# Patient Record
Sex: FEMALE | Race: WHITE | NOT HISPANIC OR LATINO | Employment: OTHER | URBAN - NONMETROPOLITAN AREA
[De-identification: names, ages, dates, MRNs, and addresses within clinical notes are randomized per-mention and may not be internally consistent; named-entity substitution may affect disease eponyms.]

---

## 2017-02-11 ENCOUNTER — TELEPHONE (OUTPATIENT)
Dept: FAMILY MEDICINE | Facility: OTHER | Age: 62
End: 2017-02-11

## 2017-02-11 NOTE — PATIENT INSTRUCTIONS
pLease call her and let her know it is time for a recheck of her kidney and liver function.  Electronically signed:    Phil Cunha PA-C

## 2017-05-26 ENCOUNTER — HEALTH MAINTENANCE LETTER (OUTPATIENT)
Age: 62
End: 2017-05-26

## 2017-06-02 DIAGNOSIS — E61.1 IRON DEFICIENCY: ICD-10-CM

## 2017-06-02 DIAGNOSIS — I10 HYPERTENSION GOAL BP (BLOOD PRESSURE) < 140/90: ICD-10-CM

## 2017-06-02 DIAGNOSIS — I10 BENIGN ESSENTIAL HYPERTENSION WITH TARGET BLOOD PRESSURE BELOW 140/90: ICD-10-CM

## 2017-06-05 RX ORDER — LISINOPRIL 10 MG/1
TABLET ORAL
Qty: 90 TABLET | Refills: 0 | Status: SHIPPED | OUTPATIENT
Start: 2017-06-05 | End: 2018-03-01

## 2017-06-05 RX ORDER — TRIAMTERENE AND HYDROCHLOROTHIAZIDE 37.5; 25 MG/1; MG/1
CAPSULE ORAL
Qty: 90 CAPSULE | Refills: 0 | Status: SHIPPED | OUTPATIENT
Start: 2017-06-05 | End: 2018-03-01

## 2017-06-05 NOTE — TELEPHONE ENCOUNTER
Lisinopril      Last Written Prescription Date: 11/30/16  Last Fill Quantity: 90, # refills: 1  Last Office Visit with [facility] [place] [place], [facility] or [facility] prescribing provider: 11/30/16       Potassium   Date Value Ref Range Status   12/19/2016 4.6 3.4 - 5.3 mmol/L Final     Creatinine   Date Value Ref Range Status   12/19/2016 1.36 (H) 0.52 - 1.04 mg/dL Final     BP Readings from Last 3 Encounters:   11/30/16 124/80   09/23/15 122/78   05/19/14 130/80     Dyazide      Last Written Prescription Date: 11/30/16  Last Fill Quantity: 90, # refills: 1  Last Office Visit with [facility] [place] [place], [facility] or [facility] prescribing provider: 11/30/16       Potassium   Date Value Ref Range Status   12/19/2016 4.6 3.4 - 5.3 mmol/L Final     Creatinine   Date Value Ref Range Status   12/19/2016 1.36 (H) 0.52 - 1.04 mg/dL Final     BP Readings from Last 3 Encounters:   11/30/16 124/80   09/23/15 122/78   05/19/14 130/80

## 2017-06-05 NOTE — TELEPHONE ENCOUNTER
Routing refill request to provider for review/approval because:  Labs out of range:  Creatinine    Alexandria Ritter RN  [facility]

## 2017-08-31 DIAGNOSIS — I10 BENIGN ESSENTIAL HYPERTENSION WITH TARGET BLOOD PRESSURE BELOW 140/90: ICD-10-CM

## 2017-08-31 DIAGNOSIS — E61.1 IRON DEFICIENCY: ICD-10-CM

## 2017-08-31 DIAGNOSIS — I10 HYPERTENSION GOAL BP (BLOOD PRESSURE) < 140/90: ICD-10-CM

## 2017-08-31 RX ORDER — TRIAMTERENE AND HYDROCHLOROTHIAZIDE 37.5; 25 MG/1; MG/1
CAPSULE ORAL
Qty: 90 CAPSULE | Refills: 0 | Status: SHIPPED | OUTPATIENT
Start: 2017-08-31 | End: 2017-11-27

## 2017-08-31 RX ORDER — LISINOPRIL 10 MG/1
TABLET ORAL
Qty: 90 TABLET | Refills: 0 | Status: SHIPPED | OUTPATIENT
Start: 2017-08-31 | End: 2017-11-27

## 2017-08-31 NOTE — TELEPHONE ENCOUNTER
Routing refill request to provider for review/approval because:  Labs out of range:  Creatinine    Alexandria Ritter RN  [facility]

## 2017-08-31 NOTE — TELEPHONE ENCOUNTER
Dyazide      Last Written Prescription Date: 6/05/2017  Last Fill Quantity: 90, # refills: 0  Last Office Visit with [facility] [place], [facility] or [facility] prescribing provider: 11/30/2016       Potassium   Date Value Ref Range Status   12/19/2016 4.6 3.4 - 5.3 mmol/L Final     Creatinine   Date Value Ref Range Status   12/19/2016 1.36 (H) 0.52 - 1.04 mg/dL Final     BP Readings from Last 3 Encounters:   11/30/16 124/80   09/23/15 122/78   05/19/14 130/80     Lisinopril      Last Written Prescription Date: 6/05/2017  Last Fill Quantity: 90, # refills: 0  Last Office Visit with [facility] [place], [facility] or [facility] prescribing provider: 11/30/2016       Potassium   Date Value Ref Range Status   12/19/2016 4.6 3.4 - 5.3 mmol/L Final     Creatinine   Date Value Ref Range Status   12/19/2016 1.36 (H) 0.52 - 1.04 mg/dL Final     BP Readings from Last 3 Encounters:   11/30/16 124/80   09/23/15 122/78   05/19/14 130/80

## 2017-11-27 DIAGNOSIS — I10 BENIGN ESSENTIAL HYPERTENSION WITH TARGET BLOOD PRESSURE BELOW 140/90: ICD-10-CM

## 2017-11-27 DIAGNOSIS — E61.1 IRON DEFICIENCY: ICD-10-CM

## 2017-11-27 DIAGNOSIS — I10 HYPERTENSION GOAL BP (BLOOD PRESSURE) < 140/90: ICD-10-CM

## 2017-11-28 RX ORDER — LISINOPRIL 10 MG/1
TABLET ORAL
Qty: 90 TABLET | Refills: 0 | Status: SHIPPED | OUTPATIENT
Start: 2017-11-28 | End: 2018-03-12

## 2017-11-28 RX ORDER — TRIAMTERENE AND HYDROCHLOROTHIAZIDE 37.5; 25 MG/1; MG/1
CAPSULE ORAL
Qty: 90 CAPSULE | Refills: 0 | Status: SHIPPED | OUTPATIENT
Start: 2017-11-28 | End: 2018-03-12

## 2017-11-28 RX ORDER — SULFASALAZINE 500 MG/1
TABLET ORAL
Qty: 360 TABLET | Refills: 0 | Status: SHIPPED | OUTPATIENT
Start: 2017-11-28 | End: 2018-03-12

## 2017-11-28 NOTE — TELEPHONE ENCOUNTER
Requested Prescriptions   Pending Prescriptions Disp Refills     sulfaSALAzine (AZULFIDINE) 500 MG tablet [Pharmacy Med Name: SULFASALAZINE 500MG TABS] 360 tablet 0     Sig: TAKE TWO TABLETS BY MOUTH TWICE A DAY --NO FURTHER REFILLS WITHOUT OFFICE VISIT    There is no refill protocol information for this order        lisinopril (PRINIVIL/ZESTRIL) 10 MG tablet [Pharmacy Med Name: LISINOPRIL 10MG TABS] 90 tablet 0     Sig: TAKE ONE TABLET BY MOUTH EVERY DAY--NO FURTHER REFILLS WITHOUT OFFICE VISIT    ACE Inhibitors (Including Combos) Protocol Failed    11/27/2017  7:52 AM       Failed - Normal serum creatinine on file in past 12 months    Recent Labs   Lab Test  12/19/16   1539   CR  1.36*            Passed - Blood pressure under 140/90    BP Readings from Last 3 Encounters:   11/30/16 124/80   09/23/15 122/78 05/19/14 130/80                Passed - Recent or future visit with authorizing provider's specialty    Patient had office visit in the last year or has a visit in the next 30 days with authorizing provider.  See chart review.              Passed - Patient is age 18 or older       Passed - No active pregnancy on record       Passed - Normal serum potassium on file in past 12 months    Recent Labs   Lab Test  12/19/16   1539   POTASSIUM  4.6            Passed - No positive pregnancy test in past 12 months        triamterene-hydrochlorothiazide (DYAZIDE) 37.5-25 MG per capsule [Pharmacy Med Name: TRIAMTERENE-HCTZ 37.5-25MG CAPS] 90 capsule 0     Sig: TAKE ONE CAPSULE BY MOUTH EVERY DAY--NO FURTHER REFILLS WITHOUT OFFICE VISIT    Diuretics (Including Combos) Protocol Failed    11/27/2017  7:52 AM       Failed - Normal serum creatinine on file in past 12 months    Recent Labs   Lab Test  12/19/16   1539   CR  1.36*             Passed - Blood pressure under 140/90    BP Readings from Last 3 Encounters:   11/30/16 124/80   09/23/15 122/78   05/19/14 130/80                Passed - Recent or future visit with  authorizing provider's specialty    Patient had office visit in the last year or has a visit in the next 30 days with authorizing provider.  See chart review.              Passed - Patient is age 18 or older       Passed - No active pregancy on record       Passed - Normal serum potassium on file in past 12 months    Recent Labs   Lab Test  12/19/16   1539   POTASSIUM  4.6                   Passed - Normal serum sodium on file in past 12 months    Recent Labs   Lab Test  12/19/16   1539   NA  144             Passed - No positive pregnancy test in past 12 months        sulfaSALAzine (AZULFIDINE) 500 MG tablet  Routing refill request to provider for review/approval because:  Lisa given x1 and patient did not follow up, please advise  Drug not on the [facility] refill protocol   Patient needs to be seen because:  Due for OV    lisinopril (PRINIVIL/ZESTRIL) 10 MG tablet  Routing refill request to provider for review/approval because:  Lisa given x1 and patient did not follow up, please advise  A break in medication, should have needed refills around 09/2017  Patient needs to be seen because:  Due for OV    triamterene-hydrochlorothiazide (DYAZIDE) 37.5-25 MG per capsule  Routing refill request to provider for review/approval because:  Lisa given x1 and patient did not follow up, please advise  Labs out of range:  creatinine  Patient needs to be seen because:  Due for OV    Isabel Lam, RN, BSN

## 2018-01-10 ENCOUNTER — TELEPHONE (OUTPATIENT)
Dept: FAMILY MEDICINE | Facility: OTHER | Age: 63
End: 2018-01-10

## 2018-01-10 NOTE — TELEPHONE ENCOUNTER
1/10/2018    Call Regarding Preventive Health Screening Colonoscopy, Mammogram and Cervical/PAP    Attempt 2    Message left with a man    Comments:     Outreach   Treva Marrero

## 2018-02-02 NOTE — TELEPHONE ENCOUNTER
2/2/2018    Call Regarding Preventive Health Screening Colonoscopy, Mammogram and Cervical/PAP    Attempt 3    Message left with a man    Comments:       Outreach   Kandis Luna

## 2018-02-27 DIAGNOSIS — E61.1 IRON DEFICIENCY: ICD-10-CM

## 2018-02-27 DIAGNOSIS — I10 HYPERTENSION GOAL BP (BLOOD PRESSURE) < 140/90: ICD-10-CM

## 2018-02-27 DIAGNOSIS — I10 BENIGN ESSENTIAL HYPERTENSION WITH TARGET BLOOD PRESSURE BELOW 140/90: ICD-10-CM

## 2018-02-28 RX ORDER — TRIAMTERENE AND HYDROCHLOROTHIAZIDE 37.5; 25 MG/1; MG/1
CAPSULE ORAL
Qty: 90 CAPSULE | Refills: 0 | OUTPATIENT
Start: 2018-02-28

## 2018-02-28 RX ORDER — SULFASALAZINE 500 MG/1
TABLET ORAL
Qty: 360 TABLET | Refills: 0 | OUTPATIENT
Start: 2018-02-28

## 2018-02-28 RX ORDER — LISINOPRIL 10 MG/1
TABLET ORAL
Qty: 90 TABLET | Refills: 0 | OUTPATIENT
Start: 2018-02-28

## 2018-02-28 NOTE — TELEPHONE ENCOUNTER
No further refills without office visit.  I have net seen her since 11/2016 and am not listed as her PCP.  Electronically signed:    Phil Cunha PA-C

## 2018-02-28 NOTE — TELEPHONE ENCOUNTER
sulfaSALAzine (AZULFIDINE) 500 MG tablet  Routing refill request to provider for review/approval because:  Lisa given x2 and patient did not follow up, please advise  Drug not on the [facility] refill protocol   Patient needs to be seen because:  Due for OV    lisinopril (PRINIVIL/ZESTRIL) 10 MG tablet  Routing refill request to provider for review/approval because:  Lisa given x2 and patient did not follow up, please advise  Labs not current:  CMP  Patient needs to be seen because:  Due for OV     triamterene-hydrochlorothiazide (DYAZIDE) 37.5-25 MG per capsule  Routing refill request to provider for review/approval because:  Lisa given x2 and patient did not follow up, please advise  Labs not current:  CMP  Patient needs to be seen because:  Due for OV     Please assist with scheduling.    Isabel Lam, RN, BSN

## 2018-03-01 RX ORDER — LISINOPRIL 10 MG/1
10 TABLET ORAL DAILY
Qty: 14 TABLET | Refills: 0 | Status: SHIPPED | OUTPATIENT
Start: 2018-03-01 | End: 2018-03-12

## 2018-03-01 RX ORDER — TRIAMTERENE AND HYDROCHLOROTHIAZIDE 37.5; 25 MG/1; MG/1
1 CAPSULE ORAL DAILY
Qty: 14 CAPSULE | Refills: 0 | Status: SHIPPED | OUTPATIENT
Start: 2018-03-01 | End: 2018-03-12

## 2018-03-01 RX ORDER — SULFASALAZINE 500 MG/1
1000 TABLET ORAL 2 TIMES DAILY
Qty: 56 TABLET | Refills: 0 | Status: SHIPPED | OUTPATIENT
Start: 2018-03-01 | End: 2018-03-12

## 2018-03-01 NOTE — TELEPHONE ENCOUNTER
Pt called back. Informed she needs an appt. She has one scheduled already for 3/12 to est with Dr. Cartwright. Can she get a supply to last until then?   Thank you,  Annamarie Heaton- Pt Rep.

## 2018-03-01 NOTE — TELEPHONE ENCOUNTER
Spoke with patient informed her of message below, no further questions   Closing encounter  Malia Gallardo RT (R)

## 2018-03-12 ENCOUNTER — OFFICE VISIT (OUTPATIENT)
Dept: FAMILY MEDICINE | Facility: OTHER | Age: 63
End: 2018-03-12
Payer: COMMERCIAL

## 2018-03-12 VITALS
TEMPERATURE: 98 F | SYSTOLIC BLOOD PRESSURE: 124 MMHG | RESPIRATION RATE: 20 BRPM | HEIGHT: 61 IN | OXYGEN SATURATION: 94 % | HEART RATE: 109 BPM | BODY MASS INDEX: 44.07 KG/M2 | DIASTOLIC BLOOD PRESSURE: 82 MMHG | WEIGHT: 233.4 LBS

## 2018-03-12 DIAGNOSIS — I10 BENIGN ESSENTIAL HYPERTENSION WITH TARGET BLOOD PRESSURE BELOW 140/90: ICD-10-CM

## 2018-03-12 DIAGNOSIS — D51.9 ANEMIA DUE TO VITAMIN B12 DEFICIENCY, UNSPECIFIED B12 DEFICIENCY TYPE: ICD-10-CM

## 2018-03-12 DIAGNOSIS — R73.03 PREDIABETES: Primary | ICD-10-CM

## 2018-03-12 DIAGNOSIS — K50.80 CROHN'S DISEASE OF BOTH SMALL AND LARGE INTESTINE WITHOUT COMPLICATION (H): ICD-10-CM

## 2018-03-12 DIAGNOSIS — E61.1 IRON DEFICIENCY: ICD-10-CM

## 2018-03-12 DIAGNOSIS — I10 HYPERTENSION GOAL BP (BLOOD PRESSURE) < 140/90: ICD-10-CM

## 2018-03-12 DIAGNOSIS — I27.0 PRIMARY PULMONARY HYPERTENSION (H): ICD-10-CM

## 2018-03-12 LAB
ANION GAP SERPL CALCULATED.3IONS-SCNC: 7 MMOL/L (ref 3–14)
BUN SERPL-MCNC: 25 MG/DL (ref 7–30)
CALCIUM SERPL-MCNC: 8.8 MG/DL (ref 8.5–10.1)
CHLORIDE SERPL-SCNC: 106 MMOL/L (ref 94–109)
CO2 SERPL-SCNC: 27 MMOL/L (ref 20–32)
CREAT SERPL-MCNC: 1.09 MG/DL (ref 0.52–1.04)
CREAT UR-MCNC: 144 MG/DL
ERYTHROCYTE [DISTWIDTH] IN BLOOD BY AUTOMATED COUNT: 13.8 % (ref 10–15)
FERRITIN SERPL-MCNC: 454 NG/ML (ref 8–252)
GFR SERPL CREATININE-BSD FRML MDRD: 51 ML/MIN/1.7M2
GLUCOSE SERPL-MCNC: 102 MG/DL (ref 70–99)
HBA1C MFR BLD: 4.8 % (ref 4.3–6)
HCT VFR BLD AUTO: 41.6 % (ref 35–47)
HGB BLD-MCNC: 13 G/DL (ref 11.7–15.7)
MCH RBC QN AUTO: 29.7 PG (ref 26.5–33)
MCHC RBC AUTO-ENTMCNC: 31.3 G/DL (ref 31.5–36.5)
MCV RBC AUTO: 95 FL (ref 78–100)
MICROALBUMIN UR-MCNC: 6 MG/L
MICROALBUMIN/CREAT UR: 4.02 MG/G CR (ref 0–25)
PLATELET # BLD AUTO: 227 10E9/L (ref 150–450)
POTASSIUM SERPL-SCNC: 4.5 MMOL/L (ref 3.4–5.3)
RBC # BLD AUTO: 4.37 10E12/L (ref 3.8–5.2)
SODIUM SERPL-SCNC: 140 MMOL/L (ref 133–144)
WBC # BLD AUTO: 8 10E9/L (ref 4–11)

## 2018-03-12 PROCEDURE — 82728 ASSAY OF FERRITIN: CPT | Performed by: INTERNAL MEDICINE

## 2018-03-12 PROCEDURE — 36415 COLL VENOUS BLD VENIPUNCTURE: CPT | Performed by: INTERNAL MEDICINE

## 2018-03-12 PROCEDURE — 99214 OFFICE O/P EST MOD 30 MIN: CPT | Performed by: INTERNAL MEDICINE

## 2018-03-12 PROCEDURE — 85027 COMPLETE CBC AUTOMATED: CPT | Performed by: INTERNAL MEDICINE

## 2018-03-12 PROCEDURE — 82043 UR ALBUMIN QUANTITATIVE: CPT | Performed by: INTERNAL MEDICINE

## 2018-03-12 PROCEDURE — 83036 HEMOGLOBIN GLYCOSYLATED A1C: CPT | Performed by: INTERNAL MEDICINE

## 2018-03-12 PROCEDURE — 80048 BASIC METABOLIC PNL TOTAL CA: CPT | Performed by: INTERNAL MEDICINE

## 2018-03-12 RX ORDER — SULFASALAZINE 500 MG/1
1000 TABLET ORAL 2 TIMES DAILY
Qty: 56 TABLET | Refills: 0 | Status: SHIPPED | OUTPATIENT
Start: 2018-03-12 | End: 2018-03-14

## 2018-03-12 RX ORDER — TRIAMTERENE AND HYDROCHLOROTHIAZIDE 37.5; 25 MG/1; MG/1
1 CAPSULE ORAL DAILY
Qty: 14 CAPSULE | Refills: 0 | Status: SHIPPED | OUTPATIENT
Start: 2018-03-12 | End: 2018-03-14

## 2018-03-12 RX ORDER — LISINOPRIL 10 MG/1
10 TABLET ORAL DAILY
Qty: 14 TABLET | Refills: 0 | Status: SHIPPED | OUTPATIENT
Start: 2018-03-12 | End: 2018-03-14

## 2018-03-12 ASSESSMENT — PAIN SCALES - GENERAL: PAINLEVEL: NO PAIN (0)

## 2018-03-12 NOTE — NURSING NOTE
"Chief Complaint   Patient presents with     Establish Care       Initial /82 (BP Location: Left arm, Patient Position: Chair, Cuff Size: Adult Large)  Pulse 109  Temp 98  F (36.7  C) (Temporal)  Resp 20  Ht 5' 1.2\" (1.554 m)  Wt 233 lb 6.4 oz (105.9 kg)  SpO2 94%  BMI 43.81 kg/m2 Estimated body mass index is 43.81 kg/(m^2) as calculated from the following:    Height as of this encounter: 5' 1.2\" (1.554 m).    Weight as of this encounter: 233 lb 6.4 oz (105.9 kg).  Medication Reconciliation: complete     Aida Pete MA 3/12/2018  4:11 PM          "

## 2018-03-12 NOTE — LETTER
March 20, 2018      Nadia Ladd  [address] [facility] [address]        Dear ,    We are writing to inform you of your test results.    The ferritin is slightly elevated at 454.   The kidney function has improved significantly over the last year.     Resulted Orders   Albumin Random Urine Quantitative with Creat Ratio   Result Value Ref Range    Creatinine Urine 144 mg/dL    Albumin Urine mg/L 6 mg/L    Albumin Urine mg/g Cr 4.02 0 - 25 mg/g Cr   Basic metabolic panel   Result Value Ref Range    Sodium 140 133 - 144 mmol/L    Potassium 4.5 3.4 - 5.3 mmol/L    Chloride 106 94 - 109 mmol/L    Carbon Dioxide 27 20 - 32 mmol/L    Anion Gap 7 3 - 14 mmol/L    Glucose 102 (H) 70 - 99 mg/dL    Urea Nitrogen 25 7 - 30 mg/dL    Creatinine 1.09 (H) 0.52 - 1.04 mg/dL    GFR Estimate 51 (L) >60 mL/min/1.7m2      Comment:      Non  GFR Calc    GFR Estimate If Black 62 >60 mL/min/1.7m2      Comment:       GFR Calc    Calcium 8.8 8.5 - 10.1 mg/dL   Hemoglobin A1c   Result Value Ref Range    Hemoglobin A1C 4.8 4.3 - 6.0 %   CBC with platelets   Result Value Ref Range    WBC 8.0 4.0 - 11.0 10e9/L    RBC Count 4.37 3.8 - 5.2 10e12/L    Hemoglobin 13.0 11.7 - 15.7 g/dL    Hematocrit 41.6 35.0 - 47.0 %    MCV 95 78 - 100 fl    MCH 29.7 26.5 - 33.0 pg    MCHC 31.3 (L) 31.5 - 36.5 g/dL    RDW 13.8 10.0 - 15.0 %    Platelet Count 227 150 - 450 10e9/L   Ferritin   Result Value Ref Range    Ferritin 454 (H) 8 - 252 ng/mL       If you have any questions or concerns, please call the clinic at the number listed above.       Sincerely,        Nomi Cartwright DO

## 2018-03-12 NOTE — MR AVS SNAPSHOT
"              After Visit Summary   3/12/2018    Nadia Ladd    MRN: [id number]           Patient Information     Date Of Birth          1955        Visit Information        Provider Department      3/12/2018 3:40 PM Nomi Cartwright DO [facility]        Today's Diagnoses     Prediabetes    -  1    Hypertension goal BP (blood pressure) < 140/90        Iron deficiency        Crohn's disease of both small and large intestine without complication (H)        Primary pulmonary hypertension (H)        Anemia due to vitamin B12 deficiency, unspecified B12 deficiency type        Benign essential hypertension with target blood pressure below 140/90           Follow-ups after your visit        Who to contact     If you have questions or need follow up information about today's clinic visit or your schedule please contact [facility] directly at [phone].  Normal or non-critical lab and imaging results will be communicated to you by MyChart, letter or phone within 4 business days after the clinic has received the results. If you do not hear from us within 7 days, please contact the clinic through MyChart or phone. If you have a critical or abnormal lab result, we will notify you by phone as soon as possible.  Submit refill requests through [employer] or call your pharmacy and they will forward the refill request to us. Please allow 3 business days for your refill to be completed.          Additional Information About Your Visit        MyChart Information     TableApp lets you send messages to your doctor, view your test results, renew your prescriptions, schedule appointments and more. To sign up, go to [email or web address] . Click on \"Log in\" on the left side of the screen, which will take you to the Welcome page. Then click on \"Sign up Now\" on the right side of the page.     You will be asked to enter the access code listed below, as well as some personal information. Please " "follow the directions to create your username and password.     Your access code is: [email or web address]  Expires: 2018 11:17 AM     Your access code will  in 90 days. If you need help or a new code, please call your [facility] clinic or [phone].        Care EveryWhere ID     This is your Care EveryWhere ID. This could be used by other organizations to access your [facility] medical records  [id number]        Your Vitals Were     Pulse Temperature Respirations Height Pulse Oximetry BMI (Body Mass Index)    109 98  F (36.7  C) (Temporal) 20 5' 1.2\" (1.554 m) 94% 43.81 kg/m2       Blood Pressure from Last 3 Encounters:   18 124/82   16 124/80   09/23/15 122/78    Weight from Last 3 Encounters:   18 233 lb 6.4 oz (105.9 kg)   16 229 lb 4.8 oz (104 kg)   09/23/15 209 lb 1.6 oz (94.8 kg)              We Performed the Following     Albumin Random Urine Quantitative with Creat Ratio     Basic metabolic panel     CBC with platelets     Ferritin     Hemoglobin A1c     IRON/IRON BINDING CAPACITY          Where to get your medicines      These medications were sent to [facility] [address]   [address] [facility] [address]     Phone:  [phone]     lisinopril 10 MG tablet    sulfaSALAzine 500 MG tablet    triamterene-hydrochlorothiazide 37.5-25 MG per capsule          Primary Care Provider Office Phone # Fax #    Nomi Geovani Cartwright [phone]       [address] [place] [address]        Equal Access to Services     PATTY SOLIS : Hadii mariah Spencer, wajimda luanthony, qaybta nas brizuela. So [facility] [phone].    ATENCIÓN: Si habla español, tiene a hampton disposición servicios gratuitos de asistencia lingüística. Llame al [phone].    We comply with applicable federal civil rights laws and [place] laws. We do not discriminate on the basis of race, color, national origin, " age, disability, sex, sexual orientation, or gender identity.            Thank you!     Thank you for choosing [facility]  for your care. Our goal is always to provide you with excellent care. Hearing back from our patients is one way we can continue to improve our services. Please take a few minutes to complete the written survey that you may receive in the mail after your visit with us. Thank you!             Your Updated Medication List - Protect others around you: Learn how to safely use, store and throw away your medicines at www.disposemymeds.org.          This list is accurate as of 3/12/18 11:59 PM.  Always use your most recent med list.                   Brand Name Dispense Instructions for use Diagnosis    cyanocobalamin 1000 MCG Tbcr    VITAMIN B-12 ER    100 tablet    Take 1,000 mcg by mouth daily    Vitamin B12 deficiency disease       ferrous sulfate 325 (65 FE) MG tablet    IRON    200 tablet    TAKE ONE TABLET BY MOUTH TWICE A DAY --NO FURTHER REFILLS WITHOUT OFFICE VISIT    Iron deficiency, Benign essential hypertension with target blood pressure below 140/90, Hypertension goal BP (blood pressure) < 140/90       fluocinonide 0.05 % cream    LIDEX    60 g    Small amount to affected area(s) BID PRN    Dermatitis       ibuprofen 200 MG tablet    ADVIL/MOTRIN     1 tablet daily as needed        lisinopril 10 MG tablet    PRINIVIL/ZESTRIL    14 tablet    Take 1 tablet (10 mg) by mouth daily    Hypertension goal BP (blood pressure) < 140/90       MULTI-DAY PLUS IRON PO           sulfaSALAzine 500 MG tablet    AZULFIDINE    56 tablet    Take 2 tablets (1,000 mg) by mouth 2 times daily    Crohn's disease of both small and large intestine without complication (H)       triamterene-hydrochlorothiazide 37.5-25 MG per capsule    DYAZIDE    14 capsule    Take 1 capsule by mouth daily    Hypertension goal BP (blood pressure) < 140/90

## 2018-03-12 NOTE — PROGRESS NOTES
SUBJECTIVE:   Nadia Ladd is a 62 year old female who presents to clinic today for the following health issues:        New Patient/Transfer of Care                   Chief Complaint    The patient is a pleasant 62-year-old female who presents today for first-time evaluation.  She has a history of Crohn's disease of both large and small intestine.  This is led to some iron deficiency.  She also has a history of hypertension and pulmonary hypertension.  She is currently having no acute respiratory compromise.  She does have some elevation in her blood sugar on previous lab work but has had no polyuria or polydipsia.  She has no symptoms of diabetes at this time.  She does have some associated B12 deficiency secondary to chronic inflammatory bowel disease.  By way of her medications, she is currently on Azulfidine and this is working fairly well for suppression of the inflammatory bowel disease.  She is on lisinopril and Dyazide for her hypertension.  She is on oral iron supplementation has been no significant side effects including constipation secondary to this at the time.                       PAST, FAMILY,SOCIAL HISTORY:     Medical  History:   has a past medical history of Arthropathia (8/5/2010); B12 deficiency anemia (10/21/2010); Benign essential hypertension with target blood pressure below 140/90 (10/10/2016); CARDIOVASCULAR SCREENING; LDL GOAL LESS THAN 160 (10/31/2010); Crohn's colitis (H) (2/15/2011); Dermatitis-dishydrotic eczema-severe (8/5/2010); HTN (hypertension) (7/21/2011); Iron deficiency anemia (10/21/2010); Primary pulmonary hypertension (H) (4/6/2010); and Unspecified essential hypertension.     Surgical History:   has a past surgical history that includes surgical history of -  (06/14/76); EXPLORATORY OF ABDOMEN (1989); and colonoscopy (10/11/10).     Social History:   reports that she has never smoked. She has never used smokeless tobacco. She reports that she does not drink alcohol or  "use illicit drugs.     Family History:  family history includes Anemia in her father; CEREBROVASCULAR DISEASE in her father; DIABETES in her father; Hypertension in her mother.            MEDICATIONS  Current Outpatient Prescriptions   Medication Sig Dispense Refill     Multiple Vitamins-Iron (MULTI-DAY PLUS IRON PO)        ferrous sulfate (IRON) 325 (65 FE) MG tablet TAKE ONE TABLET BY MOUTH TWICE A DAY --NO FURTHER REFILLS WITHOUT OFFICE VISIT 200 tablet 3     cyanocobalamin 1000 MCG TBCR Take 1,000 mcg by mouth daily 100 tablet 1     lisinopril (PRINIVIL/ZESTRIL) 10 MG tablet Take 1 tablet (10 mg) by mouth daily 30 tablet 0     sulfaSALAzine (AZULFIDINE) 500 MG tablet Take 2 tablets (1,000 mg) by mouth 2 times daily 120 tablet 0     triamterene-hydrochlorothiazide (DYAZIDE) 37.5-25 MG per capsule Take 1 capsule by mouth daily 30 capsule 0     fluocinonide (LIDEX) 0.05 % cream Small amount to affected area(s) BID PRN (Patient not taking: Reported on 3/12/2018) 60 g 1     IBUPROFEN 200 MG OR TABS 1 tablet daily as needed           --------------------------------------------------------------------------------------------------------------------                              Review of Systems     LUNGS: Pt denies: cough,excess sputum, hemoptysis, or shortness of breath.   HEART: Pt denies: chest pain, arrythmia, syncope, tachy or bradyarrhythmia.   GI: Pt denies: nausea, vomitting,  constipation, melena, or hematochezia.  She occasionally has diarrhea.   NEURO: Pt denies: seizures, strokes, diplopia, weakness, paraesthesias, or paralysis.   SKIN: Pt denies: itching, rashes, discoloration, or specific lesions of concern. Denies recent hair loss.                                     Examination      /82 (BP Location: Left arm, Patient Position: Chair, Cuff Size: Adult Large)  Pulse 109  Temp 98  F (36.7  C) (Temporal)  Resp 20  Ht 5' 1.2\" (1.554 m)  Wt 233 lb 6.4 oz (105.9 kg)  SpO2 94%  BMI 43.81 " kg/m2   Constitutional: The patient appears to be in no acute distress. The patient appears to be adequately hydrated. No acute respiratory or hemodynamic distress is noted at this time.   LUNGS: clear bilaterally, airflow is brisk, no intercostal retraction or stridor is noted. No coughing is noted during visit.   HEART:  regular without rubs, clicks, gallops, or murmurs. PMI is nondisplaced. Upstrokes are brisk. S1,S2 are heard.   GI: Abdomen is soft, without rebound, guarding or tenderness. Bowel sounds are appropriate. No renal bruits are heard.   NEURO: Pt is alert and appropriate. No neurologic lateralization is noted. Cranial nerves 2-12 are intact. Peripheral sensory and motor function are grossly normal.    SKIN:  warm and dry. No erythema, or rashes are noted. No specific lesions of concern are noted.                                           Decision Making    1. Hypertension goal BP (blood pressure) < 140/90  Currently controlled on medication.  No changes necessary.  Follow renal function    2. Iron deficiency  Check iron levels and hemoglobin  - CBC with platelets  - Ferritin  - IRON/IRON BINDING CAPACITY    3. Prediabetes  Check A1c and renal function  - Albumin Random Urine Quantitative with Creat Ratio  - Basic metabolic panel  - Hemoglobin A1c    4. Crohn's disease of both small and large intestine without complication (H)  Continue Azulfidine    5. Primary pulmonary hypertension (H)  Consider calcium channel blocker    6. Anemia due to vitamin B12 deficiency, unspecified B12 deficiency type  Continue B12 supplementation    7. Benign essential hypertension with target blood pressure below 140/90  Redundant entry                          FOLLOW UP   I have asked the patient to make an appointment for followup with me in 1 month        I have carefully explained the diagnosis and treatment options to the patient.  The patient has displayed an understanding of the above, and all subsequent questions  were answered.      DO DIANNA Bullard    Portions of this note were produced using [employer]  Although every attempt at real-time proof reading has been made, occasional grammar/syntax errors may have been missed.

## 2018-03-13 NOTE — PROGRESS NOTES
Please contact the patient and notify her of the following:  The hemoglobin A1c is 4.8.  The blood cell count is normal without evidence of anemia or leukemia.      Thank you.   DO DIANNA Bullard

## 2018-03-14 DIAGNOSIS — K50.80 CROHN'S DISEASE OF BOTH SMALL AND LARGE INTESTINE WITHOUT COMPLICATION (H): ICD-10-CM

## 2018-03-14 DIAGNOSIS — I10 HYPERTENSION GOAL BP (BLOOD PRESSURE) < 140/90: ICD-10-CM

## 2018-03-14 RX ORDER — SULFASALAZINE 500 MG/1
1000 TABLET ORAL 2 TIMES DAILY
Qty: 120 TABLET | Refills: 0 | Status: SHIPPED | OUTPATIENT
Start: 2018-03-14 | End: 2018-05-14

## 2018-03-14 RX ORDER — TRIAMTERENE AND HYDROCHLOROTHIAZIDE 37.5; 25 MG/1; MG/1
1 CAPSULE ORAL DAILY
Qty: 30 CAPSULE | Refills: 0 | Status: SHIPPED | OUTPATIENT
Start: 2018-03-14 | End: 2018-04-14

## 2018-03-14 RX ORDER — LISINOPRIL 10 MG/1
10 TABLET ORAL DAILY
Qty: 30 TABLET | Refills: 0 | Status: SHIPPED | OUTPATIENT
Start: 2018-03-14 | End: 2018-04-14

## 2018-03-14 NOTE — TELEPHONE ENCOUNTER
At appointment on 3/12/18. Only 14 days of mediacations were prescribed. Pharmacy is wondering if this was done intentional or can she have 30 days worth?     Fabienne Billingsley MA     3/14/2018

## 2018-03-20 NOTE — PROGRESS NOTES
Please contact the patient and notify her of the following:    The ferritin is slightly elevated at 454.  The kidney function has improved significantly over the last year.    Thank you.   DO DIANNA Bullard

## 2018-04-14 DIAGNOSIS — I10 HYPERTENSION GOAL BP (BLOOD PRESSURE) < 140/90: ICD-10-CM

## 2018-04-16 NOTE — TELEPHONE ENCOUNTER
"Requested Prescriptions   Pending Prescriptions Disp Refills     lisinopril (PRINIVIL/ZESTRIL) 10 MG tablet [Pharmacy Med Name: LISINOPRIL 10MG TABS] 30 tablet 0     Sig: TAKE ONE TABLET BY MOUTH EVERY DAY    ACE Inhibitors (Including Combos) Protocol Failed    4/14/2018  1:54 PM       Failed - Normal serum creatinine on file in past 12 months    Recent Labs   Lab Test  03/12/18   1629   CR  1.09*            Passed - Blood pressure under 140/90 in past 12 months    BP Readings from Last 3 Encounters:   03/12/18 124/82   11/30/16 124/80   09/23/15 122/78                Passed - Recent (12 mo) or future (30 days) visit within the authorizing provider's specialty    Patient had office visit in the last 12 months or has a visit in the next 30 days with authorizing provider or within the authorizing provider's specialty.  See \"Patient Info\" tab in inbasket, or \"Choose Columns\" in Meds & Orders section of the refill encounter.           Passed - Patient is age 18 or older       Passed - No active pregnancy on record       Passed - Normal serum potassium on file in past 12 months    Recent Labs   Lab Test  03/12/18   1629   POTASSIUM  4.5            Passed - No positive pregnancy test in past 12 months        triamterene-hydrochlorothiazide (DYAZIDE) 37.5-25 MG per capsule [Pharmacy Med Name: TRIAMTERENE-HCTZ 37.5-25MG CAPS] 30 capsule 0     Sig: TAKE ONE CAPSULE BY MOUTH EVERY DAY    Diuretics (Including Combos) Protocol Failed    4/14/2018  1:54 PM       Failed - Normal serum creatinine on file in past 12 months    Recent Labs   Lab Test  03/12/18   1629   CR  1.09*             Passed - Blood pressure under 140/90 in past 12 months    BP Readings from Last 3 Encounters:   03/12/18 124/82   11/30/16 124/80   09/23/15 122/78                Passed - Recent (12 mo) or future (30 days) visit within the authorizing provider's specialty    Patient had office visit in the last 12 months or has a visit in the next 30 days with " "authorizing provider or within the authorizing provider's specialty.  See \"Patient Info\" tab in inbasket, or \"Choose Columns\" in Meds & Orders section of the refill encounter.           Passed - Patient is age 18 or older       Passed - No active pregancy on record       Passed - Normal serum potassium on file in past 12 months    Recent Labs   Lab Test  03/12/18   1629   POTASSIUM  4.5                   Passed - Normal serum sodium on file in past 12 months    Recent Labs   Lab Test  03/12/18   1629   NA  140             Passed - No positive pregnancy test in past 12 months            lisinopril   Last Written Prescription Date:  3/14/18  Last Fill Quantity: 30,  # refills: 0   Last office visit: 3/12/2018 with prescribing provider:     Future Office Visit:        dyazide  Last Written Prescription Date:  3/14/18  Last Fill Quantity: 30,  # refills: 0   Last office visit: 3/12/2018 with prescribing provider:     Future Office Visit:      "

## 2018-04-16 NOTE — TELEPHONE ENCOUNTER
Routing refill request to provider for review/approval because:  Labs out of range:  Cr 1.09  Anna Ladd RN

## 2018-04-17 RX ORDER — LISINOPRIL 10 MG/1
TABLET ORAL
Qty: 30 TABLET | Refills: 0 | Status: SHIPPED | OUTPATIENT
Start: 2018-04-17 | End: 2018-05-14

## 2018-04-17 RX ORDER — TRIAMTERENE AND HYDROCHLOROTHIAZIDE 37.5; 25 MG/1; MG/1
CAPSULE ORAL
Qty: 30 CAPSULE | Refills: 0 | Status: SHIPPED | OUTPATIENT
Start: 2018-04-17 | End: 2018-05-14

## 2018-05-14 DIAGNOSIS — I10 HYPERTENSION GOAL BP (BLOOD PRESSURE) < 140/90: ICD-10-CM

## 2018-05-14 DIAGNOSIS — K50.80 CROHN'S DISEASE OF BOTH SMALL AND LARGE INTESTINE WITHOUT COMPLICATION (H): ICD-10-CM

## 2018-05-15 NOTE — TELEPHONE ENCOUNTER
"Requested Prescriptions   Pending Prescriptions Disp Refills     triamterene-hydrochlorothiazide (DYAZIDE) 37.5-25 MG per capsule [Pharmacy Med Name: TRIAMTERENE-HCTZ 37.5-25MG CAPS] 30 capsule 0    Last Written Prescription Date:  4-17-18  Last Fill Quantity: 30,  # refills: 0   Last office visit: 3/12/2018 with prescribing provider:  3-12-18   Future Office Visit:     Sig: TAKE ONE CAPSULE BY MOUTH EVERY DAY    Diuretics (Including Combos) Protocol Failed    5/14/2018  3:21 PM       Failed - Normal serum creatinine on file in past 12 months    Recent Labs   Lab Test  03/12/18   1629   CR  1.09*             Passed - Blood pressure under 140/90 in past 12 months    BP Readings from Last 3 Encounters:   03/12/18 124/82   11/30/16 124/80   09/23/15 122/78                Passed - Recent (12 mo) or future (30 days) visit within the authorizing provider's specialty    Patient had office visit in the last 12 months or has a visit in the next 30 days with authorizing provider or within the authorizing provider's specialty.  See \"Patient Info\" tab in inbasket, or \"Choose Columns\" in Meds & Orders section of the refill encounter.           Passed - Patient is age 18 or older       Passed - No active pregancy on record       Passed - Normal serum potassium on file in past 12 months    Recent Labs   Lab Test  03/12/18   1629   POTASSIUM  4.5                   Passed - Normal serum sodium on file in past 12 months    Recent Labs   Lab Test  03/12/18   1629   NA  140             Passed - No positive pregnancy test in past 12 months        sulfaSALAzine (AZULFIDINE) 500 MG tablet [Pharmacy Med Name: SULFASALAZINE 500MG TABS] 120 tablet 0    Last Written Prescription Date:  3-14-18  Last Fill Quantity: 120,  # refills: 0   Last office visit: 3/12/2018 with prescribing provider:  3-12-18   Future Office Visit:     Sig: TAKE TWO TABLETS BY MOUTH TWICE A DAY    There is no refill protocol information for this order        lisinopril " "(PRINIVIL/ZESTRIL) 10 MG tablet [Pharmacy Med Name: LISINOPRIL 10MG TABS] 30 tablet 0    Last Written Prescription Date:  4-17-18  Last Fill Quantity: 30,  # refills: 0   Last office visit: 3/12/2018 with prescribing provider:  3-12-18   Future Office Visit:     Sig: TAKE ONE TABLET BY MOUTH EVERY DAY    ACE Inhibitors (Including Combos) Protocol Failed    5/14/2018  3:21 PM       Failed - Normal serum creatinine on file in past 12 months    Recent Labs   Lab Test  03/12/18   1629   CR  1.09*            Passed - Blood pressure under 140/90 in past 12 months    BP Readings from Last 3 Encounters:   03/12/18 124/82   11/30/16 124/80   09/23/15 122/78                Passed - Recent (12 mo) or future (30 days) visit within the authorizing provider's specialty    Patient had office visit in the last 12 months or has a visit in the next 30 days with authorizing provider or within the authorizing provider's specialty.  See \"Patient Info\" tab in inbasket, or \"Choose Columns\" in Meds & Orders section of the refill encounter.           Passed - Patient is age 18 or older       Passed - No active pregnancy on record       Passed - Normal serum potassium on file in past 12 months    Recent Labs   Lab Test  03/12/18   1629   POTASSIUM  4.5            Passed - No positive pregnancy test in past 12 months          "

## 2018-05-16 NOTE — TELEPHONE ENCOUNTER
Routing refill request to provider for review/approval because:  Drug not on the [facility] refill protocol (Sulfasalazine)  Labs out of range:  Creatinine    Alexandria Ritter RN  [facility]

## 2018-05-17 RX ORDER — LISINOPRIL 10 MG/1
TABLET ORAL
Qty: 30 TABLET | Refills: 5 | Status: SHIPPED | OUTPATIENT
Start: 2018-05-17 | End: 2018-11-08

## 2018-05-17 RX ORDER — TRIAMTERENE AND HYDROCHLOROTHIAZIDE 37.5; 25 MG/1; MG/1
CAPSULE ORAL
Qty: 30 CAPSULE | Refills: 5 | Status: SHIPPED | OUTPATIENT
Start: 2018-05-17 | End: 2018-11-08

## 2018-05-17 RX ORDER — SULFASALAZINE 500 MG/1
TABLET ORAL
Qty: 120 TABLET | Refills: 5 | Status: SHIPPED | OUTPATIENT
Start: 2018-05-17 | End: 2018-12-10

## 2018-08-16 ENCOUNTER — TELEPHONE (OUTPATIENT)
Dept: FAMILY MEDICINE | Facility: OTHER | Age: 63
End: 2018-08-16

## 2018-08-16 NOTE — LETTER
[facility]  [address] [facility] [id number] [phone]        Nadia Ladd  [address] [facility] [address]      August 16, 2018      Dear Nadia,    I care about your health and have reviewed your health plan, including your medical conditions, medication list, and lab results and am making recommendations based on this review, to better manage your health.    You are in particular need of attention regarding:  -Breast Cancer Screening  -Colon Cancer Screening  -Cervical Cancer Screening    I am recommending that you:  -schedule a MAMMOGRAM which is due. You can call  [phone] to schedule your mammogram.    -schedule a PAP SMEAR EXAM. This is a screening test done during a pelvic exam to check for abnormal changes in the cells of the cervix.  Cervical cancer is preventable and curable if abnormal cells are detected and treated early. You can call your clinic at the number listed above to schedule your Pap Smear.    -schedule a COLONOSCOPY.  Colon cancer is now the second leading cause of cancer-related deaths in the [place] for both men and women.  There are over 130,000 new cases and 50,000 deaths per year from colon cancer.  A recent study, which included patients ages 55 to 79 found 50,400 [place] deaths from colorectal cancer could have been prevented if patients had undergone a colonoscopy in the previous 10 years.    If you have not had a colonoscopy, we encourage you to schedule by contacting us at [phone], Monday through Friday.  After hours, you may leave a message and we will return your call during normal business hours.      There is another option called a FIT test, if you don t wish to have a colonoscopy, which needs to be repeated every year.  It does replace the colonoscopy for colorectal cancer screening and can detect hidden bleeding in the lower colon.  If a positive result is obtained, you would be referred for a colonoscopy. Please  discuss this option with your provider.      For patients under/uninsured, we recommend you contact the [employer] program. [employer] is a free colorectal cancer screening program that provides colonoscopies for eligible under/uninsured [place] men and women. If you are interested in receiving a free colonoscopy, please call [employer] at [phone] (mention code [employer]) to see if you re eligible.     If you've had the preventative screening completed at another facility or feel you're not due for this screening, please call our clinic at the number listed above or send us a My Chart message so we can update our records. We would like to thank you in advance for taking the time to take care of your health.  If you have any questions, please don t hesitate to contact our clinic.    Sincerely,       Your [facility] Team

## 2018-08-16 NOTE — TELEPHONE ENCOUNTER
Panel Management Review      Patient has the following on her problem list: None      Composite cancer screening  Chart review shows that this patient is due/due soon for the following Pap Smear, Mammogram and Colonoscopy  Summary:    Patient is due/failing the following:   COLONOSCOPY, MAMMOGRAM and PAP    Action needed:   Patient needs office visit for pap.    Type of outreach:    Sent letter.    Questions for provider review:    None                                                                                                                                    Malia CABALLERO       Chart routed to Care Team .

## 2018-10-26 ENCOUNTER — TELEPHONE (OUTPATIENT)
Dept: GENERAL RADIOLOGY | Facility: CLINIC | Age: 63
End: 2018-10-26

## 2018-10-26 NOTE — TELEPHONE ENCOUNTER
10/26/2018  Call Regarding Preventive Health Screening [facility] Mammogram     Attempt 1     Message on voicemail   ?  ?  Outreach   PILAR

## 2018-10-30 NOTE — TELEPHONE ENCOUNTER
10/30/2018  Call Regarding Preventive Health Screening [facility] Mammogram     Attempt 2      Message on voicemail   ?  ?  Outreach   PILAR

## 2018-11-08 DIAGNOSIS — I10 HYPERTENSION GOAL BP (BLOOD PRESSURE) < 140/90: ICD-10-CM

## 2018-11-12 RX ORDER — TRIAMTERENE AND HYDROCHLOROTHIAZIDE 37.5; 25 MG/1; MG/1
CAPSULE ORAL
Qty: 30 CAPSULE | Refills: 5 | Status: SHIPPED | OUTPATIENT
Start: 2018-11-12 | End: 2019-05-06

## 2018-11-12 RX ORDER — LISINOPRIL 10 MG/1
TABLET ORAL
Qty: 30 TABLET | Refills: 5 | Status: SHIPPED | OUTPATIENT
Start: 2018-11-12 | End: 2019-05-06

## 2018-11-12 NOTE — TELEPHONE ENCOUNTER
"Requested Prescriptions   Pending Prescriptions Disp Refills     triamterene-hydrochlorothiazide (DYAZIDE) 37.5-25 MG per capsule [Pharmacy Med Name: TRIAMTERENE-HCTZ 37.5-25MG CAPS] 30 capsule 5     Sig: TAKE ONE CAPSULE BY MOUTH EVERY DAY    Diuretics (Including Combos) Protocol Failed    11/8/2018  3:32 PM       Failed - Normal serum creatinine on file in past 12 months    Recent Labs   Lab Test  03/12/18   1629   CR  1.09*             Passed - Blood pressure under 140/90 in past 12 months    BP Readings from Last 3 Encounters:   03/12/18 124/82   11/30/16 124/80   09/23/15 122/78                Passed - Recent (12 mo) or future (30 days) visit within the authorizing provider's specialty    Patient had office visit in the last 12 months or has a visit in the next 30 days with authorizing provider or within the authorizing provider's specialty.  See \"Patient Info\" tab in inbasket, or \"Choose Columns\" in Meds & Orders section of the refill encounter.             Passed - Patient is age 18 or older       Passed - No active pregancy on record       Passed - Normal serum potassium on file in past 12 months    Recent Labs   Lab Test  03/12/18   1629   POTASSIUM  4.5                   Passed - Normal serum sodium on file in past 12 months    Recent Labs   Lab Test  03/12/18   1629   NA  140             Passed - No positive pregnancy test in past 12 months        lisinopril (PRINIVIL/ZESTRIL) 10 MG tablet [Pharmacy Med Name: LISINOPRIL 10MG TABS] 30 tablet 5     Sig: TAKE ONE TABLET BY MOUTH EVERY DAY    ACE Inhibitors (Including Combos) Protocol Failed    11/8/2018  3:32 PM       Failed - Normal serum creatinine on file in past 12 months    Recent Labs   Lab Test  03/12/18   1629   CR  1.09*            Passed - Blood pressure under 140/90 in past 12 months    BP Readings from Last 3 Encounters:   03/12/18 124/82   11/30/16 124/80   09/23/15 122/78                Passed - Recent (12 mo) or future (30 days) visit within " "the authorizing provider's specialty    Patient had office visit in the last 12 months or has a visit in the next 30 days with authorizing provider or within the authorizing provider's specialty.  See \"Patient Info\" tab in inbasket, or \"Choose Columns\" in Meds & Orders section of the refill encounter.             Passed - Patient is age 18 or older       Passed - No active pregnancy on record       Passed - Normal serum potassium on file in past 12 months    Recent Labs   Lab Test  03/12/18   1629   POTASSIUM  4.5            Passed - No positive pregnancy test in past 12 months        Routing refill request to provider for review/approval because:  Labs out of range:  Creatinine.    Marce Lou RN          "

## 2018-12-10 DIAGNOSIS — K50.80 CROHN'S DISEASE OF BOTH SMALL AND LARGE INTESTINE WITHOUT COMPLICATION (H): ICD-10-CM

## 2018-12-11 RX ORDER — SULFASALAZINE 500 MG/1
TABLET ORAL
Qty: 120 TABLET | Refills: 5 | Status: SHIPPED | OUTPATIENT
Start: 2018-12-11 | End: 2019-06-06

## 2018-12-11 NOTE — TELEPHONE ENCOUNTER
Sulfasalazine 500 MG       Last Written Prescription Date:  5/17/18  Last Fill Quantity: 120,   # refills: 5  Last Office Visit: 3/12/18  Future Office visit:       Routing refill request to provider for review/approval because:  Drug not on the [facility], [facility] or [facility] refill protocol or controlled substance

## 2019-05-06 DIAGNOSIS — I10 HYPERTENSION GOAL BP (BLOOD PRESSURE) < 140/90: ICD-10-CM

## 2019-05-07 RX ORDER — TRIAMTERENE AND HYDROCHLOROTHIAZIDE 37.5; 25 MG/1; MG/1
CAPSULE ORAL
Qty: 30 CAPSULE | Refills: 0 | Status: SHIPPED | OUTPATIENT
Start: 2019-05-07 | End: 2019-06-06

## 2019-05-07 RX ORDER — LISINOPRIL 10 MG/1
TABLET ORAL
Qty: 30 TABLET | Refills: 0 | Status: SHIPPED | OUTPATIENT
Start: 2019-05-07 | End: 2019-06-06

## 2019-05-07 NOTE — TELEPHONE ENCOUNTER
Medication is being filled for 1 time refill only due to:  Patient needs to be seen because it has been more than one year since last visit.     Will route to scheduling to call and schedule.     KEVIN BellaN, RN  [facility]

## 2019-05-07 NOTE — TELEPHONE ENCOUNTER
"Requested Prescriptions   Pending Prescriptions Disp Refills     triamterene-HCTZ (DYAZIDE) 37.5-25 MG capsule [Pharmacy Med Name: TRIAMTERENE-HCTZ 37.5-25MG CAPS] 30 capsule 5     Sig: TAKE ONE CAPSULE BY MOUTH EVERY DAY   Last Written Prescription Date:  11/12/18  Last Fill Quantity: 30,  # refills: 5   Last office visit: 3/12/2018 with prescribing provider:  3/12/18   Future Office Visit:        Diuretics (Including Combos) Protocol Failed - 5/6/2019  3:29 PM        Failed - Blood pressure under 140/90 in past 12 months     BP Readings from Last 3 Encounters:   03/12/18 124/82   11/30/16 124/80   09/23/15 122/78                 Failed - Recent (12 mo) or future (30 days) visit within the authorizing provider's specialty     Patient had office visit in the last 12 months or has a visit in the next 30 days with authorizing provider or within the authorizing provider's specialty.  See \"Patient Info\" tab in inbasket, or \"Choose Columns\" in Meds & Orders section of the refill encounter.              Failed - Normal serum creatinine on file in past 12 months     Recent Labs   Lab Test 03/12/18  1629   CR 1.09*              Failed - Normal serum potassium on file in past 12 months     Recent Labs   Lab Test 03/12/18  1629   POTASSIUM 4.5                    Failed - Normal serum sodium on file in past 12 months     Recent Labs   Lab Test 03/12/18  1629                 Passed - Medication is active on med list        Passed - Patient is age 18 or older        Passed - No active pregancy on record        Passed - No positive pregnancy test in past 12 months        lisinopril (PRINIVIL/ZESTRIL) 10 MG tablet [Pharmacy Med Name: LISINOPRIL 10MG TABS] 30 tablet 5     Sig: TAKE ONE TABLET BY MOUTH ONCE DAILY   Last Written Prescription Date:  11/12/18  Last Fill Quantity: 30,  # refills: 5   Last office visit: 3/12/2018 with prescribing provider:  3/12/18   Future Office Visit:        ACE Inhibitors (Including Combos) " "Protocol Failed - 5/6/2019  3:29 PM        Failed - Blood pressure under 140/90 in past 12 months     BP Readings from Last 3 Encounters:   03/12/18 124/82   11/30/16 124/80   09/23/15 122/78                 Failed - Recent (12 mo) or future (30 days) visit within the authorizing provider's specialty     Patient had office visit in the last 12 months or has a visit in the next 30 days with authorizing provider or within the authorizing provider's specialty.  See \"Patient Info\" tab in inbasket, or \"Choose Columns\" in Meds & Orders section of the refill encounter.              Failed - Normal serum creatinine on file in past 12 months     Recent Labs   Lab Test 03/12/18  1629   CR 1.09*             Failed - Normal serum potassium on file in past 12 months     Recent Labs   Lab Test 03/12/18  1629   POTASSIUM 4.5             Passed - Medication is active on med list        Passed - Patient is age 18 or older        Passed - No active pregnancy on record        Passed - No positive pregnancy test within past 12 months        "

## 2019-05-07 NOTE — TELEPHONE ENCOUNTER
Patient called back, relayed message above, patient will call back to schedule appt       Jennifer Wilberto ~ Patient Representative  [address] [address]  [email or web address]  [email or web address]  Office:  [phone]  Fax:  [phone]

## 2019-05-07 NOTE — TELEPHONE ENCOUNTER
Left message for patient to call back and speak with any .        Jennifer Ladd ~ Patient Representative  [address] [address]  [email or web address]  [email or web address]  Office:  [phone]  Fax:  [phone]

## 2019-06-06 ENCOUNTER — OFFICE VISIT (OUTPATIENT)
Dept: FAMILY MEDICINE | Facility: OTHER | Age: 64
End: 2019-06-06
Payer: COMMERCIAL

## 2019-06-06 VITALS
TEMPERATURE: 97.2 F | DIASTOLIC BLOOD PRESSURE: 82 MMHG | BODY MASS INDEX: 42.51 KG/M2 | HEART RATE: 108 BPM | HEIGHT: 62 IN | RESPIRATION RATE: 16 BRPM | WEIGHT: 231 LBS | OXYGEN SATURATION: 93 % | SYSTOLIC BLOOD PRESSURE: 130 MMHG

## 2019-06-06 DIAGNOSIS — Z12.31 ENCOUNTER FOR SCREENING MAMMOGRAM FOR BREAST CANCER: ICD-10-CM

## 2019-06-06 DIAGNOSIS — L30.9 DERMATITIS: ICD-10-CM

## 2019-06-06 DIAGNOSIS — E66.01 MORBID OBESITY (H): ICD-10-CM

## 2019-06-06 DIAGNOSIS — Z11.4 SCREENING FOR HIV (HUMAN IMMUNODEFICIENCY VIRUS): ICD-10-CM

## 2019-06-06 DIAGNOSIS — Z13.220 SCREENING FOR HYPERLIPIDEMIA: Primary | ICD-10-CM

## 2019-06-06 DIAGNOSIS — I10 HYPERTENSION GOAL BP (BLOOD PRESSURE) < 140/90: ICD-10-CM

## 2019-06-06 DIAGNOSIS — K50.80 CROHN'S DISEASE OF BOTH SMALL AND LARGE INTESTINE WITHOUT COMPLICATION (H): ICD-10-CM

## 2019-06-06 LAB
ALBUMIN SERPL-MCNC: 3.8 G/DL (ref 3.4–5)
ALP SERPL-CCNC: 156 U/L (ref 40–150)
ALT SERPL W P-5'-P-CCNC: 23 U/L (ref 0–50)
ANION GAP SERPL CALCULATED.3IONS-SCNC: 10 MMOL/L (ref 3–14)
AST SERPL W P-5'-P-CCNC: 18 U/L (ref 0–45)
BILIRUB SERPL-MCNC: 0.5 MG/DL (ref 0.2–1.3)
BUN SERPL-MCNC: 28 MG/DL (ref 7–30)
CALCIUM SERPL-MCNC: 8.8 MG/DL (ref 8.5–10.1)
CHLORIDE SERPL-SCNC: 112 MMOL/L (ref 94–109)
CHOLEST SERPL-MCNC: 234 MG/DL
CO2 SERPL-SCNC: 20 MMOL/L (ref 20–32)
CREAT SERPL-MCNC: 1.21 MG/DL (ref 0.52–1.04)
CREAT UR-MCNC: 229 MG/DL
ERYTHROCYTE [DISTWIDTH] IN BLOOD BY AUTOMATED COUNT: 14.7 % (ref 10–15)
GFR SERPL CREATININE-BSD FRML MDRD: 48 ML/MIN/{1.73_M2}
GLUCOSE SERPL-MCNC: 119 MG/DL (ref 70–99)
HCT VFR BLD AUTO: 42.4 % (ref 35–47)
HDLC SERPL-MCNC: 43 MG/DL
HGB BLD-MCNC: 12.9 G/DL (ref 11.7–15.7)
LDLC SERPL CALC-MCNC: 138 MG/DL
MCH RBC QN AUTO: 28.9 PG (ref 26.5–33)
MCHC RBC AUTO-ENTMCNC: 30.4 G/DL (ref 31.5–36.5)
MCV RBC AUTO: 95 FL (ref 78–100)
MICROALBUMIN UR-MCNC: 22 MG/L
MICROALBUMIN/CREAT UR: 9.39 MG/G CR (ref 0–25)
NONHDLC SERPL-MCNC: 191 MG/DL
PLATELET # BLD AUTO: 250 10E9/L (ref 150–450)
POTASSIUM SERPL-SCNC: 4.7 MMOL/L (ref 3.4–5.3)
PROT SERPL-MCNC: 7.5 G/DL (ref 6.8–8.8)
RBC # BLD AUTO: 4.46 10E12/L (ref 3.8–5.2)
SODIUM SERPL-SCNC: 142 MMOL/L (ref 133–144)
TRIGL SERPL-MCNC: 266 MG/DL
WBC # BLD AUTO: 6.8 10E9/L (ref 4–11)

## 2019-06-06 PROCEDURE — 99214 OFFICE O/P EST MOD 30 MIN: CPT | Performed by: INTERNAL MEDICINE

## 2019-06-06 PROCEDURE — 85027 COMPLETE CBC AUTOMATED: CPT | Performed by: INTERNAL MEDICINE

## 2019-06-06 PROCEDURE — 36415 COLL VENOUS BLD VENIPUNCTURE: CPT | Performed by: INTERNAL MEDICINE

## 2019-06-06 PROCEDURE — 82043 UR ALBUMIN QUANTITATIVE: CPT | Performed by: INTERNAL MEDICINE

## 2019-06-06 PROCEDURE — 87389 HIV-1 AG W/HIV-1&-2 AB AG IA: CPT | Performed by: INTERNAL MEDICINE

## 2019-06-06 PROCEDURE — 80053 COMPREHEN METABOLIC PANEL: CPT | Performed by: INTERNAL MEDICINE

## 2019-06-06 PROCEDURE — 80061 LIPID PANEL: CPT | Performed by: INTERNAL MEDICINE

## 2019-06-06 RX ORDER — TRIAMTERENE AND HYDROCHLOROTHIAZIDE 37.5; 25 MG/1; MG/1
1 CAPSULE ORAL DAILY
Qty: 90 CAPSULE | Refills: 3 | Status: SHIPPED | OUTPATIENT
Start: 2019-06-06 | End: 2020-05-27

## 2019-06-06 RX ORDER — LISINOPRIL 10 MG/1
10 TABLET ORAL DAILY
Qty: 90 TABLET | Refills: 3 | Status: SHIPPED | OUTPATIENT
Start: 2019-06-06 | End: 2020-05-27

## 2019-06-06 RX ORDER — FLUOCINONIDE 0.5 MG/G
CREAM TOPICAL
Qty: 60 G | Refills: 1 | Status: SHIPPED | OUTPATIENT
Start: 2019-06-06 | End: 2021-01-26

## 2019-06-06 RX ORDER — SULFASALAZINE 500 MG/1
TABLET ORAL
Qty: 360 TABLET | Refills: 3 | Status: SHIPPED | OUTPATIENT
Start: 2019-06-06 | End: 2020-05-27

## 2019-06-06 ASSESSMENT — PAIN SCALES - GENERAL: PAINLEVEL: NO PAIN (0)

## 2019-06-06 ASSESSMENT — MIFFLIN-ST. JEOR: SCORE: 1561.06

## 2019-06-06 NOTE — LETTER
June 11, 2019      Nadia Ladd  [address] [facility] [address]        Dear ,    We are writing to inform you of your test results.    The microalbumin is normal.   The screening HIV test is negative.   The chemistry panel demonstrates a mildly elevated blood sugar 119 with slight decrease in kidney function with a GFR of 48.   Liver tests are essentially normal.   The cholesterol is slightly elevated with an LDL of 138.   The blood cell count is normal without evidence of anemia or.     Feel free to contact me via the office or My Chart if you have any questions regarding the above.     Resulted Orders   HIV Screening   Result Value Ref Range    HIV Antigen Antibody Combo Nonreactive NR^Nonreactive          Comment:      HIV-1 p24 Ag & HIV-1/HIV-2 Ab Not Detected   Lipid panel reflex to direct LDL Fasting   Result Value Ref Range    Cholesterol 234 (H) <200 mg/dL      Comment:      Desirable:       <200 mg/dl    Triglycerides 266 (H) <150 mg/dL      Comment:      Borderline high:  150-199 mg/dl  High:             200-499 mg/dl  Very high:       >499 mg/dl      HDL Cholesterol 43 (L) >49 mg/dL    LDL Cholesterol Calculated 138 (H) <100 mg/dL      Comment:      Above desirable:  100-129 mg/dl  Borderline High:  130-159 mg/dL  High:             160-189 mg/dL  Very high:       >189 mg/dl      Non HDL Cholesterol 191 (H) <130 mg/dL      Comment:      Above Desirable:  130-159 mg/dl  Borderline high:  160-189 mg/dl  High:             190-219 mg/dl  Very high:       >219 mg/dl     Albumin Random Urine Quantitative with Creat Ratio   Result Value Ref Range    Creatinine Urine 229 mg/dL    Albumin Urine mg/L 22 mg/L    Albumin Urine mg/g Cr 9.39 0 - 25 mg/g Cr   CBC with platelets   Result Value Ref Range    WBC 6.8 4.0 - 11.0 10e9/L    RBC Count 4.46 3.8 - 5.2 10e12/L    Hemoglobin 12.9 11.7 - 15.7 g/dL    Hematocrit 42.4 35.0 - 47.0 %    MCV 95 78 - 100 fl    MCH 28.9 26.5 - 33.0 pg    MCHC 30.4 (L)  31.5 - 36.5 g/dL    RDW 14.7 10.0 - 15.0 %    Platelet Count 250 150 - 450 10e9/L   Comprehensive metabolic panel   Result Value Ref Range    Sodium 142 133 - 144 mmol/L    Potassium 4.7 3.4 - 5.3 mmol/L    Chloride 112 (H) 94 - 109 mmol/L    Carbon Dioxide 20 20 - 32 mmol/L    Anion Gap 10 3 - 14 mmol/L    Glucose 119 (H) 70 - 99 mg/dL    Urea Nitrogen 28 7 - 30 mg/dL    Creatinine 1.21 (H) 0.52 - 1.04 mg/dL    GFR Estimate 48 (L) >60 mL/min/[1.73_m2]      Comment:      Non  GFR Calc  Starting 12/18/2018, serum creatinine based estimated GFR (eGFR) will be   calculated using the Chronic Kidney Disease Epidemiology Collaboration   (CKD-EPI) equation.      GFR Estimate If Black 55 (L) >60 mL/min/[1.73_m2]      Comment:       GFR Calc  Starting 12/18/2018, serum creatinine based estimated GFR (eGFR) will be   calculated using the Chronic Kidney Disease Epidemiology Collaboration   (CKD-EPI) equation.      Calcium 8.8 8.5 - 10.1 mg/dL    Bilirubin Total 0.5 0.2 - 1.3 mg/dL    Albumin 3.8 3.4 - 5.0 g/dL    Protein Total 7.5 6.8 - 8.8 g/dL    Alkaline Phosphatase 156 (H) 40 - 150 U/L    ALT 23 0 - 50 U/L    AST 18 0 - 45 U/L       If you have any questions or concerns, please call the clinic at the number listed above.       Sincerely,        Nomi Cartwright DO

## 2019-06-06 NOTE — NURSING NOTE
After Visit Summary   2018    Akua Gr    MRN: [id number]           Patient Information     Date Of Birth          1983        Visit Information        Provider Department      2018 12:45 PM Kaila Luevano GC [facility] M[facility] Fetal [facility]        Today's Diagnoses     Abnormal  ultrasound    -  1    Pregnancy related condition, antepartum           Follow-ups after your visit        Your next 10 appointments already scheduled     Sep 11, 2018 10:15 AM CDT   ESTABLISHED PRENATAL with CHICHI Briscoe CNM   [facility] ([facility])    [address]  [address]  [facility] [id number] [phone]            Sep 19, 2018 10:15 AM CDT   MFM US COMPRE SINGLE F/U with [id number]   [facility] Maternal Fetal Medicine Ultrasound - [place] ([facility])    [address]  [facility] [id number] [phone]           Wear comfortable clothes and leave your valuables at home.            Sep 19, 2018 10:45 AM CDT   Radiology MD with UR MFM MD   [facility] Maternal Fetal Medicine [facility])    [address]  [facility] [id number] [phone]           Please arrive at the time given for your first appointment. This visit is used internally to schedule the physician's time during your ultrasound.              Future tests that were ordered for you today     Open Future Orders        Priority Expected Expires Ordered    MFM US Comprehensive Single F/U Routine  2019    MFM US Comprehensive Single F/U Routine 10/23/2018 2019 2018            Who to contact     If you have questions or need follow up information about today's clinic visit or your schedule please contact [facility]ETAL [facility] directly at [phone].  Normal or non-critical lab and imaging results will be  Health Maintenance reviewed - Patient declined scheduling cancer screening today do to insurance coverage.  Fabienne Rod MA     6/6/2019     "communicated to you by MyChart, letter or phone within 4 business days after the clinic has received the results. If you do not hear from us within 7 days, please contact the clinic through [employer] or phone. If you have a critical or abnormal lab result, we will notify you by phone as soon as possible.  Submit refill requests through [employer] or call your pharmacy and they will forward the refill request to us. Please allow 3 business days for your refill to be completed.          Additional Information About Your Visit        [employer] Information     [employer] lets you send messages to your doctor, view your test results, renew your prescriptions, schedule appointments and more. To sign up, go to [email or web address] . Click on \"Log in\" on the left side of the screen, which will take you to the Welcome page. Then click on \"Sign up Now\" on the right side of the page.     You will be asked to enter the access code listed below, as well as some personal information. Please follow the directions to create your username and password.     Your access code is: [email or web address]  Expires: 2018 10:09 AM     Your access code will  in 90 days. If you need help or a new code, please call your [facility] clinic or [phone].        Care EveryWhere ID     This is your Care EveryWhere ID. This could be used by other organizations to access your [facility] medical records  [id number]        Your Vitals Were     Last Period                   2018 (Exact Date)            Blood Pressure from Last 3 Encounters:   18 94/63   08/15/18 104/54   18 93/52    Weight from Last 3 Encounters:   18 63.5 kg (140 lb)   08/15/18 64.1 kg (141 lb 4.8 oz)   18 64.9 kg (143 lb)              We Performed the Following     [facility] Genetic Counseling        Primary Care Provider Office Phone # Fax #    [facility] [phone] [address] [address]        Equal " Access to Services     [facility]: Hadii dana hester yasmin Ware, waaxda luqadaha, qaybta kaaldaniel magdylenamelanie, av gonzalesgeovanyriley riggins. So [facility] [phone].    ATENCIÓN: Si habla doris, tiene a lopez disposición servicios gratuitos de asistencia lingüística. Llame al [phone].    We comply with applicable federal civil rights laws and [place] laws. We do not discriminate on the basis of race, color, national origin, age, disability, sex, sexual orientation, or gender identity.            Thank you!     Thank you for choosing [facility]  for your care. Our goal is always to provide you with excellent care. Hearing back from our patients is one way we can continue to improve our services. Please take a few minutes to complete the written survey that you may receive in the mail after your visit with us. Thank you!             Your Updated Medication List - Protect others around you: Learn how to safely use, store and throw away your medicines at www.disposemymeds.org.          This list is accurate as of 8/28/18  4:42 PM.  Always use your most recent med list.                   Brand Name Dispense Instructions for use Diagnosis    albuterol 108 (90 Base) MCG/ACT inhaler    PROAIR HFA/PROVENTIL HFA/VENTOLIN HFA    1 Inhaler    Inhale 2 puffs into the lungs every 6 hours as needed for shortness of breath / dyspnea or wheezing        doxylamine 25 MG Tabs tablet    UNISOM    30 each    Take 1 tablet (25 mg) by mouth every 8 hours as needed For nausea        * Doxylamine-Pyridoxine 10-10 MG Tbec     30 tablet    Take 10 mg by mouth At Bedtime        * Doxylamine-Pyridoxine 10-10 MG Tbec     30 tablet    Take 10 mg by mouth At Bedtime        * Doxylamine-Pyridoxine 10-10 MG Tbec     30 tablet    Take 10 mg by mouth At Bedtime        * Doxylamine-Pyridoxine 10-10 MG Tbec     30 tablet    Take 10 mg by mouth At Bedtime        famotidine 20 MG tablet    PEPCID    60  tablet    Take 1 tablet (20 mg) by mouth 2 times daily    Heartburn in pregnancy in second trimester       nitroFURantoin (macrocrystal-monohydrate) 100 MG capsule    MACROBID    20 capsule    Take 1 capsule (100 mg) by mouth 2 times daily    Dysuria       prenatal multivitamin  with iron 28-0.8 MG Tabs     30 each    Take 1 tablet by mouth daily        pyridOXINE 25 MG tablet    vitamin B-6    30 tablet    Take 1 tablet (25 mg) by mouth every 8 hours as needed For nausea        * Notice:  This list has 4 medication(s) that are the same as other medications prescribed for you. Read the directions carefully, and ask your doctor or other care provider to review them with you.

## 2019-06-06 NOTE — PROGRESS NOTES
Subjective     Nadia Ladd is a 63 year old female who presents to clinic today for the following health issues:    HPI   Hypertension Follow-up      Do you check your blood pressure regularly outside of the clinic? Yes     Are you following a low salt diet? Yes    Are your blood pressures ever more than 140 on the top number (systolic) OR more   than 90 on the bottom number (diastolic), for example 140/90? No    Amount of exercise or physical activity: daily walks    Problems taking medications regularly: No    Medication side effects: none    Diet: regular (no restrictions)                      Chief Complaint             The patient is a pleasant 63-year-old female who presents today for follow-up of her hypertension.  She has a history of occasional dermatitis involving generally the distal extremities and has intermittently been using Lidex cream for this.  She would like a refill of this as it works well and she uses it sparingly.  She is fully aware that this should never be used on her face.  With respect to her hypertension, she continues to take the lisinopril without any cough or side effects.  She does have a history of Crohn's colitis and is well controlled on her current sulfasalazine.  She is had no diarrhea, melena or hematochezia.  Her last colonoscopy was in 2010.  She notes that she has not seen her endocrinologist in some time.  Have recommended colonoscopy.                       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     Surgical History:   has a past surgical history that includes surgical history of -  (06/14/76); EXPLORATORY OF ABDOMEN (1989); and colonoscopy (10/11/10).     Social History:   reports that she has never smoked. She has never used smokeless tobacco. She reports that she does not drink alcohol or use drugs.     Family History:  family history includes Anemia in her father; Cerebrovascular Disease in her father; Diabetes in her father; Hypertension in her mother.            MEDICATIONS  Current Outpatient Medications   Medication Sig Dispense Refill     cyanocobalamin 1000 MCG TBCR Take 1,000 mcg by mouth daily 100 tablet 1     ferrous sulfate (IRON) 325 (65 FE) MG tablet TAKE ONE TABLET BY MOUTH TWICE A DAY --NO FURTHER REFILLS WITHOUT OFFICE VISIT 200 tablet 3     fluocinonide (LIDEX) 0.05 % external cream Small amount to affected area(s) BID PRN 60 g 1     IBUPROFEN 200 MG OR TABS 1 tablet daily as needed       lisinopril (PRINIVIL/ZESTRIL) 10 MG tablet Take 1 tablet (10 mg) by mouth daily 90 tablet 3     Multiple Vitamins-Iron (MULTI-DAY PLUS IRON PO)        sulfaSALAzine (AZULFIDINE) 500 MG tablet TAKE TWO TABLETS BY MOUTH TWICE A  tablet 3     triamterene-HCTZ (DYAZIDE) 37.5-25 MG capsule Take 1 capsule by mouth daily 90 capsule 3         --------------------------------------------------------------------------------------------------------------------                              Review of Systems       LUNGS: Pt denies: cough, excess sputum, hemoptysis, or shortness of breath.   HEART: Pt denies: chest pain, arrhythmia, syncope, tachy or bradyarrhythmia.   GI: Pt denies: nausea, vomiting, diarrhea, constipation, melena, or hematochezia.   NEURO: Pt denies: seizures, strokes, diplopia, weakness, paraesthesias, or paralysis.   SKIN: Pt denies: itching, rashes, discoloration, or specific lesions of concern. Denies recent hair loss.   PSYCH: The patient denies significant depression,  "anxiety, mood imbalance. Specifically denies any suicidal ideation.                                     Examination    /82 (BP Location: Left arm, Patient Position: Sitting, Cuff Size: Adult Large)   Pulse 108   Temp 97.2  F (36.2  C) (Temporal)   Resp 16   Ht 1.575 m (5' 2\")   Wt 104.8 kg (231 lb)   SpO2 93%   BMI 42.25 kg/m       Constitutional: The patient appears to be in no acute distress. The patient appears to be adequately hydrated. No acute respiratory or hemodynamic distress is noted at this time.   LUNGS: clear bilaterally, airflow is brisk, no intercostal retraction or stridor is noted. No coughing is noted during visit.   HEART:  regular without rubs, clicks, gallops, or murmurs. PMI is nondisplaced. Upstrokes are brisk. S1,S2 are heard.   GI: Abdomen is soft, without rebound, guarding or tenderness. Bowel sounds are appropriate. No renal bruits are heard.   NEURO: Pt is alert and appropriate. No neurologic lateralization is noted. Cranial nerves 2-12 are intact. Peripheral sensory and motor function are grossly normal.    SKIN:  warm and dry. No erythema, or rashes are noted. No specific lesions of concern are noted.    PSYCH: The patient appears grossly appropriate. Maintains good eye contact, does not have any jittery or atypical motion. Displays appropriate affect.                                           Decision Making    1. Dermatitis  Patient will use Lidex on an as-needed basis.  - fluocinonide (LIDEX) 0.05 % external cream; Small amount to affected area(s) BID PRN  Dispense: 60 g; Refill: 1    2. Hypertension goal BP (blood pressure) < 140/90  Continue current medication.  Check renal function.  - Albumin Random Urine Quantitative with Creat Ratio  - triamterene-HCTZ (DYAZIDE) 37.5-25 MG capsule; Take 1 capsule by mouth daily  Dispense: 90 capsule; Refill: 3  - lisinopril (PRINIVIL/ZESTRIL) 10 MG tablet; Take 1 tablet (10 mg) by mouth daily  Dispense: 90 tablet; Refill: 3    3. " Crohn's disease of both small and large intestine without complication (H)  Continue Azulfidine and follow renal function and white blood count.  - sulfaSALAzine (AZULFIDINE) 500 MG tablet; TAKE TWO TABLETS BY MOUTH TWICE A DAY  Dispense: 360 tablet; Refill: 3  - CBC with platelets    4. Screening for hyperlipidemia  Done  - Lipid panel reflex to direct LDL Fasting    5. Screening for HIV (human immunodeficiency virus)  Done  - HIV Screening    6. Encounter for screening mammogram for breast cancer  Done/scheduled  - *MA Screening Digital Bilateral; Future    7. Morbid obesity (H)  Recommend weight loss to responsible caloric restriction.                           FOLLOW UP   I have asked the patient to make an appointment for followup with me in 6 months or as needed.        I have carefully explained the diagnosis and treatment options to the patient.  The patient has displayed an understanding of the above, and all subsequent questions were answered.      DO DIANNA Bullard    Portions of this note were produced using [employer]  Although every attempt at real-time proof reading has been made, occasional grammar/syntax errors may have been missed.

## 2019-06-07 LAB — HIV 1+2 AB+HIV1 P24 AG SERPL QL IA: NONREACTIVE

## 2019-06-11 NOTE — RESULT ENCOUNTER NOTE
Dear Nadia, your recent test results are attached.  The microalbumin is normal.  The screening HIV test is negative.  The chemistry panel demonstrates a mildly elevated blood sugar 119 with slight decrease in kidney function with a GFR of 48.  Liver tests are essentially normal.  The cholesterol is slightly elevated with an LDL of 138.  The blood cell count is normal without evidence of anemia or.    Feel free to contact me via the office or My Chart if you have any questions regarding the above.

## 2020-05-26 DIAGNOSIS — I10 HYPERTENSION GOAL BP (BLOOD PRESSURE) < 140/90: ICD-10-CM

## 2020-05-26 DIAGNOSIS — K50.80 CROHN'S DISEASE OF BOTH SMALL AND LARGE INTESTINE WITHOUT COMPLICATION (H): ICD-10-CM

## 2020-05-27 RX ORDER — SULFASALAZINE 500 MG/1
TABLET ORAL
Qty: 120 TABLET | Refills: 0 | Status: SHIPPED | OUTPATIENT
Start: 2020-05-27 | End: 2020-06-26

## 2020-05-27 RX ORDER — TRIAMTERENE AND HYDROCHLOROTHIAZIDE 37.5; 25 MG/1; MG/1
CAPSULE ORAL
Qty: 30 CAPSULE | Refills: 0 | Status: SHIPPED | OUTPATIENT
Start: 2020-05-27 | End: 2020-06-26

## 2020-05-27 RX ORDER — LISINOPRIL 10 MG/1
TABLET ORAL
Qty: 30 TABLET | Refills: 0 | Status: SHIPPED | OUTPATIENT
Start: 2020-05-27 | End: 2020-06-26

## 2020-05-27 NOTE — TELEPHONE ENCOUNTER
Routing refill request to provider for review/approval because:  Drug not on the [facility] refill protocol (antibiotic)  Labs out of range:  Creatinine    KEVIN BellaN, RN  [facility]

## 2020-06-26 DIAGNOSIS — I10 HYPERTENSION GOAL BP (BLOOD PRESSURE) < 140/90: ICD-10-CM

## 2020-06-26 DIAGNOSIS — K50.80 CROHN'S DISEASE OF BOTH SMALL AND LARGE INTESTINE WITHOUT COMPLICATION (H): ICD-10-CM

## 2020-06-26 RX ORDER — TRIAMTERENE AND HYDROCHLOROTHIAZIDE 37.5; 25 MG/1; MG/1
CAPSULE ORAL
Qty: 30 CAPSULE | Refills: 0 | Status: SHIPPED | OUTPATIENT
Start: 2020-06-26 | End: 2020-07-31

## 2020-06-26 RX ORDER — LISINOPRIL 10 MG/1
TABLET ORAL
Qty: 30 TABLET | Refills: 0 | Status: SHIPPED | OUTPATIENT
Start: 2020-06-26 | End: 2020-07-31

## 2020-06-26 RX ORDER — SULFASALAZINE 500 MG/1
TABLET ORAL
Qty: 120 TABLET | Refills: 0 | Status: SHIPPED | OUTPATIENT
Start: 2020-06-26 | End: 2020-07-31

## 2020-06-26 NOTE — TELEPHONE ENCOUNTER
Routing to schedulers to set up virtual appointment for patient for medication follow up.  Please also ask patient how much medication she has left and schedule appropriately.   If patient needs a refill before their appointment, please route to PCP for completion of refill.  Upon routing message, write WHEN appointment is scheduled and if they have enough medication to last to appointment.  Thank you!

## 2020-06-26 NOTE — TELEPHONE ENCOUNTER
Patient contacted and is scheduled for 6/30/20 with Dr Cartwright. She has enough medication until appointment.

## 2020-06-30 ENCOUNTER — VIRTUAL VISIT (OUTPATIENT)
Dept: FAMILY MEDICINE | Facility: CLINIC | Age: 65
End: 2020-06-30
Payer: COMMERCIAL

## 2020-06-30 ENCOUNTER — TELEPHONE (OUTPATIENT)
Dept: FAMILY MEDICINE | Facility: OTHER | Age: 65
End: 2020-06-30

## 2020-06-30 DIAGNOSIS — Z11.59 ENCOUNTER FOR HEPATITIS C SCREENING TEST FOR LOW RISK PATIENT: ICD-10-CM

## 2020-06-30 DIAGNOSIS — Z13.220 SCREENING FOR HYPERLIPIDEMIA: ICD-10-CM

## 2020-06-30 DIAGNOSIS — Z79.899 HIGH RISK MEDICATION USE: ICD-10-CM

## 2020-06-30 DIAGNOSIS — K50.10 CROHN'S DISEASE OF LARGE INTESTINE WITHOUT COMPLICATION (H): Primary | ICD-10-CM

## 2020-06-30 DIAGNOSIS — Z12.31 ENCOUNTER FOR SCREENING MAMMOGRAM FOR BREAST CANCER: ICD-10-CM

## 2020-06-30 DIAGNOSIS — I10 HYPERTENSION GOAL BP (BLOOD PRESSURE) < 140/90: ICD-10-CM

## 2020-06-30 PROCEDURE — 99214 OFFICE O/P EST MOD 30 MIN: CPT | Mod: 95 | Performed by: INTERNAL MEDICINE

## 2020-06-30 NOTE — TELEPHONE ENCOUNTER
Called patient to schedule colonoscopy. Patient would like to wait until fall to have procedure. She will call when ready.

## 2020-06-30 NOTE — PROGRESS NOTES
"Nadia Ladd is a 64 year old female who is being evaluated via a billable telephone visit.      The patient has been notified of following:     \"This telephone visit will be conducted via a call between you and your physician/provider. We have found that certain health care needs can be provided without the need for a physical exam.  This service lets us provide the care you need with a short phone conversation.  If a prescription is necessary we can send it directly to your pharmacy.  If lab work is needed we can place an order for that and you can then stop by our lab to have the test done at a later time.    Telephone visits are billed at different rates depending on your insurance coverage. During this emergency period, for some insurers they may be billed the same as an in-person visit.  Please reach out to your insurance provider with any questions.    If during the course of the call the physician/provider feels a telephone visit is not appropriate, you will not be charged for this service.\"    Patient has given verbal consent for Telephone visit?  Yes    What phone number would you like to be contacted at? [phone]    How would you like to obtain your AVS? Mail a copy  Unable to do video   Subjective     Nadia Ladd is a 64 year old female who presents via phone visit today for the following health issues:    HPI     Hypertension Follow-up      Do you check your blood pressure regularly outside of the clinic? Yes     Are you following a low salt diet? Yes    Are your blood pressures ever more than 140 on the top number (systolic) OR more   than 90 on the bottom number (diastolic), for example 140/90? No      How many servings of fruits and vegetables do you eat daily?  0-1    On average, how many sweetened beverages do you drink each day (Examples: soda, juice, sweet tea, etc.  Do NOT count diet or artificially sweetened beverages)?   1    How many days per week do you exercise enough to make " your heart beat faster? 3 or less    How many minutes a day do you exercise enough to make your heart beat faster? 20 - 29    How many days per week do you miss taking your medication? 0    Medication Followup of Sulfasalazine    Taking Medication as prescribed: yes    Side Effects:  None    Medication Helping Symptoms:  yes                         Chief Complaint         The patient is a pleasant 64-year-old female who has a history of Crohn's colitis.  She has been on Azulfidine for several years without difficulty.  Her last colonoscopy was about 10 years ago.  She was quite surprised to realize this.  She also has a concurrent history of hypertension.  She takes her medications compliantly without any difficulty.  She has had no cough or side effects from the lisinopril.  She has had some iron deficiency with anemia in the past associated with inflammatory colon disease.  She continues oral iron on a daily basis.  She is had no gastrointestinal complaints with this either.                         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     Surgical History:   has a past surgical history that includes surgical history of -  (06/14/76); EXPLORATORY OF ABDOMEN (1989); and colonoscopy (10/11/10).     Social History:   reports that she has never smoked. She has never used smokeless tobacco. She reports that she does not drink alcohol or use drugs.     Family History:  family history includes Anemia in her father; Cerebrovascular Disease  in her father; Diabetes in her father; Hypertension in her mother.            MEDICATIONS  Current Outpatient Medications   Medication Sig Dispense Refill     cyanocobalamin 1000 MCG TBCR Take 1,000 mcg by mouth daily 100 tablet 1     ferrous sulfate (IRON) 325 (65 FE) MG tablet TAKE ONE TABLET BY MOUTH TWICE A DAY --NO FURTHER REFILLS WITHOUT OFFICE VISIT 200 tablet 3     fluocinonide (LIDEX) 0.05 % external cream Small amount to affected area(s) BID PRN 60 g 1     IBUPROFEN 200 MG OR TABS 1 tablet daily as needed       lisinopril (ZESTRIL) 10 MG tablet TAKE ONE TABLET BY MOUTH ONCE DAILY 30 tablet 0     Multiple Vitamins-Iron (MULTI-DAY PLUS IRON PO)        sulfaSALAzine (AZULFIDINE) 500 MG tablet TAKE TWO TABLETS BY MOUTH TWICE A  tablet 0     triamterene-HCTZ (DYAZIDE) 37.5-25 MG capsule TAKE ONE CAPSULE BY MOUTH ONCE DAILY 30 capsule 0         --------------------------------------------------------------------------------------------------------------------                              Review of Systems       LUNGS: Pt denies: cough, excess sputum, hemoptysis, or shortness of breath.   HEART: Pt denies: chest pain, arrhythmia, syncope, tachy or bradyarrhythmia.   GI: Pt denies: nausea, vomiting, diarrhea, constipation, melena, or hematochezia.   NEURO: Pt denies: seizures, strokes, diplopia, weakness, paraesthesias, or paralysis.   SKIN: Pt denies: itching, rashes, discoloration, or specific lesions of concern. Denies recent hair loss.   PSYCH: The patient denies significant depression, anxiety, mood imbalance. Specifically denies any suicidal ideation.                                    No physical examination is performed as this is a virtual visit.  The patient's voice is strong, there is no evidence of labored breathing or wheezing.  The patient is alert and appropriate and demonstrates no obvious behavioral irregularities.             Decision Making       1. Crohn's disease of large intestine  without complication (H)  We will check CBC to rule out anemia and set up colonoscopy.  - **CBC with platelets FUTURE anytime; Future  - GASTROENTEROLOGY ADULT REF PROCEDURE ONLY; Future    2. Hypertension goal BP (blood pressure) < 140/90  Check renal function and electrolytes.  - Albumin Random Urine Quantitative with Creat Ratio; Future  - **Comprehensive metabolic panel FUTURE anytime; Future  - **UA reflex to Microscopic FUTURE anytime; Future    3. Screening for hyperlipidemia  Check lipids and discuss diet and medication if appropriate    4. Encounter for hepatitis C screening test for low risk patient  Screening performed  - Hepatitis C Screen Reflex to HCV RNA Quant and Genotype; Future    5. Encounter for screening mammogram for breast cancer  More screening  - MA SCREENING DIGITAL BILAT - Future  (s+30); Future    6. High risk medication use  Make sure the Azulfidine is not causing any problems  - **CBC with platelets FUTURE anytime; Future                                 FOLLOW UP   I have asked the patient to make an appointment for followup with me when the lab work is available and after the colonoscopy.            I have carefully explained the diagnosis and treatment options to the patient.  The patient has displayed an understanding of the above, and all subsequent questions were answered.      DO DIANNA Bullard    Portions of this note were produced using [employer]  Although every attempt at real-time proof reading has been made, occasional grammar/syntax errors may have been missed.        Telephone time: 14 minutes

## 2020-07-08 DIAGNOSIS — Z79.899 HIGH RISK MEDICATION USE: ICD-10-CM

## 2020-07-08 DIAGNOSIS — I10 HYPERTENSION GOAL BP (BLOOD PRESSURE) < 140/90: ICD-10-CM

## 2020-07-08 DIAGNOSIS — K50.10 CROHN'S DISEASE OF LARGE INTESTINE WITHOUT COMPLICATION (H): ICD-10-CM

## 2020-07-08 DIAGNOSIS — Z11.59 ENCOUNTER FOR HEPATITIS C SCREENING TEST FOR LOW RISK PATIENT: ICD-10-CM

## 2020-07-08 LAB
ALBUMIN SERPL-MCNC: 4 G/DL (ref 3.4–5)
ALBUMIN UR-MCNC: NEGATIVE MG/DL
ALP SERPL-CCNC: 136 U/L (ref 40–150)
ALT SERPL W P-5'-P-CCNC: 25 U/L (ref 0–50)
ANION GAP SERPL CALCULATED.3IONS-SCNC: 4 MMOL/L (ref 3–14)
APPEARANCE UR: ABNORMAL
AST SERPL W P-5'-P-CCNC: 20 U/L (ref 0–45)
BILIRUB SERPL-MCNC: 0.5 MG/DL (ref 0.2–1.3)
BILIRUB UR QL STRIP: NEGATIVE
BUN SERPL-MCNC: 26 MG/DL (ref 7–30)
CALCIUM SERPL-MCNC: 8.7 MG/DL (ref 8.5–10.1)
CHLORIDE SERPL-SCNC: 112 MMOL/L (ref 94–109)
CO2 SERPL-SCNC: 23 MMOL/L (ref 20–32)
COLOR UR AUTO: YELLOW
CREAT SERPL-MCNC: 1.16 MG/DL (ref 0.52–1.04)
CREAT UR-MCNC: 170 MG/DL
ERYTHROCYTE [DISTWIDTH] IN BLOOD BY AUTOMATED COUNT: 13.9 % (ref 10–15)
GFR SERPL CREATININE-BSD FRML MDRD: 50 ML/MIN/{1.73_M2}
GLUCOSE SERPL-MCNC: 115 MG/DL (ref 70–99)
GLUCOSE UR STRIP-MCNC: NEGATIVE MG/DL
HCT VFR BLD AUTO: 40.7 % (ref 35–47)
HGB BLD-MCNC: 12.7 G/DL (ref 11.7–15.7)
HGB UR QL STRIP: NEGATIVE
KETONES UR STRIP-MCNC: NEGATIVE MG/DL
LEUKOCYTE ESTERASE UR QL STRIP: ABNORMAL
MCH RBC QN AUTO: 30 PG (ref 26.5–33)
MCHC RBC AUTO-ENTMCNC: 31.2 G/DL (ref 31.5–36.5)
MCV RBC AUTO: 96 FL (ref 78–100)
MICROALBUMIN UR-MCNC: 31 MG/L
MICROALBUMIN/CREAT UR: 18.47 MG/G CR (ref 0–25)
NITRATE UR QL: NEGATIVE
PH UR STRIP: 5 PH (ref 5–7)
PLATELET # BLD AUTO: 265 10E9/L (ref 150–450)
POTASSIUM SERPL-SCNC: 4.4 MMOL/L (ref 3.4–5.3)
PROT SERPL-MCNC: 8.1 G/DL (ref 6.8–8.8)
RBC # BLD AUTO: 4.24 10E12/L (ref 3.8–5.2)
SODIUM SERPL-SCNC: 139 MMOL/L (ref 133–144)
SOURCE: ABNORMAL
SP GR UR STRIP: 1.02 (ref 1–1.03)
UROBILINOGEN UR STRIP-MCNC: 0 MG/DL (ref 0–2)
WBC # BLD AUTO: 7.2 10E9/L (ref 4–11)

## 2020-07-08 PROCEDURE — 85027 COMPLETE CBC AUTOMATED: CPT | Performed by: INTERNAL MEDICINE

## 2020-07-08 PROCEDURE — 82043 UR ALBUMIN QUANTITATIVE: CPT | Performed by: INTERNAL MEDICINE

## 2020-07-08 PROCEDURE — 86803 HEPATITIS C AB TEST: CPT | Performed by: INTERNAL MEDICINE

## 2020-07-08 PROCEDURE — 80053 COMPREHEN METABOLIC PANEL: CPT | Performed by: INTERNAL MEDICINE

## 2020-07-08 PROCEDURE — 81003 URINALYSIS AUTO W/O SCOPE: CPT | Performed by: INTERNAL MEDICINE

## 2020-07-08 PROCEDURE — 36415 COLL VENOUS BLD VENIPUNCTURE: CPT | Performed by: INTERNAL MEDICINE

## 2020-07-08 NOTE — LETTER
July 15, 2020      Ndaia Ladd  [address] [facility] [address]        Dear ,    We are writing to inform you of your test results.    Dear Nadia, your recent test results are attached.     You will be contacted with any outstanding results as they are available.   Feel free to contact me via the office or My Chart if you have any questions regarding the above    Resulted Orders   Hepatitis C Screen Reflex to HCV RNA Quant and Genotype   Result Value Ref Range    Hepatitis C Antibody Nonreactive NR^Nonreactive      Comment:      Assay performance characteristics have not been established for newborns,   infants, and children     **Comprehensive metabolic panel FUTURE anytime   Result Value Ref Range    Sodium 139 133 - 144 mmol/L    Potassium 4.4 3.4 - 5.3 mmol/L    Chloride 112 (H) 94 - 109 mmol/L    Carbon Dioxide 23 20 - 32 mmol/L    Anion Gap 4 3 - 14 mmol/L    Glucose 115 (H) 70 - 99 mg/dL    Urea Nitrogen 26 7 - 30 mg/dL    Creatinine 1.16 (H) 0.52 - 1.04 mg/dL    GFR Estimate 50 (L) >60 mL/min/[1.73_m2]      Comment:      Non  GFR Calc  Starting 12/18/2018, serum creatinine based estimated GFR (eGFR) will be   calculated using the Chronic Kidney Disease Epidemiology Collaboration   (CKD-EPI) equation.      GFR Estimate If Black 58 (L) >60 mL/min/[1.73_m2]      Comment:       GFR Calc  Starting 12/18/2018, serum creatinine based estimated GFR (eGFR) will be   calculated using the Chronic Kidney Disease Epidemiology Collaboration   (CKD-EPI) equation.      Calcium 8.7 8.5 - 10.1 mg/dL    Bilirubin Total 0.5 0.2 - 1.3 mg/dL    Albumin 4.0 3.4 - 5.0 g/dL    Protein Total 8.1 6.8 - 8.8 g/dL    Alkaline Phosphatase 136 40 - 150 U/L    ALT 25 0 - 50 U/L    AST 20 0 - 45 U/L   **CBC with platelets FUTURE anytime   Result Value Ref Range    WBC 7.2 4.0 - 11.0 10e9/L    RBC Count 4.24 3.8 - 5.2 10e12/L    Hemoglobin 12.7 11.7 - 15.7 g/dL    Hematocrit 40.7 35.0 - 47.0  %    MCV 96 78 - 100 fl    MCH 30.0 26.5 - 33.0 pg    MCHC 31.2 (L) 31.5 - 36.5 g/dL    RDW 13.9 10.0 - 15.0 %    Platelet Count 265 150 - 450 10e9/L   Albumin Random Urine Quantitative with Creat Ratio   Result Value Ref Range    Creatinine Urine 170 mg/dL    Albumin Urine mg/L 31 mg/L    Albumin Urine mg/g Cr 18.47 0 - 25 mg/g Cr   UA reflex to Microscopic ([place]; [facility])   Result Value Ref Range    Color Urine Yellow     Appearance Urine Slightly Cloudy     Glucose Urine Negative NEG^Negative mg/dL    Bilirubin Urine Negative NEG^Negative    Ketones Urine Negative NEG^Negative mg/dL    Specific Gravity Urine 1.020 1.003 - 1.035    Blood Urine Negative NEG^Negative    pH Urine 5.0 5.0 - 7.0 pH    Protein Albumin Urine Negative NEG^Negative mg/dL    Urobilinogen mg/dL 0.0 0.0 - 2.0 mg/dL    Nitrite Urine Negative NEG^Negative    Leukocyte Esterase Urine Small (A) NEG^Negative    Source Unspecified Urine        If you have any questions or concerns, please call the clinic at the number listed above.       Sincerely,        Nomi Cartwright DO

## 2020-07-08 NOTE — LETTER
July 15, 2020      Nadia Ladd  [address] [facility] [address]        Dear ,    We are writing to inform you of your test results.    Dear Nadia, your recent test results are attached.     The microalbumin is normal.  This would suggest no significant protein loss through the kidneys.   The hepatitis C screening test is negative.   The urinary analysis is unremarkable and shows no evidence of infection.   The chemistry panel shows a decrease in kidney function but it is actually slightly improved over previous.   Liver tests are normal.   The blood cell count is normal without evidence of anemia or leukemia.   You will be contacted with any outstanding results as they are available.   Feel free to contact me via the office or My Chart if you have any questions regarding the above.     Resulted Orders   Hepatitis C Screen Reflex to HCV RNA Quant and Genotype   Result Value Ref Range    Hepatitis C Antibody Nonreactive NR^Nonreactive      Comment:      Assay performance characteristics have not been established for newborns,   infants, and children     **Comprehensive metabolic panel FUTURE anytime   Result Value Ref Range    Sodium 139 133 - 144 mmol/L    Potassium 4.4 3.4 - 5.3 mmol/L    Chloride 112 (H) 94 - 109 mmol/L    Carbon Dioxide 23 20 - 32 mmol/L    Anion Gap 4 3 - 14 mmol/L    Glucose 115 (H) 70 - 99 mg/dL    Urea Nitrogen 26 7 - 30 mg/dL    Creatinine 1.16 (H) 0.52 - 1.04 mg/dL    GFR Estimate 50 (L) >60 mL/min/[1.73_m2]      Comment:      Non  GFR Calc  Starting 12/18/2018, serum creatinine based estimated GFR (eGFR) will be   calculated using the Chronic Kidney Disease Epidemiology Collaboration   (CKD-EPI) equation.      GFR Estimate If Black 58 (L) >60 mL/min/[1.73_m2]      Comment:       GFR Calc  Starting 12/18/2018, serum creatinine based estimated GFR (eGFR) will be   calculated using the Chronic Kidney Disease Epidemiology Collaboration    (CKD-EPI) equation.      Calcium 8.7 8.5 - 10.1 mg/dL    Bilirubin Total 0.5 0.2 - 1.3 mg/dL    Albumin 4.0 3.4 - 5.0 g/dL    Protein Total 8.1 6.8 - 8.8 g/dL    Alkaline Phosphatase 136 40 - 150 U/L    ALT 25 0 - 50 U/L    AST 20 0 - 45 U/L   **CBC with platelets FUTURE anytime   Result Value Ref Range    WBC 7.2 4.0 - 11.0 10e9/L    RBC Count 4.24 3.8 - 5.2 10e12/L    Hemoglobin 12.7 11.7 - 15.7 g/dL    Hematocrit 40.7 35.0 - 47.0 %    MCV 96 78 - 100 fl    MCH 30.0 26.5 - 33.0 pg    MCHC 31.2 (L) 31.5 - 36.5 g/dL    RDW 13.9 10.0 - 15.0 %    Platelet Count 265 150 - 450 10e9/L   Albumin Random Urine Quantitative with Creat Ratio   Result Value Ref Range    Creatinine Urine 170 mg/dL    Albumin Urine mg/L 31 mg/L    Albumin Urine mg/g Cr 18.47 0 - 25 mg/g Cr   UA reflex to Microscopic ([facility]; [facility])   Result Value Ref Range    Color Urine Yellow     Appearance Urine Slightly Cloudy     Glucose Urine Negative NEG^Negative mg/dL    Bilirubin Urine Negative NEG^Negative    Ketones Urine Negative NEG^Negative mg/dL    Specific Gravity Urine 1.020 1.003 - 1.035    Blood Urine Negative NEG^Negative    pH Urine 5.0 5.0 - 7.0 pH    Protein Albumin Urine Negative NEG^Negative mg/dL    Urobilinogen mg/dL 0.0 0.0 - 2.0 mg/dL    Nitrite Urine Negative NEG^Negative    Leukocyte Esterase Urine Small (A) NEG^Negative    Source Unspecified Urine        If you have any questions or concerns, please call the clinic at the number listed above.       Sincerely,      Nomi Cartwright DO

## 2020-07-09 LAB — HCV AB SERPL QL IA: NONREACTIVE

## 2020-07-15 NOTE — RESULT ENCOUNTER NOTE
Dear Nadia, your recent test results are attached.    You will be contacted with any outstanding results as they are available.  Feel free to contact me via the office or My Chart if you have any questions regarding the above.

## 2020-07-30 DIAGNOSIS — K50.80 CROHN'S DISEASE OF BOTH SMALL AND LARGE INTESTINE WITHOUT COMPLICATION (H): ICD-10-CM

## 2020-07-30 DIAGNOSIS — I10 HYPERTENSION GOAL BP (BLOOD PRESSURE) < 140/90: ICD-10-CM

## 2020-07-31 RX ORDER — SULFASALAZINE 500 MG/1
TABLET ORAL
Qty: 360 TABLET | Refills: 0 | Status: SHIPPED | OUTPATIENT
Start: 2020-07-31 | End: 2020-10-23

## 2020-07-31 RX ORDER — TRIAMTERENE AND HYDROCHLOROTHIAZIDE 37.5; 25 MG/1; MG/1
CAPSULE ORAL
Qty: 90 CAPSULE | Refills: 0 | Status: SHIPPED | OUTPATIENT
Start: 2020-07-31 | End: 2020-10-23

## 2020-07-31 RX ORDER — LISINOPRIL 10 MG/1
TABLET ORAL
Qty: 90 TABLET | Refills: 0 | Status: SHIPPED | OUTPATIENT
Start: 2020-07-31 | End: 2020-10-23

## 2020-07-31 NOTE — TELEPHONE ENCOUNTER
Routing refill request to provider for review/approval because:  Drug not on the [facility] refill protocol (Sulfasalazine)  Labs out of range:  Creatinine  Labs not current:  BP    KEVIN BellaN, RN  [facility]

## 2020-09-03 ENCOUNTER — TELEPHONE (OUTPATIENT)
Dept: FAMILY MEDICINE | Facility: OTHER | Age: 65
End: 2020-09-03

## 2020-09-03 NOTE — TELEPHONE ENCOUNTER
Reason for call:  Other   Patient called regarding (reason for call): Note/Letter from doctor  Additional comments: Patient is required to have a letter/note on file with her employer, the [employer], stating that she is advised to work from home due to pre-existing medical conditions of herself (IBD, blood pressure) and her spouse (sleep apnea, blood pressure). 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 Please advise.    Phone number to reach patient:  Home number on file [phone] (home)    Best Time:  Asap    Can we leave a detailed message on this number?  YES    Travel screening: Not Applicable

## 2020-09-07 NOTE — TELEPHONE ENCOUNTER
I am afraid I cannot give the patient the requested letter.  She is not at a markedly increased risk.  She is not on immunosuppressants.  Her Azulfidine is an anti-inflammatory.  Additionally, I cannot write her a letter based on the diseases or conditions of her spouse.    Chay

## 2020-09-08 NOTE — TELEPHONE ENCOUNTER
Spoke with patient to inform her that her and her spouses conditions do not qualify her for a [facility] letter.   No further action required.   Marifer Adler, CMA

## 2020-10-22 DIAGNOSIS — I10 HYPERTENSION GOAL BP (BLOOD PRESSURE) < 140/90: ICD-10-CM

## 2020-10-22 DIAGNOSIS — K50.80 CROHN'S DISEASE OF BOTH SMALL AND LARGE INTESTINE WITHOUT COMPLICATION (H): ICD-10-CM

## 2020-10-23 RX ORDER — SULFASALAZINE 500 MG/1
TABLET ORAL
Qty: 360 TABLET | Refills: 0 | Status: SHIPPED | OUTPATIENT
Start: 2020-10-23 | End: 2021-01-06

## 2020-10-23 RX ORDER — TRIAMTERENE AND HYDROCHLOROTHIAZIDE 37.5; 25 MG/1; MG/1
CAPSULE ORAL
Qty: 90 CAPSULE | Refills: 0 | Status: SHIPPED | OUTPATIENT
Start: 2020-10-23 | End: 2021-01-06

## 2020-10-23 RX ORDER — LISINOPRIL 10 MG/1
TABLET ORAL
Qty: 90 TABLET | Refills: 0 | Status: SHIPPED | OUTPATIENT
Start: 2020-10-23 | End: 2021-01-06

## 2020-10-23 NOTE — TELEPHONE ENCOUNTER
Routing refill request to provider for review/approval because:  Drug not on the [facility] refill protocol (sulfasalazine)  Labs out of range:  Creatinine  Labs not current:  BP    KEVIN BellaN, RN  [facility]

## 2021-01-05 DIAGNOSIS — K50.80 CROHN'S DISEASE OF BOTH SMALL AND LARGE INTESTINE WITHOUT COMPLICATION (H): ICD-10-CM

## 2021-01-05 DIAGNOSIS — I10 HYPERTENSION GOAL BP (BLOOD PRESSURE) < 140/90: ICD-10-CM

## 2021-01-06 RX ORDER — SULFASALAZINE 500 MG/1
TABLET ORAL
Qty: 120 TABLET | Refills: 5 | Status: SHIPPED | OUTPATIENT
Start: 2021-01-06 | End: 2021-07-20

## 2021-01-06 RX ORDER — TRIAMTERENE AND HYDROCHLOROTHIAZIDE 37.5; 25 MG/1; MG/1
CAPSULE ORAL
Qty: 30 CAPSULE | Refills: 0 | Status: SHIPPED | OUTPATIENT
Start: 2021-01-06 | End: 2021-01-26

## 2021-01-06 RX ORDER — LISINOPRIL 10 MG/1
TABLET ORAL
Qty: 30 TABLET | Refills: 0 | Status: SHIPPED | OUTPATIENT
Start: 2021-01-06 | End: 2021-01-26

## 2021-01-06 NOTE — TELEPHONE ENCOUNTER
Routing refill request to provider for review/approval because:  Labs out of range:           Anna Ladd RN

## 2021-01-06 NOTE — TELEPHONE ENCOUNTER
Patient last seen by me for face-to-face evaluation in June 2019.  We will need to set up face-to-face evaluation prior to any subsequent medication refills.    Chay

## 2021-01-07 NOTE — TELEPHONE ENCOUNTER
I spoke to pt, set up physical the end of this month.  ................Cale Bruno LPN,   January 7, 2021,      9:26 AM,   [facility]

## 2021-01-26 ENCOUNTER — OFFICE VISIT (OUTPATIENT)
Dept: INTERNAL MEDICINE | Facility: CLINIC | Age: 66
End: 2021-01-26
Payer: COMMERCIAL

## 2021-01-26 VITALS
BODY MASS INDEX: 43.33 KG/M2 | HEART RATE: 120 BPM | WEIGHT: 229.5 LBS | DIASTOLIC BLOOD PRESSURE: 80 MMHG | TEMPERATURE: 97.8 F | OXYGEN SATURATION: 96 % | HEIGHT: 61 IN | RESPIRATION RATE: 20 BRPM | SYSTOLIC BLOOD PRESSURE: 124 MMHG

## 2021-01-26 DIAGNOSIS — E66.01 MORBID OBESITY (H): ICD-10-CM

## 2021-01-26 DIAGNOSIS — E53.8 VITAMIN B12 DEFICIENCY (NON ANEMIC): ICD-10-CM

## 2021-01-26 DIAGNOSIS — Z12.4 SCREENING FOR MALIGNANT NEOPLASM OF CERVIX: ICD-10-CM

## 2021-01-26 DIAGNOSIS — I10 HYPERTENSION GOAL BP (BLOOD PRESSURE) < 140/90: ICD-10-CM

## 2021-01-26 DIAGNOSIS — Z00.00 ROUTINE GENERAL MEDICAL EXAMINATION AT A HEALTH CARE FACILITY: Primary | ICD-10-CM

## 2021-01-26 DIAGNOSIS — I27.0 PRIMARY PULMONARY HYPERTENSION (H): ICD-10-CM

## 2021-01-26 DIAGNOSIS — L30.9 DERMATITIS: ICD-10-CM

## 2021-01-26 DIAGNOSIS — K50.10 CROHN'S DISEASE OF LARGE INTESTINE WITHOUT COMPLICATION (H): ICD-10-CM

## 2021-01-26 LAB
ALBUMIN SERPL-MCNC: 4.1 G/DL (ref 3.4–5)
ALP SERPL-CCNC: 136 U/L (ref 40–150)
ALT SERPL W P-5'-P-CCNC: 22 U/L (ref 0–50)
ANION GAP SERPL CALCULATED.3IONS-SCNC: 7 MMOL/L (ref 3–14)
AST SERPL W P-5'-P-CCNC: 17 U/L (ref 0–45)
BILIRUB SERPL-MCNC: 0.5 MG/DL (ref 0.2–1.3)
BUN SERPL-MCNC: 29 MG/DL (ref 7–30)
CALCIUM SERPL-MCNC: 9.3 MG/DL (ref 8.5–10.1)
CHLORIDE SERPL-SCNC: 111 MMOL/L (ref 94–109)
CO2 SERPL-SCNC: 22 MMOL/L (ref 20–32)
CREAT SERPL-MCNC: 1.45 MG/DL (ref 0.52–1.04)
GFR SERPL CREATININE-BSD FRML MDRD: 38 ML/MIN/{1.73_M2}
GLUCOSE SERPL-MCNC: 107 MG/DL (ref 70–99)
POTASSIUM SERPL-SCNC: 4.1 MMOL/L (ref 3.4–5.3)
PROT SERPL-MCNC: 7.7 G/DL (ref 6.8–8.8)
SODIUM SERPL-SCNC: 140 MMOL/L (ref 133–144)
VIT B12 SERPL-MCNC: 761 PG/ML (ref 193–986)

## 2021-01-26 PROCEDURE — 82607 VITAMIN B-12: CPT | Performed by: INTERNAL MEDICINE

## 2021-01-26 PROCEDURE — 99396 PREV VISIT EST AGE 40-64: CPT | Performed by: INTERNAL MEDICINE

## 2021-01-26 PROCEDURE — 80053 COMPREHEN METABOLIC PANEL: CPT | Performed by: INTERNAL MEDICINE

## 2021-01-26 PROCEDURE — 36415 COLL VENOUS BLD VENIPUNCTURE: CPT | Performed by: INTERNAL MEDICINE

## 2021-01-26 RX ORDER — FLUOCINONIDE 0.5 MG/G
CREAM TOPICAL
Qty: 60 G | Refills: 1 | Status: SHIPPED | OUTPATIENT
Start: 2021-01-26 | End: 2023-05-18

## 2021-01-26 RX ORDER — LISINOPRIL 10 MG/1
10 TABLET ORAL DAILY
Qty: 90 TABLET | Refills: 3 | Status: SHIPPED | OUTPATIENT
Start: 2021-01-26 | End: 2022-01-12

## 2021-01-26 RX ORDER — TRIAMTERENE AND HYDROCHLOROTHIAZIDE 37.5; 25 MG/1; MG/1
CAPSULE ORAL
Qty: 90 CAPSULE | Refills: 3 | Status: SHIPPED | OUTPATIENT
Start: 2021-01-26 | End: 2022-01-12

## 2021-01-26 ASSESSMENT — MIFFLIN-ST. JEOR: SCORE: 1528.39

## 2021-01-26 ASSESSMENT — PAIN SCALES - GENERAL: PAINLEVEL: MILD PAIN (3)

## 2021-01-26 NOTE — LETTER
January 28, 2021      Nadia Ladd  [address]  [facility] [address]        Dear Nadia, your recent test results are attached.     The vitamin B12 level is now normal.   Chemistry panel shows a decrease in your average kidney function.   Liver tests are normal.   Would recommend increasing your fluid intake and repeating the kidney test in 1 month.     You will be contacted with any outstanding results as they are available.   Feel free to contact me via the office or My Chart if you have any questions regarding the above    Resulted Orders   Comprehensive metabolic panel (BMP + Alb, Alk Phos, ALT, AST, Total. Bili, TP)   Result Value Ref Range    Sodium 140 133 - 144 mmol/L    Potassium 4.1 3.4 - 5.3 mmol/L    Chloride 111 (H) 94 - 109 mmol/L    Carbon Dioxide 22 20 - 32 mmol/L    Anion Gap 7 3 - 14 mmol/L    Glucose 107 (H) 70 - 99 mg/dL    Urea Nitrogen 29 7 - 30 mg/dL    Creatinine 1.45 (H) 0.52 - 1.04 mg/dL    GFR Estimate 38 (L) >60 mL/min/[1.73_m2]      Comment:      Non  GFR Calc  Starting 12/18/2018, serum creatinine based estimated GFR (eGFR) will be   calculated using the Chronic Kidney Disease Epidemiology Collaboration   (CKD-EPI) equation.      GFR Estimate If Black 44 (L) >60 mL/min/[1.73_m2]      Comment:       GFR Calc  Starting 12/18/2018, serum creatinine based estimated GFR (eGFR) will be   calculated using the Chronic Kidney Disease Epidemiology Collaboration   (CKD-EPI) equation.      Calcium 9.3 8.5 - 10.1 mg/dL    Bilirubin Total 0.5 0.2 - 1.3 mg/dL    Albumin 4.1 3.4 - 5.0 g/dL    Protein Total 7.7 6.8 - 8.8 g/dL    Alkaline Phosphatase 136 40 - 150 U/L    ALT 22 0 - 50 U/L    AST 17 0 - 45 U/L   Vitamin B12   Result Value Ref Range    Vitamin B12 761 193 - 986 pg/mL       If you have any questions or concerns, please call the clinic at the number listed above.       Sincerely,      Nomi Cartwright DO

## 2021-01-26 NOTE — PROGRESS NOTES
SUBJECTIVE:   CC: Nadia Ladd is an 65 year old woman who presents for preventive health visit.       Patient has been advised of split billing requirements and indicates understanding: Yes  Healthy Habits:    Do you get at least three servings of calcium containing foods daily (dairy, green leafy vegetables, etc.)? yes    Amount of exercise or daily activities, outside of work: In spring and summer     Problems taking medications regularly No    Medication side effects: No    Have you had an eye exam in the past two years? no    Do you see a dentist twice per year? no    Do you have sleep apnea, excessive snoring or daytime drowsiness?no          Today's PHQ-2 Score:   PHQ-2 ( 1999 [employer]) 1/26/2021 6/30/2020   Q1: Little interest or pleasure in doing things 0 0   Q2: Feeling down, depressed or hopeless 0 0   PHQ-2 Score 0 0       Abuse: Current or Past(Physical, Sexual or Emotional)- No  Do you feel safe in your environment? Yes        Social History     Tobacco Use     Smoking status: Never Smoker     Smokeless tobacco: Never Used   Substance Use Topics     Alcohol use: No     If you drink alcohol do you typically have >3 drinks per day or >7 drinks per week? No                     Reviewed orders with patient.  Reviewed health maintenance and updated orders accordingly - Yes  Lab work is in process  Labs reviewed in EPIC  BP Readings from Last 3 Encounters:   01/26/21 124/80   06/06/19 130/82   03/12/18 124/82    Wt Readings from Last 3 Encounters:   01/26/21 104.1 kg (229 lb 8 oz)   06/06/19 104.8 kg (231 lb)   03/12/18 105.9 kg (233 lb 6.4 oz)                  Patient Active Problem List   Diagnosis     Primary pulmonary hypertension (H)     Arthropathia     Dermatitis-dishydrotic eczema-severe     Iron deficiency anemia     B12 deficiency anemia     Crohn's disease of large intestine without complication (H)     Advanced directives, counseling/discussion     Prediabetes     Obesity     CARDIOVASCULAR  SCREENING; LDL GOAL LESS THAN 130     Benign essential hypertension with target blood pressure below 140/90     Morbid obesity (H)     Past Surgical History:   Procedure Laterality Date     C EXPLORATORY OF ABDOMEN      laparoscopy     COLONOSCOPY  10/11/10     SURGICAL HISTORY OF -   77    Perineorrhaphy, for widening vaginal orifice       Social History     Tobacco Use     Smoking status: Never Smoker     Smokeless tobacco: Never Used   Substance Use Topics     Alcohol use: No     Family History   Problem Relation Age of Onset     Cerebrovascular Disease Father         stroke about age 65,  of cancer at 68 yrs     Diabetes Father         eventually took insulin     Anemia Father         Pernisios anemia     Hypertension Mother         on meds, alive         Current Outpatient Medications   Medication Sig Dispense Refill     cyanocobalamin 1000 MCG TBCR Take 1,000 mcg by mouth daily 100 tablet 1     ferrous sulfate (IRON) 325 (65 FE) MG tablet TAKE ONE TABLET BY MOUTH TWICE A DAY --NO FURTHER REFILLS WITHOUT OFFICE VISIT 200 tablet 3     fluocinonide (LIDEX) 0.05 % external cream Small amount to affected area(s) BID PRN 60 g 1     IBUPROFEN 200 MG OR TABS 1 tablet daily as needed       lisinopril (ZESTRIL) 10 MG tablet Take 1 tablet (10 mg) by mouth daily 90 tablet 3     Multiple Vitamins-Iron (MULTI-DAY PLUS IRON PO)        sulfaSALAzine (AZULFIDINE) 500 MG tablet TAKE 2 TABLETS BY MOUTH TWICE A  tablet 5     triamterene-HCTZ (DYAZIDE) 37.5-25 MG capsule TAKE 1 CAPSULE BY MOUTH EVERY DAY 90 capsule 3     Allergies   Allergen Reactions     No Known Drug Allergies        Breast CA Risk Screening:  No flowsheet data found.  No flowsheet data found.  click delete button to remove this line now  Mammogram Screening: Recommended mammography every 1-2 years with patient discussion and risk factor consideration  Pertinent mammograms are reviewed under the imaging tab.    Pertinent mammograms are  reviewed under the imaging tab.  History of abnormal Pap smear: Patient chooses to see a gynecologist for her upcoming Pap smear and breast examination.     Reviewed and updated as needed this visit by clinical staff  Tobacco  Allergies    Med Hx  Surg Hx  Fam Hx  Soc Hx        Reviewed and updated as needed this visit by Provider                Past Medical History:   Diagnosis Date     Arthropathia 8/5/2010     B12 deficiency anemia 10/21/2010     Benign essential hypertension with target blood pressure below 140/90 10/10/2016     CARDIOVASCULAR SCREENING; LDL GOAL LESS THAN 160 10/31/2010     Crohn's colitis (H) 2/15/2011     Dermatitis-dishydrotic eczema-severe 8/5/2010     HTN (hypertension) 7/21/2011     Iron deficiency anemia 10/21/2010     Primary pulmonary hypertension (H) 4/6/2010     Unspecified essential hypertension     mild      Past Surgical History:   Procedure Laterality Date     C EXPLORATORY OF ABDOMEN  1989    laparoscopy     COLONOSCOPY  10/11/10     SURGICAL HISTORY OF -   [birth year]    Perineorrhaphy, for widening vaginal orifice     OB History   No obstetric history on file.       ROS:  CONSTITUTIONAL: NEGATIVE for fever, chills, change in weight  INTEGUMENTARY/SKIN: NEGATIVE for worrisome rashes, moles or lesions.  Has occasional irritative dermatitis which she treats with topical steroid on a very rare and conservative basis.  EYES: NEGATIVE for vision changes or irritation  ENT: NEGATIVE for ear, mouth and throat problems  RESP: NEGATIVE for significant cough or SOB  BREAST: NEGATIVE for masses, tenderness or discharge  CV: NEGATIVE for chest pain, palpitations or peripheral edema  GI: NEGATIVE for nausea, abdominal pain, heartburn, or change in bowel habits  : NEGATIVE for unusual urinary or vaginal symptoms. No vaginal bleeding.  MUSCULOSKELETAL: NEGATIVE for significant arthralgias or myalgia  NEURO: NEGATIVE for weakness, dizziness or paresthesias  PSYCHIATRIC: NEGATIVE for  "changes in mood or affect     OBJECTIVE:   /80   Pulse 120   Temp 97.8  F (36.6  C) (Temporal)   Resp 20   Ht 1.549 m (5' 1\")   Wt 104.1 kg (229 lb 8 oz)   LMP  (LMP Unknown)   SpO2 96%   Breastfeeding No   BMI 43.36 kg/m    EXAM:  GENERAL APPEARANCE: healthy, alert and no distress  EYES: Eyes grossly normal to inspection, PERRL and conjunctivae and sclerae normal  HENT: ear canals and TM's normal, nose and mouth without ulcers or lesions, oropharynx clear and oral mucous membranes moist  NECK: no adenopathy, no asymmetry, masses, or scars and thyroid normal to palpation  RESP: lungs clear to auscultation - no rales, rhonchi or wheezes  CV: regular rate and rhythm, normal S1 S2, no S3 or S4, no murmur, click or rub, no peripheral edema and peripheral pulses strong  ABDOMEN: soft, nontender, no hepatosplenomegaly, no masses and bowel sounds normal.  Abdomen is obese.  BMI 43.36.  MS: no musculoskeletal defects are noted and gait is age appropriate without ataxia  SKIN: no suspicious lesions or rashes  NEURO: Normal strength and tone, sensory exam grossly normal, mentation intact and speech normal  PSYCH: mentation appears normal and affect normal/bright    Diagnostic Test Results:  Results for orders placed or performed in visit on 01/26/21 (from the past 24 hour(s))   Comprehensive metabolic panel (BMP + Alb, Alk Phos, ALT, AST, Total. Bili, TP)   Result Value Ref Range    Sodium 140 133 - 144 mmol/L    Potassium 4.1 3.4 - 5.3 mmol/L    Chloride 111 (H) 94 - 109 mmol/L    Carbon Dioxide 22 20 - 32 mmol/L    Anion Gap 7 3 - 14 mmol/L    Glucose 107 (H) 70 - 99 mg/dL    Urea Nitrogen 29 7 - 30 mg/dL    Creatinine 1.45 (H) 0.52 - 1.04 mg/dL    GFR Estimate 38 (L) >60 mL/min/[1.73_m2]    GFR Estimate If Black 44 (L) >60 mL/min/[1.73_m2]    Calcium 9.3 8.5 - 10.1 mg/dL    Bilirubin Total 0.5 0.2 - 1.3 mg/dL    Albumin 4.1 3.4 - 5.0 g/dL    Protein Total 7.7 6.8 - 8.8 g/dL    Alkaline Phosphatase 136 40 - " "150 U/L    ALT 22 0 - 50 U/L    AST 17 0 - 45 U/L   Vitamin B12   Result Value Ref Range    Vitamin B12 761 193 - 986 pg/mL       ASSESSMENT/PLAN:   1. Routine general medical examination at a health care facility    2. Hypertension goal BP (blood pressure) < 140/90  - lisinopril (ZESTRIL) 10 MG tablet; Take 1 tablet (10 mg) by mouth daily  Dispense: 90 tablet; Refill: 3  - triamterene-HCTZ (DYAZIDE) 37.5-25 MG capsule; TAKE 1 CAPSULE BY MOUTH EVERY DAY  Dispense: 90 capsule; Refill: 3  - Comprehensive metabolic panel (BMP + Alb, Alk Phos, ALT, AST, Total. Bili, TP)    3. Primary pulmonary hypertension (H)    4. Crohn's disease of large intestine without complication (H)  - GASTROENTEROLOGY ADULT REF PROCEDURE ONLY; Future  - Comprehensive metabolic panel (BMP + Alb, Alk Phos, ALT, AST, Total. Bili, TP)    5. Morbid obesity (H)    6. Screening for malignant neoplasm of cervix  - OB/GYN REFERRAL    7. Dermatitis  - fluocinonide (LIDEX) 0.05 % external cream; Small amount to affected area(s) BID PRN  Dispense: 60 g; Refill: 1    8. Vitamin B12 deficiency (non anemic)  - Vitamin B12    Patient has been advised of split billing requirements and indicates understanding: Yes  COUNSELING:   Reviewed preventive health counseling, as reflected in patient instructions       Regular exercise       Healthy diet/nutrition       Vision screening       Hearing screening       Colon cancer screening    Estimated body mass index is 43.36 kg/m  as calculated from the following:    Height as of this encounter: 1.549 m (5' 1\").    Weight as of this encounter: 104.1 kg (229 lb 8 oz).    Weight management plan: Discussed healthy diet and exercise guidelines    She reports that she has never smoked. She has never used smokeless tobacco.      Counseling Resources:  ATP IV Guidelines  Pooled Cohorts Equation Calculator  Breast Cancer Risk Calculator  BRCA-Related Cancer Risk Assessment: FHS-7 Tool  FRAX Risk Assessment  ICSI Preventive " Guidelines  Dietary Guidelines for Americans, 2010  USDA's MyPlate  ASA Prophylaxis  Lung CA Screening    Nomi Cartwright DO  [facility]

## 2021-01-27 ENCOUNTER — TELEPHONE (OUTPATIENT)
Dept: INTERNAL MEDICINE | Facility: CLINIC | Age: 66
End: 2021-01-27

## 2021-01-27 NOTE — TELEPHONE ENCOUNTER
Reason for Call:  Other call back    Detailed comments: patient is calling because she would like to know if she should take the covid vaccine due to her having IBD. Please call patient to discuss.    Phone Number Patient can be reached at: Home number on file [phone] (home)    Best Time: any    Can we leave a detailed message on this number? YES    Call taken on 1/27/2021 at 8:31 AM by Cyndie Henriquez

## 2021-01-27 NOTE — TELEPHONE ENCOUNTER
Called to schedule colonoscopy, patient states she is not ready and asked we call her back in March. Deferred in [place]

## 2021-03-19 ENCOUNTER — TELEPHONE (OUTPATIENT)
Dept: INTERNAL MEDICINE | Facility: CLINIC | Age: 66
End: 2021-03-19

## 2021-03-19 NOTE — TELEPHONE ENCOUNTER
Called to schedule colonoscopy, patient states she is not ready. She will call back when ready.

## 2021-07-19 DIAGNOSIS — K50.80 CROHN'S DISEASE OF BOTH SMALL AND LARGE INTESTINE WITHOUT COMPLICATION (H): ICD-10-CM

## 2021-07-20 RX ORDER — SULFASALAZINE 500 MG/1
TABLET ORAL
Qty: 360 TABLET | Refills: 1 | Status: SHIPPED | OUTPATIENT
Start: 2021-07-20 | End: 2022-01-12

## 2021-07-20 NOTE — TELEPHONE ENCOUNTER
Requested Prescriptions   Pending Prescriptions Disp Refills     sulfaSALAzine (AZULFIDINE) 500 MG tablet [Pharmacy Med Name: SULFASALAZINE 500MG TABLET] 360 tablet      Sig: TAKE 2 TABLETS BY MOUTH TWICE A DAY     Last Written Prescription Date:  01/06/2021  Last Fill Quantity: 120,   # refills: 5  Last Office Visit: 01/26/2021  Future Office visit:       Routing refill request to provider for review/approval because:  Drug not on the [facility] or [facility] refill protocol or controlled substance

## 2021-10-09 ENCOUNTER — APPOINTMENT (OUTPATIENT)
Dept: CT IMAGING | Facility: CLINIC | Age: 66
End: 2021-10-09
Attending: FAMILY MEDICINE
Payer: COMMERCIAL

## 2021-10-09 ENCOUNTER — HOSPITAL ENCOUNTER (EMERGENCY)
Facility: CLINIC | Age: 66
Discharge: HOME | End: 2021-10-09
Attending: FAMILY MEDICINE | Admitting: FAMILY MEDICINE
Payer: COMMERCIAL

## 2021-10-09 VITALS
WEIGHT: 230 LBS | DIASTOLIC BLOOD PRESSURE: 71 MMHG | OXYGEN SATURATION: 94 % | TEMPERATURE: 98.3 F | SYSTOLIC BLOOD PRESSURE: 137 MMHG | BODY MASS INDEX: 43.46 KG/M2 | RESPIRATION RATE: 20 BRPM | HEART RATE: 79 BPM

## 2021-10-09 DIAGNOSIS — J01.00 ACUTE MAXILLARY SINUSITIS, RECURRENCE NOT SPECIFIED: ICD-10-CM

## 2021-10-09 DIAGNOSIS — H93.11 TINNITUS, RIGHT: ICD-10-CM

## 2021-10-09 DIAGNOSIS — R42 DIZZINESS: ICD-10-CM

## 2021-10-09 LAB
ANION GAP SERPL CALCULATED.3IONS-SCNC: 4 MMOL/L (ref 3–14)
BASOPHILS # BLD AUTO: 0 10E3/UL (ref 0–0.2)
BASOPHILS NFR BLD AUTO: 0 %
BUN SERPL-MCNC: 25 MG/DL (ref 7–30)
CALCIUM SERPL-MCNC: 8.3 MG/DL (ref 8.5–10.1)
CHLORIDE BLD-SCNC: 118 MMOL/L (ref 94–109)
CO2 SERPL-SCNC: 20 MMOL/L (ref 20–32)
CREAT SERPL-MCNC: 1.3 MG/DL (ref 0.52–1.04)
CRP SERPL-MCNC: 20.4 MG/L (ref 0–8)
EOSINOPHIL # BLD AUTO: 0 10E3/UL (ref 0–0.7)
EOSINOPHIL NFR BLD AUTO: 0 %
ERYTHROCYTE [DISTWIDTH] IN BLOOD BY AUTOMATED COUNT: 16.1 % (ref 10–15)
ERYTHROCYTE [SEDIMENTATION RATE] IN BLOOD BY WESTERGREN METHOD: 10 MM/HR (ref 0–30)
GFR SERPL CREATININE-BSD FRML MDRD: 43 ML/MIN/1.73M2
GLUCOSE BLD-MCNC: 159 MG/DL (ref 70–99)
HCT VFR BLD AUTO: 32 % (ref 35–47)
HGB BLD-MCNC: 9.4 G/DL (ref 11.7–15.7)
IMM GRANULOCYTES # BLD: 0 10E3/UL
IMM GRANULOCYTES NFR BLD: 1 %
LYMPHOCYTES # BLD AUTO: 0.4 10E3/UL (ref 0.8–5.3)
LYMPHOCYTES NFR BLD AUTO: 8 %
MCH RBC QN AUTO: 27.6 PG (ref 26.5–33)
MCHC RBC AUTO-ENTMCNC: 29.4 G/DL (ref 31.5–36.5)
MCV RBC AUTO: 94 FL (ref 78–100)
MONOCYTES # BLD AUTO: 0.4 10E3/UL (ref 0–1.3)
MONOCYTES NFR BLD AUTO: 7 %
NEUTROPHILS # BLD AUTO: 4.9 10E3/UL (ref 1.6–8.3)
NEUTROPHILS NFR BLD AUTO: 84 %
NRBC # BLD AUTO: 0 10E3/UL
NRBC BLD AUTO-RTO: 0 /100
PLATELET # BLD AUTO: 212 10E3/UL (ref 150–450)
POTASSIUM BLD-SCNC: 4.3 MMOL/L (ref 3.4–5.3)
RBC # BLD AUTO: 3.41 10E6/UL (ref 3.8–5.2)
SODIUM SERPL-SCNC: 142 MMOL/L (ref 133–144)
WBC # BLD AUTO: 5.8 10E3/UL (ref 4–11)

## 2021-10-09 PROCEDURE — 250N000013 HC RX MED GY IP 250 OP 250 PS 637: Performed by: FAMILY MEDICINE

## 2021-10-09 PROCEDURE — 86140 C-REACTIVE PROTEIN: CPT | Performed by: FAMILY MEDICINE

## 2021-10-09 PROCEDURE — 250N000011 HC RX IP 250 OP 636: Performed by: FAMILY MEDICINE

## 2021-10-09 PROCEDURE — 99285 EMERGENCY DEPT VISIT HI MDM: CPT | Mod: 25 | Performed by: FAMILY MEDICINE

## 2021-10-09 PROCEDURE — 99284 EMERGENCY DEPT VISIT MOD MDM: CPT | Performed by: FAMILY MEDICINE

## 2021-10-09 PROCEDURE — 70450 CT HEAD/BRAIN W/O DYE: CPT

## 2021-10-09 PROCEDURE — 85652 RBC SED RATE AUTOMATED: CPT | Performed by: FAMILY MEDICINE

## 2021-10-09 PROCEDURE — 96374 THER/PROPH/DIAG INJ IV PUSH: CPT | Performed by: FAMILY MEDICINE

## 2021-10-09 PROCEDURE — 36415 COLL VENOUS BLD VENIPUNCTURE: CPT | Performed by: FAMILY MEDICINE

## 2021-10-09 PROCEDURE — 80048 BASIC METABOLIC PNL TOTAL CA: CPT | Performed by: FAMILY MEDICINE

## 2021-10-09 PROCEDURE — 85025 COMPLETE CBC W/AUTO DIFF WBC: CPT | Performed by: FAMILY MEDICINE

## 2021-10-09 RX ORDER — ONDANSETRON 2 MG/ML
4 INJECTION INTRAMUSCULAR; INTRAVENOUS EVERY 30 MIN PRN
Status: DISCONTINUED | OUTPATIENT
Start: 2021-10-09 | End: 2021-10-10 | Stop reason: HOSPADM

## 2021-10-09 RX ADMIN — AMOXICILLIN AND CLAVULANATE POTASSIUM 1 TABLET: 875; 125 TABLET, FILM COATED ORAL at 23:34

## 2021-10-09 RX ADMIN — ONDANSETRON 4 MG: 2 INJECTION INTRAMUSCULAR; INTRAVENOUS at 22:52

## 2021-10-10 NOTE — ED NOTES
"RN entered room to medicate patient and she had emesis of 250 mL. States, \"It just came out of nowhere.\" IV zofran given.  "

## 2021-10-10 NOTE — DISCHARGE INSTRUCTIONS
Take the Augmentin twice a day for the sinus infection.  You can use over-the-counter Flonase nasal spray to help decrease congestion in your nose.  Nasal saline irrigation may also be helpful.  NeilMed is one brand that makes a kit which includes an irrigation bottle and 50 pre-mixed saline packets.  Use distilled water, not tap water. Follow the instructions.  Your hemoglobin was a bit low today. Continue with your iron and recheck with Dr. Cartwright in clinic.  It was nice visiting with both of you this evening. I hope this settles down quickly for you.    Thank you for choosing [facility]. We appreciate the opportunity to meet your urgent medical needs. Please let us know if we could have done anything to make your stay more satisfying.    After discharge, please closely monitor for any new or worsening symptoms. Return to the Emergency Department if you develop any acute worsening signs or symptoms.    If you had lab work, cultures or imaging studies done during your stay, the final results may still be pending. We will call you if your plan of care needs to change. However, if you are not improving as expected, please follow up with your primary care provider or clinic.     Start any prescription medications that were prescribed to you and take them as directed.     Please see additional handouts that may be pertinent to your condition.

## 2021-10-10 NOTE — ED TRIAGE NOTES
Presents to ED with concerns for head fullness and right sided hearing problems for 1 week. Has been feeling dizzy tonight. States she takes BP meds.

## 2021-10-10 NOTE — ED PROVIDER NOTES
History     Chief Complaint   Patient presents with     Headache     The history is provided by the patient and a relative.     Nadia Ladd is a 66 year old female who presents to the emergency room with ear pain that is mainly focused on the right side.  Also has pressure in her face.  Patient states that the pain has been going on for the past week and a half. Then tonight the patient began to feel dizzy- more as if the room was spinning. Patient believed that it was allergies and took a zyrtec last night. She said that this did not seem to help because she still was congested and had watery eyes in this morning.Patient denies having chills and sweats. Patients vision and speech are good. She does not have chest pain or heaviness. She also has normal breathing. All other body parts are working normally. 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     Allergies:  Allergies   Allergen Reactions     No Known Drug Allergies        Problem List:    Patient Active Problem List    Diagnosis Date Noted     Morbid obesity (H) 06/06/2019     Priority: Medium     Benign essential hypertension with target blood pressure below 140/90 10/10/2016     Priority: Medium     CARDIOVASCULAR SCREENING; LDL GOAL LESS THAN 130 10/02/2012     Priority: Medium     Advanced directives, counseling/discussion 02/09/2012     Priority: Medium     Advance Directive Problem List Overview:   Name Relationship Phone    Primary Health Care Agent            Alternative Health Care Agent          Discussed advance care planning with patient; information given to patient to review. 2/9/2012          Prediabetes 02/09/2012     Priority: Medium     Obesity 02/09/2012     Priority: Medium     Crohn's disease of large intestine without complication (H) 02/15/2011     Priority: Medium     Iron deficiency anemia 10/21/2010     Priority: Medium     B12 deficiency anemia 10/21/2010     Priority: Medium     Arthropathia  2010     Priority: Medium     Dermatitis-dishydrotic eczema-severe 2010     Priority: Medium     Primary pulmonary hypertension (H) 2010     Priority: Medium        Past Medical History:    Past Medical History:   Diagnosis Date     Arthropathia 2010     B12 deficiency anemia 10/21/2010     Benign essential hypertension with target blood pressure below 140/90 10/10/2016     CARDIOVASCULAR SCREENING; LDL GOAL LESS THAN 160 10/31/2010     Crohn's colitis (H) 2/15/2011     Dermatitis-dishydrotic eczema-severe 2010     HTN (hypertension) 2011     Iron deficiency anemia 10/21/2010     Primary pulmonary hypertension (H) 2010     Unspecified essential hypertension        Past Surgical History:    Past Surgical History:   Procedure Laterality Date     C EXPLORATORY OF ABDOMEN      laparoscopy     COLONOSCOPY  10/11/10     SURGICAL HISTORY OF -   77    Perineorrhaphy, for widening vaginal orifice       Family History:    Family History   Problem Relation Age of Onset     Cerebrovascular Disease Father         stroke about age 65,  of cancer at 68 yrs     Diabetes Father         eventually took insulin     Anemia Father         Pernisios anemia     Hypertension Mother         on meds, alive       Social History:  Marital Status:   [2]  Social History     Tobacco Use     Smoking status: Never Smoker     Smokeless tobacco: Never Used   Substance Use Topics     Alcohol use: No     Drug use: No        Medications:    amoxicillin-clavulanate (AUGMENTIN) 875-125 MG tablet  cyanocobalamin 1000 MCG TBCR  ferrous sulfate (IRON) 325 (65 FE) MG tablet  fluocinonide (LIDEX) 0.05 % external cream  IBUPROFEN 200 MG OR TABS  lisinopril (ZESTRIL) 10 MG tablet  Multiple Vitamins-Iron (MULTI-DAY PLUS IRON PO)  sulfaSALAzine (AZULFIDINE) 500 MG tablet  triamterene-HCTZ (DYAZIDE) 37.5-25 MG capsule          Review of Systems   All other systems reviewed and are negative.      Physical Exam    BP: (!) 184/85  Pulse: 92  Temp: 98.3  F (36.8  C)  Resp: 20  Weight: 104.3 kg (230 lb)  SpO2: 94 %      Physical Exam  Vitals and nursing note reviewed.   Constitutional:       General: She is not in acute distress.     Appearance: Normal appearance.   HENT:      Right Ear: Tympanic membrane normal.      Left Ear: Tympanic membrane normal.      Nose: Congestion present.      Right Sinus: Maxillary sinus tenderness present.      Left Sinus: Maxillary sinus tenderness present.   Eyes:      Extraocular Movements: Extraocular movements intact.      Pupils: Pupils are equal, round, and reactive to light.   Cardiovascular:      Rate and Rhythm: Normal rate and regular rhythm.   Pulmonary:      Effort: Pulmonary effort is normal.      Breath sounds: Normal breath sounds.   Musculoskeletal:         General: Normal range of motion.   Skin:     General: Skin is warm and dry.   Neurological:      General: No focal deficit present.      Mental Status: She is alert and oriented to person, place, and time.      Cranial Nerves: Cranial nerves are intact.      Sensory: Sensation is intact.      Motor: Motor function is intact.      Coordination: Coordination is intact. Finger-Nose-Finger Test and Heel to Shin Test normal.   Psychiatric:         Mood and Affect: Mood normal.         ED Course  (with Medical Decision Making)    66-year-old female with right ear fullness, discomfort facial pressure, congestion and allergies for the past week and a half now is felt a bit dizzy like she was spinning little off balance earlier today.  No focal weakness or numbness.    Her neuro exam is reassuring.  Her ears look clear.  She does have some tenderness over the maxillary sinuses bilaterally and some nasal congestion.  Head CT showed no acute intracranial process but she does have significant sinus disease    Suspect this is what is causing her facial pain, ear pain and or dizziness.  She was given a dose of Augmentin in the ED and  prescription sent to her pharmacy.  She can also use over-the-counter Flonase and nasal saline irrigation and recheck in clinic if persistent problems.  She will return to the ED if she worsens or has any concerns.  Verbal and written discharge instructions given.  She and her  are comfortable with this plan.          Procedures      Results for orders placed or performed during the hospital encounter of 10/09/21 (from the past 24 hour(s))   Head CT w/o contrast    Narrative    EXAM: CT HEAD W/O CONTRAST  LOCATION: [facility]  DATE/TIME: 10/9/2021 10:02 PM    INDICATION: Right-sided headache. Dizziness.  COMPARISON: None.  TECHNIQUE: Routine CT Head without IV contrast. Multiplanar reformats. Dose reduction techniques were used.    FINDINGS:  INTRACRANIAL CONTENTS: No intracranial hemorrhage, extraaxial collection, or mass effect.  No CT evidence of acute infarct. Normal parenchymal attenuation. Normal ventricles and sulci.     VISUALIZED ORBITS/SINUSES/MASTOIDS: No intraorbital abnormality. Dense of opacification of the right maxillary sinus. Incompletely evaluated air-fluid level left maxillary sinus. No middle ear or mastoid effusion.    BONES/SOFT TISSUES: No acute abnormality.      Impression    IMPRESSION:  1.  No acute intracranial pathology identified.  2.  Incompletely evaluated opacification of the maxillary sinuses. Opacified left sphenoid locule. Correlate for symptoms of acute sinusitis.   CBC with platelets differential    Narrative    The following orders were created for panel order CBC with platelets differential.  Procedure                               Abnormality         Status                     ---------                               -----------         ------                     CBC with platelets and d...[[id number]]  Abnormal            Final result                 Please view results for these tests on the individual orders.   Basic metabolic panel    Result Value Ref Range    Sodium 142 133 - 144 mmol/L    Potassium 4.3 3.4 - 5.3 mmol/L    Chloride 118 (H) 94 - 109 mmol/L    Carbon Dioxide (CO2) 20 20 - 32 mmol/L    Anion Gap 4 3 - 14 mmol/L    Urea Nitrogen 25 7 - 30 mg/dL    Creatinine 1.30 (H) 0.52 - 1.04 mg/dL    Calcium 8.3 (L) 8.5 - 10.1 mg/dL    Glucose 159 (H) 70 - 99 mg/dL    GFR Estimate 43 (L) >60 mL/min/1.73m2   Erythrocyte sedimentation rate auto   Result Value Ref Range    Erythrocyte Sedimentation Rate 10 0 - 30 mm/hr   CRP inflammation   Result Value Ref Range    CRP Inflammation 20.4 (H) 0.0 - 8.0 mg/L   CBC with platelets and differential   Result Value Ref Range    WBC Count 5.8 4.0 - 11.0 10e3/uL    RBC Count 3.41 (L) 3.80 - 5.20 10e6/uL    Hemoglobin 9.4 (L) 11.7 - 15.7 g/dL    Hematocrit 32.0 (L) 35.0 - 47.0 %    MCV 94 78 - 100 fL    MCH 27.6 26.5 - 33.0 pg    MCHC 29.4 (L) 31.5 - 36.5 g/dL    RDW 16.1 (H) 10.0 - 15.0 %    Platelet Count 212 150 - 450 10e3/uL    % Neutrophils 84 %    % Lymphocytes 8 %    % Monocytes 7 %    % Eosinophils 0 %    % Basophils 0 %    % Immature Granulocytes 1 %    NRBCs per 100 WBC 0 <1 /100    Absolute Neutrophils 4.9 1.6 - 8.3 10e3/uL    Absolute Lymphocytes 0.4 (L) 0.8 - 5.3 10e3/uL    Absolute Monocytes 0.4 0.0 - 1.3 10e3/uL    Absolute Eosinophils 0.0 0.0 - 0.7 10e3/uL    Absolute Basophils 0.0 0.0 - 0.2 10e3/uL    Absolute Immature Granulocytes 0.0 <=0.0 10e3/uL    Absolute NRBCs 0.0 10e3/uL       Medications   ondansetron (ZOFRAN) injection 4 mg (4 mg Intravenous Given 10/9/21 8675)   amoxicillin-clavulanate (AUGMENTIN) 875-125 MG per tablet 1 tablet (1 tablet Oral Given 10/9/21 5948)       Assessments & Plan     I have reviewed the nursing notes.    I have reviewed the findings, diagnosis, plan and need for follow up with the patient.       Discharge Medication List as of 10/9/2021 11:31 PM      START taking these medications    Details   amoxicillin-clavulanate (AUGMENTIN) 875-125 MG tablet Take 1  tablet by mouth 2 times daily, Disp-20 tablet, R-0, [facility]             Final diagnoses:   Acute maxillary sinusitis, recurrence not specified   Tinnitus, right   Dizziness   This document serves as a record of services personally performed by Joseph Anguiano,*. It was created on their behalf by Rufino Arteaga, a trained medical scribe. The creation of this record is based on the provider's personal observations and the statements of the patient. This document has been checked and approved by the attending provider.  Note: Chart documentation done in part with Dragon Voice Recognition software. Although reviewed after completion, some word and grammatical errors may remain.  10/9/2021   [facility] EMERGENCY DEPT     Joseph Anguiano MD  10/10/21 0117

## 2021-10-26 ENCOUNTER — OFFICE VISIT (OUTPATIENT)
Dept: INTERNAL MEDICINE | Facility: CLINIC | Age: 66
End: 2021-10-26
Payer: COMMERCIAL

## 2021-10-26 VITALS
BODY MASS INDEX: 42.8 KG/M2 | WEIGHT: 226.5 LBS | RESPIRATION RATE: 20 BRPM | OXYGEN SATURATION: 96 % | TEMPERATURE: 97.6 F | HEART RATE: 98 BPM | DIASTOLIC BLOOD PRESSURE: 80 MMHG | SYSTOLIC BLOOD PRESSURE: 136 MMHG

## 2021-10-26 DIAGNOSIS — I10 BENIGN ESSENTIAL HYPERTENSION WITH TARGET BLOOD PRESSURE BELOW 140/90: ICD-10-CM

## 2021-10-26 DIAGNOSIS — H69.91 DYSFUNCTION OF RIGHT EUSTACHIAN TUBE: ICD-10-CM

## 2021-10-26 DIAGNOSIS — J01.01 ACUTE RECURRENT MAXILLARY SINUSITIS: Primary | ICD-10-CM

## 2021-10-26 PROCEDURE — 90662 IIV NO PRSV INCREASED AG IM: CPT | Performed by: INTERNAL MEDICINE

## 2021-10-26 PROCEDURE — 99213 OFFICE O/P EST LOW 20 MIN: CPT | Mod: 25 | Performed by: INTERNAL MEDICINE

## 2021-10-26 PROCEDURE — 90471 IMMUNIZATION ADMIN: CPT | Performed by: INTERNAL MEDICINE

## 2021-10-26 RX ORDER — METHYLPREDNISOLONE 4 MG
TABLET, DOSE PACK ORAL
Qty: 21 TABLET | Refills: 0 | Status: SHIPPED | OUTPATIENT
Start: 2021-10-26 | End: 2023-02-16

## 2021-10-26 ASSESSMENT — PAIN SCALES - GENERAL: PAINLEVEL: NO PAIN (0)

## 2021-10-26 NOTE — PROGRESS NOTES
Subjective      EMR reviewed including:             Complaint, History of Chief Complaint, Corresponding Review of Systems, and Complaint Specific Physical Examination.    #1   Nadia is a 66 year old who presents for the following health issues decreased hearing right ear.  Patient was seen in the emergency department on ten 9.1 and diagnosed with acute maxillary sinusitis.  She was prescribed Augmentin and Flonase with complete alleviation of her headaches and congestion.  The dizziness she was experiencing at the time of her emergency department stay has resolved.  She continues to have hearing loss and intermittent dull right ear pain.  She denies any fevers, chills, unilateral upper or lower extremity weakness, speech changes, vision changes.  No other associated signs or symptoms.      #2   Follow-up on hypertension.  Blood pressure controlled at 136/80.  Taking lisinopril and Dyazide without difficulty.  Recent potassium levels within normal limits.  Kidney function slight decrease with GFR of 43.  Will follow        Exam:   LUNGS: clear bilaterally, airflow is brisk, no intercostal retraction or stridor is noted. No coughing is noted during visit.   HEART:  regular without rubs, clicks, gallops, or murmurs. PMI is nondisplaced. Upstrokes are brisk. S1,S2 are heard.      HPI     ED/UC Followup:    Facility:  [facility]   Date of visit: 10/9/21  Reason for visit: headaches, dizzy  Current Status: having problems haring problems and pain in right ear.            Review of Systems   Constitutional, HEENT, cardiovascular, pulmonary, gi and gu systems are negative, except as otherwise noted.          Objective:       Vital Signs:   /80 (BP Location: Right arm, Patient Position: Sitting, Cuff Size: Adult Large)   Pulse 98   Temp 97.6  F (36.4  C) (Temporal)   Resp 20   Wt 102.7 kg (226 lb 8 oz)   LMP  (LMP Unknown)   SpO2 96%   BMI 42.80 kg/m      Physical Exam:   Constitutional: He is not in acute  distress, well-developed, and well-nourished  HEENT: Normocephalic and atraumatic. Right ear: Slight retraction of the right tympanic membrane.  No erythema, TM is intact, moderate buildup of cerumen.  Left ear: Tympanic membrane and ear canal and external ear normal. Nose normal Mouth: Mucous membranes are moist.  No pharyngeal swelling or posterior oropharyngeal erythema.  PERRL.  Cardiovascular: Normal rate and regular rhythm. S1 and S2 normal.  No murmurs rubs or gallops.  Pulmonary: Pulmonary effort is normal.  Breath sounds normal.  No wheezing rhonchi or rales.           Decision Making    Problem and Complexity     1. Acute recurrent maxillary sinusitis  Patient's facial pain and congestion has largely resolved with Augmentin use since the emergency department.  She has not been using the Flonase since her first day of her discharge.  If she begins to have recurrent headaches, congestion, fevers or chills she was instructed to return to the clinic for reevaluation.    2. Dysfunction of right eustachian tube  Patient is ongoing hearing loss and intermittent right ear pain is likely due to eustachian tube dysfunction as the right TM is slightly retracted and she has recent history of maxillary sinusitis.  Head CT performed in the ED on 10/9/2021 was largely normal outside of expected inflammation with maxillary sinusitis.  Or obvious etiology of her hearing loss is therefore unlikely.  She will be started on a Medrol Dosepak today and was encouraged to resume Flonase.    3. Benign essential hypertension with target blood pressure below 140/90  Continue current therapy.  Labs reviewed.                                    FOLLOW UP   I have asked the patient to make an appointment for followup with me in 2 weeks if symptoms not resolved          I have carefully explained the diagnosis and treatment options to the patient.  The patient has displayed an understanding of the above, and all subsequent questions were  answered.      DO DIANNA Bullard    Portions of this note were produced using [employer]  Although every attempt at real-time proof reading has been made, occasional grammar/syntax errors may have been missed.    This patient has been interviewed, examined, diagnosed, and informed of the above by me personally.  Medical records and available pertinent information has been reviewed by me personally.  All decisions and discussion have been between myself and the patient/family.  This was done in the presence of Ryan Ladd  , who acted as a medical scribe and recorded the events above.  No diagnosis or decision making was made by the above-mentioned scribe.  The patient, and or his/her ensurors will not be billed for the presence or actions of this scribe.  The information recorded by the scribe has been reviewed by me and found to be accurate.

## 2022-01-11 DIAGNOSIS — I10 HYPERTENSION GOAL BP (BLOOD PRESSURE) < 140/90: ICD-10-CM

## 2022-01-11 DIAGNOSIS — K50.80 CROHN'S DISEASE OF BOTH SMALL AND LARGE INTESTINE WITHOUT COMPLICATION (H): ICD-10-CM

## 2022-01-12 RX ORDER — TRIAMTERENE AND HYDROCHLOROTHIAZIDE 37.5; 25 MG/1; MG/1
CAPSULE ORAL
Qty: 90 CAPSULE | Refills: 0 | Status: SHIPPED | OUTPATIENT
Start: 2022-01-12 | End: 2022-03-31

## 2022-01-12 RX ORDER — SULFASALAZINE 500 MG/1
TABLET ORAL
Qty: 360 TABLET | Refills: 0 | Status: SHIPPED | OUTPATIENT
Start: 2022-01-12 | End: 2022-03-31

## 2022-01-12 RX ORDER — LISINOPRIL 10 MG/1
10 TABLET ORAL DAILY
Qty: 90 TABLET | Refills: 0 | Status: SHIPPED | OUTPATIENT
Start: 2022-01-12 | End: 2022-03-31

## 2022-01-12 NOTE — TELEPHONE ENCOUNTER
Azulfidine  Routing refill request to provider for review/approval because:  Drug not on the [facility] refill protocol     Dyazide  Routing refill request to provider for review/approval because:  Labs out of range:  KAYLEE    Zestril  Routing refill request to provider for review/approval because:  Labs out of range:  KAYLEE Austin RN

## 2022-03-28 DIAGNOSIS — I10 HYPERTENSION GOAL BP (BLOOD PRESSURE) < 140/90: ICD-10-CM

## 2022-03-28 DIAGNOSIS — K50.80 CROHN'S DISEASE OF BOTH SMALL AND LARGE INTESTINE WITHOUT COMPLICATION (H): ICD-10-CM

## 2022-03-29 NOTE — TELEPHONE ENCOUNTER
Lisinopril  Dyazide  Azulfidine  Routing refill request to provider for review/approval because:  Drug not on the [facility] refill protocol   Labs out of range:  KAYLEE Austin RN

## 2022-03-31 RX ORDER — TRIAMTERENE AND HYDROCHLOROTHIAZIDE 37.5; 25 MG/1; MG/1
CAPSULE ORAL
Qty: 90 CAPSULE | Refills: 0 | Status: SHIPPED | OUTPATIENT
Start: 2022-03-31 | End: 2022-06-29

## 2022-03-31 RX ORDER — LISINOPRIL 10 MG/1
10 TABLET ORAL DAILY
Qty: 90 TABLET | Refills: 0 | Status: SHIPPED | OUTPATIENT
Start: 2022-03-31 | End: 2022-06-29

## 2022-03-31 RX ORDER — SULFASALAZINE 500 MG/1
TABLET ORAL
Qty: 360 TABLET | Refills: 0 | Status: SHIPPED | OUTPATIENT
Start: 2022-03-31 | End: 2022-06-29

## 2022-06-26 DIAGNOSIS — I10 HYPERTENSION GOAL BP (BLOOD PRESSURE) < 140/90: ICD-10-CM

## 2022-06-26 DIAGNOSIS — K50.80 CROHN'S DISEASE OF BOTH SMALL AND LARGE INTESTINE WITHOUT COMPLICATION (H): ICD-10-CM

## 2022-06-29 RX ORDER — SULFASALAZINE 500 MG/1
TABLET ORAL
Qty: 360 TABLET | Refills: 0 | Status: SHIPPED | OUTPATIENT
Start: 2022-06-29 | End: 2022-09-28

## 2022-06-29 RX ORDER — LISINOPRIL 10 MG/1
10 TABLET ORAL DAILY
Qty: 90 TABLET | Refills: 0 | Status: SHIPPED | OUTPATIENT
Start: 2022-06-29 | End: 2022-09-28

## 2022-06-29 RX ORDER — TRIAMTERENE AND HYDROCHLOROTHIAZIDE 37.5; 25 MG/1; MG/1
CAPSULE ORAL
Qty: 90 CAPSULE | Refills: 0 | Status: SHIPPED | OUTPATIENT
Start: 2022-06-29 | End: 2022-09-28

## 2022-06-29 NOTE — TELEPHONE ENCOUNTER
Pending Prescriptions:                       Disp   Refills    sulfaSALAzine (AZULFIDINE) 500 MG tablet [*360 ta*0        Sig: TAKE 2 TABLETS BY MOUTH TWICE A DAY    triamterene-HCTZ (DYAZIDE) 37.5-25 MG caps*90 cap*0        Sig: TAKE 1 CAPSULE BY MOUTH EVERY DAY    lisinopril (ZESTRIL) 10 MG tablet [Pharmac*90 tab*0        Sig: TAKE 1 TABLET (10 MG) BY MOUTH DAILY    Routing refill request to provider for review/approval because:  Drug not on the [facility] refill protocol   Labs out of range:    Creatinine   Date Value Ref Range Status   10/09/2021 1.30 (H) 0.52 - 1.04 mg/dL Final   01/26/2021 1.45 (H) 0.52 - 1.04 mg/dL Final     Annamarie Davis, KEVINN, RN

## 2022-09-24 DIAGNOSIS — K50.80 CROHN'S DISEASE OF BOTH SMALL AND LARGE INTESTINE WITHOUT COMPLICATION (H): ICD-10-CM

## 2022-09-24 DIAGNOSIS — I10 HYPERTENSION GOAL BP (BLOOD PRESSURE) < 140/90: ICD-10-CM

## 2022-09-28 RX ORDER — SULFASALAZINE 500 MG/1
TABLET ORAL
Qty: 360 TABLET | Refills: 0 | Status: SHIPPED | OUTPATIENT
Start: 2022-09-28 | End: 2023-01-12

## 2022-09-28 RX ORDER — LISINOPRIL 10 MG/1
10 TABLET ORAL DAILY
Qty: 90 TABLET | Refills: 0 | Status: SHIPPED | OUTPATIENT
Start: 2022-09-28 | End: 2022-12-26

## 2022-09-28 RX ORDER — TRIAMTERENE AND HYDROCHLOROTHIAZIDE 37.5; 25 MG/1; MG/1
CAPSULE ORAL
Qty: 90 CAPSULE | Refills: 0 | Status: SHIPPED | OUTPATIENT
Start: 2022-09-28 | End: 2023-01-12

## 2022-09-28 NOTE — TELEPHONE ENCOUNTER
Sulfasalazine  Routing refill request to provider for review/approval because:  Drug not active on patient's medication list  Patient needs to be seen because it has been more than 1 year since last office visit.    Triamterene/hydrochlorothiazide  Routing refill request to provider for review/approval because:  Labs out of range:  CREAT  Patient needs to be seen because it has been more than 1 year since last office visit.    Lisinopril   Routing refill request to provider for review/approval because:  Labs out of range:  CREAT    John Yancey RN

## 2022-12-23 DIAGNOSIS — I10 HYPERTENSION GOAL BP (BLOOD PRESSURE) < 140/90: ICD-10-CM

## 2022-12-23 NOTE — LETTER
[address] [id number] [phone]        December 27, 2022    Nadia Ladd  [address] [facility] [address]          Dear Nadia,    We are concerned about your health care.  We recently provided you with a medication refill.  Many medications require routine follow-up with your Doctor.      At this time we ask that: You schedule an appointment for your annual physical. Call the clinic at [phone] Option 1 to schedule.     Your prescription:  Has been refilled for 1 time so you may have time for the above noted follow-up.      Thank you,      Sincerely,        Nomi Cartwright,

## 2022-12-26 RX ORDER — LISINOPRIL 10 MG/1
TABLET ORAL
Qty: 30 TABLET | Refills: 0 | Status: SHIPPED | OUTPATIENT
Start: 2022-12-26 | End: 2023-02-02

## 2022-12-26 NOTE — TELEPHONE ENCOUNTER
"Routing refill request to provider for review/approval because:  Future Appointments 12/26/2022 - 6/24/2023      None        Sending to scheduling for yearly office visit due    Requested Prescriptions   Pending Prescriptions Disp Refills    lisinopril (ZESTRIL) 10 MG tablet [Pharmacy Med Name: LISINOPRIL 10MG TABLET] 90 tablet 0     Sig: TAKE 1 TABLET BY MOUTH ONCE DAILY       ACE Inhibitors (Including Combos) Protocol Failed - 12/23/2022  1:12 AM        Failed - Blood pressure under 140/90 in past 12 months     BP Readings from Last 3 Encounters:   10/26/21 136/80   10/09/21 137/71   01/26/21 124/80                 Failed - Recent (12 mo) or future (30 days) visit within the authorizing provider's specialty     Patient has had an office visit with the authorizing provider or a provider within the authorizing providers department within the previous 12 mos or has a future within next 30 days. See \"Patient Info\" tab in inbasket, or \"Choose Columns\" in Meds & Orders section of the refill encounter.              Failed - Normal serum creatinine on file in past 12 months     Recent Labs   Lab Test 10/09/21  2227   CR 1.30*       Ok to refill medication if creatinine is low          Failed - Normal serum potassium on file in past 12 months     Recent Labs   Lab Test 10/09/21  2227   POTASSIUM 4.3          "

## 2023-01-12 DIAGNOSIS — I10 HYPERTENSION GOAL BP (BLOOD PRESSURE) < 140/90: ICD-10-CM

## 2023-01-12 DIAGNOSIS — K50.80 CROHN'S DISEASE OF BOTH SMALL AND LARGE INTESTINE WITHOUT COMPLICATION (H): ICD-10-CM

## 2023-01-12 RX ORDER — TRIAMTERENE AND HYDROCHLOROTHIAZIDE 37.5; 25 MG/1; MG/1
CAPSULE ORAL
Qty: 90 CAPSULE | Refills: 0 | Status: SHIPPED | OUTPATIENT
Start: 2023-01-12 | End: 2023-02-08

## 2023-01-12 RX ORDER — SULFASALAZINE 500 MG/1
TABLET ORAL
Qty: 360 TABLET | Refills: 0 | Status: SHIPPED | OUTPATIENT
Start: 2023-01-12 | End: 2023-02-08

## 2023-01-12 NOTE — TELEPHONE ENCOUNTER
Pending Prescriptions:                       Disp   Refills    triamterene-HCTZ (DYAZIDE) 37.5-25 MG caps*90 cap*0        Sig: TAKE 1 CAPSULE BY MOUTH ONCE DAILY    sulfaSALAzine (AZULFIDINE) 500 MG tablet [*360 ta*0        Sig: TAKE 2 TABLETS BY MOUTH TWICE A DAY      Routing refill request to provider for review/approval because:  Drug not on the [facility] refill protocol   Patient needs to be seen because it has been more than 1 year since last office visit.    Malia Herrera RN on 1/12/2023 at 3:32 PM

## 2023-01-30 ENCOUNTER — TELEPHONE (OUTPATIENT)
Dept: INTERNAL MEDICINE | Facility: CLINIC | Age: 68
End: 2023-01-30
Payer: MEDICARE

## 2023-01-30 NOTE — TELEPHONE ENCOUNTER
Reason for Call:  Appointment Request    Patient requesting this type of appt: Chronic Diease Management/Medication/Follow-Up    Requested provider: Nomi Cartwright    Reason patient unable to be scheduled: Not within requested timeframe    When does patient want to be seen/preferred time: 1-2 weeks    Comments: Patient looking for med check in the next few weeks. She stated she has about 20 more days of meds left and would like to do labs as well.    Okay to leave a detailed message?: Yes at Cell number on file:    Telephone Information:   Mobile [phone]       Call taken on 1/30/2023 at 11:34 AM by Augusto Bermeo

## 2023-02-02 DIAGNOSIS — I10 HYPERTENSION GOAL BP (BLOOD PRESSURE) < 140/90: ICD-10-CM

## 2023-02-02 RX ORDER — LISINOPRIL 10 MG/1
TABLET ORAL
Qty: 30 TABLET | Refills: 0 | Status: SHIPPED | OUTPATIENT
Start: 2023-02-02 | End: 2023-03-14

## 2023-02-02 NOTE — TELEPHONE ENCOUNTER
"Requested Prescriptions   Pending Prescriptions Disp Refills    lisinopril (ZESTRIL) 10 MG tablet [Pharmacy Med Name: LISINOPRIL 10MG TABLET] 30 tablet 0     Sig: TAKE 1 TABLET BY MOUTH ONCEDAILY       ACE Inhibitors (Including Combos) Protocol Failed - 2/2/2023  1:14 AM        Failed - Blood pressure under 140/90 in past 12 months     BP Readings from Last 3 Encounters:   10/26/21 136/80   10/09/21 137/71   01/26/21 124/80                 Failed - Normal serum creatinine on file in past 12 months     Recent Labs   Lab Test 10/09/21  2227   CR 1.30*       Ok to refill medication if creatinine is low          Failed - Normal serum potassium on file in past 12 months     Recent Labs   Lab Test 10/09/21  2227   POTASSIUM 4.3             Passed - Recent (12 mo) or future (30 days) visit within the authorizing provider's specialty     Patient has had an office visit with the authorizing provider or a provider within the authorizing providers department within the previous 12 mos or has a future within next 30 days. See \"Patient Info\" tab in inbasket, or \"Choose Columns\" in Meds & Orders section of the refill encounter.              Passed - Medication is active on med list        Passed - Patient is age 18 or older        Passed - No active pregnancy on record        Passed - No positive pregnancy test within past 12 months             "

## 2023-02-08 DIAGNOSIS — I10 HYPERTENSION GOAL BP (BLOOD PRESSURE) < 140/90: ICD-10-CM

## 2023-02-08 DIAGNOSIS — K50.80 CROHN'S DISEASE OF BOTH SMALL AND LARGE INTESTINE WITHOUT COMPLICATION (H): ICD-10-CM

## 2023-02-08 RX ORDER — TRIAMTERENE AND HYDROCHLOROTHIAZIDE 37.5; 25 MG/1; MG/1
CAPSULE ORAL
Qty: 90 CAPSULE | Refills: 0 | Status: ON HOLD | OUTPATIENT
Start: 2023-02-08 | End: 2023-05-12

## 2023-02-08 RX ORDER — SULFASALAZINE 500 MG/1
TABLET ORAL
Qty: 360 TABLET | Refills: 0 | Status: ON HOLD | OUTPATIENT
Start: 2023-02-08 | End: 2023-04-11

## 2023-02-08 NOTE — TELEPHONE ENCOUNTER
"Requested Prescriptions   Pending Prescriptions Disp Refills    triamterene-HCTZ (DYAZIDE) 37.5-25 MG capsule [Pharmacy Med Name: TRIAMTERENE/HCTZ 37.5-25MG CAP] 90 capsule 0     Sig: TAKE 1 CAPSULE BY MOUTH ONCE DAILY       Diuretics (Including Combos) Protocol Failed - 2/8/2023  1:28 AM        Failed - Blood pressure under 140/90 in past 12 months     BP Readings from Last 3 Encounters:   10/26/21 136/80   10/09/21 137/71   01/26/21 124/80                 Failed - Recent (12 mo) or future (30 days) visit within the authorizing provider's specialty     Patient has had an office visit with the authorizing provider or a provider within the authorizing providers department within the previous 12 mos or has a future within next 30 days. See \"Patient Info\" tab in inbasket, or \"Choose Columns\" in Meds & Orders section of the refill encounter.              Failed - Normal serum creatinine on file in past 12 months     Recent Labs   Lab Test 10/09/21  2227   CR 1.30*              Failed - Normal serum potassium on file in past 12 months     Recent Labs   Lab Test 10/09/21  2227   POTASSIUM 4.3                    Failed - Normal serum sodium on file in past 12 months     Recent Labs   Lab Test 10/09/21  2227                 Passed - Medication is active on med list        Passed - Patient is age 18 or older        Passed - No active pregancy on record        Passed - No positive pregnancy test in past 12 months          sulfaSALAzine (AZULFIDINE) 500 MG tablet [Pharmacy Med Name: SULFASALAZINE 500MG TABLET] 360 tablet 0     Sig: TAKE 2 TABLETS BY MOUTH TWICE A DAY       There is no refill protocol information for this order          "

## 2023-02-09 DIAGNOSIS — K50.80 CROHN'S DISEASE OF BOTH SMALL AND LARGE INTESTINE WITHOUT COMPLICATION (H): ICD-10-CM

## 2023-02-09 DIAGNOSIS — I10 HYPERTENSION GOAL BP (BLOOD PRESSURE) < 140/90: ICD-10-CM

## 2023-02-09 RX ORDER — TRIAMTERENE AND HYDROCHLOROTHIAZIDE 37.5; 25 MG/1; MG/1
CAPSULE ORAL
Qty: 90 CAPSULE | Refills: 0 | OUTPATIENT
Start: 2023-02-09

## 2023-02-09 RX ORDER — SULFASALAZINE 500 MG/1
TABLET ORAL
Qty: 360 TABLET | Refills: 0 | OUTPATIENT
Start: 2023-02-09

## 2023-02-16 ENCOUNTER — TELEPHONE (OUTPATIENT)
Dept: INTERNAL MEDICINE | Facility: CLINIC | Age: 68
End: 2023-02-16

## 2023-02-16 ENCOUNTER — OFFICE VISIT (OUTPATIENT)
Dept: INTERNAL MEDICINE | Facility: CLINIC | Age: 68
End: 2023-02-16
Payer: MEDICARE

## 2023-02-16 VITALS
OXYGEN SATURATION: 98 % | SYSTOLIC BLOOD PRESSURE: 128 MMHG | HEIGHT: 61 IN | HEART RATE: 96 BPM | WEIGHT: 202.1 LBS | DIASTOLIC BLOOD PRESSURE: 60 MMHG | RESPIRATION RATE: 20 BRPM | TEMPERATURE: 96.8 F | BODY MASS INDEX: 38.16 KG/M2

## 2023-02-16 DIAGNOSIS — Z12.31 ENCOUNTER FOR SCREENING MAMMOGRAM FOR BREAST CANCER: ICD-10-CM

## 2023-02-16 DIAGNOSIS — E66.01 MORBID OBESITY (H): ICD-10-CM

## 2023-02-16 DIAGNOSIS — Z00.00 MEDICARE ANNUAL WELLNESS VISIT, SUBSEQUENT: Primary | ICD-10-CM

## 2023-02-16 DIAGNOSIS — D51.9 ANEMIA DUE TO VITAMIN B12 DEFICIENCY, UNSPECIFIED B12 DEFICIENCY TYPE: ICD-10-CM

## 2023-02-16 DIAGNOSIS — I27.0 PRIMARY PULMONARY HYPERTENSION (H): ICD-10-CM

## 2023-02-16 DIAGNOSIS — Z12.11 SCREEN FOR COLON CANCER: ICD-10-CM

## 2023-02-16 DIAGNOSIS — K50.80 CROHN'S DISEASE OF BOTH SMALL AND LARGE INTESTINE WITHOUT COMPLICATION (H): ICD-10-CM

## 2023-02-16 DIAGNOSIS — Z78.0 MENOPAUSE: ICD-10-CM

## 2023-02-16 DIAGNOSIS — I10 BENIGN ESSENTIAL HYPERTENSION WITH TARGET BLOOD PRESSURE BELOW 140/90: ICD-10-CM

## 2023-02-16 LAB
ALBUMIN SERPL BCG-MCNC: 3.6 G/DL (ref 3.5–5.2)
ALP SERPL-CCNC: 110 U/L (ref 35–104)
ALT SERPL W P-5'-P-CCNC: 11 U/L (ref 10–35)
ANION GAP SERPL CALCULATED.3IONS-SCNC: 11 MMOL/L (ref 7–15)
AST SERPL W P-5'-P-CCNC: 13 U/L (ref 10–35)
BILIRUB DIRECT SERPL-MCNC: <0.2 MG/DL (ref 0–0.3)
BILIRUB SERPL-MCNC: 0.3 MG/DL
BUN SERPL-MCNC: 34.9 MG/DL (ref 8–23)
CALCIUM SERPL-MCNC: 8.4 MG/DL (ref 8.8–10.2)
CHLORIDE SERPL-SCNC: 112 MMOL/L (ref 98–107)
CREAT SERPL-MCNC: 1.64 MG/DL (ref 0.51–0.95)
CREAT UR-MCNC: 228.4 MG/DL
DEPRECATED HCO3 PLAS-SCNC: 15 MMOL/L (ref 22–29)
ERYTHROCYTE [DISTWIDTH] IN BLOOD BY AUTOMATED COUNT: 18.7 % (ref 10–15)
FERRITIN SERPL-MCNC: 21 NG/ML (ref 11–328)
GFR SERPL CREATININE-BSD FRML MDRD: 34 ML/MIN/1.73M2
GLUCOSE SERPL-MCNC: 126 MG/DL (ref 70–99)
HCT VFR BLD AUTO: 26 % (ref 35–47)
HGB BLD-MCNC: 6.8 G/DL (ref 11.7–15.7)
IRON BINDING CAPACITY (ROCHE): 265 UG/DL (ref 240–430)
IRON SATN MFR SERPL: 10 % (ref 15–46)
IRON SERPL-MCNC: 27 UG/DL (ref 37–145)
MCH RBC QN AUTO: 24.5 PG (ref 26.5–33)
MCHC RBC AUTO-ENTMCNC: 26.2 G/DL (ref 31.5–36.5)
MCV RBC AUTO: 94 FL (ref 78–100)
MICROALBUMIN UR-MCNC: 198.2 MG/L
MICROALBUMIN/CREAT UR: 86.78 MG/G CR (ref 0–25)
PLATELET # BLD AUTO: 417 10E3/UL (ref 150–450)
POTASSIUM SERPL-SCNC: 4.8 MMOL/L (ref 3.4–5.3)
PROT SERPL-MCNC: 6.1 G/DL (ref 6.4–8.3)
RBC # BLD AUTO: 2.77 10E6/UL (ref 3.8–5.2)
SODIUM SERPL-SCNC: 138 MMOL/L (ref 136–145)
VIT B12 SERPL-MCNC: 1493 PG/ML (ref 232–1245)
WBC # BLD AUTO: 8.5 10E3/UL (ref 4–11)

## 2023-02-16 PROCEDURE — 99213 OFFICE O/P EST LOW 20 MIN: CPT | Mod: 25 | Performed by: INTERNAL MEDICINE

## 2023-02-16 PROCEDURE — 85027 COMPLETE CBC AUTOMATED: CPT | Performed by: INTERNAL MEDICINE

## 2023-02-16 PROCEDURE — 83540 ASSAY OF IRON: CPT | Performed by: INTERNAL MEDICINE

## 2023-02-16 PROCEDURE — 82248 BILIRUBIN DIRECT: CPT | Performed by: INTERNAL MEDICINE

## 2023-02-16 PROCEDURE — 82043 UR ALBUMIN QUANTITATIVE: CPT | Performed by: INTERNAL MEDICINE

## 2023-02-16 PROCEDURE — G0439 PPPS, SUBSEQ VISIT: HCPCS | Performed by: INTERNAL MEDICINE

## 2023-02-16 PROCEDURE — 80053 COMPREHEN METABOLIC PANEL: CPT | Performed by: INTERNAL MEDICINE

## 2023-02-16 PROCEDURE — 82728 ASSAY OF FERRITIN: CPT | Performed by: INTERNAL MEDICINE

## 2023-02-16 PROCEDURE — 36415 COLL VENOUS BLD VENIPUNCTURE: CPT | Performed by: INTERNAL MEDICINE

## 2023-02-16 PROCEDURE — 82607 VITAMIN B-12: CPT | Performed by: INTERNAL MEDICINE

## 2023-02-16 PROCEDURE — 83550 IRON BINDING TEST: CPT | Performed by: INTERNAL MEDICINE

## 2023-02-16 PROCEDURE — 82570 ASSAY OF URINE CREATININE: CPT | Performed by: INTERNAL MEDICINE

## 2023-02-16 ASSESSMENT — PATIENT HEALTH QUESTIONNAIRE - PHQ9
SUM OF ALL RESPONSES TO PHQ QUESTIONS 1-9: 2
SUM OF ALL RESPONSES TO PHQ QUESTIONS 1-9: 2
10. IF YOU CHECKED OFF ANY PROBLEMS, HOW DIFFICULT HAVE THESE PROBLEMS MADE IT FOR YOU TO DO YOUR WORK, TAKE CARE OF THINGS AT HOME, OR GET ALONG WITH OTHER PEOPLE: NOT DIFFICULT AT ALL

## 2023-02-16 ASSESSMENT — PAIN SCALES - GENERAL: PAINLEVEL: NO PAIN (0)

## 2023-02-16 NOTE — TELEPHONE ENCOUNTER
Received call from Danna in the lab who reports a critical hgb of 6.8.    Please advise on next steps.

## 2023-02-16 NOTE — PROGRESS NOTES
Assessment & Plan     Medicare annual wellness visit, subsequent    Crohn's disease of both small and large intestine without complication (H)  - Hepatic panel (Albumin, ALT, AST, Bili, Alk Phos, TP); Future    Anemia due to vitamin B12 deficiency, unspecified B12 deficiency type  - CBC with platelets; Future  - Ferritin; Future  - Iron and iron binding capacity; Future  - Vitamin B12; Future    Morbid obesity (H)    Benign essential hypertension with target blood pressure below 140/90  - Albumin Random Urine Quantitative with Creat Ratio; Future  - Basic metabolic panel  (Ca, Cl, CO2, Creat, Gluc, K, Na, BUN); Future      Menopause  - DEXA HIP/PELVIS/SPINE - Future; Future    Encounter for screening mammogram for breast cancer  - MA SCREENING DIGITAL BILAT - Future  (s+30); Future    Screen for colon cancer  - Colonoscopy Screening  Referral; Future        Annual Wellness Visit    Patient has been advised of split billing requirements and indicates understanding: Yes         Yo Zuniga is a 67 year old, presenting for the following health issues:  Hypertension and Recheck Medication (iron)      History of Present Illness       Reason for visit:  Blood pressure and iron    She eats 0-1 servings of fruits and vegetables daily.She consumes 2 sweetened beverage(s) daily.She exercises with enough effort to increase her heart rate 9 or less minutes per day.  She exercises with enough effort to increase her heart rate 3 or less days per week.   She is taking medications regularly.    Today's PHQ-9         PHQ-9 Total Score: 2    PHQ-9 Q9 Thoughts of better off dead/self-harm past 2 weeks :   Not at all    How difficult have these problems made it for you to do your work, take care of things at home, or get along with other people: Not difficult at all       Annual Wellness Visit    Patient has been advised of split billing requirements and indicates understanding: Yes     Are you in the first 12 months  "of your Medicare Part B coverage?      Physical Health:    In general, how would you rate your overall physical health? good    Outside of work, how many days during the week do you exercise?none    Outside of work, approximately how many minutes a day do you exercise?not applicable    If you drink alcohol do you typically have >3 drinks per day or >7 drinks per week? No    Do you usually eat at least 4 servings of fruit and vegetables a day, include whole grains & fiber and avoid regularly eating high fat or \"junk\" foods? Yes    Do you have any problems taking medications regularly? No    Do you have any side effects from medications? none    Needs assistance for the following daily activities: no assistance needed    Which of the following safety concerns are present in your home?  none identified     Hearing impairment: No    In the past 6 months, have you been bothered by leaking of urine? no    Mental Health:    In general, how would you rate your overall mental or emotional health? excellent  PHQ-2 Score: (!) 3    Do you feel safe in your environment? Yes    Have you ever done Advance Care Planning? (For example, a Health Directive, POLST, or a discussion with a medical provider or your loved ones about your wishes)? Yes, advance care planning is on file.    Fall risk:  Fallen 2 or more times in the past year?: No  Any fall with injury in the past year?: No    Cognitive Screenin) Repeat 3 items (Leader, Season, Table)    2) Clock draw: NORMAL  3) 3 item recall: Recalls 3 objects  Results: 3 items recalled: COGNITIVE IMPAIRMENT LESS LIKELY    Mini-CogTM Copyright BINH Carrasco. Licensed by the author for use in [facility]; reprinted with permission (castillo@.[facility]). All rights reserved.      Do you have sleep apnea, excessive snoring or daytime drowsiness?: no    Current providers sharing in care for this patient include:   Patient Care Team:  Nomi Cartwright DO as PCP - General " "(Internal Medicine)  Nomi Cartwright DO as Assigned PCP    Patient has been advised of split billing requirements and indicates understanding: Yes      Review of Systems   CONSTITUTIONAL: NEGATIVE for fever, chills, change in weight  INTEGUMENTARY/SKIN: NEGATIVE for worrisome rashes, moles or lesions  EYES: NEGATIVE for vision changes or irritation  ENT/MOUTH: NEGATIVE for ear, mouth and throat problems  RESP: NEGATIVE for significant cough or SOB  BREAST: NEGATIVE for masses, tenderness or discharge  CV: NEGATIVE for chest pain, palpitations or peripheral edema  GI: NEGATIVE for nausea, abdominal pain, heartburn, or change in bowel habits  : NEGATIVE for frequency, dysuria, or hematuria  MUSCULOSKELETAL: NEGATIVE for significant arthralgias or myalgia  NEURO: NEGATIVE for weakness, dizziness or paresthesias  ENDOCRINE: NEGATIVE for temperature intolerance, skin/hair changes  HEME: NEGATIVE for bleeding problems  PSYCHIATRIC: NEGATIVE for changes in mood or affect      Objective    /60 (BP Location: Right arm, Patient Position: Chair)   Pulse 96   Temp 96.8  F (36  C) (Temporal)   Resp 20   Ht 1.549 m (5' 1\")   Wt 91.7 kg (202 lb 1.6 oz)   LMP  (LMP Unknown)   SpO2 98%   BMI 38.19 kg/m    Body mass index is 38.19 kg/m .  Physical Exam   GENERAL: healthy, alert and no distress  EYES: Eyes grossly normal to inspection, PERRL and conjunctivae and sclerae normal  HENT: ear canals and TM's normal, nose and mouth without ulcers or lesions  NECK: no adenopathy, no asymmetry, masses, or scars and thyroid normal to palpation  RESP: lungs clear to auscultation - no rales, rhonchi or wheezes  CV: regular rate and rhythm, normal S1 S2, no S3 or S4, no murmur, click or rub, no peripheral edema and peripheral pulses strong  ABDOMEN: soft, nontender, no hepatosplenomegaly, no masses and bowel sounds normal  MS: no gross musculoskeletal defects noted, no edema  SKIN: no suspicious lesions or " rashes  NEURO: Normal strength and tone, mentation intact and speech normal  PSYCH: mentation appears normal, affect normal/bright            I have reviewed the patient's vaccination schedule and discussed the benefits of prophylactic vaccination in detail.  I recommend the patient contact their pharmacist for vaccinations.  Discussed that most insurance companies now favor reimbursement to the pharmacies and it will financially behoove the patient to have vaccinations performed at their pharmacy.              Nomi Cartwright [facility]

## 2023-02-16 NOTE — TELEPHONE ENCOUNTER
Message handled by Nurse Triage with [facility] - provider name: Dr. Cartwright.  He states that he would like to see patient next week Tuesday as 840am and will recheck her Hgb at that time.  This appointment has already been scheduled.    Message left for patient to call back to inform her of the above.

## 2023-02-16 NOTE — TELEPHONE ENCOUNTER
Patient returning call and informed of message below of critical lab and of scheduled appointment with Dr. Cartwright on Tuesday, February 21st, to check in at 8:20 am. Questioned if she has been taking her iron supplements and she stated no she hadn't been good about taking them, causing upset stomach. Informed to take with a meal and to take with orange juice as well to help with the absorption. Kristine Ervin LPN

## 2023-02-20 ENCOUNTER — TELEPHONE (OUTPATIENT)
Dept: GASTROENTEROLOGY | Facility: CLINIC | Age: 68
End: 2023-02-20
Payer: MEDICARE

## 2023-02-20 NOTE — TELEPHONE ENCOUNTER
Screening Questions  BLUE  KIND OF PREP RED  LOCATION [review exclusion criteria] GREEN  SEDATION TYPE        N Are you active on mychart?       Nomi Cartwright, DO  Ordering/Referring Provider?        MEDICARE What type of coverage do you have?      N Have you had a positive covid test in the last 14 days?     36.6 1. BMI  [BMI 40+ - review exclusion criteria]    Y  2. Are you able to give consent for your medical care? [IF NO,RN REVIEW]          N  3. Are you taking any prescription pain medications on a routine schedule   (ex narcotics: oxycodone, roxicodone, oxycontin,  and percocet)? [RN Review]          3a. EXTENDED PREP What kind of prescription?     N 4. Do you have any chemical dependencies such as alcohol, street drugs, or methadone?        **If yes 3- 5 , please schedule with MAC sedation.**          IF YES TO ANY 6 - 10 - HOSPITAL SETTING ONLY.     N 6.   Do you need assistance transferring?     N 7.   Have you had a heart or lung transplant?    N 8.   Are you currently on dialysis?   N 9.   Do you use daily home oxygen?   N 10. Do you take nitroglycerin?   10a.  If yes, how often?     11. [FEMALES]  N Are you currently pregnant?    11a.  If yes, how many weeks? [ Greater than 12 weeks, OR NEEDED]    N 12. Do you have Pulmonary Hypertension? *NEED PAC APPT AT [facility] w/ MAC*     N 13. [review exclusion criteria]  Do you have any implantable devices in your body (pacemaker, defib, LVAD)?    N 14. In the past 6 months, have you had any heart related issues including cardiomyopathy or heart attack?     14a.  If yes, did it require cardiac stenting if so when?     N 15. Have you had a stroke or Transient ischemic attack (TIA - aka  mini stroke ) within 6 months?      N 16. Do you have mod to severe Obstructive Sleep Apnea?  [Hospital only]    N 17. Do you have SEVERE AND UNCONTROLLED asthma? *NEED PAC APPT AT [facility] w/MAC*     N 18. Are you currently taking any blood thinners?     18a. If yes,  "inform patient to \"follow up w/ ordering provider for bridging instructions.\"    N 19. Do you take the medication Phentermine?    19a. If yes, \"Hold for 7 days before procedure.  Please consult your prescribing provider if you have questions about holding this medication.\"     N  20. Do you have chronic kidney disease?      N  21. Do you have a diagnosis of diabetes?     N  22. On a regular basis do you go 3-5 days between bowel movements?      23. Preferred LOCAL Pharmacy for Pre Prescription    [ LIST ONLY ONE PHARMACY]        [id number] - [address]        - CLOSING REMINDERS -    Informed patient they will need an adult    Cannot take any type of public or medical transportation alone    Conscious Sedation- Needs  for 6 hours after the procedure       MAC/General-Needs  for 24 hours after procedure    Pre-Procedure Covid test to be completed [[facility] PCR Testing Required]    Confirmed Nurse will call to complete assessment       - SCHEDULING DETAILS -  no Hospital Setting Required? If yes, what is the exclusion?: n/a   Leodan  Surgeon    04/18/23  Date of Procedure  Lower Endoscopy [Colonoscopy]  Type of Procedure Scheduled  [facility]-If you answer yes to questions #8, #20, #21Which Colonoscopy Prep was Sent?     MAC Sedation Type     N PAC / Pre-op Required                 "

## 2023-02-21 ENCOUNTER — OFFICE VISIT (OUTPATIENT)
Dept: INTERNAL MEDICINE | Facility: CLINIC | Age: 68
End: 2023-02-21
Payer: MEDICARE

## 2023-02-21 ENCOUNTER — LAB (OUTPATIENT)
Dept: LAB | Facility: CLINIC | Age: 68
End: 2023-02-21
Payer: MEDICARE

## 2023-02-21 ENCOUNTER — TELEPHONE (OUTPATIENT)
Dept: GASTROENTEROLOGY | Facility: CLINIC | Age: 68
End: 2023-02-21

## 2023-02-21 ENCOUNTER — INFUSION THERAPY VISIT (OUTPATIENT)
Dept: INFUSION THERAPY | Facility: CLINIC | Age: 68
End: 2023-02-21
Attending: INTERNAL MEDICINE
Payer: MEDICARE

## 2023-02-21 VITALS
OXYGEN SATURATION: 98 % | TEMPERATURE: 98.8 F | DIASTOLIC BLOOD PRESSURE: 57 MMHG | SYSTOLIC BLOOD PRESSURE: 127 MMHG | HEART RATE: 90 BPM | RESPIRATION RATE: 16 BRPM

## 2023-02-21 VITALS
DIASTOLIC BLOOD PRESSURE: 80 MMHG | HEART RATE: 88 BPM | BODY MASS INDEX: 37.4 KG/M2 | RESPIRATION RATE: 16 BRPM | OXYGEN SATURATION: 98 % | HEIGHT: 61 IN | TEMPERATURE: 96.7 F | WEIGHT: 198.1 LBS | SYSTOLIC BLOOD PRESSURE: 128 MMHG

## 2023-02-21 DIAGNOSIS — D50.0 IRON DEFICIENCY ANEMIA DUE TO CHRONIC BLOOD LOSS: Primary | ICD-10-CM

## 2023-02-21 DIAGNOSIS — I27.0 PRIMARY PULMONARY HYPERTENSION (H): ICD-10-CM

## 2023-02-21 DIAGNOSIS — D51.9 ANEMIA DUE TO VITAMIN B12 DEFICIENCY, UNSPECIFIED B12 DEFICIENCY TYPE: ICD-10-CM

## 2023-02-21 DIAGNOSIS — N18.32 ANEMIA OF CHRONIC RENAL FAILURE, STAGE 3B (H): ICD-10-CM

## 2023-02-21 DIAGNOSIS — E66.01 MORBID OBESITY (H): ICD-10-CM

## 2023-02-21 DIAGNOSIS — Z12.11 SCREEN FOR COLON CANCER: ICD-10-CM

## 2023-02-21 DIAGNOSIS — D63.1 ANEMIA OF CHRONIC RENAL FAILURE, STAGE 3B (H): ICD-10-CM

## 2023-02-21 DIAGNOSIS — K50.10 CROHN'S DISEASE OF LARGE INTESTINE WITHOUT COMPLICATION (H): ICD-10-CM

## 2023-02-21 LAB
ABO/RH(D): NORMAL
ANTIBODY SCREEN: NEGATIVE
BASOPHILS # BLD AUTO: 0 10E3/UL (ref 0–0.2)
BASOPHILS NFR BLD AUTO: 0 %
BLD PROD TYP BPU: NORMAL
BLD PROD TYP BPU: NORMAL
BLOOD COMPONENT TYPE: NORMAL
BLOOD COMPONENT TYPE: NORMAL
CODING SYSTEM: NORMAL
CODING SYSTEM: NORMAL
CROSSMATCH: NORMAL
CROSSMATCH: NORMAL
EOSINOPHIL # BLD AUTO: 0 10E3/UL (ref 0–0.7)
EOSINOPHIL NFR BLD AUTO: 0 %
ERYTHROCYTE [DISTWIDTH] IN BLOOD BY AUTOMATED COUNT: 17.8 % (ref 10–15)
HCT VFR BLD AUTO: 27.7 % (ref 35–47)
HGB BLD-MCNC: 7.2 G/DL (ref 11.7–15.7)
HOLD SPECIMEN: NORMAL
HOLD SPECIMEN: NORMAL
IMM GRANULOCYTES # BLD: 0.1 10E3/UL
IMM GRANULOCYTES NFR BLD: 1 %
ISSUE DATE AND TIME: NORMAL
ISSUE DATE AND TIME: NORMAL
LYMPHOCYTES # BLD AUTO: 0.7 10E3/UL (ref 0.8–5.3)
LYMPHOCYTES NFR BLD AUTO: 8 %
MCH RBC QN AUTO: 24.5 PG (ref 26.5–33)
MCHC RBC AUTO-ENTMCNC: 26 G/DL (ref 31.5–36.5)
MCV RBC AUTO: 94 FL (ref 78–100)
MONOCYTES # BLD AUTO: 0.7 10E3/UL (ref 0–1.3)
MONOCYTES NFR BLD AUTO: 8 %
NEUTROPHILS # BLD AUTO: 7.3 10E3/UL (ref 1.6–8.3)
NEUTROPHILS NFR BLD AUTO: 83 %
NRBC # BLD AUTO: 0 10E3/UL
NRBC BLD AUTO-RTO: 0 /100
PLATELET # BLD AUTO: 428 10E3/UL (ref 150–450)
RBC # BLD AUTO: 2.94 10E6/UL (ref 3.8–5.2)
SPECIMEN EXPIRATION DATE: NORMAL
UNIT ABO/RH: NORMAL
UNIT ABO/RH: NORMAL
UNIT NUMBER: NORMAL
UNIT NUMBER: NORMAL
UNIT STATUS: NORMAL
UNIT STATUS: NORMAL
UNIT TYPE ISBT: 9500
UNIT TYPE ISBT: 9500
WBC # BLD AUTO: 8.9 10E3/UL (ref 4–11)

## 2023-02-21 PROCEDURE — 99214 OFFICE O/P EST MOD 30 MIN: CPT | Performed by: INTERNAL MEDICINE

## 2023-02-21 PROCEDURE — 36430 TRANSFUSION BLD/BLD COMPNT: CPT

## 2023-02-21 PROCEDURE — 86901 BLOOD TYPING SEROLOGIC RH(D): CPT | Performed by: INTERNAL MEDICINE

## 2023-02-21 PROCEDURE — 85025 COMPLETE CBC W/AUTO DIFF WBC: CPT | Performed by: INTERNAL MEDICINE

## 2023-02-21 PROCEDURE — 86850 RBC ANTIBODY SCREEN: CPT | Performed by: INTERNAL MEDICINE

## 2023-02-21 PROCEDURE — P9016 RBC LEUKOCYTES REDUCED: HCPCS | Performed by: INTERNAL MEDICINE

## 2023-02-21 PROCEDURE — 36415 COLL VENOUS BLD VENIPUNCTURE: CPT | Performed by: INTERNAL MEDICINE

## 2023-02-21 PROCEDURE — 86923 COMPATIBILITY TEST ELECTRIC: CPT | Performed by: INTERNAL MEDICINE

## 2023-02-21 ASSESSMENT — PAIN SCALES - GENERAL
PAINLEVEL: NO PAIN (0)
PAINLEVEL: NO PAIN (0)

## 2023-02-21 NOTE — PROGRESS NOTES
Please contact the patient and have her discontinue the lisinopril.    We will reevaluate her renal function in the future and reinitiate the medication if possible.    Chay

## 2023-02-21 NOTE — PROGRESS NOTES
Infusion Nursing Note:  Nadia Ladd presents today for transfusion 2 units PRBCs.    Patient seen by provider today: Yes: Dr Cartwright.   present during visit today: Not Applicable.    Note: lungs clear pre and post transfusion.    Intravenous Access:  Peripheral IV placed.    Treatment Conditions:  Lab Results   Component Value Date    HGB 7.2 (L) 02/21/2023    WBC 8.9 02/21/2023    ANEUTAUTO 7.3 02/21/2023     02/21/2023      Results reviewed, labs MET treatment parameters, ok to proceed with treatment.  Blood transfusion consent signed 2/21/2023.    Post Infusion Assessment:  Patient tolerated infusion without incident.  Patient observed for 15 minutes post transfusion.  Site patent and intact, free from redness, edema or discomfort.  No evidence of extravasations.  Access discontinued per protocol.     Discharge Plan:   Discharge instructions reviewed with: Patient.  Patient discharged in stable condition accompanied by: .  Departure Mode: Ambulatory.      Lali Reyes RN

## 2023-02-21 NOTE — PROGRESS NOTES
"      Yo Zuniga is a 67 year old, presenting for the following health issues:  RECHECK (hgb)      History of Present Illness       Reason for visit:  Recheck on labs    She eats 0-1 servings of fruits and vegetables daily.She consumes 2 sweetened beverage(s) daily.She exercises with enough effort to increase her heart rate 9 or less minutes per day.  She exercises with enough effort to increase her heart rate 3 or less days per week.   She is taking medications regularly.        EMR reviewed including:             Complaint, History of Chief Complaint, Corresponding Review of Systems, and Complaint Specific Physical Examination.    #1   Patient called in with hemoglobin of 6.8  History of Crohn's disease/controlled  No recent melena or hematochezia  MCV 94 but RDW is up.  History of B12 deficiency as well.  Patient denies shortness of breath at rest.  Denies any chest pain.  Has had increased fatigue.        Exam:   LUNGS: clear bilaterally, airflow is brisk, no intercostal retraction or stridor is noted. No coughing is noted during visit.   HEART:  regular without rubs, clicks, gallops, or murmurs. PMI is nondisplaced. Upstrokes are brisk. S1,S2 are heard.   GI: Abdomen is soft, without rebound, guarding or tenderness. Bowel sounds are appropriate. No renal bruits are heard.      #2   Pulmonary hypertension  Currently respiratory status is stable.  Recommend follow-up.        #3   Decreased GFR with chronic renal failure.  GFR 34  History of chronic renal sufficiency  Currently on low-dose lisinopril       Exam:    As above        Patient has been interviewed, applicable history and applied review of systems have been performed.    Vital Signs:   /80 (BP Location: Right arm, Patient Position: Chair)   Pulse 88   Temp (!) 96.7  F (35.9  C) (Temporal)   Resp 16   Ht 1.556 m (5' 1.25\")   Wt 89.9 kg (198 lb 1.6 oz)   LMP  (LMP Unknown)   SpO2 98%   BMI 37.13 kg/m        Decision Making    Problem " and Complexity     1. Screen for colon cancer  We will set up colonoscopy ASAP for diagnostic work-up    2. Iron deficiency anemia due to chronic blood loss  We will set patient up for 2 units of red blood cells.  Follow-up at next week with IV iron as she did not tolerate oral.    - Adult GI  Referral - Procedure Only; Future    3. Anemia due to vitamin B12 deficiency, unspecified B12 deficiency type  B12 deficiency in combination with iron deficiency is normal MCV but increased RDW.    4. Crohn's disease of large intestine without complication (H)  Endoscopy scheduled    5. Primary pulmonary hypertension (H)  Currently stable.  Optimize hemoglobin    6. Morbid obesity (H)  Recommend weight loss responsible caloric restriction    7. Anemia of chronic renal failure, stage 3b (H)  Recommend discontinue lisinopril temporarily.                                FOLLOW UP   I have asked the patient to make an appointment for followup with either:  1.  Me,  2.  The patient's preferred provider,  3.  Any available provider  In 2 weeks          I have carefully explained the diagnosis and treatment options to the patient.  The patient has displayed an understanding of the above, and all subsequent questions were answered.      DO DIANNA Bullard    Portions of this note were produced using [employer]  Although every attempt at real-time proof reading has been made, occasional grammar/syntax errors may have been missed.

## 2023-02-21 NOTE — TELEPHONE ENCOUNTER
[facility] CALLED TO MOVE PT EARLIER FOR AN URGENT CASE -- PT HAS BEEN MOVED FROM 04/18/2023 TO 02/27/2023 PER REQUEST.     **VM WAS LEFT FOR PT TO CALL BACK AND CONFIRM IF Monday 02/27 WITH DR VASQUEZ WILL WORK.     NH

## 2023-02-23 DIAGNOSIS — D50.0 IRON DEFICIENCY ANEMIA DUE TO CHRONIC BLOOD LOSS: Primary | ICD-10-CM

## 2023-02-23 NOTE — CONFIDENTIAL NOTE
I have placed orders for a CBC.  Please contact the patient and see if she can have this done tomorrow/Friday, February 24.  I would like to have these results prior to her colonoscopy on Monday.    Thank you very much.    Chay

## 2023-02-23 NOTE — PROGRESS NOTES
Left message for patient to schedule LAB Only appointment no later than tomorrow, 02-.    Mavis Montano XRO/

## 2023-02-24 ENCOUNTER — LAB (OUTPATIENT)
Dept: LAB | Facility: CLINIC | Age: 68
End: 2023-02-24
Payer: MEDICARE

## 2023-02-24 DIAGNOSIS — D50.0 IRON DEFICIENCY ANEMIA DUE TO CHRONIC BLOOD LOSS: ICD-10-CM

## 2023-02-24 LAB
ERYTHROCYTE [DISTWIDTH] IN BLOOD BY AUTOMATED COUNT: 17.4 % (ref 10–15)
HCT VFR BLD AUTO: 34.9 % (ref 35–47)
HGB BLD-MCNC: 9.8 G/DL (ref 11.7–15.7)
MCH RBC QN AUTO: 26.2 PG (ref 26.5–33)
MCHC RBC AUTO-ENTMCNC: 28.1 G/DL (ref 31.5–36.5)
MCV RBC AUTO: 93 FL (ref 78–100)
PLATELET # BLD AUTO: 350 10E3/UL (ref 150–450)
RBC # BLD AUTO: 3.74 10E6/UL (ref 3.8–5.2)
WBC # BLD AUTO: 8.1 10E3/UL (ref 4–11)

## 2023-02-24 PROCEDURE — 85027 COMPLETE CBC AUTOMATED: CPT

## 2023-02-24 PROCEDURE — 36415 COLL VENOUS BLD VENIPUNCTURE: CPT

## 2023-02-24 NOTE — H&P
[facility] History and Physical    Nadia Ladd MRN# [id number]   Age: 67 year old YOB: 1955     Date of Admission:  (Not on file)    Home clinic: [facility]  Primary care provider: Nomi Cartwright          Impression and Plan:   Impression:   Screen for colon cancer [Z12.11]  Iron deficiency anemia due to chronic blood loss [D50.0]  Last colonoscopy 2010 - inflammation      Plan:   Proceed to EGD and Colonoscopy as planned.  The procedure, risks(bleeding, perforation), benefits and alternatives were discussed and the patient agrees to proceed. Cleared for Anesthesia             Chief Complaint:   Screen for colon cancer [Z12.11]  Iron deficiency anemia due to chronic blood loss [D50.0]    History is obtained from the patient          History of Present Illness:   This 67 year old female is being seen at this time for evaluation for colonoscopy.  Hx of crohns.  Non family hx.           Past Medical History:     Past Medical History:   Diagnosis Date     Arthropathia 8/5/2010     B12 deficiency anemia 10/21/2010     Benign essential hypertension with target blood pressure below 140/90 10/10/2016     CARDIOVASCULAR SCREENING; LDL GOAL LESS THAN 160 10/31/2010     Crohn's colitis (H) 2/15/2011     Dermatitis-dishydrotic eczema-severe 8/5/2010     HTN (hypertension) 7/21/2011     Iron deficiency anemia 10/21/2010     Primary pulmonary hypertension (H) 4/6/2010     Unspecified essential hypertension     mild            Past Surgical History:     Past Surgical History:   Procedure Laterality Date     COLONOSCOPY  10/11/10     SURGICAL HISTORY OF -   06/14/76    Perineorrhaphy, for widening vaginal orifice     Z EXPLORATORY OF ABDOMEN  1989    laparoscopy            Social History:     Social History     Tobacco Use     Smoking status: Never     Smokeless tobacco: Never   Substance Use Topics     Alcohol use: No            Family History:     Family History    Problem Relation Age of Onset     Cerebrovascular Disease Father         stroke about age 65,  of cancer at 68 yrs     Diabetes Father         eventually took insulin     Anemia Father         Pernisios anemia     Hypertension Mother         on meds, alive            Immunizations:     VACCINE/DOSE   Diptheria   DPT   DTAP   HBIG   Hepatitis A   Hepatitis B   HIB   Influenza   Measles   Meningococcal   MMR   Mumps   Pneumococcal   Polio   Rubella   Small Pox   TDAP   Varicella   Zoster            Allergies:     Allergies   Allergen Reactions     No Known Drug Allergies             Medications:     No current facility-administered medications for this encounter.     Current Outpatient Medications   Medication Sig     bisacodyl (DULCOLAX) 5 MG EC tablet Take 2 tablets at 3 pm the day before your procedure. If your procedure is before 11 am, take 2 additional tablets at 11 pm. If your procedure is after 11 am, take 2 additional tablets at 6 am. For additional instructions refer to your colonoscopy prep instructions.     cyanocobalamin 1000 MCG TBCR Take 1,000 mcg by mouth daily     ferrous sulfate (IRON) 325 (65 FE) MG tablet TAKE ONE TABLET BY MOUTH TWICE A DAY --NO FURTHER REFILLS WITHOUT OFFICE VISIT     fluocinonide (LIDEX) 0.05 % external cream Small amount to affected area(s) BID PRN (Patient not taking: Reported on 2023)     IBUPROFEN 200 MG OR TABS 1 tablet daily as needed     lisinopril (ZESTRIL) 10 MG tablet TAKE 1 TABLET BY MOUTH ONCEDAILY     Multiple Vitamins-Iron (MULTI-DAY PLUS IRON PO)      polyethylene glycol (GOLYTELY) 236 g suspension The night before the exam at 6 pm drink an 8-ounce glass every 15 minutes until the jug is half empty. If you arrive before 11 AM: Drink the other half of the [employer] Taxly jug at 11 PM night before procedure. If you arrive after 11 AM: Drink the other half of the Golytely jug at 6 AM day of procedure. For additional instructions refer to your colonoscopy prep  instructions.     sulfaSALAzine (AZULFIDINE) 500 MG tablet TAKE 2 TABLETS BY MOUTH TWICE A DAY     triamterene-HCTZ (DYAZIDE) 37.5-25 MG capsule TAKE 1 CAPSULE BY MOUTH ONCE DAILY             Review of Systems:   The review of systems was positive for the following findings.  None.  The remainder of the review of systems was unremarkable.          Physical Exam:   All vitals have been reviewed  not currently breastfeeding.  No intake or output data in the 24 hours ending 02/24/23 1416  SHEENT examination revealed NC/AT, EOMI.  Examination of the chest revealed CTA.  Examination of the heart revealed RRR.  Examination of the abdomen revealed soft,non tender.  The neuromuscular examination was NL.          Data:   All laboratory data reviewed  Results for orders placed or performed in visit on 02/24/23   CBC with platelets     Status: Abnormal   Result Value Ref Range    WBC Count 8.1 4.0 - 11.0 10e3/uL    RBC Count 3.74 (L) 3.80 - 5.20 10e6/uL    Hemoglobin 9.8 (L) 11.7 - 15.7 g/dL    Hematocrit 34.9 (L) 35.0 - 47.0 %    MCV 93 78 - 100 fL    MCH 26.2 (L) 26.5 - 33.0 pg    MCHC 28.1 (L) 31.5 - 36.5 g/dL    RDW 17.4 (H) 10.0 - 15.0 %    Platelet Count 350 150 - 450 10e3/uL     -     Jonathan Alcocer MD, FACS

## 2023-02-27 ENCOUNTER — HOSPITAL ENCOUNTER (OUTPATIENT)
Facility: CLINIC | Age: 68
Discharge: HOME | End: 2023-02-27
Attending: SPECIALIST | Admitting: SPECIALIST
Payer: MEDICARE

## 2023-02-27 ENCOUNTER — ANESTHESIA (OUTPATIENT)
Dept: GASTROENTEROLOGY | Facility: CLINIC | Age: 68
End: 2023-02-27
Payer: MEDICARE

## 2023-02-27 ENCOUNTER — ANESTHESIA EVENT (OUTPATIENT)
Dept: GASTROENTEROLOGY | Facility: CLINIC | Age: 68
End: 2023-02-27
Payer: MEDICARE

## 2023-02-27 ENCOUNTER — HOSPITAL ENCOUNTER (OUTPATIENT)
Dept: CT IMAGING | Facility: CLINIC | Age: 68
Discharge: HOME | End: 2023-02-27
Attending: SPECIALIST | Admitting: SPECIALIST
Payer: MEDICARE

## 2023-02-27 VITALS
BODY MASS INDEX: 37.38 KG/M2 | SYSTOLIC BLOOD PRESSURE: 104 MMHG | TEMPERATURE: 98.3 F | RESPIRATION RATE: 20 BRPM | DIASTOLIC BLOOD PRESSURE: 61 MMHG | WEIGHT: 198 LBS | OXYGEN SATURATION: 96 % | HEIGHT: 61 IN | HEART RATE: 97 BPM

## 2023-02-27 DIAGNOSIS — K63.89 COLONIC MASS: ICD-10-CM

## 2023-02-27 DIAGNOSIS — K63.89 COLONIC MASS: Primary | ICD-10-CM

## 2023-02-27 LAB
COLONOSCOPY: NORMAL
UPPER GI ENDOSCOPY: NORMAL

## 2023-02-27 PROCEDURE — 45381 COLONOSCOPY SUBMUCOUS NJX: CPT | Performed by: SPECIALIST

## 2023-02-27 PROCEDURE — 88305 TISSUE EXAM BY PATHOLOGIST: CPT | Mod: TC | Performed by: SPECIALIST

## 2023-02-27 PROCEDURE — 370N000017 HC ANESTHESIA TECHNICAL FEE, PER MIN: Performed by: SPECIALIST

## 2023-02-27 PROCEDURE — 2894A PR VOIDCORRECT: CPT | Performed by: SPECIALIST

## 2023-02-27 PROCEDURE — 43239 EGD BIOPSY SINGLE/MULTIPLE: CPT | Performed by: SPECIALIST

## 2023-02-27 PROCEDURE — 258N000003 HC RX IP 258 OP 636: Performed by: NURSE ANESTHETIST, CERTIFIED REGISTERED

## 2023-02-27 PROCEDURE — G1010 CDSM STANSON: HCPCS

## 2023-02-27 PROCEDURE — 250N000009 HC RX 250: Performed by: SPECIALIST

## 2023-02-27 PROCEDURE — 45380 COLONOSCOPY AND BIOPSY: CPT | Performed by: SPECIALIST

## 2023-02-27 PROCEDURE — 71250 CT THORAX DX C-: CPT | Mod: MG

## 2023-02-27 PROCEDURE — 250N000011 HC RX IP 250 OP 636: Performed by: NURSE ANESTHETIST, CERTIFIED REGISTERED

## 2023-02-27 PROCEDURE — 250N000009 HC RX 250: Performed by: NURSE ANESTHETIST, CERTIFIED REGISTERED

## 2023-02-27 RX ORDER — NALOXONE HYDROCHLORIDE 0.4 MG/ML
0.4 INJECTION, SOLUTION INTRAMUSCULAR; INTRAVENOUS; SUBCUTANEOUS
Status: DISCONTINUED | OUTPATIENT
Start: 2023-02-27 | End: 2023-02-27 | Stop reason: HOSPADM

## 2023-02-27 RX ORDER — LIDOCAINE 40 MG/G
CREAM TOPICAL
Status: DISCONTINUED | OUTPATIENT
Start: 2023-02-27 | End: 2023-02-27 | Stop reason: HOSPADM

## 2023-02-27 RX ORDER — NALOXONE HYDROCHLORIDE 0.4 MG/ML
0.2 INJECTION, SOLUTION INTRAMUSCULAR; INTRAVENOUS; SUBCUTANEOUS
Status: DISCONTINUED | OUTPATIENT
Start: 2023-02-27 | End: 2023-02-27 | Stop reason: HOSPADM

## 2023-02-27 RX ORDER — ONDANSETRON 2 MG/ML
INJECTION INTRAMUSCULAR; INTRAVENOUS PRN
Status: DISCONTINUED | OUTPATIENT
Start: 2023-02-27 | End: 2023-02-27

## 2023-02-27 RX ORDER — PROPOFOL 10 MG/ML
INJECTION, EMULSION INTRAVENOUS CONTINUOUS PRN
Status: DISCONTINUED | OUTPATIENT
Start: 2023-02-27 | End: 2023-02-27

## 2023-02-27 RX ORDER — LIDOCAINE HYDROCHLORIDE 20 MG/ML
INJECTION, SOLUTION INFILTRATION; PERINEURAL PRN
Status: DISCONTINUED | OUTPATIENT
Start: 2023-02-27 | End: 2023-02-27

## 2023-02-27 RX ORDER — SODIUM CHLORIDE, SODIUM LACTATE, POTASSIUM CHLORIDE, CALCIUM CHLORIDE 600; 310; 30; 20 MG/100ML; MG/100ML; MG/100ML; MG/100ML
INJECTION, SOLUTION INTRAVENOUS CONTINUOUS
Status: DISCONTINUED | OUTPATIENT
Start: 2023-02-27 | End: 2023-02-27 | Stop reason: HOSPADM

## 2023-02-27 RX ORDER — FLUMAZENIL 0.1 MG/ML
0.2 INJECTION, SOLUTION INTRAVENOUS
Status: DISCONTINUED | OUTPATIENT
Start: 2023-02-27 | End: 2023-02-27 | Stop reason: HOSPADM

## 2023-02-27 RX ORDER — PROPOFOL 10 MG/ML
INJECTION, EMULSION INTRAVENOUS PRN
Status: DISCONTINUED | OUTPATIENT
Start: 2023-02-27 | End: 2023-02-27

## 2023-02-27 RX ADMIN — PROPOFOL 150 MCG/KG/MIN: 10 INJECTION, EMULSION INTRAVENOUS at 08:09

## 2023-02-27 RX ADMIN — PHENYLEPHRINE HYDROCHLORIDE 100 MCG: 10 INJECTION INTRAVENOUS at 08:23

## 2023-02-27 RX ADMIN — PROPOFOL 60 MG: 10 INJECTION, EMULSION INTRAVENOUS at 08:09

## 2023-02-27 RX ADMIN — LIDOCAINE HYDROCHLORIDE 0.1 ML: 10 INJECTION, SOLUTION EPIDURAL; INFILTRATION; INTRACAUDAL; PERINEURAL at 07:41

## 2023-02-27 RX ADMIN — LIDOCAINE HYDROCHLORIDE 50 MG: 20 INJECTION, SOLUTION INFILTRATION; PERINEURAL at 08:09

## 2023-02-27 RX ADMIN — PHENYLEPHRINE HYDROCHLORIDE 100 MCG: 10 INJECTION INTRAVENOUS at 08:20

## 2023-02-27 RX ADMIN — SODIUM CHLORIDE, POTASSIUM CHLORIDE, SODIUM LACTATE AND CALCIUM CHLORIDE: 600; 310; 30; 20 INJECTION, SOLUTION INTRAVENOUS at 07:41

## 2023-02-27 RX ADMIN — ONDANSETRON 4 MG: 2 INJECTION INTRAMUSCULAR; INTRAVENOUS at 08:24

## 2023-02-27 ASSESSMENT — ACTIVITIES OF DAILY LIVING (ADL)
ADLS_ACUITY_SCORE: 35
ADLS_ACUITY_SCORE: 35

## 2023-02-27 NOTE — ANESTHESIA POSTPROCEDURE EVALUATION
Patient: Nadia Ladd    Procedure: Procedure(s):  ATTEMPTED COLONOSCOPY WITH SIGMOID STRICTURE BIOPSY  ESOPHAGOGASTRODUODENOSCOPY, WITH BIOPSY  TATTOOING, WITH COLONOSCOPY       Anesthesia Type:  MAC    Note:  Disposition: Outpatient   Postop Pain Control: Uneventful            Sign Out: Well controlled pain   PONV: No   Neuro/Psych: Uneventful            Sign Out: Acceptable/Baseline neuro status   Airway/Respiratory: Uneventful            Sign Out: Acceptable/Baseline resp. status   CV/Hemodynamics: Uneventful            Sign Out: Acceptable CV status   Other NRE: NONE   DID A NON-ROUTINE EVENT OCCUR? No    Event details/Postop Comments:  Pt was happy with anesthesia care.  No complications.  I will follow up with the pt if needed.           Last vitals:  Vitals Value Taken Time   /63 02/27/23 0910   Temp     Pulse 92 02/27/23 0910   Resp     SpO2 95 % 02/27/23 0911   Vitals shown include unvalidated device data.    Electronically Signed By: LUIS Clark CRNA  February 27, 2023  9:45 AM

## 2023-02-27 NOTE — DISCHARGE INSTRUCTIONS
[facility]    Home Care Following Endoscopy          Activity:  You have just undergone an endoscopic procedure usually performed with conscious sedation.  Do not work or operate machinery (including a car) for at least 12 hours.    I encourage you to walk and attempt to pass this air as soon as possible.    Diet:  Return to the diet you were on before your procedure but eat lightly for the first 12-24 hours.  Drink plenty of water.  Resume any regular medications unless otherwise advised by your physician.  Please begin any new medication prescribed as a result of your procedure as directed by your physician.   If you had any biopsy or polyp removed please refrain from aspirin or aspirin products for 2 days.  If on Coumadin please restart as instructed by your physician.   Pain:  You may take Tylenol as needed for pain.  Expected Recovery:  You can expect some mild abdominal fullness and/or discomfort due to the air used to inflate your intestinal tract. It is also normal to have a mild sore throat after upper endoscopy.    Call Your Physician if You Have:  After Upper Endoscopy:  Shoulder, back or chest pain.  Difficulty breathing or swallowing.  Vomiting blood.  After Colonoscopy:  Worsening persisting abdominal pain which is worse with activity.  Fevers (>101 degrees F), chills or shakes.  Passage of continued blood with bowel movements.   Any questions or concerns about your recovery, please call [phone] or after hours [phone] Nurse Advice Line.    Follow-up Care:  You did have polyps/biopsy tissue sample(s) removed.  The polyps/biopsy tissue sample(s) will be sent to pathology.  You should receive letter in your My Chart from Dr Alcocer with your results within 1-2 weeks. If you do not participate in My Chart a physical letter will come in the mail in 2-3 weeks.  Please call if you have not received a notification of your results.  If asked to return to clinic please make an  appointment 1 week after your procedure.  Call [phone].

## 2023-02-27 NOTE — ANESTHESIA CARE TRANSFER NOTE
Patient: Nadia Ladd    Procedure: Procedure(s):  ATTEMPTED COLONOSCOPY WITH SIGMOID STRICTURE BIOPSY  ESOPHAGOGASTRODUODENOSCOPY, WITH BIOPSY  TATTOOING, WITH COLONOSCOPY       Diagnosis: Screen for colon cancer [Z12.11]  Iron deficiency anemia due to chronic blood loss [D50.0]  Diagnosis Additional Information: No value filed.    Anesthesia Type:   MAC     Note:    Oropharynx: oropharynx clear of all foreign objects and spontaneously breathing  Level of Consciousness: drowsy  Oxygen Supplementation: face mask    Independent Airway: airway patency satisfactory and stable  Dentition: dentition unchanged  Vital Signs Stable: post-procedure vital signs reviewed and stable  Report to RN Given: handoff report given  Patient transferred to: Phase II    Handoff Report: Identifed the Patient, Identified the Reponsible Provider, Reviewed the pertinent medical history, Discussed the surgical course, Reviewed Intra-OP anesthesia mangement and issues during anesthesia, Set expectations for post-procedure period and Allowed opportunity for questions and acknowledgement of understanding      Vitals:  Vitals Value Taken Time   BP     Temp     Pulse     Resp     SpO2         Electronically Signed By: LUIS Clark CRNA  February 27, 2023  8:36 AM

## 2023-02-27 NOTE — ANESTHESIA PREPROCEDURE EVALUATION
Anesthesia Pre-Procedure Evaluation    Patient: Nadia Ladd   MRN: [id number] : 1955        Procedure : Procedure(s):  COLONOSCOPY  ESOPHAGOGASTRODUODENOSCOPY (EGD)          Past Medical History:   Diagnosis Date     Arthropathia 2010     B12 deficiency anemia 10/21/2010     Benign essential hypertension with target blood pressure below 140/90 10/10/2016     CARDIOVASCULAR SCREENING; LDL GOAL LESS THAN 160 10/31/2010     Crohn's colitis (H) 2/15/2011     Dermatitis-dishydrotic eczema-severe 2010     HTN (hypertension) 2011     Iron deficiency anemia 10/21/2010     Primary pulmonary hypertension (H) 2010     Unspecified essential hypertension     mild      Past Surgical History:   Procedure Laterality Date     COLONOSCOPY  10/11/10     SURGICAL HISTORY OF -   77    Perineorrhaphy, for widening vaginal orifice     ZZC EXPLORATORY OF ABDOMEN      laparoscopy      Allergies   Allergen Reactions     No Known Drug Allergies       Social History     Tobacco Use     Smoking status: Never     Smokeless tobacco: Never   Substance Use Topics     Alcohol use: No      Wt Readings from Last 1 Encounters:   23 89.9 kg (198 lb 1.6 oz)        Anesthesia Evaluation   Pt has had prior anesthetic. Type: General and MAC.        ROS/MED HX  ENT/Pulmonary:  - neg pulmonary ROS     Neurologic:  - neg neurologic ROS     Cardiovascular:     (+) Dyslipidemia hypertension-----Previous cardiac testing   Echo: Date:  Results:  Interpretation Summary   The study was technically adequate.   Left ventricular systolic function is normal. The visual ejection fraction is   estimated at 65-70%. Right ventricular systolic pressure is elevated,   consistent with mild to moderate pulmonary hypertension. No obvious valvular   abnormalities noted.   PatientHeight: 62 in   PatientWeight: 197 lbs   HeartRate: 91 bpm   BSA 1.9 m^2           Left Ventricle   The left ventricle is normal in size.   There is normal  left ventricular wall thickness.   Left ventricular systolic function is normal.   The visual ejection fraction is estimated at 65-70%.   Grade I left ventricular diastolic dysfunction is noted.   No regional wall motion abnormalities noted.       Right Ventricle   The right ventricle is normal size.   The right ventricular systolic function is normal.       Atria   Normal left atrial size.   Right atrial size is normal.   There is no color Doppler evidence of an atrial shunt.       Mitral Valve   There is trace mitral regurgitation.       Tricuspid Valve   There is trace tricuspid regurgitation.   The right ventricular systolic pressure is approximated at 39mmHg plus the   right atrial pressure.   Right ventricular systolic pressure is elevated, consistent with mild to   moderate pulmonary hypertension.       Aortic Valve   The aortic valve is trileaflet.   No aortic regurgitation is present.   No aortic stenosis is present.       Pulmonic Valve   The pulmonic valve is not well visualized.       Vessels   The aortic root is normal size.       Pericardium   There is no pericardial effusion.       Rhythm   The rhythm was normal sinus.       Stress Test: Date: Results:    ECG Reviewed: Date: Results:    Cath: Date: Results:      METS/Exercise Tolerance:     Hematologic:     (+) anemia,     Musculoskeletal:  - neg musculoskeletal ROS     GI/Hepatic:     (+) Inflammatory bowel disease, bowel prep,     Renal/Genitourinary:  - neg Renal ROS     Endo:     (+) Obesity,     Psychiatric/Substance Use:  - neg psychiatric ROS     Infectious Disease:  - neg infectious disease ROS     Malignancy:  - neg malignancy ROS     Other:            Physical Exam    Airway        Mallampati: II   TM distance: > 3 FB   Neck ROM: full   Mouth opening: > 3 cm    Respiratory Devices and Support         Dental           Cardiovascular   cardiovascular exam normal          Pulmonary   pulmonary exam normal                OUTSIDE LABS:  CBC:    Lab Results   Component Value Date    WBC 8.1 02/24/2023    WBC 8.9 02/21/2023    HGB 9.8 (L) 02/24/2023    HGB 7.2 (L) 02/21/2023    HCT 34.9 (L) 02/24/2023    HCT 27.7 (L) 02/21/2023     02/24/2023     02/21/2023     BMP:   Lab Results   Component Value Date     02/16/2023     10/09/2021    POTASSIUM 4.8 02/16/2023    POTASSIUM 4.3 10/09/2021    CHLORIDE 112 (H) 02/16/2023    CHLORIDE 118 (H) 10/09/2021    CO2 15 (L) 02/16/2023    CO2 20 10/09/2021    BUN 34.9 (H) 02/16/2023    BUN 25 10/09/2021    CR 1.64 (H) 02/16/2023    CR 1.30 (H) 10/09/2021     (H) 02/16/2023     (H) 10/09/2021     COAGS: No results found for: PTT, INR, FIBR  POC: No results found for: BGM, HCG, HCGS  HEPATIC:   Lab Results   Component Value Date    ALBUMIN 3.6 02/16/2023    PROTTOTAL 6.1 (L) 02/16/2023    ALT 11 02/16/2023    AST 13 02/16/2023    ALKPHOS 110 (H) 02/16/2023    BILITOTAL 0.3 02/16/2023     OTHER:   Lab Results   Component Value Date    A1C 4.8 03/12/2018    LIVIER 8.4 (L) 02/16/2023    PHOS 3.7 09/14/2010    TSH 3.19 11/30/2016    T4 1.25 09/14/2010    CRP 20.4 (H) 10/09/2021    SED 10 10/09/2021       Anesthesia Plan    ASA Status:  2   NPO Status:  NPO Appropriate    Anesthesia Type: MAC.     - Reason for MAC: straight local not clinically adequate   Induction: Intravenous, Propofol.   Maintenance: TIVA.        Consents    Anesthesia Plan(s) and associated risks, benefits, and realistic alternatives discussed. Questions answered and patient/representative(s) expressed understanding.     - Discussed: Risks, Benefits and Alternatives for BOTH SEDATION and the PROCEDURE were discussed     - Discussed with:  Patient      - Extended Intubation/Ventilatory Support Discussed: No.      - Patient is DNR/DNI Status: No    Use of blood products discussed: No .     Postoperative Care            Comments:    Other Comments: The risks and benefits of anesthesia, and the alternatives where  applicable, have been discussed with the patient, and they wish to proceed.            Jacek Freitas, APRN CRNA

## 2023-02-28 ENCOUNTER — PATIENT OUTREACH (OUTPATIENT)
Dept: SURGERY | Facility: CLINIC | Age: 68
End: 2023-02-28
Payer: MEDICARE

## 2023-02-28 DIAGNOSIS — K63.89 COLONIC MASS: Primary | ICD-10-CM

## 2023-02-28 NOTE — TELEPHONE ENCOUNTER
We got a call from x-ray and due to the fact we can't use Magnesium citrate we need to [facility] for [facility] colon [facility]. Per Dr. Alcocer we can do whatever radiology wants and needs. Pt was informed and I sent her a mychart with prep instructions.   Jane Tran MA

## 2023-02-28 NOTE — PROGRESS NOTES
Colonic Mass referral:     Colonoscopy and Upper GI done on 2/27/2023 which revealed an obstructing mass in the sigmoid colon.     CT CAP on 2/27/2023:                                                        IMPRESSION:  1.  4 cm proximal sigmoid colonic mass consistent with history.  2.  Possible additional 2.5 cm mass at the hepatic flexure of the  colon.  3.  No evidence of distant metastases in the chest, abdomen, or  Pelvis.    GGE 3/3/2023 to be complete due to possible hepatic flexure mass. Sigmoid mass was unable to transverse.     Scheduled visit with Dr. Ashley for 3/10/2023

## 2023-03-01 NOTE — PROGRESS NOTES
Colon and Rectal Surgery Clinic Note    RE: Nadia Ladd.  : 1955.  CHEYENNE: 3/10/2023.    Reason for visit: sigmoid carcinoma     HPI: Nadia Ladd is a 67 year old female who presents today for sigmoid cancer. She underwent a colonoscopy on 2023 for anemia. A frond-like/villous partially obstructing mass was found in the sigmoid colon. It was partially circumferential involving two-thirds of the lumen circumference. It measured 4cm in length. Dr. Alcocer was unable to transverse the mass. 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  CT Chest/Abdomen/Pelvis showed a 4 cm proximal sigmoid colonic mass consistent with history.Possible additional 2.5 cm mass at the hepatic flexure of the colon.No evidence of distant metastases in the chest, abdomen, or Pelvis. Xray colon barium test on 3/3/2023 showed a long segment narrowing in the proximal sigmoid colon corresponds with known colonic malignancy. Focal area of colonic narrowing at the proximal transverse colon corresponds with a second area of abnormality on CT and is also concerning for a synchronous lesion.     Today Nadia feels well, save for diarrhea after her barium enema last week.    She as diagnosed with Crohn's colitis about 10 years ago.  At that time she was having frequent diarrhea, sometimes bloody diarrhea, with anemia.  She was started on sulfasalazine and this has controlled her Crohn's disease with no real flare ups and no need for biologic therapy.  One wonders if she really has Crohn's colitis.  She has had no flare ups and no bloody diarrhea.  It's been under excellent control.  She has 1-2 normal bowel movements per day.  She has not experienced any obstructive symptoms.        Colonoscopy (2023):  Findings:        Skin tags were found on perianal exam.        A frond-like/villous partially obstructing large mass was found in the        sigmoid colon. The mass was partially  circumferential (involving        two-thirds of the lumen circumference). The mass measured four cm in        length. Unable to traverse. In addition, its inner diameter measured        five mm. Oozing was present. Biopsies were taken with a cold forceps for        histology. Area was tattooed with an injection of dye.        No additional abnormalities were found on retroflexion.                                                                                     Impression:            - Perianal skin tags found on perianal exam.                          - Malignant-appearing tumor in the colon. Biopsied.                          Tattooed.                          - CT scan and barium enema           Surgical Pathology (2/27/2023):  A.  Stomach, antrum: Biopsy:  - Antral type mucosa with mild chronic inactive gastritis  - Immunostain for Helicobacter pylori is negative      B.  Colon, sigmoid, stricture: Biopsy:  - Invasive poorly differentiated carcinoma, see comment   Electronically signed by Emeka Perez MD on 3/2/2023 at 11:21 AM   Comment     The sigmoid stricture shows a high-grade carcinoma without definitive gland formation.  Given the poorly differentiated appearance, an immunohistochemical panel was performed to exclude the possibility of a tumor by direct extension or metastasis.  Although the CK7/CK20 profile is not entirely typical for a colorectal carcinoma, immunostains for tumors of other origins are negative and a colorectal primary is still favored.  Immunohistochemistry for DNA mismatch repair proteins show concurrent loss of MLH1/PMS2 and MLH1 hyper methylation studies are pending with the results to be reported separately.  Correlation with clinical and radiographic findings is suggested.     The results were discussed with Dr. Alcocer on 3/2/2023 at 11:00 am.      This case was reviewed in intradepartmental consultation with agreement to the above interpretation.    Synoptic Checklist    Colon and Rectum Biomarker Reporting Template  Protocol posted: 6/30/2021  COLON AND RECTUM: BIOMARKER REPORTING TEMPLATE - B  RESULTS   Mismatch Repair     Immunohistochemistry (IHC) Testing for Mismatch Repair (MMR) Proteins     MLH1 Result  Loss of nuclear expression    Immunohistochemistry (IHC) Testing for Mismatch Repair (MMR) Proteins     MSH2 Result  Intact nuclear expression    Immunohistochemistry (IHC) Testing for Mismatch Repair (MMR) Proteins     MSH6 Result  Intact nuclear expression    Immunohistochemistry (IHC) Testing for Mismatch Repair (MMR) Proteins     PMS2 Result  Loss of nuclear expression    Immunohistochemistry (IHC) Testing for Mismatch Repair (MMR) Proteins  Background nonneoplastic tissue / internal control with intact nuclear expression    IHC Interpretation  Loss of nuclear expression of MLH1 and PMS2: testing for methylation of the MLH1 promoter and / or mutation of BRAF is indicated (the presence of a BRAF V600E mutation and / or MLH1 methylation suggests that the tumor is sporadic and germline evaluation is probably not indicated; absence of both MLH1 methylation and of BRAF V600E mutation suggests the possibility of Chan syndrome and sequencing and / or large deletion / duplication testing of germline MLH1 may be indicated)               CT Chest/Abdomen/Pelvis (2/27/2023):  IMPRESSION:  1.  4 cm proximal sigmoid colonic mass consistent with history.  2.  Possible additional 2.5 cm mass at the hepatic flexure of the  colon.  3.  No evidence of distant metastases in the chest, abdomen, or  Pelvis.      XRAY Colon barium (3/3/2023):                                       IMPRESSION:  1. Long segment narrowing in the proximal sigmoid colon corresponds  with known colonic malignancy.  2. Focal area of colonic narrowing at the proximal transverse colon  corresponds with a second area of abnormality on CT and is also  concerning for a synchronous lesion.      CEA  "(3/10/2023):  Pending    Medical history:  1. Arthropathia   2. B12 deficiency anemia   3. HTN   4. Crohn's colitis   5. Eczema     Surgical history:  1. Exploratory laparoscopy in  for workup of infertility    Family history:  Family History   Problem Relation Age of Onset     Cerebrovascular Disease Father         stroke about age 65,  of cancer at 68 yrs     Diabetes Father         eventually took insulin     Anemia Father         Pernisios anemia     Hypertension Mother         on meds, alive     -father with \"neck cancer\" - she does not think thyroid, he  at age 68  -mother healthy -  of CVA in her 80's    Medications:  Current Outpatient Medications   Medication Sig Dispense Refill     cyanocobalamin 1000 MCG TBCR Take 1,000 mcg by mouth daily 100 tablet 1     ferrous sulfate (IRON) 325 (65 FE) MG tablet TAKE ONE TABLET BY MOUTH TWICE A DAY --NO FURTHER REFILLS WITHOUT OFFICE VISIT 200 tablet 3     fluocinonide (LIDEX) 0.05 % external cream Small amount to affected area(s) BID PRN (Patient not taking: Reported on 2023) 60 g 1     IBUPROFEN 200 MG OR TABS 1 tablet daily as needed       lisinopril (ZESTRIL) 10 MG tablet TAKE 1 TABLET BY MOUTH ONCEDAILY 30 tablet 0     Multiple Vitamins-Iron (MULTI-DAY PLUS IRON PO)        sulfaSALAzine (AZULFIDINE) 500 MG tablet TAKE 2 TABLETS BY MOUTH TWICE A  tablet 0     triamterene-HCTZ (DYAZIDE) 37.5-25 MG capsule TAKE 1 CAPSULE BY MOUTH ONCE DAILY 90 capsule 0       Allergies:  Allergies   Allergen Reactions     No Known Drug Allergies        Social history:   Social History     Tobacco Use     Smoking status: Never     Smokeless tobacco: Never   Substance Use Topics     Alcohol use: No     Marital status: .    ROS:  A complete review of systems was performed with the patient and all systems negative except as per HPI.    Physical Examination:  General: Well hydrated. No acute distress.  Abdomen: Soft, NT, not distended, very small lap " scars. No inguinal adenopathy palpated.    ASSESSMENT  66 y/o lady with sigmoid adenocarcinoma in setting of >10 years of Crohn's colitis, possible malignancy at hepatic flexure as well.     Risks, benefits, and alternatives of operative treatment were thoroughly discussed with the patient, he/she understands these well and agrees to proceed.    PLAN  1. I had a lengthy discussion with Nadia and her son, Henrry, about the typical management of colon cancer, including surgery first approach and then possible chemotherapy based on final pathology.  2. In this instance, in addition to sigmoid colectomy (laparoscopic) we will do an on-table colonoscopy to look at the hepatic flexure; I counseled Nadia that if a second malignancy is found then we would proceed with total abdominal colectomy and then either ileorectal anastomosis or end ileostomy  3. We discussed risks of surgery including risks of anesthesia and cardiopulmonary risk, as well as risk of anastomotic leak (in case of anastomosis)  4. At this point I think her rectum can remain and be actively surveyed annually  5. In the setting of >10 years of Crohn's (of unclear severity) I am even more inclined to pursue total abdominal colectomy  6. She will think about whether her preference would be ileorectal anastomosis or end ileostomy; I counseled her about 6 or more BM's and urgency associated with ileorectal anastomoses  7. Tumor board discussion  8. PET-CT    90 minutes spent on the date of the encounter doing chart review, history and exam, imaging review, documentation and further activities as noted above.      Jerome Ashley MD, PhD    Division of Colon and Rectal Surgery  [facility]    Referring Provider:  Jonathan Alcocer MD  [address] [place] [address]   [place] [address]     Primary Care Provider:  Nomi Cartwright

## 2023-03-01 NOTE — TELEPHONE ENCOUNTER
Diagnosis, Referred by & from: Colon Mass   Appt date: 3/10/2023   NOTES STATUS DETAILS   OFFICE NOTE from referring provider N/A    OFFICE NOTE from other specialist [facility]:  2/21/23, 2/16/23 - [facility] OV with Dr. Cartwright   DISCHARGE SUMMARY from hospital N/A    DISCHARGE REPORT from the ER N/A    OPERATIVE REPORT N/A    MEDICATION LIST Internal    LABS     BIOPSIES/PATHOLOGY RELATED TO DIAGNOSIS [facility]:  2/27/23 - Colon Biopsy (Not resulted yet as of 3/1/23)  10/11/10 - Clon Biopsy (Case: [id number])   DIAGNOSTIC PROCEDURES     COLONOSCOPY [facility]:  2/27/23 - Colonoscopy   UPPER ENDOSCOPY (EGD) [facility]:  2/27/23 - EGD   IMAGING (DISC & REPORT)      CT [facility]:  2/27/23 - CT Chest/Abd/Pelvis   XRAY [facility]:  ([facility]) 3/3/23 - XR Colon

## 2023-03-02 DIAGNOSIS — C18.9 COLON CANCER (H): Primary | ICD-10-CM

## 2023-03-02 LAB
LAB DIRECTOR COMMENTS: NORMAL
LAB DIRECTOR DISCLAIMER: NORMAL
LAB DIRECTOR INTERPRETATION: NORMAL
LAB DIRECTOR METHODOLOGY: NORMAL
LAB DIRECTOR RESULTS: NORMAL
PATH REPORT.COMMENTS IMP SPEC: ABNORMAL
PATH REPORT.COMMENTS IMP SPEC: YES
PATH REPORT.FINAL DX SPEC: ABNORMAL
PATH REPORT.GROSS SPEC: ABNORMAL
PATH REPORT.MICROSCOPIC SPEC OTHER STN: ABNORMAL
PATH REPORT.RELEVANT HX SPEC: ABNORMAL
PATHOLOGY SYNOPTIC REPORT: ABNORMAL
PHOTO IMAGE: ABNORMAL
SPECIMEN DESCRIPTION: NORMAL

## 2023-03-02 PROCEDURE — 88305 TISSUE EXAM BY PATHOLOGIST: CPT | Mod: 26 | Performed by: PATHOLOGY

## 2023-03-02 PROCEDURE — 88341 IMHCHEM/IMCYTCHM EA ADD ANTB: CPT | Mod: 26 | Performed by: PATHOLOGY

## 2023-03-02 PROCEDURE — 88342 IMHCHEM/IMCYTCHM 1ST ANTB: CPT | Mod: 26 | Performed by: PATHOLOGY

## 2023-03-03 ENCOUNTER — HOSPITAL ENCOUNTER (OUTPATIENT)
Dept: GENERAL RADIOLOGY | Facility: CLINIC | Age: 68
Discharge: HOME | End: 2023-03-03
Attending: SPECIALIST | Admitting: SPECIALIST
Payer: MEDICARE

## 2023-03-03 DIAGNOSIS — K63.89 COLONIC MASS: ICD-10-CM

## 2023-03-03 PROCEDURE — G0452 MOLECULAR PATHOLOGY INTERPR: HCPCS | Mod: 26 | Performed by: STUDENT IN AN ORGANIZED HEALTH CARE EDUCATION/TRAINING PROGRAM

## 2023-03-03 PROCEDURE — 81288 MLH1 GENE: CPT | Performed by: SPECIALIST

## 2023-03-03 PROCEDURE — 74270 X-RAY XM COLON 1CNTRST STD: CPT

## 2023-03-07 ENCOUNTER — MYC MEDICAL ADVICE (OUTPATIENT)
Dept: INTERNAL MEDICINE | Facility: CLINIC | Age: 68
End: 2023-03-07
Payer: MEDICARE

## 2023-03-08 NOTE — TELEPHONE ENCOUNTER
Patient can supplement the fiber in her diet with whole-grain cereals such as bran buds etc.  She should increase the fiber to the point where her stools are of the desired consistency.  She can also supplement this with Citrucel or Metamucil if she prefers.    There is no specific recommended daily quantity.    Chay

## 2023-03-10 ENCOUNTER — LAB (OUTPATIENT)
Dept: LAB | Facility: CLINIC | Age: 68
End: 2023-03-10
Payer: MEDICARE

## 2023-03-10 ENCOUNTER — OFFICE VISIT (OUTPATIENT)
Dept: SURGERY | Facility: CLINIC | Age: 68
End: 2023-03-10
Payer: MEDICARE

## 2023-03-10 ENCOUNTER — PRE VISIT (OUTPATIENT)
Dept: SURGERY | Facility: CLINIC | Age: 68
End: 2023-03-10

## 2023-03-10 VITALS — DIASTOLIC BLOOD PRESSURE: 69 MMHG | HEART RATE: 96 BPM | SYSTOLIC BLOOD PRESSURE: 118 MMHG | OXYGEN SATURATION: 99 %

## 2023-03-10 DIAGNOSIS — C18.7 MALIGNANT NEOPLASM OF SIGMOID COLON (H): Primary | ICD-10-CM

## 2023-03-10 DIAGNOSIS — K63.89 COLONIC MASS: ICD-10-CM

## 2023-03-10 DIAGNOSIS — C18.9 COLON CANCER (H): ICD-10-CM

## 2023-03-10 LAB — CEA SERPL-MCNC: 6.8 NG/ML

## 2023-03-10 PROCEDURE — 82378 CARCINOEMBRYONIC ANTIGEN: CPT | Performed by: SURGERY

## 2023-03-10 PROCEDURE — 36415 COLL VENOUS BLD VENIPUNCTURE: CPT | Performed by: PATHOLOGY

## 2023-03-10 PROCEDURE — 99205 OFFICE O/P NEW HI 60 MIN: CPT | Performed by: SURGERY

## 2023-03-10 RX ORDER — NEOMYCIN SULFATE 500 MG/1
1000 TABLET ORAL EVERY 6 HOURS
Qty: 6 TABLET | Refills: 0 | Status: ON HOLD | OUTPATIENT
Start: 2023-03-10 | End: 2023-04-11

## 2023-03-10 RX ORDER — POLYETHYLENE GLYCOL 3350 17 G/17G
238 POWDER, FOR SOLUTION ORAL SEE ADMIN INSTRUCTIONS
Qty: 14 PACKET | Refills: 0 | Status: ON HOLD | OUTPATIENT
Start: 2023-03-10 | End: 2023-04-11

## 2023-03-10 RX ORDER — POLYETHYLENE GLYCOL 3350 17 G/17G
119 POWDER, FOR SOLUTION ORAL SEE ADMIN INSTRUCTIONS
Qty: 7 PACKET | Refills: 0 | Status: ON HOLD | OUTPATIENT
Start: 2023-03-10 | End: 2023-04-11

## 2023-03-10 RX ORDER — ONDANSETRON 4 MG/1
4 TABLET, FILM COATED ORAL EVERY 6 HOURS
Qty: 3 TABLET | Refills: 0 | Status: ON HOLD | OUTPATIENT
Start: 2023-03-10 | End: 2023-04-11

## 2023-03-10 RX ORDER — METRONIDAZOLE 500 MG/1
500 TABLET ORAL EVERY 6 HOURS
Qty: 3 TABLET | Refills: 0 | Status: ON HOLD | OUTPATIENT
Start: 2023-03-10 | End: 2023-04-11

## 2023-03-10 ASSESSMENT — PAIN SCALES - GENERAL: PAINLEVEL: NO PAIN (0)

## 2023-03-10 NOTE — NURSING NOTE
Chief Complaint   Patient presents with     Cancer       Vitals:    03/10/23 0829   BP: 118/69   BP Location: Left arm   Patient Position: Sitting   Cuff Size: Adult Large   Pulse: 96   SpO2: 99%       There is no height or weight on file to calculate BMI.    Meet Vizcaino EMT-P

## 2023-03-10 NOTE — LETTER
3/10/2023       RE: Nadia Ladd  [address]  [facility] [address]     Dear Colleague,    Thank you for referring your patient, Nadia Ladd, to the [facility] COLON AND RECTAL SURGERY CLINIC [place] at [facility]. Please see a copy of my visit note below.    Colon and Rectal Surgery Clinic Note    RE: Nadia Ladd.  : 1955.  CHEYENNE: 3/10/2023.    Reason for visit: sigmoid carcinoma     HPI: Nadia Ladd is a 67 year old female who presents today for sigmoid cancer. She underwent a colonoscopy on 2023 for anemia. A frond-like/villous partially obstructing mass was found in the sigmoid colon. It was partially circumferential involving two-thirds of the lumen circumference. It measured 4cm in length. Dr. Alcocer was unable to transverse the mass. 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  CT Chest/Abdomen/Pelvis showed a 4 cm proximal sigmoid colonic mass consistent with history.Possible additional 2.5 cm mass at the hepatic flexure of the colon.No evidence of distant metastases in the chest, abdomen, or Pelvis. Xray colon barium test on 3/3/2023 showed a long segment narrowing in the proximal sigmoid colon corresponds with known colonic malignancy. Focal area of colonic narrowing at the proximal transverse colon corresponds with a second area of abnormality on CT and is also concerning for a synchronous lesion.     Today Nadia feels well, save for diarrhea after her barium enema last week.    She as diagnosed with Crohn's colitis about 10 years ago.  At that time she was having frequent diarrhea, sometimes bloody diarrhea, with anemia.  She was started on sulfasalazine and this has controlled her Crohn's disease with no real flare ups and no need for biologic therapy.  One wonders if she really has Crohn's colitis.  She has had no flare ups and no bloody diarrhea.  It's been under  excellent control.  She has 1-2 normal bowel movements per day.  She has not experienced any obstructive symptoms.        Colonoscopy (2/27/2023):  Findings:        Skin tags were found on perianal exam.        A frond-like/villous partially obstructing large mass was found in the        sigmoid colon. The mass was partially circumferential (involving        two-thirds of the lumen circumference). The mass measured four cm in        length. Unable to traverse. In addition, its inner diameter measured        five mm. Oozing was present. Biopsies were taken with a cold forceps for        histology. Area was tattooed with an injection of dye.        No additional abnormalities were found on retroflexion.                                                                                     Impression:            - Perianal skin tags found on perianal exam.                          - Malignant-appearing tumor in the colon. Biopsied.                          Tattooed.                          - CT scan and barium enema           Surgical Pathology (2/27/2023):  A.  Stomach, antrum: Biopsy:  - Antral type mucosa with mild chronic inactive gastritis  - Immunostain for Helicobacter pylori is negative      B.  Colon, sigmoid, stricture: Biopsy:  - Invasive poorly differentiated carcinoma, see comment   Electronically signed by Emeka Perez MD on 3/2/2023 at 11:21 AM   Comment     The sigmoid stricture shows a high-grade carcinoma without definitive gland formation.  Given the poorly differentiated appearance, an immunohistochemical panel was performed to exclude the possibility of a tumor by direct extension or metastasis.  Although the CK7/CK20 profile is not entirely typical for a colorectal carcinoma, immunostains for tumors of other origins are negative and a colorectal primary is still favored.  Immunohistochemistry for DNA mismatch repair proteins show concurrent loss of MLH1/PMS2 and MLH1 hyper methylation studies are  pending with the results to be reported separately.  Correlation with clinical and radiographic findings is suggested.     The results were discussed with Dr. Alcocer on 3/2/2023 at 11:00 am.      This case was reviewed in intradepartmental consultation with agreement to the above interpretation.    Synoptic Checklist   Colon and Rectum Biomarker Reporting Template  Protocol posted: 6/30/2021  COLON AND RECTUM: BIOMARKER REPORTING TEMPLATE - B  RESULTS   Mismatch Repair     Immunohistochemistry (IHC) Testing for Mismatch Repair (MMR) Proteins     MLH1 Result  Loss of nuclear expression    Immunohistochemistry (IHC) Testing for Mismatch Repair (MMR) Proteins     MSH2 Result  Intact nuclear expression    Immunohistochemistry (IHC) Testing for Mismatch Repair (MMR) Proteins     MSH6 Result  Intact nuclear expression    Immunohistochemistry (IHC) Testing for Mismatch Repair (MMR) Proteins     PMS2 Result  Loss of nuclear expression    Immunohistochemistry (IHC) Testing for Mismatch Repair (MMR) Proteins  Background nonneoplastic tissue / internal control with intact nuclear expression    IHC Interpretation  Loss of nuclear expression of MLH1 and PMS2: testing for methylation of the MLH1 promoter and / or mutation of BRAF is indicated (the presence of a BRAF V600E mutation and / or MLH1 methylation suggests that the tumor is sporadic and germline evaluation is probably not indicated; absence of both MLH1 methylation and of BRAF V600E mutation suggests the possibility of Chan syndrome and sequencing and / or large deletion / duplication testing of germline MLH1 may be indicated)               CT Chest/Abdomen/Pelvis (2/27/2023):  IMPRESSION:  1.  4 cm proximal sigmoid colonic mass consistent with history.  2.  Possible additional 2.5 cm mass at the hepatic flexure of the  colon.  3.  No evidence of distant metastases in the chest, abdomen, or  Pelvis.      XRAY Colon barium (3/3/2023):                                      "  IMPRESSION:  1. Long segment narrowing in the proximal sigmoid colon corresponds  with known colonic malignancy.  2. Focal area of colonic narrowing at the proximal transverse colon  corresponds with a second area of abnormality on CT and is also  concerning for a synchronous lesion.      CEA (3/10/2023):  Pending    Medical history:  1. Arthropathia   2. B12 deficiency anemia   3. HTN   4. Crohn's colitis   5. Eczema     Surgical history:  1. Exploratory laparoscopy in  for workup of infertility    Family history:  Family History   Problem Relation Age of Onset     Cerebrovascular Disease Father         stroke about age 65,  of cancer at 68 yrs     Diabetes Father         eventually took insulin     Anemia Father         Pernisios anemia     Hypertension Mother         on meds, alive     -father with \"neck cancer\" - she does not think thyroid, he  at age 68  -mother healthy -  of CVA in her 80's    Medications:  Current Outpatient Medications   Medication Sig Dispense Refill     cyanocobalamin 1000 MCG TBCR Take 1,000 mcg by mouth daily 100 tablet 1     ferrous sulfate (IRON) 325 (65 FE) MG tablet TAKE ONE TABLET BY MOUTH TWICE A DAY --NO FURTHER REFILLS WITHOUT OFFICE VISIT 200 tablet 3     fluocinonide (LIDEX) 0.05 % external cream Small amount to affected area(s) BID PRN (Patient not taking: Reported on 2023) 60 g 1     IBUPROFEN 200 MG OR TABS 1 tablet daily as needed       lisinopril (ZESTRIL) 10 MG tablet TAKE 1 TABLET BY MOUTH ONCEDAILY 30 tablet 0     Multiple Vitamins-Iron (MULTI-DAY PLUS IRON PO)        sulfaSALAzine (AZULFIDINE) 500 MG tablet TAKE 2 TABLETS BY MOUTH TWICE A  tablet 0     triamterene-HCTZ (DYAZIDE) 37.5-25 MG capsule TAKE 1 CAPSULE BY MOUTH ONCE DAILY 90 capsule 0       Allergies:  Allergies   Allergen Reactions     No Known Drug Allergies        Social history:   Social History     Tobacco Use     Smoking status: Never     Smokeless tobacco: Never "   Substance Use Topics     Alcohol use: No     Marital status: .    ROS:  A complete review of systems was performed with the patient and all systems negative except as per HPI.    Physical Examination:  General: Well hydrated. No acute distress.  Abdomen: Soft, NT, not distended, very small lap scars. No inguinal adenopathy palpated.    ASSESSMENT  66 y/o lady with sigmoid adenocarcinoma in setting of >10 years of Crohn's colitis, possible malignancy at hepatic flexure as well.     Risks, benefits, and alternatives of operative treatment were thoroughly discussed with the patient, he/she understands these well and agrees to proceed.    PLAN  1. I had a lengthy discussion with Nadia and her son, Henrry, about the typical management of colon cancer, including surgery first approach and then possible chemotherapy based on final pathology.  2. In this instance, in addition to sigmoid colectomy (laparoscopic) we will do an on-table colonoscopy to look at the hepatic flexure; I counseled Nadia that if a second malignancy is found then we would proceed with total abdominal colectomy and then either ileorectal anastomosis or end ileostomy  3. We discussed risks of surgery including risks of anesthesia and cardiopulmonary risk, as well as risk of anastomotic leak (in case of anastomosis)  4. At this point I think her rectum can remain and be actively surveyed annually  5. In the setting of >10 years of Crohn's (of unclear severity) I am even more inclined to pursue total abdominal colectomy  6. She will think about whether her preference would be ileorectal anastomosis or end ileostomy; I counseled her about 6 or more BM's and urgency associated with ileorectal anastomoses  7. Tumor board discussion  8. PET-CT    90 minutes spent on the date of the encounter doing chart review, history and exam, imaging review, documentation and further activities as noted above.      Jerome Ashley MD, PhD  Assistant  Professor  Division of Colon and Rectal Surgery  [facility]    Referring Provider:  Jonathan Alcocer MD  [address] [place] [address]     Primary Care Provider:  Nomi Cartwright

## 2023-03-10 NOTE — PATIENT INSTRUCTIONS
Follow up:    PET Scan- please go out to the lobby to schedule this   We will discuss your case at our tumor board conference. If there are changes in recommendations then we will call you  Sylvia, our surgery scheduler, will call you to schedule surgery in 5-7 business days. If you do not hear from her after that please call her directly   Appointments you will need: pre op physical with our anesthesia team, blood work, visit with ostomy nurse  You will need to do a full bowel prep with antibiotics the day before surgery. These prescriptions have been sent to your pharmacy. You will receive the instructions via a surgery packet through mail and [employer].     MARQUEZ Tristan [phone]    Clinic Fax Number [phone]    Surgery Scheduling (Sylvia) [phone]    My Chart is available 24 hours a day and is a secure way to access your records and communicate with your care team.  I strongly recommend signing up if you haven't already done so, if you are comfortable with computers.  If you would like to inquire about this or are having problems with My Chart access, you may call [phone] or go online at [email or web address].  Please allow at least 24 hours for a response and extra time on weekends and Holidays.

## 2023-03-13 ENCOUNTER — TUMOR CONFERENCE (OUTPATIENT)
Dept: ONCOLOGY | Facility: CLINIC | Age: 68
End: 2023-03-13
Payer: MEDICARE

## 2023-03-13 DIAGNOSIS — C18.7 MALIGNANT NEOPLASM OF SIGMOID COLON (H): Primary | ICD-10-CM

## 2023-03-13 NOTE — TUMOR CONFERENCE
Tumor Conference Information  Tumor Conference: Colorectal  Specialties Present: Medical oncology, Radiation oncology, Pathology, Radiology, Surgery  Patient Status: Prospective  Stage: Invasive poorly differentiated carcinoma of sigmoid, loss of expression of MLH1 and PMS2  Treatment to Date: None  Clinical Trials: Not discussed  Genetic Testing Discussed/Recommended?: No  Supportive Care Services Discussed/Recommended?: No  Recommended Plan: Follows evidence-based guidelines (Comment: surgery; TAC vs sigmoidectomy with possible ileostomy vs anastomosis, colonoscopy, move up PET scan, referral to med/onc)  Did the review exceed 30 minutes?: did not           Documentation / Disclaimer Cancer Tumor Board Note  Cancer tumor board recommendations do not override what is determined to be reasonable care and treatment, which is dependent on the circumstances of a patient's case; the patient's medical, social, and personal concerns; and the clinical judgment of the oncologist [physician].

## 2023-03-14 ENCOUNTER — PATIENT OUTREACH (OUTPATIENT)
Dept: ONCOLOGY | Facility: CLINIC | Age: 68
End: 2023-03-14
Payer: MEDICARE

## 2023-03-14 DIAGNOSIS — I10 HYPERTENSION GOAL BP (BLOOD PRESSURE) < 140/90: ICD-10-CM

## 2023-03-14 RX ORDER — LISINOPRIL 10 MG/1
TABLET ORAL
Qty: 30 TABLET | Refills: 0 | Status: SHIPPED | OUTPATIENT
Start: 2023-03-14 | End: 2023-04-05

## 2023-03-14 NOTE — TELEPHONE ENCOUNTER
"Requested Prescriptions   Pending Prescriptions Disp Refills    lisinopril (ZESTRIL) 10 MG tablet [Pharmacy Med Name: LISINOPRIL 10MG TABLET] 30 tablet 0     Sig: TAKE 1 TABLET BY MOUTH EVERY DAY       ACE Inhibitors (Including Combos) Protocol Failed - 3/14/2023 11:14 AM        Failed - Normal serum creatinine on file in past 12 months     Recent Labs   Lab Test 02/16/23  0959   CR 1.64*       Ok to refill medication if creatinine is low          Passed - Blood pressure under 140/90 in past 12 months     BP Readings from Last 3 Encounters:   03/10/23 118/69   02/27/23 104/61   02/21/23 127/57                 Passed - Recent (12 mo) or future (30 days) visit within the authorizing provider's specialty     Patient has had an office visit with the authorizing provider or a provider within the authorizing providers department within the previous 12 mos or has a future within next 30 days. See \"Patient Info\" tab in inbasket, or \"Choose Columns\" in Meds & Orders section of the refill encounter.              Passed - Medication is active on med list        Passed - Patient is age 18 or older        Passed - No active pregnancy on record        Passed - Normal serum potassium on file in past 12 months     Recent Labs   Lab Test 02/16/23  0959   POTASSIUM 4.8             Passed - No positive pregnancy test within past 12 months             "

## 2023-03-14 NOTE — PROGRESS NOTES
Review of Oncology referral by [facility] Colorectal Surgery for pt with colon cancer.    2/27/23 colonoscopy bx of sig mass confirms invasive poorly diff adenocarcinoma. CT CAP without distant mets, but another mass at hep flexure, XR barium confirms possible 2nd synchronous lesion. Pt met w [facility] 3/10. PET planned 3/24, per Tumor Board send to Med Onc for consult.    Pt lives in [place]; will offer first available Med Onc in [place] after PET (~Dr CECILIA Bateman 3/28) or schedule per pt preference.        Greg Mcclain RN  Oncology Nurse Navigator  [facility]  [phone]

## 2023-03-17 ENCOUNTER — TELEPHONE (OUTPATIENT)
Dept: SURGERY | Facility: CLINIC | Age: 68
End: 2023-03-17
Payer: MEDICARE

## 2023-03-17 NOTE — TELEPHONE ENCOUNTER
Dr. Ashley's schedule is quite full until last week of April, and we are trying to get patient in sooner than that due to cancer dx. Dr. Ashley was agreeable to surgery date Thurs 4/6/2023 if we can get wait list request approval to schedule case on that date. Sent email to OR Mgr Shannon Myers asking if we may be able to escalate this request.     Spoke with patient via phone, completed the following tentative scheduling corresponding to wait list date 4/6/2023, then sent Surgery Packet to patient via [employer] and Mail including related scheduling information and prep instructions:     Required: Yes, you will need a  18 years or older to drive you home from your procedure as well as stay with you for 24 hours after your procedure, if you are discharged the same day as your procedure.    Surgery: Laparoscopic total abdominal colectomy versus laparoscopic sigmoidectomy, possible ileostomy, colonoscopy    Date: Thursday April 6, 2023* Surgeon: Dr. Jerome Ashley   *wait list date pending request for approval to add on requested date    Time: You will receive a call 1-3 days prior to your surgery with your finalized surgery and arrival time.     Location:     [facility]      [facility]      [address] [place] [address]-3rd Floor(3C)      [place] [address]      [phone]      [email or web address]     Upcoming Related Appointments:     Mar 24, 2023  7:30 AM  (Arrive by 7:00 AM)  PET ONCOLOGY WHOLE BODY with UUPET1  [facility] ([facility] [place] ) [phone]    [address] [place] [address]     Mar 28, 2023 10:45 AM  New Patient with Irene Bateman DO  [facility] ([facility] ) [phone]    [address] [place] [address]     Mar 31, 2023 11:30 AM  (Arrive by 11:15 AM)  NEW OSTOMY NURSE VISIT  "with Ambika Jain RN  [facility] Wound Ostomy [facility] [place] ([facility] [place] ) [phone]    [address] [facility] [address] [facility] [address]     Mar 31, 2023  1:15 PM  (Arrive by 1:00 PM)  PAC EVALUATION with Anna Banks PA-C  [facility] Preoperative Assessment Center [place] ([facility] [place] ) [phone]    [address] [place] [address] [address] [facility] [address]     Mar 31, 2023  2:45 PM  LAB with [facility]  [facility] Lab [place] ([facility] ) [phone]    [address] [place] [address] [address] [facility] [address]     May 01, 2023 11:45 AM  (Arrive by 11:30 AM)  Post Op with LUIS Pizarro CNP  [facility] Colon and Rectal Surgery Clinic [place] ([facility] [place] ) [phone]    [address] [place] [address]     Pre-surgical Preparation:    MiraLAX/Gatorade, Antibiotics, Zofran  - see \"Day Before Surgery\"   - obtain medications and ingredients in advance    Please go to your local pharmacy or store and purchase:   - TWO 8.3oz (238g) bottles of Miralax (Powder)   - 96 oz of Gatorade (no red or purple)     Sylvia Cloud  Ro-op Coordinator  [place]  Direct Phone: [phone]      "

## 2023-03-21 NOTE — TELEPHONE ENCOUNTER
FUTURE VISIT INFORMATION      SURGERY INFORMATION:    Date: 4/6/2023    Location: [facility] OR    Surgeon:  Jerome Ashley MD    Anesthesia Type: General    Procedure: Laparoscopic total abdominal colectomy versus laparoscopic sigmoidectomy, possible ileostomy, colonoscopy    Consult: 3/10/23    RECORDS REQUESTED FROM:       Primary Care Provider: Nomi Cartwright DO - [facility]     Pertinent Medical History: Primary pulmonary hypertension (H); Iron deficiency anemia due to chronic blood loss    Most recent ECHO: 7/3/2009 - Epic     Most recent PFT's: 9/28/2010 - Epic

## 2023-03-24 ENCOUNTER — HOSPITAL ENCOUNTER (OUTPATIENT)
Dept: PET IMAGING | Facility: CLINIC | Age: 68
Setting detail: NUCLEAR MEDICINE
Discharge: HOME | End: 2023-03-24
Attending: SURGERY | Admitting: SURGERY
Payer: MEDICARE

## 2023-03-24 ENCOUNTER — TEAM CONFERENCE (OUTPATIENT)
Dept: SURGERY | Facility: CLINIC | Age: 68
End: 2023-03-24
Payer: MEDICARE

## 2023-03-24 DIAGNOSIS — C18.7 MALIGNANT NEOPLASM OF SIGMOID COLON (H): ICD-10-CM

## 2023-03-24 PROCEDURE — G1010 CDSM STANSON: HCPCS | Mod: PI

## 2023-03-24 PROCEDURE — 343N000001 HC RX 343: Performed by: SURGERY

## 2023-03-24 PROCEDURE — 78816 PET IMAGE W/CT FULL BODY: CPT | Mod: 26 | Performed by: RADIOLOGY

## 2023-03-24 PROCEDURE — A9552 F18 FDG: HCPCS | Performed by: SURGERY

## 2023-03-24 PROCEDURE — G1010 CDSM STANSON: HCPCS | Mod: GC | Performed by: RADIOLOGY

## 2023-03-24 RX ADMIN — FLUDEOXYGLUCOSE F-18 11.93 MILLICURIE: 500 INJECTION, SOLUTION INTRAVENOUS at 07:20

## 2023-03-24 NOTE — PROGRESS NOTES
COLON AND RECTAL SURGERY HUDDLE:    Patient was reviewed in preporation for their surgery the following was reviewed and has been completed:    Surgeon: Dr. Jerome Ashley     Surgery & Date: 4/6 lap TAC vs lap sigmoid resection     Last MD Note: reviewed    Anesthesia Type: General    Other Providers: No    PAC: Yes    WOC: Yes    Labs: Yes    Bowel Prep: Yes MiraLAX / Gatorade  and Antibiotic    Packet: Yes    Imaging: Yes PET today     Post-Op Appointments: Yes    COVID: N/A    Pre op soap: No Questions about shower: no     Is patient on TPN?: N/A   If yes, I contacted the TPN pharmacist by paging the  pharmacy at [phone] or calling [phone]. I also contacted Adamaris CHRISTIANSON with inpatient colon and rectal team.     Pre op call complete: Yes

## 2023-03-25 ENCOUNTER — HEALTH MAINTENANCE LETTER (OUTPATIENT)
Age: 68
End: 2023-03-25

## 2023-03-27 NOTE — PROGRESS NOTES
[facility]    Hematology/Oncology New Patient Note      Today's Date: 3/28/2023    Referring provider: Jerome Ashley MD  Reason for Consultation: Sigmoid cancer    HISTORY OF PRESENT ILLNESS: Nadia Ladd is a 67 year old female who was referred to the Hematology/Oncology Clinic for sigmoid cancer    Patient has medical history including Crohn's disease on sulfasalazine, anemia secondary to iron deficiency and B12 deficiency, obesity, hypertension, prediabetes, eczema, pulmonary hypertension, hiatal hernia, mild splenomegaly (14.7 cm in 2/23)    - 2/23 pt noted increasing fatigue, found to have iron deficiency anemia w/hgb 6.8 (previously required RBC transfusion when dx w/Crohn's), did have intermittent changes in stool but not persistent (noted minimal blood in stool)   - 2/23 upper endoscopy for iron deficiency anemia and Crohn's disease: Hiatal hernia, normal stomach, normal duodenum  - 2/23 colonoscopy: A frond-like/villous partially obstructing large mass found in sigmoid colon, partially circumferential involving two thirds of the lumen circumference, 4 cm, unable to traverse, with oozing, s/p biopsy  PATHOLOGY:  A.  Stomach, antrum: Biopsy:  - Antral type mucosa with mild chronic inactive gastritis  - Immunostain for Helicobacter pylori is negative      B.  Colon, sigmoid, stricture: Biopsy:  - Invasive poorly differentiated carcinoma  The sigmoid stricture shows a high-grade carcinoma without definitive gland formation.  Given the poorly differentiated appearance, an immunohistochemical panel was performed to exclude the possibility of a tumor by direct extension or metastasis.   Immunohistochemical stains are performed and show the tumor to be diffusely positive for CK7 and partially positive for CK20 and SATB2.  There is no significant tumor reactivity for CDX2, GATA3, PAX8, TTF-1, and chromogranin.  Synaptophysin highlights very rare positive cells. Although the  "CK7/CK20 profile is not entirely typical for a colorectal carcinoma, immunostains for tumors of other origins are negative and a colorectal primary is still favored.   - Mismatch repair:  1) MLH1-loss of nuclear expression  2) MSH2-intact  3) MSH6-intact  4) PMS2-loss of nuclear expression  -MLH1 promoter methylation: NEGATIVE    -2/23 CT CAP:  A) 4 cm length mass in the proximal sigmoid colon. There is some irregularity and stranding in the adjacent pericolonic fat which may indicate tumor extension. There is no obstruction.   B) there is a second probable mass at the hepatic flexure of the colon measuring 2.5 cm in length   C)There are small lymph nodes in the mesial colon adjacent to the hepatic flexure abnormality and the sigmoid colonic mass. No lymphadenopathy by size criteria  D) There is submucosal fatty deposition in the colon, most severe in the distal sigmoid colon and rectum, which can be seen secondary to quiescent inflammatory bowel disease.  E) no liver lesion    -3/23 PET:  a. There is increased FDG avidity of the sigmoid colon with focal lobular wall thickening consistent with biopsy-proven adenocarcinoma.  b. Secondary focus noted at the hepatic flexure demonstrates elevated FDG avidity and lobulated wall thickening highly suspicious for a additional primary.  c. Additionally there is a smaller focus of wall thickening with elevated FDG avidity along the left aspect of the transverse colon  which is suspicious for a possible third focus of colonic malignancy.    -3/23 CEA 6.8  - 3/23 colorectal surgery consultation with - in the setting of Crohn's, at least sigmoid colectomy with possible total abdominal colectomy with either ileorectal anastomosis or end ileostomy (\"rectum can remain active and be actively surveyed annually\")      REVIEW OF SYSTEMS:   A 14 point ROS was reviewed with pertinent positives and negatives in the HPI.        HOME MEDICATIONS:  Current Outpatient Medications " "  Medication Sig Dispense Refill     cyanocobalamin 1000 MCG TBCR Take 1,000 mcg by mouth daily 100 tablet 1     lisinopril (ZESTRIL) 10 MG tablet TAKE 1 TABLET BY MOUTH EVERY DAY 30 tablet 0     Multiple Vitamins-Iron (MULTI-DAY PLUS IRON PO)        sulfaSALAzine (AZULFIDINE) 500 MG tablet TAKE 2 TABLETS BY MOUTH TWICE A  tablet 0     triamterene-HCTZ (DYAZIDE) 37.5-25 MG capsule TAKE 1 CAPSULE BY MOUTH ONCE DAILY 90 capsule 0     ferrous sulfate (IRON) 325 (65 FE) MG tablet TAKE ONE TABLET BY MOUTH TWICE A DAY --NO FURTHER REFILLS WITHOUT OFFICE VISIT (Patient not taking: Reported on 3/28/2023) 200 tablet 3     fluocinonide (LIDEX) 0.05 % external cream Small amount to affected area(s) BID PRN (Patient not taking: Reported on 2/21/2023) 60 g 1     IBUPROFEN 200 MG OR TABS 1 tablet daily as needed (Patient not taking: Reported on 3/28/2023)       metroNIDAZOLE (FLAGYL) 500 MG tablet Take 1 tablet (500 mg) by mouth every 6 hours At 8:00 am, 2:00 pm, 8:00 pm the day prior to your surgery with neomycin and zofran. (Patient not taking: Reported on 3/28/2023) 3 tablet 0     neomycin (MYCIFRADIN) 500 MG tablet Take 2 tablets (1,000 mg) by mouth every 6 hours At 8:00 am, 2:00 pm, 8:00 pm the day prior to your surgery with flagyl and zofran. (Patient not taking: Reported on 3/28/2023) 6 tablet 0     ondansetron (ZOFRAN) 4 MG tablet Take 1 tablet (4 mg) by mouth every 6 hours At 8:00 am, 2:00 pm, 8:00 pm the day prior to your surgery with neomycin and flagyl. (Patient not taking: Reported on 3/28/2023) 3 tablet 0     polyethylene glycol (MIRALAX) 17 g packet Take 238 g by mouth See Admin Instructions Starting at 4 pm night prior to surgery. Refer to \"Getting Ready for Surgery\" instructions. (Patient not taking: Reported on 3/28/2023) 14 packet 0     polyethylene glycol (MIRALAX) 17 g packet Take 119 g by mouth See Admin Instructions Starting at 8 pm night prior to surgery. Refer to \"Getting Ready for Surgery\" " instructions. (Patient not taking: Reported on 3/28/2023) 7 packet 0         ALLERGIES:  Allergies   Allergen Reactions     No Known Drug Allergies          PAST MEDICAL HISTORY:  Past Medical History:   Diagnosis Date     Arthropathia 8/5/2010     B12 deficiency anemia 10/21/2010     Benign essential hypertension with target blood pressure below 140/90 10/10/2016     CARDIOVASCULAR SCREENING; LDL GOAL LESS THAN 160 10/31/2010     Crohn's colitis (H) 2/15/2011     Dermatitis-dishydrotic eczema-severe 8/5/2010     HTN (hypertension) 7/21/2011     Iron deficiency anemia 10/21/2010     Primary pulmonary hypertension (H) 4/6/2010     Unspecified essential hypertension     mild         PAST SURGICAL HISTORY:  Past Surgical History:   Procedure Laterality Date     COLONOSCOPY  10/11/10     COLONOSCOPY N/A 2/27/2023    Procedure: ATTEMPTED COLONOSCOPY WITH SIGMOID STRICTURE BIOPSY;  Surgeon: Jonathan Alcocer MD;  Location:  GI     ESOPHAGOSCOPY, GASTROSCOPY, DUODENOSCOPY (EGD), COMBINED N/A 2/27/2023    Procedure: ESOPHAGOGASTRODUODENOSCOPY, WITH BIOPSY;  Surgeon: Jonathan Alcocer MD;  Location:  GI     SURGICAL HISTORY OF -   06/14/76    Perineorrhaphy, for widening vaginal orifice     ZZC EXPLORATORY OF ABDOMEN  1989    laparoscopy         SOCIAL HISTORY:  Social History     Socioeconomic History     Marital status:      Spouse name: Not on file     Number of children: Not on file     Years of education: Not on file     Highest education level: Not on file   Occupational History     Not on file   Tobacco Use     Smoking status: Never     Smokeless tobacco: Never   Substance and Sexual Activity     Alcohol use: No     Drug use: No     Sexual activity: Yes     Partners: Male   Other Topics Concern     Parent/sibling w/ CABG, MI or angioplasty before 65F 55M? Not Asked   Social History Narrative     Not on file     Social Determinants of Health     Financial Resource Strain: Not on file   Food Insecurity: Not  "on file   Transportation Needs: Not on file   Physical Activity: Not on file   Stress: Not on file   Social Connections: Not on file   Intimate Partner Violence: Not on file   Housing Stability: Not on file         FAMILY HISTORY:  Family History   Problem Relation Age of Onset     Cerebrovascular Disease Father         stroke about age 65,  of cancer at 68 yrs     Diabetes Father         eventually took insulin     Anemia Father         Pernisios anemia     Hypertension Mother         on meds, alive         PHYSICAL EXAM:  Vital signs:  /64 (BP Location: Right arm, Patient Position: Sitting, Cuff Size: Adult Large)   Pulse 98   Temp 97.1  F (36.2  C) (Temporal)   Resp 18   Ht 1.562 m (5' 1.5\")   Wt 88.1 kg (194 lb 3 oz)   LMP  (LMP Unknown)   SpO2 98%   BMI 36.10 kg/m       ECO  GENERAL/CONSTITUTIONAL: No acute distress.  EYES:  Extraocular movements intact without nystagmus.  No scleral icterus.  RESPIRATORY: Equal chest rise.   MUSCULOSKELETAL: Warm and well-perfused, no cyanosis, clubbing, or edema.   NEUROLOGIC: Cranial nerves are grossly intact. Alert, oriented to person, place and time, answers questions appropriately.  INTEGUMENTARY: No rashes or jaundice.  GAIT: Steady, does not use assistive device      LABS:  CBC RESULTS:   Recent Labs   Lab Test 23  1002   WBC 8.1   RBC 3.74*   HGB 9.8*   HCT 34.9*   MCV 93   MCH 26.2*   MCHC 28.1*   RDW 17.4*          Recent Labs   Lab Test 23  0959 10/09/21  2227    142   POTASSIUM 4.8 4.3   CHLORIDE 112* 118*   CO2 15* 20   ANIONGAP 11 4   * 159*   BUN 34.9* 25   CR 1.64* 1.30*   LIVIER 8.4* 8.3*         PATHOLOGY:  Case Report    Surgical Pathology Report                         Case: [id number]                                    Authorizing Provider:  Jonathan Alcocer MD       Collected:           2023 08:15 AM            Ordering Location:     [facility]          Received:            2023 09:41 " [facility]                                                            Pathologist:           Emeka Perez MD                                                             Specimens:   A) - Stomach, Pyloric Antrum, antrum biopsy                                                           B) - Large Intestine, Colon, Sigmoid, sigmoid stricture biopsy                               Final Diagnosis    A.  Stomach, antrum: Biopsy:  - Antral type mucosa with mild chronic inactive gastritis  - Immunostain for Helicobacter pylori is negative      B.  Colon, sigmoid, stricture: Biopsy:  - Invasive poorly differentiated carcinoma, see comment    Electronically signed by Emeka Perez MD on 3/2/2023 at 11:21 AM    Comment      The sigmoid stricture shows a high-grade carcinoma without definitive gland formation.  Given the poorly differentiated appearance, an immunohistochemical panel was performed to exclude the possibility of a tumor by direct extension or metastasis.  Although the CK7/CK20 profile is not entirely typical for a colorectal carcinoma, immunostains for tumors of other origins are negative and a colorectal primary is still favored.  Immunohistochemistry for DNA mismatch repair proteins show concurrent loss of MLH1/PMS2 and MLH1 hyper methylation studies are pending with the results to be reported separately.  Correlation with clinical and radiographic findings is suggested.     The results were discussed with Dr. Alcocer on 3/2/2023 at 11:00 am.      This case was reviewed in intradepartmental consultation with agreement to the above interpretation.     Synoptic Checklist    Colon and Rectum Biomarker Reporting Template  Protocol posted: 6/30/2021  COLON AND RECTUM: BIOMARKER REPORTING TEMPLATE - B  RESULTS   Mismatch Repair     Immunohistochemistry (IHC) Testing for Mismatch Repair (MMR) Proteins     MLH1 Result  Loss of nuclear expression    Immunohistochemistry  (IHC) Testing for Mismatch Repair (MMR) Proteins     MSH2 Result  Intact nuclear expression    Immunohistochemistry (IHC) Testing for Mismatch Repair (MMR) Proteins     MSH6 Result  Intact nuclear expression    Immunohistochemistry (IHC) Testing for Mismatch Repair (MMR) Proteins     PMS2 Result  Loss of nuclear expression    Immunohistochemistry (IHC) Testing for Mismatch Repair (MMR) Proteins  Background nonneoplastic tissue / internal control with intact nuclear expression    IHC Interpretation  Loss of nuclear expression of MLH1 and PMS2: testing for methylation of the MLH1 promoter and / or mutation of BRAF is indicated (the presence of a BRAF V600E mutation and / or MLH1 methylation suggests that the tumor is sporadic and germline evaluation is probably not indicated; absence of both MLH1 methylation and of BRAF V600E mutation suggests the possibility of Chan syndrome and sequencing and / or large deletion / duplication testing of germline MLH1 may be indicated)    .       Clinical Information      Procedure:  ATTEMPTED COLONOSCOPY WITH SIGMOID STRICTURE BIOPSY  ESOPHAGOGASTRODUODENOSCOPY, WITH BIOPSY  TATTOOING, WITH COLONOSCOPY  Pre-op Diagnosis: Screen for colon cancer [Z12.11]  Iron deficiency anemia due to chronic blood loss [D50.0]  Post-op Diagnosis: Z12.11 - Screen for colon cancer [ICD-10-CM]  D50.0 - Iron deficiency anemia due to chronic blood loss [ICD-10-CM]    Gross Description      A(1). Stomach, Pyloric Antrum, antrum biopsy:  The specimen is received in formalin, labeled with the patient's name, medical record number and other identifying information and designated  antrum biopsy . It consists of 2 tan soft tissue fragments ranging from 0.2-0.3 cm. Entirely submitted in one cassette.     B(2). Large Intestine, Colon, Sigmoid, sigmoid stricture biopsy:  The specimen is received in formalin, labeled with the patient's name, medical record number and other identifying information and designated   sigmoid stricture biopsy . It consists of 6 tan soft tissue fragments ranging from less than 0.1-0.1 cm. Entirely submitted in one cassette.   (Audelia Alyalek)    Microscopic Description      Sections of the sigmoid stricture show tissue with diffuse involvement by a poorly differentiated carcinoma in large nests and sheets.  The tumor has abundant amphophilic cytoplasm and enlarged hyperchromatic nuclei with prominent nucleoli.  No overt gland formation or keratinization is seen.  Immunohistochemical stains are performed and show the tumor to be diffusely positive for CK7 and partially positive for CK20 and SATB2.  There is no significant tumor reactivity for CDX2, GATA3, PAX8, TTF-1, and chromogranin.  Synaptophysin highlights very rare positive cells.     All immunohistochemical stains were performed with adequate controls.     MCRS N/A Yes Abnormal      Performing Labs      The technical component of this testing was completed at [facility]       Resulting Agency  [facility]               Specimen Collected: 02/27/23  8:15 AM Last Resulted: 03/02/23 11:21 AM             RESULTS    MLH1 promoter methylation: NEGATIVE   METHODOLOGY    Tumor is macroenriched from paraffin slides, DNA is extracted, quantitated, and treated with bisulfite. Quantitative PCR amplification of MLH1 is compared to the internal control COL2A1 in the patient's sample and a known methylated DNA control. Quantification of methylated MLH1 is accomplished through generation of a standard curve and calculation of a Percent Methylation Ratio (PMR) following the published [employer] procedure. PMR values greater than or equal to 4 are interpreted as positive, and PMR = 0 is interpreted as negative. PMR values of 0.1-3.9 are equivocal and require further workup.      LIMITATIONS:    The test is designed to detect methylation only in the areas covered by the primers utilized in this assay.   Adjacent genomic regions may contain abnormalities that would not be detected.  The assay requires 10-20% tumor cellularity and the analytic sensitivity will be diminished in cases with suboptimal tumor percentage.  Tumor heterogeneity may contribute to variability in the detected methylation status, and non-tumor cellular components may falsely depress the methylation status results.  This test does not define malignancy nor can it be used as a means to detect minimal residual disease.   INTERPRETATION    This patient's sample is Negative for MLH1 promoter methylation.  (Electronically signed by: REEMA ZHU MD March 14, 2023 12:20 PM)   COMMENTS    The absence of MLH1 promoter methylation in the proper clinical context (MSI high/MMR deficiency by IHC) is associated with Chan Syndrome.  Genetic counseling and consideration of germline testing is recommended   DISCLAIMER        IMAGING:  COLONOSCOPY 02/27/2023  8:16 AM [address] [address]   _______________________________________________________________________________   Patient Name: Nadia Ladd           Procedure Date: 2/27/2023 8:16 AM   MRN: [id number]                       Account Number: [id number]   YOB: 1955              Admit Type: Outpatient   Age: 67                               Room: [place] [address]   Gender: Female                        Note Status: Finalized   Attending MD: VANDA VASQUEZ MD    Total Sedation Time:   _______________________________________________________________________________       Procedure:             Colonoscopy   Indications:           Iron deficiency anemia, Crohn's disease of the colon   Providers:             VANDA VASQUEZ MD   Referring MD:          ABBIE AGUILERA, OS   Medicines:             Propofol per Anesthesia   Complications:         No immediate complications.    _______________________________________________________________________________   Procedure:             Pre-Anesthesia Assessment:                          - Prior to the procedure, a History and Physical was                          performed, and patient medications, allergies and                          sensitivities were reviewed. The patient's tolerance                          of previous anesthesia was reviewed.                          - Pre-procedure physical examination revealed no                          contraindications to sedation.                          After obtaining informed consent, the colonoscope was                          passed under direct vision. Throughout the procedure,                          the patient's blood pressure, pulse, and oxygen                          saturations were monitored continuously. The                          Colonoscope was introduced through the anus and                          advanced to the sigmoid colon.                                                                                     Findings:        Skin tags were found on perianal exam.        A frond-like/villous partially obstructing large mass was found in the        sigmoid colon. The mass was partially circumferential (involving        two-thirds of the lumen circumference). The mass measured four cm in        length. Unable to traverse. In addition, its inner diameter measured        five mm. Oozing was present. Biopsies were taken with a cold forceps for        histology. Area was tattooed with an injection of dye.        No additional abnormalities were found on retroflexion.                                                                                     Impression:            - Perianal skin tags found on perianal exam.                          - Malignant-appearing tumor in the colon. Biopsied.                          Tattooed.                          - CT scan and barium enema    Recommendation:        - Await pathology results.                          - Refer to a colo-rectal surgeon at the next available                          appointment.                                                                                        Combined Report of:    PET and CT on  3/24/2023 8:39 AM :     1. PET of the neck, chest, abdomen, and pelvis.  2. PET CT Fusion for Attenuation Correction and Anatomical  Localization:    3. 3D MIP and PET-CT fused images were processed on an independent  workstation and archived to PACS and reviewed by a radiologist.     Technique:     1. PET: The patient received 11.93 mCi of F-18-FDG; the serum glucose  was 158 prior to administration, body weight was 89.8 kg. Images were  evaluated in the axial, sagittal, and coronal planes as well as the  rotational whole body MIP. Images were acquired from the Vertex to the  Feet.     UPTAKE WAS MEASURED AT 60 MINUTES.      BACKGROUND:  Liver SUV max= 4.27,   Aorta Blood SUV Max: 2.81.      2. CT: CT only obtained for attenuation correction and not diagnostic  purposes.     INDICATION: new sigmoid cancer with possible cancer in the hepatic  flexure; Malignant neoplasm of sigmoid colon (H)     ADDITIONAL INFORMATION OBTAINED FROM EMR: New cancer initial staging.     COMPARISON: CT chest abdomen and pelvis 2/27/2023, barium colon exam  3/3/2023     FINDINGS:      HEAD/NECK:  There is no suspicious FDG uptake in the neck.      Small amount of mucus in the maxillary sinuses with some thickening of  the walls likely indicative of chronic inflammation..     CHEST:  There is no suspicious FDG uptake in the chest.      ABDOMEN AND PELVIS:  Elevated FDG uptake at the biopsy-proven malignancy in the sigmoid  colon demonstrating an SUV max of 19.81 (series 4 image 231) focus of  wall thickening (series 4 image 232). The secondary suspicious focus  noted on prior barium exam in prior CT at the sigmoid flexure  demonstrates SUV max of  15.2 with foci of colonic wall thickening  (series 4 image 182). There is a third focus of elevated FDG avidity  along the left aspect of the transverse colon demonstrates a max SUV  of 13.1 with focus of wall thickening (series 4 image 173).     LOWER EXTREMITIES:   No abnormal masses or hypermetabolic lesions.     BONES:   There are no suspicious lytic or blastic osseous lesions.  There is no  abnormal FDG uptake in the skeleton.                                                                      IMPRESSION:   1. In this patient with a history of biopsy-proven sigmoid  adenocarcinoma.  a. There is increased FDG avidity of the sigmoid colon with focal  lobular wall thickening consistent with biopsy-proven adenocarcinoma.  b. Secondary focus noted at the hepatic flexure demonstrates elevated  FDG avidity and lobulated wall thickening highly suspicious for a  additional primary.  c. Additionally there is a smaller focus of wall thickening with  elevated FDG avidity along the left aspect of the transverse colon  which is suspicious for a possible third focus of colonic malignancy.  2. Small amount of mucus in the maxillary sinuses with some thickening  of the osseous sinus walls likely indicative of chronic sinusitis.     COLON BARIUM   3/3/2023 2:24 PM      HISTORY: Patient with sigmoid mass and stricture. Colonic mass.     COMPARISON: CT chest, abdomen and pelvis dated 2/27/2023.      FINDINGS: Single contrast barium enema was performed in usual fashion.  There is a long segment area of significant narrowing at the proximal  sigmoid colon corresponding with the mass previously described on CT  and on prior colonoscopy. Additionally, there is a focal area of  narrowing in the proximal transverse colon just distal to the hepatic  flexure corresponding to the other area of possible mass on CT. This  did not change during the entire exam and is also concerning for a  synchronous lesion. No other significant  abnormalities are identified  on this limited single contrast barium enema.     FLUOROSCOPY TIME: 1.7 minutes.  SPOT IMAGES: 10.  CINE RUNS: 0.  LAST IMAGE FLUORO HOLD IMAGES: 8.  PLAIN FILMS: Two  images and postevacuation images were also  obtained.                                                                      IMPRESSION:  1. Long segment narrowing in the proximal sigmoid colon corresponds  with known colonic malignancy.  2. Focal area of colonic narrowing at the proximal transverse colon  corresponds with a second area of abnormality on CT and is also  concerning for a synchronous lesion.      CT CHEST ABDOMEN PELVIS WITHOUT CONTRAST  2/27/2023 2:24 PM     CLINICAL HISTORY: Patient with probable malignant sigmoid mass and  stricture; Colonic mass.     TECHNIQUE: CT scan of the chest, abdomen, and pelvis was performed  without IV contrast. Multiplanar reformats were obtained. Dose  reduction techniques were used.   CONTRAST: None.     COMPARISON: None.     FINDINGS:   LUNGS AND PLEURA: Mild linear scarring or atelectasis in the inferior  right upper lobe. The lungs are otherwise clear. No pulmonary nodules.  No pleural effusion.     MEDIASTINUM/AXILLAE: No lymphadenopathy. No coronary artery  calcification.     HEPATOBILIARY: No liver lesions.     PANCREAS: Normal.     SPLEEN: Mild splenomegaly. The spleen measures 14.7 cm in AP length.     ADRENAL GLANDS: Normal.     KIDNEYS/BLADDER: Normal.     BOWEL: 4 cm length mass in the proximal sigmoid colon. There is some  irregularity and stranding in the adjacent pericolonic fat which may  indicate tumor extension. There is no obstruction. There is a second  probable mass at the hepatic flexure of the colon measuring 2.5 cm in  length series 2 image 162. There is submucosal fatty deposition in the  colon, most severe in the distal sigmoid colon and rectum, which can  be seen secondary to quiescent inflammatory bowel disease.  Inflammatory bowel  disease     LYMPH NODES: There are small lymph nodes in the mesial colon adjacent  to the hepatic flexure abnormality and the sigmoid colonic mass. No  lymphadenopathy by size criteria.     VASCULATURE: Unremarkable.     PELVIC ORGANS: Normal.     OTHER: None.     MUSCULOSKELETAL: Normal.                                                                      IMPRESSION:  1.  4 cm proximal sigmoid colonic mass consistent with history.  2.  Possible additional 2.5 cm mass at the hepatic flexure of the  colon.  3.  No evidence of distant metastases in the chest, abdomen, or  pelvis.     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,  loss of nuclear expression of MLH1 and PMS2, MLH1 promoter methylation negative  # 2.5 cm probable mass at hepatic flexure of colon  # Possible   - 2/23 colonoscopy: A frond-like/villous partially obstructing large mass found in sigmoid colon, partially circumferential involving two thirds of the lumen circumference, 4 cm, unable to traverse, with oozing, s/p biopsy  -PATHOLOGY: Invasive poorly differentiated carcinoma, IHC panel excludes tumors of other origin, colorectal primary is favored, loss of nuclear expression of MLH1 and PMS2, MLH1 promoter methylation negative  -2/23 CT CAP: Shows known 4 cm proximal sigmoid colon mass with possible tumor extension (irregularity and stranding and adjacent pericolonic fat) without obstruction AND probable second mass 2.5 cm at hepatic flexure of colon; small lymph nodes adjacent to both masses (no lymphadenopathy by size criteria)  -3/23 PET:  a. There is increased FDG avidity of the sigmoid colon with focal lobular wall thickening consistent with biopsy-proven adenocarcinoma.  b. Secondary focus noted at the hepatic flexure demonstrates elevated FDG avidity and lobulated wall thickening highly suspicious for a additional primary.  c.  Additionally there is a smaller focus of wall thickening with elevated FDG avidity along the left aspect of the transverse colon  which is suspicious for a possible third focus of colonic malignancy.  - I d/w radiology 3/27/23- in reference to b- body of report says sigmoid flexure, will be addended to hepatic flexure and c references area near splenic flexure- possible indeterminate, outpouching of colon isn't PET avid but soft tissue around it is, hard to say given normal uptake of colon    - 3/23 CEA 6.8    PLAN:  - Will add on BRAF V600 E mutation testing to surgical specimen   - Genetic counseling referral ASAP as pt is scheduled for surgery 4/6/2023  - Plan for Laparoscopic total abdominal colectomy versus laparoscopic sigmoidectomy, possible ileostomy, colonoscopy 4/6/2023 with Dr. Ashley (patient is on a wait list)  - Will await pathology results to determine need for adjuvant therapy    #Anemia secondary to iron deficiency and B12 deficiency  - 2/23 hgb 6.8, ferritin 21, iron sat index 10, TIBC 265, iron 27, b12 1493  - On B12 1000 mcg p.o. daily and ferrous sulfate 325 mg p.o. daily    #Crohn's  - dx since at least 2010   - is currently on sulfasalazine   - pt is not actively following with GI, will place GI referral (routine)    RTC 4/19/23 for f/u with me (after pathology from surgery is back)       Irene Bateman DO  Hematology/Oncology  [facility]    Future Appointments   Date Time Provider Department Center   3/28/2023 10:45 AM Irene Bateman DO [facility]   3/31/2023 11:30 AM Ambika Jain, MARQUEZ [facility]   3/31/2023  1:15 PM Anna Banks PA-C [facility]   3/31/2023  2:45 PM  LAB [facility]   5/1/2023 11:45 AM Angelina Williamson APRN [facility]   5/2/2023 10:00 AM Nomi Cartwright DO [facility]   5/19/2023 11:30 AM Jerome Ashley MD [facility]

## 2023-03-28 ENCOUNTER — ONCOLOGY VISIT (OUTPATIENT)
Dept: ONCOLOGY | Facility: CLINIC | Age: 68
End: 2023-03-28
Attending: SURGERY
Payer: MEDICARE

## 2023-03-28 VITALS
HEIGHT: 62 IN | WEIGHT: 194.19 LBS | OXYGEN SATURATION: 98 % | RESPIRATION RATE: 18 BRPM | SYSTOLIC BLOOD PRESSURE: 122 MMHG | DIASTOLIC BLOOD PRESSURE: 64 MMHG | HEART RATE: 98 BPM | BODY MASS INDEX: 35.73 KG/M2 | TEMPERATURE: 97.1 F

## 2023-03-28 DIAGNOSIS — D50.0 IRON DEFICIENCY ANEMIA DUE TO CHRONIC BLOOD LOSS: ICD-10-CM

## 2023-03-28 DIAGNOSIS — D51.8 OTHER VITAMIN B12 DEFICIENCY ANEMIA: ICD-10-CM

## 2023-03-28 DIAGNOSIS — C18.7 MALIGNANT NEOPLASM OF SIGMOID COLON (H): Primary | ICD-10-CM

## 2023-03-28 DIAGNOSIS — K50.10 CROHN'S DISEASE OF LARGE INTESTINE WITHOUT COMPLICATION (H): ICD-10-CM

## 2023-03-28 PROCEDURE — 99204 OFFICE O/P NEW MOD 45 MIN: CPT | Performed by: INTERNAL MEDICINE

## 2023-03-28 ASSESSMENT — PAIN SCALES - GENERAL: PAINLEVEL: NO PAIN (0)

## 2023-03-28 NOTE — LETTER
3/28/2023         RE: Nadia Ladd  [address]  [facility] [address]        Dear Colleague,    Thank you for referring your patient, Nadia Ladd, to the [facility]. Please see a copy of my visit note below.    [facility]    Hematology/Oncology New Patient Note      Today's Date: 3/28/2023    Referring provider: Jerome Ashley MD  Reason for Consultation: Sigmoid cancer    HISTORY OF PRESENT ILLNESS: Nadia Ladd is a 67 year old female who was referred to the Hematology/Oncology Clinic for sigmoid cancer    Patient has medical history including Crohn's disease on sulfasalazine, anemia secondary to iron deficiency and B12 deficiency, obesity, hypertension, prediabetes, eczema, pulmonary hypertension, hiatal hernia, mild splenomegaly (14.7 cm in 2/23)    - 2/23 pt noted increasing fatigue, found to have iron deficiency anemia w/hgb 6.8 (previously required RBC transfusion when dx w/Crohn's), did have intermittent changes in stool but not persistent (noted minimal blood in stool)   - 2/23 upper endoscopy for iron deficiency anemia and Crohn's disease: Hiatal hernia, normal stomach, normal duodenum  - 2/23 colonoscopy: A frond-like/villous partially obstructing large mass found in sigmoid colon, partially circumferential involving two thirds of the lumen circumference, 4 cm, unable to traverse, with oozing, s/p biopsy  PATHOLOGY:  A.  Stomach, antrum: Biopsy:  - Antral type mucosa with mild chronic inactive gastritis  - Immunostain for Helicobacter pylori is negative      B.  Colon, sigmoid, stricture: Biopsy:  - Invasive poorly differentiated carcinoma  The sigmoid stricture shows a high-grade carcinoma without definitive gland formation.  Given the poorly differentiated appearance, an immunohistochemical panel was performed to exclude the possibility of a tumor by direct extension or metastasis.   Immunohistochemical stains are  performed and show the tumor to be diffusely positive for CK7 and partially positive for CK20 and SATB2.  There is no significant tumor reactivity for CDX2, GATA3, PAX8, TTF-1, and chromogranin.  Synaptophysin highlights very rare positive cells. Although the CK7/CK20 profile is not entirely typical for a colorectal carcinoma, immunostains for tumors of other origins are negative and a colorectal primary is still favored.   - Mismatch repair:  1) MLH1-loss of nuclear expression  2) MSH2-intact  3) MSH6-intact  4) PMS2-loss of nuclear expression  -MLH1 promoter methylation: NEGATIVE    -2/23 CT CAP:  A) 4 cm length mass in the proximal sigmoid colon. There is some irregularity and stranding in the adjacent pericolonic fat which may indicate tumor extension. There is no obstruction.   B) there is a second probable mass at the hepatic flexure of the colon measuring 2.5 cm in length   C)There are small lymph nodes in the mesial colon adjacent to the hepatic flexure abnormality and the sigmoid colonic mass. No lymphadenopathy by size criteria  D) There is submucosal fatty deposition in the colon, most severe in the distal sigmoid colon and rectum, which can be seen secondary to quiescent inflammatory bowel disease.  E) no liver lesion    -3/23 PET:  a. There is increased FDG avidity of the sigmoid colon with focal lobular wall thickening consistent with biopsy-proven adenocarcinoma.  b. Secondary focus noted at the hepatic flexure demonstrates elevated FDG avidity and lobulated wall thickening highly suspicious for a additional primary.  c. Additionally there is a smaller focus of wall thickening with elevated FDG avidity along the left aspect of the transverse colon  which is suspicious for a possible third focus of colonic malignancy.    -3/23 CEA 6.8  - 3/23 colorectal surgery consultation with - in the setting of Crohn's, at least sigmoid colectomy with possible total abdominal colectomy with either  "ileorectal anastomosis or end ileostomy (\"rectum can remain active and be actively surveyed annually\")      REVIEW OF SYSTEMS:   A 14 point ROS was reviewed with pertinent positives and negatives in the HPI.        HOME MEDICATIONS:  Current Outpatient Medications   Medication Sig Dispense Refill     cyanocobalamin 1000 MCG TBCR Take 1,000 mcg by mouth daily 100 tablet 1     lisinopril (ZESTRIL) 10 MG tablet TAKE 1 TABLET BY MOUTH EVERY DAY 30 tablet 0     Multiple Vitamins-Iron (MULTI-DAY PLUS IRON PO)        sulfaSALAzine (AZULFIDINE) 500 MG tablet TAKE 2 TABLETS BY MOUTH TWICE A  tablet 0     triamterene-HCTZ (DYAZIDE) 37.5-25 MG capsule TAKE 1 CAPSULE BY MOUTH ONCE DAILY 90 capsule 0     ferrous sulfate (IRON) 325 (65 FE) MG tablet TAKE ONE TABLET BY MOUTH TWICE A DAY --NO FURTHER REFILLS WITHOUT OFFICE VISIT (Patient not taking: Reported on 3/28/2023) 200 tablet 3     fluocinonide (LIDEX) 0.05 % external cream Small amount to affected area(s) BID PRN (Patient not taking: Reported on 2/21/2023) 60 g 1     IBUPROFEN 200 MG OR TABS 1 tablet daily as needed (Patient not taking: Reported on 3/28/2023)       metroNIDAZOLE (FLAGYL) 500 MG tablet Take 1 tablet (500 mg) by mouth every 6 hours At 8:00 am, 2:00 pm, 8:00 pm the day prior to your surgery with neomycin and zofran. (Patient not taking: Reported on 3/28/2023) 3 tablet 0     neomycin (MYCIFRADIN) 500 MG tablet Take 2 tablets (1,000 mg) by mouth every 6 hours At 8:00 am, 2:00 pm, 8:00 pm the day prior to your surgery with flagyl and zofran. (Patient not taking: Reported on 3/28/2023) 6 tablet 0     ondansetron (ZOFRAN) 4 MG tablet Take 1 tablet (4 mg) by mouth every 6 hours At 8:00 am, 2:00 pm, 8:00 pm the day prior to your surgery with neomycin and flagyl. (Patient not taking: Reported on 3/28/2023) 3 tablet 0     polyethylene glycol (MIRALAX) 17 g packet Take 238 g by mouth See Admin Instructions Starting at 4 pm night prior to surgery. Refer to " "\"Getting Ready for Surgery\" instructions. (Patient not taking: Reported on 3/28/2023) 14 packet 0     polyethylene glycol (MIRALAX) 17 g packet Take 119 g by mouth See Admin Instructions Starting at 8 pm night prior to surgery. Refer to \"Getting Ready for Surgery\" instructions. (Patient not taking: Reported on 3/28/2023) 7 packet 0         ALLERGIES:  Allergies   Allergen Reactions     No Known Drug Allergies          PAST MEDICAL HISTORY:  Past Medical History:   Diagnosis Date     Arthropathia 8/5/2010     B12 deficiency anemia 10/21/2010     Benign essential hypertension with target blood pressure below 140/90 10/10/2016     CARDIOVASCULAR SCREENING; LDL GOAL LESS THAN 160 10/31/2010     Crohn's colitis (H) 2/15/2011     Dermatitis-dishydrotic eczema-severe 8/5/2010     HTN (hypertension) 7/21/2011     Iron deficiency anemia 10/21/2010     Primary pulmonary hypertension (H) 4/6/2010     Unspecified essential hypertension     mild         PAST SURGICAL HISTORY:  Past Surgical History:   Procedure Laterality Date     COLONOSCOPY  10/11/10     COLONOSCOPY N/A 2/27/2023    Procedure: ATTEMPTED COLONOSCOPY WITH SIGMOID STRICTURE BIOPSY;  Surgeon: Jonathan Alcocer MD;  Location:  GI     ESOPHAGOSCOPY, GASTROSCOPY, DUODENOSCOPY (EGD), COMBINED N/A 2/27/2023    Procedure: ESOPHAGOGASTRODUODENOSCOPY, WITH BIOPSY;  Surgeon: Jonathan Alcocer MD;  Location:  GI     SURGICAL HISTORY OF -   06/14/76    Perineorrhaphy, for widening vaginal orifice     Z EXPLORATORY OF ABDOMEN  1989    laparoscopy         SOCIAL HISTORY:  Social History     Socioeconomic History     Marital status:      Spouse name: Not on file     Number of children: Not on file     Years of education: Not on file     Highest education level: Not on file   Occupational History     Not on file   Tobacco Use     Smoking status: Never     Smokeless tobacco: Never   Substance and Sexual Activity     Alcohol use: No     Drug use: No     Sexual " "activity: Yes     Partners: Male   Other Topics Concern     Parent/sibling w/ CABG, MI or angioplasty before 65F 55M? Not Asked   Social History Narrative     Not on file     Social Determinants of Health     Financial Resource Strain: Not on file   Food Insecurity: Not on file   Transportation Needs: Not on file   Physical Activity: Not on file   Stress: Not on file   Social Connections: Not on file   Intimate Partner Violence: Not on file   Housing Stability: Not on file         FAMILY HISTORY:  Family History   Problem Relation Age of Onset     Cerebrovascular Disease Father         stroke about age 65,  of cancer at 68 yrs     Diabetes Father         eventually took insulin     Anemia Father         Pernisios anemia     Hypertension Mother         on meds, alive         PHYSICAL EXAM:  Vital signs:  /64 (BP Location: Right arm, Patient Position: Sitting, Cuff Size: Adult Large)   Pulse 98   Temp 97.1  F (36.2  C) (Temporal)   Resp 18   Ht 1.562 m (5' 1.5\")   Wt 88.1 kg (194 lb 3 oz)   LMP  (LMP Unknown)   SpO2 98%   BMI 36.10 kg/m       ECO  GENERAL/CONSTITUTIONAL: No acute distress.  EYES:  Extraocular movements intact without nystagmus.  No scleral icterus.  RESPIRATORY: Equal chest rise.   MUSCULOSKELETAL: Warm and well-perfused, no cyanosis, clubbing, or edema.   NEUROLOGIC: Cranial nerves are grossly intact. Alert, oriented to person, place and time, answers questions appropriately.  INTEGUMENTARY: No rashes or jaundice.  GAIT: Steady, does not use assistive device      LABS:  CBC RESULTS:   Recent Labs   Lab Test 23  1002   WBC 8.1   RBC 3.74*   HGB 9.8*   HCT 34.9*   MCV 93   MCH 26.2*   MCHC 28.1*   RDW 17.4*          Recent Labs   Lab Test 23  0959 10/09/21  2227    142   POTASSIUM 4.8 4.3   CHLORIDE 112* 118*   CO2 15* 20   ANIONGAP 11 4   * 159*   BUN 34.9* 25   CR 1.64* 1.30*   LIVIER 8.4* 8.3*         PATHOLOGY:  Case Report    Surgical Pathology " Report                         Case: [id number]                                    Authorizing Provider:  Jonathan Alcocer MD       Collected:           02/27/2023 08:15 AM            Ordering Location:     [facility]          Received:            02/27/2023 09:41 AM                                   [facility]                                                            Pathologist:           Emeka Perez MD                                                             Specimens:   A) - Stomach, Pyloric Antrum, antrum biopsy                                                           B) - Large Intestine, Colon, Sigmoid, sigmoid stricture biopsy                               Final Diagnosis    A.  Stomach, antrum: Biopsy:  - Antral type mucosa with mild chronic inactive gastritis  - Immunostain for Helicobacter pylori is negative      B.  Colon, sigmoid, stricture: Biopsy:  - Invasive poorly differentiated carcinoma, see comment    Electronically signed by Emeka Perez MD on 3/2/2023 at 11:21 AM    Comment      The sigmoid stricture shows a high-grade carcinoma without definitive gland formation.  Given the poorly differentiated appearance, an immunohistochemical panel was performed to exclude the possibility of a tumor by direct extension or metastasis.  Although the CK7/CK20 profile is not entirely typical for a colorectal carcinoma, immunostains for tumors of other origins are negative and a colorectal primary is still favored.  Immunohistochemistry for DNA mismatch repair proteins show concurrent loss of MLH1/PMS2 and MLH1 hyper methylation studies are pending with the results to be reported separately.  Correlation with clinical and radiographic findings is suggested.     The results were discussed with Dr. Alcocer on 3/2/2023 at 11:00 am.      This case was reviewed in intradepartmental consultation with agreement to the above interpretation.     Synoptic Checklist    Colon and Rectum  Biomarker Reporting Template  Protocol posted: 6/30/2021  COLON AND RECTUM: BIOMARKER REPORTING TEMPLATE - B  RESULTS   Mismatch Repair     Immunohistochemistry (IHC) Testing for Mismatch Repair (MMR) Proteins     MLH1 Result  Loss of nuclear expression    Immunohistochemistry (IHC) Testing for Mismatch Repair (MMR) Proteins     MSH2 Result  Intact nuclear expression    Immunohistochemistry (IHC) Testing for Mismatch Repair (MMR) Proteins     MSH6 Result  Intact nuclear expression    Immunohistochemistry (IHC) Testing for Mismatch Repair (MMR) Proteins     PMS2 Result  Loss of nuclear expression    Immunohistochemistry (IHC) Testing for Mismatch Repair (MMR) Proteins  Background nonneoplastic tissue / internal control with intact nuclear expression    IHC Interpretation  Loss of nuclear expression of MLH1 and PMS2: testing for methylation of the MLH1 promoter and / or mutation of BRAF is indicated (the presence of a BRAF V600E mutation and / or MLH1 methylation suggests that the tumor is sporadic and germline evaluation is probably not indicated; absence of both MLH1 methylation and of BRAF V600E mutation suggests the possibility of Chan syndrome and sequencing and / or large deletion / duplication testing of germline MLH1 may be indicated)    .       Clinical Information      Procedure:  ATTEMPTED COLONOSCOPY WITH SIGMOID STRICTURE BIOPSY  ESOPHAGOGASTRODUODENOSCOPY, WITH BIOPSY  TATTOOING, WITH COLONOSCOPY  Pre-op Diagnosis: Screen for colon cancer [Z12.11]  Iron deficiency anemia due to chronic blood loss [D50.0]  Post-op Diagnosis: Z12.11 - Screen for colon cancer [ICD-10-CM]  D50.0 - Iron deficiency anemia due to chronic blood loss [ICD-10-CM]    Gross Description      A(1). Stomach, Pyloric Antrum, antrum biopsy:  The specimen is received in formalin, labeled with the patient's name, medical record number and other identifying information and designated  antrum biopsy . It consists of 2 tan soft tissue  fragments ranging from 0.2-0.3 cm. Entirely submitted in one cassette.     B(2). Large Intestine, Colon, Sigmoid, sigmoid stricture biopsy:  The specimen is received in formalin, labeled with the patient's name, medical record number and other identifying information and designated  sigmoid stricture biopsy . It consists of 6 tan soft tissue fragments ranging from less than 0.1-0.1 cm. Entirely submitted in one cassette.   (Audelia Dixon)    Microscopic Description      Sections of the sigmoid stricture show tissue with diffuse involvement by a poorly differentiated carcinoma in large nests and sheets.  The tumor has abundant amphophilic cytoplasm and enlarged hyperchromatic nuclei with prominent nucleoli.  No overt gland formation or keratinization is seen.  Immunohistochemical stains are performed and show the tumor to be diffusely positive for CK7 and partially positive for CK20 and SATB2.  There is no significant tumor reactivity for CDX2, GATA3, PAX8, TTF-1, and chromogranin.  Synaptophysin highlights very rare positive cells.     All immunohistochemical stains were performed with adequate controls.     MCRS N/A Yes Abnormal      Performing Labs      The technical component of this testing was completed at [facility]       Resulting Agency  [facility]               Specimen Collected: 02/27/23  8:15 AM Last Resulted: 03/02/23 11:21 AM             RESULTS    MLH1 promoter methylation: NEGATIVE   METHODOLOGY    Tumor is macroenriched from paraffin slides, DNA is extracted, quantitated, and treated with bisulfite. Quantitative PCR amplification of MLH1 is compared to the internal control COL2A1 in the patient's sample and a known methylated DNA control. Quantification of methylated MLH1 is accomplished through generation of a standard curve and calculation of a Percent Methylation Ratio (PMR) following the published [employer] procedure. PMR values  greater than or equal to 4 are interpreted as positive, and PMR = 0 is interpreted as negative. PMR values of 0.1-3.9 are equivocal and require further workup.      LIMITATIONS:    The test is designed to detect methylation only in the areas covered by the primers utilized in this assay.  Adjacent genomic regions may contain abnormalities that would not be detected.  The assay requires 10-20% tumor cellularity and the analytic sensitivity will be diminished in cases with suboptimal tumor percentage.  Tumor heterogeneity may contribute to variability in the detected methylation status, and non-tumor cellular components may falsely depress the methylation status results.  This test does not define malignancy nor can it be used as a means to detect minimal residual disease.   INTERPRETATION    This patient's sample is Negative for MLH1 promoter methylation.  (Electronically signed by: REEMA ZHU MD March 14, 2023 12:20 PM)   COMMENTS    The absence of MLH1 promoter methylation in the proper clinical context (MSI high/MMR deficiency by IHC) is associated with Chan Syndrome.  Genetic counseling and consideration of germline testing is recommended   DISCLAIMER        IMAGING:  COLONOSCOPY 02/27/2023  8:16 AM [address] [address]   _______________________________________________________________________________   Patient Name: Nadia Ladd           Procedure Date: 2/27/2023 8:16 AM   MRN: [id number]                       Account Number: [id number]   YOB: 1955              Admit Type: Outpatient   Age: 67                               Room: [place] [address]   Gender: Female                        Note Status: Finalized   Attending MD: VANDA VASQUEZ MD    Total Sedation Time:   _______________________________________________________________________________       Procedure:             Colonoscopy   Indications:           Iron deficiency  anemia, Crohn's disease of the colon   Providers:             VANDA VASQUEZ MD   Referring MD:          ABBIE AGUILERA, OS   Medicines:             Propofol per Anesthesia   Complications:         No immediate complications.   _______________________________________________________________________________   Procedure:             Pre-Anesthesia Assessment:                          - Prior to the procedure, a History and Physical was                          performed, and patient medications, allergies and                          sensitivities were reviewed. The patient's tolerance                          of previous anesthesia was reviewed.                          - Pre-procedure physical examination revealed no                          contraindications to sedation.                          After obtaining informed consent, the colonoscope was                          passed under direct vision. Throughout the procedure,                          the patient's blood pressure, pulse, and oxygen                          saturations were monitored continuously. The                          Colonoscope was introduced through the anus and                          advanced to the sigmoid colon.                                                                                     Findings:        Skin tags were found on perianal exam.        A frond-like/villous partially obstructing large mass was found in the        sigmoid colon. The mass was partially circumferential (involving        two-thirds of the lumen circumference). The mass measured four cm in        length. Unable to traverse. In addition, its inner diameter measured        five mm. Oozing was present. Biopsies were taken with a cold forceps for        histology. Area was tattooed with an injection of dye.        No additional abnormalities were found on retroflexion.                                                                                      Impression:            - Perianal skin tags found on perianal exam.                          - Malignant-appearing tumor in the colon. Biopsied.                          Tattooed.                          - CT scan and barium enema   Recommendation:        - Await pathology results.                          - Refer to a colo-rectal surgeon at the next available                          appointment.                                                                                        Combined Report of:    PET and CT on  3/24/2023 8:39 AM :     1. PET of the neck, chest, abdomen, and pelvis.  2. PET CT Fusion for Attenuation Correction and Anatomical  Localization:    3. 3D MIP and PET-CT fused images were processed on an independent  workstation and archived to PACS and reviewed by a radiologist.     Technique:     1. PET: The patient received 11.93 mCi of F-18-FDG; the serum glucose  was 158 prior to administration, body weight was 89.8 kg. Images were  evaluated in the axial, sagittal, and coronal planes as well as the  rotational whole body MIP. Images were acquired from the Vertex to the  Feet.     UPTAKE WAS MEASURED AT 60 MINUTES.      BACKGROUND:  Liver SUV max= 4.27,   Aorta Blood SUV Max: 2.81.      2. CT: CT only obtained for attenuation correction and not diagnostic  purposes.     INDICATION: new sigmoid cancer with possible cancer in the hepatic  flexure; Malignant neoplasm of sigmoid colon (H)     ADDITIONAL INFORMATION OBTAINED FROM EMR: New cancer initial staging.     COMPARISON: CT chest abdomen and pelvis 2/27/2023, barium colon exam  3/3/2023     FINDINGS:      HEAD/NECK:  There is no suspicious FDG uptake in the neck.      Small amount of mucus in the maxillary sinuses with some thickening of  the walls likely indicative of chronic inflammation..     CHEST:  There is no suspicious FDG uptake in the chest.      ABDOMEN AND PELVIS:  Elevated FDG uptake at the biopsy-proven malignancy in the  sigmoid  colon demonstrating an SUV max of 19.81 (series 4 image 231) focus of  wall thickening (series 4 image 232). The secondary suspicious focus  noted on prior barium exam in prior CT at the sigmoid flexure  demonstrates SUV max of 15.2 with foci of colonic wall thickening  (series 4 image 182). There is a third focus of elevated FDG avidity  along the left aspect of the transverse colon demonstrates a max SUV  of 13.1 with focus of wall thickening (series 4 image 173).     LOWER EXTREMITIES:   No abnormal masses or hypermetabolic lesions.     BONES:   There are no suspicious lytic or blastic osseous lesions.  There is no  abnormal FDG uptake in the skeleton.                                                                      IMPRESSION:   1. In this patient with a history of biopsy-proven sigmoid  adenocarcinoma.  a. There is increased FDG avidity of the sigmoid colon with focal  lobular wall thickening consistent with biopsy-proven adenocarcinoma.  b. Secondary focus noted at the hepatic flexure demonstrates elevated  FDG avidity and lobulated wall thickening highly suspicious for a  additional primary.  c. Additionally there is a smaller focus of wall thickening with  elevated FDG avidity along the left aspect of the transverse colon  which is suspicious for a possible third focus of colonic malignancy.  2. Small amount of mucus in the maxillary sinuses with some thickening  of the osseous sinus walls likely indicative of chronic sinusitis.     COLON BARIUM   3/3/2023 2:24 PM      HISTORY: Patient with sigmoid mass and stricture. Colonic mass.     COMPARISON: CT chest, abdomen and pelvis dated 2/27/2023.      FINDINGS: Single contrast barium enema was performed in usual fashion.  There is a long segment area of significant narrowing at the proximal  sigmoid colon corresponding with the mass previously described on CT  and on prior colonoscopy. Additionally, there is a focal area of  narrowing in the  proximal transverse colon just distal to the hepatic  flexure corresponding to the other area of possible mass on CT. This  did not change during the entire exam and is also concerning for a  synchronous lesion. No other significant abnormalities are identified  on this limited single contrast barium enema.     FLUOROSCOPY TIME: 1.7 minutes.  SPOT IMAGES: 10.  CINE RUNS: 0.  LAST IMAGE FLUORO HOLD IMAGES: 8.  PLAIN FILMS: Two  images and postevacuation images were also  obtained.                                                                      IMPRESSION:  1. Long segment narrowing in the proximal sigmoid colon corresponds  with known colonic malignancy.  2. Focal area of colonic narrowing at the proximal transverse colon  corresponds with a second area of abnormality on CT and is also  concerning for a synchronous lesion.      CT CHEST ABDOMEN PELVIS WITHOUT CONTRAST  2/27/2023 2:24 PM     CLINICAL HISTORY: Patient with probable malignant sigmoid mass and  stricture; Colonic mass.     TECHNIQUE: CT scan of the chest, abdomen, and pelvis was performed  without IV contrast. Multiplanar reformats were obtained. Dose  reduction techniques were used.   CONTRAST: None.     COMPARISON: None.     FINDINGS:   LUNGS AND PLEURA: Mild linear scarring or atelectasis in the inferior  right upper lobe. The lungs are otherwise clear. No pulmonary nodules.  No pleural effusion.     MEDIASTINUM/AXILLAE: No lymphadenopathy. No coronary artery  calcification.     HEPATOBILIARY: No liver lesions.     PANCREAS: Normal.     SPLEEN: Mild splenomegaly. The spleen measures 14.7 cm in AP length.     ADRENAL GLANDS: Normal.     KIDNEYS/BLADDER: Normal.     BOWEL: 4 cm length mass in the proximal sigmoid colon. There is some  irregularity and stranding in the adjacent pericolonic fat which may  indicate tumor extension. There is no obstruction. There is a second  probable mass at the hepatic flexure of the colon measuring 2.5 cm  in  length series 2 image 162. There is submucosal fatty deposition in the  colon, most severe in the distal sigmoid colon and rectum, which can  be seen secondary to quiescent inflammatory bowel disease.  Inflammatory bowel disease     LYMPH NODES: There are small lymph nodes in the mesial colon adjacent  to the hepatic flexure abnormality and the sigmoid colonic mass. No  lymphadenopathy by size criteria.     VASCULATURE: Unremarkable.     PELVIC ORGANS: Normal.     OTHER: None.     MUSCULOSKELETAL: Normal.                                                                      IMPRESSION:  1.  4 cm proximal sigmoid colonic mass consistent with history.  2.  Possible additional 2.5 cm mass at the hepatic flexure of the  colon.  3.  No evidence of distant metastases in the chest, abdomen, or  pelvis.     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,  loss of nuclear expression of MLH1 and PMS2, MLH1 promoter methylation negative  # 2.5 cm probable mass at hepatic flexure of colon  # Possible   - 2/23 colonoscopy: A frond-like/villous partially obstructing large mass found in sigmoid colon, partially circumferential involving two thirds of the lumen circumference, 4 cm, unable to traverse, with oozing, s/p biopsy  -PATHOLOGY: Invasive poorly differentiated carcinoma, IHC panel excludes tumors of other origin, colorectal primary is favored, loss of nuclear expression of MLH1 and PMS2, MLH1 promoter methylation negative  -2/23 CT CAP: Shows known 4 cm proximal sigmoid colon mass with possible tumor extension (irregularity and stranding and adjacent pericolonic fat) without obstruction AND probable second mass 2.5 cm at hepatic flexure of colon; small lymph nodes adjacent to both masses (no lymphadenopathy by size criteria)  -3/23 PET:  a. There is increased FDG avidity of the sigmoid colon with focal lobular  wall thickening consistent with biopsy-proven adenocarcinoma.  b. Secondary focus noted at the hepatic flexure demonstrates elevated FDG avidity and lobulated wall thickening highly suspicious for a additional primary.  c. Additionally there is a smaller focus of wall thickening with elevated FDG avidity along the left aspect of the transverse colon  which is suspicious for a possible third focus of colonic malignancy.  - I d/w radiology 3/27/23- in reference to b- body of report says sigmoid flexure, will be addended to hepatic flexure and c references area near splenic flexure- possible indeterminate, outpouching of colon isn't PET avid but soft tissue around it is, hard to say given normal uptake of colon    - 3/23 CEA 6.8    PLAN:  - Will add on BRAF V600 E mutation testing to surgical specimen   - Genetic counseling referral ASAP as pt is scheduled for surgery 4/6/2023  - Plan for Laparoscopic total abdominal colectomy versus laparoscopic sigmoidectomy, possible ileostomy, colonoscopy 4/6/2023 with Dr. Ashley (patient is on a wait list)  - Will await pathology results to determine need for adjuvant therapy    #Anemia secondary to iron deficiency and B12 deficiency  - 2/23 hgb 6.8, ferritin 21, iron sat index 10, TIBC 265, iron 27, b12 1493  - On B12 1000 mcg p.o. daily and ferrous sulfate 325 mg p.o. daily    #Crohn's  - dx since at least 2010   - is currently on sulfasalazine   - pt is not actively following with GI, will place GI referral (routine)    RTC 4/19/23 for f/u with me (after pathology from surgery is back)       Irene Bateman DO  Hematology/Oncology  [facility]    Future Appointments   Date Time Provider Department Center   3/28/2023 10:45 AM Irene Bateman DO [facility]   3/31/2023 11:30 AM Ambika Jain, MARQUEZ [facility]   3/31/2023  1:15 PM Anna Banks PA-C [facility]   3/31/2023  2:45 PM [facility]   5/1/2023 11:45 AM ziggy Watt  LUIS Feliciano CNP [facility]   5/2/2023 10:00 AM Nomi Cartwright DO [facility]   5/19/2023 11:30 AM Jerome Ashley MD [facility]            Again, thank you for allowing me to participate in the care of your patient.        Sincerely,        FRANCISCO LEE DO

## 2023-03-28 NOTE — NURSING NOTE
DISCHARGE PLAN:  Next appointments: See patient instruction section  Departure Mode: Ambulatory  Accompanied by: self   minutes for nursing discharge (face to face time)     Nadia Ladd is here today for oncology follow up.  Patient was not seen by writing nurse at time of appointment.   Appointment scheduled for follow up with Dr. Bateman. Mailed calendar and AVS to patient. See patient instructions and Oncologist's Progress note for further details. Questions and concerns addressed to patient's satisfaction. Patient verbalized and demonstrated understanding of plan.  Contact information provided and patient is encouraged to call with any that arise in the interim of care.    Jelly Champagne  [facility]  [phone]  3/28/2023, 2:36 PM

## 2023-03-28 NOTE — PATIENT INSTRUCTIONS
Today:  Please follow up as scheduled below:    Please follow up with Dr. Bateman.        Office visit follow up with Dr. Bateman on Date/Time: 04/19/23 @ 9:15 am in [employer] to review pathology results.    If you have any questions or concerns please feel free to call.    If you need to reschedule please call:  Clinic or Lab Appointment - [phone]  Infusion - [phone]  Imaging - [phone]    eJlly Champagne [facility]   Oncology/Hematology  [facility]  [phone]    After Hours Oncology Nurse Line - [phone]

## 2023-03-29 LAB
LAB DIRECTOR COMMENTS: NORMAL
LAB DIRECTOR DISCLAIMER: NORMAL
LAB DIRECTOR INTERPRETATION: NORMAL
LAB DIRECTOR METHODOLOGY: NORMAL
LAB DIRECTOR RESULTS: NORMAL
SPECIMEN DESCRIPTION: NORMAL

## 2023-03-29 NOTE — TELEPHONE ENCOUNTER
FUTURE VISIT INFORMATION        SURGERY INFORMATION:    Date: 4/6/2023    Location: [facility] OR    Surgeon:  Jerome Ashley MD    Anesthesia Type: General    Procedure: Laparoscopic total abdominal colectomy versus laparoscopic sigmoidectomy, possible ileostomy, colonoscopy    Consult: 3/10/23     RECORDS REQUESTED FROM:         Primary Care Provider: Nomi Cartwright DO - [facility]      Pertinent Medical History: Primary pulmonary hypertension (H); Iron deficiency anemia due to chronic blood loss     Most recent ECHO: 7/3/2009 - Epic      Most recent PFT's: 9/28/2010 - Epic

## 2023-03-30 ENCOUNTER — PATIENT OUTREACH (OUTPATIENT)
Dept: ONCOLOGY | Facility: CLINIC | Age: 68
End: 2023-03-30
Payer: MEDICARE

## 2023-03-30 ENCOUNTER — LAB (OUTPATIENT)
Dept: LAB | Facility: CLINIC | Age: 68
End: 2023-03-30
Payer: MEDICARE

## 2023-03-30 DIAGNOSIS — C18.9 COLON CANCER (H): Primary | ICD-10-CM

## 2023-03-30 PROCEDURE — 81288 MLH1 GENE: CPT | Performed by: SPECIALIST

## 2023-03-30 PROCEDURE — G0452 MOLECULAR PATHOLOGY INTERPR: HCPCS | Mod: 26 | Performed by: STUDENT IN AN ORGANIZED HEALTH CARE EDUCATION/TRAINING PROGRAM

## 2023-03-30 NOTE — PROGRESS NOTES
Virtual Visit Details  Type of service:  Telephone Visit   Phone call duration: 40 minutes     3/31/2023    Referring Provider: Irene Bateman DO    Presenting Information:   I met with Nadia Ladd today for her telephone genetic counseling visit through the Cancer Risk Management Program to discuss her personal history of colon cancer. She is here today to review this history, cancer screening recommendations, and available genetic testing options.    Personal History:  Nadia is a 67 year old female. She was recently diagnosed with colon cancer. On 2/27/2023, an upper endoscopy showed a normal stomach and duodenum. A colonoscopy that same day found a large 4 cm partially obstructing mass in the sigmoid colon. Pathology revealed invasive poorly differentiated carcinoma. Immunohistochemistry (IHC) showed a loss of MLH1 and PMS2. MLH1 promoter methylation testing was negative. Somatic testing for BRAF V600E variant is currently pending. On 2/27/2023, a chest, abdominal, and pelvic CT scan showed the previously identified colon mass and identified a possible additional 2.5 cm mass at the hepatic flexure of the colon. A PET scan on 3/24/2023, showed the previous sigmoid colon mass, secondary focus at the hepatic flexure that is suspicious for an additional primary, and a third focus at the transverse colon that is suspicious for a possible colonic malignancy. Genetic testing results are needed to aid in further treatment decisions.     In her hormonal-based medical history, she had her first menstrual period at age 13, does not have biological children, and underwent menopause at age 51. Nadia has her ovaries, fallopian tubes and uterus in place, and reports no ovarian cancer screening to date. She reports no history of hormone replacement therapy. Nadia reports oral contraceptive use for approximately one year.     Nadia reports that her most recent mammogram was between 5-10 years ago which was normal. She denies  any history of dermatological exams. She does not regularly do any other cancer screening at this time. Nadia reported no history of smoking and no current alcohol use.    Family History: (Please see scanned pedigree for detailed family history information)    Nadia's father was diagnosed with a cancerous tumor on his neck in his 60's. He passed away at age 68.     Of note, there is no reported history of cancer on her maternal side of the family.     Her maternal and paternal ethnicity is Scandinavian.  There is no known Ashkenazi Yazidism ancestry on either side of her family. There is no reported consanguinity.    Discussion:  Nadia's personal history of colon cancer, absent MLH1 and PMS2 on IHC, and negative MLH1 promoter methylation testing is suggestive of a hereditary cancer syndrome.  We reviewed the features of sporadic, familial, and hereditary cancers. We discussed the natural history and genetics of several hereditary cancer syndromes, including Chan syndrome.   Chan syndrome can be caused by a mutation in one of five genes:  MLH1, MSH2, MSH6, PMS2, and EPCAM. A single mutation in one of the Chan Syndrome genes increases the risk for several cancers, such as colon cancer, endometrial/uterine cancer, gastric cancer, and ovarian cancer. Other cancers have also been reported in some families with Chan Syndrome include pancreatic, bladder, biliary tract, urothelial, small bowel, prostate, breast and brain cancers.  A detailed handout regarding Chan syndrome and the information we discussed will be provided to Nadia via [employer] and can be found in the after visit summary. Topics included: inheritance pattern, cancer risks, cancer screening recommendations, and also risks, benefits and limitations of testing.  We discussed that immunohistochemistry (IHC) was performed for the mismatch repair (MMR) proteins on Nadia's colon tumor.   IHC testing allows us to screen a patient for Chan syndrome and is ideally  done on a colon or endometrial tumor.   Each mismatch repair gene (MLH1, MSH2, MSH6, PMS2) makes a protein.  IHC testing involves looking at the tumor to determine which of the proteins is present and which (if any) are absent.  If one or more of the mismatch repair proteins is absent in the tumor, it can indicate an underlying inherited mutation in the respective gene.    IHC testing of Nadia's colon tumor showed absence of the following protein(s): MLH1 and PMS2. 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 However, follow-up testing for methylation of the MLH1 promoter was indicated.  We discussed that MLH1 promoter methylation testing was performed on Nadia's colon tumor.   If testing shows methylation of the MHL1 promoter is present, this result suggests that the tumor is likely sporadic.   If testing shows absence of MLH1 methylation, this result suggests the possibility of Chan syndrome, and sequencing and/or large deletion/duplication testing of germline MLH1 is indicated.  MLH1 promoter methylation was absent for Nadia. Therefore, it is likely that Nadia's colon cancer was due to a mutation in one of the mismatch repair genes. Thus, a diagnosis of Chan syndrome is more suggestive.   Confirmation germline genetic testing is indicated for Nadia based on these results.   In looking at Nadia's personal and family history, it is possible that a cancer susceptibility gene is present due to her diagnosis of colon cancer, absent MLH1 and PMS2 on IHC, and negative MLH1 promoter hypermethylation.  Based on her personal and family history, Nadia meets current National Comprehensive Cancer Network (NCCN) criteria for genetic testing of Chan syndrome genes (i.e. EPCAM, MLH1, MSH2, MSH6, and PMS2).   We discussed that there are additional genes that could cause increased risk for colon cancer. As many of these genes present with overlapping features in a family and accurate cancer risk cannot always be  established based upon the pedigree analysis alone, it would be reasonable for Nadia to consider panel genetic testing to analyze multiple genes at once.  We reviewed genetic testing options for Nadia based on her personal and family history: a panel of genes associated with an increased risk for Chan syndrome, or larger panel options to include genes associated with increased risk for multiple different cancer types. Nadia expressed interest in the Chan syndrome genes through [facility].   Nadia would like to submit a blood sample for her genetic testing. She will go to her nearest [facility] clinic at her earliest convenience to get her blood drawn for her genetic testing.   Verbal consent was given over the phone and written on the consent form. Turnaround time is approximately 4-6 weeks.    Medical Management: For Nadia, we reviewed that the information from genetic testing may determine:    surgery to treat Nadia's active cancer diagnosis (i.e.partial versus total colectomy, etc.),    additional cancer screening for which Nadia may qualify (i.e. more frequent colonoscopies, consideration of upper endoscopy (EGD), etc.),    options for risk reducing surgeries Nadia could consider (i.e. surgery to remove her ovaries and/or uterus, etc.),      and targeted chemotherapies for Nadia's active cancer, or if she were to develop certain cancers in the future (i.e. immunotherapy for individuals with Chan syndrome, etc.).     These recommendations and possible targeted chemotherapies will be discussed in detail once genetic testing is completed.   Plan:  1) Today Nadia elected to proceed with the Chan syndrome genes through [facility]. Therefore, consent was reviewed and verbally obtained for this testing.   2) Nadia plans to schedule her blood draw appointment at a clinic that is convenient to her.   3) A prior authorization  process will be investigated, and Nadia will be contacted by the  regarding that information. Genetic testing is started after this discussion with the .   4) The results should be available in 4 weeks after prior authorization approval is received.  5) Nadia will either have a telephone visit or video visit to discuss the results.  Additional recommendations about screening will be made at that time.    Nadia is 67 year old and is being evaluated via a billable telephone visit.    Time spent on phone: 40 minutes    Kalani Salazar MS, [facility]  Licensed, Certified Genetic Counselor  Phone: [phone]

## 2023-03-30 NOTE — PROGRESS NOTES
Writer received referral to Cancer Risk Management/Genetic Counseling.  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    I checked in with genetic counselors and Kalani Salazar GC, is able to add her to a return spot tomorrow.      Holly Barahona, RN, BSN  Oncology New Patient Nurse Navigator   [facility] [place]  [phone]

## 2023-03-31 ENCOUNTER — PRE VISIT (OUTPATIENT)
Dept: SURGERY | Facility: CLINIC | Age: 68
End: 2023-03-31

## 2023-03-31 ENCOUNTER — VIRTUAL VISIT (OUTPATIENT)
Dept: ONCOLOGY | Facility: CLINIC | Age: 68
End: 2023-03-31
Attending: INTERNAL MEDICINE
Payer: MEDICARE

## 2023-03-31 ENCOUNTER — LAB (OUTPATIENT)
Dept: LAB | Facility: CLINIC | Age: 68
End: 2023-03-31
Payer: MEDICARE

## 2023-03-31 ENCOUNTER — OFFICE VISIT (OUTPATIENT)
Dept: WOUND CARE | Facility: CLINIC | Age: 68
End: 2023-03-31
Payer: MEDICARE

## 2023-03-31 DIAGNOSIS — K50.10 CROHN'S DISEASE OF LARGE INTESTINE WITHOUT COMPLICATION (H): Primary | ICD-10-CM

## 2023-03-31 DIAGNOSIS — C18.7 MALIGNANT NEOPLASM OF SIGMOID COLON (H): ICD-10-CM

## 2023-03-31 DIAGNOSIS — C18.7 MALIGNANT NEOPLASM OF SIGMOID COLON (H): Primary | ICD-10-CM

## 2023-03-31 LAB
ALBUMIN SERPL BCG-MCNC: 4 G/DL (ref 3.5–5.2)
ALP SERPL-CCNC: 91 U/L (ref 35–104)
ALT SERPL W P-5'-P-CCNC: 10 U/L (ref 10–35)
ANION GAP SERPL CALCULATED.3IONS-SCNC: 11 MMOL/L (ref 7–15)
AST SERPL W P-5'-P-CCNC: 12 U/L (ref 10–35)
BASOPHILS # BLD AUTO: 0 10E3/UL (ref 0–0.2)
BASOPHILS NFR BLD AUTO: 0 %
BILIRUB SERPL-MCNC: 0.2 MG/DL
BUN SERPL-MCNC: 27.9 MG/DL (ref 8–23)
CALCIUM SERPL-MCNC: 9 MG/DL (ref 8.8–10.2)
CHLORIDE SERPL-SCNC: 112 MMOL/L (ref 98–107)
CREAT SERPL-MCNC: 1.39 MG/DL (ref 0.51–0.95)
DEPRECATED HCO3 PLAS-SCNC: 16 MMOL/L (ref 22–29)
EOSINOPHIL # BLD AUTO: 0 10E3/UL (ref 0–0.7)
EOSINOPHIL NFR BLD AUTO: 0 %
ERYTHROCYTE [DISTWIDTH] IN BLOOD BY AUTOMATED COUNT: 16.7 % (ref 10–15)
GFR SERPL CREATININE-BSD FRML MDRD: 42 ML/MIN/1.73M2
GLUCOSE SERPL-MCNC: 124 MG/DL (ref 70–99)
HCT VFR BLD AUTO: 25.9 % (ref 35–47)
HGB BLD-MCNC: 7.1 G/DL (ref 11.7–15.7)
IMM GRANULOCYTES # BLD: 0 10E3/UL
IMM GRANULOCYTES NFR BLD: 1 %
INTERPRETATION: NORMAL
INTERPRETATION: NORMAL
LAB PDF RESULT: NORMAL
LAB PDF RESULT: NORMAL
LYMPHOCYTES # BLD AUTO: 0.8 10E3/UL (ref 0.8–5.3)
LYMPHOCYTES NFR BLD AUTO: 10 %
MCH RBC QN AUTO: 25.4 PG (ref 26.5–33)
MCHC RBC AUTO-ENTMCNC: 27.4 G/DL (ref 31.5–36.5)
MCV RBC AUTO: 93 FL (ref 78–100)
MONOCYTES # BLD AUTO: 0.8 10E3/UL (ref 0–1.3)
MONOCYTES NFR BLD AUTO: 10 %
NEUTROPHILS # BLD AUTO: 6.7 10E3/UL (ref 1.6–8.3)
NEUTROPHILS NFR BLD AUTO: 79 %
NRBC # BLD AUTO: 0 10E3/UL
NRBC BLD AUTO-RTO: 0 /100
PLATELET # BLD AUTO: 360 10E3/UL (ref 150–450)
POTASSIUM SERPL-SCNC: 5.5 MMOL/L (ref 3.4–5.3)
PROT SERPL-MCNC: 6.6 G/DL (ref 6.4–8.3)
RBC # BLD AUTO: 2.8 10E6/UL (ref 3.8–5.2)
SIGNIFICANT RESULTS: NORMAL
SIGNIFICANT RESULTS: NORMAL
SODIUM SERPL-SCNC: 139 MMOL/L (ref 136–145)
SPECIMEN DESCRIPTION: NORMAL
SPECIMEN DESCRIPTION: NORMAL
TEST DETAILS, MDL: NORMAL
TEST DETAILS, MDL: NORMAL
WBC # BLD AUTO: 8.4 10E3/UL (ref 4–11)

## 2023-03-31 PROCEDURE — 36415 COLL VENOUS BLD VENIPUNCTURE: CPT | Performed by: PATHOLOGY

## 2023-03-31 PROCEDURE — 96040 HC GENETIC COUNSELING, EACH 30 MINUTES: CPT | Mod: TEL

## 2023-03-31 PROCEDURE — 84134 ASSAY OF PREALBUMIN: CPT | Performed by: SURGERY

## 2023-03-31 PROCEDURE — 85025 COMPLETE CBC W/AUTO DIFF WBC: CPT | Performed by: PATHOLOGY

## 2023-03-31 PROCEDURE — 99211 OFF/OP EST MAY X REQ PHY/QHP: CPT

## 2023-03-31 PROCEDURE — 80053 COMPREHEN METABOLIC PANEL: CPT | Performed by: PATHOLOGY

## 2023-03-31 NOTE — NURSING NOTE
Is the patient currently in the state of [place]? YES    Visit mode:TELEPHONE    If the visit is dropped, the patient can be reconnected by: TELEPHONE VISIT: Phone number: [phone]    Will anyone else be joining the visit? NO      How would you like to obtain your AVS? MyChart    Are changes needed to the allergy or medication list? NO    Reason for visit: new patient      Neida Dick VF

## 2023-03-31 NOTE — PATIENT INSTRUCTIONS
Assessing Cancer Risk  Cancer is a common diagnosis which impacts many families.  Individuals may develop cancer due to environmental factors (such as exposures and lifestyle), aging, genetic predisposition, or a combination of these factors.      Approximately 5-10% of cancer diagnoses are thought to be caused by inherited risk factors.  These families often have:  Several people with the same or related types of cancer  Cancers diagnosed at a young age (before age 50)  Individuals with more than one primary cancer  Multiple generations of the family affected with cancer    Chan Syndrome Genes  Many of the genes we are born with impact our risk of cancer.  When one of these genes is not working properly due to a mistake (known as a  mutation  or  variant ), this may lead to an increased risk of developing certain types of cancer.      There are currently five genes known to cause Chan Syndrome: MLH1, MSH2, MSH6, PMS2, and EPCAM.  A single mutation in one of the Chan Syndrome genes increases the risk of developing several types of cancers, including colon, endometrial, ovarian, and stomach.  Other cancers that can be seen in some families include pancreatic, bladder, biliary tract, urothelial, small bowel, prostate, breast and brain cancers.  The table below lists the chance that a person with Chan syndrome would develop cancer in their lifetime1.  Of note, exact risks differ between each gene.        Lifetime Cancer Risks    General Population Chan Syndrome   Colon 4.2% 8.7-61%   Endometrial 3.1% 13-57%   Stomach <1% up to 16%   Ovarian 1.3% up-38%   Additional cancer risks may include renal pelvis, ureter, bladder, small bowel, pancreas, biliary tract, prostate, breast, brain, skin      Inheriting a mutation does not mean a person will develop cancer, but it does significantly increase their risk of certain cancers above the general population risk.    Medical Management  If a harmful mutation is found in  a Chan syndrome gene, there may be increased cancer surveillance or risk reducing surgeries that can be offered. While cancer screening and medical management recommendations are based on genetic, personal and family history factors, the following guidelines may be recommended for some families with Chan syndrome 1:  Colorectal: Colonoscopy screening may start as early as age 20-25 (or earlier based on family history), and repeated every 1-3 years.  The age to start and frequency of screening depends on the specific gene.     Endometrial: Hysterectomy (removal of the uterus) can be considered to reduce the risk of endometrial cancer. Screening via endometrial biopsy every 1-2 years (beginning at age 30-35) can be considered. In post-menopausal women, transvaginal ultrasound may also be considered.  Ovarian: Bilateral salpingo-oophorectomy (removal of both ovaries and fallopian tubes) may reduce the chance to develop ovarian cancer, and may be considered depending on the specific Chan syndrome gene mutation. While there currently are no effective screening method for ovarian cancer, transvaginal ultrasound and a CA-125 blood test may be considered at the discretion of each individual's clinician.   Stomach: Upper gastrointestinal surveillance, including upper endoscopy (EGD) with extended duodenoscopy, may be performed in conjunction with colonoscopy.  This screening may start at age 30-40, and repeated every 2-4 years. This recommendation is largely dependent on family history, genetic, and other factors.  Urinary Tract: There is limited evidence to suggest a screening strategy for urothelial/urinary tract cancers. Annual urinalysis starting at age 30-35 can be considered. Consideration for screening is largely dependent upon personal and family history factors, as well as the specific Chan Syndrome mutation.   Brain: Individuals at risk for brain cancer are encouraged to be aware of the signs and symptoms of  neurologic cancer, and should promptly report abnormal symptoms to their managing physicians.     Pancreas: Pancreatic cancer screening may be considered for some families.  This screening is typically done with contrast-enhanced MRI/ magnetic resonance cholangiopancreatography (MRCP) and/or endoscopic ultrasound (EUS).   Prostate: There is limited evidence to recommend early or more frequent screening for prostate cancer. However, individuals at risk for prostate cancer should follow screening as recommended in the NCCN Guidelines for Prostate Early Detection. This screening should be discussed with each individual's medical provider.  Skin: Due to the increased prevalence of both malignant and benign skin tumors in some Chan syndrome genes (such as sebaceous adenocarcinomas and keratoacanthomas), skin exams may be considered every 1-2 years.     These recommendations vary depending on the specific gene identified, as cancer risks differ between each Chan syndrome gene.  These recommendations should be discussed with an individuals genetic counselor and managing providers.      Inheritance   Chan Syndrome mutations are inherited in an autosomal dominant pattern.  This means that if a parent has a mutation, each of their children will have a 50% chance of inheriting that same mutation.  Therefore, each child--male or female--would have a 50% chance of being at increased risk for developing cancer.    In rare situations in which both parents have a mutation in the same Chan syndrome gene, their children each have a 25% risk for constitutional mismatch repair deficiency syndrome (CMMRD). CMMRD is a rare autosomal recessive disorder associated with cafe-au-lait spots and risk for childhood cancers. For individuals of childbearing age with Chan syndrome mutations, genetic counseling and genetic testing may be advised for their partners.    Tumor Screening for Chan Syndrome  There are two different tests that can  be done on a person s colon or endometrial tumor as an initial screen for Chan Syndrome. These tests are called IHC (immunohistochemistry) and MSI (microsatellite instability). Tumors that show an absent protein on IHC or an unstable MSI result could be due to a mutation in one of the known Chan Syndrome genes. The IHC results can also guide further genetic testing for Chan Syndrome by indicating which gene should be tested.     Genetic Testing  For some families, genetic testing may help to explain why their cancer developed, provide tailored management options, and clarify the risk of developing cancer in the future.      Genetic testing involves a simple blood or saliva test and will look at the genetic information in select genes for variants associated with cancer risk.  This testing may include analysis of a single gene due to a known variant in the family, multiple genes most associated with the cancers in a family, or an expanded panel of genes related to many types of cancers.    Results  There are several possible genetic test results, including:   Positive--a harmful mutation (also known as a  pathogenic  or  likely pathogenic  variant) was identified in a gene associated with increased cancer risk.  These risks, as well as medical management options, depend on the specific genetic variant identified.    Negative--no variants were identified in the genes analyzed   Variant of unknown significance--a variant was identified in one or more genes, though it is currently unclear how this impacts cancer risk in the family.  Genetic testing labs are working to collect evidence about these uncertain variants and may provide updates in the future.    Genetic Information Nondiscrimination Act (ERNESTO)  The Genetic Information Nondiscrimination Act of 2008 (ERNESTO) is a federal law that protects individuals from health insurance or employment discrimination based on a genetic test result alone (with some exceptions,  including employers with fewer than 15 employees, and ).  However, ERNESTO's protection does not cover life insurance, long term care, or disability insurances.  The National Human Genome Research [place] is a great resource to learn more.    Questions to Think About Regarding Genetic Testing  What effect will the test result have on me and my relationship with my family members if I have an inherited gene mutation?  If I don t have a gene mutation?  Should I share my test results, and how will my family react to this news, which may also affect them?  Are my children ready to learn new information that may one day affect their own health?    Resources  Chan Syndrome International [email or web address]   Chna Syndrome Screening Network lynchscreening.net   American Cancer Society (ACS) cancer.org   National Cancer [place] (NCI) cancer.gov     Please call us if you have any questions or concerns.   Cancer Risk Management Program (Appointments: [phone])  Chester Valdivia, MS [facility]  [phone]  Kalani Salazar, MS, [facility] [phone]  Mallika Pollock MS, [facility]  [phone]  Viviana Velazco, MS, [facility]  [phone]  Larisa Pete, MS, [facility]  [phone]  Emma Garcia, MS, [facility] [phone]  Teri Lee, MS, [facility] [phone]    References  National Comprehensive Cancer Network. Clinical practice guidelines in oncology, colorectal cancer screening. Available online (registration required). 2022.

## 2023-03-31 NOTE — PROGRESS NOTES
WOC Preoperative Ostomy Consult    Referral from Dr. Hinds  Consult attended by patient   Diagnosis:    Crohn's disease of large intestine without complication (H)  Malignant neoplasm of sigmoid colon (H) Date of Surgery: 04/06/2023   Type of Surgery: Laparoscopic total abdominal colectomy versus laparoscopic sigmoidectomy, possible ileostomy, colonoscopy  Emotional readiness for surgery: no acute distress  Physical Limitations: With the following limitations:  obesity  Abdomen assessed for site by: Patient's ability to visiualize area, avoidance of skin creases and scars, palpating for rectus abdominus muscle and clothing fit  Teaching: Anatomy/picture of stoma, stoma/bowel function postop, intro to pouches, diet, precautions with dehydration/blockage, written/media offered and role of WOC/postop followup explained.    Stoma site marking:  Marking pen and tegaderm   Location chosen:  Left and rightupper and lower abdomen        American College of Surgeon's Ostomy packet given to patient  Alexa Hart was available for supervision of care if needed or if questions should arise and regarding plan of care.  Ambika Jain, RN RN CWON

## 2023-03-31 NOTE — LETTER
Cancer Risk Management  Program Locations    [facility]  [facility]  Mailing Address  Cancer Risk Management Program  [address]  [place] [address]    New patient appointments  [phone]  March 31, 2023    Nadia Ladd  [address]  [facility] [address]    Dear Nadia,    It was a pleasure talking with you via your virtual genetic counseling visit on 3/31/2023.  Below is a copy of the progress note from that recent visit through the Cancer Risk Management Program.  If you have any additional questions, please feel free to contact me.    Referring Provider: Irene Bateman DO    Presenting Information:   I met with Nadia Ladd today for her telephone genetic counseling visit through the Cancer Risk Management Program to discuss her personal history of colon cancer. She is here today to review this history, cancer screening recommendations, and available genetic testing options.    Personal History:  Nadia is a 67 year old female. She was recently diagnosed with colon cancer. On 2/27/2023, an upper endoscopy showed a normal stomach and duodenum. A colonoscopy that same day found a large 4 cm partially obstructing mass in the sigmoid colon. Pathology revealed invasive poorly differentiated carcinoma. Immunohistochemistry (IHC) showed a loss of MLH1 and PMS2. MLH1 promoter methylation testing was negative. Somatic testing for BRAF [id number] variant is currently pending. On 2/27/2023, a chest, abdominal, and pelvic CT scan showed the previously identified colon mass and identified a possible additional 2.5 cm mass at the hepatic flexure of the colon. A PET scan on 3/24/2023, showed the previous sigmoid colon mass, secondary focus at the hepatic flexure that is suspicious for an additional primary, and a  third focus at the transverse colon that is suspicious for a possible colonic malignancy. Genetic testing results are needed to aid in further treatment decisions.     In her hormonal-based medical history, she had her first menstrual period at age 13, does not have biological children, and underwent menopause at age 51. Nadia has her ovaries, fallopian tubes and uterus in place, and reports no ovarian cancer screening to date. She reports no history of hormone replacement therapy. Nadia reports oral contraceptive use for approximately one year.     Nadia reports that her most recent mammogram was between 5-10 years ago which was normal. She denies any history of dermatological exams. She does not regularly do any other cancer screening at this time. Nadia reported no history of smoking and no current alcohol use.    Family History: (Please see scanned pedigree for detailed family history information)  Nadia's father was diagnosed with a cancerous tumor on his neck in his 60's. He passed away at age 68.   Of note, there is no reported history of cancer on her maternal side of the family.   Her maternal and paternal ethnicity is Scandinavian.  There is no known Ashkenazi Advent ancestry on either side of her family. There is no reported consanguinity.    Discussion:  Nadia's personal history of colon cancer, absent MLH1 and PMS2 on IHC, and negative MLH1 promoter methylation testing is suggestive of a hereditary cancer syndrome.  We reviewed the features of sporadic, familial, and hereditary cancers. We discussed the natural history and genetics of several hereditary cancer syndromes, including Chan syndrome.   Chan syndrome can be caused by a mutation in one of five genes:  MLH1, MSH2, MSH6, PMS2, and EPCAM. A single mutation in one of the Chan Syndrome genes increases the risk for several cancers, such as colon cancer, endometrial/uterine cancer, gastric cancer, and ovarian cancer. Other cancers have also been  reported in some families with Chan Syndrome include pancreatic, bladder, biliary tract, urothelial, small bowel, prostate, breast and brain cancers.  A detailed handout regarding Chan syndrome and the information we discussed will be provided to Nadia via [employer] and can be found in the after visit summary. Topics included: inheritance pattern, cancer risks, cancer screening recommendations, and also risks, benefits and limitations of testing.  We discussed that immunohistochemistry (IHC) was performed for the mismatch repair (MMR) proteins on Nadia's colon tumor.   IHC testing allows us to screen a patient for Chan syndrome and is ideally done on a colon or endometrial tumor.   Each mismatch repair gene (MLH1, MSH2, MSH6, PMS2) makes a protein.  IHC testing involves looking at the tumor to determine which of the proteins is present and which (if any) are absent.  If one or more of the mismatch repair proteins is absent in the tumor, it can indicate an underlying inherited mutation in the respective gene.    IHC testing of Apples colon tumor showed absence of the following protein(s): MLH1 and PMS2. 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 However, follow-up testing for methylation of the MLH1 promoter was indicated.  We discussed that MLH1 promoter methylation testing was performed on Nadia's colon tumor.   If testing shows methylation of the MHL1 promoter is present, this result suggests that the tumor is likely sporadic.   If testing shows absence of MLH1 methylation, this result suggests the possibility of Chan syndrome, and sequencing and/or large deletion/duplication testing of germline MLH1 is indicated.  MLH1 promoter methylation was absent for Nadia. Therefore, it is likely that Apples colon cancer was due to a mutation in one of the mismatch repair genes. Thus, a diagnosis of Chan syndrome is more suggestive.   Confirmation germline genetic testing is indicated for Nadia based  on these results.   In looking at Nadia's personal and family history, it is possible that a cancer susceptibility gene is present due to her diagnosis of colon cancer, absent MLH1 and PMS2 on IHC, and negative MLH1 promoter hypermethylation.  Based on her personal and family history, Nadia meets current National Comprehensive Cancer Network (NCCN) criteria for genetic testing of Chan syndrome genes (i.e. EPCAM, MLH1, MSH2, MSH6, and PMS2).   We discussed that there are additional genes that could cause increased risk for colon cancer. As many of these genes present with overlapping features in a family and accurate cancer risk cannot always be established based upon the pedigree analysis alone, it would be reasonable for Nadia to consider panel genetic testing to analyze multiple genes at once.  We reviewed genetic testing options for Nadia based on her personal and family history: a panel of genes associated with an increased risk for Chan syndrome, or larger panel options to include genes associated with increased risk for multiple different cancer types. Nadia expressed interest in the Chan syndrome genes through [facility].   Nadia would like to submit a blood sample for her genetic testing. She will go to her nearest [facility] clinic at her earliest convenience to get her blood drawn for her genetic testing.   Verbal consent was given over the phone and written on the consent form. Turnaround time is approximately 4-6 weeks.  Medical Management: For Nadia, we reviewed that the information from genetic testing may determine:  surgery to treat Nadia's active cancer diagnosis (i.e.partial versus total colectomy, etc.),  additional cancer screening for which Nadia may qualify (i.e. more frequent colonoscopies, consideration of upper endoscopy (EGD), etc.),  options for risk reducing surgeries Nadia could consider (i.e. surgery to remove her ovaries and/or uterus,  etc.),    and targeted chemotherapies for Nadia's active cancer, or if she were to develop certain cancers in the future (i.e. immunotherapy for individuals with Chan syndrome, etc.).   These recommendations and possible targeted chemotherapies will be discussed in detail once genetic testing is completed.   Plan:  1) Today Nadia elected to proceed with the Chan syndrome genes through [facility]. Therefore, consent was reviewed and verbally obtained for this testing.   2) Nadia plans to schedule her blood draw appointment at a clinic that is convenient to her.   3) A prior authorization process will be investigated, and Nadia will be contacted by the  regarding that information. Genetic testing is started after this discussion with the .   4) The results should be available in 4 weeks after prior authorization approval is received.  5) Nadia will either have a telephone visit or video visit to discuss the results.  Additional recommendations about screening will be made at that time.    Nadia is 67 year old and is being evaluated via a billable telephone visit.    Time spent on phone: 40 minutes    Kalani Salazar MS, [facility]  Licensed, Certified Genetic Counselor  Phone: [phone]

## 2023-04-02 ENCOUNTER — ANESTHESIA EVENT (OUTPATIENT)
Dept: SURGERY | Facility: CLINIC | Age: 68
DRG: 330 | End: 2023-04-02
Payer: MEDICARE

## 2023-04-03 ENCOUNTER — PATIENT OUTREACH (OUTPATIENT)
Dept: ONCOLOGY | Facility: CLINIC | Age: 68
End: 2023-04-03
Payer: MEDICARE

## 2023-04-03 DIAGNOSIS — Z15.09 LYNCH SYNDROME: Primary | ICD-10-CM

## 2023-04-03 LAB — PREALB SERPL IA-MCNC: 26 MG/DL (ref 15–45)

## 2023-04-03 NOTE — PROGRESS NOTES
Received urgent staff message from Dr. Olea/RNCC/surgery scheduling re: combo surgery planned Thursday with Dr. Olea. Per Dr. Olea, she can discuss with pt day of surgery or meet virtually 4/5 for consult at 1030AM.     OUTGOING CALL to pt:  Introduced my role as nurse navigator with [facility] Hematology/Oncology dept and that we have recd the referral for combo surgery with Dr. Olea.  Pt confirms they are aware of the referral and ready to schedule. She would like virtual consult Wednesday as offered by Dr. Olea.     -Transferred pt to NPS line [phone] to schedule appt per scheduling instructions. Update to care team re: consult Wednesday scheduled.      Armida Oliveira, KEVINN, RN, PHN, OCN  Hematology/Oncology Nurse Navigator  [facility]  [phone]

## 2023-04-04 ENCOUNTER — PRE VISIT (OUTPATIENT)
Dept: SURGERY | Facility: CLINIC | Age: 68
End: 2023-04-04

## 2023-04-04 ENCOUNTER — PREP FOR PROCEDURE (OUTPATIENT)
Facility: CLINIC | Age: 68
End: 2023-04-04

## 2023-04-04 ENCOUNTER — PREP FOR PROCEDURE (OUTPATIENT)
Dept: ONCOLOGY | Facility: CLINIC | Age: 68
End: 2023-04-04

## 2023-04-04 ENCOUNTER — MYC MEDICAL ADVICE (OUTPATIENT)
Dept: SURGERY | Facility: CLINIC | Age: 68
End: 2023-04-04

## 2023-04-04 ENCOUNTER — VIRTUAL VISIT (OUTPATIENT)
Dept: SURGERY | Facility: CLINIC | Age: 68
End: 2023-04-04
Payer: MEDICARE

## 2023-04-04 DIAGNOSIS — I27.20 PULMONARY HTN (H): ICD-10-CM

## 2023-04-04 DIAGNOSIS — Z01.818 PRE-OP EXAMINATION: ICD-10-CM

## 2023-04-04 DIAGNOSIS — C18.7 MALIGNANT NEOPLASM OF SIGMOID COLON (H): Primary | ICD-10-CM

## 2023-04-04 LAB
ABO/RH(D): NORMAL
ANTIBODY SCREEN: NEGATIVE
INTERPRETATION: NORMAL
SPECIMEN EXPIRATION DATE: NORMAL

## 2023-04-04 PROCEDURE — 99204 OFFICE O/P NEW MOD 45 MIN: CPT | Mod: VID | Performed by: PHYSICIAN ASSISTANT

## 2023-04-04 ASSESSMENT — ENCOUNTER SYMPTOMS: SEIZURES: 0

## 2023-04-04 ASSESSMENT — LIFESTYLE VARIABLES: TOBACCO_USE: 0

## 2023-04-04 NOTE — PATIENT INSTRUCTIONS
Preparing for Your Surgery      Name:  Nadia Ladd   MRN:  [id number]   :  1955   Today's Date:  2023       Arriving for surgery:  Surgery date:  23  Arrival time:  5:30 am     Surgeries and procedures: Adult patients can have 2 visitors all through the surgery process.     Visiting hours: 8 a.m. to 8:30 p.m.     Hospital: Adult patients and children under age 18 can have 4 visitor at a time     No visitors under the age of 5 are allowed for hospital patients.  Double occupancy rooms: Patients can have only two visitors at a time.     Patients with disabilities: Can have a support person with them (family member, service provider     Or someone well informed about their needs) plus the allowed number of visitors     Patients confirmed or suspected to have symptoms of COVID 19 or flu:     No visitors allowed for adult patients.   Children (under age 18) can have 1 named visitor.     People who are sick or showing symptoms of COVID 19 or flu:    Are not allowed to visit patients--we can only make exceptions in special situations.       Please follow these guidelines for your visit:   Arrive wearing a mask over your mouth and nose; we will give you a medical mask to wear    If you arrive wearing a cloth mask.   Keep it on during your entire visit, even when in patient's room.   If you don't wear a mask we'll ask you to leave.     Clean your hands with alcohol hand . Do this when you arrive at and leave the building and patient room,    And again after you touch your mask or anything in the room.     You can t visit if you have a fever, cough, shortness of breath, muscle aches, headaches, sore throat    Or diarrhea      Stay 6 feet away from others during your visit and between visits     Go directly to and from the room you are visiting.     Stay in the patient s room during your visit. Limit going to other places in the hospital as much as possible     Leave bags and jackets at home or in  the car.     For everyone s health, please don t come and go during your visit. That includes for smoking   during your visit.     Please come to:     [facility] [address]  [address] [place]  [address]    -   parking is available in front of the hospital for those with mobility difficulties.     -  Parking is also available at the Patient Visitor Ramp on [address] [facility].    -  When entering the hospital, you will be asked some Covid screening questions and directed to Patient Registration. Patient Registration will then direct you to the 3rd floor Surgery Waiting Room. [phone]?     - ?If you are in need of directions, wheelchair or escort please stop at the Information Desk in the lobby.  Inform the information person that you are here for surgery; a wheelchair and escort to [address] will be provided.?    OPTIMAL RECOVERY AFTER SURGERY     Begin hydrating yourself by drinking at least 8-10 glasses of clear liquids for 24 hours before surgery:      Suggested clear liquids:   Water    Clear Juices   Clear Broth   Non- carbonated beverages    (Crystal Light or Bobby Aid)   Sodas    (Sprite, 7 up, ginger ale, seltzer)   Gatorade- no red or purple           Drink clear liquids up until 2 hours before your arrival time.  (Until 3:30 am)     We would like you to purchase a drink such as Gatorade (no red or purple) or Ensure Clear (not the milkshake type).  Drink this before bedtime and the morning of surgery prior to 3:30 am. Drink between 8-10 ounces or until you feel hydrated.        Keeping well hydrated leads to your veins being plump, you wake up faster, and you are less likely to be nauseated. Start drinking water as soon as you can after surgery and advance to clear liquids and food as tolerated.  IV fluids contain salt, drinking fluids will minimize the amount of IV fluids you need and decrease the amount of salt you get.                  The most common reason for the patient to be readmitted is dehydration. Staying hydrated after you go home from the hospital is very important.  Ensure or Ensure Clear are good options to keep you hydrated.        What can I eat or drink?  -  Follow [facility] instructions for starting Bowel prep and Clear Liquid diet.  -  You may have clear liquids until 2 hours prior to arrival time. (Until 3:30 am)    Examples of clear liquids:  Water  Clear broth  Juices (apple, white grape, white cranberry  and cider) without pulp  Noncarbonated, powder based beverages  (lemonade and Bobyb-Aid)  Sodas (Sprite, 7-Up, ginger ale and seltzer)  Coffee or tea (without milk or cream)  Gatorade- no red or purple    -  No Alcohol for at least 24 hours before surgery.     Which medicines can I take?  Hold Aspirin for 7 days before surgery.   Hold Multivitamins for 7 days before surgery.  Hold Supplements for 7 days before surgery.  Hold Ibuprofen (Advil, Motrin) for 1 day before surgery--unless otherwise directed by surgeon.  Hold Naproxen (Aleve) for 4 days before surgery.  Acetaminophen (Tylenol) is okay to take if needed.     Hold Sulfasalazine (Azulfidine) for 1 day prior to surgery. Take the last Sulfasalazine on 4-4-23, and then hold until surgery.    -  DO NOT take these medications the day of surgery:  Triamterene-Hydrochlorothiazide (Dyazide)  Lisinopril (Zestril)  Cyanocobalamin (Vitamin B-12)    -  PLEASE TAKE these medications the day of surgery:  Acetaminophen (Tylenol) if needed    How do I prepare myself?  - Please take 2 showers (one the night prior to surgery and one the morning of surgery) using Scrubcare or Hibiclens soap.    Use this soap only from the neck to your toes.     Leave the soap on your skin for one minute--then rinse thoroughly.      You may use your own shampoo and conditioner. No other hair products.   - Please remove all jewelry and body piercings.  - No lotions, deodorants or  fragrance.  - No makeup or fingernail polish.   - Bring your ID and insurance card.    -If you have a Deep Brain Stimulator, Spinal Cord Stimulator, or any Neuro Stimulator device---you must bring the remote control to the hospital.      ALL PATIENTS GOING HOME THE SAME DAY OF SURGERY ARE REQUIRED TO HAVE A RESPONSIBLE ADULT TO DRIVE AND BE IN ATTENDANCE WITH THEM FOR 24 HOURS FOLLOWING SURGERY.    Covid testing policy as of 12/06/2022  Your surgeon will notify and schedule you for a COVID test if one is needed before surgery--please direct any questions or COVID symptoms to your surgeon      Questions or Concerns:    - For any questions regarding the day of surgery or your hospital stay, please contact the Pre Admission Nursing Office at [phone].       - If you have health changes between today and your surgery, please call your surgeon.       - For questions after surgery, please call your surgeons office.

## 2023-04-04 NOTE — TELEPHONE ENCOUNTER
Updated Nadia of her lab and echo appointments tomorrow, 1:15 and 2:00 pm at the [facility] location.  Nadia expressed understanding.  Annamarie Wolf RN

## 2023-04-04 NOTE — PROGRESS NOTES
Nadia is a 67 year old who is being evaluated via a billable video visit.      How would you like to obtain your AVS? Jerardohart      Subjective   aNdia is a 67 year old, presenting for the following health issues:  Pre-Op Exam    HPI         Review of Systems       Physical Exam         FIORELLA Luu LPN

## 2023-04-04 NOTE — H&P
Pre-Operative H & P     ADDENDUM:  The patient completed her echo. RSVP is decreased from previous at 28.6. The patient's potassium is still slightly elevated but stable from prior. I called the patient and she has no new symptoms of palpitations, heart pain, nausea or vomiting. The patient is optimized for her surgery tomorrow.      Latest Reference Range & Units 04/05/23 13:30   Potassium 3.4 - 5.3 mmol/L 5.5 (H)   ABO/Rh(D)  O NEG   Antibody Screen Negative  Negative   SPECIMEN EXPIRATION DATE  [id number]   (H): Data is abnormally high    Echo 4/5/23  Interpretation Summary     The right ventricular systolic function is normal.  No hemodynamically significant valvular disease.  There is no pericardial effusion.  The inferior vena cava was normal in size with preserved respiratory  variability.  ______________________________________________________________________________  Left Ventricle  The left ventricle is normal in structure, function and size. The visual  ejection fraction is 65-70%. Diastolic Doppler findings (E/E' ratio and/or  other parameters) suggest left ventricular filling pressures are  indeterminate. No regional wall motion abnormalities noted.     Right Ventricle  Borderline right ventricular enlargement. The right ventricular systolic  function is normal.     Atria  Normal left atrial size. Right atrial size is normal.     Mitral Valve  The mitral valve is normal in structure and function. There is trace mitral  regurgitation. There is no mitral valve stenosis.     Tricuspid Valve  The tricuspid valve is normal in structure and function. There is physiologic  tricuspid regurgitation. The right ventricular systolic pressure is  approximated at 28.6 mmHg plus the right atrial pressure.     Aortic Valve  The aortic valve is normal in structure and function. There is physiologic  aortic regurgitation. No hemodynamically significant valvular aortic stenosis.     Pulmonic Valve  The pulmonic  valve is not well visualized. There is trace pulmonic valvular  regurgitation.     Vessels  The aortic root is normal size. The inferior vena cava was normal in size with  preserved respiratory variability.     Pericardium  There is no pericardial effusion.        CC:  Preoperative exam to assess for increased cardiopulmonary risk while undergoing surgery and anesthesia.    Date of Encounter: 4/4/2023  Primary Care Physician:  Nomi Cartwright     Reason for visit:   Encounter Diagnoses   Name Primary?     Malignant neoplasm of sigmoid colon (H) Yes     Pulmonary HTN (H)      Pre-op examination        HPI  Nadia Ladd is a 67 year old female who presents for pre-operative H & P in preparation for  Procedure Information     Case: [id number] Date/Time: 04/06/23 0730    Procedures:       Laparoscopic total abdominal colectomy versus laparoscopic sigmoidectomy, possible ileostomy, (Abdomen)      colonoscopy (Anus)    Anesthesia type: General with Block    Diagnosis: Malignant neoplasm of sigmoid colon (H) [C18.7]    Pre-op diagnosis: Malignant neoplasm of sigmoid colon (H) [C18.7]    Location: [facility]  /  OR    Providers: Jerome Ashley MD          The patient is a 67-year-old woman with a past medical history significant for hypertension, history of pulmonary hypertension, anemia, obesity, prediabetes, Crohn's disease, hiatal hernia, chronic kidney disease stage III and eczema.  Patient was found to have iron deficiency anemia and underwent upper endoscopy given this finding as well as her chronic Crohn's disease.  She was found to have a hiatal hernia but on colonoscopy she had a partially obstructing mass.  Given this finding she was referred to oncology and colorectal surgery and has now been scheduled for the procedure as above.    History is obtained from the patient and chart review    Hx of abnormal bleeding or anti-platelet use: none    Menstrual history: No LMP recorded (lmp unknown).  Patient is postmenopausal.:      Past Medical History  Past Medical History:   Diagnosis Date     Arthropathia 08/05/2010     B12 deficiency anemia 10/21/2010     Benign essential hypertension with target blood pressure below 140/90 10/10/2016     CARDIOVASCULAR SCREENING; LDL GOAL LESS THAN 160 10/31/2010     Crohn's colitis (H) 02/15/2011     Dermatitis-dishydrotic eczema-severe 08/05/2010     Hiatal hernia      HTN (hypertension) 07/21/2011     Iron deficiency anemia 10/21/2010     Malignant neoplasm of sigmoid colon (H)      Obesity      Primary pulmonary hypertension (H) 04/06/2010       Past Surgical History  Past Surgical History:   Procedure Laterality Date     COLONOSCOPY  10/11/10     COLONOSCOPY N/A 2/27/2023    Procedure: ATTEMPTED COLONOSCOPY WITH SIGMOID STRICTURE BIOPSY;  Surgeon: Jonathan Alcocer MD;  Location:  GI     ESOPHAGOSCOPY, GASTROSCOPY, DUODENOSCOPY (EGD), COMBINED N/A 2/27/2023    Procedure: ESOPHAGOGASTRODUODENOSCOPY, WITH BIOPSY;  Surgeon: Jonathan Alcocer MD;  Location:  GI     SURGICAL HISTORY OF -   06/14/76    Perineorrhaphy, for widening vaginal orifice     Z EXPLORATORY OF ABDOMEN  1989    laparoscopy       Prior to Admission Medications  Current Outpatient Medications   Medication Sig Dispense Refill     cyanocobalamin 1000 MCG TBCR Take 1,000 mcg by mouth daily (Patient taking differently: Take 1,000 mcg by mouth every morning) 100 tablet 1     lisinopril (ZESTRIL) 10 MG tablet TAKE 1 TABLET BY MOUTH EVERY DAY (Patient taking differently: Take 10 mg by mouth every morning) 30 tablet 0     sulfaSALAzine (AZULFIDINE) 500 MG tablet TAKE 2 TABLETS BY MOUTH TWICE A DAY (Patient taking differently: Take 1,000 mg by mouth 2 times daily TAKE 2 TABLETS BY MOUTH TWICE A DAY) 360 tablet 0     triamterene-HCTZ (DYAZIDE) 37.5-25 MG capsule TAKE 1 CAPSULE BY MOUTH ONCE DAILY (Patient taking differently: Take 1 capsule by mouth every morning TAKE 1 CAPSULE BY MOUTH ONCE DAILY) 90  "capsule 0     ferrous sulfate (IRON) 325 (65 FE) MG tablet TAKE ONE TABLET BY MOUTH TWICE A DAY --NO FURTHER REFILLS WITHOUT OFFICE VISIT (Patient not taking: Reported on 3/28/2023) 200 tablet 3     fluocinonide (LIDEX) 0.05 % external cream Small amount to affected area(s) BID PRN (Patient not taking: Reported on 2/21/2023) 60 g 1     IBUPROFEN 200 MG OR TABS 1 tablet daily as needed (Patient not taking: Reported on 3/28/2023)       metroNIDAZOLE (FLAGYL) 500 MG tablet Take 1 tablet (500 mg) by mouth every 6 hours At 8:00 am, 2:00 pm, 8:00 pm the day prior to your surgery with neomycin and zofran. 3 tablet 0     Multiple Vitamins-Iron (MULTI-DAY PLUS IRON PO)  (Patient not taking: Reported on 4/4/2023)       neomycin (MYCIFRADIN) 500 MG tablet Take 2 tablets (1,000 mg) by mouth every 6 hours At 8:00 am, 2:00 pm, 8:00 pm the day prior to your surgery with flagyl and zofran. 6 tablet 0     ondansetron (ZOFRAN) 4 MG tablet Take 1 tablet (4 mg) by mouth every 6 hours At 8:00 am, 2:00 pm, 8:00 pm the day prior to your surgery with neomycin and flagyl. 3 tablet 0     polyethylene glycol (MIRALAX) 17 g packet Take 238 g by mouth See Admin Instructions Starting at 4 pm night prior to surgery. Refer to \"Getting Ready for Surgery\" instructions. 14 packet 0     polyethylene glycol (MIRALAX) 17 g packet Take 119 g by mouth See Admin Instructions Starting at 8 pm night prior to surgery. Refer to \"Getting Ready for Surgery\" instructions. 7 packet 0       Allergies  Allergies   Allergen Reactions     No Known Drug Allergies        Social History  Social History     Socioeconomic History     Marital status:      Spouse name: Not on file     Number of children: Not on file     Years of education: Not on file     Highest education level: Not on file   Occupational History     Not on file   Tobacco Use     Smoking status: Never     Passive exposure: Current     Smokeless tobacco: Never   Vaping Use     Vaping status: Not on " file   Substance and Sexual Activity     Alcohol use: No     Drug use: No     Sexual activity: Yes     Partners: Male   Other Topics Concern     Parent/sibling w/ CABG, MI or angioplasty before 65F 55M? Not Asked   Social History Narrative     Not on file     Social Determinants of Health     Financial Resource Strain: Not on file   Food Insecurity: Not on file   Transportation Needs: Not on file   Physical Activity: Not on file   Stress: Not on file   Social Connections: Not on file   Intimate Partner Violence: Not on file   Housing Stability: Not on file       Family History  Family History   Problem Relation Age of Onset     Hypertension Mother         on meds, alive     Cerebrovascular Disease Father         stroke about age 65,  of cancer at 68 yrs     Diabetes Father         eventually took insulin     Anemia Father         Pernisios anemia     Kidney Disease Niece         kidney transplant     Kidney Disease Nephew         kidney transplant     Venous thrombosis No family hx of      Anesthesia Reaction No family hx of        Review of Systems  The complete review of systems is negative other than noted in the HPI or here.   Anesthesia Evaluation   Pt has had prior anesthetic. Type: MAC and General.    No history of anesthetic complications       ROS/MED HX  ENT/Pulmonary:     (+) HEATHER risk factors, snores loudly, hypertension,  (-) tobacco use   Neurologic:  - neg neurologic ROS  (-) no seizures, no CVA and no TIA   Cardiovascular:     (+) hypertension-----pulmonary hypertension (mild in 2009, 39 mmHb), Previous cardiac testing   Echo: Date:  Results:    Stress Test: Date: Results:    ECG Reviewed: Date:  Results:  Sinus rhythm with ST abnormality, possible digitalis effect   Cath: Date: Results:      METS/Exercise Tolerance: 4 - Raking leaves, gardening    Hematologic:     (+) anemia, history of blood transfusion, no previous transfusion reaction, Known PRBC Anitbodies:No  (-) history of blood  clots   Musculoskeletal:  - neg musculoskeletal ROS     GI/Hepatic:     (+) hiatal hernia, Inflammatory bowel disease,  (-) GERD   Renal/Genitourinary:     (+) renal disease, type: CRI, Pt does not require dialysis,     Endo:     (+) Obesity,     Psychiatric/Substance Use:  - neg psychiatric ROS     Infectious Disease:  - neg infectious disease ROS     Malignancy:   (+) Malignancy, History of Other.Other CA Malignant neoplasm of sigmoid colon Active status post.    Other: Comment: Eczema    sensitive skin            Virtual visit -  No vitals were obtained    Physical Exam  Constitutional: Awake, alert, cooperative, no apparent distress, and appears stated age.  Eyes: Pupils equal  HENT: Normocephalic  Respiratory: non labored breathing   Neurologic: Awake, alert, oriented to name, place and time.   Neuropsychiatric: Calm, cooperative. Normal affect.      Prior Labs/Diagnostic Studies   All labs and imaging personally reviewed    Latest Reference Range & Units 03/31/23 12:09   Sodium 136 - 145 mmol/L 139   Potassium 3.4 - 5.3 mmol/L 5.5 (H)   Chloride 98 - 107 mmol/L 112 (H)   Carbon Dioxide (CO2) 22 - 29 mmol/L 16 (L)   Urea Nitrogen 8.0 - 23.0 mg/dL 27.9 (H)   Creatinine 0.51 - 0.95 mg/dL 1.39 (H)   GFR Estimate >60 mL/min/1.73m2 42 (L)   Calcium 8.8 - 10.2 mg/dL 9.0   Anion Gap 7 - 15 mmol/L 11   Albumin 3.5 - 5.2 g/dL 4.0   Prealbumin 15 - 45 mg/dL 26   Protein Total 6.4 - 8.3 g/dL 6.6   Alkaline Phosphatase 35 - 104 U/L 91   ALT 10 - 35 U/L 10   AST 10 - 35 U/L 12   Bilirubin Total <=1.2 mg/dL 0.2   Glucose 70 - 99 mg/dL 124 (H)   WBC 4.0 - 11.0 10e3/uL 8.4   Hemoglobin 11.7 - 15.7 g/dL 7.1 (L)   Hematocrit 35.0 - 47.0 % 25.9 (L)   Platelet Count 150 - 450 10e3/uL 360   RBC Count 3.80 - 5.20 10e6/uL 2.80 (L)   MCV 78 - 100 fL 93   MCH 26.5 - 33.0 pg 25.4 (L)   MCHC 31.5 - 36.5 g/dL 27.4 (L)   RDW 10.0 - 15.0 % 16.7 (H)   % Neutrophils % 79   % Lymphocytes % 10   % Monocytes % 10   % Eosinophils % 0   %  Basophils % 0   Absolute Basophils 0.0 - 0.2 10e3/uL 0.0   Absolute Eosinophils 0.0 - 0.7 10e3/uL 0.0   Absolute Immature Granulocytes <=0.4 10e3/uL 0.0   Absolute Lymphocytes 0.8 - 5.3 10e3/uL 0.8   Absolute Monocytes 0.0 - 1.3 10e3/uL 0.8   % Immature Granulocytes % 1   Absolute Neutrophils 1.6 - 8.3 10e3/uL 6.7   Absolute NRBCs 10e3/uL 0.0   NRBCs per 100 WBC <1 /100 0   (H): Data is abnormally high  (L): Data is abnormally low    EKG/ stress test - if available please see in ROS above     Echo 2009  Interpretation Summary   The study was technically adequate.   Left ventricular systolic function is normal. The visual ejection fraction is   estimated at 65-70%. Right ventricular systolic pressure is elevated,   consistent with mild to moderate pulmonary hypertension. No obvious valvular   abnormalities noted.   PatientHeight: 62 in   PatientWeight: 197 lbs   HeartRate: 91 bpm   BSA 1.9 m^2           Left Ventricle   The left ventricle is normal in size.   There is normal left ventricular wall thickness.   Left ventricular systolic function is normal.   The visual ejection fraction is estimated at 65-70%.   Grade I left ventricular diastolic dysfunction is noted.   No regional wall motion abnormalities noted.       Right Ventricle   The right ventricle is normal size.   The right ventricular systolic function is normal.       Atria   Normal left atrial size.   Right atrial size is normal.   There is no color Doppler evidence of an atrial shunt.       Mitral Valve   There is trace mitral regurgitation.       Tricuspid Valve   There is trace tricuspid regurgitation.   The right ventricular systolic pressure is approximated at 39mmHg plus the   right atrial pressure.   Right ventricular systolic pressure is elevated, consistent with mild to   moderate pulmonary hypertension.       Aortic Valve   The aortic valve is trileaflet.   No aortic regurgitation is present.   No aortic stenosis is present.       Pulmonic Valve    The pulmonic valve is not well visualized.       Vessels   The aortic root is normal size.       Pericardium   There is no pericardial effusion.       Rhythm   The rhythm was normal sinus.       Procedure   Complete Echo Adult.     The patient's records and results personally reviewed by this provider.     Outside records reviewed from: Care Everywhere      Assessment      Nadia Ladd is a 67 year old female seen as a PAC referral for risk assessment and optimization for anesthesia.    Plan/Recommendations  Pt will be optimized for the proposed procedure.  See below for details on the assessment, risk, and preoperative recommendations    NEUROLOGY  - No history of TIA, CVA or seizure  -Post Op delirium risk factors:  No risk identified    ENT  - No current airway concerns.  Will need to be reassessed day of surgery.  Mallampati: Unable to assess  TM: Unable to assess    CARDIAC  - Hypertension  Well controlled  - pulmonary HTN - the patient was found to have mild to moderate pulmonary HTN with RSVP at 39 mmHg in 2009. She's had no follow up since then. Discussed with Dr. Luis and will get the patient set up for echo before surgery. Given very short time before surgery have also messaged surgical team  - METS (Metabolic Equivalents)  Patient performs 4 or more METS exercise without symptoms            Total Score: 0      RCRI-Low risk: Class 2 0.9% complication rate            Total Score: 1    RCRI: High Risk Surgery    ~ The patient can vacuum and walk for about 10-15 minutes before she has to stop due to fatigue from her anemia. She denies any cardiac symptoms.     PULMONARY  - Obstructive Sleep Apnea  HEATHER risk with no formal diagnosis  HEATHER Medium Risk            Total Score: 4    HEATHER: Snores loudly    EHATHER: Hypertension    HEATHER: BMI over 35 kg/m2    HEATHER: Over 50 ys old      - Patient has chronic sinus inflammation on PET scan. She reports sinus pressure but no fevers, chills or productive cough  - Tobacco  "History      History   Smoking Status     Never   Smokeless Tobacco     Never       GI  - Hiatal hernia found on EGD. The patient has no symptoms   ~ Crohn' disease - the patient needs to hold sulfasalazine 1 day prior to surgery  ~ Malignant neoplasm of sigmoid colon - procedure as above. ORAS.   PONV High Risk  Total Score: 3           1 AN PONV: Pt is Female    1 AN PONV: Patient is not a current smoker    1 AN PONV: Intended Post Op Opioids        /RENAL  - Baseline Creatinine  1.1-1.6 - CKD stage 3    ENDOCRINE    - BMI: Estimated body mass index is 36.1 kg/m  as calculated from the following:    Height as of 3/28/23: 1.562 m (5' 1.5\").    Weight as of 3/28/23: 88.1 kg (194 lb 3 oz).  Obesity (BMI >30)  - No history of Diabetes Mellitus - the patient is listed has having pre-diabetes. Last A1c in 2018 was normal at 4.8  ~ Hyperkalemia - seen on labs from 3/31/23. Will recheck potassium when checking type and screen.     HEME  VTE Medium Risk 1.8%            Total Score: 6    VTE: Greater than 59 yrs old    VTE: Current cancer      - No history of abnormal bleeding or antiplatelet use.  - Chronic anemia - B12 and iron deficiency. Hgb was 7.1 on 3/31/23. The patient has had transfusion prior with last on 2/21/23. Given last hemoglobin checked only 4 days ago will just repeat type and screen within 3 days of surgery.   Recommend perioperative use of blood conservation techniques intraoperatively and close monitoring for postoperative bleeding.    SKIN  ~ eczema - on hands and feet  ~ The patient reports she has sensitive skin      Different anesthesia methods/types have been discussed with the patient, but they are aware that the final plan will be decided by the assigned anesthesia provider on the date of service.  Patient was discussed with Dr Luis    The patient is not yet optimized for their procedure. The patient will need to complete echo prior to surgery as well as labs.       AVS with information on " surgery time/arrival time, meds and NPO status given by nursing staff. No further diagnostic testing indicated.    Please refer to the physical examination documented by the anesthesiologist in the anesthesia record on the day of surgery.    Video-Visit Details    Type of service:  Video Visit    Provider received verbal consent for a Video Visit from the patient? Yes   Video Start Time: 8:16  Video End Time:8:30    Originating Location (pt. Location): Home    Distant Location (provider location):  Off-site  Mode of Communication:  Video Conference via Ev[employer]  On the day of service:     Prep time: 17 minutes  Visit time: 15 minutes  Documentation time: 23 minutes  ------------------------------------------  Total time: 55 minutes      Zeny Drummond PA-C  Preoperative Assessment Center  [facility]  [facility]  Phone: [phone]  Fax: [phone]

## 2023-04-05 ENCOUNTER — VIRTUAL VISIT (OUTPATIENT)
Dept: ONCOLOGY | Facility: CLINIC | Age: 68
End: 2023-04-05
Attending: SURGERY
Payer: MEDICARE

## 2023-04-05 ENCOUNTER — LAB (OUTPATIENT)
Dept: LAB | Facility: CLINIC | Age: 68
End: 2023-04-05
Payer: MEDICARE

## 2023-04-05 ENCOUNTER — HOSPITAL ENCOUNTER (OUTPATIENT)
Dept: CARDIOLOGY | Facility: CLINIC | Age: 68
Discharge: HOME | DRG: 330 | End: 2023-04-05
Attending: PHYSICIAN ASSISTANT | Admitting: PHYSICIAN ASSISTANT
Payer: MEDICARE

## 2023-04-05 DIAGNOSIS — Z15.09 LYNCH SYNDROME: ICD-10-CM

## 2023-04-05 DIAGNOSIS — C18.7 MALIGNANT NEOPLASM OF SIGMOID COLON (H): ICD-10-CM

## 2023-04-05 DIAGNOSIS — Z01.818 PRE-OP EXAMINATION: ICD-10-CM

## 2023-04-05 DIAGNOSIS — I10 HYPERTENSION GOAL BP (BLOOD PRESSURE) < 140/90: ICD-10-CM

## 2023-04-05 LAB
LVEF ECHO: NORMAL
POTASSIUM SERPL-SCNC: 5.5 MMOL/L (ref 3.4–5.3)

## 2023-04-05 PROCEDURE — 93306 TTE W/DOPPLER COMPLETE: CPT | Mod: 26 | Performed by: INTERNAL MEDICINE

## 2023-04-05 PROCEDURE — G0463 HOSPITAL OUTPT CLINIC VISIT: HCPCS | Mod: PN,GT | Performed by: OBSTETRICS & GYNECOLOGY

## 2023-04-05 PROCEDURE — 86850 RBC ANTIBODY SCREEN: CPT

## 2023-04-05 PROCEDURE — 84132 ASSAY OF SERUM POTASSIUM: CPT

## 2023-04-05 PROCEDURE — 86900 BLOOD TYPING SEROLOGIC ABO: CPT

## 2023-04-05 PROCEDURE — 99205 OFFICE O/P NEW HI 60 MIN: CPT | Mod: VID | Performed by: OBSTETRICS & GYNECOLOGY

## 2023-04-05 PROCEDURE — 86901 BLOOD TYPING SEROLOGIC RH(D): CPT

## 2023-04-05 PROCEDURE — 36415 COLL VENOUS BLD VENIPUNCTURE: CPT

## 2023-04-05 PROCEDURE — 93306 TTE W/DOPPLER COMPLETE: CPT

## 2023-04-05 RX ORDER — LISINOPRIL 10 MG/1
10 TABLET ORAL EVERY MORNING
Qty: 90 TABLET | Refills: 3 | Status: ON HOLD | OUTPATIENT
Start: 2023-04-05 | End: 2023-05-12

## 2023-04-05 NOTE — TELEPHONE ENCOUNTER
"Requested Prescriptions   Pending Prescriptions Disp Refills    lisinopril (ZESTRIL) 10 MG tablet [Pharmacy Med Name: LISINOPRIL 10MG TABLET] 30 tablet 0     Sig: TAKE 1 TABLET BY MOUTH EVERY DAY       ACE Inhibitors (Including Combos) Protocol Failed - 4/5/2023  1:29 AM        Failed - Normal serum creatinine on file in past 12 months     Recent Labs   Lab Test 03/31/23  1209   CR 1.39*       Ok to refill medication if creatinine is low          Failed - Normal serum potassium on file in past 12 months     Recent Labs   Lab Test 03/31/23  1209   POTASSIUM 5.5*             Passed - Blood pressure under 140/90 in past 12 months     BP Readings from Last 3 Encounters:   03/28/23 122/64   03/10/23 118/69   02/27/23 104/61                 Passed - Recent (12 mo) or future (30 days) visit within the authorizing provider's specialty     Patient has had an office visit with the authorizing provider or a provider within the authorizing providers department within the previous 12 mos or has a future within next 30 days. See \"Patient Info\" tab in inbasket, or \"Choose Columns\" in Meds & Orders section of the refill encounter.              Passed - Medication is active on med list        Passed - Patient is age 18 or older        Passed - No active pregnancy on record        Passed - No positive pregnancy test within past 12 months             "

## 2023-04-05 NOTE — NURSING NOTE
Is the patient currently in the state of [place]? YES    Visit mode:VIDEO    If the visit is dropped, the patient can be reconnected by: VIDEO VISIT: Text to cell phone: [phone]    Will anyone else be joining the visit? NO      How would you like to obtain your AVS? MyChart    Are changes needed to the allergy or medication list? 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  Kandis Crespo, Virtual Facilitator

## 2023-04-05 NOTE — PROGRESS NOTES
Consult Notes on Referred Patient    Date: 2023       Dr. Jerome Ashley MD  [address] [place] [address]       RE: Nadia Ladd  : 1955  CHEYENNE: 2023    Dear Dr. Jerome Ashley:    I had the pleasure of seeing your patient Nadia Ladd here at the Gynecologic Cancer Clinic at the [facility] on 2023.  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  Given these findings she was subsequently sent to the Gynecologic Cancer Clinic for new patient consultation.     History obtained from patient and  Review of EMR.     On 2023, an upper endoscopy showed a normal stomach and duodenum. A colonoscopy that same day found a large 4 cm partially obstructing mass in the sigmoid colon. Pathology revealed invasive poorly differentiated carcinoma. Immunohistochemistry (IHC) showed a loss of MLH1 and PMS2. MLH1 promoter methylation testing was negative. Somatic testing for BRAF [id number] variant is currently pending.      CT CAP:  A) 4 cm length mass in the proximal sigmoid colon. There is some irregularity and stranding in the adjacent pericolonic fat which may indicate tumor extension. There is no obstruction.   B) there is a second probable mass at the hepatic flexure of the colon measuring 2.5 cm in length   C)There are small lymph nodes in the mesial colon adjacent to the hepatic flexure abnormality and the sigmoid colonic mass. No lymphadenopathy by size criteria  D) There is submucosal fatty deposition in the colon, most severe in the distal sigmoid colon and rectum, which can be seen secondary to quiescent inflammatory bowel disease.  E) no liver lesion     -3/23 PET:  a. There is increased FDG avidity of the sigmoid colon with  "focal lobular wall thickening consistent with biopsy-proven adenocarcinoma.  b. Secondary focus noted at the hepatic flexure demonstrates elevated FDG avidity and lobulated wall thickening highly suspicious for a additional primary.  c. Additionally there is a smaller focus of wall thickening with elevated FDG avidity along the left aspect of the transverse colon  which is suspicious for a possible third focus of colonic malignancy.     -3/23 CEA 6.8    - 3/23 colorectal surgery consultation with - in the setting of Crohn's, at least sigmoid colectomy with possible total abdominal colectomy with either ileorectal anastomosis or end ileostomy (\"rectum can remain active and be actively surveyed annually\")     In her hormonal-based medical history, she had her first menstrual period at age 13, does not have biological childrenG0,  Menopause in her 50s.  No HRT.  No PMB. No history of abnormal pap  Smears  But  Delayed on her screening.          Her personal history of colon cancer, absent MLH1 and PMS2 on IHC, and negative MLH1 promoter methylation testing is suggestive of a hereditary cancer syndrome.  She has met with a genetic counselor and has undergone germline testing which is pending.    PMH: Patient has medical history including Crohn's disease on sulfasalazine, anemia secondary to iron deficiency and B12 deficiency, obesity, hypertension, prediabetes, eczema, pulmonary hypertension, hiatal hernia, mild splenomegaly      Past Medical History:    Past Medical History:   Diagnosis Date     Arthropathia 08/05/2010     B12 deficiency anemia 10/21/2010     Benign essential hypertension with target blood pressure below 140/90 10/10/2016     CARDIOVASCULAR SCREENING; LDL GOAL LESS THAN 160 10/31/2010     Crohn's colitis (H) 02/15/2011     Dermatitis-dishydrotic eczema-severe 08/05/2010     Hiatal hernia      HTN (hypertension) 07/21/2011     Iron deficiency anemia 10/21/2010     Malignant neoplasm of " sigmoid colon (H)      Obesity      Primary pulmonary hypertension (H) 2010         Past Surgical History:    Past Surgical History:   Procedure Laterality Date     COLONOSCOPY  10/11/10     COLONOSCOPY N/A 2023    Procedure: ATTEMPTED COLONOSCOPY WITH SIGMOID STRICTURE BIOPSY;  Surgeon: Jonathan Alcocer MD;  Location:  GI     ESOPHAGOSCOPY, GASTROSCOPY, DUODENOSCOPY (EGD), COMBINED N/A 2023    Procedure: ESOPHAGOGASTRODUODENOSCOPY, WITH BIOPSY;  Surgeon: Jonathan Alcocer MD;  Location:  GI     SURGICAL HISTORY OF -   77    Perineorrhaphy, for widening vaginal orifice     ZZC EXPLORATORY OF ABDOMEN      laparoscopy         Health Maintenance:  Health Maintenance Due   Topic Date Due     DEXA  Never done     MAMMO SCREENING  Never done     Pneumococcal Vaccine: 65+ Years (1 - PCV) Never done     ZOSTER IMMUNIZATION (1 of 2) Never done     DTAP/TDAP/TD IMMUNIZATION (2 - Td or Tdap) 2019         Current Medications:     has a current medication list which includes the following prescription(s): vitamin b-12, ferrous sulfate, fluocinonide, ibuprofen, lisinopril, metronidazole, multiple vitamins-iron, neomycin, ondansetron, polyethylene glycol, polyethylene glycol, sulfasalazine, and triamterene-hctz.       Allergies:     No known drug allergies        Social History:     Social History     Tobacco Use     Smoking status: Never     Passive exposure: Current     Smokeless tobacco: Never   Vaping Use     Vaping status: Not on file   Substance Use Topics     Alcohol use: No       History   Drug Use No           Family History:     The patient's family history is notable for:    Family History   Problem Relation Age of Onset     Hypertension Mother         on meds, alive     Cerebrovascular Disease Father         stroke about age 65,  of cancer at 68 yrs     Diabetes Father         eventually took insulin     Anemia Father         Pernisios anemia     Kidney Disease Niece          kidney transplant     Kidney Disease Nephew         kidney transplant     Venous thrombosis No family hx of      Anesthesia Reaction No family hx of          Physical Exam:     LMP  (LMP Unknown)   There is no height or weight on file to calculate BMI.      GENERAL: Healthy, alert and no distress  EYES: Eyes grossly normal to inspection.  No discharge or erythema, or obvious scleral/conjunctival abnormalities.  RESP: No audible wheeze, cough, or visible cyanosis.  No visible retractions or increased work of breathing.    SKIN: Visible skin clear. No significant rash, abnormal pigmentation or lesions.  NEURO: Cranial nerves grossly intact.  Mentation and speech appropriate for age.  PSYCH: Mentation appears normal, affect normal/bright, judgement and insight intact, normal speech and appearance well-groomed.       Assessment:    Nadia Ladd is a 67 year old woman with a diagnosis of poorly differentiated ANN sigmoid colon, likely Chan related      60 minutes spent on the date of the encounter doing chart review, history and exam, documentation and further activities as noted above    TT video 25 minutes    Plan:     1.)     Diagnosis of poorly differentiated ANN sigmoid colon, likely Chan related:  Diagnosis and her ICH testing reviewed in detail with patient.  Her tumor is MLH1 and PMS2 negative, MLH1 promoter methylation absent and thus suggestive of Chan.  She is undergoing confirmatory germline negative testing.    Discussed role for risk reducing surgery with hysterectomy and BSO at the time of her colectomy.  We are still awaiting confirmatory germline testing, her initial tumor testing is suggestive of Chan.  Thus, discussed with patient proceeding with hysterectomy, BSO at the time of her colon surgery versus waiting until germline testing available and proceeding with surgery at a later date.    Risks, benefits of both approaches discussed.  I think it would be reasonable to proceed with hysterectomy  at the time of her surgery tomorrow.  Patient in agreement.    Therefore, plan on total abdominal hysterectomy, bilateral salpingo-oophorectomy. Risks, benefits and alternatives to proceed discussed in detail with the patient. Risks include but are not limited to bleeding, infection, possible injury to surrounding organs including bowel, bladder, ureter, need for second procedure/surgery related to complications from first procedure, postoperative medical complications such as cardiopulmonary events, lymphedema, lymphocyst, thromboembolic events. Also discussed specific risks related to the procedure including need for additional  Surgery/treatment  based on path results.Patient also advised on need for postoperative surveillance and/or adjuvant therapy. Questions answered.    2.) Genetic risk factors were assessed and the patient does not meet the qualifications for a referral.      Sunitha Olea MD  Gynecologic Oncology  [facility]    CC  Patient Care Team:  Nomi Cartwright DO as PCP - General (Internal Medicine)  Nomi Cartwright DO as Assigned PCP  Jerome Wooten MD as Assigned Surgical Provider  Irene Bateman DO as Physician (Internal Medicine)  JEROME WOOTEN

## 2023-04-05 NOTE — PROGRESS NOTES
Virtual Visit Details    Type of service:  Video Visit     Originating Location (pt. Location): Home    Distant Location (provider location):  On-site  Platform used for Video Visit: Junior

## 2023-04-06 ENCOUNTER — HOSPITAL ENCOUNTER (INPATIENT)
Facility: CLINIC | Age: 68
LOS: 5 days | Discharge: HOME | DRG: 330 | End: 2023-04-11
Attending: SURGERY | Admitting: SURGERY
Payer: MEDICARE

## 2023-04-06 ENCOUNTER — APPOINTMENT (OUTPATIENT)
Dept: GENERAL RADIOLOGY | Facility: CLINIC | Age: 68
DRG: 330 | End: 2023-04-06
Attending: SURGERY
Payer: MEDICARE

## 2023-04-06 ENCOUNTER — PATIENT OUTREACH (OUTPATIENT)
Dept: ONCOLOGY | Facility: CLINIC | Age: 68
End: 2023-04-06
Payer: MEDICARE

## 2023-04-06 ENCOUNTER — ANESTHESIA (OUTPATIENT)
Dept: SURGERY | Facility: CLINIC | Age: 68
DRG: 330 | End: 2023-04-06
Payer: MEDICARE

## 2023-04-06 DIAGNOSIS — C18.7 MALIGNANT NEOPLASM OF SIGMOID COLON (H): ICD-10-CM

## 2023-04-06 DIAGNOSIS — G89.18 ACUTE POST-OPERATIVE PAIN: Primary | ICD-10-CM

## 2023-04-06 PROBLEM — C18.9 COLON CANCER (H): Status: ACTIVE | Noted: 2023-04-06

## 2023-04-06 LAB
ANION GAP SERPL CALCULATED.3IONS-SCNC: 14 MMOL/L (ref 7–15)
BASE EXCESS BLDA CALC-SCNC: -10.8 MMOL/L (ref -9.6–2)
BASE EXCESS BLDA CALC-SCNC: -14.4 MMOL/L (ref -9.6–2)
BASE EXCESS BLDA CALC-SCNC: -5.7 MMOL/L (ref -9.6–2)
BASE EXCESS BLDA CALC-SCNC: -6.1 MMOL/L (ref -9.6–2)
BASE EXCESS BLDA CALC-SCNC: -6.4 MMOL/L (ref -9.6–2)
BASE EXCESS BLDA CALC-SCNC: -6.5 MMOL/L (ref -9.6–2)
BASE EXCESS BLDA CALC-SCNC: -7.1 MMOL/L (ref -9.6–2)
BLD PROD TYP BPU: NORMAL
BLD PROD TYP BPU: NORMAL
BLOOD COMPONENT TYPE: NORMAL
BLOOD COMPONENT TYPE: NORMAL
BUN SERPL-MCNC: 24.8 MG/DL (ref 8–23)
CA-I BLD-MCNC: 4.3 MG/DL (ref 4.4–5.2)
CA-I BLD-MCNC: 4.4 MG/DL (ref 4.4–5.2)
CA-I BLD-MCNC: 4.4 MG/DL (ref 4.4–5.2)
CA-I BLD-MCNC: 4.5 MG/DL (ref 4.4–5.2)
CA-I BLD-MCNC: 4.6 MG/DL (ref 4.4–5.2)
CA-I BLD-MCNC: 4.7 MG/DL (ref 4.4–5.2)
CA-I BLD-MCNC: 4.8 MG/DL (ref 4.4–5.2)
CALCIUM SERPL-MCNC: 7.7 MG/DL (ref 8.8–10.2)
CHLORIDE SERPL-SCNC: 110 MMOL/L (ref 98–107)
CODING SYSTEM: NORMAL
CODING SYSTEM: NORMAL
CREAT SERPL-MCNC: 1.36 MG/DL (ref 0.51–0.95)
CROSSMATCH: NORMAL
CROSSMATCH: NORMAL
DEPRECATED HCO3 PLAS-SCNC: 18 MMOL/L (ref 22–29)
ERYTHROCYTE [DISTWIDTH] IN BLOOD BY AUTOMATED COUNT: 15.5 % (ref 10–15)
GFR SERPL CREATININE-BSD FRML MDRD: 43 ML/MIN/1.73M2
GLUCOSE BLD-MCNC: 134 MG/DL (ref 70–99)
GLUCOSE BLD-MCNC: 136 MG/DL (ref 70–99)
GLUCOSE BLD-MCNC: 143 MG/DL (ref 70–99)
GLUCOSE BLD-MCNC: 155 MG/DL (ref 70–99)
GLUCOSE BLD-MCNC: 157 MG/DL (ref 70–99)
GLUCOSE BLD-MCNC: 164 MG/DL (ref 70–99)
GLUCOSE BLD-MCNC: 197 MG/DL (ref 70–99)
GLUCOSE BLDC GLUCOMTR-MCNC: 151 MG/DL (ref 70–99)
GLUCOSE SERPL-MCNC: 183 MG/DL (ref 70–99)
HCO3 BLDA-SCNC: 14 MMOL/L (ref 21–28)
HCO3 BLDA-SCNC: 17 MMOL/L (ref 21–28)
HCO3 BLDA-SCNC: 20 MMOL/L (ref 21–28)
HCT VFR BLD AUTO: 30.7 % (ref 35–47)
HGB BLD-MCNC: 10.3 G/DL (ref 11.7–15.7)
HGB BLD-MCNC: 9 G/DL (ref 11.7–15.7)
HGB BLD-MCNC: 9 G/DL (ref 11.7–15.7)
HGB BLD-MCNC: 9.1 G/DL (ref 11.7–15.7)
HGB BLD-MCNC: 9.1 G/DL (ref 11.7–15.7)
HGB BLD-MCNC: 9.3 G/DL (ref 11.7–15.7)
HGB BLD-MCNC: 9.4 G/DL (ref 11.7–15.7)
HGB BLD-MCNC: 9.4 G/DL (ref 11.7–15.7)
ISSUE DATE AND TIME: NORMAL
ISSUE DATE AND TIME: NORMAL
LACTATE BLD-SCNC: 0.5 MMOL/L
LACTATE BLD-SCNC: 0.6 MMOL/L
LACTATE BLD-SCNC: 0.6 MMOL/L
LACTATE BLD-SCNC: 0.7 MMOL/L
LACTATE BLD-SCNC: 0.7 MMOL/L
LACTATE BLD-SCNC: 0.9 MMOL/L
LACTATE BLD-SCNC: 1.1 MMOL/L
MCH RBC QN AUTO: 27.1 PG (ref 26.5–33)
MCHC RBC AUTO-ENTMCNC: 30.6 G/DL (ref 31.5–36.5)
MCV RBC AUTO: 89 FL (ref 78–100)
O2/TOTAL GAS SETTING VFR VENT: 100 %
O2/TOTAL GAS SETTING VFR VENT: 94 %
PCO2 BLDA: 40 MM HG (ref 35–45)
PCO2 BLDA: 41 MM HG (ref 35–45)
PCO2 BLDA: 45 MM HG (ref 35–45)
PCO2 BLDA: 45 MM HG (ref 35–45)
PCO2 BLDA: 48 MM HG (ref 35–45)
PH BLDA: 7.11 [PH] (ref 7.35–7.45)
PH BLDA: 7.18 [PH] (ref 7.35–7.45)
PH BLDA: 7.24 [PH] (ref 7.35–7.45)
PH BLDA: 7.29 [PH] (ref 7.35–7.45)
PH BLDA: 7.3 [PH] (ref 7.35–7.45)
PH BLDA: 7.3 [PH] (ref 7.35–7.45)
PH BLDA: 7.31 [PH] (ref 7.35–7.45)
PLATELET # BLD AUTO: 322 10E3/UL (ref 150–450)
PO2 BLDA: 107 MM HG (ref 80–105)
PO2 BLDA: 199 MM HG (ref 80–105)
PO2 BLDA: 280 MM HG (ref 80–105)
PO2 BLDA: 302 MM HG (ref 80–105)
PO2 BLDA: 84 MM HG (ref 80–105)
PO2 BLDA: 87 MM HG (ref 80–105)
PO2 BLDA: 88 MM HG (ref 80–105)
POTASSIUM BLD-SCNC: 4.7 MMOL/L (ref 3.5–5)
POTASSIUM BLD-SCNC: 4.9 MMOL/L (ref 3.5–5)
POTASSIUM BLD-SCNC: 5 MMOL/L (ref 3.5–5)
POTASSIUM BLD-SCNC: 5.3 MMOL/L (ref 3.5–5)
POTASSIUM BLD-SCNC: 5.4 MMOL/L (ref 3.5–5)
POTASSIUM SERPL-SCNC: 4.8 MMOL/L (ref 3.4–5.3)
RBC # BLD AUTO: 3.47 10E6/UL (ref 3.8–5.2)
SODIUM BLD-SCNC: 138 MMOL/L (ref 133–144)
SODIUM BLD-SCNC: 138 MMOL/L (ref 133–144)
SODIUM BLD-SCNC: 139 MMOL/L (ref 133–144)
SODIUM BLD-SCNC: 139 MMOL/L (ref 133–144)
SODIUM BLD-SCNC: 140 MMOL/L (ref 133–144)
SODIUM BLD-SCNC: 141 MMOL/L (ref 133–144)
SODIUM BLD-SCNC: 141 MMOL/L (ref 133–144)
SODIUM SERPL-SCNC: 142 MMOL/L (ref 136–145)
UNIT ABO/RH: NORMAL
UNIT ABO/RH: NORMAL
UNIT NUMBER: NORMAL
UNIT NUMBER: NORMAL
UNIT STATUS: NORMAL
UNIT STATUS: NORMAL
UNIT TYPE ISBT: 9500
UNIT TYPE ISBT: 9500
WBC # BLD AUTO: 8.8 10E3/UL (ref 4–11)

## 2023-04-06 PROCEDURE — 250N000013 HC RX MED GY IP 250 OP 250 PS 637: Performed by: PHARMACIST

## 2023-04-06 PROCEDURE — 710N000010 HC RECOVERY PHASE 1, LEVEL 2, PER MIN: Performed by: SURGERY

## 2023-04-06 PROCEDURE — 250N000024 HC ISOFLURANE, PER MIN: Performed by: SURGERY

## 2023-04-06 PROCEDURE — 83605 ASSAY OF LACTIC ACID: CPT

## 2023-04-06 PROCEDURE — 88307 TISSUE EXAM BY PATHOLOGIST: CPT | Mod: 26 | Performed by: PATHOLOGY

## 2023-04-06 PROCEDURE — 250N000011 HC RX IP 250 OP 636: Performed by: NURSE ANESTHETIST, CERTIFIED REGISTERED

## 2023-04-06 PROCEDURE — 250N000009 HC RX 250: Performed by: NURSE ANESTHETIST, CERTIFIED REGISTERED

## 2023-04-06 PROCEDURE — 250N000009 HC RX 250

## 2023-04-06 PROCEDURE — 0UT70ZZ RESECTION OF BILATERAL FALLOPIAN TUBES, OPEN APPROACH: ICD-10-PCS | Performed by: OBSTETRICS & GYNECOLOGY

## 2023-04-06 PROCEDURE — 250N000011 HC RX IP 250 OP 636

## 2023-04-06 PROCEDURE — 120N000002 HC R&B MED SURG/OB UMMC

## 2023-04-06 PROCEDURE — 84295 ASSAY OF SERUM SODIUM: CPT | Performed by: PHARMACIST

## 2023-04-06 PROCEDURE — 250N000013 HC RX MED GY IP 250 OP 250 PS 637: Performed by: SURGERY

## 2023-04-06 PROCEDURE — 250N000011 HC RX IP 250 OP 636: Performed by: SURGERY

## 2023-04-06 PROCEDURE — 999N000141 HC STATISTIC PRE-PROCEDURE NURSING ASSESSMENT: Performed by: SURGERY

## 2023-04-06 PROCEDURE — 88304 TISSUE EXAM BY PATHOLOGIST: CPT | Mod: 26 | Performed by: PATHOLOGY

## 2023-04-06 PROCEDURE — 370N000017 HC ANESTHESIA TECHNICAL FEE, PER MIN: Performed by: SURGERY

## 2023-04-06 PROCEDURE — P9016 RBC LEUKOCYTES REDUCED: HCPCS

## 2023-04-06 PROCEDURE — C9290 INJ, BUPIVACAINE LIPOSOME: HCPCS

## 2023-04-06 PROCEDURE — 0UT20ZZ RESECTION OF BILATERAL OVARIES, OPEN APPROACH: ICD-10-PCS | Performed by: OBSTETRICS & GYNECOLOGY

## 2023-04-06 PROCEDURE — 85027 COMPLETE CBC AUTOMATED: CPT | Performed by: PHARMACIST

## 2023-04-06 PROCEDURE — 258N000003 HC RX IP 258 OP 636: Performed by: ANESTHESIOLOGY

## 2023-04-06 PROCEDURE — 88329 PATH CONSLTJ DRG SURG: CPT | Performed by: PATHOLOGY

## 2023-04-06 PROCEDURE — 93010 ELECTROCARDIOGRAM REPORT: CPT | Performed by: INTERNAL MEDICINE

## 2023-04-06 PROCEDURE — 0DBU0ZZ EXCISION OF OMENTUM, OPEN APPROACH: ICD-10-PCS | Performed by: SURGERY

## 2023-04-06 PROCEDURE — 0D1B0ZP BYPASS ILEUM TO RECTUM, OPEN APPROACH: ICD-10-PCS | Performed by: SURGERY

## 2023-04-06 PROCEDURE — 88307 TISSUE EXAM BY PATHOLOGIST: CPT | Mod: TC | Performed by: SURGERY

## 2023-04-06 PROCEDURE — 258N000003 HC RX IP 258 OP 636: Performed by: NURSE ANESTHETIST, CERTIFIED REGISTERED

## 2023-04-06 PROCEDURE — 250N000011 HC RX IP 250 OP 636: Performed by: STUDENT IN AN ORGANIZED HEALTH CARE EDUCATION/TRAINING PROGRAM

## 2023-04-06 PROCEDURE — 0UT90ZZ RESECTION OF UTERUS, OPEN APPROACH: ICD-10-PCS | Performed by: OBSTETRICS & GYNECOLOGY

## 2023-04-06 PROCEDURE — 93005 ELECTROCARDIOGRAM TRACING: CPT

## 2023-04-06 PROCEDURE — P9045 ALBUMIN (HUMAN), 5%, 250 ML: HCPCS

## 2023-04-06 PROCEDURE — 84132 ASSAY OF SERUM POTASSIUM: CPT

## 2023-04-06 PROCEDURE — 88342 IMHCHEM/IMCYTCHM 1ST ANTB: CPT | Mod: 26 | Performed by: PATHOLOGY

## 2023-04-06 PROCEDURE — 250N000011 HC RX IP 250 OP 636: Performed by: ANESTHESIOLOGY

## 2023-04-06 PROCEDURE — 86923 COMPATIBILITY TEST ELECTRIC: CPT | Performed by: PHARMACIST

## 2023-04-06 PROCEDURE — 86923 COMPATIBILITY TEST ELECTRIC: CPT

## 2023-04-06 PROCEDURE — P9045 ALBUMIN (HUMAN), 5%, 250 ML: HCPCS | Performed by: STUDENT IN AN ORGANIZED HEALTH CARE EDUCATION/TRAINING PROGRAM

## 2023-04-06 PROCEDURE — 272N000001 HC OR GENERAL SUPPLY STERILE: Performed by: SURGERY

## 2023-04-06 PROCEDURE — 71045 X-RAY EXAM CHEST 1 VIEW: CPT

## 2023-04-06 PROCEDURE — 71045 X-RAY EXAM CHEST 1 VIEW: CPT | Mod: 26 | Performed by: RADIOLOGY

## 2023-04-06 PROCEDURE — 0DTE0ZZ RESECTION OF LARGE INTESTINE, OPEN APPROACH: ICD-10-PCS | Performed by: SURGERY

## 2023-04-06 PROCEDURE — 88341 IMHCHEM/IMCYTCHM EA ADD ANTB: CPT | Mod: 26 | Performed by: PATHOLOGY

## 2023-04-06 PROCEDURE — 88309 TISSUE EXAM BY PATHOLOGIST: CPT | Mod: 26 | Performed by: PATHOLOGY

## 2023-04-06 PROCEDURE — 88360 TUMOR IMMUNOHISTOCHEM/MANUAL: CPT | Mod: 26 | Performed by: PATHOLOGY

## 2023-04-06 PROCEDURE — 360N000077 HC SURGERY LEVEL 4, PER MIN: Performed by: SURGERY

## 2023-04-06 PROCEDURE — 44150 REMOVAL OF COLON: CPT | Mod: GC | Performed by: STUDENT IN AN ORGANIZED HEALTH CARE EDUCATION/TRAINING PROGRAM

## 2023-04-06 RX ORDER — HYDROMORPHONE HCL IN WATER/PF 6 MG/30 ML
0.1 PATIENT CONTROLLED ANALGESIA SYRINGE INTRAVENOUS
Status: DISCONTINUED | OUTPATIENT
Start: 2023-04-06 | End: 2023-04-11 | Stop reason: HOSPADM

## 2023-04-06 RX ORDER — ACETAMINOPHEN 325 MG/1
975 TABLET ORAL ONCE
Status: COMPLETED | OUTPATIENT
Start: 2023-04-06 | End: 2023-04-06

## 2023-04-06 RX ORDER — ENOXAPARIN SODIUM 100 MG/ML
40 INJECTION SUBCUTANEOUS
Status: COMPLETED | OUTPATIENT
Start: 2023-04-06 | End: 2023-04-06

## 2023-04-06 RX ORDER — HYDROMORPHONE HCL IN WATER/PF 6 MG/30 ML
0.2 PATIENT CONTROLLED ANALGESIA SYRINGE INTRAVENOUS EVERY 5 MIN PRN
Status: DISCONTINUED | OUTPATIENT
Start: 2023-04-06 | End: 2023-04-07

## 2023-04-06 RX ORDER — FLUMAZENIL 0.1 MG/ML
0.2 INJECTION, SOLUTION INTRAVENOUS
Status: DISCONTINUED | OUTPATIENT
Start: 2023-04-06 | End: 2023-04-06 | Stop reason: HOSPADM

## 2023-04-06 RX ORDER — HYDROMORPHONE HCL IN WATER/PF 6 MG/30 ML
0.4 PATIENT CONTROLLED ANALGESIA SYRINGE INTRAVENOUS EVERY 5 MIN PRN
Status: DISCONTINUED | OUTPATIENT
Start: 2023-04-06 | End: 2023-04-07

## 2023-04-06 RX ORDER — ONDANSETRON 2 MG/ML
INJECTION INTRAMUSCULAR; INTRAVENOUS PRN
Status: DISCONTINUED | OUTPATIENT
Start: 2023-04-06 | End: 2023-04-06

## 2023-04-06 RX ORDER — ONDANSETRON 2 MG/ML
4 INJECTION INTRAMUSCULAR; INTRAVENOUS EVERY 30 MIN PRN
Status: DISCONTINUED | OUTPATIENT
Start: 2023-04-06 | End: 2023-04-07

## 2023-04-06 RX ORDER — FENTANYL CITRATE 50 UG/ML
INJECTION, SOLUTION INTRAMUSCULAR; INTRAVENOUS PRN
Status: DISCONTINUED | OUTPATIENT
Start: 2023-04-06 | End: 2023-04-06

## 2023-04-06 RX ORDER — IPRATROPIUM BROMIDE AND ALBUTEROL SULFATE 2.5; .5 MG/3ML; MG/3ML
3 SOLUTION RESPIRATORY (INHALATION) ONCE
Status: COMPLETED | OUTPATIENT
Start: 2023-04-06 | End: 2023-04-06

## 2023-04-06 RX ORDER — NALOXONE HYDROCHLORIDE 0.4 MG/ML
0.2 INJECTION, SOLUTION INTRAMUSCULAR; INTRAVENOUS; SUBCUTANEOUS
Status: DISCONTINUED | OUTPATIENT
Start: 2023-04-06 | End: 2023-04-06 | Stop reason: HOSPADM

## 2023-04-06 RX ORDER — SODIUM CHLORIDE, SODIUM GLUCONATE, SODIUM ACETATE, POTASSIUM CHLORIDE AND MAGNESIUM CHLORIDE 526; 502; 368; 37; 30 MG/100ML; MG/100ML; MG/100ML; MG/100ML; MG/100ML
INJECTION, SOLUTION INTRAVENOUS CONTINUOUS PRN
Status: DISCONTINUED | OUTPATIENT
Start: 2023-04-06 | End: 2023-04-06

## 2023-04-06 RX ORDER — ONDANSETRON 4 MG/1
4 TABLET, ORALLY DISINTEGRATING ORAL EVERY 30 MIN PRN
Status: DISCONTINUED | OUTPATIENT
Start: 2023-04-06 | End: 2023-04-07

## 2023-04-06 RX ORDER — CEFAZOLIN SODIUM/WATER 2 G/20 ML
2 SYRINGE (ML) INTRAVENOUS SEE ADMIN INSTRUCTIONS
Status: DISCONTINUED | OUTPATIENT
Start: 2023-04-06 | End: 2023-04-06 | Stop reason: HOSPADM

## 2023-04-06 RX ORDER — NALOXONE HYDROCHLORIDE 0.4 MG/ML
0.4 INJECTION, SOLUTION INTRAMUSCULAR; INTRAVENOUS; SUBCUTANEOUS
Status: DISCONTINUED | OUTPATIENT
Start: 2023-04-06 | End: 2023-04-06 | Stop reason: HOSPADM

## 2023-04-06 RX ORDER — EPHEDRINE SULFATE 50 MG/ML
INJECTION, SOLUTION INTRAMUSCULAR; INTRAVENOUS; SUBCUTANEOUS PRN
Status: DISCONTINUED | OUTPATIENT
Start: 2023-04-06 | End: 2023-04-06

## 2023-04-06 RX ORDER — CEFAZOLIN SODIUM/WATER 2 G/20 ML
2 SYRINGE (ML) INTRAVENOUS
Status: COMPLETED | OUTPATIENT
Start: 2023-04-06 | End: 2023-04-06

## 2023-04-06 RX ORDER — SODIUM CHLORIDE, SODIUM LACTATE, POTASSIUM CHLORIDE, CALCIUM CHLORIDE 600; 310; 30; 20 MG/100ML; MG/100ML; MG/100ML; MG/100ML
INJECTION, SOLUTION INTRAVENOUS CONTINUOUS PRN
Status: DISCONTINUED | OUTPATIENT
Start: 2023-04-06 | End: 2023-04-06

## 2023-04-06 RX ORDER — CALCIUM CHLORIDE 100 MG/ML
INJECTION INTRAVENOUS; INTRAVENTRICULAR PRN
Status: DISCONTINUED | OUTPATIENT
Start: 2023-04-06 | End: 2023-04-06

## 2023-04-06 RX ORDER — METRONIDAZOLE 500 MG/100ML
500 INJECTION, SOLUTION INTRAVENOUS
Status: COMPLETED | OUTPATIENT
Start: 2023-04-06 | End: 2023-04-06

## 2023-04-06 RX ORDER — FENTANYL CITRATE 50 UG/ML
50 INJECTION, SOLUTION INTRAMUSCULAR; INTRAVENOUS EVERY 5 MIN PRN
Status: DISCONTINUED | OUTPATIENT
Start: 2023-04-06 | End: 2023-04-07

## 2023-04-06 RX ORDER — FENTANYL CITRATE 50 UG/ML
25 INJECTION, SOLUTION INTRAMUSCULAR; INTRAVENOUS EVERY 5 MIN PRN
Status: DISCONTINUED | OUTPATIENT
Start: 2023-04-06 | End: 2023-04-07

## 2023-04-06 RX ORDER — DEXAMETHASONE SODIUM PHOSPHATE 4 MG/ML
INJECTION, SOLUTION INTRA-ARTICULAR; INTRALESIONAL; INTRAMUSCULAR; INTRAVENOUS; SOFT TISSUE PRN
Status: DISCONTINUED | OUTPATIENT
Start: 2023-04-06 | End: 2023-04-06

## 2023-04-06 RX ORDER — ONDANSETRON 2 MG/ML
4 INJECTION INTRAMUSCULAR; INTRAVENOUS ONCE
Status: COMPLETED | OUTPATIENT
Start: 2023-04-06 | End: 2023-04-06

## 2023-04-06 RX ORDER — VASOPRESSIN IN 0.9 % NACL 2 UNIT/2ML
SYRINGE (ML) INTRAVENOUS PRN
Status: DISCONTINUED | OUTPATIENT
Start: 2023-04-06 | End: 2023-04-06

## 2023-04-06 RX ORDER — OXYCODONE HYDROCHLORIDE 5 MG/1
5 TABLET ORAL EVERY 4 HOURS PRN
Status: DISCONTINUED | OUTPATIENT
Start: 2023-04-06 | End: 2023-04-11 | Stop reason: HOSPADM

## 2023-04-06 RX ORDER — SODIUM CHLORIDE, SODIUM LACTATE, POTASSIUM CHLORIDE, CALCIUM CHLORIDE 600; 310; 30; 20 MG/100ML; MG/100ML; MG/100ML; MG/100ML
INJECTION, SOLUTION INTRAVENOUS CONTINUOUS
Status: DISCONTINUED | OUTPATIENT
Start: 2023-04-06 | End: 2023-04-06

## 2023-04-06 RX ORDER — HYDROMORPHONE HCL IN WATER/PF 6 MG/30 ML
0.2 PATIENT CONTROLLED ANALGESIA SYRINGE INTRAVENOUS
Status: DISCONTINUED | OUTPATIENT
Start: 2023-04-06 | End: 2023-04-11 | Stop reason: HOSPADM

## 2023-04-06 RX ORDER — LIDOCAINE HYDROCHLORIDE 20 MG/ML
INJECTION, SOLUTION INFILTRATION; PERINEURAL PRN
Status: DISCONTINUED | OUTPATIENT
Start: 2023-04-06 | End: 2023-04-06

## 2023-04-06 RX ORDER — PROPOFOL 10 MG/ML
INJECTION, EMULSION INTRAVENOUS PRN
Status: DISCONTINUED | OUTPATIENT
Start: 2023-04-06 | End: 2023-04-06

## 2023-04-06 RX ORDER — BUPIVACAINE HYDROCHLORIDE 2.5 MG/ML
INJECTION, SOLUTION EPIDURAL; INFILTRATION; INTRACAUDAL
Status: COMPLETED | OUTPATIENT
Start: 2023-04-06 | End: 2023-04-06

## 2023-04-06 RX ORDER — FENTANYL CITRATE 50 UG/ML
25-50 INJECTION, SOLUTION INTRAMUSCULAR; INTRAVENOUS
Status: DISCONTINUED | OUTPATIENT
Start: 2023-04-06 | End: 2023-04-06 | Stop reason: HOSPADM

## 2023-04-06 RX ORDER — SODIUM CHLORIDE, SODIUM LACTATE, POTASSIUM CHLORIDE, CALCIUM CHLORIDE 600; 310; 30; 20 MG/100ML; MG/100ML; MG/100ML; MG/100ML
INJECTION, SOLUTION INTRAVENOUS CONTINUOUS
Status: DISCONTINUED | OUTPATIENT
Start: 2023-04-06 | End: 2023-04-08

## 2023-04-06 RX ADMIN — ALBUMIN HUMAN 12.5 G: 0.05 INJECTION, SOLUTION INTRAVENOUS at 17:11

## 2023-04-06 RX ADMIN — ACETAMINOPHEN 975 MG: 325 TABLET ORAL at 06:28

## 2023-04-06 RX ADMIN — NOREPINEPHRINE BITARTRATE 12.8 MCG: 1 INJECTION, SOLUTION, CONCENTRATE INTRAVENOUS at 08:06

## 2023-04-06 RX ADMIN — DEXAMETHASONE SODIUM PHOSPHATE 4 MG: 4 INJECTION, SOLUTION INTRA-ARTICULAR; INTRALESIONAL; INTRAMUSCULAR; INTRAVENOUS; SOFT TISSUE at 08:00

## 2023-04-06 RX ADMIN — PHENYLEPHRINE HYDROCHLORIDE 200 MCG: 10 INJECTION INTRAVENOUS at 08:10

## 2023-04-06 RX ADMIN — ENOXAPARIN SODIUM 40 MG: 40 INJECTION SUBCUTANEOUS at 06:51

## 2023-04-06 RX ADMIN — PHENYLEPHRINE HYDROCHLORIDE 200 MCG: 10 INJECTION INTRAVENOUS at 08:38

## 2023-04-06 RX ADMIN — SODIUM CHLORIDE, POTASSIUM CHLORIDE, SODIUM LACTATE AND CALCIUM CHLORIDE: 600; 310; 30; 20 INJECTION, SOLUTION INTRAVENOUS at 07:44

## 2023-04-06 RX ADMIN — Medication 5 MG: at 08:01

## 2023-04-06 RX ADMIN — PHENYLEPHRINE HYDROCHLORIDE 200 MCG: 10 INJECTION INTRAVENOUS at 08:42

## 2023-04-06 RX ADMIN — NOREPINEPHRINE BITARTRATE 6.4 MCG: 1 INJECTION, SOLUTION, CONCENTRATE INTRAVENOUS at 08:00

## 2023-04-06 RX ADMIN — FENTANYL CITRATE 50 MCG: 50 INJECTION, SOLUTION INTRAMUSCULAR; INTRAVENOUS at 10:38

## 2023-04-06 RX ADMIN — NOREPINEPHRINE BITARTRATE 12.8 MCG: 1 INJECTION, SOLUTION, CONCENTRATE INTRAVENOUS at 08:30

## 2023-04-06 RX ADMIN — SODIUM CHLORIDE, SODIUM GLUCONATE, SODIUM ACETATE, POTASSIUM CHLORIDE AND MAGNESIUM CHLORIDE: 526; 502; 368; 37; 30 INJECTION, SOLUTION INTRAVENOUS at 10:22

## 2023-04-06 RX ADMIN — OXYCODONE HYDROCHLORIDE 5 MG: 5 TABLET ORAL at 20:20

## 2023-04-06 RX ADMIN — LIDOCAINE HYDROCHLORIDE 60 MG: 20 INJECTION, SOLUTION INFILTRATION; PERINEURAL at 07:44

## 2023-04-06 RX ADMIN — Medication 2 G: at 08:00

## 2023-04-06 RX ADMIN — CALCIUM CHLORIDE 500 MG: 100 INJECTION INTRAVENOUS; INTRAVENTRICULAR at 12:20

## 2023-04-06 RX ADMIN — GLUCAGON 0.5 MG: KIT at 14:36

## 2023-04-06 RX ADMIN — Medication 20 MG: at 12:05

## 2023-04-06 RX ADMIN — PROPOFOL 130 MG: 10 INJECTION, EMULSION INTRAVENOUS at 07:44

## 2023-04-06 RX ADMIN — IPRATROPIUM BROMIDE AND ALBUTEROL SULFATE 3 ML: .5; 3 SOLUTION RESPIRATORY (INHALATION) at 17:24

## 2023-04-06 RX ADMIN — FENTANYL CITRATE 50 MCG: 50 INJECTION, SOLUTION INTRAMUSCULAR; INTRAVENOUS at 11:12

## 2023-04-06 RX ADMIN — Medication 1 UNITS: at 08:00

## 2023-04-06 RX ADMIN — Medication 20 MG: at 14:22

## 2023-04-06 RX ADMIN — BUPIVACAINE 20 ML: 13.3 INJECTION, SUSPENSION, LIPOSOMAL INFILTRATION at 07:40

## 2023-04-06 RX ADMIN — Medication 20 MG: at 10:40

## 2023-04-06 RX ADMIN — PHENYLEPHRINE HYDROCHLORIDE 500 MCG: 10 INJECTION INTRAVENOUS at 08:26

## 2023-04-06 RX ADMIN — SUCCINYLCHOLINE CHLORIDE 60 MG: 20 INJECTION, SOLUTION INTRAMUSCULAR; INTRAVENOUS; PARENTERAL at 07:44

## 2023-04-06 RX ADMIN — ONDANSETRON 4 MG: 2 INJECTION INTRAMUSCULAR; INTRAVENOUS at 07:18

## 2023-04-06 RX ADMIN — Medication 20 MG: at 11:16

## 2023-04-06 RX ADMIN — ONDANSETRON 4 MG: 2 INJECTION INTRAMUSCULAR; INTRAVENOUS at 08:00

## 2023-04-06 RX ADMIN — NOREPINEPHRINE BITARTRATE 12.8 MCG: 1 INJECTION, SOLUTION, CONCENTRATE INTRAVENOUS at 08:32

## 2023-04-06 RX ADMIN — CALCIUM CHLORIDE 500 MG: 100 INJECTION INTRAVENOUS; INTRAVENTRICULAR at 09:16

## 2023-04-06 RX ADMIN — SODIUM CHLORIDE, POTASSIUM CHLORIDE, SODIUM LACTATE AND CALCIUM CHLORIDE: 600; 310; 30; 20 INJECTION, SOLUTION INTRAVENOUS at 16:30

## 2023-04-06 RX ADMIN — SODIUM CHLORIDE, POTASSIUM CHLORIDE, SODIUM LACTATE AND CALCIUM CHLORIDE: 600; 310; 30; 20 INJECTION, SOLUTION INTRAVENOUS at 10:24

## 2023-04-06 RX ADMIN — SODIUM CHLORIDE, SODIUM GLUCONATE, SODIUM ACETATE, POTASSIUM CHLORIDE AND MAGNESIUM CHLORIDE: 526; 502; 368; 37; 30 INJECTION, SOLUTION INTRAVENOUS at 12:00

## 2023-04-06 RX ADMIN — Medication 1 UNITS: at 08:49

## 2023-04-06 RX ADMIN — FENTANYL CITRATE 250 MCG: 50 INJECTION, SOLUTION INTRAMUSCULAR; INTRAVENOUS at 07:44

## 2023-04-06 RX ADMIN — Medication 5 MG: at 07:58

## 2023-04-06 RX ADMIN — BUPIVACAINE HYDROCHLORIDE 20 ML: 2.5 INJECTION, SOLUTION EPIDURAL; INFILTRATION; INTRACAUDAL; PERINEURAL at 07:40

## 2023-04-06 RX ADMIN — Medication 50 MG: at 08:07

## 2023-04-06 RX ADMIN — SODIUM CHLORIDE, SODIUM GLUCONATE, SODIUM ACETATE, POTASSIUM CHLORIDE AND MAGNESIUM CHLORIDE: 526; 502; 368; 37; 30 INJECTION, SOLUTION INTRAVENOUS at 11:17

## 2023-04-06 RX ADMIN — NOREPINEPHRINE BITARTRATE 12.8 MCG: 1 INJECTION, SOLUTION, CONCENTRATE INTRAVENOUS at 08:04

## 2023-04-06 RX ADMIN — PHENYLEPHRINE HYDROCHLORIDE 500 MCG: 10 INJECTION INTRAVENOUS at 07:54

## 2023-04-06 RX ADMIN — Medication 1 UNITS: at 08:30

## 2023-04-06 RX ADMIN — Medication 30 MG: at 10:07

## 2023-04-06 RX ADMIN — METRONIDAZOLE 500 MG: 500 INJECTION, SOLUTION INTRAVENOUS at 07:19

## 2023-04-06 RX ADMIN — MIDAZOLAM 2 MG: 1 INJECTION INTRAMUSCULAR; INTRAVENOUS at 08:42

## 2023-04-06 RX ADMIN — SODIUM BICARBONATE 50 MEQ: 84 INJECTION, SOLUTION INTRAVENOUS at 10:08

## 2023-04-06 RX ADMIN — Medication 2 G: at 12:00

## 2023-04-06 RX ADMIN — Medication 20 MG: at 09:08

## 2023-04-06 RX ADMIN — SODIUM BICARBONATE 50 MEQ: 84 INJECTION, SOLUTION INTRAVENOUS at 09:15

## 2023-04-06 RX ADMIN — PHENYLEPHRINE HYDROCHLORIDE 500 MCG: 10 INJECTION INTRAVENOUS at 07:57

## 2023-04-06 RX ADMIN — NOREPINEPHRINE BITARTRATE 12.8 MCG: 1 INJECTION, SOLUTION, CONCENTRATE INTRAVENOUS at 08:36

## 2023-04-06 RX ADMIN — SODIUM CHLORIDE, SODIUM GLUCONATE, SODIUM ACETATE, POTASSIUM CHLORIDE AND MAGNESIUM CHLORIDE: 526; 502; 368; 37; 30 INJECTION, SOLUTION INTRAVENOUS at 08:00

## 2023-04-06 RX ADMIN — PHENYLEPHRINE HYDROCHLORIDE 200 MCG: 10 INJECTION INTRAVENOUS at 07:44

## 2023-04-06 RX ADMIN — Medication 20 MG: at 13:38

## 2023-04-06 RX ADMIN — PHENYLEPHRINE HYDROCHLORIDE 200 MCG: 10 INJECTION INTRAVENOUS at 08:34

## 2023-04-06 RX ADMIN — PHENYLEPHRINE HYDROCHLORIDE 200 MCG: 10 INJECTION INTRAVENOUS at 08:23

## 2023-04-06 RX ADMIN — NOREPINEPHRINE BITARTRATE 12.8 MCG: 1 INJECTION, SOLUTION, CONCENTRATE INTRAVENOUS at 08:38

## 2023-04-06 RX ADMIN — CALCIUM CHLORIDE 500 MG: 100 INJECTION INTRAVENOUS; INTRAVENTRICULAR at 12:48

## 2023-04-06 RX ADMIN — Medication 5 MG: at 07:57

## 2023-04-06 RX ADMIN — PHENYLEPHRINE HYDROCHLORIDE 200 MCG: 10 INJECTION INTRAVENOUS at 08:05

## 2023-04-06 RX ADMIN — NOREPINEPHRINE BITARTRATE 12.8 MCG: 1 INJECTION, SOLUTION, CONCENTRATE INTRAVENOUS at 08:02

## 2023-04-06 RX ADMIN — SUGAMMADEX 200 MG: 100 INJECTION, SOLUTION INTRAVENOUS at 15:50

## 2023-04-06 RX ADMIN — PHENYLEPHRINE HYDROCHLORIDE 200 MCG: 10 INJECTION INTRAVENOUS at 08:16

## 2023-04-06 RX ADMIN — PHENYLEPHRINE HYDROCHLORIDE 200 MCG: 10 INJECTION INTRAVENOUS at 08:08

## 2023-04-06 RX ADMIN — PHENYLEPHRINE HYDROCHLORIDE 200 MCG: 10 INJECTION INTRAVENOUS at 08:45

## 2023-04-06 RX ADMIN — ALBUMIN HUMAN 12.5 G: 0.05 INJECTION, SOLUTION INTRAVENOUS at 17:57

## 2023-04-06 RX ADMIN — Medication 1 UNITS: at 08:16

## 2023-04-06 RX ADMIN — SODIUM CHLORIDE, SODIUM GLUCONATE, SODIUM ACETATE, POTASSIUM CHLORIDE AND MAGNESIUM CHLORIDE: 526; 502; 368; 37; 30 INJECTION, SOLUTION INTRAVENOUS at 07:44

## 2023-04-06 RX ADMIN — Medication 20 MG: at 12:48

## 2023-04-06 RX ADMIN — NOREPINEPHRINE BITARTRATE 12.8 MCG: 1 INJECTION, SOLUTION, CONCENTRATE INTRAVENOUS at 08:10

## 2023-04-06 RX ADMIN — HYDROMORPHONE HYDROCHLORIDE 0.4 MG: 0.2 INJECTION, SOLUTION INTRAMUSCULAR; INTRAVENOUS; SUBCUTANEOUS at 21:10

## 2023-04-06 RX ADMIN — NOREPINEPHRINE BITARTRATE 12.8 MCG: 1 INJECTION, SOLUTION, CONCENTRATE INTRAVENOUS at 08:15

## 2023-04-06 RX ADMIN — FENTANYL CITRATE 50 MCG: 50 INJECTION, SOLUTION INTRAMUSCULAR; INTRAVENOUS at 11:16

## 2023-04-06 RX ADMIN — SODIUM CHLORIDE, POTASSIUM CHLORIDE, SODIUM LACTATE AND CALCIUM CHLORIDE: 600; 310; 30; 20 INJECTION, SOLUTION INTRAVENOUS at 08:15

## 2023-04-06 RX ADMIN — SODIUM CHLORIDE, SODIUM GLUCONATE, SODIUM ACETATE, POTASSIUM CHLORIDE AND MAGNESIUM CHLORIDE: 526; 502; 368; 37; 30 INJECTION, SOLUTION INTRAVENOUS at 09:00

## 2023-04-06 RX ADMIN — PHENYLEPHRINE HYDROCHLORIDE 0.5 MCG/KG/MIN: 10 INJECTION INTRAVENOUS at 08:50

## 2023-04-06 RX ADMIN — CALCIUM CHLORIDE 500 MG: 100 INJECTION INTRAVENOUS; INTRAVENTRICULAR at 09:40

## 2023-04-06 RX ADMIN — FENTANYL CITRATE 50 MCG: 50 INJECTION, SOLUTION INTRAMUSCULAR; INTRAVENOUS at 06:42

## 2023-04-06 RX ADMIN — SODIUM BICARBONATE 50 MEQ: 84 INJECTION, SOLUTION INTRAVENOUS at 09:52

## 2023-04-06 ASSESSMENT — ACTIVITIES OF DAILY LIVING (ADL)
ADLS_ACUITY_SCORE: 20

## 2023-04-06 NOTE — PHARMACY-ADMISSION MEDICATION HISTORY
"  Admission Medication History Completed by Pharmacy    See [facility] Admission Navigator for allergy information, preferred outpatient pharmacy, prior to admission medications and immunization status.     Medication History Sources:     Pre-op RN assessment, Dispense history      Prior to Admission medications    Medication Sig Last Dose Taking? Auth Provider Long Term End Date   cyanocobalamin 1000 MCG TBCR Take 1,000 mcg by mouth daily  Patient taking differently: Take 1,000 mcg by mouth every morning Past Week Yes Phil Spangler PA-C     ferrous sulfate (IRON) 325 (65 FE) MG tablet TAKE ONE TABLET BY MOUTH TWICE A DAY --NO FURTHER REFILLS WITHOUT OFFICE VISIT Past Week Yes Phil Spangler PA-C     fluocinonide (LIDEX) 0.05 % external cream Small amount to affected area(s) BID PRN Past Week Yes Nomi Cartwright DO     IBUPROFEN 200 MG OR TABS  Past Week Yes Reported, Patient     lisinopril (ZESTRIL) 10 MG tablet Take 1 tablet (10 mg) by mouth every morning 4/5/2023 at 0800 Yes Nomi Cartwright,  Yes    metroNIDAZOLE (FLAGYL) 500 MG tablet Take 1 tablet (500 mg) by mouth every 6 hours At 8:00 am, 2:00 pm, 8:00 pm the day prior to your surgery with neomycin and zofran. Unknown Yes Jerome Ashley MD     Multiple Vitamins-Iron (MULTI-DAY PLUS IRON PO)  Past Week Yes Reported, Patient     neomycin (MYCIFRADIN) 500 MG tablet Take 2 tablets (1,000 mg) by mouth every 6 hours At 8:00 am, 2:00 pm, 8:00 pm the day prior to your surgery with flagyl and zofran. Unknown Yes Jerome Ashley MD     ondansetron (ZOFRAN) 4 MG tablet Take 1 tablet (4 mg) by mouth every 6 hours At 8:00 am, 2:00 pm, 8:00 pm the day prior to your surgery with neomycin and flagyl. 4/5/2023 at 2000 Yes Jerome Ashley MD     polyethylene glycol (MIRALAX) 17 g packet Take 238 g by mouth See Admin Instructions Starting at 4 pm night prior to surgery. Refer to \"Getting Ready for Surgery\" instructions. " "4/5/2023 at 1600 Yes Jerome Ashley MD     polyethylene glycol (MIRALAX) 17 g packet Take 119 g by mouth See Admin Instructions Starting at 8 pm night prior to surgery. Refer to \"Getting Ready for Surgery\" instructions. 4/5/2023 at 2000 Yes Jerome Ashley MD     triamterene-HCTZ (DYAZIDE) 37.5-25 MG capsule TAKE 1 CAPSULE BY MOUTH ONCE DAILY  Patient taking differently: Take 1 capsule by mouth every morning TAKE 1 CAPSULE BY MOUTH ONCE DAILY 4/5/2023 at 0800 Yes Nomi Cartwright,  Yes    sulfaSALAzine (AZULFIDINE) 500 MG tablet TAKE 2 TABLETS BY MOUTH TWICE A DAY  Patient taking differently: Take 1,000 mg by mouth 2 times daily TAKE 2 TABLETS BY MOUTH TWICE A DAY 4/4/2023  Nomi Cartwright DO Yes        Date completed: 04/06/23    Medication history completed by: Bruna Marie [facility]    "

## 2023-04-06 NOTE — ANESTHESIA POSTPROCEDURE EVALUATION
Patient: Nadia Ladd    Procedure: Procedure(s):  Laparoscopic Converted to Open Total abdominal colectomy  Hysterectomy total abdominal, bilateral salpingo-oophorectomy  Sigmoidoscopy flexible       Anesthesia Type:  General    Note:  Disposition: Inpatient   Postop Pain Control: Uneventful            Sign Out: Well controlled pain   PONV: No   Neuro/Psych: Uneventful            Sign Out: Acceptable/Baseline neuro status   Airway/Respiratory: Uneventful            Sign Out: Acceptable/Baseline resp. status   CV/Hemodynamics: Uneventful            Sign Out: Acceptable CV status; No obvious fluid overload (possibly still dry -- SBP 90s)   Other NRE:    DID A NON-ROUTINE EVENT OCCUR?            Last vitals:  Vitals Value Taken Time   /75 04/06/23 1630   Temp     Pulse 90 04/06/23 1638   Resp 17 04/06/23 1615   SpO2 100 % 04/06/23 1638   Vitals shown include unvalidated device data.    Electronically Signed By: Marie Smith MD  April 6, 2023  4:39 PM

## 2023-04-06 NOTE — ANESTHESIA PROCEDURE NOTES
TAP Procedure Note    Pre-Procedure   Staff -        Anesthesiologist:  Calos Luis MD       Resident/Fellow: Xin Garg MD       Performed By: with residents       Procedure performed by resident/fellow/CRNA in presence of a teaching physician.         Location: pre-op       Procedure Start/Stop Times: 4/6/2023 7:40 AM and 4/6/2023 7:50 AM       Pre-Anesthestic Checklist: patient identified, IV checked, site marked, risks and benefits discussed, informed consent, monitors and equipment checked, pre-op evaluation, at physician/surgeon's request and post-op pain management  Timeout:       Correct Patient: Yes        Correct Procedure: Yes        Correct Site: Yes        Correct Position: Yes        Correct Laterality: Yes        Site Marked: Yes  Procedure Documentation  Procedure: TAP       Laterality: bilateral       Patient Position: supine       Skin prep: Chloraprep       Insertion Site: T8-9.       Needle Type: short bevel       Needle Gauge: 21.        Needle Length (millimeters): 110        Ultrasound guided       1. Ultrasound was used to identify targeted nerve, plexus, vascular marker, or fascial plane and place a needle adjacent to it in real-time.       2. Ultrasound was used to visualize the spread of anesthetic in close proximity to the above referenced structure.       3. A permanent image is entered into the patient's record.    Assessment/Narrative         The placement was negative for: blood aspirated, painful injection and site bleeding       Paresthesias: No.       Bolus given via needle..        Secured via.        Insertion/Infusion Method: Single Shot       Complications: none    Medication(s) Administered   Bupivacaine 0.25% PF (Infiltration) - Infiltration   20 mL - 4/6/2023 7:40:00 AM  Bupivacaine liposome (Exparel) 1.3% LA inj susp (Infiltration) - Infiltration   20 mL - 4/6/2023 7:40:00 AM  Medication Administration Time: 4/6/2023 7:40 AM      FOR [facility] ([facility]) ONLY:   Pain  "Team Contact information: please page the Pain Team Via [facility]. Search \"Pain\". During daytime hours, please page the attending first. At night please page the resident first.      "

## 2023-04-06 NOTE — PROGRESS NOTES
Per MD: in person, for a postop visit in 4-6 wks     Scheduling request sent     Annamarie Srinivasan RN

## 2023-04-06 NOTE — OR NURSING
"Pt unsure what antibiotics pills she picked up from pharmacy and took this AM.  Pt thinks she \"only took one of them\".  Dr Ashley informed of the stated information @ 9040.  No new orders received in relation.      "

## 2023-04-06 NOTE — BRIEF OP NOTE
[facility]    Brief Operative Note    Pre-operative diagnosis: Malignant neoplasm of sigmoid colon (H) [C18.7]  Post-operative diagnosis Same as pre-operative diagnosis    Procedure: Procedure(s):  Total abdominal colectomy versus open sigmoidectomy,  Hysterectomy total abdominal, bilateral salpingo-oophorectomy  Surgeon: Surgeon(s) and Role:  Panel 1:     * Jerome Ashley MD - Primary     * Betty Reeves MD - Resident - Assisting  Panel 2:     * Sunitha Olea MD - Primary     * Sigifredo Mcclelland MD - Resident - Assisting  Anesthesia: General with Block     Estimated Blood Loss: 300 ml  Crystalloid given: 9 liters  pRBC given: 2 units  UOP: 880 ml    Drains: None  Specimens:   ID Type Source Tests Collected by Time Destination   1 : Omentum Tissue Omentum SURGICAL PATHOLOGY EXAM Jerome Ashley MD 4/6/2023 11:19 AM    2 : colon Tissue Other SURGICAL PATHOLOGY EXAM Jerome Ashley MD 4/6/2023 12:10 PM    3 :  Tissue Uterus, Cervix, Bilateral Fallopian Tubes & Ovaries SURGICAL PATHOLOGY EXAM Jerome Ashley MD 4/6/2023  1:00 PM    4 : Terminal Ilium Tissue Small Intestine, Terminal Ileum SURGICAL PATHOLOGY EXAM Jerome Ashley MD 4/6/2023  1:48 PM    5 : proximal anastomotic ring Tissue Other SURGICAL PATHOLOGY EXAM Jerome Ashley MD 4/6/2023  2:44 PM    6 : distal anastomotic ring Tissue Other SURGICAL PATHOLOGY EXAM Jerome Ashley MD 4/6/2023  2:46 PM      Findings:   Hepatic flexure and sigmoid colon tumors. No distant peritoneal metastatic disease. Terminal ileum with inflammation and creeping fat. Ileorectal bakers anastomosis created with negative leak test.   Complications: None.  Implants: * No implants in log *     Betty Sneed MD  General Surgery PGY-7

## 2023-04-06 NOTE — ANESTHESIA CARE TRANSFER NOTE
Patient: Nadia Ladd    Procedure: Procedure(s):  Laparoscopic Converted to Open Total abdominal colectomy  Hysterectomy total abdominal, bilateral salpingo-oophorectomy  Sigmoidoscopy flexible       Diagnosis: Malignant neoplasm of sigmoid colon (H) [C18.7]  Diagnosis Additional Information: No value filed.    Anesthesia Type:   General     Note:    Oropharynx: spontaneously breathing  Level of Consciousness: awake  Oxygen Supplementation: face mask  Level of Supplemental Oxygen (L/min / FiO2): 6  Independent Airway: airway patency satisfactory and stable  Dentition: dentition unchanged  Vital Signs Stable: post-procedure vital signs reviewed and stable  Report to RN Given: handoff report given  Patient transferred to: PACU    Handoff Report: Identifed the Patient, Identified the Reponsible Provider, Reviewed the pertinent medical history, Discussed the surgical course, Reviewed Intra-OP anesthesia mangement and issues during anesthesia, Set expectations for post-procedure period and Allowed opportunity for questions and acknowledgement of understanding      Vitals:  Vitals Value Taken Time   /50 04/06/23 1600   Temp     Pulse 103 04/06/23 1606   Resp     SpO2 98 % 04/06/23 1606   Vitals shown include unvalidated device data.    Electronically Signed By: LUIS Mendoza CRNA  April 6, 2023  4:07 PM

## 2023-04-06 NOTE — ANESTHESIA PREPROCEDURE EVALUATION
Anesthesia Pre-Procedure Evaluation    Patient: Nadia Ladd   MRN: [id number] : 1955        Procedure : Procedure(s):  Total abdominal colectomy versus open sigmoidectomy, possible ileostomy  colonoscopy  Hysterectomy total abdominal, bilateral salpingo-oophorectomy          Past Medical History:   Diagnosis Date     Arthropathia 2010     B12 deficiency anemia 10/21/2010     Benign essential hypertension with target blood pressure below 140/90 10/10/2016     CARDIOVASCULAR SCREENING; LDL GOAL LESS THAN 160 10/31/2010     Crohn's colitis (H) 02/15/2011     Dermatitis-dishydrotic eczema-severe 2010     Hiatal hernia      HTN (hypertension) 2011     Iron deficiency anemia 10/21/2010     Malignant neoplasm of sigmoid colon (H)      Obesity      Primary pulmonary hypertension (H) 2010      Past Surgical History:   Procedure Laterality Date     COLONOSCOPY  10/11/10     COLONOSCOPY N/A 2023    Procedure: ATTEMPTED COLONOSCOPY WITH SIGMOID STRICTURE BIOPSY;  Surgeon: Jonathan Alcocer MD;  Location:  GI     ESOPHAGOSCOPY, GASTROSCOPY, DUODENOSCOPY (EGD), COMBINED N/A 2023    Procedure: ESOPHAGOGASTRODUODENOSCOPY, WITH BIOPSY;  Surgeon: Jonathan Alcocer MD;  Location:  GI     SURGICAL HISTORY OF -   77    Perineorrhaphy, for widening vaginal orifice     ZZC EXPLORATORY OF ABDOMEN      laparoscopy      Allergies   Allergen Reactions     No Known Drug Allergies       Social History     Tobacco Use     Smoking status: Never     Passive exposure: Current     Smokeless tobacco: Never   Vaping Use     Vaping status: Not on file   Substance Use Topics     Alcohol use: No      Wt Readings from Last 1 Encounters:   23 86.5 kg (190 lb 11.2 oz)              OUTSIDE LABS:  CBC:   Lab Results   Component Value Date    WBC 8.4 2023    WBC 8.1 2023    HGB 7.1 (L) 2023    HGB 9.8 (L) 2023    HCT 25.9 (L) 2023    HCT 34.9 (L) 2023    PLT  360 03/31/2023     02/24/2023     BMP:   Lab Results   Component Value Date     03/31/2023     02/16/2023    POTASSIUM 5.5 (H) 04/05/2023    POTASSIUM 5.5 (H) 03/31/2023    CHLORIDE 112 (H) 03/31/2023    CHLORIDE 112 (H) 02/16/2023    CO2 16 (L) 03/31/2023    CO2 15 (L) 02/16/2023    BUN 27.9 (H) 03/31/2023    BUN 34.9 (H) 02/16/2023    CR 1.39 (H) 03/31/2023    CR 1.64 (H) 02/16/2023     (H) 04/06/2023     (H) 03/31/2023     COAGS: No results found for: PTT, INR, FIBR  POC: No results found for: BGM, HCG, HCGS  HEPATIC:   Lab Results   Component Value Date    ALBUMIN 4.0 03/31/2023    PROTTOTAL 6.6 03/31/2023    ALT 10 03/31/2023    AST 12 03/31/2023    ALKPHOS 91 03/31/2023    BILITOTAL 0.2 03/31/2023     OTHER:   Lab Results   Component Value Date    A1C 4.8 03/12/2018    LIVIER 9.0 03/31/2023    PHOS 3.7 09/14/2010    TSH 3.19 11/30/2016    T4 1.25 09/14/2010    CRP 20.4 (H) 10/09/2021    SED 10 10/09/2021       Anesthesia Plan    ASA Status:  2      Anesthesia Type: General.     - Airway: ETT   Induction: Intravenous.   Maintenance: Balanced.   Techniques and Equipment:     - Lines/Monitors: 2nd IV, BIS (clearsite)     Consents    Anesthesia Plan(s) and associated risks, benefits, and realistic alternatives discussed. Questions answered and patient/representative(s) expressed understanding.    - Discussed:     - Discussed with:  Patient         Postoperative Care    Pain management: IV analgesics.   PONV prophylaxis: Ondansetron (or other 5HT-3), Dexamethasone or Solumedrol     Comments:                Ben Gonzalez MD

## 2023-04-06 NOTE — OP NOTE
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  Risk reducing hysterectomy and bilateral salpingo-oophorectomy  Crohn's disease    Post-operative Diagnosis:    Same as above    Surgeon:  Sunitha Olea MD    Assistants:  Sigifredo Mcclelland MD PGY-4    Anesthesia:  GETA    Procedure:  Exploratory laparotomy, total abdominal hysterectomy, bilateral salpingo-oophorectomy    See colorectal op note for their portion of the case    EBL:  10 cc    Specimens: uterus, cervix, bilateral ovaries and fallopian tubes    Complications:  None apparent    Indications:  Nadia Ladd is a 67 year old with a recent diagnosis of invasive poorly differentiated carcinoma of the sigmoid colon with loss of nuclear expression of MLH1 and PMS2, MLH1 promoter 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  Following a thorough discussion of the risks, benefits, indications and alternatives she consented to the above procedure. Discussed role for risk reducing surgery with hysterectomy and BSO at the time of her colectomy. Currently, still awaiting confirmatory germline testing. However, patient would like to go ahead and proceed with total abdominal hysterectomy, bilateral salpingo-oophorectomy. Risks, benefits and alternatives to proceed discussed in detail with the patient. Risks include but are not limited to bleeding, infection, possible injury to surrounding organs including bowel, bladder, ureter, need for second procedure/surgery related to complications from first procedure, postoperative medical complications such as cardiopulmonary events, lymphedema, lymphocyst, thromboembolic events. Also discussed specific risks related to the procedure including need for additional surgery/treatment based on pathology results. Patient agreed to proceed.  Written consent signed.     Operative Findings:  Normal uterus measuring 3 cm. Normal bilateral fallopian tubes and ovaries.     Operative Procedure:  Timeout was called after switch over from colorectal team to gyn onc team at which point the patient's name, operative procedure and site was confirmed by the operative team. The Bookwalter retractor was positioned and utilized throughout the course of the procedure. Bowels were packed up into the upper abdomen and appropriate retractors repositioned. Uterus was then elevated with kochers placed on both uterine cornua. Initially on the left side, the round ligament was isolated, transected with cautery and then suture ligated. We extended the peritoneal incision of the left aspect of the psoas muscle and the retroperitoneal space was entered.  The left ureter was identified deep in the pelvis and a window was made in the medial leaf of the broad ligament ensuring to stay well above the level of the ureter. The left ovarian vessels were then isolated, doubly clamped, cut and suture ligated. Attention was then turned to the right and the round ligament was isolated, transected with cautery and suture ligated. We extended the peritoneal incision over the right aspect of the psoas muscle. Right retroperitoneal exploration was performed. We identified the ureter deep in the pelvis. Well above that site, a window was made in the medial leaf of the broad ligament and the right ovarian vessels were then isolated, clamped x2, cut and suture ligated. The peritoneal incision over the lower uterine segment was incised. The vesicouterine peritoneum was then fully developed. The bladder was retracted to the level of the upper vagina. Bilateral uterine arteries were then isolated. The bilateral uterine arteries were then clamped in a serial manner. We then cut and suture ligated with 0 Vicryl. The bilateral cardinal ligaments were next clamped and cut and suture ligated with 0  Vicryl. We proceeded until we could isolate out the uterosacral ligaments, which were similarly cut and suture ligated. The apex of the vagina was then clamped with a kocher clamp and the scissors was used to perform the colpotomy and the full cervix was resected along with the uterus. Betadine was used to clean the vaginal cuff. The vaginal cuff was now grasped and suture ligated with a three figure of 8 stitches using 0 Vicryl suture with excellent reapproximation and good hemostasis. The pelvis was irrigated and noted to be hemostatic.     Sponge, lap, instrument and needle counts were reported as correct before and after gyn onc portion of the procedure. Dr. Olea was scrubbed and present for the entire procedure. The colorectal team was then paged to resume the remainder of their portion of the procedure.     Sigifredo Mcclelland MD  OB/GYN PGY-4  04/06/23 8:07 AM    Attending Attestation:  I was present and scrubbed for the entire surgical procedure.  I have reviewed and edited above note and agree with findings as documented.    Sunitha Olea MD  Gynecologic Oncology  [facility]

## 2023-04-07 ENCOUNTER — APPOINTMENT (OUTPATIENT)
Dept: OCCUPATIONAL THERAPY | Facility: CLINIC | Age: 68
DRG: 330 | End: 2023-04-07
Attending: PHYSICIAN ASSISTANT
Payer: MEDICARE

## 2023-04-07 LAB
ANION GAP SERPL CALCULATED.3IONS-SCNC: 12 MMOL/L (ref 7–15)
ANION GAP SERPL CALCULATED.3IONS-SCNC: 14 MMOL/L (ref 7–15)
BUN SERPL-MCNC: 22.8 MG/DL (ref 8–23)
BUN SERPL-MCNC: 23.1 MG/DL (ref 8–23)
CALCIUM SERPL-MCNC: 8 MG/DL (ref 8.8–10.2)
CALCIUM SERPL-MCNC: 8.1 MG/DL (ref 8.8–10.2)
CHLORIDE SERPL-SCNC: 108 MMOL/L (ref 98–107)
CHLORIDE SERPL-SCNC: 110 MMOL/L (ref 98–107)
CREAT SERPL-MCNC: 1.43 MG/DL (ref 0.51–0.95)
CREAT SERPL-MCNC: 1.63 MG/DL (ref 0.51–0.95)
DEPRECATED HCO3 PLAS-SCNC: 18 MMOL/L (ref 22–29)
DEPRECATED HCO3 PLAS-SCNC: 19 MMOL/L (ref 22–29)
ERYTHROCYTE [DISTWIDTH] IN BLOOD BY AUTOMATED COUNT: 15.6 % (ref 10–15)
ERYTHROCYTE [DISTWIDTH] IN BLOOD BY AUTOMATED COUNT: 15.7 % (ref 10–15)
GFR SERPL CREATININE-BSD FRML MDRD: 34 ML/MIN/1.73M2
GFR SERPL CREATININE-BSD FRML MDRD: 40 ML/MIN/1.73M2
GLUCOSE BLDC GLUCOMTR-MCNC: 139 MG/DL (ref 70–99)
GLUCOSE SERPL-MCNC: 139 MG/DL (ref 70–99)
GLUCOSE SERPL-MCNC: 145 MG/DL (ref 70–99)
HCT VFR BLD AUTO: 24.9 % (ref 35–47)
HCT VFR BLD AUTO: 25.6 % (ref 35–47)
HGB BLD-MCNC: 7.5 G/DL (ref 11.7–15.7)
HGB BLD-MCNC: 7.7 G/DL (ref 11.7–15.7)
MAGNESIUM SERPL-MCNC: 1.8 MG/DL (ref 1.7–2.3)
MCH RBC QN AUTO: 26.7 PG (ref 26.5–33)
MCH RBC QN AUTO: 27.2 PG (ref 26.5–33)
MCHC RBC AUTO-ENTMCNC: 29.3 G/DL (ref 31.5–36.5)
MCHC RBC AUTO-ENTMCNC: 30.9 G/DL (ref 31.5–36.5)
MCV RBC AUTO: 88 FL (ref 78–100)
MCV RBC AUTO: 91 FL (ref 78–100)
PLATELET # BLD AUTO: 248 10E3/UL (ref 150–450)
PLATELET # BLD AUTO: 272 10E3/UL (ref 150–450)
POTASSIUM SERPL-SCNC: 4.8 MMOL/L (ref 3.4–5.3)
POTASSIUM SERPL-SCNC: 5.2 MMOL/L (ref 3.4–5.3)
RBC # BLD AUTO: 2.81 10E6/UL (ref 3.8–5.2)
RBC # BLD AUTO: 2.83 10E6/UL (ref 3.8–5.2)
SARS-COV-2 RNA RESP QL NAA+PROBE: NEGATIVE
SODIUM SERPL-SCNC: 139 MMOL/L (ref 136–145)
SODIUM SERPL-SCNC: 142 MMOL/L (ref 136–145)
WBC # BLD AUTO: 9 10E3/UL (ref 4–11)
WBC # BLD AUTO: 9 10E3/UL (ref 4–11)

## 2023-04-07 PROCEDURE — 85027 COMPLETE CBC AUTOMATED: CPT

## 2023-04-07 PROCEDURE — 250N000011 HC RX IP 250 OP 636: Performed by: ANESTHESIOLOGY

## 2023-04-07 PROCEDURE — 258N000003 HC RX IP 258 OP 636: Performed by: PHARMACIST

## 2023-04-07 PROCEDURE — U0005 INFEC AGEN DETEC AMPLI PROBE: HCPCS | Performed by: SURGERY

## 2023-04-07 PROCEDURE — 250N000013 HC RX MED GY IP 250 OP 250 PS 637: Performed by: PHYSICIAN ASSISTANT

## 2023-04-07 PROCEDURE — 36415 COLL VENOUS BLD VENIPUNCTURE: CPT | Performed by: PHARMACIST

## 2023-04-07 PROCEDURE — 97530 THERAPEUTIC ACTIVITIES: CPT | Mod: GO

## 2023-04-07 PROCEDURE — 250N000013 HC RX MED GY IP 250 OP 250 PS 637: Performed by: PHARMACIST

## 2023-04-07 PROCEDURE — 36415 COLL VENOUS BLD VENIPUNCTURE: CPT

## 2023-04-07 PROCEDURE — 250N000011 HC RX IP 250 OP 636: Performed by: PHARMACIST

## 2023-04-07 PROCEDURE — 80048 BASIC METABOLIC PNL TOTAL CA: CPT

## 2023-04-07 PROCEDURE — 97535 SELF CARE MNGMENT TRAINING: CPT | Mod: GO

## 2023-04-07 PROCEDURE — 83735 ASSAY OF MAGNESIUM: CPT | Performed by: PHARMACIST

## 2023-04-07 PROCEDURE — 97165 OT EVAL LOW COMPLEX 30 MIN: CPT | Mod: GO

## 2023-04-07 PROCEDURE — 85027 COMPLETE CBC AUTOMATED: CPT | Performed by: PHARMACIST

## 2023-04-07 PROCEDURE — 82310 ASSAY OF CALCIUM: CPT | Performed by: PHARMACIST

## 2023-04-07 PROCEDURE — 99024 POSTOP FOLLOW-UP VISIT: CPT | Performed by: OBSTETRICS & GYNECOLOGY

## 2023-04-07 PROCEDURE — 258N000003 HC RX IP 258 OP 636: Performed by: PHYSICIAN ASSISTANT

## 2023-04-07 PROCEDURE — 120N000002 HC R&B MED SURG/OB UMMC

## 2023-04-07 RX ORDER — ACETAMINOPHEN 325 MG/1
975 TABLET ORAL EVERY 8 HOURS
Status: DISCONTINUED | OUTPATIENT
Start: 2023-04-07 | End: 2023-04-11 | Stop reason: HOSPADM

## 2023-04-07 RX ORDER — ACETAMINOPHEN 325 MG/1
975 TABLET ORAL EVERY 8 HOURS
Status: DISCONTINUED | OUTPATIENT
Start: 2023-04-07 | End: 2023-04-07

## 2023-04-07 RX ORDER — GABAPENTIN 100 MG/1
100 CAPSULE ORAL AT BEDTIME
Status: DISCONTINUED | OUTPATIENT
Start: 2023-04-07 | End: 2023-04-11 | Stop reason: HOSPADM

## 2023-04-07 RX ORDER — ONDANSETRON 4 MG/1
4 TABLET, ORALLY DISINTEGRATING ORAL EVERY 6 HOURS PRN
Status: DISCONTINUED | OUTPATIENT
Start: 2023-04-07 | End: 2023-04-11 | Stop reason: HOSPADM

## 2023-04-07 RX ORDER — ONDANSETRON 2 MG/ML
4 INJECTION INTRAMUSCULAR; INTRAVENOUS EVERY 6 HOURS PRN
Status: DISCONTINUED | OUTPATIENT
Start: 2023-04-07 | End: 2023-04-11 | Stop reason: HOSPADM

## 2023-04-07 RX ORDER — LIDOCAINE 40 MG/G
CREAM TOPICAL
Status: DISCONTINUED | OUTPATIENT
Start: 2023-04-07 | End: 2023-04-11 | Stop reason: HOSPADM

## 2023-04-07 RX ORDER — HYDRALAZINE HYDROCHLORIDE 20 MG/ML
10 INJECTION INTRAMUSCULAR; INTRAVENOUS EVERY 4 HOURS PRN
Status: DISCONTINUED | OUTPATIENT
Start: 2023-04-07 | End: 2023-04-11 | Stop reason: HOSPADM

## 2023-04-07 RX ORDER — ACETAMINOPHEN 325 MG/1
650 TABLET ORAL EVERY 4 HOURS PRN
Status: DISCONTINUED | OUTPATIENT
Start: 2023-04-09 | End: 2023-04-10

## 2023-04-07 RX ORDER — ENOXAPARIN SODIUM 100 MG/ML
40 INJECTION SUBCUTANEOUS EVERY 24 HOURS
Status: DISCONTINUED | OUTPATIENT
Start: 2023-04-07 | End: 2023-04-11 | Stop reason: HOSPADM

## 2023-04-07 RX ADMIN — SODIUM CHLORIDE, POTASSIUM CHLORIDE, SODIUM LACTATE AND CALCIUM CHLORIDE: 600; 310; 30; 20 INJECTION, SOLUTION INTRAVENOUS at 02:12

## 2023-04-07 RX ADMIN — ACETAMINOPHEN 975 MG: 325 TABLET, FILM COATED ORAL at 09:57

## 2023-04-07 RX ADMIN — ACETAMINOPHEN 975 MG: 325 TABLET ORAL at 02:14

## 2023-04-07 RX ADMIN — GABAPENTIN 100 MG: 100 CAPSULE ORAL at 02:15

## 2023-04-07 RX ADMIN — OXYCODONE HYDROCHLORIDE 5 MG: 5 TABLET ORAL at 05:18

## 2023-04-07 RX ADMIN — SODIUM CHLORIDE, POTASSIUM CHLORIDE, SODIUM LACTATE AND CALCIUM CHLORIDE: 600; 310; 30; 20 INJECTION, SOLUTION INTRAVENOUS at 12:50

## 2023-04-07 RX ADMIN — OXYCODONE HYDROCHLORIDE 5 MG: 5 TABLET ORAL at 20:22

## 2023-04-07 RX ADMIN — FENTANYL CITRATE 25 MCG: 50 INJECTION, SOLUTION INTRAMUSCULAR; INTRAVENOUS at 01:48

## 2023-04-07 RX ADMIN — ENOXAPARIN SODIUM 40 MG: 40 INJECTION SUBCUTANEOUS at 12:50

## 2023-04-07 RX ADMIN — OXYCODONE HYDROCHLORIDE 5 MG: 5 TABLET ORAL at 14:50

## 2023-04-07 RX ADMIN — GABAPENTIN 100 MG: 100 CAPSULE ORAL at 21:56

## 2023-04-07 RX ADMIN — FENTANYL CITRATE 25 MCG: 50 INJECTION, SOLUTION INTRAMUSCULAR; INTRAVENOUS at 02:15

## 2023-04-07 RX ADMIN — OXYCODONE HYDROCHLORIDE 5 MG: 5 TABLET ORAL at 00:10

## 2023-04-07 RX ADMIN — FENTANYL CITRATE 50 MCG: 50 INJECTION, SOLUTION INTRAMUSCULAR; INTRAVENOUS at 00:19

## 2023-04-07 RX ADMIN — OXYCODONE HYDROCHLORIDE 2.5 MG: 5 TABLET ORAL at 09:57

## 2023-04-07 RX ADMIN — ACETAMINOPHEN 975 MG: 325 TABLET, FILM COATED ORAL at 20:23

## 2023-04-07 ASSESSMENT — ACTIVITIES OF DAILY LIVING (ADL)
ADLS_ACUITY_SCORE: 22
ADLS_ACUITY_SCORE: 20
ADLS_ACUITY_SCORE: 22
ADLS_ACUITY_SCORE: 20
ADLS_ACUITY_SCORE: 28
ADLS_ACUITY_SCORE: 20
ADLS_ACUITY_SCORE: 22
ADLS_ACUITY_SCORE: 20
ADLS_ACUITY_SCORE: 20
PREVIOUS_RESPONSIBILITIES: MEAL PREP;HOUSEKEEPING;LAUNDRY;SHOPPING;MEDICATION MANAGEMENT;FINANCES;DRIVING
ADLS_ACUITY_SCORE: 20
ADLS_ACUITY_SCORE: 28
ADLS_ACUITY_SCORE: 20

## 2023-04-07 NOTE — PROGRESS NOTES
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 Feels better this morning than she did right after anesthesia. Pain mostly well controlled overnight, receiving some pain medications now for more pain this morning. Able to rest overnight. Has not sat or stood up yet. Denies any light headed or dizziness at rest. Gleason in place. Tolerated some ice chips. Denies CP, SOB.     O:  Vitals:    04/07/23 0300 04/07/23 0400 04/07/23 0500 04/07/23 0515   BP: (!) 86/60 95/56 106/52    BP Location:       Pulse: 94 94 96 101   Resp: 16 18 20 21   Temp:  98.3  F (36.8  C)     TempSrc:  Oral     SpO2: 99% 97% 97% 97%   Weight:       Height:         Gen: alert and oriented, resting in bed  Cardio: rrr, nl s1 and s2, no m/r/g  Resp: lungs CTAB, no wheezes or crackles. on 1L NC   Abdomen: soft, appropriately tender to palpation  Incision: VML with steris, intact  : No blood on chux pad or at perineum, gleason in place with yellow urine  Extremities: BLEs non-tender with SCDs in place     I/O last 3 completed shifts:  In: 8624.44 [I.V.:8024.44]  Out: 1415 [Urine:1115; Blood:300]     UOP: 30cc/hr before 10pm, then 50-70cc/hr (recorded as 100cc/hr however RN clarified this was over 2 hours) this is 0.58cc/kg/hr     A: 67 year old POD#1 s/p dx lsc > open total colectomy, ileorectal anastomosis, procto, ALEXANDER, BSO.     Periop course complicated by baseline anemia chronic disease s/p 2U RBC tranfusion. Post op course complicated by hypotension s/p colloid resuscitation. Concern for possible post operative bleeding given hypotension, Hgb drop overnight however  overall is asymptomatic, continues to have adequate UOP. No evidence of vaginal bleeding on exam. Agree with trending Hgb.    Recommendations:   - No evidence of significant vaginal bleeding contributing to hypotension on exam   - Agree with trending Hgb  - Normal to have some post operative vaginal spotting, if bleeding heavily please place pads and measure pad weights and # pads changed/hour. If soaking >1 pad per hour please page GYN ONC team for assessment  - Post op restrictions: Nothing in vagina for 2 months post hysterectomy  - Follow up post operatively with Dr. Olea     GYN ONC team will continue to follow patient post operatively     Lavonne Ellis MD PGY4  Obstetrics & Gynecology  04/06/23     Provider Disclosure:   I agree with above History, Review of Systems, Physical exam and Plan. I have reviewed the content of the documentation and have edited it as needed. I have personally performed the services documented here and the documentation accurately represents those services and the decisions I have made.     Electronically signed by:   Sunitha Olea MD   Gynecologic Oncology   [facility]

## 2023-04-07 NOTE — OR NURSING
Dr. GABRIELA Houston (General Surgery Resident) at bedside assessing Pt at 2300 and at 0400. It was noted that SBP has been labile in 80-100s. Urine output has improved. Pt reports pain is tolerable. Dr. Houston had called x2 to make sure stat labs would be drawn as the first  was not able to get blood drawn and there was a delay in lab sending another tech to draw them.

## 2023-04-07 NOTE — PLAN OF CARE
[id number]    T 97.1, HR 95, /92, RR 19, O2 92% on room air.    Pt transferred from PACU @ 1200. A&Ox4, pleasant. VSS on room air. Assist of 1 with gb/walker. Up in chair x1 on shift. Voiding spontaneously, no BM on shift, urine output x1 but unmeasured d/t not having hat in toilet. Tolerating clear liquid diet, denies nausea. PRN oxycodone given x1 for generalized abdominal pain. Midline incision and lap sites x4 WDL, liquid bandaged and VJ. Abdominal binder in place for comfort. (L) hand infusing LR @ 75mL/hr.

## 2023-04-07 NOTE — OR NURSING
Dr. JUSTYNA Ellis (OB/GYN Resident) at bedside assessing Pt and checking to see if there was any vaginal bleeding. No bleeding noted. Right periferal IV infiltrated, had is swollen and Pt states it feels stiff. Pt denies pain. Pt had LR running. PIV removed.

## 2023-04-07 NOTE — PROGRESS NOTES
BRIEF SURGERY NOTE    After checking on the pt throughout the night, her UOP is currently around 0.7 mL/kg/hr, which is appropriate. Her BP still appear to be somewhat labile looking at the record on her bedside tele monitor however, her last BP was around 100 systolic w/ a MAP around 70. Her repeat labs tonight are within normal limits. She has a 1.7 hgb drop from 9.4 to 7.7, not unexpected postoperatively.     Will obtain repeat AM labs   Pt appears to have begun responding to initial resuscitation   No further action warranted at this time.     Please page if questions,     Hebert Houston MD  Surgical Resident  #[id number]

## 2023-04-07 NOTE — OR NURSING
Clement catheter removed per dr's order. Patient is now DTV.  Patient was also assisted to ambulate to restroom to have a bowel movement, grisel care provided. Then assisted to ambulate back to room and positioned into recliner at bedside. Reconnected to oxygnen at 1L/NC, pulse ox, CAPNO monitor and blood pressure cuff. Patient tolerated ambulating well.  Bed linens were also changed.

## 2023-04-07 NOTE — PROGRESS NOTES
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 Having some pain, just received pain medication. Had some sips of water/ice chips. Has not sat up or ambulated. Clement in place. Denies CP, SOB, light headed or dizziness.     O:  Vitals:    04/06/23 2138 04/06/23 2145 04/06/23 2200 04/06/23 2215   BP: 92/70 99/53 (!) 88/52 95/48   BP Location:       Cuff Size:       Pulse: 96 97 95 93   Resp: 15 17 17 16   Temp:   98.5  F (36.9  C)    TempSrc:   Oral    SpO2: 100% 100% 100% 100%   Weight:       Height:         Gen: alert and oriented, resting in bed, tired  Cardio: rrr, nl s1 and s2, no m/r/g  Resp: lungs CTAB, no wheezes or crackles. on 3L NC   Abdomen: soft, appropriately tender to palpation  Incision: VML with steris, intact  : No blood on chux pad  Extremities: BLEs non-tender with SCDs in place     I/O last 3 completed shifts:  In: 7824.44 [I.V.:7224.44]  Out: 530 [Urine:510; Blood:20]   UOP 30cc/hr       A: 67 year old POD#0 s/p dx lsc > open total colectomy, ileorectal anastomosis, procto, ALEXANDER, BSO.     Periop course complicated by baseline anemia chronic disease s/p 2U RBC tranfusion. Post op course complicated by hypotension s/p colloid resuscitation.     Recommendations:   - No evidence of significant vaginal bleeding contributing to hypotension, now improved with resuscitation  - Normal to have some post operative vaginal spotting, if bleeding heavily please place pads and measure pad weights and # pads changed/hour. If soaking >1 pad per hour please page GYN ONC team for assessment  - Post op restrictions: Nothing in vagina for 2 months post hysterectomy  - Follow up post operatively with Dr. Olea     GYN ONC team will continue to follow patient post  operatively     Lavonne Ellis MD PGY4  Obstetrics & Gynecology  04/06/23

## 2023-04-07 NOTE — OP NOTE
OPERATIVE REPORT    DATE: 4/6/2023    SURGEON: Jerome Ashley MD    ASSISTANT: Betty Reeves MD    PREOPERATIVE DIAGNOSIS: Malignant neoplasm of sigmoid colon, Loss of expression of MLH1 and PMS2    POSTOPERATIVE DIAGNOSIS: Same    PROCEDURES: Total abdominal colectomy with ileorectal Baker's anastomosis, partial omentectomy, flexible sigmoidoscopy. Total abdominal hysterectomy, bilateral salpingo-oophorectomy.    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 She has indication for total abdominal colectomy with ileorectal anastomosis. The risks and benefits of surgery were thoroughly discussed with the patient and she agreed to proceed. She was also counseled by gynecology oncology regarding risk reducing hysterectomy and bilateral salpingo-oophorectomy to be completed in the same operation which she was interested in pursuing.     FINDINGS: Hepatic flexure and sigmoid colon tumors. No distant peritoneal metastatic disease. Terminal ileum with inflammation and creeping fat. Ileorectal bakers anastomosis created with negative leak test.       DESCRIPTION OF PROCEDURE: The patient was brought to the operating room, placed supine on the operating table. General endotracheal anesthesia was induced. Clement catheter was placed. She was placed in modified lithotomy position with Yellofin stirrups carefully secured to the table. The abdomen was prepped and draped in the usual sterile fashion.     We began the procedure by placing a 12 mm supraumbilical port under direct visualization with Smith technique. There were no injuries upon entry into the abdomen. Under direct visualization, four additional 5 mm ports were placed in the right lower quadrant, right upper  quadrant, left lower quadrant and right lower quadrant. Findings were as noted above.     We performed a lateral to medial dissection of the left colon, opening the retroperitoneal plane. The dissection was then continued proximally taking sigmoid, descending attachments, followed by the splenic flexure and getting into the lesser sac and dividing the transverse colon from the omentum.     We then performed a midline laparotomy in order to complete the colectomy. A wound protector was placed and Bookwalter retractor system used. The omentum had partially been divided laparoscopically and a portion of this was hanging by a narrow stalk so it was excised and specimen sent to pathology.    We then divided the inferior mesenteric artery and developed the retroperitoneal place in the left upper quadrant and left lateral abdomen. This was carried fully to the right side of the transverse colon. After this, we proceeded to divide the proximal rectum with a contour stapler 5 cm below the tumor.    Then we took the mesentery with ligasure device, dividing the descending colon mesentery followed by the splenic flexure and most of the left transverse colon mesentery.     Satisfied with this, we proceeded with the right colon dissection. The lateral dissection was performed on the ascending colon and hepatic flexure in right upper quadrant, and then the ileal attachments were divided. We identified the ileocolic vessels, defined the retroperitoneal plane, pushed the duodenum away, and extended the dissection to the right upper quadrant and right lateral abdomen. The ileocolic vessels were isolated and suture ligated.      At this point, we had only the mesentery of the hepatic flexure and part of the transverse colon left to divide. There were taken successfully with good hemostasis, staying clear of the duodenum. We then divided the ileum just proximal to the ileocecal valve with 75 mm blue load WILEY stapler. The colon specimen  was sent to pathology for open and return. This confirmed hepatic flexure and sigmoid colon tumors.    Then Dr. Olea and gynecology oncology team proceeded to perform hysterectomy and bilateral salpingo-oophorectomy. After this was completed, our team returned to complete the ileorectal anastomosis.     The terminal ileum was thickened and had creeping fat consistent with known diagnosis of Crohn's. The distal most 10 cm of terminal ileum was resected due to concern that it was not healthy bowel for anastomosis. The edge of the ileal mesentery was oversewn with 0 chromic suture. The rectum was also mobilized posteriorly along the TME plane to set up for anastomosis.     We then proceeded with our ileorectal anastomosis with a Baker's side-to-end configuration using a 29 mm EEA stapler. This was successful, and the orientation of the ileum was confirmed. A flexible sigmoidoscopy was performed and anastomosis was submerged and the ileum proximally occluded. This demonstrated a health anastomosis with no bleeding which was airtight. The anastomosis was at 20 cm from the anal verge.     The abdomen was irrigated out with 2 liters of sterile water. The small bowel was ran and no enterotomies identified. Hemostasis was confirmed. The fascia was closed with running 0 looped PDS suture. Subcutaneous tissue was irrigated out. The skin closed with 4-0 Monocryl subcuticular suture followed by the application of Dermabond.     The patient was emerged from general endotracheal anesthesia, taken postoperative anesthesia care unit in good condition. All instruments and sponge counts were correct x2 at the end the case. Dr. Aslhey was present for the entire case.    Betty Sneed MD  General Surgery PGY-7

## 2023-04-07 NOTE — PROGRESS NOTES
"   04/07/23 1422   Appointment Info   Signing Clinician's Name / Credentials (OT) Anabelle Marc, OTR/L   Rehab Comments (OT) abdominal precautions   Living Environment   People in Home spouse;child(joe), adult  (ason 34 yrs staying with pt and )   Current Living Arrangements house   Home Accessibility stairs to enter home   Number of Stairs, Main Entrance 4   Transportation Anticipated family or friend will provide   Living Environment Comments Py reports one-level home with , walk-in shower combination, no AD baseline   Self-Care   Usual Activity Tolerance excellent   Current Activity Tolerance good   Regular Exercise Yes   Activity/Exercise Type walking   Exercise Amount/Frequency 2 times/wk   Equipment Currently Used at Home none   Fall history within last six months no   Instrumental Activities of Daily Living (IADL)   Previous Responsibilities meal prep;housekeeping;laundry;shopping;medication management;finances;driving   IADL Comments Pt reports IND all IADLs at baseline   General Information   Onset of Illness/Injury or Date of Surgery 04/06/23   Referring Physician Arlet Mckeon PA-C   Patient/Family Therapy Goal Statement (OT) to return home   Additional Occupational Profile Info/Pertinent History of Current Problem Per EMR, \"This is a 67 year old female PMH HTN, Crohns Colitis found to have 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  Now s/p Total abdominal colectomy w/ ileorectal bakers anastomosis; [facility]-BSO on 4/6/2023.\"   Existing Precautions/Restrictions abdominal   Left Upper Extremity (Weight-bearing Status) partial weight-bearing (PWB)  (10lb lifting restriction)   Right Upper Extremity (Weight-bearing Status) partial weight-bearing (PWB)  (10lb lifting restriction)   Left Lower Extremity (Weight-bearing Status) full weight-bearing (FWB)   Right Lower Extremity (Weight-bearing Status) full weight-bearing (FWB)   General Observations and Info " Activity: amb. w assist QID   Cognitive Status Examination   Orientation Status orientation to person, place and time   Visual Perception   Visual Impairment/Limitations corrective lenses for reading   Sensory   Sensory Quick Adds sensation intact   Pain Assessment   Patient Currently in Pain Yes, see Vital Sign flowsheet   Posture   Posture not impaired   Range of Motion Comprehensive   General Range of Motion no range of motion deficits identified   Strength Comprehensive (MMT)   General Manual Muscle Testing (MMT) Assessment no strength deficits identified   Bed Mobility   Comment (Bed Mobility) CGA   Transfers   Transfer Comments CGA   Activities of Daily Living   BADL Assessment/Intervention bathing;upper body dressing;lower body dressing;grooming;toileting   Bathing Assessment/Intervention   [place] Level (Bathing) minimum assist (75% patient effort)   Comment, (Bathing) anticipate per clinical judgement   Assistive Devices (Bathing) shower chair   Upper Body Dressing Assessment/Training   Comment, (Upper Body Dressing) anticipate per clinical judgement   Lower Body Dressing Assessment/Training   Comment, (Lower Body Dressing) anticipate per clinical judgement   [place] Level (Lower Body Dressing) minimum assist (75% patient effort)   Grooming Assessment/Training   [place] Level (Grooming) contact guard assist;set up   Comment, (Grooming) anticipate per clinical judgement   Toileting   Comment, (Toileting) anticipate per clinical judgement   [place] Level (Toileting) minimum assist (75% patient effort)   Clinical Impression   Criteria for Skilled Therapeutic Interventions Met (OT) Yes, treatment indicated   OT Diagnosis decreaxsed ADL/IADL safety and ind 2/2 post-op precautions   OT Problem List-Impairments impacting ADL problems related to;activity tolerance impaired;mobility;post-surgical precautions;pain   Assessment of Occupational Performance 3-5 Performance Deficits   Identified  Performance Deficits toileting, dressing, g/hygiene, bathing, functional mobility/transfers   Planned Therapy Interventions (OT) ADL retraining;IADL retraining;transfer training;home program guidelines;progressive activity/exercise   Clinical Decision Making Complexity (OT) low complexity   Anticipated Equipment Needs Upon Discharge (OT)   (TBD)   Risk & Benefits of therapy have been explained evaluation/treatment results reviewed;care plan/treatment goals reviewed;participants included;patient   Clinical Impression Comments Pt will benefit from skilled IP OT to maximize safety and IND completing ADL/IADL within post op precautions through modification/adaptaions, strength, and endurance training   OT Total Evaluation Time   OT Eval, Low Complexity Minutes (71251) 5   OT Goals   Therapy Frequency (OT) 6 times/wk   OT Predicted Duration/Target Date for Goal Attainment 04/10/23   OT Goals Hygiene/Grooming;Lower Body Dressing;Lower Body Bathing;Toilet Transfer/Toileting;OT Goal 1   OT: Hygiene/Grooming independent   OT: Lower Body Dressing Independent   OT: Lower Body Bathing Independent   OT: Toilet Transfer/Toileting Independent   OT: Goal 1 Pt will demosntrate IND and safe negotiation of >4 stairs to simulating entering home to facilitate safe discharge within 1-2 sessions.   Interventions   Interventions Quick Adds Therapeutic Activity   Self-Care/Home Management   Self-Care/Home Mgmt/ADL, Compensatory, Meal Prep Minutes (09658) 13   Therapeutic Activities   Therapeutic Activity Minutes (23189) 10   OT Discharge Planning   OT Plan stairs, LB dressing, review precautions   OT Discharge Recommendation (DC Rec) home with assist   OT Rationale for DC Rec Pt near IND ADL/IADL baseline and mobility, anticipate once medically ready will safely discharge home with assist from spouse as needed with heavy IADL completion.   OT Brief overview of current status SBA IV pole, CGA toileting   Total Session Time   Timed Code  Treatment Minutes 23   Total Session Time (sum of timed and untimed services) 28

## 2023-04-07 NOTE — PLAN OF CARE
Goal Outcome Evaluation:    Assumed care of patient 1500 - 1930. Calm and comfortable, declines pain medications. Not oob on this shift. VSS. Liquid bandage   Continue with POC.

## 2023-04-07 NOTE — OR NURSING
Pt sat on edge of bed without difficulty. Pt moved slow with the help of RN. Pt reported a small increase in abdominal pain but this was tolerable. Pt did mot want to stand or walk at this time but was informed that this will need to be done sooner rather than later. Pt agreeable to this.

## 2023-04-07 NOTE — PROGRESS NOTES
Colorectal Surgery Progress Note  [facility]  POD#1      Subjective:  Doing ok.  Sat up already.  No N/V.  Has not done IS yet.        Vitals:  Vitals:    04/07/23 0515 04/07/23 0600 04/07/23 0700 04/07/23 0752   BP:  99/48 103/60    BP Location:  Left arm     Pulse: 101 95 99 96   Resp: 21 20 22 19   Temp:   98.2  F (36.8  C)    TempSrc:   Oral    SpO2: 97% 98% 95% 98%   Weight:       Height:         I/O:  I/O last 3 completed shifts:  In: 06211.11 [P.O.:480; I.V.:9091.11]  Out: 1860 [Urine:1560; Blood:300]    Physical Exam:  Gen: AAOx3, NAD  Pulm: Non-labored breathing  Abd: Soft, mildly distended, appropriately tender, no guarding/rebound   Incision C/D/I   Ext:  Warm and well-perfused    BMP  Recent Labs   Lab 04/07/23  0646 04/07/23  0155 04/06/23  1653 04/06/23  1535 04/06/23  1336 04/05/23  1330 03/31/23  1209   NA  --  142 142 138 139   < > 139   POTASSIUM  --  5.2 4.8 4.7 4.7   < > 5.5*   CHLORIDE  --  110* 110*  --   --   --  112*   CO2  --  18* 18*  --   --   --  16*   BUN  --  23.1* 24.8*  --   --   --  27.9*   CR  --  1.43* 1.36*  --   --   --  1.39*   * 145* 183* 197* 164*   < > 124*    < > = values in this interval not displayed.     CBC  Recent Labs   Lab 04/07/23  0646 04/07/23  0155 04/06/23  1653 04/06/23  1535 04/06/23  0908 03/31/23  1209   WBC 9.0 9.0 8.8  --   --  8.4   HGB 7.5* 7.7* 9.4* 10.3*   < > 7.1*   HCT 25.6* 24.9* 30.7*  --   --  25.9*    248 322  --   --  360    < > = values in this interval not displayed.         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  Now s/p Total abdominal colectomy w/ ileorectal bakers anastomosis; [facility]-BSO on 4/6/2023.    Neuro/Pain: gabapentin, tylenol, low dose oxycodone, low dose IV dilaudid,   CV: hold lisinopril and triamterene - hydrochlorothiazide for today due to low BPs overnight.   PULM:  encourage IS.  GI/FEN:   - CLD  - IVF @ 75  - ambulate  - abdominal binder  : remove gleason today.  Creat 1.43 (most recent baseline ranges 1.2-1.6)  Heme:  Hgb stable 7.5-7.7 in setting of having received 8L fluids intra-op  ID: no acute concerns at this time  Endocrine: no acute concerns at this time    Activity: as tolerated.  Ppx: lovenox ppx  Dispo: 2-3 days pending ROBF, will need lovenox ppx x30 days on discharge      rAlet Mkceon PA-C ..................4/7/2023   8:12 AM  Colon and Rectal Surgery    Patient was seen and discussed with Dr. Quach    The above plan of care was performed and communicated to me by Dr. Ashley    I spent 35 minute face-to-face or coordinating care of Nadia Ladd. Over 50% of our time on the unit was spent counseling the patient and/or coordinating care as documented in the assessment and plan.     [id number] post op hospital visit

## 2023-04-07 NOTE — PROGRESS NOTES
"Post Op Check    04/07/2023    Nadia Ladd is a 67 year old female with h/o carcinoma of the sigmoid colon now POD#0 s/p Total colectomy, w/ total hysterectomy and bilat salpingoopherectomy.     I was notified by PACU nursing team about persistent hypotension and lack of UOP since return from the OR. She had received about 8 L of crystalloid and 2 units of RBCs in addition to a liter of fluids in the PACU and 2 units of albumin.     Pt reports some postoperative pain. Denies SOB, chest pain, or dizziness. Has not gotten out of bed. Denies nausea/vomiting. She describes a \"catching\" sensation when breathing that does cause some discomfort. UOP at the time of examination roughly 0.5 ml/kg/hr.    BP 95/48   Pulse 93   Temp 98.5  F (36.9  C) (Oral)   Resp 16   Ht 1.575 m (5' 2\")   Wt 86.5 kg (190 lb 11.2 oz)   LMP  (LMP Unknown)   SpO2 100%   BMI 34.88 kg/m      Gen:   General: Resting comfortably, appears to be in no acute distress. Interacts appropriately and is alert while doing so.   Cardiac: BP 83/52 w/ MAP 65, no tachycardia present.   Chest: breathing non-labored on NC  Abdomen: soft, appropriately tender and non-distended   Incision: clean, dry, intact    Discussed case with fellow, Dr. Quach, who agrees with plan as outlined.     A/P: Pt received total of 9 L of crystalloid in the operating room and in the PACU. She initially did not appear to be fluid responsive in the PACU with persistent hypotension. She is unlikely to benefit from further resuscitation w/ more crystalloid. We will continue to monitor her pressures and UOP. EKG unremarkable. Will order repeat BMP and CBC tonight. No need for vasopressors at this time. Please call with any questions.    Please page if questions,     Hebert Houston MD  Surgical Resident  #[id number]          "

## 2023-04-08 ENCOUNTER — APPOINTMENT (OUTPATIENT)
Dept: OCCUPATIONAL THERAPY | Facility: CLINIC | Age: 68
DRG: 330 | End: 2023-04-08
Attending: SURGERY
Payer: MEDICARE

## 2023-04-08 LAB
ANION GAP SERPL CALCULATED.3IONS-SCNC: 10 MMOL/L (ref 7–15)
BLD PROD TYP BPU: NORMAL
BLOOD COMPONENT TYPE: NORMAL
BUN SERPL-MCNC: 26.6 MG/DL (ref 8–23)
CALCIUM SERPL-MCNC: 8 MG/DL (ref 8.8–10.2)
CHLORIDE SERPL-SCNC: 105 MMOL/L (ref 98–107)
CODING SYSTEM: NORMAL
CREAT SERPL-MCNC: 1.64 MG/DL (ref 0.51–0.95)
CROSSMATCH: NORMAL
DEPRECATED HCO3 PLAS-SCNC: 20 MMOL/L (ref 22–29)
GFR SERPL CREATININE-BSD FRML MDRD: 34 ML/MIN/1.73M2
GLUCOSE BLDC GLUCOMTR-MCNC: 111 MG/DL (ref 70–99)
GLUCOSE SERPL-MCNC: 111 MG/DL (ref 70–99)
HGB BLD-MCNC: 6.5 G/DL (ref 11.7–15.7)
HGB BLD-MCNC: 8 G/DL (ref 11.7–15.7)
ISSUE DATE AND TIME: NORMAL
MAGNESIUM SERPL-MCNC: 1.9 MG/DL (ref 1.7–2.3)
POTASSIUM SERPL-SCNC: 4.3 MMOL/L (ref 3.4–5.3)
SODIUM SERPL-SCNC: 135 MMOL/L (ref 136–145)
UNIT ABO/RH: NORMAL
UNIT NUMBER: NORMAL
UNIT STATUS: NORMAL
UNIT TYPE ISBT: 9500

## 2023-04-08 PROCEDURE — 999N000147 HC STATISTIC PT IP EVAL DEFER

## 2023-04-08 PROCEDURE — 99024 POSTOP FOLLOW-UP VISIT: CPT | Performed by: OBSTETRICS & GYNECOLOGY

## 2023-04-08 PROCEDURE — 85018 HEMOGLOBIN: CPT | Performed by: PHARMACIST

## 2023-04-08 PROCEDURE — 36415 COLL VENOUS BLD VENIPUNCTURE: CPT | Performed by: PHYSICIAN ASSISTANT

## 2023-04-08 PROCEDURE — 999N000127 HC STATISTIC PERIPHERAL IV START W US GUIDANCE

## 2023-04-08 PROCEDURE — 82310 ASSAY OF CALCIUM: CPT | Performed by: PHYSICIAN ASSISTANT

## 2023-04-08 PROCEDURE — P9016 RBC LEUKOCYTES REDUCED: HCPCS | Performed by: PHARMACIST

## 2023-04-08 PROCEDURE — 999N000285 HC STATISTIC VASC ACCESS LAB DRAW WITH PIV START

## 2023-04-08 PROCEDURE — 250N000013 HC RX MED GY IP 250 OP 250 PS 637: Performed by: PHARMACIST

## 2023-04-08 PROCEDURE — 83735 ASSAY OF MAGNESIUM: CPT | Performed by: PHYSICIAN ASSISTANT

## 2023-04-08 PROCEDURE — 36415 COLL VENOUS BLD VENIPUNCTURE: CPT | Performed by: PHARMACIST

## 2023-04-08 PROCEDURE — 250N000011 HC RX IP 250 OP 636: Performed by: PHARMACIST

## 2023-04-08 PROCEDURE — 250N000013 HC RX MED GY IP 250 OP 250 PS 637: Performed by: PHYSICIAN ASSISTANT

## 2023-04-08 PROCEDURE — 85018 HEMOGLOBIN: CPT | Performed by: PHYSICIAN ASSISTANT

## 2023-04-08 PROCEDURE — 97535 SELF CARE MNGMENT TRAINING: CPT | Mod: GO

## 2023-04-08 PROCEDURE — 97530 THERAPEUTIC ACTIVITIES: CPT | Mod: GO

## 2023-04-08 PROCEDURE — 120N000002 HC R&B MED SURG/OB UMMC

## 2023-04-08 RX ORDER — NALOXONE HYDROCHLORIDE 0.4 MG/ML
0.2 INJECTION, SOLUTION INTRAMUSCULAR; INTRAVENOUS; SUBCUTANEOUS
Status: DISCONTINUED | OUTPATIENT
Start: 2023-04-08 | End: 2023-04-11 | Stop reason: HOSPADM

## 2023-04-08 RX ORDER — SODIUM CHLORIDE 9 MG/ML
INJECTION, SOLUTION INTRAVENOUS
Status: DISPENSED
Start: 2023-04-08 | End: 2023-04-08

## 2023-04-08 RX ORDER — NALOXONE HYDROCHLORIDE 0.4 MG/ML
0.4 INJECTION, SOLUTION INTRAMUSCULAR; INTRAVENOUS; SUBCUTANEOUS
Status: DISCONTINUED | OUTPATIENT
Start: 2023-04-08 | End: 2023-04-11 | Stop reason: HOSPADM

## 2023-04-08 RX ORDER — FUROSEMIDE 10 MG/ML
10 INJECTION INTRAMUSCULAR; INTRAVENOUS ONCE
Status: COMPLETED | OUTPATIENT
Start: 2023-04-08 | End: 2023-04-08

## 2023-04-08 RX ADMIN — OXYCODONE HYDROCHLORIDE 5 MG: 5 TABLET ORAL at 10:12

## 2023-04-08 RX ADMIN — OXYCODONE HYDROCHLORIDE 5 MG: 5 TABLET ORAL at 21:26

## 2023-04-08 RX ADMIN — ACETAMINOPHEN 975 MG: 325 TABLET, FILM COATED ORAL at 10:12

## 2023-04-08 RX ADMIN — ENOXAPARIN SODIUM 40 MG: 40 INJECTION SUBCUTANEOUS at 12:47

## 2023-04-08 RX ADMIN — OXYCODONE HYDROCHLORIDE 5 MG: 5 TABLET ORAL at 01:53

## 2023-04-08 RX ADMIN — GABAPENTIN 100 MG: 100 CAPSULE ORAL at 21:26

## 2023-04-08 RX ADMIN — ACETAMINOPHEN 975 MG: 325 TABLET, FILM COATED ORAL at 18:54

## 2023-04-08 RX ADMIN — FUROSEMIDE 10 MG: 10 INJECTION, SOLUTION INTRAVENOUS at 10:16

## 2023-04-08 RX ADMIN — OXYCODONE HYDROCHLORIDE 5 MG: 5 TABLET ORAL at 14:27

## 2023-04-08 RX ADMIN — ACETAMINOPHEN 975 MG: 325 TABLET, FILM COATED ORAL at 01:52

## 2023-04-08 ASSESSMENT — ACTIVITIES OF DAILY LIVING (ADL)
ADLS_ACUITY_SCORE: 28
ADLS_ACUITY_SCORE: 25
ADLS_ACUITY_SCORE: 25
ADLS_ACUITY_SCORE: 28
ADLS_ACUITY_SCORE: 28

## 2023-04-08 NOTE — PROGRESS NOTES
Colorectal Surgery Progress Note  [facility]  POD#2      Subjective:  Feeling better today. No nausea/vomiting. (+) flatus and BMs. Ambulated x1.    Vitals:  Vitals:    04/08/23 0602 04/08/23 1122 04/08/23 1142 04/08/23 1145   BP: 94/42 95/46 (!) 87/47 (!) 86/51   BP Location: Right arm      Patient Position:       Cuff Size:       Pulse: 101 94 89    Resp: 17 18 18    Temp: 99.2  F (37.3  C) 96.8  F (36  C) 96.9  F (36.1  C)    TempSrc: Temporal Oral Oral    SpO2: 95% 97% 96%    Weight:       Height:         I/O:  I/O last 3 completed shifts:  In: 1560 [P.O.:1560]  Out: 110 [Urine:110]    Physical Exam:  Gen: AAOx3, NAD  Pulm: Non-labored breathing  Abd: Soft, mildly distended, appropriately tender, no guarding/rebound   Incision C/D/I   Ext:  Warm and well-perfused    BMP  Recent Labs   Lab 04/08/23  0759 04/08/23  0601 04/07/23  0646 04/07/23  0155 04/06/23  1653   *  --  139 142 142   POTASSIUM 4.3  --  4.8 5.2 4.8   CHLORIDE 105  --  108* 110* 110*   CO2 20*  --  19* 18* 18*   BUN 26.6*  --  22.8 23.1* 24.8*   CR 1.64*  --  1.63* 1.43* 1.36*   * 111* 139*  139* 145* 183*   MAG 1.9  --  1.8  --   --      CBC  Recent Labs   Lab 04/08/23  0759 04/07/23  0646 04/07/23  0155 04/06/23  1653   WBC  --  9.0 9.0 8.8   HGB 6.5* 7.5* 7.7* 9.4*   HCT  --  25.6* 24.9* 30.7*   PLT  --  272 248 322         ASSESSMENT: This is a 67 year old female PMH HTN, Crohns Colitis found to have 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  Now s/p Total abdominal colectomy w/ ileorectal bakers anastomosis; [facility]-BSO on 4/6/2023.    Neuro/Pain: gabapentin, tylenol, low dose oxycodone, low dose IV dilaudid,   CV: hold lisinopril and triamterene - hydrochlorothiazide, BP remains soft  PULM: encourage IS.  GI/FEN:   - Low fiber diet  - Stop IV fluids  - ambulate  - abdominal binder  : Voiding spontaneously  Heme:  Hgb 6.5 from 7.5. Giving 1 unit PRBC + 10mg lasix.  Will recheck hgb this afternoon.  ID: no acute concerns at this time  Endocrine: no acute concerns at this time    Activity: as tolerated.  Ppx: lovenox ppx  Dispo: 1-2 days pending hgb stabilization, will need lovenox ppx x30 days on discharge    Eli Burgos MD, PharmD  General Surgery, PGY1  Pager [phone]      Patient was seen and discussed with Dr. Quach who discussed patient with attending.

## 2023-04-08 NOTE — PROGRESS NOTES
"Gynecology Oncology Progress Note  April 7, 2023    Ms. 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 Ambulating without dizziness. Tolerating PO without nausea or vomiting. Passing flatus, had a bowel movement. Voiding. Denies fevers, chills, chest pain, SOB. No other questions or concerns.    Objective:   BP 94/42 (BP Location: Right arm)   Pulse 101   Temp 99.2  F (37.3  C) (Temporal)   Resp 17   Ht 1.575 m (5' 2\")   Wt 86.5 kg (190 lb 11.2 oz)   LMP  (LMP Unknown)   SpO2 95%   BMI 34.88 kg/m      General: lying in bed in NAD  CV: RRR  Resp: CTAB, no increased work of breathing  Abdomen: soft, non-tender, distended  Incision: c/d/i, bandage  Extremities: nontender, trace edema, SCDs in place  Lines/Drains: pIV    I/Os:    Intake/Output Summary (Last 24 hours) at 4/8/2023 0514  Last data filed at 4/7/2023 2158  Gross per 24 hour   Intake 1891.67 ml   Output 190 ml   Net 1701.67 ml     UOP:     New labs/imaging-  AM CBC, BMP    Previous labs/imaging:   Latest Reference Range & Units 04/07/23 01:55 04/07/23 06:46   Sodium 136 - 145 mmol/L 142 139   Potassium 3.4 - 5.3 mmol/L 5.2 4.8   Chloride 98 - 107 mmol/L 110 (H) 108 (H)   Carbon Dioxide (CO2) 22 - 29 mmol/L 18 (L) 19 (L)   Urea Nitrogen 8.0 - 23.0 mg/dL 23.1 (H) 22.8   Creatinine 0.51 - 0.95 mg/dL 1.43 (H) 1.63 (H)   GFR Estimate >60 mL/min/1.73m2 40 (L) 34 (L)   Calcium 8.8 - 10.2 mg/dL 8.0 (L) 8.1 (L)   Anion Gap 7 - 15 mmol/L 14 12   Magnesium 1.7 - 2.3 mg/dL  1.8   Glucose 70 - 99 mg/dL 145 (H) 139 (H)   GLUCOSE BY METER POCT 70 - 99 mg/dL  139 (H)   WBC 4.0 - 11.0 10e3/uL 9.0 9.0   Hemoglobin 11.7 - 15.7 g/dL 7.7 (L) 7.5 (L)   Hematocrit 35.0 - 47.0 % 24.9 (L) 25.6 (L) "   Platelet Count 150 - 450 10e3/uL 248 272   RBC Count 3.80 - 5.20 10e6/uL 2.83 (L) 2.81 (L)   MCV 78 - 100 fL 88 91   MCH 26.5 - 33.0 pg 27.2 26.7   MCHC 31.5 - 36.5 g/dL 30.9 (L) 29.3 (L)   RDW 10.0 - 15.0 % 15.6 (H) 15.7 (H)     Assessment/Plan: 67 year old POD#2 Dx Lsc > Open Total Colectomy, ileorectal anastomosis, flex sig, ALEXANDER, BSO. Colorectal Primary patient.     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 If soaking >1 pad per hour please page GYN ONC team for assessment  - Post op restrictions: Nothing in vagina for 2 months post hysterectomy    Thank you for involving us in the care. Gyn Onc will sign off at this time. Please let us know if you have any further questions.    Elisabet Jeffrey MD  Ob/Gyn Resident, PGY-2  04/08/2023 7:42 AM    Provider Disclosure:   I agree with above History, Review of Systems, Physical exam and Plan. I have reviewed the content of the documentation and have edited it as needed. I have personally performed the services documented here and the documentation accurately represents those services and the decisions I have made.     Electronically signed by:   Sunitha Olea MD   Gynecologic Oncology   [facility]

## 2023-04-08 NOTE — PROVIDER NOTIFICATION
"MON[place] ISLAS notified; \"7412-2 C.J pt BP 99/40 diastolic low. no vital signs parameters to follow.\"  "

## 2023-04-08 NOTE — PLAN OF CARE
Physical Therapy: Orders received. Chart reviewed and discussed with care team.? Physical Therapy not indicated due to pt demonstrating functional mobility near baseline with no acute PT needs per OT. OT following and will address all impairments with functional mobility.? Defer discharge recommendations to OT.? Will complete orders.  Please re-consult PT if any new needs arise.

## 2023-04-08 NOTE — PLAN OF CARE
"[id number]    Status: open total colectomy POD 2  Vitals: BP 94/42 (BP Location: Right arm)   Pulse 101   Temp 99.2  F (37.3  C) (Temporal)   Resp 17   Ht 1.575 m (5' 2\")   Wt 86.5 kg (190 lb 11.2 oz)   LMP  (LMP Unknown)   SpO2 95%   BMI 34.88 kg/m   RA    Neuros: Axox4, follows commands  IV: L PIV- infusing LR @75ml/hr  Labs/Electrolytes: reviewed, BG   Resp/trach: WDL no SOB reported  Diet: Clear liq, tolerating  Bowel status: LBM:4/8/23  : AUOP, voids w/o difficulties  Skin: BUE/BLE edematous  Pain: ABD incision pain. Oxycodone and scheduled tylenol given for management  Activity: SBA  Plan: Continue POC  Updates this shift: no changes in pt condition this shift                          "

## 2023-04-09 LAB
ANION GAP SERPL CALCULATED.3IONS-SCNC: 10 MMOL/L (ref 7–15)
BUN SERPL-MCNC: 26.7 MG/DL (ref 8–23)
CALCIUM SERPL-MCNC: 7.9 MG/DL (ref 8.8–10.2)
CHLORIDE SERPL-SCNC: 106 MMOL/L (ref 98–107)
CREAT SERPL-MCNC: 1.38 MG/DL (ref 0.51–0.95)
DEPRECATED HCO3 PLAS-SCNC: 20 MMOL/L (ref 22–29)
GFR SERPL CREATININE-BSD FRML MDRD: 42 ML/MIN/1.73M2
GLUCOSE SERPL-MCNC: 102 MG/DL (ref 70–99)
HGB BLD-MCNC: 7.5 G/DL (ref 11.7–15.7)
IRON BINDING CAPACITY (ROCHE): 142 UG/DL (ref 240–430)
IRON SATN MFR SERPL: 6 % (ref 15–46)
IRON SERPL-MCNC: 8 UG/DL (ref 37–145)
PLATELET # BLD AUTO: 244 10E3/UL (ref 150–450)
POTASSIUM SERPL-SCNC: 4.2 MMOL/L (ref 3.4–5.3)
SODIUM SERPL-SCNC: 136 MMOL/L (ref 136–145)

## 2023-04-09 PROCEDURE — 250N000011 HC RX IP 250 OP 636: Performed by: PHARMACIST

## 2023-04-09 PROCEDURE — 83550 IRON BINDING TEST: CPT | Performed by: STUDENT IN AN ORGANIZED HEALTH CARE EDUCATION/TRAINING PROGRAM

## 2023-04-09 PROCEDURE — 85049 AUTOMATED PLATELET COUNT: CPT | Performed by: PHARMACIST

## 2023-04-09 PROCEDURE — 120N000002 HC R&B MED SURG/OB UMMC

## 2023-04-09 PROCEDURE — 85018 HEMOGLOBIN: CPT | Performed by: STUDENT IN AN ORGANIZED HEALTH CARE EDUCATION/TRAINING PROGRAM

## 2023-04-09 PROCEDURE — 250N000013 HC RX MED GY IP 250 OP 250 PS 637: Performed by: STUDENT IN AN ORGANIZED HEALTH CARE EDUCATION/TRAINING PROGRAM

## 2023-04-09 PROCEDURE — 250N000013 HC RX MED GY IP 250 OP 250 PS 637: Performed by: PHARMACIST

## 2023-04-09 PROCEDURE — 250N000013 HC RX MED GY IP 250 OP 250 PS 637: Performed by: PHYSICIAN ASSISTANT

## 2023-04-09 PROCEDURE — 80048 BASIC METABOLIC PNL TOTAL CA: CPT | Performed by: STUDENT IN AN ORGANIZED HEALTH CARE EDUCATION/TRAINING PROGRAM

## 2023-04-09 PROCEDURE — 36415 COLL VENOUS BLD VENIPUNCTURE: CPT | Performed by: STUDENT IN AN ORGANIZED HEALTH CARE EDUCATION/TRAINING PROGRAM

## 2023-04-09 RX ORDER — TRIAMTERENE/HYDROCHLOROTHIAZID 37.5-25 MG
1 TABLET ORAL DAILY
Status: DISCONTINUED | OUTPATIENT
Start: 2023-04-09 | End: 2023-04-11 | Stop reason: HOSPADM

## 2023-04-09 RX ADMIN — OXYCODONE HYDROCHLORIDE 5 MG: 5 TABLET ORAL at 21:33

## 2023-04-09 RX ADMIN — PROCHLORPERAZINE EDISYLATE 5 MG: 5 INJECTION INTRAMUSCULAR; INTRAVENOUS at 23:54

## 2023-04-09 RX ADMIN — GABAPENTIN 100 MG: 100 CAPSULE ORAL at 21:33

## 2023-04-09 RX ADMIN — TRIAMTERENE AND HYDROCHLOROTHIAZIDE 1 TABLET: 37.5; 25 TABLET ORAL at 13:28

## 2023-04-09 RX ADMIN — ACETAMINOPHEN 975 MG: 325 TABLET, FILM COATED ORAL at 10:07

## 2023-04-09 RX ADMIN — OXYCODONE HYDROCHLORIDE 5 MG: 5 TABLET ORAL at 10:07

## 2023-04-09 RX ADMIN — ACETAMINOPHEN 650 MG: 325 TABLET, FILM COATED ORAL at 15:01

## 2023-04-09 RX ADMIN — ONDANSETRON 4 MG: 4 TABLET, ORALLY DISINTEGRATING ORAL at 19:21

## 2023-04-09 RX ADMIN — ENOXAPARIN SODIUM 40 MG: 40 INJECTION SUBCUTANEOUS at 13:25

## 2023-04-09 RX ADMIN — ACETAMINOPHEN 975 MG: 325 TABLET, FILM COATED ORAL at 02:14

## 2023-04-09 ASSESSMENT — ACTIVITIES OF DAILY LIVING (ADL)
ADLS_ACUITY_SCORE: 25

## 2023-04-09 NOTE — PLAN OF CARE
Goal Outcome Evaluation:      Plan of Care Reviewed With: patient, spouse    Overall Patient Progress: improvingOverall Patient Progress: improving    Outcome Evaluation: Able to ambulate, + gas and stool    POD# 2 open TC, ileorectal anastomosis, flex sig, [facility] BSO    Alert and oriented x 4. Up with SBA of 1 with walker.  Pain is managed with prn oxycodone and scheduled po acetaminophen.   Diet advance to LFD, teaching and hand-outs provided. Tolerating it well. Denies nausea.   Abdomen soft and non tender, + gas, bowel sound audible, normoactive. 4 laps sites CDI/VJ.  Lung sounds audible, with some fine crackles. Using IS. On RA. BP is soft, otherwise the rest of the VSS. Hgb this am 6.5. 1 unit PRBC given. Recheck 8.0.  Lovenox teaching started. Per patient, she has a niece who is a nurse and would be giving this medication to he daily upon discharge.  Voiding spontaneously. IV lasix one time dose given this morning.  Previous IV site right hand leaking with clear fluid.   BLE and BUE slightly swollen, pillows applied with some imporvement.    PLAN: Continue with post-op cares.

## 2023-04-09 NOTE — PROGRESS NOTES
[id number]      Activity: SBA w/walker    Neuro: AxOx4    /GI: voiding adequate amounts, loose bowel movements.     DIET: low fiber diet    Incisions/Drains: midline and 4 lap sites VJ/CDI    IV Access: PIV    Vitals: VSS on RA    Pain: Tylenol and Oxy    Edema to BUE and BLE, elevated while in bed and pt reports not feeling as tight in her upper extremities tonight.    Continue POC

## 2023-04-09 NOTE — PROGRESS NOTES
Colorectal Surgery Progress Note  [facility]  POD#3      Subjective:  1U PRBC with lasix yesterday. Hgb stable today.   Tolerating diet, having bowel function, limited ambulation. Voiding on her own    Vitals:  Vitals:    04/08/23 2129 04/08/23 2240 04/09/23 0214 04/09/23 0739   BP: 121/61 104/48 111/56 134/57   BP Location: Right arm Right arm Left arm Left arm   Patient Position:       Cuff Size:       Pulse: 97 100 94 98   Resp: 18 18 16 17   Temp: 96.9  F (36.1  C) 97.1  F (36.2  C) 98.4  F (36.9  C) 96.8  F (36  C)   TempSrc: Oral Oral Oral Oral   SpO2: 93% 94% 92% 94%   Weight:       Height:         I/O:  I/O last 3 completed shifts:  In: 1312.08 [P.O.:960]  Out: 1100 [Urine:1100]    Physical Exam:  Gen: AAOx3, NAD  Pulm: Non-labored breathing  Abd: Soft, mildly distended, appropriately tender, no guarding/rebound   Incision C/D/I   Ext:  Warm and well-perfused    BMP  Recent Labs   Lab 04/09/23  0829 04/08/23  0759 04/08/23  0601 04/07/23  0646 04/07/23  0155    135*  --  139 142   POTASSIUM 4.2 4.3  --  4.8 5.2   CHLORIDE 106 105  --  108* 110*   CO2 20* 20*  --  19* 18*   BUN 26.7* 26.6*  --  22.8 23.1*   CR 1.38* 1.64*  --  1.63* 1.43*   * 111* 111* 139*  139* 145*   MAG  --  1.9  --  1.8  --      CBC  Recent Labs   Lab 04/09/23  0829 04/08/23  1649 04/08/23  0759 04/07/23  0646 04/07/23  0155 04/06/23  1653   WBC  --   --   --  9.0 9.0 8.8   HGB 7.5* 8.0* 6.5* 7.5* 7.7* 9.4*   HCT  --   --   --  25.6* 24.9* 30.7*     --   --  272 248 322         ASSESSMENT: This is a 67 year old female PMH HTN, Crohns Colitis found to have 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  Now s/p Total abdominal colectomy w/ ileorectal bakers anastomosis; [facility]-BSO on 4/6/2023.    Neuro/Pain: gabapentin, tylenol, prn oxycodone  CV: Resume home triamterence-hydrochlorothiazide today, can resume lisinopril on discharge  PULM: encourage IS.  GI/FEN:    - Low fiber diet  - abdominal binder  : Voiding spontaneously. Cr at baseline.   Heme: low suspicion for bleeding. Will repeat hgb tomorrow  ID: no acute concerns at this time  Endocrine: no acute concerns at this time    Activity: Ambulate 2-3x today  Ppx: lovenox ppx  Dispo: likely home tomorrow     Orlando Quach MD  Colon and Rectal Surgery Fellow    Please page the colon and rectal Surgery provider on call if further questions.

## 2023-04-09 NOTE — PLAN OF CARE
Goal Outcome Evaluation:      Plan of Care Reviewed With: patient, spouse    Overall Patient Progress: improvingOverall Patient Progress: improving    Outcome Evaluation: Able to ambulate, + gas and stool    POD# 3 open TC, ileorectal anastomosis, flex sig, ALEXANDER BSO    Alert and oriented x 4. Up with SBA of 1 with walker.  Tolerating LFD, but pt verbalized not so much appetite for dinner. No report of nausea and vomiting.  Pain is managed with prn and scheduled acetaminophen and prn oxycodone.   Midline surgical incision and lap sites CDI/VJ.  Bowel sound audible and normoactive. + gas and have frequent smear- small loose BM. Voiding frequently.   Patient verbalized she is not ready yet to be discharge tomorrow. Lovenox teaching started, has a niece who is an RN, at the bedside this afternoon that will give the medication when she dishcharge.  Lung sounds clear with some fine crackles, using IS.  Small lump upper back that was observed yesterday, with no change. Known by Dr. Ashley yesterday during his rounds.  Called colo-rectal resident re: creat level this morning. Spoke with sue Sun to give scheduled lovenox.     PLAN: Continue with the post-op cares.

## 2023-04-10 ENCOUNTER — APPOINTMENT (OUTPATIENT)
Dept: OCCUPATIONAL THERAPY | Facility: CLINIC | Age: 68
DRG: 330 | End: 2023-04-10
Attending: SURGERY
Payer: MEDICARE

## 2023-04-10 LAB
ATRIAL RATE - MUSE: 90 BPM
DIASTOLIC BLOOD PRESSURE - MUSE: NORMAL MMHG
HGB BLD-MCNC: 8.3 G/DL (ref 11.7–15.7)
HOLD SPECIMEN: NORMAL
INTERPRETATION ECG - MUSE: NORMAL
P AXIS - MUSE: 58 DEGREES
PR INTERVAL - MUSE: 146 MS
QRS DURATION - MUSE: 68 MS
QT - MUSE: 374 MS
QTC - MUSE: 457 MS
R AXIS - MUSE: -4 DEGREES
SARS-COV-2 RNA RESP QL NAA+PROBE: NEGATIVE
SYSTOLIC BLOOD PRESSURE - MUSE: NORMAL MMHG
T AXIS - MUSE: 35 DEGREES
VENTRICULAR RATE- MUSE: 90 BPM

## 2023-04-10 PROCEDURE — 36415 COLL VENOUS BLD VENIPUNCTURE: CPT | Performed by: STUDENT IN AN ORGANIZED HEALTH CARE EDUCATION/TRAINING PROGRAM

## 2023-04-10 PROCEDURE — 97535 SELF CARE MNGMENT TRAINING: CPT | Mod: GO

## 2023-04-10 PROCEDURE — 250N000013 HC RX MED GY IP 250 OP 250 PS 637: Performed by: STUDENT IN AN ORGANIZED HEALTH CARE EDUCATION/TRAINING PROGRAM

## 2023-04-10 PROCEDURE — 120N000002 HC R&B MED SURG/OB UMMC

## 2023-04-10 PROCEDURE — 250N000011 HC RX IP 250 OP 636: Performed by: PHARMACIST

## 2023-04-10 PROCEDURE — 250N000013 HC RX MED GY IP 250 OP 250 PS 637: Performed by: PHYSICIAN ASSISTANT

## 2023-04-10 PROCEDURE — U0003 INFECTIOUS AGENT DETECTION BY NUCLEIC ACID (DNA OR RNA); SEVERE ACUTE RESPIRATORY SYNDROME CORONAVIRUS 2 (SARS-COV-2) (CORONAVIRUS DISEASE [COVID-19]), AMPLIFIED PROBE TECHNIQUE, MAKING USE OF HIGH THROUGHPUT TECHNOLOGIES AS DESCRIBED BY CMS-2020-01-R: HCPCS

## 2023-04-10 PROCEDURE — 250N000013 HC RX MED GY IP 250 OP 250 PS 637: Performed by: PHARMACIST

## 2023-04-10 PROCEDURE — 97530 THERAPEUTIC ACTIVITIES: CPT | Mod: GO

## 2023-04-10 PROCEDURE — 85018 HEMOGLOBIN: CPT | Performed by: STUDENT IN AN ORGANIZED HEALTH CARE EDUCATION/TRAINING PROGRAM

## 2023-04-10 RX ADMIN — ENOXAPARIN SODIUM 40 MG: 40 INJECTION SUBCUTANEOUS at 13:50

## 2023-04-10 RX ADMIN — ONDANSETRON 4 MG: 2 INJECTION INTRAMUSCULAR; INTRAVENOUS at 10:09

## 2023-04-10 RX ADMIN — GABAPENTIN 100 MG: 100 CAPSULE ORAL at 22:43

## 2023-04-10 RX ADMIN — ACETAMINOPHEN 975 MG: 325 TABLET, FILM COATED ORAL at 11:28

## 2023-04-10 RX ADMIN — TRIAMTERENE AND HYDROCHLOROTHIAZIDE 1 TABLET: 37.5; 25 TABLET ORAL at 07:49

## 2023-04-10 RX ADMIN — ONDANSETRON 4 MG: 4 TABLET, ORALLY DISINTEGRATING ORAL at 03:15

## 2023-04-10 RX ADMIN — ACETAMINOPHEN 975 MG: 325 TABLET, FILM COATED ORAL at 18:30

## 2023-04-10 RX ADMIN — ONDANSETRON 4 MG: 4 TABLET, ORALLY DISINTEGRATING ORAL at 17:55

## 2023-04-10 RX ADMIN — PROCHLORPERAZINE EDISYLATE 5 MG: 5 INJECTION INTRAMUSCULAR; INTRAVENOUS at 05:45

## 2023-04-10 ASSESSMENT — ACTIVITIES OF DAILY LIVING (ADL)
ADLS_ACUITY_SCORE: 25
ADLS_ACUITY_SCORE: 28
ADLS_ACUITY_SCORE: 25
ADLS_ACUITY_SCORE: 28
ADLS_ACUITY_SCORE: 28

## 2023-04-10 NOTE — PLAN OF CARE
Goal Outcome Evaluation:      Plan of Care Reviewed With: patient    Overall Patient Progress: no changeOverall Patient Progress: no change    Outcome Evaluation: Intermittent nausea with 25 cc emesis    Alert and oriented x 4. Up with SBA and walker.  C/o intermittent nausea, aromatherapy and prn IV zofran with some relief.  Had an 1 emesis this morning.   Pain is well managed with scheduled po acetaminophen.  Up to the BR voiding with adequate urine volume. Loose mucousy stool smear only.  Bowel sounds audible, normoactive, abdomen soft and non-tender.  Lung sounds with some crackles. Using IS. Needs reminder.  OVSS, afebrile, on RA.  On LFD, not eating much today.    PLAN: Continue with the care plan.    catherine morgan rn

## 2023-04-10 NOTE — PLAN OF CARE
Goal Outcome Evaluation:      Plan of Care Reviewed With: other (see comments)    Overall Patient Progress: no changeOverall Patient Progress: no change    Outcome Evaluation: see RD note

## 2023-04-10 NOTE — PROGRESS NOTES
"CLINICAL NUTRITION SERVICES - ASSESSMENT NOTE     Nutrition Prescription    RECOMMENDATIONS FOR MDs/PROVIDERS TO ORDER:  None at this time     Recommendations already ordered by Registered Dietitian (RD):  PRN supplements    Future/Additional Recommendations:  Monitor PO intakes, wt trends     REASON FOR ASSESSMENT  Nadia Ladd is a/an 67 year old female assessed by the dietitian for Admission Nutrition Risk Screen for positive    NUTRITION HISTORY  Per chart, PMH includes Crohn's colitis, found to have invasive poorly differentiated adenocarcinoma; s/p total abdominal colectomy w/ ileorectal bakers anastomosis; [facility] on 4/6/2023.    CURRENT NUTRITION ORDERS  Diet: Low Fiber  Intake/Tolerance: per RN note this morning, pt with increased nausea last night so just had clear liquids; poor appetite for dinner last night without report of N/V per RN note last night. Has been on low fiber diet since 4/8, was on clear liquids 4/6 post-op-4/8    LABS  Labs reviewed  - (4/9): BUN 26.7 (H), Cr 1.38 (H), GFR 42 (L)    MEDICATIONS  Medications reviewed    ANTHROPOMETRICS  Height: 157.5 cm (5' 2\")  Most Recent Weight: 86.5 kg (190 lb 11.2 oz)    IBW: 50 kg   BMI: Obesity Grade I BMI 30-34.9  Weight History: 12 lb wt loss x ~1.5-2 months (5.9% wt loss)  Wt Readings from Last 10 Encounters:   04/06/23 86.5 kg (190 lb 11.2 oz)   03/28/23 88.1 kg (194 lb 3 oz)   02/27/23 89.8 kg (198 lb)   02/21/23 89.9 kg (198 lb 1.6 oz)   02/16/23 91.7 kg (202 lb 1.6 oz)   10/26/21 102.7 kg (226 lb 8 oz)   10/09/21 104.3 kg (230 lb)   01/26/21 104.1 kg (229 lb 8 oz)   06/06/19 104.8 kg (231 lb)   03/12/18 105.9 kg (233 lb 6.4 oz)      Dosing Weight: 59 kg (adjusted based on actual wt 86.5 kg and IBW)    ASSESSED NUTRITION NEEDS  Estimated Energy Needs: 2902-2989 kcals/day (25 - 30 kcals/kg)  Justification: Maintenance  Estimated Protein Needs: 71-89 grams protein/day (1.2 - 1.5 grams of pro/kg)  Justification: Post-op  Estimated Fluid Needs: " (1 mL/kcal)   Justification: Maintenance, or other per provider pending fluid status    PHYSICAL FINDINGS  See malnutrition section below.    MALNUTRITION  % Intake: Unable to assess  % Weight Loss: Weight loss does not meet criteria  Subcutaneous Fat Loss: Unable to assess  Muscle Loss: Unable to assess  Fluid Accumulation/Edema: mild-moderate per flowsheets yesterday  Malnutrition Diagnosis: Unable to determine due to pt with other cares during attempts to visit    NUTRITION DIAGNOSIS  Inadequate oral intake related to nausea and slower diet adv post-op as evidenced by clear liquids first 2 days post-op and now pt with poor appetite since yesterday yesterday    INTERVENTIONS  Implementation  Nutrition Education: Unable to complete due to pt unavailable   Medical food supplement therapy      Goals  Patient to consume % of nutritionally adequate meal trays TID, or the equivalent with supplements/snacks.     Monitoring/Evaluation  Progress toward goals will be monitored and evaluated per protocol.     Angelina Kang, RD, , LD  Weekday Pager: [phone]  Weekday Units covered: 7C (all beds) and 5A (beds 5201 through 5201)  Weekend/Holiday RD Pager: [phone]

## 2023-04-10 NOTE — PROGRESS NOTES
[id number]      Activity: SBA w/walker    Neuro: AxOx4    /GI: Voiding, loose BMs    DIET: LFD, having increased nausea overnight so stuck to clear liquids. Utilizing Zofran and Compazine.    Incisions/Drains: midline CDI/VJ with 4 lap sites    IV Access: PIV saline locked    Vitals: VSS on RA    Pain: Oxy x 1 and scheduled Tylenol    Plan: Continue POC

## 2023-04-10 NOTE — PROGRESS NOTES
"Colorectal Surgery Progress Note  [facility]  POD#4      Subjective:   Hgb stable today. Tolerating diet, limited ambulation (only up to bathroom). Voiding on her own. Feeling \"weird\" today. No nausea/emesis. Having smears of BMs frequently.     Vitals:  Vitals:    04/09/23 0739 04/09/23 1404 04/09/23 2229 04/10/23 0542   BP: 134/57 139/67 126/60 132/74   BP Location: Left arm Left arm Left arm Left arm   Pulse: 98 92 84 100   Resp: 17 18 18 18   Temp: 96.8  F (36  C) 97.3  F (36.3  C) 97  F (36.1  C) 97.1  F (36.2  C)   TempSrc: Oral Oral Oral Oral   SpO2: 94% 96% 94% 93%   Weight:       Height:         I/O:  I/O last 3 completed shifts:  In: 1240 [P.O.:1240]  Out: 1255 [Urine:1155; Emesis/NG output:100]    Physical Exam:  Gen: AAOx3, NAD  Pulm: Non-labored breathing  Abd: Soft, mildly distended, appropriately tender, no guarding/rebound   Incision C/D/I   Ext:  Warm and well-perfused    BMP  Recent Labs   Lab 04/09/23  0829 04/08/23  0759 04/08/23  0601 04/07/23  0646 04/07/23  0155    135*  --  139 142   POTASSIUM 4.2 4.3  --  4.8 5.2   CHLORIDE 106 105  --  108* 110*   CO2 20* 20*  --  19* 18*   BUN 26.7* 26.6*  --  22.8 23.1*   CR 1.38* 1.64*  --  1.63* 1.43*   * 111* 111* 139*  139* 145*   MAG  --  1.9  --  1.8  --      CBC  Recent Labs   Lab 04/10/23  0736 04/09/23  0829 04/08/23  1649 04/08/23  0759 04/07/23  0646 04/07/23  0155 04/06/23  1653   WBC  --   --   --   --  9.0 9.0 8.8   HGB 8.3* 7.5* 8.0* 6.5* 7.5* 7.7* 9.4*   HCT  --   --   --   --  25.6* 24.9* 30.7*   PLT  --  244  --   --  272 248 322         ASSESSMENT: This is a 67 year old female PMH HTN, Crohns Colitis found to have 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  Now s/p Total abdominal colectomy w/ ileorectal bakers anastomosis; [facility]-BSO on 4/6/2023.    Neuro/Pain: gabapentin, tylenol, prn oxycodone  CV: home triamterence-hydrochlorothiazide today, can resume " lisinopril on discharge  PULM: encourage IS.  GI/FEN:   - Low fiber diet  - abdominal binder  : Voiding spontaneously. Cr at baseline.   Heme: hgb stable today  ID: no acute concerns at this time  Endocrine: no acute concerns at this time    Activity: Ambulate 2-3x today  Ppx: lovenox ppx  Dispo: likely home in next 1-2 days    Eli Burgos MD, PharmD  General Surgery, PGY1  Pager [phone]

## 2023-04-11 ENCOUNTER — APPOINTMENT (OUTPATIENT)
Dept: OCCUPATIONAL THERAPY | Facility: CLINIC | Age: 68
DRG: 330 | End: 2023-04-11
Attending: SURGERY
Payer: MEDICARE

## 2023-04-11 VITALS
BODY MASS INDEX: 35.09 KG/M2 | WEIGHT: 190.7 LBS | HEART RATE: 87 BPM | OXYGEN SATURATION: 97 % | HEIGHT: 62 IN | SYSTOLIC BLOOD PRESSURE: 100 MMHG | RESPIRATION RATE: 16 BRPM | DIASTOLIC BLOOD PRESSURE: 67 MMHG | TEMPERATURE: 96.2 F

## 2023-04-11 LAB
ANION GAP SERPL CALCULATED.3IONS-SCNC: 13 MMOL/L (ref 7–15)
BUN SERPL-MCNC: 24.6 MG/DL (ref 8–23)
CALCIUM SERPL-MCNC: 8.3 MG/DL (ref 8.8–10.2)
CHLORIDE SERPL-SCNC: 104 MMOL/L (ref 98–107)
CREAT SERPL-MCNC: 1.34 MG/DL (ref 0.51–0.95)
DEPRECATED HCO3 PLAS-SCNC: 20 MMOL/L (ref 22–29)
ERYTHROCYTE [DISTWIDTH] IN BLOOD BY AUTOMATED COUNT: 17.3 % (ref 10–15)
GFR SERPL CREATININE-BSD FRML MDRD: 44 ML/MIN/1.73M2
GLUCOSE SERPL-MCNC: 107 MG/DL (ref 70–99)
HCT VFR BLD AUTO: 30.9 % (ref 35–47)
HGB BLD-MCNC: 8.9 G/DL (ref 11.7–15.7)
MCH RBC QN AUTO: 25.6 PG (ref 26.5–33)
MCHC RBC AUTO-ENTMCNC: 28.8 G/DL (ref 31.5–36.5)
MCV RBC AUTO: 89 FL (ref 78–100)
PLATELET # BLD AUTO: 347 10E3/UL (ref 150–450)
POTASSIUM SERPL-SCNC: 4.4 MMOL/L (ref 3.4–5.3)
RBC # BLD AUTO: 3.48 10E6/UL (ref 3.8–5.2)
SODIUM SERPL-SCNC: 137 MMOL/L (ref 136–145)
WBC # BLD AUTO: 7 10E3/UL (ref 4–11)

## 2023-04-11 PROCEDURE — 250N000011 HC RX IP 250 OP 636: Performed by: PHARMACIST

## 2023-04-11 PROCEDURE — 250N000013 HC RX MED GY IP 250 OP 250 PS 637: Performed by: STUDENT IN AN ORGANIZED HEALTH CARE EDUCATION/TRAINING PROGRAM

## 2023-04-11 PROCEDURE — 36415 COLL VENOUS BLD VENIPUNCTURE: CPT | Performed by: PHARMACIST

## 2023-04-11 PROCEDURE — 97535 SELF CARE MNGMENT TRAINING: CPT | Mod: GO

## 2023-04-11 PROCEDURE — 97530 THERAPEUTIC ACTIVITIES: CPT | Mod: GO

## 2023-04-11 PROCEDURE — 85027 COMPLETE CBC AUTOMATED: CPT | Performed by: PHARMACIST

## 2023-04-11 PROCEDURE — 250N000013 HC RX MED GY IP 250 OP 250 PS 637: Performed by: PHYSICIAN ASSISTANT

## 2023-04-11 PROCEDURE — 82310 ASSAY OF CALCIUM: CPT | Performed by: PHARMACIST

## 2023-04-11 RX ORDER — GABAPENTIN 100 MG/1
100 CAPSULE ORAL AT BEDTIME
Qty: 12 CAPSULE | Refills: 0 | Status: SHIPPED | OUTPATIENT
Start: 2023-04-11 | End: 2023-05-09

## 2023-04-11 RX ORDER — OXYCODONE HYDROCHLORIDE 5 MG/1
2.5-5 TABLET ORAL EVERY 6 HOURS PRN
Qty: 15 TABLET | Refills: 0 | Status: SHIPPED | OUTPATIENT
Start: 2023-04-11 | End: 2023-04-19

## 2023-04-11 RX ORDER — ACETAMINOPHEN 325 MG/1
975 TABLET ORAL EVERY 8 HOURS
Qty: 100 TABLET | Refills: 0 | Status: ON HOLD | OUTPATIENT
Start: 2023-04-11 | End: 2023-05-12

## 2023-04-11 RX ORDER — ENOXAPARIN SODIUM 100 MG/ML
40 INJECTION SUBCUTANEOUS EVERY 24 HOURS
Qty: 9.2 ML | Refills: 0 | Status: SHIPPED | OUTPATIENT
Start: 2023-04-12 | End: 2023-05-05

## 2023-04-11 RX ADMIN — ENOXAPARIN SODIUM 40 MG: 40 INJECTION SUBCUTANEOUS at 11:22

## 2023-04-11 RX ADMIN — ACETAMINOPHEN 975 MG: 325 TABLET, FILM COATED ORAL at 02:50

## 2023-04-11 RX ADMIN — ACETAMINOPHEN 975 MG: 325 TABLET, FILM COATED ORAL at 11:21

## 2023-04-11 RX ADMIN — TRIAMTERENE AND HYDROCHLOROTHIAZIDE 1 TABLET: 37.5; 25 TABLET ORAL at 08:43

## 2023-04-11 ASSESSMENT — ACTIVITIES OF DAILY LIVING (ADL)
ADLS_ACUITY_SCORE: 28
ADLS_ACUITY_SCORE: 25
ADLS_ACUITY_SCORE: 25
ADLS_ACUITY_SCORE: 28
ADLS_ACUITY_SCORE: 25
ADLS_ACUITY_SCORE: 28

## 2023-04-11 NOTE — DISCHARGE SUMMARY
[facility]  Discharge Summary  Colon and Rectal Surgery     Nadia Ladd MRN# [id number]   YOB: 1955 Age: 67 year old     Date of Admission:  4/6/2023  Date of Discharge::  4/11/2023  Admitting Physician:  Jerome Ashley MD  Discharge Physician:  Jerome Ashley MD  Primary Care Physician:        Nomi Cartwright          Admission Diagnoses:   Malignant neoplasm of sigmoid colon (H) [C18.7]  Colon cancer (H) [C18.9]  crohns colitis  Hypertension  History of pulmonary hypertension  Prior history of exploratory laparotomy  Abnormal creatinine  CKD stage 3  Iron deficiency anemia          Discharge Diagnosis:   Malignant neoplasm of sigmoid colon (H) [C18.7]  Colon cancer (H) [C18.9]  crohns colitis  Hypertension  Prior history of exploratory laparotomy  Abnormal creatinine  CKD stage 3  Iron deficiency anemia  Acute blood loss anemia         Procedures:   4/6/2023:  PROCEDURES: Total abdominal colectomy with ileorectal Baker's anastomosis, partial omentectomy, flexible sigmoidoscopy. Total abdominal hysterectomy, bilateral salpingo-oophorectomy.         Consultations:   PHYSICAL THERAPY ADULT IP CONSULT  OCCUPATIONAL THERAPY ADULT IP CONSULT  NURSING TO CONSULT FOR VASCULAR ACCESS CARE IP CONSULT         Imaging Studies:     Results for orders placed or performed during the hospital encounter of 04/06/23   POC US Guidance Needle Placement    Impression    bilateral Transversus abdominis plane block     XR Chest Port 1 View    Narrative    Exam: XR CHEST PORT 1 VIEW, 4/6/2023 5:31 PM    Indication: SOB, postoperative day 0    Comparison: CT chest 2/27/2023    Findings: Portable AP chest radiograph at 45 degrees. Trachea is  midline. Cardiac silhouette is stable. Bilateral costophrenic angles  are sharp. No significant pneumothorax. Increased hazy interstitial  opacities of the entirety of the left lung field. The right lung  field  appears clear. Partially visualized upper abdomen is unremarkable. No  acute osseous abnormalities.      Impression    Impression: Diffuse hazy left pulmonary opacities could represent  edema versus atelectasis in postoperative setting. Recommend follow-up  to clearing.    I have personally reviewed the examination and initial interpretation  and I agree with the findings.    MONICA WELCH MD         SYSTEM ID:  [id number]              Medications Prior to Admission:     Medications Prior to Admission   Medication Sig Dispense Refill Last Dose     cyanocobalamin 1000 MCG TBCR Take 1,000 mcg by mouth daily (Patient taking differently: Take 1,000 mcg by mouth every morning) 100 tablet 1 Past Week     ferrous sulfate (IRON) 325 (65 FE) MG tablet TAKE ONE TABLET BY MOUTH TWICE A DAY --NO FURTHER REFILLS WITHOUT OFFICE VISIT 200 tablet 3 Past Week     fluocinonide (LIDEX) 0.05 % external cream Small amount to affected area(s) BID PRN 60 g 1 Past Week     lisinopril (ZESTRIL) 10 MG tablet Take 1 tablet (10 mg) by mouth every morning 90 tablet 3 4/5/2023 at 0800     Multiple Vitamins-Iron (MULTI-DAY PLUS IRON PO)    Past Week     triamterene-HCTZ (DYAZIDE) 37.5-25 MG capsule TAKE 1 CAPSULE BY MOUTH ONCE DAILY (Patient taking differently: Take 1 capsule by mouth every morning TAKE 1 CAPSULE BY MOUTH ONCE DAILY) 90 capsule 0 4/5/2023 at 0800     [DISCONTINUED] IBUPROFEN 200 MG OR TABS    Past Week     [DISCONTINUED] metroNIDAZOLE (FLAGYL) 500 MG tablet Take 1 tablet (500 mg) by mouth every 6 hours At 8:00 am, 2:00 pm, 8:00 pm the day prior to your surgery with neomycin and zofran. 3 tablet 0 Unknown     [DISCONTINUED] neomycin (MYCIFRADIN) 500 MG tablet Take 2 tablets (1,000 mg) by mouth every 6 hours At 8:00 am, 2:00 pm, 8:00 pm the day prior to your surgery with flagyl and zofran. 6 tablet 0 Unknown     [DISCONTINUED] ondansetron (ZOFRAN) 4 MG tablet Take 1 tablet (4 mg) by mouth every 6 hours At 8:00 am, 2:00 pm,  "8:00 pm the day prior to your surgery with neomycin and flagyl. 3 tablet 0 4/5/2023 at 2000     [DISCONTINUED] polyethylene glycol (MIRALAX) 17 g packet Take 238 g by mouth See Admin Instructions Starting at 4 pm night prior to surgery. Refer to \"Getting Ready for Surgery\" instructions. 14 packet 0 4/5/2023 at 1600     [DISCONTINUED] polyethylene glycol (MIRALAX) 17 g packet Take 119 g by mouth See Admin Instructions Starting at 8 pm night prior to surgery. Refer to \"Getting Ready for Surgery\" instructions. 7 packet 0 4/5/2023 at 2000     [DISCONTINUED] sulfaSALAzine (AZULFIDINE) 500 MG tablet TAKE 2 TABLETS BY MOUTH TWICE A DAY (Patient taking differently: Take 1,000 mg by mouth 2 times daily TAKE 2 TABLETS BY MOUTH TWICE A DAY) 360 tablet 0 4/4/2023              Discharge Medications:     Current Discharge Medication List      START taking these medications    Details   acetaminophen (TYLENOL) 325 MG tablet Take 3 tablets (975 mg) by mouth every 8 hours  Qty: 100 tablet, Refills: 0    Associated Diagnoses: Acute post-operative pain      enoxaparin ANTICOAGULANT (LOVENOX) 40 MG/0.4ML syringe Inject 0.4 mLs (40 mg) Subcutaneous every 24 hours for 23 days  Qty: 9.2 mL, Refills: 0    Associated Diagnoses: Acute post-operative pain      gabapentin (NEURONTIN) 100 MG capsule Take 1 capsule (100 mg) by mouth At Bedtime  Qty: 12 capsule, Refills: 0    Associated Diagnoses: Acute post-operative pain      oxyCODONE (ROXICODONE) 5 MG tablet Take 0.5-1 tablets (2.5-5 mg) by mouth every 6 hours as needed for moderate to severe pain  Qty: 15 tablet, Refills: 0    Associated Diagnoses: Acute post-operative pain         CONTINUE these medications which have NOT CHANGED    Details   cyanocobalamin 1000 MCG TBCR Take 1,000 mcg by mouth daily  Qty: 100 tablet, Refills: 1    Associated Diagnoses: Vitamin B12 deficiency disease      ferrous sulfate (IRON) 325 (65 FE) MG tablet TAKE ONE TABLET BY MOUTH TWICE A DAY --NO FURTHER REFILLS " WITHOUT OFFICE VISIT  Qty: 200 tablet, Refills: 3    Associated Diagnoses: Iron deficiency; Benign essential hypertension with target blood pressure below 140/90; Hypertension goal BP (blood pressure) < 140/90      fluocinonide (LIDEX) 0.05 % external cream Small amount to affected area(s) BID PRN  Qty: 60 g, Refills: 1    Associated Diagnoses: Dermatitis      lisinopril (ZESTRIL) 10 MG tablet Take 1 tablet (10 mg) by mouth every morning  Qty: 90 tablet, Refills: 3    Comments: Patient enrolled in our Rx Med Sync service to improve adherence. We are requesting a refill authorization in advance to ensure an active prescription is on file.  Associated Diagnoses: Hypertension goal BP (blood pressure) < 140/90      Multiple Vitamins-Iron (MULTI-DAY PLUS IRON PO)       triamterene-HCTZ (DYAZIDE) 37.5-25 MG capsule TAKE 1 CAPSULE BY MOUTH ONCE DAILY  Qty: 90 capsule, Refills: 0    Comments: Patient enrolled in our Rx Med Sync service to improve adherence. We are requesting a refill authorization in advance to ensure an active prescription is on file.  Associated Diagnoses: Hypertension goal BP (blood pressure) < 140/90         STOP taking these medications       IBUPROFEN 200 MG OR TABS Comments:   Reason for Stopping:         metroNIDAZOLE (FLAGYL) 500 MG tablet Comments:   Reason for Stopping:         neomycin (MYCIFRADIN) 500 MG tablet Comments:   Reason for Stopping:         ondansetron (ZOFRAN) 4 MG tablet Comments:   Reason for Stopping:         polyethylene glycol (MIRALAX) 17 g packet Comments:   Reason for Stopping:         polyethylene glycol (MIRALAX) 17 g packet Comments:   Reason for Stopping:         sulfaSALAzine (AZULFIDINE) 500 MG tablet Comments:   Reason for Stopping:                        Brief History of Illness:   This is a 67 year old female PMH HTN, CKD Stage 3, hiatal hernia, Crohns Colitis found to have 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  Now s/p Total  "abdominal colectomy w/ ileorectal bakers anastomosis; [facility] on 4/6/2023.           Hospital Course:   Post-operatively pt was gently fluid resuscitated.  Clement was removed and pt was able to void.  Pt had eventual return of bowel function and was able to tolerate small amounts of a low fiber diet.     Pt did have multifactorial anemia due to iron deficiency and acute blood loss inherent to procedure.  Was given 1U PRBC transfusion with appropriate response.     Pt was seen by PT/OT and deemed appropriate for discharge home.  Pt was taught how to self-inject post-operative prophylactic lovenox for which she is to do for a total of 30 days. Post-operative pain was controlled with scheduled tylenol, gabapentin, robaxin and prn oxycodone.  Unfortunately pt was exposed to COVID during her hospitalization, first COVID test was negative and pt has remained asymptomatic.      Patient is to follow up in the Colon and Rectal Surgery Clinic in 2-3 week with Angelina Handley NP or Nalini Hart PA-C and then with Dr. Ashley in 4-6 weeks after.          Day of Discharge Physical Exam:   Blood pressure 100/67, pulse 87, temperature (!) 96.2  F (35.7  C), temperature source Oral, resp. rate 16, height 1.575 m (5' 2\"), weight 86.5 kg (190 lb 11.2 oz), SpO2 97 %, not currently breastfeeding.    Gen: AAOx3, NAD  Pulm: Non-labored breathing  Abd: Soft, appropriately tender, no guarding/rebound   Incision C/D/I   Ext:  Warm and well-perfused         Final Pathology Result:   Pending at time of discharge           Discharge Instructions and Follow-Up:     Discharge Procedure Orders   Reason for your hospital stay   Order Comments: 4/6/2023:   PROCEDURES: Total abdominal colectomy with ileorectal Baker's anastomosis, partial omentectomy, flexible sigmoidoscopy. Total abdominal hysterectomy, bilateral salpingo-oophorectomy.     Activity   Order Comments: Your activity upon discharge:   ACTIVITY  -No lifting, pushing, pulling " greater than 10 lbs and no strenuous exercise for 6 weeks   -Do not insert anything into your anus or rectum for 6 weeks  -No driving while on narcotic analgesics (i.e. Percocet, oxycodone, Vicodin)  -No driving until you are able to fully twist to both sides or slam on brakes quickly and without any pain  -We encourage walking at least 4-5 times per day     Order Specific Question Answer Comments   Is discharge order? Yes      [facility] Follow-up and recommended labs and tests   Order Comments: WOUND CARE  -Inspect your wounds daily for signs of infection (increased redness, drainage, pain)  -Keep your wound clean and dry  -You may shower, but do not soak in tub or pool    NOTIFY  Please contact Azalea Cronin RN or Cate Howe RN at [phone] for problems after discharge such as:  -Temperature > 101F, chills, rigors, dizziness  -Redness around or purulent drainage from wound  -Inability to tolerate diet, nausea or vomiting  -You stop passing gas, develop significant bloating, abdominal pain  -Have blood in stools/vomit  -Have severe diarrhea/constipation  -Any other questions or concerns.  - At nights (after 4:30pm), on weekends, or if urgent, call [phone] and ask the  to speak with the on-call Colorectal Surgery resident or fellow      Medication Instructions  Some of your medications may have changed. Please take only prescribed and resumed medications     FOLLOW-UP  1.  You will need to follow-up with Angelina Handley NP in the Colon and Rectal Surgery clinic on 5/1 and then with CRS Staff: Dr. Jerome Ashley on 5/19.  Please contact our Nurses (phone # [phone]) if you have not heard from our clinic in 3 business days afer discharge to schedule a follow-up appointment.    2.  Follow up with your primary care provider in 1-2 weeks after discharge from the hospital to review this hospitalization.       Appointments on [place] and/or [facility] (with [facility] or [facility]  provider or service). Call [phone] if you haven't heard regarding these appointments within 7 days of discharge.     Diet   Order Comments: Follow this diet upon discharge:   DIET  -Low Residue Diet for at least 4-6 weeks unless cleared by Colorectal surgery.  No raw vegetables, fruit skins, fibrous foods that require a lot of chewing, nuts, seeds, corn, popcorn.   -We recommend eating slowly, chewing thoroughly, eating small frequent meals throughout the day  -Stay well hydrated.     Order Specific Question Answer Comments   Is discharge order? Yes             Home Health Care:     Not needed           Discharge Disposition:     Discharged to home      Condition at discharge: Stable      Arlet Mckeon PA-C ..................4/11/2023   10:09 AM  Colon and Rectal Surgery     Pt was seen and discussed with Dr. Salinas on 4/11/2023    The above plan of care was performed and communicated to me by CRS Staff: Dr. Jerome Ashley     I spent 45 minute face-to-face or coordinating care of Nadia Ladd. Over 50% of our time on the unit was spent counseling the patient and/or coordinating care as documented in the assessment and plan.     [id number] post op hospital visit

## 2023-04-11 NOTE — PROGRESS NOTES
Nadia is a 66 yo pt admitted 04/06/23 with hxo HTN, chron's colitis, cancer of the sigmoid colon, now POD#5 total abd colectomy, hysterectomy, bilateral salpingo-oophorectomy     Rating abd pain 4/10, scheduled Tylenol 975 mg with effect, declined other interventions, denied n/v, other vitals stable/wdl, ambulated to bathroom with walker, RA, smear BM x1, voiding spontaneously, low fiber diet, right arm PIV SL, Midline incision with abd binder in place, liquid bandage, bruised upper arm, edema of BLE, legs elevated, in special precautions for COVID exposure tested Negative for COVID 04/10, next COVID test due 04/14/23, possible discharge today

## 2023-04-11 NOTE — PLAN OF CARE
"RN [id number]:    Vital signs: /67 (BP Location: Right arm)   Pulse 87   Temp (!) 96.2  F (35.7  C) (Oral)   Resp 16   Ht 1.575 m (5' 2\")   Wt 86.5 kg (190 lb 11.2 oz)   LMP  (LMP Unknown)   SpO2 97%   BMI 34.88 kg/m      Status: 4/6 admit, POD #5   Neuro: AOX4  Pain/Nausea: pain managed with scheduled Tylenol. Denies nausea  Cardiac: WNL  Respiratory: Sating adequately on room air. Denies SOB.  Mobility: independent in room using walker  Diet: low fiber diet, tolerating  Labs: reviewed  LDAs: PIV SL  Skin/incisions: midline incision with liquid bandage. Abdominal binder in place  GI/: voids spontaneously without difficulty. Continues to have loose BMs  Plan: patient will discharge home later today  "

## 2023-04-11 NOTE — PROGRESS NOTES
Colorectal Surgery Progress Note  [facility]  POD#5      Subjective:   Better today.  Less bloating.  Passed 14 small loose stools but not diarrhea.      Vitals:  Vitals:    04/10/23 0542 04/10/23 1410 04/10/23 2325 04/11/23 0715   BP: 132/74 130/65 125/65 100/67   BP Location: Left arm Right arm Left arm Right arm   Pulse: 100 87 87 87   Resp: 18 16 16 16   Temp: 97.1  F (36.2  C) 97  F (36.1  C) 98.3  F (36.8  C) (!) 96.2  F (35.7  C)   TempSrc: Oral Oral Oral Oral   SpO2: 93% 95% 96% 97%   Weight:       Height:         I/O:  I/O last 3 completed shifts:  In: 360 [P.O.:360]  Out: 750 [Urine:725; Emesis/NG output:25]    Physical Exam:  Gen: AAOx3, NAD  Pulm: Non-labored breathing  Abd: Soft, mildly distended, appropriately tender, no guarding/rebound   Incision C/D/I   Ext:  Warm and well-perfused    BMP  Recent Labs   Lab 04/11/23  0702 04/09/23  0829 04/08/23  0759 04/08/23  0601 04/07/23  0646    136 135*  --  139   POTASSIUM 4.4 4.2 4.3  --  4.8   CHLORIDE 104 106 105  --  108*   CO2 20* 20* 20*  --  19*   BUN 24.6* 26.7* 26.6*  --  22.8   CR 1.34* 1.38* 1.64*  --  1.63*   * 102* 111* 111* 139*  139*   MAG  --   --  1.9  --  1.8     CBC  Recent Labs   Lab 04/11/23  0702 04/10/23  0736 04/09/23  0829 04/08/23  1649 04/08/23  0759 04/07/23  0646 04/07/23  0155 04/06/23  1653   WBC 7.0  --   --   --   --  9.0 9.0 8.8   HGB 8.9* 8.3* 7.5* 8.0*   < > 7.5* 7.7* 9.4*   HCT 30.9*  --   --   --   --  25.6* 24.9* 30.7*     --  244  --   --  272 248 322    < > = values in this interval not displayed.         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  Now s/p Total abdominal colectomy w/ ileorectal bakers anastomosis; ALEXANDER-BSO on 4/6/2023.    Neuro/Pain: gabapentin, tylenol, prn oxycodone  CV: home triamterence-hydrochlorothiazide today, can resume lisinopril on  discharge  PULM: encourage IS.  GI/FEN:   - Low fiber diet  - abdominal binder  : Voiding spontaneously. Cr at baseline.   Heme: hgb 8.9 form 8.3  ID: no acute concerns at this time  Endocrine: no acute concerns at this time    Activity: Ambulate 2-3x today  Ppx: lovenox ppx  Dispo: Discharge later today if tolerates Breakfast and lunch.      Arlet Mckeon PA-C ..................4/11/2023   8:18 AM  Colon and Rectal Surgery    Patient was seen and discussed with Dr. Salinas    The above plan of care was performed and communicated to me by Dr. Ashley    I spent 30 minute face-to-face or coordinating care of Nadia Ladd. Over 50% of our time on the unit was spent counseling the patient and/or coordinating care as documented in the assessment and plan.     [id number] post op hospital visit

## 2023-04-11 NOTE — PLAN OF CARE
[id number]    POD 4- total abd colectomy with ALEXANDER-BSO. VSS on RA, A&O x4. Pain managed with scheduled tylenol and gabapentin. Ate a minimal amount, pt complained of nausea. PRN zofran given x1 before eating which was effective. Pt reported passing gas and had frequent watery stools and voided spontaneously. Midline, left and right incisions clean, dry, intact and open to air. Independent with a walker. R PIV saline locked.

## 2023-04-12 ENCOUNTER — PATIENT OUTREACH (OUTPATIENT)
Dept: CARE COORDINATION | Facility: CLINIC | Age: 68
End: 2023-04-12
Payer: MEDICARE

## 2023-04-12 NOTE — PROGRESS NOTES
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  Now s/p Total abdominal colectomy w/ ileorectal bakers anastomosis; ALEXANDER-BSO on 4/6/2023.           Hospital Course:   Post-operatively pt was gently fluid resuscitated.  Clement was removed and pt was able to void.  Pt had eventual return of bowel function and was able to tolerate small amounts of a low fiber diet.      Pt did have multifactorial anemia due to iron deficiency and acute blood loss inherent to procedure.  Was given 1U PRBC transfusion with appropriate response.      Pt was seen by PT/OT and deemed appropriate for discharge home.  Pt was taught how to self-inject post-operative prophylactic lovenox for which she is to do for a total of 30 days. Post-operative pain was controlled with scheduled tylenol, gabapentin, robaxin and prn oxycodone.  Unfortunately pt was exposed to COVID during her hospitalization, first COVID test was negative and pt has remained asymptomatic.       Patient is to follow up in the Colon and Rectal Surgery Clinic in 2-3 week with Angelina Handley NP or Nalini Hart PA-C and then with Dr. Ashley in 4-6 weeks after.       ASSESSMENT           Discharge Assessment  How are you doing now that you are home?: Per patient report, she is doing well since discharge. she said she is still working on figuring out a low fiber diet. patient said she has all  the medications she needs. she has a follow up appointment scheduled with Dr. Bateman on 4/19/2023  How are your symptoms? (Red Flag symptoms escalate to triage hotline per guidelines): Improved  Do you feel your condition is stable enough to be safe at home until your provider visit?: Yes  Does the patient have their discharge instructions? : Yes  Does the patient have questions regarding their discharge instructions? : No  Were you started on any new medications or were there changes to any of your previous medications? : Yes  Does the patient have all of their medications?: Yes  Do you have questions regarding any of your medications? : No  Do you have all of your needed medical supplies or equipment (DME)?  (i.e. oxygen tank, CPAP, cane, etc.): Yes         Post-op (Clinicians Only)  Did the patient have surgery or a procedure: Yes  Fever: No  Chills: No  Eating & Drinking: eating and drinking without complaints/concerns  PO Intake: low fiber  Urinary Status: voiding without complaint/concerns      PLAN                                                      Outpatient Plan:      Discharge Procedure Orders   Reason for your hospital stay   Order Comments: 4/6/2023:   PROCEDURES: Total abdominal colectomy with ileorectal Baker's anastomosis, partial omentectomy, flexible sigmoidoscopy. Total abdominal hysterectomy, bilateral salpingo-oophorectomy.          Activity   Order Comments: Your activity upon discharge:   ACTIVITY  -No lifting, pushing, pulling greater than 10 lbs and no strenuous exercise for 6 weeks   -Do not insert anything into your anus or rectum for 6 weeks  -No driving while on narcotic analgesics (i.e. Percocet, oxycodone, Vicodin)  -No driving until you are able to fully twist to both sides or slam on brakes quickly and without any pain  -We encourage walking at least 4-5 times per day      Order Specific Question Answer Comments   Is discharge order? Yes            [facility]ult [facility] Follow-up and  recommended labs and tests   Order Comments: WOUND CARE  -Inspect your wounds daily for signs of infection (increased redness, drainage, pain)  -Keep your wound clean and dry  -You may shower, but do not soak in tub or pool     NOTIFY  Please contact Azalea Cronin RN or Cate Howe RN at [phone] for problems after discharge such as:  -Temperature > 101F, chills, rigors, dizziness  -Redness around or purulent drainage from wound  -Inability to tolerate diet, nausea or vomiting  -You stop passing gas, develop significant bloating, abdominal pain  -Have blood in stools/vomit  -Have severe diarrhea/constipation  -Any other questions or concerns.  - At nights (after 4:30pm), on weekends, or if urgent, call [phone] and ask the  to speak with the on-call Colorectal Surgery resident or fellow        Medication Instructions  Some of your medications may have changed. Please take only prescribed and resumed medications      FOLLOW-UP  1.  You will need to follow-up with Angelina Handley NP in the Colon and Rectal Surgery clinic on 5/1 and then with CRS Staff: Dr. Jerome Ashley on 5/19.  Please contact our Nurses (phone # [phone]) if you have not heard from our clinic in 3 business days afer discharge to schedule a follow-up appointment.     2.  Follow up with your primary care provider in 1-2 weeks after discharge from the hospital to review this hospitalization.         Appointments on [facility] and/or [facility] (with [facility] or [facility] provider or service). Call [phone] if you haven't heard regarding these appointments within 7 days of discharge.          Diet   Order Comments: Follow this diet upon discharge:   DIET  -Low Residue Diet for at least 4-6 weeks unless cleared by Colorectal surgery.  No raw vegetables, fruit skins, fibrous foods that require a lot of chewing, nuts, seeds, corn, popcorn.   -We recommend eating slowly, chewing thoroughly, eating small frequent meals  throughout the day  -Stay well hydrated.         Future Appointments   Date Time Provider Department Center   4/19/2023  9:15 AM [facility]   5/1/2023 11:45 AM Angelina Williamson APRN CNP [facility]   5/1/2023 12:30 PM Ambika Jain RN [facility]   5/2/2023 10:00 AM Nomi Cartwright DO [facility]   5/9/2023  1:20 PM Sunitha Olea MD [facility]   5/19/2023 11:30 AM Jerome Ashley MD [facility]         For any urgent concerns, please contact our 24 hour nurse triage line: [phone] ([phone])         Alexandria Ritter RN

## 2023-04-12 NOTE — PLAN OF CARE
Pt A/Ox4, VSS on RA. Low fiber diet, tolerating well. Minimal pain, helped by abdominal binder. Up SBA/independent w/walker. Special isolation precautions maintained. IV removed.    Reviewed and signed AVS. Discharge medications picked up. Left floor via w/c at approx 1915.

## 2023-04-12 NOTE — PLAN OF CARE
Occupational Therapy Discharge Summary    Reason for therapy discharge:    Discharged to home.    Progress towards therapy goal(s). See goals on Care Plan in [facility] electronic health record for goal details.  Goals partially met.  Barriers to achieving goals:   discharge from facility.    Therapy recommendation(s):    No further therapy is recommended.Pt near IND ADL/IADL baseline and mobility, anticipate once medically ready will safely discharge home with assist from spouse as needed with heavy IADL completion.

## 2023-04-13 ENCOUNTER — PATIENT OUTREACH (OUTPATIENT)
Dept: SURGERY | Facility: CLINIC | Age: 68
End: 2023-04-13
Payer: MEDICARE

## 2023-04-18 NOTE — PROGRESS NOTES
[facility]    Hematology/Oncology Established Patient Follow Up Note      Today's Date: 4/19/2023    Reason for follow up: Sigmoid cancer    HISTORY OF PRESENT ILLNESS: Nadia Ladd is a 67 year old female who was referred to the Hematology/Oncology Clinic for sigmoid cancer    Patient has medical history including Crohn's disease on sulfasalazine, anemia secondary to iron deficiency and B12 deficiency, obesity, hypertension, prediabetes, eczema, pulmonary hypertension, hiatal hernia, mild splenomegaly (14.7 cm in 2/23)    - 2/23 pt noted increasing fatigue, found to have iron deficiency anemia w/hgb 6.8 (previously required RBC transfusion when dx w/Crohn's), did have intermittent changes in stool but not persistent (noted minimal blood in stool)   - 2/23 upper endoscopy for iron deficiency anemia and Crohn's disease: Hiatal hernia, normal stomach, normal duodenum  - 2/23 colonoscopy: A frond-like/villous partially obstructing large mass found in sigmoid colon, partially circumferential involving two thirds of the lumen circumference, 4 cm, unable to traverse, with oozing, s/p biopsy  PATHOLOGY:  A.  Stomach, antrum: Biopsy:  - Antral type mucosa with mild chronic inactive gastritis  - Immunostain for Helicobacter pylori is negative      B.  Colon, sigmoid, stricture: Biopsy:  - Invasive poorly differentiated carcinoma  The sigmoid stricture shows a high-grade carcinoma without definitive gland formation.  Given the poorly differentiated appearance, an immunohistochemical panel was performed to exclude the possibility of a tumor by direct extension or metastasis.   Immunohistochemical stains are performed and show the tumor to be diffusely positive for CK7 and partially positive for CK20 and SATB2.  There is no significant tumor reactivity for CDX2, GATA3, PAX8, TTF-1, and chromogranin.  Synaptophysin highlights very rare positive cells. Although the CK7/CK20 profile is not entirely  "typical for a colorectal carcinoma, immunostains for tumors of other origins are negative and a colorectal primary is still favored.   - Mismatch repair:  1) MLH1-loss of nuclear expression  2) MSH2-intact  3) MSH6-intact  4) PMS2-loss of nuclear expression  -MLH1 promoter methylation: NEGATIVE    -2/23 CT CAP:  A) 4 cm length mass in the proximal sigmoid colon. There is some irregularity and stranding in the adjacent pericolonic fat which may indicate tumor extension. There is no obstruction.   B) there is a second probable mass at the hepatic flexure of the colon measuring 2.5 cm in length   C)There are small lymph nodes in the mesial colon adjacent to the hepatic flexure abnormality and the sigmoid colonic mass. No lymphadenopathy by size criteria  D) There is submucosal fatty deposition in the colon, most severe in the distal sigmoid colon and rectum, which can be seen secondary to quiescent inflammatory bowel disease.  E) no liver lesion    -3/23 PET:  a. There is increased FDG avidity of the sigmoid colon with focal lobular wall thickening consistent with biopsy-proven adenocarcinoma.  b. Secondary focus noted at the hepatic flexure demonstrates elevated FDG avidity and lobulated wall thickening highly suspicious for a additional primary.  c. Additionally there is a smaller focus of wall thickening with elevated FDG avidity along the left aspect of the transverse colon  which is suspicious for a possible third focus of colonic malignancy.    -3/23 CEA 6.8  - 3/23 colorectal surgery consultation with - in the setting of Crohn's, at least sigmoid colectomy with possible total abdominal colectomy with either ileorectal anastomosis or end ileostomy (\"rectum can remain active and be actively surveyed annually\")    INTERIM HISTORY:  -3/23 genetic counseling, testing for Chan syndrome    - 4/5/2023 gynecologic oncology consultation for risk reduction surgery with hysterectomy and BSO at time of " colectomy    -4/6/2023 laparoscopic converted to open total abdominal colectomy with ileorectal anastomosis, partial omentectomy, flexible sigmoidoscopy, TAHBSO  A. OMENTUM, RESECTION:  - Adipose tissue with no significant histopathologic abnormality  - No evidence of malignancy     B. COLON, RESECTION:  Mass #1 (ascending colon/hepatic flexure): Mixed neuroendocrine-non-neuroendocrine neoplasm (MINEN):  - Neuroendocrine carcinoma component (70%) and moderately-differentiated adenocarcinoma component (30%)  - Size = 5.0 cm, invading into muscularis propria  - Positive LVI  - Negative macroscopic tumor perforation, negative PNI  - Negative surgical resection margins  - IHC: Neuroendocrine portion: Negative for chromogranin and INSM1 but strongly express synaptophysin  - IHC: Adenocarcinoma portion: Positive for CK20, CDX2 negative for CK7, PAX8, ER, S100  AJCC 8th edition: mpT3 pN1a     Mass#2 (sigmoid colon): Poorly-differentiated carcinoma:  - Grade 3  - Size = 5.7 cm, invading into muscularis propria  - Negative for microscopic tumor perforation, LVI, perineural invasion  - Negative surgical resection margins  - IHC: Positive for scar and keratin AE1/AE3, negative for CK7, CK20, CDX2, PAX8, ER, chromogranin, CD56, p40, synaptophysin, S100, INI1, p40, INSM1  AJCC 8th edition: mpT2 pN1a    - One of forty-one sampled lymph nodes positive (1/41)  - Benign appendix  - Tubular adenoma    C. UTERUS, CERVIX, BILATERAL FALLOPIAN TUBES & OVARIES, :  - Benign endometrial polyps; atrophic endometrium  - Uterus, cervix, bilateral fallopian tubes, and ovaries with no significant morphologic abnormalities  - No evidence of dysplasia or malignancy     D. SMALL INTESTINE, TERMINAL ILEUM, TERMINAL ILIUM:  - Benign ileum  - No evidence of dysplasia or malignancy  - Negative for metastases to one of one sampled lymph node (0 /1)     E. PROXIMAL ANASTOMOTIC RING:  - Benign small intestine  - No evidence of dysplasia or  malignancy     F. DISTAL ANASTOMOTIC RING:  - Benign colon  - No evidence of dysplasia or malignancy    -4/11/2023 patient discharged from hospital, planning for 30 days of lovenox postop, oxycodone for pain  (required 1 unit PRBC for anemia)    Pt states she is feeling well, minimal pain, controlled with tylenol, only used oxycodone once. Pt is using lovenox, no bleeding.      REVIEW OF SYSTEMS:   A 14 point ROS was reviewed with pertinent positives and negatives in the HPI.        HOME MEDICATIONS:  Current Outpatient Medications   Medication Sig Dispense Refill     cyanocobalamin 1000 MCG TBCR Take 1,000 mcg by mouth daily (Patient taking differently: Take 1,000 mcg by mouth every morning) 100 tablet 1     enoxaparin ANTICOAGULANT (LOVENOX) 40 MG/0.4ML syringe Inject 0.4 mLs (40 mg) Subcutaneous every 24 hours for 23 days 9.2 mL 0     ferrous sulfate (IRON) 325 (65 FE) MG tablet TAKE ONE TABLET BY MOUTH TWICE A DAY --NO FURTHER REFILLS WITHOUT OFFICE VISIT 200 tablet 3     gabapentin (NEURONTIN) 100 MG capsule Take 1 capsule (100 mg) by mouth At Bedtime 12 capsule 0     lisinopril (ZESTRIL) 10 MG tablet Take 1 tablet (10 mg) by mouth every morning 90 tablet 3     Multiple Vitamins-Iron (MULTI-DAY PLUS IRON PO)        triamterene-HCTZ (DYAZIDE) 37.5-25 MG capsule TAKE 1 CAPSULE BY MOUTH ONCE DAILY (Patient taking differently: Take 1 capsule by mouth every morning TAKE 1 CAPSULE BY MOUTH ONCE DAILY) 90 capsule 0     acetaminophen (TYLENOL) 325 MG tablet Take 3 tablets (975 mg) by mouth every 8 hours (Patient not taking: Reported on 4/19/2023) 100 tablet 0     fluocinonide (LIDEX) 0.05 % external cream Small amount to affected area(s) BID PRN (Patient not taking: Reported on 4/19/2023) 60 g 1         ALLERGIES:  Allergies   Allergen Reactions     No Known Drug Allergies          PAST MEDICAL HISTORY:  Past Medical History:   Diagnosis Date     Arthropathia 08/05/2010     B12 deficiency anemia 10/21/2010     Benign  essential hypertension with target blood pressure below 140/90 10/10/2016     CARDIOVASCULAR SCREENING; LDL GOAL LESS THAN 160 10/31/2010     Crohn's colitis (H) 02/15/2011     Dermatitis-dishydrotic eczema-severe 08/05/2010     Hiatal hernia      HTN (hypertension) 07/21/2011     Iron deficiency anemia 10/21/2010     Malignant neoplasm of sigmoid colon (H)      Obesity      Primary pulmonary hypertension (H) 04/06/2010         PAST SURGICAL HISTORY:  Past Surgical History:   Procedure Laterality Date     COLECTOMY WITHOUT COLOSTOMY N/A 4/6/2023    Procedure: Laparoscopic Converted to Open Total abdominal colectomy;  Surgeon: Jerome Ashley MD;  Location: [facility] OR     COLONOSCOPY  10/11/10     COLONOSCOPY N/A 2/27/2023    Procedure: ATTEMPTED COLONOSCOPY WITH SIGMOID STRICTURE BIOPSY;  Surgeon: Jonathan Alcocer MD;  Location:  GI     ESOPHAGOSCOPY, GASTROSCOPY, DUODENOSCOPY (EGD), COMBINED N/A 2/27/2023    Procedure: ESOPHAGOGASTRODUODENOSCOPY, WITH BIOPSY;  Surgeon: Jonathan Alcocer MD;  Location:  GI     HYSTERECTOMY TOTAL ABDOMINAL, BILATERAL SALPINGO-OOPHORECTOMY, COMBINED N/A 4/6/2023    Procedure: Hysterectomy total abdominal, bilateral salpingo-oophorectomy;  Surgeon: Sunitha Olea MD;  Location: [facility] OR     SIGMOIDOSCOPY FLEXIBLE N/A 4/6/2023    Procedure: Sigmoidoscopy flexible;  Surgeon: Jerome Ashley MD;  Location: [facility] OR     SURGICAL HISTORY OF -   06/14/76    Perineorrhaphy, for widening vaginal orifice     ZZC EXPLORATORY OF ABDOMEN  1989    laparoscopy         SOCIAL HISTORY:  Social History     Socioeconomic History     Marital status:      Spouse name: Not on file     Number of children: Not on file     Years of education: Not on file     Highest education level: Not on file   Occupational History     Not on file   Tobacco Use     Smoking status: Never     Passive exposure: Current     Smokeless tobacco: Never   Vaping Use     Vaping status: Not on file   Substance  "and Sexual Activity     Alcohol use: No     Drug use: No     Sexual activity: Yes     Partners: Male   Other Topics Concern     Parent/sibling w/ CABG, MI or angioplasty before 65F 55M? Not Asked   Social History Narrative     Not on file     Social Determinants of Health     Financial Resource Strain: Not on file   Food Insecurity: Not on file   Transportation Needs: Not on file   Physical Activity: Not on file   Stress: Not on file   Social Connections: Not on file   Intimate Partner Violence: Not on file   Housing Stability: Not on file         FAMILY HISTORY:  Family History   Problem Relation Age of Onset     Hypertension Mother         on meds, alive     Cerebrovascular Disease Father         stroke about age 65,  of cancer at 68 yrs     Diabetes Father         eventually took insulin     Anemia Father         Pernisios anemia     Kidney Disease Niece         kidney transplant     Kidney Disease Nephew         kidney transplant     Venous thrombosis No family hx of      Anesthesia Reaction No family hx of          PHYSICAL EXAM:  Vital signs:  /68 (BP Location: Right arm, Patient Position: Sitting, Cuff Size: Adult Large)   Pulse 106   Temp 98.2  F (36.8  C) (Temporal)   Resp 18   Ht 1.575 m (5' 2\")   Wt 83.5 kg (184 lb 3 oz)   LMP  (LMP Unknown)   SpO2 99%   BMI 33.69 kg/m       ECO  GENERAL/CONSTITUTIONAL: No acute distress.  EYES:  Extraocular movements intact without nystagmus.  No scleral icterus.  RESPIRATORY: Equal chest rise.   MUSCULOSKELETAL: Warm and well-perfused, no cyanosis, clubbing, or edema. Right LE always more swollen than left, chronic  Unchanged.   NEUROLOGIC: Cranial nerves are grossly intact. Alert, oriented to person, place and time, answers questions appropriately.  INTEGUMENTARY: No rashes or jaundice.  GAIT: walking with walker      LABS:    PATHOLOGY:  Case Report   Surgical Pathology Report                         Case: [id number]                               "     Authorizing Provider:  Jerome Ashley, Collected:           04/06/2023 11:19 AM                                  MD                                                                            Ordering Location:      MAIN OR                 Received:            04/06/2023 12:21 PM           Pathologist:           Dave Bernstein DO                                                             Specimens:   A) - Omentum                                                                                         B) - Other, colon                                                                                    C) - Uterus, Cervix, Bilateral Fallopian Tubes & Ovaries                                             D) - Small Intestine, Terminal Ileum, Terminal Ilium                                                 E) - Other, proximal anastomotic ring                                                                F) - Other, distal anastomotic ring                                                        Final Diagnosis   A. OMENTUM, RESECTION:  - Adipose tissue with no significant histopathologic abnormality  - No evidence of malignancy     B. COLON, RESECTION:  Mass #1: Mixed neuroendocrine-non-neuroendocrine neoplasm (MINEN):  - Neuroendocrine carcinoma component (70%) and moderately-differentiated adenocarcinoma component (30%)  - Size = 5.0 cm  - Negative surgical resection margins      Mass#2: Poorly-differentiated carcinoma:  - Size = 5.7 cm  - Negative surgical resection margins      - One of forty-one sampled lymph nodes positive (1/41)  - Benign appendix  - Tubular adenoma  - See synoptic reports     C. UTERUS, CERVIX, BILATERAL FALLOPIAN TUBES & OVARIES, :  - Benign endometrial polyps; atrophic endometrium  - Uterus, cervix, bilateral fallopian tubes, and ovaries with no significant morphologic abnormalities  - No evidence of dysplasia or malignancy     D. SMALL INTESTINE, TERMINAL ILEUM, TERMINAL ILIUM:  -  Benign ileum  - No evidence of dysplasia or malignancy  - Negative for metastases to one of one sampled lymph node (0 /1)     E. PROXIMAL ANASTOMOTIC RING:  - Benign small intestine  - No evidence of dysplasia or malignancy     F. DISTAL ANASTOMOTIC RING:  - Benign colon  - No evidence of dysplasia or malignancy   Electronically signed by Dave Bernstein DO on 4/14/2023 at  3:07 PM   Synoptic Checklist   COLON AND RECTUM: Resection, Including Transanal Disk Excision of Rectal Neoplasms  8th Edition - Protocol posted: 6/22/2022  COLON AND RECTUM: RESECTION - B  SPECIMEN   Procedure  Total abdominal colectomy    TUMOR   Tumor Site  Ascending colon    Histologic Type  Mixed neuroendocrine-non-neuroendocrine neoplasm: 70% neuroendocrine neoplasm and 30% adenocarcinoma    Histologic Grade  Neuroendocrine carcinoma and moderately-differentiated adenocarcinoma    Tumor Size  Greatest dimension (Centimeters): 5.0 cm   Tumor Extent  Invades into muscularis propria    Macroscopic Tumor Perforation  Not identified    Lymphovascular Invasion  Small vessel    Perineural Invasion  Not identified    Treatment Effect  No known presurgical therapy    MARGINS   Margin Status for Invasive Carcinoma  All margins negative for invasive carcinoma    Closest Margin(s) to Invasive Carcinoma  Radial (circumferential) or mesenteric    Distance from Invasive Carcinoma to Closest Margin  0.2 cm   Margin Status for Non-Invasive Tumor  All margins negative for high-grade dysplasia / intramucosal carcinoma and low-grade dysplasia    REGIONAL LYMPH NODES   Regional Lymph Node Status  Tumor present in regional lymph node(s)    Number of Lymph Nodes with Tumor  1    Number of Lymph Nodes Examined  41    Tumor Deposits  Not identified    PATHOLOGIC STAGE CLASSIFICATION (pTNM, AJCC 8th Edition)   Reporting of pT, pN, and (when applicable) pM categories is based on information available to the pathologist at the time the report is issued. As per the AJCC  (Chapter 1, 8th Ed.) it is the managing physician's responsibility to establish the final pathologic stage based upon all pertinent information, including but potentially not limited to this pathology report.   TNM Descriptors  m (multiple primary tumors)    pT Category  pT3    pN Category  pN1a    ADDITIONAL FINDINGS   Additional Findings  Adenoma(s)    .     COLON AND RECTUM: Resection, Including Transanal Disk Excision of Rectal Neoplasms  8th Edition - Protocol posted: 6/22/2022  COLON AND RECTUM: RESECTION - B  SPECIMEN   Procedure  Total abdominal colectomy    TUMOR   Tumor Site  Sigmoid colon    Histologic Type  Carcinoma, type cannot be determined    Histologic Grade  G3, poorly differentiated    Tumor Size  Greatest dimension (Centimeters): 5.7 cm   Tumor Extent  Invades into muscularis propria    Macroscopic Tumor Perforation  Not identified    Lymphovascular Invasion  Not identified    Perineural Invasion  Not identified    Treatment Effect  No known presurgical therapy    MARGINS   Margin Status for Invasive Carcinoma  All margins negative for invasive carcinoma    Closest Margin(s) to Invasive Carcinoma  Radial (circumferential) or mesenteric    Distance from Invasive Carcinoma to Closest Margin  4.2 cm   Margin Status for Non-Invasive Tumor  All margins negative for high-grade dysplasia / intramucosal carcinoma and low-grade dysplasia    REGIONAL LYMPH NODES   Regional Lymph Node Status  Tumor present in regional lymph node(s)    Number of Lymph Nodes with Tumor  1    Number of Lymph Nodes Examined  41    Tumor Deposits  Not identified    PATHOLOGIC STAGE CLASSIFICATION (pTNM, AJCC 8th Edition)   Reporting of pT, pN, and (when applicable) pM categories is based on information available to the pathologist at the time the report is issued. As per the AJCC (Chapter 1, 8th Ed.) it is the managing physician's responsibility to establish the final pathologic stage based upon all pertinent information,  "including but potentially not limited to this pathology report.   TNM Descriptors  m (multiple primary tumors)    pT Category  pT2    pN Category  pN1a    .      Clinical Information   [facility]   The patient is a 67-year-old female with recent diagnosis of Invasive poorly differentiated carcinoma in the sigmoid colon.   Gross Description   SUDHEER LANDIS(1). Omentum, Omentum:  The specimen is received fresh with proper patient identification labeled \"omentum\".  The specimen consists of a portion of omentum (29.5 x 21.0 x 1.5 cm).  Sectioning reveals yellow, lobulated cut surfaces with no masses or lesions identified within.  Representative sections are submitted in A1 and A2.  B(2). Other, colon:  B: intraoperative gross consultation, colon, open and call \"5.0 cm mass in the ascending colon, 14.0 cm from the nearest (proximal) margin. 5.7 cm sigmoid mass, 5.7 cm from the nearest (distal) margin\" (Lola CHRISTIANSON(ASCP) ).     The specimen is received fresh with proper patient identification labeled \"colon\".  The specimen consists of a total colectomy without attached rectum, which consists of a terminal ileum (2.0 cm in length by 3.0 cm in diameter), appendix (5.0 cm in length by 0.3 cm in diameter), colon (70.8 cm in length and ranges in diameter from 2.2-4.5 cm), attached omentum, and mesocolon.  Both margins are stapled.  The free retroperitoneal margin and mesenteric margin are inked black.  The serosa has 2 areas of puckering.  The first area of puckering is located 15.5 cm from the proximal margin and the second area of puckering is located 8.0 cm from the distal margin.  The areas of puckering are inked blue.  The remaining serosa is pink-tan, smooth with focal adhesions.  Opening reveals 2 masses.  Mass #1 is 5.0 x 5.0 x 0.6 cm, circumferential, pink-tan, focally ulcerated, and polypoid located within the ascending colon at the attachment of the omentum.  Mass #1 is located 14 cm from the proximal margin, 38.5 cm " from mass #2, and 55 cm from the distal margin.  Sectioning reveals a 0.5 cm depth of invasion into the mesentery which comes to within 0.2 cm of the free retroperitoneal margin.  Mass #1 does not grossly involve the serosa. Mass #2 is  5.7 x 3.5 x 0.7 cm, circumferential, tan-white, necrotic, and ulcerated and located within the sigmoid.  Mass #2 is located 5.7 cm from the distal margin.  Sectioning mass #2 reveals black tattooing and a 0.5 cm depth of invasion into the muscularis propria but does not extend into the mesentery.  Mass #2 is 4.2 cm from the closest mesenteric margin and does not grossly involve the serosa. The remaining colonic mucosa is irregularly folded, focally flattened, pink-tan with no other lesions or polyps identified.  The appendix has a pink-tan, smooth and unremarkable serosa.  Sectioning reveals a pinpoint lumen with a wall thickness of 0.1 cm.  Palpation and dissection reveals 35 possible lymph nodes, ranging from 0.2-1.8 cm.  Representative sections are submitted.     B1-proximal margin, shaved  V2-M4-nzxgzr margin, shaved  B4-B6-mass #1 2 mesenteric margin with greatest depth of invasion and B4  B7-mass #1 to puckered area  B8-mass #1 towards distal margin  B9-mass #1 towards proximal margin  M03-wyhouvtrvmaq transverse colon mucosa and wall between the masses  J99-trbf #2 2 puckered area  R39-lxyn #2 towards distal margin  X69-tarv #2 with greatest depth of invasion  T08-pred #2 towards proximal/unremarkable mucosa  S23-wuto #2 representative  F32-zthqgagaoqqsyz of ileocecal valve  W-51-grqhusziuijilj of unremarkable cecum mucosa and wall   V17-dubuzvhvcdkxjc of unremarkable descending colon mucosa and wall  C06-gkjouzmlyxttfq of splenic flexure mucosa and wall  X17-leldvlmxeqzsym of appendix and entire distal tip  F03-besevdy mesenteric margin of mass #2  B22-6 whole lymph nodes  B23-6 whole lymph nodes  B24-6 whole lymph nodes  B25-6 whole lymph nodes  B 26-5 whole lymph nodes  B  "27-3 lymph nodes, bisected and differentially inked  B 28-3 lymph nodes, bisected and differentially inked        C(3). Uterus, Cervix, Bilateral Fallopian Tubes & Ovaries, :  The specimen is received fresh with proper patient identification labeled \"uterus, cervix, bilateral fallopian tubes and ovaries\".  The specimen consists of a 35.0 g hysterectomy to include uterus (7.0 cm fundus to ectocervix, 3.5 cm cornu to cornu, 2.2 cm anterior to posterior), right fallopian tube (5.0 cm in length by 0.5 cm in diameter), right ovary (2.5 x 1.5 x 1.1 cm), left fallopian tube (4.2 cm in length by 0.5 cm in diameter), and left ovary (2.0 x 1.5 x 1.2 cm).    The serosal surface appears dull with no adhesions.  The paracervical and parametrial tissues are inked black and bisected to reveal a 2.2 x 0.6 cm endometrial cavity remarkable for 3 pink-tan polyps ranging in size from 0.4 to 1.2 cm and coming to within 2.5 cm of the anterior lower uterine segment and 2.1 cm of the posterior lower uterine segment.  The polyps appear in the anterior and posterior fundus and appear confined to the endometrium.  The remainder of the endometrium is yellow-tan with a maximum thickness of 0.1 cm.  The myometrium has a maximal thickness of 1.1 cm and is mildly trabeculated with no nodules identified.    The bilateral fallopian tubes are fimbriated and patent.  The bilateral ovaries are sectioned to reveal an unremarkable yellow-tan cut surface with no cysts or masses identified.  Representative sections to include the entirety of the endometrium are submitted.    C1-anterior cervix  C2-posterior cervix  C3-full-thickness anterior endomyometrium with polyp  C4-additional full-thickness anterior endomyometrium  C5-remainder of anterior endometrium  C6-full-thickness posterior endomyometrium with second polyp  C7-full-thickness posterior endomyometrium with second and third polyp  C8-full-thickness posterior endomyometrium with remainder of third " "polyp  C9-remainder of posterior endometrium  C10-right fallopian tube  C11-right ovary  C12-left fallopian tube  C13-left ovary  D(4). Small Intestine, Terminal Ileum, Terminal Ilium:  The specimen is received fresh with proper patient identification labeled \"terminal ileum\".  The specimen consists of a 12.0 cm in length by 2.5 cm in diameter unoriented segment of small bowel received stapled at both ends.  Up to 4.0 cm of normally attached adipose tissue is present.  The specimen appears edematous with no polyps or masses identified.  No perforations are identified.  The attached adipose tissue is sectioned to reveal 7 pink-tan possible lymph nodes averaging 0.1 cm each.  Representative sections to include all possible lymph nodes are submitted.    X4-I5-[id number] margins, en face  D3-representative edematous mucosa  D4-4 intact possible lymph nodes  D5-3 intact possible lymph nodes     E(5). Other, proximal anastomotic ring:  The specimen is received in formalin with proper patient identification, labeled \"proximal anastomotic ring\".  The specimen consists of a 0.8 x 0.5 x 0.5 cm mucosal ring with a blue suture.  The mucosa is reddish-brown and unremarkable.  The specimen is entirely submitted in E1.              F(6). Other, distal anastomotic ring:  The specimen is received in formalin with proper patient identification, labeled \"distal anastomotic ring\".  The specimen consists of a 2.3 x 2.0 x 0.5 cm mucosal ring.  The mucosa is pink-tan and unremarkable and the serosa is pink-brown and unremarkable.  The specimen is entirely submitted in F1.                Microscopic Description   [facility]   Microscopic examination performed. Immunohistochemical panel was performed on block B6.  The adenocarcinoma component of Mass #1 is positive for CK20, CDX-2, rigoberto, and keratin AE1/AE3 and negative for CK7, PAX-8, ER, S100.  The neuroendocrine component for Mass #1 is negative for chromogranin and INSM1 but synaptophysin is " strongly expressed, which is consistent with mixed neuroendocrine-non-neuroendocrine neoplasm (MINEN).  Immunohistochemical panel was performed on block B13. Mass #2 is positive for rigoberto and keratin AE1/AE3 and negative for CK7, CK20, CDX-2, PAX-8, ER, chromogranin, CD56, p40, synaptophysin, S100, INI1, P40 and INSM1, which is consistent with poorly-differentiated carcinoma.          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)  # sigmoid colon Poorly-differentiated carcinoma  - 2/23 colonoscopy: A frond-like/villous partially obstructing large mass found in sigmoid colon, partially circumferential involving two thirds of the lumen circumference, 4 cm, unable to traverse, with oozing, s/p biopsy  -PATHOLOGY: Invasive poorly differentiated carcinoma, IHC panel excludes tumors of other origin, colorectal primary is favored, loss of nuclear expression of MLH1 and PMS2, MLH1 promoter methylation negative, [id number] mutation testing pending   -2/23 CT CAP: Shows known 4 cm proximal sigmoid colon mass with possible tumor extension (irregularity and stranding and adjacent pericolonic fat) without obstruction AND probable second mass 2.5 cm at hepatic flexure of colon; small lymph nodes adjacent to both masses (no lymphadenopathy by size criteria)  -3/23 PET:  a. There is increased FDG avidity of the sigmoid colon with focal lobular wall thickening consistent with biopsy-proven adenocarcinoma.  b. Secondary focus noted at the hepatic flexure demonstrates elevated FDG avidity and lobulated wall thickening highly suspicious for a additional primary.  c. Additionally there is a smaller focus of wall thickening with elevated FDG avidity along the left aspect of the transverse colon  which is suspicious for a  possible third focus of colonic malignancy.  - I d/w radiology 3/27/23- in reference to b- body of report says sigmoid flexure, will be addended to hepatic flexure and c references area near splenic flexure- possible indeterminate, outpouching of colon isn't PET avid but soft tissue around it is, hard to say given normal uptake of colon    - 3/23 CEA 6.8    -4/6/2023 laparoscopic converted to open total abdominal colectomy with ileorectal anastomosis, partial omentectomy, flexible sigmoidoscopy, TAHBSO  PATHOLOGY:  Of note, omental resection, terminal ileum, proximal and distal anastomotic rings, uterus, cervix, bilateral fallopian tubes and ovaries negative for malignancy    Mass #1 (ascending colon/hepatic flexure): Mixed neuroendocrine-non-neuroendocrine neoplasm (MINEN):  - Neuroendocrine carcinoma component (70%) and moderately-differentiated adenocarcinoma component (30%)  - Size = 5.0 cm, invading into muscularis propria  - Positive LVI  - Negative macroscopic tumor perforation, negative PNI  - Negative surgical resection margins  - IHC: Neuroendocrine portion: Negative for chromogranin and INSM1 but strongly express synaptophysin  - IHC: Adenocarcinoma portion: Positive for CK20, CDX2 negative for CK7, PAX8, ER, S100  AJCC 8th edition: mpT3 pN1a     Mass#2 (sigmoid colon): Poorly-differentiated carcinoma:  - Grade 3  - Size = 5.7 cm, invading into muscularis propria  - Negative for microscopic tumor perforation, LVI, perineural invasion  - Negative surgical resection margins  - IHC: Positive for scar and keratin AE1/AE3, negative for CK7, CK20, CDX2, PAX8, ER, chromogranin, CD56, p40, synaptophysin, S100, INI1, p40, INSM1  AJCC 8th edition: m pT2 pN1a    - One of forty-one sampled lymph nodes positive (1/41)    PLAN:  - Patient has complex pathology with 3 separate malignancies (poorly differentiated neuroendocrine tumor (carcinoma), moderately differentiated adenocarcinoma expressing CK20 and CDX2 at the  hepatic flexure and poorly differentiated carcinoma negative for CK20 and CDX2 at sigmoid colon  - I discussed pathology with Dr. Bernstein, will add on Ki67 to mass #1 and #2, unable to differentiate which mass is metastatic to LN given mets can look different than primary     -  BRAF V600 E mutation testing on original 2/23 biopsy pending  - Genetic counseling completed and results pending  - Will present this case at tumor board regarding further input for adjuvant therapy as patient is node positive (ie chemo options: carbo/cis + etoposide, FOLFOX, FOLFIRI)  - add on testing for actionable mutations including NTRK fusion, RET fusion, BRAF V600E, MSI, PDL1, TMB   - I have asked our clinical research team to see if any clinical trials are available   - MRI brain w/wo contrast     #Anemia secondary to iron deficiency and B12 deficiency  - 2/23 hgb 6.8, ferritin 21, iron sat index 10, TIBC 265, iron 27, b12 1493  - On B12 1000 mcg p.o. daily and ferrous sulfate 325 mg p.o. daily  - 4/9/23 s/p 1 uprbcs, 4/11/23 hgb 8.9     #Crohn's  - dx since at least 2010   - is currently on sulfasalazine   - pt is not actively following with GI, GI consult pending    RTC 5/3 for f/u with me and labs prior to visit     ADDENDUM: [facility] colorectal tumor board is not meeting 5/1, will have to present case at 5/8 tumor board instead. Will adjust pts appt accordingly.    Irene Bateman DO  Hematology/Oncology  [facility]    Future Appointments   Date Time Provider Department Center   3/28/2023 10:45 AM Irene Bateman DO [facility]   3/31/2023 11:30 AM Ambika Jain, MARQUEZ [facility]   3/31/2023  1:15 PM Anna Banks PA-C [facility]   3/31/2023  2:45 PM  LAB [facility]   5/1/2023 11:45 AM Angelina Williamson APRN CNP [facility]   5/2/2023 10:00 AM Nomi Cartwright DO [facility]   5/19/2023 11:30 AM Jerome Ashley MD [facility]

## 2023-04-19 ENCOUNTER — ONCOLOGY VISIT (OUTPATIENT)
Dept: ONCOLOGY | Facility: CLINIC | Age: 68
End: 2023-04-19
Payer: MEDICARE

## 2023-04-19 VITALS
HEART RATE: 106 BPM | HEIGHT: 62 IN | BODY MASS INDEX: 33.9 KG/M2 | SYSTOLIC BLOOD PRESSURE: 104 MMHG | DIASTOLIC BLOOD PRESSURE: 68 MMHG | WEIGHT: 184.19 LBS | RESPIRATION RATE: 18 BRPM | OXYGEN SATURATION: 99 % | TEMPERATURE: 98.2 F

## 2023-04-19 DIAGNOSIS — D51.8 OTHER VITAMIN B12 DEFICIENCY ANEMIA: ICD-10-CM

## 2023-04-19 DIAGNOSIS — C7A.1 MALIGNANT POORLY DIFFERENTIATED NEUROENDOCRINE CARCINOMA (H): Primary | ICD-10-CM

## 2023-04-19 DIAGNOSIS — D50.0 IRON DEFICIENCY ANEMIA DUE TO CHRONIC BLOOD LOSS: ICD-10-CM

## 2023-04-19 PROCEDURE — 99214 OFFICE O/P EST MOD 30 MIN: CPT | Performed by: INTERNAL MEDICINE

## 2023-04-19 ASSESSMENT — PAIN SCALES - GENERAL: PAINLEVEL: MILD PAIN (3)

## 2023-04-19 NOTE — LETTER
4/19/2023         RE: Nadia Ladd  [address]  [facility] [address]        Dear Colleague,    Thank you for referring your patient, Nadia Ladd, to the [facility]. Please see a copy of my visit note below.    [facility]    Hematology/Oncology Established Patient Follow Up Note      Today's Date: 4/19/2023    Reason for follow up: Sigmoid cancer    HISTORY OF PRESENT ILLNESS: Nadia Ladd is a 67 year old female who was referred to the Hematology/Oncology Clinic for sigmoid cancer    Patient has medical history including Crohn's disease on sulfasalazine, anemia secondary to iron deficiency and B12 deficiency, obesity, hypertension, prediabetes, eczema, pulmonary hypertension, hiatal hernia, mild splenomegaly (14.7 cm in 2/23)    - 2/23 pt noted increasing fatigue, found to have iron deficiency anemia w/hgb 6.8 (previously required RBC transfusion when dx w/Crohn's), did have intermittent changes in stool but not persistent (noted minimal blood in stool)   - 2/23 upper endoscopy for iron deficiency anemia and Crohn's disease: Hiatal hernia, normal stomach, normal duodenum  - 2/23 colonoscopy: A frond-like/villous partially obstructing large mass found in sigmoid colon, partially circumferential involving two thirds of the lumen circumference, 4 cm, unable to traverse, with oozing, s/p biopsy  PATHOLOGY:  A.  Stomach, antrum: Biopsy:  - Antral type mucosa with mild chronic inactive gastritis  - Immunostain for Helicobacter pylori is negative      B.  Colon, sigmoid, stricture: Biopsy:  - Invasive poorly differentiated carcinoma  The sigmoid stricture shows a high-grade carcinoma without definitive gland formation.  Given the poorly differentiated appearance, an immunohistochemical panel was performed to exclude the possibility of a tumor by direct extension or metastasis.   Immunohistochemical stains are performed and show the tumor to be  diffusely positive for CK7 and partially positive for CK20 and SATB2.  There is no significant tumor reactivity for CDX2, GATA3, PAX8, TTF-1, and chromogranin.  Synaptophysin highlights very rare positive cells. Although the CK7/CK20 profile is not entirely typical for a colorectal carcinoma, immunostains for tumors of other origins are negative and a colorectal primary is still favored.   - Mismatch repair:  1) MLH1-loss of nuclear expression  2) MSH2-intact  3) MSH6-intact  4) PMS2-loss of nuclear expression  -MLH1 promoter methylation: NEGATIVE    -2/23 CT CAP:  A) 4 cm length mass in the proximal sigmoid colon. There is some irregularity and stranding in the adjacent pericolonic fat which may indicate tumor extension. There is no obstruction.   B) there is a second probable mass at the hepatic flexure of the colon measuring 2.5 cm in length   C)There are small lymph nodes in the mesial colon adjacent to the hepatic flexure abnormality and the sigmoid colonic mass. No lymphadenopathy by size criteria  D) There is submucosal fatty deposition in the colon, most severe in the distal sigmoid colon and rectum, which can be seen secondary to quiescent inflammatory bowel disease.  E) no liver lesion    -3/23 PET:  a. There is increased FDG avidity of the sigmoid colon with focal lobular wall thickening consistent with biopsy-proven adenocarcinoma.  b. Secondary focus noted at the hepatic flexure demonstrates elevated FDG avidity and lobulated wall thickening highly suspicious for a additional primary.  c. Additionally there is a smaller focus of wall thickening with elevated FDG avidity along the left aspect of the transverse colon  which is suspicious for a possible third focus of colonic malignancy.    -3/23 CEA 6.8  - 3/23 colorectal surgery consultation with - in the setting of Crohn's, at least sigmoid colectomy with possible total abdominal colectomy with either ileorectal anastomosis or end ileostomy  "(\"rectum can remain active and be actively surveyed annually\")    INTERIM HISTORY:  -3/23 genetic counseling, testing for Chan syndrome    - 4/5/2023 gynecologic oncology consultation for risk reduction surgery with hysterectomy and BSO at time of colectomy    -4/6/2023 laparoscopic converted to open total abdominal colectomy with ileorectal anastomosis, partial omentectomy, flexible sigmoidoscopy, TAHBSO  A. OMENTUM, RESECTION:  - Adipose tissue with no significant histopathologic abnormality  - No evidence of malignancy     B. COLON, RESECTION:  Mass #1 (ascending colon/hepatic flexure): Mixed neuroendocrine-non-neuroendocrine neoplasm (MINEN):  - Neuroendocrine carcinoma component (70%) and moderately-differentiated adenocarcinoma component (30%)  - Size = 5.0 cm, invading into muscularis propria  - Positive LVI  - Negative macroscopic tumor perforation, negative PNI  - Negative surgical resection margins  - IHC: Neuroendocrine portion: Negative for chromogranin and INSM1 but strongly express synaptophysin  - IHC: Adenocarcinoma portion: Positive for CK20, CDX2 negative for CK7, PAX8, ER, S100  AJCC 8th edition: mpT3 pN1a     Mass#2 (sigmoid colon): Poorly-differentiated carcinoma:  - Grade 3  - Size = 5.7 cm, invading into muscularis propria  - Negative for microscopic tumor perforation, LVI, perineural invasion  - Negative surgical resection margins  - IHC: Positive for scar and keratin AE1/AE3, negative for CK7, CK20, CDX2, PAX8, ER, chromogranin, CD56, p40, synaptophysin, S100, INI1, p40, INSM1  AJCC 8th edition: mpT2 pN1a    - One of forty-one sampled lymph nodes positive (1/41)  - Benign appendix  - Tubular adenoma    C. UTERUS, CERVIX, BILATERAL FALLOPIAN TUBES & OVARIES, :  - Benign endometrial polyps; atrophic endometrium  - Uterus, cervix, bilateral fallopian tubes, and ovaries with no significant morphologic abnormalities  - No evidence of dysplasia or malignancy     D. SMALL INTESTINE, TERMINAL " ILEUM, TERMINAL ILIUM:  - Benign ileum  - No evidence of dysplasia or malignancy  - Negative for metastases to one of one sampled lymph node (0 /1)     E. PROXIMAL ANASTOMOTIC RING:  - Benign small intestine  - No evidence of dysplasia or malignancy     F. DISTAL ANASTOMOTIC RING:  - Benign colon  - No evidence of dysplasia or malignancy    -4/11/2023 patient discharged from hospital, planning for 30 days of lovenox postop, oxycodone for pain  (required 1 unit PRBC for anemia)    Pt states she is feeling well, minimal pain, controlled with tylenol, only used oxycodone once. Pt is using lovenox, no bleeding.      REVIEW OF SYSTEMS:   A 14 point ROS was reviewed with pertinent positives and negatives in the HPI.        HOME MEDICATIONS:  Current Outpatient Medications   Medication Sig Dispense Refill     cyanocobalamin 1000 MCG TBCR Take 1,000 mcg by mouth daily (Patient taking differently: Take 1,000 mcg by mouth every morning) 100 tablet 1     enoxaparin ANTICOAGULANT (LOVENOX) 40 MG/0.4ML syringe Inject 0.4 mLs (40 mg) Subcutaneous every 24 hours for 23 days 9.2 mL 0     ferrous sulfate (IRON) 325 (65 FE) MG tablet TAKE ONE TABLET BY MOUTH TWICE A DAY --NO FURTHER REFILLS WITHOUT OFFICE VISIT 200 tablet 3     gabapentin (NEURONTIN) 100 MG capsule Take 1 capsule (100 mg) by mouth At Bedtime 12 capsule 0     lisinopril (ZESTRIL) 10 MG tablet Take 1 tablet (10 mg) by mouth every morning 90 tablet 3     Multiple Vitamins-Iron (MULTI-DAY PLUS IRON PO)        triamterene-HCTZ (DYAZIDE) 37.5-25 MG capsule TAKE 1 CAPSULE BY MOUTH ONCE DAILY (Patient taking differently: Take 1 capsule by mouth every morning TAKE 1 CAPSULE BY MOUTH ONCE DAILY) 90 capsule 0     acetaminophen (TYLENOL) 325 MG tablet Take 3 tablets (975 mg) by mouth every 8 hours (Patient not taking: Reported on 4/19/2023) 100 tablet 0     fluocinonide (LIDEX) 0.05 % external cream Small amount to affected area(s) BID PRN (Patient not taking: Reported on  4/19/2023) 60 g 1         ALLERGIES:  Allergies   Allergen Reactions     No Known Drug Allergies          PAST MEDICAL HISTORY:  Past Medical History:   Diagnosis Date     Arthropathia 08/05/2010     B12 deficiency anemia 10/21/2010     Benign essential hypertension with target blood pressure below 140/90 10/10/2016     CARDIOVASCULAR SCREENING; LDL GOAL LESS THAN 160 10/31/2010     Crohn's colitis (H) 02/15/2011     Dermatitis-dishydrotic eczema-severe 08/05/2010     Hiatal hernia      HTN (hypertension) 07/21/2011     Iron deficiency anemia 10/21/2010     Malignant neoplasm of sigmoid colon (H)      Obesity      Primary pulmonary hypertension (H) 04/06/2010         PAST SURGICAL HISTORY:  Past Surgical History:   Procedure Laterality Date     COLECTOMY WITHOUT COLOSTOMY N/A 4/6/2023    Procedure: Laparoscopic Converted to Open Total abdominal colectomy;  Surgeon: Jerome Ashley MD;  Location: [facility] OR     COLONOSCOPY  10/11/10     COLONOSCOPY N/A 2/27/2023    Procedure: ATTEMPTED COLONOSCOPY WITH SIGMOID STRICTURE BIOPSY;  Surgeon: Jonathan Alcocer MD;  Location:  GI     ESOPHAGOSCOPY, GASTROSCOPY, DUODENOSCOPY (EGD), COMBINED N/A 2/27/2023    Procedure: ESOPHAGOGASTRODUODENOSCOPY, WITH BIOPSY;  Surgeon: Jonathan Alcocer MD;  Location:  GI     HYSTERECTOMY TOTAL ABDOMINAL, BILATERAL SALPINGO-OOPHORECTOMY, COMBINED N/A 4/6/2023    Procedure: Hysterectomy total abdominal, bilateral salpingo-oophorectomy;  Surgeon: Sunitha Olea MD;  Location: [facility] OR     SIGMOIDOSCOPY FLEXIBLE N/A 4/6/2023    Procedure: Sigmoidoscopy flexible;  Surgeon: Jerome Ashley MD;  Location: [facility] OR     SURGICAL HISTORY OF -   06/14/76    Perineorrhaphy, for widening vaginal orifice     Z EXPLORATORY OF ABDOMEN  1989    laparoscopy         SOCIAL HISTORY:  Social History     Socioeconomic History     Marital status:      Spouse name: Not on file     Number of children: Not on file     Years of education: Not  "on file     Highest education level: Not on file   Occupational History     Not on file   Tobacco Use     Smoking status: Never     Passive exposure: Current     Smokeless tobacco: Never   Vaping Use     Vaping status: Not on file   Substance and Sexual Activity     Alcohol use: No     Drug use: No     Sexual activity: Yes     Partners: Male   Other Topics Concern     Parent/sibling w/ CABG, MI or angioplasty before 65F 55M? Not Asked   Social History Narrative     Not on file     Social Determinants of Health     Financial Resource Strain: Not on file   Food Insecurity: Not on file   Transportation Needs: Not on file   Physical Activity: Not on file   Stress: Not on file   Social Connections: Not on file   Intimate Partner Violence: Not on file   Housing Stability: Not on file         FAMILY HISTORY:  Family History   Problem Relation Age of Onset     Hypertension Mother         on meds, alive     Cerebrovascular Disease Father         stroke about age 65,  of cancer at 68 yrs     Diabetes Father         eventually took insulin     Anemia Father         Pernisios anemia     Kidney Disease Niece         kidney transplant     Kidney Disease Nephew         kidney transplant     Venous thrombosis No family hx of      Anesthesia Reaction No family hx of          PHYSICAL EXAM:  Vital signs:  /68 (BP Location: Right arm, Patient Position: Sitting, Cuff Size: Adult Large)   Pulse 106   Temp 98.2  F (36.8  C) (Temporal)   Resp 18   Ht 1.575 m (5' 2\")   Wt 83.5 kg (184 lb 3 oz)   LMP  (LMP Unknown)   SpO2 99%   BMI 33.69 kg/m       ECO  GENERAL/CONSTITUTIONAL: No acute distress.  EYES:  Extraocular movements intact without nystagmus.  No scleral icterus.  RESPIRATORY: Equal chest rise.   MUSCULOSKELETAL: Warm and well-perfused, no cyanosis, clubbing, or edema. Right LE always more swollen than left, chronic  Unchanged.   NEUROLOGIC: Cranial nerves are grossly intact. Alert, oriented to person, place " and time, answers questions appropriately.  INTEGUMENTARY: No rashes or jaundice.  GAIT: walking with walker      LABS:    PATHOLOGY:  Case Report   Surgical Pathology Report                         Case: [id number]                                   Authorizing Provider:  Jerome Ashley, Collected:           04/06/2023 11:19 AM                                  MD                                                                            Ordering Location:      MAIN OR                 Received:            04/06/2023 12:21 PM           Pathologist:           Dave Bernstein DO                                                             Specimens:   A) - Omentum                                                                                         B) - Other, colon                                                                                    C) - Uterus, Cervix, Bilateral Fallopian Tubes & Ovaries                                             D) - Small Intestine, Terminal Ileum, Terminal Ilium                                                 E) - Other, proximal anastomotic ring                                                                F) - Other, distal anastomotic ring                                                        Final Diagnosis   A. OMENTUM, RESECTION:  - Adipose tissue with no significant histopathologic abnormality  - No evidence of malignancy     B. COLON, RESECTION:  Mass #1: Mixed neuroendocrine-non-neuroendocrine neoplasm (MINEN):  - Neuroendocrine carcinoma component (70%) and moderately-differentiated adenocarcinoma component (30%)  - Size = 5.0 cm  - Negative surgical resection margins      Mass#2: Poorly-differentiated carcinoma:  - Size = 5.7 cm  - Negative surgical resection margins      - One of forty-one sampled lymph nodes positive (1/41)  - Benign appendix  - Tubular adenoma  - See synoptic reports     C. UTERUS, CERVIX, BILATERAL FALLOPIAN TUBES & OVARIES, :  - Benign  endometrial polyps; atrophic endometrium  - Uterus, cervix, bilateral fallopian tubes, and ovaries with no significant morphologic abnormalities  - No evidence of dysplasia or malignancy     D. SMALL INTESTINE, TERMINAL ILEUM, TERMINAL ILIUM:  - Benign ileum  - No evidence of dysplasia or malignancy  - Negative for metastases to one of one sampled lymph node (0 /1)     E. PROXIMAL ANASTOMOTIC RING:  - Benign small intestine  - No evidence of dysplasia or malignancy     F. DISTAL ANASTOMOTIC RING:  - Benign colon  - No evidence of dysplasia or malignancy   Electronically signed by Dave Bernstein DO on 4/14/2023 at  3:07 PM   Synoptic Checklist   COLON AND RECTUM: Resection, Including Transanal Disk Excision of Rectal Neoplasms  8th Edition - Protocol posted: 6/22/2022  COLON AND RECTUM: RESECTION - B  SPECIMEN   Procedure  Total abdominal colectomy    TUMOR   Tumor Site  Ascending colon    Histologic Type  Mixed neuroendocrine-non-neuroendocrine neoplasm: 70% neuroendocrine neoplasm and 30% adenocarcinoma    Histologic Grade  Neuroendocrine carcinoma and moderately-differentiated adenocarcinoma    Tumor Size  Greatest dimension (Centimeters): 5.0 cm   Tumor Extent  Invades into muscularis propria    Macroscopic Tumor Perforation  Not identified    Lymphovascular Invasion  Small vessel    Perineural Invasion  Not identified    Treatment Effect  No known presurgical therapy    MARGINS   Margin Status for Invasive Carcinoma  All margins negative for invasive carcinoma    Closest Margin(s) to Invasive Carcinoma  Radial (circumferential) or mesenteric    Distance from Invasive Carcinoma to Closest Margin  0.2 cm   Margin Status for Non-Invasive Tumor  All margins negative for high-grade dysplasia / intramucosal carcinoma and low-grade dysplasia    REGIONAL LYMPH NODES   Regional Lymph Node Status  Tumor present in regional lymph node(s)    Number of Lymph Nodes with Tumor  1    Number of Lymph Nodes Examined  41    Tumor  Deposits  Not identified    PATHOLOGIC STAGE CLASSIFICATION (pTNM, AJCC 8th Edition)   Reporting of pT, pN, and (when applicable) pM categories is based on information available to the pathologist at the time the report is issued. As per the AJCC (Chapter 1, 8th Ed.) it is the managing physician's responsibility to establish the final pathologic stage based upon all pertinent information, including but potentially not limited to this pathology report.   TNM Descriptors  m (multiple primary tumors)    pT Category  pT3    pN Category  pN1a    ADDITIONAL FINDINGS   Additional Findings  Adenoma(s)    .     COLON AND RECTUM: Resection, Including Transanal Disk Excision of Rectal Neoplasms  8th Edition - Protocol posted: 6/22/2022  COLON AND RECTUM: RESECTION - B  SPECIMEN   Procedure  Total abdominal colectomy    TUMOR   Tumor Site  Sigmoid colon    Histologic Type  Carcinoma, type cannot be determined    Histologic Grade  G3, poorly differentiated    Tumor Size  Greatest dimension (Centimeters): 5.7 cm   Tumor Extent  Invades into muscularis propria    Macroscopic Tumor Perforation  Not identified    Lymphovascular Invasion  Not identified    Perineural Invasion  Not identified    Treatment Effect  No known presurgical therapy    MARGINS   Margin Status for Invasive Carcinoma  All margins negative for invasive carcinoma    Closest Margin(s) to Invasive Carcinoma  Radial (circumferential) or mesenteric    Distance from Invasive Carcinoma to Closest Margin  4.2 cm   Margin Status for Non-Invasive Tumor  All margins negative for high-grade dysplasia / intramucosal carcinoma and low-grade dysplasia    REGIONAL LYMPH NODES   Regional Lymph Node Status  Tumor present in regional lymph node(s)    Number of Lymph Nodes with Tumor  1    Number of Lymph Nodes Examined  41    Tumor Deposits  Not identified    PATHOLOGIC STAGE CLASSIFICATION (pTNM, AJCC 8th Edition)   Reporting of pT, pN, and (when applicable) pM categories is  "based on information available to the pathologist at the time the report is issued. As per the AJCC (Chapter 1, 8th Ed.) it is the managing physician's responsibility to establish the final pathologic stage based upon all pertinent information, including but potentially not limited to this pathology report.   TNM Descriptors  m (multiple primary tumors)    pT Category  pT2    pN Category  pN1a    .      Clinical Information   SUDHEER   The patient is a 67-year-old female with recent diagnosis of Invasive poorly differentiated carcinoma in the sigmoid colon.   Gross Description   UBRITTANIE   A(1). Omentum, Omentum:  The specimen is received fresh with proper patient identification labeled \"omentum\".  The specimen consists of a portion of omentum (29.5 x 21.0 x 1.5 cm).  Sectioning reveals yellow, lobulated cut surfaces with no masses or lesions identified within.  Representative sections are submitted in A1 and A2.  B(2). Other, colon:  B: intraoperative gross consultation, colon, open and call \"5.0 cm mass in the ascending colon, 14.0 cm from the nearest (proximal) margin. 5.7 cm sigmoid mass, 5.7 cm from the nearest (distal) margin\" (Lola CHRISTIANSON(ASCP) ).     The specimen is received fresh with proper patient identification labeled \"colon\".  The specimen consists of a total colectomy without attached rectum, which consists of a terminal ileum (2.0 cm in length by 3.0 cm in diameter), appendix (5.0 cm in length by 0.3 cm in diameter), colon (70.8 cm in length and ranges in diameter from 2.2-4.5 cm), attached omentum, and mesocolon.  Both margins are stapled.  The free retroperitoneal margin and mesenteric margin are inked black.  The serosa has 2 areas of puckering.  The first area of puckering is located 15.5 cm from the proximal margin and the second area of puckering is located 8.0 cm from the distal margin.  The areas of puckering are inked blue.  The remaining serosa is pink-tan, smooth with focal adhesions.  " Opening reveals 2 masses.  Mass #1 is 5.0 x 5.0 x 0.6 cm, circumferential, pink-tan, focally ulcerated, and polypoid located within the ascending colon at the attachment of the omentum.  Mass #1 is located 14 cm from the proximal margin, 38.5 cm from mass #2, and 55 cm from the distal margin.  Sectioning reveals a 0.5 cm depth of invasion into the mesentery which comes to within 0.2 cm of the free retroperitoneal margin.  Mass #1 does not grossly involve the serosa. Mass #2 is  5.7 x 3.5 x 0.7 cm, circumferential, tan-white, necrotic, and ulcerated and located within the sigmoid.  Mass #2 is located 5.7 cm from the distal margin.  Sectioning mass #2 reveals black tattooing and a 0.5 cm depth of invasion into the muscularis propria but does not extend into the mesentery.  Mass #2 is 4.2 cm from the closest mesenteric margin and does not grossly involve the serosa. The remaining colonic mucosa is irregularly folded, focally flattened, pink-tan with no other lesions or polyps identified.  The appendix has a pink-tan, smooth and unremarkable serosa.  Sectioning reveals a pinpoint lumen with a wall thickness of 0.1 cm.  Palpation and dissection reveals 35 possible lymph nodes, ranging from 0.2-1.8 cm.  Representative sections are submitted.     B1-proximal margin, shaved  F6-I6-ydkirq margin, shaved  B4-B6-mass #1 2 mesenteric margin with greatest depth of invasion and B4  B7-mass #1 to puckered area  B8-mass #1 towards distal margin  B9-mass #1 towards proximal margin  Z02-sjyyuftkcdhz transverse colon mucosa and wall between the masses  P61-ezzr #2 2 puckered area  Z00-vogs #2 towards distal margin  P66-bdte #2 with greatest depth of invasion  D66-qred #2 towards proximal/unremarkable mucosa  K46-kcun #2 representative  Z16-prventjbisnbsz of ileocecal valve  H-65-ucmyjzgmntyrtp of unremarkable cecum mucosa and wall   F45-dpnkpjivbulfpm of unremarkable descending colon mucosa and wall  W27-nzogiawizpzvuz of splenic  "flexure mucosa and wall  K45-toywtlhveosrve of appendix and entire distal tip  S21-kbtewup mesenteric margin of mass #2  B22-6 whole lymph nodes  B23-6 whole lymph nodes  B24-6 whole lymph nodes  B25-6 whole lymph nodes  B 26-5 whole lymph nodes  B 27-3 lymph nodes, bisected and differentially inked  B 28-3 lymph nodes, bisected and differentially inked        C(3). Uterus, Cervix, Bilateral Fallopian Tubes & Ovaries, :  The specimen is received fresh with proper patient identification labeled \"uterus, cervix, bilateral fallopian tubes and ovaries\".  The specimen consists of a 35.0 g hysterectomy to include uterus (7.0 cm fundus to ectocervix, 3.5 cm cornu to cornu, 2.2 cm anterior to posterior), right fallopian tube (5.0 cm in length by 0.5 cm in diameter), right ovary (2.5 x 1.5 x 1.1 cm), left fallopian tube (4.2 cm in length by 0.5 cm in diameter), and left ovary (2.0 x 1.5 x 1.2 cm).    The serosal surface appears dull with no adhesions.  The paracervical and parametrial tissues are inked black and bisected to reveal a 2.2 x 0.6 cm endometrial cavity remarkable for 3 pink-tan polyps ranging in size from 0.4 to 1.2 cm and coming to within 2.5 cm of the anterior lower uterine segment and 2.1 cm of the posterior lower uterine segment.  The polyps appear in the anterior and posterior fundus and appear confined to the endometrium.  The remainder of the endometrium is yellow-tan with a maximum thickness of 0.1 cm.  The myometrium has a maximal thickness of 1.1 cm and is mildly trabeculated with no nodules identified.    The bilateral fallopian tubes are fimbriated and patent.  The bilateral ovaries are sectioned to reveal an unremarkable yellow-tan cut surface with no cysts or masses identified.  Representative sections to include the entirety of the endometrium are submitted.    C1-anterior cervix  C2-posterior cervix  C3-full-thickness anterior endomyometrium with polyp  C4-additional full-thickness anterior " "endomyometrium  C5-remainder of anterior endometrium  C6-full-thickness posterior endomyometrium with second polyp  C7-full-thickness posterior endomyometrium with second and third polyp  C8-full-thickness posterior endomyometrium with remainder of third polyp  C9-remainder of posterior endometrium  C10-right fallopian tube  C11-right ovary  C12-left fallopian tube  C13-left ovary  D(4). Small Intestine, Terminal Ileum, Terminal Ilium:  The specimen is received fresh with proper patient identification labeled \"terminal ileum\".  The specimen consists of a 12.0 cm in length by 2.5 cm in diameter unoriented segment of small bowel received stapled at both ends.  Up to 4.0 cm of normally attached adipose tissue is present.  The specimen appears edematous with no polyps or masses identified.  No perforations are identified.  The attached adipose tissue is sectioned to reveal 7 pink-tan possible lymph nodes averaging 0.1 cm each.  Representative sections to include all possible lymph nodes are submitted.    [id number] margins, en face  D3-representative edematous mucosa  D4-4 intact possible lymph nodes  D5-3 intact possible lymph nodes     E(5). Other, proximal anastomotic ring:  The specimen is received in formalin with proper patient identification, labeled \"proximal anastomotic ring\".  The specimen consists of a 0.8 x 0.5 x 0.5 cm mucosal ring with a blue suture.  The mucosa is reddish-brown and unremarkable.  The specimen is entirely submitted in E1.              F(6). Other, distal anastomotic ring:  The specimen is received in formalin with proper patient identification, labeled \"distal anastomotic ring\".  The specimen consists of a 2.3 x 2.0 x 0.5 cm mucosal ring.  The mucosa is pink-tan and unremarkable and the serosa is pink-brown and unremarkable.  The specimen is entirely submitted in F1.                Microscopic Description   [facility]   Microscopic examination performed. Immunohistochemical panel was " performed on block B6.  The adenocarcinoma component of Mass #1 is positive for CK20, CDX-2, rigoberto, and keratin AE1/AE3 and negative for CK7, PAX-8, ER, S100.  The neuroendocrine component for Mass #1 is negative for chromogranin and INSM1 but synaptophysin is strongly expressed, which is consistent with mixed neuroendocrine-non-neuroendocrine neoplasm (MINEN).  Immunohistochemical panel was performed on block B13. Mass #2 is positive for rigoberto and keratin AE1/AE3 and negative for CK7, CK20, CDX-2, PAX-8, ER, chromogranin, CD56, p40, synaptophysin, S100, INI1, P40 and INSM1, which is consistent with poorly-differentiated carcinoma.          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)  # sigmoid colon Poorly-differentiated carcinoma  - 2/23 colonoscopy: A frond-like/villous partially obstructing large mass found in sigmoid colon, partially circumferential involving two thirds of the lumen circumference, 4 cm, unable to traverse, with oozing, s/p biopsy  -PATHOLOGY: Invasive poorly differentiated carcinoma, IHC panel excludes tumors of other origin, colorectal primary is favored, loss of nuclear expression of MLH1 and PMS2, MLH1 promoter methylation negative, [id number] mutation testing pending   -2/23 CT CAP: Shows known 4 cm proximal sigmoid colon mass with possible tumor extension (irregularity and stranding and adjacent pericolonic fat) without obstruction AND probable second mass 2.5 cm at hepatic flexure of colon; small lymph nodes adjacent to both masses (no lymphadenopathy by size criteria)  -3/23 PET:  a. There is increased FDG avidity of the sigmoid colon with focal lobular wall thickening consistent with biopsy-proven adenocarcinoma.  b. Secondary focus noted at the hepatic flexure  demonstrates elevated FDG avidity and lobulated wall thickening highly suspicious for a additional primary.  c. Additionally there is a smaller focus of wall thickening with elevated FDG avidity along the left aspect of the transverse colon  which is suspicious for a possible third focus of colonic malignancy.  - I d/w radiology 3/27/23- in reference to b- body of report says sigmoid flexure, will be addended to hepatic flexure and c references area near splenic flexure- possible indeterminate, outpouching of colon isn't PET avid but soft tissue around it is, hard to say given normal uptake of colon    - 3/23 CEA 6.8    -4/6/2023 laparoscopic converted to open total abdominal colectomy with ileorectal anastomosis, partial omentectomy, flexible sigmoidoscopy, TAHBSO  PATHOLOGY:  Of note, omental resection, terminal ileum, proximal and distal anastomotic rings, uterus, cervix, bilateral fallopian tubes and ovaries negative for malignancy    Mass #1 (ascending colon/hepatic flexure): Mixed neuroendocrine-non-neuroendocrine neoplasm (MINEN):  - Neuroendocrine carcinoma component (70%) and moderately-differentiated adenocarcinoma component (30%)  - Size = 5.0 cm, invading into muscularis propria  - Positive LVI  - Negative macroscopic tumor perforation, negative PNI  - Negative surgical resection margins  - IHC: Neuroendocrine portion: Negative for chromogranin and INSM1 but strongly express synaptophysin  - IHC: Adenocarcinoma portion: Positive for CK20, CDX2 negative for CK7, PAX8, ER, S100  AJCC 8th edition: mpT3 pN1a     Mass#2 (sigmoid colon): Poorly-differentiated carcinoma:  - Grade 3  - Size = 5.7 cm, invading into muscularis propria  - Negative for microscopic tumor perforation, LVI, perineural invasion  - Negative surgical resection margins  - IHC: Positive for scar and keratin AE1/AE3, negative for CK7, CK20, CDX2, PAX8, ER, chromogranin, CD56, p40, synaptophysin, S100, INI1, p40, INSM1  AJCC 8th edition: m  pT2 pN1a    - One of forty-one sampled lymph nodes positive (1/41)    PLAN:  - Patient has complex pathology with 3 separate malignancies (poorly differentiated neuroendocrine tumor (carcinoma), moderately differentiated adenocarcinoma expressing CK20 and CDX2 at the hepatic flexure and poorly differentiated carcinoma negative for CK20 and CDX2 at sigmoid colon  - I discussed pathology with Dr. Bernstein, will add on Ki67 to mass #1 and #2, unable to differentiate which mass is metastatic to LN given mets can look different than primary     -  BRAF V600 E mutation testing on original 2/23 biopsy pending  - Genetic counseling completed and results pending  - We will present this case at tumor board regarding further input for adjuvant therapy as patient is node positive (chemo options: carbo/cis + etoposide, FOLFOX, FOLFIRI)  - add on testing for actionable mutations including NTRK fusion, RET fusion, BRAF V600E, MMR deficiency, TMB   - MRI brain w/wo contrast     #Anemia secondary to iron deficiency and B12 deficiency  - 2/23 hgb 6.8, ferritin 21, iron sat index 10, TIBC 265, iron 27, b12 1493  - On B12 1000 mcg p.o. daily and ferrous sulfate 325 mg p.o. daily  - 4/9/23 s/p 1 uprbcs, 4/11/23 hgb 8.9     #Crohn's  - dx since at least 2010   - is currently on sulfasalazine   - pt is not actively following with GI, GI consult pending    RTC 5/3 for f/u with me and labs prior to visit    Irene Bateman DO  Hematology/Oncology  [facility]    Future Appointments   Date Time Provider Department Center   3/28/2023 10:45 AM Irene Bateman DO [facility]   3/31/2023 11:30 AM Ambika Jain, MARQUEZ [facility]   3/31/2023  1:15 PM Anna Banks PA-C [facility]   3/31/2023  2:45 PM  LAB [facility]   5/1/2023 11:45 AM Angelina Williamson APRN [facility]   5/2/2023 10:00 AM Nomi Cartwright DO [facility]   5/19/2023 11:30 AM Jerome Ashley MD  [facility]            Again, thank you for allowing me to participate in the care of your patient.        Sincerely,        FRANCISCO LEE, DO

## 2023-04-19 NOTE — NURSING NOTE
DISCHARGE PLAN:  Next appointments: See patient instruction section  Departure Mode: Ambulatory  Accompanied by: self   minutes for nursing discharge (face to face time)     Nadia Ladd is here today for oncology follow up.  Patient was not seen by writing nurse at time of appointment.   Appointments scheduled for lab with follow up with Bateman and Mri. I called pt and notified her of appts. She does also look at my chart. See patient instructions and Oncologist's Progress note for further details. Questions and concerns addressed to patient's satisfaction. Patient verbalized and demonstrated understanding of plan.  Contact information provided and patient is encouraged to call with any that arise in the interim of care.    Jelly Champagne  [facility]  [phone]  4/19/2023, 11:01 AM

## 2023-04-20 ENCOUNTER — HOSPITAL ENCOUNTER (OUTPATIENT)
Dept: MRI IMAGING | Facility: CLINIC | Age: 68
Discharge: HOME | End: 2023-04-20
Attending: INTERNAL MEDICINE | Admitting: INTERNAL MEDICINE
Payer: MEDICARE

## 2023-04-20 DIAGNOSIS — C7A.1 MALIGNANT POORLY DIFFERENTIATED NEUROENDOCRINE CARCINOMA (H): ICD-10-CM

## 2023-04-20 PROCEDURE — A9585 GADOBUTROL INJECTION: HCPCS | Performed by: INTERNAL MEDICINE

## 2023-04-20 PROCEDURE — 255N000002 HC RX 255 OP 636: Performed by: INTERNAL MEDICINE

## 2023-04-20 PROCEDURE — G1010 CDSM STANSON: HCPCS

## 2023-04-20 RX ORDER — GADOBUTROL 604.72 MG/ML
7.5 INJECTION INTRAVENOUS ONCE
Status: COMPLETED | OUTPATIENT
Start: 2023-04-20 | End: 2023-04-20

## 2023-04-20 RX ADMIN — GADOBUTROL 7.5 ML: 604.72 INJECTION INTRAVENOUS at 17:16

## 2023-04-21 LAB
INTERPRETATION: NORMAL
INTERPRETATION: NORMAL
LAB DIRECTOR COMMENTS: NORMAL
LAB DIRECTOR COMMENTS: NORMAL
LAB DIRECTOR DISCLAIMER: NORMAL
LAB DIRECTOR DISCLAIMER: NORMAL
LAB DIRECTOR INTERPRETATION: NORMAL
LAB DIRECTOR INTERPRETATION: NORMAL
LAB DIRECTOR METHODOLOGY: NORMAL
LAB DIRECTOR METHODOLOGY: NORMAL
LAB DIRECTOR RESULTS: NORMAL
LAB DIRECTOR RESULTS: NORMAL
SIGNIFICANT RESULTS: NORMAL
SIGNIFICANT RESULTS: NORMAL
SPECIMEN DESCRIPTION: NORMAL
TEST DETAILS, MDL: NORMAL
TEST DETAILS, MDL: NORMAL

## 2023-04-21 PROCEDURE — 81445 SO NEO GSAP 5-50DNA/DNA&RNA: CPT | Performed by: SURGERY

## 2023-04-21 PROCEDURE — G0452 MOLECULAR PATHOLOGY INTERPR: HCPCS | Mod: 26 | Performed by: PATHOLOGY

## 2023-04-24 PROCEDURE — 81456 SO/HL 51/>GSAP RNA ALYS: CPT | Performed by: SURGERY

## 2023-04-24 PROCEDURE — G0452 MOLECULAR PATHOLOGY INTERPR: HCPCS | Mod: 26 | Performed by: PATHOLOGY

## 2023-04-28 ENCOUNTER — MYC MEDICAL ADVICE (OUTPATIENT)
Dept: SURGERY | Facility: CLINIC | Age: 68
End: 2023-04-28
Payer: MEDICARE

## 2023-05-01 ENCOUNTER — TELEPHONE (OUTPATIENT)
Dept: SURGERY | Facility: CLINIC | Age: 68
End: 2023-05-01

## 2023-05-01 ENCOUNTER — OFFICE VISIT (OUTPATIENT)
Dept: SURGERY | Facility: CLINIC | Age: 68
End: 2023-05-01
Payer: MEDICARE

## 2023-05-01 VITALS
SYSTOLIC BLOOD PRESSURE: 110 MMHG | HEART RATE: 110 BPM | OXYGEN SATURATION: 95 % | BODY MASS INDEX: 33.69 KG/M2 | DIASTOLIC BLOOD PRESSURE: 66 MMHG | HEIGHT: 62 IN

## 2023-05-01 DIAGNOSIS — Z09 FOLLOW-UP EXAMINATION AFTER COLORECTAL SURGERY: Primary | ICD-10-CM

## 2023-05-01 PROCEDURE — 99024 POSTOP FOLLOW-UP VISIT: CPT | Performed by: NURSE PRACTITIONER

## 2023-05-01 ASSESSMENT — PAIN SCALES - GENERAL: PAINLEVEL: NO PAIN (0)

## 2023-05-01 NOTE — PROGRESS NOTES
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  Now s/p total abdominal colectomy w/ ileorectal bakers anastomosis; ALEXANDER-BSO on 2023 with Dr. Ashley and Dr. Olae.    Final Diagnosis   A. OMENTUM, RESECTION:  - Adipose tissue with no significant histopathologic abnormality  - No evidence of malignancy     B. COLON, RESECTION:  Mass #1: Mixed neuroendocrine-non-neuroendocrine neoplasm (MINEN):  - Neuroendocrine carcinoma component (70%) and moderately-differentiated adenocarcinoma component (30%)  - Size = 5.0 cm  - Negative surgical resection margins      Mass#2: Poorly-differentiated carcinoma:  - Size = 5.7 cm  - Negative surgical resection margins      - One of forty-one sampled lymph nodes positive (1/41)  - Benign appendix  - Tubular adenoma  - See synoptic reports     C. UTERUS, CERVIX, BILATERAL FALLOPIAN TUBES & OVARIES, :  - Benign endometrial polyps; atrophic endometrium  - Uterus, cervix, bilateral fallopian tubes, and ovaries with no significant morphologic abnormalities  - No evidence of dysplasia or malignancy     D. SMALL INTESTINE, TERMINAL ILEUM, TERMINAL ILIUM:  - Benign ileum  - No evidence of dysplasia or malignancy  - Negative for metastases to one of one sampled lymph node (0 /1)     E. PROXIMAL ANASTOMOTIC RING:  - Benign small intestine  - No evidence of dysplasia or malignancy     F. DISTAL ANASTOMOTIC RING:  - Benign colon  - No evidence of dysplasia or malignancy     Addendum 2   Mass #1  RESULT FOR IMMUNOHISTOCHEMICAL VENTANA CLONE  PD-L1 ASSAY  COMBINED POSITIVE SCORE (CPS): 55-60  TUMOR PROPORTION SCORE (TPS): 50%     Mass #2  RESULT FOR IMMUNOHISTOCHEMICAL VENTANA CLONE  PD-L1 ASSAY  COMBINED POSITIVE SCORE (CPS): 80  TUMOR PROPORTION SCORE (TPS):  "70%      Addendum   This addendum is issued to report the results of Ki-67 staining on both masses, requested by the patient's oncologist.    - The neuroendocrine component of mass #1 shows a Ki-67 proliferation index of approximately 80%.    - The poorly-differentiated carcinoma in mass #2 shows a Ki-67 proliferation index of approximately 70%.        Interval history: Nadia has been doing well. No pain and is no longer needing any pain medications. Having normal bowel movements about 4-6 times a day. No loose stools and no difficulty holding bowel movements. No nausea or vomiting and she is tolerating a low residue diet. No fevers or chills. No drainage from the incision.    Physical Examination:   /66 (BP Location: Left arm, Patient Position: Sitting, Cuff Size: Adult Large)   Pulse 110   Ht 5' 2\"   LMP  (LMP Unknown)   SpO2 95%   BMI 33.69 kg/m    General: alert, oriented, in no acute distress, sitting comfortably  HEENT: mucous membranes moist  Abdomen: midline abdominal incision well approximated without erythema or drainage.    Assessment/Plan:  67 year old female s/p Total abdominal colectomy w/ ileorectal bakers anastomosis; ALEXANDER-BSO on 4/6/2023 with Dr. Ashley and Dr. Olea.She is recovering well. No pain. Incision healing well. We discussed her pathology results and she is scheduled in about a week to meet with medical oncology to discuss further. Low residue diet for another 2 weeks and then slowly advance to regular diet. No lifting more than 10 pounds for a full 6 weeks from surgery. Follow up with Dr. Ashley on 5/19 but encouraged her to contact the clinic in the meantime with any questions or concerns. Patient's questions were answered to her stated satisfaction and she is in agreement with this plan.     Medical history:  Past Medical History:   Diagnosis Date     Arthropathia 08/05/2010     B12 deficiency anemia 10/21/2010     Benign essential hypertension with target blood " pressure below 140/90 10/10/2016     CARDIOVASCULAR SCREENING; LDL GOAL LESS THAN 160 10/31/2010     Crohn's colitis (H) 02/15/2011     Dermatitis-dishydrotic eczema-severe 08/05/2010     Hiatal hernia      HTN (hypertension) 07/21/2011     Iron deficiency anemia 10/21/2010     Malignant neoplasm of sigmoid colon (H)      Obesity      Primary pulmonary hypertension (H) 04/06/2010       Surgical history:  Past Surgical History:   Procedure Laterality Date     COLECTOMY WITHOUT COLOSTOMY N/A 4/6/2023    Procedure: Laparoscopic Converted to Open Total abdominal colectomy;  Surgeon: Jerome Ashley MD;  Location: [facility] OR     COLONOSCOPY  10/11/10     COLONOSCOPY N/A 2/27/2023    Procedure: ATTEMPTED COLONOSCOPY WITH SIGMOID STRICTURE BIOPSY;  Surgeon: Jonathan Alcocer MD;  Location:  GI     ESOPHAGOSCOPY, GASTROSCOPY, DUODENOSCOPY (EGD), COMBINED N/A 2/27/2023    Procedure: ESOPHAGOGASTRODUODENOSCOPY, WITH BIOPSY;  Surgeon: Jonathan Alcocer MD;  Location:  GI     HYSTERECTOMY TOTAL ABDOMINAL, BILATERAL SALPINGO-OOPHORECTOMY, COMBINED N/A 4/6/2023    Procedure: Hysterectomy total abdominal, bilateral salpingo-oophorectomy;  Surgeon: Sunitha Olea MD;  Location: [facility] OR     SIGMOIDOSCOPY FLEXIBLE N/A 4/6/2023    Procedure: Sigmoidoscopy flexible;  Surgeon: Jerome Ashley MD;  Location: [facility] OR     SURGICAL HISTORY OF -   06/14/76    Perineorrhaphy, for widening vaginal orifice     ZZC EXPLORATORY OF ABDOMEN  1989    laparoscopy       Problem list:    Patient Active Problem List    Diagnosis Date Noted     Colon cancer (H) 04/06/2023     Priority: Medium     Malignant neoplasm of sigmoid colon (H) 03/31/2023     Priority: Medium     PH- check 6.23 Bateman        Morbid obesity (H) 06/06/2019     Priority: Medium     Benign essential hypertension with target blood pressure below 140/90 10/10/2016     Priority: Medium     CARDIOVASCULAR SCREENING; LDL GOAL LESS THAN 130 10/02/2012     Priority: Medium      Advanced directives, counseling/discussion 02/09/2012     Priority: Medium     Advance Directive Problem List Overview:   Name Relationship Phone    Primary Health Care Agent            Alternative Health Care Agent          Discussed advance care planning with patient; information given to patient to review. 2/9/2012          Prediabetes 02/09/2012     Priority: Medium     Obesity 02/09/2012     Priority: Medium     Crohn's disease of large intestine without complication (H) 02/15/2011     Priority: Medium     Iron deficiency anemia due to chronic blood loss 10/21/2010     Priority: Medium     B12 deficiency anemia 10/21/2010     Priority: Medium     Arthropathia 08/05/2010     Priority: Medium     Dermatitis-dishydrotic eczema-severe 08/05/2010     Priority: Medium     Primary pulmonary hypertension (H) 04/06/2010     Priority: Medium       Medications:  Current Outpatient Medications   Medication Sig Dispense Refill     acetaminophen (TYLENOL) 325 MG tablet Take 3 tablets (975 mg) by mouth every 8 hours (Patient not taking: Reported on 4/19/2023) 100 tablet 0     cyanocobalamin 1000 MCG TBCR Take 1,000 mcg by mouth daily (Patient taking differently: Take 1,000 mcg by mouth every morning) 100 tablet 1     enoxaparin ANTICOAGULANT (LOVENOX) 40 MG/0.4ML syringe Inject 0.4 mLs (40 mg) Subcutaneous every 24 hours for 23 days 9.2 mL 0     ferrous sulfate (IRON) 325 (65 FE) MG tablet TAKE ONE TABLET BY MOUTH TWICE A DAY --NO FURTHER REFILLS WITHOUT OFFICE VISIT 200 tablet 3     fluocinonide (LIDEX) 0.05 % external cream Small amount to affected area(s) BID PRN (Patient not taking: Reported on 4/19/2023) 60 g 1     gabapentin (NEURONTIN) 100 MG capsule Take 1 capsule (100 mg) by mouth At Bedtime 12 capsule 0     lisinopril (ZESTRIL) 10 MG tablet Take 1 tablet (10 mg) by mouth every morning 90 tablet 3     Multiple Vitamins-Iron (MULTI-DAY PLUS IRON PO)        triamterene-HCTZ (DYAZIDE) 37.5-25 MG capsule TAKE 1  "CAPSULE BY MOUTH ONCE DAILY (Patient taking differently: Take 1 capsule by mouth every morning TAKE 1 CAPSULE BY MOUTH ONCE DAILY) 90 capsule 0       Allergies:  Allergies   Allergen Reactions     No Known Drug Allergy        Family history:  Family History   Problem Relation Age of Onset     Hypertension Mother         on meds, alive     Cerebrovascular Disease Father         stroke about age 65,  of cancer at 68 yrs     Diabetes Father         eventually took insulin     Anemia Father         Pernisios anemia     Kidney Disease Niece         kidney transplant     Kidney Disease Nephew         kidney transplant     Venous thrombosis No family hx of      Anesthesia Reaction No family hx of        Social history:  Social History     Tobacco Use     Smoking status: Never     Passive exposure: Current     Smokeless tobacco: Never   Vaping Use     Vaping status: Not on file   Substance Use Topics     Alcohol use: No     Marital status: .    Nursing Notes:   Trent Damon, EMT  2023 11:49 AM  Signed  Chief Complaint   Patient presents with     Surgical Followup     DOS 23       Vitals:    23 1145   BP: 110/66   BP Location: Left arm   Patient Position: Sitting   Cuff Size: Adult Large   Pulse: 110   SpO2: 95%   Height: 5' 2\"       Body mass index is 33.69 kg/m .     Trent Damon, EMT- P         20 minutes spent on the date of the encounter doing chart review, history and exam, documentation and further activities as noted above.   This is a postop visit.    LUIS Braden, NP-C  Colon and Rectal Surgery  [facility]    This note was created using speech recognition software and may contain unintended word substitutions.    "

## 2023-05-01 NOTE — LETTER
2023       RE: Nadia Ladd  [address]  [facility] [address]     Dear Colleague,    Thank you for referring your patient, Nadia Ladd, to the [facility] COLON AND RECTAL SURGERY CLINIC [place] at [facility]. Please see a copy of my visit note below.    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  Now s/p total abdominal colectomy w/ ileorectal bakers anastomosis; ALEXANDER-BSO on 2023 with Dr. Ashley and Dr. Olea.    Final Diagnosis   A. OMENTUM, RESECTION:  - Adipose tissue with no significant histopathologic abnormality  - No evidence of malignancy     B. COLON, RESECTION:  Mass #1: Mixed neuroendocrine-non-neuroendocrine neoplasm (MINEN):  - Neuroendocrine carcinoma component (70%) and moderately-differentiated adenocarcinoma component (30%)  - Size = 5.0 cm  - Negative surgical resection margins      Mass#2: Poorly-differentiated carcinoma:  - Size = 5.7 cm  - Negative surgical resection margins      - One of forty-one sampled lymph nodes positive (1/41)  - Benign appendix  - Tubular adenoma  - See synoptic reports     C. UTERUS, CERVIX, BILATERAL FALLOPIAN TUBES & OVARIES, :  - Benign endometrial polyps; atrophic endometrium  - Uterus, cervix, bilateral fallopian tubes, and ovaries with no significant morphologic abnormalities  - No evidence of dysplasia or malignancy     D. SMALL INTESTINE, TERMINAL ILEUM, TERMINAL ILIUM:  - Benign ileum  - No evidence of dysplasia or malignancy  - Negative for metastases to one of one sampled lymph node (0 /1)     E. PROXIMAL ANASTOMOTIC RING:  - Benign small intestine  - No evidence of dysplasia or malignancy     F. DISTAL ANASTOMOTIC RING:  - Benign colon  - No evidence of  "dysplasia or malignancy     Addendum 2   Mass #1  RESULT FOR IMMUNOHISTOCHEMICAL VENTANA CLONE  PD-L1 ASSAY  COMBINED POSITIVE SCORE (CPS): 55-60  TUMOR PROPORTION SCORE (TPS): 50%     Mass #2  RESULT FOR IMMUNOHISTOCHEMICAL VENTANA CLONE  PD-L1 ASSAY  COMBINED POSITIVE SCORE (CPS): 80  TUMOR PROPORTION SCORE (TPS): 70%      Addendum   This addendum is issued to report the results of Ki-67 staining on both masses, requested by the patient's oncologist.    - The neuroendocrine component of mass #1 shows a Ki-67 proliferation index of approximately 80%.    - The poorly-differentiated carcinoma in mass #2 shows a Ki-67 proliferation index of approximately 70%.        Interval history: Nadia has been doing well. No pain and is no longer needing any pain medications. Having normal bowel movements about 4-6 times a day. No loose stools and no difficulty holding bowel movements. No nausea or vomiting and she is tolerating a low residue diet. No fevers or chills. No drainage from the incision.    Physical Examination:   /66 (BP Location: Left arm, Patient Position: Sitting, Cuff Size: Adult Large)   Pulse 110   Ht 5' 2\"   LMP  (LMP Unknown)   SpO2 95%   BMI 33.69 kg/m    General: alert, oriented, in no acute distress, sitting comfortably  HEENT: mucous membranes moist  Abdomen: midline abdominal incision well approximated without erythema or drainage.    Assessment/Plan:  67 year old female s/p Total abdominal colectomy w/ ileorectal bakers anastomosis; ALEXANDER-BSO on 4/6/2023 with Dr. Ashley and Dr. Olea.She is recovering well. No pain. Incision healing well. We discussed her pathology results and she is scheduled in about a week to meet with medical oncology to discuss further. Low residue diet for another 2 weeks and then slowly advance to regular diet. No lifting more than 10 pounds for a full 6 weeks from surgery. Follow up with Dr. Ashley on 5/19 but encouraged her to contact the clinic in " the meantime with any questions or concerns. Patient's questions were answered to her stated satisfaction and she is in agreement with this plan.     Medical history:  Past Medical History:   Diagnosis Date    Arthropathia 08/05/2010    B12 deficiency anemia 10/21/2010    Benign essential hypertension with target blood pressure below 140/90 10/10/2016    CARDIOVASCULAR SCREENING; LDL GOAL LESS THAN 160 10/31/2010    Crohn's colitis (H) 02/15/2011    Dermatitis-dishydrotic eczema-severe 08/05/2010    Hiatal hernia     HTN (hypertension) 07/21/2011    Iron deficiency anemia 10/21/2010    Malignant neoplasm of sigmoid colon (H)     Obesity     Primary pulmonary hypertension (H) 04/06/2010       Surgical history:  Past Surgical History:   Procedure Laterality Date    COLECTOMY WITHOUT COLOSTOMY N/A 4/6/2023    Procedure: Laparoscopic Converted to Open Total abdominal colectomy;  Surgeon: Jerome Ashley MD;  Location: [facility] OR    COLONOSCOPY  10/11/10    COLONOSCOPY N/A 2/27/2023    Procedure: ATTEMPTED COLONOSCOPY WITH SIGMOID STRICTURE BIOPSY;  Surgeon: Jonathan Alcocer MD;  Location:  GI    ESOPHAGOSCOPY, GASTROSCOPY, DUODENOSCOPY (EGD), COMBINED N/A 2/27/2023    Procedure: ESOPHAGOGASTRODUODENOSCOPY, WITH BIOPSY;  Surgeon: Jonathan Alcocer MD;  Location:  GI    HYSTERECTOMY TOTAL ABDOMINAL, BILATERAL SALPINGO-OOPHORECTOMY, COMBINED N/A 4/6/2023    Procedure: Hysterectomy total abdominal, bilateral salpingo-oophorectomy;  Surgeon: Sunitha Olea MD;  Location: [facility] OR    SIGMOIDOSCOPY FLEXIBLE N/A 4/6/2023    Procedure: Sigmoidoscopy flexible;  Surgeon: Jerome Ashley MD;  Location: [facility] OR    SURGICAL HISTORY OF -   06/14/76    Perineorrhaphy, for widening vaginal orifice    Z EXPLORATORY OF ABDOMEN  1989    laparoscopy       Problem list:    Patient Active Problem List    Diagnosis Date Noted    Colon cancer (H) 04/06/2023     Priority: Medium    Malignant neoplasm of sigmoid colon (H)  03/31/2023     Priority: Medium     PH- check 6.23 [place]       Morbid obesity (H) 06/06/2019     Priority: Medium    Benign essential hypertension with target blood pressure below 140/90 10/10/2016     Priority: Medium    CARDIOVASCULAR SCREENING; LDL GOAL LESS THAN 130 10/02/2012     Priority: Medium    Advanced directives, counseling/discussion 02/09/2012     Priority: Medium     Advance Directive Problem List Overview:   Name Relationship Phone    Primary Health Care Agent            Alternative Health Care Agent          Discussed advance care planning with patient; information given to patient to review. 2/9/2012         Prediabetes 02/09/2012     Priority: Medium    Obesity 02/09/2012     Priority: Medium    Crohn's disease of large intestine without complication (H) 02/15/2011     Priority: Medium    Iron deficiency anemia due to chronic blood loss 10/21/2010     Priority: Medium    B12 deficiency anemia 10/21/2010     Priority: Medium    Arthropathia 08/05/2010     Priority: Medium    Dermatitis-dishydrotic eczema-severe 08/05/2010     Priority: Medium    Primary pulmonary hypertension (H) 04/06/2010     Priority: Medium       Medications:  Current Outpatient Medications   Medication Sig Dispense Refill    acetaminophen (TYLENOL) 325 MG tablet Take 3 tablets (975 mg) by mouth every 8 hours (Patient not taking: Reported on 4/19/2023) 100 tablet 0    cyanocobalamin 1000 MCG TBCR Take 1,000 mcg by mouth daily (Patient taking differently: Take 1,000 mcg by mouth every morning) 100 tablet 1    enoxaparin ANTICOAGULANT (LOVENOX) 40 MG/0.4ML syringe Inject 0.4 mLs (40 mg) Subcutaneous every 24 hours for 23 days 9.2 mL 0    ferrous sulfate (IRON) 325 (65 FE) MG tablet TAKE ONE TABLET BY MOUTH TWICE A DAY --NO FURTHER REFILLS WITHOUT OFFICE VISIT 200 tablet 3    fluocinonide (LIDEX) 0.05 % external cream Small amount to affected area(s) BID PRN (Patient not taking: Reported on 4/19/2023) 60 g 1    gabapentin  "(NEURONTIN) 100 MG capsule Take 1 capsule (100 mg) by mouth At Bedtime 12 capsule 0    lisinopril (ZESTRIL) 10 MG tablet Take 1 tablet (10 mg) by mouth every morning 90 tablet 3    Multiple Vitamins-Iron (MULTI-DAY PLUS IRON PO)       triamterene-HCTZ (DYAZIDE) 37.5-25 MG capsule TAKE 1 CAPSULE BY MOUTH ONCE DAILY (Patient taking differently: Take 1 capsule by mouth every morning TAKE 1 CAPSULE BY MOUTH ONCE DAILY) 90 capsule 0       Allergies:  Allergies   Allergen Reactions    No Known Drug Allergy        Family history:  Family History   Problem Relation Age of Onset    Hypertension Mother         on meds, alive    Cerebrovascular Disease Father         stroke about age 65,  of cancer at 68 yrs    Diabetes Father         eventually took insulin    Anemia Father         Pernisios anemia    Kidney Disease Niece         kidney transplant    Kidney Disease Nephew         kidney transplant    Venous thrombosis No family hx of     Anesthesia Reaction No family hx of        Social history:  Social History     Tobacco Use    Smoking status: Never     Passive exposure: Current    Smokeless tobacco: Never   Vaping Use    Vaping status: Not on file   Substance Use Topics    Alcohol use: No     Marital status: .    Nursing Notes:   Trent Damon, EMT  2023 11:49 AM  Signed  Chief Complaint   Patient presents with    Surgical Followup     DOS 23       Vitals:    23 1145   BP: 110/66   BP Location: Left arm   Patient Position: Sitting   Cuff Size: Adult Large   Pulse: 110   SpO2: 95%   Height: 5' 2\"       Body mass index is 33.69 kg/m .     Trent Damon, EMT- P         20 minutes spent on the date of the encounter doing chart review, history and exam, documentation and further activities as noted above.   This is a postop visit.      This note was created using speech recognition software and may contain unintended word substitutions.        Again, thank you for allowing me to participate in the care " of your patient.      Sincerely,    LUIS Romero CNP

## 2023-05-01 NOTE — NURSING NOTE
"Chief Complaint   Patient presents with     Surgical Followup     DOS 4/6/23       Vitals:    05/01/23 1145   BP: 110/66   BP Location: Left arm   Patient Position: Sitting   Cuff Size: Adult Large   Pulse: 110   SpO2: 95%   Height: 5' 2\"       Body mass index is 33.69 kg/m .     Trent Damon, EMT- P    "

## 2023-05-01 NOTE — TELEPHONE ENCOUNTER
Spoke with patient she solved her transportation issues, so patient decided to keep her clinic appt with Angelina Handley NP today, as scheduled.    Sylvia Cloud  Ro-op Coordinator  [facility]  Direct Phone: [phone]

## 2023-05-02 LAB
BKR LAB AP ADD'L TEST STATUS: NORMAL
BKR PATH ADDL TEST FINAL COMMENTS: NORMAL

## 2023-05-03 DIAGNOSIS — C18.2 MALIGNANT NEOPLASM OF ASCENDING COLON (H): ICD-10-CM

## 2023-05-03 DIAGNOSIS — C7A.1 MALIGNANT POORLY DIFFERENTIATED NEUROENDOCRINE CARCINOMA (H): Primary | ICD-10-CM

## 2023-05-03 NOTE — PROGRESS NOTES
Colon and Rectal Surgery Clinic Note    RE: Nadia Ladd.  : 1955.  CHEYENNE: 2023.    Reason for visit: post op visit     HPI: Nadia Ladd is a 67 year old female who presents today for a post op visit. She underwent a colonoscopy on 2023 for anemia. A frond-like/villous partially obstructing mass was found in the sigmoid colon. It was partially circumferential involving two-thirds of the lumen circumference. It measured 4cm in length. Dr. Alcocer was unable to transverse the mass. 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  CT Chest/Abdomen/Pelvis showed a 4 cm proximal sigmoid colonic mass consistent with history.Possible additional 2.5 cm mass at the hepatic flexure of the colon.No evidence of distant metastases in the chest, abdomen, or Pelvis. Xray colon barium test on 3/3/2023 showed a long segment narrowing in the proximal sigmoid colon corresponds with known colonic malignancy. Focal area of colonic narrowing at the proximal transverse colon corresponds with a second area of abnormality on CT and is also concerning for a synchronous lesion.      She was diagnosed with Crohn's colitis about 10 years ago.  At that time she was having frequent diarrhea, sometimes bloody diarrhea, with anemia.  She was started on sulfasalazine and this has controlled her Crohn's disease with no real flare ups and no need for biologic therapy.  One wonders if she really has Crohn's colitis.  She has had no flare ups and no bloody diarrhea.  It's been under excellent control.  She has 1-2 normal bowel movements per day.  She has not experienced any obstructive symptoms.      She is now s/p total abdominal colectomy w/ ileorectal bakers anastomosis; ALEXANDER-BSO on 2023 with myself and Dr. Olea. Pathology demonstrated 2 masses with synchronous malignancy - sigmoid lesion with neuroendocrine component, Ki-67 80%; hepatic lesion with poorly-differentiated  carcinoma.     Interval History:   - Incidental hyperkalemia, non-anion gap metabolic acidosis, MARYA following recent oncology visit. Hospitalized from 5/10-5/12/23. Hyperkalemia and Cr now resolving.       Surgical Pathology (4/6/2023):  Addendum 2   Mass #1  RESULT FOR IMMUNOHISTOCHEMICAL VENTANA CLONE  PD-L1 ASSAY  COMBINED POSITIVE SCORE (CPS): 55-60  TUMOR PROPORTION SCORE (TPS): 50%     Mass #2  RESULT FOR IMMUNOHISTOCHEMICAL VENTANA CLONE  PD-L1 ASSAY  COMBINED POSITIVE SCORE (CPS): 80  TUMOR PROPORTION SCORE (TPS): 70%   Addendum electronically signed by Dave Bernstein DO on 4/27/2023 at 10:59 AM   Addendum   This addendum is issued to report the results of Ki-67 staining on both masses, requested by the patient's oncologist.    - The neuroendocrine component of mass #1 shows a Ki-67 proliferation index of approximately 80%.    - The poorly-differentiated carcinoma in mass #2 shows a Ki-67 proliferation index of approximately 70%.   Addendum electronically signed by Dave Bernstein DO on 4/19/2023 at  1:11 PM   Final Diagnosis   A. OMENTUM, RESECTION:  - Adipose tissue with no significant histopathologic abnormality  - No evidence of malignancy     B. COLON, RESECTION:  Mass #1: Mixed neuroendocrine-non-neuroendocrine neoplasm (MINEN):  - Neuroendocrine carcinoma component (70%) and moderately-differentiated adenocarcinoma component (30%)  - Size = 5.0 cm  - Negative surgical resection margins      Mass#2: Poorly-differentiated carcinoma:  - Size = 5.7 cm  - Negative surgical resection margins      - One of forty-one sampled lymph nodes positive (1/41)  - Benign appendix  - Tubular adenoma  - See synoptic reports     C. UTERUS, CERVIX, BILATERAL FALLOPIAN TUBES & OVARIES, :  - Benign endometrial polyps; atrophic endometrium  - Uterus, cervix, bilateral fallopian tubes, and ovaries with no significant morphologic abnormalities  - No evidence of dysplasia or malignancy     D. SMALL INTESTINE, TERMINAL  ILEUM, TERMINAL ILIUM:  - Benign ileum  - No evidence of dysplasia or malignancy  - Negative for metastases to one of one sampled lymph node (0 /1)     E. PROXIMAL ANASTOMOTIC RING:  - Benign small intestine  - No evidence of dysplasia or malignancy     F. DISTAL ANASTOMOTIC RING:  - Benign colon  - No evidence of dysplasia or malignancy   Electronically signed by Dave Bernstein DO on 4/14/2023 at  3:07 PM   Synoptic Checklist   COLON AND RECTUM: Resection, Including Transanal Disk Excision of Rectal Neoplasms  8th Edition - Protocol posted: 6/22/2022  COLON AND RECTUM: RESECTION - B  SPECIMEN   Procedure  Total abdominal colectomy    TUMOR   Tumor Site  Ascending colon    Histologic Type  Mixed neuroendocrine-non-neuroendocrine neoplasm: 70% neuroendocrine neoplasm and 30% adenocarcinoma    Histologic Grade  Neuroendocrine carcinoma and moderately-differentiated adenocarcinoma    Tumor Size  Greatest dimension (Centimeters): 5.0 cm   Tumor Extent  Invades into muscularis propria    Macroscopic Tumor Perforation  Not identified    Lymphovascular Invasion  Small vessel    Perineural Invasion  Not identified    Treatment Effect  No known presurgical therapy    MARGINS   Margin Status for Invasive Carcinoma  All margins negative for invasive carcinoma    Closest Margin(s) to Invasive Carcinoma  Radial (circumferential) or mesenteric    Distance from Invasive Carcinoma to Closest Margin  0.2 cm   Margin Status for Non-Invasive Tumor  All margins negative for high-grade dysplasia / intramucosal carcinoma and low-grade dysplasia    REGIONAL LYMPH NODES   Regional Lymph Node Status  Tumor present in regional lymph node(s)    Number of Lymph Nodes with Tumor  1    Number of Lymph Nodes Examined  41    Tumor Deposits  Not identified    PATHOLOGIC STAGE CLASSIFICATION (pTNM, AJCC 8th Edition)   Reporting of pT, pN, and (when applicable) pM categories is based on information available to the pathologist at the time the report  is issued. As per the AJCC (Chapter 1, 8th Ed.) it is the managing physician's responsibility to establish the final pathologic stage based upon all pertinent information, including but potentially not limited to this pathology report.   TNM Descriptors  m (multiple primary tumors)    pT Category  pT3    pN Category  pN1a    ADDITIONAL FINDINGS   Additional Findings  Adenoma(s)    .     COLON AND RECTUM: Resection, Including Transanal Disk Excision of Rectal Neoplasms  8th Edition - Protocol posted: 6/22/2022  COLON AND RECTUM: RESECTION - B  SPECIMEN   Procedure  Total abdominal colectomy    TUMOR   Tumor Site  Sigmoid colon    Histologic Type  Carcinoma, type cannot be determined    Histologic Grade  G3, poorly differentiated    Tumor Size  Greatest dimension (Centimeters): 5.7 cm   Tumor Extent  Invades into muscularis propria    Macroscopic Tumor Perforation  Not identified    Lymphovascular Invasion  Not identified    Perineural Invasion  Not identified    Treatment Effect  No known presurgical therapy    MARGINS   Margin Status for Invasive Carcinoma  All margins negative for invasive carcinoma    Closest Margin(s) to Invasive Carcinoma  Radial (circumferential) or mesenteric    Distance from Invasive Carcinoma to Closest Margin  4.2 cm   Margin Status for Non-Invasive Tumor  All margins negative for high-grade dysplasia / intramucosal carcinoma and low-grade dysplasia    REGIONAL LYMPH NODES   Regional Lymph Node Status  Tumor present in regional lymph node(s)    Number of Lymph Nodes with Tumor  1    Number of Lymph Nodes Examined  41    Tumor Deposits  Not identified    PATHOLOGIC STAGE CLASSIFICATION (pTNM, AJCC 8th Edition)   Reporting of pT, pN, and (when applicable) pM categories is based on information available to the pathologist at the time the report is issued. As per the AJCC (Chapter 1, 8th Ed.) it is the managing physician's responsibility to establish the final pathologic stage based upon all  pertinent information, including but potentially not limited to this pathology report.   TNM Descriptors  m (multiple primary tumors)    pT Category  pT2    pN Category  pN1a    .              Medications:  Current Outpatient Medications   Medication Sig Dispense Refill     acetaminophen (TYLENOL) 325 MG tablet Take 3 tablets (975 mg) by mouth every 8 hours (Patient not taking: Reported on 4/19/2023) 100 tablet 0     cyanocobalamin 1000 MCG TBCR Take 1,000 mcg by mouth daily (Patient taking differently: Take 1,000 mcg by mouth every morning) 100 tablet 1     enoxaparin ANTICOAGULANT (LOVENOX) 40 MG/0.4ML syringe Inject 0.4 mLs (40 mg) Subcutaneous every 24 hours for 23 days 9.2 mL 0     ferrous sulfate (IRON) 325 (65 FE) MG tablet TAKE ONE TABLET BY MOUTH TWICE A DAY --NO FURTHER REFILLS WITHOUT OFFICE VISIT 200 tablet 3     fluocinonide (LIDEX) 0.05 % external cream Small amount to affected area(s) BID PRN (Patient not taking: Reported on 4/19/2023) 60 g 1     gabapentin (NEURONTIN) 100 MG capsule Take 1 capsule (100 mg) by mouth At Bedtime 12 capsule 0     lisinopril (ZESTRIL) 10 MG tablet Take 1 tablet (10 mg) by mouth every morning 90 tablet 3     Multiple Vitamins-Iron (MULTI-DAY PLUS IRON PO)        triamterene-HCTZ (DYAZIDE) 37.5-25 MG capsule TAKE 1 CAPSULE BY MOUTH ONCE DAILY (Patient taking differently: Take 1 capsule by mouth every morning TAKE 1 CAPSULE BY MOUTH ONCE DAILY) 90 capsule 0       ROS:  A complete review of systems was performed with the patient and all systems negative except as per HPI.    Physical Examination:  Exam was chaperoned by Mary Watson, general surgery resident  LMP  (LMP Unknown)   General: Well hydrated. No acute distress.  Abdomen: Soft, NT, ND. Incisions very well-healed.  Perianal external examination: Not performed  Anoscopy: Was not performed.     Procedures:  None    ASSESSMENT  Nadia Ladd is a 67-year old s/p s/p total abdominal colectomy w/ ileorectal bakers  anastomosis; ALEXANDER-BSO on 4/6/2023 with myself and Dr. Olea. Pathology demonstrated 2 masses with synchronous malignancy - sigmoid lesion with neuroendocrine component, Ki-67 80%; hepatic lesion with poorly-differentiated carcinoma. Patient receiving close follow-up with medical oncology. To start FOLFOX therapy for 6 months.     PLAN  1. FOLFOX therapy per medical oncology; Ok to start at any time moving forward  2. Rigid proctoscopy to be completed at 1-year brian since diagnosis of malignancy  3. Follow-up with me as needed     30 minutes spent on the date of the encounter doing chart review, history and exam, imaging review, documentation and further activities as noted above.    Jerome Ashley MD, PhD    Division of Colon and Rectal Surgery  [facility]    Referring Provider:  Jerome Ashley MD  [address] [place] [id number]     Primary Care Provider:  Nomi Cartwright

## 2023-05-08 ENCOUNTER — APPOINTMENT (OUTPATIENT)
Dept: ONCOLOGY | Facility: CLINIC | Age: 68
End: 2023-05-08
Attending: INTERNAL MEDICINE
Payer: MEDICARE

## 2023-05-08 NOTE — PROGRESS NOTES
[facility]    Hematology/Oncology Established Patient Follow Up Note      Today's Date: 5/9/2023    Reason for follow up: Sigmoid cancer    HISTORY OF PRESENT ILLNESS: Nadia Ladd is a 67 year old female who was referred to the Hematology/Oncology Clinic for sigmoid cancer    Patient has medical history including Crohn's disease on sulfasalazine, anemia secondary to iron deficiency and B12 deficiency, obesity, hypertension, prediabetes, eczema, pulmonary hypertension, hiatal hernia, mild splenomegaly (14.7 cm in 2/23)    - 2/23 pt noted increasing fatigue, found to have iron deficiency anemia w/hgb 6.8 (previously required RBC transfusion when dx w/Crohn's), did have intermittent changes in stool but not persistent (noted minimal blood in stool)   - 2/23 upper endoscopy for iron deficiency anemia and Crohn's disease: Hiatal hernia, normal stomach, normal duodenum  - 2/23 colonoscopy: A frond-like/villous partially obstructing large mass found in sigmoid colon, partially circumferential involving two thirds of the lumen circumference, 4 cm, unable to traverse, with oozing, s/p biopsy  PATHOLOGY:  A.  Stomach, antrum: Biopsy:  - Antral type mucosa with mild chronic inactive gastritis  - Immunostain for Helicobacter pylori is negative      B.  Colon, sigmoid, stricture: Biopsy:  - Invasive poorly differentiated carcinoma  The sigmoid stricture shows a high-grade carcinoma without definitive gland formation.  Given the poorly differentiated appearance, an immunohistochemical panel was performed to exclude the possibility of a tumor by direct extension or metastasis.   Immunohistochemical stains are performed and show the tumor to be diffusely positive for CK7 and partially positive for CK20 and SATB2.  There is no significant tumor reactivity for CDX2, GATA3, PAX8, TTF-1, and chromogranin.  Synaptophysin highlights very rare positive cells. Although the CK7/CK20 profile is not entirely  "typical for a colorectal carcinoma, immunostains for tumors of other origins are negative and a colorectal primary is still favored.   - Mismatch repair:  1) MLH1-loss of nuclear expression  2) MSH2-intact  3) MSH6-intact  4) PMS2-loss of nuclear expression  -MLH1 promoter methylation: NEGATIVE    -2/23 CT CAP:  A) 4 cm length mass in the proximal sigmoid colon. There is some irregularity and stranding in the adjacent pericolonic fat which may indicate tumor extension. There is no obstruction.   B) there is a second probable mass at the hepatic flexure of the colon measuring 2.5 cm in length   C)There are small lymph nodes in the mesial colon adjacent to the hepatic flexure abnormality and the sigmoid colonic mass. No lymphadenopathy by size criteria  D) There is submucosal fatty deposition in the colon, most severe in the distal sigmoid colon and rectum, which can be seen secondary to quiescent inflammatory bowel disease.  E) no liver lesion    -3/23 PET:  a. There is increased FDG avidity of the sigmoid colon with focal lobular wall thickening consistent with biopsy-proven adenocarcinoma.  b. Secondary focus noted at the hepatic flexure demonstrates elevated FDG avidity and lobulated wall thickening highly suspicious for a additional primary.  c. Additionally there is a smaller focus of wall thickening with elevated FDG avidity along the left aspect of the transverse colon  which is suspicious for a possible third focus of colonic malignancy.    -3/23 CEA 6.8  - 3/23 colorectal surgery consultation with - in the setting of Crohn's, at least sigmoid colectomy with possible total abdominal colectomy with either ileorectal anastomosis or end ileostomy (\"rectum can remain active and be actively surveyed annually\")    -3/23 genetic counseling, testing for Chan syndrome    - 4/5/2023 gynecologic oncology consultation for risk reduction surgery with hysterectomy and BSO at time of colectomy    -4/6/2023 " laparoscopic converted to open total abdominal colectomy with ileorectal anastomosis, partial omentectomy, flexible sigmoidoscopy, TAHBSO  A. OMENTUM, RESECTION:  - Adipose tissue with no significant histopathologic abnormality  - No evidence of malignancy     B. COLON, RESECTION:  Mass #1 (ascending colon/hepatic flexure): Mixed neuroendocrine-non-neuroendocrine neoplasm (MINEN):  - Neuroendocrine carcinoma component (70%) and moderately-differentiated adenocarcinoma component (30%)  - Size = 5.0 cm, invading into muscularis propria  - Positive LVI  - Negative macroscopic tumor perforation, negative PNI  - Negative surgical resection margins  - IHC: Neuroendocrine portion: Negative for chromogranin and INSM1 but strongly express synaptophysin  - IHC: Adenocarcinoma portion: Positive for CK20, CDX2 negative for CK7, PAX8, ER, S100  Ki-67 proliferation index of approximately 80%.   PDL1:  COMBINED POSITIVE SCORE (CPS): 55-60  TUMOR PROPORTION SCORE (TPS): 50%   pathogenic KRAS mutation (G13D) was identified (5.2%).  AJCC 8th edition: stage 3B mpT3 pN1a     Mass#2 (sigmoid colon): Poorly-differentiated carcinoma:  - Grade 3  - Size = 5.7 cm, invading into muscularis propria  - Negative for microscopic tumor perforation, LVI, perineural invasion  - Negative surgical resection margins  - IHC: Positive for scar and keratin AE1/AE3, negative for CK7, CK20, CDX2, PAX8, ER, chromogranin, CD56, p40, synaptophysin, S100, INI1, p40, INSM1  Ki-67 proliferation index of approximately 70%.  PDL1:  COMBINED POSITIVE SCORE (CPS): 80  TUMOR PROPORTION SCORE (TPS): 70%  pathogenic KRAS mutation (G13D) was identified (4.5%).  AJCC 8th edition: stage 3A mpT2 pN1a    - One of forty-one sampled lymph nodes positive (1/41)  - Benign appendix  - Tubular adenoma    C. UTERUS, CERVIX, BILATERAL FALLOPIAN TUBES & OVARIES, :  - Benign endometrial polyps; atrophic endometrium  - Uterus, cervix, bilateral fallopian tubes, and ovaries with no  significant morphologic abnormalities  - No evidence of dysplasia or malignancy     D. SMALL INTESTINE, TERMINAL ILEUM, TERMINAL ILIUM:  - Benign ileum  - No evidence of dysplasia or malignancy  - Negative for metastases to one of one sampled lymph node (0 /1)     E. PROXIMAL ANASTOMOTIC RING:  - Benign small intestine  - No evidence of dysplasia or malignancy     F. DISTAL ANASTOMOTIC RING:  - Benign colon  - No evidence of dysplasia or malignancy    CRC NGS:   --mutations positive for KRAS G13D mutation 5.2% mass 1, KRAS G13D mutation 4.5% mass 2 (negative for AKT1; BRAF; ERBB2; KRAS; NRAS; PIK3CA; PTEN)  --TMB score: 17.064 mut/Mb mass 1 (CPS 55-60, TPS 50%), 60.943 mut/Mb mass 2 (CPS 80, TPS 70%)  --fusion negative including NTRK and RET for mass 1 and 2 (also negative for AKT1, AKT3, ALK, AR, OGKYDP42, CLAUDINE, BCOR, BRAF, BRD3, BRD4, CAMTA1, CCNB3, CCND1, , CIC, CSF1, CSF1R, CTNNB1, DNAJB1, EGFR, EPC1, ERBB2, ERBB4, ERG, ESR1, ESRRA, ETV1, ETV4, ETV5, ETV6, EWSR1, FGFR1, FGFR2, FGFR3, FGR, FOS, FOSB, FOXO1, FOXO4, FOXR2, FUS, GLI1, GRB7, HMGA2, HRAS, IDH1, IDH2, INSR, JAK2, JAK3, JAZF1, KRAS, MAML2, MAP2K1, MAST1, MAST2, MEAF6, MET, MKL2, MN1, MSMB, MUSK, MYB, MYBL1, MYOD1, NCOA1, NCOA2, NOTCH1, NOTCH2, NR4A3, NRAS, NRG1, NTRK1, NTRK2, NTRK3, NUMBL1, NUTM1, PAX3, PDGFB, PDGFRA, PDGFRB, PHF1, PIK3CA, PKN1, PLAG1, PPARG, PRKACA, PRKCA, PRKCB, RAF1, RELA, RET, ROS1, RPSO2, RSPO3, SS18, STAT6, TAF15, TCF12, TERT, TFE3, TFEB, TFG, THADA, TMPRSS2, USP6, VGLL2, YAP1, YWHAE)    -4/11/2023 patient discharged from hospital, planning for 30 days of lovenox postop, oxycodone for pain (required 1 unit PRBC for anemia)      INTERIM HISTORY:  Pt states she is feeling well, minimal pain, controlled with tylenol, only used oxycodone once. Pt is slowly increasing activity.     I requested additional testing from pathology including:  MASS #1 hepatic flexure mass  Ki-67 proliferation index of approximately 80%.    PDL1:  COMBINED POSITIVE SCORE (CPS): 55-60  TUMOR PROPORTION SCORE (TPS): 50%   pathogenic KRAS mutation (G13D) was identified (5.2%).    Mass#2 (sigmoid colon):  Ki-67 proliferation index of approximately 70%.  PDL1:  COMBINED POSITIVE SCORE (CPS): 80  TUMOR PROPORTION SCORE (TPS): 70%  pathogenic KRAS mutation (G13D) was identified (4.5%).    CRC NGS:   --mutations positive for KRAS G13D mutation 5.2% mass 1, KRAS G13D mutation 4.5% mass 2  --TMB score: 17.064 mut/Mb mass 1 (CPS 55-60, TPS 50%), 60.943 mut/Mb mass 2 (CPS 80, TPS 70%)  --fusion negative including NTRK and RET for mass 1 and 2     -5/8/23 I presented this case at [facility] CRC tumor board and their recommendations include:  - common to see this in Crohn's, overall poor prognosis, treat aggressively, may be poorly responsive to chemotherapy  - standard of care is mFOLFOX 6 x 12 cycles  - acknowledge that pt has high TPS and likely response to immunotherapy however not sufficient data or standard of care in stage 3 adjuvant setting  - add MMR testing to mass #1 hepatic flexure mass    Other  - germline testing results pending       REVIEW OF SYSTEMS:   A 14 point ROS was reviewed with pertinent positives and negatives in the HPI.        HOME MEDICATIONS:  Current Outpatient Medications   Medication Sig Dispense Refill     ferrous sulfate (IRON) 325 (65 FE) MG tablet TAKE ONE TABLET BY MOUTH TWICE A DAY --NO FURTHER REFILLS WITHOUT OFFICE VISIT 200 tablet 3     gabapentin (NEURONTIN) 100 MG capsule Take 1 capsule (100 mg) by mouth At Bedtime 12 capsule 0     lisinopril (ZESTRIL) 10 MG tablet Take 1 tablet (10 mg) by mouth every morning 90 tablet 3     Multiple Vitamins-Iron (MULTI-DAY PLUS IRON PO)        triamterene-HCTZ (DYAZIDE) 37.5-25 MG capsule TAKE 1 CAPSULE BY MOUTH ONCE DAILY (Patient taking differently: Take 1 capsule by mouth every morning TAKE 1 CAPSULE BY MOUTH ONCE DAILY) 90 capsule 0     acetaminophen (TYLENOL) 325 MG tablet Take 3  tablets (975 mg) by mouth every 8 hours (Patient not taking: Reported on 4/19/2023) 100 tablet 0     cyanocobalamin 1000 MCG TBCR Take 1,000 mcg by mouth daily (Patient not taking: Reported on 5/9/2023) 100 tablet 1     fluocinonide (LIDEX) 0.05 % external cream Small amount to affected area(s) BID PRN (Patient not taking: Reported on 4/19/2023) 60 g 1         ALLERGIES:  Allergies   Allergen Reactions     No Known Drug Allergy          PAST MEDICAL HISTORY:  Past Medical History:   Diagnosis Date     Arthropathia 08/05/2010     B12 deficiency anemia 10/21/2010     Benign essential hypertension with target blood pressure below 140/90 10/10/2016     CARDIOVASCULAR SCREENING; LDL GOAL LESS THAN 160 10/31/2010     Crohn's colitis (H) 02/15/2011     Dermatitis-dishydrotic eczema-severe 08/05/2010     Hiatal hernia      HTN (hypertension) 07/21/2011     Iron deficiency anemia 10/21/2010     Malignant neoplasm of sigmoid colon (H)      Obesity      Primary pulmonary hypertension (H) 04/06/2010         PAST SURGICAL HISTORY:  Past Surgical History:   Procedure Laterality Date     COLECTOMY WITHOUT COLOSTOMY N/A 4/6/2023    Procedure: Laparoscopic Converted to Open Total abdominal colectomy;  Surgeon: Jerome Ashley MD;  Location: [facility] OR     COLONOSCOPY  10/11/10     COLONOSCOPY N/A 2/27/2023    Procedure: ATTEMPTED COLONOSCOPY WITH SIGMOID STRICTURE BIOPSY;  Surgeon: Jonathan Alcocer MD;  Location:  GI     ESOPHAGOSCOPY, GASTROSCOPY, DUODENOSCOPY (EGD), COMBINED N/A 2/27/2023    Procedure: ESOPHAGOGASTRODUODENOSCOPY, WITH BIOPSY;  Surgeon: Jonathan Alcocer MD;  Location:  GI     HYSTERECTOMY TOTAL ABDOMINAL, BILATERAL SALPINGO-OOPHORECTOMY, COMBINED N/A 4/6/2023    Procedure: Hysterectomy total abdominal, bilateral salpingo-oophorectomy;  Surgeon: Sunitha Olea MD;  Location: [facility] OR     SIGMOIDOSCOPY FLEXIBLE N/A 4/6/2023    Procedure: Sigmoidoscopy flexible;  Surgeon: Jerome Ashley MD;   "Location: [facility] OR     SURGICAL HISTORY OF -   77    Perineorrhaphy, for widening vaginal orifice     ZZC EXPLORATORY OF ABDOMEN  1989    laparoscopy         SOCIAL HISTORY:  Social History     Socioeconomic History     Marital status:      Spouse name: Not on file     Number of children: Not on file     Years of education: Not on file     Highest education level: Not on file   Occupational History     Not on file   Tobacco Use     Smoking status: Never     Passive exposure: Current     Smokeless tobacco: Never   Vaping Use     Vaping status: Not on file   Substance and Sexual Activity     Alcohol use: No     Drug use: No     Sexual activity: Yes     Partners: Male   Other Topics Concern     Parent/sibling w/ CABG, MI or angioplasty before 65F 55M? Not Asked   Social History Narrative     Not on file     Social Determinants of Health     Financial Resource Strain: Not on file   Food Insecurity: Not on file   Transportation Needs: Not on file   Physical Activity: Not on file   Stress: Not on file   Social Connections: Not on file   Intimate Partner Violence: Not on file   Housing Stability: Not on file         FAMILY HISTORY:  Family History   Problem Relation Age of Onset     Hypertension Mother         on meds, alive     Cerebrovascular Disease Father         stroke about age 65,  of cancer at 68 yrs     Diabetes Father         eventually took insulin     Anemia Father         Pernisios anemia     Kidney Disease Niece         kidney transplant     Kidney Disease Nephew         kidney transplant     Venous thrombosis No family hx of      Anesthesia Reaction No family hx of          PHYSICAL EXAM:  Vital signs:  /62   Pulse (!) 121   Temp 97.3  F (36.3  C) (Temporal)   Ht 1.575 m (5' 2\")   Wt 80.3 kg (177 lb 1.6 oz)   LMP  (LMP Unknown)   SpO2 98%   BMI 32.39 kg/m       ECO  GENERAL/CONSTITUTIONAL: No acute distress.  EYES:  Extraocular movements intact without nystagmus.  No scleral " icterus.  RESPIRATORY: Equal chest rise.   MUSCULOSKELETAL: Warm and well-perfused, no cyanosis, clubbing, or edema. Right LE always more swollen than left, chronic  Unchanged.   NEUROLOGIC: Cranial nerves are grossly intact. Alert, oriented to person, place and time, answers questions appropriately.  INTEGUMENTARY: No rashes or jaundice.  GAIT: walking with walker      LABS:   Latest Reference Range & Units 05/09/23 14:13   WBC 4.0 - 11.0 10e3/uL 6.9   Hemoglobin 11.7 - 15.7 g/dL 11.3 (L)   Hematocrit 35.0 - 47.0 % 38.6   Platelet Count 150 - 450 10e3/uL 332   RBC Count 3.80 - 5.20 10e6/uL 4.42   MCV 78 - 100 fL 87   MCH 26.5 - 33.0 pg 25.6 (L)   MCHC 31.5 - 36.5 g/dL 29.3 (L)   RDW 10.0 - 15.0 % 16.5 (H)   % Neutrophils % 75   % Lymphocytes % 17   % Monocytes % 8   % Eosinophils % 0   % Basophils % 0   Absolute Basophils 0.0 - 0.2 10e3/uL 0.0   Absolute Eosinophils 0.0 - 0.7 10e3/uL 0.0   Absolute Immature Granulocytes <=0.4 10e3/uL 0.0   Absolute Lymphocytes 0.8 - 5.3 10e3/uL 1.2   Absolute Monocytes 0.0 - 1.3 10e3/uL 0.6   % Immature Granulocytes % 0   Absolute Neutrophils 1.6 - 8.3 10e3/uL 5.0   Absolute NRBCs 10e3/uL 0.0   NRBCs per 100 WBC <1 /100 0   (L): Data is abnormally low  (H): Data is abnormally high    PATHOLOGY:    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), KRAS G13D mutated, TPS 50%  # sigmoid colon poorly-differentiated carcinoma, KRAS F13D mutated, TPS 70%  - 2/23 colonoscopy: A frond-like/villous partially obstructing large mass found in sigmoid colon, partially circumferential involving two thirds of the lumen circumference, 4 cm, unable to traverse, with oozing, s/p biopsy  -PATHOLOGY: Invasive poorly differentiated carcinoma, IHC panel excludes tumors of other origin,  colorectal primary is favored, loss of nuclear expression of MLH1 and PMS2, MLH1 promoter methylation negative, [id number] mutation negative  -2/23 CT CAP: Shows known 4 cm proximal sigmoid colon mass with possible tumor extension (irregularity and stranding and adjacent pericolonic fat) without obstruction AND probable second mass 2.5 cm at hepatic flexure of colon; small lymph nodes adjacent to both masses (no lymphadenopathy by size criteria)  -3/23 PET:  a. There is increased FDG avidity of the sigmoid colon with focal lobular wall thickening consistent with biopsy-proven adenocarcinoma.  b. Secondary focus noted at the hepatic flexure demonstrates elevated FDG avidity and lobulated wall thickening highly suspicious for a additional primary.  c. Additionally there is a smaller focus of wall thickening with elevated FDG avidity along the left aspect of the transverse colon  which is suspicious for a possible third focus of colonic malignancy.  - 3/23 CEA 6.8  -4/6/2023 laparoscopic converted to open total abdominal colectomy with ileorectal anastomosis, partial omentectomy, flexible sigmoidoscopy, TAHBSO  PATHOLOGY:  Of note, omental resection, terminal ileum, proximal and distal anastomotic rings, uterus, cervix, bilateral fallopian tubes and ovaries negative for malignancy    Mass #1 (ascending colon/hepatic flexure): Mixed neuroendocrine-non-neuroendocrine neoplasm (MINEN):  - Neuroendocrine carcinoma component (70%) and moderately-differentiated adenocarcinoma component (30%)  - Size = 5.0 cm, invading into muscularis propria  - Positive LVI  - Negative macroscopic tumor perforation, negative PNI  - Negative surgical resection margins  - IHC: Neuroendocrine portion: Negative for chromogranin and INSM1 but strongly express synaptophysin  - IHC: Adenocarcinoma portion: Positive for CK20, CDX2 negative for CK7, PAX8, ER, S100  Ki-67 proliferation index of approximately 80%.   PDL1:  COMBINED POSITIVE SCORE (CPS):  55-60  TUMOR PROPORTION SCORE (TPS): 50%   pathogenic KRAS mutation (G13D) was identified (5.2%).  AJCC 8th edition: stage 3B mpT3 pN1a     Mass#2 (sigmoid colon): Poorly-differentiated carcinoma:  - Grade 3  - Size = 5.7 cm, invading into muscularis propria  - Negative for microscopic tumor perforation, LVI, perineural invasion  - Negative surgical resection margins  - IHC: Positive for scar and keratin AE1/AE3, negative for CK7, CK20, CDX2, PAX8, ER, chromogranin, CD56, p40, synaptophysin, S100, INI1, p40, INSM1  Ki-67 proliferation index of approximately 70%.  PDL1:  COMBINED POSITIVE SCORE (CPS): 80  TUMOR PROPORTION SCORE (TPS): 70%  pathogenic KRAS mutation (G13D) was identified (4.5%).  AJCC 8th edition: stage 3A mpT2 pN1a    - One of forty-one sampled lymph nodes positive (1/41), d/w pathology- unable to determine which mass is metastatic to LN    - 4/23 MRI brain w/wo contrast LAURENT    - 5/8/23 I presented this case at [facility] CRC tumor board and their recommendations include:  - common to see this in Crohn's, overall poor prognosis, treat aggressively, may be poorly responsive to chemotherapy  - standard of care is mFOLFOX 6 x 12 cycles  - acknowledge that pt has high TPS and likely response to immunotherapy however not sufficient data or standard of care in stage 3 adjuvant setting  - add MMR testing to mass #1 hepatic flexure mass    PLAN:  - Patient has complex pathology   - MMR testing added to mass #1 hepatic flexure mass  - f/u germline genetic testing   - I recommend adjuvant chemotherapy w/mFOLFOX 6 x12 cycles, port placement, d/w pt today, reading materials provided today and pt wishes to review these independently  - recommend formal second opinion from GI medical oncologist at [facility] regarding other treatment options and clinic trials given high TPS in both tumors      #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  - hepatic flexure MINEN- Neuroendocrine carcinoma component (70%) and moderately-differentiated  adenocarcinoma component (30%)- MMR testing requested and pending  - sigmoid adenocarcinoma-  Invasive poorly differentiated carcinoma, loss of nuclear expression of MLH1 and PMS2, MLH1 promoter methylation negative, [id number] mutation negative  - germline genetic testing completed, results pending    #Anemia secondary to iron deficiency and B12 deficiency  - terminal ileum removed at time of colon surgery, monitor for nutrient def  - 2/23 hgb 6.8, ferritin 21, iron sat index 10, TIBC 265, iron 27, b12 1493  - On B12 1000 mcg p.o. daily and ferrous sulfate 325 mg p.o. daily  - 4/9/23 s/p 1 uprbcs  - 5/9/23 hgb 11.3    #Crohn's  - dx since at least 2010     RTC 5/30 or 5/31 for f/u with me    ADDENDUM:  CMP resulted and Cr 3.82 w/K 7.0, I am unsure if this is false or not, specimen is not hemolyzed. I directed pt to ER. Case d/w ER attending Dr. Ladd.     ADDENDUM: see my note from 5/19/23 for updates    REGIMEN:  mFOLFOX6  q14 days, up to 24 weeks of perioperative chemo total   oxaliplatin 64mg/m2 day 1 (dose reduced from 85mg/m2 2/2 Cr)  LV 350mg/m2 day 1  5FU 400mg/m2 IV push day 1  5FU 1200mg/m2 continuous infusion day 1-2 (total 2,400mg/m2 IV over 46-48 hours)  Emetic risk: moderate  Febrile neutropenia risk: intermediate          Irene Bateman DO  Hematology/Oncology  [facility]

## 2023-05-09 ENCOUNTER — HOSPITAL ENCOUNTER (EMERGENCY)
Facility: CLINIC | Age: 68
Discharge: OTHER INSTITUTION | DRG: 640 | End: 2023-05-10
Attending: EMERGENCY MEDICINE | Admitting: EMERGENCY MEDICINE
Payer: MEDICARE

## 2023-05-09 ENCOUNTER — LAB (OUTPATIENT)
Dept: LAB | Facility: CLINIC | Age: 68
End: 2023-05-09
Payer: MEDICARE

## 2023-05-09 ENCOUNTER — ONCOLOGY VISIT (OUTPATIENT)
Dept: ONCOLOGY | Facility: CLINIC | Age: 68
End: 2023-05-09
Payer: MEDICARE

## 2023-05-09 VITALS
TEMPERATURE: 97.3 F | WEIGHT: 177.1 LBS | HEART RATE: 121 BPM | SYSTOLIC BLOOD PRESSURE: 100 MMHG | HEIGHT: 62 IN | DIASTOLIC BLOOD PRESSURE: 62 MMHG | OXYGEN SATURATION: 98 % | BODY MASS INDEX: 32.59 KG/M2

## 2023-05-09 VITALS
SYSTOLIC BLOOD PRESSURE: 129 MMHG | RESPIRATION RATE: 16 BRPM | HEART RATE: 103 BPM | TEMPERATURE: 98 F | BODY MASS INDEX: 32.37 KG/M2 | WEIGHT: 177 LBS | DIASTOLIC BLOOD PRESSURE: 90 MMHG | OXYGEN SATURATION: 100 %

## 2023-05-09 DIAGNOSIS — C7A.1 MALIGNANT POORLY DIFFERENTIATED NEUROENDOCRINE CARCINOMA (H): ICD-10-CM

## 2023-05-09 DIAGNOSIS — C18.7 MALIGNANT NEOPLASM OF SIGMOID COLON (H): ICD-10-CM

## 2023-05-09 DIAGNOSIS — C18.7 CANCER OF SIGMOID COLON (H): ICD-10-CM

## 2023-05-09 DIAGNOSIS — N17.9 AKI (ACUTE KIDNEY INJURY) (H): ICD-10-CM

## 2023-05-09 DIAGNOSIS — C7A.1 MALIGNANT POORLY DIFFERENTIATED NEUROENDOCRINE CARCINOMA (H): Primary | ICD-10-CM

## 2023-05-09 DIAGNOSIS — C18.2 MALIGNANT NEOPLASM OF ASCENDING COLON (H): ICD-10-CM

## 2023-05-09 DIAGNOSIS — E87.5 HYPERKALEMIA: ICD-10-CM

## 2023-05-09 DIAGNOSIS — Z76.89 PREVENTION OF CHEMOTHERAPY-INDUCED NEUTROPENIA: ICD-10-CM

## 2023-05-09 DIAGNOSIS — N18.32 STAGE 3B CHRONIC KIDNEY DISEASE (H): ICD-10-CM

## 2023-05-09 LAB
ALBUMIN SERPL BCG-MCNC: 4.3 G/DL (ref 3.5–5.2)
ALBUMIN UR-MCNC: NEGATIVE MG/DL
ALP SERPL-CCNC: 136 U/L (ref 35–104)
ALT SERPL W P-5'-P-CCNC: 23 U/L (ref 10–35)
ANION GAP SERPL CALCULATED.3IONS-SCNC: 13 MMOL/L (ref 7–15)
ANION GAP SERPL CALCULATED.3IONS-SCNC: 14 MMOL/L (ref 7–15)
APPEARANCE UR: ABNORMAL
AST SERPL W P-5'-P-CCNC: 19 U/L (ref 10–35)
BACTERIA #/AREA URNS HPF: ABNORMAL /HPF
BASOPHILS # BLD AUTO: 0 10E3/UL (ref 0–0.2)
BASOPHILS NFR BLD AUTO: 0 %
BILIRUB SERPL-MCNC: 0.5 MG/DL
BILIRUB UR QL STRIP: NEGATIVE
BUN SERPL-MCNC: 61.7 MG/DL (ref 8–23)
BUN SERPL-MCNC: 62.1 MG/DL (ref 8–23)
CALCIUM SERPL-MCNC: 9.4 MG/DL (ref 8.8–10.2)
CALCIUM SERPL-MCNC: 9.6 MG/DL (ref 8.8–10.2)
CHLORIDE SERPL-SCNC: 110 MMOL/L (ref 98–107)
CHLORIDE SERPL-SCNC: 110 MMOL/L (ref 98–107)
COLOR UR AUTO: YELLOW
CREAT SERPL-MCNC: 3.6 MG/DL (ref 0.51–0.95)
CREAT SERPL-MCNC: 3.82 MG/DL (ref 0.51–0.95)
DEPRECATED HCO3 PLAS-SCNC: 13 MMOL/L (ref 22–29)
DEPRECATED HCO3 PLAS-SCNC: 16 MMOL/L (ref 22–29)
EOSINOPHIL # BLD AUTO: 0 10E3/UL (ref 0–0.7)
EOSINOPHIL NFR BLD AUTO: 0 %
ERYTHROCYTE [DISTWIDTH] IN BLOOD BY AUTOMATED COUNT: 16.5 % (ref 10–15)
GFR SERPL CREATININE-BSD FRML MDRD: 12 ML/MIN/1.73M2
GFR SERPL CREATININE-BSD FRML MDRD: 13 ML/MIN/1.73M2
GLUCOSE BLDC GLUCOMTR-MCNC: 148 MG/DL (ref 70–99)
GLUCOSE BLDC GLUCOMTR-MCNC: 149 MG/DL (ref 70–99)
GLUCOSE BLDC GLUCOMTR-MCNC: 171 MG/DL (ref 70–99)
GLUCOSE BLDC GLUCOMTR-MCNC: 77 MG/DL (ref 70–99)
GLUCOSE SERPL-MCNC: 112 MG/DL (ref 70–99)
GLUCOSE SERPL-MCNC: 154 MG/DL (ref 70–99)
GLUCOSE SERPL-MCNC: 98 MG/DL (ref 70–99)
GLUCOSE UR STRIP-MCNC: NEGATIVE MG/DL
HCT VFR BLD AUTO: 38.6 % (ref 35–47)
HGB BLD-MCNC: 11.3 G/DL (ref 11.7–15.7)
HGB UR QL STRIP: NEGATIVE
HOLD SPECIMEN: NORMAL
HYALINE CASTS: 7 /LPF
IMM GRANULOCYTES # BLD: 0 10E3/UL
IMM GRANULOCYTES NFR BLD: 0 %
KETONES UR STRIP-MCNC: NEGATIVE MG/DL
LEUKOCYTE ESTERASE UR QL STRIP: ABNORMAL
LYMPHOCYTES # BLD AUTO: 1.2 10E3/UL (ref 0.8–5.3)
LYMPHOCYTES NFR BLD AUTO: 17 %
MCH RBC QN AUTO: 25.6 PG (ref 26.5–33)
MCHC RBC AUTO-ENTMCNC: 29.3 G/DL (ref 31.5–36.5)
MCV RBC AUTO: 87 FL (ref 78–100)
MONOCYTES # BLD AUTO: 0.6 10E3/UL (ref 0–1.3)
MONOCYTES NFR BLD AUTO: 8 %
MUCOUS THREADS #/AREA URNS LPF: PRESENT /LPF
NEUTROPHILS # BLD AUTO: 5 10E3/UL (ref 1.6–8.3)
NEUTROPHILS NFR BLD AUTO: 75 %
NITRATE UR QL: NEGATIVE
NRBC # BLD AUTO: 0 10E3/UL
NRBC BLD AUTO-RTO: 0 /100
PH UR STRIP: 5 [PH] (ref 5–7)
PLATELET # BLD AUTO: 332 10E3/UL (ref 150–450)
POTASSIUM SERPL-SCNC: 5.3 MMOL/L (ref 3.4–5.3)
POTASSIUM SERPL-SCNC: 5.5 MMOL/L (ref 3.4–5.3)
POTASSIUM SERPL-SCNC: 7 MMOL/L (ref 3.4–5.3)
POTASSIUM SERPL-SCNC: 7.6 MMOL/L (ref 3.4–5.3)
POTASSIUM SERPL-SCNC: 8.2 MMOL/L (ref 3.4–5.3)
PROT SERPL-MCNC: 7.1 G/DL (ref 6.4–8.3)
RBC # BLD AUTO: 4.42 10E6/UL (ref 3.8–5.2)
RBC URINE: 1 /HPF
RENAL TUB EPI: 1 /HPF
SODIUM SERPL-SCNC: 137 MMOL/L (ref 136–145)
SODIUM SERPL-SCNC: 138 MMOL/L (ref 136–145)
SODIUM SERPL-SCNC: 139 MMOL/L (ref 136–145)
SODIUM SERPL-SCNC: 139 MMOL/L (ref 136–145)
SP GR UR STRIP: 1.01 (ref 1–1.03)
SQUAMOUS EPITHELIAL: 5 /HPF
UROBILINOGEN UR STRIP-MCNC: NORMAL MG/DL
WBC # BLD AUTO: 6.9 10E3/UL (ref 4–11)
WBC URINE: 22 /HPF

## 2023-05-09 PROCEDURE — 96375 TX/PRO/DX INJ NEW DRUG ADDON: CPT | Performed by: EMERGENCY MEDICINE

## 2023-05-09 PROCEDURE — 36415 COLL VENOUS BLD VENIPUNCTURE: CPT | Performed by: EMERGENCY MEDICINE

## 2023-05-09 PROCEDURE — 82947 ASSAY GLUCOSE BLOOD QUANT: CPT | Performed by: EMERGENCY MEDICINE

## 2023-05-09 PROCEDURE — 250N000012 HC RX MED GY IP 250 OP 636 PS 637: Performed by: EMERGENCY MEDICINE

## 2023-05-09 PROCEDURE — 93010 ELECTROCARDIOGRAM REPORT: CPT | Performed by: EMERGENCY MEDICINE

## 2023-05-09 PROCEDURE — 250N000011 HC RX IP 250 OP 636: Performed by: EMERGENCY MEDICINE

## 2023-05-09 PROCEDURE — 84132 ASSAY OF SERUM POTASSIUM: CPT | Performed by: EMERGENCY MEDICINE

## 2023-05-09 PROCEDURE — 99285 EMERGENCY DEPT VISIT HI MDM: CPT | Performed by: EMERGENCY MEDICINE

## 2023-05-09 PROCEDURE — 85025 COMPLETE CBC W/AUTO DIFF WBC: CPT | Performed by: INTERNAL MEDICINE

## 2023-05-09 PROCEDURE — 87086 URINE CULTURE/COLONY COUNT: CPT | Performed by: EMERGENCY MEDICINE

## 2023-05-09 PROCEDURE — 258N000001 HC RX 258: Performed by: EMERGENCY MEDICINE

## 2023-05-09 PROCEDURE — 250N000009 HC RX 250: Performed by: EMERGENCY MEDICINE

## 2023-05-09 PROCEDURE — 36415 COLL VENOUS BLD VENIPUNCTURE: CPT

## 2023-05-09 PROCEDURE — 80053 COMPREHEN METABOLIC PANEL: CPT | Performed by: INTERNAL MEDICINE

## 2023-05-09 PROCEDURE — 258N000003 HC RX IP 258 OP 636: Performed by: EMERGENCY MEDICINE

## 2023-05-09 PROCEDURE — 82962 GLUCOSE BLOOD TEST: CPT

## 2023-05-09 PROCEDURE — 82378 CARCINOEMBRYONIC ANTIGEN: CPT

## 2023-05-09 PROCEDURE — 96374 THER/PROPH/DIAG INJ IV PUSH: CPT | Performed by: EMERGENCY MEDICINE

## 2023-05-09 PROCEDURE — 96361 HYDRATE IV INFUSION ADD-ON: CPT | Performed by: EMERGENCY MEDICINE

## 2023-05-09 PROCEDURE — 99215 OFFICE O/P EST HI 40 MIN: CPT | Mod: 24 | Performed by: INTERNAL MEDICINE

## 2023-05-09 PROCEDURE — 81001 URINALYSIS AUTO W/SCOPE: CPT | Performed by: EMERGENCY MEDICINE

## 2023-05-09 PROCEDURE — 99285 EMERGENCY DEPT VISIT HI MDM: CPT | Mod: 25 | Performed by: EMERGENCY MEDICINE

## 2023-05-09 PROCEDURE — 93005 ELECTROCARDIOGRAM TRACING: CPT | Performed by: EMERGENCY MEDICINE

## 2023-05-09 PROCEDURE — 250N000013 HC RX MED GY IP 250 OP 250 PS 637: Performed by: EMERGENCY MEDICINE

## 2023-05-09 PROCEDURE — 84295 ASSAY OF SERUM SODIUM: CPT | Performed by: EMERGENCY MEDICINE

## 2023-05-09 PROCEDURE — 82310 ASSAY OF CALCIUM: CPT | Performed by: EMERGENCY MEDICINE

## 2023-05-09 RX ORDER — ALBUTEROL SULFATE 0.83 MG/ML
10 SOLUTION RESPIRATORY (INHALATION) ONCE
Status: DISCONTINUED | OUTPATIENT
Start: 2023-05-09 | End: 2023-05-09

## 2023-05-09 RX ORDER — SODIUM POLYSTYRENE SULFONATE 15 G/60ML
30 SUSPENSION ORAL; RECTAL ONCE
Status: COMPLETED | OUTPATIENT
Start: 2023-05-09 | End: 2023-05-09

## 2023-05-09 RX ORDER — NICOTINE POLACRILEX 4 MG
15-30 LOZENGE BUCCAL
Status: DISCONTINUED | OUTPATIENT
Start: 2023-05-09 | End: 2023-05-09

## 2023-05-09 RX ORDER — CALCIUM GLUCONATE 94 MG/ML
1 INJECTION, SOLUTION INTRAVENOUS ONCE
Status: COMPLETED | OUTPATIENT
Start: 2023-05-09 | End: 2023-05-09

## 2023-05-09 RX ORDER — NICOTINE POLACRILEX 4 MG
15-30 LOZENGE BUCCAL
Status: DISCONTINUED | OUTPATIENT
Start: 2023-05-09 | End: 2023-05-10 | Stop reason: HOSPADM

## 2023-05-09 RX ORDER — SODIUM CHLORIDE 9 MG/ML
INJECTION, SOLUTION INTRAVENOUS CONTINUOUS
Status: DISCONTINUED | OUTPATIENT
Start: 2023-05-09 | End: 2023-05-10 | Stop reason: HOSPADM

## 2023-05-09 RX ORDER — ALBUTEROL SULFATE 2.5 MG/.5ML
10 SOLUTION RESPIRATORY (INHALATION) ONCE
Status: COMPLETED | OUTPATIENT
Start: 2023-05-09 | End: 2023-05-09

## 2023-05-09 RX ORDER — DEXTROSE MONOHYDRATE 25 G/50ML
25-50 INJECTION, SOLUTION INTRAVENOUS
Status: DISCONTINUED | OUTPATIENT
Start: 2023-05-09 | End: 2023-05-10 | Stop reason: HOSPADM

## 2023-05-09 RX ORDER — FUROSEMIDE 10 MG/ML
60 INJECTION INTRAMUSCULAR; INTRAVENOUS ONCE
Status: COMPLETED | OUTPATIENT
Start: 2023-05-09 | End: 2023-05-09

## 2023-05-09 RX ORDER — DEXTROSE MONOHYDRATE 25 G/50ML
25-50 INJECTION, SOLUTION INTRAVENOUS
Status: DISCONTINUED | OUTPATIENT
Start: 2023-05-09 | End: 2023-05-09

## 2023-05-09 RX ORDER — DEXTROSE MONOHYDRATE 25 G/50ML
25 INJECTION, SOLUTION INTRAVENOUS ONCE
Status: COMPLETED | OUTPATIENT
Start: 2023-05-09 | End: 2023-05-09

## 2023-05-09 RX ADMIN — SODIUM POLYSTYRENE SULFONATE 30 G: 15 SUSPENSION ORAL; RECTAL at 19:14

## 2023-05-09 RX ADMIN — SODIUM CHLORIDE: 9 INJECTION, SOLUTION INTRAVENOUS at 18:51

## 2023-05-09 RX ADMIN — DEXTROSE MONOHYDRATE 25 G: 25 INJECTION, SOLUTION INTRAVENOUS at 18:53

## 2023-05-09 RX ADMIN — ALBUTEROL SULFATE 10 MG: 2.5 SOLUTION RESPIRATORY (INHALATION) at 18:46

## 2023-05-09 RX ADMIN — SODIUM CHLORIDE 1000 ML: 9 INJECTION, SOLUTION INTRAVENOUS at 17:59

## 2023-05-09 RX ADMIN — CALCIUM GLUCONATE 1 G: 98 INJECTION, SOLUTION INTRAVENOUS at 18:40

## 2023-05-09 RX ADMIN — FUROSEMIDE 60 MG: 10 INJECTION, SOLUTION INTRAVENOUS at 19:06

## 2023-05-09 RX ADMIN — HUMAN INSULIN 8 UNITS: 100 INJECTION, SOLUTION SUBCUTANEOUS at 18:58

## 2023-05-09 RX ADMIN — DEXTROSE MONOHYDRATE 300 ML: 100 INJECTION, SOLUTION INTRAVENOUS at 18:52

## 2023-05-09 ASSESSMENT — ACTIVITIES OF DAILY LIVING (ADL)
ADLS_ACUITY_SCORE: 35

## 2023-05-09 ASSESSMENT — PAIN SCALES - GENERAL: PAINLEVEL: NO PAIN (0)

## 2023-05-09 NOTE — ED TRIAGE NOTES
Patient advised to come to ED after having labs drawn today in clinic and noted elevated K+ and Creatinine levels.      Triage Assessment     Row Name 05/09/23 1543       Triage Assessment (Adult)    Airway WDL WDL       Respiratory WDL    Respiratory WDL WDL       Skin Circulation/Temperature WDL    Skin Circulation/Temperature WDL WDL       Cardiac WDL    Cardiac WDL WDL

## 2023-05-09 NOTE — LETTER
5/9/2023         RE: Nadia Ladd  [address]  [facility] [address]        Dear Colleague,    Thank you for referring your patient, Nadia Ladd, to the [facility]. Please see a copy of my visit note below.    [facility]    Hematology/Oncology Established Patient Follow Up Note      Today's Date: 5/9/2023    Reason for follow up: Sigmoid cancer    HISTORY OF PRESENT ILLNESS: Nadia Ladd is a 67 year old female who was referred to the Hematology/Oncology Clinic for sigmoid cancer    Patient has medical history including Crohn's disease on sulfasalazine, anemia secondary to iron deficiency and B12 deficiency, obesity, hypertension, prediabetes, eczema, pulmonary hypertension, hiatal hernia, mild splenomegaly (14.7 cm in 2/23)    - 2/23 pt noted increasing fatigue, found to have iron deficiency anemia w/hgb 6.8 (previously required RBC transfusion when dx w/Crohn's), did have intermittent changes in stool but not persistent (noted minimal blood in stool)   - 2/23 upper endoscopy for iron deficiency anemia and Crohn's disease: Hiatal hernia, normal stomach, normal duodenum  - 2/23 colonoscopy: A frond-like/villous partially obstructing large mass found in sigmoid colon, partially circumferential involving two thirds of the lumen circumference, 4 cm, unable to traverse, with oozing, s/p biopsy  PATHOLOGY:  A.  Stomach, antrum: Biopsy:  - Antral type mucosa with mild chronic inactive gastritis  - Immunostain for Helicobacter pylori is negative      B.  Colon, sigmoid, stricture: Biopsy:  - Invasive poorly differentiated carcinoma  The sigmoid stricture shows a high-grade carcinoma without definitive gland formation.  Given the poorly differentiated appearance, an immunohistochemical panel was performed to exclude the possibility of a tumor by direct extension or metastasis.   Immunohistochemical stains are performed and show the tumor to be diffusely  "positive for CK7 and partially positive for CK20 and SATB2.  There is no significant tumor reactivity for CDX2, GATA3, PAX8, TTF-1, and chromogranin.  Synaptophysin highlights very rare positive cells. Although the CK7/CK20 profile is not entirely typical for a colorectal carcinoma, immunostains for tumors of other origins are negative and a colorectal primary is still favored.   - Mismatch repair:  1) MLH1-loss of nuclear expression  2) MSH2-intact  3) MSH6-intact  4) PMS2-loss of nuclear expression  -MLH1 promoter methylation: NEGATIVE    -2/23 CT CAP:  A) 4 cm length mass in the proximal sigmoid colon. There is some irregularity and stranding in the adjacent pericolonic fat which may indicate tumor extension. There is no obstruction.   B) there is a second probable mass at the hepatic flexure of the colon measuring 2.5 cm in length   C)There are small lymph nodes in the mesial colon adjacent to the hepatic flexure abnormality and the sigmoid colonic mass. No lymphadenopathy by size criteria  D) There is submucosal fatty deposition in the colon, most severe in the distal sigmoid colon and rectum, which can be seen secondary to quiescent inflammatory bowel disease.  E) no liver lesion    -3/23 PET:  a. There is increased FDG avidity of the sigmoid colon with focal lobular wall thickening consistent with biopsy-proven adenocarcinoma.  b. Secondary focus noted at the hepatic flexure demonstrates elevated FDG avidity and lobulated wall thickening highly suspicious for a additional primary.  c. Additionally there is a smaller focus of wall thickening with elevated FDG avidity along the left aspect of the transverse colon  which is suspicious for a possible third focus of colonic malignancy.    -3/23 CEA 6.8  - 3/23 colorectal surgery consultation with - in the setting of Crohn's, at least sigmoid colectomy with possible total abdominal colectomy with either ileorectal anastomosis or end ileostomy (\"rectum " "can remain active and be actively surveyed annually\")    -3/23 genetic counseling, testing for Chan syndrome    - 4/5/2023 gynecologic oncology consultation for risk reduction surgery with hysterectomy and BSO at time of colectomy    -4/6/2023 laparoscopic converted to open total abdominal colectomy with ileorectal anastomosis, partial omentectomy, flexible sigmoidoscopy, TAHBSO  A. OMENTUM, RESECTION:  - Adipose tissue with no significant histopathologic abnormality  - No evidence of malignancy     B. COLON, RESECTION:  Mass #1 (ascending colon/hepatic flexure): Mixed neuroendocrine-non-neuroendocrine neoplasm (MINEN):  - Neuroendocrine carcinoma component (70%) and moderately-differentiated adenocarcinoma component (30%)  - Size = 5.0 cm, invading into muscularis propria  - Positive LVI  - Negative macroscopic tumor perforation, negative PNI  - Negative surgical resection margins  - IHC: Neuroendocrine portion: Negative for chromogranin and INSM1 but strongly express synaptophysin  - IHC: Adenocarcinoma portion: Positive for CK20, CDX2 negative for CK7, PAX8, ER, S100  Ki-67 proliferation index of approximately 80%.   PDL1:  COMBINED POSITIVE SCORE (CPS): 55-60  TUMOR PROPORTION SCORE (TPS): 50%   pathogenic KRAS mutation (G13D) was identified (5.2%).  AJCC 8th edition: stage 3B mpT3 pN1a     Mass#2 (sigmoid colon): Poorly-differentiated carcinoma:  - Grade 3  - Size = 5.7 cm, invading into muscularis propria  - Negative for microscopic tumor perforation, LVI, perineural invasion  - Negative surgical resection margins  - IHC: Positive for scar and keratin AE1/AE3, negative for CK7, CK20, CDX2, PAX8, ER, chromogranin, CD56, p40, synaptophysin, S100, INI1, p40, INSM1  Ki-67 proliferation index of approximately 70%.  PDL1:  COMBINED POSITIVE SCORE (CPS): 80  TUMOR PROPORTION SCORE (TPS): 70%  pathogenic KRAS mutation (G13D) was identified (4.5%).  AJCC 8th edition: stage 3A mpT2 pN1a    - One of forty-one sampled " lymph nodes positive (1/41)  - Benign appendix  - Tubular adenoma    C. UTERUS, CERVIX, BILATERAL FALLOPIAN TUBES & OVARIES, :  - Benign endometrial polyps; atrophic endometrium  - Uterus, cervix, bilateral fallopian tubes, and ovaries with no significant morphologic abnormalities  - No evidence of dysplasia or malignancy     D. SMALL INTESTINE, TERMINAL ILEUM, TERMINAL ILIUM:  - Benign ileum  - No evidence of dysplasia or malignancy  - Negative for metastases to one of one sampled lymph node (0 /1)     E. PROXIMAL ANASTOMOTIC RING:  - Benign small intestine  - No evidence of dysplasia or malignancy     F. DISTAL ANASTOMOTIC RING:  - Benign colon  - No evidence of dysplasia or malignancy    CRC NGS:   --mutations positive for KRAS G13D mutation 5.2% mass 1, KRAS G13D mutation 4.5% mass 2 (negative for AKT1; BRAF; ERBB2; KRAS; NRAS; PIK3CA; PTEN)  --TMB score: 17.064 mut/Mb mass 1 (CPS 55-60, TPS 50%), 60.943 mut/Mb mass 2 (CPS 80, TPS 70%)  --fusion negative including NTRK and RET for mass 1 and 2 (also negative for AKT1, AKT3, ALK, AR, MLNTYS27, CLAUDINE, BCOR, BRAF, BRD3, BRD4, CAMTA1, CCNB3, CCND1, , CIC, CSF1, CSF1R, CTNNB1, DNAJB1, EGFR, EPC1, ERBB2, ERBB4, ERG, ESR1, ESRRA, ETV1, ETV4, ETV5, ETV6, EWSR1, FGFR1, FGFR2, FGFR3, FGR, FOS, FOSB, FOXO1, FOXO4, FOXR2, FUS, GLI1, GRB7, HMGA2, HRAS, IDH1, IDH2, INSR, JAK2, JAK3, JAZF1, KRAS, MAML2, MAP2K1, MAST1, MAST2, MEAF6, MET, MKL2, MN1, MSMB, MUSK, MYB, MYBL1, MYOD1, NCOA1, NCOA2, NOTCH1, NOTCH2, NR4A3, NRAS, NRG1, NTRK1, NTRK2, NTRK3, NUMBL1, NUTM1, PAX3, PDGFB, PDGFRA, PDGFRB, PHF1, PIK3CA, PKN1, PLAG1, PPARG, PRKACA, PRKCA, PRKCB, RAF1, RELA, RET, ROS1, RPSO2, RSPO3, SS18, STAT6, TAF15, TCF12, TERT, TFE3, TFEB, TFG, THADA, TMPRSS2, USP6, VGLL2, YAP1, YWHAE)    -4/11/2023 patient discharged from hospital, planning for 30 days of lovenox postop, oxycodone for pain (required 1 unit PRBC for anemia)      INTERIM HISTORY:  Pt states she is feeling well, minimal  pain, controlled with tylenol, only used oxycodone once. Pt is slowly increasing activity.     I requested additional testing from pathology including:  MASS #1 hepatic flexure mass  Ki-67 proliferation index of approximately 80%.   PDL1:  COMBINED POSITIVE SCORE (CPS): 55-60  TUMOR PROPORTION SCORE (TPS): 50%   pathogenic KRAS mutation (G13D) was identified (5.2%).    Mass#2 (sigmoid colon):  Ki-67 proliferation index of approximately 70%.  PDL1:  COMBINED POSITIVE SCORE (CPS): 80  TUMOR PROPORTION SCORE (TPS): 70%  pathogenic KRAS mutation (G13D) was identified (4.5%).    CRC NGS:   --mutations positive for KRAS G13D mutation 5.2% mass 1, KRAS G13D mutation 4.5% mass 2  --TMB score: 17.064 mut/Mb mass 1 (CPS 55-60, TPS 50%), 60.943 mut/Mb mass 2 (CPS 80, TPS 70%)  --fusion negative including NTRK and RET for mass 1 and 2     -5/8/23 I presented this case at [facility] CRC tumor board and their recommendations include:  - common to see this in Crohn's, overall poor prognosis, treat aggressively, may be poorly responsive to chemotherapy  - standard of care is mFOLFOX 6 x 12 cycles  - acknowledge that pt has high TPS and likely response to immunotherapy however not sufficient data or standard of care in stage 3 adjuvant setting  - add MMR testing to mass #1 hepatic flexure mass    Other  - germline testing results pending       REVIEW OF SYSTEMS:   A 14 point ROS was reviewed with pertinent positives and negatives in the HPI.        HOME MEDICATIONS:  Current Outpatient Medications   Medication Sig Dispense Refill     ferrous sulfate (IRON) 325 (65 FE) MG tablet TAKE ONE TABLET BY MOUTH TWICE A DAY --NO FURTHER REFILLS WITHOUT OFFICE VISIT 200 tablet 3     gabapentin (NEURONTIN) 100 MG capsule Take 1 capsule (100 mg) by mouth At Bedtime 12 capsule 0     lisinopril (ZESTRIL) 10 MG tablet Take 1 tablet (10 mg) by mouth every morning 90 tablet 3     Multiple Vitamins-Iron (MULTI-DAY PLUS IRON PO)        triamterene-HCTZ  (DYAZIDE) 37.5-25 MG capsule TAKE 1 CAPSULE BY MOUTH ONCE DAILY (Patient taking differently: Take 1 capsule by mouth every morning TAKE 1 CAPSULE BY MOUTH ONCE DAILY) 90 capsule 0     acetaminophen (TYLENOL) 325 MG tablet Take 3 tablets (975 mg) by mouth every 8 hours (Patient not taking: Reported on 4/19/2023) 100 tablet 0     cyanocobalamin 1000 MCG TBCR Take 1,000 mcg by mouth daily (Patient not taking: Reported on 5/9/2023) 100 tablet 1     fluocinonide (LIDEX) 0.05 % external cream Small amount to affected area(s) BID PRN (Patient not taking: Reported on 4/19/2023) 60 g 1         ALLERGIES:  Allergies   Allergen Reactions     No Known Drug Allergy          PAST MEDICAL HISTORY:  Past Medical History:   Diagnosis Date     Arthropathia 08/05/2010     B12 deficiency anemia 10/21/2010     Benign essential hypertension with target blood pressure below 140/90 10/10/2016     CARDIOVASCULAR SCREENING; LDL GOAL LESS THAN 160 10/31/2010     Crohn's colitis (H) 02/15/2011     Dermatitis-dishydrotic eczema-severe 08/05/2010     Hiatal hernia      HTN (hypertension) 07/21/2011     Iron deficiency anemia 10/21/2010     Malignant neoplasm of sigmoid colon (H)      Obesity      Primary pulmonary hypertension (H) 04/06/2010         PAST SURGICAL HISTORY:  Past Surgical History:   Procedure Laterality Date     COLECTOMY WITHOUT COLOSTOMY N/A 4/6/2023    Procedure: Laparoscopic Converted to Open Total abdominal colectomy;  Surgeon: Jerome Ashley MD;  Location: [facility] OR     COLONOSCOPY  10/11/10     COLONOSCOPY N/A 2/27/2023    Procedure: ATTEMPTED COLONOSCOPY WITH SIGMOID STRICTURE BIOPSY;  Surgeon: Jonathan Alcocer MD;  Location:  GI     ESOPHAGOSCOPY, GASTROSCOPY, DUODENOSCOPY (EGD), COMBINED N/A 2/27/2023    Procedure: ESOPHAGOGASTRODUODENOSCOPY, WITH BIOPSY;  Surgeon: Jonathan Alcocer MD;  Location:  GI     HYSTERECTOMY TOTAL ABDOMINAL, BILATERAL SALPINGO-OOPHORECTOMY, COMBINED N/A 4/6/2023    Procedure:  Hysterectomy total abdominal, bilateral salpingo-oophorectomy;  Surgeon: Sunitha Olea MD;  Location: [facility] OR     SIGMOIDOSCOPY FLEXIBLE N/A 2023    Procedure: Sigmoidoscopy flexible;  Surgeon: Jerome Ashley MD;  Location: [facility] OR     SURGICAL HISTORY OF -   77    Perineorrhaphy, for widening vaginal orifice     ZZC EXPLORATORY OF ABDOMEN      laparoscopy         SOCIAL HISTORY:  Social History     Socioeconomic History     Marital status:      Spouse name: Not on file     Number of children: Not on file     Years of education: Not on file     Highest education level: Not on file   Occupational History     Not on file   Tobacco Use     Smoking status: Never     Passive exposure: Current     Smokeless tobacco: Never   Vaping Use     Vaping status: Not on file   Substance and Sexual Activity     Alcohol use: No     Drug use: No     Sexual activity: Yes     Partners: Male   Other Topics Concern     Parent/sibling w/ CABG, MI or angioplasty before 65F 55M? Not Asked   Social History Narrative     Not on file     Social Determinants of Health     Financial Resource Strain: Not on file   Food Insecurity: Not on file   Transportation Needs: Not on file   Physical Activity: Not on file   Stress: Not on file   Social Connections: Not on file   Intimate Partner Violence: Not on file   Housing Stability: Not on file         FAMILY HISTORY:  Family History   Problem Relation Age of Onset     Hypertension Mother         on meds, alive     Cerebrovascular Disease Father         stroke about age 65,  of cancer at 68 yrs     Diabetes Father         eventually took insulin     Anemia Father         Pernisios anemia     Kidney Disease Niece         kidney transplant     Kidney Disease Nephew         kidney transplant     Venous thrombosis No family hx of      Anesthesia Reaction No family hx of          PHYSICAL EXAM:  Vital signs:  /62   Pulse (!) 121   Temp 97.3  F (36.3  C) (Temporal)    "Ht 1.575 m (5' 2\")   Wt 80.3 kg (177 lb 1.6 oz)   LMP  (LMP Unknown)   SpO2 98%   BMI 32.39 kg/m       ECO  GENERAL/CONSTITUTIONAL: No acute distress.  EYES:  Extraocular movements intact without nystagmus.  No scleral icterus.  RESPIRATORY: Equal chest rise.   MUSCULOSKELETAL: Warm and well-perfused, no cyanosis, clubbing, or edema. Right LE always more swollen than left, chronic  Unchanged.   NEUROLOGIC: Cranial nerves are grossly intact. Alert, oriented to person, place and time, answers questions appropriately.  INTEGUMENTARY: No rashes or jaundice.  GAIT: walking with walker      LABS:   Latest Reference Range & Units 23 14:13   WBC 4.0 - 11.0 10e3/uL 6.9   Hemoglobin 11.7 - 15.7 g/dL 11.3 (L)   Hematocrit 35.0 - 47.0 % 38.6   Platelet Count 150 - 450 10e3/uL 332   RBC Count 3.80 - 5.20 10e6/uL 4.42   MCV 78 - 100 fL 87   MCH 26.5 - 33.0 pg 25.6 (L)   MCHC 31.5 - 36.5 g/dL 29.3 (L)   RDW 10.0 - 15.0 % 16.5 (H)   % Neutrophils % 75   % Lymphocytes % 17   % Monocytes % 8   % Eosinophils % 0   % Basophils % 0   Absolute Basophils 0.0 - 0.2 10e3/uL 0.0   Absolute Eosinophils 0.0 - 0.7 10e3/uL 0.0   Absolute Immature Granulocytes <=0.4 10e3/uL 0.0   Absolute Lymphocytes 0.8 - 5.3 10e3/uL 1.2   Absolute Monocytes 0.0 - 1.3 10e3/uL 0.6   % Immature Granulocytes % 0   Absolute Neutrophils 1.6 - 8.3 10e3/uL 5.0   Absolute NRBCs 10e3/uL 0.0   NRBCs per 100 WBC <1 /100 0   (L): Data is abnormally low  (H): Data is abnormally high    PATHOLOGY:    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), KRAS G13D mutated, TPS 50%  # sigmoid colon poorly-differentiated carcinoma, KRAS F13D mutated, TPS 70%  -  colonoscopy: A frond-like/villous partially obstructing large mass found in sigmoid colon, " partially circumferential involving two thirds of the lumen circumference, 4 cm, unable to traverse, with oozing, s/p biopsy  -PATHOLOGY: Invasive poorly differentiated carcinoma, IHC panel excludes tumors of other origin, colorectal primary is favored, loss of nuclear expression of MLH1 and PMS2, MLH1 promoter methylation negative, [id number] mutation negative  -2/23 CT CAP: Shows known 4 cm proximal sigmoid colon mass with possible tumor extension (irregularity and stranding and adjacent pericolonic fat) without obstruction AND probable second mass 2.5 cm at hepatic flexure of colon; small lymph nodes adjacent to both masses (no lymphadenopathy by size criteria)  -3/23 PET:  a. There is increased FDG avidity of the sigmoid colon with focal lobular wall thickening consistent with biopsy-proven adenocarcinoma.  b. Secondary focus noted at the hepatic flexure demonstrates elevated FDG avidity and lobulated wall thickening highly suspicious for a additional primary.  c. Additionally there is a smaller focus of wall thickening with elevated FDG avidity along the left aspect of the transverse colon  which is suspicious for a possible third focus of colonic malignancy.  - 3/23 CEA 6.8  -4/6/2023 laparoscopic converted to open total abdominal colectomy with ileorectal anastomosis, partial omentectomy, flexible sigmoidoscopy, TAHBSO  PATHOLOGY:  Of note, omental resection, terminal ileum, proximal and distal anastomotic rings, uterus, cervix, bilateral fallopian tubes and ovaries negative for malignancy    Mass #1 (ascending colon/hepatic flexure): Mixed neuroendocrine-non-neuroendocrine neoplasm (MINEN):  - Neuroendocrine carcinoma component (70%) and moderately-differentiated adenocarcinoma component (30%)  - Size = 5.0 cm, invading into muscularis propria  - Positive LVI  - Negative macroscopic tumor perforation, negative PNI  - Negative surgical resection margins  - IHC: Neuroendocrine portion: Negative for chromogranin  and INSM1 but strongly express synaptophysin  - IHC: Adenocarcinoma portion: Positive for CK20, CDX2 negative for CK7, PAX8, ER, S100  Ki-67 proliferation index of approximately 80%.   PDL1:  COMBINED POSITIVE SCORE (CPS): 55-60  TUMOR PROPORTION SCORE (TPS): 50%   pathogenic KRAS mutation (G13D) was identified (5.2%).  AJCC 8th edition: stage 3B mpT3 pN1a     Mass#2 (sigmoid colon): Poorly-differentiated carcinoma:  - Grade 3  - Size = 5.7 cm, invading into muscularis propria  - Negative for microscopic tumor perforation, LVI, perineural invasion  - Negative surgical resection margins  - IHC: Positive for scar and keratin AE1/AE3, negative for CK7, CK20, CDX2, PAX8, ER, chromogranin, CD56, p40, synaptophysin, S100, INI1, p40, INSM1  Ki-67 proliferation index of approximately 70%.  PDL1:  COMBINED POSITIVE SCORE (CPS): 80  TUMOR PROPORTION SCORE (TPS): 70%  pathogenic KRAS mutation (G13D) was identified (4.5%).  AJCC 8th edition: stage 3A mpT2 pN1a    - One of forty-one sampled lymph nodes positive (1/41), d/w pathology- unable to determine which mass is metastatic to LN    - 4/23 MRI brain w/wo contrast LAURENT    - 5/8/23 I presented this case at [facility] CRC tumor board and their recommendations include:  - common to see this in Crohn's, overall poor prognosis, treat aggressively, may be poorly responsive to chemotherapy  - standard of care is mFOLFOX 6 x 12 cycles  - acknowledge that pt has high TPS and likely response to immunotherapy however not sufficient data or standard of care in stage 3 adjuvant setting  - add MMR testing to mass #1 hepatic flexure mass    PLAN:  - Patient has complex pathology   - MMR testing added to mass #1 hepatic flexure mass  - f/u germline genetic testing   - I recommend adjuvant chemotherapy w/mFOLFOX 6 x12 cycles, port placement, d/w pt today, reading materials provided today and pt wishes to review these independently  - recommend formal second opinion from GI medical oncologist at  [facility] regarding other treatment options and clinic trials given high TPS in both tumors      #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  - hepatic flexure MINEN- Neuroendocrine carcinoma component (70%) and moderately-differentiated adenocarcinoma component (30%)- MMR testing requested and pending  - sigmoid adenocarcinoma-  Invasive poorly differentiated carcinoma, loss of nuclear expression of MLH1 and PMS2, MLH1 promoter methylation negative, [id number] mutation negative  - germline genetic testing completed, results pending    #Anemia secondary to iron deficiency and B12 deficiency  - terminal ileum removed at time of colon surgery, monitor for nutrient def  - 2/23 hgb 6.8, ferritin 21, iron sat index 10, TIBC 265, iron 27, b12 1493  - On B12 1000 mcg p.o. daily and ferrous sulfate 325 mg p.o. daily  - 4/9/23 s/p 1 uprbcs  - 5/9/23 hgb 11.3    #Crohn's  - dx since at least 2010     RTC 5/30 or 5/31 for f/u with me    ADDENDUM:  CMP resulted and Cr 3.82 w/K 7.0, I am unsure if this is false or not, specimen is not hemolyzed. I directed pt to ER. Case d/w ER attending Dr. Ladd.     Francisco Lee DO  Hematology/Oncology  [facility]          Again, thank you for allowing me to participate in the care of your patient.        Sincerely,        FRANCISCO LEE DO

## 2023-05-09 NOTE — MEDICATION SCRIBE - ADMISSION MEDICATION HISTORY
Medication Scribe Admission Medication History    Admission medication history is complete. The information provided in this note is only as accurate as the sources available at the time of the update.    Medication reconciliation/reorder completed by provider prior to medication history? No    Information Source(s): Patient via in-person    Pertinent Information: n/a    Changes made to PTA medication list:    Added: None    Deleted: gabapentin    Changed: tylenol from scheduled to prn    Medication Affordability:  Not including over the counter (OTC) medications, was there a time in the past 3 months when you did not take your medications as prescribed because of cost?: No    Allergies reviewed with patient and updates made in EHR: yes    Medication History Completed By: MISHA WEST 5/9/2023 5:47 PM    Prior to Admission medications    Medication Sig Last Dose Taking? Auth Provider Long Term End Date   acetaminophen (TYLENOL) 325 MG tablet Take 3 tablets (975 mg) by mouth every 8 hours  Patient taking differently: Take 975 mg by mouth every 8 hours as needed for fever or pain Past Month at [facility] Yes Arlet Mckeon PA-C     cyanocobalamin 1000 MCG TBCR Take 1,000 mcg by mouth daily 5/9/2023 at am Yes Phil Orosco PA-C     ferrous sulfate (IRON) 325 (65 FE) MG tablet TAKE ONE TABLET BY MOUTH TWICE A DAY --NO FURTHER REFILLS WITHOUT OFFICE VISIT  Patient taking differently: Take 325 mg by mouth 2 times daily 5/9/2023 at am Yes Phil Orosco PA-C     fluocinonide (LIDEX) 0.05 % external cream Small amount to affected area(s) BID PRN More than a month at [facility] Yes Nomi Cartwright DO     lisinopril (ZESTRIL) 10 MG tablet Take 1 tablet (10 mg) by mouth every morning 5/9/2023 at am Yes Nomi Cartwright, DO Yes    Multiple Vitamins-Iron (MULTI-DAY PLUS IRON PO) Take 1 tablet by mouth daily 5/9/2023 at am Yes Reported, Patient     triamterene-HCTZ (DYAZIDE) 37.5-25 MG capsule TAKE 1  CAPSULE BY MOUTH ONCE DAILY  Patient taking differently: Take 1 capsule by mouth every morning TAKE 1 CAPSULE BY MOUTH ONCE DAILY 5/9/2023 at am Yes Nomi Cartwright,  Yes

## 2023-05-09 NOTE — ED PROVIDER NOTES
History     Chief Complaint   Patient presents with     Abnormal Labs     HPI  Nadia Ladd is a 67 year old female who presents per request of her oncologist for evaluation of hyperkalemia along with MARYA superimposed on CKD stage III.    Colon cancer status post sigmoid colon resection for malignancy.  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  Recovering with no complications from her surgical resection April 6, 2023.    Historically has had CKD stage III with a GFR = 34-44 range and a creatinine = 1.36-1.64 range. No change in renal function between preoperative and postoperative testing.    Presented today feeling well to her oncology team.  Improved eating.  States she been eating a lot of greens and a lot of potatoes now that her appetite has returned following her colon resection.  Blood work was checked today and noted to have normal labs: Potassium = 7.0, BUN = 6 1.7, creatinine 3.60, GFR = 13    Patient is reported no change in urine output.  Actually with improved nutrition and fluid intake she thinks that she has been having increased urine output.      Allergies:  Allergies   Allergen Reactions     No Known Drug Allergy        Problem List:    Patient Active Problem List    Diagnosis Date Noted     Colon cancer (H) 04/06/2023     Priority: Medium     Malignant neoplasm of sigmoid colon (H) 03/31/2023     Priority: Medium     PH- check 6.23 [place]        Morbid obesity (H) 06/06/2019     Priority: Medium     Benign essential hypertension with target blood pressure below 140/90 10/10/2016     Priority: Medium     CARDIOVASCULAR SCREENING; LDL GOAL LESS THAN 130 10/02/2012     Priority: Medium     Advanced directives, counseling/discussion 02/09/2012     Priority: Medium     Advance Directive Problem List Overview:   Name Relationship Phone    Primary Health Care Agent            Alternative Health Care Agent          Discussed advance care  planning with patient; information given to patient to review. 2/9/2012        Salinas Ladd, DO  05/09/23 2052      Salinas Ladd, DO  05/09/23 2052    Salinas Ladd, DO  05/09/23 2059       Prediabetes 02/09/2012     Priority: Medium     Obesity 02/09/2012     Priority: Medium     Crohn's disease of large intestine without complication (H) 02/15/2011     Priority: Medium     Iron deficiency anemia due to chronic blood loss 10/21/2010     Priority: Medium     B12 deficiency anemia 10/21/2010     Priority: Medium     Arthropathia 08/05/2010     Priority: Medium     Dermatitis-dishydrotic eczema-severe 08/05/2010     Priority: Medium     Primary pulmonary hypertension (H) 04/06/2010     Priority: Medium        Past Medical History:    Past Medical History:   Diagnosis Date     Arthropathia 08/05/2010     B12 deficiency anemia 10/21/2010     Benign essential hypertension with target blood pressure below 140/90 10/10/2016     CARDIOVASCULAR SCREENING; LDL GOAL LESS THAN 160 10/31/2010     Crohn's colitis (H) 02/15/2011     Dermatitis-dishydrotic eczema-severe 08/05/2010     Hiatal hernia      HTN (hypertension) 07/21/2011     Iron deficiency anemia 10/21/2010     Malignant neoplasm of sigmoid colon (H)      Obesity      Primary pulmonary hypertension (H) 04/06/2010       Past Surgical History:    Past Surgical History:   Procedure Laterality Date     COLECTOMY WITHOUT COLOSTOMY N/A 4/6/2023    Procedure: Laparoscopic Converted to Open Total abdominal colectomy;  Surgeon: Jerome Ashley MD;  Location: [facility] OR     COLONOSCOPY  10/11/10     COLONOSCOPY N/A 2/27/2023    Procedure: ATTEMPTED COLONOSCOPY WITH SIGMOID STRICTURE BIOPSY;  Surgeon: Jonathan Alcocer MD;  Location:  GI     ESOPHAGOSCOPY, GASTROSCOPY, DUODENOSCOPY (EGD), COMBINED N/A 2/27/2023    Procedure: ESOPHAGOGASTRODUODENOSCOPY, WITH BIOPSY;  Surgeon: Jonathan Alcocer MD;  Location:  GI     HYSTERECTOMY TOTAL  ABDOMINAL, BILATERAL SALPINGO-OOPHORECTOMY, COMBINED N/A 2023    Procedure: Hysterectomy total abdominal, bilateral salpingo-oophorectomy;  Surgeon: Sunitha Olea MD;  Location: [facility] OR     SIGMOIDOSCOPY FLEXIBLE N/A 2023    Procedure: Sigmoidoscopy flexible;  Surgeon: Jerome Ashley MD;  Location: [facility] OR     SURGICAL HISTORY OF -   77    Perineorrhaphy, for widening vaginal orifice     [facility]      laparoscopy       Family History:    Family History   Problem Relation Age of Onset     Hypertension Mother         on meds, alive     Cerebrovascular Disease Father         stroke about age 65,  of cancer at 68 yrs     Diabetes Father         eventually took insulin     Anemia Father         Pernisios anemia     Kidney Disease Niece         kidney transplant     Kidney Disease Nephew         kidney transplant     Venous thrombosis No family hx of      Anesthesia Reaction No family hx of        Social History:  Marital Status:   [2]  Social History     Tobacco Use     Smoking status: Never     Passive exposure: Current     Smokeless tobacco: Never   Substance Use Topics     Alcohol use: No     Drug use: No        Medications:    acetaminophen (TYLENOL) 325 MG tablet  cyanocobalamin 1000 MCG TBCR  ferrous sulfate (IRON) 325 (65 FE) MG tablet  fluocinonide (LIDEX) 0.05 % external cream  lisinopril (ZESTRIL) 10 MG tablet  Multiple Vitamins-Iron (MULTI-DAY PLUS IRON PO)  triamterene-HCTZ (DYAZIDE) 37.5-25 MG capsule          Review of Systems   All other systems reviewed and are negative.      Physical Exam   BP: 127/69  Pulse: 97  Temp: 98  F (36.7  C)  Resp: 18  Weight: 80.3 kg (177 lb)  SpO2: 99 %      Physical Exam  Vitals and nursing note reviewed.   Constitutional:       Appearance: She is not ill-appearing.   HENT:      Head: Normocephalic.      Nose: Nose normal.   Eyes:      Conjunctiva/sclera: Conjunctivae normal.   Cardiovascular:      Rate and Rhythm:  Normal rate.   Pulmonary:      Effort: Pulmonary effort is normal.   Abdominal:      General: Abdomen is flat. Bowel sounds are normal. There is no distension.      Tenderness: There is no abdominal tenderness.   Skin:     General: Skin is warm.      Capillary Refill: Capillary refill takes less than 2 seconds.      Findings: No rash.   Neurological:      General: No focal deficit present.      Mental Status: She is alert and oriented to person, place, and time.   Psychiatric:         Mood and Affect: Mood normal.         Behavior: Behavior normal.         ED Course                 Procedures         EKG:  Interpretation by Salinas Ladd DO.   Indication: Hyperkalemia  Sinus rhythm.  Low voltage across precordial leads.  No change in the NE interval.  No peaked T waves.  Impressions- abnormal EKG      Critical Care time:  20 minutes           Results for orders placed or performed during the hospital encounter of 05/09/23 (from the past 24 hour(s))   [place] Draw *Canceled*    Narrative    The following orders were created for panel order [place] Draw.  Procedure                               Abnormality         Status                     ---------                               -----------         ------                       Please view results for these tests on the individual orders.   Basic metabolic panel   Result Value Ref Range    Sodium 137 136 - 145 mmol/L    Potassium 8.2 (HH) 3.4 - 5.3 mmol/L    Chloride 110 (H) 98 - 107 mmol/L    Carbon Dioxide (CO2) 13 (L) 22 - 29 mmol/L    Anion Gap 14 7 - 15 mmol/L    Urea Nitrogen 61.7 (H) 8.0 - 23.0 mg/dL    Creatinine 3.60 (H) 0.51 - 0.95 mg/dL    Calcium 9.4 8.8 - 10.2 mg/dL    Glucose 98 70 - 99 mg/dL    GFR Estimate 13 (L) >60 mL/min/1.73m2   Extra Tube *Canceled*    Narrative    The following orders were created for panel order Extra Tube.  Procedure                               Abnormality         Status                     ---------                                -----------         ------                     Extra Purple Top Tube[[id number]]                                                         Please view results for these tests on the individual orders.   Glucose by meter   Result Value Ref Range    GLUCOSE BY METER POCT 77 70 - 99 mg/dL   Extra Tube ([place] Draw)    Narrative    The following orders were created for panel order Extra Tube ([place] Draw).  Procedure                               Abnormality         Status                     ---------                               -----------         ------                     Extra Purple Top Tube[[id number]]                            In process                   Please view results for these tests on the individual orders.       Medications   glucose gel 15-30 g (has no administration in time range)     Or   dextrose 50 % injection 25-50 mL (has no administration in time range)     Or   glucagon injection 1 mg (has no administration in time range)   dextrose 10% BOLUS (has no administration in time range)   dextrose 50 % injection 25 g (has no administration in time range)   insulin (regular) (HumuLIN R/NovoLIN R) injection 8 Units (has no administration in time range)   calcium gluconate 10 % injection 1 g (has no administration in time range)   albuterol (PROVENTIL) neb solution 10 mg (has no administration in time range)   sodium polystyrene (KAYEXALATE) suspension 30 g (has no administration in time range)   furosemide (LASIX) injection 60 mg (has no administration in time range)   0.9% sodium chloride BOLUS (1,000 mLs Intravenous $New Bag 5/9/23 0428)     Followed by   sodium chloride 0.9% infusion (has no administration in time range)       Assessments & Plan (with Medical Decision Making)  Nadia is 67 years of age.  Recently identified to have a colon malignancy that has a very unusual histology with a mixed neuroendocrine component as well as with some adenocarcinoma.  She has successfully undergone  surgical resection as of April 6.  She had been at home recovering and doing well.  Improved appetite.  Came to see oncology today.  Routine blood work shows  MARYA superimposed on CKD stage III as well as hyperkalemia with potassium = 7.0.  Transferring clinic to the emergency department for evaluation of hyperkalemia  EKG completed upon arrival.  Showed sinus rhythm.  Did have low voltage in V1 and V2, V3.  No peak T waves.  No change in any VT interval.  Plan: Verify that potassium is elevated with recheck of lab.  1 to make sure its not hemolyzed.  The first draw in the ED was partially hemolyzed and reported back at 8.0.  Creatinine in the past has ranged 1.36-1.64 and now = 3.60.  BUN = 61.7.  Clinically appears mildly fluid volume down but not significantly dehydrated.  Urinalysis pending  -Verify hyperkalemia will initiate hospitalization and order set to initiate treatment for hyperkalemia.  Calcium gluconate, insulin, glucose, IV fluids, IV Lasix, Kayexalate, albuterol neb.  6:11 PM  Discussed with hospitalist at [facility].  Unwilling to accept because we do not have nephrology if needed.  Also if symptoms were to intensify and not respond to medical management we do not have access to dialysis.  Requested transfer to facility that has a [facility] abilities if deemed necessary in the future.  Hospital bed available [facility].  6:39 PM  Confirmed hyperkalemia with potassium = 7.6.    8:15 PM  Patient remains asymptomatic for hyperkalemia.  Remains on cardiac monitor.  Spoke with the [facility] medical team and they were asked that we hold the patient at [facility] until we get her potassium 6 or lower before transfer.   8:48 PM  I spoke with Dr. Erwin hospitalist on-call [facility].  Has accepted the patient in transfer.  In the ER waiting for nurse to nurse report and then will call for ground transport.  Patient is in agreement.         I have reviewed the nursing notes.    I have  reviewed the findings, diagnosis, plan and need for follow up with the patient.          New Prescriptions    No medications on file       Final diagnoses:   MARYA (acute kidney injury) (H)   Stage 3b chronic kidney disease (H)   Hyperkalemia   Cancer of sigmoid colon (H) - Mixed histology with both NET (70%) and adenocarcinoma(30%)       5/9/2023   [facility] EMERGENCY DEPT

## 2023-05-09 NOTE — ED NOTES
PT was informed that her Potassium and creatinine results are elevated and were advised to visit the ED for further evaluation, PT is asymptomatic during assessment, denies ant Chest Pain and S.O.A.

## 2023-05-10 ENCOUNTER — APPOINTMENT (OUTPATIENT)
Dept: ULTRASOUND IMAGING | Facility: CLINIC | Age: 68
DRG: 640 | End: 2023-05-10
Attending: INTERNAL MEDICINE
Payer: MEDICARE

## 2023-05-10 ENCOUNTER — HOSPITAL ENCOUNTER (INPATIENT)
Facility: CLINIC | Age: 68
LOS: 2 days | Discharge: HOME | DRG: 640 | End: 2023-05-12
Admitting: INTERNAL MEDICINE
Payer: MEDICARE

## 2023-05-10 DIAGNOSIS — G89.18 ACUTE POST-OPERATIVE PAIN: ICD-10-CM

## 2023-05-10 PROBLEM — E87.5 HYPERKALEMIA: Status: ACTIVE | Noted: 2023-05-10

## 2023-05-10 LAB
ALBUMIN SERPL BCG-MCNC: 3.9 G/DL (ref 3.5–5.2)
ALBUMIN UR-MCNC: NEGATIVE MG/DL
ANION GAP SERPL CALCULATED.3IONS-SCNC: 13 MMOL/L (ref 7–15)
ANION GAP SERPL CALCULATED.3IONS-SCNC: 14 MMOL/L (ref 7–15)
APPEARANCE UR: CLEAR
BASOPHILS # BLD AUTO: 0 10E3/UL (ref 0–0.2)
BASOPHILS NFR BLD AUTO: 0 %
BILIRUB UR QL STRIP: NEGATIVE
BUN SERPL-MCNC: 52.6 MG/DL (ref 8–23)
BUN SERPL-MCNC: 57.5 MG/DL (ref 8–23)
CALCIUM SERPL-MCNC: 9.5 MG/DL (ref 8.8–10.2)
CALCIUM SERPL-MCNC: 9.5 MG/DL (ref 8.8–10.2)
CEA SERPL-MCNC: 1.8 NG/ML
CHLORIDE SERPL-SCNC: 108 MMOL/L (ref 98–107)
CHLORIDE SERPL-SCNC: 112 MMOL/L (ref 98–107)
COLOR UR AUTO: ABNORMAL
CREAT SERPL-MCNC: 3.3 MG/DL (ref 0.51–0.95)
CREAT SERPL-MCNC: 3.3 MG/DL (ref 0.51–0.95)
CREAT SERPL-MCNC: 3.84 MG/DL (ref 0.51–0.95)
CREAT UR-MCNC: 79.8 MG/DL
DEPRECATED HCO3 PLAS-SCNC: 15 MMOL/L (ref 22–29)
DEPRECATED HCO3 PLAS-SCNC: 17 MMOL/L (ref 22–29)
EOSINOPHIL # BLD AUTO: 0 10E3/UL (ref 0–0.7)
EOSINOPHIL NFR BLD AUTO: 0 %
ERYTHROCYTE [DISTWIDTH] IN BLOOD BY AUTOMATED COUNT: 16.9 % (ref 10–15)
FRACT EXCRET NA UR+SERPL-RTO: 3.4 %
GFR SERPL CREATININE-BSD FRML MDRD: 12 ML/MIN/1.73M2
GFR SERPL CREATININE-BSD FRML MDRD: 15 ML/MIN/1.73M2
GLUCOSE BLDC GLUCOMTR-MCNC: 104 MG/DL (ref 70–99)
GLUCOSE BLDC GLUCOMTR-MCNC: 108 MG/DL (ref 70–99)
GLUCOSE BLDC GLUCOMTR-MCNC: 116 MG/DL (ref 70–99)
GLUCOSE BLDC GLUCOMTR-MCNC: 147 MG/DL (ref 70–99)
GLUCOSE BLDC GLUCOMTR-MCNC: 154 MG/DL (ref 70–99)
GLUCOSE BLDC GLUCOMTR-MCNC: 66 MG/DL (ref 70–99)
GLUCOSE BLDC GLUCOMTR-MCNC: 80 MG/DL (ref 70–99)
GLUCOSE BLDC GLUCOMTR-MCNC: 94 MG/DL (ref 70–99)
GLUCOSE SERPL-MCNC: 115 MG/DL (ref 70–99)
GLUCOSE SERPL-MCNC: 92 MG/DL (ref 70–99)
GLUCOSE UR STRIP-MCNC: NEGATIVE MG/DL
HCT VFR BLD AUTO: 35.5 % (ref 35–47)
HGB BLD-MCNC: 10.6 G/DL (ref 11.7–15.7)
HGB UR QL STRIP: ABNORMAL
HYALINE CASTS: 1 /LPF
IMM GRANULOCYTES # BLD: 0 10E3/UL
IMM GRANULOCYTES NFR BLD: 1 %
KETONES UR STRIP-MCNC: NEGATIVE MG/DL
LEUKOCYTE ESTERASE UR QL STRIP: ABNORMAL
LYMPHOCYTES # BLD AUTO: 0.9 10E3/UL (ref 0.8–5.3)
LYMPHOCYTES NFR BLD AUTO: 14 %
MCH RBC QN AUTO: 26 PG (ref 26.5–33)
MCHC RBC AUTO-ENTMCNC: 29.9 G/DL (ref 31.5–36.5)
MCV RBC AUTO: 87 FL (ref 78–100)
MONOCYTES # BLD AUTO: 0.6 10E3/UL (ref 0–1.3)
MONOCYTES NFR BLD AUTO: 9 %
NEUTROPHILS # BLD AUTO: 4.7 10E3/UL (ref 1.6–8.3)
NEUTROPHILS NFR BLD AUTO: 76 %
NITRATE UR QL: NEGATIVE
NRBC # BLD AUTO: 0 10E3/UL
NRBC BLD AUTO-RTO: 0 /100
PH UR STRIP: 5 [PH] (ref 5–7)
PHOSPHATE SERPL-MCNC: 5.9 MG/DL (ref 2.5–4.5)
PLATELET # BLD AUTO: 272 10E3/UL (ref 150–450)
POTASSIUM SERPL-SCNC: 4.5 MMOL/L (ref 3.4–5.3)
POTASSIUM SERPL-SCNC: 4.9 MMOL/L (ref 3.4–5.3)
POTASSIUM SERPL-SCNC: 5.7 MMOL/L (ref 3.4–5.3)
POTASSIUM SERPL-SCNC: 6 MMOL/L (ref 3.4–5.3)
POTASSIUM SERPL-SCNC: 6.3 MMOL/L (ref 3.4–5.3)
POTASSIUM SERPL-SCNC: 6.5 MMOL/L (ref 3.4–5.3)
RBC # BLD AUTO: 4.07 10E6/UL (ref 3.8–5.2)
RBC URINE: 1 /HPF
SODIUM SERPL-SCNC: 139 MMOL/L (ref 136–145)
SODIUM SERPL-SCNC: 140 MMOL/L (ref 136–145)
SODIUM SERPL-SCNC: 140 MMOL/L (ref 136–145)
SODIUM UR-SCNC: 99 MMOL/L
SP GR UR STRIP: 1.01 (ref 1–1.03)
SQUAMOUS EPITHELIAL: <1 /HPF
TRANSITIONAL EPI: <1 /HPF
UROBILINOGEN UR STRIP-MCNC: NORMAL MG/DL
WBC # BLD AUTO: 6.2 10E3/UL (ref 4–11)
WBC URINE: 6 /HPF

## 2023-05-10 PROCEDURE — 36415 COLL VENOUS BLD VENIPUNCTURE: CPT | Performed by: INTERNAL MEDICINE

## 2023-05-10 PROCEDURE — 80069 RENAL FUNCTION PANEL: CPT | Performed by: INTERNAL MEDICINE

## 2023-05-10 PROCEDURE — 258N000003 HC RX IP 258 OP 636: Performed by: INTERNAL MEDICINE

## 2023-05-10 PROCEDURE — 85025 COMPLETE CBC W/AUTO DIFF WBC: CPT | Performed by: INTERNAL MEDICINE

## 2023-05-10 PROCEDURE — 250N000011 HC RX IP 250 OP 636: Performed by: INTERNAL MEDICINE

## 2023-05-10 PROCEDURE — 250N000013 HC RX MED GY IP 250 OP 250 PS 637: Performed by: INTERNAL MEDICINE

## 2023-05-10 PROCEDURE — 84156 ASSAY OF PROTEIN URINE: CPT | Performed by: INTERNAL MEDICINE

## 2023-05-10 PROCEDURE — 99223 1ST HOSP IP/OBS HIGH 75: CPT | Mod: 24 | Performed by: INTERNAL MEDICINE

## 2023-05-10 PROCEDURE — 258N000001 HC RX 258: Performed by: INTERNAL MEDICINE

## 2023-05-10 PROCEDURE — 81001 URINALYSIS AUTO W/SCOPE: CPT | Performed by: INTERNAL MEDICINE

## 2023-05-10 PROCEDURE — 99223 1ST HOSP IP/OBS HIGH 75: CPT | Mod: AI | Performed by: INTERNAL MEDICINE

## 2023-05-10 PROCEDURE — 84295 ASSAY OF SERUM SODIUM: CPT | Performed by: INTERNAL MEDICINE

## 2023-05-10 PROCEDURE — 84132 ASSAY OF SERUM POTASSIUM: CPT | Performed by: INTERNAL MEDICINE

## 2023-05-10 PROCEDURE — 84300 ASSAY OF URINE SODIUM: CPT | Performed by: INTERNAL MEDICINE

## 2023-05-10 PROCEDURE — 99207 PR APP CREDIT; MD BILLING SHARED VISIT: CPT | Performed by: INTERNAL MEDICINE

## 2023-05-10 PROCEDURE — 87086 URINE CULTURE/COLONY COUNT: CPT | Performed by: INTERNAL MEDICINE

## 2023-05-10 PROCEDURE — 82565 ASSAY OF CREATININE: CPT | Performed by: INTERNAL MEDICINE

## 2023-05-10 PROCEDURE — 76770 US EXAM ABDO BACK WALL COMP: CPT

## 2023-05-10 PROCEDURE — 120N000002 HC R&B MED SURG/OB UMMC

## 2023-05-10 PROCEDURE — 250N000009 HC RX 250: Performed by: INTERNAL MEDICINE

## 2023-05-10 PROCEDURE — 76770 US EXAM ABDO BACK WALL COMP: CPT | Mod: 26 | Performed by: RADIOLOGY

## 2023-05-10 RX ORDER — SODIUM CHLORIDE 9 MG/ML
INJECTION, SOLUTION INTRAVENOUS CONTINUOUS
Status: DISCONTINUED | OUTPATIENT
Start: 2023-05-10 | End: 2023-05-10

## 2023-05-10 RX ORDER — ACETAMINOPHEN 650 MG/1
650 SUPPOSITORY RECTAL EVERY 6 HOURS PRN
Status: DISCONTINUED | OUTPATIENT
Start: 2023-05-10 | End: 2023-05-12 | Stop reason: HOSPADM

## 2023-05-10 RX ORDER — LISINOPRIL 10 MG/1
10 TABLET ORAL EVERY MORNING
Status: DISCONTINUED | OUTPATIENT
Start: 2023-05-10 | End: 2023-05-12 | Stop reason: HOSPADM

## 2023-05-10 RX ORDER — ACETAMINOPHEN 325 MG/1
650 TABLET ORAL EVERY 6 HOURS PRN
Status: DISCONTINUED | OUTPATIENT
Start: 2023-05-10 | End: 2023-05-12 | Stop reason: HOSPADM

## 2023-05-10 RX ORDER — TRIAMTERENE AND HYDROCHLOROTHIAZIDE 37.5; 25 MG/1; MG/1
1 CAPSULE ORAL DAILY
Status: DISCONTINUED | OUTPATIENT
Start: 2023-05-10 | End: 2023-05-12 | Stop reason: HOSPADM

## 2023-05-10 RX ORDER — ONDANSETRON 2 MG/ML
4 INJECTION INTRAMUSCULAR; INTRAVENOUS EVERY 6 HOURS PRN
Status: DISCONTINUED | OUTPATIENT
Start: 2023-05-10 | End: 2023-05-12 | Stop reason: HOSPADM

## 2023-05-10 RX ORDER — DEXTROSE MONOHYDRATE 25 G/50ML
25 INJECTION, SOLUTION INTRAVENOUS ONCE
Status: COMPLETED | OUTPATIENT
Start: 2023-05-10 | End: 2023-05-10

## 2023-05-10 RX ORDER — ONDANSETRON 4 MG/1
4 TABLET, ORALLY DISINTEGRATING ORAL EVERY 6 HOURS PRN
Status: DISCONTINUED | OUTPATIENT
Start: 2023-05-10 | End: 2023-05-12 | Stop reason: HOSPADM

## 2023-05-10 RX ORDER — MULTIPLE VITAMINS W/ MINERALS TAB 9MG-400MCG
1 TAB ORAL DAILY
Status: DISCONTINUED | OUTPATIENT
Start: 2023-05-10 | End: 2023-05-12 | Stop reason: HOSPADM

## 2023-05-10 RX ORDER — DEXTROSE MONOHYDRATE 25 G/50ML
25-50 INJECTION, SOLUTION INTRAVENOUS
Status: DISCONTINUED | OUTPATIENT
Start: 2023-05-10 | End: 2023-05-12 | Stop reason: HOSPADM

## 2023-05-10 RX ORDER — NICOTINE POLACRILEX 4 MG
15-30 LOZENGE BUCCAL
Status: DISCONTINUED | OUTPATIENT
Start: 2023-05-10 | End: 2023-05-12 | Stop reason: HOSPADM

## 2023-05-10 RX ORDER — HEPARIN SODIUM 5000 [USP'U]/.5ML
5000 INJECTION, SOLUTION INTRAVENOUS; SUBCUTANEOUS EVERY 12 HOURS
Status: DISCONTINUED | OUTPATIENT
Start: 2023-05-10 | End: 2023-05-12 | Stop reason: HOSPADM

## 2023-05-10 RX ORDER — DEXTROSE MONOHYDRATE 25 G/50ML
INJECTION, SOLUTION INTRAVENOUS
Status: COMPLETED
Start: 2023-05-10 | End: 2023-05-10

## 2023-05-10 RX ORDER — CALCIUM GLUCONATE 94 MG/ML
1 INJECTION, SOLUTION INTRAVENOUS ONCE
Status: COMPLETED | OUTPATIENT
Start: 2023-05-10 | End: 2023-05-10

## 2023-05-10 RX ORDER — LIDOCAINE 40 MG/G
CREAM TOPICAL
Status: DISCONTINUED | OUTPATIENT
Start: 2023-05-10 | End: 2023-05-12 | Stop reason: HOSPADM

## 2023-05-10 RX ORDER — FERROUS SULFATE 325(65) MG
325 TABLET ORAL
Status: DISCONTINUED | OUTPATIENT
Start: 2023-05-10 | End: 2023-05-12 | Stop reason: HOSPADM

## 2023-05-10 RX ORDER — LANOLIN ALCOHOL/MO/W.PET/CERES
1000 CREAM (GRAM) TOPICAL DAILY
Status: DISCONTINUED | OUTPATIENT
Start: 2023-05-10 | End: 2023-05-12 | Stop reason: HOSPADM

## 2023-05-10 RX ORDER — FUROSEMIDE 10 MG/ML
80 INJECTION INTRAMUSCULAR; INTRAVENOUS ONCE
Status: COMPLETED | OUTPATIENT
Start: 2023-05-10 | End: 2023-05-10

## 2023-05-10 RX ADMIN — HEPARIN SODIUM 5000 UNITS: 5000 INJECTION, SOLUTION INTRAVENOUS; SUBCUTANEOUS at 14:30

## 2023-05-10 RX ADMIN — SODIUM BICARBONATE 50 MEQ: 84 INJECTION INTRAVENOUS at 05:07

## 2023-05-10 RX ADMIN — CYANOCOBALAMIN TAB 1000 MCG 1000 MCG: 1000 TAB at 09:05

## 2023-05-10 RX ADMIN — SODIUM ZIRCONIUM CYCLOSILICATE 10 G: 5 POWDER, FOR SUSPENSION ORAL at 14:30

## 2023-05-10 RX ADMIN — SODIUM ZIRCONIUM CYCLOSILICATE 10 G: 5 POWDER, FOR SUSPENSION ORAL at 20:04

## 2023-05-10 RX ADMIN — SODIUM ZIRCONIUM CYCLOSILICATE 10 G: 5 POWDER, FOR SUSPENSION ORAL at 07:32

## 2023-05-10 RX ADMIN — DEXTROSE MONOHYDRATE 25 G: 25 INJECTION, SOLUTION INTRAVENOUS at 05:16

## 2023-05-10 RX ADMIN — DEXTROSE MONOHYDRATE 300 ML: 100 INJECTION, SOLUTION INTRAVENOUS at 04:59

## 2023-05-10 RX ADMIN — SODIUM BICARBONATE: 84 INJECTION, SOLUTION INTRAVENOUS at 08:59

## 2023-05-10 RX ADMIN — FERROUS SULFATE TAB 325 MG (65 MG ELEMENTAL FE) 325 MG: 325 (65 FE) TAB at 09:05

## 2023-05-10 RX ADMIN — FUROSEMIDE 80 MG: 10 INJECTION, SOLUTION INTRAMUSCULAR; INTRAVENOUS at 16:10

## 2023-05-10 RX ADMIN — MULTIPLE VITAMINS W/ MINERALS TAB 1 TABLET: TAB at 09:05

## 2023-05-10 RX ADMIN — CALCIUM GLUCONATE 1 G: 98 INJECTION, SOLUTION INTRAVENOUS at 05:03

## 2023-05-10 RX ADMIN — SODIUM CHLORIDE 500 ML: 9 INJECTION, SOLUTION INTRAVENOUS at 04:59

## 2023-05-10 RX ADMIN — HUMAN INSULIN 8.01 UNITS: 100 INJECTION, SOLUTION SUBCUTANEOUS at 05:00

## 2023-05-10 RX ADMIN — HEPARIN SODIUM 5000 UNITS: 5000 INJECTION, SOLUTION INTRAVENOUS; SUBCUTANEOUS at 04:24

## 2023-05-10 ASSESSMENT — ACTIVITIES OF DAILY LIVING (ADL)
WALKING_OR_CLIMBING_STAIRS_DIFFICULTY: NO
ADLS_ACUITY_SCORE: 20
WEAR_GLASSES_OR_BLIND: NO
ADLS_ACUITY_SCORE: 20
DOING_ERRANDS_INDEPENDENTLY_DIFFICULTY: NO
TOILETING_ISSUES: NO
DIFFICULTY_EATING/SWALLOWING: NO
FALL_HISTORY_WITHIN_LAST_SIX_MONTHS: NO
CHANGE_IN_FUNCTIONAL_STATUS_SINCE_ONSET_OF_CURRENT_ILLNESS/INJURY: NO
ADLS_ACUITY_SCORE: 33
CONCENTRATING,_REMEMBERING_OR_MAKING_DECISIONS_DIFFICULTY: NO
DRESSING/BATHING_DIFFICULTY: NO

## 2023-05-10 ASSESSMENT — COLUMBIA-SUICIDE SEVERITY RATING SCALE - C-SSRS
2. HAVE YOU ACTUALLY HAD ANY THOUGHTS OF KILLING YOURSELF IN THE PAST MONTH?: NO
6. HAVE YOU EVER DONE ANYTHING, STARTED TO DO ANYTHING, OR PREPARED TO DO ANYTHING TO END YOUR LIFE?: NO
4. HAVE YOU HAD THESE THOUGHTS AND HAD SOME INTENTION OF ACTING ON THEM?: NO
3. HAVE YOU BEEN THINKING ABOUT HOW YOU MIGHT KILL YOURSELF?: NO
1. IN THE PAST MONTH, HAVE YOU WISHED YOU WERE DEAD OR WISHED YOU COULD GO TO SLEEP AND NOT WAKE UP?: NO
5. HAVE YOU STARTED TO WORK OUT OR WORKED OUT THE DETAILS OF HOW TO KILL YOURSELF? DO YOU INTEND TO CARRY OUT THIS PLAN?: NO

## 2023-05-10 NOTE — PLAN OF CARE
[phone]    Pt alert and oriented X 4. Able to make needs known and use call light appropriately. Denied SOB, chest pain, nausea, vomiting, headaches, dizziness pain and discomfort. Voiding without discomfort, had a medium BM on arrival. K+ levels 6.5, provider notified; new orders initiated; labs rescheduled. On Telemetry,with normal sinus rhythm; denied s/s of hyperkalemia. Pt independent in room. Sleep interrupted d/t frequent interruptions from lab/nursing staff. Will contniue to monitor and follow POC.     Nat Wayne, RN

## 2023-05-10 NOTE — PROGRESS NOTES
[facility]    Medicine Progress Note - Hospitalist Service, GOLD TEAM 17    Date of Admission:  5/10/2023    Assessment & Plan      67 year old female with past medical h/o Crohn's colitis, anemia (both iron deficiency and B12 deficiency listed), primary pulmonary hypertension, HTN, CKD stage III, newly diagnosed colon cancer (neuroendocrine carcinoma and adenocarcinoma) who had recent colon resection 4/6/23 (but no ostomy) who presented to [facility] ED due to incidental hyperkalemia.         Hyperkalemia  Likely multifactorial due to pre-renal MARYA + lisinopril +  dietary sources. Was normal as of 4/11 and on recheck at onc appt was 7.   Improved with shifting (calcium gluconate, albuterol, insulin/D50) and lasix (60mg) and fluids. Was 5.3 prior to transferring from ED to [facility]   -recheck on arrival  -tele  -HOLD lisinopril, hydrochlorothiazide-triamterene for now  -low potassium diet  -renal consultation  >Case discussed with nephrology on 5/10/2023.  Patient started on sodium bicarbonate infusion at 100 cc an hour for 10 hours.  Potassium level trending down.  Last level at 6 mmol/L.  Patient is on low potassium diet.  Plan to give furosemide 80 mg x 1 dose.    Plan:  - Check potassium level every 4 hours until it is normal.  - Continue sodium bicarb infusion at 100 cc an hour for total of 10 hours  - Furosemide 80 mg IV x1 dose.     Non-oliguric MARYA on CKD  Baseline creat in is in the low 1.3-1.5 range. Presented with creat of 3.6. Etiology is a bit confusing but suspect overall she was pre-renal after her surgery due to poor eating/drinking and then the resumption of her HTN meds may have tipped her over the edge. ATN possible given she mentioned ?increased urine output. UA had some hyaline casts which suggests she is a bit dry and BP is soft so that would be fitting as well.   >Patient has been getting IV fluids.  Currently on sodium bicarbonate  "infusion.  >Nephrology consulted on 5/10/2023.  Case discussed with nephrology.  >UA, renal ultrasound, urine sodium, Coco ordered.  Renal ultrasound on 5/10/2023 showed Mildly echogenic and atrophic appearance of the bilateral kidneys,compatible chronic medical renal disease. No hydronephrosis.    Plan:  - Continue IV fluids as above.  - Furosemide 80 mg IV x1 dose.  - Strict RADHA.  - Avoid hypotension, hypovolemia.  - Renal dose medication.  - Avoid nephrotoxic agents.  - Monitor renal panel daily.     Colon cancer  (70% neuroendocrine carcinoma and 30% moderately differentiated adenocarcinoma)  S/p sigmoid resection 4/6/23  -follows with oncology and had just started treatment planning  -VTE prophylaxis     Crohn's- no longer on meds due to her colon resection     Iron deficiency and b12 deficiency anemia  Hemoglobin had been quite low during her April hospitalization (was in 6-8 range). Recheck on 4/11 was 8.9 prior to discharge. On presentation to ED it was 11.3.  -continue home iron + b12, multivitamin  -trend and watch for signs of bleeding             Diet: Combination Diet Regular Diet Adult; 2 gm K Diet    DVT Prophylaxis: Heparin SQ  Clement Catheter: Not present  Lines: None     Cardiac Monitoring: ACTIVE order. Indication: Electrolyte Imbalance (24 hours)- Magnesium <1.3 mg/ml; Potassium < =2.8 or > 5.5 mg/ml  Code Status: Full Code      Clinically Significant Risk Factors Present on Admission        # Hyperkalemia: Highest K = 8.2 mmol/L in last 2 days, will monitor as appropriate          # Acute Kidney Injury, unspecified: based on a >150% or 0.3 mg/dL increase in last creatinine compared to past 90 day average, will monitor renal function  # Hypertension: home medication list includes antihypertensive(s)      # Obesity: Estimated body mass index is 32.68 kg/m  as calculated from the following:    Height as of this encounter: 1.575 m (5' 2.01\").    Weight as of this encounter: 81.1 kg (178 lb 11.2 oz).  "          Disposition Plan      Expected Discharge Date: 05/12/2023                  Omar Ag MD  Hospitalist Service, GOLD TEAM 17  [facility]  Securely message with [employer] (more info)  Text page via [employer] Paging/Directory   See signed in provider for up to date coverage information  ______________________________________________________________________    Interval History     Overnight events reviewed  This morning patient denies any lightheadedness or dizziness.  Reported she feels like that yesterday.  Denies any nausea, vomiting, chest pain, shortness of breath, diarrhea, burning urination, abdominal pain    Physical Exam   Vital Signs: Temp: 98.6  F (37  C) Temp src: Oral BP: 113/53 Pulse: 75   Resp: 16 SpO2: 100 % O2 Device: None (Room air)    Weight: 178 lbs 11.2 oz    General Appearance: Laying in bed in no acute distress  Respiratory: Bilaterally clear to auscultation  Cardiovascular: S1, S2 normal  GI: Soft, nontender, bowel sounds  Skin:   Other: Alert, oriented, nonfocal    Medical Decision Making       **CLEAR ALL SELECTIONS**      Data   ------------------------- PAST 24 HR DATA REVIEWED -----------------------------------------------    I have personally reviewed the following data over the past 24 hrs:    6.2  \   10.6 (L)   / 272     139 108 (H) 52.6 (H) /  92   6.0 (H) 17 (L) 3.30 (H) \       ALT: N/A AST: N/A AP: N/A TBILI: N/A   ALB: 3.9 TOT PROTEIN: N/A LIPASE: N/A       Imaging results reviewed over the past 24 hrs:   Recent Results (from the past 24 hour(s))   US Renal Complete Non-Vascular    Narrative    EXAMINATION: US RENAL COMPLETE NON-VASCULAR, 5/10/2023 11:56 AM     COMPARISON: 3/24/2023 PET CT    HISTORY: MARYA on CKD    TECHNIQUE: The kidneys and bladder were scanned in the standard  fashion with specialized ultrasound transducer(s) using both gray  scale and limited color/spectral Doppler techniques.    FINDINGS:    Right kidney:  Measures 9.9 x 4.6 x 4.8 cm in length. The renal  parenchyma demonstrates slightly increased echogenicity. The renal  cortex measures up to 0.9 cm. No focal mass. No hydronephrosis.    Left kidney: Measures 9.9 x 4.5 x 4.8 cm in length. The renal  parenchyma demonstrates slightly increased echogenicity. The renal  cortex measures up to 0.5 cm. No focal mass. No hydronephrosis.     Bladder: Poorly distended.      Impression    IMPRESSION:  Mildly echogenic and atrophic appearance of the bilateral kidneys,  compatible chronic medical renal disease. No hydronephrosis.    I have personally reviewed the examination and initial interpretation  and I agree with the findings.    HOLLI DOMINIQUE,          SYSTEM ID:  [id number]

## 2023-05-10 NOTE — CONSULTS
Nephrology Initial Consult  May 10, 2023      Nadia Ladd MRN:[id number] YOB: 1955  Date of Admission:5/10/2023  Primary care provider: Nomi Cartwright  Requesting physician: Lizet Vieira MD    ASSESSMENT AND RECOMMENDATIONS:   # MARYA stage 2 likely secondary to ATN from hypotension (diuretic use and increase bowel movement)  # CKD stage 3; unclear etiology DDx chronic dehydration/recurrent MARYA from Crohn's disease, Hypertension or Sulfasalazine (stopped in 4/23); BL Cr 1.3-1.6  Etiology of acute kidney injury is likely from ATN in the setting of unaware hypotension/dehydration from ongoing diuretic use and increase BM after surgery. Her UA from yesterday was bland except WBC but she has no symptoms of UTI.  Given recent history of surgery we will get kidney ultrasound to make sure that there is no obstruction.  I will also like to repeat UA as well.    In regard to CKD, she has baseline creatinine of 1.3-1.6.  Again, etiology likely multifactorial in the setting of chronic dehydration/recurrent MARYA from Crohn's, hypertension or sulfasalazine use which can be associated with AIN.  Sulfasalazine was discontinued since April 2023 so should no longer contribute.  UA appears to be clean and no protein so less likely GN. IgA is associated with Crohn's but UA is not suggestive of that.   - UA  - US kidneys without doppler  - Strict I/Os  - Daily BW  - Daily renal panel  - Dialysis diet  - Avoid nephrotoxic agents  # Hyperkalemia  Likely in the setting of triamterene use, high K diet and MARYA.   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   - Not on chemo yet.     Recommendations were  "communicated to primary team via Note and Verbal.    Santana Brenner MD   Division of Renal Disease and Hypertension  Amcom  [email or web address]  Vocera Web Console    REASON FOR CONSULT: MARYA and hyperkalemia    HISTORY OF PRESENT ILLNESS:  Admitting provider and nursing notes reviewed  Nadia Ladd is a 67 year old with Hx of poorly differentiated colon cancer s/p total colectomy and ALEXANDER BSO in 4/23, crohn;s disease, CKD stage 3, HTN who is here for MARYA and hyperkalemia.     The patient has history of Crohn's disease for over 20 years and has been taking sulfasalazine.  Initially the symptom has been controlled but 3 to 4 months ago the patient started to have increasing diarrhea abdominal pain and lightheadedness.  She was found to have iron deficiency anemia and she underwent colonoscopy and was found to have colonic mass concerning for cancer.  She then underwent laparoscopic converted to open total abdominal colectomy with ileorectal anastomosis, partial omentectomy, flexible sigmoidoscopy and ALEXANDER BSO.  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  Shortly after surgery the patient did not have good appetite and noted more loose stool.  She was dismissed with her blood pressure medication including hydrochlorothiazide/Triamterene (for which she has been taking it for many many years).  Recently, the patient noticed that she has some sinus problem as well as feeling \"dizziness\" 1 day prior to present to the oncology clinic.  Otherwise, she denies any symptoms of nausea vomiting, diarrhea, orthostatic symptoms, lower extremity edema or change in urination.  She does not take any NSAIDs.  Her appetite has been gradually improving.    She was seen by Oncology clinic with a plan to discuss Chemotherapy but was noted to have hyperkalemia at 8.0.  The specimen was hemolyzed but upon repeat was 7.6. She was sent to ED at [facility] and transferred " to [facility] on 5/9/23.  Upon arriving at the [facility], repeat potassium improved to 5.5 and 5.3 (after shifting).  She received furosemide 60 mg IV x1 last night and urinated 400 ml so far.  This morning potassium increased to 6.5 and she received insulin and dextrose again and K+ improved to 6.3.     The patient has baseline creatinine of 1.3-1.6 but she is not aware that she has chronic kidney disease and she never been seen by kidney doctor.  During surgery her creatinine has been stable and on dismissal on 4/11/23, her creatinine was 1.34.  However her creatinine upon presentation on 5/9/23 was 3.82. UA on 5/9/23 showed WBC 22 but no blood. Neg protein.     Upon interview, the patient reports feeling well.  Denies any chest pain or short of breath.  She noticed that she started to urinate more this morning.  Her appetite is good.  She does not take any NSAIDs.  She denies any lower extremity edema. /65.   Of note, the patient eats a lot of potatoes.     PAST MEDICAL HISTORY:  Reviewed with patient on 05/10/2023   Past Medical History:   Diagnosis Date     Arthropathia 08/05/2010     B12 deficiency anemia 10/21/2010     Benign essential hypertension with target blood pressure below 140/90 10/10/2016     CARDIOVASCULAR SCREENING; LDL GOAL LESS THAN 160 10/31/2010     Crohn's colitis (H) 02/15/2011     Dermatitis-dishydrotic eczema-severe 08/05/2010     Hiatal hernia      HTN (hypertension) 07/21/2011     Iron deficiency anemia 10/21/2010     Malignant neoplasm of sigmoid colon (H)      Obesity      Primary pulmonary hypertension (H) 04/06/2010       Past Surgical History:   Procedure Laterality Date     COLECTOMY WITHOUT COLOSTOMY N/A 4/6/2023    Procedure: Laparoscopic Converted to Open Total abdominal colectomy;  Surgeon: Jerome Ashley MD;  [facility]     COLONOSCOPY  10/11/10     COLONOSCOPY N/A 2/27/2023    Procedure: ATTEMPTED COLONOSCOPY WITH SIGMOID STRICTURE BIOPSY;  Surgeon:  Jonathan Alcocer MD;  Location: [facility]     ESOPHAGOSCOPY, GASTROSCOPY, DUODENOSCOPY (EGD), COMBINED N/A 2/27/2023    Procedure: ESOPHAGOGASTRODUODENOSCOPY, WITH BIOPSY;  Surgeon: Jonathan Alcocer MD;  Location: [facility]     HYSTERECTOMY TOTAL ABDOMINAL, BILATERAL SALPINGO-OOPHORECTOMY, COMBINED N/A 4/6/2023    Procedure: Hysterectomy total abdominal, bilateral salpingo-oophorectomy;  Surgeon: Sunitha Olea MD;  [facility]     SIGMOIDOSCOPY FLEXIBLE N/A 4/6/2023    Procedure: Sigmoidoscopy flexible;  Surgeon: Jerome Ashley MD;  Location: [facility]     SURGICAL HISTORY OF -   06/14/76    Perineorrhaphy, for widening vaginal orifice     ZZC EXPLORATORY OF ABDOMEN  1989    laparoscopy        MEDICATIONS:  PTA Meds  Prior to Admission medications    Medication Sig Last Dose Taking? Auth Provider Long Term End Date   acetaminophen (TYLENOL) 325 MG tablet Take 3 tablets (975 mg) by mouth every 8 hours  Patient taking differently: Take 975 mg by mouth every 8 hours as needed for fever or pain 5/9/2023 Yes Arlet Mckeon PA-C     cyanocobalamin 1000 MCG TBCR Take 1,000 mcg by mouth daily 5/9/2023 Yes Phil Orosco PA-C     ferrous sulfate (IRON) 325 (65 FE) MG tablet TAKE ONE TABLET BY MOUTH TWICE A DAY --NO FURTHER REFILLS WITHOUT OFFICE VISIT  Patient taking differently: Take 325 mg by mouth daily (with breakfast) 5/9/2023 Yes Phil Orosco PA-C     fluocinonide (LIDEX) 0.05 % external cream Small amount to affected area(s) BID PRN More than a month Yes Nomi Cartwright DO     lisinopril (ZESTRIL) 10 MG tablet Take 1 tablet (10 mg) by mouth every morning 5/9/2023 Yes Nomi Cartwright DO Yes    Multiple Vitamins-Iron (MULTI-DAY PLUS IRON PO) Take 1 tablet by mouth daily 5/9/2023 Yes Reported, Patient     triamterene-HCTZ (DYAZIDE) 37.5-25 MG capsule TAKE 1 CAPSULE BY MOUTH ONCE DAILY  Patient taking differently: Take 1 capsule by mouth every morning TAKE 1 CAPSULE BY MOUTH  ONCE DAILY 5/9/2023 Yes Nomi Cartwright DO Yes       Current Meds    cyanocobalamin  1,000 mcg Oral Daily     ferrous sulfate  325 mg Oral Daily with breakfast     heparin ANTICOAGULANT  5,000 Units Subcutaneous Q12H     [Held by provider] lisinopril  10 mg Oral QAM     multivitamin w/minerals  1 tablet Oral Daily     sodium chloride (PF)  3 mL Intracatheter Q8H     sodium zirconium cyclosilicate  10 g Oral TID    Followed by     [START ON 5/12/2023] sodium zirconium cyclosilicate  10 g Oral Daily     [Held by provider] triamterene-HCTZ  1 capsule Oral Daily     Infusion Meds    sodium bicarbonate 150 mEq in D5W 1,000 mL infusion 100 mL/hr at 05/10/23 0859       ALLERGIES:    Allergies   Allergen Reactions     No Known Drug Allergy      REVIEW OF SYSTEMS:  A comprehensive of systems was negative except as noted above.    SOCIAL HISTORY:   Social History     Socioeconomic History     Marital status:      Spouse name: Not on file     Number of children: Not on file     Years of education: Not on file     Highest education level: Not on file   Occupational History     Not on file   Tobacco Use     Smoking status: Never     Passive exposure: Current     Smokeless tobacco: Never   Vaping Use     Vaping status: Not on file   Substance and Sexual Activity     Alcohol use: No     Drug use: No     Sexual activity: Yes     Partners: Male   Other Topics Concern     Parent/sibling w/ CABG, MI or angioplasty before 65F 55M? Not Asked   Social History Narrative     Not on file     Social Determinants of Health     Financial Resource Strain: Not on file   Food Insecurity: Not on file   Transportation Needs: Not on file   Physical Activity: Not on file   Stress: Not on file   Social Connections: Not on file   Intimate Partner Violence: Not on file   Housing Stability: Not on file     Reviewed with patient.    FAMILY MEDICAL HISTORY:   Family History   Problem Relation Age of Onset     Hypertension Mother          "on meds, alive     Cerebrovascular Disease Father         stroke about age 65,  of cancer at 68 yrs     Diabetes Father         eventually took insulin     Anemia Father         Pernisios anemia     Kidney Disease Niece         kidney transplant     Kidney Disease Nephew         kidney transplant     Venous thrombosis No family hx of      Anesthesia Reaction No family hx of      Family history of kidney disease  The patient has an eosinophil that had rare kidney disease and undergo kidney transplant.  But no other family member has kidney disease.    PHYSICAL EXAM:   Temp  Av.8  F (36.6  C)  Min: 97.3  F (36.3  C)  Max: 98.1  F (36.7  C)      Pulse  Av.3  Min: 76  Max: 121 Resp  Av.5  Min: 13  Max: 21  SpO2  Av.5 %  Min: 98 %  Max: 100 %       /65 (BP Location: Left arm)   Pulse 82   Temp 97.7  F (36.5  C) (Oral)   Resp 16   Ht 1.575 m (5' 2.01\")   Wt 80.1 kg (176 lb 9.6 oz)   LMP  (LMP Unknown)   SpO2 99%   BMI 32.29 kg/m        Admit Weight: 80.1 kg (176 lb 9.6 oz)     GENERAL APPEARANCE: No distress,  awake  EYES: NO scleral icterus, pupils equal  Lymphatics: no cervical or supraclavicular LAD  Pulmonary: lungs clear to auscultation with equal breath sounds bilaterally, no clubbing  CV: regular rhythm, normal rate, no rub   - Edema: none  GI: soft, nontender, normal bowel sounds; + surgical scar  MS: no evidence of inflammation in joints, no muscle tenderness  : No CVA tenderness  SKIN: no rash, warm, dry, no cyanosis  NEURO: face symmetric, no asterixis     LABS:   I have reviewed the following labs:  CMP  Recent Labs   Lab 05/10/23  0829 05/10/23  0734 05/10/23  0647 05/10/23  0628 05/10/23  0626 05/10/23  0518 05/10/23  0315 23  2204 232 23  1938 23  1755 23  1734 23  1650 23  1413   NA  --   --   --   --   --   --  140  --   --  139  --  138  --  137 139   POTASSIUM  --   --   --   --  6.3*  --  6.5* 5.3  --  " 5.5*  --  7.6*  --  8.2* 7.0*   CHLORIDE  --   --   --   --   --   --  112*  --   --   --   --   --   --  110* 110*   CO2  --   --   --   --   --   --  15*  --   --   --   --   --   --  13* 16*   ANIONGAP  --   --   --   --   --   --  13  --   --   --   --   --   --  14 13   * 94 66* 80  --    < > 115* 154*   < >  --    < >  --    < > 98 112*   BUN  --   --   --   --   --   --  57.5*  --   --   --   --   --   --  61.7* 62.1*   CR  --   --   --   --   --   --  3.84*  --   --   --   --   --   --  3.60* 3.82*   GFRESTIMATED  --   --   --   --   --   --  12*  --   --   --   --   --   --  13* 12*   LIVIER  --   --   --   --   --   --  9.5  --   --   --   --   --   --  9.4 9.6   PHOS  --   --   --   --   --   --  5.9*  --   --   --   --   --   --   --   --    PROTTOTAL  --   --   --   --   --   --   --   --   --   --   --   --   --   --  7.1   ALBUMIN  --   --   --   --   --   --  3.9  --   --   --   --   --   --   --  4.3   BILITOTAL  --   --   --   --   --   --   --   --   --   --   --   --   --   --  0.5   ALKPHOS  --   --   --   --   --   --   --   --   --   --   --   --   --   --  136*   AST  --   --   --   --   --   --   --   --   --   --   --   --   --   --  19   ALT  --   --   --   --   --   --   --   --   --   --   --   --   --   --  23    < > = values in this interval not displayed.     CBC  Recent Labs   Lab 05/10/23  0315 05/09/23  1413   HGB 10.6* 11.3*   WBC 6.2 6.9   RBC 4.07 4.42   HCT 35.5 38.6   MCV 87 87   MCH 26.0* 25.6*   MCHC 29.9* 29.3*   RDW 16.9* 16.5*    332     INRNo lab results found in last 7 days.  ABGNo lab results found in last 7 days.   URINE STUDIES  Recent Labs   Lab Test 05/09/23  1810 07/08/20  1327   COLOR Yellow Yellow   APPEARANCE Slightly Cloudy* Slightly Cloudy   URINEGLC Negative Negative   URINEBILI Negative Negative   URINEKETONE Negative Negative   SG 1.013 1.020   UBLD Negative Negative   URINEPH 5.0 5.0   PROTEIN Negative Negative   NITRITE Negative Negative    LEUKEST Small* Small*   RBCU 1  --    WBCU 22*  --      No lab results found.  PTH  No lab results found.  IRON STUDIES  Recent Labs   Lab Test 04/09/23  0829 02/16/23  0959 03/12/18  1629 11/30/16  1628 09/23/15  1606   IRON 8* 27*  --  43 62   * 265  --  232* 210*   IRONSAT 6* 10*  --  19 30   BRENT  --  21 454*  --  582*       IMAGING:  All imaging studies reviewed by me.     Santana Brenner MD

## 2023-05-10 NOTE — PHARMACY-ADMISSION MEDICATION HISTORY
Admission medication history completed at [facility]. Please see Pharmacy - Admission Medication History note from 5/9/2023.    Priya Ambriz, PharmD, BCPS

## 2023-05-10 NOTE — H&P
[facility]    History and Physical - Hospitalist Service, GOLD TEAM        Date of Admission:  5/10/23    Assessment & Plan      Nadia Ladd is a 67 year old female with past medical h/o Crohn's colitis, anemia (both iron deficiency and B12 deficiency listed), primary pulmonary hypertension, HTN, CKD stage III, newly diagnosed colon cancer (neuroendocrine carcinoma and adenocarcinoma) who had recent colon resection 4/6/23 (but no ostomy) who presented to [facility] ED due to incidental hyperkalemia.       Hyperkalemia  Likely multifactorial due to pre-renal MARYA + lisinopril +  dietary sources. Was normal as of 4/11 and on recheck at onc appt was 7.   Improved with shifting (calcium gluconate, albuterol, insulin/D50) and lasix (60mg) and fluids. Was 5.3 prior to transferring from ED to [facility]   -recheck on arrival  -tele  -HOLD lisinopril, hydrochlorothiazide-triamterene for now  -low potassium diet  -renal consultation    Non-oliguric MARYA on CKD  Baseline creat in is in the low 1.3-1.5 range. Presented with creat of 3.6. Etiology is a bit confusing but suspect overall she was pre-renal after her surgery due to poor eating/drinking and then the resumption of her HTN meds may have tipped her over the edge. ATN possible given she mentioned ?increased urine output. UA had some hyaline casts which suggests she is a bit dry and BP is soft so that would be fitting as well.   -Will monitor response to the fluids (although she also received lasix in ED)   -Will consider another fluid bolus and monitor response  -nephrology consultation    Colon cancer  (70% neuroendocrine carcinoma and 30% moderately differentiated adenocarcinoma)  S/p sigmoid resection 4/6/23  -follows with oncology and had just started treatment planning  -VTE prophylaxis    Crohn's- no longer on meds due to her colon resection    Iron deficiency and b12 deficiency anemia  Hemoglobin had been quite  "low during her April hospitalization (was in 6-8 range). Recheck on 4/11 was 8.9 prior to discharge. On presentation to ED it was 11.3.  -continue home iron + b12, multivitamin  -trend and watch for signs of bleeding       Diet: Combination Diet Regular Diet Adult; 2 gm K Diet low potassium  DVT Prophylaxis: Heparin SQ  Clement Catheter: Not present  Lines: None     Cardiac Monitoring: ACTIVE order. Indication: Electrolyte Imbalance (24 hours)- Magnesium <1.3 mg/ml; Potassium < =2.8 or > 5.5 mg/ml  Code Status: Full Code Full- verified with her    Clinically Significant Risk Factors Present on Admission        # Hyperkalemia: Highest K = 8.2 mmol/L in last 2 days, will monitor as appropriate          # Acute Kidney Injury, unspecified: based on a >150% or 0.3 mg/dL increase in last creatinine compared to past 90 day average, will monitor renal function  # Hypertension: home medication list includes antihypertensive(s)      # Obesity: Estimated body mass index is 32.37 kg/m  as calculated from the following:    Height as of 5/9/23: 1.575 m (5' 2\").    Weight as of 5/9/23: 80.3 kg (177 lb).           Disposition Plan    Pending clinical improvement/consultation with nephrology       Lizet Vieira MD  Hospitalist Service, [facility]  Securely message with [employer] computer (more info)  Text page via [facility] Paging/Directory   See signed in provider for up to date coverage information    ______________________________________________________________________    Chief Complaint   High potassium    History is obtained from the patient and chart    History of Present Illness   Nadia Ladd is a 67 year old female with past medical h/o Crohn's colitis, anemia (both iron deficiency and B12 deficiency listed), primary pulmonary hypertension, HTN, CKD stage III, newly diagnosed colon cancer (neuroendocrine carcinoma and adenocarcinoma) who had recent colon resection 4/6/23 (but no " ostomy) who presented to [facility] ED due to incidental hyperkalemia.     She was at her oncologist office and was getting routine blood work and she was noted to have potassium of 7.0. Recheck was 8.2 but was hemolyzed. Next check was 7.6 at ED.     She reported no significant fatigue, weakness. Felt a bit dizzy but thought it was related to sinus issues. No muscle cramps, fluttering in chest. Urine output had been good, in fact seemed increased. She was eating/drinking better than usual after her April surgery.    No diarrhea or vomiting. Stool consistency has been a bit different than usual since her surgery but not watery or voluminous.      Past Medical History    Past Medical History:   Diagnosis Date     Arthropathia 08/05/2010     B12 deficiency anemia 10/21/2010     Benign essential hypertension with target blood pressure below 140/90 10/10/2016     CARDIOVASCULAR SCREENING; LDL GOAL LESS THAN 160 10/31/2010     Crohn's colitis (H) 02/15/2011     Dermatitis-dishydrotic eczema-severe 08/05/2010     Hiatal hernia      HTN (hypertension) 07/21/2011     Iron deficiency anemia 10/21/2010     Malignant neoplasm of sigmoid colon (H)      Obesity      Primary pulmonary hypertension (H) 04/06/2010       Past Surgical History   Past Surgical History:   Procedure Laterality Date     COLECTOMY WITHOUT COLOSTOMY N/A 4/6/2023    Procedure: Laparoscopic Converted to Open Total abdominal colectomy;  Surgeon: Jeroem Ashley MD;  [facility]     COLONOSCOPY  10/11/10     COLONOSCOPY N/A 2/27/2023    Procedure: ATTEMPTED COLONOSCOPY WITH SIGMOID STRICTURE BIOPSY;  Surgeon: Jonathan Alcocer MD;  Location:  GI     ESOPHAGOSCOPY, GASTROSCOPY, DUODENOSCOPY (EGD), COMBINED N/A 2/27/2023    Procedure: ESOPHAGOGASTRODUODENOSCOPY, WITH BIOPSY;  Surgeon: Jonathan Alcocer MD;  Location:  GI     HYSTERECTOMY TOTAL ABDOMINAL, BILATERAL SALPINGO-OOPHORECTOMY, COMBINED N/A 4/6/2023    Procedure: Hysterectomy total  "abdominal, bilateral salpingo-oophorectomy;  Surgeon: Sunitha Olea MD;  [facility]     SIGMOIDOSCOPY FLEXIBLE N/A 4/6/2023    Procedure: Sigmoidoscopy flexible;  Surgeon: Jerome Ashley MD;  [facility]: [facility]     SURGICAL HISTORY OF -   06/14/76    Perineorrhaphy, for widening vaginal orifice     ZZC EXPLORATORY OF ABDOMEN  1989    laparoscopy       Prior to Admission Medications   Prior to Admission Medications   Prescriptions Last Dose Informant Patient Reported? Taking?   Multiple Vitamins-Iron (MULTI-DAY PLUS IRON PO) 5/9/2023  Yes Yes   Sig: Take 1 tablet by mouth daily   acetaminophen (TYLENOL) 325 MG tablet 5/9/2023  No Yes   Sig: Take 3 tablets (975 mg) by mouth every 8 hours   Patient taking differently: Take 975 mg by mouth every 8 hours as needed for fever or pain   cyanocobalamin 1000 MCG TBCR 5/9/2023  No Yes   Sig: Take 1,000 mcg by mouth daily   ferrous sulfate (IRON) 325 (65 FE) MG tablet 5/9/2023  No Yes   Sig: TAKE ONE TABLET BY MOUTH TWICE A DAY --NO FURTHER REFILLS WITHOUT OFFICE VISIT   Patient taking differently: Take 325 mg by mouth daily (with breakfast)   fluocinonide (LIDEX) 0.05 % external cream More than a month  No Yes   Sig: Small amount to affected area(s) BID PRN   lisinopril (ZESTRIL) 10 MG tablet 5/9/2023  No Yes   Sig: Take 1 tablet (10 mg) by mouth every morning   triamterene-HCTZ (DYAZIDE) 37.5-25 MG capsule 5/9/2023  No Yes   Sig: TAKE 1 CAPSULE BY MOUTH ONCE DAILY   Patient taking differently: Take 1 capsule by mouth every morning TAKE 1 CAPSULE BY MOUTH ONCE DAILY      Facility-Administered Medications: None        Allergies   Allergies   Allergen Reactions     No Known Drug Allergy         Physical Exam   Vital Signs: /90  temp 98  Estimated body mass index is 32.37 kg/m  as calculated from the following:    Height as of 5/9/23: 1.575 m (5' 2\").    Weight as of 5/9/23: 80.3 kg (177 lb).    Constitutional: Awake, alert and in no distress. Sitting " comfortably in bed   Head: Normocephalic. Atraumatic.   Cardiovascular: Regular rate and rhythm. No murmurs, clicks, gallops or rubs. No edema in lower legs. Patient says she typically has right leg that is slightly larger than left at baseline.   Respiratory: Clear to auscultation without wheezes or crackles. Normal respiratory rate and effort.   Gastrointestinal: Abdomen soft, non-tender. BS normal.   Musculoskeletal: extremities normal- no gross deformities noted and normal muscle tone  Skin: no suspicious lesions, rashes, jaundice, or cyanosis   Psychiatric: mentation appears normal and affect normal/bright      Medical Decision Making       75 MINUTES SPENT BY ME on the date of service doing chart review, history, exam, documentation & further activities per the note.      Data     I have personally reviewed the following data over the past 24 hrs:    6.2  \   10.6 (L)   / 272     139 110 (H) 61.7 (H) /  154 (H)   5.3 13 (L) 3.60 (H) \       ALT: 23 AST: 19 AP: 136 (H) TBILI: 0.5   ALB: 4.3 TOT PROTEIN: 7.1 LIPASE: N/A

## 2023-05-10 NOTE — PROGRESS NOTES
Pt transferred from [facility] ED and admitted to [facility] room 622 at about 0250. Pt now noted with critical Potassium level at 6.6 and provider notified.Pt on cardiac monitoring, tele shows sinus rythm and appears stable in room  Pt primary RN notified. Awaiting call back with further instructions.     Call back received from Dr SELAM Vieira with updates on pt's status and abnormal lab. Multiple new orders noted per MD and primay RN notified.

## 2023-05-10 NOTE — PLAN OF CARE
"Goal Outcome Evaluation:  /53 (BP Location: Right arm)   Pulse 75   Temp 98.6  F (37  C) (Oral)   Resp 16   Ht 1.575 m (5' 2.01\")   Wt 81.1 kg (178 lb 11.2 oz)   LMP  (LMP Unknown)   SpO2 100%   BMI 32.68 kg/m       Patient is alert and oriented x4, able to make needs known- uses call light appropriately. Pt denies SOB, chest pain, n/v and pain. Voiding in bathroom independently, strict I&O's. Potassium levels elevated all day, last recheck was 5.7. VSS, on tele monitoring with NS rhythm.  Plan of care ongoing    "

## 2023-05-10 NOTE — PROGRESS NOTES
Transfer Type: [facility]  Transfer Triage Note    Date of call: 05/09/23  Time of call: 8:37 PM    Current Patient Location:  [facility]  Current Level of Care: ED    Vitals: BP:110/74 HR: 77 O2 Sats: 100 % on RA.  T 98  Diagnosis:  Hyperkalemia.  MARYA/CKD.  Recent colon cancer resection.   Is COVID-19 a concern? No  Reason for requested transfer: Further diagnostic work up, management, and consultation for specialized care   Isolation Needs: None    Outside Records: Available  Additional records may be faxed to [phone].    Transfer accepted: Yes  Stability of Patient: Patient is vitally stable, with no critical labs, and will likely remain stable throughout the transfer process  Level of Care Needed: Med Surg  Telemetry Needed:  Med (Remote) Telemetry  Expected Time of Arrival for Transfer: 0-8 hours  Arrival Location:  [facility]    Recommendations for Management and Stabilization: Not needed    Additional Comments: 67-year-old female referred from [facility] emergency department for constellation of acute kidney injury with hyperkalemia.  Patient recently underwent sigmoid colon resection for malignancy (70% neuroendocrine carcinoma and 30% moderately differentiated adenocarcinoma) on April 6, 2023.  Discharged on 4/11 with stable renal function (creatinine 1.34 BUN 44) and potassium 4.4.  Routine oncology follow-up earlier today at which time patient clinically was doing well with improved p.o. intake including fluids.  Diet high in potassium containing foods (greens and potatoes).  Improved urine output.  Routine labs demonstrated potassium of 7 with creatinine of 3.60 prompting referral to the emergency department.  Vital signs as above.  Repeat potassium 8.2 (hemolyzed).  Subsequent nonhemolyzed value of 7.6 prompting hyperkalemia protocol with calcium gluconate, insulin and glucose, IV fluids IV Lasix and albuterol by  nebulization.  Improved potassium to 5.5.  EKG on presentation did not demonstrate peaked T waves.  Following discharge patient did resume her lisinopril and Dyazide.  Not felt to be appreciably volume depleted according to the ER physician's assessment.  Transfer to [facility] largely to facilitate nephrology input.  Plan med/surge bed with [place] [facility]  Clinically stable for transfer.      Mariano Erwin MD

## 2023-05-10 NOTE — PROGRESS NOTES
6MS ADMISSION    D: Patient admitted/transferred from [facility] via stretcher for Hyperkalemia.     I: Upon arrival to the unit patient was oriented to room, unit, and call light. Patient s height, weight, and vital signs were obtained. Allergies reviewed and allergy band applied. Provider notified of patient s arrival on the unit. Adult AVS completed. Head to toe assessment completed. Education assessment completed. Care plan initiated.    A: Vital signs stable upon admission. Patient denied pain and discomfort. Two RN skin assessment completed Yes. Second RN was Rachna BROOKS Significant Skin Findings include healed vertical abdominal incisions and fading bruises at injectable sites. [facility] Nurse Consult Ordered No. Bed Algorithm can be found in PCS flow sheets (Support Surface Algorithm) and on IP [facility] NURSE RESOURCE TAB, was this used during this assessment? No. Was a pulsate mattress ordered No.    P: Continue to monitor patient s labs and status and intervene as needed. Continue with plan of care. Notify provider with any concerns or changes in patient status.

## 2023-05-11 ENCOUNTER — LAB (OUTPATIENT)
Dept: LAB | Facility: CLINIC | Age: 68
DRG: 640 | End: 2023-05-11
Payer: MEDICARE

## 2023-05-11 DIAGNOSIS — C18.7 MALIGNANT NEOPLASM OF SIGMOID COLON (H): Primary | ICD-10-CM

## 2023-05-11 LAB
ALBUMIN SERPL BCG-MCNC: 3.6 G/DL (ref 3.5–5.2)
ANION GAP SERPL CALCULATED.3IONS-SCNC: 17 MMOL/L (ref 7–15)
BACTERIA UR CULT: NORMAL
BACTERIA UR CULT: NORMAL
BASOPHILS # BLD AUTO: 0 10E3/UL (ref 0–0.2)
BASOPHILS NFR BLD AUTO: 0 %
BUN SERPL-MCNC: 51.4 MG/DL (ref 8–23)
CALCIUM SERPL-MCNC: 9.1 MG/DL (ref 8.8–10.2)
CHLORIDE SERPL-SCNC: 105 MMOL/L (ref 98–107)
CREAT SERPL-MCNC: 3.4 MG/DL (ref 0.51–0.95)
DEPRECATED HCO3 PLAS-SCNC: 19 MMOL/L (ref 22–29)
EOSINOPHIL # BLD AUTO: 0 10E3/UL (ref 0–0.7)
EOSINOPHIL NFR BLD AUTO: 0 %
ERYTHROCYTE [DISTWIDTH] IN BLOOD BY AUTOMATED COUNT: 16.7 % (ref 10–15)
GFR SERPL CREATININE-BSD FRML MDRD: 14 ML/MIN/1.73M2
GLUCOSE SERPL-MCNC: 110 MG/DL (ref 70–99)
HCT VFR BLD AUTO: 30.9 % (ref 35–47)
HGB BLD-MCNC: 9.7 G/DL (ref 11.7–15.7)
IMM GRANULOCYTES # BLD: 0 10E3/UL
IMM GRANULOCYTES NFR BLD: 1 %
LYMPHOCYTES # BLD AUTO: 0.8 10E3/UL (ref 0.8–5.3)
LYMPHOCYTES NFR BLD AUTO: 19 %
MCH RBC QN AUTO: 26.1 PG (ref 26.5–33)
MCHC RBC AUTO-ENTMCNC: 31.4 G/DL (ref 31.5–36.5)
MCV RBC AUTO: 83 FL (ref 78–100)
MONOCYTES # BLD AUTO: 0.5 10E3/UL (ref 0–1.3)
MONOCYTES NFR BLD AUTO: 11 %
NEUTROPHILS # BLD AUTO: 3.1 10E3/UL (ref 1.6–8.3)
NEUTROPHILS NFR BLD AUTO: 69 %
NRBC # BLD AUTO: 0 10E3/UL
NRBC BLD AUTO-RTO: 0 /100
PHOSPHATE SERPL-MCNC: 5.7 MG/DL (ref 2.5–4.5)
PLATELET # BLD AUTO: 223 10E3/UL (ref 150–450)
POTASSIUM SERPL-SCNC: 4.3 MMOL/L (ref 3.4–5.3)
POTASSIUM SERPL-SCNC: 4.6 MMOL/L (ref 3.4–5.3)
POTASSIUM SERPL-SCNC: 4.7 MMOL/L (ref 3.4–5.3)
POTASSIUM SERPL-SCNC: 4.7 MMOL/L (ref 3.4–5.3)
RBC # BLD AUTO: 3.71 10E6/UL (ref 3.8–5.2)
SODIUM SERPL-SCNC: 141 MMOL/L (ref 136–145)
WBC # BLD AUTO: 4.4 10E3/UL (ref 4–11)

## 2023-05-11 PROCEDURE — 99233 SBSQ HOSP IP/OBS HIGH 50: CPT | Mod: 24 | Performed by: INTERNAL MEDICINE

## 2023-05-11 PROCEDURE — 99232 SBSQ HOSP IP/OBS MODERATE 35: CPT | Performed by: INTERNAL MEDICINE

## 2023-05-11 PROCEDURE — 36416 COLLJ CAPILLARY BLOOD SPEC: CPT | Performed by: INTERNAL MEDICINE

## 2023-05-11 PROCEDURE — 81479 UNLISTED MOLECULAR PATHOLOGY: CPT | Performed by: CLINICAL NURSE SPECIALIST

## 2023-05-11 PROCEDURE — 81292 MLH1 GENE FULL SEQ: CPT | Performed by: CLINICAL NURSE SPECIALIST

## 2023-05-11 PROCEDURE — 81295 MSH2 GENE FULL SEQ: CPT | Performed by: CLINICAL NURSE SPECIALIST

## 2023-05-11 PROCEDURE — 36415 COLL VENOUS BLD VENIPUNCTURE: CPT | Performed by: INTERNAL MEDICINE

## 2023-05-11 PROCEDURE — 120N000002 HC R&B MED SURG/OB UMMC

## 2023-05-11 PROCEDURE — 250N000013 HC RX MED GY IP 250 OP 250 PS 637: Performed by: INTERNAL MEDICINE

## 2023-05-11 PROCEDURE — 81317 PMS2 GENE FULL SEQ ANALYSIS: CPT | Performed by: CLINICAL NURSE SPECIALIST

## 2023-05-11 PROCEDURE — 84132 ASSAY OF SERUM POTASSIUM: CPT | Performed by: INTERNAL MEDICINE

## 2023-05-11 PROCEDURE — G0452 MOLECULAR PATHOLOGY INTERPR: HCPCS | Mod: 26 | Performed by: STUDENT IN AN ORGANIZED HEALTH CARE EDUCATION/TRAINING PROGRAM

## 2023-05-11 PROCEDURE — 250N000011 HC RX IP 250 OP 636: Performed by: INTERNAL MEDICINE

## 2023-05-11 PROCEDURE — 81298 MSH6 GENE FULL SEQ: CPT | Performed by: CLINICAL NURSE SPECIALIST

## 2023-05-11 PROCEDURE — 85004 AUTOMATED DIFF WBC COUNT: CPT | Performed by: INTERNAL MEDICINE

## 2023-05-11 RX ADMIN — HEPARIN SODIUM 5000 UNITS: 5000 INJECTION, SOLUTION INTRAVENOUS; SUBCUTANEOUS at 19:54

## 2023-05-11 RX ADMIN — CYANOCOBALAMIN TAB 1000 MCG 1000 MCG: 1000 TAB at 09:30

## 2023-05-11 RX ADMIN — MULTIPLE VITAMINS W/ MINERALS TAB 1 TABLET: TAB at 09:30

## 2023-05-11 RX ADMIN — SODIUM ZIRCONIUM CYCLOSILICATE 10 G: 5 POWDER, FOR SUSPENSION ORAL at 08:18

## 2023-05-11 RX ADMIN — SODIUM ZIRCONIUM CYCLOSILICATE 10 G: 5 POWDER, FOR SUSPENSION ORAL at 15:19

## 2023-05-11 RX ADMIN — FERROUS SULFATE TAB 325 MG (65 MG ELEMENTAL FE) 325 MG: 325 (65 FE) TAB at 09:30

## 2023-05-11 RX ADMIN — SODIUM ZIRCONIUM CYCLOSILICATE 10 G: 5 POWDER, FOR SUSPENSION ORAL at 19:53

## 2023-05-11 RX ADMIN — HEPARIN SODIUM 5000 UNITS: 5000 INJECTION, SOLUTION INTRAVENOUS; SUBCUTANEOUS at 08:18

## 2023-05-11 ASSESSMENT — ACTIVITIES OF DAILY LIVING (ADL)
ADLS_ACUITY_SCORE: 20

## 2023-05-11 NOTE — PLAN OF CARE
"Goal Outcome Evaluation:    /57 (BP Location: Left arm)   Pulse 76   Temp 98.2  F (36.8  C) (Oral)   Resp 18   Ht 1.575 m (5' 2.01\")   Wt 81.1 kg (178 lb 11.2 oz)   LMP  (LMP Unknown)   SpO2 98%   BMI 32.68 kg/m      [id number]    A&ox4, able to make needs known, call light within reach. Denies SOB, chest pain, n/v, and pain. Up ad gena, uses bathroom appropriately- strict I&Os. LBM 05/11. Electrolytes WNL, plan of care ongoing. Possible discharge tomorrow                  "

## 2023-05-11 NOTE — PLAN OF CARE
[id number]    A&O x4.  Calls appropriately and able to make needs known.  Denies pain, SOB, chest pain, nausea, numbness, tingling, headache, dizziness.    Independent in room, stable on room air, continent of bladder and bowel.  No BMs this shift.    PT remains on tele monitoring.  NSR with occasional PVCs.    K+ checked q4 hrs.    Pt slept well overnight, no labored breathing, resp rate regular.    No acute concerns overnight.

## 2023-05-11 NOTE — CARE PLAN
Patient's potassium has trended downward and is further normalized.  Patient's hemodynamics has also remained within normal limits.      CARLOS OCHOA MD  Hospitalist Service  [facility]

## 2023-05-11 NOTE — PROGRESS NOTES
Nephrology Progress Note  05/11/2023         Assessment & Recommendations:   Nadia Ladd is a 67 year old year old female:    # MARYA stage 2 likely secondary to ATN from hypotension (diuretic use and increase bowel movement)  # CKD stage 3; unclear etiology DDx chronic dehydration/recurrent MARYA from Crohn's disease, Hypertension or Sulfasalazine (stopped in 4/23); BL Cr 1.3-1.6  Etiology of acute kidney injury is likely from ATN in the setting of unaware hypotension/dehydration from ongoing diuretic use and increase BM after surgery. Her UA from yesterday was bland except WBC but she has no symptoms of UTI.    Kidney ultrasound showed no evidence of hydronephrosis or masses but that showed that she had some mild atrophic changes consistent with chronic kidney disease.      In regard to CKD, she has baseline creatinine of 1.3-1.6.  Again, etiology likely multifactorial in the setting of chronic dehydration/recurrent MARYA from Crohn's, hypertension or sulfasalazine use which can be associated with AIN.  Sulfasalazine was discontinued since April 2023 so should no longer contribute.  UA appears to be clean and no protein so less likely GN. IgAN is associated with Crohn's but UA is not suggestive of that.     Regarding chemotherapy, would avoid nephrotoxic chemo at this time. There is a talk about potential immunotherapy which is not a conventional nephrotoxic agents. However, if this is immune check point inhibitor, I would wait until at least her kidney function improves to steady state (we do not know how much recover will she gets). The reason is that ICPi can be associated with nephritis and if we start while she is recovering from MARYA, it is hard to monitor the kidney SE from the medications.   - Will check FLC, SPEP and sIF-> low suspicion but sometimes we see the patients present like deep ATN  - Strict I/Os  - Daily BW  - Daily renal panel  - Dialysis diet  - Avoid nephrotoxic agents  # Hyperkalemia  Likely  "in the setting of triamterene use, high K diet and MARYA. S/p IV isotonic bicarb.   - Lokelma 10 g TID x2 days then 10 daily > please continue for now, discontinue lokelma if K <4.0  - Check K+ twice today  - NO diuretics today  - Hold hydrochlorothiazide/Triamterene  - Low K diet  # Colon cancer s/p total colectomy and ALEXANDER BSO in 4/23  # Neuroendocrine carcinoma component (70%) and moderately-differentiated adenocarcinoma component (30%).   - Not on chemo yet.   Recommendations were communicated to primary team via note and verbal.   # Anemia   Hb 9.7. Likely multifactorial Not indicated for ESAs, given recent cancer.     Santana Brenner MD   Division of Renal Disease and Hypertension  [facility]  [email or web address]  Vocera Web Console    Interval History :   Nursing and provider notes from last 24 hours reviewed.  In the last 24 hours Nadia Ladd received IV isotonic bicarb for 1 L. Bicarb improves to 19 this morning. Cr also stable at 3.4 from peak at 3.8. K+ is 4.7 this morning. Phos imrpves to 5.7 from 5.9.  UOP was 1.6 L yesterday and since midnight already 1.2 L. /68. US kidneys shows signs of CKD but not obstruction. UA showed improvement in urine WBC with trace blood. Negative protein.  She is doing well today denies any chest pain or short of breath.  She has good appetite.  She inquires about her chemotherapy whether can she gets that since now she has kidney problem. Also questions about her port placement.     Review of Systems:   I reviewed the following systems:  10 systems negative unless mentioned above.     Physical Exam:   I/O last 3 completed shifts:  In: 2920 [P.O.:2920]  Out: 1675 [Urine:1675]   /58 (BP Location: Right arm)   Pulse 82   Temp 99.1  F (37.3  C) (Oral)   Resp 18   Ht 1.575 m (5' 2.01\")   Wt 81.1 kg (178 lb 11.2 oz)   LMP  (LMP Unknown)   SpO2 98%   BMI 32.68 kg/m       GENERAL APPEARANCE: No distress,  awake  EYES: NO scleral icterus, pupils equal  Lymphatics: no " cervical or supraclavicular LAD  Pulmonary: lungs clear to auscultation with equal breath sounds bilaterally, no clubbing  CV: regular rhythm, normal rate, no rub   - Edema: none  GI: soft, nontender, normal bowel sounds; + surgical scar  MS: no evidence of inflammation in joints, no muscle tenderness  : No CVA tenderness  SKIN: no rash, warm, dry, no cyanosis  NEURO: face symmetric, no asterixis     Labs:   All labs reviewed by me  Electrolytes/Renal - Recent Labs   Lab Test 05/11/23  0700 05/11/23  0159 05/10/23  2213 05/10/23  1838 05/10/23  1631 05/10/23  1258 05/10/23  1044 05/10/23  0518 05/10/23  0315 04/09/23  0829 04/08/23  0759 04/08/23  0601 04/07/23  0646     --   --   --  140 139  --   --  140   < > 135*  --  139   POTASSIUM 4.7  4.7 4.6 4.5   < > 5.7* 6.0*  --    < > 6.5*   < > 4.3  --  4.8   CHLORIDE 105  --   --   --   --  108*  --   --  112*   < > 105  --  108*   CO2 19*  --   --   --   --  17*  --   --  15*   < > 20*  --  19*   BUN 51.4*  --   --   --   --  52.6*  --   --  57.5*   < > 26.6*  --  22.8   CR 3.40*  --   --   --  3.30* 3.30*  --   --  3.84*   < > 1.64*  --  1.63*   *  --   --   --   --  92 116*   < > 115*   < > 111*   < > 139*  139*   LIVIER 9.1  --   --   --   --  9.5  --   --  9.5   < > 8.0*  --  8.1*   MAG  --   --   --   --   --   --   --   --   --   --  1.9  --  1.8   PHOS 5.7*  --   --   --   --   --   --   --  5.9*  --   --   --   --     < > = values in this interval not displayed.       CBC -   Recent Labs   Lab Test 05/11/23  0700 05/10/23  0315 05/09/23  1413   WBC 4.4 6.2 6.9   HGB 9.7* 10.6* 11.3*    272 332       LFTs -   Recent Labs   Lab Test 05/11/23  0700 05/10/23  0315 05/09/23  1413 03/31/23  1209 02/16/23  0959   ALKPHOS  --   --  136* 91 110*   BILITOTAL  --   --  0.5 0.2 0.3   ALT  --   --  23 10 11   AST  --   --  19 12 13   PROTTOTAL  --   --  7.1 6.6 6.1*   ALBUMIN 3.6 3.9 4.3 4.0 3.6       Iron Panel -   Recent Labs   Lab Test  04/09/23  0829 02/16/23  0959 03/12/18  1629 11/30/16  1628   IRON 8* 27*  --  43   IRONSAT 6* 10*  --  19   BRENT  --  21   < >  --     < > = values in this interval not displayed.         Imaging:  All imaging studies reviewed by me.     Current Medications:    cyanocobalamin  1,000 mcg Oral Daily     ferrous sulfate  325 mg Oral Daily with breakfast     heparin ANTICOAGULANT  5,000 Units Subcutaneous Q12H     [Held by provider] lisinopril  10 mg Oral QAM     multivitamin w/minerals  1 tablet Oral Daily     sodium chloride (PF)  3 mL Intracatheter Q8H     sodium zirconium cyclosilicate  10 g Oral TID    Followed by     [START ON 5/12/2023] sodium zirconium cyclosilicate  10 g Oral Daily     [Held by provider] triamterene-HCTZ  1 capsule Oral Daily       Santana Brenner MD

## 2023-05-11 NOTE — PROGRESS NOTES
[facility]    Medicine Progress Note - Hospitalist Service, GOLD TEAM 17    Date of Admission:  5/10/2023    Assessment & Plan      67 year old female with past medical h/o Crohn's colitis, anemia (both iron deficiency and B12 deficiency listed), primary pulmonary hypertension, HTN, CKD stage III, newly diagnosed colon cancer (neuroendocrine carcinoma and adenocarcinoma) who had recent colon resection 4/6/23 (but no ostomy) who presented to [facility] ED due to incidental hyperkalemia.         Hyperkalemia  Non-anion gap metabolic acidosis  Likely multifactorial due to pre-renal MARYA + lisinopril +  dietary sources. Was normal as of 4/11 and on recheck at onc appt was 7.   Improved with shifting (calcium gluconate, albuterol, insulin/D50) and lasix (60mg) and fluids. Was 5.3 prior to transferring from ED to [facility]   >Patient was started on IV fluids.  Lisinopril and hydrochlorothiazide triamterene was held.  Patient was started on low potassium diet.  Renal consultation was done.  >Case discussed with nephrology on 5/10/2023.  Patient started on sodium bicarbonate infusion at 100 cc an hour for 10 hours.  Potassium level trending down.  Last level at 6 mmol/L.  Patient is on low potassium diet.  Plan to give furosemide 80 mg x 1 dose.  >On 5/11/2023.  Potassium level has normalized.    Plan:  - Check potassium level at 6 PM     Non-oliguric MARYA on CKD  Baseline creat in is in the low 1.3-1.5 range. Presented with creat of 3.6. Etiology is a bit confusing but suspect overall she was pre-renal after her surgery due to poor eating/drinking and then the resumption of her HTN meds may have tipped her over the edge. ATN possible given she mentioned ?increased urine output. UA had some hyaline casts which suggests she is a bit dry and BP is soft so that would be fitting as well.   > Patient was treated with IV fluids.  Patient was placed on IV bicarbonate infusion on  "5/10/2023.  >Nephrology consulted on 5/10/2023.  Appreciate nephrology recommendation  > Renal ultrasound on 5/10/2023 showed Mildly echogenic and atrophic appearance of the bilateral kidneys,compatible chronic medical renal disease. No hydronephrosis.  >Case discussed with nephrology on 5/11/2023.  >Creatinine level at 3.4 on 5/11/2023.  Level has more or less stabilized.  Plan:  - Renal panel in a.m.  - Strict RADHA.  - Avoid hypotension, hypovolemia.  - Renal dose medication.  - Avoid nephrotoxic agents.     Colon cancer  (70% neuroendocrine carcinoma and 30% moderately differentiated adenocarcinoma)  S/p sigmoid resection 4/6/23  -follows with oncology and had just started treatment planning  -VTE prophylaxis     Crohn's- no longer on meds due to her colon resection     Iron deficiency and b12 deficiency anemia  Hemoglobin had been quite low during her April hospitalization (was in 6-8 range). Recheck on 4/11 was 8.9 prior to discharge. On presentation to ED it was 11.3.  -continue home iron + b12, multivitamin  -trend and watch for signs of bleeding             Diet: Combination Diet Regular Diet Adult; 2 gm K Diet    DVT Prophylaxis: Heparin SQ  Clement Catheter: Not present  Lines: None     Cardiac Monitoring: None  Code Status: Full Code      Clinically Significant Risk Factors        # Hyperkalemia: Highest K = 8.2 mmol/L in last 2 days, will monitor as appropriate          # Acute Kidney Injury, unspecified: based on a >150% or 0.3 mg/dL increase in last creatinine compared to past 90 day average, will monitor renal function         # Obesity: Estimated body mass index is 32.68 kg/m  as calculated from the following:    Height as of this encounter: 1.575 m (5' 2.01\").    Weight as of this encounter: 81.1 kg (178 lb 11.2 oz)., PRESENT ON ADMISSION         Disposition Plan      Expected Discharge Date: 05/15/2023                  Omar Ag MD  Hospitalist Service, GOLD TEAM 17  [facility] " [facility]  Securely message with [employer] (more info)  Text page via [employer] Paging/Directory   See signed in provider for up to date coverage information  ______________________________________________________________________    Interval History     Overnight events reviewed.  Patient reports doing well.  Patient denies any nausea, vomiting, chest pain, shortness of breath, lightheadedness or dizziness.      Physical Exam   Vital Signs: Temp: 98.2  F (36.8  C) Temp src: Oral BP: 105/57 Pulse: 76   Resp: 18 SpO2: 98 % O2 Device: None (Room air)    Weight: 178 lbs 11.2 oz    General Appearance: Laying in bed in no acute distress  Respiratory: Bilaterally clear to auscultation  Cardiovascular: S1, S2 normal  GI: Soft, nontender, bowel sounds  Skin:   Other: Alert, oriented, nonfocal    Medical Decision Making       **CLEAR ALL SELECTIONS**      Data   ------------------------- PAST 24 HR DATA REVIEWED -----------------------------------------------    I have personally reviewed the following data over the past 24 hrs:    4.4  \   9.7 (L)   / 223     141 105 51.4 (H) /  110 (H)   4.7; 4.7 19 (L) 3.40 (H) \       ALT: N/A AST: N/A AP: N/A TBILI: N/A   ALB: 3.6 TOT PROTEIN: N/A LIPASE: N/A       Imaging results reviewed over the past 24 hrs:   No results found for this or any previous visit (from the past 24 hour(s)).

## 2023-05-12 VITALS
HEIGHT: 62 IN | TEMPERATURE: 97.6 F | WEIGHT: 178.7 LBS | BODY MASS INDEX: 32.89 KG/M2 | RESPIRATION RATE: 18 BRPM | OXYGEN SATURATION: 97 % | HEART RATE: 69 BPM | SYSTOLIC BLOOD PRESSURE: 121 MMHG | DIASTOLIC BLOOD PRESSURE: 63 MMHG

## 2023-05-12 LAB
ALBUMIN MFR UR ELPH: 4.9 MG/DL (ref 1–14)
ALBUMIN SERPL BCG-MCNC: 3.8 G/DL (ref 3.5–5.2)
ANION GAP SERPL CALCULATED.3IONS-SCNC: 13 MMOL/L (ref 7–15)
BASOPHILS # BLD AUTO: 0 10E3/UL (ref 0–0.2)
BASOPHILS NFR BLD AUTO: 1 %
BUN SERPL-MCNC: 45.5 MG/DL (ref 8–23)
CALCIUM SERPL-MCNC: 9.1 MG/DL (ref 8.8–10.2)
CHLORIDE SERPL-SCNC: 100 MMOL/L (ref 98–107)
CREAT SERPL-MCNC: 2.77 MG/DL (ref 0.51–0.95)
CREAT UR-MCNC: 80.3 MG/DL
DEPRECATED HCO3 PLAS-SCNC: 20 MMOL/L (ref 22–29)
EOSINOPHIL # BLD AUTO: 0 10E3/UL (ref 0–0.7)
EOSINOPHIL NFR BLD AUTO: 0 %
ERYTHROCYTE [DISTWIDTH] IN BLOOD BY AUTOMATED COUNT: 16.2 % (ref 10–15)
GFR SERPL CREATININE-BSD FRML MDRD: 18 ML/MIN/1.73M2
GLUCOSE SERPL-MCNC: 143 MG/DL (ref 70–99)
HCT VFR BLD AUTO: 33.3 % (ref 35–47)
HGB BLD-MCNC: 10.2 G/DL (ref 11.7–15.7)
HOLD SPECIMEN: NORMAL
HOLD SPECIMEN: NORMAL
IMM GRANULOCYTES # BLD: 0 10E3/UL
IMM GRANULOCYTES NFR BLD: 0 %
KAPPA LC FREE SER-MCNC: 6.14 MG/DL (ref 0.33–1.94)
KAPPA LC FREE/LAMBDA FREE SER NEPH: 2.12 {RATIO} (ref 0.26–1.65)
LAMBDA LC FREE SERPL-MCNC: 2.89 MG/DL (ref 0.57–2.63)
LYMPHOCYTES # BLD AUTO: 0.6 10E3/UL (ref 0.8–5.3)
LYMPHOCYTES NFR BLD AUTO: 15 %
MCH RBC QN AUTO: 25.9 PG (ref 26.5–33)
MCHC RBC AUTO-ENTMCNC: 30.6 G/DL (ref 31.5–36.5)
MCV RBC AUTO: 85 FL (ref 78–100)
MONOCYTES # BLD AUTO: 0.4 10E3/UL (ref 0–1.3)
MONOCYTES NFR BLD AUTO: 10 %
NEUTROPHILS # BLD AUTO: 3.2 10E3/UL (ref 1.6–8.3)
NEUTROPHILS NFR BLD AUTO: 74 %
NRBC # BLD AUTO: 0 10E3/UL
NRBC BLD AUTO-RTO: 0 /100
PHOSPHATE SERPL-MCNC: 5.1 MG/DL (ref 2.5–4.5)
PLATELET # BLD AUTO: 218 10E3/UL (ref 150–450)
POTASSIUM SERPL-SCNC: 4 MMOL/L (ref 3.4–5.3)
PROT/CREAT 24H UR: 0.06 MG/MG CR (ref 0–0.2)
RBC # BLD AUTO: 3.94 10E6/UL (ref 3.8–5.2)
SODIUM SERPL-SCNC: 133 MMOL/L (ref 136–145)
TOTAL PROTEIN SERUM FOR ELP: 5.8 G/DL (ref 6.4–8.3)
WBC # BLD AUTO: 4.2 10E3/UL (ref 4–11)

## 2023-05-12 PROCEDURE — 85025 COMPLETE CBC W/AUTO DIFF WBC: CPT | Performed by: INTERNAL MEDICINE

## 2023-05-12 PROCEDURE — 250N000011 HC RX IP 250 OP 636: Performed by: INTERNAL MEDICINE

## 2023-05-12 PROCEDURE — 99239 HOSP IP/OBS DSCHRG MGMT >30: CPT | Performed by: INTERNAL MEDICINE

## 2023-05-12 PROCEDURE — 86334 IMMUNOFIX E-PHORESIS SERUM: CPT | Performed by: PATHOLOGY

## 2023-05-12 PROCEDURE — 99233 SBSQ HOSP IP/OBS HIGH 50: CPT | Mod: 24 | Performed by: INTERNAL MEDICINE

## 2023-05-12 PROCEDURE — 36415 COLL VENOUS BLD VENIPUNCTURE: CPT | Performed by: INTERNAL MEDICINE

## 2023-05-12 PROCEDURE — 86334 IMMUNOFIX E-PHORESIS SERUM: CPT | Mod: 26

## 2023-05-12 PROCEDURE — 84165 PROTEIN E-PHORESIS SERUM: CPT | Mod: 26

## 2023-05-12 PROCEDURE — 250N000013 HC RX MED GY IP 250 OP 250 PS 637: Performed by: INTERNAL MEDICINE

## 2023-05-12 PROCEDURE — 83521 IG LIGHT CHAINS FREE EACH: CPT | Performed by: INTERNAL MEDICINE

## 2023-05-12 PROCEDURE — 84165 PROTEIN E-PHORESIS SERUM: CPT | Mod: TC | Performed by: PATHOLOGY

## 2023-05-12 PROCEDURE — 84155 ASSAY OF PROTEIN SERUM: CPT | Performed by: INTERNAL MEDICINE

## 2023-05-12 PROCEDURE — 80069 RENAL FUNCTION PANEL: CPT | Performed by: INTERNAL MEDICINE

## 2023-05-12 RX ORDER — ACETAMINOPHEN 325 MG/1
975 TABLET ORAL EVERY 8 HOURS PRN
COMMUNITY
Start: 2023-05-12 | End: 2023-05-18

## 2023-05-12 RX ADMIN — SODIUM ZIRCONIUM CYCLOSILICATE 10 G: 5 POWDER, FOR SUSPENSION ORAL at 08:01

## 2023-05-12 RX ADMIN — HEPARIN SODIUM 5000 UNITS: 5000 INJECTION, SOLUTION INTRAVENOUS; SUBCUTANEOUS at 08:01

## 2023-05-12 RX ADMIN — MULTIPLE VITAMINS W/ MINERALS TAB 1 TABLET: TAB at 08:01

## 2023-05-12 RX ADMIN — FERROUS SULFATE TAB 325 MG (65 MG ELEMENTAL FE) 325 MG: 325 (65 FE) TAB at 08:01

## 2023-05-12 RX ADMIN — CYANOCOBALAMIN TAB 1000 MCG 1000 MCG: 1000 TAB at 08:01

## 2023-05-12 ASSESSMENT — ACTIVITIES OF DAILY LIVING (ADL)
ADLS_ACUITY_SCORE: 20

## 2023-05-12 NOTE — PROGRESS NOTES
6MS DISCHARGE    D: Patient discharged to home at 1630. Patient accompanied by patient transportation and son.    I: Discharge prescriptions given to patient. All discharge medications and instructions reviewed with patient. Patient instructed to seek care if experiencing worsening symptoms, such as signs of increased K. Other phone numbers to call with questions or concerns after discharge reviewed. Education completed.    A: patient verbalized understanding of discharge medications and instructions. Prescribed home medications given to patient.  Belongings returned to patient.    P: Patient to follow-up on 5/17/2023 with PMD.

## 2023-05-12 NOTE — PLAN OF CARE
"Goal Outcome Evaluation:    VS: /63 (BP Location: Right arm)   Pulse 69   Temp 97.6  F (36.4  C) (Oral)   Resp 18   Ht 1.575 m (5' 2.01\")   Wt 81.1 kg (178 lb 11.2 oz)   LMP  (LMP Unknown)   SpO2 97%   BMI 32.68 kg/m     O2: Stable on room air, denies chest pain and shortness of breath   Output: Voids spontaneously without difficulty   Last BM: 5/10/2023   Activity: independent   Up for meals? Yes   Skin: Old abdominal surgical incision-open to air   Pain: Denies pain   CMS: Intact, denies numbness and tingling, A/Ox4   Dressing: N/a    Diet: Limited potassium diet   LDA: No IV access   Equipment: IV pump and pole, personal belongings   Plan: Discharge later today         Plan of Care Reviewed With: patient    Overall Patient Progress: improvingOverall Patient Progress: improving           "

## 2023-05-12 NOTE — PROGRESS NOTES
Nephrology Progress Note  05/12/2023         Assessment & Recommendations:   Nadia Ladd is a 67 year old year old female:    # MARYA stage 2 likely secondary to ATN from hypotension (diuretic use and increase bowel movement)  # CKD stage 3; unclear etiology DDx chronic dehydration/recurrent MARYA from Crohn's disease, Hypertension or Sulfasalazine (stopped in 4/23); BL Cr 1.3-1.6  Etiology of acute kidney injury is likely from ATN in the setting of unaware hypotension/dehydration from ongoing diuretic use and increase BM after surgery. Her UA from yesterday was bland except WBC but she has no symptoms of UTI.    Kidney ultrasound showed no evidence of hydronephrosis or masses but that showed that she had some mild atrophic changes consistent with chronic kidney disease.      In regard to CKD, she has baseline creatinine of 1.3-1.6.  Again, etiology likely multifactorial in the setting of chronic dehydration/recurrent MARYA from Crohn's, hypertension or sulfasalazine use which can be associated with AIN.  Sulfasalazine was discontinued since April 2023 so should no longer contribute.  UA appears to be clean and no protein so less likely GN. IgAN is associated with Crohn's but UA is not suggestive of that.     Regarding chemotherapy, would avoid nephrotoxic chemo at this time. There is a talk about potential immunotherapy which is not a conventional nephrotoxic agents. However, if this is immune check point inhibitor, I would wait until at least her kidney function improves to steady state (we do not know how much recover will she gets). The reason is that ICPi can be associated with nephritis and if we start while she is recovering from MARYA, it is hard to monitor the kidney SE from the medications.     Today her kidney function has improved and creatinine is down from 3.4-2.77.  Potassium is down to 4.0. K/L ratio 2.12 which is normal for kidney dysfunction. UPCR 0.06.  - OK to be dismissed from neph standpoint,  "follow up PCP with labs in 1 to 2 weeks and with me in 4 weeks  - Please do not resume blood pressure medication: hydrochlorothiazide/triamterene  # Mild hyponatremia  Na 133, will monitor.   # Hyperkalemia; resolved  Likely in the setting of triamterene use, high K diet and MARYA. S/p IV isotonic bicarb.  Potassium is now normalized at this morning is only 4.0.  - Stop Lokelma  # Colon cancer s/p total colectomy and ALEXANDER BSO in 4/23  # Neuroendocrine carcinoma component (70%) and moderately-differentiated adenocarcinoma component (30%).   - Not on chemo yet.     Recommendations were communicated to primary team via note and verbal.   # Anemia   Hb 9.7. Likely multifactorial Not indicated for ESAs, given recent cancer.     Santana Brenner MD   Division of Renal Disease and Hypertension  [facility]  [employer]  Vocera Web Console    Interval History :   Nursing and provider notes from last 24 hours reviewed.  In the last 24 hours Nadia Ladd has done really well. She urinates 1.7 L yesterday and K+ in the afternoon was 4.3. This morning, they have not drawn her labs yet. BP better at 121/63 mmHg.  Today she feels good her appetite is normal.  She urinates more.  Denies any constipation diarrhea nausea vomiting. Ready to go home.     Review of Systems:   I reviewed the following systems:  10 systems negative unless mentioned above.     Physical Exam:   I/O last 3 completed shifts:  In: -   Out: 1500 [Urine:1500]   /63 (BP Location: Right arm)   Pulse 69   Temp 97.6  F (36.4  C) (Oral)   Resp 18   Ht 1.575 m (5' 2.01\")   Wt 81.1 kg (178 lb 11.2 oz)   LMP  (LMP Unknown)   SpO2 97%   BMI 32.68 kg/m       GENERAL APPEARANCE: No distress,  awake  EYES: NO scleral icterus, pupils equal  Lymphatics: no cervical or supraclavicular LAD  Pulmonary: lungs clear to auscultation with equal breath sounds bilaterally, no clubbing  CV: regular rhythm, normal rate, no rub   - Edema: none  GI: soft, nontender, normal bowel " sounds; + surgical scar  MS: no evidence of inflammation in joints, no muscle tenderness  : No CVA tenderness  SKIN: no rash, warm, dry, no cyanosis  NEURO: face symmetric, no asterixis     Labs:   All labs reviewed by me  Electrolytes/Renal -   Recent Labs   Lab Test 05/11/23  1739 05/11/23  0700 05/11/23  0159 05/10/23  1838 05/10/23  1631 05/10/23  1258 05/10/23  1044 05/10/23  0518 05/10/23  0315 04/09/23  0829 04/08/23  0759 04/08/23  0601 04/07/23  0646   NA  --  141  --   --  140 139  --   --  140   < > 135*  --  139   POTASSIUM 4.3 4.7  4.7 4.6   < > 5.7* 6.0*  --    < > 6.5*   < > 4.3  --  4.8   CHLORIDE  --  105  --   --   --  108*  --   --  112*   < > 105  --  108*   CO2  --  19*  --   --   --  17*  --   --  15*   < > 20*  --  19*   BUN  --  51.4*  --   --   --  52.6*  --   --  57.5*   < > 26.6*  --  22.8   CR  --  3.40*  --   --  3.30* 3.30*  --   --  3.84*   < > 1.64*  --  1.63*   GLC  --  110*  --   --   --  92 116*   < > 115*   < > 111*   < > 139*  139*   LIVIER  --  9.1  --   --   --  9.5  --   --  9.5   < > 8.0*  --  8.1*   MAG  --   --   --   --   --   --   --   --   --   --  1.9  --  1.8   PHOS  --  5.7*  --   --   --   --   --   --  5.9*  --   --   --   --     < > = values in this interval not displayed.       CBC -   Recent Labs   Lab Test 05/11/23  0700 05/10/23  0315 05/09/23  1413   WBC 4.4 6.2 6.9   HGB 9.7* 10.6* 11.3*    272 332       LFTs -   Recent Labs   Lab Test 05/11/23  0700 05/10/23  0315 05/09/23  1413 03/31/23  1209 02/16/23  0959   ALKPHOS  --   --  136* 91 110*   BILITOTAL  --   --  0.5 0.2 0.3   ALT  --   --  23 10 11   AST  --   --  19 12 13   PROTTOTAL  --   --  7.1 6.6 6.1*   ALBUMIN 3.6 3.9 4.3 4.0 3.6       Iron Panel -   Recent Labs   Lab Test 04/09/23  0829 02/16/23  0959 03/12/18  1629 11/30/16  1628   IRON 8* 27*  --  43   IRONSAT 6* 10*  --  19   BRENT  --  21   < >  --     < > = values in this interval not displayed.         Imaging:  All imaging studies  reviewed by me.     Current Medications:    cyanocobalamin  1,000 mcg Oral Daily     ferrous sulfate  325 mg Oral Daily with breakfast     heparin ANTICOAGULANT  5,000 Units Subcutaneous Q12H     [Held by provider] lisinopril  10 mg Oral QAM     multivitamin w/minerals  1 tablet Oral Daily     sodium chloride (PF)  3 mL Intracatheter Q8H     sodium zirconium cyclosilicate  10 g Oral Daily     [Held by provider] triamterene-HCTZ  1 capsule Oral Daily       Santana Brenner MD

## 2023-05-12 NOTE — PLAN OF CARE
"/68 (BP Location: Left arm, Patient Position: Supine)   Pulse 75   Temp 98.3  F (36.8  C) (Oral)   Resp 18   Ht 1.575 m (5' 2.01\")   Wt 81.1 kg (178 lb 11.2 oz)   LMP  (LMP Unknown)   SpO2 98%   BMI 32.68 kg/m      Pt is AOx4. Pt denies chest pain, SOB, Numbness or tingling, N/V. Pt denies pain. Pt is VSS, Afebrile and on RA. Pt is independent in the room, is able to make needs known and calls appropriately. No acute events during shift.    Plan is to potentially discharge 5/12 pending potassium levels.  " To SHERRY for review. Please advise on lab orders.

## 2023-05-12 NOTE — DISCHARGE SUMMARY
"[facility]  Hospitalist Discharge Summary      Date of Admission:  5/10/2023  Date of Discharge:  5/12/2023  Discharging Provider: Omar Ag MD  Discharge Service: Hospitalist Service, GOLD TEAM 17    Discharge Diagnoses       Clinically Significant Risk Factors     # Obesity: Estimated body mass index is 32.68 kg/m  as calculated from the following:    Height as of this encounter: 1.575 m (5' 2.01\").    Weight as of this encounter: 81.1 kg (178 lb 11.2 oz).       Follow-ups Needed After Discharge   Follow-up Appointments     Adult [facility] Follow-up and recommended labs and tests      Follow up with primary care provider, Nomi Cartwright, within 7   days for hospital follow- up.  The following labs/tests are recommended:   CBC, BMP.   Follow up with Oncology as suggested  Follow up with Nephrology on 06/13/2023    Appointments on [place] and/or [facility] (with [facility] or [facility]   provider or service). Call [phone] if you haven't heard regarding   these appointments within 7 days of discharge.             Unresulted Labs Ordered in the Past 30 Days of this Admission     Date and Time Order Name Status Description    5/12/2023  9:29 AM Protein Electrophoresis, Serum In process     5/12/2023 12:02 AM Protein Immunofixation Serum In process     5/12/2023 12:02 AM [place]le[place] and lambda light chain In process     5/9/2023  2:36 PM PATHOLOGY ADDITIONAL TESTING In process       These results will be followed up by primary care    Discharge Disposition   Discharged to home  Condition at discharge: Stable    Hospital Course     67 year old female with past medical h/o Crohn's colitis, anemia (both iron deficiency and B12 deficiency listed), primary pulmonary hypertension, HTN, CKD stage III, newly diagnosed colon cancer (neuroendocrine carcinoma and adenocarcinoma) who had recent colon resection 4/6/23 (but no ostomy) who presented to [facility] ED due to " incidental hyperkalemia.         Hyperkalemia  Non-anion gap metabolic acidosis  Likely multifactorial due to pre-renal MARYA + lisinopril +  dietary sources. Was normal as of 4/11 and on recheck at onc appt was 7.   Improved with shifting (calcium gluconate, albuterol, insulin/D50) and lasix (60mg) and fluids. Was 5.3 prior to transferring from ED to [facility]   >Patient was started on IV fluids.  Lisinopril and hydrochlorothiazide triamterene was held.  Patient was started on low potassium diet.  Renal consultation was done.  >Case discussed with nephrology on 5/10/2023.  Patient started on sodium bicarbonate infusion at 100 cc an hour for 10 hours.  Potassium level trended down.  Patient was also given furosemide 80 mg x 1 dose.  >On 5/11/2023.  Potassium level has normalized.    Plan:  - Continue low potassium diet on discharge.     Non-oliguric MARYA on CKD  MARYA likely secondary to ATN from hypotension  Baseline creat in is in the low 1.3-1.5 range. Presented with creat of 3.6. Etiology is a bit confusing but suspect overall she was pre-renal/ATN after her surgery due to poor eating/drinking and then the resumption of her HTN meds may have tipped her over the edge. ATN possible given she mentioned ?increased urine output. UA had some hyaline casts which suggests she is a bit dry and BP is soft so that would be fitting as well.   > Patient was treated with IV fluids.  Patient was placed on IV bicarbonate infusion on 5/10/2023.  >Nephrology consulted on 5/10/2023.  Appreciate nephrology recommendation  > Renal ultrasound on 5/10/2023 showed Mildly echogenic and atrophic appearance of the bilateral kidneys,compatible chronic medical renal disease. No hydronephrosis.  >Case discussed with nephrology on 5/11/2023.  >Creatinine level at 3.4 on 5/11/2023.    Creatinine 2.77 on 5/12/2023.  >Case discussed with nephrology on 5/13/2023.  Plan to discharge the patient with follow-up BMP within a week.  Patient will also  follow-up with nephrology in June.  Plan:  -  BMP within a week  - Follow-up with nephrology in June as suggested.  - Avoid hypotension, hypovolemia.  - Renal dose medication.  - Avoid nephrotoxic agents.     Colon cancer  (70% neuroendocrine carcinoma and 30% moderately differentiated adenocarcinoma)  S/p sigmoid resection 4/6/23  -follows with oncology and had just started treatment planning  -VTE prophylaxis     Crohn's- no longer on meds due to her colon resection     Iron deficiency and b12 deficiency anemia  Hemoglobin had been quite low during her April hospitalization (was in 6-8 range). Recheck on 4/11 was 8.9 prior to discharge. On presentation to ED it was 11.3.  -continue home iron + b12, multivitamin  -trend and watch for signs of bleeding                 Diet: Combination Diet Regular Diet Adult; 2 gm K Diet    DVT Prophylaxis: Heparin SQ  Clement Catheter: Not present  Lines: None     Cardiac Monitoring: None  Code Status: Full Code            Consultations This Hospital Stay   NEPHROLOGY GENERAL ADULT IP CONSULT    Code Status   Full Code    Time Spent on this Encounter   I, Omar Ag MD, personally saw the patient today and spent greater than 30 minutes discharging this patient.       Omar Ag MD  [facility] MED SURG  [address] [place] [id number]  Phone: [phone]  Fax: [phone]  ______________________________________________________________________  Patient reports doing well.  Denies any nausea vomiting chest pain shortness of breath lightheadedness or dizziness.  No burning urination.  No loose stools or diarrhea.  Reports taking p.o. well.    Physical Exam   Vital Signs: Temp: 97.6  F (36.4  C) Temp src: Oral BP: 121/63 Pulse: 69   Resp: 18 SpO2: 97 % O2 Device: None (Room air)    Weight: 178 lbs 11.2 oz  General Appearance: Laying in bed in no acute  Respiratory: Bilaterally clear to  Cardiovascular: S1-S2 normal  GI: Soft, nontender, bowel sounds  positive  Skin: No obvious rashes  Neuro: Alert awake and oriented       Primary Care Physician   Nomi Cartwright    Discharge Orders      Reason for your hospital stay    Admitted for MARYA on CKD     Activity    Your activity upon discharge: activity as tolerated     Monitor and record    Monitor/Watch for nausea, vomiting, chest pain, shortness of breath, lightheadness or dizziness. If these symptoms occurs, please call your primary care or come to ED     Adult [facility] Follow-up and recommended labs and tests    Follow up with primary care provider, Nomi Cartwright, within 7 days for hospital follow- up.  The following labs/tests are recommended: CBC, BMP.   Follow up with Oncology as suggested  Follow up with Nephrology on 06/13/2023    Appointments on [place] and/or [facility] (with [facility] or [facility] provider or service). Call [phone] if you haven't heard regarding these appointments within 7 days of discharge.     Diet    Follow this diet upon discharge: Orders Placed This Encounter      Combination Diet Regular Diet Adult; 2 gm K Diet       Significant Results and Procedures   Most Recent 3 CBC's:Recent Labs   Lab Test 05/12/23  0931 05/11/23  0700 05/10/23  0315   WBC 4.2 4.4 6.2   HGB 10.2* 9.7* 10.6*   MCV 85 83 87    223 272     Most Recent 3 BMP's:Recent Labs   Lab Test 05/12/23  0950 05/11/23  1739 05/11/23  0700 05/10/23  1838 05/10/23  1631 05/10/23  1258   *  --  141  --  140 139   POTASSIUM 4.0 4.3 4.7  4.7   < > 5.7* 6.0*   CHLORIDE 100  --  105  --   --  108*   CO2 20*  --  19*  --   --  17*   BUN 45.5*  --  51.4*  --   --  52.6*   CR 2.77*  --  3.40*  --  3.30* 3.30*   ANIONGAP 13  --  17*  --   --  14   LIVIER 9.1  --  9.1  --   --  9.5   *  --  110*  --   --  92    < > = values in this interval not displayed.     Most Recent 2 LFT's:Recent Labs   Lab Test 05/09/23  1413 03/31/23  1209   AST 19 12   ALT 23 10   ALKPHOS 136* 91   BILITOTAL 0.5 0.2        Discharge Medications   Current Discharge Medication List      CONTINUE these medications which have CHANGED    Details   acetaminophen (TYLENOL) 325 MG tablet Take 3 tablets (975 mg) by mouth every 8 hours as needed for fever or pain    Associated Diagnoses: Acute post-operative pain         CONTINUE these medications which have NOT CHANGED    Details   cyanocobalamin 1000 MCG TBCR Take 1,000 mcg by mouth daily  Qty: 100 tablet, Refills: 1    Associated Diagnoses: Vitamin B12 deficiency disease      ferrous sulfate (IRON) 325 (65 FE) MG tablet TAKE ONE TABLET BY MOUTH TWICE A DAY --NO FURTHER REFILLS WITHOUT OFFICE VISIT  Qty: 200 tablet, Refills: 3    Associated Diagnoses: Iron deficiency; Benign essential hypertension with target blood pressure below 140/90; Hypertension goal BP (blood pressure) < 140/90      fluocinonide (LIDEX) 0.05 % external cream Small amount to affected area(s) BID PRN  Qty: 60 g, Refills: 1    Associated Diagnoses: Dermatitis      Multiple Vitamins-Iron (MULTI-DAY PLUS IRON PO) Take 1 tablet by mouth daily         STOP taking these medications       lisinopril (ZESTRIL) 10 MG tablet Comments:   Reason for Stopping:         triamterene-HCTZ (DYAZIDE) 37.5-25 MG capsule Comments:   Reason for Stopping:             Allergies   Allergies   Allergen Reactions     No Known Drug Allergy

## 2023-05-12 NOTE — PROGRESS NOTES
Care Management Follow Up      SW met with patient at bedside and presented IMM. Patient expressed understanding, signed document, and requested a copy. Copy given.    Patient stated that she is looking forward to going home. She is contacting her son to see if he can pick her up.      KEVIN WatsonW, LSW  6 Med Surg   [facility]  Phone: [phone]  Pager: [phone]

## 2023-05-12 NOTE — CONFIDENTIAL NOTE
DIAGNOSIS: HFU   DATE RECEIVED: 06.13.2023   NOTES STATUS DETAILS   OFFICE NOTE from referring provider     OFFICE NOTE from other specialist      *Only VASCULITIS or LUPUS gather office notes for the following     *PULMONARY       *ENT     *DERMATOLOGY     *RHEUMATOLOGY     DISCHARGE SUMMARY from hospital     DISCHARGE REPORT from the ER Internal 05.09.2023 [facility]   MEDICATION LIST Internal    IMAGING  (NEED IMAGES AND REPORTS)     KIDNEY CT SCAN     KIDNEY ULTRASOUND Internal 05.10.2023 US RENAL COMPLETE NON   MR ABDOMEN     NUCLEAR MEDICINE RENAL     LABS     CBC Internal 05.18.2023   CMP Internal 05.18.2023   BMP Internal 05.10.2023   UA Internal 05.10.2023   URINE PROTEIN Internal 05.10.2023   RENAL PANEL Internal 05.12.2023 05.11.2023   BIOPSY     KIDNEY BIOPSY         Action 05.12.2023 RM   Action Taken Need to verify what the pt is coming in for     Action 05.18.2023 RM   Action Taken Spoke to Liliane this is a HFU

## 2023-05-15 ENCOUNTER — PATIENT OUTREACH (OUTPATIENT)
Dept: CARE COORDINATION | Facility: CLINIC | Age: 68
End: 2023-05-15
Payer: MEDICARE

## 2023-05-15 LAB
ALBUMIN SERPL ELPH-MCNC: 3.4 G/DL (ref 3.7–5.1)
ALPHA1 GLOB SERPL ELPH-MCNC: 0.3 G/DL (ref 0.2–0.4)
ALPHA2 GLOB SERPL ELPH-MCNC: 0.8 G/DL (ref 0.5–0.9)
B-GLOBULIN SERPL ELPH-MCNC: 0.7 G/DL (ref 0.6–1)
GAMMA GLOB SERPL ELPH-MCNC: 0.7 G/DL (ref 0.7–1.6)
M PROTEIN SERPL ELPH-MCNC: 0.1 G/DL
PATH REPORT.ADDENDUM SPEC: ABNORMAL
PATH REPORT.ADDENDUM SPEC: ABNORMAL
PATH REPORT.COMMENTS IMP SPEC: ABNORMAL
PATH REPORT.COMMENTS IMP SPEC: ABNORMAL
PATH REPORT.COMMENTS IMP SPEC: YES
PATH REPORT.FINAL DX SPEC: ABNORMAL
PATH REPORT.GROSS SPEC: ABNORMAL
PATH REPORT.MICROSCOPIC SPEC OTHER STN: ABNORMAL
PATH REPORT.RELEVANT HX SPEC: ABNORMAL
PATHOLOGY SYNOPTIC REPORT: ABNORMAL
PHOTO IMAGE: ABNORMAL
PROT PATTERN SERPL ELPH-IMP: ABNORMAL
PROT PATTERN SERPL IFE-IMP: NORMAL

## 2023-05-15 NOTE — PROGRESS NOTES
[facility] Resource Center Contact  [facility]/Voicemail     Clinical Data: Transitional Care Management Outreach     Outreach attempted x 2.  Left message on patient's voicemail, providing [facility]'s 24/7 scheduling and nurse triage phone number [phone] ([phone]) for questions/concerns and/or to schedule an appt with an [facility] provider, if they do not have a PCP.      Plan:  [facility] will do no further outreaches at this time.       Abena Crook  Community Health Worker  [facility], [facility]  [phone]      *Connected Care Resource Team does NOT follow patient ongoing. Referrals are identified based on internal discharge reports and the outreach is to ensure patient has an understanding of their discharge instructions.

## 2023-05-16 LAB
BKR LAB AP ADD'L TEST STATUS: NORMAL
BKR PATH ADDL TEST FINAL COMMENTS: NORMAL

## 2023-05-17 ENCOUNTER — MYC MEDICAL ADVICE (OUTPATIENT)
Dept: SURGERY | Facility: CLINIC | Age: 68
End: 2023-05-17

## 2023-05-17 ENCOUNTER — TELEPHONE (OUTPATIENT)
Dept: SURGERY | Facility: CLINIC | Age: 68
End: 2023-05-17

## 2023-05-17 ENCOUNTER — OFFICE VISIT (OUTPATIENT)
Dept: SURGERY | Facility: CLINIC | Age: 68
End: 2023-05-17
Payer: MEDICARE

## 2023-05-17 VITALS
DIASTOLIC BLOOD PRESSURE: 72 MMHG | BODY MASS INDEX: 32.76 KG/M2 | SYSTOLIC BLOOD PRESSURE: 124 MMHG | WEIGHT: 178 LBS | HEIGHT: 62 IN | TEMPERATURE: 97.1 F

## 2023-05-17 DIAGNOSIS — C7A.1 MALIGNANT POORLY DIFFERENTIATED NEUROENDOCRINE CARCINOMA (H): ICD-10-CM

## 2023-05-17 PROCEDURE — 99213 OFFICE O/P EST LOW 20 MIN: CPT | Performed by: SURGERY

## 2023-05-17 ASSESSMENT — PAIN SCALES - GENERAL: PAINLEVEL: NO PAIN (0)

## 2023-05-17 NOTE — TELEPHONE ENCOUNTER
RECORDS STATUS - ALL OTHER DIAGNOSIS      RECORDS RECEIVED FROM: EPic   DATE RECEIVED:    NOTES STATUS DETAILS   OFFICE NOTE from referring provider Epic 23: Dr. Irene Bateman   OFFICE NOTE from medical oncologist Epic 23: Dr. Irene Bateman   OFFICE NOTE from other specialist [facility] Colon Sur23: Angelina Romero  03/10/23: Dr. Jerome Ashley   DISCHARGE SUMMARY from hospital [facility] 05/10/23, 23: [facility]   DISCHARGE REPORT from the ER [facility] 23: [facility] ED   OPERATIVE REPORT [facility] 23: Laparoscopic Converted to Open Total abdominal colectomy   MEDICATION LIST [facility]    LABS     PATHOLOGY REPORTS Reports in EPIC 23: [id number]  23: [id number]   ANYTHING RELATED TO DIAGNOSIS Epic Most recent 23   GENONOMIC TESTING     TYPE: Epic 23: NGS   IMAGING (NEED IMAGES & REPORT)     CT SCANS PACS 23: CT Chest Abd Pel   MRI PACS 23: MR Brain   XRAYS PACS 23: XR Chest  23: XR Colon   ULTRASOUND PACS 05/10/23: US Renal   PET PACS 23: PET Onc Whole Body

## 2023-05-17 NOTE — H&P (VIEW-ONLY)
Patient seen in consultation for port placement    HPI:  Patient is a 67 year old female with colon cancer in need of venous access for upcoming chemotherapy.  She is already had surgery.  She does not take blood thinning medication.  No prior surgery to the chest or neck area.    Review Of Systems    Skin: negative  Ears/Nose/Throat: negative  Respiratory: No shortness of breath, dyspnea on exertion, cough, or hemoptysis  Cardiovascular: negative  Gastrointestinal: as above  Genitourinary: negative  Musculoskeletal: negative  Neurologic: negative  Hematologic/Lymphatic/Immunologic: negative  Endocrine: negative      Past Medical History:   Diagnosis Date     Arthropathia 08/05/2010     B12 deficiency anemia 10/21/2010     Benign essential hypertension with target blood pressure below 140/90 10/10/2016     CARDIOVASCULAR SCREENING; LDL GOAL LESS THAN 160 10/31/2010     Crohn's colitis (H) 02/15/2011     Dermatitis-dishydrotic eczema-severe 08/05/2010     Hiatal hernia      HTN (hypertension) 07/21/2011     Iron deficiency anemia 10/21/2010     Malignant neoplasm of sigmoid colon (H)      Obesity      Primary pulmonary hypertension (H) 04/06/2010       Past Surgical History:   Procedure Laterality Date     COLECTOMY WITHOUT COLOSTOMY N/A 4/6/2023    Procedure: Laparoscopic Converted to Open Total abdominal colectomy;  Surgeon: Jerome Ashley MD;  Location: [facility]     COLONOSCOPY  10/11/10     COLONOSCOPY N/A 2/27/2023    Procedure: ATTEMPTED COLONOSCOPY WITH SIGMOID STRICTURE BIOPSY;  Surgeon: Jonathan Alcocer MD;  Location:  GI     ESOPHAGOSCOPY, GASTROSCOPY, DUODENOSCOPY (EGD), COMBINED N/A 2/27/2023    Procedure: ESOPHAGOGASTRODUODENOSCOPY, WITH BIOPSY;  Surgeon: Jonathan Alcocer MD;  Location:  GI     HYSTERECTOMY TOTAL ABDOMINAL, BILATERAL SALPINGO-OOPHORECTOMY, COMBINED N/A 4/6/2023    Procedure: Hysterectomy total abdominal, bilateral salpingo-oophorectomy;  Surgeon: Sunitha Olea MD;   Location: [facility]     SIGMOIDOSCOPY FLEXIBLE N/A 2023    Procedure: Sigmoidoscopy flexible;  Surgeon: Jerome Ashley MD;  Location: [facility]     SURGICAL HISTORY OF -   77    Perineorrhaphy, for widening vaginal orifice     ZZC EXPLORATORY OF ABDOMEN      laparoscopy       Family History   Problem Relation Age of Onset     Hypertension Mother         on meds, alive     Cerebrovascular Disease Father         stroke about age 65,  of cancer at 68 yrs     Diabetes Father         eventually took insulin     Anemia Father         Pernisios anemia     Kidney Disease Niece         kidney transplant     Kidney Disease Nephew         kidney transplant     Venous thrombosis No family hx of      Anesthesia Reaction No family hx of        Social History     Socioeconomic History     Marital status:      Spouse name: Not on file     Number of children: Not on file     Years of education: Not on file     Highest education level: Not on file   Occupational History     Not on file   Tobacco Use     Smoking status: Never     Passive exposure: Current     Smokeless tobacco: Never   Vaping Use     Vaping status: Not on file   Substance and Sexual Activity     Alcohol use: No     Drug use: No     Sexual activity: Yes     Partners: Male   Other Topics Concern     Parent/sibling w/ CABG, MI or angioplasty before 65F 55M? Not Asked   Social History Narrative     Not on file     Social Determinants of Health     Financial Resource Strain: Not on file   Food Insecurity: Not on file   Transportation Needs: Not on file   Physical Activity: Not on file   Stress: Not on file   Social Connections: Not on file   Intimate Partner Violence: Not on file   Housing Stability: Not on file       Current Outpatient Medications   Medication Sig Dispense Refill     acetaminophen (TYLENOL) 325 MG tablet Take 3 tablets (975 mg) by mouth every 8 hours as needed for fever or pain       cyanocobalamin 1000 MCG TBCR Take 1,000 mcg  "by mouth daily 100 tablet 1     ferrous sulfate (IRON) 325 (65 FE) MG tablet TAKE ONE TABLET BY MOUTH TWICE A DAY --NO FURTHER REFILLS WITHOUT OFFICE VISIT (Patient taking differently: Take 325 mg by mouth daily (with breakfast)) 200 tablet 3     fluocinonide (LIDEX) 0.05 % external cream Small amount to affected area(s) BID PRN 60 g 1     Multiple Vitamins-Iron (MULTI-DAY PLUS IRON PO) Take 1 tablet by mouth daily         Medications and history reviewed    Physical exam:  Vitals: /72 (BP Location: Right arm, Patient Position: Sitting, Cuff Size: Adult Regular)   Temp 97.1  F (36.2  C) (Temporal)   Ht 1.575 m (5' 2.01\")   Wt 80.7 kg (178 lb)   LMP  (LMP Unknown)   BMI 32.55 kg/m    BMI= Body mass index is 32.55 kg/m .    Constitutional: Healthy, alert, non-distressed   Head: Normo-cephalic, atraumatic, no lesions, masses or tenderness   Cardiovascular: RRR, no new murmurs, +S1, +S2 heart sounds, no clicks, rubs or gallops   Respiratory: CTAB, no rales, rhonchi or wheezing, equal chest rise, good respiratory effort   Gastrointestinal: Soft, non-tender, non distended, no rebound rigidity or guarding, no masses or hernias palpated   : Deferred  Musculoskeletal: Moves all extremities, normal  strength, no deformities noted   Skin: No suspicious lesions or rashes   Psychiatric: Mentation appears normal, affect appropriate   Hematologic/Lymphatic/Immunologic: Normal cervical and supraclavicular lymph nodes   Patient able to get up on table without difficulty.    Labs show:  No results found for this or any previous visit (from the past 24 hour(s)).    Imaging shows:  Recent Results (from the past 744 hour(s))   MRI Brain w & w/o contrast    Narrative    EXAM: MR BRAIN W/O and W CONTRAST  LOCATION: [facility]  DATE/TIME: 4/20/2023 5:57 PM CDT    INDICATION: Poorly differentiated neuroendocrine carcinoma.  COMPARISON: None.  CONTRAST: 7.5 mL Gadavist  TECHNIQUE: Routine " multiplanar multisequence head MRI without and with intravenous contrast.    FINDINGS:  INTRACRANIAL CONTENTS: No acute or subacute infarct. No mass, acute hemorrhage, or extra-axial fluid collections. Scattered nonspecific T2/FLAIR hyperintensities within the cerebral white matter most consistent with minimal chronic microvascular ischemic   change. Mild generalized cerebral atrophy. No hydrocephalus. Mild cerebellar atrophy. No pathologic contrast enhancement.    SELLA: No abnormality accounting for technique.    OSSEOUS STRUCTURES/SOFT TISSUES: Normal marrow signal. The major intracranial vascular flow voids are maintained.     ORBITS: No abnormality accounting for technique.     SINUSES/MASTOIDS: Opacified left sphenoid sinus. Mild mucosal thickening in the ethmoid and maxillary sinuses. No middle ear or mastoid effusion.       Impression    IMPRESSION:  1.  No evidence of intracranial metastatic disease.   US Renal Complete Non-Vascular    Narrative    EXAMINATION: US RENAL COMPLETE NON-VASCULAR, 5/10/2023 11:56 AM     COMPARISON: 3/24/2023 PET CT    HISTORY: MARYA on CKD    TECHNIQUE: The kidneys and bladder were scanned in the standard  fashion with specialized ultrasound transducer(s) using both gray  scale and limited color/spectral Doppler techniques.    FINDINGS:    Right kidney: Measures 9.9 x 4.6 x 4.8 cm in length. The renal  parenchyma demonstrates slightly increased echogenicity. The renal  cortex measures up to 0.9 cm. No focal mass. No hydronephrosis.    Left kidney: Measures 9.9 x 4.5 x 4.8 cm in length. The renal  parenchyma demonstrates slightly increased echogenicity. The renal  cortex measures up to 0.5 cm. No focal mass. No hydronephrosis.     Bladder: Poorly distended.      Impression    IMPRESSION:  Mildly echogenic and atrophic appearance of the bilateral kidneys,  compatible chronic medical renal disease. No hydronephrosis.    I have personally reviewed the examination and initial  interpretation  and I agree with the findings.    HOLLI DOMINIQUE DO         SYSTEM ID:  [id number]        Assessment:     ICD-10-CM    1. Malignant poorly differentiated neuroendocrine carcinoma (H)  C7A.1 Adult General Surg Referral     Case Request: Ultrasound-guided right internal jugular venous access port placement with fluoroscopy, possible left     Case Request: Ultrasound-guided right internal jugular venous access port placement with fluoroscopy, possible left        Plan: I recommend ultrasound-guided right internal jugular venous access port placement with fluoroscopy, possible left. We discussed the procedure in detail. We also discussed the risks, benefits, alternatives and post-op care and restrictions. After our informed discussion we decided to proceed with the proposed surgery.    30 minutes spent by me on the date of the encounter doing chart review, history and exam, documentation and further activities per the note    Martin Thomas DO

## 2023-05-17 NOTE — LETTER
5/17/2023         RE: Nadia Ladd  [address]  [facility] [address]        Dear Colleague,    Thank you for referring your patient, Nadia Ladd, to the [facility]. Please see a copy of my visit note below.    Patient seen in consultation for port placement    HPI:  Patient is a 67 year old female with colon cancer in need of venous access for upcoming chemotherapy.  She is already had surgery.  She does not take blood thinning medication.  No prior surgery to the chest or neck area.    Review Of Systems    Skin: negative  Ears/Nose/Throat: negative  Respiratory: No shortness of breath, dyspnea on exertion, cough, or hemoptysis  Cardiovascular: negative  Gastrointestinal: as above  Genitourinary: negative  Musculoskeletal: negative  Neurologic: negative  Hematologic/Lymphatic/Immunologic: negative  Endocrine: negative      Past Medical History:   Diagnosis Date     Arthropathia 08/05/2010     B12 deficiency anemia 10/21/2010     Benign essential hypertension with target blood pressure below 140/90 10/10/2016     CARDIOVASCULAR SCREENING; LDL GOAL LESS THAN 160 10/31/2010     Crohn's colitis (H) 02/15/2011     Dermatitis-dishydrotic eczema-severe 08/05/2010     Hiatal hernia      HTN (hypertension) 07/21/2011     Iron deficiency anemia 10/21/2010     Malignant neoplasm of sigmoid colon (H)      Obesity      Primary pulmonary hypertension (H) 04/06/2010       Past Surgical History:   Procedure Laterality Date     COLECTOMY WITHOUT COLOSTOMY N/A 4/6/2023    Procedure: Laparoscopic Converted to Open Total abdominal colectomy;  Surgeon: Jerome Ashley MD;  Location: [facility]     COLONOSCOPY  10/11/10     COLONOSCOPY N/A 2/27/2023    Procedure: ATTEMPTED COLONOSCOPY WITH SIGMOID STRICTURE BIOPSY;  Surgeon: Jonathan Alcocer MD;  Location:  GI     ESOPHAGOSCOPY, GASTROSCOPY, DUODENOSCOPY (EGD), COMBINED N/A 2/27/2023    Procedure: ESOPHAGOGASTRODUODENOSCOPY, WITH BIOPSY;   Surgeon: Jonathan Alcocer MD;  Location: [facility]     HYSTERECTOMY TOTAL ABDOMINAL, BILATERAL SALPINGO-OOPHORECTOMY, COMBINED N/A 2023    Procedure: Hysterectomy total abdominal, bilateral salpingo-oophorectomy;  Surgeon: Sunitha Olea MD;  Location: [facility]     SIGMOIDOSCOPY FLEXIBLE N/A 2023    Procedure: Sigmoidoscopy flexible;  Surgeon: Jerome Ashley MD;  Location: [facility]     SURGICAL HISTORY OF -   77    Perineorrhaphy, for widening vaginal orifice     ZZC EXPLORATORY OF ABDOMEN      laparoscopy       Family History   Problem Relation Age of Onset     Hypertension Mother         on meds, alive     Cerebrovascular Disease Father         stroke about age 65,  of cancer at 68 yrs     Diabetes Father         eventually took insulin     Anemia Father         Pernisios anemia     Kidney Disease Niece         kidney transplant     Kidney Disease Nephew         kidney transplant     Venous thrombosis No family hx of      Anesthesia Reaction No family hx of        Social History     Socioeconomic History     Marital status:      Spouse name: Not on file     Number of children: Not on file     Years of education: Not on file     Highest education level: Not on file   Occupational History     Not on file   Tobacco Use     Smoking status: Never     Passive exposure: Current     Smokeless tobacco: Never   Vaping Use     Vaping status: Not on file   Substance and Sexual Activity     Alcohol use: No     Drug use: No     Sexual activity: Yes     Partners: Male   Other Topics Concern     Parent/sibling w/ CABG, MI or angioplasty before 65F 55M? Not Asked   Social History Narrative     Not on file     Social Determinants of Health     Financial Resource Strain: Not on file   Food Insecurity: Not on file   Transportation Needs: Not on file   Physical Activity: Not on file   Stress: Not on file   Social Connections: Not on file   Intimate Partner Violence: Not on file   Housing Stability: Not  "on file       Current Outpatient Medications   Medication Sig Dispense Refill     acetaminophen (TYLENOL) 325 MG tablet Take 3 tablets (975 mg) by mouth every 8 hours as needed for fever or pain       cyanocobalamin 1000 MCG TBCR Take 1,000 mcg by mouth daily 100 tablet 1     ferrous sulfate (IRON) 325 (65 FE) MG tablet TAKE ONE TABLET BY MOUTH TWICE A DAY --NO FURTHER REFILLS WITHOUT OFFICE VISIT (Patient taking differently: Take 325 mg by mouth daily (with breakfast)) 200 tablet 3     fluocinonide (LIDEX) 0.05 % external cream Small amount to affected area(s) BID PRN 60 g 1     Multiple Vitamins-Iron (MULTI-DAY PLUS IRON PO) Take 1 tablet by mouth daily         Medications and history reviewed    Physical exam:  Vitals: /72 (BP Location: Right arm, Patient Position: Sitting, Cuff Size: Adult Regular)   Temp 97.1  F (36.2  C) (Temporal)   Ht 1.575 m (5' 2.01\")   Wt 80.7 kg (178 lb)   LMP  (LMP Unknown)   BMI 32.55 kg/m    BMI= Body mass index is 32.55 kg/m .    Constitutional: Healthy, alert, non-distressed   Head: Normo-cephalic, atraumatic, no lesions, masses or tenderness   Cardiovascular: RRR, no new murmurs, +S1, +S2 heart sounds, no clicks, rubs or gallops   Respiratory: CTAB, no rales, rhonchi or wheezing, equal chest rise, good respiratory effort   Gastrointestinal: Soft, non-tender, non distended, no rebound rigidity or guarding, no masses or hernias palpated   : Deferred  Musculoskeletal: Moves all extremities, normal  strength, no deformities noted   Skin: No suspicious lesions or rashes   Psychiatric: Mentation appears normal, affect appropriate   Hematologic/Lymphatic/Immunologic: Normal cervical and supraclavicular lymph nodes   Patient able to get up on table without difficulty.    Labs show:  No results found for this or any previous visit (from the past 24 hour(s)).    Imaging shows:  Recent Results (from the past 744 hour(s))   MRI Brain w & w/o contrast    Narrative    EXAM: MR " BRAIN W/O and W CONTRAST  LOCATION: [facility]  DATE/TIME: 4/20/2023 5:57 PM CDT    INDICATION: Poorly differentiated neuroendocrine carcinoma.  COMPARISON: None.  CONTRAST: 7.5 mL Gadavist  TECHNIQUE: Routine multiplanar multisequence head MRI without and with intravenous contrast.    FINDINGS:  INTRACRANIAL CONTENTS: No acute or subacute infarct. No mass, acute hemorrhage, or extra-axial fluid collections. Scattered nonspecific T2/FLAIR hyperintensities within the cerebral white matter most consistent with minimal chronic microvascular ischemic   change. Mild generalized cerebral atrophy. No hydrocephalus. Mild cerebellar atrophy. No pathologic contrast enhancement.    SELLA: No abnormality accounting for technique.    OSSEOUS STRUCTURES/SOFT TISSUES: Normal marrow signal. The major intracranial vascular flow voids are maintained.     ORBITS: No abnormality accounting for technique.     SINUSES/MASTOIDS: Opacified left sphenoid sinus. Mild mucosal thickening in the ethmoid and maxillary sinuses. No middle ear or mastoid effusion.       Impression    IMPRESSION:  1.  No evidence of intracranial metastatic disease.   US Renal Complete Non-Vascular    Narrative    EXAMINATION: US RENAL COMPLETE NON-VASCULAR, 5/10/2023 11:56 AM     COMPARISON: 3/24/2023 PET CT    HISTORY: MARYA on CKD    TECHNIQUE: The kidneys and bladder were scanned in the standard  fashion with specialized ultrasound transducer(s) using both gray  scale and limited color/spectral Doppler techniques.    FINDINGS:    Right kidney: Measures 9.9 x 4.6 x 4.8 cm in length. The renal  parenchyma demonstrates slightly increased echogenicity. The renal  cortex measures up to 0.9 cm. No focal mass. No hydronephrosis.    Left kidney: Measures 9.9 x 4.5 x 4.8 cm in length. The renal  parenchyma demonstrates slightly increased echogenicity. The renal  cortex measures up to 0.5 cm. No focal mass. No hydronephrosis.     Bladder:  Poorly distended.      Impression    IMPRESSION:  Mildly echogenic and atrophic appearance of the bilateral kidneys,  compatible chronic medical renal disease. No hydronephrosis.    I have personally reviewed the examination and initial interpretation  and I agree with the findings.    HOLLI DOMINIQUE DO         SYSTEM ID:  [id number]        Assessment:     ICD-10-CM    1. Malignant poorly differentiated neuroendocrine carcinoma (H)  C7A.1 Adult General Surg Referral     Case Request: Ultrasound-guided right internal jugular venous access port placement with fluoroscopy, possible left     Case Request: Ultrasound-guided right internal jugular venous access port placement with fluoroscopy, possible left        Plan: I recommend ultrasound-guided right internal jugular venous access port placement with fluoroscopy, possible left. We discussed the procedure in detail. We also discussed the risks, benefits, alternatives and post-op care and restrictions. After our informed discussion we decided to proceed with the proposed surgery.    30 minutes spent by me on the date of the encounter doing chart review, history and exam, documentation and further activities per the note    Martin Thomas DO        Again, thank you for allowing me to participate in the care of your patient.        Sincerely,        Martin Thomas DO

## 2023-05-17 NOTE — TELEPHONE ENCOUNTER
Type of surgery: Ultrasound-guided right internal jugular venous access port placement with fluoroscopy, possible left    Location of surgery: [facility]  Date and time of surgery: 5/25  Surgeon: William  Pre-Op Appt Date: BB will do  Post-Op Appt Date: NA   Packet sent out: Yes  Pre-cert/Authorization completed:  Not Applicable  Date: na

## 2023-05-17 NOTE — PROGRESS NOTES
Patient seen in consultation for port placement    HPI:  Patient is a 67 year old female with colon cancer in need of venous access for upcoming chemotherapy.  She is already had surgery.  She does not take blood thinning medication.  No prior surgery to the chest or neck area.    Review Of Systems    Skin: negative  Ears/Nose/Throat: negative  Respiratory: No shortness of breath, dyspnea on exertion, cough, or hemoptysis  Cardiovascular: negative  Gastrointestinal: as above  Genitourinary: negative  Musculoskeletal: negative  Neurologic: negative  Hematologic/Lymphatic/Immunologic: negative  Endocrine: negative      Past Medical History:   Diagnosis Date     Arthropathia 08/05/2010     B12 deficiency anemia 10/21/2010     Benign essential hypertension with target blood pressure below 140/90 10/10/2016     CARDIOVASCULAR SCREENING; LDL GOAL LESS THAN 160 10/31/2010     Crohn's colitis (H) 02/15/2011     Dermatitis-dishydrotic eczema-severe 08/05/2010     Hiatal hernia      HTN (hypertension) 07/21/2011     Iron deficiency anemia 10/21/2010     Malignant neoplasm of sigmoid colon (H)      Obesity      Primary pulmonary hypertension (H) 04/06/2010       Past Surgical History:   Procedure Laterality Date     COLECTOMY WITHOUT COLOSTOMY N/A 4/6/2023    Procedure: Laparoscopic Converted to Open Total abdominal colectomy;  Surgeon: Jerome Ashley MD;  Location: [facility] OR     COLONOSCOPY  10/11/10     COLONOSCOPY N/A 2/27/2023    Procedure: ATTEMPTED COLONOSCOPY WITH SIGMOID STRICTURE BIOPSY;  Surgeon: Jonathan Alcocer MD;  Location:  GI     ESOPHAGOSCOPY, GASTROSCOPY, DUODENOSCOPY (EGD), COMBINED N/A 2/27/2023    Procedure: ESOPHAGOGASTRODUODENOSCOPY, WITH BIOPSY;  Surgeon: Jonathan Alcocer MD;  Location:  GI     HYSTERECTOMY TOTAL ABDOMINAL, BILATERAL SALPINGO-OOPHORECTOMY, COMBINED N/A 4/6/2023    Procedure: Hysterectomy total abdominal, bilateral salpingo-oophorectomy;  Surgeon: Sunitha Olea MD;   Location: [facility] OR     SIGMOIDOSCOPY FLEXIBLE N/A 2023    Procedure: Sigmoidoscopy flexible;  Surgeon: Jerome Ashley MD;  Location: [facility] OR     SURGICAL HISTORY OF -   77    Perineorrhaphy, for widening vaginal orifice     ZZC EXPLORATORY OF ABDOMEN      laparoscopy       Family History   Problem Relation Age of Onset     Hypertension Mother         on meds, alive     Cerebrovascular Disease Father         stroke about age 65,  of cancer at 68 yrs     Diabetes Father         eventually took insulin     Anemia Father         Pernisios anemia     Kidney Disease Niece         kidney transplant     Kidney Disease Nephew         kidney transplant     Venous thrombosis No family hx of      Anesthesia Reaction No family hx of        Social History     Socioeconomic History     Marital status:      Spouse name: Not on file     Number of children: Not on file     Years of education: Not on file     Highest education level: Not on file   Occupational History     Not on file   Tobacco Use     Smoking status: Never     Passive exposure: Current     Smokeless tobacco: Never   Vaping Use     Vaping status: Not on file   Substance and Sexual Activity     Alcohol use: No     Drug use: No     Sexual activity: Yes     Partners: Male   Other Topics Concern     Parent/sibling w/ CABG, MI or angioplasty before 65F 55M? Not Asked   Social History Narrative     Not on file     Social Determinants of Health     Financial Resource Strain: Not on file   Food Insecurity: Not on file   Transportation Needs: Not on file   Physical Activity: Not on file   Stress: Not on file   Social Connections: Not on file   Intimate Partner Violence: Not on file   Housing Stability: Not on file       Current Outpatient Medications   Medication Sig Dispense Refill     acetaminophen (TYLENOL) 325 MG tablet Take 3 tablets (975 mg) by mouth every 8 hours as needed for fever or pain       cyanocobalamin 1000 MCG TBCR Take 1,000 mcg  "by mouth daily 100 tablet 1     ferrous sulfate (IRON) 325 (65 FE) MG tablet TAKE ONE TABLET BY MOUTH TWICE A DAY --NO FURTHER REFILLS WITHOUT OFFICE VISIT (Patient taking differently: Take 325 mg by mouth daily (with breakfast)) 200 tablet 3     fluocinonide (LIDEX) 0.05 % external cream Small amount to affected area(s) BID PRN 60 g 1     Multiple Vitamins-Iron (MULTI-DAY PLUS IRON PO) Take 1 tablet by mouth daily         Medications and history reviewed    Physical exam:  Vitals: /72 (BP Location: Right arm, Patient Position: Sitting, Cuff Size: Adult Regular)   Temp 97.1  F (36.2  C) (Temporal)   Ht 1.575 m (5' 2.01\")   Wt 80.7 kg (178 lb)   LMP  (LMP Unknown)   BMI 32.55 kg/m    BMI= Body mass index is 32.55 kg/m .    Constitutional: Healthy, alert, non-distressed   Head: Normo-cephalic, atraumatic, no lesions, masses or tenderness   Cardiovascular: RRR, no new murmurs, +S1, +S2 heart sounds, no clicks, rubs or gallops   Respiratory: CTAB, no rales, rhonchi or wheezing, equal chest rise, good respiratory effort   Gastrointestinal: Soft, non-tender, non distended, no rebound rigidity or guarding, no masses or hernias palpated   : Deferred  Musculoskeletal: Moves all extremities, normal  strength, no deformities noted   Skin: No suspicious lesions or rashes   Psychiatric: Mentation appears normal, affect appropriate   Hematologic/Lymphatic/Immunologic: Normal cervical and supraclavicular lymph nodes   Patient able to get up on table without difficulty.    Labs show:  No results found for this or any previous visit (from the past 24 hour(s)).    Imaging shows:  Recent Results (from the past 744 hour(s))   MRI Brain w & w/o contrast    Narrative    EXAM: MR BRAIN W/O and W CONTRAST  [facility]  DATE/TIME: 4/20/2023 5:57 PM CDT    INDICATION: Poorly differentiated neuroendocrine carcinoma.  COMPARISON: None.  CONTRAST: 7.5 mL Gadavist  TECHNIQUE: Routine " multiplanar multisequence head MRI without and with intravenous contrast.    FINDINGS:  INTRACRANIAL CONTENTS: No acute or subacute infarct. No mass, acute hemorrhage, or extra-axial fluid collections. Scattered nonspecific T2/FLAIR hyperintensities within the cerebral white matter most consistent with minimal chronic microvascular ischemic   change. Mild generalized cerebral atrophy. No hydrocephalus. Mild cerebellar atrophy. No pathologic contrast enhancement.    SELLA: No abnormality accounting for technique.    OSSEOUS STRUCTURES/SOFT TISSUES: Normal marrow signal. The major intracranial vascular flow voids are maintained.     ORBITS: No abnormality accounting for technique.     SINUSES/MASTOIDS: Opacified left sphenoid sinus. Mild mucosal thickening in the ethmoid and maxillary sinuses. No middle ear or mastoid effusion.       Impression    IMPRESSION:  1.  No evidence of intracranial metastatic disease.   US Renal Complete Non-Vascular    Narrative    EXAMINATION: US RENAL COMPLETE NON-VASCULAR, 5/10/2023 11:56 AM     COMPARISON: 3/24/2023 PET CT    HISTORY: MARYA on CKD    TECHNIQUE: The kidneys and bladder were scanned in the standard  fashion with specialized ultrasound transducer(s) using both gray  scale and limited color/spectral Doppler techniques.    FINDINGS:    Right kidney: Measures 9.9 x 4.6 x 4.8 cm in length. The renal  parenchyma demonstrates slightly increased echogenicity. The renal  cortex measures up to 0.9 cm. No focal mass. No hydronephrosis.    Left kidney: Measures 9.9 x 4.5 x 4.8 cm in length. The renal  parenchyma demonstrates slightly increased echogenicity. The renal  cortex measures up to 0.5 cm. No focal mass. No hydronephrosis.     Bladder: Poorly distended.      Impression    IMPRESSION:  Mildly echogenic and atrophic appearance of the bilateral kidneys,  compatible chronic medical renal disease. No hydronephrosis.    I have personally reviewed the examination and initial  interpretation  and I agree with the findings.    HOLLI DOMINIQUE DO         SYSTEM ID:  [id number]        Assessment:     ICD-10-CM    1. Malignant poorly differentiated neuroendocrine carcinoma (H)  C7A.1 Adult General Surg Referral     Case Request: Ultrasound-guided right internal jugular venous access port placement with fluoroscopy, possible left     Case Request: Ultrasound-guided right internal jugular venous access port placement with fluoroscopy, possible left        Plan: I recommend ultrasound-guided right internal jugular venous access port placement with fluoroscopy, possible left. We discussed the procedure in detail. We also discussed the risks, benefits, alternatives and post-op care and restrictions. After our informed discussion we decided to proceed with the proposed surgery.    30 minutes spent by me on the date of the encounter doing chart review, history and exam, documentation and further activities per the note    Martin Thomas DO

## 2023-05-18 ENCOUNTER — OFFICE VISIT (OUTPATIENT)
Dept: INTERNAL MEDICINE | Facility: CLINIC | Age: 68
End: 2023-05-18
Payer: MEDICARE

## 2023-05-18 VITALS
BODY MASS INDEX: 33.3 KG/M2 | RESPIRATION RATE: 16 BRPM | SYSTOLIC BLOOD PRESSURE: 112 MMHG | TEMPERATURE: 97 F | OXYGEN SATURATION: 100 % | HEIGHT: 61 IN | DIASTOLIC BLOOD PRESSURE: 64 MMHG | HEART RATE: 76 BPM | WEIGHT: 176.4 LBS

## 2023-05-18 DIAGNOSIS — N17.9 AKI (ACUTE KIDNEY INJURY) (H): ICD-10-CM

## 2023-05-18 DIAGNOSIS — E87.20 METABOLIC ACIDOSIS: ICD-10-CM

## 2023-05-18 DIAGNOSIS — E87.5 HYPERKALEMIA: Primary | ICD-10-CM

## 2023-05-18 DIAGNOSIS — C18.7 MALIGNANT NEOPLASM OF SIGMOID COLON (H): ICD-10-CM

## 2023-05-18 LAB
ALBUMIN SERPL BCG-MCNC: 3.9 G/DL (ref 3.5–5.2)
ALP SERPL-CCNC: 104 U/L (ref 35–104)
ALT SERPL W P-5'-P-CCNC: 19 U/L (ref 10–35)
ANION GAP SERPL CALCULATED.3IONS-SCNC: 14 MMOL/L (ref 7–15)
AST SERPL W P-5'-P-CCNC: 20 U/L (ref 10–35)
BILIRUB SERPL-MCNC: 0.5 MG/DL
BUN SERPL-MCNC: 47.5 MG/DL (ref 8–23)
CALCIUM SERPL-MCNC: 9.4 MG/DL (ref 8.8–10.2)
CHLORIDE SERPL-SCNC: 107 MMOL/L (ref 98–107)
CREAT SERPL-MCNC: 2.17 MG/DL (ref 0.51–0.95)
DEPRECATED HCO3 PLAS-SCNC: 18 MMOL/L (ref 22–29)
ERYTHROCYTE [DISTWIDTH] IN BLOOD BY AUTOMATED COUNT: 16.2 % (ref 10–15)
GFR SERPL CREATININE-BSD FRML MDRD: 24 ML/MIN/1.73M2
GLUCOSE SERPL-MCNC: 124 MG/DL (ref 70–99)
HCT VFR BLD AUTO: 35.2 % (ref 35–47)
HGB BLD-MCNC: 10.8 G/DL (ref 11.7–15.7)
MCH RBC QN AUTO: 25.8 PG (ref 26.5–33)
MCHC RBC AUTO-ENTMCNC: 30.7 G/DL (ref 31.5–36.5)
MCV RBC AUTO: 84 FL (ref 78–100)
PLATELET # BLD AUTO: 219 10E3/UL (ref 150–450)
POTASSIUM SERPL-SCNC: 4.3 MMOL/L (ref 3.4–5.3)
PROT SERPL-MCNC: 6.8 G/DL (ref 6.4–8.3)
RBC # BLD AUTO: 4.18 10E6/UL (ref 3.8–5.2)
SODIUM SERPL-SCNC: 139 MMOL/L (ref 136–145)
WBC # BLD AUTO: 5.2 10E3/UL (ref 4–11)

## 2023-05-18 PROCEDURE — 36415 COLL VENOUS BLD VENIPUNCTURE: CPT | Performed by: INTERNAL MEDICINE

## 2023-05-18 PROCEDURE — 80053 COMPREHEN METABOLIC PANEL: CPT | Performed by: INTERNAL MEDICINE

## 2023-05-18 PROCEDURE — 85027 COMPLETE CBC AUTOMATED: CPT | Performed by: INTERNAL MEDICINE

## 2023-05-18 PROCEDURE — 99495 TRANSJ CARE MGMT MOD F2F 14D: CPT | Performed by: INTERNAL MEDICINE

## 2023-05-18 ASSESSMENT — PAIN SCALES - GENERAL: PAINLEVEL: NO PAIN (0)

## 2023-05-18 NOTE — PROGRESS NOTES
Assessment & Plan     Hyperkalemia  Markedly improved  - Comprehensive metabolic panel (BMP + Alb, Alk Phos, ALT, AST, Total. 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  Status post total colectomy with ileal rectal anastomosis  - CBC with platelets; Future    MARYA (acute kidney injury) (H)  Markedly improved.  We will reevaluate today    Metabolic acidosis  Resolved             MED REC REQUIRED  Post Medication Reconciliation Status: discharge medications reconciled, continue medications without change                           FOLLOW UP   I have asked the patient to make an appointment for followup with:  Surgeon, oncologist, nephrologist as scheduled.    Nomi Cartwright DO  [facility] GUERITA Zuniga is a 67 year old, presenting for the following health issues:  Hospital F/U        5/18/2023     8:40 AM   Additional Questions   Roomed by Aubree RAMÍREZ     HPI   Hyperkalemia  Metabolic acidosis  Acute kidney injury  Colon cancer postresection  Iron deficiency anemia    Hospital Follow-up Visit:    Hospital/Nursing Home/IP Rehab Facility: [facility]  Date of Admission: 5-  Date of Discharge: 5-  Reason(s) for Admission: High Potassium, low kidney function    Was your hospitalization related to COVID-19? No   Problems taking medications regularly:  None  Medication changes since discharge: None  Problems adhering to non-medication therapy:  None    Summary of hospitalization:  [facility] discharge summary reviewed  Diagnostic Tests/Treatments reviewed.  Follow up needed: none  Other Healthcare Providers Involved in Patient s Care:         None  Update since discharge: improved.   Plan of care communicated with patient           Review of Systems   CONSTITUTIONAL: NEGATIVE for fever, chills, change in weight  INTEGUMENTARY/SKIN: NEGATIVE for worrisome rashes,  "moles or lesions  EYES: NEGATIVE for vision changes or irritation  ENT/MOUTH: NEGATIVE for ear, mouth and throat problems  RESP: NEGATIVE for significant cough or SOB  BREAST: NEGATIVE for masses, tenderness or discharge  CV: NEGATIVE for chest pain, palpitations or peripheral edema  GI: NEGATIVE for nausea, abdominal pain, heartburn, or change in bowel habits  : NEGATIVE for frequency, dysuria, or hematuria  MUSCULOSKELETAL: NEGATIVE for significant arthralgias or myalgia  NEURO: NEGATIVE for weakness, dizziness or paresthesias  ENDOCRINE: NEGATIVE for temperature intolerance, skin/hair changes  HEME: NEGATIVE for bleeding problems  PSYCHIATRIC: NEGATIVE for changes in mood or affect      Objective    /64 (BP Location: Right arm, Patient Position: Chair)   Pulse 76   Temp 97  F (36.1  C) (Temporal)   Resp 16   Ht 1.549 m (5' 1\")   Wt 80 kg (176 lb 6.4 oz)   LMP  (LMP Unknown)   SpO2 100%   BMI 33.33 kg/m    Body mass index is 33.33 kg/m .  Physical Exam   GENERAL: healthy, alert and no distress  EYES: Eyes grossly normal to inspection, PERRL and conjunctivae and sclerae normal  HENT: ear canals and TM's normal, nose and mouth without ulcers or lesions  NECK: no adenopathy, no asymmetry, masses, or scars and thyroid normal to palpation  RESP: lungs clear to auscultation - no rales, rhonchi or wheezes  CV: regular rate and rhythm, normal S1 S2, no S3 or S4, no murmur, click or rub, no peripheral edema and peripheral pulses strong  ABDOMEN: soft, nontender, no hepatosplenomegaly, no masses and bowel sounds normal  ABDOMEN: soft, nontender, without hepatosplenomegaly or masses, bowel sounds normal and Scarring is consistent with recent history.  MS: no gross musculoskeletal defects noted, no edema  SKIN: no suspicious lesions or rashes  NEURO: Normal strength and tone, mentation intact and speech normal  PSYCH: mentation appears normal, affect normal/bright    Lab work and radiographs performed during " hospitalization are discussed with the patient

## 2023-05-19 ENCOUNTER — OFFICE VISIT (OUTPATIENT)
Dept: SURGERY | Facility: CLINIC | Age: 68
End: 2023-05-19
Payer: MEDICARE

## 2023-05-19 ENCOUNTER — TELEPHONE (OUTPATIENT)
Dept: ONCOLOGY | Facility: CLINIC | Age: 68
End: 2023-05-19

## 2023-05-19 ENCOUNTER — PRE VISIT (OUTPATIENT)
Dept: ONCOLOGY | Facility: CLINIC | Age: 68
End: 2023-05-19

## 2023-05-19 ENCOUNTER — VIRTUAL VISIT (OUTPATIENT)
Dept: ONCOLOGY | Facility: CLINIC | Age: 68
End: 2023-05-19
Attending: INTERNAL MEDICINE
Payer: MEDICARE

## 2023-05-19 VITALS
HEART RATE: 80 BPM | BODY MASS INDEX: 33.42 KG/M2 | WEIGHT: 177 LBS | OXYGEN SATURATION: 100 % | SYSTOLIC BLOOD PRESSURE: 124 MMHG | DIASTOLIC BLOOD PRESSURE: 84 MMHG | HEIGHT: 61 IN

## 2023-05-19 DIAGNOSIS — C7A.1 MALIGNANT POORLY DIFFERENTIATED NEUROENDOCRINE CARCINOMA (H): ICD-10-CM

## 2023-05-19 DIAGNOSIS — C18.9 MALIGNANT NEOPLASM OF COLON, UNSPECIFIED PART OF COLON (H): ICD-10-CM

## 2023-05-19 DIAGNOSIS — C18.7 MALIGNANT NEOPLASM OF SIGMOID COLON (H): Primary | ICD-10-CM

## 2023-05-19 PROCEDURE — 99205 OFFICE O/P NEW HI 60 MIN: CPT | Mod: 95 | Performed by: INTERNAL MEDICINE

## 2023-05-19 PROCEDURE — 99024 POSTOP FOLLOW-UP VISIT: CPT | Performed by: SURGERY

## 2023-05-19 RX ORDER — ALBUTEROL SULFATE 90 UG/1
1-2 AEROSOL, METERED RESPIRATORY (INHALATION)
Status: CANCELLED
Start: 2023-08-02

## 2023-05-19 RX ORDER — MEPERIDINE HYDROCHLORIDE 25 MG/ML
25 INJECTION INTRAMUSCULAR; INTRAVENOUS; SUBCUTANEOUS EVERY 30 MIN PRN
Status: CANCELLED | OUTPATIENT
Start: 2023-08-02

## 2023-05-19 RX ORDER — ALBUTEROL SULFATE 90 UG/1
1-2 AEROSOL, METERED RESPIRATORY (INHALATION)
Status: CANCELLED
Start: 2023-05-30

## 2023-05-19 RX ORDER — HEPARIN SODIUM,PORCINE 10 UNIT/ML
5 VIAL (ML) INTRAVENOUS
Status: CANCELLED | OUTPATIENT
Start: 2023-08-04

## 2023-05-19 RX ORDER — HEPARIN SODIUM (PORCINE) LOCK FLUSH IV SOLN 100 UNIT/ML 100 UNIT/ML
5 SOLUTION INTRAVENOUS
Status: CANCELLED | OUTPATIENT
Start: 2023-05-22

## 2023-05-19 RX ORDER — DIPHENHYDRAMINE HYDROCHLORIDE 50 MG/ML
50 INJECTION INTRAMUSCULAR; INTRAVENOUS
Status: CANCELLED
Start: 2023-07-18

## 2023-05-19 RX ORDER — FLUOROURACIL 50 MG/ML
400 INJECTION, SOLUTION INTRAVENOUS ONCE
Status: CANCELLED | OUTPATIENT
Start: 2023-06-27

## 2023-05-19 RX ORDER — DIPHENHYDRAMINE HYDROCHLORIDE 50 MG/ML
50 INJECTION INTRAMUSCULAR; INTRAVENOUS
Status: CANCELLED
Start: 2023-05-22

## 2023-05-19 RX ORDER — DIPHENHYDRAMINE HYDROCHLORIDE 50 MG/ML
50 INJECTION INTRAMUSCULAR; INTRAVENOUS
Status: CANCELLED
Start: 2023-05-30

## 2023-05-19 RX ORDER — MEPERIDINE HYDROCHLORIDE 25 MG/ML
25 INJECTION INTRAMUSCULAR; INTRAVENOUS; SUBCUTANEOUS EVERY 30 MIN PRN
Status: CANCELLED | OUTPATIENT
Start: 2023-05-30

## 2023-05-19 RX ORDER — ALBUTEROL SULFATE 0.83 MG/ML
2.5 SOLUTION RESPIRATORY (INHALATION)
Status: CANCELLED | OUTPATIENT
Start: 2023-05-22

## 2023-05-19 RX ORDER — HEPARIN SODIUM,PORCINE 10 UNIT/ML
5 VIAL (ML) INTRAVENOUS
Status: CANCELLED | OUTPATIENT
Start: 2023-06-01

## 2023-05-19 RX ORDER — ALBUTEROL SULFATE 90 UG/1
1-2 AEROSOL, METERED RESPIRATORY (INHALATION)
Status: CANCELLED
Start: 2023-07-18

## 2023-05-19 RX ORDER — ONDANSETRON 2 MG/ML
8 INJECTION INTRAMUSCULAR; INTRAVENOUS ONCE
Status: CANCELLED | OUTPATIENT
Start: 2023-05-30

## 2023-05-19 RX ORDER — METHYLPREDNISOLONE SODIUM SUCCINATE 125 MG/2ML
125 INJECTION, POWDER, LYOPHILIZED, FOR SOLUTION INTRAMUSCULAR; INTRAVENOUS
Status: CANCELLED
Start: 2023-05-22

## 2023-05-19 RX ORDER — HEPARIN SODIUM,PORCINE 10 UNIT/ML
5 VIAL (ML) INTRAVENOUS
Status: CANCELLED | OUTPATIENT
Start: 2023-07-20

## 2023-05-19 RX ORDER — MEPERIDINE HYDROCHLORIDE 25 MG/ML
25 INJECTION INTRAMUSCULAR; INTRAVENOUS; SUBCUTANEOUS EVERY 30 MIN PRN
Status: CANCELLED | OUTPATIENT
Start: 2023-05-22

## 2023-05-19 RX ORDER — FLUOROURACIL 50 MG/ML
400 INJECTION, SOLUTION INTRAVENOUS ONCE
Status: CANCELLED | OUTPATIENT
Start: 2023-05-30

## 2023-05-19 RX ORDER — HEPARIN SODIUM (PORCINE) LOCK FLUSH IV SOLN 100 UNIT/ML 100 UNIT/ML
5 SOLUTION INTRAVENOUS
Status: CANCELLED | OUTPATIENT
Start: 2023-06-01

## 2023-05-19 RX ORDER — METHYLPREDNISOLONE SODIUM SUCCINATE 125 MG/2ML
125 INJECTION, POWDER, LYOPHILIZED, FOR SOLUTION INTRAMUSCULAR; INTRAVENOUS
Status: CANCELLED
Start: 2023-07-18

## 2023-05-19 RX ORDER — EPINEPHRINE 1 MG/ML
0.3 INJECTION, SOLUTION, CONCENTRATE INTRAVENOUS EVERY 5 MIN PRN
Status: CANCELLED | OUTPATIENT
Start: 2023-07-18

## 2023-05-19 RX ORDER — METHYLPREDNISOLONE SODIUM SUCCINATE 125 MG/2ML
125 INJECTION, POWDER, LYOPHILIZED, FOR SOLUTION INTRAMUSCULAR; INTRAVENOUS
Status: CANCELLED
Start: 2023-08-02

## 2023-05-19 RX ORDER — ONDANSETRON 2 MG/ML
8 INJECTION INTRAMUSCULAR; INTRAVENOUS ONCE
Status: CANCELLED | OUTPATIENT
Start: 2023-08-02

## 2023-05-19 RX ORDER — EPINEPHRINE 1 MG/ML
0.3 INJECTION, SOLUTION, CONCENTRATE INTRAVENOUS EVERY 5 MIN PRN
Status: CANCELLED | OUTPATIENT
Start: 2023-08-02

## 2023-05-19 RX ORDER — ONDANSETRON 2 MG/ML
8 INJECTION INTRAMUSCULAR; INTRAVENOUS ONCE
Status: CANCELLED | OUTPATIENT
Start: 2023-07-18

## 2023-05-19 RX ORDER — LORAZEPAM 2 MG/ML
0.5 INJECTION INTRAMUSCULAR EVERY 4 HOURS PRN
Status: CANCELLED | OUTPATIENT
Start: 2023-08-02

## 2023-05-19 RX ORDER — METHYLPREDNISOLONE SODIUM SUCCINATE 125 MG/2ML
125 INJECTION, POWDER, LYOPHILIZED, FOR SOLUTION INTRAMUSCULAR; INTRAVENOUS
Status: CANCELLED
Start: 2023-05-30

## 2023-05-19 RX ORDER — ALBUTEROL SULFATE 0.83 MG/ML
2.5 SOLUTION RESPIRATORY (INHALATION)
Status: CANCELLED | OUTPATIENT
Start: 2023-07-18

## 2023-05-19 RX ORDER — EPINEPHRINE 1 MG/ML
0.3 INJECTION, SOLUTION INTRAMUSCULAR; SUBCUTANEOUS EVERY 5 MIN PRN
Status: CANCELLED | OUTPATIENT
Start: 2023-05-22

## 2023-05-19 RX ORDER — HEPARIN SODIUM (PORCINE) LOCK FLUSH IV SOLN 100 UNIT/ML 100 UNIT/ML
5 SOLUTION INTRAVENOUS
Status: CANCELLED | OUTPATIENT
Start: 2023-05-30

## 2023-05-19 RX ORDER — ALBUTEROL SULFATE 0.83 MG/ML
2.5 SOLUTION RESPIRATORY (INHALATION)
Status: CANCELLED | OUTPATIENT
Start: 2023-05-30

## 2023-05-19 RX ORDER — HEPARIN SODIUM,PORCINE 10 UNIT/ML
5 VIAL (ML) INTRAVENOUS
Status: CANCELLED | OUTPATIENT
Start: 2023-07-18

## 2023-05-19 RX ORDER — MEPERIDINE HYDROCHLORIDE 25 MG/ML
25 INJECTION INTRAMUSCULAR; INTRAVENOUS; SUBCUTANEOUS EVERY 30 MIN PRN
Status: CANCELLED | OUTPATIENT
Start: 2023-07-18

## 2023-05-19 RX ORDER — HEPARIN SODIUM (PORCINE) LOCK FLUSH IV SOLN 100 UNIT/ML 100 UNIT/ML
5 SOLUTION INTRAVENOUS
Status: CANCELLED | OUTPATIENT
Start: 2023-08-02

## 2023-05-19 RX ORDER — HEPARIN SODIUM,PORCINE 10 UNIT/ML
5 VIAL (ML) INTRAVENOUS
Status: CANCELLED | OUTPATIENT
Start: 2023-08-02

## 2023-05-19 RX ORDER — ALBUTEROL SULFATE 0.83 MG/ML
2.5 SOLUTION RESPIRATORY (INHALATION)
Status: CANCELLED | OUTPATIENT
Start: 2023-08-02

## 2023-05-19 RX ORDER — EPINEPHRINE 1 MG/ML
0.3 INJECTION, SOLUTION, CONCENTRATE INTRAVENOUS EVERY 5 MIN PRN
Status: CANCELLED | OUTPATIENT
Start: 2023-05-30

## 2023-05-19 RX ORDER — ALBUTEROL SULFATE 90 UG/1
1-2 AEROSOL, METERED RESPIRATORY (INHALATION)
Status: CANCELLED
Start: 2023-05-22

## 2023-05-19 RX ORDER — HEPARIN SODIUM (PORCINE) LOCK FLUSH IV SOLN 100 UNIT/ML 100 UNIT/ML
5 SOLUTION INTRAVENOUS
Status: CANCELLED | OUTPATIENT
Start: 2023-08-04

## 2023-05-19 RX ORDER — FLUOROURACIL 50 MG/ML
400 INJECTION, SOLUTION INTRAVENOUS ONCE
Status: CANCELLED | OUTPATIENT
Start: 2023-06-13

## 2023-05-19 RX ORDER — HEPARIN SODIUM,PORCINE 10 UNIT/ML
5 VIAL (ML) INTRAVENOUS
Status: CANCELLED | OUTPATIENT
Start: 2023-05-22

## 2023-05-19 RX ORDER — LORAZEPAM 2 MG/ML
0.5 INJECTION INTRAMUSCULAR EVERY 4 HOURS PRN
Status: CANCELLED | OUTPATIENT
Start: 2023-07-18

## 2023-05-19 RX ORDER — LORAZEPAM 2 MG/ML
0.5 INJECTION INTRAMUSCULAR EVERY 4 HOURS PRN
Status: CANCELLED | OUTPATIENT
Start: 2023-05-30

## 2023-05-19 RX ORDER — HEPARIN SODIUM,PORCINE 10 UNIT/ML
5 VIAL (ML) INTRAVENOUS
Status: CANCELLED | OUTPATIENT
Start: 2023-05-30

## 2023-05-19 RX ORDER — HEPARIN SODIUM (PORCINE) LOCK FLUSH IV SOLN 100 UNIT/ML 100 UNIT/ML
5 SOLUTION INTRAVENOUS
Status: CANCELLED | OUTPATIENT
Start: 2023-07-20

## 2023-05-19 RX ORDER — HEPARIN SODIUM (PORCINE) LOCK FLUSH IV SOLN 100 UNIT/ML 100 UNIT/ML
5 SOLUTION INTRAVENOUS
Status: CANCELLED | OUTPATIENT
Start: 2023-07-18

## 2023-05-19 RX ORDER — DIPHENHYDRAMINE HYDROCHLORIDE 50 MG/ML
50 INJECTION INTRAMUSCULAR; INTRAVENOUS
Status: CANCELLED
Start: 2023-08-02

## 2023-05-19 ASSESSMENT — PAIN SCALES - GENERAL: PAINLEVEL: NO PAIN (0)

## 2023-05-19 NOTE — NURSING NOTE
Is the patient currently in the state of [place]? YES    Visit mode:VIDEO    If the visit is dropped, the patient can be reconnected by: VIDEO VISIT: Text to cell phone: [phone]    Will anyone else be joining the visit? NO      How would you like to obtain your AVS? MyChart    Are changes needed to the allergy or medication list? NO    Reason for visit: Consult (First visit - consult)

## 2023-05-19 NOTE — PATIENT INSTRUCTIONS
Follow up:  1 year flexible sigmoidoscopy in February 2024  [phone]    Please call with any questions or concerns regarding your clinic visit today.    It is a pleasure being involved in your health care.    Contacts post-consultation depending on your need:    Radiology Appointments [phone]    Schedule Clinic Appointments [phone] # 1   M-F 7:30 - 5 pm    [facility] [phone]    Clinic Fax Number [phone]    Surgery Scheduling [phone]    My Chart is available 24 hours a day and is a secure way to access your records and communicate with your care team.  I strongly recommend signing up if you haven't already done so, if you are comfortable with computers.  If you would like to inquire about this or are having problems with My Chart access, you may call [phone] or go online at [email or web address].  Please allow at least 24 hours for a response and extra time on weekends and Holidays.

## 2023-05-19 NOTE — TELEPHONE ENCOUNTER
Reason for Call:  Other call back    Detailed comments: Patient called stated that she had a consult with an MD about chemo and he advised her to be seen sooner then her May 30th appointment. If she can get in sooner please call her thank you.    Phone Number Patient can be reached at: Cell number on file:    Telephone Information:   Mobile [phone]       Best Time: any    Can we leave a detailed message on this number? YES    Call taken on 5/19/2023 at 8:06 AM by Indy Olivia

## 2023-05-19 NOTE — LETTER
2023       RE: Nadia Ladd  [address]  [facility] [address]       Dear Colleague,    Thank you for referring your patient, Nadia Ladd, to the [facility] [place] at [facility]. Please see a copy of my visit note below.    Colon and Rectal Surgery Clinic Note    RE: Nadia Ladd.  : 1955.  CHEYENNE: 2023.    Reason for visit: post op visit     HPI: Nadia Ladd is a 67 year old female who presents today for a post op visit. She underwent a colonoscopy on 2023 for anemia. A frond-like/villous partially obstructing mass was found in the sigmoid colon. It was partially circumferential involving two-thirds of the lumen circumference. It measured 4cm in length. Dr. Alcocer was unable to transverse the mass. 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  CT Chest/Abdomen/Pelvis showed a 4 cm proximal sigmoid colonic mass consistent with history.Possible additional 2.5 cm mass at the hepatic flexure of the colon.No evidence of distant metastases in the chest, abdomen, or Pelvis. Xray colon barium test on 3/3/2023 showed a long segment narrowing in the proximal sigmoid colon corresponds with known colonic malignancy. Focal area of colonic narrowing at the proximal transverse colon corresponds with a second area of abnormality on CT and is also concerning for a synchronous lesion.      She was diagnosed with Crohn's colitis about 10 years ago.  At that time she was having frequent diarrhea, sometimes bloody diarrhea, with anemia.  She was started on sulfasalazine and this has controlled her Crohn's disease with no real flare ups and no need for biologic therapy.  One wonders if she really has Crohn's colitis.  She has had no flare ups and no bloody diarrhea.  It's been under excellent control.  She has 1-2 normal bowel movements per day.  She has not  experienced any obstructive symptoms.      She is now s/p total abdominal colectomy w/ ileorectal bakers anastomosis; ALEXANDER-BSO on 4/6/2023 with myself and Dr. Olea. Pathology demonstrated 2 masses with synchronous malignancy - sigmoid lesion with neuroendocrine component, Ki-67 80%; hepatic lesion with poorly-differentiated carcinoma.     Interval History:   - Incidental hyperkalemia, non-anion gap metabolic acidosis, MARYA following recent oncology visit. Hospitalized from 5/10-5/12/23. Hyperkalemia and Cr now resolving.     Surgical Pathology (4/6/2023):  Addendum 2   Mass #1  RESULT FOR IMMUNOHISTOCHEMICAL VENTANA CLONE  PD-L1 ASSAY  COMBINED POSITIVE SCORE (CPS): 55-60  TUMOR PROPORTION SCORE (TPS): 50%     Mass #2  RESULT FOR IMMUNOHISTOCHEMICAL VENTANA CLONE  PD-L1 ASSAY  COMBINED POSITIVE SCORE (CPS): 80  TUMOR PROPORTION SCORE (TPS): 70%   Addendum electronically signed by Dave Bernstein DO on 4/27/2023 at 10:59 AM   Addendum   This addendum is issued to report the results of Ki-67 staining on both masses, requested by the patient's oncologist.    - The neuroendocrine component of mass #1 shows a Ki-67 proliferation index of approximately 80%.    - The poorly-differentiated carcinoma in mass #2 shows a Ki-67 proliferation index of approximately 70%.   Addendum electronically signed by Dave Bernstein DO on 4/19/2023 at  1:11 PM   Final Diagnosis   A. OMENTUM, RESECTION:  - Adipose tissue with no significant histopathologic abnormality  - No evidence of malignancy     B. 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 UTERUS, CERVIX, BILATERAL FALLOPIAN TUBES & OVARIES, :  - Benign endometrial polyps; atrophic endometrium  - Uterus, cervix, bilateral fallopian tubes, and ovaries with no significant morphologic abnormalities  - No evidence of dysplasia or malignancy     D. SMALL INTESTINE, TERMINAL ILEUM, TERMINAL ILIUM:  - Benign ileum  - No evidence of dysplasia or malignancy  - Negative for metastases to one of one sampled lymph node (0 /1)     E. PROXIMAL ANASTOMOTIC RING:  - Benign small intestine  - No evidence of dysplasia or malignancy     F. DISTAL ANASTOMOTIC RING:  - Benign colon  - No evidence of dysplasia or malignancy   Electronically signed by Dave Bernstein DO on 4/14/2023 at  3:07 PM   Synoptic Checklist   COLON AND RECTUM: Resection, Including Transanal Disk Excision of Rectal Neoplasms  8th Edition - Protocol posted: 6/22/2022  COLON AND RECTUM: RESECTION - B  SPECIMEN   Procedure  Total abdominal colectomy    TUMOR   Tumor Site  Ascending colon    Histologic Type  Mixed neuroendocrine-non-neuroendocrine neoplasm: 70% neuroendocrine neoplasm and 30% adenocarcinoma    Histologic Grade  Neuroendocrine carcinoma and moderately-differentiated adenocarcinoma    Tumor Size  Greatest dimension (Centimeters): 5.0 cm   Tumor Extent  Invades into muscularis propria    Macroscopic Tumor Perforation  Not identified    Lymphovascular Invasion  Small vessel    Perineural Invasion  Not identified    Treatment Effect  No known presurgical therapy    MARGINS   Margin Status for Invasive Carcinoma  All margins negative for invasive carcinoma    Closest Margin(s) to Invasive Carcinoma  Radial (circumferential) or mesenteric    Distance from Invasive Carcinoma to Closest Margin  0.2 cm   Margin Status for Non-Invasive Tumor  All margins negative for high-grade dysplasia / intramucosal carcinoma and low-grade dysplasia    REGIONAL LYMPH NODES   Regional Lymph Node Status  Tumor present  in regional lymph node(s)    Number of Lymph Nodes with Tumor  1    Number of Lymph Nodes Examined  41    Tumor Deposits  Not identified    PATHOLOGIC STAGE CLASSIFICATION (pTNM, AJCC 8th Edition)   Reporting of pT, pN, and (when applicable) pM categories is based on information available to the pathologist at the time the report is issued. As per the AJCC (Chapter 1, 8th Ed.) it is the managing physician's responsibility to establish the final pathologic stage based upon all pertinent information, including but potentially not limited to this pathology report.   TNM Descriptors  m (multiple primary tumors)    pT Category  pT3    pN Category  pN1a    ADDITIONAL FINDINGS   Additional Findings  Adenoma(s)    .     COLON AND RECTUM: Resection, Including Transanal Disk Excision of Rectal Neoplasms  8th Edition - Protocol posted: 6/22/2022  COLON AND RECTUM: RESECTION - B  SPECIMEN   Procedure  Total abdominal colectomy    TUMOR   Tumor Site  Sigmoid colon    Histologic Type  Carcinoma, type cannot be determined    Histologic Grade  G3, poorly differentiated    Tumor Size  Greatest dimension (Centimeters): 5.7 cm   Tumor Extent  Invades into muscularis propria    Macroscopic Tumor Perforation  Not identified    Lymphovascular Invasion  Not identified    Perineural Invasion  Not identified    Treatment Effect  No known presurgical therapy    MARGINS   Margin Status for Invasive Carcinoma  All margins negative for invasive carcinoma    Closest Margin(s) to Invasive Carcinoma  Radial (circumferential) or mesenteric    Distance from Invasive Carcinoma to Closest Margin  4.2 cm   Margin Status for Non-Invasive Tumor  All margins negative for high-grade dysplasia / intramucosal carcinoma and low-grade dysplasia    REGIONAL LYMPH NODES   Regional Lymph Node Status  Tumor present in regional lymph node(s)    Number of Lymph Nodes with Tumor  1    Number of Lymph Nodes Examined  41    Tumor Deposits  Not identified    PATHOLOGIC  STAGE CLASSIFICATION (pTNM, AJCC 8th Edition)   Reporting of pT, pN, and (when applicable) pM categories is based on information available to the pathologist at the time the report is issued. As per the AJCC (Chapter 1, 8th Ed.) it is the managing physician's responsibility to establish the final pathologic stage based upon all pertinent information, including but potentially not limited to this pathology report.   TNM Descriptors  m (multiple primary tumors)    pT Category  pT2    pN Category  pN1a    .              Medications:  Current Outpatient Medications   Medication Sig Dispense Refill    acetaminophen (TYLENOL) 325 MG tablet Take 3 tablets (975 mg) by mouth every 8 hours (Patient not taking: Reported on 4/19/2023) 100 tablet 0    cyanocobalamin 1000 MCG TBCR Take 1,000 mcg by mouth daily (Patient taking differently: Take 1,000 mcg by mouth every morning) 100 tablet 1    enoxaparin ANTICOAGULANT (LOVENOX) 40 MG/0.4ML syringe Inject 0.4 mLs (40 mg) Subcutaneous every 24 hours for 23 days 9.2 mL 0    ferrous sulfate (IRON) 325 (65 FE) MG tablet TAKE ONE TABLET BY MOUTH TWICE A DAY --NO FURTHER REFILLS WITHOUT OFFICE VISIT 200 tablet 3    fluocinonide (LIDEX) 0.05 % external cream Small amount to affected area(s) BID PRN (Patient not taking: Reported on 4/19/2023) 60 g 1    gabapentin (NEURONTIN) 100 MG capsule Take 1 capsule (100 mg) by mouth At Bedtime 12 capsule 0    lisinopril (ZESTRIL) 10 MG tablet Take 1 tablet (10 mg) by mouth every morning 90 tablet 3    Multiple Vitamins-Iron (MULTI-DAY PLUS IRON PO)       triamterene-HCTZ (DYAZIDE) 37.5-25 MG capsule TAKE 1 CAPSULE BY MOUTH ONCE DAILY (Patient taking differently: Take 1 capsule by mouth every morning TAKE 1 CAPSULE BY MOUTH ONCE DAILY) 90 capsule 0       ROS:  A complete review of systems was performed with the patient and all systems negative except as per HPI.    Physical Examination:  Exam was chaperoned by Mary Watson, general surgery  resident  LMP  (LMP Unknown)   General: Well hydrated. No acute distress.  Abdomen: Soft, NT, ND. Incisions very well-healed.  Perianal external examination: Not performed  Anoscopy: Was not performed.     Procedures:  None    ASSESSMENT  Nadia Ladd is a 67-year old s/p s/p total abdominal colectomy w/ ileorectal bakers anastomosis; ALEXANDER-BSO on 4/6/2023 with myself and Dr. Olea. Pathology demonstrated 2 masses with synchronous malignancy - sigmoid lesion with neuroendocrine component, Ki-67 80%; hepatic lesion with poorly-differentiated carcinoma. Patient receiving close follow-up with medical oncology. To start FOLFOX therapy for 6 months.     PLAN  1. FOLFOX therapy per medical oncology; Ok to start at any time moving forward  2. Rigid proctoscopy to be completed at 1-year brian since diagnosis of malignancy  3. Follow-up with me as needed     30 minutes spent on the date of the encounter doing chart review, history and exam, imaging review, documentation and further activities as noted above.        Referring Provider:  Jerome Ashley MD  [address] [place] [id number]     Primary Care Provider:  Nomi Cartwright        Again, thank you for allowing me to participate in the care of your patient.      Sincerely,    Jerome Ashley MD

## 2023-05-19 NOTE — TELEPHONE ENCOUNTER
MEDICAL ONCOLOGY UPDATE:    Pt had second opinion w/medical oncologist Dr. Acharya. I appreciate the thorough recommendations and reviewed these with the patient.     - She would like to proceed with FOFLOX (with dose reduced oxaliplatin due to kidney function)  - I will order CT CAP for baseline prior to starting tx; her kidney function is improving but not quite back to her baseline yet. We will repeat Cr before CT and give IVF before and after CT and follow Cr closely.  - She has port placement 5/25/23  - She has an appt with me 5/30/23 and we will try to start treatment on that date, will do our best to coordinate with infusion team    Irene Bateman D.O.  Hematology/Oncology  [facility]

## 2023-05-19 NOTE — NURSING NOTE
"Chief Complaint   Patient presents with     Post-op Visit       Vitals:    05/19/23 1127   BP: 124/84   BP Location: Left arm   Patient Position: Sitting   Cuff Size: Adult Large   Pulse: 80   SpO2: 100%   Weight: 177 lb   Height: 5' 1\"       Body mass index is 33.44 kg/m .    Rachna Dotson CMA    "

## 2023-05-19 NOTE — PROGRESS NOTES
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 Jerome Ashley.  One lesion has loss of nuclear expression in MLH1 and PMS2; both harbor KRAS G13D alteration; both harbor high expression of PD-L1 assessed by CPS and also TMB well above 10 mt/mb.     presents for a second opinion prior to initiating postoperative systemic chemotherapy under the care of Dr. Bateman.    HISTORY OF PRESENT ILLNESS:  Ms. Nadia Ladd is a 67-year-old woman from [place], which is in [facility].  She joins me for an [facility]-based virtual video visit from her home.  She is alone on the call.  She is under the care of Dr. Irene Bateman from our Oncology team at [facility]. Ms. Ladd sees Dr. Bateman at the [facility] facility.  Ms. Ladd has a past medical history notable for Crohn disease diagnosed around 2010 and she says she has not required medication or had severe bouts of colitis from that over the past decade.  She has anemia secondary to iron deficiency and vitamin B12, hypertension and the medical record also does mention some form of pulmonary hypertension that she denies.  She has chronic kidney disease stage III, that she says is new since her surgery for her colon cancer.      She met with her primary care physician, Dr. Cartwright, on 02/16/2023 and was referred for colonoscopy for screening.  She does not endorse having any particular intestinal concerns or symptoms up to that point.  She underwent colonoscopy 02/27/2023.  She also underwent a simultaneous EGD due to issues of iron deficiency anemia.  The colonoscopy detected a villous partially obstructing large mass in the sigmoid colon that  was partially circumferential involving two-thirds of the lumen circumference.  It measured 4 cm in length.  It was tattooed. Also of note, the EGD was performed and did not detect any unusual findings.  There was a 5 cm hiatal hernia detected.  The biopsy yielded evidence of invasive poorly differentiated carcinoma at the sigmoid stricture. The EGD just showed chronic inactive gastritis and negative H. pylori.      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  She then underwent a CT chest, abdomen and pelvis in 02/27/2023 for staging.  It showed by then biopsy proven colonic mass in the sigmoid colon measuring 4 cm.  There was also detection of a 2.5 cm mass at the hepatic flexure.  It is not clear if that was detected or reported on the colonoscopy that had been performed by that point. There was no evidence of distant metastatic disease.  CEA level was checked on 03/10/2023 and was slightly elevated at 6.8.      She then met with Dr. Jerome Ashley from our Colorectal Surgery team on 03/10/2023.  He reviewed the colonoscopy findings and the biopsy results, as well as the CT scan.  By that point, an x-ray colon barium had been performed showing a long segment narrowing in the proximal sigmoid colon where the area of biopsy-proven malignancy was found.  There was also an area in the proximal transverse colon corresponding with a synchronous second lesion. He discussed plans for a laparoscopic sigmoid colectomy and also obtain a colonoscopy to look at the site of the hepatic flexure identified on the CT scan.  On 03/28/2023, Ms. Ladd met with Dr. Irene Bateman from Medical Oncology within the [facility] before proceeding to surgery 04/06/2023.  Dr. Ashley took her to the operating room on that date and resected 2 lesions, both of which are malignant.      This complicated case detected the followin masses were identified, one labeled mass #1 and mass #2.  Mass #1 was reported to be a mixed neuroendocrine/non-neuroendocrine neoplasm, comprising 70% NEC and moderately differentiated adenocarcinoma was 30%.  The size overall was 5 cm and the surgical margins were negative for malignancy.  In addition, the second mass, the one near the hepatic flexure, measured 5.7 cm and was more consistent with a more pure poorly differential carcinoma and negative surgical margins.  There was 1 of 41 lymph nodes positive.  She also had resection of omentum that did not contain malignancy.  She had a resection of terminal ileum, proximal anastomotic ring, distal anastomotic ring.  Uterus, cervix, bilateral fallopian tubes and ovaries, none of those sections contained any evidence of malignancy.      Overall, for the mixed neuroendocrine/non-neuroendocrine neoplasm that comprised mass #1, this was staged as a pathologic T3 N1a carcinoma overall.  The second one was more consistent with pathologic T2 N1a. The first one, mass #1, had intact nuclear expression.  The second mass is the one that was reported to have loss of nuclear expression in MLH1 and PMS2, as was the initial biopsy sample. Dr. Bateman requested some forms of next generation sequencing that yielded evidence of KRAS, G13D alteration in both. The loss of MLH1 and PMS2 was followed up with assessment of promoter hypermethylation.  Mass #2 was negative for any evidence of promoter methylation MLH1.  Interestingly, there was elevated tumor mutation burden in both.  It is unclear from the pathologist's notation which was which, but regardless, both qualified for standard cuff off of greater than 10 mutations per megabase.  One was 60 and the other one was 17.      She developed some acute kidney injury in the postoperative setting.  She has also had trouble in the subsequent month to 6 weeks with hyperkalemia.  She was  hospitalized on 05/10/2023-05/15/2023 at [facility] for hyperkalemia-related issues. Along the way, she did have a brain MRI on 04/20/2023 presumably to rule out brain metastasis from a high-grade neuroendocrine carcinoma.  There was no evidence of brain metastasis seen.      At this point, she has an appointment next week for port placement.  FOLFOX has been recommended to her.  Dr. Bateman presented the case at the multidisciplinary colorectal tumor conference in recent weeks.  The recommendation was for postoperative FOLFOX as per standard of care for colorectal adenocarcinomas.  Dr. Bateman requested and suggested the patient have a second opinion. Thus, the reason for the visit with me today.  Overall, she feels that the recovery is going well.  She understands there been some issues with her kidney.  Her most recent check was 05/18/2023, which was yesterday, with a BUN of 47.5 and creatinine 2.17.  It appears that the peak creatinine in recent weeks was 3.5.  Baseline going back to fall of 2021 appeared to have been somewhere in the 1.3 to 1.64 range.  Overall, she has questions about whether or not it is imperative to start chemotherapy in the coming weeks or potentially wait longer, and she believes she was told that immunotherapy would be part of her treatment regimen.    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  Resected 04/06/2023 by Dr. Jerome Ashley at [facility].  She has a reported history of Crohn colitis, dating back to 2010.  She has anemia reported to be due to iron deficiency and B12 deficiency.  She has a reported history of pulmonary hypertension and hypertension and chronic kidney disease stage III.    PAST SURGICAL HISTORY:  Most notable for the 04/06/2023 sigmoid colectomy.  She says in the early 1980s she had a  laparoscopy undergoing evaluation for fertility.  She says at the age of 19 she had some sort of gynecologic procedure related to the hymen as well, but this is not sure the purpose of that one.    SOCIAL HISTORY:  She has been  46 years and they have an adopted son, Henrry. She lives in [place] which is in [facility].  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  She denies ever using any form of tobacco, alcohol, or drugs.    FAMILY HISTORY:  Family history of malignancy only includes her father who had some form of neck cancer in his 60s and  at age 68 of this disease and a stroke and other unrelated complications.  She has 2 sisters and 1 brother without cancer.  There is no other known family history of malignancy other than her father.    REVIEW OF SYSTEMS:  Full 14-point review of systems was performed. Pertinent symptoms are reviewed above per HPI.    MEDICATIONS/ALLERGIES:  Fully in Epic.  Current Outpatient Medications   Medication    cyanocobalamin 1000 MCG TBCR    ferrous sulfate (IRON) 325 (65 FE) MG tablet    Multiple Vitamins-Iron (MULTI-DAY PLUS IRON PO)     No current facility-administered medications for this visit.         Allergies   Allergen Reactions    No Known Drug Allergy         PHYSICAL EXAMINATION:  Completed via virtual video visit.  In-person physical exam could not be performed today in context of a Virtual Visit. Observed physical assessments made today by visualizing the patient by video link:  Vitals - Patient Reported  Weight (Patient Reported):  (no vitals to report)  Pain Score: No Pain (0)             General/Constitutional: Generally appears well, not acutely ill.  HEENT: no scleral icterus, not jaundiced.  Respiratory: no labored breathing.  Musculoskeletal: appears to have full range of motion and adequate physical strength.  Skin: no jaundice, discoloration or other notable lesions.  Neurological: no evidence of  tremors.  Psychiatric: no evident anxiety; fully alert and oriented with fluent speech.      The rest of a comprehensive physical examination is deferred due to nature of video visits.     Pertinent labs, pathology and imaging were reviewed by me during the course of the visit as above.    IMPRESSION AND PLAN:  Ms. Nadia Ladd is a 67-year-old woman from [place], [place] with a medical history notable for chronic Crohn disease without repeated episodes of colitis.  She has anemia, pulmonary hypertension, chronic kidney disease stage III that seems to have been worsened in the postoperative setting in 04/2023 and 05/2023.      She has an unusual diagnosis of synchronous malignancy, 2 masses, both of which were located in the colon, one at the sigmoid region and one at the hepatic flexure.  The latter one is a poorly differentiated adenocarcinoma that is somewhat more straightforward.  The sigmoid lesion is rather more complicated and relatively unusual compared to most forms of intestinal malignancies.  It is, in fact, a predominantly at least 70% form of neuroendocrine carcinoma (NEC) and it seems to harbor alterations in MLH1 and PMS2 but without promoter hypermethylation of MLH1.  There is some non-neuroendocrine component as well, but with a predominance of neuroendocrine carcinoma, that raises strong concern for prognosis and need to move forward relatively soon with postoperative therapy.  It should also be noted that the Ki-67 values for the tumor were assessed.      The mass #1, which is the neuroendocrine component of the sigmoid lesion, reported to have Ki-67 of 80%.  The poorly differentiated carcinoma, mass #2, is also very high at 70%.  In addition to the mismatch repair protein deficits MLH1 and PMS2, there is reported to be in mass #1 a CPS PD-L1 score of 55-60 and a CPS score in the second lesion, mass #2, of 80. Both of those are extremely high and indicate potential predictive value  response to immunotherapy in the stage IV metastatic setting.  However, I would emphasize here that there is no evidence as of yet that she has stage IV metastatic dise    ase.  I did take a moment to review the natural course, biology, diagnosis and treatment of neuroendocrine carcinomas in general and also more broadly colorectal and intestinal malignancies.  This is certainly an unusual situation representing a synchronous poorly differentiated carcinoma with neuroendocrine carcinoma.  It is difficult to determine whether or not they are related, but it is interesting that they both harbor KRAS G13D alteration.  Thus, it is conceivable that the tumor arose in one area and some clones migrated to another part of the colon and had such poor extensive cellular  differentiation that manifested themselves as a neuroendocrine carcinoma.      I described neuroendocrine tumors more broadly and described that neuroendocrine carcinomas that are high-grade tend to be poorly differentiated forms of neuroendocrine tumors and that can be associated with a relatively poor prognosis. In the postoperative setting without evidence of distant metastatic disease as of yet, I would recommend postoperative adjuvant therapy, as would be standard of care for colorectal adenocarcinomas, within a 4 to 8-week time frame.  Fortunately, there have been single institution phase 2 studies that have used FOLFOX in the metastatic setting for neuroendocrine carcinomas, so there is some precedent, but regardless, for colorectal adenocarcinoma,  FOLFOX is the standard of care.  I would recommend a 6-month duration of every other week administration of FOLFOX. She has a port in place.  She has gotten teaching for FOLFOX and feels that she has gotten enough information thus far from Dr. Bateman and team at [facility] on that. I think she does require pretreatment postoperative form of CT chest, abdomen and pelvis relatively soon just to ensure  that there has not been interval development of metastatic disease.      As to the question about addition of immunotherapy, that is being addressed in multiple studies for patients with mismatch repair deficits with stage III colon adenocarcinomas, but at the current time, there is no evidence to support without evidence of distant metastatic disease to use immunotherapy despite the presence of predicted biomarkers.     As these were found and there is some question about whether or not Chan syndrome may be errantly listed in the chart as Chan syndrome has not been proven at this time without proper cancer genetics counseling and evaluation, I offered cancer genetics counseling.  I will make that referral at her request, and our team will be happy to do that, and as far as I know, I think they are still doing virtual form of visits for patient.  That might be helpful for her due to the distance.      To my knowledge, there are no clinical trials that we can offer at the current time for stage III setting for mismatch repair deficits, they are available through [facility] or other cooperative groups.  They are available through some of our [facility] sites, but as she lives in [facility], the distance may be prohibitive.  It is possible Dr. Bateman may have access to certain trials through the [facility] network, but I do not know if their reach extends as far as [facility], which is where the patient will be getting treatment. At the current time, I would suggest moving forward with FOLFOX, barring other clinical trial opportunities due to distance.  I will make a referral to cancer genetics. It will be important to note for future reference with the mismatch repair deficits and extraordinarily high CPS score in her tumor the use of immunotherapy has been explored and published in small cohort series often from single institutions using chemotherapy whether it be nivolumab alone or ipilimumab and  nivolumab most prominently with additionally some cases reported anecdotal success, that may be useful to use in potential second-line regimen or beyond if she would develop distant metastatic disease.  In addition, if she were to develop metastatic disease, some form of platinum therapy with etoposide would be the usual standard of care. Ipilimumab and nivolumab could arguably be a good second-line regimen if she developed metastatic disease due to the presence of predictive immunotherapy markers.  I reviewed all the above and she stated thanks.  She will connect with Dr. Bateman at my suggestion to try and get a sooner appointment than 05/31 to have more discussion about timing of postoperative therapy and to move forward from that standpoint.    Thank you, Dr. Bateman, Dr. Ashley and Dr. Cartwright, for the opportunity to meet this kind woman.  I met with Ms. Nadia Ladd 05/19/2023 between 7:31 a.m. and 8:11 a.m.        VIRTUAL VISIT - DETAILS:    I have reviewed the note as documented above. This accurately captures the substance of my conversation with the patient.    Date of call: May 19, 2023   Start of call: 7:31 am  End of call: 8:11 am    Provider location: [facility] (academic office)  Patient location: Home      Mode of Video Visit: Amwell         I spent 40 minutes in consultation, including history and discussion with the patient including review of recent lab values and radiologic imaging results.  An additional 30 minutes was spent on the day of the visit, including reviewing pertinent medical notes and documentation from other physicians and APPs who have evaluated and treated this patient, pertinent lab values, pathology and imaging results, personal review of radiologic images, discussing the case with referring providers and/or nurse coordinator team, post-visit orders, and all post-visit documentation.    Viet Acharya MD PhD           D: 05/19/2023   T: 05/19/2023   MT: ba    Name:     GAEL  JOVITA SUTHERLAND  MRN:      [id number]        Account:      [id number]   :      1955           Service Date: 2023       Document: [id number]

## 2023-05-19 NOTE — LETTER
5/19/2023         RE: Nadia Ladd  [address]  [facility] [address]        Dear Colleague,    Thank you for referring your patient, Nadia Ladd, to the [facility]. Please see a copy of my visit note below.    Virtual Visit Details    Type of service:  Video Visit       Originating Location (pt. 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 Jerome Ashley.  One lesion has loss of nuclear expression in MLH1 and PMS2; both harbor KRAS G13D alteration; both harbor high expression of PD-L1 assessed by CPS and also TMB well above 10 mt/mb.     presents for a second opinion prior to initiating postoperative systemic chemotherapy under the care of Dr. Bateman.    HISTORY OF PRESENT ILLNESS:  Ms. Nadia Ladd is a 67-year-old woman from [place], which is in [facility].  She joins me for an [employer]-based virtual video visit from her home.  She is alone on the call.  She is under the care of Dr. Irene Bateman from our Oncology team at [facility]. Ms. Ladd sees Dr. Bateman at the [facility] facility.  Ms. Ladd has a past medical history notable for Crohn disease diagnosed around 2010 and she says she has not required medication or had severe bouts of colitis from that over the past decade.  She has anemia secondary to iron deficiency and vitamin B12, hypertension and the medical record also does mention some form of pulmonary hypertension that she denies.  She has chronic kidney disease stage III, that she says is new since her surgery for  her colon cancer.      She met with her primary care physician, Dr. Cartwright, on 02/16/2023 and was referred for colonoscopy for screening.  She does not endorse having any particular intestinal concerns or symptoms up to that point.  She underwent colonoscopy 02/27/2023.  She also underwent a simultaneous EGD due to issues of iron deficiency anemia.  The colonoscopy detected a villous partially obstructing large mass in the sigmoid colon that was partially circumferential involving two-thirds of the lumen circumference.  It measured 4 cm in length.  It was tattooed. Also of note, the EGD was performed and did not detect any unusual findings.  There was a 5 cm hiatal hernia detected.  The biopsy yielded evidence of invasive poorly differentiated carcinoma at the sigmoid stricture. The EGD just showed chronic inactive gastritis and negative H. pylori.      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  She then underwent a CT chest, abdomen and pelvis in 02/27/2023 for staging.  It showed by then biopsy proven colonic mass in the sigmoid colon measuring 4 cm.  There was also detection of a 2.5 cm mass at the hepatic flexure.  It is not clear if that was detected or reported on the colonoscopy that had been performed by that point. There was no evidence of distant metastatic disease.  CEA level was checked on 03/10/2023 and was slightly elevated at 6.8.      She then met with Dr. Jerome Ashley from our Colorectal Surgery team on 03/10/2023.  He reviewed the colonoscopy findings and the biopsy results, as well as the CT scan.  By that point, an x-ray colon barium had been performed showing a long segment narrowing in the proximal sigmoid colon where the area of biopsy-proven malignancy was found.  There was also an area in the proximal transverse colon corresponding with a  synchronous second lesion. He discussed plans for a laparoscopic sigmoid colectomy and also obtain a colonoscopy to look at the site of the hepatic flexure identified on the CT scan.  On 2023, Ms. Ladd met with Dr. Irene Bateman from Medical Oncology within the [facility] system before proceeding to surgery 2023.  Dr. Ashley took her to the operating room on that date and resected 2 lesions, both of which are malignant.     This complicated case detected the followin masses were identified, one labeled mass #1 and mass #2.  Mass #1 was reported to be a mixed neuroendocrine/non-neuroendocrine neoplasm, comprising 70% NEC and moderately differentiated adenocarcinoma was 30%.  The size overall was 5 cm and the surgical margins were negative for malignancy.  In addition, the second mass, the one near the hepatic flexure, measured 5.7 cm and was more consistent with a more pure poorly differential carcinoma and negative surgical margins.  There was 1 of 41 lymph nodes positive.  She also had resection of omentum that did not contain malignancy.  She had a resection of terminal ileum, proximal anastomotic ring, distal anastomotic ring.  Uterus, cervix, bilateral fallopian tubes and ovaries, none of those sections contained any evidence of malignancy.      Overall, for the mixed neuroendocrine/non-neuroendocrine neoplasm that comprised mass #1, this was staged as a pathologic T3 N1a carcinoma overall.  The second one was more consistent with pathologic T2 N1a. The first one, mass #1, had intact nuclear expression.  The second mass is the one that was reported to have loss of nuclear expression in MLH1 and PMS2, as was the initial biopsy sample. Dr. Bateman requested some forms of next generation sequencing that yielded evidence of KRAS, G13D alteration in both. The loss of MLH1 and PMS2 was followed up with assessment of promoter hypermethylation.  Mass #2 was negative for any evidence of  promoter methylation MLH1.  Interestingly, there was elevated tumor mutation burden in both.  It is unclear from the pathologist's notation which was which, but regardless, both qualified for standard cuff off of greater than 10 mutations per megabase.  One was 60 and the other one was 17.      She developed some acute kidney injury in the postoperative setting.  She has also had trouble in the subsequent month to 6 weeks with hyperkalemia.  She was hospitalized on 05/10/2023-05/15/2023 at [facility] for hyperkalemia-related issues. Along the way, she did have a brain MRI on 04/20/2023 presumably to rule out brain metastasis from a high-grade neuroendocrine carcinoma.  There was no evidence of brain metastasis seen.      At this point, she has an appointment next week for port placement.  FOLFOX has been recommended to her.  Dr. Bateman presented the case at the multidisciplinary colorectal tumor conference in recent weeks.  The recommendation was for postoperative FOLFOX as per standard of care for colorectal adenocarcinomas.  Dr. Bateman requested and suggested the patient have a second opinion. Thus, the reason for the visit with me today.  Overall, she feels that the recovery is going well.  She understands there been some issues with her kidney.  Her most recent check was 05/18/2023, which was yesterday, with a BUN of 47.5 and creatinine 2.17.  It appears that the peak creatinine in recent weeks was 3.5.  Baseline going back to fall of 2021 appeared to have been somewhere in the 1.3 to 1.64 range.  Overall, she has questions about whether or not it is imperative to start chemotherapy in the coming weeks or potentially wait longer, and she believes she was told that immunotherapy would be part of her treatment regimen.    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  Resected 2023 by Dr. Jerome Ashley at [facility].  She has a reported history of Crohn colitis, dating back to .  She has anemia reported to be due to iron deficiency and B12 deficiency.  She has a reported history of pulmonary hypertension and hypertension and chronic kidney disease stage III.    PAST SURGICAL HISTORY:  Most notable for the 2023 sigmoid colectomy.  She says in the early 1980s she had a laparoscopy undergoing evaluation for fertility.  She says at the age of 19 she had some sort of gynecologic procedure related to the hymen as well, but this is not sure the purpose of that one.    SOCIAL HISTORY:  She has been  46 years and they have an adopted son, Henrry. She lives in [place] which is in [place].  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  She denies ever using any form of tobacco, alcohol, or drugs.    FAMILY HISTORY:  Family history of malignancy only includes her father who had some form of neck cancer in his 60s and  at age 68 of this disease and a stroke and other unrelated complications.  She has 2 sisters and 1 brother without cancer.  There is no other known family history of malignancy other than her father.    REVIEW OF SYSTEMS:  Full 14-point review of systems was performed. Pertinent symptoms are reviewed above per HPI.    MEDICATIONS/ALLERGIES:  Fully in Epic.  Current Outpatient Medications   Medication    cyanocobalamin 1000 MCG TBCR    ferrous sulfate (IRON) 325 (65 FE) MG tablet    Multiple Vitamins-Iron (MULTI-DAY PLUS IRON PO)     No current facility-administered medications for this visit.         Allergies   Allergen Reactions    No Known Drug Allergy         PHYSICAL EXAMINATION:  Completed via virtual video visit.  In-person physical exam could not be performed today in context of a Virtual Visit. Observed physical assessments made today by visualizing the patient by  video link:  Vitals - Patient Reported  Weight (Patient Reported):  (no vitals to report)  Pain Score: No Pain (0)             General/Constitutional: Generally appears well, not acutely ill.  HEENT: no scleral icterus, not jaundiced.  Respiratory: no labored breathing.  Musculoskeletal: appears to have full range of motion and adequate physical strength.  Skin: no jaundice, discoloration or other notable lesions.  Neurological: no evidence of tremors.  Psychiatric: no evident anxiety; fully alert and oriented with fluent speech.      The rest of a comprehensive physical examination is deferred due to nature of video visits.     Pertinent labs, pathology and imaging were reviewed by me during the course of the visit as above.    IMPRESSION AND PLAN:  Ms. Nadia Ladd is a 67-year-old woman from [place], [place] with a medical history notable for chronic Crohn disease without repeated episodes of colitis.  She has anemia, pulmonary hypertension, chronic kidney disease stage III that seems to have been worsened in the postoperative setting in 04/2023 and 05/2023.      She has an unusual diagnosis of synchronous malignancy, 2 masses, both of which were located in the colon, one at the sigmoid region and one at the hepatic flexure.  The latter one is a poorly differentiated adenocarcinoma that is somewhat more straightforward.  The sigmoid lesion is rather more complicated and relatively unusual compared to most forms of intestinal malignancies.  It is, in fact, a predominantly at least 70% form of neuroendocrine carcinoma (NEC) and it seems to harbor alterations in MLH1 and PMS2 but without promoter hypermethylation of MLH1.  There is some non-neuroendocrine component as well, but with a predominance of neuroendocrine carcinoma, that raises strong concern for prognosis and need to move forward relatively soon with postoperative therapy.  It should also be noted that the Ki-67 values for the tumor were  assessed.      The mass #1, which is the neuroendocrine component of the sigmoid lesion, reported to have Ki-67 of 80%.  The poorly differentiated carcinoma, mass #2, is also very high at 70%.  In addition to the mismatch repair protein deficits MLH1 and PMS2, there is reported to be in mass #1 a CPS PD-L1 score of 55-60 and a CPS score in the second lesion, mass #2, of 80. Both of those are extremely high and indicate potential predictive value response to immunotherapy in the stage IV metastatic setting.  However, I would emphasize here that there is no evidence as of yet that she has stage IV metastatic dise    ase.  I did take a moment to review the natural course, biology, diagnosis and treatment of neuroendocrine carcinomas in general and also more broadly colorectal and intestinal malignancies.  This is certainly an unusual situation representing a synchronous poorly differentiated carcinoma with neuroendocrine carcinoma.  It is difficult to determine whether or not they are related, but it is interesting that they both harbor KRAS G13D alteration.  Thus, it is conceivable that the tumor arose in one area and some clones migrated to another part of the colon and had such poor extensive cellular  differentiation that manifested themselves as a neuroendocrine carcinoma.      I described neuroendocrine tumors more broadly and described that neuroendocrine carcinomas that are high-grade tend to be poorly differentiated forms of neuroendocrine tumors and that can be associated with a relatively poor prognosis. In the postoperative setting without evidence of distant metastatic disease as of yet, I would recommend postoperative adjuvant therapy, as would be standard of care for colorectal adenocarcinomas, within a 4 to 8-week time frame.  Fortunately, there have been single institution phase 2 studies that have used FOLFOX in the metastatic setting for neuroendocrine carcinomas, so there is some precedent, but  regardless, for colorectal adenocarcinoma,  FOLFOX is the standard of care.  I would recommend a 6-month duration of every other week administration of FOLFOX. She has a port in place.  She has gotten teaching for FOLFOX and feels that she has gotten enough information thus far from Dr. Bateman and team at [facility] on that. I think she does require pretreatment postoperative form of CT chest, abdomen and pelvis relatively soon just to ensure that there has not been interval development of metastatic disease.      As to the question about addition of immunotherapy, that is being addressed in multiple studies for patients with mismatch repair deficits with stage III colon adenocarcinomas, but at the current time, there is no evidence to support without evidence of distant metastatic disease to use immunotherapy despite the presence of predicted biomarkers.     As these were found and there is some question about whether or not Chan syndrome may be errantly listed in the chart as Chan syndrome has not been proven at this time without proper cancer genetics counseling and evaluation, I offered cancer genetics counseling.  I will make that referral at her request, and our team will be happy to do that, and as far as I know, I think they are still doing virtual form of visits for patient.  That might be helpful for her due to the distance.      To my knowledge, there are no clinical trials that we can offer at the current time for stage III setting for mismatch repair deficits, they are available through [facility] or other cooperative groups.  They are available through some of our [facility] sites, but as she lives in [facility], the distance may be prohibitive.  It is possible Dr. Bateman may have access to certain trials through the [facility] network, but I do not know if their reach extends as far as [facility], which is where the patient will be getting treatment. At the current time, I  would suggest moving forward with FOLFOX, barring other clinical trial opportunities due to distance.  I will make a referral to cancer genetics. It will be important to note for future reference with the mismatch repair deficits and extraordinarily high CPS score in her tumor the use of immunotherapy has been explored and published in small cohort series often from single institutions using chemotherapy whether it be nivolumab alone or ipilimumab and nivolumab most prominently with additionally some cases reported anecdotal success, that may be useful to use in potential second-line regimen or beyond if she would develop distant metastatic disease.  In addition, if she were to develop metastatic disease, some form of platinum therapy with etoposide would be the usual standard of care. Ipilimumab and nivolumab could arguably be a good second-line regimen if she developed metastatic disease due to the presence of predictive immunotherapy markers.  I reviewed all the above and she stated thanks.  She will connect with Dr. Bateman at my suggestion to try and get a sooner appointment than 05/31 to have more discussion about timing of postoperative therapy and to move forward from that standpoint.    Thank you, Dr. Bateman, Dr. Ashley and Dr. Cartwright, for the opportunity to meet this kind woman.  I met with Ms. Nadia Ladd 05/19/2023 between 7:31 a.m. and 8:11 a.m.        VIRTUAL VISIT - DETAILS:    I have reviewed the note as documented above. This accurately captures the substance of my conversation with the patient.    Date of call: May 19, 2023   Start of call: 7:31 am  End of call: 8:11 am    Provider location: [facility] (academic office)  Patient location: Home      Mode of Video Visit: Gavin         I spent 40 minutes in consultation, including history and discussion with the patient including review of recent lab values and radiologic imaging results.  An additional 30 minutes was spent on the day of the  visit, including reviewing pertinent medical notes and documentation from other physicians and APPs who have evaluated and treated this patient, pertinent lab values, pathology and imaging results, personal review of radiologic images, discussing the case with referring providers and/or nurse coordinator team, post-visit orders, and all post-visit documentation.    Viet Acharya MD PhD           D: 2023   T: 2023   MT: tierra    Name:     JOVITA MAYOMarylou  MRN:      [id number]        Account:      [id number]   :      1955           Service Date: 2023       Document: [id number]

## 2023-05-23 NOTE — PROGRESS NOTES
"Virtual Visit Details  Type of service:  Video Visit   Originating Location (pt. Location): Home  Distant Location (provider location):  Off-site  Platform used for Video Visit: Junior     5/24/2023    Referring Provider: Irene Bateman DO    Presenting Information:  I spoke to Nadia today to discuss her genetic testing results. Her blood was drawn on 3/31/2023. The  Chan syndrome genes was ordered from [facility]. This testing was done because of Nadia's personal history of colon cancer, absent MLH1 and PMS2 on IHC, and negative MLH1 promoter methylation testing.    Of note, since our last appointment somatic BRAF testing came back negative. On 4/6/2023, Nadia underwent a total abdominal colectomy. 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 Nadia also underwent a total abdominal hysterectomy and bilateral salpingo-oophorectomy.     Genetic Testing Result: NEGATIVE  Nadia is negative for mutations in the EPCAM, MLH1, MSH2, MSH6, and PMS2 genes. No mutations were found in any of the 5 genes analyzed. This test involved sequencing and deletion/duplication analysis of all genes with the exceptions of EPCAM (deletions/duplications only). Please see a copy of the scanned test report for complete details regarding testing methodology/limitations.    A copy of the test report can be found in the Laboratory tab, dated 5/11/2023, and named \"Next generation sequencing\".     Interpretation:  We discussed several different interpretations of this negative test result.    1. One explanation may be that there is a different gene or combination of genes and environment that are associated with the cancers in this family.  2. It is possible that her relatives diagnosed with cancer did " have a mutation and she did not inherit it.  3. There is also a small possibility that there is a mutation in one of these genes, and the testing laboratory could not find it with their current testing methods.       Screening:  Based on this negative test result, it is important for Nadia and her relatives to refer back to the family history for appropriate cancer screening.    Nadia should continue to follow up with her oncology team as recommended about her colon cancer surveillance.   Per National Comprehensive Cancer Network (NCCN) guidelines, individuals with a first degree relative with colorectal cancer diagnosed at any age should begin colonoscopy at age 40, or 10 years before the earliest diagnosis of colorectal cancer, whichever is first.  In this family, colonoscopy should start at age 40.  Colonoscopy should be repeated every 5 years, or based on colonoscopy findings.  This would apply to Nadia's siblings.  These recommendations may change based on personal and family history as well as personal preference, and should be discussed with an individuals physician.        Other population cancer screening options, such as those recommended by the American Cancer Society and the National Comprehensive Cancer Network (NCCN), are also appropriate for Nadia and her family. These screening recommendations may change if there are changes to Nadia's personal and/or family history of cancer.     Final screening recommendations should be made by each individual's primary care provider.    Additional Testing Considerations:  It is possible Nadia does carry a currently identifiable gene or combination of genes and environment that increase her risk for colon cancer. We again reviewed the option of larger gene panels covering more genes associated with colon cancer. We discussed that not much is known about the underlying causes for mixed neuroendocrine and non-neuroendocrine adenocarcinoma of the colon.  Generally,  neuroendocrine tumors of the gastrointestinal tract have been reported in a variety of hereditary cancer syndromes.     Nadia expressed interest in proceeding with the Hereditary Colorectal Cancers panel + Customized Neuroendocrine panel through [facility]: APC, AXIN2, BMPR1A, CDH1, CHEK2, EPCAM, GREM1, MLH1, MSH2, MSH3, MSH6, MUTYH, NTHL1, PMS2, POLD1, POLE, PTEN, SMAD4, STK11, TP53, CDKN1B, MEN1, NF1, RET, TSC1, TSC2, and VHL genes. Verbal consent was given over the video and written on the consent form. Turnaround time is approximately 3-4 weeks. Nadia's blood has already been drawn for testing and is currently at the lab.    Summary:  We do not have an explanation for Nadia's personal history of cancer. While no genetic changes were identified, Nadia may still be at risk for certain cancers due to family history, environmental factors, or other genetic causes not identified by this test.  Because of that, it is important that she continue with cancer screening based on her personal and family history as discussed above.    Genetic testing is rapidly advancing, and new cancer susceptibility genes will most likely be identified in the future.  Therefore, I encouraged Nadia to contact me regularly or if there are changes in her personal or family history.  This may change how we assess her cancer risk, screening, and the testing we would offer.    Plan:  1. A copy of the test results will be mailed to Nadia.  2.  Today Nadia elected to proceed with additional genetic testing for the the Hereditary Colorectal Cancers panel + Customized Neuroendocrine panel through [facility]. Therefore, consent was reviewed and verbally obtained for this testing. Apples blood has already been drawn for testing and is currently at the lab  3. The results should be available in 3-4 weeks.  4. Nadia will either have a telephone visit or video visit to  discuss the results.  Additional recommendations about screening will be made at that time.     Time spent on video: 7 minutes.    Kalani Salazar MS, [facility] [place] [facility]  Licensed, Certified Genetic Counselor  Phone: [phone]

## 2023-05-24 ENCOUNTER — VIRTUAL VISIT (OUTPATIENT)
Dept: ONCOLOGY | Facility: CLINIC | Age: 68
End: 2023-05-24
Payer: MEDICARE

## 2023-05-24 ENCOUNTER — ANESTHESIA EVENT (OUTPATIENT)
Dept: SURGERY | Facility: CLINIC | Age: 68
End: 2023-05-24
Payer: MEDICARE

## 2023-05-24 DIAGNOSIS — C18.9 MALIGNANT NEOPLASM OF COLON, UNSPECIFIED PART OF COLON (H): Primary | ICD-10-CM

## 2023-05-24 PROCEDURE — 999N000069 HC STATISTIC GENETIC COUNSELING, < 16 MIN: Mod: GT

## 2023-05-24 PROCEDURE — G0452 MOLECULAR PATHOLOGY INTERPR: HCPCS | Mod: 26 | Performed by: PATHOLOGY

## 2023-05-24 NOTE — PATIENT INSTRUCTIONS
Assessing Cancer Risk  Only about 5-10% of cancers are thought to be due to an inherited cancer susceptibility gene.    These families often have:  Several people with the same or related types of cancer  Cancers diagnosed at a young age (before age 50)  Individuals with more than one primary cancer  Multiple generations of the family affected with cancer    Comprehensive Colon Cancer Panel  We each inherit two copies of every gene in our bodies: one from our mother, and one from our father. Each gene has a specific job to do. When a gene has a mistake or  mutation  in it, it does not work like it should.      This handout will review common hereditary colon cancer syndromes, and other genes related to an increased risk for colon cancer.  The genes that will be discussed in this handout are: APC, BMPR1A, CDH1, CHEK2, EPCAM, GREM1, MLH1, MSH2, MSH6, MUTYH, PMS2, POLD1, POLE, PTEN, SMAD4, STK11, and TP53.  These genes are clinically actionable, meaning there are published guidelines for cancer screening and management for individuals who are found to carry mutations in these genes. Inheriting a mutation does not mean a person will develop cancer, but it does significantly increase his or her risk above the general population risk.      Familial Adenomatous Polyposis (FAP)  FAP is a hereditary cancer syndrome caused by mutations in the APC gene. The condition is known to cause hundreds to thousands of adenomatous polyps in the colon, creating a  carpet  of polyps. Some individuals have what is called attenuated FAP (AFAP), a milder form of FAP with fewer polyps and typically later onset. Individuals with an APC gene mutation are at an increased risk for colon, thyroid, and duodenal cancers, as well as several other types of cancer1.  Other features of this condition may include: osteomas, dental anomalies, benign skin lesions, CHRPE ( freckle  on the inside of the eye), and desmoid tumors.      Lifetime Cancer Risks     Cancer Type General Population FAP   Colon  4.1% near 100%   Thyroid (papillary) 1.2% Up to 12%   Duodenal <1% Up to 10%   Liver (hepatoblastoma)  <1% Up to 2.5% before age 5   Pancreas 1.7% Up to 2%   Stomach <1% Up to 7.1%       Juvenile Polyposis Syndrome (JPS)  JPS is characterized by hamartomatous polyps, called juvenile polyps, in the gastrointestinal tract.  Juvenile  refers to the type of polyps seen in this hereditary cancer condition, not the age of onset. Currently, mutations in two genes are known to cause JPS: BMPR1A and SMAD4. Of individuals clinically diagnosed with JPS, only 40% have an identifiable mutation in one of these genes, suggesting there are other genes that cause JPS that have not been discovered yet. Individuals with JPS are at an increased risk for colon cancer and stomach cancer 2,3,4. Pancreatic and small bowel cancers have also been reported in JPS, but the actual risks are unknown.         Lifetime Cancer Risks   Cancer Type General Population JPS   Colon 4.1% 40-50%   Gastric/Duodenal <1% Up to 21%     Some individuals with SMAD4 mutations have a condition called JPS/HHT (Juvenile Polyposis/Hereditary Hemorrhagic Telangiectasia) where in addition to JPS, individuals may have nose bleeds and clotting issues.     Hereditary Diffuse Gastric Cancer (HDGC)  Currently, mutations in one gene are known to cause Hereditary Diffuse Gastric Cancer: CDH1.  Individuals with HDGC are at increased risk for diffuse gastric cancer and lobular breast cancer. Of people diagnosed with HDGC, 30-50% have a mutation in the CDH1 gene.  This suggests there are likely mutations in other genes that may cause HDGC that have not been identified yet. Individuals with HDGC may also be at increased risk for colon cancer.      Lifetime Cancer Risks   Cancer Type General Population HDGC   Diffuse Gastric <1% 67-83%   Breast 12% 41-60%     Chan syndrome  Mutations in five different genes are known to cause Chan  syndrome: MLH1, MSH2, MSH6, PMS2, and EPCAM. Individuals with Chan syndrome have an increased risk for colon, uterine, ovarian, small bowel, stomach, urinary tract, and brain cancer, as well as several other types of cancer. The exact lifetime cancer risks are dependent upon the gene in which the mutation was identified.      Lifetime Cancer Risks   Cancer Type General Population Chan syndrome   Colon 5% Up to 61%   Uterine 2-3% Up to 57%   Stomach <1% Up to 9%   Ovarian 2% Up to 38%   Urinary tract <1% Up to 28%   Hepatobiliary tract <1% Up to 3.7%   Small bowel <1% Up to 11%   Brain/CNS <1% Up to 7.7%   Pancreas <1% Up to 6.2%       MUTYH-Associated Polyposis (MAP)  MAP is a hereditary cancer syndrome caused by mutations in the MUTYH gene. Unlike the other hereditary cancer genes discussed in this handout, two mutations in the MUTYH gene cause MAP and increase cancer risk. Those affected with MAP typically have between  adenomatous polyps. This syndrome also increases the risk for colon and duodenal cancer. Current research suggests that other cancers may be associated with MUTYH mutations, as well. The table below includes the risk that someone with two MUTYH gene mutations would develop cancer in their lifetime; of note, there is also an increased colon cancer risk for individuals who carry only one MUTYH gene mutation5,6,7.       Lifetime Cancer Risks   Cancer Type General Population MAP   Colon 5% 70-90%   Duodenal  <1% 4%       Cowden syndrome  Cowden syndrome is a hereditary condition that increases the risk for breast, thyroid, endometrial, colon, and kidney cancer.  A single mutation in the PTEN gene causes Cowden syndrome and increases cancer risk.  The table below shows the chance that someone with a PTEN mutation would develop cancer in their lifetime8,9.  Other benign features seen in some individuals with Cowden syndrome include benign skin lesions (facial papules, keratoses, lipomas),  learning disabilities, autism, thyroid nodules, hamartomatous colon polyps, and larger head size.      Lifetime Cancer Risks   Cancer Type General Population Cowden Syndrome   Breast 12% 40-60%*   Thyroid 1% 35%   Renal 1-2% 34%   Endometrial 2-3% 28%   Colon 4.1% 9%   Melanoma 2-3% Up to 6%     *One recent study found breast cancer risk to be increased to 85%    Peutz-Jeghers syndrome (PJS)  PJS is a hereditary cancer syndrome caused by mutations in the STK11 gene. This condition can be distinguished from other hereditary syndromes by the presence of hamartomatous polyps in the gastrointestinal tract and freckles present in unusual places such as the hands, feet, neck, and lips. Individuals with Peutz-Jeghers syndrome have an increased risk for colon, breast, pancreatic, and other cancers3.  Men are at risk for testicular tumors which can affect hormones in the body. Women are at risk for sex cord tumors of the ovaries and a rare aggressive type of cervical cancer.     Lifetime Cancer Risks   Cancer Type General Population PJS   Breast 12% 32-54%   Colon 4.1% 39%   Stomach <1% 29%   Pancreas 1.5% 11-36%   Small Intestine <1% 13%   Ovarian  <2%  >20%   Lung 6-7% 7-17%     Additional Genes Associated with Increased Colon Cancer Risk  CHEK2  CHEK2 is a moderate-risk breast cancer gene.  Women who have a mutation in CHEK2 have around a 2-fold increased risk for breast cancer compared to the general population, and this risk may be higher depending upon family history.12,13,14. Mutations in CHEK2 have also been shown to increase the risk of a number of other cancers, including colon and prostate, however these cancer risks are currently not well understood.     GREM1  GREM1 is a moderate-risk colon polyposis gene. Duplications of this gene are more commonly found in individuals with Ashkenazi Latter-day winwviyi10. Mutations in GREM1 are associated with colon polyps and therefore an increased risk of colon cancer; however  the estimated cancer risk is not well [id number].     POLD1 and POLE  POLD1 and POLE are moderate-risk colon cancer genes. Carriers of a mutation in one of these genes increases the lifetime risk of colorectal [id number],18,19,20. Mutations in these genes may also be associated with increased risk for other cancers including: endometrial cancer, duodenal adenomas and carcinomas, and brain tumors.    TP53  Li Fraumeni syndrome (LFS) is a hereditary cancer predisposition syndrome. LFS is caused by a mutation in the TP53 gene. A single mutation in one of the copies of TP53 increases the risk for multiple cancers. Individuals with LFS are at an increased risk for developing cancer at a young age. The general lifetime risk for development of cancer is 50% by age 30 and 90% by age 60.      Core Cancers: Sarcomas, Breast, Brain, Lung, Leukemias/Lymphomas, Adrenocortical carcinomas  Other Cancers: Gastrointestinal, Thyroid, Skin, Genitourinary    Genetic Testing  Genetic testing involves a simple blood test and will look at the genetic information in genes associated with an increased risk of colon cancer. The tests look for any harmful mutations that are associated with increased cancer risk.  If possible, it is recommended that the person(s) who has had cancer be tested before other family members.  That person will give us the most useful information about whether or not a specific gene mutation is associated with the cancer in the family.     Results  There are three possible results from genetic testing:  Positive--a harmful mutation was identified  Negative--no mutation was identified  Variant of unknown significance--a variation in one of the genes was identified, but it is unclear how this impacts cancer risk in the family  Advantages and Disadvantages  There are advantages and disadvantages to genetic testing of these genes.    Advantages  May clarify your cancer risk  Can help you make medical decisions  May  explain the cancers in your family  May give useful information to your family members (if you share your results)    Disadvantages  Possible negative emotional impact of learning about inherited cancer risk  Uncertainty in interpreting a negative test result in some situations  Possible genetic discrimination concerns (see below)    Inheritance   Most mutations in the genes outlined above are inherited in an autosomal dominant pattern.  This means that if a parent has a mutation, each of their children will have a 50% chance of inheriting that same mutation.  Therefore, each child--male or female--would have a 50% chance of being at increased risk for developing cancer.                                              Image obtained from [place] [employer], 2013     In the case of MUTYH-Associated Polyposis (MAP) this hereditary cancer syndrome is inherited in an autosomal recessive pattern. This means that each parent of an individual with MAP is a carrier of MAP, meaning that they have only one mutation in MUTYH. They still have one functioning copy of their gene.  Carriers are at a slightly higher risk for colon cancer than the general population. If each parent is a carrier for MAP, they have a 25% of having a child who is affected with MAP, meaning the child inherited both gene mutations - one from each parent.       Image obtained from [place] [employer], 2016    Genetic Information Nondiscrimination Act (ERNESTO)  The Genetic Information Nondiscrimination Act of 2008 (ERNESTO) is a federal law that protects individuals from health insurance or employment discrimination based on a genetic test result alone (with some exceptions, including employers with fewer than 15 employees, and ).  Although rare, ERNESTO  does not cover discrimination protections in terms of life insurance, long term care, or disability insurances.  Visit the [employer] [place] website to learn more. Visit the  National Human Genome Research [place] at Genome.gov/65828116 to learn more.    Reducing Cancer Risk  Each of the genes listed within this handout have nationally recognized cancer screening guidelines that would be recommended for individuals who test positive.  In addition to increased cancer screening, surgeries may be offered or recommended to reduce cancer risk in certain cases.  Recommendations are based upon an individual s genetic test result as well as their personal and family history of cancer.    Questions to Think About Regarding Genetic Testing  What effect will the test result have on me and my relationship with my family members if I have an inherited gene mutation?  If I don t have a gene mutation?  Should I share my test results, and how will my family react to this news, which may also affect them?  Are my children ready to learn new information that may one day affect their own health?    Resources    PTEN World [employer] Corporationworld.NetManage   No Stomach for Cancer, Inc. nostomachforcancer.org   Stomach Cancer Relief Network scrnet.org   Collaborative Group of the Americas on Inherited Colorectal Cancer (CGA) cgaicc.com   Cancer Care cancercare.org   American Cancer Society (ACS) cancer.org   National Cancer [place] (NCI) cancer.org   Chan Syndrome International [email or web address]       Please call us if you have any questions or concerns.     Cancer Risk Management Program [phone] ([phone])  Chester Valdivia, MS [facility]  [phone]  Kalani Salazar, MS, [facility] [phone]  Mallika Pollock, MS, [facility]  [phone]  Viviana Velazco, MS, [facility]  [phone]  Larisa Pete, MS, [facility]  [phone]  Emma Garcia, MS, [facility] [phone]  Teri Lee, MS, [facility] [phone]    References    Claire URIAS, Patti J, Christiano G, Adrian E, Malinda J, et al. The Prevalence of thyroid cancer and benign thyroid disease in patients with familial adenomatous polyposis may be higher than previously recognized. Clin Colorectal Cancer.  2012;11:304-308.  Ruben L, Oscar Finley A, Carlos L, Siva BUNCH, Monik K, et al. Risk of colorectal cancer in juvenile polyposis. Gut. 2007;56:965-967.  Alvarez FG, [place] MN, Jose Carlos CA. Colorectal cancer risk in hamartomatous polyposis syndromes. World Journal of Gastrointestinal Surgery. 2015;27:25-32  Nadege HENAO. Guidance on gastrointestinal surveillance for hereditary non-polyposis colorectal cancer, familial adenomatous polypolis, juvenile polyposis, and Peutz-Jeghers syndrome. Gut. 2002;51:21-27.  Sincere AK, Jona NELSY, Aurelio JG et al. Risk of extracolonic cancers for people with biallelic and monoallelic mutations in MUTYH. Int J of Cancer. 2016;139:3677-7927.  Verenll S, Jayne S, Aguila H, Cezar K, Guerra M, et al. MUTYH-associated polyposis: 70 of 71 patients with biallelic mutations present with an attenuated or atypical phenotype. Int J of Cancer. 2006;119:807-814.  Catarino G, Henri F, Sharpe I, Pinchev M, [place] H, et al. MUTYH mutation carriers have increased breast cancer risk. Cancer. 2012;1974-1707.  Bipin MH, Yareli J, Bella J, Karly LA, Orelizabeth MS, Eng C. Lifetime cancer risks in individuals with germline PTEN mutations. Clin Cancer Res. 2012;18:400-7.  Sohail VILLATORO. Cowden Syndrome: A Critical Review of the Clinical Literature. J Laure . 2009:18:13-27.  National Comprehensive Cancer Network. Clinical practice guidelines in oncology, colorectal cancer screening. Available online (registration required). 2013.  National Cancer [place]. SEER Cancer Stat Fact Sheets.  December 2013.  CHEK2 Breast Cancer Case-Control Consortium. CHEK2*1100delC and susceptibility to breast cancer: A collaborative analysis involving 10,860 breast cancer cases and 9,065 controls from 10 studies. Am J Hum Laure, 74 (2004), pp. 5839-2641  Martha T, Sina S, Mckenzie K, et al. Spectrum of Mutations in BRCA1, BRCA2, CHEK2, and TP53 in Families at High Risk of Breast Cancer. MICHAEL.  2006;295(12):8195-4792.  Karen BUNCH, Micheal D, Carlos LANDIS, et al. Risk of breast cancer in women with a CHEK2 mutation with and without a family history of breast cancer. J Clin Oncol. 2011;29:5979-9440.  Kin CRYSTAL, Mini E, Leo J, Caty N, Jacques M et al. Defining the polyposis/colorectal cancer phenotype associated with the GREM1 duplication: counseling and management guidelines. Laure .Res. 2016;98:1-5.  Scot E, Isabell S, Maninder LANDIS, Roxanne Fong, et al. Hereditary mixed polyposis syndrome is caused by a 40kb upstream duplication that leads to increased and ectopic expression of the BMP antagonist GREM1. Yakelin Laure. 2015;44:699-703.  JULIO C Farr. et al. Germline mutations affecting the proofreading domains of POLE and POLD1 predispose to colorectal adenomas and carcinomas. [place]. Laure. 45, 136-44 (2013).  MEL Orr. et al. POLE and POLD1 mutations in 529 demetrio with familial colorectal cancer and/or polyposis: review of reported cases and recommendations for genetic testing and surveillance. Laure. Med. (2015). doi:10.1038/gim.2015.75  ONI Lopez. et al. New insights into POLE and POLD1 germline mutations in familial colorectal cancer and polyposis. Hum. Mol. Laure. 23, 0483-12 (2014).  RAMESH Briggs. et al. Frequency and phenotypic spectrum of germline mutations in POLE and seven other polymerase genes in 266 patients with colorectal adenomas and carcinomas. Int. J. Cancer 137, 320-31 (2015).

## 2023-05-24 NOTE — Clinical Note
"    5/24/2023         RE: Nadia Ladd  [address]  [facility] [address]        Dear Colleague,    Thank you for referring your patient, Nadia Ladd, to the [facility]. Please see a copy of my visit note below.    Virtual Visit Details  Type of service:  Video Visit   Originating Location (pt. Location): Home  Distant Location (provider location):  Off-site  Platform used for Video Visit: Junior     5/24/2023    Referring Provider: Irene Bateman DO    Presenting Information:  I spoke to Nadia today to discuss her genetic testing results. Her blood was drawn on 3/31/2023. The  Chan syndrome genes was ordered from [facility]. This testing was done because of Nadia's personal history of colon cancer, absent MLH1 and PMS2 on IHC, and negative MLH1 promoter methylation testing.    Of note, since our last appointment somatic BRAF testing came back negative. On 4/6/2023, Nadia underwent a total abdominal colectomy. 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 Nadia also underwent a total abdominal hysterectomy and bilateral salpingo-oophorectomy.     Genetic Testing Result: NEGATIVE  Nadia is negative for mutations in the EPCAM, MLH1, MSH2, MSH6, and PMS2 genes. No mutations were found in any of the 5 genes analyzed. This test involved sequencing and deletion/duplication analysis of all genes with the exceptions of EPCAM (deletions/duplications only). Please see a copy of the scanned test report for complete details regarding testing methodology/limitations.    A copy of the test report can be found in the Laboratory tab, dated 5/11/2023, and named \"Next generation sequencing\".     Interpretation:  We discussed several different " interpretations of this negative test result.    1. One explanation may be that there is a different gene or combination of genes and environment that are associated with the cancers in this family.  2. It is possible that her relatives diagnosed with cancer did have a mutation and she did not inherit it.  3. There is also a small possibility that there is a mutation in one of these genes, and the testing laboratory could not find it with their current testing methods.       Screening:  Based on this negative test result, it is important for Nadia and her relatives to refer back to the family history for appropriate cancer screening.    Nadia should continue to follow up with her oncology team as recommended about her colon cancer surveillance.   Per National Comprehensive Cancer Network (NCCN) guidelines, individuals with a first degree relative with colorectal cancer diagnosed at any age should begin colonoscopy at age 40, or 10 years before the earliest diagnosis of colorectal cancer, whichever is first.  In this family, colonoscopy should start at age 40.  Colonoscopy should be repeated every 5 years, or based on colonoscopy findings.  This would apply to Nadia's siblings.  These recommendations may change based on personal and family history as well as personal preference, and should be discussed with an individuals physician.        Other population cancer screening options, such as those recommended by the American Cancer Society and the National Comprehensive Cancer Network (NCCN), are also appropriate for Nadia and her family. These screening recommendations may change if there are changes to Nadia's personal and/or family history of cancer.     Final screening recommendations should be made by each individual's primary care provider.    Additional Testing Considerations:  It is possible Nadia does carry a currently identifiable gene or combination of genes and environment that increase her risk for colon  cancer. We again reviewed the option of larger gene panels covering more genes associated with colon cancer. We discussed that not much is known about the underlying causes for mixed neuroendocrine and non-neuroendocrine adenocarcinoma of the colon.  Generally, neuroendocrine tumors of the gastrointestinal tract have been reported in a variety of hereditary cancer syndromes.     Nadia expressed interest in proceeding with the Hereditary Colorectal Cancers panel + Customized Neuroendocrine panel through [facility]: APC, AXIN2, BMPR1A, CDH1, CHEK2, EPCAM, GREM1, MLH1, MSH2, MSH3, MSH6, MUTYH, NTHL1, PMS2, POLD1, POLE, PTEN, SMAD4, STK11, TP53, CDKN1B, MEN1, NF1, RET, TSC1, TSC2, and VHL genes. Verbal consent was given over the video and written on the consent form. Turnaround time is approximately 3-4 weeks. Nadia's blood has already been drawn for testing and is currently at the lab.    Summary:  We do not have an explanation for Nadia's personal history of cancer. While no genetic changes were identified, Nadia may still be at risk for certain cancers due to family history, environmental factors, or other genetic causes not identified by this test.  Because of that, it is important that she continue with cancer screening based on her personal and family history as discussed above.    Genetic testing is rapidly advancing, and new cancer susceptibility genes will most likely be identified in the future.  Therefore, I encouraged Nadia to contact me regularly or if there are changes in her personal or family history.  This may change how we assess her cancer risk, screening, and the testing we would offer.    Plan:  1. A copy of the test results will be mailed to Nadia.  2.  Today Nadia elected to proceed with additional genetic testing for the the Hereditary Colorectal Cancers panel + Customized Neuroendocrine panel through [facility].  Therefore, consent was reviewed and verbally obtained for this testing. Nadia's blood has already been drawn for testing and is currently at the lab  3. The results should be available in 3-4 weeks.  4. Nadia will either have a telephone visit or video visit to discuss the results.  Additional recommendations about screening will be made at that time.     Time spent on video: 7 minutes.    Kalani Salazar MS, [facility]  Licensed, Certified Genetic Counselor  Phone: [phone]      Again, thank you for allowing me to participate in the care of your patient.        Sincerely,        Kalani Salazar GC

## 2023-05-24 NOTE — LETTER
Cancer Risk Management  Program Locations    [facility]  [facility]  [facility]  [facility]  Mailing Address  Cancer Risk Management Program  [facility]  [address]  [place] [address]    New patient appointments  [phone]    May 24, 2023    Nadia Ladd  [address]  [facility] [address]    Dear Nadia,    It was a pleasure talking with you via your virtual genetic counseling visit on 5/24/2023.  Below is a copy of the progress note from that recent visit through the Cancer Risk Management Program.  If you have any additional questions, please feel free to contact me.  Referring Provider: Irene Bateman DO    Presenting Information:  I spoke to Nadia today to discuss her genetic testing results. Her blood was drawn on 3/31/2023. The  Chan syndrome genes was ordered from [facility]. This testing was done because of Nadia's personal history of colon cancer, absent MLH1 and PMS2 on IHC, and negative MLH1 promoter methylation testing.    Of note, since our last appointment somatic BRAF testing came back negative. On 4/6/2023, Nadia underwent a total abdominal colectomy. 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 Nadia also underwent a total abdominal hysterectomy and bilateral salpingo-oophorectomy.     Genetic Testing Result: NEGATIVE  Nadia is negative for mutations in the EPCAM, MLH1, MSH2, MSH6, and PMS2 genes. No mutations were found in any of the 5 genes analyzed. This test involved sequencing and deletion/duplication analysis of all genes with the exceptions of EPCAM  "(deletions/duplications only). Please see a copy of the scanned test report for complete details regarding testing methodology/limitations.    A copy of the test report can be found in the Laboratory tab, dated 5/11/2023, and named \"Next generation sequencing\".     Interpretation:  We discussed several different interpretations of this negative test result.    One explanation may be that there is a different gene or combination of genes and environment that are associated with the cancers in this family.  It is possible that her relatives diagnosed with cancer did have a mutation and she did not inherit it.  There is also a small possibility that there is a mutation in one of these genes, and the testing laboratory could not find it with their current testing methods.       Screening:  Based on this negative test result, it is important for Nadia and her relatives to refer back to the family history for appropriate cancer screening.    Nadia should continue to follow up with her oncology team as recommended about her colon cancer surveillance.   Per National Comprehensive Cancer Network (NCCN) guidelines, individuals with a first degree relative with colorectal cancer diagnosed at any age should begin colonoscopy at age 40, or 10 years before the earliest diagnosis of colorectal cancer, whichever is first.  In this family, colonoscopy should start at age 40.  Colonoscopy should be repeated every 5 years, or based on colonoscopy findings.  This would apply to Nadia's siblings.  These recommendations may change based on personal and family history as well as personal preference, and should be discussed with an individuals physician.      Other population cancer screening options, such as those recommended by the American Cancer Society and the National Comprehensive Cancer Network (NCCN), are also appropriate for Nadia and her family. These screening recommendations may change if there are changes to Nadia's personal " and/or family history of cancer.   Final screening recommendations should be made by each individual's primary care provider.    Additional Testing Considerations:  It is possible Nadia does carry a currently identifiable gene or combination of genes and environment that increase her risk for colon cancer. We again reviewed the option of larger gene panels covering more genes associated with colon cancer. We discussed that not much is known about the underlying causes for mixed neuroendocrine and non-neuroendocrine adenocarcinoma of the colon.  Generally, neuroendocrine tumors of the gastrointestinal tract have been reported in a variety of hereditary cancer syndromes.     Nadia expressed interest in proceeding with the Hereditary Colorectal Cancers panel + Customized Neuroendocrine panel through [facility]: APC, AXIN2, BMPR1A, CDH1, CHEK2, EPCAM, GREM1, MLH1, MSH2, MSH3, MSH6, MUTYH, NTHL1, PMS2, POLD1, POLE, PTEN, SMAD4, STK11, TP53, CDKN1B, MEN1, NF1, RET, TSC1, TSC2, and VHL genes. Verbal consent was given over the video and written on the consent form. Turnaround time is approximately 3-4 weeks. Nadia's blood has already been drawn for testing and is currently at the lab.    Summary:  We do not have an explanation for Nadia's personal history of cancer. While no genetic changes were identified, Nadia may still be at risk for certain cancers due to family history, environmental factors, or other genetic causes not identified by this test.  Because of that, it is important that she continue with cancer screening based on her personal and family history as discussed above.    Genetic testing is rapidly advancing, and new cancer susceptibility genes will most likely be identified in the future.  Therefore, I encouraged Nadia to contact me regularly or if there are changes in her personal or family history.  This may change how we assess her cancer risk, screening, and the  testing we would offer.    Plan:  1. A copy of the test results will be mailed to Nadia.  2.  Today Nadia elected to proceed with additional genetic testing for the the Hereditary Colorectal Cancers panel + Customized Neuroendocrine panel through [facility]. Therefore, consent was reviewed and verbally obtained for this testing. Nadia's blood has already been drawn for testing and is currently at the lab  3. The results should be available in 3-4 weeks.  4. Nadia will either have a telephone visit or video visit to discuss the results.  Additional recommendations about screening will be made at that time.     Time spent on video: 7 minutes.    Kalani Salazar MS, [facility]  Licensed, Certified Genetic Counselor  Phone: [phone]

## 2023-05-25 ENCOUNTER — HOSPITAL ENCOUNTER (OUTPATIENT)
Dept: GENERAL RADIOLOGY | Facility: CLINIC | Age: 68
Discharge: HOME | End: 2023-05-25
Attending: SURGERY | Admitting: SURGERY
Payer: MEDICARE

## 2023-05-25 ENCOUNTER — HOSPITAL ENCOUNTER (OUTPATIENT)
Facility: CLINIC | Age: 68
Discharge: HOME | End: 2023-05-25
Attending: SURGERY | Admitting: SURGERY
Payer: MEDICARE

## 2023-05-25 ENCOUNTER — ANESTHESIA (OUTPATIENT)
Dept: SURGERY | Facility: CLINIC | Age: 68
End: 2023-05-25
Payer: MEDICARE

## 2023-05-25 ENCOUNTER — APPOINTMENT (OUTPATIENT)
Dept: GENERAL RADIOLOGY | Facility: CLINIC | Age: 68
End: 2023-05-25
Attending: SURGERY
Payer: MEDICARE

## 2023-05-25 VITALS
OXYGEN SATURATION: 100 % | DIASTOLIC BLOOD PRESSURE: 62 MMHG | SYSTOLIC BLOOD PRESSURE: 118 MMHG | TEMPERATURE: 97.2 F | HEART RATE: 65 BPM | HEIGHT: 61 IN | BODY MASS INDEX: 33.42 KG/M2 | RESPIRATION RATE: 16 BRPM | WEIGHT: 177 LBS

## 2023-05-25 DIAGNOSIS — C7A.1 MALIGNANT POORLY DIFFERENTIATED NEUROENDOCRINE CARCINOMA (H): ICD-10-CM

## 2023-05-25 PROCEDURE — 77001 FLUOROGUIDE FOR VEIN DEVICE: CPT | Mod: 26 | Performed by: SURGERY

## 2023-05-25 PROCEDURE — 250N000011 HC RX IP 250 OP 636: Performed by: SURGERY

## 2023-05-25 PROCEDURE — 999N000063 XR CHEST PORT 1 VIEW

## 2023-05-25 PROCEDURE — 258N000003 HC RX IP 258 OP 636: Performed by: NURSE ANESTHETIST, CERTIFIED REGISTERED

## 2023-05-25 PROCEDURE — 258N000003 HC RX IP 258 OP 636: Performed by: SURGERY

## 2023-05-25 PROCEDURE — 999N000141 HC STATISTIC PRE-PROCEDURE NURSING ASSESSMENT: Performed by: SURGERY

## 2023-05-25 PROCEDURE — C1788 PORT, INDWELLING, IMP: HCPCS | Performed by: SURGERY

## 2023-05-25 PROCEDURE — 76998 US GUIDE INTRAOP: CPT | Mod: 26 | Performed by: SURGERY

## 2023-05-25 PROCEDURE — 272N000001 HC OR GENERAL SUPPLY STERILE: Performed by: SURGERY

## 2023-05-25 PROCEDURE — 250N000009 HC RX 250: Performed by: SURGERY

## 2023-05-25 PROCEDURE — 370N000017 HC ANESTHESIA TECHNICAL FEE, PER MIN: Performed by: SURGERY

## 2023-05-25 PROCEDURE — 250N000009 HC RX 250: Performed by: NURSE ANESTHETIST, CERTIFIED REGISTERED

## 2023-05-25 PROCEDURE — 999N000179 XR SURGERY CARM FLUORO LESS THAN 5 MIN W STILLS

## 2023-05-25 PROCEDURE — 360N000082 HC SURGERY LEVEL 2 W/ FLUORO, PER MIN: Performed by: SURGERY

## 2023-05-25 PROCEDURE — 271N000001 HC OR GENERAL SUPPLY NON-STERILE: Performed by: SURGERY

## 2023-05-25 PROCEDURE — 710N000012 HC RECOVERY PHASE 2, PER MINUTE: Performed by: SURGERY

## 2023-05-25 PROCEDURE — 250N000011 HC RX IP 250 OP 636: Performed by: NURSE ANESTHETIST, CERTIFIED REGISTERED

## 2023-05-25 PROCEDURE — 36561 INSERT TUNNELED CV CATH: CPT | Performed by: SURGERY

## 2023-05-25 DEVICE — CATH PORT POWERPORT 8FR SL W/SUTURE PLUGS 1708000
Type: IMPLANTABLE DEVICE | Site: NECK | Status: NON-FUNCTIONAL
Removed: 2024-07-31

## 2023-05-25 RX ORDER — PROPOFOL 10 MG/ML
INJECTION, EMULSION INTRAVENOUS CONTINUOUS PRN
Status: DISCONTINUED | OUTPATIENT
Start: 2023-05-25 | End: 2023-05-25

## 2023-05-25 RX ORDER — LIDOCAINE 40 MG/G
CREAM TOPICAL
Status: DISCONTINUED | OUTPATIENT
Start: 2023-05-25 | End: 2023-05-25 | Stop reason: HOSPADM

## 2023-05-25 RX ORDER — OXYCODONE HYDROCHLORIDE 5 MG/1
5 TABLET ORAL
Status: DISCONTINUED | OUTPATIENT
Start: 2023-05-25 | End: 2023-05-25 | Stop reason: HOSPADM

## 2023-05-25 RX ORDER — DEXAMETHASONE SODIUM PHOSPHATE 4 MG/ML
INJECTION, SOLUTION INTRA-ARTICULAR; INTRALESIONAL; INTRAMUSCULAR; INTRAVENOUS; SOFT TISSUE PRN
Status: DISCONTINUED | OUTPATIENT
Start: 2023-05-25 | End: 2023-05-25

## 2023-05-25 RX ORDER — FENTANYL CITRATE 50 UG/ML
INJECTION, SOLUTION INTRAMUSCULAR; INTRAVENOUS PRN
Status: DISCONTINUED | OUTPATIENT
Start: 2023-05-25 | End: 2023-05-25

## 2023-05-25 RX ORDER — ACETAMINOPHEN 325 MG/1
650 TABLET ORAL
Status: DISCONTINUED | OUTPATIENT
Start: 2023-05-25 | End: 2023-05-25 | Stop reason: HOSPADM

## 2023-05-25 RX ORDER — BUPIVACAINE HYDROCHLORIDE AND EPINEPHRINE 5; 5 MG/ML; UG/ML
INJECTION, SOLUTION EPIDURAL; INTRACAUDAL; PERINEURAL PRN
Status: DISCONTINUED | OUTPATIENT
Start: 2023-05-25 | End: 2023-05-25 | Stop reason: HOSPADM

## 2023-05-25 RX ORDER — ONDANSETRON 2 MG/ML
INJECTION INTRAMUSCULAR; INTRAVENOUS PRN
Status: DISCONTINUED | OUTPATIENT
Start: 2023-05-25 | End: 2023-05-25

## 2023-05-25 RX ORDER — ONDANSETRON 2 MG/ML
4 INJECTION INTRAMUSCULAR; INTRAVENOUS EVERY 30 MIN PRN
Status: DISCONTINUED | OUTPATIENT
Start: 2023-05-25 | End: 2023-05-25 | Stop reason: HOSPADM

## 2023-05-25 RX ORDER — SODIUM CHLORIDE, SODIUM LACTATE, POTASSIUM CHLORIDE, CALCIUM CHLORIDE 600; 310; 30; 20 MG/100ML; MG/100ML; MG/100ML; MG/100ML
INJECTION, SOLUTION INTRAVENOUS CONTINUOUS PRN
Status: DISCONTINUED | OUTPATIENT
Start: 2023-05-25 | End: 2023-05-25

## 2023-05-25 RX ORDER — FENTANYL CITRATE 50 UG/ML
25 INJECTION, SOLUTION INTRAMUSCULAR; INTRAVENOUS
Status: DISCONTINUED | OUTPATIENT
Start: 2023-05-25 | End: 2023-05-25 | Stop reason: HOSPADM

## 2023-05-25 RX ORDER — HEPARIN SODIUM (PORCINE) LOCK FLUSH IV SOLN 100 UNIT/ML 100 UNIT/ML
SOLUTION INTRAVENOUS PRN
Status: DISCONTINUED | OUTPATIENT
Start: 2023-05-25 | End: 2023-05-25 | Stop reason: HOSPADM

## 2023-05-25 RX ORDER — LIDOCAINE HYDROCHLORIDE 20 MG/ML
INJECTION, SOLUTION INFILTRATION; PERINEURAL PRN
Status: DISCONTINUED | OUTPATIENT
Start: 2023-05-25 | End: 2023-05-25

## 2023-05-25 RX ORDER — ONDANSETRON 4 MG/1
4 TABLET, ORALLY DISINTEGRATING ORAL EVERY 30 MIN PRN
Status: DISCONTINUED | OUTPATIENT
Start: 2023-05-25 | End: 2023-05-25 | Stop reason: HOSPADM

## 2023-05-25 RX ORDER — CEFAZOLIN SODIUM/WATER 2 G/20 ML
2 SYRINGE (ML) INTRAVENOUS
Status: COMPLETED | OUTPATIENT
Start: 2023-05-25 | End: 2023-05-25

## 2023-05-25 RX ORDER — PROPOFOL 10 MG/ML
INJECTION, EMULSION INTRAVENOUS PRN
Status: DISCONTINUED | OUTPATIENT
Start: 2023-05-25 | End: 2023-05-25

## 2023-05-25 RX ORDER — OXYCODONE HYDROCHLORIDE 5 MG/1
10 TABLET ORAL
Status: DISCONTINUED | OUTPATIENT
Start: 2023-05-25 | End: 2023-05-25 | Stop reason: HOSPADM

## 2023-05-25 RX ORDER — CEFAZOLIN SODIUM/WATER 2 G/20 ML
2 SYRINGE (ML) INTRAVENOUS SEE ADMIN INSTRUCTIONS
Status: DISCONTINUED | OUTPATIENT
Start: 2023-05-25 | End: 2023-05-25 | Stop reason: HOSPADM

## 2023-05-25 RX ORDER — SODIUM CHLORIDE, SODIUM LACTATE, POTASSIUM CHLORIDE, CALCIUM CHLORIDE 600; 310; 30; 20 MG/100ML; MG/100ML; MG/100ML; MG/100ML
INJECTION, SOLUTION INTRAVENOUS CONTINUOUS
Status: DISCONTINUED | OUTPATIENT
Start: 2023-05-25 | End: 2023-05-25 | Stop reason: HOSPADM

## 2023-05-25 RX ADMIN — LIDOCAINE HYDROCHLORIDE 50 MG: 20 INJECTION, SOLUTION INFILTRATION; PERINEURAL at 14:37

## 2023-05-25 RX ADMIN — SODIUM CHLORIDE, POTASSIUM CHLORIDE, SODIUM LACTATE AND CALCIUM CHLORIDE 10 ML/HR: 600; 310; 30; 20 INJECTION, SOLUTION INTRAVENOUS at 14:17

## 2023-05-25 RX ADMIN — SODIUM CHLORIDE, POTASSIUM CHLORIDE, SODIUM LACTATE AND CALCIUM CHLORIDE: 600; 310; 30; 20 INJECTION, SOLUTION INTRAVENOUS at 14:27

## 2023-05-25 RX ADMIN — FENTANYL CITRATE 50 MCG: 50 INJECTION, SOLUTION INTRAMUSCULAR; INTRAVENOUS at 14:27

## 2023-05-25 RX ADMIN — PROPOFOL 150 MCG/KG/MIN: 10 INJECTION, EMULSION INTRAVENOUS at 14:37

## 2023-05-25 RX ADMIN — DEXAMETHASONE SODIUM PHOSPHATE 10 MG: 4 INJECTION, SOLUTION INTRA-ARTICULAR; INTRALESIONAL; INTRAMUSCULAR; INTRAVENOUS; SOFT TISSUE at 15:07

## 2023-05-25 RX ADMIN — Medication 2 G: at 14:27

## 2023-05-25 RX ADMIN — PROPOFOL 50 MG: 10 INJECTION, EMULSION INTRAVENOUS at 14:37

## 2023-05-25 RX ADMIN — ONDANSETRON 4 MG: 2 INJECTION INTRAMUSCULAR; INTRAVENOUS at 15:07

## 2023-05-25 RX ADMIN — MIDAZOLAM 1 MG: 1 INJECTION INTRAMUSCULAR; INTRAVENOUS at 14:27

## 2023-05-25 ASSESSMENT — ACTIVITIES OF DAILY LIVING (ADL): ADLS_ACUITY_SCORE: 35

## 2023-05-25 NOTE — TELEPHONE ENCOUNTER
[facility]/Voicemail    Clinical Data: Care Coordinator Outreach    Outreach attempted x 1.  Left message on patient's voicemail with call back information and requested return call.        Plan: Care Coordinator will try to reach patient again today.    Liseth Yoon RNCC

## 2023-05-25 NOTE — ANESTHESIA CARE TRANSFER NOTE
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    Anesthesia Type:   MAC     Note:    Oropharynx: oropharynx clear of all foreign objects and spontaneously breathing  Level of Consciousness: awake  Oxygen Supplementation: room air    Independent Airway: airway patency satisfactory and stable  Dentition: dentition unchanged  Vital Signs Stable: post-procedure vital signs reviewed and stable  Report to RN Given: handoff report given  Patient transferred to: Phase II    Handoff Report: Identifed the Patient, Identified the Reponsible Provider, Reviewed the pertinent medical history, Discussed the surgical course, Reviewed Intra-OP anesthesia mangement and issues during anesthesia, Set expectations for post-procedure period and Allowed opportunity for questions and acknowledgement of understanding      Vitals:  Vitals Value Taken Time   BP     Temp     Pulse     Resp     SpO2         Electronically Signed By: LUIS Shukla CRNA  May 25, 2023  3:22 PM

## 2023-05-25 NOTE — INTERVAL H&P NOTE
"I have reviewed the surgical (or preoperative) H&P that is linked to this encounter, and examined the patient. There are no significant changes    Clinical Conditions Present on Arrival:  Clinically Significant Risk Factors Present on Admission        # Hyperkalemia: Highest K = 8.2 mmol/L in last 30 days, will monitor as appropriate  # Hyponatremia: Lowest Na = 133 mmol/L in last 30 days, will monitor as appropriate          # Obesity: Estimated body mass index is 33.46 kg/m  as calculated from the following:    Height as of this encounter: 1.549 m (5' 0.98\").    Weight as of this encounter: 80.3 kg (177 lb).       "

## 2023-05-25 NOTE — ANESTHESIA PREPROCEDURE EVALUATION
Anesthesia Pre-Procedure Evaluation    Patient: Nadia Ladd   MRN: [id number] : 1955        Procedure : Procedure(s):  Ultrasound-guided right internal jugular venous access port placement with fluoroscopy, possible left          Past Medical History:   Diagnosis Date     Arthropathia 2010     B12 deficiency anemia 10/21/2010     Benign essential hypertension with target blood pressure below 140/90 10/10/2016     CARDIOVASCULAR SCREENING; LDL GOAL LESS THAN 160 10/31/2010     Crohn's colitis (H) 02/15/2011     Dermatitis-dishydrotic eczema-severe 2010     Hiatal hernia      HTN (hypertension) 2011     Iron deficiency anemia 10/21/2010     Malignant neoplasm of sigmoid colon (H)      Obesity      Primary pulmonary hypertension (H) 2010      Past Surgical History:   Procedure Laterality Date     COLECTOMY WITHOUT COLOSTOMY N/A 2023    Procedure: Laparoscopic Converted to Open Total abdominal colectomy;  Surgeon: Jerome Ashley MD;  Location: [facility] OR     COLONOSCOPY  10/11/10     COLONOSCOPY N/A 2023    Procedure: ATTEMPTED COLONOSCOPY WITH SIGMOID STRICTURE BIOPSY;  Surgeon: Jonathan Alcocer MD;  Location:  GI     ESOPHAGOSCOPY, GASTROSCOPY, DUODENOSCOPY (EGD), COMBINED N/A 2023    Procedure: ESOPHAGOGASTRODUODENOSCOPY, WITH BIOPSY;  Surgeon: Jonathan Alcocer MD;  Location:  GI     HYSTERECTOMY TOTAL ABDOMINAL, BILATERAL SALPINGO-OOPHORECTOMY, COMBINED N/A 2023    Procedure: Hysterectomy total abdominal, bilateral salpingo-oophorectomy;  Surgeon: Sunitha Olea MD;  Location: [facility] OR     SIGMOIDOSCOPY FLEXIBLE N/A 2023    Procedure: Sigmoidoscopy flexible;  Surgeon: Jerome Ashley MD;  Location: [facility] OR     SURGICAL HISTORY OF -   77    Perineorrhaphy, for widening vaginal orifice     ZZC EXPLORATORY OF ABDOMEN      laparoscopy      Allergies   Allergen Reactions     No Known Drug Allergy       Social History     Tobacco Use      Smoking status: Never     Passive exposure: Current     Smokeless tobacco: Never   Vaping Use     Vaping status: Not on file   Substance Use Topics     Alcohol use: No      Wt Readings from Last 1 Encounters:   05/19/23 80.3 kg (177 lb)        Anesthesia Evaluation   Pt has had prior anesthetic. Type: General and MAC.        ROS/MED HX  ENT/Pulmonary:  - neg pulmonary ROS     Neurologic:  - neg neurologic ROS     Cardiovascular:     (+) Dyslipidemia hypertension-----Previous cardiac testing   Echo: Date: 4/4/2023 Results:  Interpretation Summary   The study was technically adequate.   Left ventricular systolic function is normal. The visual ejection fraction is   estimated at 65-70%. Right ventricular systolic pressure is elevated,   consistent with mild to moderate pulmonary hypertension. No obvious valvular   abnormalities noted.   PatientHeight: 62 in   PatientWeight: 197 lbs   HeartRate: 91 bpm   BSA 1.9 m^2           Left Ventricle   The left ventricle is normal in size.   There is normal left ventricular wall thickness.   Left ventricular systolic function is normal.   The visual ejection fraction is estimated at 65-70%.   Grade I left ventricular diastolic dysfunction is noted.   No regional wall motion abnormalities noted.       Right Ventricle   The right ventricle is normal size.   The right ventricular systolic function is normal.       Atria   Normal left atrial size.   Right atrial size is normal.   There is no color Doppler evidence of an atrial shunt.       Mitral Valve   There is trace mitral regurgitation.       Tricuspid Valve   There is trace tricuspid regurgitation.   The right ventricular systolic pressure is approximated at 39mmHg plus the   right atrial pressure.   Right ventricular systolic pressure is elevated, consistent with mild to   moderate pulmonary hypertension.       Aortic Valve   The aortic valve is trileaflet.   No aortic regurgitation is present.   No aortic stenosis is  present.       Pulmonic Valve   The pulmonic valve is not well visualized.     Name: JOVITA MAYO  MRN: [id number]  : 1955  Study Date: 2023 01:51 PM  Age: 67 yrs  Gender: Female  Patient [facility]: [facility]  Reason For Study: Malignant neoplasm of sigmoid colon, Pulmonary HTN  History: Obesity,Colon CA  Ordering Physician: ABIGAIL RDZ  Referring Physician: Nomi Cartwright  Performed By: Christen Dickens     BSA: 1.9 m2  Height: 62 in  Weight: 194 lb  HR: 102  BP: 122/68 mmHg  ______________________________________________________________________________  Procedure  Complete Echo Adult.  ______________________________________________________________________________  Interpretation Summary     The right ventricular systolic function is normal.  No hemodynamically significant valvular disease.  There is no pericardial effusion.  The inferior vena cava was normal in size with preserved respiratory  variability.  ______________________________________________________________________________  Left Ventricle  The left ventricle is normal in structure, function and size. The visual  ejection fraction is 65-70%. Diastolic Doppler findings (E/E' ratio and/or  other parameters) suggest left ventricular filling pressures are  indeterminate. No regional wall motion abnormalities noted.     Right Ventricle  Borderline right ventricular enlargement. The right ventricular systolic  function is normal.     Atria  Normal left atrial size. Right atrial size is normal.     Mitral Valve  The mitral valve is normal in structure and function. There is trace mitral  regurgitation. There is no mitral valve stenosis.     Tricuspid Valve  The tricuspid valve is normal in structure and function. There is physiologic  tricuspid regurgitation. The right ventricular systolic pressure is  approximated at 28.6 mmHg plus the right atrial pressure.     Aortic Valve  The aortic valve is normal in structure and function.  There is physiologic  aortic regurgitation. No hemodynamically significant valvular aortic stenosis.     Pulmonic Valve  The pulmonic valve is not well visualized. There is trace pulmonic valvular  regurgitation.     Vessels  The aortic root is normal size. The inferior vena cava was normal in size with  preserved respiratory variability.     Pericardium  There is no pericardial effusion.     ______________________________________________________________________________  MMode/2D Measurements & Calculations  IVSd: 0.87 cm  LVIDd: 4.2 cm  LVIDs: 2.1 cm  LVPWd: 1.1 cm  FS: 50.8 %  LV mass(C)d: 129.1 grams  LV mass(C)dI: 68.4 grams/m2     Ao root diam: 2.8 cm  LA dimension: 3.1 cm  asc Aorta Diam: 3.2 cm  LA/Ao: 1.1  LA Volume (BP): 29.3 ml  LA Volume Index (BP): 15.5 ml/m2  RV Base: 2.3 cm  RWT: 0.50  TAPSE: 2.0 cm     Doppler Measurements & Calculations  MV E max fabio: 81.5 cm/sec  MV A max fabio: 128.5 cm/sec  MV E/A: 0.63  MV dec time: 0.21 sec  PA acc time: 0.09 sec  TR max fabio: 267.4 cm/sec  TR max P.6 mmHg  E/E' av.1  Lateral E/e': 8.8  Medial E/e': 9.5  RV S Fabio: 18.3 cm/sec     ______________________________________________________________________________  Report approved by: Aubire Goodwin 2023 03:59 PM    Stress Test: Date: Results:    ECG Reviewed: Date: 2023 Results:  Sinus Rhythm   Low voltage in precordial leads.     ABNORMAL   Cath: Date: Results:      METS/Exercise Tolerance:     Hematologic: Comments: Iron deficiency anemia    (+) anemia,     Musculoskeletal:  - neg musculoskeletal ROS     GI/Hepatic:     (+) hiatal hernia, Inflammatory bowel disease,     Renal/Genitourinary:  - neg Renal ROS     Endo:     (+) Obesity,     Psychiatric/Substance Use:  - neg psychiatric ROS     Infectious Disease:  - neg infectious disease ROS     Malignancy: Comment: Malignant neoplasm of sigmoid colon   (+) Malignancy, History of GI.GI CA status post Surgery.        Other:            Physical  Exam    Airway        Mallampati: II   TM distance: > 3 FB   Neck ROM: full   Mouth opening: > 3 cm    Respiratory Devices and Support         Dental           Cardiovascular   cardiovascular exam normal          Pulmonary   pulmonary exam normal                OUTSIDE LABS:  CBC:   Lab Results   Component Value Date    WBC 5.2 05/18/2023    WBC 4.2 05/12/2023    HGB 10.8 (L) 05/18/2023    HGB 10.2 (L) 05/12/2023    HCT 35.2 05/18/2023    HCT 33.3 (L) 05/12/2023     05/18/2023     05/12/2023     BMP:   Lab Results   Component Value Date     05/18/2023     (L) 05/12/2023    POTASSIUM 4.3 05/18/2023    POTASSIUM 4.0 05/12/2023    CHLORIDE 107 05/18/2023    CHLORIDE 100 05/12/2023    CO2 18 (L) 05/18/2023    CO2 20 (L) 05/12/2023    BUN 47.5 (H) 05/18/2023    BUN 45.5 (H) 05/12/2023    CR 2.17 (H) 05/18/2023    CR 2.77 (H) 05/12/2023     (H) 05/18/2023     (H) 05/12/2023     COAGS: No results found for: PTT, INR, FIBR  POC: No results found for: BGM, HCG, HCGS  HEPATIC:   Lab Results   Component Value Date    ALBUMIN 3.9 05/18/2023    PROTTOTAL 6.8 05/18/2023    ALT 19 05/18/2023    AST 20 05/18/2023    ALKPHOS 104 05/18/2023    BILITOTAL 0.5 05/18/2023     OTHER:   Lab Results   Component Value Date    PH 7.24 (L) 04/06/2023    LACT 0.9 04/06/2023    A1C 4.8 03/12/2018    LIVIER 9.4 05/18/2023    PHOS 5.1 (H) 05/12/2023    MAG 1.9 04/08/2023    TSH 3.19 11/30/2016    T4 1.25 09/14/2010    CRP 20.4 (H) 10/09/2021    SED 10 10/09/2021       Anesthesia Plan    ASA Status:  2   NPO Status:  NPO Appropriate    Anesthesia Type: MAC.     - Reason for MAC: straight local not clinically adequate   Induction: Intravenous, Propofol.   Maintenance: TIVA.        Consents    Anesthesia Plan(s) and associated risks, benefits, and realistic alternatives discussed. Questions answered and patient/representative(s) expressed understanding.     - Discussed: Risks, Benefits and Alternatives for BOTH  SEDATION and the PROCEDURE were discussed     - Discussed with:  Patient      - Extended Intubation/Ventilatory Support Discussed: No.      - Patient is DNR/DNI Status: No    Use of blood products discussed: No .     Postoperative Care            Comments:    Other Comments: The risks and benefits of anesthesia, and the alternatives where applicable, have been discussed with the patient, and they wish to proceed.            LUIS Shukla CRNA

## 2023-05-25 NOTE — OP NOTE
Procedure Date:  5/25/2023     PROCEDURE:  Ultrasound-guided right internal jugular venous access port placement with fluoroscopy.     PREOPERATIVE DIAGNOSIS: colon cancer      POSTOPERATIVE DIAGNOSIS: colon cancer     SURGEON:  Martin Thomas DO     ASSISTANT:  None     ANESTHESIA:  Monitored anesthesia care with local.     COMPLICATIONS:  None immediately apparent.     SPECIMENS:  None.     FINDINGS: Technically successful VAP placement with fluoroscopy     BLOOD LOSS:  5 mL     INDICATIONS FOR PROCEDURE: The patient is a 67 year old female with colon cancer. They are in need of venous access for chemotherapy.  I recommended ultrasound guided right sided port placement with fuoroscopy. We discussed the procedure in detail. We also discussed the risks, benefits, alternatives and post-op care and restrictions. After our informed discussion we decided to proceed with the proposed surgery.     DESCRIPTION OF PROCEDURE:  After informed consent was obtained, the patient was brought from the preoperative holding area to the operating room and placed in supine position.  Anesthesia was induced. They were prepped and draped in normal sterile fashion.  Timeout was performed.  After the correct patient and correct procedure were verified, we began by instilling 0.25% Marcaine with epinephrine for local anesthesia in the skin and subcutaneous tissue in the right neck overlying the internal jugular vein.  We made a small nick incision.  Using ultrasound to guide, we placed a introducer access needle into the patent internal jugular vein.  A guidewire was then placed into the jugular vein using Seldinger technique.  Fluoroscopic imaging confirmed right-sided venous placement of the wire. We then turned our attention to the chest.  We instilled 0.25% Marcaine with epinephrine for local anesthesia.  We made a transverse incision and made a small pocket for the port.  Once the appropriate-sized pocket was made and hemostasis  was verified, we tunneled the catheter from the chest up into the neck nick incision.  We then using Seldinger technique placed a dilator and sheath over the guidewire under direct fluoroscopic guidance.  The dilator and wire were then removed.  The catheter was threaded into the sheath.  Sheath was fractured and removed.  Under direct fluoroscopic guidance, we then retracted the tip of the catheter until it was approximately at the level of the coby.  The catheter was then cut at the level of the chest.  Port was attached and secured with the hub.  We secured this into place using suture plugs with 2-0 Vicryl suture.  One final fluoroscopic image was taken.  The port was then tested and aspirated without issue.  We flushed heparin saline solution without issue.  We then used heparin solution to lock the port and catheter.  We then closed the incision by using 3-0 Vicryl suture, inverted, interrupted in dermal layer, running, subcuticular 4-0 Monocryl suture for the skin.  Topical adhesive dressing was applied to this incision, as well as the small nick incision in the neck.  At the completion of the case, all instruments, needles and sponges were accounted for, after a correct count.  The patient was then awoken from anesthesia and brought to the recovery room in stable condition.     Martin Thomas DO, FACS

## 2023-05-25 NOTE — ANESTHESIA POSTPROCEDURE EVALUATION
Patient: Nadia Ladd    Procedure: Procedure(s):  Ultrasound-guided right internal jugular venous access port placement with fluoroscopy       Anesthesia Type:  MAC    Note:  Disposition: Outpatient   Postop Pain Control: Uneventful            Sign Out: Well controlled pain   PONV: No   Neuro/Psych: Uneventful            Sign Out: Acceptable/Baseline neuro status   Airway/Respiratory: Uneventful            Sign Out: AIRWAY IN SITU/Resp. Support   CV/Hemodynamics: Uneventful            Sign Out: Acceptable CV status   Other NRE: NONE   DID A NON-ROUTINE EVENT OCCUR? No    Event details/Postop Comments:  Pt was happy with anesthesia care.  No complications.  I will follow up with the pt if needed.           Last vitals:  Vitals Value Taken Time   /57 05/25/23 1530   Temp 95.18  F (35.1  C) 05/25/23 1526   Pulse 75 05/25/23 1530   Resp 16 05/25/23 1530   SpO2 100 % 05/25/23 1530   Vitals shown include unvalidated device data.    Electronically Signed By: LUIS Shukla CRNA  May 25, 2023  3:31 PM

## 2023-05-26 ENCOUNTER — HOSPITAL ENCOUNTER (OUTPATIENT)
Dept: CT IMAGING | Facility: CLINIC | Age: 68
Discharge: HOME | End: 2023-05-26
Attending: INTERNAL MEDICINE
Payer: MEDICARE

## 2023-05-26 ENCOUNTER — INFUSION THERAPY VISIT (OUTPATIENT)
Dept: INFUSION THERAPY | Facility: CLINIC | Age: 68
End: 2023-05-26
Attending: INTERNAL MEDICINE
Payer: MEDICARE

## 2023-05-26 VITALS — SYSTOLIC BLOOD PRESSURE: 128 MMHG | OXYGEN SATURATION: 100 % | HEART RATE: 62 BPM | DIASTOLIC BLOOD PRESSURE: 62 MMHG

## 2023-05-26 DIAGNOSIS — D70.1 CHEMOTHERAPY-INDUCED NEUTROPENIA (H): ICD-10-CM

## 2023-05-26 DIAGNOSIS — T45.1X5A CHEMOTHERAPY-INDUCED NEUTROPENIA (H): ICD-10-CM

## 2023-05-26 DIAGNOSIS — C18.9 MALIGNANT NEOPLASM OF COLON, UNSPECIFIED PART OF COLON (H): Primary | ICD-10-CM

## 2023-05-26 DIAGNOSIS — C7A.1 MALIGNANT POORLY DIFFERENTIATED NEUROENDOCRINE CARCINOMA (H): ICD-10-CM

## 2023-05-26 DIAGNOSIS — C18.7 MALIGNANT NEOPLASM OF SIGMOID COLON (H): ICD-10-CM

## 2023-05-26 LAB
ALBUMIN SERPL BCG-MCNC: 3.6 G/DL (ref 3.5–5.2)
ALP SERPL-CCNC: 97 U/L (ref 35–104)
ALT SERPL W P-5'-P-CCNC: 13 U/L (ref 10–35)
ANION GAP SERPL CALCULATED.3IONS-SCNC: 9 MMOL/L (ref 7–15)
AST SERPL W P-5'-P-CCNC: 12 U/L (ref 10–35)
BILIRUB SERPL-MCNC: 0.3 MG/DL
BUN SERPL-MCNC: 22.6 MG/DL (ref 8–23)
CALCIUM SERPL-MCNC: 8.5 MG/DL (ref 8.8–10.2)
CHLORIDE SERPL-SCNC: 110 MMOL/L (ref 98–107)
CREAT BLD-MCNC: 1.7 MG/DL (ref 0.5–1)
CREAT SERPL-MCNC: 1.62 MG/DL (ref 0.51–0.95)
DEPRECATED HCO3 PLAS-SCNC: 18 MMOL/L (ref 22–29)
GFR SERPL CREATININE-BSD FRML MDRD: 33 ML/MIN/1.73M2
GFR SERPL CREATININE-BSD FRML MDRD: 35 ML/MIN/1.73M2
GLUCOSE SERPL-MCNC: 97 MG/DL (ref 70–99)
POTASSIUM SERPL-SCNC: 3.8 MMOL/L (ref 3.4–5.3)
PROT SERPL-MCNC: 5.9 G/DL (ref 6.4–8.3)
SODIUM SERPL-SCNC: 137 MMOL/L (ref 136–145)

## 2023-05-26 PROCEDURE — 250N000009 HC RX 250: Performed by: INTERNAL MEDICINE

## 2023-05-26 PROCEDURE — 74177 CT ABD & PELVIS W/CONTRAST: CPT | Mod: MG

## 2023-05-26 PROCEDURE — 80053 COMPREHEN METABOLIC PANEL: CPT

## 2023-05-26 PROCEDURE — 250N000011 HC RX IP 250 OP 636: Performed by: INTERNAL MEDICINE

## 2023-05-26 PROCEDURE — 36591 DRAW BLOOD OFF VENOUS DEVICE: CPT

## 2023-05-26 PROCEDURE — 258N000003 HC RX IP 258 OP 636: Performed by: INTERNAL MEDICINE

## 2023-05-26 PROCEDURE — 82565 ASSAY OF CREATININE: CPT

## 2023-05-26 RX ORDER — HEPARIN SODIUM,PORCINE 10 UNIT/ML
5 VIAL (ML) INTRAVENOUS
Status: CANCELLED | OUTPATIENT
Start: 2023-05-26

## 2023-05-26 RX ORDER — ALBUTEROL SULFATE 90 UG/1
1-2 AEROSOL, METERED RESPIRATORY (INHALATION)
Status: CANCELLED
Start: 2023-05-26

## 2023-05-26 RX ORDER — HEPARIN SODIUM (PORCINE) LOCK FLUSH IV SOLN 100 UNIT/ML 100 UNIT/ML
5 SOLUTION INTRAVENOUS
Status: DISCONTINUED | OUTPATIENT
Start: 2023-05-26 | End: 2023-05-26 | Stop reason: HOSPADM

## 2023-05-26 RX ORDER — DIPHENHYDRAMINE HYDROCHLORIDE 50 MG/ML
50 INJECTION INTRAMUSCULAR; INTRAVENOUS
Status: CANCELLED
Start: 2023-05-26

## 2023-05-26 RX ORDER — EPINEPHRINE 1 MG/ML
0.3 INJECTION, SOLUTION INTRAMUSCULAR; SUBCUTANEOUS EVERY 5 MIN PRN
Status: CANCELLED | OUTPATIENT
Start: 2023-05-26

## 2023-05-26 RX ORDER — METHYLPREDNISOLONE SODIUM SUCCINATE 125 MG/2ML
125 INJECTION, POWDER, LYOPHILIZED, FOR SOLUTION INTRAMUSCULAR; INTRAVENOUS
Status: CANCELLED
Start: 2023-05-26

## 2023-05-26 RX ORDER — HEPARIN SODIUM (PORCINE) LOCK FLUSH IV SOLN 100 UNIT/ML 100 UNIT/ML
5 SOLUTION INTRAVENOUS
Status: CANCELLED | OUTPATIENT
Start: 2023-05-26

## 2023-05-26 RX ORDER — IOPAMIDOL 755 MG/ML
500 INJECTION, SOLUTION INTRAVASCULAR ONCE
Status: COMPLETED | OUTPATIENT
Start: 2023-05-26 | End: 2023-05-26

## 2023-05-26 RX ORDER — ALBUTEROL SULFATE 0.83 MG/ML
2.5 SOLUTION RESPIRATORY (INHALATION)
Status: CANCELLED | OUTPATIENT
Start: 2023-05-26

## 2023-05-26 RX ORDER — MEPERIDINE HYDROCHLORIDE 25 MG/ML
25 INJECTION INTRAMUSCULAR; INTRAVENOUS; SUBCUTANEOUS EVERY 30 MIN PRN
Status: CANCELLED | OUTPATIENT
Start: 2023-05-26

## 2023-05-26 RX ADMIN — IOPAMIDOL 80 ML: 755 INJECTION, SOLUTION INTRAVENOUS at 13:32

## 2023-05-26 RX ADMIN — HEPARIN 5 ML: 100 SYRINGE at 14:39

## 2023-05-26 RX ADMIN — SODIUM CHLORIDE 60 ML: 9 INJECTION, SOLUTION INTRAVENOUS at 13:31

## 2023-05-26 RX ADMIN — SODIUM CHLORIDE 500 ML: 9 INJECTION, SOLUTION INTRAVENOUS at 13:55

## 2023-05-26 RX ADMIN — SODIUM CHLORIDE 500 ML: 9 INJECTION, SOLUTION INTRAVENOUS at 12:24

## 2023-05-26 NOTE — PROGRESS NOTES
Infusion Nursing Note:  Nadia Ladd presents today for Port access for CT and IVF pre and post CT.    Patient seen by provider today: No   present during visit today: Not Applicable.    Note: N/A.      Intravenous Access:  Implanted Port.  Mild edema and bruising around port site, port was placed yesterday.   Pt denies pain. Port flushes easily, good blood return.   CMP drawn from Port.     Treatment Conditions:  Not Applicable.      Post Infusion Assessment:  Patient tolerated infusion without incident.  Blood return noted pre and post infusion.  Site patent and intact, free from redness, edema or discomfort.  No evidence of extravasations.  Access discontinued per protocol.       Discharge Plan:   Patient and/or family verbalized understanding of discharge instructions and all questions answered.  Patient discharged in stable condition accompanied by: .  Departure Mode: Ambulatory.      Rosalina Argueta RN

## 2023-05-30 ENCOUNTER — ONCOLOGY VISIT (OUTPATIENT)
Dept: ONCOLOGY | Facility: CLINIC | Age: 68
End: 2023-05-30
Payer: MEDICARE

## 2023-05-30 ENCOUNTER — LAB (OUTPATIENT)
Dept: INFUSION THERAPY | Facility: CLINIC | Age: 68
End: 2023-05-30

## 2023-05-30 VITALS
TEMPERATURE: 96.3 F | SYSTOLIC BLOOD PRESSURE: 136 MMHG | OXYGEN SATURATION: 98 % | WEIGHT: 183 LBS | RESPIRATION RATE: 18 BRPM | BODY MASS INDEX: 34.6 KG/M2 | DIASTOLIC BLOOD PRESSURE: 88 MMHG | HEART RATE: 68 BPM

## 2023-05-30 DIAGNOSIS — D50.0 IRON DEFICIENCY ANEMIA DUE TO CHRONIC BLOOD LOSS: ICD-10-CM

## 2023-05-30 DIAGNOSIS — C18.9 MALIGNANT NEOPLASM OF COLON, UNSPECIFIED PART OF COLON (H): ICD-10-CM

## 2023-05-30 DIAGNOSIS — C18.7 MALIGNANT NEOPLASM OF SIGMOID COLON (H): Primary | ICD-10-CM

## 2023-05-30 DIAGNOSIS — Z76.89 PREVENTION OF CHEMOTHERAPY-INDUCED NEUTROPENIA: ICD-10-CM

## 2023-05-30 DIAGNOSIS — C7A.1 MALIGNANT POORLY DIFFERENTIATED NEUROENDOCRINE CARCINOMA (H): ICD-10-CM

## 2023-05-30 LAB
ALBUMIN SERPL BCG-MCNC: 3.6 G/DL (ref 3.5–5.2)
ALP SERPL-CCNC: 97 U/L (ref 35–104)
ALT SERPL W P-5'-P-CCNC: 12 U/L (ref 10–35)
ANION GAP SERPL CALCULATED.3IONS-SCNC: 10 MMOL/L (ref 7–15)
AST SERPL W P-5'-P-CCNC: 12 U/L (ref 10–35)
BASOPHILS # BLD AUTO: 0 10E3/UL (ref 0–0.2)
BASOPHILS NFR BLD AUTO: 0 %
BILIRUB SERPL-MCNC: 0.4 MG/DL
BUN SERPL-MCNC: 18.2 MG/DL (ref 8–23)
CALCIUM SERPL-MCNC: 8.5 MG/DL (ref 8.8–10.2)
CHLORIDE SERPL-SCNC: 115 MMOL/L (ref 98–107)
CREAT SERPL-MCNC: 1.43 MG/DL (ref 0.51–0.95)
DEPRECATED HCO3 PLAS-SCNC: 19 MMOL/L (ref 22–29)
EOSINOPHIL # BLD AUTO: 0 10E3/UL (ref 0–0.7)
EOSINOPHIL NFR BLD AUTO: 0 %
ERYTHROCYTE [DISTWIDTH] IN BLOOD BY AUTOMATED COUNT: 16.1 % (ref 10–15)
GFR SERPL CREATININE-BSD FRML MDRD: 40 ML/MIN/1.73M2
GLUCOSE SERPL-MCNC: 109 MG/DL (ref 70–99)
HCT VFR BLD AUTO: 30.9 % (ref 35–47)
HGB BLD-MCNC: 9.4 G/DL (ref 11.7–15.7)
IMM GRANULOCYTES # BLD: 0 10E3/UL
IMM GRANULOCYTES NFR BLD: 1 %
LYMPHOCYTES # BLD AUTO: 0.5 10E3/UL (ref 0.8–5.3)
LYMPHOCYTES NFR BLD AUTO: 13 %
MCH RBC QN AUTO: 25.8 PG (ref 26.5–33)
MCHC RBC AUTO-ENTMCNC: 30.4 G/DL (ref 31.5–36.5)
MCV RBC AUTO: 85 FL (ref 78–100)
MONOCYTES # BLD AUTO: 0.4 10E3/UL (ref 0–1.3)
MONOCYTES NFR BLD AUTO: 9 %
NEUTROPHILS # BLD AUTO: 3.2 10E3/UL (ref 1.6–8.3)
NEUTROPHILS NFR BLD AUTO: 77 %
NRBC # BLD AUTO: 0 10E3/UL
NRBC BLD AUTO-RTO: 0 /100
PLATELET # BLD AUTO: 195 10E3/UL (ref 150–450)
POTASSIUM SERPL-SCNC: 3.7 MMOL/L (ref 3.4–5.3)
PROT SERPL-MCNC: 5.9 G/DL (ref 6.4–8.3)
RBC # BLD AUTO: 3.65 10E6/UL (ref 3.8–5.2)
SODIUM SERPL-SCNC: 144 MMOL/L (ref 136–145)
WBC # BLD AUTO: 4.2 10E3/UL (ref 4–11)

## 2023-05-30 PROCEDURE — 99214 OFFICE O/P EST MOD 30 MIN: CPT | Performed by: INTERNAL MEDICINE

## 2023-05-30 PROCEDURE — 85025 COMPLETE CBC W/AUTO DIFF WBC: CPT | Performed by: INTERNAL MEDICINE

## 2023-05-30 PROCEDURE — 36591 DRAW BLOOD OFF VENOUS DEVICE: CPT

## 2023-05-30 PROCEDURE — 80053 COMPREHEN METABOLIC PANEL: CPT | Performed by: INTERNAL MEDICINE

## 2023-05-30 PROCEDURE — 250N000011 HC RX IP 250 OP 636: Performed by: INTERNAL MEDICINE

## 2023-05-30 RX ORDER — EPINEPHRINE 1 MG/ML
0.3 INJECTION, SOLUTION, CONCENTRATE INTRAVENOUS EVERY 5 MIN PRN
Status: CANCELLED | OUTPATIENT
Start: 2023-06-14

## 2023-05-30 RX ORDER — ALBUTEROL SULFATE 90 UG/1
1-2 AEROSOL, METERED RESPIRATORY (INHALATION)
Status: CANCELLED
Start: 2023-08-16

## 2023-05-30 RX ORDER — ALBUTEROL SULFATE 90 UG/1
1-2 AEROSOL, METERED RESPIRATORY (INHALATION)
Status: CANCELLED
Start: 2023-06-14

## 2023-05-30 RX ORDER — DIPHENHYDRAMINE HYDROCHLORIDE 50 MG/ML
50 INJECTION INTRAMUSCULAR; INTRAVENOUS
Status: CANCELLED
Start: 2023-08-30

## 2023-05-30 RX ORDER — HEPARIN SODIUM (PORCINE) LOCK FLUSH IV SOLN 100 UNIT/ML 100 UNIT/ML
5 SOLUTION INTRAVENOUS
Status: CANCELLED | OUTPATIENT
Start: 2023-08-30

## 2023-05-30 RX ORDER — HEPARIN SODIUM (PORCINE) LOCK FLUSH IV SOLN 100 UNIT/ML 100 UNIT/ML
5 SOLUTION INTRAVENOUS
Status: DISCONTINUED | OUTPATIENT
Start: 2023-05-30 | End: 2023-06-02 | Stop reason: HOSPADM

## 2023-05-30 RX ORDER — ALBUTEROL SULFATE 0.83 MG/ML
2.5 SOLUTION RESPIRATORY (INHALATION)
Status: CANCELLED | OUTPATIENT
Start: 2023-05-30

## 2023-05-30 RX ORDER — FLUOROURACIL 50 MG/ML
400 INJECTION, SOLUTION INTRAVENOUS ONCE
Status: CANCELLED | OUTPATIENT
Start: 2023-07-11

## 2023-05-30 RX ORDER — METHYLPREDNISOLONE SODIUM SUCCINATE 125 MG/2ML
125 INJECTION, POWDER, LYOPHILIZED, FOR SOLUTION INTRAMUSCULAR; INTRAVENOUS
Status: CANCELLED
Start: 2023-09-13

## 2023-05-30 RX ORDER — DIPHENHYDRAMINE HYDROCHLORIDE 50 MG/ML
50 INJECTION INTRAMUSCULAR; INTRAVENOUS
Status: CANCELLED
Start: 2023-09-13

## 2023-05-30 RX ORDER — HEPARIN SODIUM,PORCINE 10 UNIT/ML
5 VIAL (ML) INTRAVENOUS
Status: CANCELLED | OUTPATIENT
Start: 2023-09-01

## 2023-05-30 RX ORDER — FLUOROURACIL 50 MG/ML
400 INJECTION, SOLUTION INTRAVENOUS ONCE
Status: CANCELLED | OUTPATIENT
Start: 2023-08-08

## 2023-05-30 RX ORDER — HEPARIN SODIUM,PORCINE 10 UNIT/ML
5 VIAL (ML) INTRAVENOUS
Status: CANCELLED | OUTPATIENT
Start: 2023-09-13

## 2023-05-30 RX ORDER — ALBUTEROL SULFATE 90 UG/1
1-2 AEROSOL, METERED RESPIRATORY (INHALATION)
Status: CANCELLED
Start: 2023-08-30

## 2023-05-30 RX ORDER — HEPARIN SODIUM,PORCINE 10 UNIT/ML
5 VIAL (ML) INTRAVENOUS
Status: CANCELLED | OUTPATIENT
Start: 2023-08-18

## 2023-05-30 RX ORDER — ALBUTEROL SULFATE 90 UG/1
1-2 AEROSOL, METERED RESPIRATORY (INHALATION)
Status: CANCELLED
Start: 2023-09-13

## 2023-05-30 RX ORDER — HEPARIN SODIUM (PORCINE) LOCK FLUSH IV SOLN 100 UNIT/ML 100 UNIT/ML
5 SOLUTION INTRAVENOUS
Status: CANCELLED | OUTPATIENT
Start: 2023-09-15

## 2023-05-30 RX ORDER — MEPERIDINE HYDROCHLORIDE 25 MG/ML
25 INJECTION INTRAMUSCULAR; INTRAVENOUS; SUBCUTANEOUS EVERY 30 MIN PRN
Status: CANCELLED | OUTPATIENT
Start: 2023-05-30

## 2023-05-30 RX ORDER — HEPARIN SODIUM,PORCINE 10 UNIT/ML
5 VIAL (ML) INTRAVENOUS
Status: CANCELLED | OUTPATIENT
Start: 2023-08-16

## 2023-05-30 RX ORDER — HEPARIN SODIUM (PORCINE) LOCK FLUSH IV SOLN 100 UNIT/ML 100 UNIT/ML
5 SOLUTION INTRAVENOUS
Status: CANCELLED | OUTPATIENT
Start: 2023-06-14

## 2023-05-30 RX ORDER — HEPARIN SODIUM (PORCINE) LOCK FLUSH IV SOLN 100 UNIT/ML 100 UNIT/ML
5 SOLUTION INTRAVENOUS
Status: CANCELLED | OUTPATIENT
Start: 2023-09-01

## 2023-05-30 RX ORDER — HEPARIN SODIUM (PORCINE) LOCK FLUSH IV SOLN 100 UNIT/ML 100 UNIT/ML
5 SOLUTION INTRAVENOUS
Status: CANCELLED | OUTPATIENT
Start: 2023-09-13

## 2023-05-30 RX ORDER — FLUOROURACIL 50 MG/ML
400 INJECTION, SOLUTION INTRAVENOUS ONCE
Status: CANCELLED | OUTPATIENT
Start: 2023-07-25

## 2023-05-30 RX ORDER — EPINEPHRINE 1 MG/ML
0.3 INJECTION, SOLUTION, CONCENTRATE INTRAVENOUS EVERY 5 MIN PRN
Status: CANCELLED | OUTPATIENT
Start: 2023-09-13

## 2023-05-30 RX ORDER — LOPERAMIDE HYDROCHLORIDE 2 MG/1
TABLET ORAL
Qty: 90 TABLET | Refills: 3 | Status: SHIPPED | OUTPATIENT
Start: 2023-05-30 | End: 2023-06-13

## 2023-05-30 RX ORDER — ALBUTEROL SULFATE 0.83 MG/ML
2.5 SOLUTION RESPIRATORY (INHALATION)
Status: CANCELLED | OUTPATIENT
Start: 2023-09-13

## 2023-05-30 RX ORDER — METHYLPREDNISOLONE SODIUM SUCCINATE 125 MG/2ML
125 INJECTION, POWDER, LYOPHILIZED, FOR SOLUTION INTRAMUSCULAR; INTRAVENOUS
Status: CANCELLED
Start: 2023-08-16

## 2023-05-30 RX ORDER — LORAZEPAM 2 MG/ML
0.5 INJECTION INTRAMUSCULAR EVERY 4 HOURS PRN
Status: CANCELLED | OUTPATIENT
Start: 2023-09-13

## 2023-05-30 RX ORDER — METHYLPREDNISOLONE SODIUM SUCCINATE 125 MG/2ML
125 INJECTION, POWDER, LYOPHILIZED, FOR SOLUTION INTRAMUSCULAR; INTRAVENOUS
Status: CANCELLED
Start: 2023-05-30

## 2023-05-30 RX ORDER — PROCHLORPERAZINE MALEATE 10 MG
10 TABLET ORAL EVERY 6 HOURS PRN
Qty: 30 TABLET | Refills: 2 | Status: SHIPPED | OUTPATIENT
Start: 2023-05-30 | End: 2023-06-13

## 2023-05-30 RX ORDER — DIPHENHYDRAMINE HYDROCHLORIDE 50 MG/ML
50 INJECTION INTRAMUSCULAR; INTRAVENOUS
Status: CANCELLED
Start: 2023-06-14

## 2023-05-30 RX ORDER — ALBUTEROL SULFATE 0.83 MG/ML
2.5 SOLUTION RESPIRATORY (INHALATION)
Status: CANCELLED | OUTPATIENT
Start: 2023-08-30

## 2023-05-30 RX ORDER — HEPARIN SODIUM,PORCINE 10 UNIT/ML
5 VIAL (ML) INTRAVENOUS
Status: CANCELLED | OUTPATIENT
Start: 2023-09-15

## 2023-05-30 RX ORDER — DEXAMETHASONE 4 MG/1
8 TABLET ORAL DAILY
Qty: 4 TABLET | Refills: 2 | Status: ON HOLD | OUTPATIENT
Start: 2023-05-30 | End: 2023-07-22

## 2023-05-30 RX ORDER — EPINEPHRINE 1 MG/ML
0.3 INJECTION, SOLUTION, CONCENTRATE INTRAVENOUS EVERY 5 MIN PRN
Status: CANCELLED | OUTPATIENT
Start: 2023-08-30

## 2023-05-30 RX ORDER — DIPHENHYDRAMINE HYDROCHLORIDE 50 MG/ML
50 INJECTION INTRAMUSCULAR; INTRAVENOUS
Status: CANCELLED
Start: 2023-05-30

## 2023-05-30 RX ORDER — EPINEPHRINE 1 MG/ML
0.3 INJECTION, SOLUTION, CONCENTRATE INTRAVENOUS EVERY 5 MIN PRN
Status: CANCELLED | OUTPATIENT
Start: 2023-08-16

## 2023-05-30 RX ORDER — ONDANSETRON 8 MG/1
8 TABLET, FILM COATED ORAL EVERY 8 HOURS PRN
Qty: 30 TABLET | Refills: 2 | Status: SHIPPED | OUTPATIENT
Start: 2023-05-30 | End: 2023-06-13

## 2023-05-30 RX ORDER — MEPERIDINE HYDROCHLORIDE 25 MG/ML
25 INJECTION INTRAMUSCULAR; INTRAVENOUS; SUBCUTANEOUS EVERY 30 MIN PRN
Status: CANCELLED | OUTPATIENT
Start: 2023-08-16

## 2023-05-30 RX ORDER — ONDANSETRON 2 MG/ML
8 INJECTION INTRAMUSCULAR; INTRAVENOUS ONCE
Status: CANCELLED | OUTPATIENT
Start: 2023-09-13

## 2023-05-30 RX ORDER — EPINEPHRINE 1 MG/ML
0.3 INJECTION, SOLUTION INTRAMUSCULAR; SUBCUTANEOUS EVERY 5 MIN PRN
Status: CANCELLED | OUTPATIENT
Start: 2023-05-30

## 2023-05-30 RX ORDER — MEPERIDINE HYDROCHLORIDE 25 MG/ML
25 INJECTION INTRAMUSCULAR; INTRAVENOUS; SUBCUTANEOUS EVERY 30 MIN PRN
Status: CANCELLED | OUTPATIENT
Start: 2023-08-30

## 2023-05-30 RX ORDER — ONDANSETRON 2 MG/ML
8 INJECTION INTRAMUSCULAR; INTRAVENOUS ONCE
Status: CANCELLED | OUTPATIENT
Start: 2023-08-30

## 2023-05-30 RX ORDER — HEPARIN SODIUM (PORCINE) LOCK FLUSH IV SOLN 100 UNIT/ML 100 UNIT/ML
5 SOLUTION INTRAVENOUS
Status: CANCELLED | OUTPATIENT
Start: 2023-08-18

## 2023-05-30 RX ORDER — ONDANSETRON 2 MG/ML
8 INJECTION INTRAMUSCULAR; INTRAVENOUS ONCE
Status: CANCELLED | OUTPATIENT
Start: 2023-08-16

## 2023-05-30 RX ORDER — MEPERIDINE HYDROCHLORIDE 25 MG/ML
25 INJECTION INTRAMUSCULAR; INTRAVENOUS; SUBCUTANEOUS EVERY 30 MIN PRN
Status: CANCELLED | OUTPATIENT
Start: 2023-06-14

## 2023-05-30 RX ORDER — ALBUTEROL SULFATE 0.83 MG/ML
2.5 SOLUTION RESPIRATORY (INHALATION)
Status: CANCELLED | OUTPATIENT
Start: 2023-08-16

## 2023-05-30 RX ORDER — METHYLPREDNISOLONE SODIUM SUCCINATE 125 MG/2ML
125 INJECTION, POWDER, LYOPHILIZED, FOR SOLUTION INTRAMUSCULAR; INTRAVENOUS
Status: CANCELLED
Start: 2023-08-30

## 2023-05-30 RX ORDER — METHYLPREDNISOLONE SODIUM SUCCINATE 125 MG/2ML
125 INJECTION, POWDER, LYOPHILIZED, FOR SOLUTION INTRAMUSCULAR; INTRAVENOUS
Status: CANCELLED
Start: 2023-06-14

## 2023-05-30 RX ORDER — ALBUTEROL SULFATE 0.83 MG/ML
2.5 SOLUTION RESPIRATORY (INHALATION)
Status: CANCELLED | OUTPATIENT
Start: 2023-06-14

## 2023-05-30 RX ORDER — LORAZEPAM 2 MG/ML
0.5 INJECTION INTRAMUSCULAR EVERY 4 HOURS PRN
Status: CANCELLED | OUTPATIENT
Start: 2023-08-30

## 2023-05-30 RX ORDER — HEPARIN SODIUM (PORCINE) LOCK FLUSH IV SOLN 100 UNIT/ML 100 UNIT/ML
5 SOLUTION INTRAVENOUS
Status: CANCELLED | OUTPATIENT
Start: 2023-08-16

## 2023-05-30 RX ORDER — HEPARIN SODIUM,PORCINE 10 UNIT/ML
5 VIAL (ML) INTRAVENOUS
Status: CANCELLED | OUTPATIENT
Start: 2023-08-30

## 2023-05-30 RX ORDER — DIPHENHYDRAMINE HYDROCHLORIDE 50 MG/ML
50 INJECTION INTRAMUSCULAR; INTRAVENOUS
Status: CANCELLED
Start: 2023-08-16

## 2023-05-30 RX ORDER — HEPARIN SODIUM,PORCINE 10 UNIT/ML
5 VIAL (ML) INTRAVENOUS
Status: CANCELLED | OUTPATIENT
Start: 2023-06-14

## 2023-05-30 RX ORDER — HEPARIN SODIUM,PORCINE 10 UNIT/ML
5 VIAL (ML) INTRAVENOUS
Status: CANCELLED | OUTPATIENT
Start: 2023-05-30

## 2023-05-30 RX ORDER — HEPARIN SODIUM (PORCINE) LOCK FLUSH IV SOLN 100 UNIT/ML 100 UNIT/ML
5 SOLUTION INTRAVENOUS
Status: CANCELLED | OUTPATIENT
Start: 2023-05-30

## 2023-05-30 RX ORDER — LORAZEPAM 2 MG/ML
0.5 INJECTION INTRAMUSCULAR EVERY 4 HOURS PRN
Status: CANCELLED | OUTPATIENT
Start: 2023-08-16

## 2023-05-30 RX ORDER — MEPERIDINE HYDROCHLORIDE 25 MG/ML
25 INJECTION INTRAMUSCULAR; INTRAVENOUS; SUBCUTANEOUS EVERY 30 MIN PRN
Status: CANCELLED | OUTPATIENT
Start: 2023-09-13

## 2023-05-30 RX ORDER — ALBUTEROL SULFATE 90 UG/1
1-2 AEROSOL, METERED RESPIRATORY (INHALATION)
Status: CANCELLED
Start: 2023-05-30

## 2023-05-30 RX ADMIN — HEPARIN 5 ML: 100 SYRINGE at 14:34

## 2023-05-30 ASSESSMENT — PAIN SCALES - GENERAL: PAINLEVEL: NO PAIN (0)

## 2023-05-30 NOTE — PROGRESS NOTES
Infusion Nursing Note:  Nadia Ladd presents today for port lab draw for tomorrow's chemo..    Patient seen by provider today: No   present during visit today: Not Applicable.    Note: Denies pain. Port site bruised, but healing nicely.      Intravenous Access:  Labs drawn without difficulty.  Implanted Port.    Treatment Conditions:  Lab Results   Component Value Date    HGB 9.4 (L) 05/30/2023    WBC 4.2 05/30/2023    ANEUTAUTO 3.2 05/30/2023     05/30/2023      Lab Results   Component Value Date     05/26/2023    POTASSIUM 3.8 05/26/2023    MAG 1.9 04/08/2023    CR 1.62 (H) 05/26/2023    LIVIER 8.5 (L) 05/26/2023    BILITOTAL 0.3 05/26/2023    ALBUMIN 3.6 05/26/2023    ALT 13 05/26/2023    AST 12 05/26/2023         Post Infusion Assessment:  Blood return noted pre and post infusion.  Site patent and intact, free from redness, edema or discomfort.  No evidence of extravasations.  Access discontinued per protocol.       Discharge Plan:   Discharge instructions reviewed with: Patient.  Patient and/or family verbalized understanding of discharge instructions and all questions answered.  AVS to patient via [employer].  Patient will return tomorrow for next appointment.   Patient discharged in stable condition accompanied by: self.  Departure Mode: Ambulatory.      Gladys Parikh RN

## 2023-05-30 NOTE — NURSING NOTE
Chief Complaint   Patient presents with     Oncology Clinic Visit     colon     Results     Labs-today, CT-5/26     Chemotherapy     [id number]

## 2023-05-30 NOTE — LETTER
5/30/2023         RE: Nadia Ladd  [address]  [facility] [id number]        Dear Colleague,    Thank you for referring your patient, Nadia Ladd, to the [facility]. Please see a copy of my visit note below.    [facility]    Hematology/Oncology Established Patient Follow Up Note      Today's Date: 5/30/2023    Reason for follow up: Sigmoid cancer    HISTORY OF PRESENT ILLNESS: Nadia Ladd is a 67 year old female who was referred to the Hematology/Oncology Clinic for sigmoid cancer    Patient has medical history including Crohn's disease on sulfasalazine, anemia secondary to iron deficiency and B12 deficiency, obesity, hypertension, prediabetes, eczema, pulmonary hypertension, hiatal hernia, mild splenomegaly (14.7 cm in 2/23)    - 2/23 pt noted increasing fatigue, found to have iron deficiency anemia w/hgb 6.8 (previously required RBC transfusion when dx w/Crohn's), did have intermittent changes in stool but not persistent (noted minimal blood in stool)   - 2/23 upper endoscopy for iron deficiency anemia and Crohn's disease: Hiatal hernia, normal stomach, normal duodenum  - 2/23 colonoscopy: A frond-like/villous partially obstructing large mass found in sigmoid colon, partially circumferential involving two thirds of the lumen circumference, 4 cm, unable to traverse, with oozing, s/p biopsy  PATHOLOGY:  A.  Stomach, antrum: Biopsy:  - Antral type mucosa with mild chronic inactive gastritis  - Immunostain for Helicobacter pylori is negative      B.  Colon, sigmoid, stricture: Biopsy:  - Invasive poorly differentiated carcinoma  The sigmoid stricture shows a high-grade carcinoma without definitive gland formation.  Given the poorly differentiated appearance, an immunohistochemical panel was performed to exclude the possibility of a tumor by direct extension or metastasis.   Immunohistochemical stains are performed and show the tumor to be  diffusely positive for CK7 and partially positive for CK20 and SATB2.  There is no significant tumor reactivity for CDX2, GATA3, PAX8, TTF-1, and chromogranin.  Synaptophysin highlights very rare positive cells. Although the CK7/CK20 profile is not entirely typical for a colorectal carcinoma, immunostains for tumors of other origins are negative and a colorectal primary is still favored.   - Mismatch repair:  1) MLH1-loss of nuclear expression  2) MSH2-intact  3) MSH6-intact  4) PMS2-loss of nuclear expression  -MLH1 promoter methylation: NEGATIVE    -2/23 CT CAP:  A) 4 cm length mass in the proximal sigmoid colon. There is some irregularity and stranding in the adjacent pericolonic fat which may indicate tumor extension. There is no obstruction.   B) there is a second probable mass at the hepatic flexure of the colon measuring 2.5 cm in length   C)There are small lymph nodes in the mesial colon adjacent to the hepatic flexure abnormality and the sigmoid colonic mass. No lymphadenopathy by size criteria  D) There is submucosal fatty deposition in the colon, most severe in the distal sigmoid colon and rectum, which can be seen secondary to quiescent inflammatory bowel disease.  E) no liver lesion    -3/23 PET:  a. There is increased FDG avidity of the sigmoid colon with focal lobular wall thickening consistent with biopsy-proven adenocarcinoma.  b. Secondary focus noted at the hepatic flexure demonstrates elevated FDG avidity and lobulated wall thickening highly suspicious for a additional primary.  c. Additionally there is a smaller focus of wall thickening with elevated FDG avidity along the left aspect of the transverse colon  which is suspicious for a possible third focus of colonic malignancy.    -3/23 CEA 6.8  - 3/23 colorectal surgery consultation with - in the setting of Crohn's, at least sigmoid colectomy with possible total abdominal colectomy with either ileorectal anastomosis or end ileostomy  "(\"rectum can remain active and be actively surveyed annually\")    -3/23 genetic counseling, testing for Chan syndrome    - 4/5/2023 gynecologic oncology consultation for risk reduction surgery with hysterectomy and BSO at time of colectomy    -4/6/2023 laparoscopic converted to open total abdominal colectomy with ileorectal anastomosis, partial omentectomy, flexible sigmoidoscopy, TAHBSO  A. OMENTUM, RESECTION:  - Adipose tissue with no significant histopathologic abnormality  - No evidence of malignancy     B. COLON, RESECTION:  Mass #1 (ascending colon/hepatic flexure): Mixed neuroendocrine-non-neuroendocrine neoplasm (MINEN):  - Neuroendocrine carcinoma component (70%) and moderately-differentiated adenocarcinoma component (30%)  - Size = 5.0 cm, invading into muscularis propria  - Positive LVI  - Negative macroscopic tumor perforation, negative PNI  - Negative surgical resection margins  - IHC: Neuroendocrine portion: Negative for chromogranin and INSM1 but strongly express synaptophysin  - IHC: Adenocarcinoma portion: Positive for CK20, CDX2 negative for CK7, PAX8, ER, S100  Ki-67 proliferation index of approximately 80%.   PDL1:  COMBINED POSITIVE SCORE (CPS): 55-60  TUMOR PROPORTION SCORE (TPS): 50%   pathogenic KRAS mutation (G13D) was identified (5.2%).  AJCC 8th edition: stage 3B mpT3 pN1a     Mass#2 (sigmoid colon): Poorly-differentiated carcinoma:  - Grade 3  - Size = 5.7 cm, invading into muscularis propria  - Negative for microscopic tumor perforation, LVI, perineural invasion  - Negative surgical resection margins  - IHC: Positive for scar and keratin AE1/AE3, negative for CK7, CK20, CDX2, PAX8, ER, chromogranin, CD56, p40, synaptophysin, S100, INI1, p40, INSM1  Ki-67 proliferation index of approximately 70%.  PDL1:  COMBINED POSITIVE SCORE (CPS): 80  TUMOR PROPORTION SCORE (TPS): 70%  pathogenic KRAS mutation (G13D) was identified (4.5%).  AJCC 8th edition: stage 3A mpT2 pN1a    - One of forty-one " sampled lymph nodes positive (1/41)  - Benign appendix  - Tubular adenoma    C. UTERUS, CERVIX, BILATERAL FALLOPIAN TUBES & OVARIES, :  - Benign endometrial polyps; atrophic endometrium  - Uterus, cervix, bilateral fallopian tubes, and ovaries with no significant morphologic abnormalities  - No evidence of dysplasia or malignancy     D. SMALL INTESTINE, TERMINAL ILEUM, TERMINAL ILIUM:  - Benign ileum  - No evidence of dysplasia or malignancy  - Negative for metastases to one of one sampled lymph node (0 /1)     E. PROXIMAL ANASTOMOTIC RING:  - Benign small intestine  - No evidence of dysplasia or malignancy     F. DISTAL ANASTOMOTIC RING:  - Benign colon  - No evidence of dysplasia or malignancy    CRC NGS:   --mutations positive for KRAS G13D mutation 5.2% mass 1, KRAS G13D mutation 4.5% mass 2 (negative for AKT1; BRAF; ERBB2; KRAS; NRAS; PIK3CA; PTEN)  --TMB score: 17.064 mut/Mb mass 1 (CPS 55-60, TPS 50%), 60.943 mut/Mb mass 2 (CPS 80, TPS 70%)  --fusion negative including NTRK and RET for mass 1 and 2 (also negative for AKT1, AKT3, ALK, AR, SNCHXH32, CLAUDINE, BCOR, BRAF, BRD3, BRD4, CAMTA1, CCNB3, CCND1, , CIC, CSF1, CSF1R, CTNNB1, DNAJB1, EGFR, EPC1, ERBB2, ERBB4, ERG, ESR1, ESRRA, ETV1, ETV4, ETV5, ETV6, EWSR1, FGFR1, FGFR2, FGFR3, FGR, FOS, FOSB, FOXO1, FOXO4, FOXR2, FUS, GLI1, GRB7, HMGA2, HRAS, IDH1, IDH2, INSR, JAK2, JAK3, JAZF1, KRAS, MAML2, MAP2K1, MAST1, MAST2, MEAF6, MET, MKL2, MN1, MSMB, MUSK, MYB, MYBL1, MYOD1, NCOA1, NCOA2, NOTCH1, NOTCH2, NR4A3, NRAS, NRG1, NTRK1, NTRK2, NTRK3, NUMBL1, NUTM1, PAX3, PDGFB, PDGFRA, PDGFRB, PHF1, PIK3CA, PKN1, PLAG1, PPARG, PRKACA, PRKCA, PRKCB, RAF1, RELA, RET, ROS1, RPSO2, RSPO3, SS18, STAT6, TAF15, TCF12, TERT, TFE3, TFEB, TFG, THADA, TMPRSS2, USP6, VGLL2, YAP1, YWHAE)    -4/11/2023 patient discharged from hospital, planning for 30 days of lovenox postop, oxycodone for pain (required 1 unit PRBC for anemia)    -5/8/23 I presented this case at [facility] CRC tumor  board and their recommendations include:  - common to see this in Crohn's, overall poor prognosis, treat aggressively, may be poorly responsive to chemotherapy  - standard of care is mFOLFOX 6 x 12 cycles  - acknowledge that pt has high TPS and likely response to immunotherapy however not sufficient data or standard of care in stage 3 adjuvant setting  - add MMR testing to mass #1 hepatic flexure mass    INTERIM HISTORY:    - 5/9/23 admitted for acute renal failure w/Cr 3.82 w/K 7.0    - 5/19/23 second opinion at [facility] w/medical oncologist Dr. Acharya, thorough consultation and recommendations appreciated     - pt would like to proceed with FOFLOX (with dose reduced oxaliplatin due to kidney function)    -5/20/2023 genetic counseling: Negative for EPCAM, MLH1, MSH2, MSH6, and PMS2 genes.  Proceeding with additional testing including: APC, AXIN2, BMPR1A, CDH1, CHEK2, EPCAM, GREM1, MLH1, MSH2, MSH3, MSH6, MUTYH, NTHL1, PMS2, POLD1, POLE, PTEN, SMAD4, STK11, TP53, CDKN1B, MEN1, NF1, RET, TSC1, TSC2, and VHL genes- results pending     - 5/25/23 port placement    - 5/26/23 CT CAP w/ contrast and IVF before and after CT (baseline prior to starting tx):  1.  3 mm nodule RML and 5 mm lateral EDSON nodule, minimally increased from previous  2.  New 4 mm EDSON groundglass density nodule  3.  Several prominent borderline enlarged mediastinal lymph nodes slightly increased from previous  4.  Splenomegaly 14.5 cm  5. S/p subtotal colectomy with ileorectal anastomosis with postsurgical changes along the ventral abdominal wall midline    Pt reports congestion and fullness sensation in right ear, no fever or cough, no sick contacts.  Pt is eating and drinking well.      REVIEW OF SYSTEMS:   A 14 point ROS was reviewed with pertinent positives and negatives in the HPI.        HOME MEDICATIONS:  Current Outpatient Medications   Medication Sig Dispense Refill     cyanocobalamin 1000 MCG TBCR Take 1,000 mcg by mouth daily 100 tablet 1      ferrous sulfate (IRON) 325 (65 FE) MG tablet TAKE ONE TABLET BY MOUTH TWICE A DAY --NO FURTHER REFILLS WITHOUT OFFICE VISIT (Patient taking differently: Take 325 mg by mouth daily (with breakfast)) 200 tablet 3     Multiple Vitamins-Iron (MULTI-DAY PLUS IRON PO) Take 1 tablet by mouth daily           ALLERGIES:  Allergies   Allergen Reactions     No Known Drug Allergy          PAST MEDICAL HISTORY:  Past Medical History:   Diagnosis Date     Arthropathia 08/05/2010     B12 deficiency anemia 10/21/2010     Benign essential hypertension with target blood pressure below 140/90 10/10/2016     CARDIOVASCULAR SCREENING; LDL GOAL LESS THAN 160 10/31/2010     Crohn's colitis (H) 02/15/2011     Dermatitis-dishydrotic eczema-severe 08/05/2010     Hiatal hernia      HTN (hypertension) 07/21/2011     Iron deficiency anemia 10/21/2010     Malignant neoplasm of sigmoid colon (H)      Obesity      Primary pulmonary hypertension (H) 04/06/2010         PAST SURGICAL HISTORY:  Past Surgical History:   Procedure Laterality Date     COLECTOMY WITHOUT COLOSTOMY N/A 4/6/2023    Procedure: Laparoscopic Converted to Open Total abdominal colectomy;  Surgeon: Jerome Ashley MD;  Location: [facility] OR     COLONOSCOPY  10/11/10     COLONOSCOPY N/A 2/27/2023    Procedure: ATTEMPTED COLONOSCOPY WITH SIGMOID STRICTURE BIOPSY;  Surgeon: Jonathan Alcocer MD;  Location:  GI     ESOPHAGOSCOPY, GASTROSCOPY, DUODENOSCOPY (EGD), COMBINED N/A 2/27/2023    Procedure: ESOPHAGOGASTRODUODENOSCOPY, WITH BIOPSY;  Surgeon: Jonathan Alcocer MD;  Location:  GI     HYSTERECTOMY TOTAL ABDOMINAL, BILATERAL SALPINGO-OOPHORECTOMY, COMBINED N/A 4/6/2023    Procedure: Hysterectomy total abdominal, bilateral salpingo-oophorectomy;  Surgeon: Sunitha Olea MD;  Location: [facility] OR     INSERT PORT VASCULAR ACCESS Right 5/25/2023    Procedure: Ultrasound-guided right internal jugular venous access port placement with fluoroscopy;  Surgeon: Martin Thomas,  DO;  Location: [facility] OR     SIGMOIDOSCOPY FLEXIBLE N/A 2023    Procedure: Sigmoidoscopy flexible;  Surgeon: Jerome Ashley MD;  Location: [facility] OR     SURGICAL HISTORY OF -   77    Perineorrhaphy, for widening vaginal orifice     ZZC EXPLORATORY OF ABDOMEN      laparoscopy         SOCIAL HISTORY:  Social History     Socioeconomic History     Marital status:      Spouse name: Not on file     Number of children: Not on file     Years of education: Not on file     Highest education level: Not on file   Occupational History     Not on file   Tobacco Use     Smoking status: Never     Passive exposure: Current     Smokeless tobacco: Never   Vaping Use     Vaping status: Not on file   Substance and Sexual Activity     Alcohol use: No     Drug use: No     Sexual activity: Yes     Partners: Male   Other Topics Concern     Parent/sibling w/ CABG, MI or angioplasty before 65F 55M? Not Asked   Social History Narrative     Not on file     Social Determinants of Health     Financial Resource Strain: Not on file   Food Insecurity: Not on file   Transportation Needs: Not on file   Physical Activity: Not on file   Stress: Not on file   Social Connections: Not on file   Intimate Partner Violence: Not on file   Housing Stability: Not on file         FAMILY HISTORY:  Family History   Problem Relation Age of Onset     Hypertension Mother         on meds, alive     Cerebrovascular Disease Father         stroke about age 65,  of cancer at 68 yrs     Diabetes Father         eventually took insulin     Anemia Father         Pernisios anemia     Kidney Disease Niece         kidney transplant     Kidney Disease Nephew         kidney transplant     Venous thrombosis No family hx of      Anesthesia Reaction No family hx of          PHYSICAL EXAM:  Vital signs:  LMP  (LMP Unknown)    /88   Pulse 68   Resp 18   Temp 96.3  F (35.7  C)   Temp src Temporal   SpO2 98 %   Weight 83 kg (183 lb)   Pain Score No Pain  (0)         ECO  GENERAL/CONSTITUTIONAL: No acute distress.  EYES:  Extraocular movements intact without nystagmus.  No scleral icterus.  RESPIRATORY: Equal chest rise.   MUSCULOSKELETAL: Warm and well-perfused, no cyanosis, clubbing, or edema. Right LE always more swollen than left, chronic  Unchanged.   NEUROLOGIC: Cranial nerves are grossly intact. Alert, oriented to person, place and time, answers questions appropriately.  INTEGUMENTARY: No rashes or jaundice.      LABS:   Latest Reference Range & Units 23 14:35   Sodium 136 - 145 mmol/L 144   Potassium 3.4 - 5.3 mmol/L 3.7   Chloride 98 - 107 mmol/L 115 (H)   Carbon Dioxide (CO2) 22 - 29 mmol/L 19 (L)   Urea Nitrogen 8.0 - 23.0 mg/dL 18.2   Creatinine 0.51 - 0.95 mg/dL 1.43 (H)   GFR Estimate >60 mL/min/1.73m2 40 (L)   Calcium 8.8 - 10.2 mg/dL 8.5 (L)   Anion Gap 7 - 15 mmol/L 10   Albumin 3.5 - 5.2 g/dL 3.6   Protein Total 6.4 - 8.3 g/dL 5.9 (L)   Alkaline Phosphatase 35 - 104 U/L 97   ALT 10 - 35 U/L 12   AST 10 - 35 U/L 12   Bilirubin Total <=1.2 mg/dL 0.4   Glucose 70 - 99 mg/dL 109 (H)   WBC 4.0 - 11.0 10e3/uL 4.2   Hemoglobin 11.7 - 15.7 g/dL 9.4 (L)   Hematocrit 35.0 - 47.0 % 30.9 (L)   Platelet Count 150 - 450 10e3/uL 195   RBC Count 3.80 - 5.20 10e6/uL 3.65 (L)   MCV 78 - 100 fL 85   MCH 26.5 - 33.0 pg 25.8 (L)   MCHC 31.5 - 36.5 g/dL 30.4 (L)   RDW 10.0 - 15.0 % 16.1 (H)   % Neutrophils % 77   % Lymphocytes % 13   % Monocytes % 9   % Eosinophils % 0   % Basophils % 0   Absolute Basophils 0.0 - 0.2 10e3/uL 0.0   Absolute Eosinophils 0.0 - 0.7 10e3/uL 0.0   Absolute Immature Granulocytes <=0.4 10e3/uL 0.0   Absolute Lymphocytes 0.8 - 5.3 10e3/uL 0.5 (L)   Absolute Monocytes 0.0 - 1.3 10e3/uL 0.4   % Immature Granulocytes % 1   Absolute Neutrophils 1.6 - 8.3 10e3/uL 3.2   Absolute NRBCs 10e3/uL 0.0   NRBCs per 100 WBC <1 /100 0   (H): Data is abnormally high  (L): Data is abnormally low    PATHOLOGY:    IMAGING:      ASSESSMENT/PLAN:  Nadia CRYSTAL  Wilberto is a 67 year old female with:      # ascending colon/hepatic flexure Mixed neuroendocrine-non-neuroendocrine neoplasm (MINEN, poorly differentiated neuroendocrine carcinoma and moderately differentiated adenocarcinoma), KRAS G13D mutated, TPS 50%  # sigmoid colon poorly-differentiated carcinoma, KRAS G13D mutated, TPS 70%  - 2/23 colonoscopy: A frond-like/villous partially obstructing large mass found in sigmoid colon, partially circumferential involving two thirds of the lumen circumference, 4 cm, unable to traverse, with oozing, s/p biopsy, PATHOLOGY: Invasive poorly differentiated carcinoma, IHC panel excludes tumors of other origin, colorectal primary is favored, loss of nuclear expression of MLH1 and PMS2, MLH1 promoter methylation negative, [id number] mutation negative  -2/23 CT CAP: Shows known 4 cm proximal sigmoid colon mass with possible tumor extension (irregularity and stranding and adjacent pericolonic fat) without obstruction AND probable second mass 2.5 cm at hepatic flexure of colon; small lymph nodes adjacent to both masses (no lymphadenopathy by size criteria)  -3/23 PET:  a. There is increased FDG avidity of the sigmoid colon with focal lobular wall thickening consistent with biopsy-proven adenocarcinoma.  b. Secondary focus noted at the hepatic flexure demonstrates elevated FDG avidity and lobulated wall thickening highly suspicious for a additional primary.  c. Additionally there is a smaller focus of wall thickening with elevated FDG avidity along the left aspect of the transverse colon which is suspicious for a possible third focus of colonic malignancy.  - 3/23 CEA 6.8  -4/6/2023 laparoscopic converted to open total abdominal colectomy with ileorectal anastomosis, partial omentectomy, flexible sigmoidoscopy, TAHBSO  PATHOLOGY:  Of note, omental resection, terminal ileum, proximal and distal anastomotic rings, uterus, cervix, bilateral fallopian tubes and ovaries negative for  malignancy    Mass #1 (ascending colon/hepatic flexure): Mixed neuroendocrine-non-neuroendocrine neoplasm (MINEN):  - Neuroendocrine carcinoma component (70%) and moderately-differentiated adenocarcinoma component (30%)  - Size = 5.0 cm, invading into muscularis propria, Positive LVI  - IHC: Neuroendocrine portion: Negative for chromogranin and INSM1 but strongly express synaptophysin  - IHC: Adenocarcinoma portion: Positive for CK20, CDX2 negative for CK7, PAX8, ER, S100  Ki-67 proliferation index of approximately 80%.   PDL1:  COMBINED POSITIVE SCORE (CPS): 55-60  TUMOR PROPORTION SCORE (TPS): 50%   pathogenic KRAS mutation (G13D) was identified (5.2%).  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  PDL1:  COMBINED POSITIVE SCORE (CPS): 80  TUMOR PROPORTION SCORE (TPS): 70%  pathogenic KRAS mutation (G13D) was identified (4.5%).  MMR testing: loss of MLH1 and PMS2, intact MSH2 and MSH6  AJCC 8th edition: stage 3A mpT2 pN1a    - One of forty-one sampled lymph nodes positive (1/41), d/w pathology- unable to determine which mass is metastatic to LN    - 4/23 MRI brain w/wo contrast LAURENT  - 5/26/23 CT CAP w/ contrast and IVF before and after CT (post op baseline prior to starting tx):  1.  3 mm nodule RML and 5 mm lateral EDSON nodule, minimally increased from previous  2.  New 4 mm EDSON groundglass density nodule  3.  Several prominent borderline enlarged mediastinal lymph nodes slightly increased from previous  4.  Splenomegaly 14.5 cm  5. S/p subtotal colectomy with ileorectal anastomosis with postsurgical changes along the ventral abdominal wall midline    - 5/8/23 I presented this case at  [facility] CRC tumor board as above   - [facility]19/[facility] w/medical oncologist Dr. Acharya, thorough consultation and recommendations appreciated, overall agree w/FOLFOX x6 months, possible nivo or ipi nivo in second line; if metastatic platinum etoposide vs ipi nivo  - 5/25/23 port placement    REGIMEN:  mFOLFOX6  q14 days x12 cycles/6 months  oxaliplatin 64mg/m2 day 1 (dose reduced from 85mg/m2 2/2 Cr)  LV 350mg/m2 day 1  5FU 400mg/m2 IV push day 1  5FU 1200mg/m2 continuous infusion day 1-2 (total 2,400mg/m2 IV over 46-48 hours)  Emetic risk: moderate  Febrile neutropenia risk: intermediate     - plan for C1D1 5/31/23 pending labs  - antiemetics including dexamethasone, imodium rx sent  - plan for repeat CT in 3 months    -5/20/2023 genetic counseling: Negative for EPCAM, MLH1, MSH2, MSH6, and PMS2 genes.  Proceeding with additional testing including: APC, AXIN2, BMPR1A, CDH1, CHEK2, EPCAM, GREM1, MLH1, MSH2, MSH3, MSH6, MUTYH, NTHL1, PMS2, POLD1, POLE, PTEN, SMAD4, STK11, TP53, CDKN1B, MEN1, NF1, RET, TSC1, TSC2, and VHL genes- results pending   - f/u with Dr. 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    #Anemia secondary to iron deficiency and B12 deficiency  - terminal ileum removed at time of colon surgery, monitor for nutrient def  - 2/23 hgb 6.8, ferritin 21, iron sat index 10, TIBC 265, iron 27, b12 1493  - On B12 1000 mcg p.o. daily and ferrous sulfate 325 mg p.o. daily  - 4/9/23 s/p 1 uprbcs  -  5/9/23 hgb 11.3,5/30/23 hgb 9.4  - based on Ganzoni equation w/goal hgb 14 and 500mg needed for iron stores, pts iron deficit is 1400 mg  - will rx ferric carboxymaltose 750mg x2 doses, 1 week apart. Risk of infusion rxn d/w pt and she would like to proceed.       #Crohn's  - dx since at least 2010     RTC 2 weeks for f/u with RADHA, labs, C2D1  RTC 4 weeks for f/u with me, labs, C3D1      Francisco Lee DO  Hematology/Oncology  [facility]          Again, thank you for allowing me to participate in the care of your patient.        Sincerely,        FRANCISCO LEE DO

## 2023-05-30 NOTE — PROGRESS NOTES
[facility]    Hematology/Oncology Established Patient Follow Up Note      Today's Date: 5/30/2023    Reason for follow up: Sigmoid cancer    HISTORY OF PRESENT ILLNESS: Nadia Ladd is a 67 year old female who was referred to the Hematology/Oncology Clinic for sigmoid cancer    Patient has medical history including Crohn's disease on sulfasalazine, anemia secondary to iron deficiency and B12 deficiency, obesity, hypertension, prediabetes, eczema, pulmonary hypertension, hiatal hernia, mild splenomegaly (14.7 cm in 2/23)    - 2/23 pt noted increasing fatigue, found to have iron deficiency anemia w/hgb 6.8 (previously required RBC transfusion when dx w/Crohn's), did have intermittent changes in stool but not persistent (noted minimal blood in stool)   - 2/23 upper endoscopy for iron deficiency anemia and Crohn's disease: Hiatal hernia, normal stomach, normal duodenum  - 2/23 colonoscopy: A frond-like/villous partially obstructing large mass found in sigmoid colon, partially circumferential involving two thirds of the lumen circumference, 4 cm, unable to traverse, with oozing, s/p biopsy  PATHOLOGY:  A.  Stomach, antrum: Biopsy:  - Antral type mucosa with mild chronic inactive gastritis  - Immunostain for Helicobacter pylori is negative      B.  Colon, sigmoid, stricture: Biopsy:  - Invasive poorly differentiated carcinoma  The sigmoid stricture shows a high-grade carcinoma without definitive gland formation.  Given the poorly differentiated appearance, an immunohistochemical panel was performed to exclude the possibility of a tumor by direct extension or metastasis.   Immunohistochemical stains are performed and show the tumor to be diffusely positive for CK7 and partially positive for CK20 and SATB2.  There is no significant tumor reactivity for CDX2, GATA3, PAX8, TTF-1, and chromogranin.  Synaptophysin highlights very rare positive cells. Although the CK7/CK20 profile is not entirely  "typical for a colorectal carcinoma, immunostains for tumors of other origins are negative and a colorectal primary is still favored.   - Mismatch repair:  1) MLH1-loss of nuclear expression  2) MSH2-intact  3) MSH6-intact  4) PMS2-loss of nuclear expression  -MLH1 promoter methylation: NEGATIVE    -2/23 CT CAP:  A) 4 cm length mass in the proximal sigmoid colon. There is some irregularity and stranding in the adjacent pericolonic fat which may indicate tumor extension. There is no obstruction.   B) there is a second probable mass at the hepatic flexure of the colon measuring 2.5 cm in length   C)There are small lymph nodes in the mesial colon adjacent to the hepatic flexure abnormality and the sigmoid colonic mass. No lymphadenopathy by size criteria  D) There is submucosal fatty deposition in the colon, most severe in the distal sigmoid colon and rectum, which can be seen secondary to quiescent inflammatory bowel disease.  E) no liver lesion    -3/23 PET:  a. There is increased FDG avidity of the sigmoid colon with focal lobular wall thickening consistent with biopsy-proven adenocarcinoma.  b. Secondary focus noted at the hepatic flexure demonstrates elevated FDG avidity and lobulated wall thickening highly suspicious for a additional primary.  c. Additionally there is a smaller focus of wall thickening with elevated FDG avidity along the left aspect of the transverse colon  which is suspicious for a possible third focus of colonic malignancy.    -3/23 CEA 6.8  - 3/23 colorectal surgery consultation with - in the setting of Crohn's, at least sigmoid colectomy with possible total abdominal colectomy with either ileorectal anastomosis or end ileostomy (\"rectum can remain active and be actively surveyed annually\")    -3/23 genetic counseling, testing for Chan syndrome    - 4/5/2023 gynecologic oncology consultation for risk reduction surgery with hysterectomy and BSO at time of colectomy    -4/6/2023 " laparoscopic converted to open total abdominal colectomy with ileorectal anastomosis, partial omentectomy, flexible sigmoidoscopy, TAHBSO  A. OMENTUM, RESECTION:  - Adipose tissue with no significant histopathologic abnormality  - No evidence of malignancy     B. COLON, RESECTION:  Mass #1 (ascending colon/hepatic flexure): Mixed neuroendocrine-non-neuroendocrine neoplasm (MINEN):  - Neuroendocrine carcinoma component (70%) and moderately-differentiated adenocarcinoma component (30%)  - Size = 5.0 cm, invading into muscularis propria  - Positive LVI  - Negative macroscopic tumor perforation, negative PNI  - Negative surgical resection margins  - IHC: Neuroendocrine portion: Negative for chromogranin and INSM1 but strongly express synaptophysin  - IHC: Adenocarcinoma portion: Positive for CK20, CDX2 negative for CK7, PAX8, ER, S100  Ki-67 proliferation index of approximately 80%.   PDL1:  COMBINED POSITIVE SCORE (CPS): 55-60  TUMOR PROPORTION SCORE (TPS): 50%   pathogenic KRAS mutation (G13D) was identified (5.2%).  AJCC 8th edition: stage 3B mpT3 pN1a     Mass#2 (sigmoid colon): Poorly-differentiated carcinoma:  - Grade 3  - Size = 5.7 cm, invading into muscularis propria  - Negative for microscopic tumor perforation, LVI, perineural invasion  - Negative surgical resection margins  - IHC: Positive for scar and keratin AE1/AE3, negative for CK7, CK20, CDX2, PAX8, ER, chromogranin, CD56, p40, synaptophysin, S100, INI1, p40, INSM1  Ki-67 proliferation index of approximately 70%.  PDL1:  COMBINED POSITIVE SCORE (CPS): 80  TUMOR PROPORTION SCORE (TPS): 70%  pathogenic KRAS mutation (G13D) was identified (4.5%).  AJCC 8th edition: stage 3A mpT2 pN1a    - One of forty-one sampled lymph nodes positive (1/41)  - Benign appendix  - Tubular adenoma    C. UTERUS, CERVIX, BILATERAL FALLOPIAN TUBES & OVARIES, :  - Benign endometrial polyps; atrophic endometrium  - Uterus, cervix, bilateral fallopian tubes, and ovaries with no  significant morphologic abnormalities  - No evidence of dysplasia or malignancy     D. SMALL INTESTINE, TERMINAL ILEUM, TERMINAL ILIUM:  - Benign ileum  - No evidence of dysplasia or malignancy  - Negative for metastases to one of one sampled lymph node (0 /1)     E. PROXIMAL ANASTOMOTIC RING:  - Benign small intestine  - No evidence of dysplasia or malignancy     F. DISTAL ANASTOMOTIC RING:  - Benign colon  - No evidence of dysplasia or malignancy    CRC NGS:   --mutations positive for KRAS G13D mutation 5.2% mass 1, KRAS G13D mutation 4.5% mass 2 (negative for AKT1; BRAF; ERBB2; KRAS; NRAS; PIK3CA; PTEN)  --TMB score: 17.064 mut/Mb mass 1 (CPS 55-60, TPS 50%), 60.943 mut/Mb mass 2 (CPS 80, TPS 70%)  --fusion negative including NTRK and RET for mass 1 and 2 (also negative for AKT1, AKT3, ALK, AR, VLSRVX37, CLAUDINE, BCOR, BRAF, BRD3, BRD4, CAMTA1, CCNB3, CCND1, , CIC, CSF1, CSF1R, CTNNB1, DNAJB1, EGFR, EPC1, ERBB2, ERBB4, ERG, ESR1, ESRRA, ETV1, ETV4, ETV5, ETV6, EWSR1, FGFR1, FGFR2, FGFR3, FGR, FOS, FOSB, FOXO1, FOXO4, FOXR2, FUS, GLI1, GRB7, HMGA2, HRAS, IDH1, IDH2, INSR, JAK2, JAK3, JAZF1, KRAS, MAML2, MAP2K1, MAST1, MAST2, MEAF6, MET, MKL2, MN1, MSMB, MUSK, MYB, MYBL1, MYOD1, NCOA1, NCOA2, NOTCH1, NOTCH2, NR4A3, NRAS, NRG1, NTRK1, NTRK2, NTRK3, NUMBL1, NUTM1, PAX3, PDGFB, PDGFRA, PDGFRB, PHF1, PIK3CA, PKN1, PLAG1, PPARG, PRKACA, PRKCA, PRKCB, RAF1, RELA, RET, ROS1, RPSO2, RSPO3, SS18, STAT6, TAF15, TCF12, TERT, TFE3, TFEB, TFG, THADA, TMPRSS2, USP6, VGLL2, YAP1, YWHAE)    -4/11/2023 patient discharged from hospital, planning for 30 days of lovenox postop, oxycodone for pain (required 1 unit PRBC for anemia)    -5/8/23 I presented this case at [facility] CRC tumor board and their recommendations include:  - common to see this in Crohn's, overall poor prognosis, treat aggressively, may be poorly responsive to chemotherapy  - standard of care is mFOLFOX 6 x 12 cycles  - acknowledge that pt has high TPS and likely  response to immunotherapy however not sufficient data or standard of care in stage 3 adjuvant setting  - add MMR testing to mass #1 hepatic flexure mass    INTERIM HISTORY:    - 5/9/23 admitted for acute renal failure w/Cr 3.82 w/K 7.0    - 5/19/23 second opinion at [facility] w/medical oncologist Dr. Acharya, thorough consultation and recommendations appreciated     - pt would like to proceed with FOFLOX (with dose reduced oxaliplatin due to kidney function)    -5/20/2023 genetic counseling: Negative for EPCAM, MLH1, MSH2, MSH6, and PMS2 genes.  Proceeding with additional testing including: APC, AXIN2, BMPR1A, CDH1, CHEK2, EPCAM, GREM1, MLH1, MSH2, MSH3, MSH6, MUTYH, NTHL1, PMS2, POLD1, POLE, PTEN, SMAD4, STK11, TP53, CDKN1B, MEN1, NF1, RET, TSC1, TSC2, and VHL genes- results pending     - 5/25/23 port placement    - 5/26/23 CT CAP w/ contrast and IVF before and after CT (baseline prior to starting tx):  1.  3 mm nodule RML and 5 mm lateral EDSON nodule, minimally increased from previous  2.  New 4 mm EDSON groundglass density nodule  3.  Several prominent borderline enlarged mediastinal lymph nodes slightly increased from previous  4.  Splenomegaly 14.5 cm  5. S/p subtotal colectomy with ileorectal anastomosis with postsurgical changes along the ventral abdominal wall midline    Pt reports congestion and fullness sensation in right ear, no fever or cough, no sick contacts.  Pt is eating and drinking well.      REVIEW OF SYSTEMS:   A 14 point ROS was reviewed with pertinent positives and negatives in the HPI.        HOME MEDICATIONS:  Current Outpatient Medications   Medication Sig Dispense Refill     cyanocobalamin 1000 MCG TBCR Take 1,000 mcg by mouth daily 100 tablet 1     ferrous sulfate (IRON) 325 (65 FE) MG tablet TAKE ONE TABLET BY MOUTH TWICE A DAY --NO FURTHER REFILLS WITHOUT OFFICE VISIT (Patient taking differently: Take 325 mg by mouth daily (with breakfast)) 200 tablet 3     Multiple Vitamins-Iron (MULTI-DAY PLUS  IRON PO) Take 1 tablet by mouth daily           ALLERGIES:  Allergies   Allergen Reactions     No Known Drug Allergy          PAST MEDICAL HISTORY:  Past Medical History:   Diagnosis Date     Arthropathia 08/05/2010     B12 deficiency anemia 10/21/2010     Benign essential hypertension with target blood pressure below 140/90 10/10/2016     CARDIOVASCULAR SCREENING; LDL GOAL LESS THAN 160 10/31/2010     Crohn's colitis (H) 02/15/2011     Dermatitis-dishydrotic eczema-severe 08/05/2010     Hiatal hernia      HTN (hypertension) 07/21/2011     Iron deficiency anemia 10/21/2010     Malignant neoplasm of sigmoid colon (H)      Obesity      Primary pulmonary hypertension (H) 04/06/2010         PAST SURGICAL HISTORY:  Past Surgical History:   Procedure Laterality Date     COLECTOMY WITHOUT COLOSTOMY N/A 4/6/2023    Procedure: Laparoscopic Converted to Open Total abdominal colectomy;  Surgeon: Jerome Ashley MD;  Location: [facility] OR     COLONOSCOPY  10/11/10     COLONOSCOPY N/A 2/27/2023    Procedure: ATTEMPTED COLONOSCOPY WITH SIGMOID STRICTURE BIOPSY;  Surgeon: Jonathan Alcocer MD;  Location:  GI     ESOPHAGOSCOPY, GASTROSCOPY, DUODENOSCOPY (EGD), COMBINED N/A 2/27/2023    Procedure: ESOPHAGOGASTRODUODENOSCOPY, WITH BIOPSY;  Surgeon: Jonathan Alcocer MD;  Location:  GI     HYSTERECTOMY TOTAL ABDOMINAL, BILATERAL SALPINGO-OOPHORECTOMY, COMBINED N/A 4/6/2023    Procedure: Hysterectomy total abdominal, bilateral salpingo-oophorectomy;  Surgeon: Sunitha Olea MD;  Location: [facility] OR     INSERT PORT VASCULAR ACCESS Right 5/25/2023    Procedure: Ultrasound-guided right internal jugular venous access port placement with fluoroscopy;  Surgeon: Martin Thomas DO;  Location: [facility] OR     SIGMOIDOSCOPY FLEXIBLE N/A 4/6/2023    Procedure: Sigmoidoscopy flexible;  Surgeon: Jerome Ashley MD;  Location: [facility] OR     SURGICAL HISTORY OF -   06/14/76    Perineorrhaphy, for widening vaginal orifice     [facility]  EXPLORATORY OF ABDOMEN  1989    laparoscopy         SOCIAL HISTORY:  Social History     Socioeconomic History     Marital status:      Spouse name: Not on file     Number of children: Not on file     Years of education: Not on file     Highest education level: Not on file   Occupational History     Not on file   Tobacco Use     Smoking status: Never     Passive exposure: Current     Smokeless tobacco: Never   Vaping Use     Vaping status: Not on file   Substance and Sexual Activity     Alcohol use: No     Drug use: No     Sexual activity: Yes     Partners: Male   Other Topics Concern     Parent/sibling w/ CABG, MI or angioplasty before 65F 55M? Not Asked   Social History Narrative     Not on file     Social Determinants of Health     Financial Resource Strain: Not on file   Food Insecurity: Not on file   Transportation Needs: Not on file   Physical Activity: Not on file   Stress: Not on file   Social Connections: Not on file   Intimate Partner Violence: Not on file   Housing Stability: Not on file         FAMILY HISTORY:  Family History   Problem Relation Age of Onset     Hypertension Mother         on meds, alive     Cerebrovascular Disease Father         stroke about age 65,  of cancer at 68 yrs     Diabetes Father         eventually took insulin     Anemia Father         Pernisios anemia     Kidney Disease Niece         kidney transplant     Kidney Disease Nephew         kidney transplant     Venous thrombosis No family hx of      Anesthesia Reaction No family hx of          PHYSICAL EXAM:  Vital signs:  LMP  (LMP Unknown)    /88   Pulse 68   Resp 18   Temp 96.3  F (35.7  C)   Temp src Temporal   SpO2 98 %   Weight 83 kg (183 lb)   Pain Score No Pain (0)         ECO  GENERAL/CONSTITUTIONAL: No acute distress.  EYES:  Extraocular movements intact without nystagmus.  No scleral icterus.  RESPIRATORY: Equal chest rise.   MUSCULOSKELETAL: Warm and well-perfused, no cyanosis, clubbing, or edema.  Right LE always more swollen than left, chronic  Unchanged.   NEUROLOGIC: Cranial nerves are grossly intact. Alert, oriented to person, place and time, answers questions appropriately.  INTEGUMENTARY: No rashes or jaundice.      LABS:   Latest Reference Range & Units 05/30/23 14:35   Sodium 136 - 145 mmol/L 144   Potassium 3.4 - 5.3 mmol/L 3.7   Chloride 98 - 107 mmol/L 115 (H)   Carbon Dioxide (CO2) 22 - 29 mmol/L 19 (L)   Urea Nitrogen 8.0 - 23.0 mg/dL 18.2   Creatinine 0.51 - 0.95 mg/dL 1.43 (H)   GFR Estimate >60 mL/min/1.73m2 40 (L)   Calcium 8.8 - 10.2 mg/dL 8.5 (L)   Anion Gap 7 - 15 mmol/L 10   Albumin 3.5 - 5.2 g/dL 3.6   Protein Total 6.4 - 8.3 g/dL 5.9 (L)   Alkaline Phosphatase 35 - 104 U/L 97   ALT 10 - 35 U/L 12   AST 10 - 35 U/L 12   Bilirubin Total <=1.2 mg/dL 0.4   Glucose 70 - 99 mg/dL 109 (H)   WBC 4.0 - 11.0 10e3/uL 4.2   Hemoglobin 11.7 - 15.7 g/dL 9.4 (L)   Hematocrit 35.0 - 47.0 % 30.9 (L)   Platelet Count 150 - 450 10e3/uL 195   RBC Count 3.80 - 5.20 10e6/uL 3.65 (L)   MCV 78 - 100 fL 85   MCH 26.5 - 33.0 pg 25.8 (L)   MCHC 31.5 - 36.5 g/dL 30.4 (L)   RDW 10.0 - 15.0 % 16.1 (H)   % Neutrophils % 77   % Lymphocytes % 13   % Monocytes % 9   % Eosinophils % 0   % Basophils % 0   Absolute Basophils 0.0 - 0.2 10e3/uL 0.0   Absolute Eosinophils 0.0 - 0.7 10e3/uL 0.0   Absolute Immature Granulocytes <=0.4 10e3/uL 0.0   Absolute Lymphocytes 0.8 - 5.3 10e3/uL 0.5 (L)   Absolute Monocytes 0.0 - 1.3 10e3/uL 0.4   % Immature Granulocytes % 1   Absolute Neutrophils 1.6 - 8.3 10e3/uL 3.2   Absolute NRBCs 10e3/uL 0.0   NRBCs per 100 WBC <1 /100 0   (H): Data is abnormally high  (L): Data is abnormally low    PATHOLOGY:    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), KRAS G13D mutated, TPS  50%  # sigmoid colon poorly-differentiated carcinoma, KRAS G13D mutated, TPS 70%  - 2/23 colonoscopy: A frond-like/villous partially obstructing large mass found in sigmoid colon, partially circumferential involving two thirds of the lumen circumference, 4 cm, unable to traverse, with oozing, s/p biopsy, PATHOLOGY: Invasive poorly differentiated carcinoma, IHC panel excludes tumors of other origin, colorectal primary is favored, loss of nuclear expression of MLH1 and PMS2, MLH1 promoter methylation negative, [id number] mutation negative  -2/23 CT CAP: Shows known 4 cm proximal sigmoid colon mass with possible tumor extension (irregularity and stranding and adjacent pericolonic fat) without obstruction AND probable second mass 2.5 cm at hepatic flexure of colon; small lymph nodes adjacent to both masses (no lymphadenopathy by size criteria)  -3/23 PET:  a. There is increased FDG avidity of the sigmoid colon with focal lobular wall thickening consistent with biopsy-proven adenocarcinoma.  b. Secondary focus noted at the hepatic flexure demonstrates elevated FDG avidity and lobulated wall thickening highly suspicious for a additional primary.  c. Additionally there is a smaller focus of wall thickening with elevated FDG avidity along the left aspect of the transverse colon which is suspicious for a possible third focus of colonic malignancy.  - 3/23 CEA 6.8  -4/6/2023 laparoscopic converted to open total abdominal colectomy with ileorectal anastomosis, partial omentectomy, flexible sigmoidoscopy, TAHBSO  PATHOLOGY:  Of note, omental resection, terminal ileum, proximal and distal anastomotic rings, uterus, cervix, bilateral fallopian tubes and ovaries negative for malignancy    Mass #1 (ascending colon/hepatic flexure): Mixed neuroendocrine-non-neuroendocrine neoplasm (MINEN):  - Neuroendocrine carcinoma component (70%) and moderately-differentiated adenocarcinoma component (30%)  - Size = 5.0 cm, invading into  muscularis propria, Positive LVI  - IHC: Neuroendocrine portion: Negative for chromogranin and INSM1 but strongly express synaptophysin  - IHC: Adenocarcinoma portion: Positive for CK20, CDX2 negative for CK7, PAX8, ER, S100  Ki-67 proliferation index of approximately 80%.   PDL1:  COMBINED POSITIVE SCORE (CPS): 55-60  TUMOR PROPORTION SCORE (TPS): 50%   pathogenic KRAS mutation (G13D) was identified (5.2%).  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  PDL1:  COMBINED POSITIVE SCORE (CPS): 80  TUMOR PROPORTION SCORE (TPS): 70%  pathogenic KRAS mutation (G13D) was identified (4.5%).  MMR testing: loss of MLH1 and PMS2, intact MSH2 and MSH6  AJCC 8th edition: stage 3A mpT2 pN1a    - One of forty-one sampled lymph nodes positive (1/41), d/w pathology- unable to determine which mass is metastatic to LN    - 4/23 MRI brain w/wo contrast LAURENT  - 5/26/23 CT CAP w/ contrast and IVF before and after CT (post op baseline prior to starting tx):  1.  3 mm nodule RML and 5 mm lateral EDSON nodule, minimally increased from previous  2.  New 4 mm EDSON groundglass density nodule  3.  Several prominent borderline enlarged mediastinal lymph nodes slightly increased from previous  4.  Splenomegaly 14.5 cm  5. S/p subtotal colectomy with ileorectal anastomosis with postsurgical changes along the ventral abdominal wall midline    - 5/8/23 I presented this case at [facility] CRC tumor board as above   - 5/19/23 second opinion at [facility] w/medical oncologist Dr. Acharya, thorough consultation and recommendations appreciated, overall agree w/FOLFOX x6 months, possible nivo or ipi nivo in second line; if metastatic  platinum etoposide vs ipi nivo  - 5/25/23 port placement    REGIMEN:  mFOLFOX6  q14 days x12 cycles/6 months  oxaliplatin 64mg/m2 day 1 (dose reduced from 85mg/m2 2/2 Cr)  LV 350mg/m2 day 1  5FU 400mg/m2 IV push day 1  5FU 1200mg/m2 continuous infusion day 1-2 (total 2,400mg/m2 IV over 46-48 hours)  Emetic risk: moderate  Febrile neutropenia risk: intermediate     - plan for C1D1 5/31/23 pending labs  - antiemetics including dexamethasone, imodium rx sent  - plan for repeat CT in 3 months    -5/20/2023 genetic counseling: Negative for EPCAM, MLH1, MSH2, MSH6, and PMS2 genes.  Proceeding with additional testing including: APC, AXIN2, BMPR1A, CDH1, CHEK2, EPCAM, GREM1, MLH1, MSH2, MSH3, MSH6, MUTYH, NTHL1, PMS2, POLD1, POLE, PTEN, SMAD4, STK11, TP53, CDKN1B, MEN1, NF1, RET, TSC1, TSC2, and VHL genes- results pending   - f/u with Dr. 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    #Anemia secondary to iron deficiency and B12 deficiency  - terminal ileum removed at time of colon surgery, monitor for nutrient def  - 2/23 hgb 6.8, ferritin 21, iron sat index 10, TIBC 265, iron 27, b12 1493  - On B12 1000 mcg p.o. daily and ferrous sulfate 325 mg p.o. daily  - 4/9/23 s/p 1 uprbcs  - 5/9/23 hgb 11.3,5/30/23 hgb 9.4  - based on Ganzoni equation w/goal hgb 14 and 500mg needed for iron stores, pts iron deficit is 1400 mg  - will rx ferric carboxymaltose 750mg x2 doses, 1 week apart. Risk of infusion rxn d/w pt and she would like to  proceed.       #Crohn's  - dx since at least 2010     RTC 2 weeks for f/u with RADHA, labs, C2D1  RTC 4 weeks for f/u with me, labs, C3D1      Irene Bateman DO  Hematology/Oncology  [facility]

## 2023-05-31 ENCOUNTER — INFUSION THERAPY VISIT (OUTPATIENT)
Dept: INFUSION THERAPY | Facility: CLINIC | Age: 68
DRG: 683 | End: 2023-05-31
Attending: INTERNAL MEDICINE
Payer: MEDICARE

## 2023-05-31 VITALS
BODY MASS INDEX: 33.47 KG/M2 | WEIGHT: 181.9 LBS | OXYGEN SATURATION: 98 % | HEART RATE: 68 BPM | DIASTOLIC BLOOD PRESSURE: 69 MMHG | TEMPERATURE: 97.6 F | HEIGHT: 62 IN | RESPIRATION RATE: 16 BRPM | SYSTOLIC BLOOD PRESSURE: 139 MMHG

## 2023-05-31 DIAGNOSIS — C7A.1 MALIGNANT POORLY DIFFERENTIATED NEUROENDOCRINE CARCINOMA (H): Primary | ICD-10-CM

## 2023-05-31 DIAGNOSIS — C18.7 MALIGNANT NEOPLASM OF SIGMOID COLON (H): ICD-10-CM

## 2023-05-31 PROCEDURE — 96413 CHEMO IV INFUSION 1 HR: CPT

## 2023-05-31 PROCEDURE — 96549 UNLISTED CHEMOTHERAPY PX: CPT

## 2023-05-31 PROCEDURE — 96367 TX/PROPH/DG ADDL SEQ IV INF: CPT

## 2023-05-31 PROCEDURE — 96411 CHEMO IV PUSH ADDL DRUG: CPT

## 2023-05-31 PROCEDURE — 96415 CHEMO IV INFUSION ADDL HR: CPT

## 2023-05-31 PROCEDURE — 258N000003 HC RX IP 258 OP 636: Performed by: INTERNAL MEDICINE

## 2023-05-31 PROCEDURE — 250N000011 HC RX IP 250 OP 636: Performed by: INTERNAL MEDICINE

## 2023-05-31 PROCEDURE — 96375 TX/PRO/DX INJ NEW DRUG ADDON: CPT

## 2023-05-31 RX ORDER — ONDANSETRON 2 MG/ML
8 INJECTION INTRAMUSCULAR; INTRAVENOUS ONCE
Status: COMPLETED | OUTPATIENT
Start: 2023-05-31 | End: 2023-05-31

## 2023-05-31 RX ORDER — FLUOROURACIL 50 MG/ML
400 INJECTION, SOLUTION INTRAVENOUS ONCE
Status: COMPLETED | OUTPATIENT
Start: 2023-05-31 | End: 2023-05-31

## 2023-05-31 RX ADMIN — FOSAPREPITANT: 150 INJECTION, POWDER, LYOPHILIZED, FOR SOLUTION INTRAVENOUS at 11:27

## 2023-05-31 RX ADMIN — LEUCOVORIN CALCIUM 650 MG: 350 INJECTION, POWDER, LYOPHILIZED, FOR SUSPENSION INTRAMUSCULAR; INTRAVENOUS at 12:28

## 2023-05-31 RX ADMIN — FLUOROURACIL 750 MG: 50 INJECTION, SOLUTION INTRAVENOUS at 14:32

## 2023-05-31 RX ADMIN — FAMOTIDINE 20 MG: 10 INJECTION, SOLUTION INTRAVENOUS at 11:14

## 2023-05-31 RX ADMIN — DEXTROSE MONOHYDRATE 250 ML: 50 INJECTION, SOLUTION INTRAVENOUS at 11:08

## 2023-05-31 RX ADMIN — OXALIPLATIN 120 MG: 5 INJECTION, SOLUTION INTRAVENOUS at 12:25

## 2023-05-31 RX ADMIN — ONDANSETRON 8 MG: 2 INJECTION INTRAMUSCULAR; INTRAVENOUS at 11:19

## 2023-05-31 ASSESSMENT — PAIN SCALES - GENERAL: PAINLEVEL: NO PAIN (0)

## 2023-05-31 NOTE — PROGRESS NOTES
"Infusion Nursing Note:  Nadia Ladd presents today for C1D1 Miodified FOLFOX 6  Patient seen by provider yesterday: Yes: DR. Bateman   present during visit today: Not Applicable.    Note: Patient arrived with . Oriented to room, bathroom and  call light. POC reviewed with patient/. Take home nausea meds reviewed. Plan to start dexamethasone tomorrow am.  Questions answered.       Intravenous Access:  Implanted Port.    Treatment Conditions:  Lab Results   Component Value Date    HGB 9.4 (L) 05/30/2023    WBC 4.2 05/30/2023    ANEUTAUTO 3.2 05/30/2023     05/30/2023      Lab Results   Component Value Date     05/30/2023    POTASSIUM 3.7 05/30/2023    MAG 1.9 04/08/2023    CR 1.43 (H) 05/30/2023    LIVIER 8.5 (L) 05/30/2023    BILITOTAL 0.4 05/30/2023    ALBUMIN 3.6 05/30/2023    ALT 12 05/30/2023    AST 12 05/30/2023     Results reviewed, labs MET treatment parameters, ok to proceed with treatment.      Post Infusion Assessment:  Patient tolerated infusion without incident.  Blood return noted pre and post infusion.  Prior to discharge: Port is secured in place with tegaderm and flushed with 10cc NS with positive blood return noted.  Continuous home infusion CADD pump connected.    All connectors secured in place and clamps taped open.    Pump started, \"running\" noted on display (CADD): YES.  Pump Connection double checked with Rosalina Argueta RN.  Patient instructed to call our clinic or [facility] with any questions or concerns at home.  Patient verbalized understanding.    Patient set up for pump disconnect at home with [facility] on 6/2/23 at 1245 pm. [employer] message sent to [facility] Care coordinator. .      .       Discharge Plan:   Discharge instructions reviewed with: Patient and Family.  Patient and/or family verbalized understanding of discharge instructions and all questions answered.  AVS to patient via [employer].  Patient will return 2 weeks for next " appointment.   Patient discharged in stable condition accompanied by: self and .  Departure Mode: Ambulatory.      Eli Ladd RN

## 2023-06-02 ENCOUNTER — TELEPHONE (OUTPATIENT)
Dept: ONCOLOGY | Facility: CLINIC | Age: 68
End: 2023-06-02
Payer: MEDICARE

## 2023-06-02 ENCOUNTER — TELEPHONE (OUTPATIENT)
Dept: INTERNAL MEDICINE | Facility: CLINIC | Age: 68
End: 2023-06-02
Payer: MEDICARE

## 2023-06-02 DIAGNOSIS — C18.7 MALIGNANT NEOPLASM OF SIGMOID COLON (H): Primary | ICD-10-CM

## 2023-06-02 NOTE — TELEPHONE ENCOUNTER
Patient called into clinic to report possible side effects from first chemotherapy treatment on 5/31/23. Symptoms started 6/1/23.  Patient states she is feeling better, but has experienced some hearing changes to her right ear, feeling dizzy, nausea, but not vomiting. No fever. No pain.  Patient took compazine for nausea around 0200 with good results  Patient is eating and tolerating food well. Drinking fluids.  Patient educated on some home interventions for symptoms.  Will route to provider for recommendations for hearing changes and dizziness.  Patient educated on symptoms that warrant an ED visit.  Patient verbalized understanding.  Cherelle Phillips RN on 6/2/2023 at 11:50 AM

## 2023-06-02 NOTE — TELEPHONE ENCOUNTER
Home Care is calling regarding an established patient with [facility].       Requesting orders from: Nomi Cartwright  Provider is following patient: Yes  Is this a 60-day recertification request?  No    Orders Requested    Skilled Nursing  Request for initial certification (first set of orders)   Frequency: 1x/wk for 3 wks  then 1x/wk for every other week for 6 wks   4 prns    Social Work  Request for initial evaluation and treatment (one time) for Health Care directive and coping with cancer     monicagideon is declining therapies at this time    Malia Herrera RN

## 2023-06-03 ENCOUNTER — HOSPITAL ENCOUNTER (INPATIENT)
Facility: CLINIC | Age: 68
LOS: 2 days | Discharge: HOME HEALTH | DRG: 683 | End: 2023-06-05
Attending: EMERGENCY MEDICINE | Admitting: INTERNAL MEDICINE
Payer: MEDICARE

## 2023-06-03 DIAGNOSIS — I48.0 PAROXYSMAL ATRIAL FIBRILLATION WITH RVR (H): ICD-10-CM

## 2023-06-03 DIAGNOSIS — N17.9 AKI (ACUTE KIDNEY INJURY) (H): ICD-10-CM

## 2023-06-03 DIAGNOSIS — N18.32 STAGE 3B CHRONIC KIDNEY DISEASE (H): ICD-10-CM

## 2023-06-03 DIAGNOSIS — E87.20 METABOLIC ACIDOSIS, NORMAL ANION GAP (NAG): ICD-10-CM

## 2023-06-03 DIAGNOSIS — E83.42 HYPOMAGNESEMIA: ICD-10-CM

## 2023-06-03 DIAGNOSIS — I48.91 ATRIAL FIBRILLATION WITH RVR (H): ICD-10-CM

## 2023-06-03 DIAGNOSIS — K52.9 CHRONIC DIARRHEA: Primary | ICD-10-CM

## 2023-06-03 PROBLEM — N39.0 UTI (URINARY TRACT INFECTION): Status: ACTIVE | Noted: 2023-06-03

## 2023-06-03 LAB
ALBUMIN SERPL BCG-MCNC: 3.9 G/DL (ref 3.5–5.2)
ALBUMIN UR-MCNC: NEGATIVE MG/DL
ALP SERPL-CCNC: 86 U/L (ref 35–104)
ALT SERPL W P-5'-P-CCNC: 15 U/L (ref 10–35)
ANION GAP SERPL CALCULATED.3IONS-SCNC: 13 MMOL/L (ref 7–15)
APPEARANCE UR: ABNORMAL
AST SERPL W P-5'-P-CCNC: 14 U/L (ref 10–35)
BACTERIA #/AREA URNS HPF: ABNORMAL /HPF
BASOPHILS # BLD AUTO: 0 10E3/UL (ref 0–0.2)
BASOPHILS NFR BLD AUTO: 0 %
BILIRUB SERPL-MCNC: 0.5 MG/DL
BILIRUB UR QL STRIP: NEGATIVE
BUN SERPL-MCNC: 46.5 MG/DL (ref 8–23)
CALCIUM SERPL-MCNC: 8.3 MG/DL (ref 8.8–10.2)
CHLORIDE SERPL-SCNC: 110 MMOL/L (ref 98–107)
COLOR UR AUTO: ABNORMAL
CREAT SERPL-MCNC: 1.75 MG/DL (ref 0.51–0.95)
DEPRECATED HCO3 PLAS-SCNC: 17 MMOL/L (ref 22–29)
EOSINOPHIL # BLD AUTO: 0 10E3/UL (ref 0–0.7)
EOSINOPHIL NFR BLD AUTO: 0 %
ERYTHROCYTE [DISTWIDTH] IN BLOOD BY AUTOMATED COUNT: 15.9 % (ref 10–15)
GFR SERPL CREATININE-BSD FRML MDRD: 32 ML/MIN/1.73M2
GLUCOSE SERPL-MCNC: 119 MG/DL (ref 70–99)
GLUCOSE UR STRIP-MCNC: NEGATIVE MG/DL
HCT VFR BLD AUTO: 33.2 % (ref 35–47)
HGB BLD-MCNC: 10.3 G/DL (ref 11.7–15.7)
HGB UR QL STRIP: NEGATIVE
IMM GRANULOCYTES # BLD: 0 10E3/UL
IMM GRANULOCYTES NFR BLD: 0 %
KETONES UR STRIP-MCNC: NEGATIVE MG/DL
LEUKOCYTE ESTERASE UR QL STRIP: ABNORMAL
LIPASE SERPL-CCNC: 70 U/L (ref 13–60)
LYMPHOCYTES # BLD AUTO: 0.6 10E3/UL (ref 0.8–5.3)
LYMPHOCYTES NFR BLD AUTO: 19 %
MCH RBC QN AUTO: 25.8 PG (ref 26.5–33)
MCHC RBC AUTO-ENTMCNC: 31 G/DL (ref 31.5–36.5)
MCV RBC AUTO: 83 FL (ref 78–100)
MONOCYTES # BLD AUTO: 0.2 10E3/UL (ref 0–1.3)
MONOCYTES NFR BLD AUTO: 8 %
NEUTROPHILS # BLD AUTO: 2.1 10E3/UL (ref 1.6–8.3)
NEUTROPHILS NFR BLD AUTO: 73 %
NITRATE UR QL: NEGATIVE
NRBC # BLD AUTO: 0 10E3/UL
NRBC BLD AUTO-RTO: 0 /100
PH UR STRIP: 5 [PH] (ref 5–7)
PLATELET # BLD AUTO: 159 10E3/UL (ref 150–450)
POTASSIUM SERPL-SCNC: 4 MMOL/L (ref 3.4–5.3)
PROT SERPL-MCNC: 6.1 G/DL (ref 6.4–8.3)
RBC # BLD AUTO: 4 10E6/UL (ref 3.8–5.2)
RBC URINE: 1 /HPF
SODIUM SERPL-SCNC: 140 MMOL/L (ref 136–145)
SP GR UR STRIP: 1.01 (ref 1–1.03)
SQUAMOUS EPITHELIAL: 1 /HPF
TROPONIN T SERPL HS-MCNC: 34 NG/L
TROPONIN T SERPL HS-MCNC: 41 NG/L
TROPONIN T SERPL HS-MCNC: 82 NG/L
TSH SERPL DL<=0.005 MIU/L-ACNC: 1.55 UIU/ML (ref 0.3–4.2)
UFH PPP CHRO-ACNC: >1.1 IU/ML
UROBILINOGEN UR STRIP-MCNC: NORMAL MG/DL
WBC # BLD AUTO: 2.9 10E3/UL (ref 4–11)
WBC URINE: 7 /HPF

## 2023-06-03 PROCEDURE — 93010 ELECTROCARDIOGRAM REPORT: CPT | Performed by: EMERGENCY MEDICINE

## 2023-06-03 PROCEDURE — 96361 HYDRATE IV INFUSION ADD-ON: CPT

## 2023-06-03 PROCEDURE — 87086 URINE CULTURE/COLONY COUNT: CPT | Performed by: EMERGENCY MEDICINE

## 2023-06-03 PROCEDURE — 36415 COLL VENOUS BLD VENIPUNCTURE: CPT | Performed by: EMERGENCY MEDICINE

## 2023-06-03 PROCEDURE — 258N000003 HC RX IP 258 OP 636: Performed by: EMERGENCY MEDICINE

## 2023-06-03 PROCEDURE — 85520 HEPARIN ASSAY: CPT | Performed by: EMERGENCY MEDICINE

## 2023-06-03 PROCEDURE — 99223 1ST HOSP IP/OBS HIGH 75: CPT | Mod: AI | Performed by: INTERNAL MEDICINE

## 2023-06-03 PROCEDURE — 258N000003 HC RX IP 258 OP 636: Performed by: INTERNAL MEDICINE

## 2023-06-03 PROCEDURE — 83690 ASSAY OF LIPASE: CPT | Performed by: EMERGENCY MEDICINE

## 2023-06-03 PROCEDURE — 36415 COLL VENOUS BLD VENIPUNCTURE: CPT | Performed by: INTERNAL MEDICINE

## 2023-06-03 PROCEDURE — 99285 EMERGENCY DEPT VISIT HI MDM: CPT | Mod: 25 | Performed by: EMERGENCY MEDICINE

## 2023-06-03 PROCEDURE — 84484 ASSAY OF TROPONIN QUANT: CPT | Performed by: EMERGENCY MEDICINE

## 2023-06-03 PROCEDURE — 99285 EMERGENCY DEPT VISIT HI MDM: CPT | Mod: 25

## 2023-06-03 PROCEDURE — 250N000011 HC RX IP 250 OP 636: Performed by: EMERGENCY MEDICINE

## 2023-06-03 PROCEDURE — 93010 ELECTROCARDIOGRAM REPORT: CPT | Mod: 76 | Performed by: EMERGENCY MEDICINE

## 2023-06-03 PROCEDURE — 80053 COMPREHEN METABOLIC PANEL: CPT | Performed by: EMERGENCY MEDICINE

## 2023-06-03 PROCEDURE — 96375 TX/PRO/DX INJ NEW DRUG ADDON: CPT

## 2023-06-03 PROCEDURE — 85025 COMPLETE CBC W/AUTO DIFF WBC: CPT | Performed by: EMERGENCY MEDICINE

## 2023-06-03 PROCEDURE — 81001 URINALYSIS AUTO W/SCOPE: CPT | Performed by: EMERGENCY MEDICINE

## 2023-06-03 PROCEDURE — 84443 ASSAY THYROID STIM HORMONE: CPT | Performed by: EMERGENCY MEDICINE

## 2023-06-03 PROCEDURE — 250N000011 HC RX IP 250 OP 636: Performed by: INTERNAL MEDICINE

## 2023-06-03 PROCEDURE — 96366 THER/PROPH/DIAG IV INF ADDON: CPT

## 2023-06-03 PROCEDURE — 93005 ELECTROCARDIOGRAM TRACING: CPT

## 2023-06-03 PROCEDURE — 120N000001 HC R&B MED SURG/OB

## 2023-06-03 PROCEDURE — 93005 ELECTROCARDIOGRAM TRACING: CPT | Mod: 76

## 2023-06-03 PROCEDURE — 96365 THER/PROPH/DIAG IV INF INIT: CPT

## 2023-06-03 PROCEDURE — 84484 ASSAY OF TROPONIN QUANT: CPT | Performed by: INTERNAL MEDICINE

## 2023-06-03 RX ORDER — ACETAMINOPHEN 650 MG/1
650 SUPPOSITORY RECTAL EVERY 6 HOURS PRN
Status: DISCONTINUED | OUTPATIENT
Start: 2023-06-03 | End: 2023-06-05 | Stop reason: HOSPADM

## 2023-06-03 RX ORDER — DILTIAZEM HCL/D5W 125 MG/125
5-15 PLASTIC BAG, INJECTION (ML) INTRAVENOUS CONTINUOUS
Status: DISCONTINUED | OUTPATIENT
Start: 2023-06-03 | End: 2023-06-04

## 2023-06-03 RX ORDER — SODIUM CHLORIDE 9 MG/ML
INJECTION, SOLUTION INTRAVENOUS CONTINUOUS
Status: DISCONTINUED | OUTPATIENT
Start: 2023-06-03 | End: 2023-06-04

## 2023-06-03 RX ORDER — SODIUM CHLORIDE, SODIUM LACTATE, POTASSIUM CHLORIDE, CALCIUM CHLORIDE 600; 310; 30; 20 MG/100ML; MG/100ML; MG/100ML; MG/100ML
INJECTION, SOLUTION INTRAVENOUS CONTINUOUS
Status: DISCONTINUED | OUTPATIENT
Start: 2023-06-03 | End: 2023-06-03

## 2023-06-03 RX ORDER — ONDANSETRON 4 MG/1
4 TABLET, ORALLY DISINTEGRATING ORAL EVERY 6 HOURS PRN
Status: DISCONTINUED | OUTPATIENT
Start: 2023-06-03 | End: 2023-06-03

## 2023-06-03 RX ORDER — LIDOCAINE 40 MG/G
CREAM TOPICAL
Status: DISCONTINUED | OUTPATIENT
Start: 2023-06-03 | End: 2023-06-05 | Stop reason: HOSPADM

## 2023-06-03 RX ORDER — HEPARIN SODIUM 10000 [USP'U]/100ML
0-5000 INJECTION, SOLUTION INTRAVENOUS CONTINUOUS
Status: DISCONTINUED | OUTPATIENT
Start: 2023-06-03 | End: 2023-06-05 | Stop reason: HOSPADM

## 2023-06-03 RX ORDER — ACETAMINOPHEN 325 MG/1
650 TABLET ORAL EVERY 4 HOURS PRN
Status: DISCONTINUED | OUTPATIENT
Start: 2023-06-03 | End: 2023-06-03

## 2023-06-03 RX ORDER — ONDANSETRON 2 MG/ML
4 INJECTION INTRAMUSCULAR; INTRAVENOUS EVERY 6 HOURS PRN
Status: DISCONTINUED | OUTPATIENT
Start: 2023-06-03 | End: 2023-06-05 | Stop reason: HOSPADM

## 2023-06-03 RX ORDER — ONDANSETRON 2 MG/ML
4 INJECTION INTRAMUSCULAR; INTRAVENOUS EVERY 6 HOURS PRN
Status: DISCONTINUED | OUTPATIENT
Start: 2023-06-03 | End: 2023-06-03

## 2023-06-03 RX ORDER — ONDANSETRON 4 MG/1
4 TABLET, ORALLY DISINTEGRATING ORAL EVERY 6 HOURS PRN
Status: DISCONTINUED | OUTPATIENT
Start: 2023-06-03 | End: 2023-06-05 | Stop reason: HOSPADM

## 2023-06-03 RX ORDER — CEFTRIAXONE 1 G/1
1 INJECTION, POWDER, FOR SOLUTION INTRAMUSCULAR; INTRAVENOUS EVERY 24 HOURS
Status: DISCONTINUED | OUTPATIENT
Start: 2023-06-03 | End: 2023-06-05 | Stop reason: HOSPADM

## 2023-06-03 RX ORDER — SODIUM CHLORIDE 9 MG/ML
INJECTION, SOLUTION INTRAVENOUS CONTINUOUS
Status: DISCONTINUED | OUTPATIENT
Start: 2023-06-03 | End: 2023-06-05 | Stop reason: HOSPADM

## 2023-06-03 RX ORDER — ACETAMINOPHEN 325 MG/1
650 TABLET ORAL EVERY 6 HOURS PRN
Status: DISCONTINUED | OUTPATIENT
Start: 2023-06-03 | End: 2023-06-05 | Stop reason: HOSPADM

## 2023-06-03 RX ORDER — DILTIAZEM HYDROCHLORIDE 5 MG/ML
15 INJECTION INTRAVENOUS ONCE
Status: COMPLETED | OUTPATIENT
Start: 2023-06-03 | End: 2023-06-03

## 2023-06-03 RX ORDER — SODIUM CHLORIDE, SODIUM LACTATE, POTASSIUM CHLORIDE, CALCIUM CHLORIDE 600; 310; 30; 20 MG/100ML; MG/100ML; MG/100ML; MG/100ML
125 INJECTION, SOLUTION INTRAVENOUS CONTINUOUS
Status: DISCONTINUED | OUTPATIENT
Start: 2023-06-03 | End: 2023-06-03

## 2023-06-03 RX ADMIN — DILTIAZEM HYDROCHLORIDE 15 MG: 5 INJECTION, SOLUTION INTRAVENOUS at 11:58

## 2023-06-03 RX ADMIN — SODIUM CHLORIDE, POTASSIUM CHLORIDE, SODIUM LACTATE AND CALCIUM CHLORIDE 1000 ML: 600; 310; 30; 20 INJECTION, SOLUTION INTRAVENOUS at 11:24

## 2023-06-03 RX ADMIN — HEPARIN SODIUM 1500 UNITS/HR: 10000 INJECTION, SOLUTION INTRAVENOUS at 14:35

## 2023-06-03 RX ADMIN — SODIUM CHLORIDE 1000 ML: 9 INJECTION, SOLUTION INTRAVENOUS at 10:46

## 2023-06-03 RX ADMIN — SODIUM CHLORIDE, POTASSIUM CHLORIDE, SODIUM LACTATE AND CALCIUM CHLORIDE: 600; 310; 30; 20 INJECTION, SOLUTION INTRAVENOUS at 20:37

## 2023-06-03 RX ADMIN — SODIUM CHLORIDE: 9 INJECTION, SOLUTION INTRAVENOUS at 22:45

## 2023-06-03 RX ADMIN — HEPARIN SODIUM 1200 UNITS/HR: 10000 INJECTION, SOLUTION INTRAVENOUS at 23:30

## 2023-06-03 RX ADMIN — Medication 5 MG/HR: at 11:59

## 2023-06-03 RX ADMIN — CEFTRIAXONE SODIUM 1 G: 1 INJECTION, POWDER, FOR SOLUTION INTRAMUSCULAR; INTRAVENOUS at 22:42

## 2023-06-03 ASSESSMENT — ACTIVITIES OF DAILY LIVING (ADL)
CONCENTRATING,_REMEMBERING_OR_MAKING_DECISIONS_DIFFICULTY: NO
CHANGE_IN_FUNCTIONAL_STATUS_SINCE_ONSET_OF_CURRENT_ILLNESS/INJURY: NO
DRESSING/BATHING_DIFFICULTY: NO
ADLS_ACUITY_SCORE: 35
ADLS_ACUITY_SCORE: 35
DIFFICULTY_EATING/SWALLOWING: NO
ADLS_ACUITY_SCORE: 20
ADLS_ACUITY_SCORE: 20
ADLS_ACUITY_SCORE: 35
WALKING_OR_CLIMBING_STAIRS_DIFFICULTY: NO
ADLS_ACUITY_SCORE: 35
ADLS_ACUITY_SCORE: 35
FALL_HISTORY_WITHIN_LAST_SIX_MONTHS: NO
TOILETING_ISSUES: NO
DOING_ERRANDS_INDEPENDENTLY_DIFFICULTY: NO
WEAR_GLASSES_OR_BLIND: NO

## 2023-06-03 NOTE — ED PROVIDER NOTES
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  States this started yesterday.  She does not describe it as room spinning but feeling on the spectrum of lightheaded.  This only happens when she sits up or stands up.  When she lies still she has no symptoms.  No visual field cuts.  No speech changes, headache, numbness/tingling/weakness in arms or legs.  She had a brain MRI on 4/23 which did not show evidence of metastatic disease.  She was told she could have symptoms of this after her infusion but she was having trouble walking around today so she called the ambulance and is present here.  She received Zofran on the way in and now feels a lot better.  No fevers, rhinorrhea, sore throat, cough.  She does feel some total chest tightness that improves when she walks.  Has been present since yesterday.    Review of Systems:  All organ systems below were reviewed and are negative unless indicated in the HPI.    Constitutional  Eyes  ENT  Respiratory  Cardiovascular  Gastrointestinal  Genitourinary  Musculoskeletal  Skin  Neuro    Allergies:  Allergies   Allergen Reactions     No Known Drug Allergy        Problem List:    Patient Active Problem List    Diagnosis Date Noted     MARYA (acute kidney injury) (H) 06/03/2023     Priority: Medium     Atrial fibrillation with RVR (H) 06/03/2023     Priority: Medium     Hyperkalemia 05/10/2023     Priority: Medium     Colon cancer (H) 04/06/2023     Priority: Medium     Malignant neoplasm of sigmoid colon (H) 03/31/2023     Priority: Medium     [facility]- check 6.23 Bateman        Morbid obesity (H) 06/06/2019     Priority: Medium     Benign essential hypertension with target blood pressure below 140/90 10/10/2016      Priority: Medium     CARDIOVASCULAR SCREENING; LDL GOAL LESS THAN 130 10/02/2012     Priority: Medium     Advanced directives, counseling/discussion 02/09/2012     Priority: Medium     Advance Directive Problem List Overview:   Name Relationship Phone    Primary Health Care Agent            Alternative Health Care Agent          Discussed advance care planning with patient; information given to patient to review. 2/9/2012        Ivan Shi MD  06/03/23 1558         Prediabetes 02/09/2012     Priority: Medium     Obesity 02/09/2012     Priority: Medium     Crohn's disease of large intestine without complication (H) 02/15/2011     Priority: Medium     Iron deficiency anemia due to chronic blood loss 10/21/2010     Priority: Medium     B12 deficiency anemia 10/21/2010     Priority: Medium     Arthropathia 08/05/2010     Priority: Medium     Dermatitis-dishydrotic eczema-severe 08/05/2010     Priority: Medium     Primary pulmonary hypertension (H) 04/06/2010     Priority: Medium        Past Medical History:    Past Medical History:   Diagnosis Date     Arthropathia 08/05/2010     B12 deficiency anemia 10/21/2010     Benign essential hypertension with target blood pressure below 140/90 10/10/2016     CARDIOVASCULAR SCREENING; LDL GOAL LESS THAN 160 10/31/2010     Crohn's colitis (H) 02/15/2011     Dermatitis-dishydrotic eczema-severe 08/05/2010     Hiatal hernia      HTN (hypertension) 07/21/2011     Iron deficiency anemia 10/21/2010     Malignant neoplasm of sigmoid colon (H)      Obesity      Primary pulmonary hypertension (H) 04/06/2010       Past Surgical History:    Past Surgical History:   Procedure Laterality Date     COLECTOMY WITHOUT COLOSTOMY N/A 4/6/2023    Procedure: Laparoscopic Converted to Open Total abdominal colectomy;  Surgeon: Jerome Ashley MD;  Location: [facility]     COLONOSCOPY  10/11/10     COLONOSCOPY N/A 2/27/2023    Procedure: ATTEMPTED COLONOSCOPY WITH SIGMOID STRICTURE BIOPSY;   Surgeon: Jonathan Alcocer MD;  Location[facility]     ESOPHAGOSCOPY, GASTROSCOPY, DUODENOSCOPY (EGD), COMBINED N/A 2023    Procedure: ESOPHAGOGASTRODUODENOSCOPY, WITH BIOPSY;  Surgeon: Jonathan Alcocer MD;  Location: [facility]     HYSTERECTOMY TOTAL ABDOMINAL, BILATERAL SALPINGO-OOPHORECTOMY, COMBINED N/A 2023    Procedure: Hysterectomy total abdominal, bilateral salpingo-oophorectomy;  Surgeon: Sunitha Olea MD;  Location: [facility]     INSERT PORT VASCULAR ACCESS Right 2023    Procedure: Ultrasound-guided right internal jugular venous access port placement with fluoroscopy;  Surgeon: Martin Thomas DO;  Location: [facility]     SIGMOIDOSCOPY FLEXIBLE N/A 2023    Procedure: Sigmoidoscopy flexible;  Surgeon: Jerome Ashley MD;  Location: [facility]     SURGICAL HISTORY OF -   77    Perineorrhaphy, for widening vaginal orifice     Z EXPLORATORY OF ABDOMEN      laparoscopy       Family History:    Family History   Problem Relation Age of Onset     Hypertension Mother         on meds, alive     Cerebrovascular Disease Father         stroke about age 65,  of cancer at 68 yrs     Diabetes Father         eventually took insulin     Anemia Father         Pernisios anemia     Kidney Disease Niece         kidney transplant     Kidney Disease Nephew         kidney transplant     Venous thrombosis No family hx of      Anesthesia Reaction No family hx of        Medications:    cyanocobalamin 1000 MCG TBCR  dexamethasone (DECADRON) 4 MG tablet  ferrous sulfate (IRON) 325 (65 FE) MG tablet  loperamide (IMODIUM A-D) 2 MG tablet  Multiple Vitamins-Iron (MULTI-DAY PLUS IRON PO)  ondansetron (ZOFRAN) 8 MG tablet  prochlorperazine (COMPAZINE) 10 MG tablet          Physical Exam   BP: 139/78  Pulse: 68  Temp: 98  F (36.7  C)  Resp: 18  SpO2: 97 %    Gen: Vital signs reviewed  Eyes: Sclera white, pupils round  ENT: External ears and nares normal  Card: Regular rate and rhythm  Resp: No respiratory  distress. Lungs clear to auscultation bilaterally  Abd: Soft, non-distended, non-tender  Extremities: Symmetric distal pulses.  Skin: No rashes  Neuro: Alert, speech normal. Face is symmetric.  Strength and sensation intact throughout.  No dysmetria.  Visual fields grossly intact.  Hearing grossly intact.  Patient is able to ambulate without ataxia.      ED Course        Procedures         EKG interpreted by myself.  EKG shows sinus rhythm at a rate of 65 bpm, IN interval 140 ms, QTc 352 ms, QRS duration 80 ms without concerning ST segment changes.      EKG #2 interpreted by myself.  Atrial fibrillation at a rate of 144 bpm, QTc 423 ms, QRS duration 78 ms without concerning ST segment changes.    EKG #3 interpreted by myself. Sinus rhythm at a rate of 72 bpm, IN interval 142 ms, QTc 379 ms, QRS duration 82 ms without concerning ST segment changes.        Results for orders placed or performed during the hospital encounter of 06/03/23 (from the past 24 hour(s))   UA with Microscopic reflex to Culture    Specimen: Urine, Clean Catch   Result Value Ref Range    Color Urine Straw Colorless, Straw, Light Yellow, Yellow    Appearance Urine Slightly Cloudy (A) Clear    Glucose Urine Negative Negative mg/dL    Bilirubin Urine Negative Negative    Ketones Urine Negative Negative mg/dL    Specific Gravity Urine 1.014 1.003 - 1.035    Blood Urine Negative Negative    pH Urine 5.0 5.0 - 7.0    Protein Albumin Urine Negative Negative mg/dL    Urobilinogen Urine Normal Normal, 2.0 mg/dL    Nitrite Urine Negative Negative    Leukocyte Esterase Urine Trace (A) Negative    Bacteria Urine Few (A) None Seen /HPF    RBC Urine 1 <=2 /HPF    WBC Urine 7 (H) <=5 /HPF    Squamous Epithelials Urine 1 <=1 /HPF    Narrative    Urine Culture not indicated   CBC with platelets differential    Narrative    The following orders were created for panel order CBC with platelets differential.  Procedure                               Abnormality          Status                     ---------                               -----------         ------                     CBC with platelets and d...[[id number]]  Abnormal            Final result                 Please view results for these tests on the individual orders.   Comprehensive metabolic panel   Result Value Ref Range    Sodium 140 136 - 145 mmol/L    Potassium 4.0 3.4 - 5.3 mmol/L    Chloride 110 (H) 98 - 107 mmol/L    Carbon Dioxide (CO2) 17 (L) 22 - 29 mmol/L    Anion Gap 13 7 - 15 mmol/L    Urea Nitrogen 46.5 (H) 8.0 - 23.0 mg/dL    Creatinine 1.75 (H) 0.51 - 0.95 mg/dL    Calcium 8.3 (L) 8.8 - 10.2 mg/dL    Glucose 119 (H) 70 - 99 mg/dL    Alkaline Phosphatase 86 35 - 104 U/L    AST 14 10 - 35 U/L    ALT 15 10 - 35 U/L    Protein Total 6.1 (L) 6.4 - 8.3 g/dL    Albumin 3.9 3.5 - 5.2 g/dL    Bilirubin Total 0.5 <=1.2 mg/dL    GFR Estimate 32 (L) >60 mL/min/1.73m2   Lipase   Result Value Ref Range    Lipase 70 (H) 13 - 60 U/L   Troponin T, High Sensitivity   Result Value Ref Range    Troponin T, High Sensitivity 34 (H) <=14 ng/L   CBC with platelets and differential   Result Value Ref Range    WBC Count 2.9 (L) 4.0 - 11.0 10e3/uL    RBC Count 4.00 3.80 - 5.20 10e6/uL    Hemoglobin 10.3 (L) 11.7 - 15.7 g/dL    Hematocrit 33.2 (L) 35.0 - 47.0 %    MCV 83 78 - 100 fL    MCH 25.8 (L) 26.5 - 33.0 pg    MCHC 31.0 (L) 31.5 - 36.5 g/dL    RDW 15.9 (H) 10.0 - 15.0 %    Platelet Count 159 150 - 450 10e3/uL    % Neutrophils 73 %    % Lymphocytes 19 %    % Monocytes 8 %    % Eosinophils 0 %    % Basophils 0 %    % Immature Granulocytes 0 %    NRBCs per 100 WBC 0 <1 /100    Absolute Neutrophils 2.1 1.6 - 8.3 10e3/uL    Absolute Lymphocytes 0.6 (L) 0.8 - 5.3 10e3/uL    Absolute Monocytes 0.2 0.0 - 1.3 10e3/uL    Absolute Eosinophils 0.0 0.0 - 0.7 10e3/uL    Absolute Basophils 0.0 0.0 - 0.2 10e3/uL    Absolute Immature Granulocytes 0.0 <=0.4 10e3/uL    Absolute NRBCs 0.0 10e3/uL   Troponin T, High Sensitivity   Result Value  Ref Range    Troponin T, High Sensitivity 41 (H) <=14 ng/L   TSH with free T4 reflex   Result Value Ref Range    TSH 1.55 0.30 - 4.20 uIU/mL       Medications   0.9% sodium chloride BOLUS (0 mLs Intravenous Stopped 6/3/23 1124)     Followed by   sodium chloride 0.9% infusion (has no administration in time range)   lactated ringers BOLUS 1,000 mL (0 mLs Intravenous Stopped 6/3/23 1225)     Followed by   lactated ringers infusion (has no administration in time range)   diltiazem (CARDIZEM) 125 mg in dextrose 5 % 125 mL infusion (0 mg/hr Intravenous Stopped 6/3/23 1450)   heparin 25,000 units in 0.45% NaCl 250 mL ANTICOAGULANT infusion (1,500 Units/hr Intravenous $New Bag 6/3/23 1435)   diltiazem (CARDIZEM) injection 15 mg (15 mg Intravenous $Given 6/3/23 1158)   heparin ANTICOAGULANT Loading dose for HIGH INTENSITY TREATMENT * Give BEFORE starting heparin infusion (6,600 Units Intravenous $Given 6/3/23 1435)         Consultations:  Hospitalist    Social Determinants of Health:  Lives with her  who is present here.    Assessments & Plan (with Medical Decision Making)       I have reviewed the nursing notes.    I have reviewed the findings, diagnosis, plan and need for follow up with the patient.      Medical Decision Making  On arrival, patient's vital signs are reassuring.  Her symptoms seem more typical of lightheadedness from a dehydration state.  She has a nonfocal neurologic exam without concerning evidence of posterior circulation stroke.  I reviewed her prior brain MRI results which did not reveal metastatic disease.  Laboratory work-up sent which did show an acute kidney injury.  During her time in the emergency department, patient suddenly developed atrial fibrillation with RVR ultimately requiring 15 mg/h of diltiazem.  Her delta troponin is appropriately elevated for comorbidities and acute kidney injury.  I do not suspect acute coronary syndrome given initial EKG as well as the reproducible chest  pain that she has that improves when she walks around.  The possibility does remain of a PE given her active cancer, however she did not come in with this tachycardia and she has a GFR hovering around 30.  I discussed this with the hospitalist and the decision was made to treat her with heparin and see how her kidney function is tomorrow.  Patient admitted to the ICU for further management.    Addendum: Prior to admission patient did convert to normal sinus rhythm.  Will perform trial off of the diltiazem drip.  Still continue heparin drip.    Final diagnoses:   Atrial fibrillation with RVR (H)   MARYA (acute kidney injury) (H)         Ivan Shi M.D.   [facility]

## 2023-06-03 NOTE — MEDICATION SCRIBE - ADMISSION MEDICATION HISTORY
Medication Scribe Admission Medication History    Admission medication history is complete. The information provided in this note is only as accurate as the sources available at the time of the update.    Medication reconciliation/reorder completed by provider prior to medication history? No    Information Source(s): Patient via in-person    Pertinent Information: n/a    Changes made to PTA medication list:    Added: None    Deleted: None    Changed: None    Medication Affordability:  Not including over the counter (OTC) medications, was there a time in the past 3 months when you did not take your medications as prescribed because of cost?: No    Allergies reviewed with patient and updates made in EHR: yes    Medication History Completed By: MISHA WEST 6/3/2023 2:01 PM    Prior to Admission medications    Medication Sig Last Dose Taking? Auth Provider Long Term End Date   cyanocobalamin 1000 MCG TBCR Take 1,000 mcg by mouth daily 6/2/2023 at am Yes Phil Orosco PA-C     dexamethasone (DECADRON) 4 MG tablet Take 2 tablets (8 mg) by mouth daily Take for 2 days, starting the day after chemo. Take with food. 6/2/2023 at am Yes Irene Bateman, DO Yes    ferrous sulfate (IRON) 325 (65 FE) MG tablet TAKE ONE TABLET BY MOUTH TWICE A DAY --NO FURTHER REFILLS WITHOUT OFFICE VISIT  Patient taking differently: Take 325 mg by mouth daily (with breakfast) 6/2/2023 at am Yes Phil Orosco PA-C     loperamide (IMODIUM A-D) 2 MG tablet When diarrhea starts take 2 tabs, then with each additional episode take 1 more tab, if using more than 8 tab in 24 hrs notify oncology  at not started Yes Irene Bateman, DO     Multiple Vitamins-Iron (MULTI-DAY PLUS IRON PO) Take 1 tablet by mouth daily 6/2/2023 at am Yes Reported, Patient     ondansetron (ZOFRAN) 8 MG tablet Take 1 tablet (8 mg) by mouth every 8 hours as needed for nausea (vomiting) 6/2/2023 at 2330 Yes Irene Bateman, DO No    prochlorperazine (COMPAZINE) 10 MG tablet Take  1 tablet (10 mg) by mouth every 6 hours as needed for nausea or vomiting  at not started Yes Fransico, Irene, DO

## 2023-06-03 NOTE — ED TRIAGE NOTES
Pt presents by EMS with concerns of weakness and dizziness.  Pt started her first chemo 2 days ago.  4 mg Zofran administered by EMS. Pt has colon cancer and history of bell's palsy.  Pt also complaining of ear pain and would like that looked at.  Pt states that the chemo was set up two days ago and runs over 24 hours, nurse came out yesterday and disconnected.  This was the first round of chemo.  Pt started feeling weak and dizzy yesterday.      Triage Assessment     Row Name 06/03/23 0934       Triage Assessment (Adult)    Airway WDL WDL       Respiratory WDL    Respiratory WDL WDL       Skin Circulation/Temperature WDL    Skin Circulation/Temperature WDL WDL       Cardiac WDL    Cardiac WDL WDL       Peripheral/Neurovascular WDL    Peripheral Neurovascular WDL WDL       Cognitive/Neuro/Behavioral WDL    Cognitive/Neuro/Behavioral WDL WDL              
2018 20:46

## 2023-06-04 PROBLEM — T45.1X5A ANEMIA ASSOCIATED WITH CHEMOTHERAPY: Status: ACTIVE | Noted: 2023-06-04

## 2023-06-04 PROBLEM — E83.51 HYPOCALCEMIA: Status: ACTIVE | Noted: 2023-06-04

## 2023-06-04 PROBLEM — E83.42 HYPOMAGNESEMIA: Status: ACTIVE | Noted: 2023-06-04

## 2023-06-04 PROBLEM — R79.89 ELEVATED TROPONIN: Status: ACTIVE | Noted: 2023-06-04

## 2023-06-04 PROBLEM — I48.0 PAROXYSMAL ATRIAL FIBRILLATION WITH RVR (H): Status: ACTIVE | Noted: 2023-06-03

## 2023-06-04 PROBLEM — Z79.899 IMMUNOSUPPRESSED DUE TO CHEMOTHERAPY (H): Status: ACTIVE | Noted: 2023-06-04

## 2023-06-04 PROBLEM — R82.81 PYURIA: Status: ACTIVE | Noted: 2023-06-03

## 2023-06-04 PROBLEM — N18.32 STAGE 3B CHRONIC KIDNEY DISEASE (H): Status: ACTIVE | Noted: 2023-06-04

## 2023-06-04 PROBLEM — Z79.69 IMMUNOSUPPRESSED DUE TO CHEMOTHERAPY: Status: ACTIVE | Noted: 2023-06-04

## 2023-06-04 PROBLEM — D84.821 IMMUNOSUPPRESSED DUE TO CHEMOTHERAPY (H): Status: ACTIVE | Noted: 2023-06-04

## 2023-06-04 PROBLEM — D72.810 LYMPHOPENIA: Status: ACTIVE | Noted: 2023-06-04

## 2023-06-04 PROBLEM — E87.20 METABOLIC ACIDOSIS, NORMAL ANION GAP (NAG): Status: ACTIVE | Noted: 2023-06-04

## 2023-06-04 PROBLEM — T45.1X5A IMMUNOSUPPRESSED DUE TO CHEMOTHERAPY (H): Status: ACTIVE | Noted: 2023-06-04

## 2023-06-04 PROBLEM — H93.11 TINNITUS, RIGHT: Status: ACTIVE | Noted: 2023-06-04

## 2023-06-04 PROBLEM — D64.81 ANEMIA ASSOCIATED WITH CHEMOTHERAPY: Status: ACTIVE | Noted: 2023-06-04

## 2023-06-04 LAB
ALBUMIN SERPL BCG-MCNC: 3.5 G/DL (ref 3.5–5.2)
ALP SERPL-CCNC: 74 U/L (ref 35–104)
ALT SERPL W P-5'-P-CCNC: 13 U/L (ref 10–35)
ANION GAP SERPL CALCULATED.3IONS-SCNC: 10 MMOL/L (ref 7–15)
ANION GAP SERPL CALCULATED.3IONS-SCNC: 11 MMOL/L (ref 7–15)
AST SERPL W P-5'-P-CCNC: 15 U/L (ref 10–35)
BASE EXCESS BLDV CALC-SCNC: -5.7 MMOL/L (ref -7.7–1.9)
BILIRUB SERPL-MCNC: 0.3 MG/DL
BUN SERPL-MCNC: 36.1 MG/DL (ref 8–23)
CA-I BLD-MCNC: 4.6 MG/DL (ref 4.4–5.2)
CALCIUM SERPL-MCNC: 8 MG/DL (ref 8.8–10.2)
CHLORIDE SERPL-SCNC: 109 MMOL/L (ref 98–107)
CHLORIDE SERPL-SCNC: 110 MMOL/L (ref 98–107)
CREAT SERPL-MCNC: 1.53 MG/DL (ref 0.51–0.95)
DEPRECATED HCO3 PLAS-SCNC: 19 MMOL/L (ref 22–29)
DEPRECATED HCO3 PLAS-SCNC: 19 MMOL/L (ref 22–29)
ERYTHROCYTE [DISTWIDTH] IN BLOOD BY AUTOMATED COUNT: 16.3 % (ref 10–15)
GFR SERPL CREATININE-BSD FRML MDRD: 37 ML/MIN/1.73M2
GLUCOSE SERPL-MCNC: 112 MG/DL (ref 70–99)
HCO3 BLDV-SCNC: 21 MMOL/L (ref 21–28)
HCT VFR BLD AUTO: 30.2 % (ref 35–47)
HGB BLD-MCNC: 9.2 G/DL (ref 11.7–15.7)
MAGNESIUM SERPL-MCNC: 1.5 MG/DL (ref 1.7–2.3)
MAGNESIUM SERPL-MCNC: 2.8 MG/DL (ref 1.7–2.3)
MCH RBC QN AUTO: 25.8 PG (ref 26.5–33)
MCHC RBC AUTO-ENTMCNC: 30.5 G/DL (ref 31.5–36.5)
MCV RBC AUTO: 85 FL (ref 78–100)
O2/TOTAL GAS SETTING VFR VENT: 21 %
PCO2 BLDV: 43 MM HG (ref 40–50)
PH BLDV: 7.29 [PH] (ref 7.32–7.43)
PHOSPHATE SERPL-MCNC: 3.3 MG/DL (ref 2.5–4.5)
PLATELET # BLD AUTO: 151 10E3/UL (ref 150–450)
PO2 BLDV: 36 MM HG (ref 25–47)
POTASSIUM SERPL-SCNC: 3.8 MMOL/L (ref 3.4–5.3)
POTASSIUM SERPL-SCNC: 3.9 MMOL/L (ref 3.4–5.3)
PROT SERPL-MCNC: 5.6 G/DL (ref 6.4–8.3)
RBC # BLD AUTO: 3.56 10E6/UL (ref 3.8–5.2)
SODIUM SERPL-SCNC: 138 MMOL/L (ref 136–145)
SODIUM SERPL-SCNC: 140 MMOL/L (ref 136–145)
TROPONIN T SERPL HS-MCNC: 72 NG/L
TROPONIN T SERPL HS-MCNC: 72 NG/L
UFH PPP CHRO-ACNC: 0.61 IU/ML
UFH PPP CHRO-ACNC: 0.7 IU/ML
UFH PPP CHRO-ACNC: >1.1 IU/ML
WBC # BLD AUTO: 3.4 10E3/UL (ref 4–11)

## 2023-06-04 PROCEDURE — 83735 ASSAY OF MAGNESIUM: CPT | Performed by: PEDIATRICS

## 2023-06-04 PROCEDURE — 80053 COMPREHEN METABOLIC PANEL: CPT | Performed by: INTERNAL MEDICINE

## 2023-06-04 PROCEDURE — 82330 ASSAY OF CALCIUM: CPT | Performed by: PEDIATRICS

## 2023-06-04 PROCEDURE — 82803 BLOOD GASES ANY COMBINATION: CPT | Performed by: PEDIATRICS

## 2023-06-04 PROCEDURE — 87086 URINE CULTURE/COLONY COUNT: CPT | Performed by: INTERNAL MEDICINE

## 2023-06-04 PROCEDURE — 84484 ASSAY OF TROPONIN QUANT: CPT | Performed by: INTERNAL MEDICINE

## 2023-06-04 PROCEDURE — 85520 HEPARIN ASSAY: CPT | Performed by: INTERNAL MEDICINE

## 2023-06-04 PROCEDURE — 250N000011 HC RX IP 250 OP 636: Performed by: EMERGENCY MEDICINE

## 2023-06-04 PROCEDURE — 84100 ASSAY OF PHOSPHORUS: CPT | Performed by: PEDIATRICS

## 2023-06-04 PROCEDURE — 120N000001 HC R&B MED SURG/OB

## 2023-06-04 PROCEDURE — 250N000011 HC RX IP 250 OP 636: Performed by: INTERNAL MEDICINE

## 2023-06-04 PROCEDURE — 85520 HEPARIN ASSAY: CPT | Performed by: PEDIATRICS

## 2023-06-04 PROCEDURE — 250N000011 HC RX IP 250 OP 636: Performed by: PEDIATRICS

## 2023-06-04 PROCEDURE — 80051 ELECTROLYTE PANEL: CPT | Performed by: PEDIATRICS

## 2023-06-04 PROCEDURE — 99233 SBSQ HOSP IP/OBS HIGH 50: CPT | Performed by: PEDIATRICS

## 2023-06-04 PROCEDURE — 85027 COMPLETE CBC AUTOMATED: CPT | Performed by: INTERNAL MEDICINE

## 2023-06-04 PROCEDURE — 258N000003 HC RX IP 258 OP 636: Performed by: INTERNAL MEDICINE

## 2023-06-04 RX ORDER — MAGNESIUM SULFATE HEPTAHYDRATE 40 MG/ML
4 INJECTION, SOLUTION INTRAVENOUS ONCE
Status: COMPLETED | OUTPATIENT
Start: 2023-06-04 | End: 2023-06-04

## 2023-06-04 RX ADMIN — SODIUM CHLORIDE: 9 INJECTION, SOLUTION INTRAVENOUS at 09:46

## 2023-06-04 RX ADMIN — MAGNESIUM SULFATE HEPTAHYDRATE 4 G: 40 INJECTION, SOLUTION INTRAVENOUS at 13:52

## 2023-06-04 RX ADMIN — HEPARIN SODIUM 900 UNITS/HR: 10000 INJECTION, SOLUTION INTRAVENOUS at 07:18

## 2023-06-04 RX ADMIN — CEFTRIAXONE SODIUM 1 G: 1 INJECTION, POWDER, FOR SOLUTION INTRAMUSCULAR; INTRAVENOUS at 20:41

## 2023-06-04 ASSESSMENT — ACTIVITIES OF DAILY LIVING (ADL)
ADLS_ACUITY_SCORE: 20
DEPENDENT_IADLS:: INDEPENDENT
ADLS_ACUITY_SCORE: 20

## 2023-06-04 NOTE — PROVIDER NOTIFICATION
Dr. Santillan notified that Hep Xa rechecked came back critical >1.1 Hep already stopped ran protocol.

## 2023-06-04 NOTE — CONSULTS
Care Management Initial Consult    General Information  Assessment completed with: Patient   Type of CM/SW Visit: Initial Assessment    Primary Care Provider verified and updated as needed: Yes   Readmission within the last 30 days: current reason for admission unrelated to previous admission      Reason for Consult: discharge planning  Advance Care Planning:          Communication Assessment  Patient's communication style: spoken language (English or Bilingual)    Hearing Difficulty or Deaf: yes (Right side is worst)   Wear Glasses or Blind: no    Cognitive  Cognitive/Neuro/Behavioral: WDL                      Living Environment:   People in home: spouse  Montrell  Current living Arrangements: house      Able to return to prior arrangements: yes     Family/Social Support:  Care provided by: self, spouse/significant other  Provides care for: no one  Marital Status:   , Children  Montrell       Description of Support System: Supportive, Involved    Support Assessment: Adequate family and caregiver support, Adequate social supports    Current Resources:   Patient receiving home care services: Yes  Skilled Home Care Services: Skilled Nursing  Community Resources: None  Equipment currently used at home: none  Supplies currently used at home: None    Employment/Financial:  Employment Status: retired        Financial Concerns: No concerns identified     Does the patient's insurance plan have a 3 day qualifying hospital stay waiver?  No    Lifestyle & Psychosocial Needs:  Social Determinants of Health     Tobacco Use: Medium Risk (5/30/2023)    Patient History      Smoking Tobacco Use: Never      Smokeless Tobacco Use: Never      Passive Exposure: Current   Alcohol Use: Not on file   Financial Resource Strain: Not on file   Food Insecurity: Not on file   Transportation Needs: Not on file   Physical Activity: Not on file   Stress: Not on file   Social Connections: Not on file   Intimate Partner Violence: Not on file    Depression: At risk (2/16/2023)    PHQ-2      PHQ-2 Score: 3   Housing Stability: Not on file     Functional Status:  Prior to admission patient needed assistance:   Dependent ADLs:: Independent  Dependent IADLs:: Independent     Mental Health Status:  Mental Health Status: No Current Concerns       Chemical Dependency Status:  Chemical Dependency Status: No Current Concerns             Values/Beliefs:  Spiritual, Cultural Beliefs, Church Practices, Values that affect care: no               Additional Information:  Care Management met with patient at bedside -  Montrell also present. CM introduced self and explained role.     Patient resides with  in a home in [place]. Patient is independent with ADLs and IADLs at baseline. Patient just started with [facility] (Phone: [phone]) RN who helps with chemo disconnect.    Plan: home with  and resume [facility] RN  Transport: Family will provide    Care management will assist with discharge planning.        PARUL Harris  Inpatient Care Coordinator   [facility] [phone]  [facility] [phone]

## 2023-06-04 NOTE — PROGRESS NOTES
"S-(situation): Patient arrives to room 272 via cart from ED.    B-(background): Dizziness and atrial flutter    A-(assessment): A&O x4, VSS, Tele showed NSR. Received with ongoing heparin drip at 1500 units from ED. At 2220, Critical result for Xa- >1.10, updated Dr. Santillan and ran protocol. Xa rechecked at 0530, >1.10, Heparin infusion paused for an hour. Will resume on 0708 at 900 units/hr and Xa recheck at 1300.Tolerating rocephin. Trop- 72.     /49 (BP Location: Left arm)   Pulse 64   Temp 97.7  F (36.5  C) (Oral)   Resp 18   Ht 1.575 m (5' 2\")   Wt 84 kg (185 lb 3.2 oz)   LMP  (LMP Unknown)   SpO2 97%   BMI 33.87 kg/m         R-(recommendations): Orders reviewed with patient. Will monitor patient per MD orders.     Inpatient nursing criteria listed below were met:    Health care directives status obtained and documented: No  VTE ordered/documented: Yes  Skin issues/needs documented:Yes  Isolation addressed and Signage used: NA  Fall Prevention: Care plan updated Yes Education given and documented Yes  Care Plan initiated and Co-Morbidities added: Yes  Education Assessment documented:Yes  Admission Education Documented: Yes  If present CAUTI/CLABI Education done: Yes  New medication patient education completed and documented (Possible Side Effects of Common Medications handout): Yes  Allergies Reviewed: Yes  Admission Medication Reconciliation completed: Yes  Home medications if not able to send immediately home with family stored here: NA  Reminder note placed in discharge instructions regarding home meds: NA  Provider Notified that patient has arrived to the unit: Yes        "

## 2023-06-04 NOTE — PLAN OF CARE
Goal Outcome Evaluation:      Plan of Care Reviewed With: patient    Overall Patient Progress: improvingOverall Patient Progress: improving    Outcome Evaluation: Pt. A&Ox4, VSS, maintaining 02 saturations on RA. Denies pain, dizziness, nausea. Continues in and out of A-fib on tele. Continues IV fluids and IV abx. Mag came back at 1.5, replaced IV, recheck at 1800. Right chest port CDI. Heparin ggt currently at 900 u/hr, awaiting lastest draw result for adjustments.

## 2023-06-04 NOTE — H&P
[facility]    History and Physical - Hospitalist Service       Date of Admission:  6/3/2023    Assessment & Plan      Nadia Ladd is a 67 year old female admitted on 6/3/2023 for Afib RVR, UTI, MARYA.    Afib RVR s/p sucessful cardioversion. Admit to medical telemetry. Could be trigger by stress from chemotherapy vs infection. Afib RVR on EKG and patient was cardioverted after failing rate control with diltiazem treatment. Now back to sinus rhythm and heart rate is in the 80s. Continue heparin drip and monitor for any change in rate/rhythm. Obtain echo in AM. Will check TSH and free T4 if these are not done recently. Will also need to assess the need for anticoagulation (after echo come back) and follow up with cardiology.    UTI. UA came back positive. Patient is afebrile and has no sign of sepsis. Note patient is immunocompromised so will treat and monitor closely. Draw urine culture and treat with IV ceftriaxone. Monitor for sepsis.    MARYA. Cr 1.75. Likely due to dehydration vs infection. IVF and recheck renal function in AM.    Elevated troponin. Trop 34 with recheck 41. Likely demand ischemia from RVR. EKG was negative. Continue to trend troponin.    DVT PPX. Heparin drip.    Code status. Full code.    Disposition. Home in 2-3 days.      The patient's care was discussed with the bedside nursing staff        Ty Santillan MD  [facility]  Securely message with the Vocera Web Console (learn more here)  Text page via [facility] Paging/Directory      Visit/Communication Style   Virtual (Video) communication was used to evaluate Nadia.  Nadia consented to the use of video communication: yes  Video START time: 2300, 6/3/2023  Video STOP time: 2330, 6/3/2023   Patient's location: [facility]   Provider's location during the visit: [facility]-medicine site         ______________________________________________________________________    Chief Complaint   Dizziness.    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 The patient finished her last infusion of chemotherapy yesterday and started to feel dizzy. The symptoms improve when lying down but get worse when sitting or standing up. The patient also experienced some chest tightness Otherwise, the patient denies any fever, chills, vomiting, diarrhea, coughing or shortness of breath.    In the ER, the patient started to have afib with HR in the 140s. Labs came back with Cr 1.75, troponin 34 initially and recheck was 41. UA positive. Diltiazem bolus and drip were started however the patient was still in afib RVR. Heparin drip ws started and Cardioversion was performed The patient went back to sinus rhythm after the cardioversion..      Review of Systems      General: negative for fever, chills, sweats, weakness  Eyes: negative for blurred vision, loss of vision  Ear Nose and Throat: negative for pharyngitis, speech or swallowing difficulties  Respiratory:  negative for sputum production, wheezing, GALE, pleuritic pain, sob or cough  Cardiology:  negative for chest pain, palpitations, orthopnea, PND, edema, syncope   Gastrointestinal: negative for abdominal pain, nausea, vomiting, diarrhea, constipation, hematemesis, melena or hematochezia  Genitourinary: negative for frequency, urgency, dysuria, hematuria   Neurological: negative for focal weakness, paresthesia    Past Medical History    I have reviewed this patient's medical history and updated it with pertinent information if needed.   Past Medical History:   Diagnosis Date     Arthropathia 08/05/2010     B12 deficiency anemia 10/21/2010     Benign essential hypertension with target blood  pressure below 140/90 10/10/2016     CARDIOVASCULAR SCREENING; LDL GOAL LESS THAN 160 10/31/2010     Crohn's colitis (H) 02/15/2011     Dermatitis-dishydrotic eczema-severe 08/05/2010     Hiatal hernia      HTN (hypertension) 07/21/2011     Iron deficiency anemia 10/21/2010     Malignant neoplasm of sigmoid colon (H)      Obesity      Primary pulmonary hypertension (H) 04/06/2010       Past Surgical History   I have reviewed this patient's surgical history and updated it with pertinent information if needed.  Past Surgical History:   Procedure Laterality Date     COLECTOMY WITHOUT COLOSTOMY N/A 4/6/2023    Procedure: Laparoscopic Converted to Open Total abdominal colectomy;  Surgeon: Jerome Ashley MD;  Location: [facility] OR     COLONOSCOPY  10/11/10     COLONOSCOPY N/A 2/27/2023    Procedure: ATTEMPTED COLONOSCOPY WITH SIGMOID STRICTURE BIOPSY;  Surgeon: Jonathan Alcocer MD;  Location:  GI     ESOPHAGOSCOPY, GASTROSCOPY, DUODENOSCOPY (EGD), COMBINED N/A 2/27/2023    Procedure: ESOPHAGOGASTRODUODENOSCOPY, WITH BIOPSY;  Surgeon: Jonathan Alcocer MD;  Location:  GI     HYSTERECTOMY TOTAL ABDOMINAL, BILATERAL SALPINGO-OOPHORECTOMY, COMBINED N/A 4/6/2023    Procedure: Hysterectomy total abdominal, bilateral salpingo-oophorectomy;  Surgeon: Sunitha Olea MD;  Location: [facility] OR     INSERT PORT VASCULAR ACCESS Right 5/25/2023    Procedure: Ultrasound-guided right internal jugular venous access port placement with fluoroscopy;  Surgeon: Martin Thomas DO;  Location: [facility] OR     SIGMOIDOSCOPY FLEXIBLE N/A 4/6/2023    Procedure: Sigmoidoscopy flexible;  Surgeon: Jerome Ashley MD;  Location: [facility] OR     SURGICAL HISTORY OF -   06/14/76    Perineorrhaphy, for widening vaginal orifice     ZZC EXPLORATORY OF ABDOMEN  1989    laparoscopy       Social History   I have reviewed this patient's social history and updated it with pertinent information if needed.  Social History     Tobacco Use     Smoking  status: Never     Passive exposure: Current     Smokeless tobacco: Never   Substance Use Topics     Alcohol use: No     Drug use: No       Family History   I have reviewed this patient's family history and updated it with pertinent information if needed.  Family History   Problem Relation Age of Onset     Hypertension Mother         on meds, alive     Cerebrovascular Disease Father         stroke about age 65,  of cancer at 68 yrs     Diabetes Father         eventually took insulin     Anemia Father         Pernisios anemia     Kidney Disease Niece         kidney transplant     Kidney Disease Nephew         kidney transplant     Venous thrombosis No family hx of      Anesthesia Reaction No family hx of        Prior to Admission Medications   Prior to Admission Medications   Prescriptions Last Dose Informant Patient Reported? Taking?   Multiple Vitamins-Iron (MULTI-DAY PLUS IRON PO) 2023 at am  Yes Yes   Sig: Take 1 tablet by mouth daily   cyanocobalamin 1000 MCG TBCR 2023 at am  No Yes   Sig: Take 1,000 mcg by mouth daily   dexamethasone (DECADRON) 4 MG tablet 2023 at am  No Yes   Sig: Take 2 tablets (8 mg) by mouth daily Take for 2 days, starting the day after chemo. Take with food.   ferrous sulfate (IRON) 325 (65 FE) MG tablet 2023 at am  No Yes   Sig: TAKE ONE TABLET BY MOUTH TWICE A DAY --NO FURTHER REFILLS WITHOUT OFFICE VISIT   Patient taking differently: Take 325 mg by mouth daily (with breakfast)   loperamide (IMODIUM A-D) 2 MG tablet  at not started  No Yes   Sig: When diarrhea starts take 2 tabs, then with each additional episode take 1 more tab, if using more than 8 tab in 24 hrs notify oncology   ondansetron (ZOFRAN) 8 MG tablet 2023 at 2330  No Yes   Sig: Take 1 tablet (8 mg) by mouth every 8 hours as needed for nausea (vomiting)   prochlorperazine (COMPAZINE) 10 MG tablet  at not started  No Yes   Sig: Take 1 tablet (10 mg) by mouth every 6 hours as needed for nausea or  vomiting      Facility-Administered Medications: None     Allergies   Allergies   Allergen Reactions     No Known Drug Allergy        Physical Exam   Vital Signs: Temp: 97.7  F (36.5  C) Temp src: Oral BP: 117/49 Pulse: 64   Resp: 18 SpO2: 97 % O2 Device: None (Room air)    Weight: 0 lbs 0 oz      GENERAL: The patient is not in any acute distressed. Awake and alert.  HEENT: Nonicteric sclerae, PERRLA, EOMI. Oropharynx clear. Moist mucous membranes. Conjunctivae appear well perfused.  HEART: fas rate and irregularly irregular rhythm without murmurs. No lower extremities edema.  LUNGS: Clear to auscultation bilaterally. No wheezing, crackles or rhonchi  ABDOMEN: Soft, positive bowel sounds, nontender.  SKIN: No rash, no excessive bruising, petechiae, or purpura.  NEUROLOGIC: AxO x 3.  Cranial nerves II-XII intact without motor/sensory deficit.      Data     Recent Labs   Lab 06/03/23  1045 05/30/23  1435   WBC 2.9* 4.2   HGB 10.3* 9.4*   MCV 83 85    195    144   POTASSIUM 4.0 3.7   CHLORIDE 110* 115*   CO2 17* 19*   BUN 46.5* 18.2   CR 1.75* 1.43*   ANIONGAP 13 10   LIVIER 8.3* 8.5*   * 109*   ALBUMIN 3.9 3.6   PROTTOTAL 6.1* 5.9*   BILITOTAL 0.5 0.4   ALKPHOS 86 97   ALT 15 12   AST 14 12   LIPASE 70*  --          No results found for this or any previous visit (from the past 24 hour(s)).

## 2023-06-04 NOTE — PROGRESS NOTES
[facility]    Medicine Progress Note - Hospitalist Service    Date of Admission:  6/3/2023    Assessment & Plan      Nadia Ladd is a 67 year old female with stage IIIb chronic kidney disease and colon cancer resected surgically in April and for which she started FOLFOX chemotherapy 5/31 (which includes oxaliplatin, leucovorin, and 5-fluorouracil) presented with 1 day history of dizziness.  While in the emergency room for evaluation, she developed abrupt onset rapid atrial fibrillation that was treated with continuous diltiazem infusion after which she cardioverted while still within the emergency room.  Other evaluation was notable for rising troponin level and acute kidney injury superimposed upon stage IIIb chronic kidney disease, so she was admitted.    Paroxysmal atrial fibrillation with rapid ventricular response  Presented to ER yesterday with dizziness and initially noted to have PACs but abruptly developed atrial fibrillation with RVR in the ER.  She was treated in ER with diltiazem bolus followed by infusion for about 3 hours after which she cardioverted back to sinus rhythm.  Diltiazem infusion was discontinued about 3 PM on 6/3 and she has maintained sinus rhythm since that time.  She has no previous history of atrial fibrillation.  She has no previous history of structural heart disease and had essentially normal echocardiogram in April 2023.  -Ordered magnesium and magnesium supplementation if indicated  -Continue cardiac monitoring  -Anticipate repeat echocardiogram on 6/5 when next available at this hospital  -May advise outpatient cardiac monitoring after hospital discharge depending upon clinical course and additional test results    Elevated troponin  Initial troponin in ER was elevated at 34 and subsequently increased as high as 80 yesterday coinciding with prolonged episode of rapid atrial fibrillation.  She has no history of CAD.  She has not had anginal pain  and did not have definite acute ischemic EKG changes.  Troponin has now trended downward.  Suspect elevated troponin was due to arrhythmia.  Low suspicion for acute coronary syndrome.  Pulmonary embolism is a possibility although clinical suspicion is low.  -Echocardiogram when available  -Depending upon echocardiogram results, could consider VQ scan for assessment of PE but avoiding CTA because of kidney injury and chronic kidney disease and low suspicion for PE  -Continue with therapeutic anticoagulation with IV heparin for now but not anticipating recommending anticoagulation at discharge if there are no concerns for acute venous thromboembolism or recurrent and persistent atrial fibrillation    Acute kidney injury superimposed upon stage IIIb chronic kidney disease  Metabolic acidosis with normal anion gap  Presented yesterday with creatinine 1.75, increased from her baseline of 1.3-1.6 in the last few months.  She had also been hospitalized for more significant acute kidney injury about a month ago with peak creatinine 3.84 which was considered to be multifactorial including previous treatment with ACE inhibitor and triamterene hydrochlorothiazide that were discontinued during that hospitalization.  Creatinine has recovered to baseline with IV fluid treatment overnight.  However, acute kidney injury appears to been associated with metabolic acidosis with normal anion gap which seems to persist today.  Given the episode of rapid atrial fibrillation, acute kidney injury due to renal hypoperfusion from arrhythmia is possible.  -Decrease IV fluid to TKO because of her Port-A-Cath  -Ordered monitoring of acid-base status and electrolytes today and recheck of renal function tomorrow  -Reconsider adding bicarbonate supplementation if significant metabolic acidosis persists    Hypocalcemia  Mildly low serum calcium of 8.0 at admission.  Uncertain whether this could exacerbate arrhythmia but she is not otherwise overtly  symptomatic from hypocalcemia.  -Check ionized calcium  -If hypocalcemia is confirmed after testing ionized calcium we will check vitamin D and PTH and start calcium supplementation    Colon cancer  Immunosuppression due to chemotherapy  Lymphopenia due to chemotherapy  First dose chemotherapy for colon cancer on 5/31 and following with oncology.  Now lymphopenic and leukopenic after recent chemotherapy although not presently neutropenic.  Considered immunosuppressed due to chemotherapy but no strong clinical suspicion for infection at this time.  -Monitor cell counts  -Anticipate outpatient follow-up with oncology per their recommendation    Anemia due to chemotherapy  Chronic iron deficiency and B12 anemia  Has history of chronic iron deficiency and B12 anemia for which she has been taking iron and B12 supplementation.  More anemic now after starting chemotherapy recently.  Active bleeding not suspected.  -Monitor hemoglobin    Pyuria  Minimal pyuria noted on UA.  No overt symptoms of UTI but may have atypical presentation for UTI because of immunosuppression.  -Follow-up on results of urine culture  -Continue empiric IV ceftriaxone for now while awaiting urine culture results    Right ear tinnitus  New onset tinnitus in right ear with reassuring exam findings.  Coincides with recent chemotherapy raising concern for possible side effect of chemotherapy.  -Monitor clinically  -Advised outpatient follow-up with her oncologist    Obesity  Chronic with BMI 34  -No acute intervention anticipated       Diet: Combination Diet Low Saturated Fat Na <2400mg Diet, No Caffeine Diet    DVT Prophylaxis: IV heparin  Clement Catheter: Not present  Lines:   Port a Cath 05/25/23 Single Lumen Right Chest wall-Site Assessment: WDL      Cardiac Monitoring: ACTIVE order. Indication: Tachyarrhythmias, acute (48 hours)  Code Status: Full Code      Clinically Significant Risk Factors Present on Admission                         Disposition  "Plan   Anticipate discharge home once medically stable, possibly tomorrow          Nate Munoz MD  Hospitalist Service  [facility]  Securely message with [employer] (more info)  Text page via [facility] Paging/Directory   ______________________________________________________________________    Interval History   She feels much better today.  Her dizziness and palpitations have resolved.  She continues to have a tin sounding dullness of hearing in her right ear but not her left ear.  She has noticed these hearing symptoms for a few days.  She has not had ear pain or drainage.  She denies any shortness of breath.  She has been afebrile and hemodynamically stable overnight.  Oxygenation has been normal.  She had good urine output overnight and is voiding spontaneously.  She says her stools are according to her usual pattern but she is not having any more diarrhea than usual.  She denies nausea although does not have normal appetite but is tolerating some oral intake.    Physical Exam   Vital Signs: Temp: 98.2  F (36.8  C) Temp src: Oral BP: 126/75 Pulse: 70   Resp: 18 SpO2: 98 % O2 Device: None (Room air)     Patient Vitals for the past 24 hrs:   BP Temp Temp src Pulse Resp SpO2 Height Weight   06/04/23 0723 126/75 98.2  F (36.8  C) Oral 70 18 98 % -- --   06/03/23 2336 117/49 97.7  F (36.5  C) Oral 64 18 97 % -- --   06/03/23 1948 125/70 98.4  F (36.9  C) Oral 81 18 -- -- --   06/03/23 1921 110/66 -- -- -- -- -- 1.575 m (5' 2\") 84 kg (185 lb 3.2 oz)   06/03/23 1915 110/66 -- -- 77 17 97 % -- --   06/03/23 1900 128/87 -- -- 76 25 98 % -- --   06/03/23 1845 110/47 -- -- 83 18 98 % -- --   06/03/23 1830 (!) 125/99 -- -- 98 26 96 % -- --   06/03/23 1815 114/67 -- -- 78 (!) 8 97 % -- --   06/03/23 1800 (!) 113/91 -- -- 80 17 -- -- --   06/03/23 1745 115/58 -- -- 70 23 99 % -- --   06/03/23 1730 102/57 -- -- 70 22 98 % -- --   06/03/23 1715 94/62 -- -- 77 22 94 % -- --   06/03/23 1700 112/62 -- " -- 70 20 98 % -- --   06/03/23 1645 98/65 -- -- 71 22 98 % -- --   06/03/23 1630 98/66 -- -- 72 12 97 % -- --   06/03/23 1615 100/56 -- -- 72 13 98 % -- --   06/03/23 1604 106/59 -- -- 72 18 98 % -- --   06/03/23 1544 101/66 -- -- 75 23 96 % -- --   06/03/23 1530 94/73 -- -- 74 27 95 % -- --   06/03/23 1515 100/69 -- -- 73 14 96 % -- --   06/03/23 1500 111/64 -- -- 73 21 96 % -- --   06/03/23 1445 112/69 -- -- 85 20 96 % -- --   06/03/23 1442 112/69 -- -- 74 10 95 % -- --   06/03/23 1437 109/64 -- -- (!) 122 28 95 % -- --   06/03/23 1415 108/76 -- -- 99 27 96 % -- --   06/03/23 1400 98/74 -- -- (!) 128 22 96 % -- --   06/03/23 1345 111/84 -- -- 109 25 95 % -- --     Weight: 185 lbs 3.2 oz Body mass index is 33.87 kg/m .      General Appearance: Obese appearing elderly woman without signs of acute distress while sitting in a chair  Respiratory: Normal respiratory effort, clear lungs  Cardiovascular: Regular rate and rhythm, good radial pulse, brisk capillary refill  Ear exam: External ears, ear canals and tympanic membranes appear normal bilaterally    Medical Decision Making     66 MINUTES SPENT BY ME on the date of service doing chart review, history, exam, documentation & further activities per the note.  MANAGEMENT DISCUSSED with the following over the past 24 hours: Discussed atrial fibrillation evaluation and management and hospital admission with ER physician Dr. Shi on 6/3 and hospital admission with hospitalist Dr. Santillan on 6/4   NOTE(S)/MEDICAL RECORDS REVIEWED over the past 24 hours: Reviewed results of echocardiogram from April 4, 2023, notes from hospital discharge summary from May 9 to May 12, 2023, and notes from oncology visit on May 30, 2023      Data     I have personally reviewed the following data over the past 24 hrs:    3.4 (L)  \   9.2 (L)   / 151     140 110 (H) 36.1 (H) /  112 (H)   3.8 19 (L) 1.53 (H) \       ALT: 13 AST: 15 AP: 74 TBILI: 0.3   ALB: 3.5 TOT PROTEIN: 5.6 (L) LIPASE: N/A        Trop: 72 (H) BNP: N/A       TSH: N/A T4: N/A A1C: N/A        Magnesium 1.5, phosphorus 3.3  TSH 1.55  Absolute neutrophil count 2100, absolute lymphocyte count 600  Heparin 10 a level greater than 1.10    Cardiac monitor results reviewed over the past 24 hrs: No recurrent episodes of atrial fibrillation since about 3 PM in the emergency room yesterday, occasional PACs with underlying sinus rhythm    Recent Labs   Lab 06/04/23  0530 06/03/23  1045 05/30/23  1435   WBC 3.4* 2.9* 4.2   HGB 9.2* 10.3* 9.4*   MCV 85 83 85    159 195    140 144   POTASSIUM 3.8 4.0 3.7   CHLORIDE 110* 110* 115*   CO2 19* 17* 19*   BUN 36.1* 46.5* 18.2   CR 1.53* 1.75* 1.43*   ANIONGAP 11 13 10   LIVIER 8.0* 8.3* 8.5*   * 119* 109*   ALBUMIN 3.5 3.9 3.6   PROTTOTAL 5.6* 6.1* 5.9*   BILITOTAL 0.3 0.5 0.4   ALKPHOS 74 86 97   ALT 13 15 12   AST 15 14 12   LIPASE  --  70*  --

## 2023-06-05 ENCOUNTER — APPOINTMENT (OUTPATIENT)
Dept: CARDIOLOGY | Facility: CLINIC | Age: 68
DRG: 683 | End: 2023-06-05
Attending: INTERNAL MEDICINE
Payer: MEDICARE

## 2023-06-05 VITALS
SYSTOLIC BLOOD PRESSURE: 133 MMHG | TEMPERATURE: 97.6 F | RESPIRATION RATE: 20 BRPM | WEIGHT: 185.2 LBS | DIASTOLIC BLOOD PRESSURE: 66 MMHG | BODY MASS INDEX: 34.08 KG/M2 | OXYGEN SATURATION: 99 % | HEART RATE: 79 BPM | HEIGHT: 62 IN

## 2023-06-05 PROBLEM — K52.9 CHRONIC DIARRHEA: Status: ACTIVE | Noted: 2023-06-05

## 2023-06-05 LAB
ANION GAP SERPL CALCULATED.3IONS-SCNC: 10 MMOL/L (ref 7–15)
ANION GAP SERPL CALCULATED.3IONS-SCNC: 8 MMOL/L (ref 7–15)
BACTERIA UR CULT: NORMAL
BACTERIA UR CULT: NORMAL
BASE EXCESS BLDV CALC-SCNC: -3.4 MMOL/L (ref -7.7–1.9)
BASE EXCESS BLDV CALC-SCNC: -4.7 MMOL/L (ref -7.7–1.9)
BASOPHILS # BLD AUTO: 0 10E3/UL (ref 0–0.2)
BASOPHILS NFR BLD AUTO: 0 %
BUN SERPL-MCNC: 25.9 MG/DL (ref 8–23)
CA-I BLD-MCNC: 4.8 MG/DL (ref 4.4–5.2)
CALCIUM SERPL-MCNC: 7.9 MG/DL (ref 8.8–10.2)
CHLORIDE SERPL-SCNC: 111 MMOL/L (ref 98–107)
CHLORIDE SERPL-SCNC: 111 MMOL/L (ref 98–107)
CREAT SERPL-MCNC: 1.3 MG/DL (ref 0.51–0.95)
DEPRECATED HCO3 PLAS-SCNC: 20 MMOL/L (ref 22–29)
DEPRECATED HCO3 PLAS-SCNC: 21 MMOL/L (ref 22–29)
EOSINOPHIL # BLD AUTO: 0 10E3/UL (ref 0–0.7)
EOSINOPHIL NFR BLD AUTO: 0 %
ERYTHROCYTE [DISTWIDTH] IN BLOOD BY AUTOMATED COUNT: 15.9 % (ref 10–15)
GFR SERPL CREATININE-BSD FRML MDRD: 45 ML/MIN/1.73M2
GLUCOSE SERPL-MCNC: 110 MG/DL (ref 70–99)
HCO3 BLDV-SCNC: 22 MMOL/L (ref 21–28)
HCO3 BLDV-SCNC: 23 MMOL/L (ref 21–28)
HCT VFR BLD AUTO: 28.1 % (ref 35–47)
HGB BLD-MCNC: 8.4 G/DL (ref 11.7–15.7)
HOLD SPECIMEN: NORMAL
IMM GRANULOCYTES # BLD: 0 10E3/UL
IMM GRANULOCYTES NFR BLD: 1 %
LVEF ECHO: NORMAL
LYMPHOCYTES # BLD AUTO: 0.5 10E3/UL (ref 0.8–5.3)
LYMPHOCYTES NFR BLD AUTO: 20 %
MAGNESIUM SERPL-MCNC: 2.3 MG/DL (ref 1.7–2.3)
MCH RBC QN AUTO: 25.7 PG (ref 26.5–33)
MCHC RBC AUTO-ENTMCNC: 29.9 G/DL (ref 31.5–36.5)
MCV RBC AUTO: 86 FL (ref 78–100)
MONOCYTES # BLD AUTO: 0.1 10E3/UL (ref 0–1.3)
MONOCYTES NFR BLD AUTO: 3 %
NEUTROPHILS # BLD AUTO: 1.8 10E3/UL (ref 1.6–8.3)
NEUTROPHILS NFR BLD AUTO: 76 %
NRBC # BLD AUTO: 0 10E3/UL
NRBC BLD AUTO-RTO: 0 /100
O2/TOTAL GAS SETTING VFR VENT: 21 %
O2/TOTAL GAS SETTING VFR VENT: 21 %
PCO2 BLDV: 44 MM HG (ref 40–50)
PCO2 BLDV: 47 MM HG (ref 40–50)
PH BLDV: 7.27 [PH] (ref 7.32–7.43)
PH BLDV: 7.32 [PH] (ref 7.32–7.43)
PLATELET # BLD AUTO: 129 10E3/UL (ref 150–450)
PO2 BLDV: 32 MM HG (ref 25–47)
PO2 BLDV: 39 MM HG (ref 25–47)
POTASSIUM SERPL-SCNC: 3.8 MMOL/L (ref 3.4–5.3)
POTASSIUM SERPL-SCNC: 4.2 MMOL/L (ref 3.4–5.3)
RBC # BLD AUTO: 3.27 10E6/UL (ref 3.8–5.2)
SODIUM SERPL-SCNC: 140 MMOL/L (ref 136–145)
SODIUM SERPL-SCNC: 141 MMOL/L (ref 136–145)
UFH PPP CHRO-ACNC: 0.46 IU/ML
WBC # BLD AUTO: 2.4 10E3/UL (ref 4–11)

## 2023-06-05 PROCEDURE — 93325 DOPPLER ECHO COLOR FLOW MAPG: CPT | Mod: 26 | Performed by: INTERNAL MEDICINE

## 2023-06-05 PROCEDURE — 93321 DOPPLER ECHO F-UP/LMTD STD: CPT | Mod: 26 | Performed by: INTERNAL MEDICINE

## 2023-06-05 PROCEDURE — 85025 COMPLETE CBC W/AUTO DIFF WBC: CPT | Performed by: PEDIATRICS

## 2023-06-05 PROCEDURE — 250N000011 HC RX IP 250 OP 636: Performed by: EMERGENCY MEDICINE

## 2023-06-05 PROCEDURE — 93321 DOPPLER ECHO F-UP/LMTD STD: CPT

## 2023-06-05 PROCEDURE — 250N000011 HC RX IP 250 OP 636: Performed by: PEDIATRICS

## 2023-06-05 PROCEDURE — 80048 BASIC METABOLIC PNL TOTAL CA: CPT | Performed by: PEDIATRICS

## 2023-06-05 PROCEDURE — 82330 ASSAY OF CALCIUM: CPT | Performed by: PEDIATRICS

## 2023-06-05 PROCEDURE — 93325 DOPPLER ECHO COLOR FLOW MAPG: CPT

## 2023-06-05 PROCEDURE — 250N000013 HC RX MED GY IP 250 OP 250 PS 637: Performed by: PEDIATRICS

## 2023-06-05 PROCEDURE — 85520 HEPARIN ASSAY: CPT | Performed by: PEDIATRICS

## 2023-06-05 PROCEDURE — 80051 ELECTROLYTE PANEL: CPT | Performed by: PEDIATRICS

## 2023-06-05 PROCEDURE — 83735 ASSAY OF MAGNESIUM: CPT | Performed by: PEDIATRICS

## 2023-06-05 PROCEDURE — 93308 TTE F-UP OR LMTD: CPT | Mod: 26 | Performed by: INTERNAL MEDICINE

## 2023-06-05 PROCEDURE — 82803 BLOOD GASES ANY COMBINATION: CPT | Performed by: PEDIATRICS

## 2023-06-05 PROCEDURE — 99239 HOSP IP/OBS DSCHRG MGMT >30: CPT | Performed by: PEDIATRICS

## 2023-06-05 RX ORDER — HEPARIN SODIUM (PORCINE) LOCK FLUSH IV SOLN 100 UNIT/ML 100 UNIT/ML
5-10 SOLUTION INTRAVENOUS
Status: DISCONTINUED | OUTPATIENT
Start: 2023-06-05 | End: 2023-06-05 | Stop reason: HOSPADM

## 2023-06-05 RX ORDER — SODIUM BICARBONATE 650 MG/1
1300 TABLET ORAL 2 TIMES DAILY
Qty: 120 TABLET | Refills: 0 | Status: SHIPPED | OUTPATIENT
Start: 2023-06-05 | End: 2023-06-13

## 2023-06-05 RX ORDER — MAGNESIUM OXIDE 400 MG/1
400 TABLET ORAL 2 TIMES DAILY
Qty: 60 TABLET | Refills: 0 | Status: SHIPPED | OUTPATIENT
Start: 2023-06-05 | End: 2023-06-28

## 2023-06-05 RX ORDER — SODIUM BICARBONATE 650 MG/1
1300 TABLET ORAL 2 TIMES DAILY
Status: DISCONTINUED | OUTPATIENT
Start: 2023-06-05 | End: 2023-06-05 | Stop reason: HOSPADM

## 2023-06-05 RX ADMIN — Medication 5 ML: at 16:34

## 2023-06-05 RX ADMIN — HEPARIN SODIUM 900 UNITS/HR: 10000 INJECTION, SOLUTION INTRAVENOUS at 13:19

## 2023-06-05 RX ADMIN — SODIUM BICARBONATE 650 MG TABLET 1300 MG: at 09:09

## 2023-06-05 ASSESSMENT — ACTIVITIES OF DAILY LIVING (ADL)
ADLS_ACUITY_SCORE: 20

## 2023-06-05 NOTE — PLAN OF CARE
Goal Outcome Evaluation:      Plan of Care Reviewed With: patient    Overall Patient Progress: improvingOverall Patient Progress: improving      Hep Xa: 0.46, heparin still running at same rate 900u/hr. Right chest port CDI. Denies pain. VSS.

## 2023-06-05 NOTE — PROGRESS NOTES
Care Management Discharge Note    Discharge Date: 06/05/2023       Discharge Disposition: Home, Home Care    Discharge Services: Resume RN services through [facility]    Discharge DME: None    Discharge Transportation: family or friend will provide    Private pay costs discussed: Not applicable    Does the patient's insurance plan have a 3 day qualifying hospital stay waiver?  Yes   Will the waiver be used for post-acute placement? No    Patient/family educated on Medicare website which has current facility and service quality ratings: no    Education Provided on the Discharge Plan: Yes    Persons Notified of Discharge Plans: Patient, Home Care     Patient/Family in Agreement with the Plan: yes    Handoff Referral Completed: Yes    Additional Information:  Patient is medically ready for discharge.      Writer has sent communication to Nica with [facility] (Phone: [phone]) that patient is discharging and will resume her current RN services with them.      Family transport.      PARUL Li  [facility]   [phone]

## 2023-06-05 NOTE — PLAN OF CARE
Goal Outcome Evaluation:      Plan of Care Reviewed With: patient    Overall Patient Progress: improvingOverall Patient Progress: improving    Outcome Evaluation: patient A&O, vss, RA, heparin protocol AM draw no changes this shift, NS 10mL/hr thru port

## 2023-06-05 NOTE — PROGRESS NOTES
"CLINICAL NUTRITION SERVICES - ASSESSMENT NOTE     Nutrition Prescription    RECOMMENDATIONS FOR MDs/PROVIDERS TO ORDER:      Malnutrition Status:    Does not meet criteria    Recommendations already ordered by Registered Dietitian (RD):  Provided handout - Heart Healthy - Reduced Sodium Nutrition Therapy  (thank you to Nursing for printing and providing to pt)    Future/Additional Recommendations:  Will monitor po, wt, questions on low fat, low sodium diet information     REASON FOR ASSESSMENT  Nadia Ladd is a/an 67 year old female assessed by the dietitian for Admission Nutrition Risk Screen for unsure of weight loss.    Per chart, pt  with stage IIIb chronic kidney disease and colon cancer resected surgically in April and for which she started FOLFOX chemotherapy 5/31 (which includes oxaliplatin, leucovorin, and 5-Follow-up) presented with 1 day history of dizziness.  While in the emergency room for evaluation, she developed abrupt onset rapid atrial fibrillation that was treated with continuous diltiazem infusion after which she cardioverted while still within the emergency room.  Other evaluation was notable for rising troponin level and acute kidney injury superimposed upon stage IIIb chronic kidney disease, so she was admitted    NUTRITION HISTORY  Per telephone conversation, pt reports she is eating fine now and PTA    CURRENT NUTRITION ORDERS  Diet: Low Saturated Fat/2400 mg Sodium  Intake/Tolerance: one meal documented at 75%    Pt is interested in information on the low fat low sodium diet/    LABS  Labs reviewed   H  CO2 21 L  BUN 25.9 H  Creat 1.30 H  Glucose 110    MEDICATIONS  Medications reviewed  Rocephin  Magnesium sulfate  Sodium Bicarbonate    ANTHROPOMETRICS  Height: 157.5 cm (5' 2\")  Most Recent Weight: 84 kg (185 lb 3.2 oz)  4.7% in just over 2 months, 8.4% in ~ 4 months  IBW: 50 kg  BMI: Obesity Grade I BMI 30-34.9  Weight History:   Wt Readings from Last 3 Encounters:   06/03/23 84 " kg (185 lb 3.2 oz)   05/31/23 82.5 kg (181 lb 14.4 oz)   05/30/23 83 kg (183 lb)     04/06/23 86.5 kg (190 lb 11.2 oz)   03/28/23 88.1 kg (194 lb 3 oz)   02/27/23 89.8 kg (198 lb)   02/21/23 89.9 kg (198 lb 1.6 oz)   02/16/23 91.7 kg (202 lb 1.6 oz)     Dosing Weight: 58.5 kg, adjusted    ASSESSED NUTRITION NEEDS  Estimated Energy Needs: 1755 + kcals/day (30+ kcal/kg)  Justification: Increased needs  Estimated Protein Needs: 58-70 grams protein/day (1 - 1.2 grams of pro/kg)  Justification: Increased needs, MARYA on CKD - monitor   Estimated Fluid Needs: 1755 mL/day (1 mL/kcal)   Justification: Maintenance    PHYSICAL FINDINGS  Per chart,  Surgical incision noted  GI: Last BM 6/4/23    MALNUTRITION:  % Weight Loss:  Up to 10% in 6 months (moderate malnutrition)  % Intake:  No decreased intake noted  Subcutaneous Fat Loss:  Unable to assess  Muscle Loss:  Unable to assess  Fluid Retention:  None noted    Malnutrition Diagnosis: Patient does not meet two of the above criteria necessary for diagnosing malnutrition    NUTRITION DIAGNOSIS  Unintended weight loss related to colon cancer as evidenced by up to 10% weight loss in 6 months      INTERVENTIONS  Implementation  Nutrition education - pt would like information regarding eating a low fat, low sodium diet.     Nursing is printing Heart Healthy - Reduced Sodium Nutrition Therapy handout    Goals  Patient to consume % of nutritionally adequate meals three times per day, or the equivalent with supplements/snacks.     Monitoring/Evaluation  Progress toward goals will be monitored and evaluated per protocol.     Simple: Patient demonstrates quick and easy understanding

## 2023-06-05 NOTE — PROGRESS NOTES
S-(situation): Patient discharged to home via wheelchair with     B-(background): MARYA    A-(assessment): vss, room air, denies pain    R-(recommendations): Discharge instructions reviewed with patient. Listed belongings gathered and returned to patient.          Discharge Nursing Criteria:     Care Plan and Patient education resolved: Yes    New Medications- pt has been educated about purpose and side effects: Yes    Vaccines  Influenza status verified at discharge:  Yes      MISC  Prescriptions if needed, hard copies sent with patient  NA  Home medications returned to patient: NA  Medication Bin checked and emptied on discharge Yes  Patient reports post-discharge pain management plan is effective: Yes

## 2023-06-05 NOTE — DISCHARGE SUMMARY
[facility], located within 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 Bateman and Oncology team at [facility] within   10 days  to evaluate medication change, for hospital follow- up, and   regarding new diagnosis. The following labs/tests are recommended:   magnesium, basic metabolic panel and venous blood gas within 10 days prior   to next chemo.             Discharge Disposition   Resume home care:   Agency: [facility]  Discharged to home  Condition at discharge: Stable    Hospital Course    Nadia Ladd is a 67 year old female with stage IIIb chronic kidney disease and colon cancer resected surgically in April 2023 and for which she started FOLFOX chemotherapy 5/31/23 presented with 1 day history of dizziness.  While in the emergency room for evaluation, she developed abrupt onset rapid atrial fibrillation that was treated with continuous diltiazem infusion after which she cardioverted while still within the emergency room.  Other evaluation was notable for rising troponin level and acute kidney injury superimposed upon stage IIIb chronic kidney disease, so she was admitted.    Paroxysmal atrial fibrillation  with rapid ventricular response with possible demand ischemia  Hypomagnesemia  Presented to ER with dizziness and initially noted to have PACs but abruptly developed atrial fibrillation with RVR in the ER.  She was treated in ER with diltiazem bolus followed by infusion for about 3 hours after which she cardioverted back to sinus rhythm.  Diltiazem infusion was discontinued about 3 PM on 6/3 and she maintained sinus rhythm after that time.  She has no previous history of atrial fibrillation.  She has no previous history of structural heart disease and had essentially normal echocardiogram in April 2023.  Echocardiogram performed this hospitalization demonstrated only mild mitral regurgitation and no significant valvular disease and normal left atrial size.  Incidental note was made of mild pulmonary hypertension.  Troponin was elevated in the emergency room and subsequently increased as high as 80 coinciding with prolonged episode of rapid atrial fibrillation.  She has no history of CAD.  She did not have anginal pain or definite acute ischemic EKG changes.  Troponin then trended downward and there were no regional wall motion abnormalities on echocardiogram.  She was treated with continuous heparin infusion.  However, after investigation, elevated troponin was attributed to possible demand ischemia from rapid atrial fibrillation with low suspicion for acute coronary syndrome or acute pulmonary embolism.  Because episode of atrial fibrillation was brief, ongoing anticoagulation after discharge was not advised.  She was found to have hypomagnesemia and metabolic acidosis.  There was suspicion that hypomagnesemia and metabolic acidosis increased her risk for atrial fibrillation.  She was treated with magnesium and bicarbonate supplementation and advised to continue same supplementation after discharge.  Outpatient follow-up of acid-base status and magnesium was recommended.    Acute kidney injury superimposed upon  stage IIIb chronic kidney disease  Metabolic acidosis with normal anion gap  Presented with creatinine 1.75, increased from her baseline of 1.3-1.6 in the last few months.  She had also been hospitalized for more significant acute kidney injury in May 2023 with peak creatinine 3.84 which was attributed to multifactorial causes including previous treatment with ACE inhibitor and triamterene hydrochlorothiazide that were discontinued during that hospitalization.  Creatinine quickly recovered to baseline during this hospital stay with IV fluid treatment.  She was also noted to have persistent metabolic acidosis with normal anion gap during this hospitalization which, upon retrospective review, has been present ever since her hospitalization in May for acute renal failure.  Due to concern that metabolic acidosis may exacerbate tendency for atrial fibrillation, bicarbonate supplementation was started.    Hypocalcemia  Mildly low serum calcium of 8.0 at admission but serum ionized calcium was normal, so true hypocalcemia was ruled out after additional investigation.    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  First dose chemotherapy for colon cancer on 5/31 and patient follows with oncology.  During hospitalization patient was noted to be lymphopenic and leukopenic after recent chemotherapy although not neutropenic.  She was also considered immunosuppressed due to chemotherapy but infection was not identified nor strongly suspected.  Outpatient follow-up with her oncology team per their previous recommendations was recommended.    Anemia due to chemotherapy  Chronic iron deficiency and B12 anemia  Has history of chronic iron deficiency and B12 anemia for which she has been taking iron and B12  supplementation.  Noted to be more anemic after starting chemotherapy on 5/31.  Hemoglobin was stable at 8-9 and neither hemolysis nor active bleeding were suspected.  Acutely worsening anemia due to chemotherapy was suspected.  Outpatient follow-up with her oncology team was recommended.    Pyuria  Minimal pyuria noted on UA.  No overt symptoms of UTI.  She was treated empirically with ceftriaxone, but once urine culture results were negative, antibiotics were discontinued.    Right ear tinnitus  She reported new onset tinnitus in right ear with reassuring exam findings.  Tinnitus coincided with recent chemotherapy raising concern for possible side effect of chemotherapy, so she was advised to follow-up with her oncologist regarding tinnitus.    Obesity  Chronic with BMI 34. No acute intervention was required during hospitalization.      Consultations This Hospital Stay   PHARMACY IP CONSULT  CARE MANAGEMENT / SOCIAL WORK IP CONSULT    Code Status   Full Code    Time Spent on this Encounter   I, Nate Munoz MD, personally saw the patient today and spent greater than 30 minutes discharging this patient.       Nate Munoz MD  [address] SURGICAL  [address] [place] [address]  Phone: [phone]  ______________________________________________________________________    Physical Exam   Vital Signs: Temp: 97.6  F (36.4  C) Temp src: Oral BP: 133/66 Pulse: 79   Resp: 20 SpO2: 99 % O2 Device: None (Room air)    Weight: 185 lbs 3.2 oz  General Appearance: Obese pale elderly woman, no acute distress sitting up in bed  Respiratory: Normal respiratory effort, clear lungs  Cardiovascular: Regular rate and rhythm, good radial pulse, brisk capillary refill       Primary Care Physician   Nomi Cartwright    Discharge Orders      Reason for your hospital stay    Hospitalized due to kidney injury and an episode of rapid atrial fibrillation and improved     Follow-up and  recommended labs and tests     Follow up with Dr. Bateman and Oncology team at [facility] within 10 days  to evaluate medication change, for hospital follow- up, and regarding new diagnosis. The following labs/tests are recommended: magnesium, basic metabolic panel and venous blood gas within 10 days prior to next chemo.     Activity    Your activity upon discharge: activity as tolerated     Resume Home Care Services     Diet    Follow this diet upon discharge: Regular       Significant Results and Procedures   Most Recent 3 CBC's:Recent Labs   Lab Test 06/05/23  0543 06/04/23  0530 06/03/23  1045   WBC 2.4* 3.4* 2.9*   HGB 8.4* 9.2* 10.3*   MCV 86 85 83   * 151 159     Most Recent 3 BMP's:Recent Labs   Lab Test 06/05/23  1231 06/05/23  0543 06/04/23  1932 06/04/23  0530 06/03/23  1045    141 138 140 140   POTASSIUM 4.2 3.8 3.9 3.8 4.0   CHLORIDE 111* 111* 109* 110* 110*   CO2 21* 20* 19* 19* 17*   BUN  --  25.9*  --  36.1* 46.5*   CR  --  1.30*  --  1.53* 1.75*   ANIONGAP 8 10 10 11 13   LIVIER  --  7.9*  --  8.0* 8.3*   GLC  --  110*  --  112* 119*     Most Recent 2 LFT's:Recent Labs   Lab Test 06/04/23  0530 06/03/23  1045   AST 15 14   ALT 13 15   ALKPHOS 74 86   BILITOTAL 0.3 0.5     7-Day Micro Results     Collected Updated Procedure Result Status      06/04/2023 0322 06/05/2023 1335 Urine Culture [[id number]]   Urine, Midstream    Final result Component Value   Culture <10,000 CFU/mL Urogenital sandeep               06/03/2023 1038 06/05/2023 0558 Urine Culture [[id number]]   Urine, Clean Catch    Final result Component Value   Culture 10,000-50,000 CFU/mL Mixture of urogenital sandeep                   Most Recent Urinalysis:Recent Labs   Lab Test 06/03/23  1038   COLOR Straw   APPEARANCE Slightly Cloudy*   URINEGLC Negative   URINEBILI Negative   URINEKETONE Negative   SG 1.014   UBLD Negative   URINEPH 5.0   PROTEIN Negative   NITRITE Negative   LEUKEST Trace*   RBCU 1   WBCU 7*   ,    Results for orders placed or performed during the hospital encounter of 23   Echocardiogram Limited     Value    LVEF  55-60%    Narrative    [id number]  [id number]  [id number]  [id number]^BANG^JOSÉ MIGUEL     [facility]  Echocardiography Laboratory  [address] [place] [address]     Name: JOVITA MAYO  MRN: [id number]  : 1955  Study Date: 2023 07:42 AM  Age: 67 yrs  Gender: Female  Patient Location: [facility]  Reason For Study: Atrial Fibrillation  Ordering Physician: JOSÉ MIGUEL CUENCA  Referring Physician: ABBIE AGUILERA  Performed By: Irlanda Jacobs RDCS     BSA: 1.8 m2  Height: 62 in  Weight: 185 lb  HR: 68  BP: 125/70 mmHg  ______________________________________________________________________________  Procedure  Limited Portable Echo Adult.  ______________________________________________________________________________  Interpretation Summary     The left ventricle is normal in size.  Left ventricular systolic function is normal.  The visual ejection fraction is 55-60%.  Normal left ventricular wall motion  There is mild (1+) mitral regurgitation.  Right ventricular systolic pressure is elevated, consistent with mild  pulmonary hypertension.  The inferior vena cava was normal in size with preserved respiratory  variability.  Sinus rhythm was noted.  Compared to prior study, there is no significant change.  ______________________________________________________________________________  Left Ventricle  The left ventricle is normal in size. There is normal left ventricular wall  thickness. Left ventricular systolic function is normal. Diastolic Doppler  findings (E/E' ratio and/or other parameters) suggest left ventricular filling  pressures are indeterminate. The visual ejection fraction is 55-60%. Normal  left ventricular wall motion.     Right Ventricle  The right ventricle is normal in structure, function and size.     Atria  Normal left atrial size. Right atrial size  is normal. There is no atrial shunt  seen.     Mitral Valve  There is mild (1+) mitral regurgitation.     Tricuspid Valve  The tricuspid valve is normal in structure and function. There is trace  tricuspid regurgitation. The right ventricular systolic pressure is  approximated at 31.3 mmHg plus the right atrial pressure. Right ventricular  systolic pressure is elevated, consistent with mild pulmonary hypertension.  IVC diameter <2.1 cm collapsing >50% with sniff suggests a normal RA pressure  of 3 mmHg.     Aortic Valve  The aortic valve is normal in structure and function. No aortic regurgitation  is present. No aortic stenosis is present.     Pulmonic Valve  The pulmonic valve is not well seen, but is grossly normal. There is trace  pulmonic valvular regurgitation.     Vessels  Normal size aorta. The inferior vena cava was normal in size with preserved  respiratory variability.     Pericardium  There is no pericardial effusion.     Rhythm  Sinus rhythm was noted.  ______________________________________________________________________________  MMode/2D Measurements & Calculations  RV Base: 3.2 cm     TAPSE: 1.5 cm     Doppler Measurements & Calculations  MV E max fabio: 114.0 cm/sec  MV A max fabio: 94.7 cm/sec  MV E/A: 1.2  MV dec time: 0.18 sec  TR max fabio: 279.6 cm/sec  TR max P.3 mmHg  E/E' av.5  Lateral E/e': 12.3  Medial E/e': 10.7  RV S Fabio: 12.4 cm/sec     ______________________________________________________________________________  Report approved by: Aubrie Brooks 2023 10:46 AM           Discharge Medications   Current Discharge Medication List      START taking these medications    Details   magnesium oxide (MAG-OX) 400 MG tablet Take 1 tablet (400 mg) by mouth 2 times daily  Qty: 60 tablet, Refills: 0    Associated Diagnoses: Chronic diarrhea; Hypomagnesemia; Paroxysmal atrial fibrillation with RVR (H)      sodium bicarbonate 650 MG tablet Take 2 tablets (1,300 mg) by mouth 2 times  daily  Qty: 120 tablet, Refills: 0    Associated Diagnoses: Chronic diarrhea; Metabolic acidosis, normal anion gap (NAG); Paroxysmal atrial fibrillation with RVR (H); Stage 3b chronic kidney disease (H)         CONTINUE these medications which have NOT CHANGED    Details   cyanocobalamin 1000 MCG TBCR Take 1,000 mcg by mouth daily  Qty: 100 tablet, Refills: 1    Associated Diagnoses: Vitamin B12 deficiency disease      dexamethasone (DECADRON) 4 MG tablet Take 2 tablets (8 mg) by mouth daily Take for 2 days, starting the day after chemo. Take with food.  Qty: 4 tablet, Refills: 2    Associated Diagnoses: Malignant neoplasm of sigmoid colon (H)      ferrous sulfate (IRON) 325 (65 FE) MG tablet TAKE ONE TABLET BY MOUTH TWICE A DAY --NO FURTHER REFILLS WITHOUT OFFICE VISIT  Qty: 200 tablet, Refills: 3    Associated Diagnoses: Iron deficiency; Benign essential hypertension with target blood pressure below 140/90; Hypertension goal BP (blood pressure) < 140/90      loperamide (IMODIUM A-D) 2 MG tablet When diarrhea starts take 2 tabs, then with each additional episode take 1 more tab, if using more than 8 tab in 24 hrs notify oncology  Qty: 90 tablet, Refills: 3    Associated Diagnoses: Malignant neoplasm of sigmoid colon (H)      Multiple Vitamins-Iron (MULTI-DAY PLUS IRON PO) Take 1 tablet by mouth daily      ondansetron (ZOFRAN) 8 MG tablet Take 1 tablet (8 mg) by mouth every 8 hours as needed for nausea (vomiting)  Qty: 30 tablet, Refills: 2    Associated Diagnoses: Malignant neoplasm of sigmoid colon (H)      prochlorperazine (COMPAZINE) 10 MG tablet Take 1 tablet (10 mg) by mouth every 6 hours as needed for nausea or vomiting  Qty: 30 tablet, Refills: 2    Associated Diagnoses: Malignant neoplasm of sigmoid colon (H)           Allergies   Allergies   Allergen Reactions     No Known Drug Allergy

## 2023-06-06 ENCOUNTER — PATIENT OUTREACH (OUTPATIENT)
Dept: CARE COORDINATION | Facility: CLINIC | Age: 68
End: 2023-06-06
Payer: MEDICARE

## 2023-06-06 ENCOUNTER — TELEPHONE (OUTPATIENT)
Dept: INTERNAL MEDICINE | Facility: CLINIC | Age: 68
End: 2023-06-06
Payer: MEDICARE

## 2023-06-06 NOTE — TELEPHONE ENCOUNTER
Home Care is calling regarding an established patient with [facility].     Requesting orders from: Nomi Cartwright  Provider is following patient: Yes  Is this a 60-day recertification request?  No    Orders Requested    Skilled Nursing  Request for delay in care, service is not able to be provided within same scheduled day.   6/8/23 due to patient request      Confirmed ok to leave a detailed message with call back.  Contact information confirmed and updated as needed.    Malia Herrera RN

## 2023-06-06 NOTE — PROGRESS NOTES
Clinic Care Coordination Contact  RUST/Voicemail       Clinical Data: Care Coordinator Outreach  Outreach attempted x 1.  Left message on patient's voicemail with call back information and requested return call.  Plan: Care Coordinator will try to reach patient again in 1-2 business days.    Alexandria Ritter RN Care Coordination   [facility]  Email: [email or web address]  Phone: [phone]

## 2023-06-07 ASSESSMENT — ACTIVITIES OF DAILY LIVING (ADL): DEPENDENT_IADLS:: INDEPENDENT

## 2023-06-07 NOTE — PROGRESS NOTES
Clinic Care Coordination Contact  [facility]: Post-Discharge Note  SITUATION                                                      Admission:    Admission Date: 06/03/23   Reason for Admission: MARYA (acute kidney injury)  Discharge:   Discharge Date: 06/05/23  Discharge Diagnosis: MARYA (acute kidney injury)    BACKGROUND                                                      Per hospital discharge summary and inpatient provider notes:    Hospital Course   Nadia Ladd is a 67 year old female with stage IIIb chronic kidney disease and colon cancer resected surgically in April 2023 and for which she started FOLFOX chemotherapy 5/31/23 presented with 1 day history of dizziness.  While in the emergency room for evaluation, she developed abrupt onset rapid atrial fibrillation that was treated with continuous diltiazem infusion after which she cardioverted while still within the emergency room.  Other evaluation was notable for rising troponin level and acute kidney injury superimposed upon stage IIIb chronic kidney disease, so she was admitted.     Paroxysmal atrial fibrillation with rapid ventricular response with possible demand ischemia  Hypomagnesemia  Presented to ER with dizziness and initially noted to have PACs but abruptly developed atrial fibrillation with RVR in the ER.  She was treated in ER with diltiazem bolus followed by infusion for about 3 hours after which she cardioverted back to sinus rhythm.  Diltiazem infusion was discontinued about 3 PM on 6/3 and she maintained sinus rhythm after that time.  She has no previous history of atrial fibrillation.  She has no previous history of structural heart disease and had essentially normal echocardiogram in April 2023.  Echocardiogram performed this hospitalization demonstrated only mild mitral regurgitation and no significant valvular disease and normal left atrial size.  Incidental note was made of mild pulmonary hypertension.  Troponin was elevated in  the emergency room and subsequently increased as high as 80 coinciding with prolonged episode of rapid atrial fibrillation.  She has no history of CAD.  She did not have anginal pain or definite acute ischemic EKG changes.  Troponin then trended downward and there were no regional wall motion abnormalities on echocardiogram.  She was treated with continuous heparin infusion.  However, after investigation, elevated troponin was attributed to possible demand ischemia from rapid atrial fibrillation with low suspicion for acute coronary syndrome or acute pulmonary embolism.  Because episode of atrial fibrillation was brief, ongoing anticoagulation after discharge was not advised.  She was found to have hypomagnesemia and metabolic acidosis.  There was suspicion that hypomagnesemia and metabolic acidosis increased her risk for atrial fibrillation.  She was treated with magnesium and bicarbonate supplementation and advised to continue same supplementation after discharge.  Outpatient follow-up of acid-base status and magnesium was recommended.     Acute kidney injury superimposed upon stage IIIb chronic kidney disease  Metabolic acidosis with normal anion gap  Presented with creatinine 1.75, increased from her baseline of 1.3-1.6 in the last few months.  She had also been hospitalized for more significant acute kidney injury in May 2023 with peak creatinine 3.84 which was attributed to multifactorial causes including previous treatment with ACE inhibitor and triamterene hydrochlorothiazide that were discontinued during that hospitalization.  Creatinine quickly recovered to baseline during this hospital stay with IV fluid treatment.  She was also noted to have persistent metabolic acidosis with normal anion gap during this hospitalization which, upon retrospective review, has been present ever since her hospitalization in May for acute renal failure.  Due to concern that metabolic acidosis may exacerbate tendency for  atrial fibrillation, bicarbonate supplementation was started.     Hypocalcemia  Mildly low serum calcium of 8.0 at admission but serum ionized calcium was normal, so true hypocalcemia was ruled out after additional investigation.     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  First dose chemotherapy for colon cancer on 5/31 and patient follows with oncology.  During hospitalization patient was noted to be lymphopenic and leukopenic after recent chemotherapy although not neutropenic.  She was also considered immunosuppressed due to chemotherapy but infection was not identified nor strongly suspected.  Outpatient follow-up with her oncology team per their previous recommendations was recommended.     Anemia due to chemotherapy  Chronic iron deficiency and B12 anemia  Has history of chronic iron deficiency and B12 anemia for which she has been taking iron and B12 supplementation.  Noted to be more anemic after starting chemotherapy on 5/31.  Hemoglobin was stable at 8-9 and neither hemolysis nor active bleeding were suspected.  Acutely worsening anemia due to chemotherapy was suspected.  Outpatient follow-up with her oncology team was recommended.     Pyuria  Minimal pyuria noted on UA.  No overt symptoms of UTI.  She was treated empirically with ceftriaxone, but once urine culture results were negative, antibiotics were discontinued.     Right ear tinnitus  She reported new onset tinnitus in right ear with reassuring exam findings.  Tinnitus coincided with recent chemotherapy raising concern for possible side effect of chemotherapy, so she was advised to follow-up with her oncologist regarding tinnitus.     Obesity  Chronic with BMI 34. No acute intervention was required during  hospitalization.    ASSESSMENT           Discharge Assessment  How are you doing now that you are home?: per patient report, she is doing well. she said she does not need anything at this time. patient has a follow up appt scheduled with PCP on 6/22/2023.  How are your symptoms? (Red Flag symptoms escalate to triage hotline per guidelines): Improved  Do you feel your condition is stable enough to be safe at home until your provider visit?: Yes  Does the patient have their discharge instructions? : Yes  Does the patient have questions regarding their discharge instructions? : No  Were you started on any new medications or were there changes to any of your previous medications? : Yes  Does the patient have all of their medications?: Yes  Do you have questions regarding any of your medications? : No  Do you have all of your needed medical supplies or equipment (DME)?  (i.e. oxygen tank, CPAP, cane, etc.): Yes  Discharge follow-up appointment scheduled within 14 calendar days? : Yes  Discharge Follow Up Appointment Date: 06/22/23  Discharge Follow Up Appointment Scheduled with?: Primary Care Provider         Post-op (Clinicians Only)  Did the patient have surgery or a procedure: No  Fever: No  Chills: No  Eating & Drinking: eating and drinking without complaints/concerns  PO Intake: low fiber  Urinary Status: voiding without complaint/concerns      PLAN                                                      Outpatient Plan:      Discharge Orders          Reason for your hospital stay     Hospitalized due to kidney injury and an episode of rapid atrial fibrillation and improved          Follow-up and recommended labs and tests      Follow up with Dr. Bateman and Oncology team at [facility] within 10 days  to evaluate medication change, for hospital follow- up, and regarding new diagnosis. The following labs/tests are recommended: magnesium, basic metabolic panel and venous blood gas within 10 days prior to next  chemo.          Activity     Your activity upon discharge: activity as tolerated      Resume Home Care Services          Diet     Follow this diet upon discharge: Regular         Future Appointments   Date Time Provider Department Center   6/8/2023  3:00 PM Alva Putnam PA-C [facility]   6/13/2023 10:15 AM Irene Bateman, DO [facility]   6/13/2023  1:30 PM Santana Brenner MD [facility]   6/14/2023  9:30 AM PH INFUSION CHAIR 8 [facility]   6/14/2023 10:00 AM PH INFUSION CHAIR 10 [facility]   6/22/2023  2:20 PM Nomi Cartwright DO [facility]   6/28/2023  9:30 AM PH INFUSION CHAIR 8 [facility]   6/28/2023  9:45 AM Irene Bateman, DO [facility]   6/28/2023 10:30 AM PH INFUSION CHAIR 9 [facility]         For any urgent concerns, please contact our 24 hour nurse triage line: [phone] ([phone])         Alexandria Ritter RN

## 2023-06-08 ENCOUNTER — TELEPHONE (OUTPATIENT)
Dept: ONCOLOGY | Facility: CLINIC | Age: 68
End: 2023-06-08

## 2023-06-08 ENCOUNTER — LAB REQUISITION (OUTPATIENT)
Dept: LAB | Facility: CLINIC | Age: 68
End: 2023-06-08
Payer: MEDICARE

## 2023-06-08 ENCOUNTER — VIRTUAL VISIT (OUTPATIENT)
Dept: ONCOLOGY | Facility: CLINIC | Age: 68
End: 2023-06-08
Attending: INTERNAL MEDICINE
Payer: MEDICARE

## 2023-06-08 DIAGNOSIS — I48.0 PAROXYSMAL ATRIAL FIBRILLATION (H): Primary | ICD-10-CM

## 2023-06-08 DIAGNOSIS — C18.7 MALIGNANT NEOPLASM OF SIGMOID COLON (H): ICD-10-CM

## 2023-06-08 DIAGNOSIS — I49.1 PREMATURE ATRIAL COMPLEX: ICD-10-CM

## 2023-06-08 LAB
ALBUMIN SERPL BCG-MCNC: 4.1 G/DL (ref 3.5–5.2)
ALP SERPL-CCNC: 80 U/L (ref 35–104)
ALT SERPL W P-5'-P-CCNC: 14 U/L (ref 10–35)
ANION GAP SERPL CALCULATED.3IONS-SCNC: 10 MMOL/L (ref 7–15)
AST SERPL W P-5'-P-CCNC: 14 U/L (ref 10–35)
BILIRUB SERPL-MCNC: 0.5 MG/DL
BUN SERPL-MCNC: 11.6 MG/DL (ref 8–23)
CALCIUM SERPL-MCNC: 8.7 MG/DL (ref 8.8–10.2)
CHLORIDE SERPL-SCNC: 109 MMOL/L (ref 98–107)
CREAT SERPL-MCNC: 1.18 MG/DL (ref 0.51–0.95)
DEPRECATED HCO3 PLAS-SCNC: 26 MMOL/L (ref 22–29)
ERYTHROCYTE [DISTWIDTH] IN BLOOD BY AUTOMATED COUNT: 15.2 % (ref 10–15)
GFR SERPL CREATININE-BSD FRML MDRD: 51 ML/MIN/1.73M2
GLUCOSE SERPL-MCNC: 109 MG/DL (ref 70–99)
HCT VFR BLD AUTO: 33.3 % (ref 35–47)
HGB BLD-MCNC: 10.1 G/DL (ref 11.7–15.7)
MCH RBC QN AUTO: 25.6 PG (ref 26.5–33)
MCHC RBC AUTO-ENTMCNC: 30.3 G/DL (ref 31.5–36.5)
MCV RBC AUTO: 85 FL (ref 78–100)
PLATELET # BLD AUTO: 164 10E3/UL (ref 150–450)
POTASSIUM SERPL-SCNC: 3.6 MMOL/L (ref 3.4–5.3)
PROT SERPL-MCNC: 6.5 G/DL (ref 6.4–8.3)
RBC # BLD AUTO: 3.94 10E6/UL (ref 3.8–5.2)
SODIUM SERPL-SCNC: 145 MMOL/L (ref 136–145)
WBC # BLD AUTO: 3 10E3/UL (ref 4–11)

## 2023-06-08 PROCEDURE — 80053 COMPREHEN METABOLIC PANEL: CPT | Mod: ORL | Performed by: INTERNAL MEDICINE

## 2023-06-08 PROCEDURE — 99214 OFFICE O/P EST MOD 30 MIN: CPT | Mod: 95 | Performed by: PHYSICIAN ASSISTANT

## 2023-06-08 PROCEDURE — 85027 COMPLETE CBC AUTOMATED: CPT | Mod: ORL | Performed by: INTERNAL MEDICINE

## 2023-06-08 RX ORDER — METOPROLOL SUCCINATE 25 MG/1
25 TABLET, EXTENDED RELEASE ORAL DAILY
Qty: 30 TABLET | Refills: 1 | Status: SHIPPED | OUTPATIENT
Start: 2023-06-08 | End: 2023-07-31

## 2023-06-08 NOTE — TELEPHONE ENCOUNTER
Bianka calling from [facility] home care needing Home care orders please call her at [phone]

## 2023-06-08 NOTE — TELEPHONE ENCOUNTER
Spoke with patient/home health nurse today regarding symptoms. Patient states dizziness has started to improve. Hearing loss continues, right ear is worse than the left ear but both have been effected. This RN provided verbal order for labs to be drawn today prior to seeing RADHA. Will send message to RADHA and Dr. Bateman. No further questions or concerns.  Liseth Yoon RNCC

## 2023-06-08 NOTE — TELEPHONE ENCOUNTER
Bianka from [facility] in regards to Nadia, she is needing to know if there are labs needing  to be drawn today as Bianka is at the [facility] for [facility] right now, please call her ASAP at [phone]

## 2023-06-08 NOTE — LETTER
6/8/2023         RE: Nadia Ladd  [address]  [place] [address]        Dear Colleague,    Thank you for referring your patient, Nadia Ladd, to the [facility]. Please see a copy of my visit note below.    Oncology/Hematology Visit Note  Jun 8, 2023    Reason for Visit: multiple symptoms    Primary Oncologist: Dr. 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 She is currently undergoing FOLFOX. Course has been complicated by MARYA, hyperkalemia, and paroxysmal atrial fibrillation.     Interval History:  Since starting chemo has been dealing with bilateral tinnitus described as metallic sound, R>L, associated hearing loss. She does note ongoing sinus/nasal congestion. No pain, headache, or vision changes.     Recently hospitalized and noted to have paroxysmal afib and PACs. Did have brief palpitations once. She received diltiazem inpatient and completed continuous monitoring with no recurrence. She was discharge on mag and citrate (concurrent MARYA and hyperkalemia). She is worried/anxious about recurrence.     Review of Systems:  A complete review of systems was negative except as noted in the HPI.     Past medical, Surgical, and social history:  Reviewed    Physical Examination:  LMP  (LMP Unknown)   Wt Readings from Last 10 Encounters:   06/03/23 84 kg (185 lb 3.2 oz)   05/31/23 82.5 kg (181 lb 14.4 oz)   05/30/23 83 kg (183 lb)   05/25/23 80.3 kg (177 lb)   05/19/23 80.3 kg (177 lb)   05/18/23 80 kg (176 lb 6.4 oz)   05/17/23 80.7 kg (178 lb)   05/10/23 81.1 kg (178 lb 11.2 oz)   05/09/23 80.3 kg (177 lb)   05/09/23 80.3 kg (177 lb 1.6 oz)   Phone Visit - Exam Limited  General: Pleasant patient in no acute distress.Normal work of breathing. Oriented.     Laboratory Data:  CBC, CMP reviewed.     Assessment and Plan:  Nadia Ladd is a 67 year old female with  complicated colon cancer (adeno and neuroendocrine) currently undergoing FOLFOX chemo.    # Colon Cancer  - Follow-up with Dr. Bateman next week prior to next cycle to discuss plan for chemo and consideration of dosing modification such as omission/redsuction of oxaliplatin for ototoxicity or 5FU for possible cardiotoxicity     # Premature Atrial Complexes  # Paroxysmal Atrial Fibrillation  # Palpations  # ANxiety  - Recently hospitalized and noted to have paroxysmal afib and PACs. Did have brief palpitations. She received diltiazem inpatient and completed continuous monitoring with no recurrence. She was discharged on mag and ascorbic acid (concurrent MARYA and hyperkalemia). She is worried/anxious about recurrence.   - Trial metoprolol XL 25 mg daily for rate and palpitation control, she has adequate BP    # Tinnitus  - Referral to ENT/audiology  - Consider BID Flonase for now for associated nasal/sinus congestion    # MARYA  - Significant improvement  - Discontinue sodium restriction  - Follow-up with nephrology as scheduled    30 minutes spent on the date of the encounter doing chart review, review of test results, interpretation of tests, patient visit, documentation and discussion with other provider(s)     Alva Putnam PA-C  [facility]   [phone]    Virtual Visit Details    Type of service: Phone Visit     Originating Location (pt. Location): Home  Distant Location (provider location): ONsite  Platform used for Video Visit: Phone      Again, thank you for allowing me to participate in the care of your patient.        Sincerely,        ALVA PUTNAM PA-C

## 2023-06-08 NOTE — NURSING NOTE
Is the patient currently in the state of [place]? YES    Visit mode:VIDEO    If the visit is dropped, the patient can be reconnected by: VIDEO VISIT: Send to e-mail at: [email or web address]    Will anyone else be joining the visit? NO      How would you like to obtain your AVS? MyChart    Are changes needed to the allergy or medication list? NO    Reason for visit: SYDNI MEJIA, VF

## 2023-06-08 NOTE — LETTER
6/8/2023         RE: Nadia Ladd  [address]  [place] [address]        Dear Colleague,    Thank you for referring your patient, Nadia Ladd, to the [facility]. Please see a copy of my visit note below.    Oncology/Hematology Visit Note  Jun 8, 2023    Reason for Visit: multiple symptoms    Primary Oncologist: Dr. 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 She is currently undergoing FOLFOX. Course has been complicated by MARYA, hyperkalemia, and paroxysmal atrial fibrillation.     Interval History:  Since starting chemo has been dealing with bilateral tinnitus described as metallic sound, R>L, associated hearing loss. She does note ongoing sinus/nasal congestion. No pain, headache, or vision changes.     Recently hospitalized and noted to have paroxysmal afib and PACs. Did have brief palpitations once. She received diltiazem inpatient and completed continuous monitoring with no recurrence. She was discharge on mag and citrate (concurrent MARYA and hyperkalemia). She is worried/anxious about recurrence.     Review of Systems:  A complete review of systems was negative except as noted in the HPI.     Past medical, Surgical, and social history:  Reviewed    Physical Examination:  LMP  (LMP Unknown)   Wt Readings from Last 10 Encounters:   06/03/23 84 kg (185 lb 3.2 oz)   05/31/23 82.5 kg (181 lb 14.4 oz)   05/30/23 83 kg (183 lb)   05/25/23 80.3 kg (177 lb)   05/19/23 80.3 kg (177 lb)   05/18/23 80 kg (176 lb 6.4 oz)   05/17/23 80.7 kg (178 lb)   05/10/23 81.1 kg (178 lb 11.2 oz)   05/09/23 80.3 kg (177 lb)   05/09/23 80.3 kg (177 lb 1.6 oz)   Phone Visit - Exam Limited  General: Pleasant patient in no acute distress.Normal work of breathing. Oriented.     Laboratory Data:  CBC, CMP reviewed.     Assessment and Plan:  Nadia Ladd is a 67 year old female with  complicated colon cancer (adeno and neuroendocrine) currently undergoing FOLFOX chemo.    # Colon Cancer  - Follow-up with Dr. Bateman next week prior to next cycle to discuss plan for chemo and consideration of dosing modification such as omission/redsuction of oxaliplatin for ototoxicity or 5FU for possible cardiotoxicity     # Premature Atrial Complexes  # Paroxysmal Atrial Fibrillation  # Palpations  # ANxiety  - Recently hospitalized and noted to have paroxysmal afib and PACs. Did have brief palpitations. She received diltiazem inpatient and completed continuous monitoring with no recurrence. She was discharged on mag and ascorbic acid (concurrent MARYA and hyperkalemia). She is worried/anxious about recurrence.   - Trial metoprolol XL 25 mg daily for rate and palpitation control, she has adequate BP    # Tinnitus  - Referral to ENT/audiology  - Consider BID Flonase for now for associated nasal/sinus congestion    # MARYA  - Significant improvement  - Discontinue sodium restriction  - Follow-up with nephrology as scheduled    30 minutes spent on the date of the encounter doing chart review, review of test results, interpretation of tests, patient visit, documentation and discussion with other provider(s)     Alva Putnam PA-C  [facility]   [phone]    Virtual Visit Details    Type of service: Phone Visit     Originating Location (pt. Location): Home  Distant Location (provider location): ONsite  Platform used for Video Visit: Phone      Again, thank you for allowing me to participate in the care of your patient.        Sincerely,        ALVA PUTNAM PA-C

## 2023-06-08 NOTE — PROGRESS NOTES
Oncology/Hematology Visit Note  Jun 8, 2023    Reason for Visit: multiple symptoms    Primary Oncologist: Dr. 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 She is currently undergoing FOLFOX. Course has been complicated by MARYA, hyperkalemia, and paroxysmal atrial fibrillation.     Interval History:  Since starting chemo has been dealing with bilateral tinnitus described as metallic sound, R>L, associated hearing loss. She does note ongoing sinus/nasal congestion. No pain, headache, or vision changes.     Recently hospitalized and noted to have paroxysmal afib and PACs. Did have brief palpitations once. She received diltiazem inpatient and completed continuous monitoring with no recurrence. She was discharge on mag and citrate (concurrent MARYA and hyperkalemia). She is worried/anxious about recurrence.     Review of Systems:  A complete review of systems was negative except as noted in the HPI.     Past medical, Surgical, and social history:  Reviewed    Physical Examination:  LMP  (LMP Unknown)   Wt Readings from Last 10 Encounters:   06/03/23 84 kg (185 lb 3.2 oz)   05/31/23 82.5 kg (181 lb 14.4 oz)   05/30/23 83 kg (183 lb)   05/25/23 80.3 kg (177 lb)   05/19/23 80.3 kg (177 lb)   05/18/23 80 kg (176 lb 6.4 oz)   05/17/23 80.7 kg (178 lb)   05/10/23 81.1 kg (178 lb 11.2 oz)   05/09/23 80.3 kg (177 lb)   05/09/23 80.3 kg (177 lb 1.6 oz)   Phone Visit - Exam Limited  General: Pleasant patient in no acute distress.Normal work of breathing. Oriented.     Laboratory Data:  CBC, CMP reviewed.     Assessment and Plan:  Nadia Ladd is a 67 year old female with complicated colon cancer (adeno and neuroendocrine) currently undergoing FOLFOX chemo.    # Colon Cancer  - Follow-up with Dr. Bateman next week prior to next cycle to discuss plan for chemo and consideration of dosing modification such as omission/redsuction of  oxaliplatin for ototoxicity or 5FU for possible cardiotoxicity     # Premature Atrial Complexes  # Paroxysmal Atrial Fibrillation  # Palpations  # ANxiety  - Recently hospitalized and noted to have paroxysmal afib and PACs. Did have brief palpitations. She received diltiazem inpatient and completed continuous monitoring with no recurrence. She was discharged on mag and ascorbic acid (concurrent MARYA and hyperkalemia). She is worried/anxious about recurrence.   - Trial metoprolol XL 25 mg daily for rate and palpitation control, she has adequate BP    # Tinnitus  - Referral to ENT/audiology  - Consider BID Flonase for now for associated nasal/sinus congestion    # MARYA  - Significant improvement  - Discontinue sodium restriction  - Follow-up with nephrology as scheduled    30 minutes spent on the date of the encounter doing chart review, review of test results, interpretation of tests, patient visit, documentation and discussion with other provider(s)     Alva Putnam PA-C  [facility]   [phone]    Virtual Visit Details    Type of service: Phone Visit     Originating Location (pt. Location): Home  Distant Location (provider location): ONsite  Platform used for Video Visit: Phone

## 2023-06-09 NOTE — TELEPHONE ENCOUNTER
[facility] requesting verbal orders to continue homecare.    Skilled nursing once weekly x 3 weeks, every other week x 8 weeks, with four prn's for chemo disconnect.    Please advise if verbal orders are ok.     Megan Valera RN on 6/9/2023 at 3:52 PM

## 2023-06-09 NOTE — TELEPHONE ENCOUNTER
Verbal orders given.    Megan Valera, RN on 6/9/2023 at 4:27 PM                Irene Bateman, DO  You 30 minutes ago (3:56 PM)     SK  Yes ok to give verbal orders for the above     SK

## 2023-06-12 ENCOUNTER — TELEPHONE (OUTPATIENT)
Dept: INTERNAL MEDICINE | Facility: CLINIC | Age: 68
End: 2023-06-12
Payer: MEDICARE

## 2023-06-12 NOTE — TELEPHONE ENCOUNTER
Medication reconciliation completed by RN. Form and chart forwarded to PCP for signatures    Malia Herrera RN on 6/12/2023 at 3:30 PM

## 2023-06-12 NOTE — TELEPHONE ENCOUNTER
Reason for Call:  Form, our goal is to have forms completed with 72 hours, however, some forms may require a visit or additional information.    Type of letter, form or note:  Home Health Certification    Who is the form from?: Home care    Where did the form come from: form was faxed in    What clinic location was the form placed at?: [facility]    Where the form was placed: Given to PETER Mancuso    What number is listed as a contact on the form?: [phone]       [facility]    Call taken on 6/12/2023 at 2:59 PM by Mavis Montano

## 2023-06-13 ENCOUNTER — ONCOLOGY VISIT (OUTPATIENT)
Dept: ONCOLOGY | Facility: CLINIC | Age: 68
End: 2023-06-13
Payer: MEDICARE

## 2023-06-13 ENCOUNTER — PRE VISIT (OUTPATIENT)
Dept: NEPHROLOGY | Facility: CLINIC | Age: 68
End: 2023-06-13
Payer: MEDICARE

## 2023-06-13 ENCOUNTER — OFFICE VISIT (OUTPATIENT)
Dept: NEPHROLOGY | Facility: CLINIC | Age: 68
End: 2023-06-13
Attending: INTERNAL MEDICINE
Payer: MEDICARE

## 2023-06-13 VITALS
OXYGEN SATURATION: 98 % | WEIGHT: 181.25 LBS | TEMPERATURE: 97.6 F | HEART RATE: 95 BPM | DIASTOLIC BLOOD PRESSURE: 74 MMHG | RESPIRATION RATE: 18 BRPM | SYSTOLIC BLOOD PRESSURE: 136 MMHG | BODY MASS INDEX: 33.35 KG/M2 | HEIGHT: 62 IN

## 2023-06-13 VITALS
SYSTOLIC BLOOD PRESSURE: 139 MMHG | OXYGEN SATURATION: 97 % | HEART RATE: 80 BPM | DIASTOLIC BLOOD PRESSURE: 84 MMHG | BODY MASS INDEX: 33 KG/M2 | WEIGHT: 180.44 LBS

## 2023-06-13 DIAGNOSIS — I48.0 PAROXYSMAL ATRIAL FIBRILLATION WITH RVR (H): ICD-10-CM

## 2023-06-13 DIAGNOSIS — N18.32 STAGE 3B CHRONIC KIDNEY DISEASE (H): ICD-10-CM

## 2023-06-13 DIAGNOSIS — Z53.9 DIAGNOSIS NOT YET DEFINED: Primary | ICD-10-CM

## 2023-06-13 DIAGNOSIS — C18.7 MALIGNANT NEOPLASM OF SIGMOID COLON (H): Primary | ICD-10-CM

## 2023-06-13 DIAGNOSIS — K52.9 CHRONIC DIARRHEA: ICD-10-CM

## 2023-06-13 DIAGNOSIS — I48.0 PAROXYSMAL ATRIAL FIBRILLATION (H): ICD-10-CM

## 2023-06-13 DIAGNOSIS — E87.20 METABOLIC ACIDOSIS, NORMAL ANION GAP (NAG): ICD-10-CM

## 2023-06-13 DIAGNOSIS — N18.31 STAGE 3A CHRONIC KIDNEY DISEASE (CKD) (H): Primary | ICD-10-CM

## 2023-06-13 DIAGNOSIS — D50.0 IRON DEFICIENCY ANEMIA DUE TO CHRONIC BLOOD LOSS: ICD-10-CM

## 2023-06-13 PROCEDURE — G0463 HOSPITAL OUTPT CLINIC VISIT: HCPCS | Performed by: INTERNAL MEDICINE

## 2023-06-13 PROCEDURE — G0180 MD CERTIFICATION HHA PATIENT: HCPCS | Performed by: INTERNAL MEDICINE

## 2023-06-13 PROCEDURE — 99215 OFFICE O/P EST HI 40 MIN: CPT | Mod: 24 | Performed by: INTERNAL MEDICINE

## 2023-06-13 PROCEDURE — 99214 OFFICE O/P EST MOD 30 MIN: CPT | Mod: 24 | Performed by: INTERNAL MEDICINE

## 2023-06-13 RX ORDER — SODIUM BICARBONATE 650 MG/1
650 TABLET ORAL 2 TIMES DAILY
Qty: 120 TABLET | Refills: 3 | Status: SHIPPED | OUTPATIENT
Start: 2023-06-13 | End: 2023-12-04

## 2023-06-13 ASSESSMENT — PAIN SCALES - GENERAL
PAINLEVEL: NO PAIN (0)
PAINLEVEL: NO PAIN (0)

## 2023-06-13 NOTE — LETTER
6/13/2023       RE: Nadia Ladd  [address]  [facility] [address]     Dear Colleague,    Thank you for referring your patient, Nadia Ladd, to the [facility] [place] at [facility]. Please see a copy of my visit note below.      Nephrology Initial Consult  June 13, 2023      Nadia Ladd MRN:[id number] YOB: 1955  Primary care provider: Nomi Cartwright    REASON FOR CONSULT: MARYA    HISTORY OF PRESENT ILLNESS:  Nadia Ladd is a 67 year old with a history of Crohn's colitis, anemia (both iron deficiency and B12 deficiency listed), primary pulmonary hypertension, HTN, CKD stage III, newly diagnosed colon cancer (neuroendocrine carcinoma and adenocarcinoma) who had recent colon resection 4/6/23 (but no ostomy) who is currently on FOLFOX (starting 5/31, C2 planned for 6/14). She recently was admitted 5/10 - 5/12 for MARYA up to Scr 3.6 suspected to be due to poor PO intake with increased stool output and resumption of anti-HTN medications. Creatinine started to downtrend during hospitalization. She is presenting to clinic for subsequent follow-up.    Since hospitalization, she has started on FOLFOX on 5/31. She subsequently developed dizziness and lightheadedness which brought her into the ED. She was found to have a-fib and was started on a diltiazem gtt with chemical cardioversion. She did not have further a fib during hospitalization. Was evaluated for demand ischemia. Cardiac workup showed EF 55-60%. Mild pulmonary HTN, mild MR.     There is suspicion for cardiotoxicity with 5-Follow-up and cardiology referral is being pursued prior to additional chemotherapy. Additionally she has developed tinnitus on oxaliplatin    BL Cr prior to aforementioned admissions was 1.3-1.5. Since most recent hospital discharge, creatinine has trended down to previous baseline. Her diarrhea is improved (4-6x per day as  opposed to >7). No longer taking imodium. Reasonable appetite and PO fluid intake. No issues with urination. Recently bought a BP machine and monitors at home - noting 130-140 systolic/ 70 diastolic.      Brief Oncologic history (obtained from note from Dr. Bateman):  COLONOSCOPY 2/2023  Colon, sigmoid, stricture: Biopsy:  - Invasive poorly differentiated carcinoma  The sigmoid stricture shows a high-grade carcinoma without definitive gland formation.  Given the poorly differentiated appearance, an immunohistochemical panel was performed to exclude the possibility of a tumor by direct extension or metastasis.   Immunohistochemical stains are performed and show the tumor to be diffusely positive for CK7 and partially positive for CK20 and SATB2.  There is no significant tumor reactivity for CDX2, GATA3, PAX8, TTF-1, and chromogranin.  Synaptophysin highlights very rare positive cells. Although the CK7/CK20 profile is not entirely typical for a colorectal carcinoma, immunostains for tumors of other origins are negative and a colorectal primary is still favored.   - Mismatch repair:  1) MLH1-loss of nuclear expression  2) MSH2-intact  3) MSH6-intact  4) PMS2-loss of nuclear expression  -MLH1 promoter methylation: NEGATIVE    COLON, RESECTION 4/6/23:  Mass #1 (ascending colon/hepatic flexure): Mixed neuroendocrine-non-neuroendocrine neoplasm (MINEN):  - Neuroendocrine carcinoma component (70%) and moderately-differentiated adenocarcinoma component (30%)  - Size = 5.0 cm, invading into muscularis propria  - Positive LVI  - Negative macroscopic tumor perforation, negative PNI  - Negative surgical resection margins  - IHC: Neuroendocrine portion: Negative for chromogranin and INSM1 but strongly express synaptophysin  - IHC: Adenocarcinoma portion: Positive for CK20, CDX2 negative for CK7, PAX8, ER, S100  Ki-67 proliferation index of approximately 80%.  MMR:  Intact nuclear expression of MLH1, MSH2, MHS6,  PMS2  PDL1:  COMBINED POSITIVE SCORE (CPS): 55-60  TUMOR PROPORTION SCORE (TPS): 50%   pathogenic KRAS mutation (G13D) was identified (5.2%).  AJCC 8th edition: stage 3B mpT3 pN1a      Mass#2 (sigmoid colon): Poorly-differentiated carcinoma:  - Grade 3  - Size = 5.7 cm, invading into muscularis propria  - Negative for microscopic tumor perforation, LVI, perineural invasion  - Negative surgical resection margins  - IHC: Positive for scar and keratin AE1/AE3, negative for CK7, CK20, CDX2, PAX8, ER, chromogranin, CD56, p40, synaptophysin, S100, INI1, p40, INSM1  Ki-67 proliferation index of approximately 70%.  MMR: loss of nuclear expression MLH1 and PMS2, intact nuclear expression MSH2 and MSH6  PDL1:  COMBINED POSITIVE SCORE (CPS): 80  TUMOR PROPORTION SCORE (TPS): 70%  pathogenic KRAS mutation (G13D) was identified (4.5%).  AJCC 8th edition: stage 3A mpT2 pN1a    CRC NGS:   --mutations positive for KRAS G13D mutation 5.2% mass 1, KRAS G13D mutation 4.5% mass 2 (negative for AKT1; BRAF; ERBB2; KRAS; NRAS; PIK3CA; PTEN)  --TMB score: 17.064 mut/Mb mass 1 (CPS 55-60, TPS 50%), 60.943 mut/Mb mass 2 (CPS 80, TPS 70%)  --fusion negative including NTRK and RET for mass 1 and 2 (also negative for AKT1, AKT3, ALK, AR, AWZEVS40, CLAUDINE, BCOR, BRAF, BRD3, BRD4, CAMTA1, CCNB3, CCND1, , CIC, CSF1, CSF1R, CTNNB1, DNAJB1, EGFR, EPC1, ERBB2, ERBB4, ERG, ESR1, ESRRA, ETV1, ETV4, ETV5, ETV6, EWSR1, FGFR1, FGFR2, FGFR3, FGR, FOS, FOSB, FOXO1, FOXO4, FOXR2, FUS, GLI1, GRB7, HMGA2, HRAS, IDH1, IDH2, INSR, JAK2, JAK3, JAZF1, KRAS, MAML2, MAP2K1, MAST1, MAST2, MEAF6, MET, MKL2, MN1, MSMB, MUSK, MYB, MYBL1, MYOD1, NCOA1, NCOA2, NOTCH1, NOTCH2, NR4A3, NRAS, NRG1, NTRK1, NTRK2, NTRK3, NUMBL1, NUTM1, PAX3, PDGFB, PDGFRA, PDGFRB, PHF1, PIK3CA, PKN1, PLAG1, PPARG, PRKACA, PRKCA, PRKCB, RAF1, RELA, RET, ROS1, RPSO2, RSPO3, SS18, STAT6, TAF15, TCF12, TERT, TFE3, TFEB, TFG, THADA, TMPRSS2, USP6, VGLL2, YAP1, YWHAE)    5/26/23 CT CAP w/ contrast  and IVF before and after CT (baseline prior to starting tx):  1.  3 mm nodule RML and 5 mm lateral EDSON nodule, minimally increased from previous  2.  New 4 mm EDSON groundglass density nodule  3.  Several prominent borderline enlarged mediastinal lymph nodes slightly increased from previous  4.  Splenomegaly 14.5 cm  5. S/p subtotal colectomy with ileorectal anastomosis with postsurgical changes along the ventral abdominal wall midline    5/31 - started treatment with FOLFOX C1D1  6/13 -  C2D1 FOLFOX delayed for cardiology evaluation    PAST MEDICAL HISTORY:  Reviewed with patient on 06/13/2023     Past Medical History:   Diagnosis Date     Arthropathia 08/05/2010     B12 deficiency anemia 10/21/2010     Benign essential hypertension with target blood pressure below 140/90 10/10/2016     CARDIOVASCULAR SCREENING; LDL GOAL LESS THAN 160 10/31/2010     Crohn's colitis (H) 02/15/2011     Dermatitis-dishydrotic eczema-severe 08/05/2010     Hiatal hernia      HTN (hypertension) 07/21/2011     Iron deficiency anemia 10/21/2010     Malignant neoplasm of sigmoid colon (H)      Obesity      Primary pulmonary hypertension (H) 04/06/2010       Past Surgical History:   Procedure Laterality Date     COLECTOMY WITHOUT COLOSTOMY N/A 4/6/2023    Procedure: Laparoscopic Converted to Open Total abdominal colectomy;  Surgeon: Jerome Ashley MD;  Location: [facility] OR     COLONOSCOPY  10/11/10     COLONOSCOPY N/A 2/27/2023    Procedure: ATTEMPTED COLONOSCOPY WITH SIGMOID STRICTURE BIOPSY;  Surgeon: Jonathan Alcocer MD;  Location:  GI     ESOPHAGOSCOPY, GASTROSCOPY, DUODENOSCOPY (EGD), COMBINED N/A 2/27/2023    Procedure: ESOPHAGOGASTRODUODENOSCOPY, WITH BIOPSY;  Surgeon: Jonathan Alcocer MD;  [facility]     HYSTERECTOMY TOTAL ABDOMINAL, BILATERAL SALPINGO-OOPHORECTOMY, COMBINED N/A 4/6/2023    Procedure: Hysterectomy total abdominal, bilateral salpingo-oophorectomy;  Surgeon: Sunitha Olea MD;  Location: [facility] OR     INSERT  PORT VASCULAR ACCESS Right 5/25/2023    Procedure: Ultrasound-guided right internal jugular venous access port placement with fluoroscopy;  Surgeon: Martin Thomas DO;  Location: [facility]     SIGMOIDOSCOPY FLEXIBLE N/A 4/6/2023    Procedure: Sigmoidoscopy flexible;  Surgeon: Jerome Ashley MD;  Location: [facility] OR     SURGICAL HISTORY OF -   06/14/76    Perineorrhaphy, for widening vaginal orifice     ZZC EXPLORATORY OF ABDOMEN  1989    laparoscopy        MEDICATIONS:  PTA Meds  Current Outpatient Medications   Medication     cyanocobalamin 1000 MCG TBCR     dexamethasone (DECADRON) 4 MG tablet     ferrous sulfate (IRON) 325 (65 FE) MG tablet     loperamide (IMODIUM A-D) 2 MG tablet     magnesium oxide (MAG-OX) 400 MG tablet     metoprolol succinate ER (TOPROL XL) 25 MG 24 hr tablet     Multiple Vitamins-Iron (MULTI-DAY PLUS IRON PO)     ondansetron (ZOFRAN) 8 MG tablet     prochlorperazine (COMPAZINE) 10 MG tablet     sodium bicarbonate 650 MG tablet     No current facility-administered medications for this visit.         ALLERGIES:    Allergies   Allergen Reactions     No Known Drug Allergy        REVIEW OF SYSTEMS:  A comprehensive of systems was negative except as noted above.    SOCIAL HISTORY:   Social History     Socioeconomic History     Marital status:      Spouse name: Not on file     Number of children: Not on file     Years of education: Not on file     Highest education level: Not on file   Occupational History     Not on file   Tobacco Use     Smoking status: Never     Passive exposure: Current     Smokeless tobacco: Never   Vaping Use     Vaping status: Not on file   Substance and Sexual Activity     Alcohol use: No     Drug use: No     Sexual activity: Yes     Partners: Male   Other Topics Concern     Parent/sibling w/ CABG, MI or angioplasty before 65F 55M? Not Asked   Social History Narrative     Not on file     Social Determinants of Health     Financial Resource Strain: Not  on file   Food Insecurity: Not on file   Transportation Needs: Not on file   Physical Activity: Not on file   Stress: Not on file   Social Connections: Not on file   Intimate Partner Violence: Not on file   Housing Stability: Not on file     Reviewed with patient.    FAMILY MEDICAL HISTORY:   Family History   Problem Relation Age of Onset     Hypertension Mother         on meds, alive     Cerebrovascular Disease Father         stroke about age 65,  of cancer at 68 yrs     Diabetes Father         eventually took insulin     Anemia Father         Pernisios anemia     Kidney Disease Niece         kidney transplant     Kidney Disease Nephew         kidney transplant     Venous thrombosis No family hx of      Anesthesia Reaction No family hx of      Reviewed with patient.    PHYSICAL EXAM:   /84 (BP Location: Right arm, Patient Position: Sitting, Cuff Size: Adult Large)   Pulse 80   Wt 81.8 kg (180 lb 7 oz)   LMP  (LMP Unknown)   SpO2 97%   BMI 33.00 kg/m      GENERAL APPEARANCE: No distress, awake  EYES: no scleral icterus, pupils equal  Endo: no goiter, no moon facies  Lymphatics: no cervical or supraclavicular LAD  Pulmonary: breathing comfortably on RA  CV: regular rhythm, normal rate, no murmur   - Edema trace in R LE (more than left)  GI: soft, nontender  : no gleason  SKIN: no rash, warm, dry, no cyanosis  NEURO: face symmetric, no asterixis     LABS:   Last Renal Panel:  Sodium   Date Value Ref Range Status   2023 145 136 - 145 mmol/L Final   2021 140 133 - 144 mmol/L Final     Potassium   Date Value Ref Range Status   2023 3.6 3.4 - 5.3 mmol/L Final   10/09/2021 4.3 3.4 - 5.3 mmol/L Final   2021 4.1 3.4 - 5.3 mmol/L Final     Potassium POCT   Date Value Ref Range Status   2023 4.7 3.5 - 5.0 mmol/L Final     Chloride   Date Value Ref Range Status   2023 109 (H) 98 - 107 mmol/L Final   10/09/2021 118 (H) 94 - 109 mmol/L Final   2021 111 (H) 94 - 109 mmol/L  Final     Carbon Dioxide   Date Value Ref Range Status   01/26/2021 22 20 - 32 mmol/L Final     Carbon Dioxide (CO2)   Date Value Ref Range Status   06/08/2023 26 22 - 29 mmol/L Final   10/09/2021 20 20 - 32 mmol/L Final     Anion Gap   Date Value Ref Range Status   06/08/2023 10 7 - 15 mmol/L Final   10/09/2021 4 3 - 14 mmol/L Final   01/26/2021 7 3 - 14 mmol/L Final     Glucose   Date Value Ref Range Status   06/08/2023 109 (H) 70 - 99 mg/dL Final   10/09/2021 159 (H) 70 - 99 mg/dL Final   01/26/2021 107 (H) 70 - 99 mg/dL Final     GLUCOSE BY METER POCT   Date Value Ref Range Status   05/10/2023 116 (H) 70 - 99 mg/dL Final     Comment:     Dr/RN Notified     Urea Nitrogen   Date Value Ref Range Status   06/08/2023 11.6 8.0 - 23.0 mg/dL Final   10/09/2021 25 7 - 30 mg/dL Final   01/26/2021 29 7 - 30 mg/dL Final     Creatinine   Date Value Ref Range Status   06/08/2023 1.18 (H) 0.51 - 0.95 mg/dL Final   01/26/2021 1.45 (H) 0.52 - 1.04 mg/dL Final     GFR Estimate   Date Value Ref Range Status   06/08/2023 51 (L) >60 mL/min/1.73m2 Final     Comment:     eGFR calculated using 2021 CKD-EPI equation.   01/26/2021 38 (L) >60 mL/min/[1.73_m2] Final     Comment:     Non  GFR Calc  Starting 12/18/2018, serum creatinine based estimated GFR (eGFR) will be   calculated using the Chronic Kidney Disease Epidemiology Collaboration   (CKD-EPI) equation.       GFR, ESTIMATED POCT   Date Value Ref Range Status   05/26/2023 33 (L) >60 mL/min/1.73m2 Final     Calcium   Date Value Ref Range Status   06/08/2023 8.7 (L) 8.8 - 10.2 mg/dL Final   01/26/2021 9.3 8.5 - 10.1 mg/dL Final     Phosphorus   Date Value Ref Range Status   06/04/2023 3.3 2.5 - 4.5 mg/dL Final   09/14/2010 3.7 2.5 - 4.5 mg/dL Final     Albumin   Date Value Ref Range Status   06/08/2023 4.1 3.5 - 5.2 g/dL Final   01/26/2021 4.1 3.4 - 5.0 g/dL Final       URINE STUDIES  Recent Labs   Lab Test 06/03/23  1038 05/10/23  1004 05/09/23  1810 07/08/20  1327    COLOR Straw Light Yellow Yellow Yellow   APPEARANCE Slightly Cloudy* Clear Slightly Cloudy* Slightly Cloudy   URINEGLC Negative Negative Negative Negative   URINEBILI Negative Negative Negative Negative   URINEKETONE Negative Negative Negative Negative   SG 1.014 1.010 1.013 1.020   UBLD Negative Trace* Negative Negative   URINEPH 5.0 5.0 5.0 5.0   PROTEIN Negative Negative Negative Negative   NITRITE Negative Negative Negative Negative   LEUKEST Trace* Moderate* Small* Small*   RBCU 1 1 1  --    WBCU 7* 6* 22*  --      No lab results found.  PTH  No lab results found.  IRON STUDIES  Recent Labs   Lab Test 04/09/23  0829 02/16/23  0959 03/12/18  1629 11/30/16  1628 09/23/15  1606   IRON 8* 27*  --  43 62   * 265  --  232* 210*   IRONSAT 6* 10*  --  19 30   BRENT  --  21 454*  --  582*       IMAGING:  I review the CT Abd/Pelvis on 5/26 which showed interval mild increase in borderline enlarged mediastinal lymph nodes      ASSESSMENT AND RECOMMENDATIONS:   #CKD3a  #Recent history of MARYA - suspect secondary to pre-renal injury in the setting of large stool output and resumption of anti-HTN medications.  Baseline creatinine 1.3 - 1.5. It seems that her creatinine has trended back down to baseline despite recent initiation of FOLFOX and development of transient A Fib. She also reports dramatic improvement in stool output, and reasonable PO intake.   -We will continue to follow during her upcoming cycles of chemotherapy. Unclear if she will continue with FOLFOX or proceed with adjuvant therapies    #HTN  Given her recent MARYA with concern for contribution from volume depletion and anti-HTN medications, will allow for marginal blood pressure from 130-140 systolic. She is now off of lisinopril and hydrochlorothiazide/Tiramterene. If this were to increase in the coming months would consider starting an different agent such as amlodipine.     #Acid/Base  Bicarbonate 26, improved from prior. Likely reflects improvement  in stool output. Will decrease bicarbonate supplementation to 650 BID.    #Anemia  Hgb 10.1 - improved from prior. Suspect her iron and B12 deficiency is primary  for anemia.    #BMD  Ca 8.7, Phos 3.3 (last check). Will check parathyroid and vitamin D at next visit.     #Hyperkalemia  Resolved after previous MARYA. Lisinopril and hydrochlorothiazide/triamterene remains held    #Hypomagnesemia  Currently on Mg supplementation, will monitor at next visit.    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  -Initiated on FOLFOX 5/31 for C1D1 -> developed A fib  -Additional cycles pending cardiology evalaution      Follow-up in 3 months with labs    Patient was seen and discussed with Dr. Jordin Contreras MD  Division of Renal Disease and Hypertension  [facility]  [email or web address]  Vocera Web Console            Attestation signed by Santana Brenner MD at 6/13/2023  7:09 PM:  I personally examined and evaluated this patient on 06/13/23. I discussed the patient with the resident/fellow and care team, and agree with the assessment and plan of care as documented in the resident's/fellow's note of 06/13/23. Please also see my separate note for additional information.   Santana Brenner MD on 6/13/2023 at 7:09 PM        Nephrology Initial Consult  June 13, 2023      Nadia Ladd MRN:[id number] YOB: 1955  Primary care provider: Nomi Cartwright  Requesting physician: Self, referred; hospital follow-up    REASON FOR CONSULT: MARYA on CKD stage 3    HISTORY OF PRESENT ILLNESS:  Nadia Ladd is a 67 year old F with Hx of poorly differentiated colon cancer s/p total colectomy and ALEXANDER BSO in 4/23, crohn;s disease, CKD stage 3, HTN  who is here for post hospitalization for MARYA and hyperkalemia.     The patient has history of Crohn's disease for over 20 years and has been taking  "sulfasalazine.  Initially, the symptom has been controlled but 3 to 4 months ago the patient started to have increasing diarrhea abdominal pain and lightheadedness.  She was found to have iron deficiency anemia and she underwent colonoscopy and was found to have colonic mass concerning for cancer in Feb 2023.  She then underwent laparoscopic converted to open total abdominal colectomy with ileorectal anastomosis, partial omentectomy, flexible sigmoidoscopy and ALEXANDER BSO in April 2023.  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  Shortly after surgery the patient did not have good appetite and noted more loose stool.  She was dismissed with her blood pressure medication including hydrochlorothiazide/Triamterene (for which she has been taking it for many many years).   On 5/9/2023, the patient noticed that she has some sinus problem as well as feeling \"dizziness\" 1 day prior to present to the oncology clinic.  Otherwise, she denies any symptoms of nausea vomiting, diarrhea, orthostatic symptoms, lower extremity edema or change in urination.  She does not take any NSAIDs.  Her appetite has been gradually improving. She was then seen by Oncology clinic with a plan to discuss Chemotherapy but was noted to have hyperkalemia at 8.0.  The specimen was hemolyzed but upon repeat it was 7.6. She was sent to ED at [facility] and transferred to [facility] on 5/9/23.  Upon arriving at the [facility], repeat potassium improved to 5.5 and 5.3 (after shifting).  The patient has baseline creatinine of 1.3-1.6 but she is not aware that she has chronic kidney disease and she never been seen by kidney doctor.  During surgery her creatinine has been stable and on dismissal on 4/11/23, her creatinine was 1.34.  However her creatinine upon presentation on 5/9/23 was 3.82. UA on 5/9/23 showed WBC 22 but no blood. Neg protein.   Been treated conservatively with " IV fluid, Lokelma with improvement in potassium level and creatinine function and she was dismissed.     She was started on FOLFOX C1D1 on 5/31/23.  However, shortly after that, she felt dizzy and sent to ED. to have A-fib with RVR and was was admited between 6/3-6/5/23.  She received diltiazem and spontaneously converted in the ED oncology is concerned it might be related to 5-FU and she has been referred to cardiology.      Today, she is here for posthospitalization follow-up.  She reports bit of leg swelling. Her diarrhea has improved and she would consider that this is normal for her. Her appetite is good. No problem with urination. She still has frequent BMs. She just bought BP machine at home and they were 130/70s.    Labs 6/8/23: Creatinine 1.18, EGFR 51, BUN 11, 3.6, sodium 145, calcium 8.7, albumin 4.1. Hemoglobin 10.1, blood cell 3.0, platelet 164.  UA on 6/30/2020 showed white blood cell 7 cells no RBC. UPCR 0.06 g/g.     PAST MEDICAL HISTORY:  Reviewed with patient on 06/13/2023     Past Medical History:   Diagnosis Date     Arthropathia 08/05/2010     B12 deficiency anemia 10/21/2010     Benign essential hypertension with target blood pressure below 140/90 10/10/2016     CARDIOVASCULAR SCREENING; LDL GOAL LESS THAN 160 10/31/2010     Crohn's colitis (H) 02/15/2011     Dermatitis-dishydrotic eczema-severe 08/05/2010     Hiatal hernia      HTN (hypertension) 07/21/2011     Iron deficiency anemia 10/21/2010     Malignant neoplasm of sigmoid colon (H)      Obesity      Primary pulmonary hypertension (H) 04/06/2010       Past Surgical History:   Procedure Laterality Date     COLECTOMY WITHOUT COLOSTOMY N/A 4/6/2023    Procedure: Laparoscopic Converted to Open Total abdominal colectomy;  Surgeon: Jerome Ashley MD;  [facility]     COLONOSCOPY  10/11/10     COLONOSCOPY N/A 2/27/2023    Procedure: ATTEMPTED COLONOSCOPY WITH SIGMOID STRICTURE BIOPSY;  Surgeon: Jonathan Alcocer MD;  Location:   GI     ESOPHAGOSCOPY, GASTROSCOPY, DUODENOSCOPY (EGD), COMBINED N/A 2/27/2023    Procedure: ESOPHAGOGASTRODUODENOSCOPY, WITH BIOPSY;  Surgeon: Jonathan Alcocer MD;  [facility]     HYSTERECTOMY TOTAL ABDOMINAL, BILATERAL SALPINGO-OOPHORECTOMY, COMBINED N/A 4/6/2023    Procedure: Hysterectomy total abdominal, bilateral salpingo-oophorectomy;  Surgeon: Sunitha Olea MD;  Location: [facility]     INSERT PORT VASCULAR ACCESS Right 5/25/2023    Procedure: Ultrasound-guided right internal jugular venous access port placement with fluoroscopy;  Surgeon: Martin Thomas DO;  Location[facility]     SIGMOIDOSCOPY FLEXIBLE N/A 4/6/2023    Procedure: Sigmoidoscopy flexible;  Surgeon: Jerome Ashley MD;  Location: [facility]     SURGICAL HISTORY OF -   06/14/76    Perineorrhaphy, for widening vaginal orifice     ZZC EXPLORATORY OF ABDOMEN  1989    laparoscopy        MEDICATIONS:  PTA Meds  Current Outpatient Medications   Medication     cyanocobalamin 1000 MCG TBCR     dexamethasone (DECADRON) 4 MG tablet     ferrous sulfate (IRON) 325 (65 FE) MG tablet     magnesium oxide (MAG-OX) 400 MG tablet     metoprolol succinate ER (TOPROL XL) 25 MG 24 hr tablet     Multiple Vitamins-Iron (MULTI-DAY PLUS IRON PO)     sodium bicarbonate 650 MG tablet     loperamide (IMODIUM A-D) 2 MG tablet     ondansetron (ZOFRAN) 8 MG tablet     prochlorperazine (COMPAZINE) 10 MG tablet     No current facility-administered medications for this visit.         ALLERGIES:    Allergies   Allergen Reactions     No Known Drug Allergy        REVIEW OF SYSTEMS:  A comprehensive of systems was negative except as noted above.    SOCIAL HISTORY:   Social History     Socioeconomic History     Marital status:      Spouse name: Not on file     Number of children: Not on file     Years of education: Not on file     Highest education level: Not on file   Occupational History     Not on file   Tobacco Use     Smoking status: Never     Passive  exposure: Current     Smokeless tobacco: Never   Vaping Use     Vaping status: Not on file   Substance and Sexual Activity     Alcohol use: No     Drug use: No     Sexual activity: Yes     Partners: Male   Other Topics Concern     Parent/sibling w/ CABG, MI or angioplasty before 65F 55M? Not Asked   Social History Narrative     Not on file     Social Determinants of Health     Financial Resource Strain: Not on file   Food Insecurity: Not on file   Transportation Needs: Not on file   Physical Activity: Not on file   Stress: Not on file   Social Connections: Not on file   Intimate Partner Violence: Not on file   Housing Stability: Not on file     Reviewed with patient.    FAMILY MEDICAL HISTORY:   Family History   Problem Relation Age of Onset     Hypertension Mother         on meds, alive     Cerebrovascular Disease Father         stroke about age 65,  of cancer at 68 yrs     Diabetes Father         eventually took insulin     Anemia Father         Pernisios anemia     Kidney Disease Niece         kidney transplant     Kidney Disease Nephew         kidney transplant     Venous thrombosis No family hx of      Anesthesia Reaction No family hx of      Reviewed with patient.    PHYSICAL EXAM:   @FLOWSTAT(6,873624,[id number])@      @FLOWSTAT(8)@ @FLOWSTAT(9,504090,10)@       /84 (BP Location: Right arm, Patient Position: Sitting, Cuff Size: Adult Large)   Pulse 80   Wt 81.8 kg (180 lb 7 oz)   LMP  (LMP Unknown)   SpO2 97%   BMI 33.00 kg/m     [unfilled]    Weight: 81.8 kg (180 lb 7 oz)     GENERAL APPEARANCE: no  distress, no awake  EYES: no scleral icterus, pupils equal  Endo: No goiter, no moon facies  Lymphatics: no cervical or supraclavicular LAD  Pulmonary: lungs clear to auscultation with equal breath sounds bilaterally, no clubbing  CV: regular rhythm, normal rate, no rub   - Edema: more swelling on rt leg (chronic and stable)  GI: soft, nontender, normal bowel sounds; well healed surgical  scar  MS: no evidence of inflammation in joints, no muscle tenderness  : No CVA tenderness  SKIN: no rash, warm, dry, no cyanosis  NEURO: face symmetric, no asterixis     LABS:   Last Renal Panel:  Sodium   Date Value Ref Range Status   06/08/2023 145 136 - 145 mmol/L Final   01/26/2021 140 133 - 144 mmol/L Final     Potassium   Date Value Ref Range Status   06/08/2023 3.6 3.4 - 5.3 mmol/L Final   10/09/2021 4.3 3.4 - 5.3 mmol/L Final   01/26/2021 4.1 3.4 - 5.3 mmol/L Final     Potassium POCT   Date Value Ref Range Status   04/06/2023 4.7 3.5 - 5.0 mmol/L Final     Chloride   Date Value Ref Range Status   06/08/2023 109 (H) 98 - 107 mmol/L Final   10/09/2021 118 (H) 94 - 109 mmol/L Final   01/26/2021 111 (H) 94 - 109 mmol/L Final     Carbon Dioxide   Date Value Ref Range Status   01/26/2021 22 20 - 32 mmol/L Final     Carbon Dioxide (CO2)   Date Value Ref Range Status   06/08/2023 26 22 - 29 mmol/L Final   10/09/2021 20 20 - 32 mmol/L Final     Anion Gap   Date Value Ref Range Status   06/08/2023 10 7 - 15 mmol/L Final   10/09/2021 4 3 - 14 mmol/L Final   01/26/2021 7 3 - 14 mmol/L Final     Glucose   Date Value Ref Range Status   06/08/2023 109 (H) 70 - 99 mg/dL Final   10/09/2021 159 (H) 70 - 99 mg/dL Final   01/26/2021 107 (H) 70 - 99 mg/dL Final     GLUCOSE BY METER POCT   Date Value Ref Range Status   05/10/2023 116 (H) 70 - 99 mg/dL Final     Comment:     /RN Notified     Urea Nitrogen   Date Value Ref Range Status   06/08/2023 11.6 8.0 - 23.0 mg/dL Final   10/09/2021 25 7 - 30 mg/dL Final   01/26/2021 29 7 - 30 mg/dL Final     Creatinine   Date Value Ref Range Status   06/08/2023 1.18 (H) 0.51 - 0.95 mg/dL Final   01/26/2021 1.45 (H) 0.52 - 1.04 mg/dL Final     GFR Estimate   Date Value Ref Range Status   06/08/2023 51 (L) >60 mL/min/1.73m2 Final     Comment:     eGFR calculated using 2021 CKD-EPI equation.   01/26/2021 38 (L) >60 mL/min/[1.73_m2] Final     Comment:     Non  GFR  Calc  Starting 12/18/2018, serum creatinine based estimated GFR (eGFR) will be   calculated using the Chronic Kidney Disease Epidemiology Collaboration   (CKD-EPI) equation.       GFR, ESTIMATED POCT   Date Value Ref Range Status   05/26/2023 33 (L) >60 mL/min/1.73m2 Final     Calcium   Date Value Ref Range Status   06/08/2023 8.7 (L) 8.8 - 10.2 mg/dL Final   01/26/2021 9.3 8.5 - 10.1 mg/dL Final     Phosphorus   Date Value Ref Range Status   06/04/2023 3.3 2.5 - 4.5 mg/dL Final   09/14/2010 3.7 2.5 - 4.5 mg/dL Final     Albumin   Date Value Ref Range Status   06/08/2023 4.1 3.5 - 5.2 g/dL Final   01/26/2021 4.1 3.4 - 5.0 g/dL Final       URINE STUDIES  Recent Labs   Lab Test 06/03/23  1038 05/10/23  1004 05/09/23  1810 07/08/20  1327   COLOR Straw Light Yellow Yellow Yellow   APPEARANCE Slightly Cloudy* Clear Slightly Cloudy* Slightly Cloudy   URINEGLC Negative Negative Negative Negative   URINEBILI Negative Negative Negative Negative   URINEKETONE Negative Negative Negative Negative   SG 1.014 1.010 1.013 1.020   UBLD Negative Trace* Negative Negative   URINEPH 5.0 5.0 5.0 5.0   PROTEIN Negative Negative Negative Negative   NITRITE Negative Negative Negative Negative   LEUKEST Trace* Moderate* Small* Small*   RBCU 1 1 1  --    WBCU 7* 6* 22*  --      No lab results found.  PTH  No lab results found.  IRON STUDIES  Recent Labs   Lab Test 04/09/23  0829 02/16/23  0959 03/12/18  1629 11/30/16  1628 09/23/15  1606   IRON 8* 27*  --  43 62   * 265  --  232* 210*   IRONSAT 6* 10*  --  19 30   BRENT  --  21 454*  --  582*       IMAGING:  Ultrasound renal without Doppler on 5/10/2023 showed  Mildly echogenic and atrophic appearance of the bilateral kidneys,  compatible chronic medical renal disease. No hydronephrosis.     ASSESSMENT AND RECOMMENDATIONS:   # Hx MARYA stage 2 likely secondary to ATN from hypotension (diuretic use and increase bowel movement)  # CKD stage 3; DDx chronic dehydration/recurrent MARYA from  Crohn's disease, Hypertension or Sulfasalazine (stopped in 4/23); BL Cr 1.3-1.6  Etiology of acute kidney injury that occurred in May 23 was likely from ATN in the setting of unaware hypotension/dehydration from ongoing diuretic use and increase BM after surgery. UA was bland.UPCR was negative. Kidney ultrasound showed no evidence of hydronephrosis or masses but that showed that she had some mild atrophic changes consistent with chronic kidney disease.      In regard to CKD, she has baseline creatinine of 1.3-1.6.  Again, etiology likely multifactorial in the setting of chronic dehydration/recurrent MARYA from Crohn's, hypertension or sulfasalazine use which can be associated with AIN.  Sulfasalazine was discontinued since April 2023 so should no longer contribute.  UA appears to be clean and no protein so less likely GN. IgAN is associated with Crohn's but UA is not suggestive of that.      She has been on FOLFOX6, oxaliplatin may cause MARYA int he form of ATN but rare. Her volume status should be maintained especially in the setting of chronic diarrhea in the setting of Crohn's disease.   - Stay well hydrated, dehydration is one of the major risk factor for MARYA in platinum-based regimen   -Continue to observe blood pressure while holding blood pressure medication  # Hx of Hyperkalemia; resolved  Likely in the setting of triamterene use, high K diet and MARYA. Now resolved.  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   FOLFOX6 C1D1 on 5/31/23 but has Afib shortly after. Plan [id number] pending cardiology eval.   # Anemia in CKD and cancer   Hb 10.1. Likely multifactorial Not indicated for ESAs, given recent cancer.  # Hypertension; monitor blood pressure medication  Previously on triamterene/hydrochlorothiazide but being held May 2023.  Blood pressure still maintaining and no propensity when the patient is  brought blood pressure cuff and continue to monitor home blood pressure.   # BMD  I will check vitamin D and phosphorus PTH next visit.  # Crohn's disease  She was on Sulfazalazine since April 23. Diarrhea is stable.      Follow-up in 3 months with labs    I spent 60 minutes on the date of the encounter doing chart review, history and exam, documentation and further activities as noted above. 20 minutes of this visit is dedicated to direct patient interaction via face to face.    Santana Brenner MD on 06/13/2023              Again, thank you for allowing me to participate in the care of your patient.      Sincerely,    Santana Brenner MD

## 2023-06-13 NOTE — PROGRESS NOTES
"  Nephrology Initial Consult  June 13, 2023      Nadia Ladd MRN:[id number] YOB: 1955  Primary care provider: Nomi Cartwright  Requesting physician: Self, referred; hospital follow-up    REASON FOR CONSULT: MARYA on CKD stage 3    HISTORY OF PRESENT ILLNESS:  Nadia Ladd is a 67 year old F with Hx of poorly differentiated colon cancer s/p total colectomy and ALEXANDER BSO in 4/23, crohn;s disease, CKD stage 3, HTN  who is here for post hospitalization for MARYA and hyperkalemia.     The patient has history of Crohn's disease for over 20 years and has been taking sulfasalazine.  Initially, the symptom has been controlled but 3 to 4 months ago the patient started to have increasing diarrhea abdominal pain and lightheadedness.  She was found to have iron deficiency anemia and she underwent colonoscopy and was found to have colonic mass concerning for cancer in Feb 2023.  She then underwent laparoscopic converted to open total abdominal colectomy with ileorectal anastomosis, partial omentectomy, flexible sigmoidoscopy and ALEXANDER BSO in April 2023.  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  Shortly after surgery the patient did not have good appetite and noted more loose stool.  She was dismissed with her blood pressure medication including hydrochlorothiazide/Triamterene (for which she has been taking it for many many years).   On 5/9/2023, the patient noticed that she has some sinus problem as well as feeling \"dizziness\" 1 day prior to present to the oncology clinic.  Otherwise, she denies any symptoms of nausea vomiting, diarrhea, orthostatic symptoms, lower extremity edema or change in urination.  She does not take any NSAIDs.  Her appetite has been gradually improving. She was then seen by Oncology clinic with a plan to discuss Chemotherapy but was noted to have hyperkalemia at 8.0.  The specimen was hemolyzed but " upon repeat it was 7.6. She was sent to ED at [facility] and transferred to [facility] on 5/9/23.  Upon arriving at the [facility], repeat potassium improved to 5.5 and 5.3 (after shifting).  The patient has baseline creatinine of 1.3-1.6 but she is not aware that she has chronic kidney disease and she never been seen by kidney doctor.  During surgery her creatinine has been stable and on dismissal on 4/11/23, her creatinine was 1.34.  However her creatinine upon presentation on 5/9/23 was 3.82. UA on 5/9/23 showed WBC 22 but no blood. Neg protein.   Been treated conservatively with IV fluid, Lokelma with improvement in potassium level and creatinine function and she was dismissed.     She was started on FOLFOX C1D1 on 5/31/23.  However, shortly after that, she felt dizzy and sent to ED. to have A-fib with RVR and was was admited between 6/3-6/5/23.  She received diltiazem and spontaneously converted in the ED oncology is concerned it might be related to 5-FU and she has been referred to cardiology.      Today, she is here for posthospitalization follow-up.  She reports bit of leg swelling. Her diarrhea has improved and she would consider that this is normal for her. Her appetite is good. No problem with urination. She still has frequent BMs. She just bought BP machine at home and they were 130/70s.    Labs 6/8/23: Creatinine 1.18, EGFR 51, BUN 11, 3.6, sodium 145, calcium 8.7, albumin 4.1. Hemoglobin 10.1, blood cell 3.0, platelet 164.  UA on 6/30/2020 showed white blood cell 7 cells no RBC. UPCR 0.06 g/g.     PAST MEDICAL HISTORY:  Reviewed with patient on 06/13/2023     Past Medical History:   Diagnosis Date     Arthropathia 08/05/2010     B12 deficiency anemia 10/21/2010     Benign essential hypertension with target blood pressure below 140/90 10/10/2016     CARDIOVASCULAR SCREENING; LDL GOAL LESS THAN 160 10/31/2010     Crohn's colitis (H) 02/15/2011     Dermatitis-dishydrotic eczema-severe 08/05/2010      Hiatal hernia      HTN (hypertension) 07/21/2011     Iron deficiency anemia 10/21/2010     Malignant neoplasm of sigmoid colon (H)      Obesity      Primary pulmonary hypertension (H) 04/06/2010       Past Surgical History:   Procedure Laterality Date     COLECTOMY WITHOUT COLOSTOMY N/A 4/6/2023    Procedure: Laparoscopic Converted to Open Total abdominal colectomy;  Surgeon: Jerome Ashley MD;  Location: [facility]     COLONOSCOPY  10/11/10     COLONOSCOPY N/A 2/27/2023    Procedure: ATTEMPTED COLONOSCOPY WITH SIGMOID STRICTURE BIOPSY;  Surgeon: Jonathan Alcocer MD;  Location: [facility]     ESOPHAGOSCOPY, GASTROSCOPY, DUODENOSCOPY (EGD), COMBINED N/A 2/27/2023    Procedure: ESOPHAGOGASTRODUODENOSCOPY, WITH BIOPSY;  Surgeon: Jonathan Alcocer MD;  Location: [facility]     HYSTERECTOMY TOTAL ABDOMINAL, BILATERAL SALPINGO-OOPHORECTOMY, COMBINED N/A 4/6/2023    Procedure: Hysterectomy total abdominal, bilateral salpingo-oophorectomy;  Surgeon: Sunitha Olea MD;  Location: [facility] OR     INSERT PORT VASCULAR ACCESS Right 5/25/2023    Procedure: Ultrasound-guided right internal jugular venous access port placement with fluoroscopy;  Surgeon: Martin Thomas DO;  Location: [facility]     SIGMOIDOSCOPY FLEXIBLE N/A 4/6/2023    Procedure: Sigmoidoscopy flexible;  Surgeon: Jerome Ashley MD;  Location: [facility] OR     SURGICAL HISTORY OF -   06/14/76    Perineorrhaphy, for widening vaginal orifice     Z EXPLORATORY OF ABDOMEN  1989    laparoscopy        MEDICATIONS:  PTA Meds  Current Outpatient Medications   Medication     cyanocobalamin 1000 MCG TBCR     dexamethasone (DECADRON) 4 MG tablet     ferrous sulfate (IRON) 325 (65 FE) MG tablet     magnesium oxide (MAG-OX) 400 MG tablet     metoprolol succinate ER (TOPROL XL) 25 MG 24 hr tablet     Multiple Vitamins-Iron (MULTI-DAY PLUS IRON PO)     sodium bicarbonate 650 MG tablet     loperamide (IMODIUM A-D) 2 MG tablet     ondansetron (ZOFRAN) 8 MG tablet      prochlorperazine (COMPAZINE) 10 MG tablet     No current facility-administered medications for this visit.         ALLERGIES:    Allergies   Allergen Reactions     No Known Drug Allergy        REVIEW OF SYSTEMS:  A comprehensive of systems was negative except as noted above.    SOCIAL HISTORY:   Social History     Socioeconomic History     Marital status:      Spouse name: Not on file     Number of children: Not on file     Years of education: Not on file     Highest education level: Not on file   Occupational History     Not on file   Tobacco Use     Smoking status: Never     Passive exposure: Current     Smokeless tobacco: Never   Vaping Use     Vaping status: Not on file   Substance and Sexual Activity     Alcohol use: No     Drug use: No     Sexual activity: Yes     Partners: Male   Other Topics Concern     Parent/sibling w/ CABG, MI or angioplasty before 65F 55M? Not Asked   Social History Narrative     Not on file     Social Determinants of Health     Financial Resource Strain: Not on file   Food Insecurity: Not on file   Transportation Needs: Not on file   Physical Activity: Not on file   Stress: Not on file   Social Connections: Not on file   Intimate Partner Violence: Not on file   Housing Stability: Not on file     Reviewed with patient.    FAMILY MEDICAL HISTORY:   Family History   Problem Relation Age of Onset     Hypertension Mother         on meds, alive     Cerebrovascular Disease Father         stroke about age 65,  of cancer at 68 yrs     Diabetes Father         eventually took insulin     Anemia Father         Pernisios anemia     Kidney Disease Niece         kidney transplant     Kidney Disease Nephew         kidney transplant     Venous thrombosis No family hx of      Anesthesia Reaction No family hx of      Reviewed with patient.    PHYSICAL EXAM:   @FLOWSTAT(6,058127,246197,670039)@      @FLOWSTAT(8)@ @FLOWSTAT(9,865536,10)@       /84 (BP Location: Right arm, Patient Position:  Sitting, Cuff Size: Adult Large)   Pulse 80   Wt 81.8 kg (180 lb 7 oz)   LMP  (LMP Unknown)   SpO2 97%   BMI 33.00 kg/m     [unfilled]    Weight: 81.8 kg (180 lb 7 oz)     GENERAL APPEARANCE: no  distress, no awake  EYES: no scleral icterus, pupils equal  Endo: No goiter, no moon facies  Lymphatics: no cervical or supraclavicular LAD  Pulmonary: lungs clear to auscultation with equal breath sounds bilaterally, no clubbing  CV: regular rhythm, normal rate, no rub   - Edema: more swelling on rt leg (chronic and stable)  GI: soft, nontender, normal bowel sounds; well healed surgical scar  MS: no evidence of inflammation in joints, no muscle tenderness  : No CVA tenderness  SKIN: no rash, warm, dry, no cyanosis  NEURO: face symmetric, no asterixis     LABS:   Last Renal Panel:  Sodium   Date Value Ref Range Status   06/08/2023 145 136 - 145 mmol/L Final   01/26/2021 140 133 - 144 mmol/L Final     Potassium   Date Value Ref Range Status   06/08/2023 3.6 3.4 - 5.3 mmol/L Final   10/09/2021 4.3 3.4 - 5.3 mmol/L Final   01/26/2021 4.1 3.4 - 5.3 mmol/L Final     Potassium POCT   Date Value Ref Range Status   04/06/2023 4.7 3.5 - 5.0 mmol/L Final     Chloride   Date Value Ref Range Status   06/08/2023 109 (H) 98 - 107 mmol/L Final   10/09/2021 118 (H) 94 - 109 mmol/L Final   01/26/2021 111 (H) 94 - 109 mmol/L Final     Carbon Dioxide   Date Value Ref Range Status   01/26/2021 22 20 - 32 mmol/L Final     Carbon Dioxide (CO2)   Date Value Ref Range Status   06/08/2023 26 22 - 29 mmol/L Final   10/09/2021 20 20 - 32 mmol/L Final     Anion Gap   Date Value Ref Range Status   06/08/2023 10 7 - 15 mmol/L Final   10/09/2021 4 3 - 14 mmol/L Final   01/26/2021 7 3 - 14 mmol/L Final     Glucose   Date Value Ref Range Status   06/08/2023 109 (H) 70 - 99 mg/dL Final   10/09/2021 159 (H) 70 - 99 mg/dL Final   01/26/2021 107 (H) 70 - 99 mg/dL Final     GLUCOSE BY METER POCT   Date Value Ref Range Status   05/10/2023 116 (H) 70 - 99  mg/dL Final     Comment:     /RN Notified     Urea Nitrogen   Date Value Ref Range Status   06/08/2023 11.6 8.0 - 23.0 mg/dL Final   10/09/2021 25 7 - 30 mg/dL Final   01/26/2021 29 7 - 30 mg/dL Final     Creatinine   Date Value Ref Range Status   06/08/2023 1.18 (H) 0.51 - 0.95 mg/dL Final   01/26/2021 1.45 (H) 0.52 - 1.04 mg/dL Final     GFR Estimate   Date Value Ref Range Status   06/08/2023 51 (L) >60 mL/min/1.73m2 Final     Comment:     eGFR calculated using 2021 CKD-EPI equation.   01/26/2021 38 (L) >60 mL/min/[1.73_m2] Final     Comment:     Non  GFR Calc  Starting 12/18/2018, serum creatinine based estimated GFR (eGFR) will be   calculated using the Chronic Kidney Disease Epidemiology Collaboration   (CKD-EPI) equation.       GFR, ESTIMATED POCT   Date Value Ref Range Status   05/26/2023 33 (L) >60 mL/min/1.73m2 Final     Calcium   Date Value Ref Range Status   06/08/2023 8.7 (L) 8.8 - 10.2 mg/dL Final   01/26/2021 9.3 8.5 - 10.1 mg/dL Final     Phosphorus   Date Value Ref Range Status   06/04/2023 3.3 2.5 - 4.5 mg/dL Final   09/14/2010 3.7 2.5 - 4.5 mg/dL Final     Albumin   Date Value Ref Range Status   06/08/2023 4.1 3.5 - 5.2 g/dL Final   01/26/2021 4.1 3.4 - 5.0 g/dL Final       URINE STUDIES  Recent Labs   Lab Test 06/03/23  1038 05/10/23  1004 05/09/23  1810 07/08/20  1327   COLOR Straw Light Yellow Yellow Yellow   APPEARANCE Slightly Cloudy* Clear Slightly Cloudy* Slightly Cloudy   URINEGLC Negative Negative Negative Negative   URINEBILI Negative Negative Negative Negative   URINEKETONE Negative Negative Negative Negative   SG 1.014 1.010 1.013 1.020   UBLD Negative Trace* Negative Negative   URINEPH 5.0 5.0 5.0 5.0   PROTEIN Negative Negative Negative Negative   NITRITE Negative Negative Negative Negative   LEUKEST Trace* Moderate* Small* Small*   RBCU 1 1 1  --    WBCU 7* 6* 22*  --      No lab results found.  PTH  No lab results found.  IRON STUDIES  Recent Labs   Lab Test  04/09/23  0829 02/16/23  0959 03/12/18  1629 11/30/16  1628 09/23/15  1606   IRON 8* 27*  --  43 62   * 265  --  232* 210*   IRONSAT 6* 10*  --  19 30   BRENT  --  21 454*  --  582*       IMAGING:  Ultrasound renal without Doppler on 5/10/2023 showed  Mildly echogenic and atrophic appearance of the bilateral kidneys,  compatible chronic medical renal disease. No hydronephrosis.     ASSESSMENT AND RECOMMENDATIONS:   # Hx MARYA stage 2 likely secondary to ATN from hypotension (diuretic use and increase bowel movement)  # CKD stage 3; DDx chronic dehydration/recurrent MARYA from Crohn's disease, Hypertension or Sulfasalazine (stopped in 4/23); BL Cr 1.3-1.6  Etiology of acute kidney injury that occurred in May 23 was likely from ATN in the setting of unaware hypotension/dehydration from ongoing diuretic use and increase BM after surgery. UA was bland.UPCR was negative. Kidney ultrasound showed no evidence of hydronephrosis or masses but that showed that she had some mild atrophic changes consistent with chronic kidney disease.      In regard to CKD, she has baseline creatinine of 1.3-1.6.  Again, etiology likely multifactorial in the setting of chronic dehydration/recurrent MARYA from Crohn's, hypertension or sulfasalazine use which can be associated with AIN.  Sulfasalazine was discontinued since April 2023 so should no longer contribute.  UA appears to be clean and no protein so less likely GN. IgAN is associated with Crohn's but UA is not suggestive of that.      She has been on FOLFOX6, oxaliplatin may cause MARYA int he form of ATN but rare. Her volume status should be maintained especially in the setting of chronic diarrhea in the setting of Crohn's disease.   - Stay well hydrated, dehydration is one of the major risk factor for MARYA in platinum-based regimen   -Continue to observe blood pressure while holding blood pressure medication  # Hx of Hyperkalemia; resolved  Likely in the setting of triamterene use, high K  diet and MARYA. Now resolved.  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   FOLFOX6 C1D1 on 5/31/23 but has Afib shortly after. Plan [id number] pending cardiology eval.   # Anemia in CKD and cancer   Hb 10.1. Likely multifactorial Not indicated for ESAs, given recent cancer.  # Hypertension; monitor blood pressure medication  Previously on triamterene/hydrochlorothiazide but being held May 2023.  Blood pressure still maintaining and no propensity when the patient is brought blood pressure cuff and continue to monitor home blood pressure.   # BMD  I will check vitamin D and phosphorus PTH next visit.  # Crohn's disease  She was on Sulfazalazine since April 23. Diarrhea is stable.      Follow-up in 3 months with labs    I spent 60 minutes on the date of the encounter doing chart review, history and exam, documentation and further activities as noted above. 20 minutes of this visit is dedicated to direct patient interaction via face to face.    Santana Brenner MD on 06/13/2023

## 2023-06-13 NOTE — PROGRESS NOTES
Nephrology Initial Consult  June 13, 2023      Nadia Ladd MRN:[id number] YOB: 1955  Primary care provider: Nomi Cartwright    REASON FOR CONSULT: MARYA    HISTORY OF PRESENT ILLNESS:  Nadia Ladd is a 67 year old with a history of Crohn's colitis, anemia (both iron deficiency and B12 deficiency listed), primary pulmonary hypertension, HTN, CKD stage III, newly diagnosed colon cancer (neuroendocrine carcinoma and adenocarcinoma) who had recent colon resection 4/6/23 (but no ostomy) who is currently on FOLFOX (starting 5/31, C2 planned for 6/14). She recently was admitted 5/10 - 5/12 for MARYA up to Scr 3.6 suspected to be due to poor PO intake with increased stool output and resumption of anti-HTN medications. Creatinine started to downtrend during hospitalization. She is presenting to clinic for subsequent follow-up.    Since hospitalization, she has started on FOLFOX on 5/31. She subsequently developed dizziness and lightheadedness which brought her into the ED. She was found to have a-fib and was started on a diltiazem gtt with chemical cardioversion. She did not have further a fib during hospitalization. Was evaluated for demand ischemia. Cardiac workup showed EF 55-60%. Mild pulmonary HTN, mild MR.     There is suspicion for cardiotoxicity with 5-Follow-up and cardiology referral is being pursued prior to additional chemotherapy. Additionally she has developed tinnitus on oxaliplatin    BL Cr prior to aforementioned admissions was 1.3-1.5. Since most recent hospital discharge, creatinine has trended down to previous baseline. Her diarrhea is improved (4-6x per day as opposed to >7). No longer taking imodium. Reasonable appetite and PO fluid intake. No issues with urination. Recently bought a BP machine and monitors at home - noting 130-140 systolic/ 70 diastolic.      Brief Oncologic history (obtained from note from Dr. Bateman):  COLONOSCOPY 2/2023  Colon, sigmoid, stricture:  Biopsy:  - Invasive poorly differentiated carcinoma  The sigmoid stricture shows a high-grade carcinoma without definitive gland formation.  Given the poorly differentiated appearance, an immunohistochemical panel was performed to exclude the possibility of a tumor by direct extension or metastasis.   Immunohistochemical stains are performed and show the tumor to be diffusely positive for CK7 and partially positive for CK20 and SATB2.  There is no significant tumor reactivity for CDX2, GATA3, PAX8, TTF-1, and chromogranin.  Synaptophysin highlights very rare positive cells. Although the CK7/CK20 profile is not entirely typical for a colorectal carcinoma, immunostains for tumors of other origins are negative and a colorectal primary is still favored.   - Mismatch repair:  1) MLH1-loss of nuclear expression  2) MSH2-intact  3) MSH6-intact  4) PMS2-loss of nuclear expression  -MLH1 promoter methylation: NEGATIVE    COLON, RESECTION 4/6/23:  Mass #1 (ascending colon/hepatic flexure): Mixed neuroendocrine-non-neuroendocrine neoplasm (MINEN):  - Neuroendocrine carcinoma component (70%) and moderately-differentiated adenocarcinoma component (30%)  - Size = 5.0 cm, invading into muscularis propria  - Positive LVI  - Negative macroscopic tumor perforation, negative PNI  - Negative surgical resection margins  - IHC: Neuroendocrine portion: Negative for chromogranin and INSM1 but strongly express synaptophysin  - IHC: Adenocarcinoma portion: Positive for CK20, CDX2 negative for CK7, PAX8, ER, S100  Ki-67 proliferation index of approximately 80%.  MMR:  Intact nuclear expression of MLH1, MSH2, MHS6, PMS2  PDL1:  COMBINED POSITIVE SCORE (CPS): 55-60  TUMOR PROPORTION SCORE (TPS): 50%   pathogenic KRAS mutation (G13D) was identified (5.2%).  AJCC 8th edition: stage 3B mpT3 pN1a      Mass#2 (sigmoid colon): Poorly-differentiated carcinoma:  - Grade 3  - Size = 5.7 cm, invading into muscularis propria  - Negative for microscopic  tumor perforation, LVI, perineural invasion  - Negative surgical resection margins  - IHC: Positive for scar and keratin AE1/AE3, negative for CK7, CK20, CDX2, PAX8, ER, chromogranin, CD56, p40, synaptophysin, S100, INI1, p40, INSM1  Ki-67 proliferation index of approximately 70%.  MMR: loss of nuclear expression MLH1 and PMS2, intact nuclear expression MSH2 and MSH6  PDL1:  COMBINED POSITIVE SCORE (CPS): 80  TUMOR PROPORTION SCORE (TPS): 70%  pathogenic KRAS mutation (G13D) was identified (4.5%).  AJCC 8th edition: stage 3A mpT2 pN1a    CRC NGS:   --mutations positive for KRAS G13D mutation 5.2% mass 1, KRAS G13D mutation 4.5% mass 2 (negative for AKT1; BRAF; ERBB2; KRAS; NRAS; PIK3CA; PTEN)  --TMB score: 17.064 mut/Mb mass 1 (CPS 55-60, TPS 50%), 60.943 mut/Mb mass 2 (CPS 80, TPS 70%)  --fusion negative including NTRK and RET for mass 1 and 2 (also negative for AKT1, AKT3, ALK, AR, HQGRXX90, CLAUDINE, BCOR, BRAF, BRD3, BRD4, CAMTA1, CCNB3, CCND1, , CIC, CSF1, CSF1R, CTNNB1, DNAJB1, EGFR, EPC1, ERBB2, ERBB4, ERG, ESR1, ESRRA, ETV1, ETV4, ETV5, ETV6, EWSR1, FGFR1, FGFR2, FGFR3, FGR, FOS, FOSB, FOXO1, FOXO4, FOXR2, FUS, GLI1, GRB7, HMGA2, HRAS, IDH1, IDH2, INSR, JAK2, JAK3, JAZF1, KRAS, MAML2, MAP2K1, MAST1, MAST2, MEAF6, MET, MKL2, MN1, MSMB, MUSK, MYB, MYBL1, MYOD1, NCOA1, NCOA2, NOTCH1, NOTCH2, NR4A3, NRAS, NRG1, NTRK1, NTRK2, NTRK3, NUMBL1, NUTM1, PAX3, PDGFB, PDGFRA, PDGFRB, PHF1, PIK3CA, PKN1, PLAG1, PPARG, PRKACA, PRKCA, PRKCB, RAF1, RELA, RET, ROS1, RPSO2, RSPO3, SS18, STAT6, TAF15, TCF12, TERT, TFE3, TFEB, TFG, THADA, TMPRSS2, USP6, VGLL2, YAP1, YWHAE)    5/26/23 CT CAP w/ contrast and IVF before and after CT (baseline prior to starting tx):  1.  3 mm nodule RML and 5 mm lateral EDSON nodule, minimally increased from previous  2.  New 4 mm EDSON groundglass density nodule  3.  Several prominent borderline enlarged mediastinal lymph nodes slightly increased from previous  4.  Splenomegaly 14.5 cm  5. S/p subtotal  colectomy with ileorectal anastomosis with postsurgical changes along the ventral abdominal wall midline    5/31 - started treatment with FOLFOX C1D1  6/13 -  C2D1 FOLFOX delayed for cardiology evaluation    PAST MEDICAL HISTORY:  Reviewed with patient on 06/13/2023     Past Medical History:   Diagnosis Date     Arthropathia 08/05/2010     B12 deficiency anemia 10/21/2010     Benign essential hypertension with target blood pressure below 140/90 10/10/2016     CARDIOVASCULAR SCREENING; LDL GOAL LESS THAN 160 10/31/2010     Crohn's colitis (H) 02/15/2011     Dermatitis-dishydrotic eczema-severe 08/05/2010     Hiatal hernia      HTN (hypertension) 07/21/2011     Iron deficiency anemia 10/21/2010     Malignant neoplasm of sigmoid colon (H)      Obesity      Primary pulmonary hypertension (H) 04/06/2010       Past Surgical History:   Procedure Laterality Date     COLECTOMY WITHOUT COLOSTOMY N/A 4/6/2023    Procedure: Laparoscopic Converted to Open Total abdominal colectomy;  Surgeon: Jerome Ashley MD;  Location: [facility] OR     COLONOSCOPY  10/11/10     COLONOSCOPY N/A 2/27/2023    Procedure: ATTEMPTED COLONOSCOPY WITH SIGMOID STRICTURE BIOPSY;  Surgeon: Jonathan Alcocer MD;  Location:  GI     ESOPHAGOSCOPY, GASTROSCOPY, DUODENOSCOPY (EGD), COMBINED N/A 2/27/2023    Procedure: ESOPHAGOGASTRODUODENOSCOPY, WITH BIOPSY;  Surgeon: Jonathan Alcocer MD;  Location:  GI     HYSTERECTOMY TOTAL ABDOMINAL, BILATERAL SALPINGO-OOPHORECTOMY, COMBINED N/A 4/6/2023    Procedure: Hysterectomy total abdominal, bilateral salpingo-oophorectomy;  Surgeon: Sunitha Olea MD;  Location: [facility] OR     INSERT PORT VASCULAR ACCESS Right 5/25/2023    Procedure: Ultrasound-guided right internal jugular venous access port placement with fluoroscopy;  Surgeon: Martin Thomas DO;  Location: [facility]     SIGMOIDOSCOPY FLEXIBLE N/A 4/6/2023    Procedure: Sigmoidoscopy flexible;  Surgeon: Jerome Ashley MD;  Location: [facility] OR      SURGICAL HISTORY OF -   06/14/76    Perineorrhaphy, for widening vaginal orifice     [facility] EXPLORATORY OF ABDOMEN  1989    laparoscopy        MEDICATIONS:  PTA Meds  Current Outpatient Medications   Medication     cyanocobalamin 1000 MCG TBCR     dexamethasone (DECADRON) 4 MG tablet     ferrous sulfate (IRON) 325 (65 FE) MG tablet     loperamide (IMODIUM A-D) 2 MG tablet     magnesium oxide (MAG-OX) 400 MG tablet     metoprolol succinate ER (TOPROL XL) 25 MG 24 hr tablet     Multiple Vitamins-Iron (MULTI-DAY PLUS IRON PO)     ondansetron (ZOFRAN) 8 MG tablet     prochlorperazine (COMPAZINE) 10 MG tablet     sodium bicarbonate 650 MG tablet     No current facility-administered medications for this visit.         ALLERGIES:    Allergies   Allergen Reactions     No Known Drug Allergy        REVIEW OF SYSTEMS:  A comprehensive of systems was negative except as noted above.    SOCIAL HISTORY:   Social History     Socioeconomic History     Marital status:      Spouse name: Not on file     Number of children: Not on file     Years of education: Not on file     Highest education level: Not on file   Occupational History     Not on file   Tobacco Use     Smoking status: Never     Passive exposure: Current     Smokeless tobacco: Never   Vaping Use     Vaping status: Not on file   Substance and Sexual Activity     Alcohol use: No     Drug use: No     Sexual activity: Yes     Partners: Male   Other Topics Concern     Parent/sibling w/ CABG, MI or angioplasty before 65F 55M? Not Asked   Social History Narrative     Not on file     Social Determinants of Health     Financial Resource Strain: Not on file   Food Insecurity: Not on file   Transportation Needs: Not on file   Physical Activity: Not on file   Stress: Not on file   Social Connections: Not on file   Intimate Partner Violence: Not on file   Housing Stability: Not on file     Reviewed with patient.    FAMILY MEDICAL HISTORY:   Family History   Problem Relation Age  of Onset     Hypertension Mother         on meds, alive     Cerebrovascular Disease Father         stroke about age 65,  of cancer at 68 yrs     Diabetes Father         eventually took insulin     Anemia Father         Pernisios anemia     Kidney Disease Niece         kidney transplant     Kidney Disease Nephew         kidney transplant     Venous thrombosis No family hx of      Anesthesia Reaction No family hx of      Reviewed with patient.    PHYSICAL EXAM:   /84 (BP Location: Right arm, Patient Position: Sitting, Cuff Size: Adult Large)   Pulse 80   Wt 81.8 kg (180 lb 7 oz)   LMP  (LMP Unknown)   SpO2 97%   BMI 33.00 kg/m      GENERAL APPEARANCE: No distress, awake  EYES: no scleral icterus, pupils equal  Endo: no goiter, no moon facies  Lymphatics: no cervical or supraclavicular LAD  Pulmonary: breathing comfortably on RA  CV: regular rhythm, normal rate, no murmur   - Edema trace in R LE (more than left)  GI: soft, nontender  : no gleason  SKIN: no rash, warm, dry, no cyanosis  NEURO: face symmetric, no asterixis     LABS:   Last Renal Panel:  Sodium   Date Value Ref Range Status   2023 145 136 - 145 mmol/L Final   2021 140 133 - 144 mmol/L Final     Potassium   Date Value Ref Range Status   2023 3.6 3.4 - 5.3 mmol/L Final   10/09/2021 4.3 3.4 - 5.3 mmol/L Final   2021 4.1 3.4 - 5.3 mmol/L Final     Potassium POCT   Date Value Ref Range Status   2023 4.7 3.5 - 5.0 mmol/L Final     Chloride   Date Value Ref Range Status   2023 109 (H) 98 - 107 mmol/L Final   10/09/2021 118 (H) 94 - 109 mmol/L Final   2021 111 (H) 94 - 109 mmol/L Final     Carbon Dioxide   Date Value Ref Range Status   2021 22 20 - 32 mmol/L Final     Carbon Dioxide (CO2)   Date Value Ref Range Status   2023 26 22 - 29 mmol/L Final   10/09/2021 20 20 - 32 mmol/L Final     Anion Gap   Date Value Ref Range Status   2023 10 7 - 15 mmol/L Final   10/09/2021 4 3 - 14 mmol/L  Final   01/26/2021 7 3 - 14 mmol/L Final     Glucose   Date Value Ref Range Status   06/08/2023 109 (H) 70 - 99 mg/dL Final   10/09/2021 159 (H) 70 - 99 mg/dL Final   01/26/2021 107 (H) 70 - 99 mg/dL Final     GLUCOSE BY METER POCT   Date Value Ref Range Status   05/10/2023 116 (H) 70 - 99 mg/dL Final     Comment:     Dr/RN Notified     Urea Nitrogen   Date Value Ref Range Status   06/08/2023 11.6 8.0 - 23.0 mg/dL Final   10/09/2021 25 7 - 30 mg/dL Final   01/26/2021 29 7 - 30 mg/dL Final     Creatinine   Date Value Ref Range Status   06/08/2023 1.18 (H) 0.51 - 0.95 mg/dL Final   01/26/2021 1.45 (H) 0.52 - 1.04 mg/dL Final     GFR Estimate   Date Value Ref Range Status   06/08/2023 51 (L) >60 mL/min/1.73m2 Final     Comment:     eGFR calculated using 2021 CKD-EPI equation.   01/26/2021 38 (L) >60 mL/min/[1.73_m2] Final     Comment:     Non  GFR Calc  Starting 12/18/2018, serum creatinine based estimated GFR (eGFR) will be   calculated using the Chronic Kidney Disease Epidemiology Collaboration   (CKD-EPI) equation.       GFR, ESTIMATED POCT   Date Value Ref Range Status   05/26/2023 33 (L) >60 mL/min/1.73m2 Final     Calcium   Date Value Ref Range Status   06/08/2023 8.7 (L) 8.8 - 10.2 mg/dL Final   01/26/2021 9.3 8.5 - 10.1 mg/dL Final     Phosphorus   Date Value Ref Range Status   06/04/2023 3.3 2.5 - 4.5 mg/dL Final   09/14/2010 3.7 2.5 - 4.5 mg/dL Final     Albumin   Date Value Ref Range Status   06/08/2023 4.1 3.5 - 5.2 g/dL Final   01/26/2021 4.1 3.4 - 5.0 g/dL Final       URINE STUDIES  Recent Labs   Lab Test 06/03/23  1038 05/10/23  1004 05/09/23  1810 07/08/20  1327   COLOR Straw Light Yellow Yellow Yellow   APPEARANCE Slightly Cloudy* Clear Slightly Cloudy* Slightly Cloudy   URINEGLC Negative Negative Negative Negative   URINEBILI Negative Negative Negative Negative   URINEKETONE Negative Negative Negative Negative   SG 1.014 1.010 1.013 1.020   UBLD Negative Trace* Negative Negative    URINEPH 5.0 5.0 5.0 5.0   PROTEIN Negative Negative Negative Negative   NITRITE Negative Negative Negative Negative   LEUKEST Trace* Moderate* Small* Small*   RBCU 1 1 1  --    WBCU 7* 6* 22*  --      No lab results found.  PTH  No lab results found.  IRON STUDIES  Recent Labs   Lab Test 04/09/23  0829 02/16/23  0959 03/12/18  1629 11/30/16  1628 09/23/15  1606   IRON 8* 27*  --  43 62   * 265  --  232* 210*   IRONSAT 6* 10*  --  19 30   BRENT  --  21 454*  --  582*       IMAGING:  I review the CT Abd/Pelvis on 5/26 which showed interval mild increase in borderline enlarged mediastinal lymph nodes      ASSESSMENT AND RECOMMENDATIONS:   #CKD3a  #Recent history of MARYA - suspect secondary to pre-renal injury in the setting of large stool output and resumption of anti-HTN medications.  Baseline creatinine 1.3 - 1.5. It seems that her creatinine has trended back down to baseline despite recent initiation of FOLFOX and development of transient A Fib. She also reports dramatic improvement in stool output, and reasonable PO intake.   -We will continue to follow during her upcoming cycles of chemotherapy. Unclear if she will continue with FOLFOX or proceed with adjuvant therapies    #HTN  Given her recent MARYA with concern for contribution from volume depletion and anti-HTN medications, will allow for marginal blood pressure from 130-140 systolic. She is now off of lisinopril and hydrochlorothiazide/Tiramterene. If this were to increase in the coming months would consider starting an different agent such as amlodipine.     #Acid/Base  Bicarbonate 26, improved from prior. Likely reflects improvement in stool output. Will decrease bicarbonate supplementation to 650 BID.    #Anemia  Hgb 10.1 - improved from prior. Suspect her iron and B12 deficiency is primary  for anemia.    #BMD  Ca 8.7, Phos 3.3 (last check). Will check parathyroid and vitamin D at next visit.     #Hyperkalemia  Resolved after previous MARYA.  Lisinopril and hydrochlorothiazide/triamterene remains held    #Hypomagnesemia  Currently on Mg supplementation, will monitor at next visit.    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  -Initiated on FOLFOX 5/31 for C1D1 -> developed A fib  -Additional cycles pending cardiology evalaution      Follow-up in 3 months with labs    Patient was seen and discussed with Dr. Jordin Contreras MD  Division of Renal Disease and Hypertension  [facility]  [employer] Web Console

## 2023-06-13 NOTE — PROGRESS NOTES
[facility]    Hematology/Oncology Established Patient Follow Up Note      Today's Date: 6/13/2023    Reason for follow up: Sigmoid cancer    HISTORY OF PRESENT ILLNESS: Nadia Ladd is a 67 year old female who was referred to the Hematology/Oncology Clinic for sigmoid cancer    Patient has medical history including Crohn's disease on sulfasalazine, anemia secondary to iron deficiency and B12 deficiency, obesity, hypertension, prediabetes, eczema, pulmonary hypertension, hiatal hernia, mild splenomegaly (14.7 cm in 2/23)    - 2/23 pt noted increasing fatigue, found to have iron deficiency anemia w/hgb 6.8 (previously required RBC transfusion when dx w/Crohn's), did have intermittent changes in stool but not persistent (noted minimal blood in stool)   - 2/23 upper endoscopy for iron deficiency anemia and Crohn's disease: Hiatal hernia, normal stomach, normal duodenum  - 2/23 colonoscopy: A frond-like/villous partially obstructing large mass found in sigmoid colon, partially circumferential involving two thirds of the lumen circumference, 4 cm, unable to traverse, with oozing, s/p biopsy  PATHOLOGY:  A.  Stomach, antrum: Biopsy:  - Antral type mucosa with mild chronic inactive gastritis  - Immunostain for Helicobacter pylori is negative      B.  Colon, sigmoid, stricture: Biopsy:  - Invasive poorly differentiated carcinoma  The sigmoid stricture shows a high-grade carcinoma without definitive gland formation.  Given the poorly differentiated appearance, an immunohistochemical panel was performed to exclude the possibility of a tumor by direct extension or metastasis.   Immunohistochemical stains are performed and show the tumor to be diffusely positive for CK7 and partially positive for CK20 and SATB2.  There is no significant tumor reactivity for CDX2, GATA3, PAX8, TTF-1, and chromogranin.  Synaptophysin highlights very rare positive cells. Although the CK7/CK20 profile is not entirely  "typical for a colorectal carcinoma, immunostains for tumors of other origins are negative and a colorectal primary is still favored.   - Mismatch repair:  1) MLH1-loss of nuclear expression  2) MSH2-intact  3) MSH6-intact  4) PMS2-loss of nuclear expression  -MLH1 promoter methylation: NEGATIVE    -2/23 CT CAP:  A) 4 cm length mass in the proximal sigmoid colon. There is some irregularity and stranding in the adjacent pericolonic fat which may indicate tumor extension. There is no obstruction.   B) there is a second probable mass at the hepatic flexure of the colon measuring 2.5 cm in length   C)There are small lymph nodes in the mesial colon adjacent to the hepatic flexure abnormality and the sigmoid colonic mass. No lymphadenopathy by size criteria  D) There is submucosal fatty deposition in the colon, most severe in the distal sigmoid colon and rectum, which can be seen secondary to quiescent inflammatory bowel disease.  E) no liver lesion    -3/23 PET:  a. There is increased FDG avidity of the sigmoid colon with focal lobular wall thickening consistent with biopsy-proven adenocarcinoma.  b. Secondary focus noted at the hepatic flexure demonstrates elevated FDG avidity and lobulated wall thickening highly suspicious for a additional primary.  c. Additionally there is a smaller focus of wall thickening with elevated FDG avidity along the left aspect of the transverse colon  which is suspicious for a possible third focus of colonic malignancy.    -3/23 CEA 6.8  - 3/23 colorectal surgery consultation with - in the setting of Crohn's, at least sigmoid colectomy with possible total abdominal colectomy with either ileorectal anastomosis or end ileostomy (\"rectum can remain active and be actively surveyed annually\")    -3/23 genetic counseling, testing for Chan syndrome    - 4/5/2023 gynecologic oncology consultation for risk reduction surgery with hysterectomy and BSO at time of colectomy    -4/6/2023 " laparoscopic converted to open total abdominal colectomy with ileorectal anastomosis, partial omentectomy, flexible sigmoidoscopy, TAHBSO  A. OMENTUM, RESECTION:  - Adipose tissue with no significant histopathologic abnormality  - No evidence of malignancy     B. COLON, RESECTION:  Mass #1 (ascending colon/hepatic flexure): Mixed neuroendocrine-non-neuroendocrine neoplasm (MINEN):  - Neuroendocrine carcinoma component (70%) and moderately-differentiated adenocarcinoma component (30%)  - Size = 5.0 cm, invading into muscularis propria  - Positive LVI  - Negative macroscopic tumor perforation, negative PNI  - Negative surgical resection margins  - IHC: Neuroendocrine portion: Negative for chromogranin and INSM1 but strongly express synaptophysin  - IHC: Adenocarcinoma portion: Positive for CK20, CDX2 negative for CK7, PAX8, ER, S100  Ki-67 proliferation index of approximately 80%.  MMR:  Intact nuclear expression of MLH1, MSH2, MHS6, PMS2  PDL1:  COMBINED POSITIVE SCORE (CPS): 55-60  TUMOR PROPORTION SCORE (TPS): 50%   pathogenic KRAS mutation (G13D) was identified (5.2%).  AJCC 8th edition: stage 3B mpT3 pN1a     Mass#2 (sigmoid colon): Poorly-differentiated carcinoma:  - Grade 3  - Size = 5.7 cm, invading into muscularis propria  - Negative for microscopic tumor perforation, LVI, perineural invasion  - Negative surgical resection margins  - IHC: Positive for scar and keratin AE1/AE3, negative for CK7, CK20, CDX2, PAX8, ER, chromogranin, CD56, p40, synaptophysin, S100, INI1, p40, INSM1  Ki-67 proliferation index of approximately 70%.  MMR: loss of nuclear expression MLH1 and PMS2, intact nuclear expression MSH2 and MSH6  PDL1:  COMBINED POSITIVE SCORE (CPS): 80  TUMOR PROPORTION SCORE (TPS): 70%  pathogenic KRAS mutation (G13D) was identified (4.5%).  AJCC 8th edition: stage 3A mpT2 pN1a    - One of forty-one sampled lymph nodes positive (1/41)  - Benign appendix  - Tubular adenoma    C. UTERUS, CERVIX, BILATERAL  FALLOPIAN TUBES & OVARIES, :  - Benign endometrial polyps; atrophic endometrium  - Uterus, cervix, bilateral fallopian tubes, and ovaries with no significant morphologic abnormalities  - No evidence of dysplasia or malignancy     D. SMALL INTESTINE, TERMINAL ILEUM, TERMINAL ILIUM:  - Benign ileum  - No evidence of dysplasia or malignancy  - Negative for metastases to one of one sampled lymph node (0 /1)     E. PROXIMAL ANASTOMOTIC RING:  - Benign small intestine  - No evidence of dysplasia or malignancy     F. DISTAL ANASTOMOTIC RING:  - Benign colon  - No evidence of dysplasia or malignancy    CRC NGS:   --mutations positive for KRAS G13D mutation 5.2% mass 1, KRAS G13D mutation 4.5% mass 2 (negative for AKT1; BRAF; ERBB2; KRAS; NRAS; PIK3CA; PTEN)  --TMB score: 17.064 mut/Mb mass 1 (CPS 55-60, TPS 50%), 60.943 mut/Mb mass 2 (CPS 80, TPS 70%)  --fusion negative including NTRK and RET for mass 1 and 2 (also negative for AKT1, AKT3, ALK, AR, AWBHBG71, CLAUDINE, BCOR, BRAF, BRD3, BRD4, CAMTA1, CCNB3, CCND1, , CIC, CSF1, CSF1R, CTNNB1, DNAJB1, EGFR, EPC1, ERBB2, ERBB4, ERG, ESR1, ESRRA, ETV1, ETV4, ETV5, ETV6, EWSR1, FGFR1, FGFR2, FGFR3, FGR, FOS, FOSB, FOXO1, FOXO4, FOXR2, FUS, GLI1, GRB7, HMGA2, HRAS, IDH1, IDH2, INSR, JAK2, JAK3, JAZF1, KRAS, MAML2, MAP2K1, MAST1, MAST2, MEAF6, MET, MKL2, MN1, MSMB, MUSK, MYB, MYBL1, MYOD1, NCOA1, NCOA2, NOTCH1, NOTCH2, NR4A3, NRAS, NRG1, NTRK1, NTRK2, NTRK3, NUMBL1, NUTM1, PAX3, PDGFB, PDGFRA, PDGFRB, PHF1, PIK3CA, PKN1, PLAG1, PPARG, PRKACA, PRKCA, PRKCB, RAF1, RELA, RET, ROS1, RPSO2, RSPO3, SS18, STAT6, TAF15, TCF12, TERT, TFE3, TFEB, TFG, THADA, TMPRSS2, USP6, VGLL2, YAP1, YWHAE)    -4/11/2023 patient discharged from hospital, planning for 30 days of lovenox postop, oxycodone for pain (required 1 unit PRBC for anemia)    -5/8/23 I presented this case at [facility] tumor board and their recommendations include:  - common to see this in Crohn's, overall poor prognosis, treat  "aggressively, may be poorly responsive to chemotherapy  - standard of care is mFOLFOX 6 x 12 cycles  - acknowledge that pt has high TPS and likely response to immunotherapy however not sufficient data or standard of care in stage 3 adjuvant setting  - add MMR testing to mass #1 hepatic flexure mass    - 5/9/23 admitted for acute renal failure w/Cr 3.82 w/K 7.0    - 5/19/23 second opinion at [facility] w/medical oncologist Dr. Acharya, thorough consultation and recommendations appreciated     - pt would like to proceed with FOFLOX (with dose reduced oxaliplatin due to kidney function)    -5/20/2023 genetic counseling: Negative for EPCAM, MLH1, MSH2, MSH6, and PMS2 genes.  Proceeding with additional testing including: APC, AXIN2, BMPR1A, CDH1, CHEK2, EPCAM, GREM1, MLH1, MSH2, MSH3, MSH6, MUTYH, NTHL1, PMS2, POLD1, POLE, PTEN, SMAD4, STK11, TP53, CDKN1B, MEN1, NF1, RET, TSC1, TSC2, and VHL genes- results pending     - 5/25/23 port placement    - 5/26/23 CT CAP w/ contrast and IVF before and after CT (baseline prior to starting tx):  1.  3 mm nodule RML and 5 mm lateral EDSON nodule, minimally increased from previous  2.  New 4 mm EDSON groundglass density nodule  3.  Several prominent borderline enlarged mediastinal lymph nodes slightly increased from previous  4.  Splenomegaly 14.5 cm  5. S/p subtotal colectomy with ileorectal anastomosis with postsurgical changes along the ventral abdominal wall midline    INTERIM HISTORY:  REGIMEN:  [id number]  q14 days x12 cycles/6 months  C1D1 5/31/23  oxaliplatin 64mg/m2 day 1 (dose reduced from 85mg/m2 2/2 Cr C1D1)  LV 350mg/m2 day 1  5FU 400mg/m2 IV push day 1  5FU 1200mg/m2 continuous infusion day 1-2 (total 2,400mg/m2 IV over 46-48 hours)  Emetic risk: moderate  Febrile neutropenia risk: intermediate     - plan for C2D1 6/14/23 pending labs  - notably 6/8/23 Cr 1.18     Treatment related AE:  - hearing changes-described as \"metallic\" sound in ears with obscured hearing, Flonase BID as " needed, ENT consultation pending  - lightheadedness and dizziness- after cycle 1 with position change including sitting up and standing, no associated vision changes, headache, motor or sensation changes. No longer feeling lightheaded or dizzy   - Paroxysmal A-fib with RVR and PAC- admitted to [facility] 6/3/2023 - 6/5/2023 for lightheadedness and dizziness and found to have A-fib with RVR required diltiazem drip for RVR and subsequently cardioverted while she was in the ER and did not have recurrent A-fib during monitoring, heparin, with demand ischemia from RVR, concern for PE but held off on CTA due to kidney disease, 6/3/2023 echo EF 55-60%, mild pulmonary hypertension, mild mitral regurgitation, TSH 1.55, low magnesium requiring replacement and continues on oral magnesium, no longer on potassium restriction, found to have pyuria (not UTI) empirically treated with Rocephin,  metoprolol XL 25 mg p.o. daily for ongoing palpitations  - Anxiety  - Nausea-Compazine and Zofran as needed  -leukopenia without neutropenia  -Normocytic anemia- due to chemotherapy and iron deficiency, pending ferric carboxymaltose  -Thrombocytopenia-6/5/2023 platelet 129K, 6/8/23 plt 164K    Other:  - Nephrology consultation pending    REVIEW OF SYSTEMS:   A 14 point ROS was reviewed with pertinent positives and negatives in the HPI.        HOME MEDICATIONS:  Current Outpatient Medications   Medication Sig Dispense Refill     cyanocobalamin 1000 MCG TBCR Take 1,000 mcg by mouth daily 100 tablet 1     dexamethasone (DECADRON) 4 MG tablet Take 2 tablets (8 mg) by mouth daily Take for 2 days, starting the day after chemo. Take with food. 4 tablet 2     ferrous sulfate (IRON) 325 (65 FE) MG tablet TAKE ONE TABLET BY MOUTH TWICE A DAY --NO FURTHER REFILLS WITHOUT OFFICE VISIT (Patient taking differently: Take 325 mg by mouth daily (with breakfast)) 200 tablet 3     magnesium oxide (MAG-OX) 400 MG tablet Take 1 tablet (400 mg) by  mouth 2 times daily 60 tablet 0     metoprolol succinate ER (TOPROL XL) 25 MG 24 hr tablet Take 1 tablet (25 mg) by mouth daily 30 tablet 1     Multiple Vitamins-Iron (MULTI-DAY PLUS IRON PO) Take 1 tablet by mouth daily       sodium bicarbonate 650 MG tablet Take 2 tablets (1,300 mg) by mouth 2 times daily 120 tablet 0     loperamide (IMODIUM A-D) 2 MG tablet When diarrhea starts take 2 tabs, then with each additional episode take 1 more tab, if using more than 8 tab in 24 hrs notify oncology (Patient not taking: Reported on 6/13/2023) 90 tablet 3     ondansetron (ZOFRAN) 8 MG tablet Take 1 tablet (8 mg) by mouth every 8 hours as needed for nausea (vomiting) (Patient not taking: Reported on 6/13/2023) 30 tablet 2     prochlorperazine (COMPAZINE) 10 MG tablet Take 1 tablet (10 mg) by mouth every 6 hours as needed for nausea or vomiting (Patient not taking: Reported on 6/13/2023) 30 tablet 2         ALLERGIES:  Allergies   Allergen Reactions     No Known Drug Allergy          PAST MEDICAL HISTORY:  Past Medical History:   Diagnosis Date     Arthropathia 08/05/2010     B12 deficiency anemia 10/21/2010     Benign essential hypertension with target blood pressure below 140/90 10/10/2016     CARDIOVASCULAR SCREENING; LDL GOAL LESS THAN 160 10/31/2010     Crohn's colitis (H) 02/15/2011     Dermatitis-dishydrotic eczema-severe 08/05/2010     Hiatal hernia      HTN (hypertension) 07/21/2011     Iron deficiency anemia 10/21/2010     Malignant neoplasm of sigmoid colon (H)      Obesity      Primary pulmonary hypertension (H) 04/06/2010         PAST SURGICAL HISTORY:  Past Surgical History:   Procedure Laterality Date     COLECTOMY WITHOUT COLOSTOMY N/A 4/6/2023    Procedure: Laparoscopic Converted to Open Total abdominal colectomy;  Surgeon: Jerome Ashley MD;  Location: [facility] OR     COLONOSCOPY  10/11/10     COLONOSCOPY N/A 2/27/2023    Procedure: ATTEMPTED COLONOSCOPY WITH SIGMOID STRICTURE BIOPSY;  Surgeon:  Jonathan Alcocer MD;  Location: [facility]     ESOPHAGOSCOPY, GASTROSCOPY, DUODENOSCOPY (EGD), COMBINED N/A 2/27/2023    Procedure: ESOPHAGOGASTRODUODENOSCOPY, WITH BIOPSY;  Surgeon: Jonathan Alcocer MD;  Location: [facility]     HYSTERECTOMY TOTAL ABDOMINAL, BILATERAL SALPINGO-OOPHORECTOMY, COMBINED N/A 4/6/2023    Procedure: Hysterectomy total abdominal, bilateral salpingo-oophorectomy;  Surgeon: Sunitha Olea MD;  Location: [facility] OR     INSERT PORT VASCULAR ACCESS Right 5/25/2023    Procedure: Ultrasound-guided right internal jugular venous access port placement with fluoroscopy;  Surgeon: Martin Thomas DO;  Location: [facility]     SIGMOIDOSCOPY FLEXIBLE N/A 4/6/2023    Procedure: Sigmoidoscopy flexible;  Surgeon: Jerome Ashley MD;  Location: [facility] OR     SURGICAL HISTORY OF -   06/14/76    Perineorrhaphy, for widening vaginal orifice     ZZC EXPLORATORY OF ABDOMEN  1989    laparoscopy         SOCIAL HISTORY:  Social History     Socioeconomic History     Marital status:      Spouse name: Not on file     Number of children: Not on file     Years of education: Not on file     Highest education level: Not on file   Occupational History     Not on file   Tobacco Use     Smoking status: Never     Passive exposure: Current     Smokeless tobacco: Never   Vaping Use     Vaping status: Not on file   Substance and Sexual Activity     Alcohol use: No     Drug use: No     Sexual activity: Yes     Partners: Male   Other Topics Concern     Parent/sibling w/ CABG, MI or angioplasty before 65F 55M? Not Asked   Social History Narrative     Not on file     Social Determinants of Health     Financial Resource Strain: Not on file   Food Insecurity: Not on file   Transportation Needs: Not on file   Physical Activity: Not on file   Stress: Not on file   Social Connections: Not on file   Intimate Partner Violence: Not on file   Housing Stability: Not on file         FAMILY HISTORY:  Family History   Problem Relation Age  "of Onset     Hypertension Mother         on meds, alive     Cerebrovascular Disease Father         stroke about age 65,  of cancer at 68 yrs     Diabetes Father         eventually took insulin     Anemia Father         Pernisios anemia     Kidney Disease Niece         kidney transplant     Kidney Disease Nephew         kidney transplant     Venous thrombosis No family hx of      Anesthesia Reaction No family hx of          PHYSICAL EXAM:  Vital signs:  /74 (BP Location: Right arm, Patient Position: Sitting, Cuff Size: Adult Large)   Pulse 95   Temp 97.6  F (36.4  C) (Temporal)   Resp 18   Ht 1.575 m (5' 2\")   Wt 82.2 kg (181 lb 4 oz)   LMP  (LMP Unknown)   SpO2 98%   BMI 33.15 kg/m       Manual pulse 60 bmp    ECO  GENERAL/CONSTITUTIONAL: No acute distress.  EYES:  Extraocular movements intact without nystagmus.  No scleral icterus.  RESPIRATORY: Equal chest rise.   CARDIOLOGY: RRR no tachycardia  MUSCULOSKELETAL: Warm and well-perfused, no cyanosis, clubbing, or edema. Right LE always more swollen than left, chronic unchanged.   NEUROLOGIC: Cranial nerves are grossly intact. Alert, oriented to person, place and time, answers questions appropriately.  INTEGUMENTARY: No rashes or jaundice.      LABS:   Latest Reference Range & Units 23 11:15   Sodium 136 - 145 mmol/L 145   Potassium 3.4 - 5.3 mmol/L 3.6   Chloride 98 - 107 mmol/L 109 (H)   Carbon Dioxide (CO2) 22 - 29 mmol/L 26   Urea Nitrogen 8.0 - 23.0 mg/dL 11.6   Creatinine 0.51 - 0.95 mg/dL 1.18 (H)   GFR Estimate >60 mL/min/1.73m2 51 (L)   Calcium 8.8 - 10.2 mg/dL 8.7 (L)   Anion Gap 7 - 15 mmol/L 10   Albumin 3.5 - 5.2 g/dL 4.1   Protein Total 6.4 - 8.3 g/dL 6.5   Alkaline Phosphatase 35 - 104 U/L 80   ALT 10 - 35 U/L 14   AST 10 - 35 U/L 14   Bilirubin Total <=1.2 mg/dL 0.5   Glucose 70 - 99 mg/dL 109 (H)   WBC 4.0 - 11.0 10e3/uL 3.0 (L)   Hemoglobin 11.7 - 15.7 g/dL 10.1 (L)   Hematocrit 35.0 - 47.0 % 33.3 (L)   Platelet Count 150 " - 450 10e3/uL 164   RBC Count 3.80 - 5.20 10e6/uL 3.94   MCV 78 - 100 fL 85   MCH 26.5 - 33.0 pg 25.6 (L)   MCHC 31.5 - 36.5 g/dL 30.3 (L)   RDW 10.0 - 15.0 % 15.2 (H)   (H): Data is abnormally high  (L): Data is abnormally low        PATHOLOGY:    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), KRAS G13D mutated, MARISSA, TPS 50%  # sigmoid colon poorly-differentiated carcinoma, KRAS G13D mutated, loss of MLH1 and PMS2, TPS 70%  - 2/23 colonoscopy: A frond-like/villous partially obstructing large mass found in sigmoid colon, partially circumferential involving two thirds of the lumen circumference, 4 cm, unable to traverse, with oozing, s/p biopsy, PATHOLOGY: Invasive poorly differentiated carcinoma, IHC panel excludes tumors of other origin, colorectal primary is favored, loss of nuclear expression of MLH1 and PMS2, MLH1 promoter methylation negative, [id number] mutation negative  -2/23 CT CAP: Shows known 4 cm proximal sigmoid colon mass with possible tumor extension (irregularity and stranding and adjacent pericolonic fat) without obstruction AND probable second mass 2.5 cm at hepatic flexure of colon; small lymph nodes adjacent to both masses (no lymphadenopathy by size criteria)  -3/23 PET:  a. There is increased FDG avidity of the sigmoid colon with focal lobular wall thickening consistent with biopsy-proven adenocarcinoma.  b. Secondary focus noted at the hepatic flexure demonstrates elevated FDG avidity and lobulated wall thickening highly suspicious for a additional primary.  c. Additionally there is a smaller focus of wall thickening with elevated FDG avidity along the left aspect of the transverse colon which is suspicious for a possible third focus of colonic malignancy.  - 3/23 CEA  6.8  -4/6/2023 laparoscopic converted to open total abdominal colectomy with ileorectal anastomosis, partial omentectomy, flexible sigmoidoscopy, TAHBSO  PATHOLOGY:  Of note, omental resection, terminal ileum, proximal and distal anastomotic rings, uterus, cervix, bilateral fallopian tubes and ovaries negative for malignancy    Mass #1 (ascending colon/hepatic flexure): Mixed neuroendocrine-non-neuroendocrine neoplasm (MINEN):  - Neuroendocrine carcinoma component (70%) and moderately-differentiated adenocarcinoma component (30%)  - Size = 5.0 cm, invading into muscularis propria, Positive LVI  - IHC: Neuroendocrine portion: Negative for chromogranin and INSM1 but strongly express synaptophysin  - IHC: Adenocarcinoma portion: Positive for CK20, CDX2 negative for CK7, PAX8, ER, S100  Ki-67 proliferation index of approximately 80%.  MARISSA   PDL1:  COMBINED POSITIVE SCORE (CPS): 55-60  TUMOR PROPORTION SCORE (TPS): 50%   pathogenic KRAS mutation (G13D) was identified (5.2%).  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  MMR testing: loss of MLH1 and PMS2  PDL1:  COMBINED POSITIVE SCORE (CPS): 80  TUMOR PROPORTION SCORE (TPS): 70%  pathogenic KRAS mutation (G13D) was identified (4.5%).  MMR testing: loss of MLH1 and PMS2, intact MSH2 and MSH6  AJCC 8th edition: stage 3A mpT2 pN1a    - One of forty-one sampled lymph nodes positive (1/41), d/w pathology- unable to determine which mass is metastatic to LN    - 4/23 MRI brain w/wo contrast LAURENT  - 5/26/23 CT CAP w/ contrast and IVF before and after CT (post op baseline prior to starting tx):  1.  3 mm nodule RML and 5 mm lateral EDSON nodule,  minimally increased from previous  2.  New 4 mm EDSON groundglass density nodule  3.  Several prominent borderline enlarged mediastinal lymph nodes slightly increased from previous  4.  Splenomegaly 14.5 cm  5. S/p subtotal colectomy with ileorectal anastomosis with postsurgical changes along the ventral abdominal wall midline  - 5/8/23 I presented this case at [facility] tumor board as above   - 5/19/23 second opinion at [facility] w/medical oncologist Dr. Acharya, thorough consultation and recommendations appreciated, overall agree w/FOLFOX x6 months, possible nivo or ipi nivo in second line; if metastatic platinum etoposide vs ipi nivo  - 5/25/23 port placement    REGIMEN:  [id number]  q14 days x12 cycles/6 months  oxaliplatin 64mg/m2 day 1 (dose reduced from 85mg/m2 2/2 Cr)  LV 350mg/m2 day 1  5FU 400mg/m2 IV push day 1  5FU 1200mg/m2 continuous infusion day 1-2 (total 2,400mg/m2 IV over 46-48 hours)  Emetic risk: moderate  Febrile neutropenia risk: intermediate     - HOLD CYCLE 2 OF CHEMO until pt sees cardio oncology for recommendations regarding possible rechallenge of 5FU given new afib a few days after receiving 5FU, I am concerned this will recur with repeat 5FU dose. If possible to rechallenge w/medication optimization, would consider doing this inpatient with continuous cardiac monitoring  - if unable to give 5FU, will d/w [facility] tumor board other options in adjuvant setting, availability of clinical trials etc  - plan for repeat CT end of 8/23, hold until tx plan is confirmed     -5/20/2023 genetic counseling: Negative for EPCAM, MLH1, MSH2, MSH6, and PMS2 genes.  Proceeding with additional testing including: APC, AXIN2, BMPR1A, CDH1, CHEK2, EPCAM, GREM1, MLH1, MSH2, MSH3, MSH6, MUTYH, NTHL1, PMS2, POLD1, POLE, PTEN, SMAD4, STK11, TP53, CDKN1B, MEN1, NF1, RET, TSC1, TSC2, and VHL genes- results pending   - f/u with Dr. Ashley 4/2024 for rigid proctoscopy     #parxosymal afib  - within 3 days of receiving 5FU,  prior cardiac risk factors htn, prediabetes  - 6/3/23 presented to ER w/lightheadedness, dizziness, cardioverted s/p diltiazem ggt in ER   - currently on metoprolol XL  - cardio oncology consultation for recommendations regarding possible rechallenge of 5FU given new afib a few days after receiving 5FU, I am concerned this will recur with repeat 5FU dose. 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    #Anemia secondary to iron deficiency and B12 deficiency  - terminal ileum removed at time of colon surgery, monitor for nutrient def  - 2/23 hgb 6.8, ferritin 21, iron sat index 10, TIBC 265, iron 27, b12 1493  - On B12 1000 mcg p.o. daily and ferrous sulfate 325 mg p.o. daily  - 4/9/23 s/p 1 uprbcs  - 5/9/23 hgb 11.3,5/30/23 hgb 9.4  - based on Ganzoni equation w/goal hgb 14 and 500mg needed for iron stores, pts iron deficit is 1400 mg  - ferric carboxymaltose 750mg x2 doses, pending 6/14 and 6/28    #Crohn's  - dx since at least 2010     RTC tomorrow for labs, IV iron  RTC 2-3 weeks for f/u with me, after pt has seen cardiac oncology team       Irene Bateman DO  Hematology/Oncology  [facility]

## 2023-06-13 NOTE — LETTER
6/13/2023         RE: Nadia Ladd  [address]  [facility] [address]        Dear Colleague,    Thank you for referring your patient, Nadia Ladd, to the [facility]. Please see a copy of my visit note below.    [facility]    Hematology/Oncology Established Patient Follow Up Note      Today's Date: 6/13/2023    Reason for follow up: Sigmoid cancer    HISTORY OF PRESENT ILLNESS: Nadia Ladd is a 67 year old female who was referred to the Hematology/Oncology Clinic for sigmoid cancer    Patient has medical history including Crohn's disease on sulfasalazine, anemia secondary to iron deficiency and B12 deficiency, obesity, hypertension, prediabetes, eczema, pulmonary hypertension, hiatal hernia, mild splenomegaly (14.7 cm in 2/23)    - 2/23 pt noted increasing fatigue, found to have iron deficiency anemia w/hgb 6.8 (previously required RBC transfusion when dx w/Crohn's), did have intermittent changes in stool but not persistent (noted minimal blood in stool)   - 2/23 upper endoscopy for iron deficiency anemia and Crohn's disease: Hiatal hernia, normal stomach, normal duodenum  - 2/23 colonoscopy: A frond-like/villous partially obstructing large mass found in sigmoid colon, partially circumferential involving two thirds of the lumen circumference, 4 cm, unable to traverse, with oozing, s/p biopsy  PATHOLOGY:  A.  Stomach, antrum: Biopsy:  - Antral type mucosa with mild chronic inactive gastritis  - Immunostain for Helicobacter pylori is negative      B.  Colon, sigmoid, stricture: Biopsy:  - Invasive poorly differentiated carcinoma  The sigmoid stricture shows a high-grade carcinoma without definitive gland formation.  Given the poorly differentiated appearance, an immunohistochemical panel was performed to exclude the possibility of a tumor by direct extension or metastasis.   Immunohistochemical stains are performed and show the tumor to be  diffusely positive for CK7 and partially positive for CK20 and SATB2.  There is no significant tumor reactivity for CDX2, GATA3, PAX8, TTF-1, and chromogranin.  Synaptophysin highlights very rare positive cells. Although the CK7/CK20 profile is not entirely typical for a colorectal carcinoma, immunostains for tumors of other origins are negative and a colorectal primary is still favored.   - Mismatch repair:  1) MLH1-loss of nuclear expression  2) MSH2-intact  3) MSH6-intact  4) PMS2-loss of nuclear expression  -MLH1 promoter methylation: NEGATIVE    -2/23 CT CAP:  A) 4 cm length mass in the proximal sigmoid colon. There is some irregularity and stranding in the adjacent pericolonic fat which may indicate tumor extension. There is no obstruction.   B) there is a second probable mass at the hepatic flexure of the colon measuring 2.5 cm in length   C)There are small lymph nodes in the mesial colon adjacent to the hepatic flexure abnormality and the sigmoid colonic mass. No lymphadenopathy by size criteria  D) There is submucosal fatty deposition in the colon, most severe in the distal sigmoid colon and rectum, which can be seen secondary to quiescent inflammatory bowel disease.  E) no liver lesion    -3/23 PET:  a. There is increased FDG avidity of the sigmoid colon with focal lobular wall thickening consistent with biopsy-proven adenocarcinoma.  b. Secondary focus noted at the hepatic flexure demonstrates elevated FDG avidity and lobulated wall thickening highly suspicious for a additional primary.  c. Additionally there is a smaller focus of wall thickening with elevated FDG avidity along the left aspect of the transverse colon  which is suspicious for a possible third focus of colonic malignancy.    -3/23 CEA 6.8  - 3/23 colorectal surgery consultation with - in the setting of Crohn's, at least sigmoid colectomy with possible total abdominal colectomy with either ileorectal anastomosis or end ileostomy  "(\"rectum can remain active and be actively surveyed annually\")    -3/23 genetic counseling, testing for Chan syndrome    - 4/5/2023 gynecologic oncology consultation for risk reduction surgery with hysterectomy and BSO at time of colectomy    -4/6/2023 laparoscopic converted to open total abdominal colectomy with ileorectal anastomosis, partial omentectomy, flexible sigmoidoscopy, TAHBSO  A. OMENTUM, RESECTION:  - Adipose tissue with no significant histopathologic abnormality  - No evidence of malignancy     B. COLON, RESECTION:  Mass #1 (ascending colon/hepatic flexure): Mixed neuroendocrine-non-neuroendocrine neoplasm (MINEN):  - Neuroendocrine carcinoma component (70%) and moderately-differentiated adenocarcinoma component (30%)  - Size = 5.0 cm, invading into muscularis propria  - Positive LVI  - Negative macroscopic tumor perforation, negative PNI  - Negative surgical resection margins  - IHC: Neuroendocrine portion: Negative for chromogranin and INSM1 but strongly express synaptophysin  - IHC: Adenocarcinoma portion: Positive for CK20, CDX2 negative for CK7, PAX8, ER, S100  Ki-67 proliferation index of approximately 80%.  MMR:  Intact nuclear expression of MLH1, MSH2, MHS6, PMS2  PDL1:  COMBINED POSITIVE SCORE (CPS): 55-60  TUMOR PROPORTION SCORE (TPS): 50%   pathogenic KRAS mutation (G13D) was identified (5.2%).  AJCC 8th edition: stage 3B mpT3 pN1a     Mass#2 (sigmoid colon): Poorly-differentiated carcinoma:  - Grade 3  - Size = 5.7 cm, invading into muscularis propria  - Negative for microscopic tumor perforation, LVI, perineural invasion  - Negative surgical resection margins  - IHC: Positive for scar and keratin AE1/AE3, negative for CK7, CK20, CDX2, PAX8, ER, chromogranin, CD56, p40, synaptophysin, S100, INI1, p40, INSM1  Ki-67 proliferation index of approximately 70%.  MMR: loss of nuclear expression MLH1 and PMS2, intact nuclear expression MSH2 and MSH6  PDL1:  COMBINED POSITIVE SCORE (CPS): 80  TUMOR " PROPORTION SCORE (TPS): 70%  pathogenic KRAS mutation (G13D) was identified (4.5%).  AJCC 8th edition: stage 3A mpT2 pN1a    - One of forty-one sampled lymph nodes positive (1/41)  - Benign appendix  - Tubular adenoma    C. UTERUS, CERVIX, BILATERAL FALLOPIAN TUBES & OVARIES, :  - Benign endometrial polyps; atrophic endometrium  - Uterus, cervix, bilateral fallopian tubes, and ovaries with no significant morphologic abnormalities  - No evidence of dysplasia or malignancy     D. SMALL INTESTINE, TERMINAL ILEUM, TERMINAL ILIUM:  - Benign ileum  - No evidence of dysplasia or malignancy  - Negative for metastases to one of one sampled lymph node (0 /1)     E. PROXIMAL ANASTOMOTIC RING:  - Benign small intestine  - No evidence of dysplasia or malignancy     F. DISTAL ANASTOMOTIC RING:  - Benign colon  - No evidence of dysplasia or malignancy    CRC NGS:   --mutations positive for KRAS G13D mutation 5.2% mass 1, KRAS G13D mutation 4.5% mass 2 (negative for AKT1; BRAF; ERBB2; KRAS; NRAS; PIK3CA; PTEN)  --TMB score: 17.064 mut/Mb mass 1 (CPS 55-60, TPS 50%), 60.943 mut/Mb mass 2 (CPS 80, TPS 70%)  --fusion negative including NTRK and RET for mass 1 and 2 (also negative for AKT1, AKT3, ALK, AR, [id number], CLAUDINE, BCOR, BRAF, BRD3, BRD4, CAMTA1, CCNB3, CCND1, , CIC, CSF1, CSF1R, CTNNB1, DNAJB1, EGFR, EPC1, ERBB2, ERBB4, ERG, ESR1, ESRRA, ETV1, ETV4, ETV5, ETV6, EWSR1, FGFR1, FGFR2, FGFR3, FGR, FOS, FOSB, FOXO1, FOXO4, FOXR2, FUS, GLI1, GRB7, HMGA2, HRAS, IDH1, IDH2, INSR, JAK2, JAK3, JAZF1, KRAS, MAML2, MAP2K1, MAST1, MAST2, MEAF6, MET, MKL2, MN1, MSMB, MUSK, MYB, MYBL1, MYOD1, NCOA1, NCOA2, NOTCH1, NOTCH2, NR4A3, NRAS, NRG1, NTRK1, NTRK2, NTRK3, NUMBL1, NUTM1, PAX3, PDGFB, PDGFRA, PDGFRB, PHF1, PIK3CA, PKN1, PLAG1, PPARG, PRKACA, PRKCA, PRKCB, RAF1, RELA, RET, ROS1, RPSO2, RSPO3, SS18, STAT6, TAF15, TCF12, TERT, TFE3, TFEB, TFG, THADA, TMPRSS2, USP6, VGLL2, YAP1, YWHAE)    -4/11/2023 patient discharged from hospital,  planning for 30 days of lovenox postop, oxycodone for pain (required 1 unit PRBC for anemia)    -5/8/23 I presented this case at [facility] tumor board and their recommendations include:  - common to see this in Crohn's, overall poor prognosis, treat aggressively, may be poorly responsive to chemotherapy  - standard of care is mFOLFOX 6 x 12 cycles  - acknowledge that pt has high TPS and likely response to immunotherapy however not sufficient data or standard of care in stage 3 adjuvant setting  - add MMR testing to mass #1 hepatic flexure mass    - 5/9/23 admitted for acute renal failure w/Cr 3.82 w/K 7.0    - 5/19/23 second opinion at [facility] w/medical oncologist Dr. Acharya, thorough consultation and recommendations appreciated     - pt would like to proceed with FOFLOX (with dose reduced oxaliplatin due to kidney function)    -5/20/2023 genetic counseling: Negative for EPCAM, MLH1, MSH2, MSH6, and PMS2 genes.  Proceeding with additional testing including: APC, AXIN2, BMPR1A, CDH1, CHEK2, EPCAM, GREM1, MLH1, MSH2, MSH3, MSH6, MUTYH, NTHL1, PMS2, POLD1, POLE, PTEN, SMAD4, STK11, TP53, CDKN1B, MEN1, NF1, RET, TSC1, TSC2, and VHL genes- results pending     - 5/25/23 port placement    - 5/26/23 CT CAP w/ contrast and IVF before and after CT (baseline prior to starting tx):  1.  3 mm nodule RML and 5 mm lateral EDSON nodule, minimally increased from previous  2.  New 4 mm EDSON groundglass density nodule  3.  Several prominent borderline enlarged mediastinal lymph nodes slightly increased from previous  4.  Splenomegaly 14.5 cm  5. S/p subtotal colectomy with ileorectal anastomosis with postsurgical changes along the ventral abdominal wall midline    INTERIM HISTORY:  REGIMEN:  [id number]  q14 days x12 cycles/6 months  C1D1 5/31/23  oxaliplatin 64mg/m2 day 1 (dose reduced from 85mg/m2 2/2 Cr C1D1)  LV 350mg/m2 day 1  5FU 400mg/m2 IV push day 1  5FU 1200mg/m2 continuous infusion day 1-2 (total 2,400mg/m2 IV over 46-48  "hours)  Emetic risk: moderate  Febrile neutropenia risk: intermediate     - plan for C2D1 6/14/23 pending labs  - notably 6/8/23 Cr 1.18     Treatment related AE:  - hearing changes-described as \"metallic\" sound in ears with obscured hearing, Flonase BID as needed, ENT consultation pending  - lightheadedness and dizziness- after cycle 1 with position change including sitting up and standing, no associated vision changes, headache, motor or sensation changes. No longer feeling lightheaded or dizzy   - Paroxysmal A-fib with RVR and PAC- admitted to [facility] 6/3/2023 - 6/5/2023 for lightheadedness and dizziness and found to have A-fib with RVR required diltiazem drip for RVR and subsequently cardioverted while she was in the ER and did not have recurrent A-fib during monitoring, heparin, with demand ischemia from RVR, concern for PE but held off on CTA due to kidney disease, 6/3/2023 echo EF 55-60%, mild pulmonary hypertension, mild mitral regurgitation, TSH 1.55, low magnesium requiring replacement and continues on oral magnesium, no longer on potassium restriction, found to have pyuria (not UTI) empirically treated with Rocephin,  metoprolol XL 25 mg p.o. daily for ongoing palpitations  - Anxiety  - Nausea-Compazine and Zofran as needed  -leukopenia without neutropenia  -Normocytic anemia- due to chemotherapy and iron deficiency, pending ferric carboxymaltose  -Thrombocytopenia-6/5/2023 platelet 129K, 6/8/23 plt 164K    Other:  - Nephrology consultation pending    REVIEW OF SYSTEMS:   A 14 point ROS was reviewed with pertinent positives and negatives in the HPI.        HOME MEDICATIONS:  Current Outpatient Medications   Medication Sig Dispense Refill     cyanocobalamin 1000 MCG TBCR Take 1,000 mcg by mouth daily 100 tablet 1     dexamethasone (DECADRON) 4 MG tablet Take 2 tablets (8 mg) by mouth daily Take for 2 days, starting the day after chemo. Take with food. 4 tablet 2     ferrous sulfate (IRON) 325 " (65 FE) MG tablet TAKE ONE TABLET BY MOUTH TWICE A DAY --NO FURTHER REFILLS WITHOUT OFFICE VISIT (Patient taking differently: Take 325 mg by mouth daily (with breakfast)) 200 tablet 3     magnesium oxide (MAG-OX) 400 MG tablet Take 1 tablet (400 mg) by mouth 2 times daily 60 tablet 0     metoprolol succinate ER (TOPROL XL) 25 MG 24 hr tablet Take 1 tablet (25 mg) by mouth daily 30 tablet 1     Multiple Vitamins-Iron (MULTI-DAY PLUS IRON PO) Take 1 tablet by mouth daily       sodium bicarbonate 650 MG tablet Take 2 tablets (1,300 mg) by mouth 2 times daily 120 tablet 0     loperamide (IMODIUM A-D) 2 MG tablet When diarrhea starts take 2 tabs, then with each additional episode take 1 more tab, if using more than 8 tab in 24 hrs notify oncology (Patient not taking: Reported on 6/13/2023) 90 tablet 3     ondansetron (ZOFRAN) 8 MG tablet Take 1 tablet (8 mg) by mouth every 8 hours as needed for nausea (vomiting) (Patient not taking: Reported on 6/13/2023) 30 tablet 2     prochlorperazine (COMPAZINE) 10 MG tablet Take 1 tablet (10 mg) by mouth every 6 hours as needed for nausea or vomiting (Patient not taking: Reported on 6/13/2023) 30 tablet 2         ALLERGIES:  Allergies   Allergen Reactions     No Known Drug Allergy          PAST MEDICAL HISTORY:  Past Medical History:   Diagnosis Date     Arthropathia 08/05/2010     B12 deficiency anemia 10/21/2010     Benign essential hypertension with target blood pressure below 140/90 10/10/2016     CARDIOVASCULAR SCREENING; LDL GOAL LESS THAN 160 10/31/2010     Crohn's colitis (H) 02/15/2011     Dermatitis-dishydrotic eczema-severe 08/05/2010     Hiatal hernia      HTN (hypertension) 07/21/2011     Iron deficiency anemia 10/21/2010     Malignant neoplasm of sigmoid colon (H)      Obesity      Primary pulmonary hypertension (H) 04/06/2010         PAST SURGICAL HISTORY:  Past Surgical History:   Procedure Laterality Date     COLECTOMY WITHOUT COLOSTOMY N/A 4/6/2023    Procedure:  Laparoscopic Converted to Open Total abdominal colectomy;  Surgeon: Jerome Ashley MD;  Location: [facility] OR     COLONOSCOPY  10/11/10     COLONOSCOPY N/A 2/27/2023    Procedure: ATTEMPTED COLONOSCOPY WITH SIGMOID STRICTURE BIOPSY;  Surgeon: Jonathan Alcocer MD;  Location: [facility]     ESOPHAGOSCOPY, GASTROSCOPY, DUODENOSCOPY (EGD), COMBINED N/A 2/27/2023    Procedure: ESOPHAGOGASTRODUODENOSCOPY, WITH BIOPSY;  Surgeon: Jonathan Alcocer MD;  Location: [facility]     HYSTERECTOMY TOTAL ABDOMINAL, BILATERAL SALPINGO-OOPHORECTOMY, COMBINED N/A 4/6/2023    Procedure: Hysterectomy total abdominal, bilateral salpingo-oophorectomy;  Surgeon: Sunitha Olea MD;  Location: [facility] OR     INSERT PORT VASCULAR ACCESS Right 5/25/2023    Procedure: Ultrasound-guided right internal jugular venous access port placement with fluoroscopy;  Surgeon: Martin Thomas DO;  Location: [facility]     SIGMOIDOSCOPY FLEXIBLE N/A 4/6/2023    Procedure: Sigmoidoscopy flexible;  Surgeon: Jerome Ashley MD;  Location: [facility] OR     SURGICAL HISTORY OF -   06/14/76    Perineorrhaphy, for widening vaginal orifice     ZZC EXPLORATORY OF ABDOMEN  1989    laparoscopy         SOCIAL HISTORY:  Social History     Socioeconomic History     Marital status:      Spouse name: Not on file     Number of children: Not on file     Years of education: Not on file     Highest education level: Not on file   Occupational History     Not on file   Tobacco Use     Smoking status: Never     Passive exposure: Current     Smokeless tobacco: Never   Vaping Use     Vaping status: Not on file   Substance and Sexual Activity     Alcohol use: No     Drug use: No     Sexual activity: Yes     Partners: Male   Other Topics Concern     Parent/sibling w/ CABG, MI or angioplasty before 65F 55M? Not Asked   Social History Narrative     Not on file     Social Determinants of Health     Financial Resource Strain: Not on file   Food Insecurity: Not on file  "  Transportation Needs: Not on file   Physical Activity: Not on file   Stress: Not on file   Social Connections: Not on file   Intimate Partner Violence: Not on file   Housing Stability: Not on file         FAMILY HISTORY:  Family History   Problem Relation Age of Onset     Hypertension Mother         on meds, alive     Cerebrovascular Disease Father         stroke about age 65,  of cancer at 68 yrs     Diabetes Father         eventually took insulin     Anemia Father         Pernisios anemia     Kidney Disease Niece         kidney transplant     Kidney Disease Nephew         kidney transplant     Venous thrombosis No family hx of      Anesthesia Reaction No family hx of          PHYSICAL EXAM:  Vital signs:  /74 (BP Location: Right arm, Patient Position: Sitting, Cuff Size: Adult Large)   Pulse 95   Temp 97.6  F (36.4  C) (Temporal)   Resp 18   Ht 1.575 m (5' 2\")   Wt 82.2 kg (181 lb 4 oz)   LMP  (LMP Unknown)   SpO2 98%   BMI 33.15 kg/m       Manual pulse 60 bmp    ECO  GENERAL/CONSTITUTIONAL: No acute distress.  EYES:  Extraocular movements intact without nystagmus.  No scleral icterus.  RESPIRATORY: Equal chest rise.   CARDIOLOGY: RRR no tachycardia  MUSCULOSKELETAL: Warm and well-perfused, no cyanosis, clubbing, or edema. Right LE always more swollen than left, chronic unchanged.   NEUROLOGIC: Cranial nerves are grossly intact. Alert, oriented to person, place and time, answers questions appropriately.  INTEGUMENTARY: No rashes or jaundice.      LABS:   Latest Reference Range & Units 23 11:15   Sodium 136 - 145 mmol/L 145   Potassium 3.4 - 5.3 mmol/L 3.6   Chloride 98 - 107 mmol/L 109 (H)   Carbon Dioxide (CO2) 22 - 29 mmol/L 26   Urea Nitrogen 8.0 - 23.0 mg/dL 11.6   Creatinine 0.51 - 0.95 mg/dL 1.18 (H)   GFR Estimate >60 mL/min/1.73m2 51 (L)   Calcium 8.8 - 10.2 mg/dL 8.7 (L)   Anion Gap 7 - 15 mmol/L 10   Albumin 3.5 - 5.2 g/dL 4.1   Protein Total 6.4 - 8.3 g/dL 6.5   Alkaline " Phosphatase 35 - 104 U/L 80   ALT 10 - 35 U/L 14   AST 10 - 35 U/L 14   Bilirubin Total <=1.2 mg/dL 0.5   Glucose 70 - 99 mg/dL 109 (H)   WBC 4.0 - 11.0 10e3/uL 3.0 (L)   Hemoglobin 11.7 - 15.7 g/dL 10.1 (L)   Hematocrit 35.0 - 47.0 % 33.3 (L)   Platelet Count 150 - 450 10e3/uL 164   RBC Count 3.80 - 5.20 10e6/uL 3.94   MCV 78 - 100 fL 85   MCH 26.5 - 33.0 pg 25.6 (L)   MCHC 31.5 - 36.5 g/dL 30.3 (L)   RDW 10.0 - 15.0 % 15.2 (H)   (H): Data is abnormally high  (L): Data is abnormally low        PATHOLOGY:    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), KRAS G13D mutated, MARISSA, TPS 50%  # sigmoid colon poorly-differentiated carcinoma, KRAS G13D mutated, loss of MLH1 and PMS2, TPS 70%  - 2/23 colonoscopy: A frond-like/villous partially obstructing large mass found in sigmoid colon, partially circumferential involving two thirds of the lumen circumference, 4 cm, unable to traverse, with oozing, s/p biopsy, PATHOLOGY: Invasive poorly differentiated carcinoma, IHC panel excludes tumors of other origin, colorectal primary is favored, loss of nuclear expression of MLH1 and PMS2, MLH1 promoter methylation negative, [id number] mutation negative  -2/23 CT CAP: Shows known 4 cm proximal sigmoid colon mass with possible tumor extension (irregularity and stranding and adjacent pericolonic fat) without obstruction AND probable second mass 2.5 cm at hepatic flexure of colon; small lymph nodes adjacent to both masses (no lymphadenopathy by size criteria)  -3/23 PET:  a. There is increased FDG avidity of the sigmoid colon with focal lobular wall thickening consistent with biopsy-proven adenocarcinoma.  b. Secondary focus noted at the hepatic flexure demonstrates elevated FDG avidity and lobulated wall thickening  highly suspicious for a additional primary.  c. Additionally there is a smaller focus of wall thickening with elevated FDG avidity along the left aspect of the transverse colon which is suspicious for a possible third focus of colonic malignancy.  - 3/23 CEA 6.8  -4/6/2023 laparoscopic converted to open total abdominal colectomy with ileorectal anastomosis, partial omentectomy, flexible sigmoidoscopy, TAHBSO  PATHOLOGY:  Of note, omental resection, terminal ileum, proximal and distal anastomotic rings, uterus, cervix, bilateral fallopian tubes and ovaries negative for malignancy    Mass #1 (ascending colon/hepatic flexure): Mixed neuroendocrine-non-neuroendocrine neoplasm (MINEN):  - Neuroendocrine carcinoma component (70%) and moderately-differentiated adenocarcinoma component (30%)  - Size = 5.0 cm, invading into muscularis propria, Positive LVI  - IHC: Neuroendocrine portion: Negative for chromogranin and INSM1 but strongly express synaptophysin  - IHC: Adenocarcinoma portion: Positive for CK20, CDX2 negative for CK7, PAX8, ER, S100  Ki-67 proliferation index of approximately 80%.  MARISSA   PDL1:  COMBINED POSITIVE SCORE (CPS): 55-60  TUMOR PROPORTION SCORE (TPS): 50%   pathogenic KRAS mutation (G13D) was identified (5.2%).  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  MMR testing: loss of MLH1 and PMS2  PDL1:  COMBINED POSITIVE SCORE (CPS): 80  TUMOR PROPORTION SCORE (TPS): 70%  pathogenic KRAS mutation (G13D) was identified (4.5%).  MMR testing: loss of MLH1 and PMS2, intact MSH2 and MSH6  AJCC 8th edition: stage 3A mpT2 pN1a    - One of forty-one sampled lymph nodes positive (1/41),  d/w pathology- unable to determine which mass is metastatic to LN    - 4/23 MRI brain w/wo contrast LAURENT  - 5/26/23 CT CAP w/ contrast and IVF before and after CT (post op baseline prior to starting tx):  1.  3 mm nodule RML and 5 mm lateral EDSON nodule, minimally increased from previous  2.  New 4 mm EDSON groundglass density nodule  3.  Several prominent borderline enlarged mediastinal lymph nodes slightly increased from previous  4.  Splenomegaly 14.5 cm  5. S/p subtotal colectomy with ileorectal anastomosis with postsurgical changes along the ventral abdominal wall midline  - 5/8/23 I presented this case at [facility] tumor board as above   - 5/19/23 second opinion at [facility] w/medical oncologist Dr. Acharya, thorough consultation and recommendations appreciated, overall agree w/FOLFOX x6 months, possible nivo or ipi nivo in second line; if metastatic platinum etoposide vs ipi nivo  - 5/25/23 port placement    REGIMEN:  [id number]  q14 days x12 cycles/6 months  oxaliplatin 64mg/m2 day 1 (dose reduced from 85mg/m2 2/2 Cr)  LV 350mg/m2 day 1  5FU 400mg/m2 IV push day 1  5FU 1200mg/m2 continuous infusion day 1-2 (total 2,400mg/m2 IV over 46-48 hours)  Emetic risk: moderate  Febrile neutropenia risk: intermediate     - HOLD CYCLE 2 OF CHEMO until pt sees cardio oncology for recommendations regarding possible rechallenge of 5FU given new afib a few days after receiving 5FU, I am concerned this will recur with repeat 5FU dose. If possible to rechallenge w/medication optimization, would consider doing this inpatient with continuous cardiac monitoring  - if unable to give 5FU, will d/w [facility] tumor board other options in adjuvant setting, availability of clinical trials etc  - plan for repeat CT end of 8/23, hold until tx plan is confirmed     -5/20/2023 genetic counseling: Negative for EPCAM, MLH1, MSH2, MSH6, and PMS2 genes.  Proceeding with additional testing including: APC, AXIN2, BMPR1A, CDH1, CHEK2, EPCAM, GREM1, MLH1, MSH2,  MSH3, MSH6, MUTYH, NTHL1, PMS2, POLD1, POLE, PTEN, SMAD4, STK11, TP53, CDKN1B, MEN1, NF1, RET, TSC1, TSC2, and VHL genes- results pending   - f/u with Dr. Ashley 4/2024 for rigid proctoscopy     #parxosymal afib  - within 3 days of receiving 5FU, prior cardiac risk factors htn, prediabetes  - 6/3/23 presented to ER w/lightheadedness, dizziness, cardioverted s/p diltiazem ggt in ER   - currently on metoprolol XL  - cardio oncology consultation for recommendations regarding possible rechallenge of 5FU given new afib a few days after receiving 5FU, I am concerned this will recur with repeat 5FU dose. 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    #Anemia secondary to iron deficiency and B12 deficiency  - terminal ileum removed at time of colon surgery, monitor for nutrient def  - 2/23 hgb 6.8, ferritin 21, iron sat index 10, TIBC 265, iron 27, b12 1493  - On B12 1000 mcg p.o. daily and ferrous sulfate 325 mg p.o. daily  - 4/9/23 s/p 1 uprbcs  - 5/9/23 hgb 11.3,5/30/23 hgb 9.4  - based on Ganzoni equation w/goal hgb 14 and 500mg needed for iron stores, pts iron deficit is 1400 mg  - ferric carboxymaltose 750mg x2 doses, pending 6/14 and 6/28    #Crohn's  - dx since at least 2010     RTC tomorrow for labs, IV iron  RTC 2-3 weeks for f/u with me, after pt has seen cardiac oncology team       Irene Bateman  DO  Hematology/Oncology  [facility]          Again, thank you for allowing me to participate in the care of your patient.        Sincerely,        FRANCISCO LEE, DO

## 2023-06-13 NOTE — PATIENT INSTRUCTIONS
Reduced sodium bicarbonate to 650 mg twice a day  Stay well hydrated  Monitor blood pressure couple times a week  See you in 3 months with labs

## 2023-06-14 ENCOUNTER — INFUSION THERAPY VISIT (OUTPATIENT)
Dept: INFUSION THERAPY | Facility: CLINIC | Age: 68
End: 2023-06-14
Attending: INTERNAL MEDICINE
Payer: MEDICARE

## 2023-06-14 ENCOUNTER — PATIENT OUTREACH (OUTPATIENT)
Dept: ONCOLOGY | Facility: CLINIC | Age: 68
End: 2023-06-14
Payer: MEDICARE

## 2023-06-14 VITALS
BODY MASS INDEX: 33.2 KG/M2 | HEART RATE: 85 BPM | SYSTOLIC BLOOD PRESSURE: 144 MMHG | WEIGHT: 181.5 LBS | TEMPERATURE: 97.2 F | RESPIRATION RATE: 18 BRPM | DIASTOLIC BLOOD PRESSURE: 67 MMHG | OXYGEN SATURATION: 99 %

## 2023-06-14 DIAGNOSIS — D50.0 IRON DEFICIENCY ANEMIA DUE TO CHRONIC BLOOD LOSS: ICD-10-CM

## 2023-06-14 DIAGNOSIS — C18.9 MALIGNANT NEOPLASM OF COLON, UNSPECIFIED PART OF COLON (H): Primary | ICD-10-CM

## 2023-06-14 DIAGNOSIS — Z76.89 PREVENTION OF CHEMOTHERAPY-INDUCED NEUTROPENIA: ICD-10-CM

## 2023-06-14 DIAGNOSIS — C18.7 MALIGNANT NEOPLASM OF SIGMOID COLON (H): ICD-10-CM

## 2023-06-14 DIAGNOSIS — C18.7 MALIGNANT NEOPLASM OF SIGMOID COLON (H): Primary | ICD-10-CM

## 2023-06-14 DIAGNOSIS — C18.9 MALIGNANT NEOPLASM OF COLON, UNSPECIFIED PART OF COLON (H): ICD-10-CM

## 2023-06-14 LAB
ALBUMIN SERPL BCG-MCNC: 3.6 G/DL (ref 3.5–5.2)
ALP SERPL-CCNC: 102 U/L (ref 35–104)
ALT SERPL W P-5'-P-CCNC: 16 U/L (ref 0–50)
ANION GAP SERPL CALCULATED.3IONS-SCNC: 11 MMOL/L (ref 7–15)
AST SERPL W P-5'-P-CCNC: 19 U/L (ref 0–45)
BASOPHILS # BLD AUTO: 0 10E3/UL (ref 0–0.2)
BASOPHILS NFR BLD AUTO: 1 %
BILIRUB SERPL-MCNC: 0.5 MG/DL
BUN SERPL-MCNC: 13.8 MG/DL (ref 8–23)
CALCIUM SERPL-MCNC: 8.9 MG/DL (ref 8.8–10.2)
CHLORIDE SERPL-SCNC: 106 MMOL/L (ref 98–107)
CREAT SERPL-MCNC: 1.23 MG/DL (ref 0.51–0.95)
DEPRECATED HCO3 PLAS-SCNC: 26 MMOL/L (ref 22–29)
EOSINOPHIL # BLD AUTO: 0 10E3/UL (ref 0–0.7)
EOSINOPHIL NFR BLD AUTO: 0 %
ERYTHROCYTE [DISTWIDTH] IN BLOOD BY AUTOMATED COUNT: 15.5 % (ref 10–15)
GFR SERPL CREATININE-BSD FRML MDRD: 48 ML/MIN/1.73M2
GLUCOSE SERPL-MCNC: 115 MG/DL (ref 70–99)
HCT VFR BLD AUTO: 29.2 % (ref 35–47)
HGB BLD-MCNC: 8.6 G/DL (ref 11.7–15.7)
IMM GRANULOCYTES # BLD: 0 10E3/UL
IMM GRANULOCYTES NFR BLD: 0 %
LYMPHOCYTES # BLD AUTO: 0.3 10E3/UL (ref 0.8–5.3)
LYMPHOCYTES NFR BLD AUTO: 30 %
Lab: NORMAL
MAGNESIUM SERPL-MCNC: 1.6 MG/DL (ref 1.7–2.3)
MCH RBC QN AUTO: 25.1 PG (ref 26.5–33)
MCHC RBC AUTO-ENTMCNC: 29.5 G/DL (ref 31.5–36.5)
MCV RBC AUTO: 85 FL (ref 78–100)
MONOCYTES # BLD AUTO: 0.2 10E3/UL (ref 0–1.3)
MONOCYTES NFR BLD AUTO: 19 %
NEUTROPHILS # BLD AUTO: 0.5 10E3/UL (ref 1.6–8.3)
NEUTROPHILS NFR BLD AUTO: 50 %
NRBC # BLD AUTO: 0 10E3/UL
NRBC BLD AUTO-RTO: 0 /100
PERFORMING LABORATORY: NORMAL
PLAT MORPH BLD: NORMAL
PLATELET # BLD AUTO: 183 10E3/UL (ref 150–450)
POTASSIUM SERPL-SCNC: 3.6 MMOL/L (ref 3.4–5.3)
PROT SERPL-MCNC: 5.9 G/DL (ref 6.4–8.3)
RBC # BLD AUTO: 3.43 10E6/UL (ref 3.8–5.2)
RBC MORPH BLD: NORMAL
SODIUM SERPL-SCNC: 143 MMOL/L (ref 136–145)
SPECIMEN STATUS: NORMAL
TEST NAME: NORMAL
WBC # BLD AUTO: 0.9 10E3/UL (ref 4–11)

## 2023-06-14 PROCEDURE — 83735 ASSAY OF MAGNESIUM: CPT

## 2023-06-14 PROCEDURE — 80053 COMPREHEN METABOLIC PANEL: CPT

## 2023-06-14 PROCEDURE — 96365 THER/PROPH/DIAG IV INF INIT: CPT

## 2023-06-14 PROCEDURE — 250N000011 HC RX IP 250 OP 636: Performed by: INTERNAL MEDICINE

## 2023-06-14 PROCEDURE — 85025 COMPLETE CBC W/AUTO DIFF WBC: CPT

## 2023-06-14 PROCEDURE — 258N000003 HC RX IP 258 OP 636: Performed by: INTERNAL MEDICINE

## 2023-06-14 PROCEDURE — 36415 COLL VENOUS BLD VENIPUNCTURE: CPT

## 2023-06-14 RX ORDER — HEPARIN SODIUM (PORCINE) LOCK FLUSH IV SOLN 100 UNIT/ML 100 UNIT/ML
5 SOLUTION INTRAVENOUS
Status: CANCELLED | OUTPATIENT
Start: 2023-06-14

## 2023-06-14 RX ORDER — HEPARIN SODIUM (PORCINE) LOCK FLUSH IV SOLN 100 UNIT/ML 100 UNIT/ML
5 SOLUTION INTRAVENOUS
Status: CANCELLED | OUTPATIENT
Start: 2023-06-21

## 2023-06-14 RX ORDER — METHYLPREDNISOLONE SODIUM SUCCINATE 125 MG/2ML
125 INJECTION, POWDER, LYOPHILIZED, FOR SOLUTION INTRAMUSCULAR; INTRAVENOUS
Status: CANCELLED
Start: 2023-06-14

## 2023-06-14 RX ORDER — EPINEPHRINE 1 MG/ML
0.3 INJECTION, SOLUTION INTRAMUSCULAR; SUBCUTANEOUS EVERY 5 MIN PRN
Status: CANCELLED | OUTPATIENT
Start: 2023-06-21

## 2023-06-14 RX ORDER — EPINEPHRINE 1 MG/ML
0.3 INJECTION, SOLUTION INTRAMUSCULAR; SUBCUTANEOUS EVERY 5 MIN PRN
Status: CANCELLED | OUTPATIENT
Start: 2023-06-14

## 2023-06-14 RX ORDER — ALBUTEROL SULFATE 0.83 MG/ML
2.5 SOLUTION RESPIRATORY (INHALATION)
Status: CANCELLED | OUTPATIENT
Start: 2023-06-14

## 2023-06-14 RX ORDER — HEPARIN SODIUM (PORCINE) LOCK FLUSH IV SOLN 100 UNIT/ML 100 UNIT/ML
5 SOLUTION INTRAVENOUS
Status: DISCONTINUED | OUTPATIENT
Start: 2023-06-14 | End: 2023-06-14 | Stop reason: HOSPADM

## 2023-06-14 RX ORDER — HEPARIN SODIUM,PORCINE 10 UNIT/ML
5 VIAL (ML) INTRAVENOUS
Status: CANCELLED | OUTPATIENT
Start: 2023-06-21

## 2023-06-14 RX ORDER — ALBUTEROL SULFATE 90 UG/1
1-2 AEROSOL, METERED RESPIRATORY (INHALATION)
Status: CANCELLED
Start: 2023-06-14

## 2023-06-14 RX ORDER — MEPERIDINE HYDROCHLORIDE 25 MG/ML
25 INJECTION INTRAMUSCULAR; INTRAVENOUS; SUBCUTANEOUS EVERY 30 MIN PRN
Status: CANCELLED | OUTPATIENT
Start: 2023-06-21

## 2023-06-14 RX ORDER — ALBUTEROL SULFATE 90 UG/1
1-2 AEROSOL, METERED RESPIRATORY (INHALATION)
Status: CANCELLED
Start: 2023-06-21

## 2023-06-14 RX ORDER — ALBUTEROL SULFATE 0.83 MG/ML
2.5 SOLUTION RESPIRATORY (INHALATION)
Status: CANCELLED | OUTPATIENT
Start: 2023-06-21

## 2023-06-14 RX ORDER — HEPARIN SODIUM,PORCINE 10 UNIT/ML
5 VIAL (ML) INTRAVENOUS
Status: CANCELLED | OUTPATIENT
Start: 2023-06-14

## 2023-06-14 RX ORDER — DIPHENHYDRAMINE HYDROCHLORIDE 50 MG/ML
50 INJECTION INTRAMUSCULAR; INTRAVENOUS
Status: CANCELLED
Start: 2023-06-21

## 2023-06-14 RX ORDER — DIPHENHYDRAMINE HYDROCHLORIDE 50 MG/ML
50 INJECTION INTRAMUSCULAR; INTRAVENOUS
Status: CANCELLED
Start: 2023-06-14

## 2023-06-14 RX ORDER — MEPERIDINE HYDROCHLORIDE 25 MG/ML
25 INJECTION INTRAMUSCULAR; INTRAVENOUS; SUBCUTANEOUS EVERY 30 MIN PRN
Status: CANCELLED | OUTPATIENT
Start: 2023-06-14

## 2023-06-14 RX ORDER — METHYLPREDNISOLONE SODIUM SUCCINATE 125 MG/2ML
125 INJECTION, POWDER, LYOPHILIZED, FOR SOLUTION INTRAMUSCULAR; INTRAVENOUS
Status: CANCELLED
Start: 2023-06-21

## 2023-06-14 RX ADMIN — HEPARIN 5 ML: 100 SYRINGE at 11:58

## 2023-06-14 RX ADMIN — FERRIC CARBOXYMALTOSE INJECTION 750 MG: 50 INJECTION, SOLUTION INTRAVENOUS at 11:02

## 2023-06-14 RX ADMIN — SODIUM CHLORIDE 250 ML: 9 INJECTION, SOLUTION INTRAVENOUS at 10:57

## 2023-06-14 ASSESSMENT — PAIN SCALES - GENERAL: PAINLEVEL: NO PAIN (0)

## 2023-06-14 NOTE — TELEPHONE ENCOUNTER
Spoke with patient regarding visit with Dr. Bateman 6/13/23. This RN has arranged for Cardio-Oncology visit:    Patient verbalized understanding of plan. No further questions or concerns.  Liseth Yoon RNCC

## 2023-06-14 NOTE — PROGRESS NOTES
Infusion Nursing Note:  Nadia Ladd presents today for Iron replacement. Was originally scheduled for C2D1 of Modified FOLFOX, but this was postponed due to some afib that she had after cycle 1.    Patient seen by provider today: No. Visited with Fransico yesterday.   present during visit today: Not Applicable.    Note: Feeling better since afib episode that brought her to the ED on 6/3.      Intravenous Access:  Implanted Port.    Treatment Conditions:  Lab Results   Component Value Date    HGB 8.6 (L) 06/14/2023    WBC 0.9 (LL) 06/14/2023    ANEUTAUTO 0.5 (L) 06/14/2023     06/14/2023      Lab Results   Component Value Date     06/14/2023    POTASSIUM 3.6 06/14/2023    MAG 1.6 (L) 06/14/2023    CR 1.23 (H) 06/14/2023    LIVIER 8.9 06/14/2023    BILITOTAL 0.5 06/14/2023    ALBUMIN 3.6 06/14/2023    ALT 16 06/14/2023    AST 19 06/14/2023     Magnesium 1.6. Fransico notified.    Results reviewed, labs MET treatment parameters, ok to proceed with treatment.      Post Infusion Assessment:  Patient tolerated infusion without incident.  Patient observed for 20 minutes post infusion per protocol.       Discharge Plan:   Patient discharged in stable condition accompanied by: .  Departure Mode: Ambulatory.      Marycarmen Cardenas RN

## 2023-06-14 NOTE — NURSING NOTE
DISCHARGE PLAN:  Next appointments: See patient instruction section  Departure Mode: Ambulatory  Accompanied by:      minutes for nursing discharge (face to face time)     Nadia Ladd is here today for oncology follow up.  Patient was not seen by writing nurse at time of appointment.   Appointments scheduled for infusion and labs, Bateman follow up and cardio onc consult. Pt is aware of her appts.  See patient instructions and Oncologist's Progress note for further details. Questions and concerns addressed to patient's satisfaction. Patient verbalized and demonstrated understanding of plan.  Contact information provided and patient is encouraged to call with any that arise in the interim of care.    Jelly Champagne  [facility]  [phone]  6/14/2023, 9:06 AM

## 2023-06-14 NOTE — PROGRESS NOTES
Infusion Nursing Note:  Nadia Ladd presents today for port labs.    Patient seen by provider today: No   present during visit today: Not Applicable.    Note: N/A.      Intravenous Access:  Labs drawn without difficulty.  Implanted Port.      Marycarmen Cardenas RN

## 2023-06-19 ENCOUNTER — TELEPHONE (OUTPATIENT)
Dept: ONCOLOGY | Facility: CLINIC | Age: 68
End: 2023-06-19

## 2023-06-19 NOTE — TELEPHONE ENCOUNTER
Oncology Nurse Triage- RASH    Situation:   Pt/Caregiver reporting the symptom of red/swollen Right Foot    Background:   Treating Provider:   Dr. Bateman    Date of last office visit: 05/31/23    Is patient on radiation therapy?:No    Did patient start any new medications?: No    Other recent treatments?: CHEMO    Assessment:       Duration: Started to notice it on Saturday 06/17/23  Description   Location: big toe and foot - right  Joint Swelling: YES  Redness: YES  Pain intensity:  moderate  Accompanying signs and symptoms: burning sensation to right foot/toe  History  Previous history of gout: no    Trauma to the area: no   Precipitating factors:   Alcohol usage: none  Diuretic use: no   Recent illness: no   Therapies tried and outcome: None    Recommendations:   Paged provider: Message sent to Dr. Bateman regarding asssessment above. Will return call to patient with recommendations when available. This RN did advise the below recommendations.      This writer educated on:      Apply cool compress to inflamed area    Apply topical medications as prescribed    Wear loose fitting cotton clothing    Keep fingernails cut short and wear soft mittens at night to avoid scratching    Avoid hot baths and showers\    Avoid sunlight and uses sunscreen and sun protection    If patient prescribed antihistamine, expect drowsiness as side effect and take safety precautions.    If no improvement in next three days, increase in pain or uncontrolled itching, continued rash progression call back triage.     Seek Emergency Care Immediately if any of the following occurs:     Severe headache    Difficulty breathing and throat tightening    Chest pain, wheezing, shortness of breath    High Fever    Eye involvement    Desquamation(aka peeling skin in scales/flakes)    Mottled skin below the waist    Liseth Yoon RNCC

## 2023-06-19 NOTE — TELEPHONE ENCOUNTER
Please call pt today, Foot concerns possible side effect to chemo. Please call pt at#[phone]. Thank You Lelo

## 2023-06-19 NOTE — PROGRESS NOTES
"Virtual Visit Details  Type of service:  Telephone Visit   Phone call duration: 10 minutes     6/20/2023    Referring Provider: Irene Bateman DO     Presenting Information:  I spoke to Nadia today to discuss her genetic testing results. Her blood was drawn on 3/31/2023. The Hereditary Colorectal Cancers panel + Customized Neuroendocrine panel was ordered from [facility]. This testing was done because of Nadia's personal history of colon cancer, absent MLH1 and PMS2 on IHC, and negative MLH1 promoter methylation testing.    Genetic Testing Result: NEGATIVE  Nadia tested negative for mutations in the APC, AXIN2, BMPR1A, CDH1, CHEK2, EPCAM, GREM1, MLH1, MSH2, MSH3, MSH6, MUTYH, NTHL1, PMS2, POLD1, POLE, PTEN, SMAD4, STK11, TP53, CDKN1B, MEN1, NF1, RET, TSC1, TSC2, and VHL gene. No mutations were found in any of the 27 genes analyzed. This test involved sequencing and deletion/duplication analysis of all genes with the exceptions of EPCAM and GREM1 (deletions/duplications only). Please see a copy of the scanned test report for complete details regarding testing methodology/limitations.    A copy of the test report can be found in the Laboratory tab, dated 5/24/2023, and named \"Next generation sequencing\".     Interpretation:  We discussed several different interpretations of this negative test result.    1. One explanation may be that there is a different gene or combination of genes and environment that are associated with the cancers in this family.  2. It is possible that her relatives diagnosed with cancer did have a mutation and she did not inherit it.  3. There is also a small possibility that there is a mutation in one of these genes, and the testing laboratory could not find it with their current testing methods.       In approximately 2-4% of patients with colon cancer, immunohistochemistry (IHC) indicates mismatch repair (MMR) protein loss but germline genetic testing " does not detect a mutation. Studies have shown that 45%-68% of these cases have acquired biallelic somatic MMR gene loss (sometimes referred to as double somatic MMR mutations) and are not associated with a genetic predisposition to cancer.     Screening:  Based on this negative test result, it is important for Nadia and her relatives to refer back to the family history for appropriate cancer screening.      Nadia should continue to follow up with her oncology team as recommended about her colon cancer surveillance.     Nadia's family history of cancer does not meet Rescue II criteria. Therefore, per National Comprehensive Cancer Network (NCCN) guidelines, individuals with a first degree relative with colorectal cancer diagnosed at any age should begin colonoscopy at age 40, or 10 years before the earliest diagnosis of colorectal cancer, whichever is first.  In this family, colonoscopy should start at age 40.  Colonoscopy should be repeated every 5 years, or based on colonoscopy findings.  This would apply to Nadia's siblings.  These recommendations may change based on personal and family history as well as personal preference, and should be discussed with an individuals physician.     Other population cancer screening options, such as those recommended by the American Cancer Society and the National Comprehensive Cancer Network (NCCN), are also appropriate for Nadia and her family. These screening recommendations may change if there are changes to Nadia's personal and/or family history of cancer.     Final screening recommendations should be made by each individual's primary care provider.     Additional Testing Considerations:  Guidelines from the National Comprehensive Cancer Network (NCCN) indicate that tumor genetic testing may be helpful for individuals with MMR protein loss and no germline mutation detected to evaluate for biallelic somatic MMR. Paired somatic and germline genetic testing options are  likely to be available in the near future. I encouraged Nadia to contact me regularly to inquire when this testing is available as it could clarify her IHC results.    Summary:  We do not have an explanation for Nadia's personal or family history of cancer. While no genetic changes were identified, Nadia may still be at risk for certain cancers due to family history, environmental factors, or other genetic causes not identified by this test.  Because of that, it is important that she continue with cancer screening based on her personal and family history as discussed above.    Genetic testing is rapidly advancing, and new cancer susceptibility genes will most likely be identified in the future.  Therefore, I encouraged Nadia to contact me regularly or if there are changes in her personal or family history.  This may change how we assess her cancer risk, screening, and the testing we would offer.    Plan:  1. A copy of the test results will be mailed to Nadia.  2. She plans to follow-up with her other providers.  3. She should contact me regularly, or sooner if her family history changes.    If Nadia has any further questions, I encouraged her to contact me at [phone].     Time spent on phone: 10 minutes.    Kalani Salazar MS, [facility]  Licensed, Certified Genetic Counselor  Phone: [phone]

## 2023-06-20 ENCOUNTER — VIRTUAL VISIT (OUTPATIENT)
Dept: ONCOLOGY | Facility: CLINIC | Age: 68
End: 2023-06-20
Payer: MEDICARE

## 2023-06-20 ENCOUNTER — MEDICAL CORRESPONDENCE (OUTPATIENT)
Dept: HEALTH INFORMATION MANAGEMENT | Facility: CLINIC | Age: 68
End: 2023-06-20

## 2023-06-20 ENCOUNTER — ALLIED HEALTH/NURSE VISIT (OUTPATIENT)
Dept: FAMILY MEDICINE | Facility: CLINIC | Age: 68
End: 2023-06-20
Payer: MEDICARE

## 2023-06-20 ENCOUNTER — TELEPHONE (OUTPATIENT)
Dept: OTOLARYNGOLOGY | Facility: CLINIC | Age: 68
End: 2023-06-20

## 2023-06-20 DIAGNOSIS — T45.1X5A IMMUNOSUPPRESSED DUE TO CHEMOTHERAPY (H): ICD-10-CM

## 2023-06-20 DIAGNOSIS — M10.071 ACUTE IDIOPATHIC GOUT INVOLVING TOE OF RIGHT FOOT: Primary | ICD-10-CM

## 2023-06-20 DIAGNOSIS — D84.821 IMMUNOSUPPRESSED DUE TO CHEMOTHERAPY (H): ICD-10-CM

## 2023-06-20 DIAGNOSIS — H93.11 TINNITUS, RIGHT: ICD-10-CM

## 2023-06-20 DIAGNOSIS — C18.7 MALIGNANT NEOPLASM OF SIGMOID COLON (H): Primary | ICD-10-CM

## 2023-06-20 DIAGNOSIS — Z79.899 IMMUNOSUPPRESSED DUE TO CHEMOTHERAPY (H): ICD-10-CM

## 2023-06-20 LAB — MISCELLANEOUS TEST 1 (ARUP): NORMAL

## 2023-06-20 PROCEDURE — 999N000069 HC STATISTIC GENETIC COUNSELING, < 16 MIN: Mod: TEL

## 2023-06-20 RX ORDER — INDOMETHACIN 50 MG/1
50 CAPSULE ORAL
Qty: 12 CAPSULE | Refills: 0 | Status: ON HOLD | OUTPATIENT
Start: 2023-06-20 | End: 2023-07-18

## 2023-06-20 NOTE — TELEPHONE ENCOUNTER
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  Based on the availability of our provider(s), we are unable to accommodate this request.      Were all sites offered this patient?  Yes      Does scheduling algorithm request to schedule next available?  Patient has been scheduled for the first available opening with Dr Zurita on 10/23.  We have informed the patient that the clinic will review their referral and reach out if a sooner appointment is medically necessary.     PT prefers [place]

## 2023-06-20 NOTE — TELEPHONE ENCOUNTER
Appointment is appropriate. Patient was placed on the waiting list.     Karen Rahman RN on 6/20/2023 at 3:47 PM

## 2023-06-20 NOTE — CONFIDENTIAL NOTE
Medical Oncology Update:    Pt called in w/c/o right foot erythema, unable to send pictures. I asked the patient to come in so we could look at her foot. Appears that she has gout, not hand foot syndrome (pt off 5FU for some time now).    Rxed indomethacin  I sent a message to her PCP whom she is seeing in two days for further recs    Irene Bateman D.O.  Hematology/Oncology  [facility]

## 2023-06-20 NOTE — LETTER
"Cancer Risk Management  Program Locations    [facility]  [facility]  [facility]  Mailing Address  Cancer Risk Management Program  [facility]  [address]  [place] [address]    New patient appointments  [phone]    June 20, 2023    Nadia Ladd  [address]  [place] [facility] [address]    Dear Nadia,    It was a pleasure talking with you via your virtual genetic counseling visit on 6/20/2023.  Below is a copy of the progress note from that recent visit through the Cancer Risk Management Program.  If you have any additional questions, please feel free to contact me.  Referring Provider: Irene Bateman DO     Presenting Information:  I spoke to Nadia today to discuss her genetic testing results. Her blood was drawn on 3/31/2023. The Hereditary Colorectal Cancers panel + Customized Neuroendocrine panel was ordered from [facility]. This testing was done because of Nadia's personal history of colon cancer, absent MLH1 and PMS2 on IHC, and negative MLH1 promoter methylation testing.    Genetic Testing Result: NEGATIVE  Nadia tested negative for mutations in the APC, AXIN2, BMPR1A, CDH1, CHEK2, EPCAM, GREM1, MLH1, MSH2, MSH3, MSH6, MUTYH, NTHL1, PMS2, POLD1, POLE, PTEN, SMAD4, STK11, TP53, CDKN1B, MEN1, NF1, RET, TSC1, TSC2, and VHL gene. No mutations were found in any of the 27 genes analyzed. This test involved sequencing and deletion/duplication analysis of all genes with the exceptions of EPCAM and GREM1 (deletions/duplications only). Please see a copy of the scanned test report for complete details regarding testing methodology/limitations.    A copy of the test report can be found in the Laboratory tab, dated 5/24/2023, and named \"Next generation sequencing\".     Interpretation:  We discussed " several different interpretations of this negative test result.    One explanation may be that there is a different gene or combination of genes and environment that are associated with the cancers in this family.  It is possible that her relatives diagnosed with cancer did have a mutation and she did not inherit it.  There is also a small possibility that there is a mutation in one of these genes, and the testing laboratory could not find it with their current testing methods.       In approximately 2-4% of patients with colon cancer, immunohistochemistry (IHC) indicates mismatch repair (MMR) protein loss but germline genetic testing does not detect a mutation. Studies have shown that 45%-68% of these cases have acquired biallelic somatic MMR gene loss (sometimes referred to as double somatic MMR mutations) and are not associated with a genetic predisposition to cancer.     Screening:  Based on this negative test result, it is important for Nadia and her relatives to refer back to the family history for appropriate cancer screening.    Nadia should continue to follow up with her oncology team as recommended about her colon cancer surveillance.   Nadia's family history of cancer does not meet [place] II criteria. Therefore, per National Comprehensive Cancer Network (NCCN) guidelines, individuals with a first degree relative with colorectal cancer diagnosed at any age should begin colonoscopy at age 40, or 10 years before the earliest diagnosis of colorectal cancer, whichever is first.  In this family, colonoscopy should start at age 40.  Colonoscopy should be repeated every 5 years, or based on colonoscopy findings.  This would apply to Nadia's siblings.  These recommendations may change based on personal and family history as well as personal preference, and should be discussed with an individuals physician.   Other population cancer screening options, such as those recommended by the American Cancer Society and  the National Comprehensive Cancer Network (NCCN), are also appropriate for Nadia and her family. These screening recommendations may change if there are changes to Nadia's personal and/or family history of cancer.   Final screening recommendations should be made by each individual's primary care provider.     Additional Testing Considerations:  Guidelines from the National Comprehensive Cancer Network (NCCN) indicate that tumor genetic testing may be helpful for individuals with MMR protein loss and no germline mutation detected to evaluate for biallelic somatic MMR. Paired somatic and germline genetic testing options are likely to be available in the near future. I encouraged Nadia to contact me regularly to inquire when this testing is available as it could clarify her IHC results.    Summary:  We do not have an explanation for Nadia's personal or family history of cancer. While no genetic changes were identified, Nadia may still be at risk for certain cancers due to family history, environmental factors, or other genetic causes not identified by this test.  Because of that, it is important that she continue with cancer screening based on her personal and family history as discussed above.    Genetic testing is rapidly advancing, and new cancer susceptibility genes will most likely be identified in the future.  Therefore, I encouraged Nadia to contact me regularly or if there are changes in her personal or family history.  This may change how we assess her cancer risk, screening, and the testing we would offer.    Plan:  1. A copy of the test results will be mailed to Nadia.  2. She plans to follow-up with her other providers.  3. She should contact me regularly, or sooner if her family history changes.    If Nadia has any further questions, I encouraged her to contact me at [phone].     Kalani Salazar MS, [facility] [place] [facility]  Licensed, Certified Genetic Counselor  Phone: [phone]

## 2023-06-20 NOTE — PATIENT INSTRUCTIONS
Negative Genetic Test Result    Genetic Testing  Genetic testing involved a blood or saliva test which looked at the genetic information in select genes for variants associated with cancer risk.  This testing may have included analysis of a single gene due to a known variant in the family, multiple genes most associated with the cancers in a family, or an expanded panel of genes related to many types of cancers.     Results  There are several possible genetic test results, including:   Positive--a harmful mutation (also known as a  pathogenic  or  likely pathogenic  variant) was identified in a gene associated with increased cancer risk.  These risks, as well as medical management options, depend on the specific genetic variant identified.    Negative--no variants were identified in the genes analyzed   Variant of unknown significance--a variant was identified in one or more genes, though it is currently unclear how this impacts cancer risk in the family.  Genetic testing labs are working to collect evidence about these uncertain variants and may provide updates in the future.    What Does a Negative Genetic Test Result Mean?  A negative genetic test results means that no genetic changes (variants) were detected in the genes tested. While no inherited risk factors for cancer were identified, you and your family may be at risk for certain cancers due to family history, environmental factors, or other genetic causes not identified by this test.  It is also possible that your family may still be at risk of carrying a genetic risk factor which you did not inherit. Your genetic counselor can help interpret the result for you and your relatives.      It is important to note which genes were included in your test. A list of these genes can be found on your test result.    Screening Recommendations  A combination of personal and family history factors may inform cancer risk and medical management recommendations.   Population cancer screening options, such as those recommended by the American Cancer Society and the National Comprehensive Cancer Network (NCCN) are appropriate for many families at average risk for cancer.  However, earlier and/or more frequent screening may be recommended based on personal factors (lifestyle, exposures, medications, screening results), family history of cancer, and sometimes genetic factors.  These cancer risk management options should be discussed in more detail with an individual's medical providers.      Please call us if you have any questions or concerns.   Cancer Risk Management Program (Appointments: [phone])  Chester Valdivia, MS [facility]  [phone]  Kalani Salazar, MS, [facility] [phone]  Mallika Pollock, MS, [facility]  [phone]  Viviana Velazco, MS, [facility]  [phone]  Larisa Pete, MS, [facility]  [phone]  Emma Garcia, MS, [facility] [phone]  Teri Lee, MS, [facility] [phone]

## 2023-06-20 NOTE — TELEPHONE ENCOUNTER
Per [facility]- call pt and see if she can come in today to have foot looked at.     I called pt and she will be here before 3:00.    Jelly RODRIGUEZ [facility]  [phone]

## 2023-06-20 NOTE — NURSING NOTE
Is the patient currently in the state of [place]? YES    Visit mode:TELEPHONE    If the visit is dropped, the patient can be reconnected by: TELEPHONE VISIT: Phone number: [phone]    Will anyone else be joining the visit? NO      How would you like to obtain your AVS? MyChart    Are changes needed to the allergy or medication list? NO    Reason for visit: RECHNAHID Whyte [facility]

## 2023-06-20 NOTE — Clinical Note
"    6/20/2023         RE: Nadia Ladd  [address]  [facility] [address]        Dear Colleague,    Thank you for referring your patient, Nadia Ladd, to the [facility]. Please see a copy of my visit note below.    Virtual Visit Details  Type of service:  Telephone Visit   Phone call duration: *** minutes     6/20/2023    Referring Provider: Irene Bateman DO     Presenting Information:  I spoke to Nadia today to discuss her genetic testing results. Her blood was drawn on 3/31/2023. The Hereditary Colorectal Cancers panel + Customized Neuroendocrine panel was ordered from [facility]. This testing was done because of Nadia's personal history of colon cancer, absent MLH1 and PMS2 on IHC, and negative MLH1 promoter methylation testing.    Genetic Testing Result: NEGATIVE  Nadia tested negative for mutations in the APC, AXIN2, BMPR1A, CDH1, CHEK2, EPCAM, GREM1, MLH1, MSH2, MSH3, MSH6, MUTYH, NTHL1, PMS2, POLD1, POLE, PTEN, SMAD4, STK11, TP53, CDKN1B, MEN1, NF1, RET, TSC1, TSC2, and VHL gene. No mutations were found in any of the 27 genes analyzed. This test involved sequencing and deletion/duplication analysis of all genes with the exceptions of EPCAM and GREM1 (deletions/duplications only). Please see a copy of the scanned test report for complete details regarding testing methodology/limitations.    A copy of the test report can be found in the Laboratory tab, dated 5/24/2023, and named \"Next generation sequencing\".     Interpretation:  We discussed several different interpretations of this negative test result.    1. One explanation may be that there is a different gene or combination of genes and environment that are associated with the cancers in this family.  2. It is possible that her relatives diagnosed with cancer did have a mutation and she did not inherit it.  3. There is also a small possibility that there is a mutation in one of " these genes, and the testing laboratory could not find it with their current testing methods.       In approximately 2-4% of patients with colon cancer, immunohistochemistry (IHC) indicates mismatch repair (MMR) protein loss but germline genetic testing does not detect a mutation. Studies have shown that 45%-68% of these cases have acquired biallelic somatic MMR gene loss (sometimes referred to as double somatic MMR mutations) and are not associated with a genetic predisposition to cancer.     Screening:  Based on this negative test result, it is important for Nadia and her relatives to refer back to the family history for appropriate cancer screening.      Nadia should continue to follow up with her oncology team as recommended about her colon cancer surveillance.     Nadia's family history of cancer does not meet Temple II criteria. Therefore, per National Comprehensive Cancer Network (NCCN) guidelines, individuals with a first degree relative with colorectal cancer diagnosed at any age should begin colonoscopy at age 40, or 10 years before the earliest diagnosis of colorectal cancer, whichever is first.  In this family, colonoscopy should start at age 40.  Colonoscopy should be repeated every 5 years, or based on colonoscopy findings.  This would apply to Nadia's siblings.  These recommendations may change based on personal and family history as well as personal preference, and should be discussed with an individuals physician.     Other population cancer screening options, such as those recommended by the American Cancer Society and the National Comprehensive Cancer Network (NCCN), are also appropriate for Nadia and her family. These screening recommendations may change if there are changes to Nadia's personal and/or family history of cancer.     Final screening recommendations should be made by each individual's primary care provider.     Additional Testing Considerations:  Guidelines from the National  Comprehensive Cancer Network (NCCN) indicate that tumor genetic testing may be helpful for individuals with MMR protein loss and no germline mutation detected to evaluate for biallelic somatic MMR. Paired somatic and germline genetic testing options are likely to be available in the near future. I encouraged Nadia to contact me regularly to inquire when this testing is available as it could clarify her IHC results.    Summary:  We do not have an explanation for Nadia's personal or family history of cancer. While no genetic changes were identified, Nadia may still be at risk for certain cancers due to family history, environmental factors, or other genetic causes not identified by this test.  Because of that, it is important that she continue with cancer screening based on her personal and family history as discussed above.    Genetic testing is rapidly advancing, and new cancer susceptibility genes will most likely be identified in the future.  Therefore, I encouraged Nadia to contact me regularly or if there are changes in her personal or family history.  This may change how we assess her cancer risk, screening, and the testing we would offer.    Plan:  1. A copy of the test results will be mailed to Nadia.  2. She plans to follow-up with her other providers.  3. She should contact me regularly, or sooner if her family history changes.    If Nadia has any further questions, I encouraged her to contact me at [phone].     Time spent on phone: *** minutes.    Kalani Salazar MS, [facility]  Licensed, Certified Genetic Counselor  Phone: [phone]      Again, thank you for allowing me to participate in the care of your patient.        Sincerely,        Kalani Salazar GC

## 2023-06-22 ENCOUNTER — OFFICE VISIT (OUTPATIENT)
Dept: INTERNAL MEDICINE | Facility: CLINIC | Age: 68
End: 2023-06-22
Payer: MEDICARE

## 2023-06-22 VITALS
OXYGEN SATURATION: 92 % | BODY MASS INDEX: 34.72 KG/M2 | HEART RATE: 73 BPM | WEIGHT: 183.9 LBS | HEIGHT: 61 IN | SYSTOLIC BLOOD PRESSURE: 141 MMHG | RESPIRATION RATE: 17 BRPM | DIASTOLIC BLOOD PRESSURE: 83 MMHG

## 2023-06-22 DIAGNOSIS — C18.7 MALIGNANT NEOPLASM OF SIGMOID COLON (H): ICD-10-CM

## 2023-06-22 DIAGNOSIS — M10.272 ACUTE DRUG-INDUCED GOUT INVOLVING TOE OF LEFT FOOT: ICD-10-CM

## 2023-06-22 DIAGNOSIS — R79.89 ELEVATED TROPONIN: ICD-10-CM

## 2023-06-22 DIAGNOSIS — E83.42 HYPOMAGNESEMIA: ICD-10-CM

## 2023-06-22 DIAGNOSIS — N18.32 STAGE 3B CHRONIC KIDNEY DISEASE (H): ICD-10-CM

## 2023-06-22 DIAGNOSIS — I48.0 PAROXYSMAL ATRIAL FIBRILLATION WITH RVR (H): ICD-10-CM

## 2023-06-22 DIAGNOSIS — E87.20 METABOLIC ACIDOSIS, NORMAL ANION GAP (NAG): ICD-10-CM

## 2023-06-22 DIAGNOSIS — N17.9 AKI (ACUTE KIDNEY INJURY) (H): Primary | ICD-10-CM

## 2023-06-22 DIAGNOSIS — T45.1X5A ANEMIA ASSOCIATED WITH CHEMOTHERAPY: ICD-10-CM

## 2023-06-22 DIAGNOSIS — D64.81 ANEMIA ASSOCIATED WITH CHEMOTHERAPY: ICD-10-CM

## 2023-06-22 DIAGNOSIS — K52.9 CHRONIC DIARRHEA: ICD-10-CM

## 2023-06-22 PROCEDURE — 99213 OFFICE O/P EST LOW 20 MIN: CPT | Performed by: INTERNAL MEDICINE

## 2023-06-22 ASSESSMENT — PAIN SCALES - GENERAL: PAINLEVEL: MODERATE PAIN (4)

## 2023-06-22 NOTE — PROGRESS NOTES
Yo Zuniga is a 67 year old, presenting for the following health issues:  Hospital F/U        6/22/2023     1:59 PM   Additional Questions   Roomed by Teri Leger     HPI   Acute kidney injury  Paroxysmal atrial fibrillation  Mild pulmonary hypertension  Colon cancer  Side effects of chemotherapy  Metabolic acidosis  Hypomagnesemia  Elevated troponin  Chronic diarrhea  Obesity    Hospital Follow-up Visit:    Hospital/Nursing Home/IP Rehab Facility: [facility]  Date of Admission: 6/3/23  Date of Discharge: 6/5/23  Reason(s) for Admission: kidney issues    Was your hospitalization related to COVID-19? No   Problems taking medications regularly:  None  Medication changes since discharge: None  Problems adhering to non-medication therapy:  None    Would like to have labs done today if possible.    Summary of hospitalization:  [facility] discharge summary reviewed  Diagnostic Tests/Treatments reviewed.  Follow up needed: none  Other Healthcare Providers Involved in Patient s Care:         None  Update since discharge: improved.   Plan of care communicated with patient             Review of Systems   GoutCONSTITUTIONAL: NEGATIVE for fever, chills, change in weight  INTEGUMENTARY/SKIN: NEGATIVE for worrisome rashes, moles or lesions  EYES: NEGATIVE for vision changes or irritation  ENT/MOUTH: NEGATIVE for ear, mouth and throat problems  RESP: NEGATIVE for significant cough or SOB  BREAST: NEGATIVE for masses, tenderness or discharge  CV: NEGATIVE for chest pain, palpitations or peripheral edema.  No further atrial fibrillation  GI: NEGATIVE for nausea, abdominal pain, heartburn, or change in bowel habits  : NEGATIVE for frequency, dysuria, or hematuria  MUSCULOSKELETAL: NEGATIVE for significant arthralgias or myalgia.  Patient has acute gout in her left great toe with redness and inflammation.  NEURO: NEGATIVE for weakness, dizziness or paresthesias  ENDOCRINE:  "NEGATIVE for temperature intolerance, skin/hair changes  HEME: NEGATIVE for bleeding problems  PSYCHIATRIC: NEGATIVE for changes in mood or affect      Objective    BP (!) 141/83   Pulse 73   Resp 17   Ht 1.555 m (5' 1.22\")   Wt 83.4 kg (183 lb 14.4 oz)   LMP  (LMP Unknown)   SpO2 92%   BMI 34.50 kg/m    Body mass index is 34.5 kg/m .  Physical Exam   GENERAL: healthy, alert and no distress  EYES: Eyes grossly normal to inspection, PERRL and conjunctivae and sclerae normal  HENT: ear canals and TM's normal, nose and mouth without ulcers or lesions  NECK: no adenopathy, no asymmetry, masses, or scars and thyroid normal to palpation  RESP: lungs clear to auscultation - no rales, rhonchi or wheezes  CV: regular rate and rhythm, normal S1 S2, no S3 or S4, no murmur, click or rub, no peripheral edema and peripheral pulses strong  ABDOMEN: soft, nontender, no hepatosplenomegaly, no masses and bowel sounds normal  MS: COVID in the left great toe is acute and is noted above.  SKIN: no suspicious lesions or rashes  NEURO: Normal strength and tone, mentation intact and speech normal  PSYCH: mentation appears normal, affect normal/bright    Lab work, radiographs performed during the hospitalization are discussed with the patient in detail              Patient has been interviewed, applicable history and applied review of systems have been performed.    Vital Signs:   BP (!) 141/83   Pulse 73   Resp 17   Ht 1.555 m (5' 1.22\")   Wt 83.4 kg (183 lb 14.4 oz)   LMP  (LMP Unknown)   SpO2 92%   BMI 34.50 kg/m        Decision Making    Problem and Complexity   1. MARYA (acute kidney injury) (H)  Markedly improved    2. Stage 3b chronic kidney disease (H)  Resolved    3. Metabolic acidosis, normal anion gap (NAG)  Improved    4. Elevated troponin  Normalized    5. Hypomagnesemia  Resolved    6. Paroxysmal atrial fibrillation with RVR (H)  Resolved    7. Malignant neoplasm of sigmoid colon (H)  Continuing    8. Anemia " associated with chemotherapy  Pending iron infusion    9. Chronic diarrhea  Improved    10. Acute drug-induced gout involving toe of left foot  Recommend warm soaks.  Patient to continue indomethacin for short-term only                                FOLLOW UP   I have asked the patient to make an appointment for followup with either:  1.  Me  In 1 month.  She will follow-up with her cadre of specialists in the interim        Regarding routine vaccinations:  I have reviewed the patient's vaccination schedule and discussed the benefits of prophylactic vaccination in detail.  I recommend the patient contact their pharmacist (not the pharmacy within the clinic) for vaccinations.  Discussed that most insurance companies now favor reimbursement to the pharmacies and it will financially behoove the patient to have vaccinations performed at their pharmacy.        I have carefully explained the diagnosis and treatment options to the patient.  The patient has displayed an understanding of the above, and all subsequent questions were answered.      DO DIANNA Bullard    Portions of this note were produced using [employer]  Although every attempt at real-time proof reading has been made, occasional grammar/syntax errors may have been missed.

## 2023-06-26 ENCOUNTER — INFUSION THERAPY VISIT (OUTPATIENT)
Dept: INFUSION THERAPY | Facility: CLINIC | Age: 68
End: 2023-06-26
Attending: INTERNAL MEDICINE
Payer: MEDICARE

## 2023-06-26 VITALS
RESPIRATION RATE: 16 BRPM | WEIGHT: 189.9 LBS | HEART RATE: 75 BPM | SYSTOLIC BLOOD PRESSURE: 178 MMHG | OXYGEN SATURATION: 98 % | BODY MASS INDEX: 35.62 KG/M2 | TEMPERATURE: 98.1 F | DIASTOLIC BLOOD PRESSURE: 89 MMHG

## 2023-06-26 DIAGNOSIS — D50.0 IRON DEFICIENCY ANEMIA DUE TO CHRONIC BLOOD LOSS: Primary | ICD-10-CM

## 2023-06-26 PROCEDURE — 250N000011 HC RX IP 250 OP 636: Mod: JZ | Performed by: INTERNAL MEDICINE

## 2023-06-26 PROCEDURE — 96365 THER/PROPH/DIAG IV INF INIT: CPT

## 2023-06-26 PROCEDURE — 258N000003 HC RX IP 258 OP 636: Performed by: INTERNAL MEDICINE

## 2023-06-26 RX ORDER — HEPARIN SODIUM (PORCINE) LOCK FLUSH IV SOLN 100 UNIT/ML 100 UNIT/ML
5 SOLUTION INTRAVENOUS
Status: DISCONTINUED | OUTPATIENT
Start: 2023-06-26 | End: 2023-06-26 | Stop reason: HOSPADM

## 2023-06-26 RX ORDER — HEPARIN SODIUM (PORCINE) LOCK FLUSH IV SOLN 100 UNIT/ML 100 UNIT/ML
5 SOLUTION INTRAVENOUS
Status: CANCELLED | OUTPATIENT
Start: 2023-06-28

## 2023-06-26 RX ORDER — METHYLPREDNISOLONE SODIUM SUCCINATE 125 MG/2ML
125 INJECTION, POWDER, LYOPHILIZED, FOR SOLUTION INTRAMUSCULAR; INTRAVENOUS
Status: CANCELLED
Start: 2023-06-28

## 2023-06-26 RX ORDER — MEPERIDINE HYDROCHLORIDE 25 MG/ML
25 INJECTION INTRAMUSCULAR; INTRAVENOUS; SUBCUTANEOUS EVERY 30 MIN PRN
Status: CANCELLED | OUTPATIENT
Start: 2023-06-28

## 2023-06-26 RX ORDER — ALBUTEROL SULFATE 90 UG/1
1-2 AEROSOL, METERED RESPIRATORY (INHALATION)
Status: CANCELLED
Start: 2023-06-28

## 2023-06-26 RX ORDER — EPINEPHRINE 1 MG/ML
0.3 INJECTION, SOLUTION INTRAMUSCULAR; SUBCUTANEOUS EVERY 5 MIN PRN
Status: CANCELLED | OUTPATIENT
Start: 2023-06-28

## 2023-06-26 RX ORDER — HEPARIN SODIUM,PORCINE 10 UNIT/ML
5 VIAL (ML) INTRAVENOUS
Status: CANCELLED | OUTPATIENT
Start: 2023-06-28

## 2023-06-26 RX ORDER — ALBUTEROL SULFATE 0.83 MG/ML
2.5 SOLUTION RESPIRATORY (INHALATION)
Status: CANCELLED | OUTPATIENT
Start: 2023-06-28

## 2023-06-26 RX ORDER — DIPHENHYDRAMINE HYDROCHLORIDE 50 MG/ML
50 INJECTION INTRAMUSCULAR; INTRAVENOUS
Status: CANCELLED
Start: 2023-06-28

## 2023-06-26 RX ADMIN — HEPARIN 5 ML: 100 SYRINGE at 13:50

## 2023-06-26 RX ADMIN — SODIUM CHLORIDE 250 ML: 9 INJECTION, SOLUTION INTRAVENOUS at 13:13

## 2023-06-26 RX ADMIN — FERRIC CARBOXYMALTOSE INJECTION 750 MG: 50 INJECTION, SOLUTION INTRAVENOUS at 13:26

## 2023-06-26 ASSESSMENT — PAIN SCALES - GENERAL: PAINLEVEL: MODERATE PAIN (4)

## 2023-06-26 NOTE — PROGRESS NOTES
Infusion Nursing Note:  Nadia Ladd presents today for Injectafer infusion 2 of 2.    Patient seen by provider today: No   present during visit today: Not Applicable.    Note: Pt has had gout flareup for past week. Slowly getting better. Rates foot pain at 4/10.      Intravenous Access:  Implanted Port.    Treatment Conditions:  Not Applicable.      Post Infusion Assessment:  Patient tolerated infusion without incident.       Discharge Plan:   Patient discharged in stable condition accompanied by: .  Departure Mode: Ambulatory.      Marycarmen Cardenas RN

## 2023-06-27 DIAGNOSIS — I48.0 PAROXYSMAL ATRIAL FIBRILLATION WITH RVR (H): ICD-10-CM

## 2023-06-27 DIAGNOSIS — K52.9 CHRONIC DIARRHEA: ICD-10-CM

## 2023-06-27 DIAGNOSIS — E83.42 HYPOMAGNESEMIA: ICD-10-CM

## 2023-06-28 RX ORDER — MAGNESIUM OXIDE 400 MG/1
400 TABLET ORAL 2 TIMES DAILY
Qty: 60 TABLET | Refills: 0 | Status: SHIPPED | OUTPATIENT
Start: 2023-06-28 | End: 2023-07-31

## 2023-06-28 NOTE — TELEPHONE ENCOUNTER
Pending Prescriptions:                       Disp   Refills    magnesium oxide (MAG-OX) 400 MG tablet     60 tab*0        Sig: Take 1 tablet (400 mg) by mouth 2 times daily    Routing refill request to provider for review/approval because:  Medication is reported/historical

## 2023-06-30 ENCOUNTER — OFFICE VISIT (OUTPATIENT)
Dept: CARDIOLOGY | Facility: CLINIC | Age: 68
End: 2023-06-30
Attending: INTERNAL MEDICINE
Payer: MEDICARE

## 2023-06-30 ENCOUNTER — TELEPHONE (OUTPATIENT)
Dept: ONCOLOGY | Facility: CLINIC | Age: 68
End: 2023-06-30

## 2023-06-30 ENCOUNTER — TELEPHONE (OUTPATIENT)
Dept: CARDIOLOGY | Facility: CLINIC | Age: 68
End: 2023-06-30

## 2023-06-30 VITALS
SYSTOLIC BLOOD PRESSURE: 139 MMHG | DIASTOLIC BLOOD PRESSURE: 85 MMHG | HEART RATE: 83 BPM | HEIGHT: 62 IN | BODY MASS INDEX: 33.82 KG/M2 | WEIGHT: 183.8 LBS

## 2023-06-30 DIAGNOSIS — D84.821 IMMUNOSUPPRESSED DUE TO CHEMOTHERAPY (H): ICD-10-CM

## 2023-06-30 DIAGNOSIS — N18.32 STAGE 3B CHRONIC KIDNEY DISEASE (H): ICD-10-CM

## 2023-06-30 DIAGNOSIS — K50.10 CROHN'S DISEASE OF LARGE INTESTINE WITHOUT COMPLICATION (H): ICD-10-CM

## 2023-06-30 DIAGNOSIS — I48.0 PAROXYSMAL ATRIAL FIBRILLATION WITH RVR (H): Primary | ICD-10-CM

## 2023-06-30 DIAGNOSIS — C18.3 MALIGNANT NEOPLASM OF HEPATIC FLEXURE (H): ICD-10-CM

## 2023-06-30 DIAGNOSIS — I10 BENIGN ESSENTIAL HYPERTENSION WITH TARGET BLOOD PRESSURE BELOW 140/90: ICD-10-CM

## 2023-06-30 DIAGNOSIS — Z79.899 IMMUNOSUPPRESSED DUE TO CHEMOTHERAPY (H): ICD-10-CM

## 2023-06-30 DIAGNOSIS — I27.20 MILD PULMONARY HYPERTENSION (H): ICD-10-CM

## 2023-06-30 DIAGNOSIS — C18.7 MALIGNANT NEOPLASM OF SIGMOID COLON (H): ICD-10-CM

## 2023-06-30 DIAGNOSIS — T45.1X5A IMMUNOSUPPRESSED DUE TO CHEMOTHERAPY (H): ICD-10-CM

## 2023-06-30 PROCEDURE — 99205 OFFICE O/P NEW HI 60 MIN: CPT | Performed by: INTERNAL MEDICINE

## 2023-06-30 RX ORDER — PNV NO.95/FERROUS FUM/FOLIC AC 28MG-0.8MG
1 TABLET ORAL DAILY
COMMUNITY
End: 2024-06-03

## 2023-06-30 NOTE — TELEPHONE ENCOUNTER
[facility] Call Center    Phone Message    May a detailed message be left on voicemail: yes     Reason for Call: Other: Pt states Dr. Garza gave her the ok to see a EVERT in [place], there is no notes stating this is ok if so please put in notes that the pt is ok to see a evert at [place] .      Action Taken: Other: Cardiology    Travel Screening: Not Applicable     Thank you!  [facility]

## 2023-06-30 NOTE — LETTER
6/30/2023    Nomi Cartwright, DO  [address]   [place] [address]    RE: Nadia Ladd       Dear Colleague,     I had the pleasure of seeing Nadia Ladd in the [facility].  HPI and Plan:   Nadia Ladd is a 67 year old female with stage IIIb chronic kidney disease and colon cancer resected surgically in April 2023 and for which she started FOLFOX chemotherapy 5/31/23 presented with 1 day history of dizziness.  She was diagnosed with 2 different types of colon cancer.  One was in hepatic flexure which is poorly differentiated adenocarcinoma.  He also has sigmoid tumor that appeared to be predominantly neuroendocrine and with about 30% on neuroendocrine morphology.  She follows with Dr. Bateman at [facility].  A second opinion with Dr. Viet Acharya was also considered [facility] and both of them recommended adjuvant chemotherapy with FOLFOX.    On 2nd June she received first infusion of chemotherapy with FOLFOX.  She presented to the emergency room with lightheadedness.  She did not have any vertigo.  She had this lightheadedness only when she sat up or stand stood up.  In the emergency room, she developed new onset atrial fibrillation with rapid rate.  She was given 15 mg of IV diltiazem and eventually she was admitted over the next 2 days at [facility].  She converted to sinus rhythm during the hospitalization was discharged on metoprolol XL 25 mg/day.  She had no palpitations before she was in the emergency room but when she had her atrial fibrillation she had some palpitations.  There is no shortness of breath.  She had some reproducible atypical chest pain.  EKG during that admission was reviewed by me and revealed atrial fibrillation with rapid rate.  Subsequent EKG with sinus rhythm without any ischemic changes.    I reviewed the emergency room notes, discharge summary as well as oncology notes.    Since discharge she is feeling well.  She is  anxious about restarting FOLFOX.  She was referred here to discuss management of atrial fibrillation in context of ongoing need for chemotherapy.  Echocardiogram during her hospitalization revealed normal LV Stolle function with mild pulmonary hypertension and mild tricuspid regurgitation.    Patient does have a history of hypertension although since the colon resection, her blood pressures have improved.  She is to be on antihypertensive before that.  She has a chads vascular score of 3 given her age, history of hypertension and female sex.  She is not on any anticoagulation.    She does have anemia which started before her colon cancer diagnosis but has worse since cholectomy.  She has received iron infusion.  Her platelet counts are normal.  Recently she had an episode of gout and was given Indocin.    On exam, regular rate and rhythm.  There is faint 2 x 6 ejection stock murmur in the aortic area.  Chest was clear auscultation.  No carotid bruits.  No focal deficits.  No peripheral edema.    TSH during the hospitalization was normal at 1.55.  Recent hemoglobin was 8.6.  She had acute on chronic renal failure and hospitalization with creatinine going as high as 1.78 but now is decreased to 1.28.    Impression    1.  Paroxysmal atrial fibrillation  2.  2 different colon cancers, one in the hepatic flexure which was poorly differentiated no carcinoma in the second 1 in the sigmoid colon which is predominant neuroendocrine kind of morphology  3.  S/p colectomy earlier this year.  4.  Hypertension,blood pressures improved after colectomy.  5.  Anemia, likely iron deficient, received iron infusion    Plan  1.  Paroxysmal atrial fibrillation  Patient developed atrial fibrillation with rapid rate on 3 Kelly in the emergency room.  She had come to the emergency room because of lightheadedness and then developed atrial fibrillation there.  A day before she had received FOLFOX therapy.  At this time agree with metoprolol  XL.  Given her chads vascular score of 3, I think she should be on Eliquis.  I discussed this with Dr. Bateman who did not think this was contraindicated.  The risk of bleeding were discussed with her.  We will start her on 5 mg twice daily.      There was a question whether FOLFOX was contributing to atrial fibrillation.  I told the patient that I cannot be certain about that.  Typically, FOLFOX is less likely to cause atrial fibrillation.  It is more likely to cause coronary spasm which was not the case for her.  I also discussed with Dr. Bateman.  She told me that FOLFOX is very crucial medication for her to prevent recurrence of cancer.    Since we cannot be certain that FOLFOX was contributing to atrial fibrillation and importance of this chemo to prevent cancer revcurrence, I would suggest we consider another trial of FOLFOX while the patient is on metoprolol to see if she can tolerate the medication without any recurrence of atrial fibrillation.  Patient understands the risks and pros and cons and is willing to proceed with it.      Dr. Bateman would like to be done in the inpatient setting and I will defer that to her.      2.  Colon cancer, see above.  S/p colectomy.  Now on FOLFOX therapy to decrease recurrence.     3.  Hypertension, improved over the last few months.    4.  Anemia, management per hematology and oncology.    Thank you for allowing us to participate in the care of this nice patient.  She can follow-up with her midlevel provider in [facility] in 1 month.  She can also follow-up with a general cardiologist at [place].          Today's clinic visit entailed:  Review of external notes as documented elsewhere in note  Review of the result(s) of each unique test - ekg, echo, TSH, BMP, Hb  The following tests were independently interpreted by me as noted in my documentation: ekgs, echo  Discussion of management or test interpretation with external physician/other qualified healthcare  professional/appropriate source - Dr Bateman  Prescription drug management    Provider  Link to [facility] Help Grid     The level of medical decision making during this visit was of high complexity.      No orders of the defined types were placed in this encounter.      Orders Placed This Encounter   Medications    Ferrous Sulfate (IRON) 325 (65 Fe) MG tablet     Sig: Take 1 tablet by mouth daily       There are no discontinued medications.      Encounter Diagnosis   Name Primary?    Malignant neoplasm of sigmoid colon (H)        CURRENT MEDICATIONS:  Current Outpatient Medications   Medication Sig Dispense Refill    cyanocobalamin 1000 MCG TBCR Take 1,000 mcg by mouth daily 100 tablet 1    dexamethasone (DECADRON) 4 MG tablet Take 2 tablets (8 mg) by mouth daily Take for 2 days, starting the day after chemo. Take with food. 4 tablet 2    Ferrous Sulfate (IRON) 325 (65 Fe) MG tablet Take 1 tablet by mouth daily      magnesium oxide (MAG-OX) 400 MG tablet Take 1 tablet (400 mg) by mouth 2 times daily 60 tablet 0    metoprolol succinate ER (TOPROL XL) 25 MG 24 hr tablet Take 1 tablet (25 mg) by mouth daily 30 tablet 1    Multiple Vitamins-Iron (MULTI-DAY PLUS IRON PO) Take 1 tablet by mouth daily      sodium bicarbonate 650 MG tablet Take 1 tablet (650 mg) by mouth 2 times daily 120 tablet 3    indomethacin (INDOCIN) 50 MG capsule Take 1 capsule (50 mg) by mouth 3 times daily (with meals) (Patient not taking: Reported on 6/26/2023) 12 capsule 0       ALLERGIES     Allergies   Allergen Reactions    No Known Drug Allergy        PAST MEDICAL HISTORY:  Past Medical History:   Diagnosis Date    Arthropathia 08/05/2010    B12 deficiency anemia 10/21/2010    Benign essential hypertension with target blood pressure below 140/90 10/10/2016    CARDIOVASCULAR SCREENING; LDL GOAL LESS THAN 160 10/31/2010    Crohn's colitis (H) 02/15/2011    Dermatitis-dishydrotic eczema-severe 08/05/2010    Hiatal hernia     HTN (hypertension)  2011    Iron deficiency anemia 10/21/2010    Malignant neoplasm of sigmoid colon (H)     Obesity     Primary pulmonary hypertension (H) 2010       PAST SURGICAL HISTORY:  Past Surgical History:   Procedure Laterality Date    COLECTOMY WITHOUT COLOSTOMY N/A 2023    Procedure: Laparoscopic Converted to Open Total abdominal colectomy;  Surgeon: Jerome Ashley MD;  Location: [facility] OR    COLONOSCOPY  10/11/10    COLONOSCOPY N/A 2023    Procedure: ATTEMPTED COLONOSCOPY WITH SIGMOID STRICTURE BIOPSY;  Surgeon: Jonathan Alcocer MD;  Location: [facility]    ESOPHAGOSCOPY, GASTROSCOPY, DUODENOSCOPY (EGD), COMBINED N/A 2023    Procedure: ESOPHAGOGASTRODUODENOSCOPY, WITH BIOPSY;  Surgeon: Jonathan Aclocer MD;  Location: [facility]    HYSTERECTOMY TOTAL ABDOMINAL, BILATERAL SALPINGO-OOPHORECTOMY, COMBINED N/A 2023    Procedure: Hysterectomy total abdominal, bilateral salpingo-oophorectomy;  Surgeon: Sunitha Olea MD;  Location: [facility] OR    INSERT PORT VASCULAR ACCESS Right 2023    Procedure: Ultrasound-guided right internal jugular venous access port placement with fluoroscopy;  Surgeon: Martin Thomas DO;  Location: [facility]    SIGMOIDOSCOPY FLEXIBLE N/A 2023    Procedure: Sigmoidoscopy flexible;  Surgeon: Jerome Ashley MD;  Location: [facility] OR    SURGICAL HISTORY OF -   77    Perineorrhaphy, for widening vaginal orifice    [facility] EXPLORATORY OF ABDOMEN      laparoscopy       FAMILY HISTORY:  Family History   Problem Relation Age of Onset    Hypertension Mother         on meds, alive    Cerebrovascular Disease Father         stroke about age 65,  of cancer at 68 yrs    Diabetes Father         eventually took insulin    Anemia Father         Pernisios anemia    Kidney Disease Niece         kidney transplant    Kidney Disease Nephew         kidney transplant    Venous thrombosis No family hx of     Anesthesia Reaction No family hx of        SOCIAL HISTORY:  Social  "History     Socioeconomic History    Marital status:      Spouse name: None    Number of children: None    Years of education: None    Highest education level: None   Tobacco Use    Smoking status: Never     Passive exposure: Current    Smokeless tobacco: Never   Substance and Sexual Activity    Alcohol use: No    Drug use: No    Sexual activity: Yes     Partners: Male       Review of Systems:  Skin:          Eyes:         ENT:         Respiratory:  Negative       Cardiovascular:  Negative;palpitations;chest pain;syncope or near-syncope;cyanosis;dizziness;lightheadedness;exercise intolerance edema;Positive for    Gastroenterology: Positive for   colon cancer  Genitourinary:         Musculoskeletal:  Positive for gout    Neurologic:         Psychiatric:         Heme/Lymph/Imm:  Positive for   iron infusion on 6/26  Endocrine:           Physical Exam:  Vitals: /85   Pulse 83   Ht 1.575 m (5' 2\")   Wt 83.4 kg (183 lb 12.8 oz)   LMP  (LMP Unknown)   BMI 33.62 kg/m          CC  Irene DO Fransico  [address] [place] [address]      Thank you for allowing me to participate in the care of your patient.      Sincerely,     Israel Garza MD     [facility]  "

## 2023-06-30 NOTE — TELEPHONE ENCOUNTER
Reason for Call:  Other appointment    Detailed comments: patient calling because she saw her cardiologist this morning and he wants her to go on blood thinners. Before she fills it he wants her to check with Dr. Bateman to make sure this would be ok that she take this. She is going out of town today and is hoping to get an answer today if at all possible.       Phone Number Patient can be reached at: Home number on file [phone] (home)    Best Time: any    Can we leave a detailed message on this number? YES    Call taken on 6/30/2023 at 1:02 PM by Jane Simms

## 2023-06-30 NOTE — PROGRESS NOTES
HPI and Plan:   Nadia Ladd is a 67 year old female with stage IIIb chronic kidney disease and colon cancer resected surgically in April 2023 and for which she started FOLFOX chemotherapy 5/31/23 presented with 1 day history of dizziness.  She was diagnosed with 2 different types of colon cancer.  One was in hepatic flexure which is poorly differentiated adenocarcinoma.  He also has sigmoid tumor that appeared to be predominantly neuroendocrine and with about 30% on neuroendocrine morphology.  She follows with Dr. Bateman at [facility].  A second opinion with Dr. Viet Acharya was also considered [facility] and both of them recommended adjuvant chemotherapy with FOLFOX.    On 2nd June she received first infusion of chemotherapy with FOLFOX.  She presented to the emergency room with lightheadedness.  She did not have any vertigo.  She had this lightheadedness only when she sat up or stand stood up.  In the emergency room, she developed new onset atrial fibrillation with rapid rate.  She was given 15 mg of IV diltiazem and eventually she was admitted over the next 2 days at [facility].  She converted to sinus rhythm during the hospitalization was discharged on metoprolol XL 25 mg/day.  She had no palpitations before she was in the emergency room but when she had her atrial fibrillation she had some palpitations.  There is no shortness of breath.  She had some reproducible atypical chest pain.  EKG during that admission was reviewed by me and revealed atrial fibrillation with rapid rate.  Subsequent EKG with sinus rhythm without any ischemic changes.    I reviewed the emergency room notes, discharge summary as well as oncology notes.    Since discharge she is feeling well.  She is anxious about restarting FOLFOX.  She was referred here to discuss management of atrial fibrillation in context of ongoing need for chemotherapy.  Echocardiogram during her hospitalization revealed normal LV Stolle  function with mild pulmonary hypertension and mild tricuspid regurgitation.    Patient does have a history of hypertension although since the colon resection, her blood pressures have improved.  She is to be on antihypertensive before that.  She has a chads vascular score of 3 given her age, history of hypertension and female sex.  She is not on any anticoagulation.    She does have anemia which started before her colon cancer diagnosis but has worse since cholectomy.  She has received iron infusion.  Her platelet counts are normal.  Recently she had an episode of gout and was given Indocin.    On exam, regular rate and rhythm.  There is faint 2 x 6 ejection stock murmur in the aortic area.  Chest was clear auscultation.  No carotid bruits.  No focal deficits.  No peripheral edema.    TSH during the hospitalization was normal at 1.55.  Recent hemoglobin was 8.6.  She had acute on chronic renal failure and hospitalization with creatinine going as high as 1.78 but now is decreased to 1.28.    Impression    1.  Paroxysmal atrial fibrillation  2.  2 different colon cancers, one in the hepatic flexure which was poorly differentiated no carcinoma in the second 1 in the sigmoid colon which is predominant neuroendocrine kind of morphology  3.  S/p colectomy earlier this year.  4.  Hypertension,blood pressures improved after colectomy.  5.  Anemia, likely iron deficient, received iron infusion    Plan  1.  Paroxysmal atrial fibrillation  Patient developed atrial fibrillation with rapid rate on 3 Kelly in the emergency room.  She had come to the emergency room because of lightheadedness and then developed atrial fibrillation there.  A day before she had received FOLFOX therapy.  At this time agree with metoprolol XL.  Given her chads vascular score of 3, I think she should be on Eliquis.  I discussed this with Dr. Bateman who did not think this was contraindicated.  The risk of bleeding were discussed with her.  We will start  her on 5 mg twice daily.      There was a question whether FOLFOX was contributing to atrial fibrillation.  I told the patient that I cannot be certain about that.  Typically, FOLFOX is less likely to cause atrial fibrillation.  It is more likely to cause coronary spasm which was not the case for her.  I also discussed with Dr. Bateman.  She told me that FOLFOX is very crucial medication for her to prevent recurrence of cancer.    Since we cannot be certain that FOLFOX was contributing to atrial fibrillation and importance of this chemo to prevent cancer revcurrence, I would suggest we consider another trial of FOLFOX while the patient is on metoprolol to see if she can tolerate the medication without any recurrence of atrial fibrillation.  Patient understands the risks and pros and cons and is willing to proceed with it.      Dr. Bateman would like to be done in the inpatient setting and I will defer that to her.      2.  Colon cancer, see above.  S/p colectomy.  Now on FOLFOX therapy to decrease recurrence.     3.  Hypertension, improved over the last few months.    4.  Anemia, management per hematology and oncology.    Thank you for allowing us to participate in the care of this nice patient.  She can follow-up with her midlevel provider in [facility] in 1 month.  She can also follow-up with a general cardiologist at [place].          Today's clinic visit entailed:  Review of external notes as documented elsewhere in note  Review of the result(s) of each unique test - ekg, echo, TSH, BMP, Hb  The following tests were independently interpreted by me as noted in my documentation: ekgs, echo  Discussion of management or test interpretation with external physician/other qualified healthcare professional/appropriate source - Dr Bateman  Prescription drug management    Provider  Link to [facility] Help Grid     The level of medical decision making during this visit was of high complexity.      No orders of the defined  types were placed in this encounter.      Orders Placed This Encounter   Medications     Ferrous Sulfate (IRON) 325 (65 Fe) MG tablet     Sig: Take 1 tablet by mouth daily       There are no discontinued medications.      Encounter Diagnosis   Name Primary?     Malignant neoplasm of sigmoid colon (H)        CURRENT MEDICATIONS:  Current Outpatient Medications   Medication Sig Dispense Refill     cyanocobalamin 1000 MCG TBCR Take 1,000 mcg by mouth daily 100 tablet 1     dexamethasone (DECADRON) 4 MG tablet Take 2 tablets (8 mg) by mouth daily Take for 2 days, starting the day after chemo. Take with food. 4 tablet 2     Ferrous Sulfate (IRON) 325 (65 Fe) MG tablet Take 1 tablet by mouth daily       magnesium oxide (MAG-OX) 400 MG tablet Take 1 tablet (400 mg) by mouth 2 times daily 60 tablet 0     metoprolol succinate ER (TOPROL XL) 25 MG 24 hr tablet Take 1 tablet (25 mg) by mouth daily 30 tablet 1     Multiple Vitamins-Iron (MULTI-DAY PLUS IRON PO) Take 1 tablet by mouth daily       sodium bicarbonate 650 MG tablet Take 1 tablet (650 mg) by mouth 2 times daily 120 tablet 3     indomethacin (INDOCIN) 50 MG capsule Take 1 capsule (50 mg) by mouth 3 times daily (with meals) (Patient not taking: Reported on 6/26/2023) 12 capsule 0       ALLERGIES     Allergies   Allergen Reactions     No Known Drug Allergy        PAST MEDICAL HISTORY:  Past Medical History:   Diagnosis Date     Arthropathia 08/05/2010     B12 deficiency anemia 10/21/2010     Benign essential hypertension with target blood pressure below 140/90 10/10/2016     CARDIOVASCULAR SCREENING; LDL GOAL LESS THAN 160 10/31/2010     Crohn's colitis (H) 02/15/2011     Dermatitis-dishydrotic eczema-severe 08/05/2010     Hiatal hernia      HTN (hypertension) 07/21/2011     Iron deficiency anemia 10/21/2010     Malignant neoplasm of sigmoid colon (H)      Obesity      Primary pulmonary hypertension (H) 04/06/2010       PAST SURGICAL HISTORY:  Past Surgical History:    Procedure Laterality Date     COLECTOMY WITHOUT COLOSTOMY N/A 2023    Procedure: Laparoscopic Converted to Open Total abdominal colectomy;  Surgeon: Jerome sAhley MD;  Location: [facility] OR     COLONOSCOPY  10/11/10     COLONOSCOPY N/A 2023    Procedure: ATTEMPTED COLONOSCOPY WITH SIGMOID STRICTURE BIOPSY;  Surgeon: Jonathan Alcocer MD;  Location: [facility]     ESOPHAGOSCOPY, GASTROSCOPY, DUODENOSCOPY (EGD), COMBINED N/A 2023    Procedure: ESOPHAGOGASTRODUODENOSCOPY, WITH BIOPSY;  Surgeon: Jonathan Alcocer MD;  Location: [facility]     HYSTERECTOMY TOTAL ABDOMINAL, BILATERAL SALPINGO-OOPHORECTOMY, COMBINED N/A 2023    Procedure: Hysterectomy total abdominal, bilateral salpingo-oophorectomy;  Surgeon: Sunitha Olea MD;  Location: [facility] OR     INSERT PORT VASCULAR ACCESS Right 2023    Procedure: Ultrasound-guided right internal jugular venous access port placement with fluoroscopy;  Surgeon: Martin Thomas DO;  Location: [facility]     SIGMOIDOSCOPY FLEXIBLE N/A 2023    Procedure: Sigmoidoscopy flexible;  Surgeon: Jerome Ashley MD;  Location: [facility] OR     SURGICAL HISTORY OF -   77    Perineorrhaphy, for widening vaginal orifice     Z EXPLORATORY OF ABDOMEN      laparoscopy       FAMILY HISTORY:  Family History   Problem Relation Age of Onset     Hypertension Mother         on meds, alive     Cerebrovascular Disease Father         stroke about age 65,  of cancer at 68 yrs     Diabetes Father         eventually took insulin     Anemia Father         Pernisios anemia     Kidney Disease Niece         kidney transplant     Kidney Disease Nephew         kidney transplant     Venous thrombosis No family hx of      Anesthesia Reaction No family hx of        SOCIAL HISTORY:  Social History     Socioeconomic History     Marital status:      Spouse name: None     Number of children: None     Years of education: None     Highest education level: None   Tobacco Use  "    Smoking status: Never     Passive exposure: Current     Smokeless tobacco: Never   Substance and Sexual Activity     Alcohol use: No     Drug use: No     Sexual activity: Yes     Partners: Male       Review of Systems:  Skin:          Eyes:         ENT:         Respiratory:  Negative       Cardiovascular:  Negative;palpitations;chest pain;syncope or near-syncope;cyanosis;dizziness;lightheadedness;exercise intolerance edema;Positive for    Gastroenterology: Positive for   colon cancer  Genitourinary:         Musculoskeletal:  Positive for gout    Neurologic:         Psychiatric:         Heme/Lymph/Imm:  Positive for   iron infusion on 6/26  Endocrine:           Physical Exam:  Vitals: /85   Pulse 83   Ht 1.575 m (5' 2\")   Wt 83.4 kg (183 lb 12.8 oz)   LMP  (LMP Unknown)   BMI 33.62 kg/m          CC  [place] Bateman [place]  [address]   [place] [id number]          "

## 2023-06-30 NOTE — TELEPHONE ENCOUNTER
Patient notified of below and follow up appointment scheduled for 7/11. Information on apixaban sent to patient through [employer].    Megan Valera, RN on 6/30/2023 at 1:55 PM          Irene Bateman DO Nichols, Lindsay N, RN  Caller: Unspecified (Today,  1:01 PM)  I spoke with her cardiologist Dr. Garza, he is concerned about risk of stroke in the setting of afib   At the time of her last labs, she had some anemia likely related to her chemotherapy and platelets were normal   She can start her blood thinner   Please advise her to avoid concurrent use of aspirin, NSAIDS while on blood thinner due to increased risk of bleeding     Our clinic will contact her to set up her next follow up with me and we can discuss cancer treatment plan at that time     TY     SK

## 2023-07-10 NOTE — PROGRESS NOTES
[facility]    Hematology/Oncology Established Patient Follow Up Note      Today's Date: 7/11/2023    Reason for follow up: Sigmoid cancer    HISTORY OF PRESENT ILLNESS: Nadia Ladd is a 67 year old female who was referred to the Hematology/Oncology Clinic for sigmoid cancer    Patient has medical history including Crohn's disease on sulfasalazine, anemia secondary to iron deficiency and B12 deficiency, obesity, hypertension, prediabetes, eczema, pulmonary hypertension, hiatal hernia, mild splenomegaly (14.7 cm in 2/23)    - 2/23 pt noted increasing fatigue, found to have iron deficiency anemia w/hgb 6.8 (previously required RBC transfusion when dx w/Crohn's), did have intermittent changes in stool but not persistent (noted minimal blood in stool)   - 2/23 upper endoscopy for iron deficiency anemia and Crohn's disease: Hiatal hernia, normal stomach, normal duodenum  - 2/23 colonoscopy: A frond-like/villous partially obstructing large mass found in sigmoid colon, partially circumferential involving two thirds of the lumen circumference, 4 cm, unable to traverse, with oozing, s/p biopsy  PATHOLOGY:  A.  Stomach, antrum: Biopsy:  - Antral type mucosa with mild chronic inactive gastritis  - Immunostain for Helicobacter pylori is negative      B.  Colon, sigmoid, stricture: Biopsy:  - Invasive poorly differentiated carcinoma  The sigmoid stricture shows a high-grade carcinoma without definitive gland formation.  Given the poorly differentiated appearance, an immunohistochemical panel was performed to exclude the possibility of a tumor by direct extension or metastasis.   Immunohistochemical stains are performed and show the tumor to be diffusely positive for CK7 and partially positive for CK20 and SATB2.  There is no significant tumor reactivity for CDX2, GATA3, PAX8, TTF-1, and chromogranin.  Synaptophysin highlights very rare positive cells. Although the CK7/CK20 profile is not entirely  "typical for a colorectal carcinoma, immunostains for tumors of other origins are negative and a colorectal primary is still favored.   - Mismatch repair:  1) MLH1-loss of nuclear expression  2) MSH2-intact  3) MSH6-intact  4) PMS2-loss of nuclear expression  -MLH1 promoter methylation: NEGATIVE    -2/23 CT CAP:  A) 4 cm length mass in the proximal sigmoid colon. There is some irregularity and stranding in the adjacent pericolonic fat which may indicate tumor extension. There is no obstruction.   B) there is a second probable mass at the hepatic flexure of the colon measuring 2.5 cm in length   C)There are small lymph nodes in the mesial colon adjacent to the hepatic flexure abnormality and the sigmoid colonic mass. No lymphadenopathy by size criteria  D) There is submucosal fatty deposition in the colon, most severe in the distal sigmoid colon and rectum, which can be seen secondary to quiescent inflammatory bowel disease.  E) no liver lesion    -3/23 PET:  a. There is increased FDG avidity of the sigmoid colon with focal lobular wall thickening consistent with biopsy-proven adenocarcinoma.  b. Secondary focus noted at the hepatic flexure demonstrates elevated FDG avidity and lobulated wall thickening highly suspicious for a additional primary.  c. Additionally there is a smaller focus of wall thickening with elevated FDG avidity along the left aspect of the transverse colon  which is suspicious for a possible third focus of colonic malignancy.    -3/23 CEA 6.8  - 3/23 colorectal surgery consultation with - in the setting of Crohn's, at least sigmoid colectomy with possible total abdominal colectomy with either ileorectal anastomosis or end ileostomy (\"rectum can remain active and be actively surveyed annually\")    -3/23 genetic counseling, testing for Chan syndrome    - 4/5/2023 gynecologic oncology consultation for risk reduction surgery with hysterectomy and BSO at time of colectomy    -4/6/2023 " laparoscopic converted to open total abdominal colectomy with ileorectal anastomosis, partial omentectomy, flexible sigmoidoscopy, TAHBSO  A. OMENTUM, RESECTION:  - Adipose tissue with no significant histopathologic abnormality  - No evidence of malignancy     B. COLON, RESECTION:  Mass #1 (ascending colon/hepatic flexure): Mixed neuroendocrine-non-neuroendocrine neoplasm (MINEN):  - Neuroendocrine carcinoma component (70%) and moderately-differentiated adenocarcinoma component (30%)  - Size = 5.0 cm, invading into muscularis propria  - Positive LVI  - Negative macroscopic tumor perforation, negative PNI  - Negative surgical resection margins  - IHC: Neuroendocrine portion: Negative for chromogranin and INSM1 but strongly express synaptophysin  - IHC: Adenocarcinoma portion: Positive for CK20, CDX2 negative for CK7, PAX8, ER, S100  Ki-67 proliferation index of approximately 80%.  MMR:  Intact nuclear expression of MLH1, MSH2, MHS6, PMS2  PDL1:  COMBINED POSITIVE SCORE (CPS): 55-60  TUMOR PROPORTION SCORE (TPS): 50%   pathogenic KRAS mutation (G13D) was identified (5.2%).  AJCC 8th edition: stage 3B mpT3 pN1a     Mass#2 (sigmoid colon): Poorly-differentiated carcinoma:  - Grade 3  - Size = 5.7 cm, invading into muscularis propria  - Negative for microscopic tumor perforation, LVI, perineural invasion  - Negative surgical resection margins  - IHC: Positive for scar and keratin AE1/AE3, negative for CK7, CK20, CDX2, PAX8, ER, chromogranin, CD56, p40, synaptophysin, S100, INI1, p40, INSM1  Ki-67 proliferation index of approximately 70%.  MMR: loss of nuclear expression MLH1 and PMS2, intact nuclear expression MSH2 and MSH6  PDL1:  COMBINED POSITIVE SCORE (CPS): 80  TUMOR PROPORTION SCORE (TPS): 70%  pathogenic KRAS mutation (G13D) was identified (4.5%).  AJCC 8th edition: stage 3A mpT2 pN1a    - One of forty-one sampled lymph nodes positive (1/41)  - Benign appendix  - Tubular adenoma    C. UTERUS, CERVIX, BILATERAL  FALLOPIAN TUBES & OVARIES, :  - Benign endometrial polyps; atrophic endometrium  - Uterus, cervix, bilateral fallopian tubes, and ovaries with no significant morphologic abnormalities  - No evidence of dysplasia or malignancy     D. SMALL INTESTINE, TERMINAL ILEUM, TERMINAL ILIUM:  - Benign ileum  - No evidence of dysplasia or malignancy  - Negative for metastases to one of one sampled lymph node (0 /1)     E. PROXIMAL ANASTOMOTIC RING:  - Benign small intestine  - No evidence of dysplasia or malignancy     F. DISTAL ANASTOMOTIC RING:  - Benign colon  - No evidence of dysplasia or malignancy    CRC NGS:   --mutations positive for KRAS G13D mutation 5.2% mass 1, KRAS G13D mutation 4.5% mass 2 (negative for AKT1; BRAF; ERBB2; KRAS; NRAS; PIK3CA; PTEN)  --TMB score: 17.064 mut/Mb mass 1 (CPS 55-60, TPS 50%), 60.943 mut/Mb mass 2 (CPS 80, TPS 70%)  --fusion negative including NTRK and RET for mass 1 and 2 (also negative for AKT1, AKT3, ALK, AR, XSSUID20, CLAUDINE, BCOR, BRAF, BRD3, BRD4, CAMTA1, CCNB3, CCND1, , CIC, CSF1, CSF1R, CTNNB1, DNAJB1, EGFR, EPC1, ERBB2, ERBB4, ERG, ESR1, ESRRA, ETV1, ETV4, ETV5, ETV6, EWSR1, FGFR1, FGFR2, FGFR3, FGR, FOS, FOSB, FOXO1, FOXO4, FOXR2, FUS, GLI1, GRB7, HMGA2, HRAS, IDH1, IDH2, INSR, JAK2, JAK3, JAZF1, KRAS, MAML2, MAP2K1, MAST1, MAST2, MEAF6, MET, MKL2, MN1, MSMB, MUSK, MYB, MYBL1, MYOD1, NCOA1, NCOA2, NOTCH1, NOTCH2, NR4A3, NRAS, NRG1, NTRK1, NTRK2, NTRK3, NUMBL1, NUTM1, PAX3, PDGFB, PDGFRA, PDGFRB, PHF1, PIK3CA, PKN1, PLAG1, PPARG, PRKACA, PRKCA, PRKCB, RAF1, RELA, RET, ROS1, RPSO2, RSPO3, SS18, STAT6, TAF15, TCF12, TERT, TFE3, TFEB, TFG, THADA, TMPRSS2, USP6, VGLL2, YAP1, YWHAE)    -4/11/2023 patient discharged from hospital, planning for 30 days of lovenox postop, oxycodone for pain (required 1 unit PRBC for anemia)    -5/8/23 I presented this case at [facility] tumor board and their recommendations include:  - common to see this in Crohn's, overall poor prognosis, treat  aggressively, may be poorly responsive to chemotherapy  - standard of care is mFOLFOX 6 x 12 cycles  - acknowledge that pt has high TPS and likely response to immunotherapy however not sufficient data or standard of care in stage 3 adjuvant setting  - add MMR testing to mass #1 hepatic flexure mass    - 5/9/23 admitted for acute renal failure w/Cr 3.82 w/K 7.0    - 5/19/23 second opinion at [facility] w/medical oncologist Dr. Acharya, thorough consultation and recommendations appreciated     - pt would like to proceed with FOFLOX (with dose reduced oxaliplatin due to kidney function)    -5/2023 genetic counseling: Negative for mutations in EPCAM, MLH1, MSH2, MSH6, and PMS2 genes.  Negative for mutations in APC, AXIN2, BMPR1A, CDH1, CHEK2, EPCAM, GREM1, MLH1, MSH2, MSH3, MSH6, MUTYH, NTHL1, PMS2, POLD1, POLE, PTEN, SMAD4, STK11, TP53, CDKN1B, MEN1, NF1, RET, TSC1, TSC2, and VHL genes    - 5/25/23 port placement    - 5/26/23 CT CAP w/ contrast and IVF before and after CT (baseline prior to starting tx):  1.  3 mm nodule RML and 5 mm lateral EDSON nodule, minimally increased from previous  2.  New 4 mm DESON groundglass density nodule  3.  Several prominent borderline enlarged mediastinal lymph nodes slightly increased from previous  4.  Splenomegaly 14.5 cm  5. S/p subtotal colectomy with ileorectal anastomosis with postsurgical changes along the ventral abdominal wall midline    INTERIM HISTORY:  REGIMEN:  mFOLFOX6  q14 days x12 cycles/6 months  C1D1 5/31/23  oxaliplatin 64mg/m2 day 1 (dose reduced from 85mg/m2 2/2 Cr C1D1)  LV 350mg/m2 day 1  5FU 400mg/m2 IV push day 1  5FU 1200mg/m2 continuous infusion day 1-2 (total 2,400mg/m2 IV over 46-48 hours)  Emetic risk: moderate  Febrile neutropenia risk: intermediate     - plan for C2D1 6/14/23 held due to new onset paroxysmal A-fib with RVR requiring hospitalization 6/3/2023  - 6/19/2023 I presented this case at [facility] colorectal tumor board at the [facility], it is possible  "that paroxysmal A-fib may be related to 5-FU.  Options include withholding further treatment versus retrialing 5-FU under the guidance of cardio oncology in an inpatient setting.  No other adjuvant treatment options available, including immunotherapy  - 6/30/2023 consultation with cardio oncologist Dr. Garza; I spoke with Dr. Garza 6/30/2023 as well, [id number] score 3, recommending starting apixaban 5 mg twice daily, okay to retrial FOLFOX while patient is on metoprolol      Treatment related AE:  - hearing changes-described as \"metallic\" sound in ears with obscured hearing, Flonase BID as needed, ENT consultation pending 10/23.; overall pt reports metallic/ringing in ears has nearly resolved; muffled sound is stable (present prior to starting chemo, improved w/steroids, now stable and not getting better)   - lightheadedness and dizziness- after cycle 1 with position change including sitting up and standing, no associated vision changes, headache, motor or sensation changes.  Resolved  - Paroxysmal A-fib with RVR and PAC- admitted to [facility] 6/3/2023 - 6/5/2023 for lightheadedness and dizziness and found to have A-fib with RVR required diltiazem drip for RVR and subsequently cardioverted while she was in the ER and did not have recurrent A-fib during monitoring, heparin, with demand ischemia from RVR, concern for PE but held off on CTA due to kidney disease, 6/3/2023 echo EF 55-60%, mild pulmonary hypertension, mild mitral regurgitation, TSH 1.55, low magnesium requiring replacement and continues on oral magnesium, no longer on potassium restriction, found to have pyuria (not UTI) empirically treated with Rocephin,  metoprolol XL 25 mg p.o. daily for ongoing palpitations. Cardio oncology consult appreciated, pt started on eliquis, reports cost is very high, no palpitations.   - Anxiety  - Nausea-Compazine and Zofran as needed  - Delayed neutropenia- neutropenic thierry ANC 0.5 2 weeks out from cycle 1, " afebrile, 6/23 DPD deficiency testing negative  -Normocytic anemia- due to chemotherapy and iron deficiency, s/p ferric carboxymaltose 750mg x2 doses 6/14/23 and 6/28/23  -Thrombocytopenia-6/5/2023 platelet 129K, 6/8/23 plt 164K  - MARYA on CKD- following w/nephro, Cr improved to 1.23 from 3          REVIEW OF SYSTEMS:   A 14 point ROS was reviewed with pertinent positives and negatives in the HPI.        HOME MEDICATIONS:  Current Outpatient Medications   Medication Sig Dispense Refill     apixaban ANTICOAGULANT (ELIQUIS ANTICOAGULANT) 5 MG tablet Take 1 tablet (5 mg) by mouth 2 times daily 60 tablet 11     cyanocobalamin 1000 MCG TBCR Take 1,000 mcg by mouth daily 100 tablet 1     Ferrous Sulfate (IRON) 325 (65 Fe) MG tablet Take 1 tablet by mouth daily       magnesium oxide (MAG-OX) 400 MG tablet Take 1 tablet (400 mg) by mouth 2 times daily 60 tablet 0     metoprolol succinate ER (TOPROL XL) 25 MG 24 hr tablet Take 1 tablet (25 mg) by mouth daily 30 tablet 1     Multiple Vitamins-Iron (MULTI-DAY PLUS IRON PO) Take 1 tablet by mouth daily       sodium bicarbonate 650 MG tablet Take 1 tablet (650 mg) by mouth 2 times daily 120 tablet 3     dexamethasone (DECADRON) 4 MG tablet Take 2 tablets (8 mg) by mouth daily Take for 2 days, starting the day after chemo. Take with food. (Patient not taking: Reported on 7/11/2023) 4 tablet 2     indomethacin (INDOCIN) 50 MG capsule Take 1 capsule (50 mg) by mouth 3 times daily (with meals) (Patient not taking: Reported on 6/26/2023) 12 capsule 0         ALLERGIES:  Allergies   Allergen Reactions     No Known Drug Allergy          PAST MEDICAL HISTORY:  Past Medical History:   Diagnosis Date     Arthropathia 08/05/2010     B12 deficiency anemia 10/21/2010     Benign essential hypertension with target blood pressure below 140/90 10/10/2016     CARDIOVASCULAR SCREENING; LDL GOAL LESS THAN 160 10/31/2010     Crohn's colitis (H) 02/15/2011     Dermatitis-dishydrotic eczema-severe  08/05/2010     Hiatal hernia      HTN (hypertension) 07/21/2011     Iron deficiency anemia 10/21/2010     Malignant neoplasm of sigmoid colon (H)      Obesity      Primary pulmonary hypertension (H) 04/06/2010         PAST SURGICAL HISTORY:  Past Surgical History:   Procedure Laterality Date     COLECTOMY WITHOUT COLOSTOMY N/A 4/6/2023    Procedure: Laparoscopic Converted to Open Total abdominal colectomy;  Surgeon: Jerome Ashley MD;  Location: [facility] OR     COLONOSCOPY  10/11/10     COLONOSCOPY N/A 2/27/2023    Procedure: ATTEMPTED COLONOSCOPY WITH SIGMOID STRICTURE BIOPSY;  Surgeon: Jonathan Alcocer MD;  Location:  GI     ESOPHAGOSCOPY, GASTROSCOPY, DUODENOSCOPY (EGD), COMBINED N/A 2/27/2023    Procedure: ESOPHAGOGASTRODUODENOSCOPY, WITH BIOPSY;  Surgeon: Jonathan Alcocer MD;  Location: [facility] GI     HYSTERECTOMY TOTAL ABDOMINAL, BILATERAL SALPINGO-OOPHORECTOMY, COMBINED N/A 4/6/2023    Procedure: Hysterectomy total abdominal, bilateral salpingo-oophorectomy;  Surgeon: Sunitha Olea MD;  Location: [facility] OR     INSERT PORT VASCULAR ACCESS Right 5/25/2023    Procedure: Ultrasound-guided right internal jugular venous access port placement with fluoroscopy;  Surgeon: Martin Thomas DO;  Location: [facility] OR     SIGMOIDOSCOPY FLEXIBLE N/A 4/6/2023    Procedure: Sigmoidoscopy flexible;  Surgeon: Jerome Ashley MD;  Location: [facility] OR     SURGICAL HISTORY OF -   06/14/76    Perineorrhaphy, for widening vaginal orifice     ZZC EXPLORATORY OF ABDOMEN  1989    laparoscopy         SOCIAL HISTORY:  Social History     Socioeconomic History     Marital status:      Spouse name: Not on file     Number of children: Not on file     Years of education: Not on file     Highest education level: Not on file   Occupational History     Not on file   Tobacco Use     Smoking status: Never     Passive exposure: Current     Smokeless tobacco: Never   Substance and Sexual Activity     Alcohol use: No     Drug  "use: No     Sexual activity: Yes     Partners: Male   Other Topics Concern     Parent/sibling w/ CABG, MI or angioplasty before 65F 55M? Not Asked   Social History Narrative     Not on file     Social Determinants of Health     Financial Resource Strain: Not on file   Food Insecurity: Not on file   Transportation Needs: Not on file   Physical Activity: Not on file   Stress: Not on file   Social Connections: Not on file   Intimate Partner Violence: Not on file   Housing Stability: Not on file         FAMILY HISTORY:  Family History   Problem Relation Age of Onset     Hypertension Mother         on meds, alive     Cerebrovascular Disease Father         stroke about age 65,  of cancer at 68 yrs     Diabetes Father         eventually took insulin     Anemia Father         Pernisios anemia     Kidney Disease Niece         kidney transplant     Kidney Disease Nephew         kidney transplant     Venous thrombosis No family hx of      Anesthesia Reaction No family hx of          PHYSICAL EXAM:  Vital signs:  /78 (BP Location: Right arm, Patient Position: Sitting, Cuff Size: Adult Large)   Pulse 94   Temp (!) 96.6  F (35.9  C) (Temporal)   Resp 16   Ht 1.575 m (5' 2\")   Wt 79 kg (174 lb 3 oz)   LMP  (LMP Unknown)   SpO2 98%   BMI 31.86 kg/m         ECO  GENERAL/CONSTITUTIONAL: No acute distress.  EYES:  Extraocular movements intact without nystagmus.  No scleral icterus.  RESPIRATORY: Equal chest rise.   CARDIOLOGY: RRR no tachycardia  MUSCULOSKELETAL: Warm and well-perfused, no cyanosis, clubbing, or edema. Right LE always more swollen than left, chronic unchanged.   NEUROLOGIC: Cranial nerves are grossly intact. Alert, oriented to person, place and time, answers questions appropriately.  INTEGUMENTARY: No rashes or jaundice.      LABS:    PATHOLOGY:    IMAGING:      ASSESSMENT/PLAN:  Nadia Ladd is a 67 year old female with:      # ascending colon/hepatic flexure Mixed " neuroendocrine-non-neuroendocrine neoplasm (MINEN, poorly differentiated neuroendocrine carcinoma and moderately differentiated adenocarcinoma), KRAS G13D mutated, MARISSA, TPS 50%  # sigmoid colon poorly-differentiated carcinoma, KRAS G13D mutated, loss of MLH1 and PMS2, TPS 70%  - 2/23 colonoscopy: A frond-like/villous partially obstructing large mass found in sigmoid colon, partially circumferential involving two thirds of the lumen circumference, 4 cm, unable to traverse, with oozing, s/p biopsy, PATHOLOGY: Invasive poorly differentiated carcinoma, IHC panel excludes tumors of other origin, colorectal primary is favored, loss of nuclear expression of MLH1 and PMS2, MLH1 promoter methylation negative, [id number] mutation negative  -2/23 CT CAP: Shows known 4 cm proximal sigmoid colon mass with possible tumor extension (irregularity and stranding and adjacent pericolonic fat) without obstruction AND probable second mass 2.5 cm at hepatic flexure of colon; small lymph nodes adjacent to both masses (no lymphadenopathy by size criteria)  -3/23 PET:  a. There is increased FDG avidity of the sigmoid colon with focal lobular wall thickening consistent with biopsy-proven adenocarcinoma.  b. Secondary focus noted at the hepatic flexure demonstrates elevated FDG avidity and lobulated wall thickening highly suspicious for a additional primary.  c. Additionally there is a smaller focus of wall thickening with elevated FDG avidity along the left aspect of the transverse colon which is suspicious for a possible third focus of colonic malignancy.  - 3/23 CEA 6.8  -4/6/2023 laparoscopic converted to open total abdominal colectomy with ileorectal anastomosis, partial omentectomy, flexible sigmoidoscopy, TAHBSO  PATHOLOGY:  Of note, omental resection, terminal ileum, proximal and distal anastomotic rings, uterus, cervix, bilateral fallopian tubes and ovaries negative for malignancy    Mass #1 (ascending colon/hepatic flexure): Mixed  neuroendocrine-non-neuroendocrine neoplasm (MINEN):  - Neuroendocrine carcinoma component (70%) and moderately-differentiated adenocarcinoma component (30%)  - Size = 5.0 cm, invading into muscularis propria, Positive LVI  - IHC: Neuroendocrine portion: Negative for chromogranin and INSM1 but strongly express synaptophysin  - IHC: Adenocarcinoma portion: Positive for CK20, CDX2 negative for CK7, PAX8, ER, S100  Ki-67 proliferation index of approximately 80%.  MARISSA   PDL1:  COMBINED POSITIVE SCORE (CPS): 55-60  TUMOR PROPORTION SCORE (TPS): 50%   pathogenic KRAS mutation (G13D) was identified (5.2%).  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  MMR testing: loss of MLH1 and PMS2  PDL1:  COMBINED POSITIVE SCORE (CPS): 80  TUMOR PROPORTION SCORE (TPS): 70%  pathogenic KRAS mutation (G13D) was identified (4.5%).  MMR testing: loss of MLH1 and PMS2, intact MSH2 and MSH6  AJCC 8th edition: stage 3A mpT2 pN1a    - One of forty-one sampled lymph nodes positive (1/41), d/w pathology- unable to determine which mass is metastatic to LN    - 4/23 MRI brain w/wo contrast LAURENT  - 5/26/23 CT CAP w/ contrast and IVF before and after CT (post op baseline prior to starting tx):  1.  3 mm nodule RML and 5 mm lateral EDSON nodule, minimally increased from previous  2.  New 4 mm EDSON groundglass density nodule  3.  Several prominent borderline enlarged mediastinal lymph nodes slightly increased from previous  4.  Splenomegaly 14.5 cm  5. S/p subtotal colectomy with ileorectal anastomosis with postsurgical changes along the ventral abdominal wall midline  - 5/8/23 I presented this case at [facility] tumor  board as above   - 5/19/23 second opinion at [facility]/medical oncologist Dr. Acharya, thorough consultation and recommendations appreciated, overall agree w/FOLFOX x6 months, possible nivo or ipi nivo in second line; if metastatic platinum etoposide vs ipi nivo  - 5/25/23 port placement    -5/2023 genetic counseling: Negative for mutations in EPCAM, MLH1, MSH2, MSH6, and PMS2 genes.  Negative for mutations in APC, AXIN2, BMPR1A, CDH1, CHEK2, EPCAM, GREM1, MLH1, MSH2, MSH3, MSH6, MUTYH, NTHL1, PMS2, POLD1, POLE, PTEN, SMAD4, STK11, TP53, CDKN1B, MEN1, NF1, RET, TSC1, TSC2, and VHL genes      REGIMEN:  mFOLFOX6  q14 days x12 cycles/6 months  C1D1 5/31/23 (complicated by new onset afib)  oxaliplatin 64mg/m2 day 1 (dose reduced from 85mg/m2 2/2 Cr C1D1)  LV 350mg/m2 day 1  5FU 1200mg/m2 continuous infusion day 1-2 (total 2,400mg/m2 IV over 46-48 hours)  (5FU 400mg/m2 IV push day 1- discontinued C2 onwards)  Emetic risk: moderate  Febrile neutropenia risk: intermediate     - planned for C2D1 6/14/23 but held due to new onset paroxysmal A-fib with RVR requiring hospitalization 6/3/2023  - 6/19/2023 I presented this case at [facility] colorectal tumor board at the [facility], it is possible that paroxysmal A-fib may be related to 5-FU.  Options include withholding further treatment versus retrialing 5-FU under the guidance of cardio oncology in an inpatient setting.  No other adjuvant treatment options available, including immunotherapy  - 6/30/2023 consultation with cardio oncologist Dr. Garza; I spoke with Dr. Garza 6/30/2023 as well, [id number] score 3, recommending starting apixaban 5 mg twice daily, okay to retrial FOLFOX while patient is on metoprolol  - pt is unsure how she would like to proceed, she will call me and let me know by end of week. If proceeding with treatment, we will plan for inpatient admission at [facility] for cycle 2 FOLFOX w/o 5FU bolus with continuous cardiac monitoring and IVF  - plan for  repeat CT prior to proceeding w/tx if pt wants to continue tx    Treatment related AE:  - hearing changes-stable, flonase prn, ENT consultation pending 10/23  - lightheadedness and dizziness- plan for weekly IVF w/treatment if pt wishes to pursue tx.   - Paroxysmal A-fib with RVR and PAC- continue metoprolol, eliquis, appreciate Dr. Garza's consult, pt to contact cardiology team regarding affordability of medications (will need interaction check w/chemo), will drop 5FU bolus  - Anxiety- stable   - Nausea-Compazine and Zofran as needed  - Delayed neutropenia- neutropenic thierry ANC 0.5 2 weeks out from cycle 1, afebrile, 6/23 DPD deficiency testing negative, will drop 5UF bolus, repeat CBC today  - Normocytic anemia- due to chemotherapy and iron deficiency, s/p ferric carboxymaltose 750mg x2 doses 6/14/23 and 6/28/23; repeat iron studies today  -Thrombocytopenia-6/5/2023 platelet 129K, 6/8/23 plt 164K, repeat CBC today  - MARYA on CKD- following w/nephro, f/u with nephro 9/23    - f/u with Dr. Ashley 4/2024 for rigid proctoscopy     #parxosymal afib  - within 3 days of receiving 5FU, prior cardiac risk factors htn, prediabetes  - 6/3/23 presented to ER w/lightheadedness, dizziness, cardioverted s/p diltiazem ggt in ER  - 6/23 DPD deficiency testing negative  - currently on metoprolol XL 25 mg daily, apixaban 5 mg twice daily  - 6/30/2023 consultation with cardio oncologist Dr. Garza; I spoke with Dr. 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  Negative for mutations in APC, AXIN2, BMPR1A, CDH1, CHEK2, EPCAM, GREM1, MLH1, MSH2, MSH3, MSH6, MUTYH, NTHL1, PMS2, POLD1, POLE, PTEN, SMAD4, STK11, TP53, CDKN1B, MEN1, NF1, RET, TSC1, TSC2, and VHL genes    #Anemia secondary to iron deficiency and B12 deficiency  - terminal ileum removed at time of colon surgery, monitor for nutrient def  - 2/23 hgb 6.8, ferritin 21, iron sat index 10, TIBC 265, iron 27, b12 1493  - On B12 1000 mcg p.o. daily and ferrous sulfate 325 mg p.o. daily  - 4/9/23 s/p 1 uprbcs  - 5/9/23 hgb 11.3,5/30/23 hgb 9.4  - based on Ganzoni equation w/goal hgb 14 and 500mg needed for iron stores, pts iron deficit is 1400 mg  - s/p ferric carboxymaltose 750mg x2 doses 6/14/23 and 6/28/23  - repeat iron studies today    #Crohn's  - dx since at least 2010     RTC 2-3 weeks for follow up with me  Pt will call me by end of week to let me know if she wants to proceed with inpatient chemo      Irene Bateman DO  Hematology/Oncology  [facility]

## 2023-07-11 ENCOUNTER — ONCOLOGY VISIT (OUTPATIENT)
Dept: ONCOLOGY | Facility: CLINIC | Age: 68
End: 2023-07-11
Payer: MEDICARE

## 2023-07-11 ENCOUNTER — TELEPHONE (OUTPATIENT)
Dept: ONCOLOGY | Facility: CLINIC | Age: 68
End: 2023-07-11

## 2023-07-11 VITALS
BODY MASS INDEX: 32.05 KG/M2 | WEIGHT: 174.19 LBS | RESPIRATION RATE: 16 BRPM | TEMPERATURE: 96.6 F | HEART RATE: 94 BPM | DIASTOLIC BLOOD PRESSURE: 78 MMHG | SYSTOLIC BLOOD PRESSURE: 132 MMHG | HEIGHT: 62 IN | OXYGEN SATURATION: 98 %

## 2023-07-11 DIAGNOSIS — D50.0 IRON DEFICIENCY ANEMIA DUE TO CHRONIC BLOOD LOSS: ICD-10-CM

## 2023-07-11 DIAGNOSIS — I48.0 PAROXYSMAL ATRIAL FIBRILLATION (H): ICD-10-CM

## 2023-07-11 DIAGNOSIS — Z92.21 STATUS POST CHEMOTHERAPY: ICD-10-CM

## 2023-07-11 DIAGNOSIS — C18.7 MALIGNANT NEOPLASM OF SIGMOID COLON (H): Primary | ICD-10-CM

## 2023-07-11 LAB
ALBUMIN SERPL BCG-MCNC: 4.1 G/DL (ref 3.5–5.2)
ALP SERPL-CCNC: 174 U/L (ref 35–104)
ALT SERPL W P-5'-P-CCNC: 20 U/L (ref 0–50)
ANION GAP SERPL CALCULATED.3IONS-SCNC: 15 MMOL/L (ref 7–15)
AST SERPL W P-5'-P-CCNC: 27 U/L (ref 0–45)
BASOPHILS # BLD AUTO: 0 10E3/UL (ref 0–0.2)
BASOPHILS NFR BLD AUTO: 1 %
BILIRUB SERPL-MCNC: 0.7 MG/DL
BUN SERPL-MCNC: 21.1 MG/DL (ref 8–23)
CALCIUM SERPL-MCNC: 9.8 MG/DL (ref 8.8–10.2)
CEA SERPL-MCNC: 2.8 NG/ML
CHLORIDE SERPL-SCNC: 103 MMOL/L (ref 98–107)
CREAT SERPL-MCNC: 1.45 MG/DL (ref 0.51–0.95)
DEPRECATED HCO3 PLAS-SCNC: 23 MMOL/L (ref 22–29)
EOSINOPHIL # BLD AUTO: 0 10E3/UL (ref 0–0.7)
EOSINOPHIL NFR BLD AUTO: 0 %
ERYTHROCYTE [DISTWIDTH] IN BLOOD BY AUTOMATED COUNT: 16.9 % (ref 10–15)
FERRITIN SERPL-MCNC: 1022 NG/ML (ref 11–328)
GFR SERPL CREATININE-BSD FRML MDRD: 40 ML/MIN/1.73M2
GLUCOSE SERPL-MCNC: 122 MG/DL (ref 70–99)
HCT VFR BLD AUTO: 42.4 % (ref 35–47)
HGB BLD-MCNC: 13 G/DL (ref 11.7–15.7)
IMM GRANULOCYTES # BLD: 0 10E3/UL
IMM GRANULOCYTES NFR BLD: 1 %
IRON BINDING CAPACITY (ROCHE): 201 UG/DL (ref 240–430)
IRON SATN MFR SERPL: 22 % (ref 15–46)
IRON SERPL-MCNC: 44 UG/DL (ref 37–145)
LYMPHOCYTES # BLD AUTO: 0.4 10E3/UL (ref 0.8–5.3)
LYMPHOCYTES NFR BLD AUTO: 10 %
MCH RBC QN AUTO: 27 PG (ref 26.5–33)
MCHC RBC AUTO-ENTMCNC: 30.7 G/DL (ref 31.5–36.5)
MCV RBC AUTO: 88 FL (ref 78–100)
MONOCYTES # BLD AUTO: 0.5 10E3/UL (ref 0–1.3)
MONOCYTES NFR BLD AUTO: 12 %
NEUTROPHILS # BLD AUTO: 3.2 10E3/UL (ref 1.6–8.3)
NEUTROPHILS NFR BLD AUTO: 76 %
NRBC # BLD AUTO: 0 10E3/UL
NRBC BLD AUTO-RTO: 0 /100
PLATELET # BLD AUTO: 217 10E3/UL (ref 150–450)
POTASSIUM SERPL-SCNC: 4.5 MMOL/L (ref 3.4–5.3)
PROT SERPL-MCNC: 7 G/DL (ref 6.4–8.3)
RBC # BLD AUTO: 4.81 10E6/UL (ref 3.8–5.2)
SODIUM SERPL-SCNC: 141 MMOL/L (ref 136–145)
WBC # BLD AUTO: 4.1 10E3/UL (ref 4–11)

## 2023-07-11 PROCEDURE — 36415 COLL VENOUS BLD VENIPUNCTURE: CPT | Performed by: INTERNAL MEDICINE

## 2023-07-11 PROCEDURE — 99214 OFFICE O/P EST MOD 30 MIN: CPT | Performed by: INTERNAL MEDICINE

## 2023-07-11 PROCEDURE — 83540 ASSAY OF IRON: CPT | Performed by: INTERNAL MEDICINE

## 2023-07-11 PROCEDURE — 82728 ASSAY OF FERRITIN: CPT | Performed by: INTERNAL MEDICINE

## 2023-07-11 PROCEDURE — 83550 IRON BINDING TEST: CPT | Performed by: INTERNAL MEDICINE

## 2023-07-11 PROCEDURE — 80053 COMPREHEN METABOLIC PANEL: CPT | Performed by: INTERNAL MEDICINE

## 2023-07-11 PROCEDURE — 85025 COMPLETE CBC W/AUTO DIFF WBC: CPT | Performed by: INTERNAL MEDICINE

## 2023-07-11 PROCEDURE — 82378 CARCINOEMBRYONIC ANTIGEN: CPT | Performed by: INTERNAL MEDICINE

## 2023-07-11 ASSESSMENT — PAIN SCALES - GENERAL: PAINLEVEL: MILD PAIN (2)

## 2023-07-11 NOTE — PROGRESS NOTES
[facility]    Hematology/Oncology Established Patient Follow Up Note      Today's Date: 7/11/2023    Reason for follow up: Sigmoid cancer    HISTORY OF PRESENT ILLNESS: Nadia Ladd is a 67 year old female who was referred to the Hematology/Oncology Clinic for sigmoid cancer    Patient has medical history including Crohn's disease on sulfasalazine, anemia secondary to iron deficiency and B12 deficiency, obesity, hypertension, prediabetes, eczema, pulmonary hypertension, hiatal hernia, mild splenomegaly (14.7 cm in 2/23)    - 2/23 pt noted increasing fatigue, found to have iron deficiency anemia w/hgb 6.8 (previously required RBC transfusion when dx w/Crohn's), did have intermittent changes in stool but not persistent (noted minimal blood in stool)   - 2/23 upper endoscopy for iron deficiency anemia and Crohn's disease: Hiatal hernia, normal stomach, normal duodenum  - 2/23 colonoscopy: A frond-like/villous partially obstructing large mass found in sigmoid colon, partially circumferential involving two thirds of the lumen circumference, 4 cm, unable to traverse, with oozing, s/p biopsy  PATHOLOGY:  A.  Stomach, antrum: Biopsy:  - Antral type mucosa with mild chronic inactive gastritis  - Immunostain for Helicobacter pylori is negative      B.  Colon, sigmoid, stricture: Biopsy:  - Invasive poorly differentiated carcinoma  The sigmoid stricture shows a high-grade carcinoma without definitive gland formation.  Given the poorly differentiated appearance, an immunohistochemical panel was performed to exclude the possibility of a tumor by direct extension or metastasis.   Immunohistochemical stains are performed and show the tumor to be diffusely positive for CK7 and partially positive for CK20 and SATB2.  There is no significant tumor reactivity for CDX2, GATA3, PAX8, TTF-1, and chromogranin.  Synaptophysin highlights very rare positive cells. Although the CK7/CK20 profile is not entirely  "typical for a colorectal carcinoma, immunostains for tumors of other origins are negative and a colorectal primary is still favored.   - Mismatch repair:  1) MLH1-loss of nuclear expression  2) MSH2-intact  3) MSH6-intact  4) PMS2-loss of nuclear expression  -MLH1 promoter methylation: NEGATIVE    -2/23 CT CAP:  A) 4 cm length mass in the proximal sigmoid colon. There is some irregularity and stranding in the adjacent pericolonic fat which may indicate tumor extension. There is no obstruction.   B) there is a second probable mass at the hepatic flexure of the colon measuring 2.5 cm in length   C)There are small lymph nodes in the mesial colon adjacent to the hepatic flexure abnormality and the sigmoid colonic mass. No lymphadenopathy by size criteria  D) There is submucosal fatty deposition in the colon, most severe in the distal sigmoid colon and rectum, which can be seen secondary to quiescent inflammatory bowel disease.  E) no liver lesion    -3/23 PET:  a. There is increased FDG avidity of the sigmoid colon with focal lobular wall thickening consistent with biopsy-proven adenocarcinoma.  b. Secondary focus noted at the hepatic flexure demonstrates elevated FDG avidity and lobulated wall thickening highly suspicious for a additional primary.  c. Additionally there is a smaller focus of wall thickening with elevated FDG avidity along the left aspect of the transverse colon  which is suspicious for a possible third focus of colonic malignancy.    -3/23 CEA 6.8  - 3/23 colorectal surgery consultation with - in the setting of Crohn's, at least sigmoid colectomy with possible total abdominal colectomy with either ileorectal anastomosis or end ileostomy (\"rectum can remain active and be actively surveyed annually\")    -3/23 genetic counseling, testing for Chan syndrome    - 4/5/2023 gynecologic oncology consultation for risk reduction surgery with hysterectomy and BSO at time of colectomy    -4/6/2023 " laparoscopic converted to open total abdominal colectomy with ileorectal anastomosis, partial omentectomy, flexible sigmoidoscopy, TAHBSO  A. OMENTUM, RESECTION:  - Adipose tissue with no significant histopathologic abnormality  - No evidence of malignancy     B. COLON, RESECTION:  Mass #1 (ascending colon/hepatic flexure): Mixed neuroendocrine-non-neuroendocrine neoplasm (MINEN):  - Neuroendocrine carcinoma component (70%) and moderately-differentiated adenocarcinoma component (30%)  - Size = 5.0 cm, invading into muscularis propria  - Positive LVI  - Negative macroscopic tumor perforation, negative PNI  - Negative surgical resection margins  - IHC: Neuroendocrine portion: Negative for chromogranin and INSM1 but strongly express synaptophysin  - IHC: Adenocarcinoma portion: Positive for CK20, CDX2 negative for CK7, PAX8, ER, S100  Ki-67 proliferation index of approximately 80%.  MMR:  Intact nuclear expression of MLH1, MSH2, MHS6, PMS2  PDL1:  COMBINED POSITIVE SCORE (CPS): 55-60  TUMOR PROPORTION SCORE (TPS): 50%   pathogenic KRAS mutation (G13D) was identified (5.2%).  AJCC 8th edition: stage 3B mpT3 pN1a     Mass#2 (sigmoid colon): Poorly-differentiated carcinoma:  - Grade 3  - Size = 5.7 cm, invading into muscularis propria  - Negative for microscopic tumor perforation, LVI, perineural invasion  - Negative surgical resection margins  - IHC: Positive for scar and keratin AE1/AE3, negative for CK7, CK20, CDX2, PAX8, ER, chromogranin, CD56, p40, synaptophysin, S100, INI1, p40, INSM1  Ki-67 proliferation index of approximately 70%.  MMR: loss of nuclear expression MLH1 and PMS2, intact nuclear expression MSH2 and MSH6  PDL1:  COMBINED POSITIVE SCORE (CPS): 80  TUMOR PROPORTION SCORE (TPS): 70%  pathogenic KRAS mutation (G13D) was identified (4.5%).  AJCC 8th edition: stage 3A mpT2 pN1a    - One of forty-one sampled lymph nodes positive (1/41)  - Benign appendix  - Tubular adenoma    C. UTERUS, CERVIX, BILATERAL  FALLOPIAN TUBES & OVARIES, :  - Benign endometrial polyps; atrophic endometrium  - Uterus, cervix, bilateral fallopian tubes, and ovaries with no significant morphologic abnormalities  - No evidence of dysplasia or malignancy     D. SMALL INTESTINE, TERMINAL ILEUM, TERMINAL ILIUM:  - Benign ileum  - No evidence of dysplasia or malignancy  - Negative for metastases to one of one sampled lymph node (0 /1)     E. PROXIMAL ANASTOMOTIC RING:  - Benign small intestine  - No evidence of dysplasia or malignancy     F. DISTAL ANASTOMOTIC RING:  - Benign colon  - No evidence of dysplasia or malignancy    CRC NGS:   --mutations positive for KRAS G13D mutation 5.2% mass 1, KRAS G13D mutation 4.5% mass 2 (negative for AKT1; BRAF; ERBB2; KRAS; NRAS; PIK3CA; PTEN)  --TMB score: 17.064 mut/Mb mass 1 (CPS 55-60, TPS 50%), 60.943 mut/Mb mass 2 (CPS 80, TPS 70%)  --fusion negative including NTRK and RET for mass 1 and 2 (also negative for AKT1, AKT3, ALK, AR, MLABFR24, CLAUDINE, BCOR, BRAF, BRD3, BRD4, CAMTA1, CCNB3, CCND1, , CIC, CSF1, CSF1R, CTNNB1, DNAJB1, EGFR, EPC1, ERBB2, ERBB4, ERG, ESR1, ESRRA, ETV1, ETV4, ETV5, ETV6, EWSR1, FGFR1, FGFR2, FGFR3, FGR, FOS, FOSB, FOXO1, FOXO4, FOXR2, FUS, GLI1, GRB7, HMGA2, HRAS, IDH1, IDH2, INSR, JAK2, JAK3, JAZF1, KRAS, MAML2, MAP2K1, MAST1, MAST2, MEAF6, MET, MKL2, MN1, MSMB, MUSK, MYB, MYBL1, MYOD1, NCOA1, NCOA2, NOTCH1, NOTCH2, NR4A3, NRAS, NRG1, NTRK1, NTRK2, NTRK3, NUMBL1, NUTM1, PAX3, PDGFB, PDGFRA, PDGFRB, PHF1, PIK3CA, PKN1, PLAG1, PPARG, PRKACA, PRKCA, PRKCB, RAF1, RELA, RET, ROS1, RPSO2, RSPO3, SS18, STAT6, TAF15, TCF12, TERT, TFE3, TFEB, TFG, THADA, TMPRSS2, USP6, VGLL2, YAP1, YWHAE)    -4/11/2023 patient discharged from hospital, planning for 30 days of lovenox postop, oxycodone for pain (required 1 unit PRBC for anemia)    -5/8/23 I presented this case at [facility] CRC tumor board and their recommendations include:  - common to see this in Crohn's, overall poor prognosis, treat  aggressively, may be poorly responsive to chemotherapy  - standard of care is mFOLFOX 6 x 12 cycles  - acknowledge that pt has high TPS and likely response to immunotherapy however not sufficient data or standard of care in stage 3 adjuvant setting  - add MMR testing to mass #1 hepatic flexure mass    - 5/9/23 admitted for acute renal failure w/Cr 3.82 w/K 7.0    - 5/19/23 second opinion at [facility] w/medical oncologist Dr. Acharya, thorough consultation and recommendations appreciated     - pt would like to proceed with FOFLOX (with dose reduced oxaliplatin due to kidney function)    -5/2023 genetic counseling: Negative for mutations in EPCAM, MLH1, MSH2, MSH6, and PMS2 genes.  Negative for mutations in APC, AXIN2, BMPR1A, CDH1, CHEK2, EPCAM, GREM1, MLH1, MSH2, MSH3, MSH6, MUTYH, NTHL1, PMS2, POLD1, POLE, PTEN, SMAD4, STK11, TP53, CDKN1B, MEN1, NF1, RET, TSC1, TSC2, and VHL genes    - 5/25/23 port placement    - 5/26/23 CT CAP w/ contrast and IVF before and after CT (baseline prior to starting tx):  1.  3 mm nodule RML and 5 mm lateral EDSON nodule, minimally increased from previous  2.  New 4 mm EDSON groundglass density nodule  3.  Several prominent borderline enlarged mediastinal lymph nodes slightly increased from previous  4.  Splenomegaly 14.5 cm  5. S/p subtotal colectomy with ileorectal anastomosis with postsurgical changes along the ventral abdominal wall midline    INTERIM HISTORY:  REGIMEN:  mFOLFOX6  q14 days x12 cycles/6 months  C1D1 5/31/23  oxaliplatin 64mg/m2 day 1 (dose reduced from 85mg/m2 2/2 Cr C1D1)  LV 350mg/m2 day 1  5FU 400mg/m2 IV push day 1  5FU 1200mg/m2 continuous infusion day 1-2 (total 2,400mg/m2 IV over 46-48 hours)  Emetic risk: moderate  Febrile neutropenia risk: intermediate     - plan for C2D1 6/14/23 held due to new onset paroxysmal A-fib with RVR requiring hospitalization 6/3/2023  - 6/19/2023 I presented this case at [facility] colorectal tumor board at the [facility], it is possible  "that paroxysmal A-fib may be related to 5-FU.  Options include withholding further treatment versus retrialing 5-FU under the guidance of cardio oncology in an inpatient setting.  No other adjuvant treatment options available, including immunotherapy  - 6/30/2023 consultation with cardio oncologist Dr. Garza; I spoke with Dr. Garza 6/30/2023 as well, [id number] score 3, recommending starting apixaban 5 mg twice daily, okay to retrial FOLFOX while patient is on metoprolol      Treatment related AE:  - hearing changes-described as \"metallic\" sound in ears with obscured hearing, Flonase BID as needed, ENT consultation pending 10/23.; overall pt reports metallic/ringing in ears has nearly resolved; muffled sound is stable (present prior to starting chemo, improved w/steroids, now stable and not getting better)   - lightheadedness and dizziness- after cycle 1 with position change including sitting up and standing, no associated vision changes, headache, motor or sensation changes.  Resolved  - Paroxysmal A-fib with RVR and PAC- admitted to [facility] 6/3/2023 - 6/5/2023 for lightheadedness and dizziness and found to have A-fib with RVR required diltiazem drip for RVR and subsequently cardioverted while she was in the ER and did not have recurrent A-fib during monitoring, heparin, with demand ischemia from RVR, concern for PE but held off on CTA due to kidney disease, 6/3/2023 echo EF 55-60%, mild pulmonary hypertension, mild mitral regurgitation, TSH 1.55, low magnesium requiring replacement and continues on oral magnesium, no longer on potassium restriction, found to have pyuria (not UTI) empirically treated with Rocephin,  metoprolol XL 25 mg p.o. daily for ongoing palpitations. Cardio oncology consult appreciated, pt started on eliquis, reports cost is very high, no palpitations.   - Anxiety  - Nausea-Compazine and Zofran as needed  - Delayed neutropenia- neutropenic thierry ANC 0.5 2 weeks out from cycle 1, " afebrile, 6/23 DPD deficiency testing negative  -Normocytic anemia- due to chemotherapy and iron deficiency, s/p ferric carboxymaltose 750mg x2 doses 6/14/23 and 6/28/23  -Thrombocytopenia-6/5/2023 platelet 129K, 6/8/23 plt 164K  - MARYA on CKD- following w/nephro, Cr improved to 1.23 from 3          REVIEW OF SYSTEMS:   A 14 point ROS was reviewed with pertinent positives and negatives in the HPI.        HOME MEDICATIONS:  Current Outpatient Medications   Medication Sig Dispense Refill     apixaban ANTICOAGULANT (ELIQUIS ANTICOAGULANT) 5 MG tablet Take 1 tablet (5 mg) by mouth 2 times daily 60 tablet 11     cyanocobalamin 1000 MCG TBCR Take 1,000 mcg by mouth daily 100 tablet 1     Ferrous Sulfate (IRON) 325 (65 Fe) MG tablet Take 1 tablet by mouth daily       magnesium oxide (MAG-OX) 400 MG tablet Take 1 tablet (400 mg) by mouth 2 times daily 60 tablet 0     metoprolol succinate ER (TOPROL XL) 25 MG 24 hr tablet Take 1 tablet (25 mg) by mouth daily 30 tablet 1     Multiple Vitamins-Iron (MULTI-DAY PLUS IRON PO) Take 1 tablet by mouth daily       sodium bicarbonate 650 MG tablet Take 1 tablet (650 mg) by mouth 2 times daily 120 tablet 3     dexamethasone (DECADRON) 4 MG tablet Take 2 tablets (8 mg) by mouth daily Take for 2 days, starting the day after chemo. Take with food. (Patient not taking: Reported on 7/11/2023) 4 tablet 2     indomethacin (INDOCIN) 50 MG capsule Take 1 capsule (50 mg) by mouth 3 times daily (with meals) (Patient not taking: Reported on 6/26/2023) 12 capsule 0         ALLERGIES:  Allergies   Allergen Reactions     No Known Drug Allergy          PAST MEDICAL HISTORY:  Past Medical History:   Diagnosis Date     Arthropathia 08/05/2010     B12 deficiency anemia 10/21/2010     Benign essential hypertension with target blood pressure below 140/90 10/10/2016     CARDIOVASCULAR SCREENING; LDL GOAL LESS THAN 160 10/31/2010     Crohn's colitis (H) 02/15/2011     Dermatitis-dishydrotic eczema-severe  08/05/2010     Hiatal hernia      HTN (hypertension) 07/21/2011     Iron deficiency anemia 10/21/2010     Malignant neoplasm of sigmoid colon (H)      Obesity      Primary pulmonary hypertension (H) 04/06/2010         PAST SURGICAL HISTORY:  Past Surgical History:   Procedure Laterality Date     COLECTOMY WITHOUT COLOSTOMY N/A 4/6/2023    Procedure: Laparoscopic Converted to Open Total abdominal colectomy;  Surgeon: Jerome Ashley MD;  Location: [facility] OR     COLONOSCOPY  10/11/10     COLONOSCOPY N/A 2/27/2023    Procedure: ATTEMPTED COLONOSCOPY WITH SIGMOID STRICTURE BIOPSY;  Surgeon: Jonathan Alcocer MD;  Location:  GI     ESOPHAGOSCOPY, GASTROSCOPY, DUODENOSCOPY (EGD), COMBINED N/A 2/27/2023    Procedure: ESOPHAGOGASTRODUODENOSCOPY, WITH BIOPSY;  Surgeon: Jonathan Alcocer MD;  Location: [facility] GI     HYSTERECTOMY TOTAL ABDOMINAL, BILATERAL SALPINGO-OOPHORECTOMY, COMBINED N/A 4/6/2023    Procedure: Hysterectomy total abdominal, bilateral salpingo-oophorectomy;  Surgeon: Sunitha Olea MD;  Location: [facility] OR     INSERT PORT VASCULAR ACCESS Right 5/25/2023    Procedure: Ultrasound-guided right internal jugular venous access port placement with fluoroscopy;  Surgeon: Martin Thomas DO;  Location: [facility] OR     SIGMOIDOSCOPY FLEXIBLE N/A 4/6/2023    Procedure: Sigmoidoscopy flexible;  Surgeon: Jerome Ashley MD;  Location: [facility] OR     SURGICAL HISTORY OF -   06/14/76    Perineorrhaphy, for widening vaginal orifice     ZZC EXPLORATORY OF ABDOMEN  1989    laparoscopy         SOCIAL HISTORY:  Social History     Socioeconomic History     Marital status:      Spouse name: Not on file     Number of children: Not on file     Years of education: Not on file     Highest education level: Not on file   Occupational History     Not on file   Tobacco Use     Smoking status: Never     Passive exposure: Current     Smokeless tobacco: Never   Substance and Sexual Activity     Alcohol use: No     Drug  "use: No     Sexual activity: Yes     Partners: Male   Other Topics Concern     Parent/sibling w/ CABG, MI or angioplasty before 65F 55M? Not Asked   Social History Narrative     Not on file     Social Determinants of Health     Financial Resource Strain: Not on file   Food Insecurity: Not on file   Transportation Needs: Not on file   Physical Activity: Not on file   Stress: Not on file   Social Connections: Not on file   Intimate Partner Violence: Not on file   Housing Stability: Not on file         FAMILY HISTORY:  Family History   Problem Relation Age of Onset     Hypertension Mother         on meds, alive     Cerebrovascular Disease Father         stroke about age 65,  of cancer at 68 yrs     Diabetes Father         eventually took insulin     Anemia Father         Pernisios anemia     Kidney Disease Niece         kidney transplant     Kidney Disease Nephew         kidney transplant     Venous thrombosis No family hx of      Anesthesia Reaction No family hx of          PHYSICAL EXAM:  Vital signs:  /78 (BP Location: Right arm, Patient Position: Sitting, Cuff Size: Adult Large)   Pulse 94   Temp (!) 96.6  F (35.9  C) (Temporal)   Resp 16   Ht 1.575 m (5' 2\")   Wt 79 kg (174 lb 3 oz)   LMP  (LMP Unknown)   SpO2 98%   BMI 31.86 kg/m         ECO  GENERAL/CONSTITUTIONAL: No acute distress.  EYES:  Extraocular movements intact without nystagmus.  No scleral icterus.  RESPIRATORY: Equal chest rise.   CARDIOLOGY: RRR no tachycardia  MUSCULOSKELETAL: Warm and well-perfused, no cyanosis, clubbing, or edema. Right LE always more swollen than left, chronic unchanged.   NEUROLOGIC: Cranial nerves are grossly intact. Alert, oriented to person, place and time, answers questions appropriately.  INTEGUMENTARY: No rashes or jaundice.      LABS:    PATHOLOGY:    IMAGING:      ASSESSMENT/PLAN:  Nadia Ladd is a 67 year old female with:      # ascending colon/hepatic flexure Mixed " neuroendocrine-non-neuroendocrine neoplasm (MINEN, poorly differentiated neuroendocrine carcinoma and moderately differentiated adenocarcinoma), KRAS G13D mutated, MARISSA, TPS 50%  # sigmoid colon poorly-differentiated carcinoma, KRAS G13D mutated, loss of MLH1 and PMS2, TPS 70%  - 2/23 colonoscopy: A frond-like/villous partially obstructing large mass found in sigmoid colon, partially circumferential involving two thirds of the lumen circumference, 4 cm, unable to traverse, with oozing, s/p biopsy, PATHOLOGY: Invasive poorly differentiated carcinoma, IHC panel excludes tumors of other origin, colorectal primary is favored, loss of nuclear expression of MLH1 and PMS2, MLH1 promoter methylation negative, [id number] mutation negative  -2/23 CT CAP: Shows known 4 cm proximal sigmoid colon mass with possible tumor extension (irregularity and stranding and adjacent pericolonic fat) without obstruction AND probable second mass 2.5 cm at hepatic flexure of colon; small lymph nodes adjacent to both masses (no lymphadenopathy by size criteria)  -3/23 PET:  a. There is increased FDG avidity of the sigmoid colon with focal lobular wall thickening consistent with biopsy-proven adenocarcinoma.  b. Secondary focus noted at the hepatic flexure demonstrates elevated FDG avidity and lobulated wall thickening highly suspicious for a additional primary.  c. Additionally there is a smaller focus of wall thickening with elevated FDG avidity along the left aspect of the transverse colon which is suspicious for a possible third focus of colonic malignancy.  - 3/23 CEA 6.8  -4/6/2023 laparoscopic converted to open total abdominal colectomy with ileorectal anastomosis, partial omentectomy, flexible sigmoidoscopy, TAHBSO  PATHOLOGY:  Of note, omental resection, terminal ileum, proximal and distal anastomotic rings, uterus, cervix, bilateral fallopian tubes and ovaries negative for malignancy    Mass #1 (ascending colon/hepatic flexure): Mixed  neuroendocrine-non-neuroendocrine neoplasm (MINEN):  - Neuroendocrine carcinoma component (70%) and moderately-differentiated adenocarcinoma component (30%)  - Size = 5.0 cm, invading into muscularis propria, Positive LVI  - IHC: Neuroendocrine portion: Negative for chromogranin and INSM1 but strongly express synaptophysin  - IHC: Adenocarcinoma portion: Positive for CK20, CDX2 negative for CK7, PAX8, ER, S100  Ki-67 proliferation index of approximately 80%.  MARISSA   PDL1:  COMBINED POSITIVE SCORE (CPS): 55-60  TUMOR PROPORTION SCORE (TPS): 50%   pathogenic KRAS mutation (G13D) was identified (5.2%).  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  MMR testing: loss of MLH1 and PMS2  PDL1:  COMBINED POSITIVE SCORE (CPS): 80  TUMOR PROPORTION SCORE (TPS): 70%  pathogenic KRAS mutation (G13D) was identified (4.5%).  MMR testing: loss of MLH1 and PMS2, intact MSH2 and MSH6  AJCC 8th edition: stage 3A mpT2 pN1a    - One of forty-one sampled lymph nodes positive (1/41), d/w pathology- unable to determine which mass is metastatic to LN    - 4/23 MRI brain w/wo contrast LAURENT  - 5/26/23 CT CAP w/ contrast and IVF before and after CT (post op baseline prior to starting tx):  1.  3 mm nodule RML and 5 mm lateral EDSON nodule, minimally increased from previous  2.  New 4 mm EDSON groundglass density nodule  3.  Several prominent borderline enlarged mediastinal lymph nodes slightly increased from previous  4.  Splenomegaly 14.5 cm  5. S/p subtotal colectomy with ileorectal anastomosis with postsurgical changes along the ventral abdominal wall midline  - 5/8/23 I presented this case at [facility] CRC tumor  board as above   - 5/19/23 second opinion at [facility]/medical oncologist Dr. Acharya, thorough consultation and recommendations appreciated, overall agree w/FOLFOX x6 months, possible nivo or ipi nivo in second line; if metastatic platinum etoposide vs ipi nivo  - 5/25/23 port placement    -5/2023 genetic counseling: Negative for mutations in EPCAM, MLH1, MSH2, MSH6, and PMS2 genes.  Negative for mutations in APC, AXIN2, BMPR1A, CDH1, CHEK2, EPCAM, GREM1, MLH1, MSH2, MSH3, MSH6, MUTYH, NTHL1, PMS2, POLD1, POLE, PTEN, SMAD4, STK11, TP53, CDKN1B, MEN1, NF1, RET, TSC1, TSC2, and VHL genes      REGIMEN:  mFOLFOX6  q14 days x12 cycles/6 months  C1D1 5/31/23 (complicated by new onset afib)  oxaliplatin 64mg/m2 day 1 (dose reduced from 85mg/m2 2/2 Cr C1D1)  LV 350mg/m2 day 1  5FU 1200mg/m2 continuous infusion day 1-2 (total 2,400mg/m2 IV over 46-48 hours)  (5FU 400mg/m2 IV push day 1- discontinued C2 onwards)  Emetic risk: moderate  Febrile neutropenia risk: intermediate     - planned for C2D1 6/14/23 but held due to new onset paroxysmal A-fib with RVR requiring hospitalization 6/3/2023  - 6/19/2023 I presented this case at [facility] colorectal tumor board at the [facility], it is possible that paroxysmal A-fib may be related to 5-FU.  Options include withholding further treatment versus retrialing 5-FU under the guidance of cardio oncology in an inpatient setting.  No other adjuvant treatment options available, including immunotherapy  - 6/30/2023 consultation with cardio oncologist Dr. Garza; I spoke with Dr. Garza 6/30/2023 as well, [id number] score 3, recommending starting apixaban 5 mg twice daily, okay to retrial FOLFOX while patient is on metoprolol  - pt is unsure how she would like to proceed, she will call me and let me know by end of week. If proceeding with treatment, we will plan for inpatient admission at [facility] for cycle 2 FOLFOX w/o 5FU bolus with continuous cardiac monitoring and IVF  - plan for  repeat CT prior to proceeding w/tx if pt wants to continue tx    Treatment related AE:  - hearing changes-stable, flonase prn, ENT consultation pending 10/23  - lightheadedness and dizziness- plan for weekly IVF w/treatment if pt wishes to pursue tx.   - Paroxysmal A-fib with RVR and PAC- continue metoprolol, eliquis, appreciate Dr. Garza's consult, pt to contact cardiology team regarding affordability of medications (will need interaction check w/chemo), will drop 5FU bolus  - Anxiety- stable   - Nausea-Compazine and Zofran as needed  - Delayed neutropenia- neutropenic thierry ANC 0.5 2 weeks out from cycle 1, afebrile, 6/23 DPD deficiency testing negative, will drop 5UF bolus, repeat CBC today  - Normocytic anemia- due to chemotherapy and iron deficiency, s/p ferric carboxymaltose 750mg x2 doses 6/14/23 and 6/28/23; repeat iron studies today  -Thrombocytopenia-6/5/2023 platelet 129K, 6/8/23 plt 164K, repeat CBC today  - MARYA on CKD- following w/nephro, f/u with nephro 9/23    - f/u with Dr. Ashley 4/2024 for rigid proctoscopy     #parxosymal afib  - within 3 days of receiving 5FU, prior cardiac risk factors htn, prediabetes  - 6/3/23 presented to ER w/lightheadedness, dizziness, cardioverted s/p diltiazem ggt in ER  - 6/23 DPD deficiency testing negative  - currently on metoprolol XL 25 mg daily, apixaban 5 mg twice daily  - 6/30/2023 consultation with cardio oncologist Dr. Garza; I spoke with Dr. 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  Negative for mutations in APC, AXIN2, BMPR1A, CDH1, CHEK2, EPCAM, GREM1, MLH1, MSH2, MSH3, MSH6, MUTYH, NTHL1, PMS2, POLD1, POLE, PTEN, SMAD4, STK11, TP53, CDKN1B, MEN1, NF1, RET, TSC1, TSC2, and VHL genes    #Anemia secondary to iron deficiency and B12 deficiency  - terminal ileum removed at time of colon surgery, monitor for nutrient def  - 2/23 hgb 6.8, ferritin 21, iron sat index 10, TIBC 265, iron 27, b12 1493  - On B12 1000 mcg p.o. daily and ferrous sulfate 325 mg p.o. daily  - 4/9/23 s/p 1 uprbcs  - 5/9/23 hgb 11.3,5/30/23 hgb 9.4  - based on Ganzoni equation w/goal hgb 14 and 500mg needed for iron stores, pts iron deficit is 1400 mg  - s/p ferric carboxymaltose 750mg x2 doses 6/14/23 and 6/28/23  - repeat iron studies today    #Crohn's  - dx since at least 2010     RTC 2-3 weeks for follow up with me  Pt will call me by end of week to let me know if she wants to proceed with inpatient chemo    ADDENDUM 7/14/23:  Pt would like to proceed with inpatient retrial of FOLFOX and is aware of associated cardiac risks. We will proceed with inpatient admission at [facility] next week for inpatient FOLFOX w/o 5FU bolus and with dose reduced oxaliplatin and continuous cardiac monitoring. Ideally, I would like patient to have CT CAP prior to starting so we can get a new baseline as pt has been off tx for 7 weeks. Will repeat labs including alk phos and Cr which have increased. I d/w bed placement today and they need pt to do nurse visit to have vitals done within 24hrs of admission. I contacted [facility] to get pt scheduled to come in on Monday and plan for admission Tuesday 7/18 8AM.     ADDENDUM 7/17/23:  [facility] visit and VS noted and stable.  Sign out given to hospitalist team, Dr. Shen   Sign out given to our covering oncologist, Dr. Soto  Sign out given to station 88 charge, Nevaeh  Chemotherapy ordered  I called  the patient and made sure she is aware of the plan, she will arrive at [facility] tomorrow Tuesday 7/18       Irene Bateman DO  Hematology/Oncology  [facility]

## 2023-07-11 NOTE — LETTER
7/11/2023         RE: Nadia Ladd  [address]  [facility] [id number]        Dear Colleague,    Thank you for referring your patient, Nadia Ladd, to the [facility]. Please see a copy of my visit note below.    [facility]    Hematology/Oncology Established Patient Follow Up Note      Today's Date: 7/11/2023    Reason for follow up: Sigmoid cancer    HISTORY OF PRESENT ILLNESS: Nadia Ladd is a 67 year old female who was referred to the Hematology/Oncology Clinic for sigmoid cancer    Patient has medical history including Crohn's disease on sulfasalazine, anemia secondary to iron deficiency and B12 deficiency, obesity, hypertension, prediabetes, eczema, pulmonary hypertension, hiatal hernia, mild splenomegaly (14.7 cm in 2/23)    - 2/23 pt noted increasing fatigue, found to have iron deficiency anemia w/hgb 6.8 (previously required RBC transfusion when dx w/Crohn's), did have intermittent changes in stool but not persistent (noted minimal blood in stool)   - 2/23 upper endoscopy for iron deficiency anemia and Crohn's disease: Hiatal hernia, normal stomach, normal duodenum  - 2/23 colonoscopy: A frond-like/villous partially obstructing large mass found in sigmoid colon, partially circumferential involving two thirds of the lumen circumference, 4 cm, unable to traverse, with oozing, s/p biopsy  PATHOLOGY:  A.  Stomach, antrum: Biopsy:  - Antral type mucosa with mild chronic inactive gastritis  - Immunostain for Helicobacter pylori is negative      B.  Colon, sigmoid, stricture: Biopsy:  - Invasive poorly differentiated carcinoma  The sigmoid stricture shows a high-grade carcinoma without definitive gland formation.  Given the poorly differentiated appearance, an immunohistochemical panel was performed to exclude the possibility of a tumor by direct extension or metastasis.   Immunohistochemical stains are performed and show the tumor to be  diffusely positive for CK7 and partially positive for CK20 and SATB2.  There is no significant tumor reactivity for CDX2, GATA3, PAX8, TTF-1, and chromogranin.  Synaptophysin highlights very rare positive cells. Although the CK7/CK20 profile is not entirely typical for a colorectal carcinoma, immunostains for tumors of other origins are negative and a colorectal primary is still favored.   - Mismatch repair:  1) MLH1-loss of nuclear expression  2) MSH2-intact  3) MSH6-intact  4) PMS2-loss of nuclear expression  -MLH1 promoter methylation: NEGATIVE    -2/23 CT CAP:  A) 4 cm length mass in the proximal sigmoid colon. There is some irregularity and stranding in the adjacent pericolonic fat which may indicate tumor extension. There is no obstruction.   B) there is a second probable mass at the hepatic flexure of the colon measuring 2.5 cm in length   C)There are small lymph nodes in the mesial colon adjacent to the hepatic flexure abnormality and the sigmoid colonic mass. No lymphadenopathy by size criteria  D) There is submucosal fatty deposition in the colon, most severe in the distal sigmoid colon and rectum, which can be seen secondary to quiescent inflammatory bowel disease.  E) no liver lesion    -3/23 PET:  a. There is increased FDG avidity of the sigmoid colon with focal lobular wall thickening consistent with biopsy-proven adenocarcinoma.  b. Secondary focus noted at the hepatic flexure demonstrates elevated FDG avidity and lobulated wall thickening highly suspicious for a additional primary.  c. Additionally there is a smaller focus of wall thickening with elevated FDG avidity along the left aspect of the transverse colon  which is suspicious for a possible third focus of colonic malignancy.    -3/23 CEA 6.8  - 3/23 colorectal surgery consultation with - in the setting of Crohn's, at least sigmoid colectomy with possible total abdominal colectomy with either ileorectal anastomosis or end ileostomy  "(\"rectum can remain active and be actively surveyed annually\")    -3/23 genetic counseling, testing for Chan syndrome    - 4/5/2023 gynecologic oncology consultation for risk reduction surgery with hysterectomy and BSO at time of colectomy    -4/6/2023 laparoscopic converted to open total abdominal colectomy with ileorectal anastomosis, partial omentectomy, flexible sigmoidoscopy, TAHBSO  A. OMENTUM, RESECTION:  - Adipose tissue with no significant histopathologic abnormality  - No evidence of malignancy     B. COLON, RESECTION:  Mass #1 (ascending colon/hepatic flexure): Mixed neuroendocrine-non-neuroendocrine neoplasm (MINEN):  - Neuroendocrine carcinoma component (70%) and moderately-differentiated adenocarcinoma component (30%)  - Size = 5.0 cm, invading into muscularis propria  - Positive LVI  - Negative macroscopic tumor perforation, negative PNI  - Negative surgical resection margins  - IHC: Neuroendocrine portion: Negative for chromogranin and INSM1 but strongly express synaptophysin  - IHC: Adenocarcinoma portion: Positive for CK20, CDX2 negative for CK7, PAX8, ER, S100  Ki-67 proliferation index of approximately 80%.  MMR:  Intact nuclear expression of MLH1, MSH2, MHS6, PMS2  PDL1:  COMBINED POSITIVE SCORE (CPS): 55-60  TUMOR PROPORTION SCORE (TPS): 50%   pathogenic KRAS mutation (G13D) was identified (5.2%).  AJCC 8th edition: stage 3B mpT3 pN1a     Mass#2 (sigmoid colon): Poorly-differentiated carcinoma:  - Grade 3  - Size = 5.7 cm, invading into muscularis propria  - Negative for microscopic tumor perforation, LVI, perineural invasion  - Negative surgical resection margins  - IHC: Positive for scar and keratin AE1/AE3, negative for CK7, CK20, CDX2, PAX8, ER, chromogranin, CD56, p40, synaptophysin, S100, INI1, p40, INSM1  Ki-67 proliferation index of approximately 70%.  MMR: loss of nuclear expression MLH1 and PMS2, intact nuclear expression MSH2 and MSH6  PDL1:  COMBINED POSITIVE SCORE (CPS): 80  TUMOR " PROPORTION SCORE (TPS): 70%  pathogenic KRAS mutation (G13D) was identified (4.5%).  AJCC 8th edition: stage 3A mpT2 pN1a    - One of forty-one sampled lymph nodes positive (1/41)  - Benign appendix  - Tubular adenoma    C. UTERUS, CERVIX, BILATERAL FALLOPIAN TUBES & OVARIES, :  - Benign endometrial polyps; atrophic endometrium  - Uterus, cervix, bilateral fallopian tubes, and ovaries with no significant morphologic abnormalities  - No evidence of dysplasia or malignancy     D. SMALL INTESTINE, TERMINAL ILEUM, TERMINAL ILIUM:  - Benign ileum  - No evidence of dysplasia or malignancy  - Negative for metastases to one of one sampled lymph node (0 /1)     E. PROXIMAL ANASTOMOTIC RING:  - Benign small intestine  - No evidence of dysplasia or malignancy     F. DISTAL ANASTOMOTIC RING:  - Benign colon  - No evidence of dysplasia or malignancy    CRC NGS:   --mutations positive for KRAS G13D mutation 5.2% mass 1, KRAS G13D mutation 4.5% mass 2 (negative for AKT1; BRAF; ERBB2; KRAS; NRAS; PIK3CA; PTEN)  --TMB score: 17.064 mut/Mb mass 1 (CPS 55-60, TPS 50%), 60.943 mut/Mb mass 2 (CPS 80, TPS 70%)  --fusion negative including NTRK and RET for mass 1 and 2 (also negative for AKT1, AKT3, ALK, AR, TLGTPO12, CLAUDINE, BCOR, BRAF, BRD3, BRD4, CAMTA1, CCNB3, CCND1, , CIC, CSF1, CSF1R, CTNNB1, DNAJB1, EGFR, EPC1, ERBB2, ERBB4, ERG, ESR1, ESRRA, ETV1, ETV4, ETV5, ETV6, EWSR1, FGFR1, FGFR2, FGFR3, FGR, FOS, FOSB, FOXO1, FOXO4, FOXR2, FUS, GLI1, GRB7, HMGA2, HRAS, IDH1, IDH2, INSR, JAK2, JAK3, JAZF1, KRAS, MAML2, MAP2K1, MAST1, MAST2, MEAF6, MET, MKL2, MN1, MSMB, MUSK, MYB, MYBL1, MYOD1, NCOA1, NCOA2, NOTCH1, NOTCH2, NR4A3, NRAS, NRG1, NTRK1, NTRK2, NTRK3, NUMBL1, NUTM1, PAX3, PDGFB, PDGFRA, PDGFRB, PHF1, PIK3CA, PKN1, PLAG1, PPARG, PRKACA, PRKCA, PRKCB, RAF1, RELA, RET, ROS1, RPSO2, RSPO3, SS18, STAT6, TAF15, TCF12, TERT, TFE3, TFEB, TFG, THADA, TMPRSS2, USP6, VGLL2, YAP1, YWHAE)    -4/11/2023 patient discharged from hospital,  planning for 30 days of lovenox postop, oxycodone for pain (required 1 unit PRBC for anemia)    -5/8/23 I presented this case at [facility] CRC tumor board and their recommendations include:  - common to see this in Crohn's, overall poor prognosis, treat aggressively, may be poorly responsive to chemotherapy  - standard of care is mFOLFOX 6 x 12 cycles  - acknowledge that pt has high TPS and likely response to immunotherapy however not sufficient data or standard of care in stage 3 adjuvant setting  - add MMR testing to mass #1 hepatic flexure mass    - 5/9/23 admitted for acute renal failure w/Cr 3.82 w/K 7.0    - 5/19/23 second opinion at [facility] w/medical oncologist Dr. Acharya, thorough consultation and recommendations appreciated     - pt would like to proceed with FOFLOX (with dose reduced oxaliplatin due to kidney function)    -5/2023 genetic counseling: Negative for mutations in EPCAM, MLH1, MSH2, MSH6, and PMS2 genes.  Negative for mutations in APC, AXIN2, BMPR1A, CDH1, CHEK2, EPCAM, GREM1, MLH1, MSH2, MSH3, MSH6, MUTYH, NTHL1, PMS2, POLD1, POLE, PTEN, SMAD4, STK11, TP53, CDKN1B, MEN1, NF1, RET, TSC1, TSC2, and VHL genes    - 5/25/23 port placement    - 5/26/23 CT CAP w/ contrast and IVF before and after CT (baseline prior to starting tx):  1.  3 mm nodule RML and 5 mm lateral EDSON nodule, minimally increased from previous  2.  New 4 mm EDSON groundglass density nodule  3.  Several prominent borderline enlarged mediastinal lymph nodes slightly increased from previous  4.  Splenomegaly 14.5 cm  5. S/p subtotal colectomy with ileorectal anastomosis with postsurgical changes along the ventral abdominal wall midline    INTERIM HISTORY:  REGIMEN:  mFOLFOX6  q14 days x12 cycles/6 months  C1D1 5/31/23  oxaliplatin 64mg/m2 day 1 (dose reduced from 85mg/m2 2/2 Cr C1D1)  LV 350mg/m2 day 1  5FU 400mg/m2 IV push day 1  5FU 1200mg/m2 continuous infusion day 1-2 (total 2,400mg/m2 IV over 46-48 hours)  Emetic risk: moderate  Febrile  "neutropenia risk: intermediate     - plan for C2D1 6/14/23 held due to new onset paroxysmal A-fib with RVR requiring hospitalization 6/3/2023  - 6/19/2023 I presented this case at [facility] colorectal tumor board at the [facility], it is possible that paroxysmal A-fib may be related to 5-FU.  Options include withholding further treatment versus retrialing 5-FU under the guidance of cardio oncology in an inpatient setting.  No other adjuvant treatment options available, including immunotherapy  - 6/30/2023 consultation with cardio oncologist Dr. Garza; I spoke with Dr. Garza 6/30/2023 as well, [id number] score 3, recommending starting apixaban 5 mg twice daily, okay to retrial FOLFOX while patient is on metoprolol      Treatment related AE:  - hearing changes-described as \"metallic\" sound in ears with obscured hearing, Flonase BID as needed, ENT consultation pending 10/23.; overall pt reports metallic/ringing in ears has nearly resolved; muffled sound is stable (present prior to starting chemo, improved w/steroids, now stable and not getting better)   - lightheadedness and dizziness- after cycle 1 with position change including sitting up and standing, no associated vision changes, headache, motor or sensation changes.  Resolved  - Paroxysmal A-fib with RVR and PAC- admitted to [facility] 6/3/2023 - 6/5/2023 for lightheadedness and dizziness and found to have A-fib with RVR required diltiazem drip for RVR and subsequently cardioverted while she was in the ER and did not have recurrent A-fib during monitoring, heparin, with demand ischemia from RVR, concern for PE but held off on CTA due to kidney disease, 6/3/2023 echo EF 55-60%, mild pulmonary hypertension, mild mitral regurgitation, TSH 1.55, low magnesium requiring replacement and continues on oral magnesium, no longer on potassium restriction, found to have pyuria (not UTI) empirically treated with Rocephin,  metoprolol XL 25 mg p.o. daily for " ongoing palpitations. Cardio oncology consult appreciated, pt started on eliquis, reports cost is very high, no palpitations.   - Anxiety  - Nausea-Compazine and Zofran as needed  - Delayed neutropenia- neutropenic thierry ANC 0.5 2 weeks out from cycle 1, afebrile, 6/23 DPD deficiency testing negative  -Normocytic anemia- due to chemotherapy and iron deficiency, s/p ferric carboxymaltose 750mg x2 doses 6/14/23 and 6/28/23  -Thrombocytopenia-6/5/2023 platelet 129K, 6/8/23 plt 164K  - MARYA on CKD- following w/nephro, Cr improved to 1.23 from 3          REVIEW OF SYSTEMS:   A 14 point ROS was reviewed with pertinent positives and negatives in the HPI.        HOME MEDICATIONS:  Current Outpatient Medications   Medication Sig Dispense Refill     apixaban ANTICOAGULANT (ELIQUIS ANTICOAGULANT) 5 MG tablet Take 1 tablet (5 mg) by mouth 2 times daily 60 tablet 11     cyanocobalamin 1000 MCG TBCR Take 1,000 mcg by mouth daily 100 tablet 1     Ferrous Sulfate (IRON) 325 (65 Fe) MG tablet Take 1 tablet by mouth daily       magnesium oxide (MAG-OX) 400 MG tablet Take 1 tablet (400 mg) by mouth 2 times daily 60 tablet 0     metoprolol succinate ER (TOPROL XL) 25 MG 24 hr tablet Take 1 tablet (25 mg) by mouth daily 30 tablet 1     Multiple Vitamins-Iron (MULTI-DAY PLUS IRON PO) Take 1 tablet by mouth daily       sodium bicarbonate 650 MG tablet Take 1 tablet (650 mg) by mouth 2 times daily 120 tablet 3     dexamethasone (DECADRON) 4 MG tablet Take 2 tablets (8 mg) by mouth daily Take for 2 days, starting the day after chemo. Take with food. (Patient not taking: Reported on 7/11/2023) 4 tablet 2     indomethacin (INDOCIN) 50 MG capsule Take 1 capsule (50 mg) by mouth 3 times daily (with meals) (Patient not taking: Reported on 6/26/2023) 12 capsule 0         ALLERGIES:  Allergies   Allergen Reactions     No Known Drug Allergy          PAST MEDICAL HISTORY:  Past Medical History:   Diagnosis Date     Arthropathia 08/05/2010     B12  deficiency anemia 10/21/2010     Benign essential hypertension with target blood pressure below 140/90 10/10/2016     CARDIOVASCULAR SCREENING; LDL GOAL LESS THAN 160 10/31/2010     Crohn's colitis (H) 02/15/2011     Dermatitis-dishydrotic eczema-severe 08/05/2010     Hiatal hernia      HTN (hypertension) 07/21/2011     Iron deficiency anemia 10/21/2010     Malignant neoplasm of sigmoid colon (H)      Obesity      Primary pulmonary hypertension (H) 04/06/2010         PAST SURGICAL HISTORY:  Past Surgical History:   Procedure Laterality Date     COLECTOMY WITHOUT COLOSTOMY N/A 4/6/2023    Procedure: Laparoscopic Converted to Open Total abdominal colectomy;  Surgeon: Jerome Ashley MD;  Location: [facility] OR     COLONOSCOPY  10/11/10     COLONOSCOPY N/A 2/27/2023    Procedure: ATTEMPTED COLONOSCOPY WITH SIGMOID STRICTURE BIOPSY;  Surgeon: Jonathan Alcocer MD;  Location: [facility]     ESOPHAGOSCOPY, GASTROSCOPY, DUODENOSCOPY (EGD), COMBINED N/A 2/27/2023    Procedure: ESOPHAGOGASTRODUODENOSCOPY, WITH BIOPSY;  Surgeon: Jonathan Alcocer MD;  Location: [facility]     HYSTERECTOMY TOTAL ABDOMINAL, BILATERAL SALPINGO-OOPHORECTOMY, COMBINED N/A 4/6/2023    Procedure: Hysterectomy total abdominal, bilateral salpingo-oophorectomy;  Surgeon: Sunitha Olea MD;  Location: [facility] OR     INSERT PORT VASCULAR ACCESS Right 5/25/2023    Procedure: Ultrasound-guided right internal jugular venous access port placement with fluoroscopy;  Surgeon: Martin Thomas DO;  Location: [facility]     SIGMOIDOSCOPY FLEXIBLE N/A 4/6/2023    Procedure: Sigmoidoscopy flexible;  Surgeon: Jerome Ashley MD;  Location: [facility] OR     SURGICAL HISTORY OF -   06/14/76    Perineorrhaphy, for widening vaginal orifice     ZZC EXPLORATORY OF ABDOMEN  1989    laparoscopy         SOCIAL HISTORY:  Social History     Socioeconomic History     Marital status:      Spouse name: Not on file     Number of children: Not on file     Years of education:  "Not on file     Highest education level: Not on file   Occupational History     Not on file   Tobacco Use     Smoking status: Never     Passive exposure: Current     Smokeless tobacco: Never   Substance and Sexual Activity     Alcohol use: No     Drug use: No     Sexual activity: Yes     Partners: Male   Other Topics Concern     Parent/sibling w/ CABG, MI or angioplasty before 65F 55M? Not Asked   Social History Narrative     Not on file     Social Determinants of Health     Financial Resource Strain: Not on file   Food Insecurity: Not on file   Transportation Needs: Not on file   Physical Activity: Not on file   Stress: Not on file   Social Connections: Not on file   Intimate Partner Violence: Not on file   Housing Stability: Not on file         FAMILY HISTORY:  Family History   Problem Relation Age of Onset     Hypertension Mother         on meds, alive     Cerebrovascular Disease Father         stroke about age 65,  of cancer at 68 yrs     Diabetes Father         eventually took insulin     Anemia Father         Pernisios anemia     Kidney Disease Niece         kidney transplant     Kidney Disease Nephew         kidney transplant     Venous thrombosis No family hx of      Anesthesia Reaction No family hx of          PHYSICAL EXAM:  Vital signs:  /78 (BP Location: Right arm, Patient Position: Sitting, Cuff Size: Adult Large)   Pulse 94   Temp (!) 96.6  F (35.9  C) (Temporal)   Resp 16   Ht 1.575 m (5' 2\")   Wt 79 kg (174 lb 3 oz)   LMP  (LMP Unknown)   SpO2 98%   BMI 31.86 kg/m         ECO  GENERAL/CONSTITUTIONAL: No acute distress.  EYES:  Extraocular movements intact without nystagmus.  No scleral icterus.  RESPIRATORY: Equal chest rise.   CARDIOLOGY: RRR no tachycardia  MUSCULOSKELETAL: Warm and well-perfused, no cyanosis, clubbing, or edema. Right LE always more swollen than left, chronic unchanged.   NEUROLOGIC: Cranial nerves are grossly intact. Alert, oriented to person, place and " time, answers questions appropriately.  INTEGUMENTARY: No rashes or jaundice.      LABS:    PATHOLOGY:    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), KRAS G13D mutated, MARISSA, TPS 50%  # sigmoid colon poorly-differentiated carcinoma, KRAS G13D mutated, loss of MLH1 and PMS2, TPS 70%  - 2/23 colonoscopy: A frond-like/villous partially obstructing large mass found in sigmoid colon, partially circumferential involving two thirds of the lumen circumference, 4 cm, unable to traverse, with oozing, s/p biopsy, PATHOLOGY: Invasive poorly differentiated carcinoma, IHC panel excludes tumors of other origin, colorectal primary is favored, loss of nuclear expression of MLH1 and PMS2, MLH1 promoter methylation negative, [id number] mutation negative  -2/23 CT CAP: Shows known 4 cm proximal sigmoid colon mass with possible tumor extension (irregularity and stranding and adjacent pericolonic fat) without obstruction AND probable second mass 2.5 cm at hepatic flexure of colon; small lymph nodes adjacent to both masses (no lymphadenopathy by size criteria)  -3/23 PET:  a. There is increased FDG avidity of the sigmoid colon with focal lobular wall thickening consistent with biopsy-proven adenocarcinoma.  b. Secondary focus noted at the hepatic flexure demonstrates elevated FDG avidity and lobulated wall thickening highly suspicious for a additional primary.  c. Additionally there is a smaller focus of wall thickening with elevated FDG avidity along the left aspect of the transverse colon which is suspicious for a possible third focus of colonic malignancy.  - 3/23 CEA 6.8  -4/6/2023 laparoscopic converted to open total abdominal colectomy with ileorectal anastomosis, partial omentectomy, flexible sigmoidoscopy,  TAHBSO  PATHOLOGY:  Of note, omental resection, terminal ileum, proximal and distal anastomotic rings, uterus, cervix, bilateral fallopian tubes and ovaries negative for malignancy    Mass #1 (ascending colon/hepatic flexure): Mixed neuroendocrine-non-neuroendocrine neoplasm (MINEN):  - Neuroendocrine carcinoma component (70%) and moderately-differentiated adenocarcinoma component (30%)  - Size = 5.0 cm, invading into muscularis propria, Positive LVI  - IHC: Neuroendocrine portion: Negative for chromogranin and INSM1 but strongly express synaptophysin  - IHC: Adenocarcinoma portion: Positive for CK20, CDX2 negative for CK7, PAX8, ER, S100  Ki-67 proliferation index of approximately 80%.  MARISSA   PDL1:  COMBINED POSITIVE SCORE (CPS): 55-60  TUMOR PROPORTION SCORE (TPS): 50%   pathogenic KRAS mutation (G13D) was identified (5.2%).  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  MMR testing: loss of MLH1 and PMS2  PDL1:  COMBINED POSITIVE SCORE (CPS): 80  TUMOR PROPORTION SCORE (TPS): 70%  pathogenic KRAS mutation (G13D) was identified (4.5%).  MMR testing: loss of MLH1 and PMS2, intact MSH2 and MSH6  AJCC 8th edition: stage 3A mpT2 pN1a    - One of forty-one sampled lymph nodes positive (1/41), d/w pathology- unable to determine which mass is metastatic to LN    - 4/23 MRI brain w/wo contrast LAURENT  - 5/26/23 CT CAP w/ contrast and IVF before and after CT (post op baseline prior to starting tx):  1.  3 mm nodule RML and 5 mm lateral EDSON nodule, minimally increased from previous  2.  New 4 mm EDSON groundglass density nodule  3.  Several prominent borderline enlarged mediastinal lymph nodes  slightly increased from previous  4.  Splenomegaly 14.5 cm  5. S/p subtotal colectomy with ileorectal anastomosis with postsurgical changes along the ventral abdominal wall midline  - 5/8/23 I presented this case at [facility] CRC tumor board as above   - 5/19/23 second opinion at [facility] w/medical oncologist Dr. Acharya, thorough consultation and recommendations appreciated, overall agree w/FOLFOX x6 months, possible nivo or ipi nivo in second line; if metastatic platinum etoposide vs ipi nivo  - 5/25/23 port placement    -5/2023 genetic counseling: Negative for mutations in EPCAM, MLH1, MSH2, MSH6, and PMS2 genes.  Negative for mutations in APC, AXIN2, BMPR1A, CDH1, CHEK2, EPCAM, GREM1, MLH1, MSH2, MSH3, MSH6, MUTYH, NTHL1, PMS2, POLD1, POLE, PTEN, SMAD4, STK11, TP53, CDKN1B, MEN1, NF1, RET, TSC1, TSC2, and VHL genes      REGIMEN:  mFOLFOX6  q14 days x12 cycles/6 months  C1D1 5/31/23 (complicated by new onset afib)  oxaliplatin 64mg/m2 day 1 (dose reduced from 85mg/m2 2/2 Cr C1D1)  LV 350mg/m2 day 1  5FU 1200mg/m2 continuous infusion day 1-2 (total 2,400mg/m2 IV over 46-48 hours)  (5FU 400mg/m2 IV push day 1- discontinued C2 onwards)  Emetic risk: moderate  Febrile neutropenia risk: intermediate     - planned for C2D1 6/14/23 but held due to new onset paroxysmal A-fib with RVR requiring hospitalization 6/3/2023  - 6/19/2023 I presented this case at [facility] colorectal tumor board at the [facility], it is possible that paroxysmal A-fib may be related to 5-FU.  Options include withholding further treatment versus retrialing 5-FU under the guidance of cardio oncology in an inpatient setting.  No other adjuvant treatment options available, including immunotherapy  - 6/30/2023 consultation with cardio oncologist Dr. Garza; I spoke with Dr. Garza 6/30/2023 as well, [id number] score 3, recommending starting apixaban 5 mg twice daily, okay to retrial FOLFOX while patient is on metoprolol  - pt is unsure how she would like  to proceed, she will call me and let me know by end of week. If proceeding with treatment, we will plan for inpatient admission at [facility] for cycle 2 FOLFOX w/o 5FU bolus with continuous cardiac monitoring and IVF  - plan for repeat CT prior to proceeding w/tx if pt wants to continue tx    Treatment related AE:  - hearing changes-stable, flonase prn, ENT consultation pending 10/23  - lightheadedness and dizziness- plan for weekly IVF w/treatment if pt wishes to pursue tx.   - Paroxysmal A-fib with RVR and PAC- continue metoprolol, eliquis, appreciate Dr. Garza's consult, pt to contact cardiology team regarding affordability of medications (will need interaction check w/chemo), will drop 5FU bolus  - Anxiety- stable   - Nausea-Compazine and Zofran as needed  - Delayed neutropenia- neutropenic thierry ANC 0.5 2 weeks out from cycle 1, afebrile, 6/23 DPD deficiency testing negative, will drop 5UF bolus, repeat CBC today  - Normocytic anemia- due to chemotherapy and iron deficiency, s/p ferric carboxymaltose 750mg x2 doses 6/14/23 and 6/28/23; repeat iron studies today  -Thrombocytopenia-6/5/2023 platelet 129K, 6/8/23 plt 164K, repeat CBC today  - MARYA on CKD- following w/nephro, f/u with nephro 9/23    - f/u with Dr. Ashley 4/2024 for rigid proctoscopy     #parxosymal afib  - within 3 days of receiving 5FU, prior cardiac risk factors htn, prediabetes  - 6/3/23 presented to ER w/lightheadedness, dizziness, cardioverted s/p diltiazem ggt in ER  - 6/23 DPD deficiency testing negative  - currently on metoprolol XL 25 mg daily, apixaban 5 mg twice daily  - 6/30/2023 consultation with cardio oncologist Dr. Garza; I spoke with Dr. 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  Negative for mutations in APC, AXIN2, BMPR1A, CDH1, CHEK2, EPCAM, GREM1, MLH1, MSH2, MSH3, MSH6, MUTYH, NTHL1, PMS2, POLD1, POLE, PTEN, SMAD4, STK11, TP53, CDKN1B, MEN1, NF1, RET, TSC1, TSC2, and VHL genes    #Anemia secondary to iron deficiency and B12 deficiency  - terminal ileum removed at time of colon surgery, monitor for nutrient def  - 2/23 hgb 6.8, ferritin 21, iron sat index 10, TIBC 265, iron 27, b12 1493  - On B12 1000 mcg p.o. daily and ferrous sulfate 325 mg p.o. daily  - 4/9/23 s/p 1 uprbcs  - 5/9/23 hgb 11.3,5/30/23 hgb 9.4  - based on Ganzoni equation w/goal hgb 14 and 500mg needed for iron stores, pts iron deficit is 1400 mg  - s/p ferric carboxymaltose 750mg x2 doses 6/14/23 and 6/28/23  - repeat iron studies today    #Crohn's  - dx since at least 2010     RTC 2-3 weeks for follow up with me  Pt will call me by end of week to let me know if she wants to proceed with inpatient chemo      Francisco Lee DO  Hematology/Oncology  [facility]          Again, thank you for allowing me to participate in the care of your patient.        Sincerely,        FRANCISCO LEE DO

## 2023-07-11 NOTE — NURSING NOTE
DISCHARGE PLAN:  Next appointments: See patient instruction section  Departure Mode: Ambulatory  Accompanied by: self   minutes for nursing discharge (face to face time)     Nadia CRYSTAL Wilberto is here today for oncology follow up.  Patient was not seen by writing nurse at time of appointment.   Appointments scheduled for Fransico follow up. Message sent to Fransico regarding pt told me she wants to proceed with chemo.  See patient instructions and Oncologist's Progress note for further details. Questions and concerns addressed to patient's satisfaction. Patient verbalized and demonstrated understanding of plan.  Contact information provided and patient is encouraged to call with any that arise in the interim of care.    Jelly Champagne  [facility]  [phone]  7/11/2023, 4:02 PM

## 2023-07-11 NOTE — TELEPHONE ENCOUNTER
Called Nadia to schedule follow up per pasted message below:    RTC 2-3 weeks for follow up with me  Pt will call me by end of week to let me know if she wants to proceed with inpatient chemo    After talking with patient she would like to proceed with the chemo.    Jelly RODRIGUEZ [facility]  [phone]

## 2023-07-17 ENCOUNTER — ALLIED HEALTH/NURSE VISIT (OUTPATIENT)
Dept: FAMILY MEDICINE | Facility: CLINIC | Age: 68
End: 2023-07-17
Payer: MEDICARE

## 2023-07-17 VITALS
HEART RATE: 94 BPM | DIASTOLIC BLOOD PRESSURE: 74 MMHG | OXYGEN SATURATION: 96 % | WEIGHT: 175.9 LBS | RESPIRATION RATE: 14 BRPM | HEIGHT: 61 IN | TEMPERATURE: 97 F | BODY MASS INDEX: 33.21 KG/M2 | SYSTOLIC BLOOD PRESSURE: 118 MMHG

## 2023-07-17 DIAGNOSIS — C18.7 MALIGNANT NEOPLASM OF SIGMOID COLON (H): Primary | ICD-10-CM

## 2023-07-17 PROCEDURE — 99207 PR NO CHARGE NURSE ONLY: CPT

## 2023-07-17 PROCEDURE — 99222 1ST HOSP IP/OBS MODERATE 55: CPT | Mod: AI | Performed by: PHYSICIAN ASSISTANT

## 2023-07-17 ASSESSMENT — PAIN SCALES - GENERAL: PAINLEVEL: MILD PAIN (2)

## 2023-07-17 NOTE — PROGRESS NOTES
"/74 (BP Location: Right arm, Patient Position: Sitting, Cuff Size: Adult Large)   Pulse 94   Temp 97  F (36.1  C) (Temporal)   Resp 14   Ht 1.549 m (5' 1\")   Wt 79.8 kg (175 lb 14.4 oz)   LMP  (LMP Unknown)   SpO2 96%   BMI 33.24 kg/m      "

## 2023-07-17 NOTE — H&P
[facility]    History and Physical - Hospitalist Service       Date of Admission:  7/18/2023    Assessment & Plan      Nadia Ladd is a 67 year old female with a past medical history significant for stage IIIb chronic kidney disease, chron's disease, HTN, and colon cancer resected surgically in April and for which she started FOLFOX chemotherapy 5/31 (which includes oxaliplatin, leucovorin, and 5-fluorouracil), chemotherapy induced tinnitus, paroxysmal atrial fibrillation, pulmonary hypertension, admitted on 7/18/2023 for inpatient administration of second cycle FOLFOX.    Colon cancer, mass #1 ascending colon/hepatic flexure mixed neuroendocrine neoplasm, mass #2 sigmoid colon poorly-differentiated carcinoma  S/p open total abdominal colectomy with ileorectal anastomosis, partial omentectomy, TAHBSO 4/2023  Pt presents inpatient for second cycle of FOLFOX due to development of atrial fibrillation after first round.   --management is per Oncology  --will monitor on tele while in hospital   --CBC, CMP pending  --CT C/A/P planned prior to chemo, recommend IVF prior to and after given CKD   --unable to access port this am; IR plans for port check with possible exchange this afternoon. Discussed with nursing- will need to clarify timing of Eliquis resumption with IR (eliquis is currently ordered)     Anemia due to chemotherapy  Chronic iron deficiency and B12 anemia  Most recent hgb 7/11 up to 13. s/p ferric carboxymaltose 750mg x2 doses 6/14/23 and 6/28/23. She is on po iron PTA.   Hgb on admission 11.9  --appreciate Oncology management     Paroxysmal atrial fibrillation  Pt admitted after presenting with palpitations and being found to have afib with RVR one day after receiving FOLFOX.  Converted with diltiazem drip. She followed up with Cardiology who initiated Eliquis and recommended another trial of FOLFOX on metoprolol. Also notably had hypomagnesemia and metabolic acidosis at time of  "afib episode   TSH WNL  EKG on admission with short IL  --monitor on telemetry   --check mag, BMP  --continue PTA metoprolol and Eliquis   -pharmacy liaison consult to help with assistance for Eliquis  --goal K >4, mag >2    CKD III  Baseline Cr around 1.3-1.6. Recently established with Nephrology.   --avoid nephrotoxins  --recommend IVF if plan for CT with contrast given CKD   --follow BMP      HTN  Previously on lisinopril and hydrochlorothiazide/Tiramterene for HTN which was discontinued due to MARYA. She was seen by Nephrology with plans to allow for -140s for now and consider amlodipine in the future if BP uncontrolled.   --monitor clinically     Hx hypomagnesemia  High mag protocol  Resume supplement scheduled at discharge    Recent gout R great toe   Completed 5 days indomethacin 2 weeks ago with resolution     Diet: Combination Diet Regular Diet Adult  DVT Prophylaxis: DOAC  Clement Catheter: Not present  Lines: PRESENT             Cardiac Monitoring: ACTIVE order. Indication: Tachyarrhythmias, acute (48 hours)  Code Status: Full Code    Clinically Significant Risk Factors Present on Admission               # Drug Induced Coagulation Defect: home medication list includes an anticoagulant medication    # Hypertension: Noted on problem list      # Obesity: Estimated body mass index is 33.99 kg/m  as calculated from the following:    Height as of this encounter: 1.53 m (5' 0.24\").    Weight as of this encounter: 79.6 kg (175 lb 6.4 oz).            Disposition Plan      Expected Discharge Date: 07/20/2023                The patient's care was discussed with Dr. Cain who agrees with the above plan     Migdalia Dalton PA-C  Hospitalist Service  [facility]  Securely message with [employer] (more info)  Text page via [facility] Paging/Directory     ______________________________________________________________________    Chief Complaint   Chemo infusion     History is obtained from the " patient, chart review     History of Present Illness   Nadia Ladd is a 67 year old female with a past medical history significant for stage IIIb chronic kidney disease, chron's disease, HTN, and colon cancer resected surgically in April and for which she started FOLFOX chemotherapy 5/31 (which includes oxaliplatin, leucovorin, and 5-fluorouracil), chemotherapy induced tinnitus, paroxysmal atrial fibrillation, pulmonary hypertension, admitted on 7/18/2023 for inpatient administration of second cycle FOLFOX.  Patient received her first infusion of chemotherapy with FOLFOX 5/31/2023.  She subsequently presented to the emergency department with lightheadedness and was found to be in new onset atrial fibrillation with rapid rate.  She converted back to sinus rhythm with diltiazem drip and was discharged on metoprolol.  Echo was obtained showing EF of 55 to 60% with mild MR and mild pulmonary hypertension.  She followed up with cardiology in clinic in late June as there was some concern that her chemotherapy may be related to new onset atrial fibrillation.  During his consultation she was started on Eliquis as her [id number] score is 3 and metoprolol was continued with plans made to try a second cycle of FOLFOX while on beta-blocker to see if she can tolerate this.  She presents today for chemotherapy infusion while on telemetry to monitor for recurrence of A-fib.  She is presently evaluated in her room on the medical floor. She reports she has generally been doing well. Diarrhea has improved. Denies abdominal pain. No CP, SOB, dizziness. She has had occasional mild palpitations in the evening but no recurrence of symptoms similar to episode of afib in the ED. She had an episode of gout a few weeks ago treated with a short course of indomethacin with improvement. She does not have diabetes.       Past Medical History    Past Medical History:   Diagnosis Date     Arthropathia 08/05/2010     B12 deficiency anemia  10/21/2010     Benign essential hypertension with target blood pressure below 140/90 10/10/2016     CARDIOVASCULAR SCREENING; LDL GOAL LESS THAN 160 10/31/2010     Crohn's colitis (H) 02/15/2011     Dermatitis-dishydrotic eczema-severe 08/05/2010     Hiatal hernia      HTN (hypertension) 07/21/2011     Iron deficiency anemia 10/21/2010     Malignant neoplasm of sigmoid colon (H)      Obesity      Primary pulmonary hypertension (H) 04/06/2010       Past Surgical History   Past Surgical History:   Procedure Laterality Date     COLECTOMY WITHOUT COLOSTOMY N/A 4/6/2023    Procedure: Laparoscopic Converted to Open Total abdominal colectomy;  Surgeon: Jerome Ashley MD;  Location: [facility] OR     COLONOSCOPY  10/11/10     COLONOSCOPY N/A 2/27/2023    Procedure: ATTEMPTED COLONOSCOPY WITH SIGMOID STRICTURE BIOPSY;  Surgeon: Jonathan Alcocer MD;  Location:  GI     ESOPHAGOSCOPY, GASTROSCOPY, DUODENOSCOPY (EGD), COMBINED N/A 2/27/2023    Procedure: ESOPHAGOGASTRODUODENOSCOPY, WITH BIOPSY;  Surgeon: Jonathan Alcocer MD;  Location: [facility]     HYSTERECTOMY TOTAL ABDOMINAL, BILATERAL SALPINGO-OOPHORECTOMY, COMBINED N/A 4/6/2023    Procedure: Hysterectomy total abdominal, bilateral salpingo-oophorectomy;  Surgeon: Sunitha Olea MD;  Location: [facility] OR     INSERT PORT VASCULAR ACCESS Right 5/25/2023    Procedure: Ultrasound-guided right internal jugular venous access port placement with fluoroscopy;  Surgeon: Martin Thomas DO;  Location: [facility] OR     SIGMOIDOSCOPY FLEXIBLE N/A 4/6/2023    Procedure: Sigmoidoscopy flexible;  Surgeon: Jerome Ashley MD;  Location: [facility] OR     SURGICAL HISTORY OF -   06/14/76    Perineorrhaphy, for widening vaginal orifice     Z EXPLORATORY OF ABDOMEN  1989    laparoscopy       Prior to Admission Medications   Prior to Admission Medications   Prescriptions Last Dose Informant Patient Reported? Taking?   Ferrous Sulfate (IRON) 325 (65 Fe) MG tablet   Yes No   Sig: Take 1  tablet by mouth daily   Multiple Vitamins-Iron (MULTI-DAY PLUS IRON PO)   Yes No   Sig: Take 1 tablet by mouth daily   apixaban ANTICOAGULANT (ELIQUIS ANTICOAGULANT) 5 MG tablet   No No   Sig: Take 1 tablet (5 mg) by mouth 2 times daily   cyanocobalamin 1000 MCG TBCR   No No   Sig: Take 1,000 mcg by mouth daily   dexamethasone (DECADRON) 4 MG tablet   No No   Sig: Take 2 tablets (8 mg) by mouth daily Take for 2 days, starting the day after chemo. Take with food.   Patient not taking: Reported on 7/11/2023   indomethacin (INDOCIN) 50 MG capsule   No No   Sig: Take 1 capsule (50 mg) by mouth 3 times daily (with meals)   Patient not taking: Reported on 6/26/2023   magnesium oxide (MAG-OX) 400 MG tablet   No No   Sig: Take 1 tablet (400 mg) by mouth 2 times daily   metoprolol succinate ER (TOPROL XL) 25 MG 24 hr tablet   No No   Sig: Take 1 tablet (25 mg) by mouth daily   sodium bicarbonate 650 MG tablet   No No   Sig: Take 1 tablet (650 mg) by mouth 2 times daily      Facility-Administered Medications: None        Social History   I have reviewed this patient's social history and updated it with pertinent information if needed.  Social History     Tobacco Use     Smoking status: Never     Passive exposure: Current     Smokeless tobacco: Never   Substance Use Topics     Alcohol use: No     Drug use: No        Physical Exam   Temp: 97.7  F (36.5  C) Temp src: Oral BP: (!) 153/80 Pulse: 87   Resp: 18 SpO2: 96 % O2 Device: None (Room air)    Vitals:    07/18/23 0840   Weight: 79.6 kg (175 lb 6.4 oz)     Vital Signs with Ranges  Temp:  [97.7  F (36.5  C)] 97.7  F (36.5  C)  Pulse:  [87] 87  Resp:  [18] 18  BP: (153)/(80) 153/80  SpO2:  [96 %] 96 %  No intake/output data recorded.    Constitutional: Alert and oriented, sitting up in bed. Appears comfortable and is appropriately conversant   ENT: moist mucous membranes  Eyes:  Sclera anicteric, EOMI  Respiratory: Lungs clear to auscultation bilaterally, no increased work of  breathing or wheezing   Cardiovascular: Regular rate and rhythm, no murmur  Extremities:  Trace bilateral edema. RLE larger than LLE which is baseline per patient.   GI:  active bowel sounds, abdomen soft, non-tender  MSK:  Moves all four extremities. Normal tone   Neuro:  Speech is clear. Face symmetric. Follows commands       Medical Decision Making       60 MINUTES SPENT BY ME on the date of service doing chart review, history, exam, documentation & further activities per the note.      Data     I have personally reviewed the following data over the past 24 hrs:    4.5  \   11.9   / 169     141 105 30.2 (H) /  109 (H)   4.4 23 1.45 (H) \       ALT: 20 AST: 24 AP: 172 (H) TBILI: 0.6   ALB: 3.9 TOT PROTEIN: 6.4 LIPASE: N/A       Imaging results reviewed over the past 24 hrs:   Recent Results (from the past 24 hour(s))   XR Chest Port 1 View    Narrative    XR CHEST PORT 1 VIEW  7/18/2023 11:22 AM       INDICATION: port placement  COMPARISON: 5/26/2023       Impression    IMPRESSION: Right IJ port. The lungs are clear. No pneumothorax.    ADIA DENNIS MD         SYSTEM ID:  [id number]

## 2023-07-17 NOTE — TELEPHONE ENCOUNTER
Patient coming in today for nurse only visit to complete full set of vitals. Patient aware.  Liseth Yoon RNCC

## 2023-07-18 ENCOUNTER — APPOINTMENT (OUTPATIENT)
Dept: GENERAL RADIOLOGY | Facility: CLINIC | Age: 68
DRG: 847 | End: 2023-07-18
Attending: PHYSICIAN ASSISTANT
Payer: MEDICARE

## 2023-07-18 ENCOUNTER — HOSPITAL ENCOUNTER (INPATIENT)
Facility: CLINIC | Age: 68
LOS: 4 days | Discharge: HOME | DRG: 847 | End: 2023-07-22
Attending: HOSPITALIST | Admitting: HOSPITALIST
Payer: MEDICARE

## 2023-07-18 ENCOUNTER — ONCOLOGY VISIT (OUTPATIENT)
Dept: ONCOLOGY | Facility: CLINIC | Age: 68
End: 2023-07-18
Payer: MEDICARE

## 2023-07-18 ENCOUNTER — APPOINTMENT (OUTPATIENT)
Dept: CT IMAGING | Facility: CLINIC | Age: 68
DRG: 847 | End: 2023-07-18
Attending: INTERNAL MEDICINE
Payer: MEDICARE

## 2023-07-18 ENCOUNTER — APPOINTMENT (OUTPATIENT)
Dept: INTERVENTIONAL RADIOLOGY/VASCULAR | Facility: CLINIC | Age: 68
DRG: 847 | End: 2023-07-18
Attending: PHYSICIAN ASSISTANT
Payer: MEDICARE

## 2023-07-18 DIAGNOSIS — Z76.89 PREVENTION OF CHEMOTHERAPY-INDUCED NEUTROPENIA: ICD-10-CM

## 2023-07-18 DIAGNOSIS — C18.7 MALIGNANT NEOPLASM OF SIGMOID COLON (H): Primary | ICD-10-CM

## 2023-07-18 LAB
ALBUMIN SERPL BCG-MCNC: 3.9 G/DL (ref 3.5–5.2)
ALP SERPL-CCNC: 172 U/L (ref 35–104)
ALT SERPL W P-5'-P-CCNC: 20 U/L (ref 0–50)
ANION GAP SERPL CALCULATED.3IONS-SCNC: 13 MMOL/L (ref 7–15)
AST SERPL W P-5'-P-CCNC: 24 U/L (ref 0–45)
BASOPHILS # BLD AUTO: 0 10E3/UL (ref 0–0.2)
BASOPHILS NFR BLD AUTO: 0 %
BILIRUB SERPL-MCNC: 0.6 MG/DL
BUN SERPL-MCNC: 30.2 MG/DL (ref 8–23)
CALCIUM SERPL-MCNC: 9.7 MG/DL (ref 8.8–10.2)
CHLORIDE SERPL-SCNC: 105 MMOL/L (ref 98–107)
CREAT SERPL-MCNC: 1.45 MG/DL (ref 0.51–0.95)
DEPRECATED HCO3 PLAS-SCNC: 23 MMOL/L (ref 22–29)
EOSINOPHIL # BLD AUTO: 0 10E3/UL (ref 0–0.7)
EOSINOPHIL NFR BLD AUTO: 0 %
ERYTHROCYTE [DISTWIDTH] IN BLOOD BY AUTOMATED COUNT: 16.2 % (ref 10–15)
GFR SERPL CREATININE-BSD FRML MDRD: 40 ML/MIN/1.73M2
GLUCOSE SERPL-MCNC: 109 MG/DL (ref 70–99)
HCT VFR BLD AUTO: 37.6 % (ref 35–47)
HGB BLD-MCNC: 11.9 G/DL (ref 11.7–15.7)
IMM GRANULOCYTES # BLD: 0 10E3/UL
IMM GRANULOCYTES NFR BLD: 0 %
LYMPHOCYTES # BLD AUTO: 0.6 10E3/UL (ref 0.8–5.3)
LYMPHOCYTES NFR BLD AUTO: 13 %
MAGNESIUM SERPL-MCNC: 1.8 MG/DL (ref 1.7–2.3)
MCH RBC QN AUTO: 27.7 PG (ref 26.5–33)
MCHC RBC AUTO-ENTMCNC: 31.6 G/DL (ref 31.5–36.5)
MCV RBC AUTO: 87 FL (ref 78–100)
MONOCYTES # BLD AUTO: 0.6 10E3/UL (ref 0–1.3)
MONOCYTES NFR BLD AUTO: 13 %
NEUTROPHILS # BLD AUTO: 3.3 10E3/UL (ref 1.6–8.3)
NEUTROPHILS NFR BLD AUTO: 74 %
NRBC # BLD AUTO: 0 10E3/UL
NRBC BLD AUTO-RTO: 0 /100
PLATELET # BLD AUTO: 169 10E3/UL (ref 150–450)
POTASSIUM SERPL-SCNC: 4.4 MMOL/L (ref 3.4–5.3)
PROT SERPL-MCNC: 6.4 G/DL (ref 6.4–8.3)
RBC # BLD AUTO: 4.3 10E6/UL (ref 3.8–5.2)
SODIUM SERPL-SCNC: 141 MMOL/L (ref 136–145)
WBC # BLD AUTO: 4.5 10E3/UL (ref 4–11)

## 2023-07-18 PROCEDURE — 99207 PR NO BILLABLE SERVICE THIS VISIT: CPT | Performed by: HOSPITALIST

## 2023-07-18 PROCEDURE — 120N000001 HC R&B MED SURG/OB

## 2023-07-18 PROCEDURE — 80053 COMPREHEN METABOLIC PANEL: CPT | Performed by: PHYSICIAN ASSISTANT

## 2023-07-18 PROCEDURE — 250N000013 HC RX MED GY IP 250 OP 250 PS 637: Performed by: PHYSICIAN ASSISTANT

## 2023-07-18 PROCEDURE — 99222 1ST HOSP IP/OBS MODERATE 55: CPT | Performed by: INTERNAL MEDICINE

## 2023-07-18 PROCEDURE — 250N000011 HC RX IP 250 OP 636: Performed by: RADIOLOGY

## 2023-07-18 PROCEDURE — 36415 COLL VENOUS BLD VENIPUNCTURE: CPT | Performed by: PHYSICIAN ASSISTANT

## 2023-07-18 PROCEDURE — 258N000003 HC RX IP 258 OP 636: Performed by: INTERNAL MEDICINE

## 2023-07-18 PROCEDURE — 36598 INJ W/FLUOR EVAL CV DEVICE: CPT

## 2023-07-18 PROCEDURE — 250N000011 HC RX IP 250 OP 636: Performed by: HOSPITALIST

## 2023-07-18 PROCEDURE — 83735 ASSAY OF MAGNESIUM: CPT | Performed by: PHYSICIAN ASSISTANT

## 2023-07-18 PROCEDURE — 74177 CT ABD & PELVIS W/CONTRAST: CPT | Mod: MG

## 2023-07-18 PROCEDURE — 250N000009 HC RX 250: Performed by: PHYSICIAN ASSISTANT

## 2023-07-18 PROCEDURE — G1010 CDSM STANSON: HCPCS

## 2023-07-18 PROCEDURE — 250N000009 HC RX 250: Performed by: HOSPITALIST

## 2023-07-18 PROCEDURE — 85025 COMPLETE CBC W/AUTO DIFF WBC: CPT | Performed by: PHYSICIAN ASSISTANT

## 2023-07-18 PROCEDURE — 99222 1ST HOSP IP/OBS MODERATE 55: CPT | Mod: AI | Performed by: PHYSICIAN ASSISTANT

## 2023-07-18 PROCEDURE — 93010 ELECTROCARDIOGRAM REPORT: CPT | Mod: 76 | Performed by: INTERNAL MEDICINE

## 2023-07-18 PROCEDURE — 71045 X-RAY EXAM CHEST 1 VIEW: CPT

## 2023-07-18 PROCEDURE — 93005 ELECTROCARDIOGRAM TRACING: CPT

## 2023-07-18 RX ORDER — ACETAMINOPHEN 650 MG/1
650 SUPPOSITORY RECTAL EVERY 6 HOURS PRN
Status: DISCONTINUED | OUTPATIENT
Start: 2023-07-18 | End: 2023-07-22 | Stop reason: HOSPADM

## 2023-07-18 RX ORDER — NALOXONE HYDROCHLORIDE 0.4 MG/ML
0.2 INJECTION, SOLUTION INTRAMUSCULAR; INTRAVENOUS; SUBCUTANEOUS
Status: DISCONTINUED | OUTPATIENT
Start: 2023-07-18 | End: 2023-07-18 | Stop reason: HOSPADM

## 2023-07-18 RX ORDER — LIDOCAINE 40 MG/G
CREAM TOPICAL
Status: DISCONTINUED | OUTPATIENT
Start: 2023-07-18 | End: 2023-07-22 | Stop reason: HOSPADM

## 2023-07-18 RX ORDER — NALOXONE HYDROCHLORIDE 0.4 MG/ML
0.4 INJECTION, SOLUTION INTRAMUSCULAR; INTRAVENOUS; SUBCUTANEOUS
Status: DISCONTINUED | OUTPATIENT
Start: 2023-07-18 | End: 2023-07-18 | Stop reason: HOSPADM

## 2023-07-18 RX ORDER — FLUMAZENIL 0.1 MG/ML
0.2 INJECTION, SOLUTION INTRAVENOUS
Status: DISCONTINUED | OUTPATIENT
Start: 2023-07-18 | End: 2023-07-18 | Stop reason: HOSPADM

## 2023-07-18 RX ORDER — HEPARIN SODIUM (PORCINE) LOCK FLUSH IV SOLN 100 UNIT/ML 100 UNIT/ML
5 SOLUTION INTRAVENOUS
Status: DISCONTINUED | OUTPATIENT
Start: 2023-07-18 | End: 2023-07-22 | Stop reason: HOSPADM

## 2023-07-18 RX ORDER — HEPARIN SODIUM,PORCINE 10 UNIT/ML
5 VIAL (ML) INTRAVENOUS
Status: DISCONTINUED | OUTPATIENT
Start: 2023-07-18 | End: 2023-07-22 | Stop reason: HOSPADM

## 2023-07-18 RX ORDER — METOPROLOL SUCCINATE 25 MG/1
25 TABLET, EXTENDED RELEASE ORAL DAILY
Status: DISCONTINUED | OUTPATIENT
Start: 2023-07-18 | End: 2023-07-22 | Stop reason: HOSPADM

## 2023-07-18 RX ORDER — SODIUM CHLORIDE 9 MG/ML
INJECTION, SOLUTION INTRAVENOUS CONTINUOUS
Status: DISCONTINUED | OUTPATIENT
Start: 2023-07-18 | End: 2023-07-18

## 2023-07-18 RX ORDER — SODIUM BICARBONATE 650 MG/1
650 TABLET ORAL 2 TIMES DAILY
Status: DISCONTINUED | OUTPATIENT
Start: 2023-07-18 | End: 2023-07-22 | Stop reason: HOSPADM

## 2023-07-18 RX ORDER — MAGNESIUM OXIDE 400 MG/1
400 TABLET ORAL EVERY 4 HOURS
Status: COMPLETED | OUTPATIENT
Start: 2023-07-18 | End: 2023-07-18

## 2023-07-18 RX ORDER — SODIUM CHLORIDE 9 MG/ML
INJECTION, SOLUTION INTRAVENOUS CONTINUOUS
Status: DISCONTINUED | OUTPATIENT
Start: 2023-07-18 | End: 2023-07-19

## 2023-07-18 RX ORDER — ACETAMINOPHEN 325 MG/1
650 TABLET ORAL EVERY 6 HOURS PRN
Status: DISCONTINUED | OUTPATIENT
Start: 2023-07-18 | End: 2023-07-22 | Stop reason: HOSPADM

## 2023-07-18 RX ORDER — FENTANYL CITRATE 50 UG/ML
25-50 INJECTION, SOLUTION INTRAMUSCULAR; INTRAVENOUS EVERY 5 MIN PRN
Status: DISCONTINUED | OUTPATIENT
Start: 2023-07-18 | End: 2023-07-18 | Stop reason: HOSPADM

## 2023-07-18 RX ORDER — IOPAMIDOL 755 MG/ML
88 INJECTION, SOLUTION INTRAVASCULAR ONCE
Status: COMPLETED | OUTPATIENT
Start: 2023-07-18 | End: 2023-07-18

## 2023-07-18 RX ORDER — DEXAMETHASONE 4 MG/1
8 TABLET ORAL DAILY
Status: CANCELLED
Start: 2023-07-19

## 2023-07-18 RX ORDER — HEPARIN SODIUM (PORCINE) LOCK FLUSH IV SOLN 100 UNIT/ML 100 UNIT/ML
500 SOLUTION INTRAVENOUS ONCE
Status: COMPLETED | OUTPATIENT
Start: 2023-07-18 | End: 2023-07-18

## 2023-07-18 RX ORDER — CEFAZOLIN SODIUM 2 G/100ML
2 INJECTION, SOLUTION INTRAVENOUS
Status: CANCELLED | OUTPATIENT
Start: 2023-07-18

## 2023-07-18 RX ORDER — ACETAMINOPHEN 325 MG/1
650 TABLET ORAL
Status: CANCELLED | OUTPATIENT
Start: 2023-07-18

## 2023-07-18 RX ORDER — FERROUS SULFATE 325(65) MG
325 TABLET ORAL DAILY
Status: DISCONTINUED | OUTPATIENT
Start: 2023-07-19 | End: 2023-07-22 | Stop reason: HOSPADM

## 2023-07-18 RX ADMIN — METOPROLOL SUCCINATE 25 MG: 25 TABLET, EXTENDED RELEASE ORAL at 14:25

## 2023-07-18 RX ADMIN — APIXABAN 5 MG: 5 TABLET, FILM COATED ORAL at 20:29

## 2023-07-18 RX ADMIN — SODIUM BICARBONATE 650 MG TABLET 650 MG: at 20:29

## 2023-07-18 RX ADMIN — SODIUM CHLORIDE: 9 INJECTION, SOLUTION INTRAVENOUS at 17:27

## 2023-07-18 RX ADMIN — HEPARIN SODIUM (PORCINE) LOCK FLUSH IV SOLN 100 UNIT/ML 500 UNITS: 100 SOLUTION at 15:55

## 2023-07-18 RX ADMIN — Medication 400 MG: at 14:25

## 2023-07-18 RX ADMIN — SODIUM CHLORIDE 67 ML: 9 INJECTION, SOLUTION INTRAVENOUS at 16:47

## 2023-07-18 RX ADMIN — LIDOCAINE 2.5 APPLICATION: 40 CREAM TOPICAL at 09:59

## 2023-07-18 RX ADMIN — IOPAMIDOL 88 ML: 755 INJECTION, SOLUTION INTRAVENOUS at 16:47

## 2023-07-18 RX ADMIN — Medication 400 MG: at 17:32

## 2023-07-18 RX ADMIN — LIDOCAINE HYDROCHLORIDE 4 ML: 10 INJECTION, SOLUTION INFILTRATION; PERINEURAL at 15:44

## 2023-07-18 ASSESSMENT — ACTIVITIES OF DAILY LIVING (ADL)
ADLS_ACUITY_SCORE: 35
ADLS_ACUITY_SCORE: 20

## 2023-07-18 NOTE — CONSULTS
Interventional Radiology - Pre-Procedure Note:  7/18/2023    Procedure Requested: port check  Requested by: Migdalia Dalton PA-C    Brief HPI: Nadia Ladd is a 67 year old female with a past medical history significant for stage IIIb chronic kidney disease, chron's disease, HTN, and colon cancer resected surgically in April and for which she started FOLFOX chemotherapy 5/31 (which includes oxaliplatin, leucovorin, and 5-fluorouracil). She was admitted to [facility] oncology 7/18/23 to the hospitalist service for inpatient administration of second cycle FOLFOX. Oncology nurses attempted to access her port, but were unable to. There is concern the port has flipped, so IR is asked to assess.        OR Report:  Procedure Date:  5/25/2023     PROCEDURE:  Ultrasound-guided right internal jugular venous access port placement with fluoroscopy.     PREOPERATIVE DIAGNOSIS: colon cancer      POSTOPERATIVE DIAGNOSIS: colon cancer     SURGEON:  Martin Thomas DO     ASSISTANT:  None     ANESTHESIA:  Monitored anesthesia care with local.     COMPLICATIONS:  None immediately apparent.     SPECIMENS:  None.     FINDINGS: Technically successful VAP placement with fluoroscopy     BLOOD LOSS:  5 mL     INDICATIONS FOR PROCEDURE: The patient is a 67 year old female with colon cancer. They are in need of venous access for chemotherapy.  I recommended ultrasound guided right sided port placement with fuoroscopy. We discussed the procedure in detail. We also discussed the risks, benefits, alternatives and post-op care and restrictions. After our informed discussion we decided to proceed with the proposed surgery.     DESCRIPTION OF PROCEDURE:  After informed consent was obtained, the patient was brought from the preoperative holding area to the operating room and placed in supine position.  Anesthesia was induced. They were prepped and draped in normal sterile fashion.  Timeout was performed.  After the correct  patient and correct procedure were verified, we began by instilling 0.25% Marcaine with epinephrine for local anesthesia in the skin and subcutaneous tissue in the right neck overlying the internal jugular vein.  We made a small nick incision.  Using ultrasound to guide, we placed a introducer access needle into the patent internal jugular vein.  A guidewire was then placed into the jugular vein using Seldinger technique.  Fluoroscopic imaging confirmed right-sided venous placement of the wire. We then turned our attention to the chest.  We instilled 0.25% Marcaine with epinephrine for local anesthesia.  We made a transverse incision and made a small pocket for the port.  Once the appropriate-sized pocket was made and hemostasis was verified, we tunneled the catheter from the chest up into the neck nick incision.  We then using Seldinger technique placed a dilator and sheath over the guidewire under direct fluoroscopic guidance.  The dilator and wire were then removed.  The catheter was threaded into the sheath.  Sheath was fractured and removed.  Under direct fluoroscopic guidance, we then retracted the tip of the catheter until it was approximately at the level of the coby.  The catheter was then cut at the level of the chest.  Port was attached and secured with the hub.  We secured this into place using suture plugs with 2-0 Vicryl suture.  One final fluoroscopic image was taken.  The port was then tested and aspirated without issue.  We flushed heparin saline solution without issue.  We then used heparin solution to lock the port and catheter.  We then closed the incision by using 3-0 Vicryl suture, inverted, interrupted in dermal layer, running, subcuticular 4-0 Monocryl suture for the skin.  Topical adhesive dressing was applied to this incision, as well as the small nick incision in the neck.  At the completion of the case, all instruments, needles and sponges were accounted for, after a correct count.  The  "patient was then awoken from anesthesia and brought to the recovery room in stable condition.     Martin Thomas, DO, FACS    NPO: Breakfast at 6am, NPO since then.   ANTICOAGULANTS: Eliquis  ANTIBIOTICS: Ancef if port exchanged    ALLERGIES  Allergies   Allergen Reactions     No Known Drug Allergy      LABS:  No results found for: INR   Hemoglobin   Date Value Ref Range Status   07/18/2023 11.9 11.7 - 15.7 g/dL Final   07/08/2020 12.7 11.7 - 15.7 g/dL Final   ]  Platelet Count   Date Value Ref Range Status   07/18/2023 169 150 - 450 10e3/uL Final   07/08/2020 265 150 - 450 10e9/L Final     Creatinine   Date Value Ref Range Status   07/18/2023 1.45 (H) 0.51 - 0.95 mg/dL Final   01/26/2021 1.45 (H) 0.52 - 1.04 mg/dL Final     Potassium   Date Value Ref Range Status   07/18/2023 4.4 3.4 - 5.3 mmol/L Final   10/09/2021 4.3 3.4 - 5.3 mmol/L Final   01/26/2021 4.1 3.4 - 5.3 mmol/L Final     Potassium POCT   Date Value Ref Range Status   04/06/2023 4.7 3.5 - 5.0 mmol/L Final     EXAM:  BP (!) 153/80   Pulse 87   Temp 97.7  F (36.5  C) (Oral)   Resp 18   Ht 1.53 m (5' 0.24\")   Wt 79.6 kg (175 lb 6.4 oz)   LMP  (LMP Unknown)   SpO2 96%   BMI 33.99 kg/m    General:  Stable.  In no acute distress.    Neuro:  A&O x 3. Moves all extremities equally.  Resp:  Lungs clear to auscultation bilaterally, breathing unlabored on room air.  Cardio:  S1S2 and reg, without murmur, clicks or rubs  Abdomen:  Soft  Vascular:  +2/4 radial pulse  Skin:  Without excoriations, ecchymosis, erythema, lesions or open sores on visible skin.  MSK:  No gross motor weakness.  Sensation intact.  Proprioception intact.    ASSESSMENT/PLAN:   67 year old female with a past medical history significant for colon cancer admitted for inpatient administration of second cycle FOLFOX. Concern for port malposition  -Port check with possible exchange with as needed moderate sedation    Procedure, risks/benefits, details, alternatives, and sedation " education reviewed with patient, who verbalized understanding. All questions answered.  Total time: 45 minutes    Autumn Gonsalez PA-C  Interventional Radiology  [facility] PA desk phone *[phone]

## 2023-07-18 NOTE — CONSULTS
Patient did not elect Medicare D and has no other prescription coverage.  Patient is not eligible to sign up for coverage until 2024 unless she meets criteria for Medicaid (mnsure.org) or the Medicare D low-income subsidy (ssa.gov/extrahelp).     Prices using singlecare.com discount card:    Xarelto: $513/mo.  Discharge Pharmacy can provide one month free using a  voucher, provided patient has not used one in the past.    Eliquis: $511/mo.  Discharge Pharmacy can provide one month free using a  voucher, provided patient has not used one in the past.    Jantoven (warfarin): $10/mo.     Free Eliquis and Xarelto are available through the mail to income-eligible patients.  Application and info for Eliquis at [employer] or by calling [phone].    Xarelto's program can be found at [email or web address] or by calling [phone].      Leighann Saldana  Pharmacy Technician/Liaison, Discharge Pharmacy   [phone] (voice or text)  [email or web address]

## 2023-07-18 NOTE — PROGRESS NOTES
Medical Oncology Update:    7/14/23:  Pt called in stating she would like to proceed with inpatient retrial of FOLFOX and is aware of associated cardiac risks. We will proceed with inpatient admission at [facility] next week for inpatient FOLFOX w/o 5FU bolus and with dose reduced oxaliplatin and continuous cardiac monitoring. Ideally, I would like patient to have CT CAP prior to starting so we can get a new baseline as pt has been off tx for 7 weeks. Will repeat labs including alk phos and Cr which have increased. I d/w bed placement today and they need pt to do nurse visit to have vitals done within 24hrs of admission. I contacted RNCC to get pt scheduled to come in on Monday and plan for admission Tuesday 7/18 8AM.      7/17/23:  RNCC visit and VS noted and stable.  Sign out given to hospitalist team, Dr. Shen   Sign out given to our covering oncologist, Dr. Soto  Sign out given to station 88 charge, Nevaeh  Chemotherapy ordered  I called the patient and made sure she is aware of the plan, she will arrive at [facility] tomorrow Tuesday 7/18 for 8AM admission    Irene Bateman D.O.  Hematology/Oncology  [facility]

## 2023-07-18 NOTE — LETTER
7/18/2023         RE: Naida Ladd  [address]  [facility] [address]        Dear Colleague,    Thank you for referring your patient, Nadia Ladd, to the [facility]. Please see a copy of my visit note below.    Medical Oncology Update:    7/14/23:  Pt called in stating she would like to proceed with inpatient retrial of FOLFOX and is aware of associated cardiac risks. We will proceed with inpatient admission at [facility] next week for inpatient FOLFOX w/o 5FU bolus and with dose reduced oxaliplatin and continuous cardiac monitoring. Ideally, I would like patient to have CT CAP prior to starting so we can get a new baseline as pt has been off tx for 7 weeks. Will repeat labs including alk phos and Cr which have increased. I d/w bed placement today and they need pt to do nurse visit to have vitals done within 24hrs of admission. I contacted [facility] to get pt scheduled to come in on Monday and plan for admission Tuesday 7/18 8AM.      7/17/23:  [facility] visit and VS noted and stable.  Sign out given to hospitalist team, Dr. Shen   Sign out given to our covering oncologist, Dr. Soto  Sign out given to station 88 charge, Nevaeh  Chemotherapy ordered  I called the patient and made sure she is aware of the plan, she will arrive at [facility] tomorrow Tuesday 7/18 for 8AM admission    Francisco Bateman D.O.  Hematology/Oncology  [facility]        Again, thank you for allowing me to participate in the care of your patient.        Sincerely,        FRANCISCO BATEMAN, DO

## 2023-07-18 NOTE — CONSULTS
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   -C1 FOLFOX complicated by Afib with RVR.   -Admitted now for continuous monitoring with treatment. Unfortunately, her port is needing reassessment. Chemo unlikely to begin until 7/19/23. Discussed with patient on the off-chance, we are unable to use the port, will require PICC line placement.   -Will star NS 75 ml/hr until the start of chemotherapy.  -Updated CT CAP pending per Dr. Bateman's request.   -CBC stable. CMP with renal function at baseline. She is otherwise okay to start treatment once port has been assessed. Please ensure continous tele monitoring given her previous history of Afib with RVR with treatment.     #Recent diagnosis of Afib with RVR s/p CV  #CKD  #Crohn's disease  #HTN  #HLD  #DM2    We are very appreciative of medicine team management. Please do not hesitate to contact our team with any further questions or concerns.     Sylvia Soto, DO  Hematology/Oncology  [facility]      Total time spent on day of visit, including review of tests, obtaining/reviewing separately obtained history, ordering medications/tests/procedures, communicating with PCP/consultants, and documenting in electronic medical record: 60 minutes. Adequate time was dedicated to patient questions and answered to the expressed satisfaction of the patient and family members.         HISTORY OF  PRESENT ILLNESS: Nadia Ladd is a 67 year old female known to Dr. Irene Bateman for MINEN/adenocarcinoma of the ascending colon/hepatic flexure. She is admitted for FOLFOX. Patient has medical history including Crohn's disease on sulfasalazine, anemia secondary to iron deficiency and B12 deficiency, obesity, hypertension, prediabetes, eczema, pulmonary hypertension, hiatal hernia, mild splenomegaly (14.7 cm in 2/23)    She received C1D1 FOLFOX on 5/31/23 complicated by Afib with RVR requiring hospitalization and CV. After long discussion and MDT review, the decision was made to continue with FOLFOX. Please see Dr. Bateman's detailed clinic notes for further discussion.     She is admitted now for C2D1 FOLFOX (without bolus). Unfortunately, her port is no longer accessible and is requiring reassessment. She is otherwise HD stable. She denies pain.         REVIEW OF SYSTEMS:   A 14 point ROS was reviewed with pertinent positives and negatives in the HPI.      MEDICATIONS:  Current Facility-Administered Medications   Medication     acetaminophen (TYLENOL) tablet 650 mg    Or     acetaminophen (TYLENOL) Suppository 650 mg     heparin 100 unit/mL injection 5 mL     heparin lock flush 10 UNIT/ML injection 5 mL     lidocaine (LMX4) cream     lidocaine 1 % 0.1-1 mL     melatonin tablet 1 mg     Patient is already receiving anticoagulation with heparin, enoxaparin (LOVENOX), warfarin (COUMADIN)  or other anticoagulant medication     sodium chloride (PF) 0.9% PF flush 3 mL     sodium chloride (PF) 0.9% PF flush 3 mL     sodium chloride (PF) 0.9% PF flush 3-20 mL     sodium chloride 0.9% infusion         ALLERGIES:  Allergies   Allergen Reactions     No Known Drug Allergy          PAST MEDICAL HISTORY:  Past Medical History:   Diagnosis Date     Arthropathia 08/05/2010     B12 deficiency anemia 10/21/2010     Benign essential hypertension with target blood pressure below 140/90 10/10/2016     CARDIOVASCULAR SCREENING; LDL  GOAL LESS THAN 160 10/31/2010     Crohn's colitis (H) 02/15/2011     Dermatitis-dishydrotic eczema-severe 08/05/2010     Hiatal hernia      HTN (hypertension) 07/21/2011     Iron deficiency anemia 10/21/2010     Malignant neoplasm of sigmoid colon (H)      Obesity      Primary pulmonary hypertension (H) 04/06/2010         PAST SURGICAL HISTORY:  Past Surgical History:   Procedure Laterality Date     COLECTOMY WITHOUT COLOSTOMY N/A 4/6/2023    Procedure: Laparoscopic Converted to Open Total abdominal colectomy;  Surgeon: Jerome Ashley MD;  Location: [facility] OR     COLONOSCOPY  10/11/10     COLONOSCOPY N/A 2/27/2023    Procedure: ATTEMPTED COLONOSCOPY WITH SIGMOID STRICTURE BIOPSY;  Surgeon: Jonathan Alcocer MD;  Location:  GI     ESOPHAGOSCOPY, GASTROSCOPY, DUODENOSCOPY (EGD), COMBINED N/A 2/27/2023    Procedure: ESOPHAGOGASTRODUODENOSCOPY, WITH BIOPSY;  Surgeon: Jonathan Alcocer MD;  Location:  GI     HYSTERECTOMY TOTAL ABDOMINAL, BILATERAL SALPINGO-OOPHORECTOMY, COMBINED N/A 4/6/2023    Procedure: Hysterectomy total abdominal, bilateral salpingo-oophorectomy;  Surgeon: Sunitha Olea MD;  Location: [facility] OR     INSERT PORT VASCULAR ACCESS Right 5/25/2023    Procedure: Ultrasound-guided right internal jugular venous access port placement with fluoroscopy;  Surgeon: Martin Thomas DO;  Location: [facility] OR     SIGMOIDOSCOPY FLEXIBLE N/A 4/6/2023    Procedure: Sigmoidoscopy flexible;  Surgeon: Jerome Ashley MD;  Location: [facility] OR     SURGICAL HISTORY OF -   06/14/76    Perineorrhaphy, for widening vaginal orifice     Z EXPLORATORY OF ABDOMEN  1989    laparoscopy         SOCIAL HISTORY:  Social History     Socioeconomic History     Marital status:      Spouse name: Not on file     Number of children: Not on file     Years of education: Not on file     Highest education level: Not on file   Occupational History     Not on file   Tobacco Use     Smoking status: Never     Passive  "exposure: Current     Smokeless tobacco: Never   Substance and Sexual Activity     Alcohol use: No     Drug use: No     Sexual activity: Yes     Partners: Male   Other Topics Concern     Parent/sibling w/ CABG, MI or angioplasty before 65F 55M? Not Asked   Social History Narrative     Not on file     Social Determinants of Health     Financial Resource Strain: Not on file   Food Insecurity: Not on file   Transportation Needs: Not on file   Physical Activity: Not on file   Stress: Not on file   Social Connections: Not on file   Intimate Partner Violence: Not on file   Housing Stability: Not on file         FAMILY HISTORY:  Family History   Problem Relation Age of Onset     Hypertension Mother         on meds, alive     Cerebrovascular Disease Father         stroke about age 65,  of cancer at 68 yrs     Diabetes Father         eventually took insulin     Anemia Father         Pernisios anemia     Kidney Disease Niece         kidney transplant     Kidney Disease Nephew         kidney transplant     Venous thrombosis No family hx of      Anesthesia Reaction No family hx of          PHYSICAL EXAM:  Vital signs:  Temp: 97.7  F (36.5  C) Temp src: Oral BP: (!) 153/80 Pulse: 87   Resp: 18 SpO2: 96 % O2 Device: None (Room air)   Height: 153 cm (5' 0.24\") Weight: 79.6 kg (175 lb 6.4 oz)  Estimated body mass index is 33.99 kg/m  as calculated from the following:    Height as of this encounter: 1.53 m (5' 0.24\").    Weight as of this encounter: 79.6 kg (175 lb 6.4 oz).    GENERAL/CONSTITUTIONAL: No acute distress.  EYES: Pupils are equal, round, and react to light and accommodation. Extraocular movements intact.  No scleral icterus.  ENT/MOUTH: Neck supple. Oropharynx clear, no mucositis.  RESPIRATORY: Clear to auscultation bilaterally. No crackles or wheezing.   CARDIOVASCULAR: Regular rate and rhythm without murmurs, gallops, or rubs.  MUSCULOSKELETAL: Warm and well-perfused, no cyanosis, clubbing, or edema.  NEUROLOGIC: " Cranial nerves II-XII are intact. Alert, oriented, answers questions appropriately.  INTEGUMENTARY: No rashes or jaundice.      LABS:  CBC RESULTS:   Recent Labs   Lab Test 07/18/23  1111   WBC 4.5   RBC 4.30   HGB 11.9   HCT 37.6   MCV 87   MCH 27.7   MCHC 31.6   RDW 16.2*          Recent Labs   Lab Test 07/18/23  1111 07/11/23  1040    141   POTASSIUM 4.4 4.5   CHLORIDE 105 103   CO2 23 23   ANIONGAP 13 15   * 122*   BUN 30.2* 21.1   CR 1.45* 1.45*   LIVIER 9.7 9.8     Lab Results   Component Value Date    AST 24 07/18/2023    AST 17 01/26/2021     Lab Results   Component Value Date    ALT 20 07/18/2023    ALT 22 01/26/2021     Lab Results   Component Value Date    BILICONJ 0.0 09/14/2010      Lab Results   Component Value Date    BILITOTAL 0.6 07/18/2023    BILITOTAL 0.5 01/26/2021     Lab Results   Component Value Date    ALBUMIN 3.9 07/18/2023    ALBUMIN 4.1 01/26/2021     Lab Results   Component Value Date    PROTTOTAL 6.4 07/18/2023    PROTTOTAL 7.7 01/26/2021      Lab Results   Component Value Date    ALKPHOS 172 07/18/2023    ALKPHOS 136 01/26/2021       IMAGING:  CT CAP pending          Thank you for the opportunity to participate in this patient's care.  Please call with any questions.    Sylvia Soto DO  Hematology/Oncology  [facility]

## 2023-07-18 NOTE — IR NOTE
Interventional Radiology Intra-procedural Nursing Note    Patient Name: Nadia Ladd  Medical Record Number: [id number]  Today's Date: July 18, 2023    Start Time: 1542  End of procedure time: 1552  Procedure: Consented for Chest port check with local anesthesia with possible removal and replacement with as needed moderate sedation  Report given to: 88 RN  Time pt departs:  1600    Other Notes: Pt into IR suite 1 via cart. Pt awake and alert. To table in supine position. Monitoring equipment applied. VSS. Tele SR. Dr. Claire in room. Time out and procedure started. Pt tolerated procedure well. Debrief with Dr. Claire. Port flipped, accessed  and pressure held until hemostasis achieved. Dressing CDI. No complications. Pt transferred back to Station 88.    Medications:    Heparin 500 units  Lidocaine 1% 4 ml    Rito Gordon RN

## 2023-07-18 NOTE — PHARMACY-ADMISSION MEDICATION HISTORY
Pharmacist Admission Medication History    Admission medication history is complete. The information provided in this note is only as accurate as the sources available at the time of the update.    Medication reconciliation/reorder completed by provider prior to medication history? No    Information Source(s): Patient and CareEverywhere/SureScripts via in-person    Pertinent Information: None    Changes made to PTA medication list:    Added: None    Deleted: Indomethacin - Pt states this therapy is complete    Changed: Pt only takes Dexamethasone day 2,3 after chemotherapy      Allergies reviewed with patient and updates made in EHR: done by RN    Medication History Completed By: Namrata Flores RP 7/18/2023 11:48 AM    Prior to Admission medications    Medication Sig Last Dose Taking? Auth Provider Long Term End Date   apixaban ANTICOAGULANT (ELIQUIS ANTICOAGULANT) 5 MG tablet Take 1 tablet (5 mg) by mouth 2 times daily 7/17/2023 at PM Yes Israel Garza MD     cyanocobalamin 1000 MCG TBCR Take 1,000 mcg by mouth daily 7/18/2023 Yes Phil Orosco PA-C     Ferrous Sulfate (IRON) 325 (65 Fe) MG tablet Take 1 tablet by mouth daily 7/18/2023 Yes Reported, Patient     magnesium oxide (MAG-OX) 400 MG tablet Take 1 tablet (400 mg) by mouth 2 times daily 7/18/2023 Yes Toñito Queen MD     metoprolol succinate ER (TOPROL XL) 25 MG 24 hr tablet Take 1 tablet (25 mg) by mouth daily 7/17/2023 at ~ 1400 Yes Alva Putnam PA-C Yes    Multiple Vitamins-Iron (MULTI-DAY PLUS IRON PO) Take 1 tablet by mouth daily 7/18/2023 Yes Reported, Patient     sodium bicarbonate 650 MG tablet Take 1 tablet (650 mg) by mouth 2 times daily 7/18/2023 Yes Santana Brenner MD     dexamethasone (DECADRON) 4 MG tablet Take 2 tablets (8 mg) by mouth daily Take for 2 days, starting the day after chemo. Take with food.  Patient not taking: Reported on 7/11/2023  at only after chemo  Irene Bateman, DO Yes

## 2023-07-18 NOTE — PLAN OF CARE
Pt admitted for Cycle 2 FOLFOX. A&O. VSS on RA. Tele SR- see EKG. Denied pain. Port malfunction and unable to be accessed this AM, IR repositioned this afternoon. Port infusing NS until chemo starts tomorrow. Chemo delayed until tomorrow. Mag replaced, recheck in AM. Up independently. IR approved for patient to restart eliquis tonight. Plan for chemo tomorrow and discharge Friday.

## 2023-07-19 LAB
ANION GAP SERPL CALCULATED.3IONS-SCNC: 9 MMOL/L (ref 7–15)
ATRIAL RATE - MUSE: 76 BPM
BUN SERPL-MCNC: 26.5 MG/DL (ref 8–23)
CALCIUM SERPL-MCNC: 9 MG/DL (ref 8.8–10.2)
CHLORIDE SERPL-SCNC: 106 MMOL/L (ref 98–107)
CREAT SERPL-MCNC: 1.54 MG/DL (ref 0.51–0.95)
DEPRECATED HCO3 PLAS-SCNC: 26 MMOL/L (ref 22–29)
DIASTOLIC BLOOD PRESSURE - MUSE: NORMAL MMHG
GFR SERPL CREATININE-BSD FRML MDRD: 37 ML/MIN/1.73M2
GLUCOSE SERPL-MCNC: 96 MG/DL (ref 70–99)
INTERPRETATION ECG - MUSE: NORMAL
MAGNESIUM SERPL-MCNC: 1.8 MG/DL (ref 1.7–2.3)
P AXIS - MUSE: 268 DEGREES
POTASSIUM SERPL-SCNC: 4.4 MMOL/L (ref 3.4–5.3)
PR INTERVAL - MUSE: 136 MS
QRS DURATION - MUSE: 84 MS
QT - MUSE: 382 MS
QTC - MUSE: 429 MS
R AXIS - MUSE: 8 DEGREES
SODIUM SERPL-SCNC: 141 MMOL/L (ref 136–145)
SYSTOLIC BLOOD PRESSURE - MUSE: NORMAL MMHG
T AXIS - MUSE: 35 DEGREES
VENTRICULAR RATE- MUSE: 76 BPM

## 2023-07-19 PROCEDURE — 250N000013 HC RX MED GY IP 250 OP 250 PS 637: Performed by: PHYSICIAN ASSISTANT

## 2023-07-19 PROCEDURE — 80048 BASIC METABOLIC PNL TOTAL CA: CPT | Performed by: PHYSICIAN ASSISTANT

## 2023-07-19 PROCEDURE — 83735 ASSAY OF MAGNESIUM: CPT | Performed by: PHYSICIAN ASSISTANT

## 2023-07-19 PROCEDURE — 3E06305 INTRODUCTION OF OTHER ANTINEOPLASTIC INTO CENTRAL ARTERY, PERCUTANEOUS APPROACH: ICD-10-PCS | Performed by: NURSE PRACTITIONER

## 2023-07-19 PROCEDURE — 99232 SBSQ HOSP IP/OBS MODERATE 35: CPT | Performed by: HOSPITALIST

## 2023-07-19 PROCEDURE — 258N000003 HC RX IP 258 OP 636: Performed by: INTERNAL MEDICINE

## 2023-07-19 PROCEDURE — 250N000011 HC RX IP 250 OP 636: Mod: JZ | Performed by: INTERNAL MEDICINE

## 2023-07-19 PROCEDURE — 99232 SBSQ HOSP IP/OBS MODERATE 35: CPT | Performed by: NURSE PRACTITIONER

## 2023-07-19 PROCEDURE — 250N000013 HC RX MED GY IP 250 OP 250 PS 637: Performed by: HOSPITALIST

## 2023-07-19 PROCEDURE — 120N000001 HC R&B MED SURG/OB

## 2023-07-19 RX ORDER — HEPARIN SODIUM (PORCINE) LOCK FLUSH IV SOLN 100 UNIT/ML 100 UNIT/ML
5 SOLUTION INTRAVENOUS
Status: DISCONTINUED | OUTPATIENT
Start: 2023-07-19 | End: 2023-07-19

## 2023-07-19 RX ORDER — EPINEPHRINE 1 MG/ML
0.3 INJECTION, SOLUTION, CONCENTRATE INTRAVENOUS EVERY 5 MIN PRN
Status: DISCONTINUED | OUTPATIENT
Start: 2023-07-19 | End: 2023-07-22 | Stop reason: HOSPADM

## 2023-07-19 RX ORDER — MAGNESIUM OXIDE 400 MG/1
400 TABLET ORAL EVERY 4 HOURS
Status: COMPLETED | OUTPATIENT
Start: 2023-07-19 | End: 2023-07-19

## 2023-07-19 RX ORDER — ALBUTEROL SULFATE 0.83 MG/ML
2.5 SOLUTION RESPIRATORY (INHALATION)
Status: DISCONTINUED | OUTPATIENT
Start: 2023-07-19 | End: 2023-07-22 | Stop reason: HOSPADM

## 2023-07-19 RX ORDER — ALBUTEROL SULFATE 90 UG/1
1-2 AEROSOL, METERED RESPIRATORY (INHALATION)
Status: DISCONTINUED | OUTPATIENT
Start: 2023-07-19 | End: 2023-07-22 | Stop reason: HOSPADM

## 2023-07-19 RX ORDER — NALOXONE HYDROCHLORIDE 0.4 MG/ML
0.4 INJECTION, SOLUTION INTRAMUSCULAR; INTRAVENOUS; SUBCUTANEOUS
Status: DISCONTINUED | OUTPATIENT
Start: 2023-07-19 | End: 2023-07-22 | Stop reason: HOSPADM

## 2023-07-19 RX ORDER — NALOXONE HYDROCHLORIDE 0.4 MG/ML
0.2 INJECTION, SOLUTION INTRAMUSCULAR; INTRAVENOUS; SUBCUTANEOUS
Status: DISCONTINUED | OUTPATIENT
Start: 2023-07-19 | End: 2023-07-22 | Stop reason: HOSPADM

## 2023-07-19 RX ORDER — MEPERIDINE HYDROCHLORIDE 25 MG/ML
25 INJECTION INTRAMUSCULAR; INTRAVENOUS; SUBCUTANEOUS EVERY 30 MIN PRN
Status: DISCONTINUED | OUTPATIENT
Start: 2023-07-19 | End: 2023-07-22 | Stop reason: HOSPADM

## 2023-07-19 RX ORDER — DEXAMETHASONE 4 MG/1
8 TABLET ORAL DAILY
Status: COMPLETED | OUTPATIENT
Start: 2023-07-20 | End: 2023-07-21

## 2023-07-19 RX ORDER — ONDANSETRON 2 MG/ML
8 INJECTION INTRAMUSCULAR; INTRAVENOUS ONCE
Status: COMPLETED | OUTPATIENT
Start: 2023-07-19 | End: 2023-07-19

## 2023-07-19 RX ORDER — METHYLPREDNISOLONE SODIUM SUCCINATE 125 MG/2ML
125 INJECTION, POWDER, LYOPHILIZED, FOR SOLUTION INTRAMUSCULAR; INTRAVENOUS
Status: DISCONTINUED | OUTPATIENT
Start: 2023-07-19 | End: 2023-07-22 | Stop reason: HOSPADM

## 2023-07-19 RX ORDER — HEPARIN SODIUM,PORCINE 10 UNIT/ML
5 VIAL (ML) INTRAVENOUS
Status: DISCONTINUED | OUTPATIENT
Start: 2023-07-19 | End: 2023-07-19

## 2023-07-19 RX ORDER — DIPHENHYDRAMINE HYDROCHLORIDE 50 MG/ML
50 INJECTION INTRAMUSCULAR; INTRAVENOUS
Status: DISCONTINUED | OUTPATIENT
Start: 2023-07-19 | End: 2023-07-22 | Stop reason: HOSPADM

## 2023-07-19 RX ORDER — LORAZEPAM 2 MG/ML
0.5 INJECTION INTRAMUSCULAR EVERY 4 HOURS PRN
Status: DISCONTINUED | OUTPATIENT
Start: 2023-07-19 | End: 2023-07-22 | Stop reason: HOSPADM

## 2023-07-19 RX ADMIN — OXALIPLATIN 120 MG: 5 INJECTION, SOLUTION INTRAVENOUS at 13:24

## 2023-07-19 RX ADMIN — Medication 400 MG: at 12:44

## 2023-07-19 RX ADMIN — METOPROLOL SUCCINATE 25 MG: 25 TABLET, EXTENDED RELEASE ORAL at 09:38

## 2023-07-19 RX ADMIN — APIXABAN 5 MG: 5 TABLET, FILM COATED ORAL at 09:38

## 2023-07-19 RX ADMIN — Medication 400 MG: at 09:38

## 2023-07-19 RX ADMIN — FOSAPREPITANT: 150 INJECTION, POWDER, LYOPHILIZED, FOR SOLUTION INTRAVENOUS at 12:44

## 2023-07-19 RX ADMIN — ONDANSETRON 8 MG: 2 INJECTION INTRAMUSCULAR; INTRAVENOUS at 12:44

## 2023-07-19 RX ADMIN — SODIUM BICARBONATE 650 MG TABLET 650 MG: at 21:16

## 2023-07-19 RX ADMIN — APIXABAN 5 MG: 5 TABLET, FILM COATED ORAL at 21:16

## 2023-07-19 RX ADMIN — FLUOROURACIL 2255 MG: 50 INJECTION, SOLUTION INTRAVENOUS at 15:39

## 2023-07-19 RX ADMIN — SODIUM BICARBONATE 650 MG TABLET 650 MG: at 09:43

## 2023-07-19 RX ADMIN — LEUCOVORIN CALCIUM 658 MG: 350 INJECTION, POWDER, LYOPHILIZED, FOR SOLUTION INTRAMUSCULAR; INTRAVENOUS at 13:24

## 2023-07-19 RX ADMIN — SODIUM CHLORIDE: 9 INJECTION, SOLUTION INTRAVENOUS at 05:55

## 2023-07-19 RX ADMIN — FERROUS SULFATE TAB 325 MG (65 MG ELEMENTAL FE) 325 MG: 325 (65 FE) TAB at 09:38

## 2023-07-19 ASSESSMENT — ACTIVITIES OF DAILY LIVING (ADL)
ADLS_ACUITY_SCORE: 20

## 2023-07-19 NOTE — CONSULTS
"BRIEF NUTRITION ASSESSMENT      REASON FOR ASSESSMENT:  Nadia Ladd is a 67 year old female seen by Registered Dietitian for Admission Nutrition Risk Screen:  Have you recently lost weight without trying? \"14-23#\"  Have you been eating poorly because of a decreased appetite? \"NO\"      CURRENT DIET AND INTAKE:  Diet:  Regular              Chart reviewed  Visited with pt this morning  Reports good appetite - eating well  \"I have been eating several small meals/day at home\"  She denies any decrease in her intake  She has not been taking any nutrition supplements - \"would like to try them\"  Reports eating all of her breakfast - omelet, potatoes, 2 glasses OJ    ANTHROPOMETRICS:  Height: 5' .236\"  Weight:(7/18) 79.6 kg /  175 lbs 6.4 oz  Body mass index is 33.99 kg/m .   Weight Status: Obesity Grade I BMI 30-34.9  IBW:  45.4 kg  %IBW: 175%  Weight History:   Wt loss of 27# over past 5 months (13%)     07/18/23 : 79.6 kg (175 lb 6.4 oz) - **   07/17/23 : 79.8 kg (175 lb 14.4 oz)   07/11/23 : 79 kg (174 lb 3 oz)   06/30/23 : 83.4 kg (183 lb 12.8 oz) - pt states this wt is slightly elevated, \"I was having some edema\"  06/26/23 : 86.1 kg (189 lb 14.4 oz)   06/22/23 : 83.4 kg (183 lb 14.4 oz)   06/14/23 : 82.3 kg (181 lb 8 oz)   06/13/23 : 81.8 kg (180 lb 7 oz)   06/13/23 : 82.2 kg (181 lb 4 oz) - pt states this is closer to actual wt  06/03/23 : 84 kg (185 lb 3.2 oz)   05/31/23 : 82.5 kg (181 lb 14.4 oz)   05/18/23 : 80 kg (176 lb 6.4 oz)   05/10/23 : 81.1 kg (178 lb 11.2 oz)   05/09/23 : 80.3 kg (177 lb 1.6 oz)   04/19/23 : 83.5 kg (184 lb 3 oz)   04/06/23 : 86.5 kg (190 lb 11.2 oz)   03/28/23 : 88.1 kg (194 lb 3 oz)   02/21/23 : 89.9 kg (198 lb 1.6 oz)   02/16/23 : 91.7 kg (202 lb 1.6 oz) - **       LABS:  Labs noted    MALNUTRITION:  Patient does not meet two of the following criteria necessary for diagnosing malnutrition.     % Weight Loss:  > 7.5% in 3 months (severe malnutrition) - 13% wt loss past 5 months  % " Intake:  No decreased intake noted  Subcutaneous Fat Loss:  None observed  Muscle Loss:  Temporal region - mild depletion (not a strong indicator)  Fluid Retention:  None noted    NUTRITION INTERVENTION:  Nutrition Diagnosis:  No nutrition diagnosis at this time.    Implementation:  Nutrition Education ---> Reviewed current diet order and meal ordering process.  Discussed importance of eating healthy and including protein at meals.  Pt may order Ensure prn to sample.    FOLLOW UP/MONITORING:   Will re-evaluate in 7 - 10 days, or sooner, if re-consulted.

## 2023-07-19 NOTE — PROGRESS NOTES
[facility]    Medicine Progress Note - Hospitalist Service    Date of Admission:  7/18/2023    Assessment & Plan     Nadia Ladd is a 67 year old female with a past medical history significant for stage IIIb chronic kidney disease, chron's disease, HTN, and colon cancer resected surgically in April and for which she started FOLFOX chemotherapy 5/31 (which includes oxaliplatin, leucovorin, and 5-fluorouracil), chemotherapy induced tinnitus, paroxysmal atrial fibrillation, pulmonary hypertension, admitted on 7/18/2023 for inpatient administration of second cycle FOLFOX.     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   -Reviewed note from oncology team and interventional radiology as there was concern for port malposition  -CT scan of the abdomen pelvis and chest with contrast was done on 7/18 as per primary oncologist oncologist and subtotal colectomy with ileorectal anastomosis, new new or worsening disease, borderline and mildly enlarged mediastinal and and periportal lymph nodes are unchanged, splenomegaly, tiny pulmonary nodules remain unchanged although still indeterminate due to small size and mosaic attenuation of the lung is nonspecific  -IR was able to fix her access issues yesterday  -Scheduled for chemo today     Anemia   Chronic iron deficiency and B12 anemia  Most recent hgb 7/11 up to 13. s/p ferric carboxymaltose 750mg x2 doses 6/14/23 and 6/28/23. She is on po iron PTA.   -Hgb on admission on 7/18 was 11.9  --appreciate Oncology management      Paroxysmal atrial fibrillation  -Recently pt  Was admitted after presenting with palpitations and being found to have afib with  "RVR one day after receiving FOLFOX.  Converted with diltiazem drip. She followed up with Cardiology who initiated Eliquis and recommended another trial of FOLFOX on metoprolol. Also notably had hypomagnesemia and metabolic acidosis at time of afib episode   -TSH WNL  -EKG on admission with short GA  -We will continue the patient with PTA metoprolol and Eliquis and continue to monitor on telemetry  --goal K >4, mag >2     CKD III   Baseline Cr around 1.3-1.6. Recently established with Nephrology.   -Creatinine this morning is 1.54 and we will continue to monitor  -- Continue with sodium bicarbonate and IV fluids     HTN  Previously on lisinopril and hydrochlorothiazide/Tiramterene for HTN which was discontinued due to MARYA. She was seen by Nephrology with plans to allow for -140s for now and consider amlodipine in the future if BP uncontrolled.   --monitor clinically and blood pressure this morning is stable at 129/67     Hx hypomagnesemia  -Magnesium this morning is stable and continue with replacement protocol       Recent gout R great toe   -Completed 5 days indomethacin 2 weeks ago with resolution           Diet: Regular Diet Adult    DVT Prophylaxis: DOAC  Clement Catheter: Not present  Lines: PRESENT      Port a Cath 05/25/23 Right Chest wall-Site Assessment: WDL      Cardiac Monitoring: ACTIVE order. Indication: Tachyarrhythmias, acute (48 hours)  Code Status: Full Code      Clinically Significant Risk Factors Present on Admission               # Drug Induced Coagulation Defect: home medication list includes an anticoagulant medication    # Hypertension: Noted on problem list      # Obesity: Estimated body mass index is 33.99 kg/m  as calculated from the following:    Height as of this encounter: 1.53 m (5' 0.24\").    Weight as of this encounter: 79.6 kg (175 lb 6.4 oz).            Disposition Plan     Expected Discharge Date: 07/20/2023                  Scout Lima MD  Hospitalist [facility]" [facility]  Securely message with [employer] (more info)  Text page via [facility] Paging/Directory   ______________________________________________________________________    Interval History     Saw the patient today and her port issues were fixed yesterday.  She denies any fever, chills, nausea or vomiting.she did tell me that sodium bicarbonate dose was decreased to twice a day.  She is ready  Chemo today    Discussed with RN     Physical Exam   Vital Signs: Temp: (P) 98  F (36.7  C) Temp src: (P) Oral BP: (P) 121/64 Pulse: (P) 73   Resp: (P) 16 SpO2: (P) 96 % O2 Device: (P) None (Room air)    Weight: 175 lbs 6.4 oz        General: Patient appears comfortable and in no acute distress.  HEENT: Head is atraumatic, normocephalic.  Pupils are equal, round and reactive to light.  No scleral icterus. Oral mucosa is moist   Neck: Neck is supple   Respiratory: Lungs are clear to auscultation bilaterally with no wheeze or crackles and has port on the right side or  Cardiovascular: Regular rate , S1 and S2 normal with no murmer or rubs or gallops  Abdomen:   soft , non tender , non distended and bowel sound present   Skin: No skin rashes or lesions to inspection or palpation.  Neurologic: Higher functions are within normal limits. No obvious defects in speech, language and memory. No facial droop  Musculoskeletal: Normal Range of motion over upper and lower extremities bilaterally   Psychiatric: cooperative   Medical Decision Making             Data     I have personally reviewed the following data over the past 24 hrs:    4.5  \   11.9   / 169     141 106 26.5 (H) /  96   4.4 26 1.54 (H) \       ALT: 20 AST: 24 AP: 172 (H) TBILI: 0.6   ALB: 3.9 TOT PROTEIN: 6.4 LIPASE: N/A       Imaging results reviewed over the past 24 hrs:   Recent Results (from the past 24 hour(s))   XR Chest Port 1 View    Narrative    XR CHEST PORT 1 VIEW  7/18/2023 11:22 AM       INDICATION: port placement  COMPARISON: 5/26/2023        Impression    IMPRESSION: Right IJ port. The lungs are clear. No pneumothorax.    ADIA DENNIS MD         SYSTEM ID:  [id number]   IR Port Check Right    Narrative    IR PORT CHECK RIGHT  7/18/2023 3:52 PM     HISTORY: The patient has a malfunctioning right IJ port.    COMPARISON: Chest x-ray dated 7/18/2023.    FINDINGS: After obtaining informed consent, the patient was placed in  a supine position on the fluoroscopy table. The skin overlying the  port was prepped and draped in the usual sterile manner. On  fluoroscopy, the port was flipped. The port chamber was manipulated  and able to be turned and unflipped. A fluoroscopic image was saved to  confirm this. The port was then accessed using a Yoon needle, flushed  with saline, and heparin locked. The Yoon needle was left in place.    MEDICATIONS: None    Fluoroscopy time: 0.8 minutes    Total fluoroscopy dose: 9.66 mGy Air Kerma      Impression    IMPRESSION: Repositioning of a port by unflipping as above. Yoon  needle left in place. Recommend leaving the Yoon needle in place for  24 hours to help to adhesive port in its current position.    RUBIN BURDICK MD         SYSTEM ID:  [id number]   CT Chest/Abdomen/Pelvis w Contrast    Narrative    EXAM: CT CHEST/ABDOMEN/PELVIS W CONTRAST  LOCATION: [facility]  DATE: 7/18/2023    INDICATION: ascenidng colon hepatic cancer  COMPARISON: None.  TECHNIQUE: CT scan of the chest, abdomen, and pelvis was performed following injection of IV contrast. Multiplanar reformats were obtained. Dose reduction techniques were used.   CONTRAST: 88  ml Isovue 370    FINDINGS:   LUNGS AND PLEURA: Mosaic attenuation throughout the lungs. Subcentimeter nodules described previously remain unchanged (e.g. 4/85, 95, 157). No new or enlarging nodules. No pleural effusion or pneumothorax.    MEDIASTINUM/AXILLAE: Unchanged borderline and mildly enlarged mediastinal lymph nodes. For example, right paratracheal lymph  node measures 1.0 cm short axis (3/48), previously 1.0 cm short axis. Right chest port catheter terminates in the lower SVC.    CORONARY ARTERY CALCIFICATION: None.    HEPATOBILIARY: Normal.    PANCREAS: Normal.    SPLEEN: Enlarged, 15.7 cm craniocaudal.    ADRENAL GLANDS: Normal.    KIDNEYS/BLADDER: Multifocal cortical thinning and scarring. No collecting system dilation. The bladder is mostly collapsed and suboptimally evaluated.    BOWEL: Subtotal colectomy with ileorectal anastomosis. No acute inflammation or obstruction.    LYMPH NODES: Unchanged borderline and mildly enlarged periportal lymph nodes. For example, portacaval lymph node measures 1.2 cm short axis (3/147).    VASCULATURE: Unremarkable.    PELVIC ORGANS: Surgically absent.    MUSCULOSKELETAL: Normal.      Impression    IMPRESSION:  1.  Subtotal colectomy with ileorectal anastomosis. No new or worsening disease.    2.  Borderline and mildly enlarged mediastinal and periportal lymph nodes are unchanged.    3.  Tiny pulmonary nodules remain unchanged, although still indeterminate due to small size. Mosaic attenuation the lungs is nonspecific, but most commonly related to small airways disease.    4.  Splenomegaly.

## 2023-07-19 NOTE — PROGRESS NOTES
"Hematology-Oncology Follow-up Note  [facility]     Today's Date: 07/19/23  Date of Admission:  7/18/2023  Reason for Consult: Colon cancer      ASSESSMENT/ PLAN :      Colon cancer  Patient of Dr. Fransico Bonilla from our [facility]  Patient developed A-fib with cycle 1 FOLFOX  inpatient retrial of FOLFOX cycle 2 today (without 5-FU bolus and oxaliplatin 64mg/m2   CT chest abdomen pelvis done as per Dr. Bateman's request  Side effects reviewed patient agrees to proceed with cycle 2  Labs reviewed okay to proceed with cycle 2 treatment  Patient is on continuous cardiac monitoring given previous history of A-fib with cycle 1      A-fib  Currently on Eliquis and metoprolol      Chronic kidney disease  Seen by nephrology  IV ordered before the chemo  Management per nephrology           INTERIM HISTORY:  Patient reports overall feeling well denies palpitations chest pain denies cough shortness of breath denies headache dizziness.  Denies fever chills sweats overall reports feeling well         MEDICATIONS:  Reviewed         PHYSICAL EXAM:  Vital signs:  Temp: 98  F (36.7  C) Temp src: Oral BP: (!) 142/72 Pulse: 75   Resp: 16 SpO2: 96 % O2 Device: None (Room air)   Height: 153 cm (5' 0.24\") Weight: 79.6 kg (175 lb 6.4 oz)  Estimated body mass index is 33.99 kg/m  as calculated from the following:    Height as of this encounter: 1.53 m (5' 0.24\").    Weight as of this encounter: 79.6 kg (175 lb 6.4 oz).      GENERAL/CONSTITUTIONAL: No acute distress.      LABS:  Recent Labs   Lab Test 07/19/23  0625      POTASSIUM 4.4   CHLORIDE 106   CO2 26   ANIONGAP 9   BUN 26.5*   CR 1.54*   GLC 96   LIVIER 9.0     Recent Labs   Lab Test 07/18/23  1111 07/11/23  1040   WBC 4.5 4.1   HGB 11.9 13.0    217   MCV 87 88   NEUTROPHIL 74 76     Recent Labs   Lab Test 07/18/23  1111 07/11/23  1040   BILITOTAL 0.6 0.7   ALKPHOS 172* 174*   ALT 20 20   AST 24 27   ALBUMIN 3.9 4.1             Petros Villareal Nurse " Practitioner   [facility]   [phone]    Chart documentation with Dragon Voice recognition Software. Although reviewed after completion, some words and grammatical errors may remain.

## 2023-07-19 NOTE — PLAN OF CARE
Goal Outcome Evaluation:     Pt here for Cycle 2 FOLFOX for treatment of sigmoid cancer. A&O x 4. VSS on RA. On TELE d/t acute A-fib RVR developing during cycle 1. TELE SR. Pt had port repositioned on 7/18 so plan is to start chemo this morning. NS infusing to chest port, to be stopped once chemo starts. Chest port tender but pt declined intervention. Up independently in the room. Regular diet with good appetite per pt. Plan for discharge after chemo is completed, likely on Thursday.

## 2023-07-20 LAB
ANION GAP SERPL CALCULATED.3IONS-SCNC: 13 MMOL/L (ref 7–15)
ATRIAL RATE - MUSE: 74 BPM
BUN SERPL-MCNC: 29.7 MG/DL (ref 8–23)
CALCIUM SERPL-MCNC: 9.4 MG/DL (ref 8.8–10.2)
CHLORIDE SERPL-SCNC: 102 MMOL/L (ref 98–107)
CREAT SERPL-MCNC: 1.43 MG/DL (ref 0.51–0.95)
DEPRECATED HCO3 PLAS-SCNC: 22 MMOL/L (ref 22–29)
DIASTOLIC BLOOD PRESSURE - MUSE: NORMAL MMHG
ERYTHROCYTE [DISTWIDTH] IN BLOOD BY AUTOMATED COUNT: 15.8 % (ref 10–15)
GFR SERPL CREATININE-BSD FRML MDRD: 40 ML/MIN/1.73M2
GLUCOSE SERPL-MCNC: 168 MG/DL (ref 70–99)
HCT VFR BLD AUTO: 33.7 % (ref 35–47)
HGB BLD-MCNC: 11.1 G/DL (ref 11.7–15.7)
INTERPRETATION ECG - MUSE: NORMAL
MAGNESIUM SERPL-MCNC: 1.8 MG/DL (ref 1.7–2.3)
MCH RBC QN AUTO: 28.5 PG (ref 26.5–33)
MCHC RBC AUTO-ENTMCNC: 32.9 G/DL (ref 31.5–36.5)
MCV RBC AUTO: 86 FL (ref 78–100)
P AXIS - MUSE: NORMAL DEGREES
PLATELET # BLD AUTO: 154 10E3/UL (ref 150–450)
POTASSIUM SERPL-SCNC: 4.5 MMOL/L (ref 3.4–5.3)
PR INTERVAL - MUSE: 136 MS
QRS DURATION - MUSE: 88 MS
QT - MUSE: 358 MS
QTC - MUSE: 397 MS
R AXIS - MUSE: 27 DEGREES
RBC # BLD AUTO: 3.9 10E6/UL (ref 3.8–5.2)
SODIUM SERPL-SCNC: 137 MMOL/L (ref 136–145)
SYSTOLIC BLOOD PRESSURE - MUSE: NORMAL MMHG
T AXIS - MUSE: 30 DEGREES
VENTRICULAR RATE- MUSE: 74 BPM
WBC # BLD AUTO: 5 10E3/UL (ref 4–11)

## 2023-07-20 PROCEDURE — 83735 ASSAY OF MAGNESIUM: CPT | Performed by: HOSPITALIST

## 2023-07-20 PROCEDURE — 80048 BASIC METABOLIC PNL TOTAL CA: CPT | Performed by: HOSPITALIST

## 2023-07-20 PROCEDURE — 250N000012 HC RX MED GY IP 250 OP 636 PS 637: Performed by: INTERNAL MEDICINE

## 2023-07-20 PROCEDURE — 85027 COMPLETE CBC AUTOMATED: CPT | Performed by: HOSPITALIST

## 2023-07-20 PROCEDURE — 250N000011 HC RX IP 250 OP 636: Mod: JZ | Performed by: INTERNAL MEDICINE

## 2023-07-20 PROCEDURE — 250N000013 HC RX MED GY IP 250 OP 250 PS 637: Performed by: PHYSICIAN ASSISTANT

## 2023-07-20 PROCEDURE — 99232 SBSQ HOSP IP/OBS MODERATE 35: CPT | Performed by: NURSE PRACTITIONER

## 2023-07-20 PROCEDURE — 258N000003 HC RX IP 258 OP 636: Performed by: INTERNAL MEDICINE

## 2023-07-20 PROCEDURE — 120N000001 HC R&B MED SURG/OB

## 2023-07-20 PROCEDURE — 250N000013 HC RX MED GY IP 250 OP 250 PS 637: Performed by: HOSPITALIST

## 2023-07-20 PROCEDURE — 99232 SBSQ HOSP IP/OBS MODERATE 35: CPT | Performed by: HOSPITALIST

## 2023-07-20 PROCEDURE — 250N000011 HC RX IP 250 OP 636: Performed by: INTERNAL MEDICINE

## 2023-07-20 RX ORDER — MAGNESIUM OXIDE 400 MG/1
400 TABLET ORAL EVERY 4 HOURS
Status: COMPLETED | OUTPATIENT
Start: 2023-07-20 | End: 2023-07-20

## 2023-07-20 RX ORDER — MAGNESIUM OXIDE 400 MG/1
400 TABLET ORAL EVERY 4 HOURS
Status: DISCONTINUED | OUTPATIENT
Start: 2023-07-20 | End: 2023-07-20

## 2023-07-20 RX ADMIN — APIXABAN 5 MG: 5 TABLET, FILM COATED ORAL at 08:33

## 2023-07-20 RX ADMIN — ACETAMINOPHEN 650 MG: 325 TABLET, FILM COATED ORAL at 15:15

## 2023-07-20 RX ADMIN — Medication 400 MG: at 08:33

## 2023-07-20 RX ADMIN — FERROUS SULFATE TAB 325 MG (65 MG ELEMENTAL FE) 325 MG: 325 (65 FE) TAB at 08:33

## 2023-07-20 RX ADMIN — APIXABAN 5 MG: 5 TABLET, FILM COATED ORAL at 20:39

## 2023-07-20 RX ADMIN — SODIUM BICARBONATE 650 MG TABLET 650 MG: at 08:33

## 2023-07-20 RX ADMIN — SODIUM BICARBONATE 650 MG TABLET 650 MG: at 20:39

## 2023-07-20 RX ADMIN — FLUOROURACIL 2255 MG: 50 INJECTION, SOLUTION INTRAVENOUS at 14:56

## 2023-07-20 RX ADMIN — DEXAMETHASONE 8 MG: 4 TABLET ORAL at 08:33

## 2023-07-20 RX ADMIN — METOPROLOL SUCCINATE 25 MG: 25 TABLET, EXTENDED RELEASE ORAL at 08:33

## 2023-07-20 RX ADMIN — Medication 400 MG: at 13:08

## 2023-07-20 RX ADMIN — Medication 5 ML: at 14:12

## 2023-07-20 ASSESSMENT — ACTIVITIES OF DAILY LIVING (ADL)
ADLS_ACUITY_SCORE: 20

## 2023-07-20 NOTE — CONSULTS
Care Management Initial Consult    General Information  Assessment completed with: Patient, patient  Type of CM/SW Visit: CM Role Introduction  Homecare RN (email to [facility] for homecare confirmation patient would like SW added for community resources)     Primary Care Provider verified and updated as needed: Yes   Readmission within the last 30 days: no previous admission in last 30 days      Reason for Consult: discharge planning  Advance Care Planning:            Communication Assessment  Patient's communication style: spoken language (English or Bilingual)    Hearing Difficulty or Deaf: yes   Wear Glasses or Blind: no    Cognitive  Cognitive/Neuro/Behavioral: WDL                      Living Environment:   People in home: child(joe), adult, spouse   Montrell and Son Henrry  Current living Arrangements: house      Able to return to prior arrangements: yes       Family/Social Support:  Care provided by: self  Provides care for: no one  Marital Status:   , Children  Montrell       Description of Support System: Supportive, Involved    Support Assessment: Adequate family and caregiver support, Adequate social supports    Current Resources:   Patient receiving home care services: Yes  Skilled Home Care Services: Skilled Nursing  Community Resources: Home Care  Equipment currently used at home: none  Supplies currently used at home: None    Employment/Financial:  Employment Status: retired        Financial Concerns: insurance inadequate, unable to afford medication(s), other (see comments) (does not have RX or supplemental coverage through Medicare)   Referral to Financial Worker: No (patient stated she has assets and pension)       Does the patient's insurance plan have a 3 day qualifying hospital stay waiver?  No    Lifestyle & Psychosocial Needs:  Social Determinants of Health     Tobacco Use: Medium Risk (7/19/2023)    Patient History      Smoking Tobacco Use: Never      Smokeless Tobacco Use:  Never      Passive Exposure: Current   Alcohol Use: Not on file   Financial Resource Strain: Not on file   Food Insecurity: Not on file   Transportation Needs: Not on file   Physical Activity: Not on file   Stress: Not on file   Social Connections: Not on file   Intimate Partner Violence: Not on file   Depression: At risk (2/16/2023)    PHQ-2      PHQ-2 Score: 3   Housing Stability: Not on file       Functional Status:  Prior to admission patient needed assistance: independent with all ADL's without use of assistive equipment.              Mental Health Status:          Chemical Dependency Status:                Values/Beliefs:  Spiritual, Cultural Beliefs, Confucianism Practices, Values that affect care:                 Additional Information:  Writer met with the patient at the bedside. Patient is A+Ox4 up ad gena.  Writer went over role and scope of safe discharge planning. Patient anticipates discharge to home with family and follow up with [facility] as scheduled in AVS.  Patient unfortunately did NOT elect RX or supplemental coverage through medicare and is aware that she will need to enroll in 2023 for 2024 coverage, will put the information on discharge instructions for the Medicare.gov website as well as [facility] linkage line.  Patient is currently on Eliquis for Afib she stated she is unable to continue to afford this. Per review of Chart RX Liaison did give resources for 1st month free at our discharge rx and other resources. Writer mentioned checking with [employer] patient has some locations in [place]. Patient is aware that she will need to further discuss AC plan with her PCP if she is unable to get further Eliquis grants after discharge for possible coumadin and INR clinic with PCP.  Writer will put a note to discharging provider to order one month of Eliquis for RX to dispense as she is running out at home.  Patient noted that she is currently open to homecare RN for VS and chemo  disconnect Q2 weeks, writer sent an email to [facility] to confirm for resumption and to possibly add on SW per patient request for community resources.      Aida Spencer RN, BSN, [facility]   Care Transitions Specialist  [facility]  Care Transitions Specialist  [phone] [email or web address]  [email or web address], [email or web address]  Office: [phone] Fax: [phone]  [facility]

## 2023-07-20 NOTE — PROGRESS NOTES
[facility]    Medicine Progress Note - Hospitalist Service    Date of Admission:  7/18/2023    Assessment & Plan     Nadia Ladd is a 67 year old female with a past medical history significant for stage IIIb chronic kidney disease, chron's disease, HTN, and colon cancer resected surgically in April and for which she started FOLFOX chemotherapy 5/31 (which includes oxaliplatin, leucovorin, and 5-fluorouracil), chemotherapy induced tinnitus, paroxysmal atrial fibrillation, pulmonary hypertension, admitted on 7/18/2023 for inpatient administration of second cycle FOLFOX.     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   -Reviewed note from oncology team and interventional radiology as there was concern for port malposition  -CT scan of the abdomen pelvis and chest with contrast was done on 7/18 as per primary oncologist oncologist and subtotal colectomy with ileorectal anastomosis, new new or worsening disease, borderline and mildly enlarged mediastinal and and periportal lymph nodes are unchanged, splenomegaly, tiny pulmonary nodules remain unchanged although still indeterminate due to small size and mosaic attenuation of the lung is nonspecific  -IR was able to fix her access issues   -Patient was started on chemotherapy yesterday and has been tolerating the same and continue to monitor her vitals and labs closely     Anemia   Chronic iron deficiency and B12 anemia  Most recent hgb 7/11 up to 13. s/p ferric carboxymaltose 750mg x2 doses 6/14/23 and 6/28/23. She is on po iron PTA.   -Hgb on admission on 7/18 was 11.9 and hemoglobin today on 7/20 is 11.1  --appreciate Oncology management  "     Paroxysmal atrial fibrillation  -Recently pt  Was admitted after presenting with palpitations and being found to have afib with RVR one day after receiving FOLFOX.  Converted with diltiazem drip. She followed up with Cardiology who initiated Eliquis and recommended another trial of FOLFOX on metoprolol. Also notably had hypomagnesemia and metabolic acidosis at time of afib episode   -TSH WNL  -EKG on admission with short MI  -We will continue the patient with PTA metoprolol and Eliquis and continue to monitor on telemetry  --goal K >4, mag >2       CKD III   Baseline Cr around 1.3-1.6. Recently established with Nephrology.   -Creatinine this morning trended  down to 1.43 and we will continue to monitor  -- Continue with sodium bicarbonate and IV fluids     HTN  Previously on lisinopril and hydrochlorothiazide/Tiramterene for HTN which was discontinued due to MARYA. She was seen by Nephrology with plans to allow for -140s for now and consider amlodipine in the future if BP uncontrolled.   --monitor clinically and blood pressure this morning is stable at 118/56 continue with metoprolol succinate 25 mg daily     Hx hypomagnesemia  -Magnesium this morning is stable  At 1.8 and continue with replacement protocol       Recent gout R great toe   -Completed 5 days indomethacin 2 weeks ago with resolution           Diet: Regular Diet Adult  Snacks/Supplements Adult: Other; pt may order supplements prn; With Meals    DVT Prophylaxis: DOAC  Clement Catheter: Not present  Lines: PRESENT      Port a Cath 05/25/23 Right Chest wall-Site Assessment: WDL      Cardiac Monitoring: ACTIVE order. Indication: Tachyarrhythmias, acute (48 hours)  Code Status: Full Code      Clinically Significant Risk Factors                  # Hypertension: Noted on problem list        # Obesity: Estimated body mass index is 33.99 kg/m  as calculated from the following:    Height as of this encounter: 1.53 m (5' 0.24\").    Weight as of this " encounter: 79.6 kg (175 lb 6.4 oz)., PRESENT ON ADMISSION          Disposition Plan     Expected Discharge Date: 07/21/2023                  Scout Lima MD  Hospitalist Service  [facility]  Securely message with [employer] (more info)  Text page via [employer] Paging/Directory   ______________________________________________________________________    Interval History     Seen today and was laying in the bed and has been tolerating the chemo.  She did mention that she at times feels that sensation of PAC but heart rate has been stable.  No nausea, vomiting, shortness of breath.    Discussed with MARQUEZ Wren and patient mentioned to me that her son visited her yesterday and her  and daughter will be visiting and she has been updating them and did not wanted me to call    Also discussed her case with case management team this morning    Physical Exam   Vital Signs: Temp: 97.4  F (36.3  C) Temp src: Oral BP: 118/56 Pulse: 62   Resp: 16 SpO2: 94 % O2 Device: None (Room air)    Weight: 175 lbs 6.4 oz        General: Pleasant and AOx3  Respiratory: Clear to auscultation with no wheeze  Cardiovascular: Regular rate , S1 and S2 normal with no murmer or rubs or gallops  Abdomen:   soft , non tender , non distended and bowel sound present   Neurologic: Current facial droop on visual inspection  Musculoskeletal: Normal Range of motion over upper and lower extremities bilaterally   Psychiatric: cooperative   Medical Decision Making             Data     I have personally reviewed the following data over the past 24 hrs:    5.0  \   11.1 (L)   / 154     137 102 29.7 (H) /  168 (H)   4.5 22 1.43 (H) \       Imaging results reviewed over the past 24 hrs:   No results found for this or any previous visit (from the past 24 hour(s)).

## 2023-07-20 NOTE — DISCHARGE INSTRUCTIONS
Please note you can work on Medicare Prescription Drug Coverage and Supplemental Medicare Coverage during the Medicare enrollment period.  Please see the following website for further information:  https://www.medicare.gov/  Phone#1-680.742.6325    [place] Link[place]  [place]://[place]/  1-175.356.9227    For information on Eliquis and additional resources for coverage you can inquire with the following to see if you are eligible:  Medicare D low-income subsidy (ssa.gov/extrahelp).       Xarelto: $513/mo.  Discharge Pharmacy can provide one month free using a  voucher, provided patient has not used one in the past.     Eliquis: $511/mo.  Discharge Pharmacy can provide one month free using a  voucher, provided patient has not used one in the past.     Jantoven (warfarin): $10/mo.     Free Eliquis and Xarelto are available through the mail to income-eligible patients.  Application and info for Eliquis at [employer] or by calling [phone].    Xarelto's program can be found at [employer]org or by calling [phone].       You can check with your local [facility]/[employer] or [employer] for pricing you do not have to have a membership to get medications for these places    If you are not able to get additional resource and need to change to Coumadin/Warfarin please connect with your primary care MD as soon as possible to get established with an INR clinic and to get dosing instructions.

## 2023-07-20 NOTE — PLAN OF CARE
Goal Outcome Evaluation: 1930-0700    A&O x4. Calm and pleasant. VSS on RA, taken q4 as ordered. Tolerating regular diet. Fluorouracil infusing via port-a-cath @ 47.6 ml/hr, good blood return. Bag 1/2. Up ad gena. Tele sinus w/ PAC's (verified with heart center). Scheduled Eliquis given. Rate stable. Denies pain, dizziness, and nausea. Denies numbness and tingling in extremities. Voiding spontaneously. Avoiding ice related to oxaliplatin's side effect of increased sensitivity to cold. Magnesium high replacement protocol. Goal is for K>4 and Mag>2.     Discharge likely Friday evening after completion of chemo.

## 2023-07-20 NOTE — PROGRESS NOTES
[facility]  Patient is currently receiving home care services from [facility]. The patient is currently receiving Skilled Nursing services.  and home health team have been notified of patient admission. [facility] liaison will continue to follow patient during stay. If appropriate provide orders to resume home care at time of discharge.

## 2023-07-20 NOTE — PLAN OF CARE
Pt is A&Ox4. VSS on RA. Tele sinus klaus. Denies pain. Port infusing fluorouracil, tolerating well. Chemo precautions. Mag replaced during the day, recheck tomorrow morning. On a regular diet, tolerating well, denies N/V. Up independent, ambulated the halls. Continent of B&B. X1 BM. Discharge to home tomorrow.

## 2023-07-20 NOTE — PROGRESS NOTES
"Hematology-Oncology Follow-up Note  [facility]     Today's Date: 07/20/23  Date of Admission:  7/18/2023  Reason for Consult: Colon cancer      ASSESSMENT/ PLAN :      Colon cancer  Patient of Dr. Fransico Bonilla from our [facility]  Patient developed A-fib with cycle 1 FOLFOX  07/19-inpatient retrial of FOLFOX cycle 2  (without 5-FU bolus and oxaliplatin 64mg/m2 )  Patient is currently on continuous cardiac monitoring  -EKG on admission with short ND  CT chest abdomen pelvis done as per Dr. Bateman's request  08/01-patient scheduled with Dr. Bateman      A-fib  Currently on Eliquis and metoprolol  Management per cardiology    Chronic kidney disease  Overall stable creatinine today  Seen by nephrology  IV ordered before the chemo  Management per nephrology       Anemia stable monitor closely      INTERIM HISTORY:  Patient reports she is tolerated second cycle of treatment well she denies any palpitations chest pressure or chest pain shortness of breath,             MEDICATIONS:  Reviewed         PHYSICAL EXAM:  Vital signs:  Temp: 98  F (36.7  C) Temp src: Oral BP: 121/57 Pulse: 59   Resp: 16 SpO2: 95 % O2 Device: None (Room air)   Height: 153 cm (5' 0.24\") Weight: 79.6 kg (175 lb 6.4 oz)  Estimated body mass index is 33.99 kg/m  as calculated from the following:    Height as of this encounter: 1.53 m (5' 0.24\").    Weight as of this encounter: 79.6 kg (175 lb 6.4 oz).      GENERAL/CONSTITUTIONAL: No acute distress.      LABS:  Recent Labs   Lab Test 07/19/23  0625      POTASSIUM 4.4   CHLORIDE 106   CO2 26   ANIONGAP 9   BUN 26.5*   CR 1.54*   GLC 96   LIVIER 9.0     Recent Labs   Lab Test 07/18/23  1111 07/11/23  1040   WBC 4.5 4.1   HGB 11.9 13.0    217   MCV 87 88   NEUTROPHIL 74 76     Recent Labs   Lab Test 07/18/23  1111 07/11/23  1040   BILITOTAL 0.6 0.7   ALKPHOS 172* 174*   ALT 20 20   AST 24 27   ALBUMIN 3.9 4.1             Petros Villareal, Nurse Practitioner   [facility] " [facility]   [phone]    Chart documentation with Dragon Voice recognition Software. Although reviewed after completion, some words and grammatical errors may remain.

## 2023-07-20 NOTE — PLAN OF CARE
[id number]  C2D1 FOLFOX. VSS on RA. Tele SR. Denied pain/SOB/dizziness/nausea. Port infusing fluorouracil, blood return noted. Tolerating without side effects so far. Good appetite. Up independently. Mag replaced, recheck in AM. Hem/Onc following. Plan for discharge earliest on Friday evening if patient tolerates chemo.

## 2023-07-21 DIAGNOSIS — T45.1X5A CHEMOTHERAPY-INDUCED NEUTROPENIA (H): ICD-10-CM

## 2023-07-21 DIAGNOSIS — D70.1 CHEMOTHERAPY-INDUCED NEUTROPENIA (H): ICD-10-CM

## 2023-07-21 LAB
ANION GAP SERPL CALCULATED.3IONS-SCNC: 12 MMOL/L (ref 7–15)
BUN SERPL-MCNC: 41.7 MG/DL (ref 8–23)
CALCIUM SERPL-MCNC: 9.4 MG/DL (ref 8.8–10.2)
CHLORIDE SERPL-SCNC: 105 MMOL/L (ref 98–107)
CREAT SERPL-MCNC: 1.59 MG/DL (ref 0.51–0.95)
DEPRECATED HCO3 PLAS-SCNC: 23 MMOL/L (ref 22–29)
ERYTHROCYTE [DISTWIDTH] IN BLOOD BY AUTOMATED COUNT: 15.9 % (ref 10–15)
GFR SERPL CREATININE-BSD FRML MDRD: 35 ML/MIN/1.73M2
GLUCOSE SERPL-MCNC: 138 MG/DL (ref 70–99)
HCT VFR BLD AUTO: 37.7 % (ref 35–47)
HGB BLD-MCNC: 12.2 G/DL (ref 11.7–15.7)
MAGNESIUM SERPL-MCNC: 1.9 MG/DL (ref 1.7–2.3)
MCH RBC QN AUTO: 28.4 PG (ref 26.5–33)
MCHC RBC AUTO-ENTMCNC: 32.4 G/DL (ref 31.5–36.5)
MCV RBC AUTO: 88 FL (ref 78–100)
PLATELET # BLD AUTO: 160 10E3/UL (ref 150–450)
POTASSIUM SERPL-SCNC: 4.9 MMOL/L (ref 3.4–5.3)
RBC # BLD AUTO: 4.3 10E6/UL (ref 3.8–5.2)
SODIUM SERPL-SCNC: 140 MMOL/L (ref 136–145)
WBC # BLD AUTO: 5.8 10E3/UL (ref 4–11)

## 2023-07-21 PROCEDURE — 120N000001 HC R&B MED SURG/OB

## 2023-07-21 PROCEDURE — 82310 ASSAY OF CALCIUM: CPT | Performed by: HOSPITALIST

## 2023-07-21 PROCEDURE — 250N000013 HC RX MED GY IP 250 OP 250 PS 637: Performed by: PHYSICIAN ASSISTANT

## 2023-07-21 PROCEDURE — 258N000003 HC RX IP 258 OP 636: Performed by: HOSPITALIST

## 2023-07-21 PROCEDURE — 250N000012 HC RX MED GY IP 250 OP 636 PS 637: Performed by: INTERNAL MEDICINE

## 2023-07-21 PROCEDURE — 83735 ASSAY OF MAGNESIUM: CPT | Performed by: HOSPITALIST

## 2023-07-21 PROCEDURE — 85014 HEMATOCRIT: CPT | Performed by: HOSPITALIST

## 2023-07-21 PROCEDURE — 99232 SBSQ HOSP IP/OBS MODERATE 35: CPT | Performed by: NURSE PRACTITIONER

## 2023-07-21 PROCEDURE — 250N000013 HC RX MED GY IP 250 OP 250 PS 637: Performed by: HOSPITALIST

## 2023-07-21 PROCEDURE — 99232 SBSQ HOSP IP/OBS MODERATE 35: CPT | Performed by: HOSPITALIST

## 2023-07-21 RX ORDER — MAGNESIUM OXIDE 400 MG/1
400 TABLET ORAL EVERY 4 HOURS
Status: COMPLETED | OUTPATIENT
Start: 2023-07-21 | End: 2023-07-21

## 2023-07-21 RX ORDER — SODIUM CHLORIDE 9 MG/ML
INJECTION, SOLUTION INTRAVENOUS CONTINUOUS
Status: DISCONTINUED | OUTPATIENT
Start: 2023-07-21 | End: 2023-07-22

## 2023-07-21 RX ADMIN — SODIUM CHLORIDE: 9 INJECTION, SOLUTION INTRAVENOUS at 21:59

## 2023-07-21 RX ADMIN — APIXABAN 5 MG: 5 TABLET, FILM COATED ORAL at 20:17

## 2023-07-21 RX ADMIN — SODIUM CHLORIDE: 9 INJECTION, SOLUTION INTRAVENOUS at 10:16

## 2023-07-21 RX ADMIN — METOPROLOL SUCCINATE 25 MG: 25 TABLET, EXTENDED RELEASE ORAL at 08:04

## 2023-07-21 RX ADMIN — DEXAMETHASONE 8 MG: 4 TABLET ORAL at 08:04

## 2023-07-21 RX ADMIN — APIXABAN 5 MG: 5 TABLET, FILM COATED ORAL at 08:04

## 2023-07-21 RX ADMIN — SODIUM BICARBONATE 650 MG TABLET 650 MG: at 20:17

## 2023-07-21 RX ADMIN — Medication 400 MG: at 12:33

## 2023-07-21 RX ADMIN — FERROUS SULFATE TAB 325 MG (65 MG ELEMENTAL FE) 325 MG: 325 (65 FE) TAB at 08:04

## 2023-07-21 RX ADMIN — Medication 400 MG: at 08:56

## 2023-07-21 RX ADMIN — SODIUM BICARBONATE 650 MG TABLET 650 MG: at 08:04

## 2023-07-21 RX ADMIN — ACETAMINOPHEN 650 MG: 325 TABLET, FILM COATED ORAL at 21:58

## 2023-07-21 ASSESSMENT — ACTIVITIES OF DAILY LIVING (ADL)
ADLS_ACUITY_SCORE: 20

## 2023-07-21 NOTE — PLAN OF CARE
Goal Outcome Evaluation:    [id number]    AVSS on room air. Heart monitor indicating sinus dysrhythmia/bradycardia. A+Ox4. Denies pain. Ambulating SBA in room/hallways. Reported short episode of dizziness this AM when getting OOB, fall precautions imitated. Port infusing NS @ 75 ml/hr. Fluorouracil infusion completed. Chemo precautions maintained. Magnesium 1.9- replaced per protocol, recheck tomorrow morning. Tolerating regular diet w/o nausea or vomiting. Continent of B/B. Voiding spontaneously. LBM noted this morning. Oncology following. Will continue with plan of care.

## 2023-07-21 NOTE — PLAN OF CARE
Denies pain. Tolerating PO intake. Chemo infusing via port, positive blood return. Pt up in room ind.

## 2023-07-21 NOTE — PLAN OF CARE
[id number]:  A&Ox4.  SAVANNA MARTINEZ.  TELE: SB.  Independent.  Port infusing fluorouracil @ 47.6mL/hr; good blood return noted from port.  Chemo precautions.  Denies pain.  Tolerating regular diet without problems.  Discharge pending after the completion of this cycle of chemotherapy.

## 2023-07-21 NOTE — PROGRESS NOTES
[facility]    Medicine Progress Note - Hospitalist Service    Date of Admission:  7/18/2023    Assessment & Plan     Nadia Ladd is a 67 year old female with a past medical history significant for stage IIIb chronic kidney disease, chron's disease, HTN, and colon cancer resected surgically in April and for which she started FOLFOX chemotherapy 5/31 (which includes oxaliplatin, leucovorin, and 5-fluorouracil), chemotherapy induced tinnitus, paroxysmal atrial fibrillation, pulmonary hypertension, admitted on 7/18/2023 for inpatient administration of second cycle FOLFOX.     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   -Reviewed note from oncology team and interventional radiology as there was concern for port malposition  -CT scan of the abdomen pelvis and chest with contrast was done on 7/18 as per primary oncologist oncologist and subtotal colectomy with ileorectal anastomosis, new new or worsening disease, borderline and mildly enlarged mediastinal and and periportal lymph nodes are unchanged, splenomegaly, tiny pulmonary nodules remain unchanged although still indeterminate due to small size and mosaic attenuation of the lung is nonspecific  -IR was able to fix her access issues   -she has tolerated her chemoinfusion without any side effects and her chemo will draw until afternoon  -Oncology is on board and appreciate input reviewed their note and they are recommending Neulasta on Monday initially but then per addendum no need for neulasta as per primary oncologist     Anemia   Chronic iron deficiency and B12 anemia  Most recent hgb 7/11 up to 13. s/p ferric carboxymaltose 750mg x2  doses 6/14/23 and 6/28/23. She is on po iron PTA.   -Hgb on admission on 7/18 was 11.9 and hemoglobin today on 7/20 is   --appreciate Oncology management      Paroxysmal atrial fibrillation  -Recently pt  Was admitted after presenting with palpitations and being found to have afib with RVR one day after receiving FOLFOX.  Converted with diltiazem drip. She followed up with Cardiology who initiated Eliquis and recommended another trial of FOLFOX on metoprolol. Also notably had hypomagnesemia and metabolic acidosis at time of afib episode   -TSH WNL  -EKG on admission with short MA  -We will continue the patient with PTA metoprolol and Eliquis and continue to monitor on telemetry  --goal K >4, mag >2       CKD III   Baseline Cr around 1.3-1.6. Recently established with Nephrology.   -Creatinine this morning mildly increased to 1.59   -- Continue with sodium bicarbonate and will need to restart IV fluids again today and monitor renal function in the morning     HTN  Previously on lisinopril and hydrochlorothiazide/Tiramterene for HTN which was discontinued due to MARYA. She was seen by Nephrology with plans to allow for -140s for now and consider amlodipine in the future if BP uncontrolled.   --monitor clinically and blood pressure this morning is stable at 115/56 and continue with metoprolol succinate 25 mg daily     Hx hypomagnesemia  -Magnesium this morning is stable  At 1.9 and continue with replacement protocol       Recent gout R great toe   -Completed five days indomethacin 2 weeks ago with resolution           Diet: Regular Diet Adult  Snacks/Supplements Adult: Other; pt may order supplements prn; With Meals    DVT Prophylaxis: DOAC  Clement Catheter: Not present  Lines: PRESENT      Port a Cath 05/25/23 Right Chest wall-Site Assessment: WDL (infusing; good blood return)      Cardiac Monitoring: ACTIVE order. Indication: Tachyarrhythmias, acute (48 hours)  Code Status: Full Code      Clinically Significant  "Risk Factors                  # Hypertension: Noted on problem list        # Obesity: Estimated body mass index is 34.51 kg/m  as calculated from the following:    Height as of this encounter: 1.53 m (5' 0.24\").    Weight as of this encounter: 80.8 kg (178 lb 1.6 oz)., PRESENT ON ADMISSION          Disposition Plan      Expected Discharge Date: 07/22/2023      Destination: home;home with family;home with help/services            Scout Lima MD  Hospitalist Service  [facility]  Securely message with [employer] (more info)  Text page via [employer] Paging/Directory   ______________________________________________________________________    Interval History     Seen this morning and was laying in the bed and mentioned to me that she felt little lightheaded but is eating and drinking.  She denied any nausea or vomiting.  She denied any fluttering sensation in her heart.  I did discuss about results of her labs with the patient and plan to start IV fluids again and she is in agreement and had questions which were answered to satisfaction    I have encouraged her to drink plenty of fluid and she understands      Discussed plan of care with patient's nurse and care coordinator team    Physical Exam   Vital Signs: Temp: 97.5  F (36.4  C) Temp src: Oral BP: 115/56 Pulse: 62   Resp: 16 SpO2: 98 % O2 Device: None (Room air)    Weight: 178 lbs 1.6 oz        General: Pleasant and AOx3  Respiratory: Clear to auscultation with no wheeze  Cardiovascular: Regular rate , S1 and S2 normal with no murmer or rubs or gallops  Abdomen:   soft , non tender , non distended and bowel sound present   Neurologic: no  facial droop on visual inspection  Musculoskeletal: Normal Range of motion over upper and lower extremities bilaterally   Psychiatric: cooperative   Medical Decision Making             Data     I have personally reviewed the following data over the past 24 hrs:    N/A  \   N/A   / N/A     140 105 41.7 (H) /  138 " (H)   4.9 23 1.59 (H) \       Imaging results reviewed over the past 24 hrs:   No results found for this or any previous visit (from the past 24 hour(s)).

## 2023-07-21 NOTE — PROGRESS NOTES
"Hematology-Oncology Follow-up Note  [facility]     Today's Date: 07/21/23  Date of Admission:  7/18/2023  Reason for Consult: Colon cancer      ASSESSMENT/ PLAN :      Colon cancer  Patient of Dr. Fransico Bonilla from our [facility]  Patient developed A-fib with cycle 1 FOLFOX  07/19-inpatient retrial of FOLFOX cycle 2  (without 5-FU bolus and oxaliplatin 64mg/m2 )  Patient is currently on continuous cardiac monitoring  -EKG on admission with short TX  CT chest abdomen pelvis done as per Dr. Bateman's request  08/01-patient scheduled with Dr. Bateman  Patient is finishing today with 5-FU infusion  Plan for discharge home tomorrow  Dr. Soto heme-onc attending recommends Neulasta on Monday  I sent message to Dr. Bateman and the staff to schedule for Neulasta on Monday  Addendum-per  Dr. Bateman no need for Neulasta since patient did not get bolus 5-FU with this cycle      A-fib  Currently on Eliquis and metoprolol  Management per cardiology    Chronic kidney disease  Overall stable creatinine today  Seen by nephrology  IV ordered before the chemo  Management per nephrology       Anemia stable monitor closely      INTERIM HISTORY:  Patient reports she is tolerated second cycle of treatment well she denies any palpitations chest pressure or chest pain shortness of breath,             MEDICATIONS:  Reviewed         PHYSICAL EXAM:  Vital signs:  Temp: 98  F (36.7  C) Temp src: Oral BP: 117/59 Pulse: 59   Resp: 18 SpO2: 97 % O2 Device: None (Room air)   Height: 153 cm (5' 0.24\") Weight: 80.8 kg (178 lb 1.6 oz)  Estimated body mass index is 34.51 kg/m  as calculated from the following:    Height as of this encounter: 1.53 m (5' 0.24\").    Weight as of this encounter: 80.8 kg (178 lb 1.6 oz).      GENERAL/CONSTITUTIONAL: No acute distress.      LABS:  Recent Labs   Lab Test 07/19/23  0625      POTASSIUM 4.4   CHLORIDE 106   CO2 26   ANIONGAP 9   BUN 26.5*   CR 1.54*   GLC 96   LIVIER 9.0     Recent Labs "   Lab Test 07/18/23  1111 07/11/23  1040   WBC 4.5 4.1   HGB 11.9 13.0    217   MCV 87 88   NEUTROPHIL 74 76     Recent Labs   Lab Test 07/18/23  1111 07/11/23  1040   BILITOTAL 0.6 0.7   ALKPHOS 172* 174*   ALT 20 20   AST 24 27   ALBUMIN 3.9 4.1             Petros Villareal, Nurse Practitioner   [facility]   [phone]    Chart documentation with Dragon Voice recognition Software. Although reviewed after completion, some words and grammatical errors may remain.

## 2023-07-22 VITALS
TEMPERATURE: 97.5 F | BODY MASS INDEX: 34.96 KG/M2 | HEART RATE: 66 BPM | RESPIRATION RATE: 18 BRPM | WEIGHT: 178.1 LBS | HEIGHT: 60 IN | OXYGEN SATURATION: 97 % | SYSTOLIC BLOOD PRESSURE: 139 MMHG | DIASTOLIC BLOOD PRESSURE: 69 MMHG

## 2023-07-22 LAB
ANION GAP SERPL CALCULATED.3IONS-SCNC: 12 MMOL/L (ref 7–15)
BUN SERPL-MCNC: 42.4 MG/DL (ref 8–23)
CALCIUM SERPL-MCNC: 8.9 MG/DL (ref 8.8–10.2)
CHLORIDE SERPL-SCNC: 107 MMOL/L (ref 98–107)
CREAT SERPL-MCNC: 1.46 MG/DL (ref 0.51–0.95)
DEPRECATED HCO3 PLAS-SCNC: 22 MMOL/L (ref 22–29)
ERYTHROCYTE [DISTWIDTH] IN BLOOD BY AUTOMATED COUNT: 16 % (ref 10–15)
GFR SERPL CREATININE-BSD FRML MDRD: 39 ML/MIN/1.73M2
GLUCOSE SERPL-MCNC: 95 MG/DL (ref 70–99)
HCT VFR BLD AUTO: 36.3 % (ref 35–47)
HGB BLD-MCNC: 11.5 G/DL (ref 11.7–15.7)
MAGNESIUM SERPL-MCNC: 2.1 MG/DL (ref 1.7–2.3)
MCH RBC QN AUTO: 28.1 PG (ref 26.5–33)
MCHC RBC AUTO-ENTMCNC: 31.7 G/DL (ref 31.5–36.5)
MCV RBC AUTO: 89 FL (ref 78–100)
PLATELET # BLD AUTO: 133 10E3/UL (ref 150–450)
POTASSIUM SERPL-SCNC: 4.3 MMOL/L (ref 3.4–5.3)
RBC # BLD AUTO: 4.09 10E6/UL (ref 3.8–5.2)
SODIUM SERPL-SCNC: 141 MMOL/L (ref 136–145)
WBC # BLD AUTO: 3.3 10E3/UL (ref 4–11)

## 2023-07-22 PROCEDURE — 99239 HOSP IP/OBS DSCHRG MGMT >30: CPT | Performed by: HOSPITALIST

## 2023-07-22 PROCEDURE — 80048 BASIC METABOLIC PNL TOTAL CA: CPT | Performed by: HOSPITALIST

## 2023-07-22 PROCEDURE — 250N000013 HC RX MED GY IP 250 OP 250 PS 637: Performed by: PHYSICIAN ASSISTANT

## 2023-07-22 PROCEDURE — 250N000011 HC RX IP 250 OP 636: Performed by: INTERNAL MEDICINE

## 2023-07-22 PROCEDURE — 85014 HEMATOCRIT: CPT | Performed by: HOSPITALIST

## 2023-07-22 PROCEDURE — 83735 ASSAY OF MAGNESIUM: CPT | Performed by: HOSPITALIST

## 2023-07-22 RX ADMIN — SODIUM BICARBONATE 650 MG TABLET 650 MG: at 08:10

## 2023-07-22 RX ADMIN — APIXABAN 5 MG: 5 TABLET, FILM COATED ORAL at 08:10

## 2023-07-22 RX ADMIN — ACETAMINOPHEN 650 MG: 325 TABLET, FILM COATED ORAL at 13:09

## 2023-07-22 RX ADMIN — Medication 5 ML: at 09:02

## 2023-07-22 RX ADMIN — HEPARIN SODIUM (PORCINE) LOCK FLUSH IV SOLN 100 UNIT/ML 5 ML: 100 SOLUTION at 13:46

## 2023-07-22 RX ADMIN — METOPROLOL SUCCINATE 25 MG: 25 TABLET, EXTENDED RELEASE ORAL at 08:10

## 2023-07-22 RX ADMIN — FERROUS SULFATE TAB 325 MG (65 MG ELEMENTAL FE) 325 MG: 325 (65 FE) TAB at 08:10

## 2023-07-22 ASSESSMENT — ACTIVITIES OF DAILY LIVING (ADL)
ADLS_ACUITY_SCORE: 20

## 2023-07-22 NOTE — DISCHARGE SUMMARY
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    Weight as of this encounter: 80.8 kg (178 lb 1.6 oz).       Follow-ups Needed After Discharge   Follow-up Appointments     Follow-up and recommended labs and tests       Follow up with primary care provider, Nomi Cartwright, within 7   days for hospital follow- up.  The following labs/tests are recommended:   CBC and basic metabolic panel.    Lfollow with your oncologist as scheduled for ongoing chemotherapy        {Additional follow-up instructions/to-do's for PCP        Unresulted Labs Ordered in the Past 30 Days of this Admission     No orders found from 6/18/2023 to 7/19/2023.      These results will be followed up by     Discharge Disposition   Discharged to home  Condition at discharge: Stable    Hospital Course     Nadia Ladd is a 67 year old female with a past medical history significant for stage IIIb chronic kidney disease, chron's disease, HTN, and colon cancer resected surgically in April and for which she was started FOLFOX chemotherapy 5/31 (which includes oxaliplatin, leucovorin, and 5-fluorouracil), postinfusion patient developed atrial fibrillation with RVR and was admitted to the " hospital for close monitoring.  Patient did receive second cycle of FOLFOX without any complications.  During the course of hospital stay she also underwent CT scan of the abdomen and pelvis with contrast on 7/18 and there was no worsening disease and results of the CT scan were communicated to the patient and oncology team is aware of the same.  Also during course of hospital stay initially on presentation her port was not functioning well and IR was consulted.  Her renal function was monitored closely and on day of discharge was at baseline.  Patient tolerated the chemotherapy well and is at baseline and and did mention that she had mild tinnitus.  Her counts during the hospital stay and on day of discharge they were checked and WBC count is low at 3.3, hemoglobin of 11.5 with platelet count of 133 and I discussed with on call oncologist from [facility] on 7/22 and they did recommend that patient should get Neulasta on Monday and they will coordinate with [facility] and patient will be called    On day of discharge patient was in agreement with going home and she will follow-up with her primary care physician and will continue to follow with oncology.  She had questions which were answered to satisfaction    Consultations This Hospital Stay   PHARMACY LIAISON FOR MEDICATION COVERAGE CONSULT  CARE MANAGEMENT / SOCIAL WORK IP CONSULT    Code Status   Full Code    Time Spent on this Encounter   I, Scout Lima MD, personally saw the patient today and spent greater than 30 minutes discharging this patient.       Scout Lima MD  [place] [address] [facility]  [address] [place] [address] AVE, [address]  [facility] [address]  Phone: [phone]  ______________________________________________________________________    Physical Exam   Vital Signs: Temp: 97.5  F (36.4  C) Temp src: Oral BP: 139/69 Pulse: 66   Resp: 18 SpO2: 97 % O2 Device: None (Room air)    Weight: 178 lbs 1.6 oz    General: Pleasant and  AOx3  Respiratory: Clear to auscultation with no wheeze  Cardiovascular: Regular rate ,   Abdomen:   soft , non tender , non distended   Neurologic:no  facial droop on visual inspection  Musculoskeletal: Seen moving upper and lower extremities bilaterally  Psychiatric: cooperative        Primary Care Physician   Nomi Cartwright    Discharge Orders      Reason for your hospital stay    Chemotherapy infusion     Follow-up and recommended labs and tests     Follow up with primary care provider, Nomi Cartwright, within 7 days for hospital follow- up.  The following labs/tests are recommended: CBC and basic metabolic panel.    Lfollow with your oncologist as scheduled for ongoing chemotherapy     Activity    Your activity upon discharge: activity as tolerated     Resume Home Care Services    RN and social work     Diet    Follow this diet upon discharge: Orders Placed This Encounter      Snacks/Supplements Adult: Other; pt may order supplements prn; With Meals      Regular Diet Adult     Infusion Appointment Request       Significant Results and Procedures   Most Recent 3 CBC's:Recent Labs   Lab Test 07/21/23  0929 07/20/23  0621 07/18/23  1111   WBC 5.8 5.0 4.5   HGB 12.2 11.1* 11.9   MCV 88 86 87    154 169     Most Recent 3 BMP's:Recent Labs   Lab Test 07/22/23  0604 07/21/23  0607 07/20/23  0621    140 137   POTASSIUM 4.3 4.9 4.5   CHLORIDE 107 105 102   CO2 22 23 22   BUN 42.4* 41.7* 29.7*   CR 1.46* 1.59* 1.43*   ANIONGAP 12 12 13   LIVIER 8.9 9.4 9.4   GLC 95 138* 168*     Most Recent 2 LFT's:Recent Labs   Lab Test 07/18/23  1111 07/11/23  1040   AST 24 27   ALT 20 20   ALKPHOS 172* 174*   BILITOTAL 0.6 0.7     Most Recent 3 INR's:No lab results found.,   Results for orders placed or performed during the hospital encounter of 07/18/23   CT Chest/Abdomen/Pelvis w Contrast    Narrative    EXAM: CT CHEST/ABDOMEN/PELVIS W CONTRAST  LOCATION: [facility]  DATE:  7/18/2023    INDICATION: ascenidng colon hepatic cancer  COMPARISON: None.  TECHNIQUE: CT scan of the chest, abdomen, and pelvis was performed following injection of IV contrast. Multiplanar reformats were obtained. Dose reduction techniques were used.   CONTRAST: 88  ml Isovue 370    FINDINGS:   LUNGS AND PLEURA: Mosaic attenuation throughout the lungs. Subcentimeter nodules described previously remain unchanged (e.g. 4/85, 95, 157). No new or enlarging nodules. No pleural effusion or pneumothorax.    MEDIASTINUM/AXILLAE: Unchanged borderline and mildly enlarged mediastinal lymph nodes. For example, right paratracheal lymph node measures 1.0 cm short axis (3/48), previously 1.0 cm short axis. Right chest port catheter terminates in the lower SVC.    CORONARY ARTERY CALCIFICATION: None.    HEPATOBILIARY: Normal.    PANCREAS: Normal.    SPLEEN: Enlarged, 15.7 cm craniocaudal.    ADRENAL GLANDS: Normal.    KIDNEYS/BLADDER: Multifocal cortical thinning and scarring. No collecting system dilation. The bladder is mostly collapsed and suboptimally evaluated.    BOWEL: Subtotal colectomy with ileorectal anastomosis. No acute inflammation or obstruction.    LYMPH NODES: Unchanged borderline and mildly enlarged periportal lymph nodes. For example, portacaval lymph node measures 1.2 cm short axis (3/147).    VASCULATURE: Unremarkable.    PELVIC ORGANS: Surgically absent.    MUSCULOSKELETAL: Normal.      Impression    IMPRESSION:  1.  Subtotal colectomy with ileorectal anastomosis. No new or worsening disease.    2.  Borderline and mildly enlarged mediastinal and periportal lymph nodes are unchanged.    3.  Tiny pulmonary nodules remain unchanged, although still indeterminate due to small size. Mosaic attenuation the lungs is nonspecific, but most commonly related to small airways disease.    4.  Splenomegaly.   XR Chest Port 1 View    Narrative    XR CHEST PORT 1 VIEW  7/18/2023 11:22 AM       INDICATION: port  placement  COMPARISON: 5/26/2023       Impression    IMPRESSION: Right IJ port. The lungs are clear. No pneumothorax.    ADIA DENNIS MD         SYSTEM ID:  [id number]   IR Port Check Right    Narrative    IR PORT CHECK RIGHT  7/18/2023 3:52 PM     HISTORY: The patient has a malfunctioning right IJ port.    COMPARISON: Chest x-ray dated 7/18/2023.    FINDINGS: After obtaining informed consent, the patient was placed in  a supine position on the fluoroscopy table. The skin overlying the  port was prepped and draped in the usual sterile manner. On  fluoroscopy, the port was flipped. The port chamber was manipulated  and able to be turned and unflipped. A fluoroscopic image was saved to  confirm this. The port was then accessed using a Yoon needle, flushed  with saline, and heparin locked. The Yoon needle was left in place.    MEDICATIONS: None    Fluoroscopy time: 0.8 minutes    Total fluoroscopy dose: 9.66 mGy Air Kerma      Impression    IMPRESSION: Repositioning of a port by unflipping as above. Yoon  needle left in place. Recommend leaving the Yoon needle in place for  24 hours to help to adhesive port in its current position.    RUBIN BURDICK MD         SYSTEM ID:  [id number]       Discharge Medications   Current Discharge Medication List      CONTINUE these medications which have NOT CHANGED    Details   apixaban ANTICOAGULANT (ELIQUIS ANTICOAGULANT) 5 MG tablet Take 1 tablet (5 mg) by mouth 2 times daily  Qty: 60 tablet, Refills: 11    Associated Diagnoses: Paroxysmal atrial fibrillation with RVR (H)      cyanocobalamin 1000 MCG TBCR Take 1,000 mcg by mouth daily  Qty: 100 tablet, Refills: 1    Associated Diagnoses: Vitamin B12 deficiency disease      Ferrous Sulfate (IRON) 325 (65 Fe) MG tablet Take 1 tablet by mouth daily      magnesium oxide (MAG-OX) 400 MG tablet Take 1 tablet (400 mg) by mouth 2 times daily  Qty: 60 tablet, Refills: 0    Associated Diagnoses: Chronic diarrhea; Hypomagnesemia;  Paroxysmal atrial fibrillation with RVR (H)      metoprolol succinate ER (TOPROL XL) 25 MG 24 hr tablet Take 1 tablet (25 mg) by mouth daily  Qty: 30 tablet, Refills: 1    Associated Diagnoses: Malignant neoplasm of sigmoid colon (H); Paroxysmal atrial fibrillation (H); Premature atrial complex      Multiple Vitamins-Iron (MULTI-DAY PLUS IRON PO) Take 1 tablet by mouth daily      sodium bicarbonate 650 MG tablet Take 1 tablet (650 mg) by mouth 2 times daily  Qty: 120 tablet, Refills: 3    Associated Diagnoses: Chronic diarrhea; Metabolic acidosis, normal anion gap (NAG); Paroxysmal atrial fibrillation with RVR (H); Stage 3b chronic kidney disease (H)         STOP taking these medications       dexamethasone (DECADRON) 4 MG tablet Comments:   Reason for Stopping:             Allergies   Allergies   Allergen Reactions     No Known Drug Allergy

## 2023-07-22 NOTE — PLAN OF CARE
Date&time:07/21/23,1021-4509    Summary:  admitted on 07/18  for inpt C2 folfox due to C1 F U bolus causing Afib.     Primary Diagnosis: Sigmoid cancer, colectomy and ileo rectal anastomosis and partial omentectomy done, CKD3, HTN, anemia, gout    Orientation: A&OX4    Aggression Stop Light: green    Mobility: indep    Pain Management: Tylenol    Diet: Regular    Bowel/Bladder: continent    Abnormal Lab/Assessments: BUN-41.7    Drain/Device/Wound: R Port infusing NS 75 ml/hr, good blood return noted    Consults: hem/onc    D/C Day/Goals/Place: possible tomorrow    VSS, RA.  TELE: SB, occasional irregular rhythm noted.  Chemo precautions maintained.  Denies pain.  No dizziness noted in this shift.On mag protocol-AM draw. Headache managed with PRN Tylenol.

## 2023-07-22 NOTE — PLAN OF CARE
Discharge Note    Patient discharged to home via private vehicle  accompanied by son.  IV: Discontinued  Prescriptions e-prescribed to pharmacy.   Belongings reviewed and sent with patient.   Home medications returned to patient: NA  Equipment sent with: N/A.   patient verbalizes understanding of discharge instructions. AVS given to patient.

## 2023-07-22 NOTE — PLAN OF CARE
[id number]:  A&Ox4.  VSS, RA.  Patient was made independent on previous shift; patient states that she has not had any dizziness since yesterday morning and will call staff to assist her with ambulation if needed.  Port infusing NS @ 75mL/hr.  Chemo precautions maintained.  Tolerating regular diet without problems.  TELE: SB/SD.  Discharge pending progress; patient hoping to go home today.

## 2023-07-24 ENCOUNTER — INFUSION THERAPY VISIT (OUTPATIENT)
Dept: INFUSION THERAPY | Facility: CLINIC | Age: 68
End: 2023-07-24
Attending: INTERNAL MEDICINE
Payer: MEDICARE

## 2023-07-24 ENCOUNTER — PATIENT OUTREACH (OUTPATIENT)
Dept: CARE COORDINATION | Facility: CLINIC | Age: 68
End: 2023-07-24

## 2023-07-24 VITALS
TEMPERATURE: 98.5 F | HEART RATE: 78 BPM | RESPIRATION RATE: 18 BRPM | DIASTOLIC BLOOD PRESSURE: 84 MMHG | SYSTOLIC BLOOD PRESSURE: 148 MMHG | OXYGEN SATURATION: 98 %

## 2023-07-24 DIAGNOSIS — Z76.89 PREVENTION OF CHEMOTHERAPY-INDUCED NEUTROPENIA: ICD-10-CM

## 2023-07-24 DIAGNOSIS — C18.7 MALIGNANT NEOPLASM OF SIGMOID COLON (H): Primary | ICD-10-CM

## 2023-07-24 PROCEDURE — 96372 THER/PROPH/DIAG INJ SC/IM: CPT | Performed by: INTERNAL MEDICINE

## 2023-07-24 PROCEDURE — 96377 APPLICATON ON-BODY INJECTOR: CPT

## 2023-07-24 PROCEDURE — 250N000011 HC RX IP 250 OP 636: Mod: JZ | Performed by: INTERNAL MEDICINE

## 2023-07-24 RX ADMIN — PEGFILGRASTIM 6 MG: KIT SUBCUTANEOUS at 13:18

## 2023-07-24 ASSESSMENT — PAIN SCALES - GENERAL: PAINLEVEL: NO PAIN (0)

## 2023-07-24 NOTE — PROGRESS NOTES
"Infusion Nursing Note:  Nadia Ladd presents today for Neulasta OnPro.    Patient seen by provider today: No   present during visit today: Not Applicable.    Note: Neulasta On Pro placed on left arm.  All questions answered and education provided.  Pt and  deny questions upon leaving.  Vitally stable.  Pt states she has tolerated her last treatment \"much better\" then the first treatment.        Intravenous Access:  No Intravenous access/labs at this visit.    Treatment Conditions:  Not Applicable.      Post Infusion Assessment:    Your On-body Neulasta injector was applied today at 1315.  The injection will start 27 hours after application, at 1615 tomorrow.  Note: the medication will be delivered over 45 minutes.  Before removing the injector, check to see that the light is either solid green or off and that the indicator line is on empty.  If there is a red light on the injector at any time or wetness on the dressing or under the injector after removal, you must report this information.   Call [place]: [phone] during business hours.  Please refer to the written information given to you for additional information on the injector. .       Discharge Plan:   Discharge instructions reviewed with: Patient and Family.  Patient and/or family verbalized understanding of discharge instructions and all questions answered.  AVS to patient via [employer].  Patient will return 8/1/23 for next appointment.   Patient discharged in stable condition accompanied by: .  Departure Mode: Ambulatory.      Jane Vásquez RN  "

## 2023-07-24 NOTE — PROGRESS NOTES
[facility]    Background: Transitional Care Management program identified per system criteria and reviewed by [facility] team for possible outreach.    Assessment: Upon chart review, [facility] Team member will not proceed with patient outreach related to this episode of Transitional Care Management program due to reason below:    Patient has active communication with a nurse, provider or care team for reason of post-hospital follow up plan.  Outreach call by [facility] team not indicated to minimize duplicative efforts.     Per chart review, patient was seen by infusion therapy RN today and followed closely by Oncology care team.      Plan: Transitional Care Management episode addressed appropriately per reason noted above.      Eli Gerber RN  [facility] Resource [facility]    *Connected Care Resource Team does NOT follow patient ongoing. Referrals are identified based on internal discharge reports and the outreach is to ensure patient has an understanding of their discharge instructions.

## 2023-07-27 ENCOUNTER — TELEPHONE (OUTPATIENT)
Dept: INTERNAL MEDICINE | Facility: CLINIC | Age: 68
End: 2023-07-27
Payer: MEDICARE

## 2023-07-27 NOTE — TELEPHONE ENCOUNTER
Home Care is calling regarding an established patient with [facility].       Requesting orders from: Nomi Cartwright  Provider is following patient: Yes  Is this a 60-day recertification request?  Yes    Orders Requested    Skilled Nursing  Request for recertification   Frequency:  1x/wk for 1 wks      Confirmed ok to leave a detailed message with call back.  Contact information confirmed and updated as needed.    Malia Herrera RN

## 2023-07-28 NOTE — TELEPHONE ENCOUNTER
Left detailed message for RN listed with below information:    Is this a 60-day recertification request?  Yes     Orders Requested     Skilled Nursing  Request for recertification   Frequency:  1x/wk for 1 wks        Call back number given.    Ann Ervin CMA (AAMA)

## 2023-07-31 ENCOUNTER — TELEPHONE (OUTPATIENT)
Dept: INTERNAL MEDICINE | Facility: CLINIC | Age: 68
End: 2023-07-31
Payer: MEDICARE

## 2023-07-31 DIAGNOSIS — C18.7 MALIGNANT NEOPLASM OF SIGMOID COLON (H): ICD-10-CM

## 2023-07-31 DIAGNOSIS — I48.0 PAROXYSMAL ATRIAL FIBRILLATION (H): ICD-10-CM

## 2023-07-31 DIAGNOSIS — K52.9 CHRONIC DIARRHEA: ICD-10-CM

## 2023-07-31 DIAGNOSIS — I48.0 PAROXYSMAL ATRIAL FIBRILLATION WITH RVR (H): ICD-10-CM

## 2023-07-31 DIAGNOSIS — E83.42 HYPOMAGNESEMIA: ICD-10-CM

## 2023-07-31 DIAGNOSIS — I49.1 PREMATURE ATRIAL COMPLEX: ICD-10-CM

## 2023-07-31 RX ORDER — LANOLIN ALCOHOL/MO/W.PET/CERES
CREAM (GRAM) TOPICAL
Qty: 60 TABLET | Refills: 0 | Status: SHIPPED | OUTPATIENT
Start: 2023-07-31 | End: 2023-08-29

## 2023-07-31 RX ORDER — METOPROLOL SUCCINATE 25 MG/1
25 TABLET, EXTENDED RELEASE ORAL DAILY
Qty: 30 TABLET | Refills: 1 | Status: SHIPPED | OUTPATIENT
Start: 2023-07-31 | End: 2023-08-03

## 2023-07-31 NOTE — TELEPHONE ENCOUNTER
Home Care is calling regarding an established patient with [facility].       Requesting orders from: Nomi Cartwright  Provider is following patient: Yes  Is this a 60-day recertification request?  Yes    Orders Requested    Skilled Nursing  Request for recertification   Frequency: 1x/wk for 4 wks  1x every other wk for 4 wks   With 2 PRNs    Information was gathered and will be sent to provider for review.  RN will contact Home Care with information after provider review.  Confirmed ok to leave a detailed message with call back.  Contact information confirmed and updated as needed.    Annamarie Davis RN

## 2023-08-01 ENCOUNTER — LAB (OUTPATIENT)
Dept: INFUSION THERAPY | Facility: CLINIC | Age: 68
End: 2023-08-01
Attending: INTERNAL MEDICINE
Payer: MEDICARE

## 2023-08-01 ENCOUNTER — ONCOLOGY VISIT (OUTPATIENT)
Dept: ONCOLOGY | Facility: CLINIC | Age: 68
End: 2023-08-01
Payer: MEDICARE

## 2023-08-01 VITALS
WEIGHT: 177 LBS | BODY MASS INDEX: 34.75 KG/M2 | RESPIRATION RATE: 18 BRPM | TEMPERATURE: 96.4 F | HEIGHT: 60 IN | HEART RATE: 96 BPM | DIASTOLIC BLOOD PRESSURE: 82 MMHG | SYSTOLIC BLOOD PRESSURE: 118 MMHG | OXYGEN SATURATION: 97 %

## 2023-08-01 DIAGNOSIS — I48.91 ATRIAL FIBRILLATION, UNSPECIFIED TYPE (H): ICD-10-CM

## 2023-08-01 DIAGNOSIS — D70.1 CHEMOTHERAPY-INDUCED NEUTROPENIA (H): ICD-10-CM

## 2023-08-01 DIAGNOSIS — C18.7 MALIGNANT NEOPLASM OF SIGMOID COLON (H): Primary | ICD-10-CM

## 2023-08-01 DIAGNOSIS — Z76.89 PREVENTION OF CHEMOTHERAPY-INDUCED NEUTROPENIA: ICD-10-CM

## 2023-08-01 DIAGNOSIS — T45.1X5A CHEMOTHERAPY-INDUCED NEUTROPENIA (H): ICD-10-CM

## 2023-08-01 DIAGNOSIS — D69.59 CHEMOTHERAPY-INDUCED THROMBOCYTOPENIA: ICD-10-CM

## 2023-08-01 DIAGNOSIS — C18.9 MALIGNANT NEOPLASM OF COLON, UNSPECIFIED PART OF COLON (H): ICD-10-CM

## 2023-08-01 DIAGNOSIS — T45.1X5A CHEMOTHERAPY-INDUCED THROMBOCYTOPENIA: ICD-10-CM

## 2023-08-01 LAB
ALBUMIN SERPL BCG-MCNC: 3.8 G/DL (ref 3.5–5.2)
ALP SERPL-CCNC: 207 U/L (ref 35–104)
ALT SERPL W P-5'-P-CCNC: 19 U/L (ref 0–50)
ANION GAP SERPL CALCULATED.3IONS-SCNC: 12 MMOL/L (ref 7–15)
AST SERPL W P-5'-P-CCNC: 26 U/L (ref 0–45)
BASOPHILS # BLD MANUAL: 0 10E3/UL (ref 0–0.2)
BASOPHILS NFR BLD MANUAL: 0 %
BILIRUB SERPL-MCNC: 0.4 MG/DL
BUN SERPL-MCNC: 15.6 MG/DL (ref 8–23)
CALCIUM SERPL-MCNC: 8.7 MG/DL (ref 8.8–10.2)
CHLORIDE SERPL-SCNC: 107 MMOL/L (ref 98–107)
CREAT SERPL-MCNC: 1.37 MG/DL (ref 0.51–0.95)
DEPRECATED HCO3 PLAS-SCNC: 24 MMOL/L (ref 22–29)
EOSINOPHIL # BLD MANUAL: 0 10E3/UL (ref 0–0.7)
EOSINOPHIL NFR BLD MANUAL: 0 %
ERYTHROCYTE [DISTWIDTH] IN BLOOD BY AUTOMATED COUNT: 16 % (ref 10–15)
GFR SERPL CREATININE-BSD FRML MDRD: 42 ML/MIN/1.73M2
GLUCOSE SERPL-MCNC: 104 MG/DL (ref 70–99)
HCT VFR BLD AUTO: 36.9 % (ref 35–47)
HGB BLD-MCNC: 12 G/DL (ref 11.7–15.7)
LYMPHOCYTES # BLD MANUAL: 1.2 10E3/UL (ref 0.8–5.3)
LYMPHOCYTES NFR BLD MANUAL: 12 %
MAGNESIUM SERPL-MCNC: 1.7 MG/DL (ref 1.7–2.3)
MCH RBC QN AUTO: 28.7 PG (ref 26.5–33)
MCHC RBC AUTO-ENTMCNC: 32.5 G/DL (ref 31.5–36.5)
MCV RBC AUTO: 88 FL (ref 78–100)
MONOCYTES # BLD MANUAL: 0.6 10E3/UL (ref 0–1.3)
MONOCYTES NFR BLD MANUAL: 6 %
MYELOCYTES # BLD MANUAL: 0.3 10E3/UL
MYELOCYTES NFR BLD MANUAL: 3 %
NEUTROPHILS # BLD MANUAL: 7.6 10E3/UL (ref 1.6–8.3)
NEUTROPHILS NFR BLD MANUAL: 79 %
PLAT MORPH BLD: ABNORMAL
PLATELET # BLD AUTO: 139 10E3/UL (ref 150–450)
POTASSIUM SERPL-SCNC: 4 MMOL/L (ref 3.4–5.3)
PROT SERPL-MCNC: 6 G/DL (ref 6.4–8.3)
RBC # BLD AUTO: 4.18 10E6/UL (ref 3.8–5.2)
RBC MORPH BLD: ABNORMAL
SODIUM SERPL-SCNC: 143 MMOL/L (ref 136–145)
WBC # BLD AUTO: 9.6 10E3/UL (ref 4–11)

## 2023-08-01 PROCEDURE — 83735 ASSAY OF MAGNESIUM: CPT

## 2023-08-01 PROCEDURE — 99214 OFFICE O/P EST MOD 30 MIN: CPT | Performed by: INTERNAL MEDICINE

## 2023-08-01 PROCEDURE — 85007 BL SMEAR W/DIFF WBC COUNT: CPT | Performed by: INTERNAL MEDICINE

## 2023-08-01 PROCEDURE — 80053 COMPREHEN METABOLIC PANEL: CPT | Performed by: INTERNAL MEDICINE

## 2023-08-01 PROCEDURE — 85014 HEMATOCRIT: CPT | Performed by: INTERNAL MEDICINE

## 2023-08-01 PROCEDURE — 36591 DRAW BLOOD OFF VENOUS DEVICE: CPT | Performed by: INTERNAL MEDICINE

## 2023-08-01 PROCEDURE — 250N000011 HC RX IP 250 OP 636: Mod: JZ | Performed by: INTERNAL MEDICINE

## 2023-08-01 RX ORDER — HEPARIN SODIUM,PORCINE 10 UNIT/ML
5 VIAL (ML) INTRAVENOUS
Status: CANCELLED | OUTPATIENT
Start: 2023-08-01

## 2023-08-01 RX ORDER — ALBUTEROL SULFATE 0.83 MG/ML
2.5 SOLUTION RESPIRATORY (INHALATION)
Status: CANCELLED | OUTPATIENT
Start: 2023-08-01

## 2023-08-01 RX ORDER — DIPHENHYDRAMINE HYDROCHLORIDE 50 MG/ML
50 INJECTION INTRAMUSCULAR; INTRAVENOUS
Status: CANCELLED
Start: 2023-08-01

## 2023-08-01 RX ORDER — ALBUTEROL SULFATE 90 UG/1
1-2 AEROSOL, METERED RESPIRATORY (INHALATION)
Status: CANCELLED
Start: 2023-08-01

## 2023-08-01 RX ORDER — MAGNESIUM SULFATE HEPTAHYDRATE 40 MG/ML
2 INJECTION, SOLUTION INTRAVENOUS ONCE
Status: CANCELLED
Start: 2023-08-02 | End: 2023-08-02

## 2023-08-01 RX ORDER — EPINEPHRINE 1 MG/ML
0.3 INJECTION, SOLUTION INTRAMUSCULAR; SUBCUTANEOUS EVERY 5 MIN PRN
Status: CANCELLED | OUTPATIENT
Start: 2023-08-01

## 2023-08-01 RX ORDER — HEPARIN SODIUM,PORCINE 10 UNIT/ML
5 VIAL (ML) INTRAVENOUS
Status: DISCONTINUED | OUTPATIENT
Start: 2023-08-01 | End: 2023-08-01 | Stop reason: HOSPADM

## 2023-08-01 RX ORDER — HEPARIN SODIUM (PORCINE) LOCK FLUSH IV SOLN 100 UNIT/ML 100 UNIT/ML
5 SOLUTION INTRAVENOUS
Status: CANCELLED | OUTPATIENT
Start: 2023-08-01

## 2023-08-01 RX ORDER — MEPERIDINE HYDROCHLORIDE 25 MG/ML
25 INJECTION INTRAMUSCULAR; INTRAVENOUS; SUBCUTANEOUS EVERY 30 MIN PRN
Status: CANCELLED | OUTPATIENT
Start: 2023-08-01

## 2023-08-01 RX ORDER — METHYLPREDNISOLONE SODIUM SUCCINATE 125 MG/2ML
125 INJECTION, POWDER, LYOPHILIZED, FOR SOLUTION INTRAMUSCULAR; INTRAVENOUS
Status: CANCELLED
Start: 2023-08-01

## 2023-08-01 RX ORDER — HEPARIN SODIUM (PORCINE) LOCK FLUSH IV SOLN 100 UNIT/ML 100 UNIT/ML
5 SOLUTION INTRAVENOUS
Status: DISCONTINUED | OUTPATIENT
Start: 2023-08-01 | End: 2023-08-01 | Stop reason: HOSPADM

## 2023-08-01 RX ADMIN — Medication 5 ML: at 13:12

## 2023-08-01 ASSESSMENT — PAIN SCALES - GENERAL: PAINLEVEL: NO PAIN (0)

## 2023-08-01 NOTE — PROGRESS NOTES
"Infusion Nursing Note:  Nadia Ladd presents today for PORT labs.    Patient seen by provider today: Yes: following PORT labs patient has visit with Dr lerma   present during visit today: Not Applicable.    Note: Upon arrival patient reports that PORT \"flipped\" when she was at [facility] a couple weeks ago.  Upon initial assessment patients PORT was flipped today, Alayna CRYSTAL and Scott PURCELL RNs easily flipped PORT a cath and I was able to draw LABS.  Patient reports no discomfort when PORT flipped.  She will talk to Dr Lerma about this reoccurrence.      Intravenous Access:  Lab draw site right chest wall, Needle type non-coring, Gauge 20 - 1 inch.  Labs drawn without difficulty.  Implanted Port.    Treatment Conditions:  Lab Results   Component Value Date    HGB 12.0 08/01/2023    WBC 9.6 08/01/2023    ANEU 7.6 08/01/2023    ANEUTAUTO 3.3 07/18/2023     (L) 08/01/2023        Lab Results   Component Value Date     08/01/2023    POTASSIUM 4.0 08/01/2023    MAG 1.7 08/01/2023    CR 1.37 (H) 08/01/2023    LIVIER 8.7 (L) 08/01/2023    BILITOTAL 0.4 08/01/2023    ALBUMIN 3.8 08/01/2023    ALT 19 08/01/2023    AST 26 08/01/2023         Post Infusion Assessment:  Access discontinued per protocol.       Discharge Plan:   Patient discharged in stable condition accompanied by: self.  Departure Mode: Ambulatory.      Charlene Stokes RN  "

## 2023-08-01 NOTE — LETTER
8/1/2023         RE: Nadia Ladd  [address]  [facility] [address]        Dear Colleague,    Thank you for referring your patient, Nadia Ladd, to the [facility]. Please see a copy of my visit note below.    [facility]    Hematology/Oncology Established Patient Follow Up Note      Today's Date: 8/1/2023    Reason for follow up: Sigmoid cancer    HISTORY OF PRESENT ILLNESS: Nadia Ladd is a 67 year old female who was referred to the Hematology/Oncology Clinic for sigmoid cancer    Patient has medical history including Crohn's disease on sulfasalazine, anemia secondary to iron deficiency and B12 deficiency, obesity, hypertension, prediabetes, eczema, pulmonary hypertension, hiatal hernia, mild splenomegaly (14.7 cm in 2/23)    - 2/23 pt noted increasing fatigue, found to have iron deficiency anemia w/hgb 6.8 (previously required RBC transfusion when dx w/Crohn's), did have intermittent changes in stool but not persistent (noted minimal blood in stool)   - 2/23 upper endoscopy for iron deficiency anemia and Crohn's disease: Hiatal hernia, normal stomach, normal duodenum  - 2/23 colonoscopy: A frond-like/villous partially obstructing large mass found in sigmoid colon, partially circumferential involving two thirds of the lumen circumference, 4 cm, unable to traverse, with oozing, s/p biopsy  PATHOLOGY:  A.  Stomach, antrum: Biopsy:  - Antral type mucosa with mild chronic inactive gastritis  - Immunostain for Helicobacter pylori is negative      B.  Colon, sigmoid, stricture: Biopsy:  - Invasive poorly differentiated carcinoma  The sigmoid stricture shows a high-grade carcinoma without definitive gland formation.  Given the poorly differentiated appearance, an immunohistochemical panel was performed to exclude the possibility of a tumor by direct extension or metastasis.   Immunohistochemical stains are performed and show the tumor to be diffusely  "positive for CK7 and partially positive for CK20 and SATB2.  There is no significant tumor reactivity for CDX2, GATA3, PAX8, TTF-1, and chromogranin.  Synaptophysin highlights very rare positive cells. Although the CK7/CK20 profile is not entirely typical for a colorectal carcinoma, immunostains for tumors of other origins are negative and a colorectal primary is still favored.   - Mismatch repair:  1) MLH1-loss of nuclear expression  2) MSH2-intact  3) MSH6-intact  4) PMS2-loss of nuclear expression  -MLH1 promoter methylation: NEGATIVE    -2/23 CT CAP:  A) 4 cm length mass in the proximal sigmoid colon. There is some irregularity and stranding in the adjacent pericolonic fat which may indicate tumor extension. There is no obstruction.   B) there is a second probable mass at the hepatic flexure of the colon measuring 2.5 cm in length   C)There are small lymph nodes in the mesial colon adjacent to the hepatic flexure abnormality and the sigmoid colonic mass. No lymphadenopathy by size criteria  D) There is submucosal fatty deposition in the colon, most severe in the distal sigmoid colon and rectum, which can be seen secondary to quiescent inflammatory bowel disease.  E) no liver lesion    -3/23 PET:  a. There is increased FDG avidity of the sigmoid colon with focal lobular wall thickening consistent with biopsy-proven adenocarcinoma.  b. Secondary focus noted at the hepatic flexure demonstrates elevated FDG avidity and lobulated wall thickening highly suspicious for a additional primary.  c. Additionally there is a smaller focus of wall thickening with elevated FDG avidity along the left aspect of the transverse colon  which is suspicious for a possible third focus of colonic malignancy.    -3/23 CEA 6.8  - 3/23 colorectal surgery consultation with - in the setting of Crohn's, at least sigmoid colectomy with possible total abdominal colectomy with either ileorectal anastomosis or end ileostomy (\"rectum " "can remain active and be actively surveyed annually\")    -3/23 genetic counseling, testing for Chan syndrome    - 4/5/2023 gynecologic oncology consultation for risk reduction surgery with hysterectomy and BSO at time of colectomy    -4/6/2023 laparoscopic converted to open total abdominal colectomy with ileorectal anastomosis, partial omentectomy, flexible sigmoidoscopy, TAHBSO  A. OMENTUM, RESECTION:  - Adipose tissue with no significant histopathologic abnormality  - No evidence of malignancy     B. COLON, RESECTION:  Mass #1 (ascending colon/hepatic flexure): Mixed neuroendocrine-non-neuroendocrine neoplasm (MINEN):  - Neuroendocrine carcinoma component (70%) and moderately-differentiated adenocarcinoma component (30%)  - Size = 5.0 cm, invading into muscularis propria  - Positive LVI  - Negative macroscopic tumor perforation, negative PNI  - Negative surgical resection margins  - IHC: Neuroendocrine portion: Negative for chromogranin and INSM1 but strongly express synaptophysin  - IHC: Adenocarcinoma portion: Positive for CK20, CDX2 negative for CK7, PAX8, ER, S100  Ki-67 proliferation index of approximately 80%.  MMR:  Intact nuclear expression of MLH1, MSH2, MHS6, PMS2  PDL1:  COMBINED POSITIVE SCORE (CPS): 55-60  TUMOR PROPORTION SCORE (TPS): 50%   pathogenic KRAS mutation (G13D) was identified (5.2%).  AJCC 8th edition: stage 3B mpT3 pN1a     Mass#2 (sigmoid colon): Poorly-differentiated carcinoma:  - Grade 3  - Size = 5.7 cm, invading into muscularis propria  - Negative for microscopic tumor perforation, LVI, perineural invasion  - Negative surgical resection margins  - IHC: Positive for scar and keratin AE1/AE3, negative for CK7, CK20, CDX2, PAX8, ER, chromogranin, CD56, p40, synaptophysin, S100, INI1, p40, INSM1  Ki-67 proliferation index of approximately 70%.  MMR: loss of nuclear expression MLH1 and PMS2, intact nuclear expression MSH2 and MSH6  PDL1:  COMBINED POSITIVE SCORE (CPS): 80  TUMOR " PROPORTION SCORE (TPS): 70%  pathogenic KRAS mutation (G13D) was identified (4.5%).  AJCC 8th edition: stage 3A mpT2 pN1a    - One of forty-one sampled lymph nodes positive (1/41)  - Benign appendix  - Tubular adenoma    C. UTERUS, CERVIX, BILATERAL FALLOPIAN TUBES & OVARIES, :  - Benign endometrial polyps; atrophic endometrium  - Uterus, cervix, bilateral fallopian tubes, and ovaries with no significant morphologic abnormalities  - No evidence of dysplasia or malignancy     D. SMALL INTESTINE, TERMINAL ILEUM, TERMINAL ILIUM:  - Benign ileum  - No evidence of dysplasia or malignancy  - Negative for metastases to one of one sampled lymph node (0 /1)     E. PROXIMAL ANASTOMOTIC RING:  - Benign small intestine  - No evidence of dysplasia or malignancy     F. DISTAL ANASTOMOTIC RING:  - Benign colon  - No evidence of dysplasia or malignancy    CRC NGS:   --mutations positive for KRAS G13D mutation 5.2% mass 1, KRAS G13D mutation 4.5% mass 2 (negative for AKT1; BRAF; ERBB2; KRAS; NRAS; PIK3CA; PTEN)  --TMB score: 17.064 mut/Mb mass 1 (CPS 55-60, TPS 50%), 60.943 mut/Mb mass 2 (CPS 80, TPS 70%)  --fusion negative including NTRK and RET for mass 1 and 2 (also negative for AKT1, AKT3, ALK, AR, WRFJJQ39, CLAUDINE, BCOR, BRAF, BRD3, BRD4, CAMTA1, CCNB3, CCND1, , CIC, CSF1, CSF1R, CTNNB1, DNAJB1, EGFR, EPC1, ERBB2, ERBB4, ERG, ESR1, ESRRA, ETV1, ETV4, ETV5, ETV6, EWSR1, FGFR1, FGFR2, FGFR3, FGR, FOS, FOSB, FOXO1, FOXO4, FOXR2, FUS, GLI1, GRB7, HMGA2, HRAS, IDH1, IDH2, INSR, JAK2, JAK3, JAZF1, KRAS, MAML2, MAP2K1, MAST1, MAST2, MEAF6, MET, MKL2, MN1, MSMB, MUSK, MYB, MYBL1, MYOD1, NCOA1, NCOA2, NOTCH1, NOTCH2, NR4A3, NRAS, NRG1, NTRK1, NTRK2, NTRK3, NUMBL1, NUTM1, PAX3, PDGFB, PDGFRA, PDGFRB, PHF1, PIK3CA, PKN1, PLAG1, PPARG, PRKACA, PRKCA, PRKCB, RAF1, RELA, RET, ROS1, RPSO2, RSPO3, SS18, STAT6, TAF15, TCF12, TERT, TFE3, TFEB, TFG, THADA, TMPRSS2, USP6, VGLL2, YAP1, YWHAE)    -4/11/2023 patient discharged from hospital,  planning for 30 days of lovenox postop, oxycodone for pain (required 1 unit PRBC for anemia)    -5/8/23 I presented this case at [facility] CRC tumor board and their recommendations include:  - common to see this in Crohn's, overall poor prognosis, treat aggressively, may be poorly responsive to chemotherapy  - standard of care is mFOLFOX 6 x 12 cycles  - acknowledge that pt has high TPS and likely response to immunotherapy however not sufficient data or standard of care in stage 3 adjuvant setting  - add MMR testing to mass #1 hepatic flexure mass    - 5/9/23 admitted for acute renal failure w/Cr 3.82 w/K 7.0    - 5/19/23 second opinion at [facility] w/medical oncologist Dr. Acharya, thorough consultation and recommendations appreciated     - pt would like to proceed with FOFLOX (with dose reduced oxaliplatin due to kidney function)    -5/2023 genetic counseling: Negative for mutations in EPCAM, MLH1, MSH2, MSH6, and PMS2 genes.  Negative for mutations in APC, AXIN2, BMPR1A, CDH1, CHEK2, EPCAM, GREM1, MLH1, MSH2, MSH3, MSH6, MUTYH, NTHL1, PMS2, POLD1, POLE, PTEN, SMAD4, STK11, TP53, CDKN1B, MEN1, NF1, RET, TSC1, TSC2, and VHL genes    - 5/25/23 port placement    - 5/26/23 CT CAP w/ contrast and IVF before and after CT (baseline prior to starting tx):  1.  3 mm nodule RML and 5 mm lateral EDSON nodule, minimally increased from previous  2.  New 4 mm EDSON groundglass density nodule  3.  Several prominent borderline enlarged mediastinal lymph nodes slightly increased from previous  4.  Splenomegaly 14.5 cm  5. S/p subtotal colectomy with ileorectal anastomosis with postsurgical changes along the ventral abdominal wall midline    -5/31/23 started mFOLFOX 6 w/dose reduced oxaliplatin      - plan for C2D1 6/14/23 held due to new onset paroxysmal A-fib with RVR requiring hospitalization 6/3/2023 (also hypoMg, dehydrated)  - 6/19/2023 I presented this case at [facility] colorectal tumor board at the [facility], it is possible that  paroxysmal A-fib may be related to 5-FU.  Options include withholding further treatment versus retrialing 5-FU under the guidance of cardio oncology in an inpatient setting.  No other adjuvant treatment options available, including immunotherapy  - 6/30/2023 consultation with cardio oncologist Dr. Garza; I spoke with Dr. Garza 6/30/2023 as well, [id number] score 3, recommending starting apixaban 5 mg twice daily, okay to retrial FOLFOX while patient is on metoprolol      INTERIM HISTORY:  REGIMEN:  mFOLFOX6  q14 days x12 cycles/6 months  C1D1 5/31/23 (complicated by hospitalization for afib w/RVR C1D4 6/3/23), C2D1 7/19/23 (inpatient w/continuous cardiac monitoring)  oxaliplatin 64mg/m2 day 1 (dose reduced from 85mg/m2 2/2 Cr C1D1)  LV 350mg/m2 day 1  5FU 1200mg/m2 continuous infusion day 1-2 (total 2,400mg/m2 IV over 46-48 hours)  (5FU 400mg/m2 IV push day 1 (discontinued cycle 2 onwards))  PLUS IVF and magnesium level day 8  Emetic risk: moderate  Febrile neutropenia risk: intermediate     7/18/23 CT CAP- subcm lung nodules stable, borderline and mildly enlarged mediastinal LN and periportal LN stable, splenomegaly 15.7cm, Subtotal colectomy with ileorectal anastomosis. No acute inflammation or obstruction     Pt tolerated cycle 2 mFOLFOX6 inpatient well, no reported cardiac events except pt reported sensation of heart fluttering for 3-5 minutes at rest while watching TV- in hospital was told this was PVCs    Treatment related AE:  - hearing changes-described as muffled with intermittent ringing with obscured hearing R>L- overall improved since 1st cycle, Flonase BID as needed, ENT consultation pending 10/23; (present prior to starting chemo, improved w/steroids, now stable and not getting better)   - Paroxysmal A-fib with RVR and PAC- admitted to [facility] 6/3/2023 - 6/5/2023 for this, 6/3/2023 echo EF 55-60%, mild pulmonary hypertension, mild mitral regurgitation, on metoprolol XL 25 mg p.o. daily  for ongoing palpitations, 7/23 Cardio oncology consult w/Dr. Garza, on eliquis, no recurrence of RVR while inpatient for cycle 2 w/continuous cardiac monitoring; pt working w/cardiology team regarding medication cost, of note- coumadin had CLASS D interaction with 5FU chemotherapy, Mg today and repeat day 8  - Delayed neutropenia- neutropenic thierry ANC 0.5 2 weeks out from cycle 1, afebrile, 6/23 DPD deficiency testing negative; cycle 2 onwards 5FU bolus dropped   -Normocytic anemia- due to chemotherapy and iron deficiency, s/p ferric carboxymaltose 750mg x2 doses 6/14/23 and 6/28/23  -Thrombocytopenia- 2/2 chemotherapy   - MARYA on CKD- following w/nephro, Cr improved to 1.23 from 3  - diarrhea- grade 1, C2D4-5, improved with imodium (3 total)   - cold sensitivity- intermittent, grade 1      REVIEW OF SYSTEMS:   A 14 point ROS was reviewed with pertinent positives and negatives in the HPI.        HOME MEDICATIONS:  Current Outpatient Medications   Medication Sig Dispense Refill     apixaban ANTICOAGULANT (ELIQUIS ANTICOAGULANT) 5 MG tablet Take 1 tablet (5 mg) by mouth 2 times daily 60 tablet 11     cyanocobalamin 1000 MCG TBCR Take 1,000 mcg by mouth daily 100 tablet 1     Ferrous Sulfate (IRON) 325 (65 Fe) MG tablet Take 1 tablet by mouth daily       MAGNESIUM OXIDE 400 (240 Mg) MG tablet TAKE 1 TABLET (400 MG) BY MOUTH 2 TIMES DAILY 60 tablet 0     metoprolol succinate ER (TOPROL XL) 25 MG 24 hr tablet TAKE 1 TABLET (25 MG) BY MOUTH DAILY 30 tablet 1     Multiple Vitamins-Iron (MULTI-DAY PLUS IRON PO) Take 1 tablet by mouth daily       sodium bicarbonate 650 MG tablet Take 1 tablet (650 mg) by mouth 2 times daily 120 tablet 3         ALLERGIES:  Allergies   Allergen Reactions     No Known Drug Allergy          PAST MEDICAL HISTORY:  Past Medical History:   Diagnosis Date     Arthropathia 08/05/2010     B12 deficiency anemia 10/21/2010     Benign essential hypertension with target blood pressure below 140/90  10/10/2016     CARDIOVASCULAR SCREENING; LDL GOAL LESS THAN 160 10/31/2010     Crohn's colitis (H) 02/15/2011     Dermatitis-dishydrotic eczema-severe 08/05/2010     Hiatal hernia      HTN (hypertension) 07/21/2011     Iron deficiency anemia 10/21/2010     Malignant neoplasm of sigmoid colon (H)      Obesity      Primary pulmonary hypertension (H) 04/06/2010         PAST SURGICAL HISTORY:  Past Surgical History:   Procedure Laterality Date     COLECTOMY WITHOUT COLOSTOMY N/A 4/6/2023    Procedure: Laparoscopic Converted to Open Total abdominal colectomy;  Surgeon: Jerome Ashley MD;  Location: [facility] OR     COLONOSCOPY  10/11/10     COLONOSCOPY N/A 2/27/2023    Procedure: ATTEMPTED COLONOSCOPY WITH SIGMOID STRICTURE BIOPSY;  Surgeon: Jonathan Alcocer MD;  Location:  GI     ESOPHAGOSCOPY, GASTROSCOPY, DUODENOSCOPY (EGD), COMBINED N/A 2/27/2023    Procedure: ESOPHAGOGASTRODUODENOSCOPY, WITH BIOPSY;  Surgeon: Jonathan Alcocer MD;  Location:  GI     HYSTERECTOMY TOTAL ABDOMINAL, BILATERAL SALPINGO-OOPHORECTOMY, COMBINED N/A 4/6/2023    Procedure: Hysterectomy total abdominal, bilateral salpingo-oophorectomy;  Surgeon: Sunitha Olea MD;  Location: [facility] OR     INSERT PORT VASCULAR ACCESS Right 5/25/2023    Procedure: Ultrasound-guided right internal jugular venous access port placement with fluoroscopy;  Surgeon: Martin Thomas DO;  Location: [facility] OR     IR PORT CHECK RIGHT  7/18/2023     SIGMOIDOSCOPY FLEXIBLE N/A 4/6/2023    Procedure: Sigmoidoscopy flexible;  Surgeon: Jerome Ashley MD;  Location: [facility] OR     SURGICAL HISTORY OF -   06/14/76    Perineorrhaphy, for widening vaginal orifice     ZZC EXPLORATORY OF ABDOMEN  1989    laparoscopy         SOCIAL HISTORY:  Social History     Socioeconomic History     Marital status:      Spouse name: Not on file     Number of children: Not on file     Years of education: Not on file     Highest education level: Not on file   Occupational  "History     Not on file   Tobacco Use     Smoking status: Never     Passive exposure: Current     Smokeless tobacco: Never   Substance and Sexual Activity     Alcohol use: No     Drug use: No     Sexual activity: Yes     Partners: Male   Other Topics Concern     Parent/sibling w/ CABG, MI or angioplasty before 65F 55M? Not Asked   Social History Narrative     Not on file     Social Determinants of Health     Financial Resource Strain: Not on file   Food Insecurity: Not on file   Transportation Needs: Not on file   Physical Activity: Not on file   Stress: Not on file   Social Connections: Not on file   Intimate Partner Violence: Not on file   Housing Stability: Not on file         FAMILY HISTORY:  Family History   Problem Relation Age of Onset     Hypertension Mother         on meds, alive     Cerebrovascular Disease Father         stroke about age 65,  of cancer at 68 yrs     Diabetes Father         eventually took insulin     Anemia Father         Pernisios anemia     Kidney Disease Niece         kidney transplant     Kidney Disease Nephew         kidney transplant     Venous thrombosis No family hx of      Anesthesia Reaction No family hx of          PHYSICAL EXAM:  Vital signs:  /82   Pulse 96   Temp (!) 96.4  F (35.8  C) (Temporal)   Resp 18   Ht 1.53 m (5' 0.24\")   Wt 80.3 kg (177 lb)   LMP  (LMP Unknown)   SpO2 97%   BMI 34.29 kg/m         ECO  GENERAL/CONSTITUTIONAL: No acute distress.  EYES:  Extraocular movements intact without nystagmus.  No scleral icterus.  RESPIRATORY: Equal chest rise.   MUSCULOSKELETAL: Warm and well-perfused, no cyanosis, clubbing, or edema. Right LE always more swollen than left, chronic unchanged.   NEUROLOGIC: Cranial nerves are grossly intact. Alert, oriented to person, place and time, answers questions appropriately.  INTEGUMENTARY: No rashes or jaundice.      LABS:   Latest Reference Range & Units 23 13:31   Sodium 136 - 145 mmol/L 143   Potassium " 3.4 - 5.3 mmol/L 4.0   Chloride 98 - 107 mmol/L 107   Carbon Dioxide (CO2) 22 - 29 mmol/L 24   Urea Nitrogen 8.0 - 23.0 mg/dL 15.6   Creatinine 0.51 - 0.95 mg/dL 1.37 (H)   GFR Estimate >60 mL/min/1.73m2 42 (L)   Calcium 8.8 - 10.2 mg/dL 8.7 (L)   Anion Gap 7 - 15 mmol/L 12   Magnesium 1.7 - 2.3 mg/dL 1.7   Albumin 3.5 - 5.2 g/dL 3.8   Protein Total 6.4 - 8.3 g/dL 6.0 (L)   Alkaline Phosphatase 35 - 104 U/L 207 (H)   ALT 0 - 50 U/L 19   AST 0 - 45 U/L 26   Bilirubin Total <=1.2 mg/dL 0.4   Glucose 70 - 99 mg/dL 104 (H)   WBC 4.0 - 11.0 10e3/uL 9.6   Hemoglobin 11.7 - 15.7 g/dL 12.0   Hematocrit 35.0 - 47.0 % 36.9   Platelet Count 150 - 450 10e3/uL 139 (L)   RBC Count 3.80 - 5.20 10e6/uL 4.18   MCV 78 - 100 fL 88   MCH 26.5 - 33.0 pg 28.7   MCHC 31.5 - 36.5 g/dL 32.5   RDW 10.0 - 15.0 % 16.0 (H)   % Neutrophils % 79   % Lymphocytes % 12   % Monocytes % 6   % Eosinophils % 0   % Basophils % 0   % Myelocytes % 3   Absolute Basophils 0.0 - 0.2 10e3/uL 0.0   Absolute Neutrophil 1.6 - 8.3 10e3/uL 7.6   Absolute Lymphocytes 0.8 - 5.3 10e3/uL 1.2   Absolute Monocytes 0.0 - 1.3 10e3/uL 0.6   Absolute Eosinophils 0.0 - 0.7 10e3/uL 0.0   Absolute Myelocytes <=0.0 10e3/uL 0.3 (H)   RBC Morphology  Confirmed RBC Indices   Platelet Morphology Automated Count Confirmed. Platelet morphology is normal.  Automated Count Confirmed. Platelet morphology is normal.   (H): Data is abnormally high  (L): Data is abnormally low    PATHOLOGY:    IMAGING:  EXAM: CT CHEST/ABDOMEN/PELVIS W CONTRAST  LOCATION: [facility]  DATE: 7/18/2023     INDICATION: ascenidng colon hepatic cancer  COMPARISON: None.  TECHNIQUE: CT scan of the chest, abdomen, and pelvis was performed following injection of IV contrast. Multiplanar reformats were obtained. Dose reduction techniques were used.   CONTRAST: 88  ml Isovue 370     FINDINGS:   LUNGS AND PLEURA: Mosaic attenuation throughout the lungs. Subcentimeter nodules described  previously remain unchanged (e.g. 4/85, 95, 157). No new or enlarging nodules. No pleural effusion or pneumothorax.     MEDIASTINUM/AXILLAE: Unchanged borderline and mildly enlarged mediastinal lymph nodes. For example, right paratracheal lymph node measures 1.0 cm short axis (3/48), previously 1.0 cm short axis. Right chest port catheter terminates in the lower SVC.     CORONARY ARTERY CALCIFICATION: None.     HEPATOBILIARY: Normal.     PANCREAS: Normal.     SPLEEN: Enlarged, 15.7 cm craniocaudal.     ADRENAL GLANDS: Normal.     KIDNEYS/BLADDER: Multifocal cortical thinning and scarring. No collecting system dilation. The bladder is mostly collapsed and suboptimally evaluated.     BOWEL: Subtotal colectomy with ileorectal anastomosis. No acute inflammation or obstruction.     LYMPH NODES: Unchanged borderline and mildly enlarged periportal lymph nodes. For example, portacaval lymph node measures 1.2 cm short axis (3/147).     VASCULATURE: Unremarkable.     PELVIC ORGANS: Surgically absent.     MUSCULOSKELETAL: Normal.                                                                      IMPRESSION:  1.  Subtotal colectomy with ileorectal anastomosis. No new or worsening disease.     2.  Borderline and mildly enlarged mediastinal and periportal lymph nodes are unchanged.     3.  Tiny pulmonary nodules remain unchanged, although still indeterminate due to small size. Mosaic attenuation the lungs is nonspecific, but most commonly related to small airways disease.     4.  Splenomegaly.    ASSESSMENT/PLAN:  Nadia Ladd is a 67 year old female with:      # ascending colon/hepatic flexure Mixed neuroendocrine-non-neuroendocrine neoplasm (MINEN, poorly differentiated neuroendocrine carcinoma and moderately differentiated adenocarcinoma), KRAS G13D mutated, MARISSA, TPS 50%  # sigmoid colon poorly-differentiated carcinoma, KRAS G13D mutated, loss of MLH1 and PMS2, TPS 70%  - 2/23 colonoscopy: A frond-like/villous partially  obstructing large mass found in sigmoid colon, partially circumferential involving two thirds of the lumen circumference, 4 cm, unable to traverse, with oozing, s/p biopsy, PATHOLOGY: Invasive poorly differentiated carcinoma, IHC panel excludes tumors of other origin, colorectal primary is favored, loss of nuclear expression of MLH1 and PMS2, MLH1 promoter methylation negative, [id number] mutation negative  -2/23 CT CAP: Shows known 4 cm proximal sigmoid colon mass with possible tumor extension (irregularity and stranding and adjacent pericolonic fat) without obstruction AND probable second mass 2.5 cm at hepatic flexure of colon; small lymph nodes adjacent to both masses (no lymphadenopathy by size criteria)  -3/23 PET:  a. There is increased FDG avidity of the sigmoid colon with focal lobular wall thickening consistent with biopsy-proven adenocarcinoma.  b. Secondary focus noted at the hepatic flexure demonstrates elevated FDG avidity and lobulated wall thickening highly suspicious for a additional primary.  c. Additionally there is a smaller focus of wall thickening with elevated FDG avidity along the left aspect of the transverse colon which is suspicious for a possible third focus of colonic malignancy.  - 3/23 CEA 6.8  -4/6/2023 laparoscopic converted to open total abdominal colectomy with ileorectal anastomosis, partial omentectomy, flexible sigmoidoscopy, TAHBSO  PATHOLOGY:  Of note, omental resection, terminal ileum, proximal and distal anastomotic rings, uterus, cervix, bilateral fallopian tubes and ovaries negative for malignancy    Mass #1 (ascending colon/hepatic flexure): Mixed neuroendocrine-non-neuroendocrine neoplasm (MINEN):  - Neuroendocrine carcinoma component (70%) and moderately-differentiated adenocarcinoma component (30%)  - Size = 5.0 cm, invading into muscularis propria, Positive LVI  - IHC: Neuroendocrine portion: Negative for chromogranin and INSM1 but strongly express synaptophysin  -  IHC: Adenocarcinoma portion: Positive for CK20, CDX2 negative for CK7, PAX8, ER, S100  Ki-67 proliferation index of approximately 80%.  MARISSA   PDL1:  COMBINED POSITIVE SCORE (CPS): 55-60  TUMOR PROPORTION SCORE (TPS): 50%   pathogenic KRAS mutation (G13D) was identified (5.2%).  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  MMR testing: loss of MLH1 and PMS2  PDL1:  COMBINED POSITIVE SCORE (CPS): 80  TUMOR PROPORTION SCORE (TPS): 70%  pathogenic KRAS mutation (G13D) was identified (4.5%).  MMR testing: loss of MLH1 and PMS2, intact MSH2 and MSH6  AJCC 8th edition: stage 3A mpT2 pN1a    - One of forty-one sampled lymph nodes positive (1/41), d/w pathology- unable to determine which mass is metastatic to LN    - 4/23 MRI brain w/wo contrast LARUENT  - 5/26/23 CT CAP w/ contrast and IVF before and after CT (post op baseline prior to starting tx):  1.  3 mm nodule RML and 5 mm lateral EDSON nodule, minimally increased from previous  2.  New 4 mm EDSON groundglass density nodule  3.  Several prominent borderline enlarged mediastinal lymph nodes slightly increased from previous  4.  Splenomegaly 14.5 cm  5. S/p subtotal colectomy with ileorectal anastomosis with postsurgical changes along the ventral abdominal wall midline  - 5/8/23 I presented this case at [facility] tumor board as above   - 5/19/23 second opinion at [facility] w/medical oncologist Dr. Acharya, thorough consultation and recommendations appreciated, overall agree w/FOLFOX x6 months, possible nivo or ipi nivo in second line; if metastatic platinum etoposide vs ipi nivo  - 5/25/23 port placement    -5/2023 genetic counseling: Negative for  mutations detailed below     REGIMEN:  REGIMEN:  mFOLFOX6  q14 days x12 cycles/6 months  C1D1 5/31/23 (complicated by hospitalization for afib w/RVR C1D4 6/3/23), C2D1 7/19/23 (inpatient w/continuous cardiac monitoring)  oxaliplatin 64mg/m2 day 1 (dose reduced from 85mg/m2 2/2 Cr C1D1)  LV 350mg/m2 day 1  5FU 1200mg/m2 continuous infusion day 1-2 (total 2,400mg/m2 IV over 46-48 hours)  (5FU 400mg/m2 IV push day 1 (discontinued cycle 2 onwards))  PLUS IVF and magnesium level day 8  Emetic risk: moderate  Febrile neutropenia risk: intermediate     C3D1 8/2/23 planned pending labs, outpt    Treatment related AE:  - hearing changes-stable w/dose reduced oxaliplatin, flonase prn, ENT consultation pending 10/23  - Paroxysmal A-fib with RVR and PAC- continue metoprolol, eliquis, weekly IVF and Mg check; Mg 1.7 today- will give 2g IV Mg tomorrow, I do not recommend changing pts eliquis to coumadin given CLASS D interaction w/5FU chemotherapy   - Delayed neutropenia- neutropenic thierry ANC 0.5 2 weeks out from cycle 1, afebrile, 6/23 DPD deficiency testing negative, improved after 5FU bolus dropped w/cycle 2  - Normocytic anemia- due to chemotherapy and iron deficiency, s/p ferric carboxymaltose 750mg x2 doses 6/14/23 and 6/28/23; repeat iron studies normal  -Thrombocytopenia- plt 139K today, 2/2 chemo  - MARYA on CKD- following w/nephro, f/u with nephro 9/23  - diarrhea- grade 1  - cold sensitivity- grade 1    - 7/18/23 CT CAP- subcm lung nodules stable, borderline and mildly enlarged mediastinal LN and periportal LN stable, splenomegaly 15.7cm, Subtotal colectomy with ileorectal anastomosis. No acute inflammation or obstruction    - plan to repeat CT 10/23     - f/u with Dr. Ashley 4/2024 for rigid proctoscopy     #parxosymal afib  - within 3 days of receiving 5FU, prior cardiac risk factors htn, prediabetes  - 6/3/23 presented to ER w/lightheadedness, dizziness, cardioverted s/p diltiazem ggt in ER  - 6/23 DPD deficiency  testing negative  - currently on metoprolol XL 25 mg daily, apixaban 5 mg twice daily  - 6/30/2023 consultation with cardio oncologist Dr. Garza; I spoke with Dr. 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  Negative for mutations in APC, AXIN2, BMPR1A, CDH1, CHEK2, EPCAM, GREM1, MLH1, MSH2, MSH3, MSH6, MUTYH, NTHL1, PMS2, POLD1, POLE, PTEN, SMAD4, STK11, TP53, CDKN1B, MEN1, NF1, RET, TSC1, TSC2, and VHL genes    #Anemia secondary to iron deficiency and B12 deficiency  - terminal ileum removed at time of colon surgery, monitor for nutrient def  - 2/23 hgb 6.8, ferritin 21, iron sat index 10, TIBC 265, iron 27, b12 1493  - On B12 1000 mcg p.o. daily and ferrous sulfate 325 mg p.o. daily  - 4/9/23 s/p 1 uprbcs  - 5/9/23 hgb 11.3,5/30/23 hgb 9.4  - based on Ganzoni equation w/goal hgb 14 and 500mg needed for iron stores, pts iron deficit is 1400 mg  - s/p ferric carboxymaltose 750mg x2 doses 6/14/23 and 6/28/23  - 7/11/23 ferritin 1022, iron sat 22, TIBC 201, iron 44      #Crohn's  - dx since at least 2010     RTC 2 weeks for follow up with RAD[facility], labs, chemo  RTC 4 weeks for follow up with me, labs, chemo      Irene Bateman, DO  Hematology/Oncology  [facility]      Again, thank you for  allowing me to participate in the care of your patient.        Sincerely,        FRANCISCO LEE, DO

## 2023-08-02 ENCOUNTER — HOSPITAL ENCOUNTER (OUTPATIENT)
Dept: GENERAL RADIOLOGY | Facility: CLINIC | Age: 68
Discharge: HOME | End: 2023-08-02
Attending: INTERNAL MEDICINE
Payer: MEDICARE

## 2023-08-02 ENCOUNTER — INFUSION THERAPY VISIT (OUTPATIENT)
Dept: INFUSION THERAPY | Facility: CLINIC | Age: 68
End: 2023-08-02
Attending: INTERNAL MEDICINE
Payer: MEDICARE

## 2023-08-02 ENCOUNTER — TELEPHONE (OUTPATIENT)
Dept: ONCOLOGY | Facility: CLINIC | Age: 68
End: 2023-08-02
Payer: MEDICARE

## 2023-08-02 VITALS
RESPIRATION RATE: 18 BRPM | TEMPERATURE: 98.5 F | HEART RATE: 62 BPM | OXYGEN SATURATION: 97 % | DIASTOLIC BLOOD PRESSURE: 59 MMHG | WEIGHT: 177.3 LBS | SYSTOLIC BLOOD PRESSURE: 139 MMHG | BODY MASS INDEX: 34.35 KG/M2

## 2023-08-02 DIAGNOSIS — Z76.89 PREVENTION OF CHEMOTHERAPY-INDUCED NEUTROPENIA: ICD-10-CM

## 2023-08-02 DIAGNOSIS — C18.7 MALIGNANT NEOPLASM OF SIGMOID COLON (H): Primary | ICD-10-CM

## 2023-08-02 DIAGNOSIS — C18.7 MALIGNANT NEOPLASM OF SIGMOID COLON (H): ICD-10-CM

## 2023-08-02 PROCEDURE — 96375 TX/PRO/DX INJ NEW DRUG ADDON: CPT

## 2023-08-02 PROCEDURE — 96415 CHEMO IV INFUSION ADDL HR: CPT

## 2023-08-02 PROCEDURE — 71046 X-RAY EXAM CHEST 2 VIEWS: CPT

## 2023-08-02 PROCEDURE — 96368 THER/DIAG CONCURRENT INF: CPT

## 2023-08-02 PROCEDURE — 96367 TX/PROPH/DG ADDL SEQ IV INF: CPT

## 2023-08-02 PROCEDURE — 250N000011 HC RX IP 250 OP 636: Performed by: INTERNAL MEDICINE

## 2023-08-02 PROCEDURE — 96413 CHEMO IV INFUSION 1 HR: CPT

## 2023-08-02 PROCEDURE — 258N000003 HC RX IP 258 OP 636: Performed by: INTERNAL MEDICINE

## 2023-08-02 PROCEDURE — 96411 CHEMO IV PUSH ADDL DRUG: CPT

## 2023-08-02 RX ORDER — DEXAMETHASONE 4 MG/1
8 TABLET ORAL DAILY
Qty: 40 TABLET | Refills: 0 | Status: SHIPPED | OUTPATIENT
Start: 2023-08-02 | End: 2023-11-08

## 2023-08-02 RX ORDER — ONDANSETRON 2 MG/ML
8 INJECTION INTRAMUSCULAR; INTRAVENOUS ONCE
Status: COMPLETED | OUTPATIENT
Start: 2023-08-02 | End: 2023-08-02

## 2023-08-02 RX ORDER — MAGNESIUM SULFATE HEPTAHYDRATE 40 MG/ML
2 INJECTION, SOLUTION INTRAVENOUS ONCE
Status: COMPLETED | OUTPATIENT
Start: 2023-08-02 | End: 2023-08-02

## 2023-08-02 RX ADMIN — LEUCOVORIN CALCIUM 650 MG: 350 INJECTION, POWDER, LYOPHILIZED, FOR SOLUTION INTRAMUSCULAR; INTRAVENOUS at 13:13

## 2023-08-02 RX ADMIN — OXALIPLATIN 120 MG: 5 INJECTION, SOLUTION INTRAVENOUS at 13:09

## 2023-08-02 RX ADMIN — MAGNESIUM SULFATE HEPTAHYDRATE 2 G: 40 INJECTION, SOLUTION INTRAVENOUS at 11:34

## 2023-08-02 RX ADMIN — DEXTROSE MONOHYDRATE 250 ML: 50 INJECTION, SOLUTION INTRAVENOUS at 12:25

## 2023-08-02 RX ADMIN — ONDANSETRON 8 MG: 2 INJECTION INTRAMUSCULAR; INTRAVENOUS at 12:29

## 2023-08-02 RX ADMIN — FOSAPREPITANT: 150 INJECTION, POWDER, LYOPHILIZED, FOR SOLUTION INTRAVENOUS at 12:32

## 2023-08-02 ASSESSMENT — PAIN SCALES - GENERAL: PAINLEVEL: MILD PAIN (2)

## 2023-08-02 NOTE — PROGRESS NOTES
Medical Oncology Update:    Pt reported port was flipped yesterday and repositioned in infusion.    CXR done this morning to confirm position of port prior to chemotherapy showed:  Right internal jugular port with the tip at the junction  of the right brachiocephalic vein and subclavian vein. The port  appears to have been withdrawn approximately 5 cm in the interval  since CT 7/18/2023    I spoke with IR team at [facility]- we reviewed prior imaging and todays imaging. There are differences in the position in which Xrays and Cts have been done so it is difficult to directly compare but the tip of the catheter is above the atriocaval junction/SVC. It is still within a central vein and can be utilized to give treatment today as long as it is functioning but ultimately needs to be replaced (perhaps higher up in the chest wall, rather than its current position closer to the breast tissue).    I spoke with infusion nursing, port is functioning well.  Pt has had neutropenia in the past, so port replacement will need to be timed to patient's neutropenic thierry.  Pt to come in on day 8 for labs and hopefully coordinate port replacement close to start of next cycle of chemotherapy to avoid neutropenic thierry.       Irene Bateman D.O.  Hematology/Oncology  [facility]

## 2023-08-02 NOTE — TELEPHONE ENCOUNTER
I have attempted to contact this patient by phone with the following results: left message to return my call on answering machine. I did also send a arviem AGhart message regarding documentation in below note.    Jelly RODRIGUEZ [facility]  [phone]

## 2023-08-02 NOTE — TELEPHONE ENCOUNTER
I have attempted to contact this patient by phone with the following results: left message to return my call on answering machine.    Per Fransico's message below I called pt and relayed on vm to come in earlier to get the chest xray done before her infusion. I did relay to please call me to let me know she received this message.     good morning- for whenever you start your day, i made an error yesterday. i forgot to order a CXR for paulina ruiz. she told me her port flipped and the infusion nurses fixed it? usually this is done by IR. she needs a CXR prior to starting chemo to confirm port position. i have ordered it this morning as STAT. sorry about that.       Jelly Champagne  [facility]  [phone]

## 2023-08-02 NOTE — PROGRESS NOTES
Infusion Nursing Note:  Nadia Ladd presents today for C3D1 Folfox.    Patient seen by provider today: No. Had Oncology visit with Dr. Bateman yesterday.   present during visit today: Not Applicable.    Note: Patient arrives ambulatory with spouse present. Had labs drawn yesterday. Went to imaging for CXR to check port placement due to recent issues and needing to be flipped back into position yesterday. Pt has slight tenderness to touch with port otherwise no pain or swelling. See provider visit note from yesterday.   Dr. Bateman did call Infusion today to inquire about pt's port and discuss CXR results.   Port was accessed without difficulty, seems to be in good position. Good blood return, no swelling or pain noted with flushing. This was communicated to Dr. Bateman. She discussed today's XR results with IR at [facility] and was determined that we will proceed with chemo today and plan to have new Port placement in near future if patient is not neutropenic next week. This was communicated to patient. She is agreeable with proceeding today but mildly concerned since her Port has been having problems. She just wants everything to be ok. Education was given on when to call if she is experiencing any pain around or above port site, edema, erythema or abnormal drainage. She states understanding.   Proceeded with chemo today and Mg replacement as ordered by Dr. Bateman.       Intravenous Access:  Implanted Port.  See above note.     Treatment Conditions:  Lab Results   Component Value Date    HGB 12.0 08/01/2023    WBC 9.6 08/01/2023    ANEU 7.6 08/01/2023    ANEUTAUTO 3.3 07/18/2023     (L) 08/01/2023        Lab Results   Component Value Date     08/01/2023    POTASSIUM 4.0 08/01/2023    MAG 1.7 08/01/2023    CR 1.37 (H) 08/01/2023    LIVIER 8.7 (L) 08/01/2023    BILITOTAL 0.4 08/01/2023    ALBUMIN 3.8 08/01/2023    ALT 19 08/01/2023    AST 26 08/01/2023       Results reviewed, labs MET treatment  "parameters, ok to proceed with treatment.      Post Infusion Assessment:  Patient tolerated infusion without incident.  Blood return noted pre and post infusion and prior to each med administration.   Site patent and intact, free from redness, edema or discomfort.  No evidence of extravasations.    Prior to discharge: Port is secured in place with tegaderm and flushed with 10cc NS with positive blood return noted.    Continuous home infusion CADD pump connected.    All connectors secured in place and clamps taped open.    Pump started, \"running\" noted on display (CADD): YES.   Capillary element taped to pt's skin per protocol.  Pump Connection double checked with Marycarmen Cardenas RN.  Patient instructed to call our clinic or [facility] with any questions or concerns at home.  Patient verbalized understanding.    Patient set up for pump disconnect at home with [facility] on 8/4 @ 1pm. In basket message sent to [facility] team.  Pt aware of pump disconnect date/time.        Discharge Plan:   AVS to patient via MYCHART.  Patient will return 8/11 for next appointment. Pt is picking up Dexamethasone prescription at Pharmacy to take as directed starting tomorrow.   Patient discharged in stable condition accompanied by: .  Departure Mode: Ambulatory.      Rosalina Argueta RN  "

## 2023-08-02 NOTE — TELEPHONE ENCOUNTER
Spoke with Eli in infusion and informed her about the xray. She made note of it and asked if I would inform radiology to push it through as a stat read and inform Dr. Bateman right away so infusion doesn't get pushed behind schedule. Spoke with radiology and they put her on the schedule and made note of this.    Jelly RODRIGUEZ [facility]  [phone]            Pt called back and will be here right around 10:00 to get chest xray done first.    Jelly RODRIGUEZ [facility]  [phone]

## 2023-08-03 ENCOUNTER — OFFICE VISIT (OUTPATIENT)
Dept: CARDIOLOGY | Facility: CLINIC | Age: 68
End: 2023-08-03
Payer: MEDICARE

## 2023-08-03 ENCOUNTER — TELEPHONE (OUTPATIENT)
Dept: INTERVENTIONAL RADIOLOGY/VASCULAR | Facility: CLINIC | Age: 68
End: 2023-08-03

## 2023-08-03 VITALS
BODY MASS INDEX: 35.38 KG/M2 | HEIGHT: 60 IN | OXYGEN SATURATION: 96 % | HEART RATE: 58 BPM | WEIGHT: 180.2 LBS | DIASTOLIC BLOOD PRESSURE: 70 MMHG | SYSTOLIC BLOOD PRESSURE: 139 MMHG

## 2023-08-03 DIAGNOSIS — I10 BENIGN ESSENTIAL HYPERTENSION WITH TARGET BLOOD PRESSURE BELOW 140/90: ICD-10-CM

## 2023-08-03 DIAGNOSIS — I48.0 PAROXYSMAL ATRIAL FIBRILLATION WITH RVR (H): Primary | ICD-10-CM

## 2023-08-03 PROCEDURE — 99214 OFFICE O/P EST MOD 30 MIN: CPT | Mod: 25 | Performed by: NURSE PRACTITIONER

## 2023-08-03 PROCEDURE — 93000 ELECTROCARDIOGRAM COMPLETE: CPT | Performed by: NURSE PRACTITIONER

## 2023-08-03 RX ORDER — METOPROLOL SUCCINATE 25 MG/1
25 TABLET, EXTENDED RELEASE ORAL DAILY
Qty: 90 TABLET | Refills: 3 | Status: SHIPPED | OUTPATIENT
Start: 2023-08-03 | End: 2023-08-15

## 2023-08-03 RX ORDER — DILTIAZEM HYDROCHLORIDE 30 MG/1
30 TABLET, FILM COATED ORAL 3 TIMES DAILY PRN
Qty: 15 TABLET | Refills: 1 | Status: SHIPPED | OUTPATIENT
Start: 2023-08-03 | End: 2023-08-15

## 2023-08-03 NOTE — PROGRESS NOTES
HPI:  Nadia Ladd is a 67 year old female with stage IIIb chronic kidney disease and colon cancer resected surgically in April 2023 and for which she started FOLFOX chemotherapy 5/31/23 presented with 1 day history of dizziness.  She was diagnosed with 2 different types of colon cancer.  One was in hepatic flexure which is poorly differentiated adenocarcinoma. She also has sigmoid tumor. She follows with Dr. Bateman at [facility].  A second opinion with Dr. Viet Acharya at [facility] and both of them recommended adjuvant chemotherapy with FOLFOX.     She was seen in consultation by Dr. Garza on 6/30/2023.  She had her first infusion of chemotherapy with FOLFOX on 6/2/2023.  She then presented to the emergency department with lightheadedness and vertigo.  She developed new onset atrial fibrillation with RVR.  Given IV diltiazem and admitted to [facility].  She converted to sinus rhythm and was discharged on metoprolol ER.  Due to her [id number] score of 3 (gender, age, HTN) she was started on Eliquis 5 mg twice daily.  Dr. Garza was interested to see if the patient tolerated the second infusion of chemo with Folfox on metoprolol therapy.  She is here today for follow-up      Currently she is doing well.  She received her second round of chemo/FOLFOX yesterday.  She has had slight increase in her palpitations, but no A-fib.  Blood pressure was initially elevated today.  Her blood pressure yesterday following her chemo was 139 per patient.    Denies chest pain, orthopnea, PND, syncope, lower extremity edema.  Does have some tingling in her hands.      Diagnostics:  Echo 6/5/2023: EF 55 to 60%, 1+ MR, normal left atrial size.  Echocardiogram 4/2023.  Normal EF.  Normal atrial size.  No significant valvular insufficiency.  EKG today shows sinus rhythm 61 bpm with PAC    Plan:   1.  New onset paroxysmal atrial fibrillation with RVR  This was a first-time episode after the patient started chemo  with FOLFOX therapy  Echocardiogram shows normal LV function with normal atrial size.  Now on metoprolol ER 25 mg daily heart rate in the 60s.  We will add diltiazem 30 mg as needed every 4-6 hours for palpitations or A-fib with RVR.      2. [id number] score 3  Continue Eliquis 5 mg twice daily  Platelets 139, hemoglobin 12, creatinine 1.37 on 8/1/2023.  Patient is unable to afford Eliquis.  Had to pay out-of-pocket.  I did get her in touch with our patient assistance program to see if we can get her free Eliquis through the manufactures program.    3. HTN  On metoprolol     4. Colon cancer   following    5. Anemia    I would like to see her back in 3 months.  If she has questions or concerns she will contact us.  I will have her follow-up with cardiology in the spring.  We briefly discussed long-term commitment to anticoagulation therapy since the patient has no history of A-fib.  It could have definitely been aggravated by her chemo.  I would recommend that we keep her on Eliquis for now.  She needs to receive 12 infusions which are every 2 weeks.    Thank you for including us in her care.  Feel free to contact us if you have questions or concerns regarding today's assessment and plan.  I spent 30 minutes today reviewing her diagnostic tests, discussing plan regarding her atrial fibrillation.     Jane Jolley NP, APRN CNP        Today's clinic visit entailed:  Review of the result(s) of each unique test - EKG  I spent a total of 30 minutes on the day of the visit.   Time spent by me doing chart review, history and exam, documentation and further activities per the note  Provider  Link to [facility] Help Grid     The level of medical decision making during this visit was of moderate complexity.      Orders Placed This Encounter   Procedures    Follow-Up with Cardiology [facility]    EKG 12-lead complete w/read - Clinics (performed today)       Orders Placed This Encounter   Medications    diltiazem (CARDIZEM) 30 MG  tablet     Sig: Take 1 tablet (30 mg) by mouth 3 times daily as needed (take for afib or increaed palpitations)     Dispense:  15 tablet     Refill:  1    metoprolol succinate ER (TOPROL XL) 25 MG 24 hr tablet     Sig: Take 1 tablet (25 mg) by mouth daily     Dispense:  90 tablet     Refill:  3       Medications Discontinued During This Encounter   Medication Reason    metoprolol succinate ER (TOPROL XL) 25 MG 24 hr tablet Reorder (No AVS)         Encounter Diagnoses   Name Primary?    Paroxysmal atrial fibrillation with RVR (H)     Malignant neoplasm of sigmoid colon (H)     Premature atrial complex     Paroxysmal atrial fibrillation (H)        CURRENT MEDICATIONS:  Current Outpatient Medications   Medication Sig Dispense Refill    apixaban ANTICOAGULANT (ELIQUIS ANTICOAGULANT) 5 MG tablet Take 1 tablet (5 mg) by mouth 2 times daily 60 tablet 11    cyanocobalamin 1000 MCG TBCR Take 1,000 mcg by mouth daily 100 tablet 1    dexamethasone (DECADRON) 4 MG tablet Take 2 tablets (8 mg) by mouth daily Take for 2 days, starting the day after chemo. Take with food. 40 tablet 0    diltiazem (CARDIZEM) 30 MG tablet Take 1 tablet (30 mg) by mouth 3 times daily as needed (take for afib or increaed palpitations) 15 tablet 1    Ferrous Sulfate (IRON) 325 (65 Fe) MG tablet Take 1 tablet by mouth daily      MAGNESIUM OXIDE 400 (240 Mg) MG tablet TAKE 1 TABLET (400 MG) BY MOUTH 2 TIMES DAILY 60 tablet 0    metoprolol succinate ER (TOPROL XL) 25 MG 24 hr tablet Take 1 tablet (25 mg) by mouth daily 90 tablet 3    Multiple Vitamins-Iron (MULTI-DAY PLUS IRON PO) Take 1 tablet by mouth daily      sodium bicarbonate 650 MG tablet Take 1 tablet (650 mg) by mouth 2 times daily 120 tablet 3       ALLERGIES     Allergies   Allergen Reactions    No Known Drug Allergy        PAST MEDICAL HISTORY:  Past Medical History:   Diagnosis Date    Arthropathia 08/05/2010    B12 deficiency anemia 10/21/2010    Benign essential hypertension with target  blood pressure below 140/90 10/10/2016    CARDIOVASCULAR SCREENING; LDL GOAL LESS THAN 160 10/31/2010    Crohn's colitis (H) 02/15/2011    Dermatitis-dishydrotic eczema-severe 2010    Hiatal hernia     HTN (hypertension) 2011    Iron deficiency anemia 10/21/2010    Malignant neoplasm of sigmoid colon (H)     Obesity     Primary pulmonary hypertension (H) 2010       PAST SURGICAL HISTORY:  Past Surgical History:   Procedure Laterality Date    COLECTOMY WITHOUT COLOSTOMY N/A 2023    Procedure: Laparoscopic Converted to Open Total abdominal colectomy;  Surgeon: Jerome Ashley MD;  Location: [facility] OR    COLONOSCOPY  10/11/10    COLONOSCOPY N/A 2023    Procedure: ATTEMPTED COLONOSCOPY WITH SIGMOID STRICTURE BIOPSY;  Surgeon: Jonathan Alcocer MD;  Location:  GI    ESOPHAGOSCOPY, GASTROSCOPY, DUODENOSCOPY (EGD), COMBINED N/A 2023    Procedure: ESOPHAGOGASTRODUODENOSCOPY, WITH BIOPSY;  Surgeon: Jonathan Alcocer MD;  Location:  GI    HYSTERECTOMY TOTAL ABDOMINAL, BILATERAL SALPINGO-OOPHORECTOMY, COMBINED N/A 2023    Procedure: Hysterectomy total abdominal, bilateral salpingo-oophorectomy;  Surgeon: Sunitha Olea MD;  Location: [facility] OR    INSERT PORT VASCULAR ACCESS Right 2023    Procedure: Ultrasound-guided right internal jugular venous access port placement with fluoroscopy;  Surgeon: Martin Thomas DO;  Location: [facility] OR    IR PORT CHECK RIGHT  2023    SIGMOIDOSCOPY FLEXIBLE N/A 2023    Procedure: Sigmoidoscopy flexible;  Surgeon: Jerome Ashley MD;  Location: [facility] OR    SURGICAL HISTORY OF -   77    Perineorrhaphy, for widening vaginal orifice    Z EXPLORATORY OF ABDOMEN      laparoscopy       FAMILY HISTORY:  Family History   Problem Relation Age of Onset    Hypertension Mother         on meds, alive    Cerebrovascular Disease Father         stroke about age 65,  of cancer at 68 yrs    Diabetes Father         eventually  "took insulin    Anemia Father         Pernisios anemia    Kidney Disease Niece         kidney transplant    Kidney Disease Nephew         kidney transplant    Venous thrombosis No family hx of     Anesthesia Reaction No family hx of        SOCIAL HISTORY:  Social History     Socioeconomic History    Marital status:      Spouse name: None    Number of children: None    Years of education: None    Highest education level: None   Tobacco Use    Smoking status: Never     Passive exposure: Current    Smokeless tobacco: Never   Substance and Sexual Activity    Alcohol use: No    Drug use: No    Sexual activity: Yes     Partners: Male       Review of Systems:  Skin:          Eyes:         ENT:         Respiratory:  Negative shortness of breath;cough;wheezing     Cardiovascular:  Negative;chest pain;lightheadedness;dizziness;syncope or near-syncope Positive for;palpitations;edema    Gastroenterology:        Genitourinary:         Musculoskeletal:         Neurologic:  Positive for numbness or tingling of hands    Psychiatric:         Heme/Lymph/Imm:         Endocrine:           Physical Exam:  Vitals: /70   Pulse 58   Ht 1.53 m (5' 0.24\")   Wt 81.7 kg (180 lb 3.2 oz)   LMP  (LMP Unknown)   SpO2 96%   BMI 34.91 kg/m      Constitutional:  cooperative, alert and oriented, well developed, well nourished, in no acute distress        Skin:  warm and dry to the touch;no apparent skin lesions or masses noted          Head:  normocephalic, no masses or lesions        Eyes:  pupils equal and round        ENT:  no pallor or cyanosis, dentition good        Neck:  JVP normal        Respiratory:  normal breath sounds, clear to auscultation, normal A-P diameter, normal symmetry, normal respiratory excursion, no use of accessory muscles         Cardiac: regular rhythm;normal S1 and S2;no murmurs, gallops or rubs detected                not assessed this visit                                        GI:  not assessed this " visit        Extremities and Muscular Skeletal:  no deformities, clubbing, cyanosis, erythema observed;no edema              Neurological:  no gross motor deficits        Psych:  Alert and Oriented x 3        CC  Israel Garza MD  [id number]  [id number]  [place] [id number]

## 2023-08-03 NOTE — PATIENT INSTRUCTIONS
Call my nurse with any questions or concerns:  [phone] or [phone]  *If you have concerns after hours, please call [phone], option 2 to speak with on call Cardiologist.    1. Medication changes from today:    Diltiazem 30 mg as needed for increased palpitations or afib. OK to take 1 tablet every 4-6 hours

## 2023-08-03 NOTE — LETTER
8/3/2023    Nomi Cartwright, DO  [address] [facility]   [place] [address]    RE: Nadia Ladd       Dear Colleague,     I had the pleasure of seeing Nadia Ladd in the [facility].  HPI:  Nadia Ladd is a 67 year old female with stage IIIb chronic kidney disease and colon cancer resected surgically in April 2023 and for which she started FOLFOX chemotherapy 5/31/23 presented with 1 day history of dizziness.  She was diagnosed with 2 different types of colon cancer.  One was in hepatic flexure which is poorly differentiated adenocarcinoma. She also has sigmoid tumor. She follows with Dr. Bateman at [facility].  A second opinion with Dr. Viet cAharya at [facility] and both of them recommended adjuvant chemotherapy with FOLFOX.     She was seen in consultation by Dr. Garza on 6/30/2023.  She had her first infusion of chemotherapy with FOLFOX on 6/2/2023.  She then presented to the emergency department with lightheadedness and vertigo.  She developed new onset atrial fibrillation with RVR.  Given IV diltiazem and admitted to [facility].  She converted to sinus rhythm and was discharged on metoprolol ER.  Due to her [id number] score of 3 (gender, age, HTN) she was started on Eliquis 5 mg twice daily.  Dr. Garza was interested to see if the patient tolerated the second infusion of chemo with Folfox on metoprolol therapy.  She is here today for follow-up      Currently she is doing well.  She received her second round of chemo/FOLFOX yesterday.  She has had slight increase in her palpitations, but no A-fib.  Blood pressure was initially elevated today.  Her blood pressure yesterday following her chemo was 139 per patient.    Denies chest pain, orthopnea, PND, syncope, lower extremity edema.  Does have some tingling in her hands.      Diagnostics:  Echo 6/5/2023: EF 55 to 60%, 1+ MR, normal left atrial size.  Echocardiogram 4/2023.  Normal EF.  Normal atrial size.  No  significant valvular insufficiency.  EKG today shows sinus rhythm 61 bpm with PAC    Plan:   1.  New onset paroxysmal atrial fibrillation with RVR  This was a first-time episode after the patient started chemo with FOLFOX therapy  Echocardiogram shows normal LV function with normal atrial size.  Now on metoprolol ER 25 mg daily heart rate in the 60s.  We will add diltiazem 30 mg as needed every 4-6 hours for palpitations or A-fib with RVR.      2. [id number] score 3  Continue Eliquis 5 mg twice daily  Platelets 139, hemoglobin 12, creatinine 1.37 on 8/1/2023.  Patient is unable to afford Eliquis.  Had to pay out-of-pocket.  I did get her in touch with our patient assistance program to see if we can get her free Eliquis through the manufactures program.    3. HTN  On metoprolol     4. Colon cancer   following    5. Anemia    I would like to see her back in 3 months.  If she has questions or concerns she will contact us.  I will have her follow-up with cardiology in the spring.  We briefly discussed long-term commitment to anticoagulation therapy since the patient has no history of A-fib.  It could have definitely been aggravated by her chemo.  I would recommend that we keep her on Eliquis for now.  She needs to receive 12 infusions which are every 2 weeks.    Thank you for including us in her care.  Feel free to contact us if you have questions or concerns regarding today's assessment and plan.  I spent 30 minutes today reviewing her diagnostic tests, discussing plan regarding her atrial fibrillation.     Jane Jolley, NP, APRN CNP        Today's clinic visit entailed:  Review of the result(s) of each unique test - EKG  I spent a total of 30 minutes on the day of the visit.   Time spent by me doing chart review, history and exam, documentation and further activities per the note  Provider  Link to [facility] Help Grid     The level of medical decision making during this visit was of moderate  complexity.      Orders Placed This Encounter   Procedures    Follow-Up with Cardiology RADHA    EKG 12-lead complete w/read - Clinics (performed today)       Orders Placed This Encounter   Medications    diltiazem (CARDIZEM) 30 MG tablet     Sig: Take 1 tablet (30 mg) by mouth 3 times daily as needed (take for afib or increaed palpitations)     Dispense:  15 tablet     Refill:  1    metoprolol succinate ER (TOPROL XL) 25 MG 24 hr tablet     Sig: Take 1 tablet (25 mg) by mouth daily     Dispense:  90 tablet     Refill:  3       Medications Discontinued During This Encounter   Medication Reason    metoprolol succinate ER (TOPROL XL) 25 MG 24 hr tablet Reorder (No AVS)         Encounter Diagnoses   Name Primary?    Paroxysmal atrial fibrillation with RVR (H)     Malignant neoplasm of sigmoid colon (H)     Premature atrial complex     Paroxysmal atrial fibrillation (H)        CURRENT MEDICATIONS:  Current Outpatient Medications   Medication Sig Dispense Refill    apixaban ANTICOAGULANT (ELIQUIS ANTICOAGULANT) 5 MG tablet Take 1 tablet (5 mg) by mouth 2 times daily 60 tablet 11    cyanocobalamin 1000 MCG TBCR Take 1,000 mcg by mouth daily 100 tablet 1    dexamethasone (DECADRON) 4 MG tablet Take 2 tablets (8 mg) by mouth daily Take for 2 days, starting the day after chemo. Take with food. 40 tablet 0    diltiazem (CARDIZEM) 30 MG tablet Take 1 tablet (30 mg) by mouth 3 times daily as needed (take for afib or increaed palpitations) 15 tablet 1    Ferrous Sulfate (IRON) 325 (65 Fe) MG tablet Take 1 tablet by mouth daily      MAGNESIUM OXIDE 400 (240 Mg) MG tablet TAKE 1 TABLET (400 MG) BY MOUTH 2 TIMES DAILY 60 tablet 0    metoprolol succinate ER (TOPROL XL) 25 MG 24 hr tablet Take 1 tablet (25 mg) by mouth daily 90 tablet 3    Multiple Vitamins-Iron (MULTI-DAY PLUS IRON PO) Take 1 tablet by mouth daily      sodium bicarbonate 650 MG tablet Take 1 tablet (650 mg) by mouth 2 times daily 120 tablet 3       ALLERGIES      Allergies   Allergen Reactions    No Known Drug Allergy        PAST MEDICAL HISTORY:  Past Medical History:   Diagnosis Date    Arthropathia 08/05/2010    B12 deficiency anemia 10/21/2010    Benign essential hypertension with target blood pressure below 140/90 10/10/2016    CARDIOVASCULAR SCREENING; LDL GOAL LESS THAN 160 10/31/2010    Crohn's colitis (H) 02/15/2011    Dermatitis-dishydrotic eczema-severe 08/05/2010    Hiatal hernia     HTN (hypertension) 07/21/2011    Iron deficiency anemia 10/21/2010    Malignant neoplasm of sigmoid colon (H)     Obesity     Primary pulmonary hypertension (H) 04/06/2010       PAST SURGICAL HISTORY:  Past Surgical History:   Procedure Laterality Date    COLECTOMY WITHOUT COLOSTOMY N/A 4/6/2023    Procedure: Laparoscopic Converted to Open Total abdominal colectomy;  Surgeon: Jerome Ashley MD;  Location: [facility] OR    COLONOSCOPY  10/11/10    COLONOSCOPY N/A 2/27/2023    Procedure: ATTEMPTED COLONOSCOPY WITH SIGMOID STRICTURE BIOPSY;  Surgeon: Jonathan Alcocer MD;  Location:  GI    ESOPHAGOSCOPY, GASTROSCOPY, DUODENOSCOPY (EGD), COMBINED N/A 2/27/2023    Procedure: ESOPHAGOGASTRODUODENOSCOPY, WITH BIOPSY;  Surgeon: Jonathan Alcocer MD;  Location: [facility]    HYSTERECTOMY TOTAL ABDOMINAL, BILATERAL SALPINGO-OOPHORECTOMY, COMBINED N/A 4/6/2023    Procedure: Hysterectomy total abdominal, bilateral salpingo-oophorectomy;  Surgeon: Sunitha Olea MD;  Location: [facility] OR    INSERT PORT VASCULAR ACCESS Right 5/25/2023    Procedure: Ultrasound-guided right internal jugular venous access port placement with fluoroscopy;  Surgeon: Martin Thomas DO;  Location: [facility] OR    IR PORT CHECK RIGHT  7/18/2023    SIGMOIDOSCOPY FLEXIBLE N/A 4/6/2023    Procedure: Sigmoidoscopy flexible;  Surgeon: Jerome Ashley MD;  Location: [facility] OR    SURGICAL HISTORY OF -   06/14/76    Perineorrhaphy, for widening vaginal orifice    [facility] [place] EXPLORATORY OF ABDOMEN  1989    laparoscopy  "      FAMILY HISTORY:  Family History   Problem Relation Age of Onset    Hypertension Mother         on meds, alive    Cerebrovascular Disease Father         stroke about age 65,  of cancer at 68 yrs    Diabetes Father         eventually took insulin    Anemia Father         Pernisios anemia    Kidney Disease Niece         kidney transplant    Kidney Disease Nephew         kidney transplant    Venous thrombosis No family hx of     Anesthesia Reaction No family hx of        SOCIAL HISTORY:  Social History     Socioeconomic History    Marital status:      Spouse name: None    Number of children: None    Years of education: None    Highest education level: None   Tobacco Use    Smoking status: Never     Passive exposure: Current    Smokeless tobacco: Never   Substance and Sexual Activity    Alcohol use: No    Drug use: No    Sexual activity: Yes     Partners: Male       Review of Systems:  Skin:          Eyes:         ENT:         Respiratory:  Negative shortness of breath;cough;wheezing     Cardiovascular:  Negative;chest pain;lightheadedness;dizziness;syncope or near-syncope Positive for;palpitations;edema    Gastroenterology:        Genitourinary:         Musculoskeletal:         Neurologic:  Positive for numbness or tingling of hands    Psychiatric:         Heme/Lymph/Imm:         Endocrine:           Physical Exam:  Vitals: /70   Pulse 58   Ht 1.53 m (5' 0.24\")   Wt 81.7 kg (180 lb 3.2 oz)   LMP  (LMP Unknown)   SpO2 96%   BMI 34.91 kg/m      Constitutional:  cooperative, alert and oriented, well developed, well nourished, in no acute distress        Skin:  warm and dry to the touch;no apparent skin lesions or masses noted          Head:  normocephalic, no masses or lesions        Eyes:  pupils equal and round        ENT:  no pallor or cyanosis, dentition good        Neck:  JVP normal        Respiratory:  normal breath sounds, clear to auscultation, normal A-P diameter, normal symmetry, " normal respiratory excursion, no use of accessory muscles         Cardiac: regular rhythm;normal S1 and S2;no murmurs, gallops or rubs detected                not assessed this visit                                        GI:  not assessed this visit        Extremities and Muscular Skeletal:  no deformities, clubbing, cyanosis, erythema observed;no edema              Neurological:  no gross motor deficits        Psych:  Alert and Oriented x 3        CC  Israel Garza MD  [address]  [address]  [place] [id number]                  Thank you for allowing me to participate in the care of your patient.      Sincerely,     Jane Jolley NP, APRN CNP     [facility]

## 2023-08-03 NOTE — TELEPHONE ENCOUNTER
"    Nadia Ladd has a history significant for sigmoid cancer s/p port placement at . The port has been flipping and therefore been difficult to access. A removal and replacement has been requested by Dr BINH Bateman who had d/w IR Dr Claire recently.     The patient is on Eliquis for A fib recently diagnosed in 6/2023 and Dr Claire would like this held for 2 days prior to procedures since it is a removal and a replacement. I messaged Dr Garza, Cardiologist who had initiated the medication who approved a 2 day hold pre and whatever is needed post   \"Israel Garza MD [facility], LUIS Case CNP  Okay to hold Eliquis as recommended by Dr. Ojeda.  Please restart it once the bleeding risk is deemed low\"    Kayleen [facility]iology CNP ([phone]) (phone [phone])       "

## 2023-08-04 ENCOUNTER — HOSPITAL ENCOUNTER (EMERGENCY)
Facility: CLINIC | Age: 68
Discharge: HOME | End: 2023-08-04
Attending: EMERGENCY MEDICINE | Admitting: EMERGENCY MEDICINE
Payer: MEDICARE

## 2023-08-04 ENCOUNTER — NURSE TRIAGE (OUTPATIENT)
Dept: INTERNAL MEDICINE | Facility: CLINIC | Age: 68
End: 2023-08-04

## 2023-08-04 ENCOUNTER — APPOINTMENT (OUTPATIENT)
Dept: GENERAL RADIOLOGY | Facility: CLINIC | Age: 68
End: 2023-08-04
Attending: EMERGENCY MEDICINE
Payer: MEDICARE

## 2023-08-04 VITALS
TEMPERATURE: 97.8 F | SYSTOLIC BLOOD PRESSURE: 116 MMHG | OXYGEN SATURATION: 98 % | BODY MASS INDEX: 33.61 KG/M2 | DIASTOLIC BLOOD PRESSURE: 84 MMHG | HEART RATE: 97 BPM | WEIGHT: 178 LBS | HEIGHT: 61 IN | RESPIRATION RATE: 18 BRPM

## 2023-08-04 DIAGNOSIS — Z79.01 CHRONIC ANTICOAGULATION: ICD-10-CM

## 2023-08-04 DIAGNOSIS — I48.91 ATRIAL FIBRILLATION WITH RAPID VENTRICULAR RESPONSE (H): ICD-10-CM

## 2023-08-04 DIAGNOSIS — C18.7 MALIGNANT NEOPLASM OF SIGMOID COLON (H): ICD-10-CM

## 2023-08-04 LAB
ANION GAP SERPL CALCULATED.3IONS-SCNC: 13 MMOL/L (ref 7–15)
BASOPHILS # BLD AUTO: 0 10E3/UL (ref 0–0.2)
BASOPHILS NFR BLD AUTO: 0 %
BUN SERPL-MCNC: 37.8 MG/DL (ref 8–23)
CALCIUM SERPL-MCNC: 9.2 MG/DL (ref 8.8–10.2)
CHLORIDE SERPL-SCNC: 104 MMOL/L (ref 98–107)
CREAT SERPL-MCNC: 1.54 MG/DL (ref 0.51–0.95)
DEPRECATED HCO3 PLAS-SCNC: 25 MMOL/L (ref 22–29)
EOSINOPHIL # BLD AUTO: 0 10E3/UL (ref 0–0.7)
EOSINOPHIL NFR BLD AUTO: 0 %
ERYTHROCYTE [DISTWIDTH] IN BLOOD BY AUTOMATED COUNT: 16.7 % (ref 10–15)
GFR SERPL CREATININE-BSD FRML MDRD: 37 ML/MIN/1.73M2
GLUCOSE SERPL-MCNC: 126 MG/DL (ref 70–99)
HCT VFR BLD AUTO: 40.1 % (ref 35–47)
HGB BLD-MCNC: 13 G/DL (ref 11.7–15.7)
IMM GRANULOCYTES # BLD: 0.2 10E3/UL
IMM GRANULOCYTES NFR BLD: 2 %
LYMPHOCYTES # BLD AUTO: 0.5 10E3/UL (ref 0.8–5.3)
LYMPHOCYTES NFR BLD AUTO: 5 %
MAGNESIUM SERPL-MCNC: 2.1 MG/DL (ref 1.7–2.3)
MCH RBC QN AUTO: 28.5 PG (ref 26.5–33)
MCHC RBC AUTO-ENTMCNC: 32.4 G/DL (ref 31.5–36.5)
MCV RBC AUTO: 88 FL (ref 78–100)
MONOCYTES # BLD AUTO: 0.4 10E3/UL (ref 0–1.3)
MONOCYTES NFR BLD AUTO: 4 %
NEUTROPHILS # BLD AUTO: 8.5 10E3/UL (ref 1.6–8.3)
NEUTROPHILS NFR BLD AUTO: 89 %
NRBC # BLD AUTO: 0 10E3/UL
NRBC BLD AUTO-RTO: 0 /100
PLATELET # BLD AUTO: 133 10E3/UL (ref 150–450)
POTASSIUM SERPL-SCNC: 4.5 MMOL/L (ref 3.4–5.3)
RBC # BLD AUTO: 4.56 10E6/UL (ref 3.8–5.2)
SODIUM SERPL-SCNC: 142 MMOL/L (ref 136–145)
TROPONIN T SERPL HS-MCNC: 28 NG/L
TROPONIN T SERPL HS-MCNC: 32 NG/L
TROPONIN T SERPL HS-MCNC: 34 NG/L
TSH SERPL DL<=0.005 MIU/L-ACNC: 2.39 UIU/ML (ref 0.3–4.2)
WBC # BLD AUTO: 9.6 10E3/UL (ref 4–11)

## 2023-08-04 PROCEDURE — 71045 X-RAY EXAM CHEST 1 VIEW: CPT

## 2023-08-04 PROCEDURE — 93010 ELECTROCARDIOGRAM REPORT: CPT | Mod: 76 | Performed by: EMERGENCY MEDICINE

## 2023-08-04 PROCEDURE — 93005 ELECTROCARDIOGRAM TRACING: CPT | Mod: 76 | Performed by: EMERGENCY MEDICINE

## 2023-08-04 PROCEDURE — 96366 THER/PROPH/DIAG IV INF ADDON: CPT | Performed by: EMERGENCY MEDICINE

## 2023-08-04 PROCEDURE — 93005 ELECTROCARDIOGRAM TRACING: CPT | Performed by: EMERGENCY MEDICINE

## 2023-08-04 PROCEDURE — 96361 HYDRATE IV INFUSION ADD-ON: CPT | Performed by: EMERGENCY MEDICINE

## 2023-08-04 PROCEDURE — 83735 ASSAY OF MAGNESIUM: CPT | Performed by: EMERGENCY MEDICINE

## 2023-08-04 PROCEDURE — 36415 COLL VENOUS BLD VENIPUNCTURE: CPT | Performed by: EMERGENCY MEDICINE

## 2023-08-04 PROCEDURE — 250N000013 HC RX MED GY IP 250 OP 250 PS 637: Performed by: EMERGENCY MEDICINE

## 2023-08-04 PROCEDURE — 258N000003 HC RX IP 258 OP 636: Performed by: EMERGENCY MEDICINE

## 2023-08-04 PROCEDURE — 93010 ELECTROCARDIOGRAM REPORT: CPT | Performed by: EMERGENCY MEDICINE

## 2023-08-04 PROCEDURE — 99285 EMERGENCY DEPT VISIT HI MDM: CPT | Mod: 25 | Performed by: EMERGENCY MEDICINE

## 2023-08-04 PROCEDURE — 84443 ASSAY THYROID STIM HORMONE: CPT | Performed by: EMERGENCY MEDICINE

## 2023-08-04 PROCEDURE — 84484 ASSAY OF TROPONIN QUANT: CPT | Mod: 91 | Performed by: EMERGENCY MEDICINE

## 2023-08-04 PROCEDURE — 85025 COMPLETE CBC W/AUTO DIFF WBC: CPT | Performed by: EMERGENCY MEDICINE

## 2023-08-04 PROCEDURE — 96365 THER/PROPH/DIAG IV INF INIT: CPT | Performed by: EMERGENCY MEDICINE

## 2023-08-04 PROCEDURE — 250N000011 HC RX IP 250 OP 636: Mod: JZ | Performed by: EMERGENCY MEDICINE

## 2023-08-04 PROCEDURE — 80048 BASIC METABOLIC PNL TOTAL CA: CPT | Performed by: EMERGENCY MEDICINE

## 2023-08-04 RX ORDER — HEPARIN SODIUM (PORCINE) LOCK FLUSH IV SOLN 100 UNIT/ML 100 UNIT/ML
5-10 SOLUTION INTRAVENOUS
Status: DISCONTINUED | OUTPATIENT
Start: 2023-08-04 | End: 2023-08-04 | Stop reason: HOSPADM

## 2023-08-04 RX ORDER — UREA 10 %
1000 LOTION (ML) TOPICAL DAILY
Status: DISCONTINUED | OUTPATIENT
Start: 2023-08-04 | End: 2023-08-04 | Stop reason: HOSPADM

## 2023-08-04 RX ORDER — DILTIAZEM HYDROCHLORIDE 30 MG/1
30 TABLET, FILM COATED ORAL 3 TIMES DAILY
Qty: 90 TABLET | Refills: 0 | Status: SHIPPED | OUTPATIENT
Start: 2023-08-04 | End: 2024-06-03

## 2023-08-04 RX ORDER — DILTIAZEM HYDROCHLORIDE 5 MG/ML
20 INJECTION INTRAVENOUS ONCE
Status: COMPLETED | OUTPATIENT
Start: 2023-08-04 | End: 2023-08-04

## 2023-08-04 RX ORDER — DILTIAZEM HYDROCHLORIDE 30 MG/1
30 TABLET, FILM COATED ORAL ONCE
Status: COMPLETED | OUTPATIENT
Start: 2023-08-04 | End: 2023-08-04

## 2023-08-04 RX ORDER — DILTIAZEM HCL/D5W 125 MG/125
5-15 PLASTIC BAG, INJECTION (ML) INTRAVENOUS CONTINUOUS
Status: DISCONTINUED | OUTPATIENT
Start: 2023-08-04 | End: 2023-08-04 | Stop reason: HOSPADM

## 2023-08-04 RX ORDER — METOPROLOL SUCCINATE 25 MG/1
25 TABLET, EXTENDED RELEASE ORAL DAILY
Status: DISCONTINUED | OUTPATIENT
Start: 2023-08-04 | End: 2023-08-04 | Stop reason: HOSPADM

## 2023-08-04 RX ORDER — SODIUM BICARBONATE 650 MG/1
650 TABLET ORAL 2 TIMES DAILY
Status: DISCONTINUED | OUTPATIENT
Start: 2023-08-04 | End: 2023-08-04 | Stop reason: HOSPADM

## 2023-08-04 RX ORDER — MAGNESIUM OXIDE 400 MG/1
400 TABLET ORAL 2 TIMES DAILY
Status: DISCONTINUED | OUTPATIENT
Start: 2023-08-04 | End: 2023-08-04 | Stop reason: HOSPADM

## 2023-08-04 RX ADMIN — DILTIAZEM HYDROCHLORIDE 30 MG: 30 TABLET, FILM COATED ORAL at 10:21

## 2023-08-04 RX ADMIN — SODIUM CHLORIDE 1000 ML: 9 INJECTION, SOLUTION INTRAVENOUS at 09:15

## 2023-08-04 RX ADMIN — Medication 5 MG/HR: at 15:17

## 2023-08-04 RX ADMIN — DILTIAZEM HYDROCHLORIDE 20 MG: 5 INJECTION INTRAVENOUS at 09:18

## 2023-08-04 RX ADMIN — CYANOCOBALAMIN TAB 500 MCG 1000 MCG: 500 TAB at 16:13

## 2023-08-04 RX ADMIN — METOPROLOL SUCCINATE 25 MG: 25 TABLET, EXTENDED RELEASE ORAL at 15:21

## 2023-08-04 RX ADMIN — HEPARIN 5 ML: 100 SYRINGE at 12:50

## 2023-08-04 RX ADMIN — SODIUM CHLORIDE 1000 ML: 9 INJECTION, SOLUTION INTRAVENOUS at 12:03

## 2023-08-04 RX ADMIN — Medication 5 MG/HR: at 12:00

## 2023-08-04 ASSESSMENT — ACTIVITIES OF DAILY LIVING (ADL)
ADLS_ACUITY_SCORE: 35

## 2023-08-04 NOTE — ED TRIAGE NOTES
Pt presents with concerns related to her A-fib.  Pt states that she was prescribed yesterday Diltiazem as needed.  Pt states that she took a dose today at 0545.  Pt is suppose to have her chem disconnected at 1300 today.  Pt states that the heart racing feeling started at 0530.     Triage Assessment       Row Name 08/04/23 0832       Triage Assessment (Adult)    Airway WDL WDL       Respiratory WDL    Respiratory WDL WDL       Skin Circulation/Temperature WDL    Skin Circulation/Temperature WDL WDL       Cardiac WDL    Cardiac WDL X  pt states that her heart is racing and can feel that in her chest

## 2023-08-04 NOTE — MEDICATION SCRIBE - ADMISSION MEDICATION HISTORY
Medication Scribe Admission Medication History    Admission medication history is complete. The information provided in this note is only as accurate as the sources available at the time of the update.    Medication reconciliation/reorder completed by provider prior to medication history? No    Information Source(s): Patient via in-person    Pertinent Information: patient has not had her second dose of Decadron which is due today.     Changes made to PTA medication list:  Added: None  Deleted: None  Changed: None    Medication Affordability:  Not including over the counter (OTC) medications, was there a time in the past 3 months when you did not take your medications as prescribed because of cost?: Yes (currently on a sample of Eliquis but will not be able to afford next fill.)    Allergies reviewed with patient and updates made in EHR: yes    Medication History Completed By: ASHLEY ESCOBAR 8/4/2023 1:58 PM    Prior to Admission medications    Medication Sig Last Dose Taking? Auth Provider Long Term End Date   apixaban ANTICOAGULANT (ELIQUIS ANTICOAGULANT) 5 MG tablet Take 1 tablet (5 mg) by mouth 2 times daily 8/4/2023 at 0100 am Yes Israel Garza MD     cyanocobalamin 1000 MCG TBCR Take 1,000 mcg by mouth daily 8/3/2023 at am Yes Phil Orosco PA-C     dexamethasone (DECADRON) 4 MG tablet Take 2 tablets (8 mg) by mouth daily Take for 2 days, starting the day after chemo. Take with food. 8/3/2023 at 0900 Yes Irene Bateman, DO Yes    diltiazem (CARDIZEM) 30 MG tablet Take 1 tablet (30 mg) by mouth 3 times daily as needed (take for afib or increaed palpitations) 8/4/2023 at 0530 Yes Jane Jolley, APRN CNP Yes    Ferrous Sulfate (IRON) 325 (65 Fe) MG tablet Take 1 tablet by mouth daily 8/3/2023 at am Yes Reported, Patient     MAGNESIUM OXIDE 400 (240 Mg) MG tablet TAKE 1 TABLET (400 MG) BY MOUTH 2 TIMES DAILY  Patient taking differently: Take 400 mg by mouth 2 times daily 8/3/2023 at hs Yes Chay  Nomi Olivo DO     metoprolol succinate ER (TOPROL XL) 25 MG 24 hr tablet Take 1 tablet (25 mg) by mouth daily 8/3/2023 at am Yes Jane Jolley, APRN CNP Yes    Multiple Vitamins-Iron (MULTI-DAY PLUS IRON PO) Take 1 tablet by mouth daily 8/3/2023 at am Yes Reported, Patient     sodium bicarbonate 650 MG tablet Take 1 tablet (650 mg) by mouth 2 times daily 8/3/2023 at hs Yes Santana Brenner MD

## 2023-08-04 NOTE — DISCHARGE INSTRUCTIONS
Your blood work today did not show any alarming findings    Your atrial fibrillation may have been due to your chemotherapy infusion.  You should contact your oncology team to discuss any ongoing infusions or any adjustments to your medication    Since you are still in A-fib, but your heart rate is much more controlled, you may go home with oral medication.  Continue to take your Eliquis twice daily, next dose this evening at 9 PM, and may continue to resume your normal dosing.  You should take a dose of the oral Cardizem tonight and resume dosing tomorrow and take 3 times daily    You should also take the diltiazem by mouth 3 times daily to control your heart rate and rhythm.  A new prescription was printed in case you need more medication and run out of your current supply    You may wish to contact your primary doctor and/or cardiology team to discuss this atrial fibrillation and needing the medication daily.  May want to change her medication, or could change her to an extended base of the diltiazem for easier dosing    If you develop lightheadedness or dizziness, chest pain or shortness of breath, vomiting, or any new or worsening symptoms, do not hesitate to return promptly to the ER for evaluation

## 2023-08-04 NOTE — TELEPHONE ENCOUNTER
"S-(situation): Patient is calling and stated she is doing chemotherapy at home this morning.    She stated she is having heart palpitations that are intermittent more than 4 times a minute and ranging HR from .  She stated she feels \"off\"      B-(background): Patient stated, she had chemotherapy at home first time around, then had treatment in the hospital, and now again is having treatment at home.    She stated the last chemotherapy at home she was experiencing these heart palpitation and went into the ED.    ED report on 6/3/2023 states:    Paroxysmal atrial fibrillation with rapid ventricular response with possible demand ischemia  Hypomagnesemia  Presented to ER with dizziness and initially noted to have PACs but abruptly developed atrial fibrillation with RVR in the ER.  She was treated in ER with diltiazem bolus followed by infusion for about 3 hours after which she cardioverted back to sinus rhythm.  Diltiazem infusion was discontinued about 3 PM on 6/3 and she maintained sinus rhythm after that time.  She has no previous history of atrial fibrillation.     She stated her last chemotherapy treatment was done at the hospital with observation and had some heart palpitations but was sent home after treatment with no concerns.     Patient is now experiencing the palpitations as she is having chemotherapy treatment at home again, and stated her home health RN encouraged her to call the triage line.      A-(assessment): Patient stated she does not exhibit any other symptoms, but feels \"off\" at this time.      R-(recommendations): Per protocol, patient advised to be seen today in clinic, urgent care or emergency room.  Patient stated understanding.  No availability in PCP schedule at this time, or any other provider at the [facility] location.  Patient stated she wanted to call her home care nurse and discuss the options.  Patient is requesting a message be sent to PCP regarding a possible work into " schedule.  Patient stated she would probably go to the emergency room, but was going to discuss with home health nurse.      Will forward to PCP for review.      Reason for Disposition   Skipped or extra beat(s) and occurs 4 or more times per minute   Heart beating very rapidly (e.g., > 140 / minute) and not present now  (Exception: During exercise.)    Additional Information   Negative: Passed out (i.e., lost consciousness, collapsed and was not responding)   Negative: Shock suspected (e.g., cold/pale/clammy skin, too weak to stand, low BP, rapid pulse)   Negative: Difficult to awaken or acting confused (e.g., disoriented, slurred speech)   Negative: Visible sweat on face or sweat dripping down face   Negative: Unable to walk, or can only walk with assistance (e.g., requires support)   Negative: Received SHOCK from implantable cardiac defibrillator and has persisting symptoms (i.e., palpitations, lightheadedness)   Negative: Dizziness, lightheadedness, or weakness and heart beating very rapidly (e.g., > 140 / minute)   Negative: Dizziness, lightheadedness, or weakness and heart beating very slowly (e.g., < 50 / minute)   Negative: Sounds like a life-threatening emergency to the triager   Negative: Chest pain   Negative: Difficulty breathing   Negative: Dizziness, lightheadedness, or weakness   Negative: Heart beating very rapidly (e.g., > 140 / minute) and present now  (Exception: During exercise.)   Negative: Heart beating very slowly (e.g., < 50 / minute)  (Exception: Athlete and heart rate normal for caller.)   Negative: New or worsened shortness of breath with activity (dyspnea on exertion)   Negative: Patient sounds very sick or weak to the triager   Negative: Wearing a 'Holter monitor' or 'cardiac event monitor'   Negative: Received SHOCK from implantable cardiac defibrillator (and now feels well)   Negative: Skipped or extra beat(s) and increases with exercise or exertion    Answer Assessment - Initial  "Assessment Questions  1. DESCRIPTION: \"Please describe your heart rate or heartbeat that you are having\" (e.g., fast/slow, regular/irregular, skipped or extra beats, \"palpitations\")      Racing between , doing chemotherapy at home.  She stated this happened the last time she was doing chemotherapy.    2. ONSET: \"When did it start?\" (Minutes, hours or days)       This morning    3. DURATION: \"How long does it last\" (e.g., seconds, minutes, hours)      Intermittent, lasting minutes    4. PATTERN \"Does it come and go, or has it been constant since it started?\"  \"Does it get worse with exertion?\"   \"Are you feeling it now?\"      \"Off\"    5. TAP: \"Using your hand, can you tap out what you are feeling on a chair or table in front of you, so that I can hear?\" (Note: not all patients can do this)        Unknown    6. HEART RATE: \"Can you tell me your heart rate?\" \"How many beats in 15 seconds?\"  (Note: not all patients can do this)            7. RECURRENT SYMPTOM: \"Have you ever had this before?\" If Yes, ask: \"When was the last time?\" and \"What happened that time?\"       Went to ER, and the second time was in the hospital monitored    8. CAUSE: \"What do you think is causing the palpitations?\"      Unknown    9. CARDIAC HISTORY: \"Do you have any history of heart disease?\" (e.g., heart attack, angina, bypass surgery, angioplasty, arrhythmia)       No    10. OTHER SYMPTOMS: \"Do you have any other symptoms?\" (e.g., dizziness, chest pain, sweating, difficulty breathing)        No    11. PREGNANCY: \"Is there any chance you are pregnant?\" \"When was your last menstrual period?\"        NA    Protocols used: Heart Rate and Heartbeat [id number]  Radha Austin RN    "

## 2023-08-07 ENCOUNTER — TELEPHONE (OUTPATIENT)
Dept: ONCOLOGY | Facility: CLINIC | Age: 68
End: 2023-08-07
Payer: MEDICARE

## 2023-08-07 ENCOUNTER — DOCUMENTATION ONLY (OUTPATIENT)
Dept: CARDIOLOGY | Facility: CLINIC | Age: 68
End: 2023-08-07
Payer: MEDICARE

## 2023-08-07 ENCOUNTER — TELEPHONE (OUTPATIENT)
Dept: CARDIOLOGY | Facility: CLINIC | Age: 68
End: 2023-08-07

## 2023-08-07 DIAGNOSIS — I48.0 PAROXYSMAL ATRIAL FIBRILLATION (H): Primary | ICD-10-CM

## 2023-08-07 NOTE — TELEPHONE ENCOUNTER
Patient calling wondering if she needs to continue on scheduled dose of diltiazem instead of PRN. Will send to Jane Jolley NP to review and give further recommendations.     Nadia was on chemo last week, she woke up in a-fib on Friday, 8/4/23, she went to the [facility] emergency room and was told to take the diltiazem (CARDIZEM) 30 MG tablet 3 times daily and consult her cardiologist to see if she needs to continue taking the diltiazem (CARDIZEM) 30 MG tablet  3 times daily or go back to the dosage Jane Jolley put her on. Please advise.     ED note 8/3/23:  Assessments & Plan (with Medical Decision Making)  Nadia is a 67-year-old female with past medical history of paroxysmal atrial fibrillation, colorectal cancer on chemotherapy presenting to the emergency room for palpitations after onset of chemotherapy infusion this morning.  See history and focused physical exam as above  Pleasant 67-year-old female in no acute distress, is tachycardic, has an irregularly irregular rhythm noted, but is not diaphoretic or in any acute distress, is otherwise vitally stable.  She does have chemotherapy infusion currently running, as she does administer this at home.  She thinks that this may be atrial fibrillation due to the chemotherapy infusion, as it has happened during her previous infusions.  She did try taking home diltiazem without any relief.  We will try a bolus dose of IV, and will also give some IV fluids.  We will check blood work and continue to monitor  Blood work as above.  Chest x-ray was clear.  Does have a slight elevation in her initial troponin at 28, but is not having any chest pains.  We will check a second troponin in 2 hours  Patient did have some improvement of her rate with the IV diltiazem bolus, but did not convert to a normal sinus rhythm.  She does have a labile rate, fluctuating between 80s and 130s.  She is asymptomatic at this time.  Thinks that it must be from her chemotherapy and that it will stop  when she stopped the infusion at 1 PM.  We will continue to monitor in the ED in the interim.  We will give a second dose of IV fluids.  Also ordered for Cardizem infusion, but will have this at the bedside as patient does have labile rate and may not need an ongoing infusion.  Second troponin is slightly elevated, from 28 to 32, but patient still not having any chest pain.  Had a second EKG performed when rate had slowed, do not see any acute ischemic changes on EKG.  This slight elevation in troponin is thought to be demand due to rapid rate from A-fib.  We will check a third troponin in 2 hours  Third troponin is not much changed, is 34.  Again, EKG shows atrial fibrillation with controlled rate at 91 bpm.  Had a discussion with patient and her  who is back at the bedside.  Discussed procedure for cardioversion to hopefully achieve normal sinus rhythm, and this may be a good choice since patient is chronically anticoagulated, has been taking her medication, and is currently NPO.  Other option would be to resume diltiazem drip.  If we resume this drip, could monitor in the ED for a few hours and see if her rhythm converts, or could admit now to the hospital.  We would have staffing capability at this time to accept the patient to an ICU bed, which she would need for diltiazem drip.  Ultimately, after discussing all the options, patient would like to proceed with more conservative management, would like to initiate the Cardizem drip and see if she will convert here in the ED.  We will keep her n.p.o. except for some of her morning meds with sips of water, in case she would like to proceed with cardioversion in a few hours.  She remains stable in the ED and we will continue to monitor.  Patient has been monitored here in the ED for 9 hours.  She had been initiated on Cardizem drip, and while she still remains in A-fib, her rate is much more controlled.  Maximum rate noted over the last few hours since drip  initiation was up to 110s, but mostly fluctuated between 70 and 100.  Spoke with patient and her  again.  Again offered options for admission and observation, ongoing Cardizem drip, could attempt cardioversion, but since she is quite stable this is not an absolute necessity.  She is also welcome to be discharged at this time since she is already anticoagulated and does have prescription for diltiazem, and could continue to take this over the weekend until she is able to follow-up with cardiology, oncology, and her primary doctor early next week.  Ultimately, patient decided she would like to go home.  She is feeling completely asymptomatic and does not want to stay overnight in the ED ED or in the hospital.  She feels comfortable with the plan to trial medication at home and continue to manage her symptoms as needed.  Told her I would have a low threshold for her to return to the emergency room.  She should return promptly if she has any lightheadedness or dizziness, chest pain or shortness of breath, nausea or vomiting, if she feels worsening palpitations or notices a fast heart rate, or has any other concerns.  Otherwise, contact her oncology team to determine next steps with chemotherapy and if she needs to have her dose or administration changed in any way, she also contact her cardiology team and her primary doctor regarding this change in more persistent A-fib and daily medication.  All questions were answered.  Discharged in no distress    Last OV with Jane Jolley NP on 8/3/23:  Plan:   1.  New onset paroxysmal atrial fibrillation with RVR  This was a first-time episode after the patient started chemo with FOLFOX therapy  Echocardiogram shows normal LV function with normal atrial size.  Now on metoprolol ER 25 mg daily heart rate in the 60s.  We will add diltiazem 30 mg as needed every 4-6 hours for palpitations or A-fib with RVR.        2. [id number] score 3  Continue Eliquis 5 mg twice  daily  Platelets 139, hemoglobin 12, creatinine 1.37 on 8/1/2023.  Patient is unable to afford Eliquis.  Had to pay out-of-pocket.  I did get her in touch with our patient assistance program to see if we can get her free Eliquis through the manufact[employer] program.     3. HTN  On metoprolol      4. Colon cancer   following     5. Anemia     I would like to see her back in 3 months.  If she has questions or concerns she will contact us.  I will have her follow-up with cardiology in the spring.  We briefly discussed long-term commitment to anticoagulation therapy since the patient has no history of A-fib.  It could have definitely been aggravated by her chemo.  I would recommend that we keep her on Eliquis for now.  She needs to receive 12 infusions which are every 2 weeks.     Thank you for including us in her care.  Feel free to contact us if you have questions or concerns regarding today's assessment and plan.  I spent 30 minutes today reviewing her diagnostic tests, discussing plan regarding her atrial fibrillation.      Jane Jolley NP, APRN CNP

## 2023-08-07 NOTE — TELEPHONE ENCOUNTER
Reason for Call:  Other Medication question    Detailed comments: Nadia was on chemo last week, she woke up in a-fib on Friday, 8/4/23, she went to the [facility] emergency room and was told to take the diltiazem (CARDIZEM) 30 MG tablet 3 times daily and consult her cardiologist to see if she needs to continue taking the diltiazem (CARDIZEM) 30 MG tablet  3 times daily or go back to the dosage Jane Jolley put her on. Please advise.    Phone Number Patient can be reached at: Home number on file [phone] (home)    Best Time:     Can we leave a detailed message on this number? YES    Call taken on 8/7/2023 at 11:54 AM by Jennifer Chen

## 2023-08-07 NOTE — PROGRESS NOTES
8-3-2023  Initial call to pt re Eliquis assistance program was 8-3-2023  She and  make above the guidelines of 37,775 a year. Pt is medicare, and monthly price for a switch to Xarelto would be 85$ a month.  I mailed out info on the [employer] program.  Jane Jolley agreed to change if pt wishes to do the care path program.   Pt to call me back, I will also check w/ her this week- 8-7-2023-mmunns lpn      8-  Pt called, she did receive the 30 day Xarelto card, and is asking for the RX to be sent to [facility] in [place]. I spoke to AF team, they will check with Jane Jolley for dose she would like pt to be on, and add script to the chart  Mmunns lpn

## 2023-08-07 NOTE — TELEPHONE ENCOUNTER
Reason for Call:  Other appointment    Detailed comments: Patient calling to let Dr. Bateman know that she was in afib on Friday 08/04 and went to the ER wondering if she needs to be seen for a follow up on this?     Phone Number Patient can be reached at: Home number on file [phone] (home)    Best Time: any    Can we leave a detailed message on this number? YES    Call taken on 8/7/2023 at 11:45 AM by Jane Simms

## 2023-08-07 NOTE — TELEPHONE ENCOUNTER
Spoke with patient regarding ED visit. Patient states she is feeling much better now. This RN informed patient a message would be sent to Dr. Bateman to advise if follow-up is needed due to A. Fib onset over there weekend. Patient agreeable to plan.  Liseth Yoon RNCC

## 2023-08-08 NOTE — TELEPHONE ENCOUNTER
Patient scheduled with cardiology prior to next treatment, patient aware. Infusion notified of below.    Megan Valera, RN on 8/8/2023 at 11:50 AM          Irene Bateman DO  [facility]20 hours ago (1:31 PM)     SK  She needs cardiology follow up for medication optimization. Please expedite this appointment, this needs to be done prior to her next cycle which supposed to be on 8/16/23. Her next chemotherapy is not scheduled- this needs to be scheduled and she needs virtual appt with me on 8/16/23 prior to getting treatment.    She has fluids scheduled for tomorrow, she should come in for those. I am adding on additional labs including Mg and she may need IV Mg if this is low. Please relay this to the infusion team    TY  Liseth Robetrs RN Kumar, Sheetal, DO21 hours ago (1:12 PM)     KH  Patient was seen in ED for A. Fib, started are Cardizem drip and discharged due to being asymptomatic. Patient is asking if you would like to see her for a follow-up from this episode?    -Liseth ZAYAS

## 2023-08-09 ENCOUNTER — INFUSION THERAPY VISIT (OUTPATIENT)
Dept: INFUSION THERAPY | Facility: CLINIC | Age: 68
End: 2023-08-09
Attending: INTERNAL MEDICINE
Payer: MEDICARE

## 2023-08-09 VITALS
WEIGHT: 172.6 LBS | SYSTOLIC BLOOD PRESSURE: 133 MMHG | HEART RATE: 66 BPM | RESPIRATION RATE: 16 BRPM | DIASTOLIC BLOOD PRESSURE: 56 MMHG | BODY MASS INDEX: 32.61 KG/M2

## 2023-08-09 DIAGNOSIS — C18.7 MALIGNANT NEOPLASM OF SIGMOID COLON (H): Primary | ICD-10-CM

## 2023-08-09 DIAGNOSIS — Z76.89 PREVENTION OF CHEMOTHERAPY-INDUCED NEUTROPENIA: ICD-10-CM

## 2023-08-09 DIAGNOSIS — T45.1X5A CHEMOTHERAPY-INDUCED NEUTROPENIA (H): ICD-10-CM

## 2023-08-09 DIAGNOSIS — D70.1 CHEMOTHERAPY-INDUCED NEUTROPENIA (H): ICD-10-CM

## 2023-08-09 DIAGNOSIS — C18.9 MALIGNANT NEOPLASM OF COLON, UNSPECIFIED PART OF COLON (H): ICD-10-CM

## 2023-08-09 LAB
ALBUMIN SERPL BCG-MCNC: 4.1 G/DL (ref 3.5–5.2)
ALP SERPL-CCNC: 147 U/L (ref 35–104)
ALT SERPL W P-5'-P-CCNC: 24 U/L (ref 0–50)
ANION GAP SERPL CALCULATED.3IONS-SCNC: 9 MMOL/L (ref 7–15)
AST SERPL W P-5'-P-CCNC: 25 U/L (ref 0–45)
BASOPHILS # BLD AUTO: 0 10E3/UL (ref 0–0.2)
BASOPHILS NFR BLD AUTO: 0 %
BILIRUB SERPL-MCNC: 0.5 MG/DL
BUN SERPL-MCNC: 19.6 MG/DL (ref 8–23)
CALCIUM SERPL-MCNC: 9 MG/DL (ref 8.8–10.2)
CHLORIDE SERPL-SCNC: 105 MMOL/L (ref 98–107)
CREAT SERPL-MCNC: 1.55 MG/DL (ref 0.51–0.95)
DEPRECATED HCO3 PLAS-SCNC: 25 MMOL/L (ref 22–29)
EOSINOPHIL # BLD AUTO: 0 10E3/UL (ref 0–0.7)
EOSINOPHIL NFR BLD AUTO: 0 %
ERYTHROCYTE [DISTWIDTH] IN BLOOD BY AUTOMATED COUNT: 15.7 % (ref 10–15)
GFR SERPL CREATININE-BSD FRML MDRD: 37 ML/MIN/1.73M2
GLUCOSE SERPL-MCNC: 103 MG/DL (ref 70–99)
HCT VFR BLD AUTO: 41.3 % (ref 35–47)
HGB BLD-MCNC: 13.3 G/DL (ref 11.7–15.7)
IMM GRANULOCYTES # BLD: 0.1 10E3/UL
IMM GRANULOCYTES NFR BLD: 1 %
LYMPHOCYTES # BLD AUTO: 0.6 10E3/UL (ref 0.8–5.3)
LYMPHOCYTES NFR BLD AUTO: 13 %
MAGNESIUM SERPL-MCNC: 1.7 MG/DL (ref 1.7–2.3)
MCH RBC QN AUTO: 28.4 PG (ref 26.5–33)
MCHC RBC AUTO-ENTMCNC: 32.2 G/DL (ref 31.5–36.5)
MCV RBC AUTO: 88 FL (ref 78–100)
MONOCYTES # BLD AUTO: 0.4 10E3/UL (ref 0–1.3)
MONOCYTES NFR BLD AUTO: 8 %
NEUTROPHILS # BLD AUTO: 3.6 10E3/UL (ref 1.6–8.3)
NEUTROPHILS NFR BLD AUTO: 78 %
NRBC # BLD AUTO: 0 10E3/UL
NRBC BLD AUTO-RTO: 0 /100
PLATELET # BLD AUTO: 145 10E3/UL (ref 150–450)
POTASSIUM SERPL-SCNC: 4.5 MMOL/L (ref 3.4–5.3)
PROT SERPL-MCNC: 6.6 G/DL (ref 6.4–8.3)
RBC # BLD AUTO: 4.69 10E6/UL (ref 3.8–5.2)
SODIUM SERPL-SCNC: 139 MMOL/L (ref 136–145)
WBC # BLD AUTO: 4.7 10E3/UL (ref 4–11)

## 2023-08-09 PROCEDURE — 85025 COMPLETE CBC W/AUTO DIFF WBC: CPT

## 2023-08-09 PROCEDURE — 250N000011 HC RX IP 250 OP 636: Mod: JZ | Performed by: INTERNAL MEDICINE

## 2023-08-09 PROCEDURE — 36415 COLL VENOUS BLD VENIPUNCTURE: CPT | Performed by: INTERNAL MEDICINE

## 2023-08-09 PROCEDURE — 80053 COMPREHEN METABOLIC PANEL: CPT | Performed by: INTERNAL MEDICINE

## 2023-08-09 PROCEDURE — 83735 ASSAY OF MAGNESIUM: CPT

## 2023-08-09 PROCEDURE — 96365 THER/PROPH/DIAG IV INF INIT: CPT

## 2023-08-09 PROCEDURE — 258N000003 HC RX IP 258 OP 636: Performed by: INTERNAL MEDICINE

## 2023-08-09 RX ORDER — METHYLPREDNISOLONE SODIUM SUCCINATE 125 MG/2ML
125 INJECTION, POWDER, LYOPHILIZED, FOR SOLUTION INTRAMUSCULAR; INTRAVENOUS
Status: CANCELLED
Start: 2023-08-09

## 2023-08-09 RX ORDER — ALBUTEROL SULFATE 0.83 MG/ML
2.5 SOLUTION RESPIRATORY (INHALATION)
Status: CANCELLED | OUTPATIENT
Start: 2023-08-09

## 2023-08-09 RX ORDER — HEPARIN SODIUM (PORCINE) LOCK FLUSH IV SOLN 100 UNIT/ML 100 UNIT/ML
5 SOLUTION INTRAVENOUS
Status: DISCONTINUED | OUTPATIENT
Start: 2023-08-09 | End: 2023-08-09 | Stop reason: HOSPADM

## 2023-08-09 RX ORDER — EPINEPHRINE 1 MG/ML
0.3 INJECTION, SOLUTION INTRAMUSCULAR; SUBCUTANEOUS EVERY 5 MIN PRN
Status: CANCELLED | OUTPATIENT
Start: 2023-08-09

## 2023-08-09 RX ORDER — DIPHENHYDRAMINE HYDROCHLORIDE 50 MG/ML
50 INJECTION INTRAMUSCULAR; INTRAVENOUS
Status: CANCELLED
Start: 2023-08-09

## 2023-08-09 RX ORDER — MAGNESIUM SULFATE HEPTAHYDRATE 40 MG/ML
2 INJECTION, SOLUTION INTRAVENOUS ONCE
Status: COMPLETED | OUTPATIENT
Start: 2023-08-09 | End: 2023-08-09

## 2023-08-09 RX ORDER — HEPARIN SODIUM,PORCINE 10 UNIT/ML
5 VIAL (ML) INTRAVENOUS
Status: CANCELLED | OUTPATIENT
Start: 2023-08-09

## 2023-08-09 RX ORDER — HEPARIN SODIUM (PORCINE) LOCK FLUSH IV SOLN 100 UNIT/ML 100 UNIT/ML
5 SOLUTION INTRAVENOUS
Status: CANCELLED | OUTPATIENT
Start: 2023-08-09

## 2023-08-09 RX ORDER — MEPERIDINE HYDROCHLORIDE 25 MG/ML
25 INJECTION INTRAMUSCULAR; INTRAVENOUS; SUBCUTANEOUS EVERY 30 MIN PRN
Status: CANCELLED | OUTPATIENT
Start: 2023-08-09

## 2023-08-09 RX ORDER — ALBUTEROL SULFATE 90 UG/1
1-2 AEROSOL, METERED RESPIRATORY (INHALATION)
Status: CANCELLED
Start: 2023-08-09

## 2023-08-09 RX ADMIN — HEPARIN 5 ML: 100 SYRINGE at 15:19

## 2023-08-09 RX ADMIN — MAGNESIUM SULFATE HEPTAHYDRATE 2 G: 40 INJECTION, SOLUTION INTRAVENOUS at 13:53

## 2023-08-09 RX ADMIN — SODIUM CHLORIDE 1000 ML: 9 INJECTION, SOLUTION INTRAVENOUS at 13:21

## 2023-08-09 NOTE — PROGRESS NOTES
Infusion Nursing Note:  Nadia Ladd presents today for port labs.    Patient seen by provider today: No   present during visit today: Not Applicable.    Note: Pt here today with her  for port labs and then IVF.  .      Intravenous Access:  Implanted Port.    Treatment Conditions:  Not Applicable.      Discharge Plan:   Will proceed with IVF and magnesium today. .      Etelvina Barrientos RN

## 2023-08-09 NOTE — PROGRESS NOTES
"Infusion Nursing Note:  Nadia Ladd presents today for IVF and magnesium replacement.    Patient seen by provider today: No   present during visit today: Not Applicable.    Note: Pt here today with her  for C3D8 IV Fluids and magnesium replacement. Pt was seen recently in the ED for AFib. Pt converted to a normal rhythm once she got home and has remained in a normal rhythm since.  She was on PRN diltiazem but since her ED visit has been taking it daily. Pt has cardiology appt prior to her next treatment cycle.   Pt also has had 2 incidence of her port \"flipping\".   Order was placed by  for the port removal.  Pt has not heard from a  yet on when that will be.  The port was used today. No difficulty accessing.  Blood return-labs drawn off of it.       Intravenous Access:  Implanted Port.    Treatment Conditions:  Lab Results   Component Value Date     08/09/2023    POTASSIUM 4.5 08/09/2023    MAG 1.7 08/09/2023    CR 1.55 (H) 08/09/2023    LIVIER 9.0 08/09/2023    BILITOTAL 0.5 08/09/2023    ALBUMIN 4.1 08/09/2023    ALT 24 08/09/2023    AST 25 08/09/2023       Results reviewed, labs MET treatment parameters, ok to proceed with treatment.  Magnesium given today per treatment orders of wanting the magnesium blood level to be at least 2.       Post Infusion Assessment:  Patient tolerated infusion without incident.  Blood return noted pre and post infusion.  No evidence of extravasations.  Access discontinued per protocol.       Discharge Plan:   Patient discharged in stable condition accompanied by: .  Departure Mode: Ambulatory.//  Pt has appt to return to infusion on 08/15/2023      Etelvina Barrientos RN  "

## 2023-08-10 ENCOUNTER — TELEPHONE (OUTPATIENT)
Dept: ONCOLOGY | Facility: CLINIC | Age: 68
End: 2023-08-10
Payer: MEDICARE

## 2023-08-15 ENCOUNTER — OFFICE VISIT (OUTPATIENT)
Dept: CARDIOLOGY | Facility: CLINIC | Age: 68
End: 2023-08-15
Payer: MEDICARE

## 2023-08-15 VITALS
HEART RATE: 74 BPM | HEIGHT: 61 IN | DIASTOLIC BLOOD PRESSURE: 76 MMHG | OXYGEN SATURATION: 97 % | BODY MASS INDEX: 32.4 KG/M2 | WEIGHT: 171.6 LBS | SYSTOLIC BLOOD PRESSURE: 134 MMHG

## 2023-08-15 DIAGNOSIS — I48.0 PAROXYSMAL ATRIAL FIBRILLATION WITH RVR (H): Primary | ICD-10-CM

## 2023-08-15 PROCEDURE — 93000 ELECTROCARDIOGRAM COMPLETE: CPT | Performed by: NURSE PRACTITIONER

## 2023-08-15 PROCEDURE — 99215 OFFICE O/P EST HI 40 MIN: CPT | Mod: 25 | Performed by: NURSE PRACTITIONER

## 2023-08-15 RX ORDER — METOPROLOL SUCCINATE 50 MG/1
50 TABLET, EXTENDED RELEASE ORAL DAILY
Qty: 90 TABLET | Refills: 3 | Status: SHIPPED | OUTPATIENT
Start: 2023-08-15 | End: 2024-08-13

## 2023-08-15 RX ORDER — DILTIAZEM HYDROCHLORIDE 120 MG/1
120 CAPSULE, COATED, EXTENDED RELEASE ORAL DAILY
Qty: 60 CAPSULE | Refills: 3 | Status: SHIPPED | OUTPATIENT
Start: 2023-08-15 | End: 2024-06-03

## 2023-08-15 NOTE — PATIENT INSTRUCTIONS
TODAY'S RECOMMENDATIONS:    Increase metoprolol XL to 50 mg daily (can take 2 of 25 mg tablets). Will send a new script.  During the days of chemo and 2 days after take long acting diltiazem 120 mg daily with 30 mg of short acting diltiazem as needed for atrial fibrillation with HR >110.  Discuss with Dr. Bateman about Warfarin or Coumadin.  Continue all other medications without changes.  Please follow up with Jane in November or sooner if needed.    If you have questions or concerns please call clinic at [phone].    Please call [phone] for scheduling.      It was a pleasure seeing you today!

## 2023-08-15 NOTE — LETTER
8/15/2023    Nomi Cartwright, DO  [address] [place] [address]    RE: Nadia Ladd       Dear Colleague,     I had the pleasure of seeing Nadia Ladd in the [facility].    General Cardiology Clinic Progress Note  Nadia Ladd MRN# [id number]   YOB: 1955 Age: 68 year old     Primary cardiologist: Dr. Garza     Reason for visit: Atrial fibrillation     History of presenting illness:    Nadia Ladd is a pleasant 68 year old patient with past medical history significant for:    Colon cancer:   -2 different types of colon cancer with adenocarcinoma of the hepatic flexure sigmoid tumor that appeared to be predominantly neuroendocrine.   -S/p surgical resection 4/2023 with colectomy and started on FOLFOX chemotherapy 5/31/2023.   -Follows with Dr. CECILIA Bateman at [facility].   Paroxysmal atrial fibrillation with RVR  [id number] score 3(gender, age, hypertension  Hypertension  CKD, stage IIIb  Prediabetes  Crohn's disease  Anemia    She was initially evaluated by Dr. Garza on 6/30/2023 after her first dose of chemotherapy she presented to the emergency department with lightheadedness with positional changes.  She was found to have new onset of atrial fibrillation with RVR.  She was admitted to [facility] for 2 days and was started on rate control with IV diltiazem.  Ultimately, she converted to sinus rhythm and was discharged home on metoprolol XL 25 mg daily.    She was followed up by my colleague LUIS Holloway, CNP on 8/3/2023 and reported that after her second round of chemotherapy the day prior she had palpitations but did not have atrial fibrillation.  It was recommended that she continue Eliquis. Diltiazem 30 mg as needed every 4-6 hours for palpitations or atrial fibrillation with RVR was recommended.    The patient states that Eliquis is unaffordable and she has been working with our patient assistance program.  Unfortunately, due to  her income level they do not qualify for patient assistance, but she could qualify for $85 Xarelto a month.  This is still a financial strain on the patient.     Unfortunately, on 8/4/2023 she received chemotherapy and again developed atrial fibrillation with RVR.  She described palpitations without shortness of breath, dizziness or lightheadedness.  Upon evaluation in the emergency department she had atrial fibrillation with RVR but was in no acute distress and vitals were stable.  The oral diltiazem was tried without relief and she was given a bolus of IV diltiazem and IV fluids.  Her troponin was elevated but remained flat during her monitoring.  Her rate became to well controlled and the patient remained vitally stable.  They rhythm control admission for cardioversion versus ongoing monitoring in the ED with IV diltiazem infusion, and she elected for the latter.  Ultimately, the patient was discharged home on anticoagulation after rates were improved.    Today she follows up prior to her next chemoinfusion which is scheduled for tomorrow.  The patient states that during the initial infusion that she has at the infusion center she traditionally does not have episodes of atrial fibrillation.  She then wears a home chemo pump for 46 hours and traditionally has the breakthrough A-fib at the end of the home infusion pump.  Her symptoms are primarily palpitations and thankfully she does not have any dizziness, lightheadedness, shortness of breath, syncope or presyncope.    We are continuing to work with the patient to discuss anticoagulation and I will reach out to her oncologist regarding possible use of Coumadin given DOACs seem to be cost prohibitive.  The patient did mention to me that Dr. Bateman felt Coumadin may not be the best option with her chemo regimen.    Diagnotic studies:  EKG (8/15/2023): Sinus rhythm at 71 bpm  Echocardiogram (6/5/2023): Normal biventricular size, structure and function, mild pulmonary  hypertension, mild MR without wall motion abnormalities normal biatrial size  Echocardiogram (4/2023): Normal biventricular systolic function without effusion and no significant wall motion or valvular abnormalities.  Normal biatrial size          Assessment and Plan:     ASSESSMENT:    Paroxysmal atrial fibrillation with RVR  In the setting of chemoinfusion  [id number] score of 3 therefore warrants anticoagulation  Currently on rate control with metoprolol XL 25 mg daily and Cardizem 30 mg 3 times daily.  Unfortunately, with ongoing treatments she will likely have episodes of breakthrough atrial fibrillation.  Our goal at this time should be rate control and continuing on anticoagulation.  If in the future she has persistent atrial fibrillation despite completion of chemotherapy could discuss rhythm control.    Hypertension  Currently well controlled    Colon cancer  Status post resection and colectomy  Currently undergoing chemo therapy as above      PLAN:     Increase metoprolol XL to 50 mg daily (can take 2 of 25 mg tablets). Will send a new script.  During the days of chemo and 2 days after take long acting diltiazem 120 mg daily with 30 mg of short acting diltiazem as needed for atrial fibrillation with HR >110.  Will discuss with Dr. Bateman about Warfarin or Coumadin.  Continue all other medications without changes.  Please follow up with Jane in November or sooner if needed.       Orders this Visit:  Orders Placed This Encounter   Procedures    EKG 12-lead complete w/read - Clinics (performed today)     Orders Placed This Encounter   Medications    diltiazem ER COATED BEADS (CARDIZEM CD/CARTIA XT) 120 MG 24 hr capsule     Sig: Take 1 capsule (120 mg) by mouth daily On chemo days and 2 days post chemo infusion.     Dispense:  60 capsule     Refill:  3    metoprolol succinate ER (TOPROL XL) 50 MG 24 hr tablet     Sig: Take 1 tablet (50 mg) by mouth daily     Dispense:  90 tablet     Refill:  3     Medications  "Discontinued During This Encounter   Medication Reason    metoprolol succinate ER (TOPROL XL) 25 MG 24 hr tablet Reorder (No AVS)    diltiazem (CARDIZEM) 30 MG tablet        Today's clinic visit entailed:  Review of the result(s) of each unique test - echo, EKGs,   Prescription drug management  40 minutes spent by me on the date of the encounter doing chart review, history and exam, documentation and further activities per the note  Provider  Link to [facility] Help Grid     The level of medical decision making during this visit was of moderate complexity.           Review of Systems:     Review of Systems:  Skin:        Eyes:       ENT:       Respiratory:  Negative shortness of breath;cough;wheezing  Cardiovascular:  Negative;palpitations;chest pain;edema;lightheadedness;dizziness;syncope or near-syncope    Gastroenterology:      Genitourinary:       Musculoskeletal:       Neurologic:  Negative numbness or tingling of feet;numbness or tingling of hands  Psychiatric:       Heme/Lymph/Imm:       Endocrine:                 Physical Exam:     Vitals: /76 (BP Location: Right arm, Patient Position: Sitting, Cuff Size: Adult Regular)   Pulse 74   Ht 1.549 m (5' 1\")   Wt 77.8 kg (171 lb 9.6 oz)   LMP  (LMP Unknown)   SpO2 97%   BMI 32.42 kg/m    Constitutional: Well nourished and in no apparent distress.  Eyes: Pupils equal, round. Sclerae anicteric.   HEENT: Normocephalic, atraumatic.   Neck: Supple. JVD   Respiratory: Breathing non-labored. Lungs clear to auscultation bilaterally. No crackles, wheezes, rhonchi, or rales.  Cardiovascular: Regular rate and rhythm, normal S1 and S2. No murmur, rub, or gallop.  Skin: Warm, dry. No rashes, cyanosis, or xanthelasma.  Extremities: No edema.  Neurologic: No gross motor deficits. Alert, awake, and oriented to person, place and time.  Psychiatric: Affect appropriate.        CURRENT MEDICATIONS:  Current Outpatient Medications   Medication Sig Dispense Refill    apixaban " ANTICOAGULANT (ELIQUIS ANTICOAGULANT) 5 MG tablet Take 1 tablet (5 mg) by mouth 2 times daily 60 tablet 11    cyanocobalamin 1000 MCG TBCR Take 1,000 mcg by mouth daily 100 tablet 1    dexamethasone (DECADRON) 4 MG tablet Take 2 tablets (8 mg) by mouth daily Take for 2 days, starting the day after chemo. Take with food. 40 tablet 0    diltiazem (CARDIZEM) 30 MG tablet Take 1 tablet (30 mg) by mouth 3 times daily 90 tablet 0    diltiazem ER COATED BEADS (CARDIZEM CD/CARTIA XT) 120 MG 24 hr capsule Take 1 capsule (120 mg) by mouth daily On chemo days and 2 days post chemo infusion. 60 capsule 3    Ferrous Sulfate (IRON) 325 (65 Fe) MG tablet Take 1 tablet by mouth daily      MAGNESIUM OXIDE 400 (240 Mg) MG tablet TAKE 1 TABLET (400 MG) BY MOUTH 2 TIMES DAILY (Patient taking differently: Take 400 mg by mouth 2 times daily) 60 tablet 0    metoprolol succinate ER (TOPROL XL) 50 MG 24 hr tablet Take 1 tablet (50 mg) by mouth daily 90 tablet 3    Multiple Vitamins-Iron (MULTI-DAY PLUS IRON PO) Take 1 tablet by mouth daily      sodium bicarbonate 650 MG tablet Take 1 tablet (650 mg) by mouth 2 times daily 120 tablet 3       ALLERGIES  Allergies   Allergen Reactions    No Known Drug Allergy          PAST MEDICAL HISTORY:  Past Medical History:   Diagnosis Date    Arthropathia 08/05/2010    B12 deficiency anemia 10/21/2010    Benign essential hypertension with target blood pressure below 140/90 10/10/2016    CARDIOVASCULAR SCREENING; LDL GOAL LESS THAN 160 10/31/2010    Crohn's colitis (H) 02/15/2011    Dermatitis-dishydrotic eczema-severe 08/05/2010    Hiatal hernia     HTN (hypertension) 07/21/2011    Iron deficiency anemia 10/21/2010    Malignant neoplasm of sigmoid colon (H)     Obesity     Primary pulmonary hypertension (H) 04/06/2010       PAST SURGICAL HISTORY:  Past Surgical History:   Procedure Laterality Date    COLECTOMY WITHOUT COLOSTOMY N/A 4/6/2023    Procedure: Laparoscopic Converted to Open Total abdominal  colectomy;  Surgeon: Jerome Ashley MD;  Location: [facility]    COLONOSCOPY  10/11/10    COLONOSCOPY N/A 2023    Procedure: ATTEMPTED COLONOSCOPY WITH SIGMOID STRICTURE BIOPSY;  Surgeon: Jonathan Alcocer MD;  Location: [facility]    ESOPHAGOSCOPY, GASTROSCOPY, DUODENOSCOPY (EGD), COMBINED N/A 2023    Procedure: ESOPHAGOGASTRODUODENOSCOPY, WITH BIOPSY;  Surgeon: Jonathan Alcocer MD;  Location: [facility]    HYSTERECTOMY TOTAL ABDOMINAL, BILATERAL SALPINGO-OOPHORECTOMY, COMBINED N/A 2023    Procedure: Hysterectomy total abdominal, bilateral salpingo-oophorectomy;  Surgeon: Sunitha Olea MD;  Location: [facility]    INSERT PORT VASCULAR ACCESS Right 2023    Procedure: Ultrasound-guided right internal jugular venous access port placement with fluoroscopy;  Surgeon: Martin Thomas DO;  Location: [facility]    IR PORT CHECK RIGHT  2023    SIGMOIDOSCOPY FLEXIBLE N/A 2023    Procedure: Sigmoidoscopy flexible;  Surgeon: Jerome Ashley MD;  Location: [facility]    SURGICAL HISTORY OF -   77    Perineorrhaphy, for widening vaginal orifice    ZZC EXPLORATORY OF ABDOMEN      laparoscopy       FAMILY HISTORY:  Family History   Problem Relation Age of Onset    Hypertension Mother         on meds, alive    Cerebrovascular Disease Father         stroke about age 65,  of cancer at 68 yrs    Diabetes Father         eventually took insulin    Anemia Father         Pernisios anemia    Kidney Disease Niece         kidney transplant    Kidney Disease Nephew         kidney transplant    Venous thrombosis No family hx of     Anesthesia Reaction No family hx of        SOCIAL HISTORY:  Social History     Socioeconomic History    Marital status:      Spouse name: None    Number of children: None    Years of education: None    Highest education level: None   Tobacco Use    Smoking status: Never     Passive exposure: Current    Smokeless tobacco: Never   Substance and Sexual Activity     Alcohol use: No    Drug use: No    Sexual activity: Yes     Partners: Male               Thank you for allowing me to participate in the care of your patient.      Sincerely,     LUIS Smith [facility]  cc:   No referring provider defined for this encounter.

## 2023-08-15 NOTE — PROGRESS NOTES
General Cardiology Clinic Progress Note  Nadia Ladd MRN# [id number]   YOB: 1955 Age: 68 year old     Primary cardiologist: Dr. Garza     Reason for visit: Atrial fibrillation     History of presenting illness:    Nadia Ladd is a pleasant 68 year old patient with past medical history significant for:    Colon cancer:   -2 different types of colon cancer with adenocarcinoma of the hepatic flexure sigmoid tumor that appeared to be predominantly neuroendocrine.   -S/p surgical resection 4/2023 with colectomy and started on FOLFOX chemotherapy 5/31/2023.   -Follows with Dr. CECILIA Bateman at [facility].   Paroxysmal atrial fibrillation with RVR  [id number] score 3(gender, age, hypertension  Hypertension  CKD, stage IIIb  Prediabetes  Crohn's disease  Anemia    She was initially evaluated by Dr. Garza on 6/30/2023 after her first dose of chemotherapy she presented to the emergency department with lightheadedness with positional changes.  She was found to have new onset of atrial fibrillation with RVR.  She was admitted to [facility] for 2 days and was started on rate control with IV diltiazem.  Ultimately, she converted to sinus rhythm and was discharged home on metoprolol XL 25 mg daily.    She was followed up by my colleague LUIS Holloway CNP on 8/3/2023 and reported that after her second round of chemotherapy the day prior she had palpitations but did not have atrial fibrillation.  It was recommended that she continue Eliquis. Diltiazem 30 mg as needed every 4-6 hours for palpitations or atrial fibrillation with RVR was recommended.    The patient states that Eliquis is unaffordable and she has been working with our patient assistance program.  Unfortunately, due to her income level they do not qualify for patient assistance, but she could qualify for $85 Xarelto a month.  This is still a financial strain on the patient.     Unfortunately, on 8/4/2023 she received chemotherapy  and again developed atrial fibrillation with RVR.  She described palpitations without shortness of breath, dizziness or lightheadedness.  Upon evaluation in the emergency department she had atrial fibrillation with RVR but was in no acute distress and vitals were stable.  The oral diltiazem was tried without relief and she was given a bolus of IV diltiazem and IV fluids.  Her troponin was elevated but remained flat during her monitoring.  Her rate became to well controlled and the patient remained vitally stable.  They rhythm control admission for cardioversion versus ongoing monitoring in the ED with IV diltiazem infusion, and she elected for the latter.  Ultimately, the patient was discharged home on anticoagulation after rates were improved.    Today she follows up prior to her next chemoinfusion which is scheduled for tomorrow.  The patient states that during the initial infusion that she has at the infusion center she traditionally does not have episodes of atrial fibrillation.  She then wears a home chemo pump for 46 hours and traditionally has the breakthrough A-fib at the end of the home infusion pump.  Her symptoms are primarily palpitations and thankfully she does not have any dizziness, lightheadedness, shortness of breath, syncope or presyncope.    We are continuing to work with the patient to discuss anticoagulation and I will reach out to her oncologist regarding possible use of Coumadin given DOACs seem to be cost prohibitive.  The patient did mention to me that Dr. Bateman felt Coumadin may not be the best option with her chemo regimen.    Diagnotic studies:  EKG (8/15/2023): Sinus rhythm at 71 bpm  Echocardiogram (6/5/2023): Normal biventricular size, structure and function, mild pulmonary hypertension, mild MR without wall motion abnormalities normal biatrial size  Echocardiogram (4/2023): Normal biventricular systolic function without effusion and no significant wall motion or valvular  abnormalities.  Normal biatrial size          Assessment and Plan:     ASSESSMENT:    Paroxysmal atrial fibrillation with RVR  In the setting of chemoinfusion  [id number] score of 3 therefore warrants anticoagulation  Currently on rate control with metoprolol XL 25 mg daily and Cardizem 30 mg 3 times daily.  Unfortunately, with ongoing treatments she will likely have episodes of breakthrough atrial fibrillation.  Our goal at this time should be rate control and continuing on anticoagulation.  If in the future she has persistent atrial fibrillation despite completion of chemotherapy could discuss rhythm control.    Hypertension  Currently well controlled    Colon cancer  Status post resection and colectomy  Currently undergoing chemo therapy as above      PLAN:     Increase metoprolol XL to 50 mg daily (can take 2 of 25 mg tablets). Will send a new script.  During the days of chemo and 2 days after take long acting diltiazem 120 mg daily with 30 mg of short acting diltiazem as needed for atrial fibrillation with HR >110.  Will discuss with Dr. Bateman about Warfarin or Coumadin.  Continue all other medications without changes.  Please follow up with Jane in November or sooner if needed.       Orders this Visit:  Orders Placed This Encounter   Procedures    EKG 12-lead complete w/read - Clinics (performed today)     Orders Placed This Encounter   Medications    diltiazem ER COATED BEADS (CARDIZEM CD/CARTIA XT) 120 MG 24 hr capsule     Sig: Take 1 capsule (120 mg) by mouth daily On chemo days and 2 days post chemo infusion.     Dispense:  60 capsule     Refill:  3    metoprolol succinate ER (TOPROL XL) 50 MG 24 hr tablet     Sig: Take 1 tablet (50 mg) by mouth daily     Dispense:  90 tablet     Refill:  3     Medications Discontinued During This Encounter   Medication Reason    metoprolol succinate ER (TOPROL XL) 25 MG 24 hr tablet Reorder (No AVS)    diltiazem (CARDIZEM) 30 MG tablet        Today's clinic visit  "entailed:  Review of the result(s) of each unique test - echo, EKGs,   Prescription drug management  40 minutes spent by me on the date of the encounter doing chart review, history and exam, documentation and further activities per the note  Provider  Link to [facility] Help Grid     The level of medical decision making during this visit was of moderate complexity.           Review of Systems:     Review of Systems:  Skin:        Eyes:       ENT:       Respiratory:  Negative shortness of breath;cough;wheezing  Cardiovascular:  Negative;palpitations;chest pain;edema;lightheadedness;dizziness;syncope or near-syncope    Gastroenterology:      Genitourinary:       Musculoskeletal:       Neurologic:  Negative numbness or tingling of feet;numbness or tingling of hands  Psychiatric:       Heme/Lymph/Imm:       Endocrine:                 Physical Exam:     Vitals: /76 (BP Location: Right arm, Patient Position: Sitting, Cuff Size: Adult Regular)   Pulse 74   Ht 1.549 m (5' 1\")   Wt 77.8 kg (171 lb 9.6 oz)   LMP  (LMP Unknown)   SpO2 97%   BMI 32.42 kg/m    Constitutional: Well nourished and in no apparent distress.  Eyes: Pupils equal, round. Sclerae anicteric.   HEENT: Normocephalic, atraumatic.   Neck: Supple. JVD   Respiratory: Breathing non-labored. Lungs clear to auscultation bilaterally. No crackles, wheezes, rhonchi, or rales.  Cardiovascular: Regular rate and rhythm, normal S1 and S2. No murmur, rub, or gallop.  Skin: Warm, dry. No rashes, cyanosis, or xanthelasma.  Extremities: No edema.  Neurologic: No gross motor deficits. Alert, awake, and oriented to person, place and time.  Psychiatric: Affect appropriate.        CURRENT MEDICATIONS:  Current Outpatient Medications   Medication Sig Dispense Refill    apixaban ANTICOAGULANT (ELIQUIS ANTICOAGULANT) 5 MG tablet Take 1 tablet (5 mg) by mouth 2 times daily 60 tablet 11    cyanocobalamin 1000 MCG TBCR Take 1,000 mcg by mouth daily 100 tablet 1    dexamethasone " (DECADRON) 4 MG tablet Take 2 tablets (8 mg) by mouth daily Take for 2 days, starting the day after chemo. Take with food. 40 tablet 0    diltiazem (CARDIZEM) 30 MG tablet Take 1 tablet (30 mg) by mouth 3 times daily 90 tablet 0    diltiazem ER COATED BEADS (CARDIZEM CD/CARTIA XT) 120 MG 24 hr capsule Take 1 capsule (120 mg) by mouth daily On chemo days and 2 days post chemo infusion. 60 capsule 3    Ferrous Sulfate (IRON) 325 (65 Fe) MG tablet Take 1 tablet by mouth daily      MAGNESIUM OXIDE 400 (240 Mg) MG tablet TAKE 1 TABLET (400 MG) BY MOUTH 2 TIMES DAILY (Patient taking differently: Take 400 mg by mouth 2 times daily) 60 tablet 0    metoprolol succinate ER (TOPROL XL) 50 MG 24 hr tablet Take 1 tablet (50 mg) by mouth daily 90 tablet 3    Multiple Vitamins-Iron (MULTI-DAY PLUS IRON PO) Take 1 tablet by mouth daily      sodium bicarbonate 650 MG tablet Take 1 tablet (650 mg) by mouth 2 times daily 120 tablet 3       ALLERGIES  Allergies   Allergen Reactions    No Known Drug Allergy          PAST MEDICAL HISTORY:  Past Medical History:   Diagnosis Date    Arthropathia 08/05/2010    B12 deficiency anemia 10/21/2010    Benign essential hypertension with target blood pressure below 140/90 10/10/2016    CARDIOVASCULAR SCREENING; LDL GOAL LESS THAN 160 10/31/2010    Crohn's colitis (H) 02/15/2011    Dermatitis-dishydrotic eczema-severe 08/05/2010    Hiatal hernia     HTN (hypertension) 07/21/2011    Iron deficiency anemia 10/21/2010    Malignant neoplasm of sigmoid colon (H)     Obesity     Primary pulmonary hypertension (H) 04/06/2010       PAST SURGICAL HISTORY:  Past Surgical History:   Procedure Laterality Date    COLECTOMY WITHOUT COLOSTOMY N/A 4/6/2023    Procedure: Laparoscopic Converted to Open Total abdominal colectomy;  Surgeon: Jerome Ashley MD;  Location: [facility]    COLONOSCOPY  10/11/10    COLONOSCOPY N/A 2/27/2023    Procedure: ATTEMPTED COLONOSCOPY WITH SIGMOID STRICTURE BIOPSY;  Surgeon:  Jonathan Alcocer MD;  [facility]    ESOPHAGOSCOPY, GASTROSCOPY, DUODENOSCOPY (EGD), COMBINED N/A 2023    Procedure: ESOPHAGOGASTRODUODENOSCOPY, WITH BIOPSY;  Surgeon: Jonathan Alcocer MD;  [facility]    HYSTERECTOMY TOTAL ABDOMINAL, BILATERAL SALPINGO-OOPHORECTOMY, COMBINED N/A 2023    Procedure: Hysterectomy total abdominal, bilateral salpingo-oophorectomy;  Surgeon: Sunitha Olea MD;  Location: [facility]    INSERT PORT VASCULAR ACCESS Right 2023    Procedure: Ultrasound-guided right internal jugular venous access port placement with fluoroscopy;  Surgeon: Martin Thomas DO;  Location: [facility]    IR PORT CHECK RIGHT  2023    SIGMOIDOSCOPY FLEXIBLE N/A 2023    Procedure: Sigmoidoscopy flexible;  Surgeon: Jerome Ashley MD;  Location: [facility]    SURGICAL HISTORY OF -   77    Perineorrhaphy, for widening vaginal orifice    ZZC EXPLORATORY OF ABDOMEN      laparoscopy       FAMILY HISTORY:  Family History   Problem Relation Age of Onset    Hypertension Mother         on meds, alive    Cerebrovascular Disease Father         stroke about age 65,  of cancer at 68 yrs    Diabetes Father         eventually took insulin    Anemia Father         Pernisios anemia    Kidney Disease Niece         kidney transplant    Kidney Disease Nephew         kidney transplant    Venous thrombosis No family hx of     Anesthesia Reaction No family hx of        SOCIAL HISTORY:  Social History     Socioeconomic History    Marital status:      Spouse name: None    Number of children: None    Years of education: None    Highest education level: None   Tobacco Use    Smoking status: Never     Passive exposure: Current    Smokeless tobacco: Never   Substance and Sexual Activity    Alcohol use: No    Drug use: No    Sexual activity: Yes     Partners: Male

## 2023-08-16 ENCOUNTER — VIRTUAL VISIT (OUTPATIENT)
Dept: ONCOLOGY | Facility: CLINIC | Age: 68
End: 2023-08-16
Payer: MEDICARE

## 2023-08-16 ENCOUNTER — TELEPHONE (OUTPATIENT)
Dept: CARDIOLOGY | Facility: CLINIC | Age: 68
End: 2023-08-16

## 2023-08-16 ENCOUNTER — LAB (OUTPATIENT)
Dept: INFUSION THERAPY | Facility: CLINIC | Age: 68
End: 2023-08-16
Attending: INTERNAL MEDICINE
Payer: MEDICARE

## 2023-08-16 VITALS
BODY MASS INDEX: 33.44 KG/M2 | RESPIRATION RATE: 18 BRPM | SYSTOLIC BLOOD PRESSURE: 135 MMHG | DIASTOLIC BLOOD PRESSURE: 62 MMHG | OXYGEN SATURATION: 98 % | HEART RATE: 66 BPM | TEMPERATURE: 98.6 F | WEIGHT: 177 LBS

## 2023-08-16 DIAGNOSIS — Z76.89 PREVENTION OF CHEMOTHERAPY-INDUCED NEUTROPENIA: ICD-10-CM

## 2023-08-16 DIAGNOSIS — D69.59 CHEMOTHERAPY-INDUCED THROMBOCYTOPENIA: ICD-10-CM

## 2023-08-16 DIAGNOSIS — I48.0 PAROXYSMAL ATRIAL FIBRILLATION (H): ICD-10-CM

## 2023-08-16 DIAGNOSIS — D50.0 IRON DEFICIENCY ANEMIA DUE TO CHRONIC BLOOD LOSS: ICD-10-CM

## 2023-08-16 DIAGNOSIS — H93.8X3 OTOTOXICITY OF BOTH EARS: ICD-10-CM

## 2023-08-16 DIAGNOSIS — C18.7 MALIGNANT NEOPLASM OF SIGMOID COLON (H): ICD-10-CM

## 2023-08-16 DIAGNOSIS — T45.1X5A CHEMOTHERAPY-INDUCED THROMBOCYTOPENIA: ICD-10-CM

## 2023-08-16 DIAGNOSIS — C18.7 MALIGNANT NEOPLASM OF SIGMOID COLON (H): Primary | ICD-10-CM

## 2023-08-16 DIAGNOSIS — R68.89 COLD SENSITIVITY: ICD-10-CM

## 2023-08-16 DIAGNOSIS — Z76.89 PREVENTION OF CHEMOTHERAPY-INDUCED NEUTROPENIA: Primary | ICD-10-CM

## 2023-08-16 LAB
ALBUMIN SERPL BCG-MCNC: 3.9 G/DL (ref 3.5–5.2)
ALP SERPL-CCNC: 149 U/L (ref 35–104)
ALT SERPL W P-5'-P-CCNC: 23 U/L (ref 0–50)
ANION GAP SERPL CALCULATED.3IONS-SCNC: 11 MMOL/L (ref 7–15)
AST SERPL W P-5'-P-CCNC: 30 U/L (ref 0–45)
BASOPHILS # BLD AUTO: 0 10E3/UL (ref 0–0.2)
BASOPHILS NFR BLD AUTO: 1 %
BILIRUB SERPL-MCNC: 0.4 MG/DL
BUN SERPL-MCNC: 19.7 MG/DL (ref 8–23)
CALCIUM SERPL-MCNC: 9.1 MG/DL (ref 8.8–10.2)
CHLORIDE SERPL-SCNC: 106 MMOL/L (ref 98–107)
CREAT SERPL-MCNC: 1.22 MG/DL (ref 0.51–0.95)
DEPRECATED HCO3 PLAS-SCNC: 23 MMOL/L (ref 22–29)
EOSINOPHIL # BLD AUTO: 0 10E3/UL (ref 0–0.7)
EOSINOPHIL NFR BLD AUTO: 0 %
ERYTHROCYTE [DISTWIDTH] IN BLOOD BY AUTOMATED COUNT: 16.7 % (ref 10–15)
GFR SERPL CREATININE-BSD FRML MDRD: 48 ML/MIN/1.73M2
GLUCOSE SERPL-MCNC: 122 MG/DL (ref 70–99)
HCT VFR BLD AUTO: 38.3 % (ref 35–47)
HGB BLD-MCNC: 12.4 G/DL (ref 11.7–15.7)
IMM GRANULOCYTES # BLD: 0 10E3/UL
IMM GRANULOCYTES NFR BLD: 1 %
LYMPHOCYTES # BLD AUTO: 0.5 10E3/UL (ref 0.8–5.3)
LYMPHOCYTES NFR BLD AUTO: 13 %
MAGNESIUM SERPL-MCNC: 1.7 MG/DL (ref 1.7–2.3)
MCH RBC QN AUTO: 29 PG (ref 26.5–33)
MCHC RBC AUTO-ENTMCNC: 32.4 G/DL (ref 31.5–36.5)
MCV RBC AUTO: 90 FL (ref 78–100)
MONOCYTES # BLD AUTO: 0.6 10E3/UL (ref 0–1.3)
MONOCYTES NFR BLD AUTO: 16 %
NEUTROPHILS # BLD AUTO: 2.8 10E3/UL (ref 1.6–8.3)
NEUTROPHILS NFR BLD AUTO: 69 %
NRBC # BLD AUTO: 0 10E3/UL
NRBC BLD AUTO-RTO: 0 /100
PLATELET # BLD AUTO: 149 10E3/UL (ref 150–450)
POTASSIUM SERPL-SCNC: 4.4 MMOL/L (ref 3.4–5.3)
PROT SERPL-MCNC: 6.2 G/DL (ref 6.4–8.3)
RBC # BLD AUTO: 4.28 10E6/UL (ref 3.8–5.2)
SODIUM SERPL-SCNC: 140 MMOL/L (ref 136–145)
WBC # BLD AUTO: 4 10E3/UL (ref 4–11)

## 2023-08-16 PROCEDURE — 96411 CHEMO IV PUSH ADDL DRUG: CPT

## 2023-08-16 PROCEDURE — 96375 TX/PRO/DX INJ NEW DRUG ADDON: CPT

## 2023-08-16 PROCEDURE — 96368 THER/DIAG CONCURRENT INF: CPT

## 2023-08-16 PROCEDURE — 258N000003 HC RX IP 258 OP 636: Performed by: INTERNAL MEDICINE

## 2023-08-16 PROCEDURE — 96367 TX/PROPH/DG ADDL SEQ IV INF: CPT

## 2023-08-16 PROCEDURE — 80053 COMPREHEN METABOLIC PANEL: CPT | Performed by: INTERNAL MEDICINE

## 2023-08-16 PROCEDURE — 36591 DRAW BLOOD OFF VENOUS DEVICE: CPT | Performed by: INTERNAL MEDICINE

## 2023-08-16 PROCEDURE — 83735 ASSAY OF MAGNESIUM: CPT

## 2023-08-16 PROCEDURE — 99214 OFFICE O/P EST MOD 30 MIN: CPT | Mod: 95 | Performed by: INTERNAL MEDICINE

## 2023-08-16 PROCEDURE — 96415 CHEMO IV INFUSION ADDL HR: CPT

## 2023-08-16 PROCEDURE — 250N000011 HC RX IP 250 OP 636: Performed by: INTERNAL MEDICINE

## 2023-08-16 PROCEDURE — 96413 CHEMO IV INFUSION 1 HR: CPT

## 2023-08-16 PROCEDURE — 85025 COMPLETE CBC W/AUTO DIFF WBC: CPT | Performed by: INTERNAL MEDICINE

## 2023-08-16 RX ORDER — MAGNESIUM SULFATE HEPTAHYDRATE 40 MG/ML
2 INJECTION, SOLUTION INTRAVENOUS ONCE
Status: COMPLETED | OUTPATIENT
Start: 2023-08-16 | End: 2023-08-16

## 2023-08-16 RX ORDER — LORAZEPAM 2 MG/ML
0.5 INJECTION INTRAMUSCULAR EVERY 4 HOURS PRN
Status: CANCELLED | OUTPATIENT
Start: 2023-09-27

## 2023-08-16 RX ORDER — HEPARIN SODIUM (PORCINE) LOCK FLUSH IV SOLN 100 UNIT/ML 100 UNIT/ML
5 SOLUTION INTRAVENOUS
Status: CANCELLED | OUTPATIENT
Start: 2023-09-29

## 2023-08-16 RX ORDER — HEPARIN SODIUM (PORCINE) LOCK FLUSH IV SOLN 100 UNIT/ML 100 UNIT/ML
5 SOLUTION INTRAVENOUS
Status: CANCELLED | OUTPATIENT
Start: 2023-09-27

## 2023-08-16 RX ORDER — EPINEPHRINE 1 MG/ML
0.3 INJECTION, SOLUTION, CONCENTRATE INTRAVENOUS EVERY 5 MIN PRN
Status: CANCELLED | OUTPATIENT
Start: 2023-10-11

## 2023-08-16 RX ORDER — MEPERIDINE HYDROCHLORIDE 25 MG/ML
25 INJECTION INTRAMUSCULAR; INTRAVENOUS; SUBCUTANEOUS EVERY 30 MIN PRN
Status: CANCELLED | OUTPATIENT
Start: 2023-10-11

## 2023-08-16 RX ORDER — HEPARIN SODIUM (PORCINE) LOCK FLUSH IV SOLN 100 UNIT/ML 100 UNIT/ML
5 SOLUTION INTRAVENOUS
Status: CANCELLED | OUTPATIENT
Start: 2023-10-11

## 2023-08-16 RX ORDER — ALBUTEROL SULFATE 90 UG/1
1-2 AEROSOL, METERED RESPIRATORY (INHALATION)
Status: CANCELLED
Start: 2023-10-11

## 2023-08-16 RX ORDER — HEPARIN SODIUM,PORCINE 10 UNIT/ML
5 VIAL (ML) INTRAVENOUS
Status: CANCELLED | OUTPATIENT
Start: 2023-09-27

## 2023-08-16 RX ORDER — DIPHENHYDRAMINE HYDROCHLORIDE 50 MG/ML
50 INJECTION INTRAMUSCULAR; INTRAVENOUS
Status: CANCELLED
Start: 2023-09-27

## 2023-08-16 RX ORDER — ONDANSETRON 2 MG/ML
8 INJECTION INTRAMUSCULAR; INTRAVENOUS ONCE
Status: CANCELLED | OUTPATIENT
Start: 2023-09-27

## 2023-08-16 RX ORDER — METHYLPREDNISOLONE SODIUM SUCCINATE 125 MG/2ML
125 INJECTION, POWDER, LYOPHILIZED, FOR SOLUTION INTRAMUSCULAR; INTRAVENOUS
Status: CANCELLED
Start: 2023-09-27

## 2023-08-16 RX ORDER — ONDANSETRON 2 MG/ML
8 INJECTION INTRAMUSCULAR; INTRAVENOUS ONCE
Status: CANCELLED | OUTPATIENT
Start: 2023-10-11

## 2023-08-16 RX ORDER — HEPARIN SODIUM (PORCINE) LOCK FLUSH IV SOLN 100 UNIT/ML 100 UNIT/ML
5 SOLUTION INTRAVENOUS
Status: CANCELLED | OUTPATIENT
Start: 2023-10-13

## 2023-08-16 RX ORDER — HEPARIN SODIUM,PORCINE 10 UNIT/ML
5 VIAL (ML) INTRAVENOUS
Status: CANCELLED | OUTPATIENT
Start: 2023-10-13

## 2023-08-16 RX ORDER — EPINEPHRINE 1 MG/ML
0.3 INJECTION, SOLUTION, CONCENTRATE INTRAVENOUS EVERY 5 MIN PRN
Status: CANCELLED | OUTPATIENT
Start: 2023-09-27

## 2023-08-16 RX ORDER — ALBUTEROL SULFATE 0.83 MG/ML
2.5 SOLUTION RESPIRATORY (INHALATION)
Status: CANCELLED | OUTPATIENT
Start: 2023-10-11

## 2023-08-16 RX ORDER — MEPERIDINE HYDROCHLORIDE 25 MG/ML
25 INJECTION INTRAMUSCULAR; INTRAVENOUS; SUBCUTANEOUS EVERY 30 MIN PRN
Status: CANCELLED | OUTPATIENT
Start: 2023-09-27

## 2023-08-16 RX ORDER — ALBUTEROL SULFATE 90 UG/1
1-2 AEROSOL, METERED RESPIRATORY (INHALATION)
Status: CANCELLED
Start: 2023-09-27

## 2023-08-16 RX ORDER — LORAZEPAM 2 MG/ML
0.5 INJECTION INTRAMUSCULAR EVERY 4 HOURS PRN
Status: CANCELLED | OUTPATIENT
Start: 2023-10-11

## 2023-08-16 RX ORDER — ONDANSETRON 2 MG/ML
8 INJECTION INTRAMUSCULAR; INTRAVENOUS ONCE
Status: COMPLETED | OUTPATIENT
Start: 2023-08-16 | End: 2023-08-16

## 2023-08-16 RX ORDER — METHYLPREDNISOLONE SODIUM SUCCINATE 125 MG/2ML
125 INJECTION, POWDER, LYOPHILIZED, FOR SOLUTION INTRAMUSCULAR; INTRAVENOUS
Status: CANCELLED
Start: 2023-10-11

## 2023-08-16 RX ORDER — ALBUTEROL SULFATE 0.83 MG/ML
2.5 SOLUTION RESPIRATORY (INHALATION)
Status: CANCELLED | OUTPATIENT
Start: 2023-09-27

## 2023-08-16 RX ORDER — DIPHENHYDRAMINE HYDROCHLORIDE 50 MG/ML
50 INJECTION INTRAMUSCULAR; INTRAVENOUS
Status: CANCELLED
Start: 2023-10-11

## 2023-08-16 RX ORDER — HEPARIN SODIUM,PORCINE 10 UNIT/ML
5 VIAL (ML) INTRAVENOUS
Status: CANCELLED | OUTPATIENT
Start: 2023-10-11

## 2023-08-16 RX ORDER — HEPARIN SODIUM,PORCINE 10 UNIT/ML
5 VIAL (ML) INTRAVENOUS
Status: CANCELLED | OUTPATIENT
Start: 2023-09-29

## 2023-08-16 RX ADMIN — FOSAPREPITANT: 150 INJECTION, POWDER, LYOPHILIZED, FOR SOLUTION INTRAVENOUS at 12:13

## 2023-08-16 RX ADMIN — ONDANSETRON 8 MG: 2 INJECTION INTRAMUSCULAR; INTRAVENOUS at 11:48

## 2023-08-16 RX ADMIN — LEUCOVORIN CALCIUM 650 MG: 350 INJECTION, POWDER, LYOPHILIZED, FOR SUSPENSION INTRAMUSCULAR; INTRAVENOUS at 12:54

## 2023-08-16 RX ADMIN — DEXTROSE MONOHYDRATE 250 ML: 50 INJECTION, SOLUTION INTRAVENOUS at 11:45

## 2023-08-16 RX ADMIN — OXALIPLATIN 120 MG: 5 INJECTION, SOLUTION INTRAVENOUS at 12:52

## 2023-08-16 RX ADMIN — MAGNESIUM SULFATE HEPTAHYDRATE 2 G: 40 INJECTION, SOLUTION INTRAVENOUS at 15:02

## 2023-08-16 RX ADMIN — SODIUM CHLORIDE 1000 ML: 9 INJECTION, SOLUTION INTRAVENOUS at 11:58

## 2023-08-16 ASSESSMENT — PAIN SCALES - GENERAL: PAINLEVEL: NO PAIN (0)

## 2023-08-16 NOTE — LETTER
8/16/2023         RE: Nadia Ladd  [address]  [facility] [address]        Dear Colleague,    Thank you for referring your patient, Nadia Ladd, to the [facility]. Please see a copy of my visit note below.    Virtual Visit Details    Video Start Time: 9:11AM     Type of service:  Video Visit     Video End Time: 9:36AM    Originating Location (pt. Location):      Distant Location (provider location):  [facility] off site     Platform used for Video Visit: [facility]    Hematology/Oncology Established Patient Follow Up Note      Today's Date: 8/16/2023    Reason for follow up: Sigmoid cancer    HISTORY OF PRESENT ILLNESS: Nadia Ladd is a 68 year old female who presents for follow-up    Patient has medical history including Crohn's disease on sulfasalazine, anemia secondary to iron deficiency and B12 deficiency, obesity, hypertension, prediabetes, eczema, pulmonary hypertension, hiatal hernia, mild splenomegaly (14.7 cm in 2/23)    - 2/23 pt noted increasing fatigue, found to have iron deficiency anemia w/hgb 6.8 (previously required RBC transfusion when dx w/Crohn's), did have intermittent changes in stool but not persistent (noted minimal blood in stool)   - 2/23 upper endoscopy for iron deficiency anemia and Crohn's disease: Hiatal hernia, normal stomach, normal duodenum  - 2/23 colonoscopy: A frond-like/villous partially obstructing large mass found in sigmoid colon, partially circumferential involving two thirds of the lumen circumference, 4 cm, unable to traverse, with oozing, s/p biopsy  PATHOLOGY:  A.  Stomach, antrum: Biopsy:  - Antral type mucosa with mild chronic inactive gastritis  - Immunostain for Helicobacter pylori is negative      B.  Colon, sigmoid, stricture: Biopsy:  - Invasive poorly differentiated carcinoma  The sigmoid stricture shows a high-grade carcinoma without definitive gland formation.  Given the  poorly differentiated appearance, an immunohistochemical panel was performed to exclude the possibility of a tumor by direct extension or metastasis.   Immunohistochemical stains are performed and show the tumor to be diffusely positive for CK7 and partially positive for CK20 and SATB2.  There is no significant tumor reactivity for CDX2, GATA3, PAX8, TTF-1, and chromogranin.  Synaptophysin highlights very rare positive cells. Although the CK7/CK20 profile is not entirely typical for a colorectal carcinoma, immunostains for tumors of other origins are negative and a colorectal primary is still favored.   - Mismatch repair:  1) MLH1-loss of nuclear expression  2) MSH2-intact  3) MSH6-intact  4) PMS2-loss of nuclear expression  -MLH1 promoter methylation: NEGATIVE    -2/23 CT CAP:  A) 4 cm length mass in the proximal sigmoid colon. There is some irregularity and stranding in the adjacent pericolonic fat which may indicate tumor extension. There is no obstruction.   B) there is a second probable mass at the hepatic flexure of the colon measuring 2.5 cm in length   C)There are small lymph nodes in the mesial colon adjacent to the hepatic flexure abnormality and the sigmoid colonic mass. No lymphadenopathy by size criteria  D) There is submucosal fatty deposition in the colon, most severe in the distal sigmoid colon and rectum, which can be seen secondary to quiescent inflammatory bowel disease.  E) no liver lesion    -3/23 PET:  a. There is increased FDG avidity of the sigmoid colon with focal lobular wall thickening consistent with biopsy-proven adenocarcinoma.  b. Secondary focus noted at the hepatic flexure demonstrates elevated FDG avidity and lobulated wall thickening highly suspicious for a additional primary.  c. Additionally there is a smaller focus of wall thickening with elevated FDG avidity along the left aspect of the transverse colon  which is suspicious for a possible third focus of colonic  "malignancy.    -3/23 CEA 6.8  - 3/23 colorectal surgery consultation with - in the setting of Crohn's, at least sigmoid colectomy with possible total abdominal colectomy with either ileorectal anastomosis or end ileostomy (\"rectum can remain active and be actively surveyed annually\")    -3/23 genetic counseling, testing for Chan syndrome    - 4/5/2023 gynecologic oncology consultation for risk reduction surgery with hysterectomy and BSO at time of colectomy    -4/6/2023 laparoscopic converted to open total abdominal colectomy with ileorectal anastomosis, partial omentectomy, flexible sigmoidoscopy, TAHBSO  A. OMENTUM, RESECTION:  - Adipose tissue with no significant histopathologic abnormality  - No evidence of malignancy     B. COLON, RESECTION:  Mass #1 (ascending colon/hepatic flexure): Mixed neuroendocrine-non-neuroendocrine neoplasm (MINEN):  - Neuroendocrine carcinoma component (70%) and moderately-differentiated adenocarcinoma component (30%)  - Size = 5.0 cm, invading into muscularis propria  - Positive LVI  - Negative macroscopic tumor perforation, negative PNI  - Negative surgical resection margins  - IHC: Neuroendocrine portion: Negative for chromogranin and INSM1 but strongly express synaptophysin  - IHC: Adenocarcinoma portion: Positive for CK20, CDX2 negative for CK7, PAX8, ER, S100  Ki-67 proliferation index of approximately 80%.  MMR:  Intact nuclear expression of MLH1, MSH2, MHS6, PMS2  PDL1:  COMBINED POSITIVE SCORE (CPS): 55-60  TUMOR PROPORTION SCORE (TPS): 50%   pathogenic KRAS mutation (G13D) was identified (5.2%).  AJCC 8th edition: stage 3B mpT3 pN1a     Mass#2 (sigmoid colon): Poorly-differentiated carcinoma:  - Grade 3  - Size = 5.7 cm, invading into muscularis propria  - Negative for microscopic tumor perforation, LVI, perineural invasion  - Negative surgical resection margins  - IHC: Positive for scar and keratin AE1/AE3, negative for CK7, CK20, CDX2, PAX8, ER, chromogranin, " CD56, p40, synaptophysin, S100, INI1, p40, INSM1  Ki-67 proliferation index of approximately 70%.  MMR: loss of nuclear expression MLH1 and PMS2, intact nuclear expression MSH2 and MSH6  PDL1:  COMBINED POSITIVE SCORE (CPS): 80  TUMOR PROPORTION SCORE (TPS): 70%  pathogenic KRAS mutation (G13D) was identified (4.5%).  AJCC 8th edition: stage 3A mpT2 pN1a    - One of forty-one sampled lymph nodes positive (1/41)  - Benign appendix  - Tubular adenoma    C. UTERUS, CERVIX, BILATERAL FALLOPIAN TUBES & OVARIES, :  - Benign endometrial polyps; atrophic endometrium  - Uterus, cervix, bilateral fallopian tubes, and ovaries with no significant morphologic abnormalities  - No evidence of dysplasia or malignancy     D. SMALL INTESTINE, TERMINAL ILEUM, TERMINAL ILIUM:  - Benign ileum  - No evidence of dysplasia or malignancy  - Negative for metastases to one of one sampled lymph node (0 /1)     E. PROXIMAL ANASTOMOTIC RING:  - Benign small intestine  - No evidence of dysplasia or malignancy     F. DISTAL ANASTOMOTIC RING:  - Benign colon  - No evidence of dysplasia or malignancy    CRC NGS:   --mutations positive for KRAS G13D mutation 5.2% mass 1, KRAS G13D mutation 4.5% mass 2 (negative for AKT1; BRAF; ERBB2; KRAS; NRAS; PIK3CA; PTEN)  --TMB score: 17.064 mut/Mb mass 1 (CPS 55-60, TPS 50%), 60.943 mut/Mb mass 2 (CPS 80, TPS 70%)  --fusion negative including NTRK and RET for mass 1 and 2 (also negative for AKT1, AKT3, ALK, AR, ETDEZA59, CLAUDINE, BCOR, BRAF, BRD3, BRD4, CAMTA1, CCNB3, CCND1, , CIC, CSF1, CSF1R, CTNNB1, DNAJB1, EGFR, EPC1, ERBB2, ERBB4, ERG, ESR1, ESRRA, ETV1, ETV4, ETV5, ETV6, EWSR1, FGFR1, FGFR2, FGFR3, FGR, FOS, FOSB, FOXO1, FOXO4, FOXR2, FUS, GLI1, GRB7, HMGA2, HRAS, IDH1, IDH2, INSR, JAK2, JAK3, JAZF1, KRAS, MAML2, MAP2K1, MAST1, MAST2, MEAF6, MET, MKL2, MN1, MSMB, MUSK, MYB, MYBL1, MYOD1, NCOA1, NCOA2, NOTCH1, NOTCH2, NR4A3, NRAS, NRG1, NTRK1, NTRK2, NTRK3, NUMBL1, NUTM1, PAX3, PDGFB, PDGFRA, PDGFRB,  PHF1, PIK3CA, PKN1, PLAG1, PPARG, PRKACA, PRKCA, PRKCB, RAF1, RELA, RET, ROS1, RPSO2, RSPO3, SS18, STAT6, TAF15, TCF12, TERT, TFE3, TFEB, TFG, THADA, TMPRSS2, USP6, VGLL2, YAP1, YWHAE)    -4/11/2023 patient discharged from hospital, planning for 30 days of lovenox postop, oxycodone for pain (required 1 unit PRBC for anemia)    -5/8/23 I presented this case at [facility] CRC tumor board and their recommendations include:  - common to see this in Crohn's, overall poor prognosis, treat aggressively, may be poorly responsive to chemotherapy  - standard of care is mFOLFOX 6 x 12 cycles  - acknowledge that pt has high TPS and likely response to immunotherapy however not sufficient data or standard of care in stage 3 adjuvant setting  - add MMR testing to mass #1 hepatic flexure mass    - 5/9/23 admitted for acute renal failure w/Cr 3.82 w/K 7.0    - 5/19/23 [facility] opinion at [facility] w/medical oncologist Dr. Acharya, thorough consultation and recommendations appreciated     - pt would like to proceed with FOFLOX (with dose reduced oxaliplatin due to kidney function)    -5/2023 genetic counseling: Negative for mutations in EPCAM, MLH1, MSH2, MSH6, and PMS2 genes.  Negative for mutations in APC, AXIN2, BMPR1A, CDH1, CHEK2, EPCAM, GREM1, MLH1, MSH2, MSH3, MSH6, MUTYH, NTHL1, PMS2, POLD1, POLE, PTEN, SMAD4, STK11, TP53, CDKN1B, MEN1, NF1, RET, TSC1, TSC2, and VHL genes    - 5/25/23 port placement    - 5/26/23 CT CAP w/ contrast and IVF before and after CT (baseline prior to starting tx):  1.  3 mm nodule RML and 5 mm lateral EDSON nodule, minimally increased from previous  2.  New 4 mm EDSON groundglass density nodule  3.  Several prominent borderline enlarged mediastinal lymph nodes slightly increased from previous  4.  Splenomegaly 14.5 cm  5. S/p subtotal colectomy with ileorectal anastomosis with postsurgical changes along the ventral abdominal wall midline    -5/31/23 started mFOLFOX 6 w/dose reduced oxaliplatin    - C2D1 6/14/23 held  due to new onset paroxysmal A-fib with RVR requiring hospitalization 6/3/2023 (also hypoMg, dehydrated)  - 6/19/2023 I presented this case at [facility] colorectal tumor board at the [facility], it is possible that paroxysmal A-fib may be related to 5-FU.  Options include withholding further treatment versus retrialing 5-FU under the guidance of cardio oncology in an inpatient setting.  No other adjuvant treatment options available, including immunotherapy  - 6/30/2023 consultation with cardio oncologist Dr. Garza; I spoke with Dr. Garza 6/30/2023 as well, [id number] score 3, recommending starting apixaban 5 mg twice daily, okay to retrial FOLFOX while patient is on metoprolol  -7/18/2023  CT CAP- subcm lung nodules stable, borderline and mildly enlarged mediastinal LN and periportal LN stable, splenomegaly 15.7cm, Subtotal colectomy with ileorectal anastomosis. No acute inflammation or obstruction   - 7/19/2023 mFOLFOX6 C2D1  (inpatient w/continuous cardiac monitoring)      INTERIM HISTORY:  REGIMEN:  mFOLFOX6  q14 days x12 cycles/6 months  C1D1 5/31/23 (complicated by hospitalization for afib w/RVR C1D4 6/3/23), C2D1 7/19/23 (inpatient w/continuous cardiac monitoring), C3D1 8/2/2023 (outpatient, admitted to ER for A-fib with RVR C3D3 8/4/23)   oxaliplatin 64mg/m2 day 1 (dose reduced from 85mg/m2 2/2 Cr C1D1)  LV 350mg/m2 day 1  5FU 1200mg/m2 continuous infusion day 1-2 (total 2,400mg/m2 IV over 46-48 hours)  (5FU 400mg/m2 IV push day 1 (discontinued cycle 2 onwards))  PLUS IVF and magnesium level day 8  Emetic risk: moderate  Febrile neutropenia risk: intermediate     C4D1 planned 8/16/23 pending labs    Treatment related AE:  - hearing changes-described as muffled with intermittent ringing with obscured hearing R>L- overall improved since 1st cycle, Flonase BID as needed, ENT consultation pending 10/23; (present prior to starting chemo, improved w/steroids, now stable and not getting better)   - Paroxysmal  A-fib with RVR and PAC- admitted to [facility] 6/3/2023 - 6/5/2023 for this, 6/3/2023 echo EF 55-60%, mild pulmonary hypertension, mild mitral regurgitation, on metoprolol XL 25 mg p.o. daily for ongoing palpitations, 7/23 Cardio oncology consult w/Dr. Garza, on eliquis, no recurrence of RVR while inpatient for cycle 2 w/continuous cardiac monitoring; hydration plan and magnesium replacement to magnesium = 2 minimum added to D1 and D8. 8/4/2023 C3D3 patient went to ER for A-fib with palpitations (no other sx), HR 80-130s, labs WNL, CXR negative, given IV fluids, placed on diltiazem drip, HR improved to .  8/15/2023 cardiology follow-up: Increase metoprolol XL to 50 mg daily; During the days of chemo and 2 days after take long acting diltiazem 120 mg daily with 30 mg of short acting diltiazem as needed for atrial fibrillation with HR >110   - Delayed neutropenia- neutropenic thierry ANC 0.5 2 weeks out from cycle 1, afebrile, 6/23 DPD deficiency testing negative; cycle 2 onwards 5FU bolus dropped   -Normocytic anemia- due to chemotherapy and iron deficiency, s/p ferric carboxymaltose 750mg x2 doses 6/14/23 and 6/28/23, 8/23 hgb 13.3  -Thrombocytopenia- 2/2 chemotherapy   - MARYA on CKD- following w/nephro- follow up 9/23, Cr improved from 3, drinking 2 16oz bottles per day  - diarrhea- grade 1, C2D4-5, middle of cycle 3, improved with imodium (4 total)   - cold sensitivity- lasted 3 days in fingers and mouth, improved over 2-3 days, now resolved   - Port flipped-see my note from 8/2/2023, pending port removal and replacement with IR    REVIEW OF SYSTEMS:   A 14 point ROS was reviewed with pertinent positives and negatives in the HPI.        HOME MEDICATIONS:  Current Outpatient Medications   Medication Sig Dispense Refill     apixaban ANTICOAGULANT (ELIQUIS ANTICOAGULANT) 5 MG tablet Take 1 tablet (5 mg) by mouth 2 times daily 60 tablet 11     cyanocobalamin 1000 MCG TBCR Take 1,000 mcg by mouth daily 100  tablet 1     dexamethasone (DECADRON) 4 MG tablet Take 2 tablets (8 mg) by mouth daily Take for 2 days, starting the day after chemo. Take with food. 40 tablet 0     diltiazem (CARDIZEM) 30 MG tablet Take 1 tablet (30 mg) by mouth 3 times daily 90 tablet 0     diltiazem ER COATED BEADS (CARDIZEM CD/CARTIA XT) 120 MG 24 hr capsule Take 1 capsule (120 mg) by mouth daily On chemo days and 2 days post chemo infusion. 60 capsule 3     Ferrous Sulfate (IRON) 325 (65 Fe) MG tablet Take 1 tablet by mouth daily       MAGNESIUM OXIDE 400 (240 Mg) MG tablet TAKE 1 TABLET (400 MG) BY MOUTH 2 TIMES DAILY (Patient taking differently: Take 400 mg by mouth 2 times daily) 60 tablet 0     metoprolol succinate ER (TOPROL XL) 50 MG 24 hr tablet Take 1 tablet (50 mg) by mouth daily 90 tablet 3     Multiple Vitamins-Iron (MULTI-DAY PLUS IRON PO) Take 1 tablet by mouth daily       sodium bicarbonate 650 MG tablet Take 1 tablet (650 mg) by mouth 2 times daily 120 tablet 3         ALLERGIES:  Allergies   Allergen Reactions     No Known Drug Allergy          PAST MEDICAL HISTORY:  Past Medical History:   Diagnosis Date     Arthropathia 08/05/2010     B12 deficiency anemia 10/21/2010     Benign essential hypertension with target blood pressure below 140/90 10/10/2016     CARDIOVASCULAR SCREENING; LDL GOAL LESS THAN 160 10/31/2010     Crohn's colitis (H) 02/15/2011     Dermatitis-dishydrotic eczema-severe 08/05/2010     Hiatal hernia      HTN (hypertension) 07/21/2011     Iron deficiency anemia 10/21/2010     Malignant neoplasm of sigmoid colon (H)      Obesity      Primary pulmonary hypertension (H) 04/06/2010         PAST SURGICAL HISTORY:  Past Surgical History:   Procedure Laterality Date     COLECTOMY WITHOUT COLOSTOMY N/A 4/6/2023    Procedure: Laparoscopic Converted to Open Total abdominal colectomy;  Surgeon: Jerome Ashley MD;  Location: [facility] OR     COLONOSCOPY  10/11/10     COLONOSCOPY N/A 2/27/2023    Procedure: ATTEMPTED  COLONOSCOPY WITH SIGMOID STRICTURE BIOPSY;  Surgeon: Jonathan Alcocer MD;  Location: [facility]     ESOPHAGOSCOPY, GASTROSCOPY, DUODENOSCOPY (EGD), COMBINED N/A 2/27/2023    Procedure: ESOPHAGOGASTRODUODENOSCOPY, WITH BIOPSY;  Surgeon: Jonathan Alcocer MD;  Location: [facility]     HYSTERECTOMY TOTAL ABDOMINAL, BILATERAL SALPINGO-OOPHORECTOMY, COMBINED N/A 4/6/2023    Procedure: Hysterectomy total abdominal, bilateral salpingo-oophorectomy;  Surgeon: Sunitha Olea MD;  Location: [facility] OR     INSERT PORT VASCULAR ACCESS Right 5/25/2023    Procedure: Ultrasound-guided right internal jugular venous access port placement with fluoroscopy;  Surgeon: Martin Thomas DO;  Location: [facility]     IR PORT CHECK RIGHT  7/18/2023     SIGMOIDOSCOPY FLEXIBLE N/A 4/6/2023    Procedure: Sigmoidoscopy flexible;  Surgeon: Jerome Ashley MD;  Location: [facility] OR     SURGICAL HISTORY OF -   06/14/76    Perineorrhaphy, for widening vaginal orifice     ZZC EXPLORATORY OF ABDOMEN  1989    laparoscopy         SOCIAL HISTORY:  Social History     Socioeconomic History     Marital status:      Spouse name: Not on file     Number of children: Not on file     Years of education: Not on file     Highest education level: Not on file   Occupational History     Not on file   Tobacco Use     Smoking status: Never     Passive exposure: Current     Smokeless tobacco: Never   Substance and Sexual Activity     Alcohol use: No     Drug use: No     Sexual activity: Yes     Partners: Male   Other Topics Concern     Parent/sibling w/ CABG, MI or angioplasty before 65F 55M? Not Asked   Social History Narrative     Not on file     Social Determinants of Health     Financial Resource Strain: Not on file   Food Insecurity: Not on file   Transportation Needs: Not on file   Physical Activity: Not on file   Stress: Not on file   Social Connections: Not on file   Intimate Partner Violence: Not on file   Housing Stability: Not on file         FAMILY  HISTORY:  Family History   Problem Relation Age of Onset     Hypertension Mother         on meds, alive     Cerebrovascular Disease Father         stroke about age 65,  of cancer at 68 yrs     Diabetes Father         eventually took insulin     Anemia Father         Pernisios anemia     Kidney Disease Niece         kidney transplant     Kidney Disease Nephew         kidney transplant     Venous thrombosis No family hx of      Anesthesia Reaction No family hx of          PHYSICAL EXAM:  Vital signs:  LMP  (LMP Unknown)          LABS:   Latest Reference Range & Units 23 13:03   Sodium 136 - 145 mmol/L 139   Potassium 3.4 - 5.3 mmol/L 4.5   Chloride 98 - 107 mmol/L 105   Carbon Dioxide (CO2) 22 - 29 mmol/L 25   Urea Nitrogen 8.0 - 23.0 mg/dL 19.6   Creatinine 0.51 - 0.95 mg/dL 1.55 (H)   GFR Estimate >60 mL/min/1.73m2 37 (L)   Calcium 8.8 - 10.2 mg/dL 9.0   Anion Gap 7 - 15 mmol/L 9   Magnesium 1.7 - 2.3 mg/dL 1.7   Albumin 3.5 - 5.2 g/dL 4.1   Protein Total 6.4 - 8.3 g/dL 6.6   Alkaline Phosphatase 35 - 104 U/L 147 (H)   ALT 0 - 50 U/L 24   AST 0 - 45 U/L 25   Bilirubin Total <=1.2 mg/dL 0.5   Glucose 70 - 99 mg/dL 103 (H)   WBC 4.0 - 11.0 10e3/uL 4.7   Hemoglobin 11.7 - 15.7 g/dL 13.3   Hematocrit 35.0 - 47.0 % 41.3   Platelet Count 150 - 450 10e3/uL 145 (L)   RBC Count 3.80 - 5.20 10e6/uL 4.69   MCV 78 - 100 fL 88   MCH 26.5 - 33.0 pg 28.4   MCHC 31.5 - 36.5 g/dL 32.2   RDW 10.0 - 15.0 % 15.7 (H)   % Neutrophils % 78   % Lymphocytes % 13   % Monocytes % 8   % Eosinophils % 0   % Basophils % 0   Absolute Basophils 0.0 - 0.2 10e3/uL 0.0   Absolute Eosinophils 0.0 - 0.7 10e3/uL 0.0   Absolute Immature Granulocytes <=0.4 10e3/uL 0.1   Absolute Lymphocytes 0.8 - 5.3 10e3/uL 0.6 (L)   Absolute Monocytes 0.0 - 1.3 10e3/uL 0.4   % Immature Granulocytes % 1   Absolute Neutrophils 1.6 - 8.3 10e3/uL 3.6   Absolute NRBCs 10e3/uL 0.0   NRBCs per 100 WBC <1 /100 0   (H): Data is abnormally high  (L): Data is abnormally  low  PATHOLOGY:    IMAGING:    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 mutated, MARISSA, TPS 50%  # sigmoid colon poorly-differentiated carcinoma, KRAS G13D mutated, loss of MLH1 and PMS2, TPS 70%  - 2/23 colonoscopy: A frond-like/villous partially obstructing large mass found in sigmoid colon, partially circumferential involving two thirds of the lumen circumference, 4 cm, unable to traverse, with oozing, s/p biopsy, PATHOLOGY: Invasive poorly differentiated carcinoma, IHC panel excludes tumors of other origin, colorectal primary is favored, loss of nuclear expression of MLH1 and PMS2, MLH1 promoter methylation negative, [id number] mutation negative  -2/23 CT CAP: Shows known 4 cm proximal sigmoid colon mass with possible tumor extension (irregularity and stranding and adjacent pericolonic fat) without obstruction AND probable second mass 2.5 cm at hepatic flexure of colon; small lymph nodes adjacent to both masses (no lymphadenopathy by size criteria)  -3/23 PET:  a. There is increased FDG avidity of the sigmoid colon with focal lobular wall thickening consistent with biopsy-proven adenocarcinoma.  b. Secondary focus noted at the hepatic flexure demonstrates elevated FDG avidity and lobulated wall thickening highly suspicious for a additional primary.  c. Additionally there is a smaller focus of wall thickening with elevated FDG avidity along the left aspect of the transverse colon which is suspicious for a possible third focus of colonic malignancy.  - 3/23 CEA 6.8  -4/6/2023 laparoscopic converted to open total abdominal colectomy with ileorectal anastomosis, partial omentectomy, flexible sigmoidoscopy, TAHBSO  PATHOLOGY:  Of note, omental resection, terminal ileum, proximal and distal  anastomotic rings, uterus, cervix, bilateral fallopian tubes and ovaries negative for malignancy    Mass #1 (ascending colon/hepatic flexure): Mixed neuroendocrine-non-neuroendocrine neoplasm (MINEN):  - Neuroendocrine carcinoma component (70%) and moderately-differentiated adenocarcinoma component (30%)  - Size = 5.0 cm, invading into muscularis propria, Positive LVI  - IHC: Neuroendocrine portion: Negative for chromogranin and INSM1 but strongly express synaptophysin  - IHC: Adenocarcinoma portion: Positive for CK20, CDX2 negative for CK7, PAX8, ER, S100  Ki-67 proliferation index of approximately 80%.  MARISSA   PDL1:  COMBINED POSITIVE SCORE (CPS): 55-60  TUMOR PROPORTION SCORE (TPS): 50%   pathogenic KRAS mutation (G13D) was identified (5.2%).  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  MMR testing: loss of MLH1 and PMS2  PDL1:  COMBINED POSITIVE SCORE (CPS): 80  TUMOR PROPORTION SCORE (TPS): 70%  pathogenic KRAS mutation (G13D) was identified (4.5%).  MMR testing: loss of MLH1 and PMS2, intact MSH2 and MSH6  AJCC 8th edition: stage 3A mpT2 pN1a    - One of forty-one sampled lymph nodes positive (1/41), d/w pathology- unable to determine which mass is metastatic to LN    - 4/23 MRI brain w/wo contrast LAURENT  - 5/26/23 CT CAP w/ contrast and IVF before and after CT (post op baseline prior to starting tx):  1.  3 mm nodule RML and 5 mm lateral EDSON nodule, minimally increased from previous  2.  New 4 mm EDSON groundglass density nodule  3.  Several prominent borderline enlarged mediastinal lymph nodes slightly increased from previous  4.  Splenomegaly 14.5 cm  5. S/p subtotal colectomy with  ileorectal anastomosis with postsurgical changes along the ventral abdominal wall midline  - 5/8/23 [facility] as above   - 5/19/23 [facility] w/medical oncologist Dr. Acharya, thorough consultation and recommendations appreciated, overall agree w/FOLFOX x6 months, possible nivo or ipi nivo in second line; if metastatic platinum etoposide vs ipi nivo  - 5/25/23 port placement    -5/2023 genetic counseling: Negative for mutations detailed below     REGIMEN:  [id number]  q14 days x12 cycles/6 months  C1D1 5/31/23 (complicated by hospitalization for afib w/RVR C1D4 6/3/23), C2D1 7/19/23 (inpatient w/continuous cardiac monitoring), C3D1 8/2/2023 (outpatient, admitted to ER for A-fib with RVR C3D3 8/4/23)   oxaliplatin 64mg/m2 day 1 (dose reduced from 85mg/m2 2/2 Cr C1D1)  LV 350mg/m2 day 1  5FU 1200mg/m2 continuous infusion day 1-2 (total 2,400mg/m2 IV over 46-48 hours)  (5FU 400mg/m2 IV push day 1 (discontinued cycle 2 onwards))  PLUS IVF and magnesium level day 8 (optimize Mg to at least 2)  Emetic risk: moderate  Febrile neutropenia risk: intermediate     C4D1 planned 8/16/23 pending labs      Treatment related AE:  - hearing changes-stable w/dose reduced oxaliplatin, flonase prn, ENT consultation pending 10/23  - Paroxysmal A-fib with RVR and PAC- 8/15/2023 cardiology follow-up: Increase metoprolol XL to 50 mg daily; During the days of chemo and 2 days after take long acting diltiazem 120 mg daily with 30 mg of short acting diltiazem as needed for atrial fibrillation with HR >110 , eliquis but expensive and wonders if she can change to xarelto which is $85/month, weekly IVF and Mg check; Mg pending today, I do not recommend changing to coumadin given CLASS D interaction w/5FU chemotherapy   - Delayed neutropenia- neutropenic thierry ANC 0.5 2 weeks out from cycle 1, afebrile, 6/23 DPD deficiency testing negative, improved after 5FU bolus dropped w/cycle 2  - Normocytic  anemia- due to chemotherapy and iron deficiency, s/p ferric carboxymaltose 750mg x2 doses 6/14/23 and 6/28/23; repeat iron studies normal and hgb normal  -Thrombocytopenia- mild, 2/2 chemo  - MARYA on CKD- following w/nephro, f/u with nephro 9/23  - diarrhea- grade 1  - cold sensitivity- grade 1  - port flipped- to be replaced on 8/21 with IR in [facility], they will contact patient today    - 7/18/23 CT CAP- subcm lung nodules stable, borderline and mildly enlarged mediastinal LN and periportal LN stable, splenomegaly 15.7cm, Subtotal colectomy with ileorectal anastomosis. No acute inflammation or obstruction    - plan to repeat CT 10/23-to be ordered next visit    - f/u with Dr. Ashley 4/2024 for rigid proctoscopy     #parxosymal afib  - within 3 days of receiving 5FU, prior cardiac risk factors htn, prediabetes  - 6/3/23 presented to ER w/lightheadedness, dizziness, cardioverted s/p diltiazem ggt in ER  - 6/23 DPD deficiency testing negative  - currently on metoprolol XL 25 mg daily, apixaban 5 mg twice daily  - 6/30/2023 consultation with cardio oncologist Dr. Garza; I spoke with Dr. 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  Negative for mutations in APC, AXIN2, BMPR1A, CDH1, CHEK2, EPCAM, GREM1, MLH1, MSH2, MSH3, MSH6, MUTYH, NTHL1, PMS2, POLD1, POLE, PTEN, SMAD4, STK11, TP53, CDKN1B, MEN1, NF1, RET, TSC1, TSC2, and VHL genes    #Anemia secondary to iron deficiency and B12 deficiency  - terminal ileum removed at time of colon surgery, monitor for nutrient def  - 2/23 hgb 6.8, ferritin 21, iron sat index 10, TIBC 265, iron 27, b12 1493  - On B12 1000 mcg p.o. daily and ferrous sulfate 325 mg p.o. daily  - 4/9/23 s/p 1 uprbcs  - 5/9/23 hgb 11.3,5/30/23 hgb 9.4  - based on Ganzoni equation w/goal hgb 14 and 500mg needed for iron stores, pts iron deficit is 1400 mg  - s/p ferric carboxymaltose 750mg x2 doses 6/14/23 and 6/28/23  - 7/11/23 ferritin 1022, iron sat 22, TIBC 201, iron 44    - 8/23 hgb 13.3    #Crohn's  - dx since at least 2010     RTC 2 weeks for follow up with RADHA, labs, chemo  RTC 4 weeks for follow up with me, labs, chemo      Francisco Lee DO  Hematology/Oncology  [facility]      Again, thank you for allowing me to participate in the care of your patient.        Sincerely,        FRANCISCO LEE DO

## 2023-08-16 NOTE — PROGRESS NOTES
Virtual Visit Details    Video Start Time: 9:11AM     Type of service:  Video Visit     Video End Time: 9:36AM    Originating Location (pt. Location):      Distant Location (provider location):  [facility] off site     Platform used for Video Visit: [facility]    Hematology/Oncology Established Patient Follow Up Note      Today's Date: 8/16/2023    Reason for follow up: Sigmoid cancer    HISTORY OF PRESENT ILLNESS: Nadia Ladd is a 68 year old female who presents for follow-up    Patient has medical history including Crohn's disease on sulfasalazine, anemia secondary to iron deficiency and B12 deficiency, obesity, hypertension, prediabetes, eczema, pulmonary hypertension, hiatal hernia, mild splenomegaly (14.7 cm in 2/23)    - 2/23 pt noted increasing fatigue, found to have iron deficiency anemia w/hgb 6.8 (previously required RBC transfusion when dx w/Crohn's), did have intermittent changes in stool but not persistent (noted minimal blood in stool)   - 2/23 upper endoscopy for iron deficiency anemia and Crohn's disease: Hiatal hernia, normal stomach, normal duodenum  - 2/23 colonoscopy: A frond-like/villous partially obstructing large mass found in sigmoid colon, partially circumferential involving two thirds of the lumen circumference, 4 cm, unable to traverse, with oozing, s/p biopsy  PATHOLOGY:  A.  Stomach, antrum: Biopsy:  - Antral type mucosa with mild chronic inactive gastritis  - Immunostain for Helicobacter pylori is negative      B.  Colon, sigmoid, stricture: Biopsy:  - Invasive poorly differentiated carcinoma  The sigmoid stricture shows a high-grade carcinoma without definitive gland formation.  Given the poorly differentiated appearance, an immunohistochemical panel was performed to exclude the possibility of a tumor by direct extension or metastasis.   Immunohistochemical stains are performed and show the tumor to be diffusely positive for CK7 and  "partially positive for CK20 and SATB2.  There is no significant tumor reactivity for CDX2, GATA3, PAX8, TTF-1, and chromogranin.  Synaptophysin highlights very rare positive cells. Although the CK7/CK20 profile is not entirely typical for a colorectal carcinoma, immunostains for tumors of other origins are negative and a colorectal primary is still favored.   - Mismatch repair:  1) MLH1-loss of nuclear expression  2) MSH2-intact  3) MSH6-intact  4) PMS2-loss of nuclear expression  -MLH1 promoter methylation: NEGATIVE    -2/23 CT CAP:  A) 4 cm length mass in the proximal sigmoid colon. There is some irregularity and stranding in the adjacent pericolonic fat which may indicate tumor extension. There is no obstruction.   B) there is a second probable mass at the hepatic flexure of the colon measuring 2.5 cm in length   C)There are small lymph nodes in the mesial colon adjacent to the hepatic flexure abnormality and the sigmoid colonic mass. No lymphadenopathy by size criteria  D) There is submucosal fatty deposition in the colon, most severe in the distal sigmoid colon and rectum, which can be seen secondary to quiescent inflammatory bowel disease.  E) no liver lesion    -3/23 PET:  a. There is increased FDG avidity of the sigmoid colon with focal lobular wall thickening consistent with biopsy-proven adenocarcinoma.  b. Secondary focus noted at the hepatic flexure demonstrates elevated FDG avidity and lobulated wall thickening highly suspicious for a additional primary.  c. Additionally there is a smaller focus of wall thickening with elevated FDG avidity along the left aspect of the transverse colon  which is suspicious for a possible third focus of colonic malignancy.    -3/23 CEA 6.8  - 3/23 colorectal surgery consultation with - in the setting of Crohn's, at least sigmoid colectomy with possible total abdominal colectomy with either ileorectal anastomosis or end ileostomy (\"rectum can remain active and " "be actively surveyed annually\")    -3/23 genetic counseling, testing for Chan syndrome    - 4/5/2023 gynecologic oncology consultation for risk reduction surgery with hysterectomy and BSO at time of colectomy    -4/6/2023 laparoscopic converted to open total abdominal colectomy with ileorectal anastomosis, partial omentectomy, flexible sigmoidoscopy, TAHBSO  A. OMENTUM, RESECTION:  - Adipose tissue with no significant histopathologic abnormality  - No evidence of malignancy     B. COLON, RESECTION:  Mass #1 (ascending colon/hepatic flexure): Mixed neuroendocrine-non-neuroendocrine neoplasm (MINEN):  - Neuroendocrine carcinoma component (70%) and moderately-differentiated adenocarcinoma component (30%)  - Size = 5.0 cm, invading into muscularis propria  - Positive LVI  - Negative macroscopic tumor perforation, negative PNI  - Negative surgical resection margins  - IHC: Neuroendocrine portion: Negative for chromogranin and INSM1 but strongly express synaptophysin  - IHC: Adenocarcinoma portion: Positive for CK20, CDX2 negative for CK7, PAX8, ER, S100  Ki-67 proliferation index of approximately 80%.  MMR:  Intact nuclear expression of MLH1, MSH2, MHS6, PMS2  PDL1:  COMBINED POSITIVE SCORE (CPS): 55-60  TUMOR PROPORTION SCORE (TPS): 50%   pathogenic KRAS mutation (G13D) was identified (5.2%).  AJCC 8th edition: stage 3B mpT3 pN1a     Mass#2 (sigmoid colon): Poorly-differentiated carcinoma:  - Grade 3  - Size = 5.7 cm, invading into muscularis propria  - Negative for microscopic tumor perforation, LVI, perineural invasion  - Negative surgical resection margins  - IHC: Positive for scar and keratin AE1/AE3, negative for CK7, CK20, CDX2, PAX8, ER, chromogranin, CD56, p40, synaptophysin, S100, INI1, p40, INSM1  Ki-67 proliferation index of approximately 70%.  MMR: loss of nuclear expression MLH1 and PMS2, intact nuclear expression MSH2 and MSH6  PDL1:  COMBINED POSITIVE SCORE (CPS): 80  TUMOR PROPORTION SCORE (TPS): " 70%  pathogenic KRAS mutation (G13D) was identified (4.5%).  AJCC 8th edition: stage 3A mpT2 pN1a    - One of forty-one sampled lymph nodes positive (1/41)  - Benign appendix  - Tubular adenoma    C. UTERUS, CERVIX, BILATERAL FALLOPIAN TUBES & OVARIES, :  - Benign endometrial polyps; atrophic endometrium  - Uterus, cervix, bilateral fallopian tubes, and ovaries with no significant morphologic abnormalities  - No evidence of dysplasia or malignancy     D. SMALL INTESTINE, TERMINAL ILEUM, TERMINAL ILIUM:  - Benign ileum  - No evidence of dysplasia or malignancy  - Negative for metastases to one of one sampled lymph node (0 /1)     E. PROXIMAL ANASTOMOTIC RING:  - Benign small intestine  - No evidence of dysplasia or malignancy     F. DISTAL ANASTOMOTIC RING:  - Benign colon  - No evidence of dysplasia or malignancy    CRC NGS:   --mutations positive for KRAS G13D mutation 5.2% mass 1, KRAS G13D mutation 4.5% mass 2 (negative for AKT1; BRAF; ERBB2; KRAS; NRAS; PIK3CA; PTEN)  --TMB score: 17.064 mut/Mb mass 1 (CPS 55-60, TPS 50%), 60.943 mut/Mb mass 2 (CPS 80, TPS 70%)  --fusion negative including NTRK and RET for mass 1 and 2 (also negative for AKT1, AKT3, ALK, AR, TKTAIQ56, CLAUDINE, BCOR, BRAF, BRD3, BRD4, CAMTA1, CCNB3, CCND1, , CIC, CSF1, CSF1R, CTNNB1, DNAJB1, EGFR, EPC1, ERBB2, ERBB4, ERG, ESR1, ESRRA, ETV1, ETV4, ETV5, ETV6, EWSR1, FGFR1, FGFR2, FGFR3, FGR, FOS, FOSB, FOXO1, FOXO4, FOXR2, FUS, GLI1, GRB7, HMGA2, HRAS, IDH1, IDH2, INSR, JAK2, JAK3, JAZF1, KRAS, MAML2, MAP2K1, MAST1, MAST2, MEAF6, MET, MKL2, MN1, MSMB, MUSK, MYB, MYBL1, MYOD1, NCOA1, NCOA2, NOTCH1, NOTCH2, NR4A3, NRAS, NRG1, NTRK1, NTRK2, NTRK3, NUMBL1, NUTM1, PAX3, PDGFB, PDGFRA, PDGFRB, PHF1, PIK3CA, PKN1, PLAG1, PPARG, PRKACA, PRKCA, PRKCB, RAF1, RELA, RET, ROS1, RPSO2, RSPO3, SS18, STAT6, TAF15, TCF12, TERT, TFE3, TFEB, TFG, THADA, TMPRSS2, USP6, VGLL2, YAP1, YWHAE)    -4/11/2023 patient discharged from hospital, planning for 30 days of  lovenox postop, oxycodone for pain (required 1 unit PRBC for anemia)    -5/8/23 I presented this case at [facility] CRC tumor board and their recommendations include:  - common to see this in Crohn's, overall poor prognosis, treat aggressively, may be poorly responsive to chemotherapy  - standard of care is mFOLFOX 6 x 12 cycles  - acknowledge that pt has high TPS and likely response to immunotherapy however not sufficient data or standard of care in stage 3 adjuvant setting  - add MMR testing to mass #1 hepatic flexure mass    - 5/9/23 admitted for acute renal failure w/Cr 3.82 w/K 7.0    - 5/19/23 second opinion at [facility] w/medical oncologist Dr. Acharya, thorough consultation and recommendations appreciated     - pt would like to proceed with FOFLOX (with dose reduced oxaliplatin due to kidney function)    -5/2023 genetic counseling: Negative for mutations in EPCAM, MLH1, MSH2, MSH6, and PMS2 genes.  Negative for mutations in APC, AXIN2, BMPR1A, CDH1, CHEK2, EPCAM, GREM1, MLH1, MSH2, MSH3, MSH6, MUTYH, NTHL1, PMS2, POLD1, POLE, PTEN, SMAD4, STK11, TP53, CDKN1B, MEN1, NF1, RET, TSC1, TSC2, and VHL genes    - 5/25/23 port placement    - 5/26/23 CT CAP w/ contrast and IVF before and after CT (baseline prior to starting tx):  1.  3 mm nodule RML and 5 mm lateral EDSON nodule, minimally increased from previous  2.  New 4 mm EDSON groundglass density nodule  3.  Several prominent borderline enlarged mediastinal lymph nodes slightly increased from previous  4.  Splenomegaly 14.5 cm  5. S/p subtotal colectomy with ileorectal anastomosis with postsurgical changes along the ventral abdominal wall midline    -5/31/23 started mFOLFOX 6 w/dose reduced oxaliplatin    - C2D1 6/14/23 held due to new onset paroxysmal A-fib with RVR requiring hospitalization 6/3/2023 (also hypoMg, dehydrated)  - 6/19/2023 I presented this case at [facility] colorectal tumor board at the [facility], it is possible that paroxysmal A-fib may be related to  5-FU.  Options include withholding further treatment versus retrialing 5-FU under the guidance of cardio oncology in an inpatient setting.  No other adjuvant treatment options available, including immunotherapy  - 6/30/2023 consultation with cardio oncologist Dr. Garza; I spoke with Dr. Garza 6/30/2023 as well, [id number] score 3, recommending starting apixaban 5 mg twice daily, okay to retrial FOLFOX while patient is on metoprolol  -7/18/2023  CT CAP- subcm lung nodules stable, borderline and mildly enlarged mediastinal LN and periportal LN stable, splenomegaly 15.7cm, Subtotal colectomy with ileorectal anastomosis. No acute inflammation or obstruction   - 7/19/2023 mFOLFOX6 C2D1  (inpatient w/continuous cardiac monitoring)      INTERIM HISTORY:  REGIMEN:  mFOLFOX6  q14 days x12 cycles/6 months  C1D1 5/31/23 (complicated by hospitalization for afib w/RVR C1D4 6/3/23), C2D1 7/19/23 (inpatient w/continuous cardiac monitoring), C3D1 8/2/2023 (outpatient, admitted to ER for A-fib with RVR C3D3 8/4/23)   oxaliplatin 64mg/m2 day 1 (dose reduced from 85mg/m2 2/2 Cr C1D1)  LV 350mg/m2 day 1  5FU 1200mg/m2 continuous infusion day 1-2 (total 2,400mg/m2 IV over 46-48 hours)  (5FU 400mg/m2 IV push day 1 (discontinued cycle 2 onwards))  PLUS IVF and magnesium level day 8  Emetic risk: moderate  Febrile neutropenia risk: intermediate     C4D1 planned 8/16/23 pending labs    Treatment related AE:  - hearing changes-described as muffled with intermittent ringing with obscured hearing R>L- overall improved since 1st cycle, Flonase BID as needed, ENT consultation pending 10/23; (present prior to starting chemo, improved w/steroids, now stable and not getting better)   - Paroxysmal A-fib with RVR and PAC- admitted to [facility] 6/3/2023 - 6/5/2023 for this, 6/3/2023 echo EF 55-60%, mild pulmonary hypertension, mild mitral regurgitation, on metoprolol XL 25 mg p.o. daily for ongoing palpitations, 7/23 Cardio oncology  consult w/Dr. Garza, on eliquis, no recurrence of RVR while inpatient for cycle 2 w/continuous cardiac monitoring; hydration plan and magnesium replacement to magnesium = 2 minimum added to D1 and D8. 8/4/2023 C3D3 patient went to ER for A-fib with palpitations (no other sx), HR 80-130s, labs WNL, CXR negative, given IV fluids, placed on diltiazem drip, HR improved to .  8/15/2023 cardiology follow-up: Increase metoprolol XL to 50 mg daily; During the days of chemo and 2 days after take long acting diltiazem 120 mg daily with 30 mg of short acting diltiazem as needed for atrial fibrillation with HR >110   - Delayed neutropenia- neutropenic thierry ANC 0.5 2 weeks out from cycle 1, afebrile, 6/23 DPD deficiency testing negative; cycle 2 onwards 5FU bolus dropped   -Normocytic anemia- due to chemotherapy and iron deficiency, s/p ferric carboxymaltose 750mg x2 doses 6/14/23 and 6/28/23, 8/23 hgb 13.3  -Thrombocytopenia- 2/2 chemotherapy   - MARYA on CKD- following w/nephro- follow up 9/23, Cr improved from 3, drinking 2 16oz bottles per day  - diarrhea- grade 1, C2D4-5, middle of cycle 3, improved with imodium (4 total)   - cold sensitivity- lasted 3 days in fingers and mouth, improved over 2-3 days, now resolved   - Port flipped-see my note from 8/2/2023, pending port removal and replacement with IR    REVIEW OF SYSTEMS:   A 14 point ROS was reviewed with pertinent positives and negatives in the HPI.        HOME MEDICATIONS:  Current Outpatient Medications   Medication Sig Dispense Refill    apixaban ANTICOAGULANT (ELIQUIS ANTICOAGULANT) 5 MG tablet Take 1 tablet (5 mg) by mouth 2 times daily 60 tablet 11    cyanocobalamin 1000 MCG TBCR Take 1,000 mcg by mouth daily 100 tablet 1    dexamethasone (DECADRON) 4 MG tablet Take 2 tablets (8 mg) by mouth daily Take for 2 days, starting the day after chemo. Take with food. 40 tablet 0    diltiazem (CARDIZEM) 30 MG tablet Take 1 tablet (30 mg) by mouth 3 times daily 90  tablet 0    diltiazem ER COATED BEADS (CARDIZEM CD/CARTIA XT) 120 MG 24 hr capsule Take 1 capsule (120 mg) by mouth daily On chemo days and 2 days post chemo infusion. 60 capsule 3    Ferrous Sulfate (IRON) 325 (65 Fe) MG tablet Take 1 tablet by mouth daily      MAGNESIUM OXIDE 400 (240 Mg) MG tablet TAKE 1 TABLET (400 MG) BY MOUTH 2 TIMES DAILY (Patient taking differently: Take 400 mg by mouth 2 times daily) 60 tablet 0    metoprolol succinate ER (TOPROL XL) 50 MG 24 hr tablet Take 1 tablet (50 mg) by mouth daily 90 tablet 3    Multiple Vitamins-Iron (MULTI-DAY PLUS IRON PO) Take 1 tablet by mouth daily      sodium bicarbonate 650 MG tablet Take 1 tablet (650 mg) by mouth 2 times daily 120 tablet 3         ALLERGIES:  Allergies   Allergen Reactions    No Known Drug Allergy          PAST MEDICAL HISTORY:  Past Medical History:   Diagnosis Date    Arthropathia 08/05/2010    B12 deficiency anemia 10/21/2010    Benign essential hypertension with target blood pressure below 140/90 10/10/2016    CARDIOVASCULAR SCREENING; LDL GOAL LESS THAN 160 10/31/2010    Crohn's colitis (H) 02/15/2011    Dermatitis-dishydrotic eczema-severe 08/05/2010    Hiatal hernia     HTN (hypertension) 07/21/2011    Iron deficiency anemia 10/21/2010    Malignant neoplasm of sigmoid colon (H)     Obesity     Primary pulmonary hypertension (H) 04/06/2010         PAST SURGICAL HISTORY:  Past Surgical History:   Procedure Laterality Date    COLECTOMY WITHOUT COLOSTOMY N/A 4/6/2023    Procedure: Laparoscopic Converted to Open Total abdominal colectomy;  Surgeon: Jerome Ashley MD;  Location: [facility] OR    COLONOSCOPY  10/11/10    COLONOSCOPY N/A 2/27/2023    Procedure: ATTEMPTED COLONOSCOPY WITH SIGMOID STRICTURE BIOPSY;  Surgeon: Jonathan Alcocer MD;  Location:  GI    ESOPHAGOSCOPY, GASTROSCOPY, DUODENOSCOPY (EGD), COMBINED N/A 2/27/2023    Procedure: ESOPHAGOGASTRODUODENOSCOPY, WITH BIOPSY;  Surgeon: Jonathan Alcocer MD;  Location:  GI     HYSTERECTOMY TOTAL ABDOMINAL, BILATERAL SALPINGO-OOPHORECTOMY, COMBINED N/A 2023    Procedure: Hysterectomy total abdominal, bilateral salpingo-oophorectomy;  Surgeon: Sunitha Olea MD;  Location: [facility] OR    INSERT PORT VASCULAR ACCESS Right 2023    Procedure: Ultrasound-guided right internal jugular venous access port placement with fluoroscopy;  Surgeon: Martin Thomas DO;  Location: [facility] OR    IR PORT CHECK RIGHT  2023    SIGMOIDOSCOPY FLEXIBLE N/A 2023    Procedure: Sigmoidoscopy flexible;  Surgeon: Jerome Ashley MD;  Location: [facility] OR    SURGICAL HISTORY OF -   77    Perineorrhaphy, for widening vaginal orifice    ZZC EXPLORATORY OF ABDOMEN      laparoscopy         SOCIAL HISTORY:  Social History     Socioeconomic History    Marital status:      Spouse name: Not on file    Number of children: Not on file    Years of education: Not on file    Highest education level: Not on file   Occupational History    Not on file   Tobacco Use    Smoking status: Never     Passive exposure: Current    Smokeless tobacco: Never   Substance and Sexual Activity    Alcohol use: No    Drug use: No    Sexual activity: Yes     Partners: Male   Other Topics Concern    Parent/sibling w/ CABG, MI or angioplasty before 65F 55M? Not Asked   Social History Narrative    Not on file     Social Determinants of Health     Financial Resource Strain: Not on file   Food Insecurity: Not on file   Transportation Needs: Not on file   Physical Activity: Not on file   Stress: Not on file   Social Connections: Not on file   Intimate Partner Violence: Not on file   Housing Stability: Not on file         FAMILY HISTORY:  Family History   Problem Relation Age of Onset    Hypertension Mother         on meds, alive    Cerebrovascular Disease Father         stroke about age 65,  of cancer at 68 yrs    Diabetes Father         eventually took insulin    Anemia Father         Pernisios anemia    Kidney  Disease Niece         kidney transplant    Kidney Disease Nephew         kidney transplant    Venous thrombosis No family hx of     Anesthesia Reaction No family hx of          PHYSICAL EXAM:  Vital signs:  LMP  (LMP Unknown)          LABS:   Latest Reference Range & Units 08/09/23 13:03   Sodium 136 - 145 mmol/L 139   Potassium 3.4 - 5.3 mmol/L 4.5   Chloride 98 - 107 mmol/L 105   Carbon Dioxide (CO2) 22 - 29 mmol/L 25   Urea Nitrogen 8.0 - 23.0 mg/dL 19.6   Creatinine 0.51 - 0.95 mg/dL 1.55 (H)   GFR Estimate >60 mL/min/1.73m2 37 (L)   Calcium 8.8 - 10.2 mg/dL 9.0   Anion Gap 7 - 15 mmol/L 9   Magnesium 1.7 - 2.3 mg/dL 1.7   Albumin 3.5 - 5.2 g/dL 4.1   Protein Total 6.4 - 8.3 g/dL 6.6   Alkaline Phosphatase 35 - 104 U/L 147 (H)   ALT 0 - 50 U/L 24   AST 0 - 45 U/L 25   Bilirubin Total <=1.2 mg/dL 0.5   Glucose 70 - 99 mg/dL 103 (H)   WBC 4.0 - 11.0 10e3/uL 4.7   Hemoglobin 11.7 - 15.7 g/dL 13.3   Hematocrit 35.0 - 47.0 % 41.3   Platelet Count 150 - 450 10e3/uL 145 (L)   RBC Count 3.80 - 5.20 10e6/uL 4.69   MCV 78 - 100 fL 88   MCH 26.5 - 33.0 pg 28.4   MCHC 31.5 - 36.5 g/dL 32.2   RDW 10.0 - 15.0 % 15.7 (H)   % Neutrophils % 78   % Lymphocytes % 13   % Monocytes % 8   % Eosinophils % 0   % Basophils % 0   Absolute Basophils 0.0 - 0.2 10e3/uL 0.0   Absolute Eosinophils 0.0 - 0.7 10e3/uL 0.0   Absolute Immature Granulocytes <=0.4 10e3/uL 0.1   Absolute Lymphocytes 0.8 - 5.3 10e3/uL 0.6 (L)   Absolute Monocytes 0.0 - 1.3 10e3/uL 0.4   % Immature Granulocytes % 1   Absolute Neutrophils 1.6 - 8.3 10e3/uL 3.6   Absolute NRBCs 10e3/uL 0.0   NRBCs per 100 WBC <1 /100 0   (H): Data is abnormally high  (L): Data is abnormally low  PATHOLOGY:    IMAGING:    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  mutated, MARISSA, TPS 50%  # sigmoid colon poorly-differentiated carcinoma, KRAS G13D mutated, loss of MLH1 and PMS2, TPS 70%  - 2/23 colonoscopy: A frond-like/villous partially obstructing large mass found in sigmoid colon, partially circumferential involving two thirds of the lumen circumference, 4 cm, unable to traverse, with oozing, s/p biopsy, PATHOLOGY: Invasive poorly differentiated carcinoma, IHC panel excludes tumors of other origin, colorectal primary is favored, loss of nuclear expression of MLH1 and PMS2, MLH1 promoter methylation negative, [id number] mutation negative  -2/23 CT CAP: Shows known 4 cm proximal sigmoid colon mass with possible tumor extension (irregularity and stranding and adjacent pericolonic fat) without obstruction AND probable second mass 2.5 cm at hepatic flexure of colon; small lymph nodes adjacent to both masses (no lymphadenopathy by size criteria)  -3/23 PET:  a. There is increased FDG avidity of the sigmoid colon with focal lobular wall thickening consistent with biopsy-proven adenocarcinoma.  b. Secondary focus noted at the hepatic flexure demonstrates elevated FDG avidity and lobulated wall thickening highly suspicious for a additional primary.  c. Additionally there is a smaller focus of wall thickening with elevated FDG avidity along the left aspect of the transverse colon which is suspicious for a possible third focus of colonic malignancy.  - 3/23 CEA 6.8  -4/6/2023 laparoscopic converted to open total abdominal colectomy with ileorectal anastomosis, partial omentectomy, flexible sigmoidoscopy, TAHBSO  PATHOLOGY:  Of note, omental resection, terminal ileum, proximal and distal anastomotic rings, uterus, cervix, bilateral fallopian tubes and ovaries negative for malignancy    Mass #1 (ascending colon/hepatic flexure): Mixed neuroendocrine-non-neuroendocrine neoplasm (MINEN):  - Neuroendocrine carcinoma component (70%) and moderately-differentiated adenocarcinoma component  (30%)  - Size = 5.0 cm, invading into muscularis propria, Positive LVI  - IHC: Neuroendocrine portion: Negative for chromogranin and INSM1 but strongly express synaptophysin  - IHC: Adenocarcinoma portion: Positive for CK20, CDX2 negative for CK7, PAX8, ER, S100  Ki-67 proliferation index of approximately 80%.  MARISSA   PDL1:  COMBINED POSITIVE SCORE (CPS): 55-60  TUMOR PROPORTION SCORE (TPS): 50%   pathogenic KRAS mutation (G13D) was identified (5.2%).  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  MMR testing: loss of MLH1 and PMS2  PDL1:  COMBINED POSITIVE SCORE (CPS): 80  TUMOR PROPORTION SCORE (TPS): 70%  pathogenic KRAS mutation (G13D) was identified (4.5%).  MMR testing: loss of MLH1 and PMS2, intact MSH2 and MSH6  AJCC 8th edition: stage 3A mpT2 pN1a    - One of forty-one sampled lymph nodes positive (1/41), d/w pathology- unable to determine which mass is metastatic to LN    - 4/23 MRI brain w/wo contrast LAURENT  - 5/26/23 CT CAP w/ contrast and IVF before and after CT (post op baseline prior to starting tx):  1.  3 mm nodule RML and 5 mm lateral EDSON nodule, minimally increased from previous  2.  New 4 mm EDSON groundglass density nodule  3.  Several prominent borderline enlarged mediastinal lymph nodes slightly increased from previous  4.  Splenomegaly 14.5 cm  5. S/p subtotal colectomy with ileorectal anastomosis with postsurgical changes along the ventral abdominal wall midline  - 5/8/23 I presented this case at [facility] CRC tumor board as above   - 5/19/23 second opinion at [facility] w/medical oncologist Dr. Acharya, thorough consultation and recommendations appreciated, overall agree  w/FOLFOX x6 months, possible nivo or ipi nivo in second line; if metastatic platinum etoposide vs ipi nivo  - 5/25/23 port placement    -5/2023 genetic counseling: Negative for mutations detailed below     REGIMEN:  mFOLFOX6  q14 days x12 cycles/6 months  C1D1 5/31/23 (complicated by hospitalization for afib w/RVR C1D4 6/3/23), C2D1 7/19/23 (inpatient w/continuous cardiac monitoring), C3D1 8/2/2023 (outpatient, admitted to ER for A-fib with RVR C3D3 8/4/23)   oxaliplatin 64mg/m2 day 1 (dose reduced from 85mg/m2 2/2 Cr C1D1)  LV 350mg/m2 day 1  5FU 1200mg/m2 continuous infusion day 1-2 (total 2,400mg/m2 IV over 46-48 hours)  (5FU 400mg/m2 IV push day 1 (discontinued cycle 2 onwards))  PLUS IVF and magnesium level day 8 (optimize Mg to at least 2)  Emetic risk: moderate  Febrile neutropenia risk: intermediate     C4D1 planned 8/16/23 pending labs      Treatment related AE:  - hearing changes-stable w/dose reduced oxaliplatin, flonase prn, ENT consultation pending 10/23  - Paroxysmal A-fib with RVR and PAC- 8/15/2023 cardiology follow-up: Increase metoprolol XL to 50 mg daily; During the days of chemo and 2 days after take long acting diltiazem 120 mg daily with 30 mg of short acting diltiazem as needed for atrial fibrillation with HR >110 , eliquis but expensive and wonders if she can change to xarelto which is $85/month, weekly IVF and Mg check; Mg pending today, I do not recommend changing to coumadin given CLASS D interaction w/5FU chemotherapy   - Delayed neutropenia- neutropenic thierry ANC 0.5 2 weeks out from cycle 1, afebrile, 6/23 DPD deficiency testing negative, improved after 5FU bolus dropped w/cycle 2  - Normocytic anemia- due to chemotherapy and iron deficiency, s/p ferric carboxymaltose 750mg x2 doses 6/14/23 and 6/28/23; repeat iron studies normal and hgb normal  -Thrombocytopenia- mild, 2/2 chemo  - MARYA on CKD- following w/nephro, f/u with nephro 9/23  - diarrhea- grade 1  - cold sensitivity- grade 1  -  port flipped- to be replaced on 8/21 with IR in [place], they will contact patient today    - 7/18/23 CT CAP- subcm lung nodules stable, borderline and mildly enlarged mediastinal LN and periportal LN stable, splenomegaly 15.7cm, Subtotal colectomy with ileorectal anastomosis. No acute inflammation or obstruction    - plan to repeat CT 10/23-to be ordered next visit    - f/u with Dr. Ashley 4/2024 for rigid proctoscopy     #parxosymal afib  - within 3 days of receiving 5FU, prior cardiac risk factors htn, prediabetes  - 6/3/23 presented to ER w/lightheadedness, dizziness, cardioverted s/p diltiazem ggt in ER  - 6/23 DPD deficiency testing negative  - currently on metoprolol XL 25 mg daily, apixaban 5 mg twice daily  - 6/30/2023 consultation with cardio oncologist Dr. Garza; I spoke with Dr. 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  Negative for mutations in APC, AXIN2, BMPR1A, CDH1, CHEK2, EPCAM, GREM1, MLH1, MSH2, MSH3,  MSH6, MUTYH, NTHL1, PMS2, POLD1, POLE, PTEN, SMAD4, STK11, TP53, CDKN1B, MEN1, NF1, RET, TSC1, TSC2, and VHL genes    #Anemia secondary to iron deficiency and B12 deficiency  - terminal ileum removed at time of colon surgery, monitor for nutrient def  - 2/23 hgb 6.8, ferritin 21, iron sat index 10, TIBC 265, iron 27, b12 1493  - On B12 1000 mcg p.o. daily and ferrous sulfate 325 mg p.o. daily  - 4/9/23 s/p 1 uprbcs  - 5/9/23 hgb 11.3,5/30/23 hgb 9.4  - based on Ganzoni equation w/goal hgb 14 and 500mg needed for iron stores, pts iron deficit is 1400 mg  - s/p ferric carboxymaltose 750mg x2 doses 6/14/23 and 6/28/23  - 7/11/23 ferritin 1022, iron sat 22, TIBC 201, iron 44    - 8/23 hgb 13.3    #Crohn's  - dx since at least 2010     RTC 2 weeks for follow up with RADHA, labs, chemo  RTC 4 weeks for follow up with me, labs, chemo      Irene DO Fransico  Hematology/Oncology  [facility]

## 2023-08-16 NOTE — TELEPHONE ENCOUNTER
Jerome Haney!  Probably a good idea.  It shows that she is on diltiazem 120 mg before chemo.  I think being on 120 mg daily is a good idea.  I should probably see her back this fall.  Have her call with questions or concerns.  Thank you-Jane

## 2023-08-16 NOTE — NURSING NOTE
DISCHARGE PLAN:  Next appointments: See patient instruction section  Departure Mode: video  Accompanied by:    minutes for nursing discharge (face to face time)     Nadia CRYSTAL Wilberto is here today for video onc follow up.  Patient was not seen by writing nurse at time of appointment.   Appointments previously scheduled. Pt uses mychart and is aware of her appts. See patient instructions and Oncologist's Progress note for further details. Questions and concerns addressed to patient's satisfaction. Patient verbalized and demonstrated understanding of plan.  Contact information provided and patient is encouraged to call with any that arise in the interim of care.    Jelly Champagne  [facility]  [phone]  8/16/2023, 10:31 AM

## 2023-08-16 NOTE — TELEPHONE ENCOUNTER
Called and spoke with patient, she states that she understands that she is to be taking the long acting diltiazem 120 mg daily with 30 mg of short acting diltiazem as needed for atrial fibrillation with HR >110 on the days she has chemo and for 2 days after. She would like to clarify if she is to continue taking the 30mg of short acting Diltiazem three times per day on the days that she is not taking the long acting Diltiazem. Please advise.     Aicha Newton RN   [facility]

## 2023-08-16 NOTE — TELEPHONE ENCOUNTER
Pt. Called and left voicemail asking for a call back about what medication she should take after her chemotherapy.     Pt. Saw Marce Sandy in clinic 8/15/23, see note of plan below.      PLAN:     Increase metoprolol XL to 50 mg daily (can take 2 of 25 mg tablets). Will send a new script.  During the days of chemo and 2 days after take long acting diltiazem 120 mg daily with 30 mg of short acting diltiazem as needed for atrial fibrillation with HR >110.  Will discuss with Dr. Bateman about Warfarin or Coumadin.  Continue all other medications without changes.  Please follow up with Jane in November or sooner if needed.    Denise Johnson on 8/16/2023 at 7:53 AM

## 2023-08-16 NOTE — NURSING NOTE
Is the patient currently in the state of [place]? YES    Visit mode:VIDEO    If the visit is dropped, the patient can be reconnected by: VIDEO VISIT: Text to cell phone: [phone]    Will anyone else be joining the visit? NO      How would you like to obtain your AVS? MyChart    Are changes needed to the allergy or medication list? NO    Reason for visit: Oncology Clinic Visit (Colon ca), Infusion (08/16 Modified FOLFOX-6), ER F/U (08/04 Afib), and Video Visit (Change heart medication)

## 2023-08-16 NOTE — PROGRESS NOTES
"Infusion Nursing Note:  Nadia Ladd presents today for C4D1 Modified Folfox.    Patient seen by provider today: Yes: Video visit with Dr. Bateman.    present during visit today: Not Applicable.    Note: Patient arrives ambulatory with spouse. Says she is feeling much better since being in the ER on 8/4 for AFib. Had appt with Cardiologist this week. Is on Diltiazem. Denies pain or any concerning cardiac or respiratory sx today. She was instructed to take an extra Diltiazem dose if Pulse is over 110 bpm. Her vitals are stable after arrival and she is afebrile. Will receive extra NS bolus since her Creatinine is elevated. Also needs Mg Sulfate IV. See lab results.       Intravenous Access:  Implanted Port.    Treatment Conditions:  Lab Results   Component Value Date    HGB 12.4 08/16/2023    WBC 4.0 08/16/2023    ANEU 7.6 08/01/2023    ANEUTAUTO 2.8 08/16/2023     (L) 08/16/2023        Lab Results   Component Value Date     08/16/2023    POTASSIUM 4.4 08/16/2023    MAG 1.7 08/16/2023    CR 1.22 (H) 08/16/2023    LIVIER 9.1 08/16/2023    BILITOTAL 0.4 08/16/2023    ALBUMIN 3.9 08/16/2023    ALT 23 08/16/2023    AST 30 08/16/2023       Results reviewed, labs MET treatment parameters, ok to proceed with treatment.  Mg level 1.7, 2gms Mg Sulfate given.      Post Infusion Assessment:  Patient tolerated infusion without incident.  Blood return noted pre and post infusion.  Site patent and intact, free from redness, edema or discomfort.  No evidence of extravasations.    Prior to discharge: Port is secured in place with tegaderm and flushed with 10cc NS with positive blood return noted.    Continuous home infusion CADD pump connected.    All connectors secured in place and clamps taped open.    Pump started, \"running\" noted on display (CADD): YES.   Capillary element taped to pt's skin per protocol.  Pump Connection double checked with Teri Magana RN.  Patient instructed to call our clinic or [facility] with any questions or concerns at home.  Patient verbalized understanding.    Patient set up for pump disconnect at home with [facility] on 8/18 @ 2pm. In basket msg sent to [facility] to confirm pump disconnect date/time. Pt is aware.          Discharge Plan:   AVS to patient via MYCHART.  Patient will return 8/23 for next appointment.   Patient discharged in stable condition accompanied by: .  Departure Mode: Ambulatory.      Rosalina Argueta RN

## 2023-08-16 NOTE — LETTER
8/16/2023         RE: Nadia Ladd  [address]  [facility] [address]        Dear Colleague,    Thank you for referring your patient, Nadia Ladd, to the [facility]. Please see a copy of my visit note below.    Virtual Visit Details    Video Start Time: 9:11AM     Type of service:  Video Visit     Video End Time: 9:36AM    Originating Location (pt. Location):      Distant Location (provider location):  [facility] off site     Platform used for Video Visit: [facility]    Hematology/Oncology Established Patient Follow Up Note      Today's Date: 8/16/2023    Reason for follow up: Sigmoid cancer    HISTORY OF PRESENT ILLNESS: Nadia Ladd is a 68 year old female who presents for follow-up    Patient has medical history including Crohn's disease on sulfasalazine, anemia secondary to iron deficiency and B12 deficiency, obesity, hypertension, prediabetes, eczema, pulmonary hypertension, hiatal hernia, mild splenomegaly (14.7 cm in 2/23)    - 2/23 pt noted increasing fatigue, found to have iron deficiency anemia w/hgb 6.8 (previously required RBC transfusion when dx w/Crohn's), did have intermittent changes in stool but not persistent (noted minimal blood in stool)   - 2/23 upper endoscopy for iron deficiency anemia and Crohn's disease: Hiatal hernia, normal stomach, normal duodenum  - 2/23 colonoscopy: A frond-like/villous partially obstructing large mass found in sigmoid colon, partially circumferential involving two thirds of the lumen circumference, 4 cm, unable to traverse, with oozing, s/p biopsy  PATHOLOGY:  A.  Stomach, antrum: Biopsy:  - Antral type mucosa with mild chronic inactive gastritis  - Immunostain for Helicobacter pylori is negative      B.  Colon, sigmoid, stricture: Biopsy:  - Invasive poorly differentiated carcinoma  The sigmoid stricture shows a high-grade carcinoma without definitive gland formation.  Given the  poorly differentiated appearance, an immunohistochemical panel was performed to exclude the possibility of a tumor by direct extension or metastasis.   Immunohistochemical stains are performed and show the tumor to be diffusely positive for CK7 and partially positive for CK20 and SATB2.  There is no significant tumor reactivity for CDX2, GATA3, PAX8, TTF-1, and chromogranin.  Synaptophysin highlights very rare positive cells. Although the CK7/CK20 profile is not entirely typical for a colorectal carcinoma, immunostains for tumors of other origins are negative and a colorectal primary is still favored.   - Mismatch repair:  1) MLH1-loss of nuclear expression  2) MSH2-intact  3) MSH6-intact  4) PMS2-loss of nuclear expression  -MLH1 promoter methylation: NEGATIVE    -2/23 CT CAP:  A) 4 cm length mass in the proximal sigmoid colon. There is some irregularity and stranding in the adjacent pericolonic fat which may indicate tumor extension. There is no obstruction.   B) there is a second probable mass at the hepatic flexure of the colon measuring 2.5 cm in length   C)There are small lymph nodes in the mesial colon adjacent to the hepatic flexure abnormality and the sigmoid colonic mass. No lymphadenopathy by size criteria  D) There is submucosal fatty deposition in the colon, most severe in the distal sigmoid colon and rectum, which can be seen secondary to quiescent inflammatory bowel disease.  E) no liver lesion    -3/23 PET:  a. There is increased FDG avidity of the sigmoid colon with focal lobular wall thickening consistent with biopsy-proven adenocarcinoma.  b. Secondary focus noted at the hepatic flexure demonstrates elevated FDG avidity and lobulated wall thickening highly suspicious for a additional primary.  c. Additionally there is a smaller focus of wall thickening with elevated FDG avidity along the left aspect of the transverse colon  which is suspicious for a possible third focus of colonic  "malignancy.    -3/23 CEA 6.8  - 3/23 colorectal surgery consultation with - in the setting of Crohn's, at least sigmoid colectomy with possible total abdominal colectomy with either ileorectal anastomosis or end ileostomy (\"rectum can remain active and be actively surveyed annually\")    -3/23 genetic counseling, testing for Chan syndrome    - 4/5/2023 gynecologic oncology consultation for risk reduction surgery with hysterectomy and BSO at time of colectomy    -4/6/2023 laparoscopic converted to open total abdominal colectomy with ileorectal anastomosis, partial omentectomy, flexible sigmoidoscopy, TAHBSO  A. OMENTUM, RESECTION:  - Adipose tissue with no significant histopathologic abnormality  - No evidence of malignancy     B. COLON, RESECTION:  Mass #1 (ascending colon/hepatic flexure): Mixed neuroendocrine-non-neuroendocrine neoplasm (MINEN):  - Neuroendocrine carcinoma component (70%) and moderately-differentiated adenocarcinoma component (30%)  - Size = 5.0 cm, invading into muscularis propria  - Positive LVI  - Negative macroscopic tumor perforation, negative PNI  - Negative surgical resection margins  - IHC: Neuroendocrine portion: Negative for chromogranin and INSM1 but strongly express synaptophysin  - IHC: Adenocarcinoma portion: Positive for CK20, CDX2 negative for CK7, PAX8, ER, S100  Ki-67 proliferation index of approximately 80%.  MMR:  Intact nuclear expression of MLH1, MSH2, MHS6, PMS2  PDL1:  COMBINED POSITIVE SCORE (CPS): 55-60  TUMOR PROPORTION SCORE (TPS): 50%   pathogenic KRAS mutation (G13D) was identified (5.2%).  AJCC 8th edition: stage 3B mpT3 pN1a     Mass#2 (sigmoid colon): Poorly-differentiated carcinoma:  - Grade 3  - Size = 5.7 cm, invading into muscularis propria  - Negative for microscopic tumor perforation, LVI, perineural invasion  - Negative surgical resection margins  - IHC: Positive for scar and keratin AE1/AE3, negative for CK7, CK20, CDX2, PAX8, ER, chromogranin, " CD56, p40, synaptophysin, S100, INI1, p40, INSM1  Ki-67 proliferation index of approximately 70%.  MMR: loss of nuclear expression MLH1 and PMS2, intact nuclear expression MSH2 and MSH6  PDL1:  COMBINED POSITIVE SCORE (CPS): 80  TUMOR PROPORTION SCORE (TPS): 70%  pathogenic KRAS mutation (G13D) was identified (4.5%).  AJCC 8th edition: stage 3A mpT2 pN1a    - One of forty-one sampled lymph nodes positive (1/41)  - Benign appendix  - Tubular adenoma    C. UTERUS, CERVIX, BILATERAL FALLOPIAN TUBES & OVARIES, :  - Benign endometrial polyps; atrophic endometrium  - Uterus, cervix, bilateral fallopian tubes, and ovaries with no significant morphologic abnormalities  - No evidence of dysplasia or malignancy     D. SMALL INTESTINE, TERMINAL ILEUM, TERMINAL ILIUM:  - Benign ileum  - No evidence of dysplasia or malignancy  - Negative for metastases to one of one sampled lymph node (0 /1)     E. PROXIMAL ANASTOMOTIC RING:  - Benign small intestine  - No evidence of dysplasia or malignancy     F. DISTAL ANASTOMOTIC RING:  - Benign colon  - No evidence of dysplasia or malignancy    CRC NGS:   --mutations positive for KRAS G13D mutation 5.2% mass 1, KRAS G13D mutation 4.5% mass 2 (negative for AKT1; BRAF; ERBB2; KRAS; NRAS; PIK3CA; PTEN)  --TMB score: 17.064 mut/Mb mass 1 (CPS 55-60, TPS 50%), 60.943 mut/Mb mass 2 (CPS 80, TPS 70%)  --fusion negative including NTRK and RET for mass 1 and 2 (also negative for AKT1, AKT3, ALK, AR, UQFIMY19, CLAUDINE, BCOR, BRAF, BRD3, BRD4, CAMTA1, CCNB3, CCND1, , CIC, CSF1, CSF1R, CTNNB1, DNAJB1, EGFR, EPC1, ERBB2, ERBB4, ERG, ESR1, ESRRA, ETV1, ETV4, ETV5, ETV6, EWSR1, FGFR1, FGFR2, FGFR3, FGR, FOS, FOSB, FOXO1, FOXO4, FOXR2, FUS, GLI1, GRB7, HMGA2, HRAS, IDH1, IDH2, INSR, JAK2, JAK3, JAZF1, KRAS, MAML2, MAP2K1, MAST1, MAST2, MEAF6, MET, MKL2, MN1, MSMB, MUSK, MYB, MYBL1, MYOD1, NCOA1, NCOA2, NOTCH1, NOTCH2, NR4A3, NRAS, NRG1, NTRK1, NTRK2, NTRK3, NUMBL1, NUTM1, PAX3, PDGFB, PDGFRA, PDGFRB,  PHF1, PIK3CA, PKN1, PLAG1, PPARG, PRKACA, PRKCA, PRKCB, RAF1, RELA, RET, ROS1, RPSO2, RSPO3, SS18, STAT6, TAF15, TCF12, TERT, TFE3, TFEB, TFG, THADA, TMPRSS2, USP6, VGLL2, YAP1, YWHAE)    -4/11/2023 patient discharged from hospital, planning for 30 days of lovenox postop, oxycodone for pain (required 1 unit PRBC for anemia)    -5/8/23 I presented this case at [facility] CRC tumor board and their recommendations include:  - common to see this in Crohn's, overall poor prognosis, treat aggressively, may be poorly responsive to chemotherapy  - standard of care is mFOLFOX 6 x 12 cycles  - acknowledge that pt has high TPS and likely response to immunotherapy however not sufficient data or standard of care in stage 3 adjuvant setting  - add MMR testing to mass #1 hepatic flexure mass    - 5/9/23 admitted for acute renal failure w/Cr 3.82 w/K 7.0    - 5/19/23 second opinion at [facility] w/medical oncologist Dr. Acharya, thorough consultation and recommendations appreciated     - pt would like to proceed with FOFLOX (with dose reduced oxaliplatin due to kidney function)    -5/2023 genetic counseling: Negative for mutations in EPCAM, MLH1, MSH2, MSH6, and PMS2 genes.  Negative for mutations in APC, AXIN2, BMPR1A, CDH1, CHEK2, EPCAM, GREM1, MLH1, MSH2, MSH3, MSH6, MUTYH, NTHL1, PMS2, POLD1, POLE, PTEN, SMAD4, STK11, TP53, CDKN1B, MEN1, NF1, RET, TSC1, TSC2, and VHL genes    - 5/25/23 port placement    - 5/26/23 CT CAP w/ contrast and IVF before and after CT (baseline prior to starting tx):  1.  3 mm nodule RML and 5 mm lateral EDSON nodule, minimally increased from previous  2.  New 4 mm EDSON groundglass density nodule  3.  Several prominent borderline enlarged mediastinal lymph nodes slightly increased from previous  4.  Splenomegaly 14.5 cm  5. S/p subtotal colectomy with ileorectal anastomosis with postsurgical changes along the ventral abdominal wall midline    -5/31/23 started mFOLFOX 6 w/dose reduced oxaliplatin    - C2D1 6/14/23 held  due to new onset paroxysmal A-fib with RVR requiring hospitalization 6/3/2023 (also hypoMg, dehydrated)  - 6/19/2023 I presented this case at [facility] colorectal tumor board at the [facility], it is possible that paroxysmal A-fib may be related to 5-FU.  Options include withholding further treatment versus retrialing 5-FU under the guidance of cardio oncology in an inpatient setting.  No other adjuvant treatment options available, including immunotherapy  - 6/30/2023 consultation with cardio oncologist Dr. Garza; I spoke with Dr. Garza 6/30/2023 as well, [id number] score 3, recommending starting apixaban 5 mg twice daily, okay to retrial FOLFOX while patient is on metoprolol  -7/18/2023  CT CAP- subcm lung nodules stable, borderline and mildly enlarged mediastinal LN and periportal LN stable, splenomegaly 15.7cm, Subtotal colectomy with ileorectal anastomosis. No acute inflammation or obstruction   - 7/19/2023 mFOLFOX6 C2D1  (inpatient w/continuous cardiac monitoring)      INTERIM HISTORY:  REGIMEN:  mFOLFOX6  q14 days x12 cycles/6 months  C1D1 5/31/23 (complicated by hospitalization for afib w/RVR C1D4 6/3/23), C2D1 7/19/23 (inpatient w/continuous cardiac monitoring), C3D1 8/2/2023 (outpatient, admitted to ER for A-fib with RVR C3D3 8/4/23)   oxaliplatin 64mg/m2 day 1 (dose reduced from 85mg/m2 2/2 Cr C1D1)  LV 350mg/m2 day 1  5FU 1200mg/m2 continuous infusion day 1-2 (total 2,400mg/m2 IV over 46-48 hours)  (5FU 400mg/m2 IV push day 1 (discontinued cycle 2 onwards))  PLUS IVF and magnesium level day 8  Emetic risk: moderate  Febrile neutropenia risk: intermediate     C4D1 planned 8/16/23 pending labs    Treatment related AE:  - hearing changes-described as muffled with intermittent ringing with obscured hearing R>L- overall improved since 1st cycle, Flonase BID as needed, ENT consultation pending 10/23; (present prior to starting chemo, improved w/steroids, now stable and not getting better)   - Paroxysmal  A-fib with RVR and PAC- admitted to [facility] [place] 2[facility] 6/3/2023 - 6/5/2023 for this, 6/3/2023 echo EF 55-60%, mild pulmonary hypertension, mild mitral regurgitation, on metoprolol XL 25 mg p.o. daily for ongoing palpitations, 7/23 Cardio oncology consult w/Dr. Garza, on eliquis, no recurrence of RVR while inpatient for cycle 2 w/continuous cardiac monitoring; hydration plan and magnesium replacement to magnesium = 2 minimum added to D1 and D8. 8/4/2023 C3D3 patient went to ER for A-fib with palpitations (no other sx), HR 80-130s, labs WNL, CXR negative, given IV fluids, placed on diltiazem drip, HR improved to .  8/15/2023 cardiology follow-up: Increase metoprolol XL to 50 mg daily; During the days of chemo and 2 days after take long acting diltiazem 120 mg daily with 30 mg of short acting diltiazem as needed for atrial fibrillation with HR >110   - Delayed neutropenia- neutropenic thierry ANC 0.5 2 weeks out from cycle 1, afebrile, 6/23 DPD deficiency testing negative; cycle 2 onwards 5FU bolus dropped   -Normocytic anemia- due to chemotherapy and iron deficiency, s/p ferric carboxymaltose 750mg x2 doses 6/14/23 and 6/28/23, 8/23 hgb 13.3  -Thrombocytopenia- 2/2 chemotherapy   - MARYA on CKD- following w/nephro- follow up 9/23, Cr improved from 3, drinking 2 16oz bottles per day  - diarrhea- grade 1, C2D4-5, middle of cycle 3, improved with imodium (4 total)   - cold sensitivity- lasted 3 days in fingers and mouth, improved over 2-3 days, now resolved   - Port flipped-see my note from 8/2/2023, pending port removal and replacement with IR    REVIEW OF SYSTEMS:   A 14 point ROS was reviewed with pertinent positives and negatives in the HPI.        HOME MEDICATIONS:  Current Outpatient Medications   Medication Sig Dispense Refill     apixaban ANTICOAGULANT (ELIQUIS ANTICOAGULANT) 5 MG tablet Take 1 tablet (5 mg) by mouth 2 times daily 60 tablet 11     cyanocobalamin 1000 MCG TBCR Take 1,000 mcg by mouth daily 100  tablet 1     dexamethasone (DECADRON) 4 MG tablet Take 2 tablets (8 mg) by mouth daily Take for 2 days, starting the day after chemo. Take with food. 40 tablet 0     diltiazem (CARDIZEM) 30 MG tablet Take 1 tablet (30 mg) by mouth 3 times daily 90 tablet 0     diltiazem ER COATED BEADS (CARDIZEM CD/CARTIA XT) 120 MG 24 hr capsule Take 1 capsule (120 mg) by mouth daily On chemo days and 2 days post chemo infusion. 60 capsule 3     Ferrous Sulfate (IRON) 325 (65 Fe) MG tablet Take 1 tablet by mouth daily       MAGNESIUM OXIDE 400 (240 Mg) MG tablet TAKE 1 TABLET (400 MG) BY MOUTH 2 TIMES DAILY (Patient taking differently: Take 400 mg by mouth 2 times daily) 60 tablet 0     metoprolol succinate ER (TOPROL XL) 50 MG 24 hr tablet Take 1 tablet (50 mg) by mouth daily 90 tablet 3     Multiple Vitamins-Iron (MULTI-DAY PLUS IRON PO) Take 1 tablet by mouth daily       sodium bicarbonate 650 MG tablet Take 1 tablet (650 mg) by mouth 2 times daily 120 tablet 3         ALLERGIES:  Allergies   Allergen Reactions     No Known Drug Allergy          PAST MEDICAL HISTORY:  Past Medical History:   Diagnosis Date     Arthropathia 08/05/2010     B12 deficiency anemia 10/21/2010     Benign essential hypertension with target blood pressure below 140/90 10/10/2016     CARDIOVASCULAR SCREENING; LDL GOAL LESS THAN 160 10/31/2010     Crohn's colitis (H) 02/15/2011     Dermatitis-dishydrotic eczema-severe 08/05/2010     Hiatal hernia      HTN (hypertension) 07/21/2011     Iron deficiency anemia 10/21/2010     Malignant neoplasm of sigmoid colon (H)      Obesity      Primary pulmonary hypertension (H) 04/06/2010         PAST SURGICAL HISTORY:  Past Surgical History:   Procedure Laterality Date     COLECTOMY WITHOUT COLOSTOMY N/A 4/6/2023    Procedure: Laparoscopic Converted to Open Total abdominal colectomy;  Surgeon: Jerome Ashley MD;  Location: [facility] OR     COLONOSCOPY  10/11/10     COLONOSCOPY N/A 2/27/2023    Procedure: ATTEMPTED  COLONOSCOPY WITH SIGMOID STRICTURE BIOPSY;  Surgeon: Jonathan Alcocer MD;  Location: [facility]     ESOPHAGOSCOPY, GASTROSCOPY, DUODENOSCOPY (EGD), COMBINED N/A 2/27/2023    Procedure: ESOPHAGOGASTRODUODENOSCOPY, WITH BIOPSY;  Surgeon: Jonathan Alcocer MD;  Location: [facility]     HYSTERECTOMY TOTAL ABDOMINAL, BILATERAL SALPINGO-OOPHORECTOMY, COMBINED N/A 4/6/2023    Procedure: Hysterectomy total abdominal, bilateral salpingo-oophorectomy;  Surgeon: Sunitha Olea MD;  Location: [facility] OR     INSERT PORT VASCULAR ACCESS Right 5/25/2023    Procedure: Ultrasound-guided right internal jugular venous access port placement with fluoroscopy;  Surgeon: Martin Thomas DO;  Location: [facility] OR     IR PORT CHECK RIGHT  7/18/2023     SIGMOIDOSCOPY FLEXIBLE N/A 4/6/2023    Procedure: Sigmoidoscopy flexible;  Surgeon: Jerome Ashley MD;  Location: [facility] OR     SURGICAL HISTORY OF -   06/14/76    Perineorrhaphy, for widening vaginal orifice     ZZC EXPLORATORY OF ABDOMEN  1989    laparoscopy         SOCIAL HISTORY:  Social History     Socioeconomic History     Marital status:      Spouse name: Not on file     Number of children: Not on file     Years of education: Not on file     Highest education level: Not on file   Occupational History     Not on file   Tobacco Use     Smoking status: Never     Passive exposure: Current     Smokeless tobacco: Never   Substance and Sexual Activity     Alcohol use: No     Drug use: No     Sexual activity: Yes     Partners: Male   Other Topics Concern     Parent/sibling w/ CABG, MI or angioplasty before 65F 55M? Not Asked   Social History Narrative     Not on file     Social Determinants of Health     Financial Resource Strain: Not on file   Food Insecurity: Not on file   Transportation Needs: Not on file   Physical Activity: Not on file   Stress: Not on file   Social Connections: Not on file   Intimate Partner Violence: Not on file   Housing Stability: Not on file         FAMILY  HISTORY:  Family History   Problem Relation Age of Onset     Hypertension Mother         on meds, alive     Cerebrovascular Disease Father         stroke about age 65,  of cancer at 68 yrs     Diabetes Father         eventually took insulin     Anemia Father         Pernisios anemia     Kidney Disease Niece         kidney transplant     Kidney Disease Nephew         kidney transplant     Venous thrombosis No family hx of      Anesthesia Reaction No family hx of          PHYSICAL EXAM:  Vital signs:  LMP  (LMP Unknown)          LABS:   Latest Reference Range & Units 23 13:03   Sodium 136 - 145 mmol/L 139   Potassium 3.4 - 5.3 mmol/L 4.5   Chloride 98 - 107 mmol/L 105   Carbon Dioxide (CO2) 22 - 29 mmol/L 25   Urea Nitrogen 8.0 - 23.0 mg/dL 19.6   Creatinine 0.51 - 0.95 mg/dL 1.55 (H)   GFR Estimate >60 mL/min/1.73m2 37 (L)   Calcium 8.8 - 10.2 mg/dL 9.0   Anion Gap 7 - 15 mmol/L 9   Magnesium 1.7 - 2.3 mg/dL 1.7   Albumin 3.5 - 5.2 g/dL 4.1   Protein Total 6.4 - 8.3 g/dL 6.6   Alkaline Phosphatase 35 - 104 U/L 147 (H)   ALT 0 - 50 U/L 24   AST 0 - 45 U/L 25   Bilirubin Total <=1.2 mg/dL 0.5   Glucose 70 - 99 mg/dL 103 (H)   WBC 4.0 - 11.0 10e3/uL 4.7   Hemoglobin 11.7 - 15.7 g/dL 13.3   Hematocrit 35.0 - 47.0 % 41.3   Platelet Count 150 - 450 10e3/uL 145 (L)   RBC Count 3.80 - 5.20 10e6/uL 4.69   MCV 78 - 100 fL 88   MCH 26.5 - 33.0 pg 28.4   MCHC 31.5 - 36.5 g/dL 32.2   RDW 10.0 - 15.0 % 15.7 (H)   % Neutrophils % 78   % Lymphocytes % 13   % Monocytes % 8   % Eosinophils % 0   % Basophils % 0   Absolute Basophils 0.0 - 0.2 10e3/uL 0.0   Absolute Eosinophils 0.0 - 0.7 10e3/uL 0.0   Absolute Immature Granulocytes <=0.4 10e3/uL 0.1   Absolute Lymphocytes 0.8 - 5.3 10e3/uL 0.6 (L)   Absolute Monocytes 0.0 - 1.3 10e3/uL 0.4   % Immature Granulocytes % 1   Absolute Neutrophils 1.6 - 8.3 10e3/uL 3.6   Absolute NRBCs 10e3/uL 0.0   NRBCs per 100 WBC <1 /100 0   (H): Data is abnormally high  (L): Data is abnormally  low  PATHOLOGY:    IMAGING:    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 mutated, MARISSA, TPS 50%  # sigmoid colon poorly-differentiated carcinoma, KRAS G13D mutated, loss of MLH1 and PMS2, TPS 70%  - 2/23 colonoscopy: A frond-like/villous partially obstructing large mass found in sigmoid colon, partially circumferential involving two thirds of the lumen circumference, 4 cm, unable to traverse, with oozing, s/p biopsy, PATHOLOGY: Invasive poorly differentiated carcinoma, IHC panel excludes tumors of other origin, colorectal primary is favored, loss of nuclear expression of MLH1 and PMS2, MLH1 promoter methylation negative, [id number] mutation negative  -2/23 CT CAP: Shows known 4 cm proximal sigmoid colon mass with possible tumor extension (irregularity and stranding and adjacent pericolonic fat) without obstruction AND probable second mass 2.5 cm at hepatic flexure of colon; small lymph nodes adjacent to both masses (no lymphadenopathy by size criteria)  -3/23 PET:  a. There is increased FDG avidity of the sigmoid colon with focal lobular wall thickening consistent with biopsy-proven adenocarcinoma.  b. Secondary focus noted at the hepatic flexure demonstrates elevated FDG avidity and lobulated wall thickening highly suspicious for a additional primary.  c. Additionally there is a smaller focus of wall thickening with elevated FDG avidity along the left aspect of the transverse colon which is suspicious for a possible third focus of colonic malignancy.  - 3/23 CEA 6.8  -4/6/2023 laparoscopic converted to open total abdominal colectomy with ileorectal anastomosis, partial omentectomy, flexible sigmoidoscopy, TAHBSO  PATHOLOGY:  Of note, omental resection, terminal ileum, proximal and distal  anastomotic rings, uterus, cervix, bilateral fallopian tubes and ovaries negative for malignancy    Mass #1 (ascending colon/hepatic flexure): Mixed neuroendocrine-non-neuroendocrine neoplasm (MINEN):  - Neuroendocrine carcinoma component (70%) and moderately-differentiated adenocarcinoma component (30%)  - Size = 5.0 cm, invading into muscularis propria, Positive LVI  - IHC: Neuroendocrine portion: Negative for chromogranin and INSM1 but strongly express synaptophysin  - IHC: Adenocarcinoma portion: Positive for CK20, CDX2 negative for CK7, PAX8, ER, S100  Ki-67 proliferation index of approximately 80%.  MARISSA   PDL1:  COMBINED POSITIVE SCORE (CPS): 55-60  TUMOR PROPORTION SCORE (TPS): 50%   pathogenic KRAS mutation (G13D) was identified (5.2%).  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  MMR testing: loss of MLH1 and PMS2  PDL1:  COMBINED POSITIVE SCORE (CPS): 80  TUMOR PROPORTION SCORE (TPS): 70%  pathogenic KRAS mutation (G13D) was identified (4.5%).  MMR testing: loss of MLH1 and PMS2, intact MSH2 and MSH6  AJCC 8th edition: stage 3A mpT2 pN1a    - One of forty-one sampled lymph nodes positive (1/41), d/w pathology- unable to determine which mass is metastatic to LN    - 4/23 MRI brain w/wo contrast LAURENT  - 5/26/23 CT CAP w/ contrast and IVF before and after CT (post op baseline prior to starting tx):  1.  3 mm nodule RML and 5 mm lateral EDSON nodule, minimally increased from previous  2.  New 4 mm EDSON groundglass density nodule  3.  Several prominent borderline enlarged mediastinal lymph nodes slightly increased from previous  4.  Splenomegaly 14.5 cm  5. S/p subtotal colectomy with  ileorectal anastomosis with postsurgical changes along the ventral abdominal wall midline  - 5/8/23 I presented this case at [facility] CRC tumor board as above   - 5/19/23 second opinion at [facility] w/medical oncologist Dr. Acharya, thorough consultation and recommendations appreciated, overall agree w/FOLFOX x6 months, possible nivo or ipi nivo in second line; if metastatic platinum etoposide vs ipi nivo  - 5/25/23 port placement    -5/2023 genetic counseling: Negative for mutations detailed below     REGIMEN:  [id number]  q14 days x12 cycles/6 months  C1D1 5/31/23 (complicated by hospitalization for afib w/RVR C1D4 6/3/23), C2D1 7/19/23 (inpatient w/continuous cardiac monitoring), C3D1 8/2/2023 (outpatient, admitted to ER for A-fib with RVR C3D3 8/4/23)   oxaliplatin 64mg/m2 day 1 (dose reduced from 85mg/m2 2/2 Cr C1D1)  LV 350mg/m2 day 1  5FU 1200mg/m2 continuous infusion day 1-2 (total 2,400mg/m2 IV over 46-48 hours)  (5FU 400mg/m2 IV push day 1 (discontinued cycle 2 onwards))  PLUS IVF and magnesium level day 8 (optimize Mg to at least 2)  Emetic risk: moderate  Febrile neutropenia risk: intermediate     C4D1 planned 8/16/23 pending labs      Treatment related AE:  - hearing changes-stable w/dose reduced oxaliplatin, flonase prn, ENT consultation pending 10/23  - Paroxysmal A-fib with RVR and PAC- 8/15/2023 cardiology follow-up: Increase metoprolol XL to 50 mg daily; During the days of chemo and 2 days after take long acting diltiazem 120 mg daily with 30 mg of short acting diltiazem as needed for atrial fibrillation with HR >110 , eliquis but expensive and wonders if she can change to xarelto which is $85/month, weekly IVF and Mg check; Mg pending today, I do not recommend changing to coumadin given CLASS D interaction w/5FU chemotherapy   - Delayed neutropenia- neutropenic thierry ANC 0.5 2 weeks out from cycle 1, afebrile, 6/23 DPD deficiency testing negative, improved after 5FU bolus dropped w/cycle 2  - Normocytic  anemia- due to chemotherapy and iron deficiency, s/p ferric carboxymaltose 750mg x2 doses 6/14/23 and 6/28/23; repeat iron studies normal and hgb normal  -Thrombocytopenia- mild, 2/2 chemo  - MARYA on CKD- following w/nephro, f/u with nephro 9/23  - diarrhea- grade 1  - cold sensitivity- grade 1  - port flipped- to be replaced on 8/21 with IR in [facility], they will contact patient today    - 7/18/23 CT CAP- subcm lung nodules stable, borderline and mildly enlarged mediastinal LN and periportal LN stable, splenomegaly 15.7cm, Subtotal colectomy with ileorectal anastomosis. No acute inflammation or obstruction    - plan to repeat CT 10/23-to be ordered next visit    - f/u with Dr. Ashley 4/2024 for rigid proctoscopy     #parxosymal afib  - within 3 days of receiving 5FU, prior cardiac risk factors htn, prediabetes  - 6/3/23 presented to ER w/lightheadedness, dizziness, cardioverted s/p diltiazem ggt in ER  - 6/23 DPD deficiency testing negative  - currently on metoprolol XL 25 mg daily, apixaban 5 mg twice daily  - 6/30/2023 consultation with cardio oncologist Dr. Garza; I spoke with Dr. 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  Negative for mutations in APC, AXIN2, BMPR1A, CDH1, CHEK2, EPCAM, GREM1, MLH1, MSH2, MSH3, MSH6, MUTYH, NTHL1, PMS2, POLD1, POLE, PTEN, SMAD4, STK11, TP53, CDKN1B, MEN1, NF1, RET, TSC1, TSC2, and VHL genes    #Anemia secondary to iron deficiency and B12 deficiency  - terminal ileum removed at time of colon surgery, monitor for nutrient def  - 2/23 hgb 6.8, ferritin 21, iron sat index 10, TIBC 265, iron 27, b12 1493  - On B12 1000 mcg p.o. daily and ferrous sulfate 325 mg p.o. daily  - 4/9/23 s/p 1 uprbcs  - 5/9/23 hgb 11.3,5/30/23 hgb 9.4  - based on Ganzoni equation w/goal hgb 14 and 500mg needed for iron stores, pts iron deficit is 1400 mg  - s/p ferric carboxymaltose 750mg x2 doses 6/14/23 and 6/28/23  - 7/11/23 ferritin 1022, iron sat 22, TIBC 201, iron 44    - 8/23 hgb 13.3    #Crohn's  - dx since at least 2010     RTC 2 weeks for follow up with RADHA, labs, chemo  RTC 4 weeks for follow up with me, labs, chemo      Francisco Lee DO  Hematology/Oncology  [facility]      Again, thank you for allowing me to participate in the care of your patient.        Sincerely,        FRANCISCO LEE DO

## 2023-08-16 NOTE — PROGRESS NOTES
Infusion Nursing Note:  Nadia Ladd presents today for Port labs.    Patient seen by provider today: Yes: Video visit with Dr. Bateman today.    present during visit today: Not Applicable.    Note: Pt here for port labs after Provider visit. See other infusion therapy visit note from today for treatment conditions and other documentation.       Intravenous Access:  Implanted Port.  Lab draw site R chest wall, Needle type Power, Gauge 19,3/4in.  Labs drawn without difficulty.      Rosalina Argueta RN

## 2023-08-17 NOTE — TELEPHONE ENCOUNTER
Reached out to pharmacy liaison team who advised that patient does not have Pharmacy benefit coverage, but there is a free drug program she can try to apply for. They will be reaching out to her later today to discuss.     Aicha Newton RN   [facility]

## 2023-08-17 NOTE — TELEPHONE ENCOUNTER
"Patient was seen by Marce Sandy CNP 8/15/23. Plan from this visit was as follows:    \"1. Increase metoprolol XL to 50 mg daily (can take 2 of 25 mg tablets). Will send a new script.  2. During the days of chemo and 2 days after take long acting diltiazem 120 mg daily with 30 mg of short acting diltiazem as needed for atrial fibrillation with HR >110.  3. Discuss with Dr. Bateman about Warfarin or Coumadin.  4. Continue all other medications without changes.  5. Please follow up with Jane in November or sooner if needed.\"    Further clarification was provided via telephone encounter from 8/16/23 as patient was calling to see if she should continue taking the 30mg of short acting Diltiazem three times per day on the days that she is not taking the long acting Diltiazem. Per Marce Sandy CNP patient should not be taking the short acting diltiazem at all.     Patient does have appointment with Jane Jolley CNP scheduled for 11/2/23. Please clarify if patient should be taking 120mg of Diltiazem daily, or if she should continue with the medication recommendations that were provided at her most recent appointment.     Aicha Newton RN   [facility]       "

## 2023-08-17 NOTE — TELEPHONE ENCOUNTER
Hello-    We need to reply to this message I do not think she would like to start her on Tuesday.  Lets go with the plan that I laid out during the office visit.    Dr. Bateman did write back to me stating that Coumadin was not recommended with her current chemo regimen.  Unfortunately, I did discuss with the pharmacy liaison and she is not eligible for manufactures assistance or a chaparrita for DOACs.     Pharmacy liaison did mention something about reaching out to the oncology social worker to see if there are general grants to help with living expenses given her colon cancer diagnosis.     Otherwise, Dr. Bateman mentioned that Xarelto could possibly be affordable as would be $85 a month.    Could you please reach out to the oncology team to see if she is available for grants through them?    Also, could you reach out to see me to see if she wants to transition from Eliquis to Xarelto?  If she wishes to do so, lets change her from Eliquis to Xarelto 20 mg daily in the evening.  She is eligible for 1 month free with a coupon card.    LUIS Epps, CNP

## 2023-08-18 ENCOUNTER — TELEPHONE (OUTPATIENT)
Dept: ONCOLOGY | Facility: CLINIC | Age: 68
End: 2023-08-18
Payer: MEDICARE

## 2023-08-18 ENCOUNTER — NURSE TRIAGE (OUTPATIENT)
Dept: NURSING | Facility: CLINIC | Age: 68
End: 2023-08-18
Payer: MEDICARE

## 2023-08-18 NOTE — TELEPHONE ENCOUNTER
FREE DRUG APPLICATION INITIATED    Medication: ELIQUIS 5 MG PO TABS  Free Drug Program Name: [employer]MSPAF  Start Date: ASAP  Phone #: [phone]  Fax #: [phone]  Additional Information: 08/18/23 sent application for MD to sign

## 2023-08-19 ENCOUNTER — HOSPITAL ENCOUNTER (EMERGENCY)
Facility: CLINIC | Age: 68
Discharge: HOME | End: 2023-08-19
Attending: EMERGENCY MEDICINE | Admitting: EMERGENCY MEDICINE
Payer: MEDICARE

## 2023-08-19 VITALS
DIASTOLIC BLOOD PRESSURE: 56 MMHG | RESPIRATION RATE: 16 BRPM | WEIGHT: 178 LBS | BODY MASS INDEX: 33.63 KG/M2 | SYSTOLIC BLOOD PRESSURE: 106 MMHG | OXYGEN SATURATION: 98 % | TEMPERATURE: 98.1 F | HEART RATE: 50 BPM

## 2023-08-19 DIAGNOSIS — R00.2 PALPITATIONS: ICD-10-CM

## 2023-08-19 DIAGNOSIS — I48.0 PAROXYSMAL ATRIAL FIBRILLATION (H): ICD-10-CM

## 2023-08-19 DIAGNOSIS — E86.0 DEHYDRATION: ICD-10-CM

## 2023-08-19 LAB
ALBUMIN SERPL BCG-MCNC: 3.7 G/DL (ref 3.5–5.2)
ALP SERPL-CCNC: 126 U/L (ref 35–104)
ALT SERPL W P-5'-P-CCNC: 31 U/L (ref 0–50)
ANION GAP SERPL CALCULATED.3IONS-SCNC: 12 MMOL/L (ref 7–15)
AST SERPL W P-5'-P-CCNC: 26 U/L (ref 0–45)
BASOPHILS # BLD AUTO: 0 10E3/UL (ref 0–0.2)
BASOPHILS NFR BLD AUTO: 0 %
BILIRUB SERPL-MCNC: 0.7 MG/DL
BUN SERPL-MCNC: 37 MG/DL (ref 8–23)
CALCIUM SERPL-MCNC: 9 MG/DL (ref 8.8–10.2)
CHLORIDE SERPL-SCNC: 106 MMOL/L (ref 98–107)
CREAT SERPL-MCNC: 1.31 MG/DL (ref 0.51–0.95)
DEPRECATED HCO3 PLAS-SCNC: 22 MMOL/L (ref 22–29)
EOSINOPHIL # BLD AUTO: 0 10E3/UL (ref 0–0.7)
EOSINOPHIL NFR BLD AUTO: 0 %
ERYTHROCYTE [DISTWIDTH] IN BLOOD BY AUTOMATED COUNT: 16.8 % (ref 10–15)
GFR SERPL CREATININE-BSD FRML MDRD: 44 ML/MIN/1.73M2
GLUCOSE SERPL-MCNC: 108 MG/DL (ref 70–99)
HCT VFR BLD AUTO: 38.3 % (ref 35–47)
HGB BLD-MCNC: 12.5 G/DL (ref 11.7–15.7)
IMM GRANULOCYTES # BLD: 0 10E3/UL
IMM GRANULOCYTES NFR BLD: 1 %
LYMPHOCYTES # BLD AUTO: 0.5 10E3/UL (ref 0.8–5.3)
LYMPHOCYTES NFR BLD AUTO: 14 %
MAGNESIUM SERPL-MCNC: 2.1 MG/DL (ref 1.7–2.3)
MCH RBC QN AUTO: 29.1 PG (ref 26.5–33)
MCHC RBC AUTO-ENTMCNC: 32.6 G/DL (ref 31.5–36.5)
MCV RBC AUTO: 89 FL (ref 78–100)
MONOCYTES # BLD AUTO: 0.3 10E3/UL (ref 0–1.3)
MONOCYTES NFR BLD AUTO: 7 %
NEUTROPHILS # BLD AUTO: 2.9 10E3/UL (ref 1.6–8.3)
NEUTROPHILS NFR BLD AUTO: 78 %
NRBC # BLD AUTO: 0 10E3/UL
NRBC BLD AUTO-RTO: 0 /100
PHOSPHATE SERPL-MCNC: 3.2 MG/DL (ref 2.5–4.5)
PLATELET # BLD AUTO: 143 10E3/UL (ref 150–450)
POTASSIUM SERPL-SCNC: 4.3 MMOL/L (ref 3.4–5.3)
PROT SERPL-MCNC: 6 G/DL (ref 6.4–8.3)
RBC # BLD AUTO: 4.29 10E6/UL (ref 3.8–5.2)
SODIUM SERPL-SCNC: 140 MMOL/L (ref 136–145)
WBC # BLD AUTO: 3.7 10E3/UL (ref 4–11)

## 2023-08-19 PROCEDURE — 85014 HEMATOCRIT: CPT | Performed by: EMERGENCY MEDICINE

## 2023-08-19 PROCEDURE — 83735 ASSAY OF MAGNESIUM: CPT | Performed by: EMERGENCY MEDICINE

## 2023-08-19 PROCEDURE — 93005 ELECTROCARDIOGRAM TRACING: CPT | Performed by: EMERGENCY MEDICINE

## 2023-08-19 PROCEDURE — 99284 EMERGENCY DEPT VISIT MOD MDM: CPT | Mod: 25 | Performed by: EMERGENCY MEDICINE

## 2023-08-19 PROCEDURE — 84100 ASSAY OF PHOSPHORUS: CPT | Performed by: EMERGENCY MEDICINE

## 2023-08-19 PROCEDURE — 99283 EMERGENCY DEPT VISIT LOW MDM: CPT | Mod: 25 | Performed by: EMERGENCY MEDICINE

## 2023-08-19 PROCEDURE — 36415 COLL VENOUS BLD VENIPUNCTURE: CPT | Performed by: EMERGENCY MEDICINE

## 2023-08-19 PROCEDURE — 96360 HYDRATION IV INFUSION INIT: CPT | Performed by: EMERGENCY MEDICINE

## 2023-08-19 PROCEDURE — 258N000003 HC RX IP 258 OP 636: Performed by: EMERGENCY MEDICINE

## 2023-08-19 PROCEDURE — 80053 COMPREHEN METABOLIC PANEL: CPT | Performed by: EMERGENCY MEDICINE

## 2023-08-19 PROCEDURE — 93010 ELECTROCARDIOGRAM REPORT: CPT | Performed by: EMERGENCY MEDICINE

## 2023-08-19 RX ORDER — HEPARIN SODIUM,PORCINE 10 UNIT/ML
5-10 VIAL (ML) INTRAVENOUS
Status: DISCONTINUED | OUTPATIENT
Start: 2023-08-19 | End: 2023-08-19 | Stop reason: HOSPADM

## 2023-08-19 RX ADMIN — SODIUM CHLORIDE 1000 ML: 9 INJECTION, SOLUTION INTRAVENOUS at 10:31

## 2023-08-19 ASSESSMENT — ACTIVITIES OF DAILY LIVING (ADL)
ADLS_ACUITY_SCORE: 35
ADLS_ACUITY_SCORE: 35

## 2023-08-19 NOTE — ED TRIAGE NOTES
"PT has hx of afib, and comes in today with a \"fast heart rate, and chest tightness\". HR was in the 140's at home.         "

## 2023-08-19 NOTE — DISCHARGE INSTRUCTIONS
I will send a message to your cardiologist.  I recommend receiving an extra liter or 2 of IV fluids while you are getting your chemotherapy doses.    Continue to drink plenty of fluids, frequent small sips.    Continue to monitor symptoms.  Take your diltiazem if needed.    Return promptly at anytime for worsening, changes or concerns.    I hope you start to improve quickly!  It was so nice to meet you!

## 2023-08-19 NOTE — TELEPHONE ENCOUNTER
Nurse Triage SBAR    Is this a 2nd Level Triage? NO    Situation: Afib    Background: Port replacement Monday; supposed to hold 4 doses of blood thinner starting tomorrow. Took chemo medication today and now is in afib. Should she still hold blood thinner?    Assessment: Was having rates up to 120. Racing heart rate started about 2230.Taking diltiazem; took extra dose as prescribed and heart rate is now down to 67. Feeling warm. /88.     Protocol Recommended Disposition:   See in Office Today    Recommendation: Paged on call provider [phone];  spoke with provider on call for [facility] (Dr. Onofre). Instructions received for patient: Hold blood thinner as instructed previously. Be seen in ED if heart rate persists >100 despite recommended PRN diltiazem. Patient agrees she will do this. Discussed care advice and when to call back. Heart rate is stable currently, primarily in 70s, patient is asymptomatic. Patient will re-evaluate heart rate in the morning, or sooner if symptoms arise.      Paged to provider    Does the patient meet one of the following criteria for ADS visit consideration? 16+ years old, with an [facility] PCP     TIP  Providers, please consider if this condition is appropriate for management at one of our Acute and Diagnostic Services sites.     If patient is a good candidate, please use dotphrase <dot>triageresponse and select Refer to ADS to document.    Reason for Disposition   Age > 60 years  (Exception: Brief heartbeat symptoms that went away and now feels well.)    Additional Information   Negative: Passed out (i.e., lost consciousness, collapsed and was not responding)   Negative: Shock suspected (e.g., cold/pale/clammy skin, too weak to stand, low BP, rapid pulse)   Negative: Difficult to awaken or acting confused (e.g., disoriented, slurred speech)   Negative: Visible sweat on face or sweat dripping down face   Negative: Unable to walk, or can only walk with assistance  (e.g., requires support)   Negative: Received SHOCK from implantable cardiac defibrillator and has persisting symptoms (i.e., palpitations, lightheadedness)   Negative: Dizziness, lightheadedness, or weakness and heart beating very rapidly (e.g., > 140 / minute)   Negative: Dizziness, lightheadedness, or weakness and heart beating very slowly (e.g., < 50 / minute)   Negative: Sounds like a life-threatening emergency to the triager   Negative: Chest pain   Negative: Difficulty breathing   Negative: Dizziness, lightheadedness, or weakness   Negative: Heart beating very rapidly (e.g., > 140 / minute) and present now  (Exception: During exercise.)   Negative: Heart beating very slowly (e.g., < 50 / minute)  (Exception: Athlete and heart rate normal for caller.)   Negative: New or worsened shortness of breath with activity (dyspnea on exertion)   Negative: Patient sounds very sick or weak to the triager   Negative: Wearing a 'Holter monitor' or 'cardiac event monitor'   Negative: Received SHOCK from implantable cardiac defibrillator (and now feels well)   Negative: Heart beating very rapidly (e.g., > 140 / minute) and not present now  (Exception: During exercise.)   Negative: Skipped or extra beat(s) and increases with exercise or exertion   Negative: Skipped or extra beat(s) and occurs 4 or more times per minute   Negative: History of heart disease (i.e., heart attack, bypass surgery, angina, angioplasty)  (Exception: Brief heartbeat symptoms that went away and now feels well.)    Protocols used: Heart Rate and Heartbeat [id number]

## 2023-08-20 NOTE — ED PROVIDER NOTES
History     Chief Complaint   Patient presents with    Palpitations     HPI  History per patient, medical records    This is a 68-year-old female, currently undergoing chemotherapy treatment for colon cancer, status post colectomy, ALEXANDER, history of chronic kidney disease, paroxysmal atrial fibrillation, Crohn's disease.  She presents with palpitations.  Patient states that after each of her chemotherapy infusions she has had paroxysmal atrial fibrillation with a rapid ventricular rate.  She has been followed by cardiology and is on metoprolol XL.  This was recently increased to 50 mg daily.  She is also on diltiazem 120 mg daily with 30 mg as needed to use as needed for elevated heart rates.  She has taken the diltiazem 3 times, every 4 hours, through the night.  She continues to have a sensation of slight tightness in palpitations in the chest.  She is on anticoagulation but notes she is supposed to hold 4 doses for a port change on Monday, 2 days from now.  She denies any weakness or dizziness, lightheadedness, syncopal symptoms.  No shortness of breath.  No nausea or vomiting.  No diarrhea.    I reviewed medical records.  Patient was seen in this ED on 8/4/2023 with A-fib with RVR.  She responded with rate control to diltiazem drip.  She saw cardiology on 8/15/2023 where they increased the metoprolol and made the recommendations for the diltiazem.    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) 06/04/2023     Priority: Medium    Anemia associated with chemotherapy 06/04/2023     Priority: Medium    Lymphopenia 06/04/2023     Priority: Medium    Stage 3b chronic kidney  disease (H) 06/04/2023     Priority: Medium    Metabolic acidosis, normal anion gap (NAG) 06/04/2023     Priority: Medium    Elevated troponin 06/04/2023     Priority: Medium    Hypocalcemia 06/04/2023     Priority: Medium    Hypomagnesemia 06/04/2023     Priority: Medium    Tinnitus, right 06/04/2023     Priority: Medium    MARYA (acute kidney injury) (H) 06/03/2023     Priority: Medium    Paroxysmal atrial fibrillation with RVR (H) 06/03/2023     Priority: Medium    Pyuria 06/03/2023     Priority: Medium    Hyperkalemia 05/10/2023     Priority: Medium    Colon cancer (H) 04/06/2023     Priority: Medium    Malignant neoplasm of sigmoid colon (H) 03/31/2023     Priority: Medium     [facility]- EOTD 8/10/2023 - Bateman       Morbid obesity (H) 06/06/2019     Priority: Medium    Benign essential hypertension with target blood pressure below 140/90 10/10/2016     Priority: Medium    CARDIOVASCULAR SCREENING; LDL GOAL LESS THAN 130 10/02/2012     Priority: Medium    Advanced directives, counseling/discussion 02/09/2012     Priority: Medium     Advance Directive Problem List Overview:   Name Relationship Phone    Primary Health Care Agent            Alternative Health Care Agent          Discussed advance care planning with patient; information given to patient to review. 2/9/2012         Prediabetes 02/09/2012     Priority: Medium    Obesity 02/09/2012     Priority: Medium    Crohn's disease of large intestine without complication (H) 02/15/2011     Priority: Medium    Iron deficiency anemia due to chronic blood loss 10/21/2010     Priority: Medium    B12 deficiency anemia 10/21/2010     Priority: Medium    Arthropathia 08/05/2010     Priority: Medium    Dermatitis-dishydrotic eczema-severe 08/05/2010     Priority: Medium    Mild pulmonary hypertension (H) 04/06/2010     Priority: Medium        Past Medical History:    Past Medical History:   Diagnosis Date    Arthropathia 08/05/2010    B12 deficiency anemia 10/21/2010    Benign  essential hypertension with target blood pressure below 140/90 10/10/2016    CARDIOVASCULAR SCREENING; LDL GOAL LESS THAN 160 10/31/2010    Crohn's colitis (H) 02/15/2011    Dermatitis-dishydrotic eczema-severe 2010    Hiatal hernia     HTN (hypertension) 2011    Iron deficiency anemia 10/21/2010    Malignant neoplasm of sigmoid colon (H)     Obesity     Primary pulmonary hypertension (H) 2010       Past Surgical History:    Past Surgical History:   Procedure Laterality Date    COLECTOMY WITHOUT COLOSTOMY N/A 2023    Procedure: Laparoscopic Converted to Open Total abdominal colectomy;  Surgeon: Jerome Ashley MD;  Location: [facility]    COLONOSCOPY  10/11/10    COLONOSCOPY N/A 2023    Procedure: ATTEMPTED COLONOSCOPY WITH SIGMOID STRICTURE BIOPSY;  Surgeon: Jonathan Alcocer MD;  Location: [facility]    ESOPHAGOSCOPY, GASTROSCOPY, DUODENOSCOPY (EGD), COMBINED N/A 2023    Procedure: ESOPHAGOGASTRODUODENOSCOPY, WITH BIOPSY;  Surgeon: Jonathan Alcocer MD;  Location: [facility]    HYSTERECTOMY TOTAL ABDOMINAL, BILATERAL SALPINGO-OOPHORECTOMY, COMBINED N/A 2023    Procedure: Hysterectomy total abdominal, bilateral salpingo-oophorectomy;  Surgeon: Sunitha Olea MD;  Location: [facility]    INSERT PORT VASCULAR ACCESS Right 2023    Procedure: Ultrasound-guided right internal jugular venous access port placement with fluoroscopy;  Surgeon: Martin Thomas DO;  Location: [facility]    IR PORT CHECK RIGHT  2023    SIGMOIDOSCOPY FLEXIBLE N/A 2023    Procedure: Sigmoidoscopy flexible;  Surgeon: Jerome Ashley MD;  Location: [facility]    SURGICAL HISTORY OF -   77    Perineorrhaphy, for widening vaginal orifice    ZZC EXPLORATORY OF ABDOMEN      laparoscopy       Family History:    Family History   Problem Relation Age of Onset    Hypertension Mother         on meds, alive    Cerebrovascular Disease Father         stroke about age 65,  of cancer at 68 yrs     Diabetes Father         eventually took insulin    Anemia Father         Pernisios anemia    Kidney Disease Niece         kidney transplant    Kidney Disease Nephew         kidney transplant    Venous thrombosis No family hx of     Anesthesia Reaction No family hx of        Social History:  Marital Status:   [2]  Social History     Tobacco Use    Smoking status: Never     Passive exposure: Current    Smokeless tobacco: Never   Substance Use Topics    Alcohol use: No    Drug use: No        Medications:    apixaban ANTICOAGULANT (ELIQUIS ANTICOAGULANT) 5 MG tablet  cyanocobalamin 1000 MCG TBCR  dexamethasone (DECADRON) 4 MG tablet  diltiazem (CARDIZEM) 30 MG tablet  diltiazem ER COATED BEADS (CARDIZEM CD/CARTIA XT) 120 MG 24 hr capsule  Ferrous Sulfate (IRON) 325 (65 Fe) MG tablet  MAGNESIUM OXIDE 400 (240 Mg) MG tablet  metoprolol succinate ER (TOPROL XL) 50 MG 24 hr tablet  Multiple Vitamins-Iron (MULTI-DAY PLUS IRON PO)  sodium bicarbonate 650 MG tablet          Review of Systems  All other ROS reviewed and are negative or non-contributory except as stated in HPI.     Physical Exam   BP: 106/69  Pulse: 65  Temp: 98.1  F (36.7  C)  Resp: 18  Weight: 80.7 kg (178 lb)  SpO2: 100 %      Physical Exam  Vitals and nursing note reviewed.   Constitutional:       Appearance: Normal appearance. She is normal weight.      Comments: Very pleasant female sitting in the bed   HENT:      Head: Normocephalic.   Eyes:      Extraocular Movements: Extraocular movements intact.      Conjunctiva/sclera: Conjunctivae normal.   Cardiovascular:      Rate and Rhythm: Normal rate and regular rhythm.      Pulses: Normal pulses.      Heart sounds: Normal heart sounds.   Pulmonary:      Effort: Pulmonary effort is normal.      Breath sounds: Normal breath sounds.      Comments: Port in right chest wall clean/dry/intact  Musculoskeletal:         General: No tenderness. Normal range of motion.      Cervical back: Normal range of motion  and neck supple.      Right lower leg: No edema.      Left lower leg: No edema.   Skin:     General: Skin is warm and dry.   Neurological:      General: No focal deficit present.      Mental Status: She is alert and oriented to person, place, and time.   Psychiatric:         Mood and Affect: Mood normal.         Behavior: Behavior normal.         ED Course (with Medical Decision Making)    Pt seen and examined by me.  RN and EPIC notes reviewed.       Patient with palpitation symptoms.  History of atrial fibrillation with rapid ventricular rate at the end of her chemotherapy.  She still has a number of chemotherapy treatments to go.  EKG was done on arrival.  This shows sinus rhythm with a rate of 63.  She had 1 PAC.  Nonspecific ST changes.  No acute ST elevation.  Read by myself at 8:15 AM.    On the monitor, patient appears to be in normal sinus rhythm.  She had a little bit of bradycardia.  I did check basic labs, especially magnesium.  This was 2.1.  Potassium is normal.  She has elevated BUN/creatinine at 37/1.31.  I note that patient has had elevated BUN/creatinine each time she has been in the atrial fibrillation.  Or at least each time she has had the palpitation symptoms.  I think it might be a good idea for her to get some extra fluids as she is receiving her chemotherapy and especially at the end of chemotherapy.  Maybe this will help prevent the symptoms.    In the ED, she was given a liter of fluids.  She feels comfortable to return home.  I will send a message to her oncologist.  In the meantime, continue to monitor symptoms.  Hold off on any further rapid acting diltiazem doses unless absolutely needed.  Return promptly for concerns.      Procedures         Results for orders placed or performed during the hospital encounter of 08/19/23 (from the past 24 hour(s))   CBC with platelets differential    Narrative    The following orders were created for panel order CBC with platelets  differential.  Procedure                               Abnormality         Status                     ---------                               -----------         ------                     CBC with platelets and d...[[id number]]  Abnormal            Final result                 Please view results for these tests on the individual orders.   Comprehensive metabolic panel   Result Value Ref Range    Sodium 140 136 - 145 mmol/L    Potassium 4.3 3.4 - 5.3 mmol/L    Chloride 106 98 - 107 mmol/L    Carbon Dioxide (CO2) 22 22 - 29 mmol/L    Anion Gap 12 7 - 15 mmol/L    Urea Nitrogen 37.0 (H) 8.0 - 23.0 mg/dL    Creatinine 1.31 (H) 0.51 - 0.95 mg/dL    Calcium 9.0 8.8 - 10.2 mg/dL    Glucose 108 (H) 70 - 99 mg/dL    Alkaline Phosphatase 126 (H) 35 - 104 U/L    AST 26 0 - 45 U/L    ALT 31 0 - 50 U/L    Protein Total 6.0 (L) 6.4 - 8.3 g/dL    Albumin 3.7 3.5 - 5.2 g/dL    Bilirubin Total 0.7 <=1.2 mg/dL    GFR Estimate 44 (L) >60 mL/min/1.73m2   Magnesium   Result Value Ref Range    Magnesium 2.1 1.7 - 2.3 mg/dL   Phosphorus   Result Value Ref Range    Phosphorus 3.2 2.5 - 4.5 mg/dL   CBC with platelets and differential   Result Value Ref Range    WBC Count 3.7 (L) 4.0 - 11.0 10e3/uL    RBC Count 4.29 3.80 - 5.20 10e6/uL    Hemoglobin 12.5 11.7 - 15.7 g/dL    Hematocrit 38.3 35.0 - 47.0 %    MCV 89 78 - 100 fL    MCH 29.1 26.5 - 33.0 pg    MCHC 32.6 31.5 - 36.5 g/dL    RDW 16.8 (H) 10.0 - 15.0 %    Platelet Count 143 (L) 150 - 450 10e3/uL    % Neutrophils 78 %    % Lymphocytes 14 %    % Monocytes 7 %    % Eosinophils 0 %    % Basophils 0 %    % Immature Granulocytes 1 %    NRBCs per 100 WBC 0 <1 /100    Absolute Neutrophils 2.9 1.6 - 8.3 10e3/uL    Absolute Lymphocytes 0.5 (L) 0.8 - 5.3 10e3/uL    Absolute Monocytes 0.3 0.0 - 1.3 10e3/uL    Absolute Eosinophils 0.0 0.0 - 0.7 10e3/uL    Absolute Basophils 0.0 0.0 - 0.2 10e3/uL    Absolute Immature Granulocytes 0.0 <=0.4 10e3/uL    Absolute NRBCs 0.0 10e3/uL       Medications    0.9% sodium chloride BOLUS (0 mLs Intravenous Stopped 8/19/23 1130)       Assessments & Plan      I have reviewed the findings, diagnosis, plan and need for follow up with the patient.  Discharge Medication List as of 8/19/2023 11:46 AM          Final diagnoses:   Palpitations   Dehydration   Paroxysmal atrial fibrillation (H)     Disposition: Patient discharged home in stable condition.  Plan as above.  Return for concerns.     Note: Chart documentation done in part with Dragon Voice Recognition software. Although reviewed after completion, some word and grammatical errors may remain.   8/19/2023   [facility] EMERGENCY DEPT       Zehra Burrell MD  08/20/23 2667

## 2023-08-21 ENCOUNTER — APPOINTMENT (OUTPATIENT)
Dept: INTERVENTIONAL RADIOLOGY/VASCULAR | Facility: CLINIC | Age: 68
End: 2023-08-21
Attending: INTERNAL MEDICINE
Payer: MEDICARE

## 2023-08-21 ENCOUNTER — HOSPITAL ENCOUNTER (OUTPATIENT)
Facility: CLINIC | Age: 68
Discharge: HOME | End: 2023-08-21
Admitting: RADIOLOGY
Payer: MEDICARE

## 2023-08-21 VITALS
RESPIRATION RATE: 16 BRPM | OXYGEN SATURATION: 99 % | SYSTOLIC BLOOD PRESSURE: 145 MMHG | HEART RATE: 55 BPM | TEMPERATURE: 97.9 F | DIASTOLIC BLOOD PRESSURE: 67 MMHG

## 2023-08-21 DIAGNOSIS — C18.7 MALIGNANT NEOPLASM OF SIGMOID COLON (H): ICD-10-CM

## 2023-08-21 DIAGNOSIS — C18.9 COLON CANCER (H): ICD-10-CM

## 2023-08-21 PROCEDURE — C1769 GUIDE WIRE: HCPCS

## 2023-08-21 PROCEDURE — 272N000116 HC CATH CR1

## 2023-08-21 PROCEDURE — 250N000011 HC RX IP 250 OP 636: Performed by: RADIOLOGY

## 2023-08-21 PROCEDURE — C1788 PORT, INDWELLING, IMP: HCPCS

## 2023-08-21 PROCEDURE — 36590 REMOVAL TUNNELED CV CATH: CPT

## 2023-08-21 PROCEDURE — 272N000196 HC ACCESSORY CR5

## 2023-08-21 PROCEDURE — 36591 DRAW BLOOD OFF VENOUS DEVICE: CPT

## 2023-08-21 PROCEDURE — 250N000009 HC RX 250: Performed by: PHYSICIAN ASSISTANT

## 2023-08-21 PROCEDURE — 999N000163 HC STATISTIC SIMPLE TUBE INSERTION/CHARGE, PORT, CATH, FISTULOGRAM

## 2023-08-21 PROCEDURE — 99152 MOD SED SAME PHYS/QHP 5/>YRS: CPT

## 2023-08-21 PROCEDURE — 99153 MOD SED SAME PHYS/QHP EA: CPT

## 2023-08-21 PROCEDURE — 36561 INSERT TUNNELED CV CATH: CPT

## 2023-08-21 PROCEDURE — 250N000011 HC RX IP 250 OP 636: Performed by: PHYSICIAN ASSISTANT

## 2023-08-21 RX ORDER — HEPARIN SODIUM (PORCINE) LOCK FLUSH IV SOLN 100 UNIT/ML 100 UNIT/ML
500 SOLUTION INTRAVENOUS
Status: COMPLETED | OUTPATIENT
Start: 2023-08-21 | End: 2023-08-21

## 2023-08-21 RX ORDER — NALOXONE HYDROCHLORIDE 0.4 MG/ML
0.4 INJECTION, SOLUTION INTRAMUSCULAR; INTRAVENOUS; SUBCUTANEOUS
Status: DISCONTINUED | OUTPATIENT
Start: 2023-08-21 | End: 2023-08-21 | Stop reason: HOSPADM

## 2023-08-21 RX ORDER — CEFAZOLIN SODIUM 2 G/100ML
2 INJECTION, SOLUTION INTRAVENOUS
Status: COMPLETED | OUTPATIENT
Start: 2023-08-21 | End: 2023-08-21

## 2023-08-21 RX ORDER — LIDOCAINE 40 MG/G
CREAM TOPICAL
Status: DISCONTINUED | OUTPATIENT
Start: 2023-08-21 | End: 2023-08-21 | Stop reason: HOSPADM

## 2023-08-21 RX ORDER — FLUMAZENIL 0.1 MG/ML
0.2 INJECTION, SOLUTION INTRAVENOUS
Status: DISCONTINUED | OUTPATIENT
Start: 2023-08-21 | End: 2023-08-21 | Stop reason: HOSPADM

## 2023-08-21 RX ORDER — NALOXONE HYDROCHLORIDE 0.4 MG/ML
0.2 INJECTION, SOLUTION INTRAMUSCULAR; INTRAVENOUS; SUBCUTANEOUS
Status: DISCONTINUED | OUTPATIENT
Start: 2023-08-21 | End: 2023-08-21 | Stop reason: HOSPADM

## 2023-08-21 RX ORDER — ACETAMINOPHEN 325 MG/1
650 TABLET ORAL
Status: DISCONTINUED | OUTPATIENT
Start: 2023-08-21 | End: 2023-08-21 | Stop reason: HOSPADM

## 2023-08-21 RX ORDER — FENTANYL CITRATE 50 UG/ML
25-50 INJECTION, SOLUTION INTRAMUSCULAR; INTRAVENOUS EVERY 5 MIN PRN
Status: DISCONTINUED | OUTPATIENT
Start: 2023-08-21 | End: 2023-08-21 | Stop reason: HOSPADM

## 2023-08-21 RX ADMIN — FENTANYL CITRATE 25 MCG: 50 INJECTION, SOLUTION INTRAMUSCULAR; INTRAVENOUS at 10:39

## 2023-08-21 RX ADMIN — CEFAZOLIN SODIUM 2 G: 2 INJECTION, SOLUTION INTRAVENOUS at 09:27

## 2023-08-21 RX ADMIN — FENTANYL CITRATE 25 MCG: 50 INJECTION, SOLUTION INTRAMUSCULAR; INTRAVENOUS at 11:04

## 2023-08-21 RX ADMIN — LIDOCAINE HYDROCHLORIDE 29 ML: 10 INJECTION, SOLUTION INFILTRATION; PERINEURAL at 11:06

## 2023-08-21 RX ADMIN — HEPARIN SODIUM (PORCINE) LOCK FLUSH IV SOLN 100 UNIT/ML 500 UNITS: 100 SOLUTION at 10:50

## 2023-08-21 RX ADMIN — MIDAZOLAM 1 MG: 1 INJECTION INTRAMUSCULAR; INTRAVENOUS at 10:49

## 2023-08-21 RX ADMIN — FENTANYL CITRATE 50 MCG: 50 INJECTION, SOLUTION INTRAMUSCULAR; INTRAVENOUS at 10:26

## 2023-08-21 RX ADMIN — MIDAZOLAM 1 MG: 1 INJECTION INTRAMUSCULAR; INTRAVENOUS at 10:27

## 2023-08-21 ASSESSMENT — ACTIVITIES OF DAILY LIVING (ADL)
ADLS_ACUITY_SCORE: 35
ADLS_ACUITY_SCORE: 35

## 2023-08-21 NOTE — DISCHARGE INSTRUCTIONS
Port Insertion Discharge Instructions     After you go home:    Have an adult stay with you for the first 6 hours  You may resume your normal diet       For 24 hours - due to the sedation you received:  Relax and take it easy  Do NOT make any important or legal decisions  Do NOT drive or operate machines at home or at work  Do NOT drink alcohol    Care of Incision sites:    You have a liquid adhesive covering on your incisions. Do not remove the adhesive residue. It will come off on it's own.  You may take a shallow bath until your doctor or nurse says it is safe to shower.  You may cover the wound with a bandaid if needed for comfort.      Activity     Avoid heavy lifting (greater than 10 pounds) or the overuse of your shoulder for 2 weeks    Bleeding:    If you start bleeding from the incision sites in your chest or neck - or have swelling in your neck, sit down and press on the site for 5-10 minutes.   If bleeding has not stopped after 10 minutes, call your provider.        Call 911 right away if you have heavy bleeding or bleeding that does not stop.      Medicines:    You may resume all medications  Resume your Warfarin/Coumadin at your regular dose today. Follow up with your provider to have your INR rechecked  Resume your Platelet Inhibitors and Aspirin tomorrow at your regular dose  For minor pain, you may take Acetaminophen (Tylenol) or Ibuprofen (Advil)    Call the provider who ordered this procedure if:    You have swelling in your neck or over your port site  The incision area is red, swollen, hot or tender  You have chills or a fever greater than 101 F (38 C)  Any questions or concerns    Call  911 or go to the Emergency Room if you have:    Severe chest pain or trouble breathing  Bleeding that you cannot control    Additional Information:    Your port may be accessed right away.   You will need to have your port flushed every 30 days or after each use.      If you have questions call:          [facility] @ [phone]      The provider who performed your procedure was _________________.

## 2023-08-21 NOTE — IR NOTE
Interventional Radiology Intra-procedural Nursing Note    Patient Name: Nadia Ladd  Medical Record Number: [id number]  Today's Date: August 21, 2023    Procedure:   Right port a catheter removal,  left port placement with moderate sedation   Start time: 1028  End time: 1112  Report provided to: Carmen ZAYAS  Patient depart time and location: 1118 to cs-11    Note: Patient entered Interventional Radiology Suite number 2 via cart. Patient awake, alert and oriented. Assisted onto procedural table in supine position. Prepped and draped.  Dr. Zuñiga in room. Time out and procedure started. Vital signs stable. Telemetry reading sinus bradycardia with occasional PVC and PAC.    Procedure well tolerated by patient without complications. Procedure end with debrief by Dr. Zuñiga.    Administered medication totals:  Lidocaine 1% 29 mL Intradermal  Heparin 500 Units port  Versed 2 mg IVP  Fentanyl 100 mcg IVP    Last dose of sedation administered at 1049.

## 2023-08-21 NOTE — PROGRESS NOTES
Care Suites Post Procedure Note    Patient Information  Name: Nadia Ladd  Age: 68 year old    Post Procedure  Time patient returned to Care Suites: 1125  Concerns/abnormal assessment: NA  If abnormal assessment, provider notified: N/A  Plan/Other: Proceed per orders. Pt arrived A/O, VSS. Sites CDI, will continue to monitor.    Gladys Garcia RN

## 2023-08-21 NOTE — PROGRESS NOTES
Care Suites Discharge Nursing Note    Patient Information  Name: Nadia Ladd  Age: 68 year old    Discharge Education:  Discharge instructions reviewed: Yes  Additional education/resources provided: no  Patient/patient representative verbalizes understanding: Yes  Patient discharging on new medications: No  Medication education completed: NA    Discharge Plans:   Discharge location: home  Discharge ride contacted: Yes  Approximate discharge time: 1300    Discharge Criteria:  Discharge criteria met and vital signs stable: Yes    Patient Belongs:  Patient belongings returned to patient: Yes    Carmen Ornelas RN

## 2023-08-21 NOTE — PRE-PROCEDURE
GENERAL PRE-PROCEDURE:   Procedure:  Right port a catheter removal, port placement with moderate sedation  Date/Time:  8/21/2023 8:55 AM    Written consent obtained?: Yes    Risks and benefits: Risks, benefits and alternatives were discussed    Consent given by:  Patient  Patient states understanding of procedure being performed: Yes    Patient's understanding of procedure matches consent: Yes    Procedure consent matches procedure scheduled: Yes    Expected level of sedation:  Moderate  Appropriately NPO:  Yes  ASA Class:  3  Mallampati  :  Grade 2- soft palate, base of uvula, tonsillar pillars, and portion of posterior pharyngeal wall visible  Lungs:  Lungs clear with good breath sounds bilaterally  Heart:  Normal heart sounds and rate, systolic murmur and other (comment)  Heart exam comment:  Occasional irregular beat  History & Physical reviewed:  History and physical reviewed and no updates needed  Statement of review:  I have reviewed the lab findings, diagnostic data, medications, and the plan for sedation    Patient shared she has irregular heart beats (PVCs or PACs) during chemo, which is infused over 48 hours. The first time she had chemo she went into a fib. She was started on Metoprolol and takes Diltiazem while chemo infusing and for 2 days after. The irregular beats improve as time goes on.     The port was placed surgically at [facility]. It has flipped a couple times so it will be removed and a new one replaced.     Total time: 20 minutes    Thanks [facility] Interventional Radiology CNP ([phone]) (phone [phone])

## 2023-08-21 NOTE — PROGRESS NOTES
Care Suites Admission Nursing Note    Patient Information  Name: Nadia Ladd  Age: 68 year old  Reason for admission: port removal and insertion  Care Suites arrival time: 0830    Visitor Information       Patient Admission/Assessment   Pre-procedure assessment complete: Yes  If abnormal assessment/labs, provider notified: N/A  NPO: Yes  Medications held per instructions/orders: Yes  Consent: obtained  If applicable, pregnancy test status: deferred  Patient oriented to room: Yes  Education/questions answered: Yes  Plan/other: port    Discharge Planning  Discharge name/phone number: damien   Overnight post sedation caregiver: damien  Discharge location: home    Carmen Ornelas RN

## 2023-08-22 NOTE — TELEPHONE ENCOUNTER
FREE DRUG APPLICATION DENIED    Medication: ELIQUIS 5 MG PO TABS  Denial Reason(s): income is over guidelines   Program Name: [facility]  Denial Date: 8/22/2023  Patient Notified: waiting for provider to see if there is another drug to try  Additional Information: sent message to provider to see if there is another drug to try

## 2023-08-23 ENCOUNTER — INFUSION THERAPY VISIT (OUTPATIENT)
Dept: INFUSION THERAPY | Facility: CLINIC | Age: 68
End: 2023-08-23
Attending: INTERNAL MEDICINE
Payer: MEDICARE

## 2023-08-23 VITALS
DIASTOLIC BLOOD PRESSURE: 58 MMHG | RESPIRATION RATE: 16 BRPM | HEART RATE: 57 BPM | BODY MASS INDEX: 32.84 KG/M2 | OXYGEN SATURATION: 98 % | TEMPERATURE: 97 F | SYSTOLIC BLOOD PRESSURE: 148 MMHG | WEIGHT: 173.8 LBS

## 2023-08-23 DIAGNOSIS — C18.7 MALIGNANT NEOPLASM OF SIGMOID COLON (H): ICD-10-CM

## 2023-08-23 DIAGNOSIS — Z76.89 PREVENTION OF CHEMOTHERAPY-INDUCED NEUTROPENIA: Primary | ICD-10-CM

## 2023-08-23 DIAGNOSIS — Z76.89 PREVENTION OF CHEMOTHERAPY-INDUCED NEUTROPENIA: ICD-10-CM

## 2023-08-23 DIAGNOSIS — C18.7 MALIGNANT NEOPLASM OF SIGMOID COLON (H): Primary | ICD-10-CM

## 2023-08-23 LAB
ALBUMIN SERPL BCG-MCNC: 3.8 G/DL (ref 3.5–5.2)
ALP SERPL-CCNC: 135 U/L (ref 35–104)
ALT SERPL W P-5'-P-CCNC: 24 U/L (ref 0–50)
ANION GAP SERPL CALCULATED.3IONS-SCNC: 12 MMOL/L (ref 7–15)
AST SERPL W P-5'-P-CCNC: 37 U/L (ref 0–45)
BILIRUB SERPL-MCNC: 0.4 MG/DL
BUN SERPL-MCNC: 16.8 MG/DL (ref 8–23)
CALCIUM SERPL-MCNC: 8.9 MG/DL (ref 8.8–10.2)
CHLORIDE SERPL-SCNC: 106 MMOL/L (ref 98–107)
CREAT SERPL-MCNC: 1.29 MG/DL (ref 0.51–0.95)
DEPRECATED HCO3 PLAS-SCNC: 22 MMOL/L (ref 22–29)
GFR SERPL CREATININE-BSD FRML MDRD: 45 ML/MIN/1.73M2
GLUCOSE SERPL-MCNC: 104 MG/DL (ref 70–99)
MAGNESIUM SERPL-MCNC: 1.7 MG/DL (ref 1.7–2.3)
POTASSIUM SERPL-SCNC: 4.9 MMOL/L (ref 3.4–5.3)
PROT SERPL-MCNC: 6.2 G/DL (ref 6.4–8.3)
SODIUM SERPL-SCNC: 140 MMOL/L (ref 136–145)

## 2023-08-23 PROCEDURE — 258N000003 HC RX IP 258 OP 636: Performed by: INTERNAL MEDICINE

## 2023-08-23 PROCEDURE — 96360 HYDRATION IV INFUSION INIT: CPT

## 2023-08-23 PROCEDURE — 80053 COMPREHEN METABOLIC PANEL: CPT | Performed by: INTERNAL MEDICINE

## 2023-08-23 PROCEDURE — 96361 HYDRATE IV INFUSION ADD-ON: CPT

## 2023-08-23 PROCEDURE — 83735 ASSAY OF MAGNESIUM: CPT

## 2023-08-23 PROCEDURE — 36415 COLL VENOUS BLD VENIPUNCTURE: CPT | Performed by: INTERNAL MEDICINE

## 2023-08-23 RX ORDER — HEPARIN SODIUM,PORCINE 10 UNIT/ML
5 VIAL (ML) INTRAVENOUS
Status: CANCELLED | OUTPATIENT
Start: 2023-08-23

## 2023-08-23 RX ORDER — HEPARIN SODIUM (PORCINE) LOCK FLUSH IV SOLN 100 UNIT/ML 100 UNIT/ML
5 SOLUTION INTRAVENOUS
Status: CANCELLED | OUTPATIENT
Start: 2023-08-23

## 2023-08-23 RX ORDER — MEPERIDINE HYDROCHLORIDE 25 MG/ML
25 INJECTION INTRAMUSCULAR; INTRAVENOUS; SUBCUTANEOUS EVERY 30 MIN PRN
Status: CANCELLED | OUTPATIENT
Start: 2023-08-23

## 2023-08-23 RX ORDER — EPINEPHRINE 1 MG/ML
0.3 INJECTION, SOLUTION INTRAMUSCULAR; SUBCUTANEOUS EVERY 5 MIN PRN
Status: CANCELLED | OUTPATIENT
Start: 2023-08-23

## 2023-08-23 RX ORDER — DIPHENHYDRAMINE HYDROCHLORIDE 50 MG/ML
50 INJECTION INTRAMUSCULAR; INTRAVENOUS
Status: CANCELLED
Start: 2023-08-23

## 2023-08-23 RX ORDER — ALBUTEROL SULFATE 0.83 MG/ML
2.5 SOLUTION RESPIRATORY (INHALATION)
Status: CANCELLED | OUTPATIENT
Start: 2023-08-23

## 2023-08-23 RX ORDER — ALBUTEROL SULFATE 90 UG/1
1-2 AEROSOL, METERED RESPIRATORY (INHALATION)
Status: CANCELLED
Start: 2023-08-23

## 2023-08-23 RX ORDER — METHYLPREDNISOLONE SODIUM SUCCINATE 125 MG/2ML
125 INJECTION, POWDER, LYOPHILIZED, FOR SOLUTION INTRAMUSCULAR; INTRAVENOUS
Status: CANCELLED
Start: 2023-08-23

## 2023-08-23 RX ADMIN — SODIUM CHLORIDE 1000 ML: 9 INJECTION, SOLUTION INTRAVENOUS at 13:04

## 2023-08-23 ASSESSMENT — PAIN SCALES - GENERAL: PAINLEVEL: MILD PAIN (3)

## 2023-08-23 NOTE — PROGRESS NOTES
Patient here for PIV lab draw prior to IVF. New port placed 8/21. Site tender, opted not to access today.     Intravenous access:       Labs drawn without difficulty.  green  tubes drawn.     flushed with 10ml NS after.       Eli Ladd RN

## 2023-08-23 NOTE — PROGRESS NOTES
Infusion Nursing Note:  Nadia Ladd presents today for IVF.    Patient seen by provider today: No   present during visit today: Not Applicable.    Note: Patient doing well. New port placed 8/21. Site tender on left chest. PIV inserted. Patient will mychart DR. Bateman for Emla cream for future port acess. .      Intravenous Access:  Peripheral IV placed.    Treatment Conditions:  Lab Results   Component Value Date     08/23/2023    POTASSIUM 4.9 08/23/2023    MAG 1.7 08/23/2023    CR 1.29 (H) 08/23/2023    LIVIER 8.9 08/23/2023    BILITOTAL 0.4 08/23/2023    ALBUMIN 3.8 08/23/2023    ALT 24 08/23/2023    AST 37 08/23/2023       No electrolytes replacement needed today. .      Post Infusion Assessment:  Patient tolerated infusion without incident.  Site patent and intact, free from redness, edema or discomfort.  Access discontinued per protocol.       Discharge Plan:   Patient discharged in stable condition accompanied by: self and .  Departure Mode: Ambulatory.      Eli Ladd RN

## 2023-08-24 ENCOUNTER — TELEPHONE (OUTPATIENT)
Dept: ADMISSION | Facility: CLINIC | Age: 68
End: 2023-08-24

## 2023-08-24 DIAGNOSIS — C18.7 MALIGNANT NEOPLASM OF SIGMOID COLON (H): Primary | ICD-10-CM

## 2023-08-24 RX ORDER — LIDOCAINE/PRILOCAINE 2.5 %-2.5%
CREAM (GRAM) TOPICAL PRN
Qty: 1 G | Refills: 4 | Status: SHIPPED | OUTPATIENT
Start: 2023-08-24 | End: 2024-07-30

## 2023-08-24 NOTE — TELEPHONE ENCOUNTER
Spoke with patient regarding recent ED visit after chemotherapy treatment. Patient wanted Dr. Bateman to be aware. This RN will send message to Dr. Bateman. Patient asking if IVF with disconnect would benefit her and keep her out of the ED next time. This RN will send this request. Patient states she does try her best to drink fluids but does struggle. Patient requesting EMLA for port access. This RN sent prescription to local pharmacy and dicussed how to use. No further questions or concerns.  Liseth Yoon RNCC

## 2023-08-24 NOTE — TELEPHONE ENCOUNTER
Reason for Call:  Other call back    Detailed comments: pt requesting to speak with team about getting numbing cream for port site. Pt would also like to discuss recent ED visit after chemo. Please return call to pt, thank you!     Phone Number Patient can be reached at: Home number on file [phone] (home)    Best Time: any    Can we leave a detailed message on this number? YES    Call taken on 8/24/2023 at 11:22 AM by Estelle Bennett

## 2023-08-24 NOTE — TELEPHONE ENCOUNTER
Called [facility] and talked with Lilliam, to see what the income guidelines are and if we can send in income for the patient. For a family of two the top end to make for a year is $59,160

## 2023-08-29 ENCOUNTER — TELEPHONE (OUTPATIENT)
Dept: INTERNAL MEDICINE | Facility: CLINIC | Age: 68
End: 2023-08-29
Payer: MEDICARE

## 2023-08-29 DIAGNOSIS — E83.42 HYPOMAGNESEMIA: ICD-10-CM

## 2023-08-29 DIAGNOSIS — I48.0 PAROXYSMAL ATRIAL FIBRILLATION WITH RVR (H): ICD-10-CM

## 2023-08-29 DIAGNOSIS — K52.9 CHRONIC DIARRHEA: ICD-10-CM

## 2023-08-29 RX ORDER — LANOLIN ALCOHOL/MO/W.PET/CERES
CREAM (GRAM) TOPICAL
Qty: 60 TABLET | Refills: 0 | Status: SHIPPED | OUTPATIENT
Start: 2023-08-29 | End: 2023-10-02

## 2023-08-29 NOTE — TELEPHONE ENCOUNTER
Reason for Call:  Form, our goal is to have forms completed with 72 hours, however, some forms may require a visit or additional information.    Type of letter, form or note:  Home Health Certification    Who is the form from?: Home care    Where did the form come from: form was faxed in    What clinic location was the form placed at?: [facility]    Where the form was placed: Given to PETER Mancuso    What number is listed as a contact on the form?: [phone]       Additional [facility]    Call taken on 8/29/2023 at 5:55 PM by Mavis Montano

## 2023-08-30 ENCOUNTER — LAB (OUTPATIENT)
Dept: INFUSION THERAPY | Facility: CLINIC | Age: 68
End: 2023-08-30
Attending: INTERNAL MEDICINE
Payer: MEDICARE

## 2023-08-30 ENCOUNTER — VIRTUAL VISIT (OUTPATIENT)
Dept: ONCOLOGY | Facility: CLINIC | Age: 68
End: 2023-08-30
Payer: MEDICARE

## 2023-08-30 ENCOUNTER — PATIENT OUTREACH (OUTPATIENT)
Dept: ONCOLOGY | Facility: CLINIC | Age: 68
End: 2023-08-30

## 2023-08-30 VITALS — BODY MASS INDEX: 33.04 KG/M2 | HEIGHT: 61 IN | WEIGHT: 175 LBS

## 2023-08-30 VITALS
BODY MASS INDEX: 33.61 KG/M2 | DIASTOLIC BLOOD PRESSURE: 66 MMHG | WEIGHT: 177.9 LBS | SYSTOLIC BLOOD PRESSURE: 136 MMHG | HEART RATE: 59 BPM | TEMPERATURE: 98.4 F | OXYGEN SATURATION: 99 % | RESPIRATION RATE: 18 BRPM

## 2023-08-30 DIAGNOSIS — Z76.89 PREVENTION OF CHEMOTHERAPY-INDUCED NEUTROPENIA: Primary | ICD-10-CM

## 2023-08-30 DIAGNOSIS — C18.7 MALIGNANT NEOPLASM OF SIGMOID COLON (H): Primary | ICD-10-CM

## 2023-08-30 DIAGNOSIS — D70.1 CHEMOTHERAPY-INDUCED NEUTROPENIA (H): ICD-10-CM

## 2023-08-30 DIAGNOSIS — D69.59 CHEMOTHERAPY-INDUCED THROMBOCYTOPENIA: ICD-10-CM

## 2023-08-30 DIAGNOSIS — F41.9 ANXIETY: ICD-10-CM

## 2023-08-30 DIAGNOSIS — T45.1X5A CHEMOTHERAPY-INDUCED THROMBOCYTOPENIA: ICD-10-CM

## 2023-08-30 DIAGNOSIS — I48.0 PAROXYSMAL ATRIAL FIBRILLATION (H): ICD-10-CM

## 2023-08-30 DIAGNOSIS — Z76.89 PREVENTION OF CHEMOTHERAPY-INDUCED NEUTROPENIA: ICD-10-CM

## 2023-08-30 DIAGNOSIS — T45.1X5A CHEMOTHERAPY-INDUCED NEUTROPENIA (H): ICD-10-CM

## 2023-08-30 DIAGNOSIS — C18.9 MALIGNANT NEOPLASM OF COLON, UNSPECIFIED PART OF COLON (H): Primary | ICD-10-CM

## 2023-08-30 DIAGNOSIS — C18.7 MALIGNANT NEOPLASM OF SIGMOID COLON (H): ICD-10-CM

## 2023-08-30 LAB
ALBUMIN SERPL BCG-MCNC: 3.6 G/DL (ref 3.5–5.2)
ALP SERPL-CCNC: 156 U/L (ref 35–104)
ALT SERPL W P-5'-P-CCNC: 27 U/L (ref 0–50)
ANION GAP SERPL CALCULATED.3IONS-SCNC: 14 MMOL/L (ref 7–15)
AST SERPL W P-5'-P-CCNC: 32 U/L (ref 0–45)
BASOPHILS # BLD AUTO: 0 10E3/UL (ref 0–0.2)
BASOPHILS NFR BLD AUTO: 0 %
BILIRUB SERPL-MCNC: 0.5 MG/DL
BUN SERPL-MCNC: 16.3 MG/DL (ref 8–23)
CALCIUM SERPL-MCNC: 9.2 MG/DL (ref 8.8–10.2)
CHLORIDE SERPL-SCNC: 108 MMOL/L (ref 98–107)
CREAT SERPL-MCNC: 1.34 MG/DL (ref 0.51–0.95)
DEPRECATED HCO3 PLAS-SCNC: 21 MMOL/L (ref 22–29)
EOSINOPHIL # BLD AUTO: 0 10E3/UL (ref 0–0.7)
EOSINOPHIL NFR BLD AUTO: 0 %
ERYTHROCYTE [DISTWIDTH] IN BLOOD BY AUTOMATED COUNT: 16.8 % (ref 10–15)
GFR SERPL CREATININE-BSD FRML MDRD: 43 ML/MIN/1.73M2
GLUCOSE SERPL-MCNC: 111 MG/DL (ref 70–99)
HCT VFR BLD AUTO: 38.6 % (ref 35–47)
HGB BLD-MCNC: 12.7 G/DL (ref 11.7–15.7)
IMM GRANULOCYTES # BLD: 0 10E3/UL
IMM GRANULOCYTES NFR BLD: 0 %
LYMPHOCYTES # BLD AUTO: 0.5 10E3/UL (ref 0.8–5.3)
LYMPHOCYTES NFR BLD AUTO: 10 %
MAGNESIUM SERPL-MCNC: 1.8 MG/DL (ref 1.7–2.3)
MCH RBC QN AUTO: 29.3 PG (ref 26.5–33)
MCHC RBC AUTO-ENTMCNC: 32.9 G/DL (ref 31.5–36.5)
MCV RBC AUTO: 89 FL (ref 78–100)
MONOCYTES # BLD AUTO: 0.7 10E3/UL (ref 0–1.3)
MONOCYTES NFR BLD AUTO: 14 %
NEUTROPHILS # BLD AUTO: 3.6 10E3/UL (ref 1.6–8.3)
NEUTROPHILS NFR BLD AUTO: 76 %
NRBC # BLD AUTO: 0 10E3/UL
NRBC BLD AUTO-RTO: 0 /100
PLATELET # BLD AUTO: 117 10E3/UL (ref 150–450)
POTASSIUM SERPL-SCNC: 4.3 MMOL/L (ref 3.4–5.3)
PROT SERPL-MCNC: 6.1 G/DL (ref 6.4–8.3)
RBC # BLD AUTO: 4.34 10E6/UL (ref 3.8–5.2)
SODIUM SERPL-SCNC: 143 MMOL/L (ref 136–145)
WBC # BLD AUTO: 4.8 10E3/UL (ref 4–11)

## 2023-08-30 PROCEDURE — 96375 TX/PRO/DX INJ NEW DRUG ADDON: CPT

## 2023-08-30 PROCEDURE — 96415 CHEMO IV INFUSION ADDL HR: CPT

## 2023-08-30 PROCEDURE — 36415 COLL VENOUS BLD VENIPUNCTURE: CPT | Performed by: INTERNAL MEDICINE

## 2023-08-30 PROCEDURE — 83735 ASSAY OF MAGNESIUM: CPT

## 2023-08-30 PROCEDURE — 258N000003 HC RX IP 258 OP 636: Performed by: INTERNAL MEDICINE

## 2023-08-30 PROCEDURE — 80053 COMPREHEN METABOLIC PANEL: CPT | Performed by: INTERNAL MEDICINE

## 2023-08-30 PROCEDURE — 96367 TX/PROPH/DG ADDL SEQ IV INF: CPT

## 2023-08-30 PROCEDURE — 250N000011 HC RX IP 250 OP 636: Mod: JZ | Performed by: INTERNAL MEDICINE

## 2023-08-30 PROCEDURE — 96368 THER/DIAG CONCURRENT INF: CPT

## 2023-08-30 PROCEDURE — 85025 COMPLETE CBC W/AUTO DIFF WBC: CPT | Performed by: INTERNAL MEDICINE

## 2023-08-30 PROCEDURE — 99214 OFFICE O/P EST MOD 30 MIN: CPT | Mod: 95 | Performed by: INTERNAL MEDICINE

## 2023-08-30 PROCEDURE — 96411 CHEMO IV PUSH ADDL DRUG: CPT

## 2023-08-30 PROCEDURE — 96413 CHEMO IV INFUSION 1 HR: CPT

## 2023-08-30 RX ORDER — LORAZEPAM 2 MG/ML
0.5 INJECTION INTRAMUSCULAR EVERY 4 HOURS PRN
Status: CANCELLED | OUTPATIENT
Start: 2023-11-08

## 2023-08-30 RX ORDER — HEPARIN SODIUM (PORCINE) LOCK FLUSH IV SOLN 100 UNIT/ML 100 UNIT/ML
5 SOLUTION INTRAVENOUS
Status: CANCELLED | OUTPATIENT
Start: 2023-08-30

## 2023-08-30 RX ORDER — HEPARIN SODIUM (PORCINE) LOCK FLUSH IV SOLN 100 UNIT/ML 100 UNIT/ML
5 SOLUTION INTRAVENOUS
Status: CANCELLED | OUTPATIENT
Start: 2023-11-24

## 2023-08-30 RX ORDER — EPINEPHRINE 1 MG/ML
0.3 INJECTION, SOLUTION, CONCENTRATE INTRAVENOUS EVERY 5 MIN PRN
Status: CANCELLED | OUTPATIENT
Start: 2023-10-25

## 2023-08-30 RX ORDER — HEPARIN SODIUM,PORCINE 10 UNIT/ML
5 VIAL (ML) INTRAVENOUS
Status: CANCELLED | OUTPATIENT
Start: 2023-11-22

## 2023-08-30 RX ORDER — HEPARIN SODIUM (PORCINE) LOCK FLUSH IV SOLN 100 UNIT/ML 100 UNIT/ML
5 SOLUTION INTRAVENOUS
Status: CANCELLED | OUTPATIENT
Start: 2023-11-08

## 2023-08-30 RX ORDER — HEPARIN SODIUM (PORCINE) LOCK FLUSH IV SOLN 100 UNIT/ML 100 UNIT/ML
5 SOLUTION INTRAVENOUS
Status: CANCELLED | OUTPATIENT
Start: 2023-11-22

## 2023-08-30 RX ORDER — HEPARIN SODIUM (PORCINE) LOCK FLUSH IV SOLN 100 UNIT/ML 100 UNIT/ML
5 SOLUTION INTRAVENOUS
Status: CANCELLED | OUTPATIENT
Start: 2023-10-27

## 2023-08-30 RX ORDER — DIPHENHYDRAMINE HYDROCHLORIDE 50 MG/ML
50 INJECTION INTRAMUSCULAR; INTRAVENOUS
Status: CANCELLED
Start: 2023-08-30

## 2023-08-30 RX ORDER — ONDANSETRON 2 MG/ML
8 INJECTION INTRAMUSCULAR; INTRAVENOUS ONCE
Status: COMPLETED | OUTPATIENT
Start: 2023-08-30 | End: 2023-08-30

## 2023-08-30 RX ORDER — HEPARIN SODIUM (PORCINE) LOCK FLUSH IV SOLN 100 UNIT/ML 100 UNIT/ML
5 SOLUTION INTRAVENOUS
Status: DISCONTINUED | OUTPATIENT
Start: 2023-08-30 | End: 2023-08-30 | Stop reason: HOSPADM

## 2023-08-30 RX ORDER — MAGNESIUM SULFATE HEPTAHYDRATE 40 MG/ML
2 INJECTION, SOLUTION INTRAVENOUS ONCE
Status: COMPLETED | OUTPATIENT
Start: 2023-08-30 | End: 2023-08-30

## 2023-08-30 RX ORDER — HEPARIN SODIUM,PORCINE 10 UNIT/ML
5 VIAL (ML) INTRAVENOUS
Status: CANCELLED | OUTPATIENT
Start: 2023-08-30

## 2023-08-30 RX ORDER — METHYLPREDNISOLONE SODIUM SUCCINATE 125 MG/2ML
125 INJECTION, POWDER, LYOPHILIZED, FOR SOLUTION INTRAMUSCULAR; INTRAVENOUS
Status: CANCELLED
Start: 2023-08-30

## 2023-08-30 RX ORDER — ONDANSETRON 2 MG/ML
8 INJECTION INTRAMUSCULAR; INTRAVENOUS ONCE
Status: CANCELLED | OUTPATIENT
Start: 2023-10-25

## 2023-08-30 RX ORDER — MEPERIDINE HYDROCHLORIDE 25 MG/ML
25 INJECTION INTRAMUSCULAR; INTRAVENOUS; SUBCUTANEOUS EVERY 30 MIN PRN
Status: CANCELLED | OUTPATIENT
Start: 2023-11-08

## 2023-08-30 RX ORDER — HEPARIN SODIUM (PORCINE) LOCK FLUSH IV SOLN 100 UNIT/ML 100 UNIT/ML
5 SOLUTION INTRAVENOUS
Status: CANCELLED | OUTPATIENT
Start: 2023-10-25

## 2023-08-30 RX ORDER — ALBUTEROL SULFATE 90 UG/1
1-2 AEROSOL, METERED RESPIRATORY (INHALATION)
Status: CANCELLED
Start: 2023-08-30

## 2023-08-30 RX ORDER — LORAZEPAM 2 MG/ML
0.5 INJECTION INTRAMUSCULAR EVERY 4 HOURS PRN
Status: CANCELLED | OUTPATIENT
Start: 2023-10-25

## 2023-08-30 RX ORDER — ALBUTEROL SULFATE 0.83 MG/ML
2.5 SOLUTION RESPIRATORY (INHALATION)
Status: CANCELLED | OUTPATIENT
Start: 2023-12-13

## 2023-08-30 RX ORDER — METHYLPREDNISOLONE SODIUM SUCCINATE 125 MG/2ML
125 INJECTION, POWDER, LYOPHILIZED, FOR SOLUTION INTRAMUSCULAR; INTRAVENOUS
Status: CANCELLED
Start: 2023-11-22

## 2023-08-30 RX ORDER — ALBUTEROL SULFATE 90 UG/1
1-2 AEROSOL, METERED RESPIRATORY (INHALATION)
Status: CANCELLED
Start: 2023-11-08

## 2023-08-30 RX ORDER — HEPARIN SODIUM,PORCINE 10 UNIT/ML
5 VIAL (ML) INTRAVENOUS
Status: CANCELLED | OUTPATIENT
Start: 2023-12-13

## 2023-08-30 RX ORDER — EPINEPHRINE 1 MG/ML
0.3 INJECTION, SOLUTION, CONCENTRATE INTRAVENOUS EVERY 5 MIN PRN
Status: CANCELLED | OUTPATIENT
Start: 2023-12-13

## 2023-08-30 RX ORDER — HEPARIN SODIUM,PORCINE 10 UNIT/ML
5 VIAL (ML) INTRAVENOUS
Status: CANCELLED | OUTPATIENT
Start: 2023-10-27

## 2023-08-30 RX ORDER — ONDANSETRON 2 MG/ML
8 INJECTION INTRAMUSCULAR; INTRAVENOUS ONCE
Status: CANCELLED | OUTPATIENT
Start: 2023-11-22

## 2023-08-30 RX ORDER — ALBUTEROL SULFATE 0.83 MG/ML
2.5 SOLUTION RESPIRATORY (INHALATION)
Status: CANCELLED | OUTPATIENT
Start: 2023-08-30

## 2023-08-30 RX ORDER — DIPHENHYDRAMINE HYDROCHLORIDE 50 MG/ML
50 INJECTION INTRAMUSCULAR; INTRAVENOUS
Status: CANCELLED
Start: 2023-12-13

## 2023-08-30 RX ORDER — ONDANSETRON 2 MG/ML
8 INJECTION INTRAMUSCULAR; INTRAVENOUS ONCE
Status: CANCELLED | OUTPATIENT
Start: 2023-12-13

## 2023-08-30 RX ORDER — EPINEPHRINE 1 MG/ML
0.3 INJECTION, SOLUTION INTRAMUSCULAR; SUBCUTANEOUS EVERY 5 MIN PRN
Status: CANCELLED | OUTPATIENT
Start: 2023-08-30

## 2023-08-30 RX ORDER — HEPARIN SODIUM,PORCINE 10 UNIT/ML
5 VIAL (ML) INTRAVENOUS
Status: CANCELLED | OUTPATIENT
Start: 2023-11-10

## 2023-08-30 RX ORDER — METHYLPREDNISOLONE SODIUM SUCCINATE 125 MG/2ML
125 INJECTION, POWDER, LYOPHILIZED, FOR SOLUTION INTRAMUSCULAR; INTRAVENOUS
Status: CANCELLED
Start: 2023-10-25

## 2023-08-30 RX ORDER — HEPARIN SODIUM (PORCINE) LOCK FLUSH IV SOLN 100 UNIT/ML 100 UNIT/ML
5 SOLUTION INTRAVENOUS
Status: CANCELLED | OUTPATIENT
Start: 2023-12-13

## 2023-08-30 RX ORDER — ALBUTEROL SULFATE 0.83 MG/ML
2.5 SOLUTION RESPIRATORY (INHALATION)
Status: CANCELLED | OUTPATIENT
Start: 2023-11-08

## 2023-08-30 RX ORDER — LORAZEPAM 2 MG/ML
0.5 INJECTION INTRAMUSCULAR EVERY 4 HOURS PRN
Status: CANCELLED | OUTPATIENT
Start: 2023-11-22

## 2023-08-30 RX ORDER — HEPARIN SODIUM (PORCINE) LOCK FLUSH IV SOLN 100 UNIT/ML 100 UNIT/ML
5 SOLUTION INTRAVENOUS
Status: CANCELLED | OUTPATIENT
Start: 2023-11-10

## 2023-08-30 RX ORDER — METHYLPREDNISOLONE SODIUM SUCCINATE 125 MG/2ML
125 INJECTION, POWDER, LYOPHILIZED, FOR SOLUTION INTRAMUSCULAR; INTRAVENOUS
Status: CANCELLED
Start: 2023-11-08

## 2023-08-30 RX ORDER — ALBUTEROL SULFATE 90 UG/1
1-2 AEROSOL, METERED RESPIRATORY (INHALATION)
Status: CANCELLED
Start: 2023-10-25

## 2023-08-30 RX ORDER — MEPERIDINE HYDROCHLORIDE 25 MG/ML
25 INJECTION INTRAMUSCULAR; INTRAVENOUS; SUBCUTANEOUS EVERY 30 MIN PRN
Status: CANCELLED | OUTPATIENT
Start: 2023-10-25

## 2023-08-30 RX ORDER — ALBUTEROL SULFATE 90 UG/1
1-2 AEROSOL, METERED RESPIRATORY (INHALATION)
Status: CANCELLED
Start: 2023-11-22

## 2023-08-30 RX ORDER — DIPHENHYDRAMINE HYDROCHLORIDE 50 MG/ML
50 INJECTION INTRAMUSCULAR; INTRAVENOUS
Status: CANCELLED
Start: 2023-10-25

## 2023-08-30 RX ORDER — HEPARIN SODIUM,PORCINE 10 UNIT/ML
5 VIAL (ML) INTRAVENOUS
Status: CANCELLED | OUTPATIENT
Start: 2023-11-24

## 2023-08-30 RX ORDER — MEPERIDINE HYDROCHLORIDE 25 MG/ML
25 INJECTION INTRAMUSCULAR; INTRAVENOUS; SUBCUTANEOUS EVERY 30 MIN PRN
Status: CANCELLED | OUTPATIENT
Start: 2023-08-30

## 2023-08-30 RX ORDER — ALBUTEROL SULFATE 0.83 MG/ML
2.5 SOLUTION RESPIRATORY (INHALATION)
Status: CANCELLED | OUTPATIENT
Start: 2023-11-22

## 2023-08-30 RX ORDER — LORAZEPAM 2 MG/ML
0.5 INJECTION INTRAMUSCULAR EVERY 4 HOURS PRN
Status: CANCELLED | OUTPATIENT
Start: 2023-12-13

## 2023-08-30 RX ORDER — DIPHENHYDRAMINE HYDROCHLORIDE 50 MG/ML
50 INJECTION INTRAMUSCULAR; INTRAVENOUS
Status: CANCELLED
Start: 2023-11-08

## 2023-08-30 RX ORDER — HEPARIN SODIUM,PORCINE 10 UNIT/ML
5 VIAL (ML) INTRAVENOUS
Status: CANCELLED | OUTPATIENT
Start: 2023-11-08

## 2023-08-30 RX ORDER — ONDANSETRON 2 MG/ML
8 INJECTION INTRAMUSCULAR; INTRAVENOUS ONCE
Status: CANCELLED | OUTPATIENT
Start: 2023-11-08

## 2023-08-30 RX ORDER — MEPERIDINE HYDROCHLORIDE 25 MG/ML
25 INJECTION INTRAMUSCULAR; INTRAVENOUS; SUBCUTANEOUS EVERY 30 MIN PRN
Status: CANCELLED | OUTPATIENT
Start: 2023-11-22

## 2023-08-30 RX ORDER — EPINEPHRINE 1 MG/ML
0.3 INJECTION, SOLUTION, CONCENTRATE INTRAVENOUS EVERY 5 MIN PRN
Status: CANCELLED | OUTPATIENT
Start: 2023-11-08

## 2023-08-30 RX ORDER — MEPERIDINE HYDROCHLORIDE 25 MG/ML
25 INJECTION INTRAMUSCULAR; INTRAVENOUS; SUBCUTANEOUS EVERY 30 MIN PRN
Status: CANCELLED | OUTPATIENT
Start: 2023-12-13

## 2023-08-30 RX ORDER — EPINEPHRINE 1 MG/ML
0.3 INJECTION, SOLUTION, CONCENTRATE INTRAVENOUS EVERY 5 MIN PRN
Status: CANCELLED | OUTPATIENT
Start: 2023-11-22

## 2023-08-30 RX ORDER — DIPHENHYDRAMINE HYDROCHLORIDE 50 MG/ML
50 INJECTION INTRAMUSCULAR; INTRAVENOUS
Status: CANCELLED
Start: 2023-11-22

## 2023-08-30 RX ORDER — HEPARIN SODIUM,PORCINE 10 UNIT/ML
5 VIAL (ML) INTRAVENOUS
Status: CANCELLED | OUTPATIENT
Start: 2023-10-25

## 2023-08-30 RX ORDER — HEPARIN SODIUM,PORCINE 10 UNIT/ML
5 VIAL (ML) INTRAVENOUS
Status: CANCELLED | OUTPATIENT
Start: 2023-12-15

## 2023-08-30 RX ORDER — ALBUTEROL SULFATE 90 UG/1
1-2 AEROSOL, METERED RESPIRATORY (INHALATION)
Status: CANCELLED
Start: 2023-12-13

## 2023-08-30 RX ORDER — HEPARIN SODIUM (PORCINE) LOCK FLUSH IV SOLN 100 UNIT/ML 100 UNIT/ML
5 SOLUTION INTRAVENOUS
Status: CANCELLED | OUTPATIENT
Start: 2023-12-15

## 2023-08-30 RX ORDER — METHYLPREDNISOLONE SODIUM SUCCINATE 125 MG/2ML
125 INJECTION, POWDER, LYOPHILIZED, FOR SOLUTION INTRAMUSCULAR; INTRAVENOUS
Status: CANCELLED
Start: 2023-12-13

## 2023-08-30 RX ORDER — ALBUTEROL SULFATE 0.83 MG/ML
2.5 SOLUTION RESPIRATORY (INHALATION)
Status: CANCELLED | OUTPATIENT
Start: 2023-10-25

## 2023-08-30 RX ADMIN — FOSAPREPITANT: 150 INJECTION, POWDER, LYOPHILIZED, FOR SOLUTION INTRAVENOUS at 12:55

## 2023-08-30 RX ADMIN — MAGNESIUM SULFATE HEPTAHYDRATE 2 G: 40 INJECTION, SOLUTION INTRAVENOUS at 11:55

## 2023-08-30 RX ADMIN — DEXTROSE MONOHYDRATE 250 ML: 50 INJECTION, SOLUTION INTRAVENOUS at 12:51

## 2023-08-30 RX ADMIN — LEUCOVORIN CALCIUM 700 MG: 350 INJECTION, POWDER, LYOPHILIZED, FOR SOLUTION INTRAMUSCULAR; INTRAVENOUS at 13:26

## 2023-08-30 RX ADMIN — SODIUM CHLORIDE 500 ML: 9 INJECTION, SOLUTION INTRAVENOUS at 11:05

## 2023-08-30 RX ADMIN — OXALIPLATIN 120 MG: 5 INJECTION, SOLUTION INTRAVENOUS at 13:18

## 2023-08-30 RX ADMIN — ONDANSETRON 8 MG: 2 INJECTION INTRAMUSCULAR; INTRAVENOUS at 11:42

## 2023-08-30 ASSESSMENT — PAIN SCALES - GENERAL
PAINLEVEL: NO PAIN (0)
PAINLEVEL: NO PAIN (0)

## 2023-08-30 NOTE — LETTER
8/30/2023         RE: Nadia Ladd  [address]  [facility] [address]        Dear Colleague,    Thank you for referring your patient, Nadia Ladd, to the [facility]. Please see a copy of my visit note below.    Virtual Visit Details    Type of service:  Video Visit   Video Start Time: 10:21 AM  Video End Time:10:47 AM    Originating Location (pt. Location): Home    Distant Location (provider location):  Off-site  Platform used for Video Visit: CE Interactive      [facility]    Hematology/Oncology Established Patient Follow Up Note      Today's Date: 8/30/2023    Reason for follow up: Sigmoid cancer    HISTORY OF PRESENT ILLNESS: Nadia Ladd is a 68 year old female who presents for follow-up    Patient has medical history including Crohn's disease on sulfasalazine, anemia secondary to iron deficiency and B12 deficiency, obesity, hypertension, prediabetes, eczema, pulmonary hypertension, hiatal hernia, mild splenomegaly (14.7 cm in 2/23)    - 2/23 pt noted increasing fatigue, found to have iron deficiency anemia w/hgb 6.8 (previously required RBC transfusion when dx w/Crohn's), did have intermittent changes in stool but not persistent (noted minimal blood in stool)   - 2/23 upper endoscopy for iron deficiency anemia and Crohn's disease: Hiatal hernia, normal stomach, normal duodenum  - 2/23 colonoscopy: A frond-like/villous partially obstructing large mass found in sigmoid colon, partially circumferential involving two thirds of the lumen circumference, 4 cm, unable to traverse, with oozing, s/p biopsy  PATHOLOGY:  A.  Stomach, antrum: Biopsy:  - Antral type mucosa with mild chronic inactive gastritis  - Immunostain for Helicobacter pylori is negative      B.  Colon, sigmoid, stricture: Biopsy:  - Invasive poorly differentiated carcinoma  The sigmoid stricture shows a high-grade carcinoma without definitive gland formation.  Given the poorly differentiated  appearance, an immunohistochemical panel was performed to exclude the possibility of a tumor by direct extension or metastasis.   Immunohistochemical stains are performed and show the tumor to be diffusely positive for CK7 and partially positive for CK20 and SATB2.  There is no significant tumor reactivity for CDX2, GATA3, PAX8, TTF-1, and chromogranin.  Synaptophysin highlights very rare positive cells. Although the CK7/CK20 profile is not entirely typical for a colorectal carcinoma, immunostains for tumors of other origins are negative and a colorectal primary is still favored.   - Mismatch repair:  1) MLH1-loss of nuclear expression  2) MSH2-intact  3) MSH6-intact  4) PMS2-loss of nuclear expression  -MLH1 promoter methylation: NEGATIVE    -2/23 CT CAP:  A) 4 cm length mass in the proximal sigmoid colon. There is some irregularity and stranding in the adjacent pericolonic fat which may indicate tumor extension. There is no obstruction.   B) there is a second probable mass at the hepatic flexure of the colon measuring 2.5 cm in length   C)There are small lymph nodes in the mesial colon adjacent to the hepatic flexure abnormality and the sigmoid colonic mass. No lymphadenopathy by size criteria  D) There is submucosal fatty deposition in the colon, most severe in the distal sigmoid colon and rectum, which can be seen secondary to quiescent inflammatory bowel disease.  E) no liver lesion    -3/23 PET:  a. There is increased FDG avidity of the sigmoid colon with focal lobular wall thickening consistent with biopsy-proven adenocarcinoma.  b. Secondary focus noted at the hepatic flexure demonstrates elevated FDG avidity and lobulated wall thickening highly suspicious for a additional primary.  c. Additionally there is a smaller focus of wall thickening with elevated FDG avidity along the left aspect of the transverse colon  which is suspicious for a possible third focus of colonic malignancy.    -3/23 CEA 6.8  - 3/23  "colorectal surgery consultation with - in the setting of Crohn's, at least sigmoid colectomy with possible total abdominal colectomy with either ileorectal anastomosis or end ileostomy (\"rectum can remain active and be actively surveyed annually\")    -3/23 genetic counseling, testing for Chan syndrome    - 4/5/2023 gynecologic oncology consultation for risk reduction surgery with hysterectomy and BSO at time of colectomy    -4/6/2023 laparoscopic converted to open total abdominal colectomy with ileorectal anastomosis, partial omentectomy, flexible sigmoidoscopy, TAHBSO  A. OMENTUM, RESECTION:  - Adipose tissue with no significant histopathologic abnormality  - No evidence of malignancy     B. COLON, RESECTION:  Mass #1 (ascending colon/hepatic flexure): Mixed neuroendocrine-non-neuroendocrine neoplasm (MINEN):  - Neuroendocrine carcinoma component (70%) and moderately-differentiated adenocarcinoma component (30%)  - Size = 5.0 cm, invading into muscularis propria  - Positive LVI  - Negative macroscopic tumor perforation, negative PNI  - Negative surgical resection margins  - IHC: Neuroendocrine portion: Negative for chromogranin and INSM1 but strongly express synaptophysin  - IHC: Adenocarcinoma portion: Positive for CK20, CDX2 negative for CK7, PAX8, ER, S100  Ki-67 proliferation index of approximately 80%.  MMR:  Intact nuclear expression of MLH1, MSH2, MHS6, PMS2  PDL1:  COMBINED POSITIVE SCORE (CPS): 55-60  TUMOR PROPORTION SCORE (TPS): 50%   pathogenic KRAS mutation (G13D) was identified (5.2%).  AJCC 8th edition: stage 3B mpT3 pN1a     Mass#2 (sigmoid colon): Poorly-differentiated carcinoma:  - Grade 3  - Size = 5.7 cm, invading into muscularis propria  - Negative for microscopic tumor perforation, LVI, perineural invasion  - Negative surgical resection margins  - IHC: Positive for scar and keratin AE1/AE3, negative for CK7, CK20, CDX2, PAX8, ER, chromogranin, CD56, p40, synaptophysin, S100, " INI1, p40, INSM1  Ki-67 proliferation index of approximately 70%.  MMR: loss of nuclear expression MLH1 and PMS2, intact nuclear expression MSH2 and MSH6  PDL1:  COMBINED POSITIVE SCORE (CPS): 80  TUMOR PROPORTION SCORE (TPS): 70%  pathogenic KRAS mutation (G13D) was identified (4.5%).  AJCC 8th edition: stage 3A mpT2 pN1a    - One of forty-one sampled lymph nodes positive (1/41)  - Benign appendix  - Tubular adenoma    C. UTERUS, CERVIX, BILATERAL FALLOPIAN TUBES & OVARIES, :  - Benign endometrial polyps; atrophic endometrium  - Uterus, cervix, bilateral fallopian tubes, and ovaries with no significant morphologic abnormalities  - No evidence of dysplasia or malignancy     D. SMALL INTESTINE, TERMINAL ILEUM, TERMINAL ILIUM:  - Benign ileum  - No evidence of dysplasia or malignancy  - Negative for metastases to one of one sampled lymph node (0 /1)     E. PROXIMAL ANASTOMOTIC RING:  - Benign small intestine  - No evidence of dysplasia or malignancy     F. DISTAL ANASTOMOTIC RING:  - Benign colon  - No evidence of dysplasia or malignancy    CRC NGS:   --mutations positive for KRAS G13D mutation 5.2% mass 1, KRAS G13D mutation 4.5% mass 2 (negative for AKT1; BRAF; ERBB2; KRAS; NRAS; PIK3CA; PTEN)  --TMB score: 17.064 mut/Mb mass 1 (CPS 55-60, TPS 50%), 60.943 mut/Mb mass 2 (CPS 80, TPS 70%)  --fusion negative including NTRK and RET for mass 1 and 2 (also negative for AKT1, AKT3, ALK, AR, ZSRZOV15, CLAUDINE, BCOR, BRAF, BRD3, BRD4, CAMTA1, CCNB3, CCND1, , CIC, CSF1, CSF1R, CTNNB1, DNAJB1, EGFR, EPC1, ERBB2, ERBB4, ERG, ESR1, ESRRA, ETV1, ETV4, ETV5, ETV6, EWSR1, FGFR1, FGFR2, FGFR3, FGR, FOS, FOSB, FOXO1, FOXO4, FOXR2, FUS, GLI1, GRB7, HMGA2, HRAS, IDH1, IDH2, INSR, JAK2, JAK3, JAZF1, KRAS, MAML2, MAP2K1, MAST1, MAST2, MEAF6, MET, MKL2, MN1, MSMB, MUSK, MYB, MYBL1, MYOD1, NCOA1, NCOA2, NOTCH1, NOTCH2, NR4A3, NRAS, NRG1, NTRK1, NTRK2, NTRK3, NUMBL1, NUTM1, PAX3, PDGFB, PDGFRA, PDGFRB, PHF1, PIK3CA, PKN1, PLAG1, PPARG,  PRKACA, PRKCA, PRKCB, RAF1, RELA, RET, ROS1, RPSO2, RSPO3, SS18, STAT6, TAF15, TCF12, TERT, TFE3, TFEB, TFG, THADA, TMPRSS2, USP6, VGLL2, YAP1, YWHAE)    -4/11/2023 patient discharged from hospital, planning for 30 days of lovenox postop, oxycodone for pain (required 1 unit PRBC for anemia)    -5/8/23 I presented this case at [facility] CRC tumor board and their recommendations include:  - common to see this in Crohn's, overall poor prognosis, treat aggressively, may be poorly responsive to chemotherapy  - standard of care is mFOLFOX 6 x 12 cycles  - acknowledge that pt has high TPS and likely response to immunotherapy however not sufficient data or standard of care in stage 3 adjuvant setting  - add MMR testing to mass #1 hepatic flexure mass    - 5/9/23 admitted for acute renal failure w/Cr 3.82 w/K 7.0    - 5/19/23 second opinion at [facility] w/medical oncologist Dr. Acharya, thorough consultation and recommendations appreciated     - pt would like to proceed with FOFLOX (with dose reduced oxaliplatin due to kidney function)    -5/2023 genetic counseling: Negative for mutations in EPCAM, MLH1, MSH2, MSH6, and PMS2 genes.  Negative for mutations in APC, AXIN2, BMPR1A, CDH1, CHEK2, EPCAM, GREM1, MLH1, MSH2, MSH3, MSH6, MUTYH, NTHL1, PMS2, POLD1, POLE, PTEN, SMAD4, STK11, TP53, CDKN1B, MEN1, NF1, RET, TSC1, TSC2, and VHL genes    - 5/25/23 port placement    - 5/26/23 CT CAP w/ contrast and IVF before and after CT (baseline prior to starting tx):  1.  3 mm nodule RML and 5 mm lateral EDSON nodule, minimally increased from previous  2.  New 4 mm EDSON groundglass density nodule  3.  Several prominent borderline enlarged mediastinal lymph nodes slightly increased from previous  4.  Splenomegaly 14.5 cm  5. S/p subtotal colectomy with ileorectal anastomosis with postsurgical changes along the ventral abdominal wall midline    -5/31/23 started mFOLFOX 6 w/dose reduced oxaliplatin    - C2D1 6/14/23 held due to new onset paroxysmal A-fib  with RVR requiring hospitalization 6/3/2023 (also hypoMg, dehydrated)  - 6/19/2023 I presented this case at MTD colorectal tumor board at the [facility], it is possible that paroxysmal A-fib may be related to 5-FU.  Options include withholding further treatment versus retrialing 5-FU under the guidance of cardio oncology in an inpatient setting.  No other adjuvant treatment options available, including immunotherapy  - 6/30/2023 consultation with cardio oncologist Dr. Garza; I spoke with Dr. Garza 6/30/2023 as well, [id number] score 3, recommending starting apixaban 5 mg twice daily, okay to retrial FOLFOX while patient is on metoprolol  -7/18/2023  CT CAP- subcm lung nodules stable, borderline and mildly enlarged mediastinal LN and periportal LN stable, splenomegaly 15.7cm, Subtotal colectomy with ileorectal anastomosis. No acute inflammation or obstruction   - 7/19/2023 mFOLFOX6 C2D1  (inpatient w/continuous cardiac monitoring)      INTERIM HISTORY:  REGIMEN:  mFOLFOX6  q14 days x12 cycles/6 months  Starting weight 183lb   C1D1 5/31/23 (complicated by hospitalization for afib w/RVR C1D4 6/3/23), C2D1 7/19/23 (inpatient w/continuous cardiac monitoring), C3D1 8/2/2023 (outpatient, admitted to ER for A-fib with RVR C3D3 8/4/23), C4D1 8/16/23    oxaliplatin 64mg/m2 day 1 (dose reduced from 85mg/m2 2/2 Cr C1D1)  LV 350mg/m2 day 1  5FU 1200mg/m2 continuous infusion day 1-2 (total 2,400mg/m2 IV over 46-48 hours)  (5FU 400mg/m2 day 1 bolus (discontinued cycle 2 onwards))  PLUS IVF and magnesium level day 1 and day 8 with optimization to Mg= 2 at least, AND IVF day 3 on day of pump disconnect  PLUS (per cardiology recommendations) During the days of chemo and 2 days after ONLY: take long acting diltiazem 120 mg daily with 30 mg of short acting diltiazem as needed for atrial fibrillation with HR >110  Emetic risk: moderate  Febrile neutropenia risk: intermediate     C5D1 planned 8/30/23 pending  labs    Treatment related AE:  - hearing changes-described as muffled with intermittent ringing with obscured hearing R>L- overall improved since 1st cycle, Flonase BID as needed, ENT consultation pending 10/23; (present prior to starting chemo, improved w/steroids, now stable and not getting better)   - Paroxysmal A-fib with RVR and PAC- admitted to [facility] 6/3/2023 - 6/5/2023 for this, 6/3/2023 echo EF 55-60%, mild pulmonary hypertension, mild mitral regurgitation, on metoprolol XL 25 mg p.o. daily for ongoing palpitations, 7/23 Cardio oncology consult w/Dr. Garza, on eliquis, no recurrence of RVR while inpatient for cycle 2 w/continuous cardiac monitoring; hydration plan and magnesium replacement to magnesium = 2 minimum added to D1 and D8 and IVF day 3. 8/4/2023 C3D3 patient went to ER for A-fib with palpitations (no other sx), HR 80-130s, labs WNL, CXR negative, given IV fluids, placed on diltiazem drip, HR improved to .  8/15/2023 cardiology follow-up: Increase metoprolol XL to 50 mg daily; During the days of chemo and 2 days after ONLY: take long acting diltiazem 120 mg daily with 30 mg of short acting diltiazem as needed for atrial fibrillation with HR >110.  8/18/2023 patient called in reporting heart rate up to 140, sensation of heart racing, improved to 67 after taking extra prescribed dose of diltiazem and then asymptomatic.  8/19/2023 patient presented to ER with chest tightness and palpitations, had taken total of 3 tabs of diltiazem 30mg short acting at that point, EKG with sinus rhythm, rate 63 with some noted bradycardia while in the ER, magnesium and potassium normal, creatinine slightly elevated, given 1 L IVF.  free drug application for Eliquis completed 8/18/2023 but income over guidelines  - anxiety- pt admits to having anxiety, checking HR more often than she thinks she should  - Delayed neutropenia- neutropenic thierry ANC 0.5 2 weeks out from cycle 1, afebrile, 6/23 DPD  deficiency testing negative; cycle 2 onwards 5FU bolus dropped   -Normocytic anemia- due to chemotherapy and iron deficiency, s/p ferric carboxymaltose 750mg x2 doses 6/14/23 and 6/28/23, 8/23 hgb 13.3  -Thrombocytopenia- 2/2 chemotherapy   - MARYA on CKD- following w/nephro- follow up 9/23, Cr improved from 3, drinking 2-3 16oz bottles per day and getting IV fluids day 1, day 3 and day 8 of each chemotherapy cycle  - diarrhea- grade 1, C2D4-5, middle of cycle 3, improved with imodium (4 total)   - neuropathy- grade 1, fingers and toes x3 days   - cold sensitivity- lasted 3 days in fingers and mouth, improved over 2-3 days, now resolved   - Port flipped-see my note from 8/2/2023, 8/21/2023 port removed and replacement with IR      REVIEW OF SYSTEMS:   A 14 point ROS was reviewed with pertinent positives and negatives in the HPI.        HOME MEDICATIONS:  Current Outpatient Medications   Medication Sig Dispense Refill     apixaban ANTICOAGULANT (ELIQUIS ANTICOAGULANT) 5 MG tablet Take 1 tablet (5 mg) by mouth 2 times daily 60 tablet 11     cyanocobalamin 1000 MCG TBCR Take 1,000 mcg by mouth daily 100 tablet 1     dexamethasone (DECADRON) 4 MG tablet Take 2 tablets (8 mg) by mouth daily Take for 2 days, starting the day after chemo. Take with food. 40 tablet 0     diltiazem (CARDIZEM) 30 MG tablet Take 1 tablet (30 mg) by mouth 3 times daily (Patient not taking: Reported on 8/23/2023) 90 tablet 0     diltiazem ER COATED BEADS (CARDIZEM CD/CARTIA XT) 120 MG 24 hr capsule Take 1 capsule (120 mg) by mouth daily On chemo days and 2 days post chemo infusion. 60 capsule 3     Ferrous Sulfate (IRON) 325 (65 Fe) MG tablet Take 1 tablet by mouth daily       lidocaine-prilocaine (EMLA) 2.5-2.5 % external cream Apply topically as needed for moderate pain Apply 15-20 minutes prior to port access 1 g 4     MAGNESIUM OXIDE 400 (240 Mg) MG tablet TAKE 1 TABLET (400 MG) BY MOUTH 2 TIMES DAILY 60 tablet 0     metoprolol succinate ER  (TOPROL XL) 50 MG 24 hr tablet Take 1 tablet (50 mg) by mouth daily 90 tablet 3     Multiple Vitamins-Iron (MULTI-DAY PLUS IRON PO) Take 1 tablet by mouth daily       sodium bicarbonate 650 MG tablet Take 1 tablet (650 mg) by mouth 2 times daily 120 tablet 3         ALLERGIES:  Allergies   Allergen Reactions     No Known Drug Allergy          PAST MEDICAL HISTORY:  Past Medical History:   Diagnosis Date     Arthropathia 08/05/2010     B12 deficiency anemia 10/21/2010     Benign essential hypertension with target blood pressure below 140/90 10/10/2016     CARDIOVASCULAR SCREENING; LDL GOAL LESS THAN 160 10/31/2010     Crohn's colitis (H) 02/15/2011     Dermatitis-dishydrotic eczema-severe 08/05/2010     Hiatal hernia      HTN (hypertension) 07/21/2011     Iron deficiency anemia 10/21/2010     Malignant neoplasm of sigmoid colon (H)      Obesity      Primary pulmonary hypertension (H) 04/06/2010         PAST SURGICAL HISTORY:  Past Surgical History:   Procedure Laterality Date     COLECTOMY WITHOUT COLOSTOMY N/A 4/6/2023    Procedure: Laparoscopic Converted to Open Total abdominal colectomy;  Surgeon: Jerome Ashley MD;  Location: [facility] OR     COLONOSCOPY  10/11/10     COLONOSCOPY N/A 2/27/2023    Procedure: ATTEMPTED COLONOSCOPY WITH SIGMOID STRICTURE BIOPSY;  Surgeon: Jonathan Alcocer MD;  Location:  GI     ESOPHAGOSCOPY, GASTROSCOPY, DUODENOSCOPY (EGD), COMBINED N/A 2/27/2023    Procedure: ESOPHAGOGASTRODUODENOSCOPY, WITH BIOPSY;  Surgeon: Jonathan Alcocer MD;  Location:  GI     HYSTERECTOMY TOTAL ABDOMINAL, BILATERAL SALPINGO-OOPHORECTOMY, COMBINED N/A 4/6/2023    Procedure: Hysterectomy total abdominal, bilateral salpingo-oophorectomy;  Surgeon: Sunitha Olea MD;  Location: [facility] OR     INSERT PORT VASCULAR ACCESS Right 5/25/2023    Procedure: Ultrasound-guided right internal jugular venous access port placement with fluoroscopy;  Surgeon: Martin Thomas DO;  Location:  OR     IR CHEST  PORT PLACEMENT > 5 YRS OF AGE  2023     IR PORT CHECK RIGHT  2023     IR PORT REMOVAL RIGHT  2023     SIGMOIDOSCOPY FLEXIBLE N/A 2023    Procedure: Sigmoidoscopy flexible;  Surgeon: Jerome Ashley MD;  Location: [facility] OR     SURGICAL HISTORY OF -   77    Perineorrhaphy, for widening vaginal orifice     ZZC EXPLORATORY OF ABDOMEN      laparoscopy         SOCIAL HISTORY:  Social History     Socioeconomic History     Marital status:      Spouse name: Not on file     Number of children: Not on file     Years of education: Not on file     Highest education level: Not on file   Occupational History     Not on file   Tobacco Use     Smoking status: Never     Passive exposure: Current     Smokeless tobacco: Never   Substance and Sexual Activity     Alcohol use: No     Drug use: No     Sexual activity: Yes     Partners: Male   Other Topics Concern     Parent/sibling w/ CABG, MI or angioplasty before 65F 55M? Not Asked   Social History Narrative     Not on file     Social Determinants of Health     Financial Resource Strain: Not on file   Food Insecurity: Not on file   Transportation Needs: Not on file   Physical Activity: Not on file   Stress: Not on file   Social Connections: Not on file   Intimate Partner Violence: Not on file   Housing Stability: Not on file         FAMILY HISTORY:  Family History   Problem Relation Age of Onset     Hypertension Mother         on meds, alive     Cerebrovascular Disease Father         stroke about age 65,  of cancer at 68 yrs     Diabetes Father         eventually took insulin     Anemia Father         Pernisios anemia     Kidney Disease Niece         kidney transplant     Kidney Disease Nephew         kidney transplant     Venous thrombosis No family hx of      Anesthesia Reaction No family hx of          PHYSICAL EXAM:  Vital signs:  LMP  (LMP Unknown)          LABS:   Latest Reference Range & Units 23 13:03   Sodium 136 - 145 mmol/L  139   Potassium 3.4 - 5.3 mmol/L 4.5   Chloride 98 - 107 mmol/L 105   Carbon Dioxide (CO2) 22 - 29 mmol/L 25   Urea Nitrogen 8.0 - 23.0 mg/dL 19.6   Creatinine 0.51 - 0.95 mg/dL 1.55 (H)   GFR Estimate >60 mL/min/1.73m2 37 (L)   Calcium 8.8 - 10.2 mg/dL 9.0   Anion Gap 7 - 15 mmol/L 9   Magnesium 1.7 - 2.3 mg/dL 1.7   Albumin 3.5 - 5.2 g/dL 4.1   Protein Total 6.4 - 8.3 g/dL 6.6   Alkaline Phosphatase 35 - 104 U/L 147 (H)   ALT 0 - 50 U/L 24   AST 0 - 45 U/L 25   Bilirubin Total <=1.2 mg/dL 0.5   Glucose 70 - 99 mg/dL 103 (H)   WBC 4.0 - 11.0 10e3/uL 4.7   Hemoglobin 11.7 - 15.7 g/dL 13.3   Hematocrit 35.0 - 47.0 % 41.3   Platelet Count 150 - 450 10e3/uL 145 (L)   RBC Count 3.80 - 5.20 10e6/uL 4.69   MCV 78 - 100 fL 88   MCH 26.5 - 33.0 pg 28.4   MCHC 31.5 - 36.5 g/dL 32.2   RDW 10.0 - 15.0 % 15.7 (H)   % Neutrophils % 78   % Lymphocytes % 13   % Monocytes % 8   % Eosinophils % 0   % Basophils % 0   Absolute Basophils 0.0 - 0.2 10e3/uL 0.0   Absolute Eosinophils 0.0 - 0.7 10e3/uL 0.0   Absolute Immature Granulocytes <=0.4 10e3/uL 0.1   Absolute Lymphocytes 0.8 - 5.3 10e3/uL 0.6 (L)   Absolute Monocytes 0.0 - 1.3 10e3/uL 0.4   % Immature Granulocytes % 1   Absolute Neutrophils 1.6 - 8.3 10e3/uL 3.6   Absolute NRBCs 10e3/uL 0.0   NRBCs per 100 WBC <1 /100 0   (H): Data is abnormally high  (L): Data is abnormally low  PATHOLOGY:    IMAGING:    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 mutated, MARISSA, TPS 50%  # sigmoid colon poorly-differentiated carcinoma, KRAS G13D mutated, loss of MLH1 and PMS2, TPS 70%  - 2/23 colonoscopy: A frond-like/villous partially obstructing large mass found in sigmoid colon, partially circumferential involving two thirds of the lumen circumference, 4 cm, unable to traverse,  with oozing, s/p biopsy, PATHOLOGY: Invasive poorly differentiated carcinoma, IHC panel excludes tumors of other origin, colorectal primary is favored, loss of nuclear expression of MLH1 and PMS2, MLH1 promoter methylation negative, [id number] mutation negative  -2/23 CT CAP: Shows known 4 cm proximal sigmoid colon mass with possible tumor extension (irregularity and stranding and adjacent pericolonic fat) without obstruction AND probable second mass 2.5 cm at hepatic flexure of colon; small lymph nodes adjacent to both masses (no lymphadenopathy by size criteria)  -3/23 PET:  a. There is increased FDG avidity of the sigmoid colon with focal lobular wall thickening consistent with biopsy-proven adenocarcinoma.  b. Secondary focus noted at the hepatic flexure demonstrates elevated FDG avidity and lobulated wall thickening highly suspicious for a additional primary.  c. Additionally there is a smaller focus of wall thickening with elevated FDG avidity along the left aspect of the transverse colon which is suspicious for a possible third focus of colonic malignancy.  - 3/23 CEA 6.8  -4/6/2023 laparoscopic converted to open total abdominal colectomy with ileorectal anastomosis, partial omentectomy, flexible sigmoidoscopy, TAHBSO  PATHOLOGY:  Of note, omental resection, terminal ileum, proximal and distal anastomotic rings, uterus, cervix, bilateral fallopian tubes and ovaries negative for malignancy    Mass #1 (ascending colon/hepatic flexure): Mixed neuroendocrine-non-neuroendocrine neoplasm (MINEN):  - Neuroendocrine carcinoma component (70%) and moderately-differentiated adenocarcinoma component (30%)  - Size = 5.0 cm, invading into muscularis propria, Positive LVI  - IHC: Neuroendocrine portion: Negative for chromogranin and INSM1 but strongly express synaptophysin  - IHC: Adenocarcinoma portion: Positive for CK20, CDX2 negative for CK7, PAX8, ER, S100  Ki-67 proliferation index of approximately 80%.  MARISSA    PDL1:  COMBINED POSITIVE SCORE (CPS): 55-60  TUMOR PROPORTION SCORE (TPS): 50%   pathogenic KRAS mutation (G13D) was identified (5.2%).  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  MMR testing: loss of MLH1 and PMS2  PDL1:  COMBINED POSITIVE SCORE (CPS): 80  TUMOR PROPORTION SCORE (TPS): 70%  pathogenic KRAS mutation (G13D) was identified (4.5%).  MMR testing: loss of MLH1 and PMS2, intact MSH2 and MSH6  AJCC 8th edition: stage 3A mpT2 pN1a    - One of forty-one sampled lymph nodes positive (1/41), d/w pathology- unable to determine which mass is metastatic to LN    - 4/23 MRI brain w/wo contrast LAURENT  - 5/26/23 CT CAP w/ contrast and IVF before and after CT (post op baseline prior to starting tx):  1.  3 mm nodule RML and 5 mm lateral EDSON nodule, minimally increased from previous  2.  New 4 mm EDSON groundglass density nodule  3.  Several prominent borderline enlarged mediastinal lymph nodes slightly increased from previous  4.  Splenomegaly 14.5 cm  5. S/p subtotal colectomy with ileorectal anastomosis with postsurgical changes along the ventral abdominal wall midline  - 5/8/23 I presented this case at [facility] tumor board as above   - 5/19/23 second opinion at [facility] w/medical oncologist Dr. Acharya, thorough consultation and recommendations appreciated, overall agree w/FOLFOX x6 months, possible nivo or ipi nivo in second line; if metastatic platinum etoposide vs ipi nivo  - 5/25/23 port placement    -5/2023 genetic counseling: Negative for mutations detailed below     REGIMEN:  mFOLFOX6  q14 days x12 cycles/6 months  C1D1 5/31/23 (complicated by hospitalization for afib w/RVR C1D4  6/3/23), C2D1 7/19/23 (inpatient w/continuous cardiac monitoring), C3D1 8/2/2023 (outpatient, admitted to ER for A-fib with RVR C3D3 8/4/23), C4D1 8/16/23  oxaliplatin 64mg/m2 day 1 (dose reduced from 85mg/m2 2/2 Cr C1D1)  LV 350mg/m2 day 1  5FU 1200mg/m2 continuous infusion day 1-2 (total 2,400mg/m2 IV over 46-48 hours)  (5FU 400mg/m2 day 1 bolus (discontinued cycle 2 onwards))  PLUS IVF and magnesium level day 1 and day 8 with optimization to Mg= 2 at least, AND IVF day 3 on day of pump disconnect  PLUS (per cardiology recommendations) During the days of chemo and 2 days after ONLY: take long acting diltiazem 120 mg daily with 30 mg of short acting diltiazem as needed for atrial fibrillation with HR >110  Emetic risk: moderate  Febrile neutropenia risk: intermediate     C5D1 planned 8/30/23 pending labs    Treatment related AE:  - hearing changes-stable w/dose reduced oxaliplatin, flonase prn, ENT consultation pending 10/23/23  - Paroxysmal A-fib with RVR and PAC- 8/15/2023 cardiology follow-up: Increase metoprolol XL to 50 mg daily; During the days of chemo and 2 days after take long acting diltiazem 120 mg daily with 30 mg of short acting diltiazem as needed for atrial fibrillation with HR >110, cardiology follow-up 11/2/2023. 8/19/2023 ER visit noted-patient was not in A-fib and did not have RVR but did have symptoms of palpitations and noted bradycardia.  Will add additional IV fluids today 1 and day 3 (day of pump disconnect)- day 3 would need to be in infusion clinic otherwise will cost pt out of pocket- will check on this with infusion team, continue IV fluids and magnesium optimization on day 8, pt also to increase PO fluids, continue working with cardiology regarding cost effective anticoagulation (I discussed with cardiology that  I do not recommend changing to coumadin given CLASS D interaction w/5FU chemotherapy).  I believe there is an anxiety component that needs to be addressed, oncology psychology  consultation. If patient continues to have ER visits with every cycle of chemotherapy, will do the following: first- cardio revisit and dose reduce 5FU, if still not effective, then- inpatient chemotherapy with continuous cardiac monitoring for future treatments.  - anxiety- oncology psychology consultation   - Normocytic anemia- due to chemotherapy and iron deficiency, s/p ferric carboxymaltose 750mg x2 doses 6/14/23 and 6/28/23; repeat iron studies normal and hgb normal  -Thrombocytopenia- mild, 2/2 chemo  - MARYA on CKD- following w/nephro, f/u with nephro 9/21/23  - diarrhea- grade 1  - cold sensitivity- grade 1  - neuropathy- grade 1   - port flipped- port removed and replaced 8/21/2023 with IR     - 7/18/23 CT CAP- subcm lung nodules stable, borderline and mildly enlarged mediastinal LN and periportal LN stable, splenomegaly 15.7cm, Subtotal colectomy with ileorectal anastomosis. No acute inflammation or obstruction    - plan to repeat CT 10/23-ordered today    - f/u with Dr. Ashley 4/2024 for rigid proctoscopy     #parxosymal afib  - within 3 days of receiving 5FU, prior cardiac risk factors htn, prediabetes  - 6/3/23 presented to ER w/lightheadedness, dizziness, cardioverted s/p diltiazem ggt in ER  - 6/23 DPD deficiency testing negative  - currently on metoprolol XL 25 mg daily, apixaban 5 mg twice daily  - 6/30/2023 consultation with cardio oncologist Dr. Garza; I spoke with Dr. 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  Negative for mutations in APC, AXIN2, BMPR1A, CDH1, CHEK2, EPCAM, GREM1, MLH1, MSH2, MSH3, MSH6, MUTYH, NTHL1, PMS2, POLD1, POLE, PTEN, SMAD4, STK11, TP53, CDKN1B, MEN1, NF1, RET, TSC1, TSC2, and VHL genes    #Anemia secondary to iron deficiency and B12 deficiency  - terminal ileum removed at time of colon surgery, monitor for nutrient def  - 2/23 hgb 6.8, ferritin 21, iron sat index 10, TIBC 265, iron 27, b12 1493  - On B12 1000 mcg p.o. daily and ferrous sulfate 325 mg p.o. daily  - 4/9/23 s/p 1 uprbcs  - 5/9/23 hgb 11.3,5/30/23 hgb 9.4  - based on Ganzoni equation w/goal hgb 14 and 500mg needed for iron stores, pts iron deficit is 1400 mg  - s/p ferric carboxymaltose 750mg x2 doses 6/14/23 and 6/28/23  - 7/11/23 ferritin 1022, iron sat 22, TIBC 201, iron 44    - 8/23 hgb 13.3    #Crohn's  - dx since at least 2010     RTC 2 weeks for follow up with RADHA, labs, chemo  RTC 4 weeks for follow up with me, labs, chemo      Francisco Lee DO  Hematology/Oncology  [facility]      Again, thank you for allowing me to participate in the care of your patient.        Sincerely,        FRANCISCO LEE DO

## 2023-08-30 NOTE — NURSING NOTE
Is the patient currently in the state of [place]? YES    Visit mode:VIDEO    If the visit is dropped, the patient can be reconnected by: VIDEO VISIT: Text to cell phone:   Telephone Information:   Mobile [phone]       Will anyone else be joining the visit? NO  (If patient encounters technical issues they should call [phone]    How would you like to obtain your AVS? MyChart    Are changes needed to the allergy or medication list? No    Reason for visit: Oncology Clinic Visit (Colon ca), Infusion (08/30 c5d1 Modified FOLFOX-6), Results (Labs 08/30), Video Visit, and RECHECK    Effie Ignacio VVF

## 2023-08-30 NOTE — PROGRESS NOTES
"Infusion Nursing Note:  Nadia Ladd presents today for C5D1 Modified Folfox, IVF, and Magnesium replacement (Fransico wishes for replacement if <2.0).  Patient seen by provider today: Yes: Virtual visit with Fransico today.   present during visit today: Not Applicable.    Note: Arrives with her  [place]. Feeling quite well today. Nadia will return on Friday for pump disconnect with IVF.      Intravenous Access:  Implanted Port.    Treatment Conditions:  Lab Results   Component Value Date    HGB 12.7 08/30/2023    WBC 4.8 08/30/2023    ANEU 7.6 08/01/2023    ANEUTAUTO 3.6 08/30/2023     (L) 08/30/2023        Lab Results   Component Value Date     08/30/2023    POTASSIUM 4.3 08/30/2023    MAG 1.8 08/30/2023    CR 1.34 (H) 08/30/2023    LIVIER 9.2 08/30/2023    BILITOTAL 0.5 08/30/2023    ALBUMIN 3.6 08/30/2023    ALT 27 08/30/2023    AST 32 08/30/2023       Results reviewed, labs MET treatment parameters, ok to proceed with treatment.      Post Infusion Assessment:  Patient tolerated infusion without incident.  Patient observed for 15 minutes post chemo per protocol.     Prior to discharge: Port is secured in place with tegaderm and flushed with 10cc NS with positive blood return noted.    Continuous home infusion CADD pump connected.    All connectors secured in place and clamps taped open.    Pump started, \"running\" noted on display (CADD): YES.   Capillary element taped to pt's skin per protocol.  Pump Connection double checked with JULIO C Reyes RN.  Patient instructed to call our clinic or [facility] with any questions or concerns at home.  Patient verbalized understanding.    Patient set up for pump disconnect at our clinic on Friday, Sept 1.        Discharge Plan:   Patient discharged in stable condition accompanied by: .  Departure Mode: Ambulatory.      Marycarmen Cardenas RN  "

## 2023-08-30 NOTE — LETTER
8/30/2023         RE: Nadia Ladd  [address]  [facility] [address]        Dear Colleague,    Thank you for referring your patient, Nadia Ladd, to the [facility]. Please see a copy of my visit note below.    Virtual Visit Details    Type of service:  Video Visit   Video Start Time: 10:21 AM  Video End Time:10:47 AM    Originating Location (pt. Location): Home    Distant Location (provider location):  Off-site  Platform used for Video Visit: Jelastic      [facility]    Hematology/Oncology Established Patient Follow Up Note      Today's Date: 8/30/2023    Reason for follow up: Sigmoid cancer    HISTORY OF PRESENT ILLNESS: Nadia Ladd is a 68 year old female who presents for follow-up    Patient has medical history including Crohn's disease on sulfasalazine, anemia secondary to iron deficiency and B12 deficiency, obesity, hypertension, prediabetes, eczema, pulmonary hypertension, hiatal hernia, mild splenomegaly (14.7 cm in 2/23)    - 2/23 pt noted increasing fatigue, found to have iron deficiency anemia w/hgb 6.8 (previously required RBC transfusion when dx w/Crohn's), did have intermittent changes in stool but not persistent (noted minimal blood in stool)   - 2/23 upper endoscopy for iron deficiency anemia and Crohn's disease: Hiatal hernia, normal stomach, normal duodenum  - 2/23 colonoscopy: A frond-like/villous partially obstructing large mass found in sigmoid colon, partially circumferential involving two thirds of the lumen circumference, 4 cm, unable to traverse, with oozing, s/p biopsy  PATHOLOGY:  A.  Stomach, antrum: Biopsy:  - Antral type mucosa with mild chronic inactive gastritis  - Immunostain for Helicobacter pylori is negative      B.  Colon, sigmoid, stricture: Biopsy:  - Invasive poorly differentiated carcinoma  The sigmoid stricture shows a high-grade carcinoma without definitive gland formation.  Given the poorly differentiated  appearance, an immunohistochemical panel was performed to exclude the possibility of a tumor by direct extension or metastasis.   Immunohistochemical stains are performed and show the tumor to be diffusely positive for CK7 and partially positive for CK20 and SATB2.  There is no significant tumor reactivity for CDX2, GATA3, PAX8, TTF-1, and chromogranin.  Synaptophysin highlights very rare positive cells. Although the CK7/CK20 profile is not entirely typical for a colorectal carcinoma, immunostains for tumors of other origins are negative and a colorectal primary is still favored.   - Mismatch repair:  1) MLH1-loss of nuclear expression  2) MSH2-intact  3) MSH6-intact  4) PMS2-loss of nuclear expression  -MLH1 promoter methylation: NEGATIVE    -2/23 CT CAP:  A) 4 cm length mass in the proximal sigmoid colon. There is some irregularity and stranding in the adjacent pericolonic fat which may indicate tumor extension. There is no obstruction.   B) there is a second probable mass at the hepatic flexure of the colon measuring 2.5 cm in length   C)There are small lymph nodes in the mesial colon adjacent to the hepatic flexure abnormality and the sigmoid colonic mass. No lymphadenopathy by size criteria  D) There is submucosal fatty deposition in the colon, most severe in the distal sigmoid colon and rectum, which can be seen secondary to quiescent inflammatory bowel disease.  E) no liver lesion    -3/23 PET:  a. There is increased FDG avidity of the sigmoid colon with focal lobular wall thickening consistent with biopsy-proven adenocarcinoma.  b. Secondary focus noted at the hepatic flexure demonstrates elevated FDG avidity and lobulated wall thickening highly suspicious for a additional primary.  c. Additionally there is a smaller focus of wall thickening with elevated FDG avidity along the left aspect of the transverse colon  which is suspicious for a possible third focus of colonic malignancy.    -3/23 CEA 6.8  - 3/23  "colorectal surgery consultation with - in the setting of Crohn's, at least sigmoid colectomy with possible total abdominal colectomy with either ileorectal anastomosis or end ileostomy (\"rectum can remain active and be actively surveyed annually\")    -3/23 genetic counseling, testing for Chan syndrome    - 4/5/2023 gynecologic oncology consultation for risk reduction surgery with hysterectomy and BSO at time of colectomy    -4/6/2023 laparoscopic converted to open total abdominal colectomy with ileorectal anastomosis, partial omentectomy, flexible sigmoidoscopy, TAHBSO  A. OMENTUM, RESECTION:  - Adipose tissue with no significant histopathologic abnormality  - No evidence of malignancy     B. COLON, RESECTION:  Mass #1 (ascending colon/hepatic flexure): Mixed neuroendocrine-non-neuroendocrine neoplasm (MINEN):  - Neuroendocrine carcinoma component (70%) and moderately-differentiated adenocarcinoma component (30%)  - Size = 5.0 cm, invading into muscularis propria  - Positive LVI  - Negative macroscopic tumor perforation, negative PNI  - Negative surgical resection margins  - IHC: Neuroendocrine portion: Negative for chromogranin and INSM1 but strongly express synaptophysin  - IHC: Adenocarcinoma portion: Positive for CK20, CDX2 negative for CK7, PAX8, ER, S100  Ki-67 proliferation index of approximately 80%.  MMR:  Intact nuclear expression of MLH1, MSH2, MHS6, PMS2  PDL1:  COMBINED POSITIVE SCORE (CPS): 55-60  TUMOR PROPORTION SCORE (TPS): 50%   pathogenic KRAS mutation (G13D) was identified (5.2%).  AJCC 8th edition: stage 3B mpT3 pN1a     Mass#2 (sigmoid colon): Poorly-differentiated carcinoma:  - Grade 3  - Size = 5.7 cm, invading into muscularis propria  - Negative for microscopic tumor perforation, LVI, perineural invasion  - Negative surgical resection margins  - IHC: Positive for scar and keratin AE1/AE3, negative for CK7, CK20, CDX2, PAX8, ER, chromogranin, CD56, p40, synaptophysin, S100, " INI1, p40, INSM1  Ki-67 proliferation index of approximately 70%.  MMR: loss of nuclear expression MLH1 and PMS2, intact nuclear expression MSH2 and MSH6  PDL1:  COMBINED POSITIVE SCORE (CPS): 80  TUMOR PROPORTION SCORE (TPS): 70%  pathogenic KRAS mutation (G13D) was identified (4.5%).  AJCC 8th edition: stage 3A mpT2 pN1a    - One of forty-one sampled lymph nodes positive (1/41)  - Benign appendix  - Tubular adenoma    C. UTERUS, CERVIX, BILATERAL FALLOPIAN TUBES & OVARIES, :  - Benign endometrial polyps; atrophic endometrium  - Uterus, cervix, bilateral fallopian tubes, and ovaries with no significant morphologic abnormalities  - No evidence of dysplasia or malignancy     D. SMALL INTESTINE, TERMINAL ILEUM, TERMINAL ILIUM:  - Benign ileum  - No evidence of dysplasia or malignancy  - Negative for metastases to one of one sampled lymph node (0 /1)     E. PROXIMAL ANASTOMOTIC RING:  - Benign small intestine  - No evidence of dysplasia or malignancy     F. DISTAL ANASTOMOTIC RING:  - Benign colon  - No evidence of dysplasia or malignancy    CRC NGS:   --mutations positive for KRAS G13D mutation 5.2% mass 1, KRAS G13D mutation 4.5% mass 2 (negative for AKT1; BRAF; ERBB2; KRAS; NRAS; PIK3CA; PTEN)  --TMB score: 17.064 mut/Mb mass 1 (CPS 55-60, TPS 50%), 60.943 mut/Mb mass 2 (CPS 80, TPS 70%)  --fusion negative including NTRK and RET for mass 1 and 2 (also negative for AKT1, AKT3, ALK, AR, HVTORW64, CLAUDINE, BCOR, BRAF, BRD3, BRD4, CAMTA1, CCNB3, CCND1, , CIC, CSF1, CSF1R, CTNNB1, DNAJB1, EGFR, EPC1, ERBB2, ERBB4, ERG, ESR1, ESRRA, ETV1, ETV4, ETV5, ETV6, EWSR1, FGFR1, FGFR2, FGFR3, FGR, FOS, FOSB, FOXO1, FOXO4, FOXR2, FUS, GLI1, GRB7, HMGA2, HRAS, IDH1, IDH2, INSR, JAK2, JAK3, JAZF1, KRAS, MAML2, MAP2K1, MAST1, MAST2, MEAF6, MET, MKL2, MN1, MSMB, MUSK, MYB, MYBL1, MYOD1, NCOA1, NCOA2, NOTCH1, NOTCH2, NR4A3, NRAS, NRG1, NTRK1, NTRK2, NTRK3, NUMBL1, NUTM1, PAX3, PDGFB, PDGFRA, PDGFRB, PHF1, PIK3CA, PKN1, PLAG1, PPARG,  PRKACA, PRKCA, PRKCB, RAF1, RELA, RET, ROS1, RPSO2, RSPO3, SS18, STAT6, TAF15, TCF12, TERT, TFE3, TFEB, TFG, THADA, TMPRSS2, USP6, VGLL2, YAP1, YWHAE)    -4/11/2023 patient discharged from hospital, planning for 30 days of lovenox postop, oxycodone for pain (required 1 unit PRBC for anemia)    -5/8/23 I presented this case at [facility] CRC tumor board and their recommendations include:  - common to see this in Crohn's, overall poor prognosis, treat aggressively, may be poorly responsive to chemotherapy  - standard of care is mFOLFOX 6 x 12 cycles  - acknowledge that pt has high TPS and likely response to immunotherapy however not sufficient data or standard of care in stage 3 adjuvant setting  - add MMR testing to mass #1 hepatic flexure mass    - 5/9/23 admitted for acute renal failure w/Cr 3.82 w/K 7.0    - 5/19/23 second opinion at [facility] w/medical oncologist Dr. Acharya, thorough consultation and recommendations appreciated     - pt would like to proceed with FOFLOX (with dose reduced oxaliplatin due to kidney function)    -5/2023 genetic counseling: Negative for mutations in EPCAM, MLH1, MSH2, MSH6, and PMS2 genes.  Negative for mutations in APC, AXIN2, BMPR1A, CDH1, CHEK2, EPCAM, GREM1, MLH1, MSH2, MSH3, MSH6, MUTYH, NTHL1, PMS2, POLD1, POLE, PTEN, SMAD4, STK11, TP53, CDKN1B, MEN1, NF1, RET, TSC1, TSC2, and VHL genes    - 5/25/23 port placement    - 5/26/23 CT CAP w/ contrast and IVF before and after CT (baseline prior to starting tx):  1.  3 mm nodule RML and 5 mm lateral EDSON nodule, minimally increased from previous  2.  New 4 mm EDSON groundglass density nodule  3.  Several prominent borderline enlarged mediastinal lymph nodes slightly increased from previous  4.  Splenomegaly 14.5 cm  5. S/p subtotal colectomy with ileorectal anastomosis with postsurgical changes along the ventral abdominal wall midline    -5/31/23 started mFOLFOX 6 w/dose reduced oxaliplatin    - C2D1 6/14/23 held due to new onset paroxysmal A-fib  with RVR requiring hospitalization 6/3/2023 (also hypoMg, dehydrated)  - 6/19/2023 I presented this case at [facility] colorectal tumor board at the [facility], it is possible that paroxysmal A-fib may be related to 5-FU.  Options include withholding further treatment versus retrialing 5-FU under the guidance of cardio oncology in an inpatient setting.  No other adjuvant treatment options available, including immunotherapy  - 6/30/2023 consultation with cardio oncologist Dr. Garza; I spoke with Dr. Garza 6/30/2023 as well, [id number] score 3, recommending starting apixaban 5 mg twice daily, okay to retrial FOLFOX while patient is on metoprolol  -7/18/2023  CT CAP- subcm lung nodules stable, borderline and mildly enlarged mediastinal LN and periportal LN stable, splenomegaly 15.7cm, Subtotal colectomy with ileorectal anastomosis. No acute inflammation or obstruction   - 7/19/2023 mFOLFOX6 C2D1  (inpatient w/continuous cardiac monitoring)      INTERIM HISTORY:  REGIMEN:  mFOLFOX6  q14 days x12 cycles/6 months  Starting weight 183lb   C1D1 5/31/23 (complicated by hospitalization for afib w/RVR C1D4 6/3/23), C2D1 7/19/23 (inpatient w/continuous cardiac monitoring), C3D1 8/2/2023 (outpatient, admitted to ER for A-fib with RVR C3D3 8/4/23), C4D1 8/16/23    oxaliplatin 64mg/m2 day 1 (dose reduced from 85mg/m2 2/2 Cr C1D1)  LV 350mg/m2 day 1  5FU 1200mg/m2 continuous infusion day 1-2 (total 2,400mg/m2 IV over 46-48 hours)  (5FU 400mg/m2 day 1 bolus (discontinued cycle 2 onwards))  PLUS IVF and magnesium level day 1 and day 8 with optimization to Mg= 2 at least, AND IVF day 3 on day of pump disconnect  PLUS (per cardiology recommendations) During the days of chemo and 2 days after ONLY: take long acting diltiazem 120 mg daily with 30 mg of short acting diltiazem as needed for atrial fibrillation with HR >110  Emetic risk: moderate  Febrile neutropenia risk: intermediate     C5D1 planned 8/30/23 pending  labs    Treatment related AE:  - hearing changes-described as muffled with intermittent ringing with obscured hearing R>L- overall improved since 1st cycle, Flonase BID as needed, ENT consultation pending 10/23; (present prior to starting chemo, improved w/steroids, now stable and not getting better)   - Paroxysmal A-fib with RVR and PAC- admitted to [facility] 6/3/2023 - 6/5/2023 for this, 6/3/2023 echo EF 55-60%, mild pulmonary hypertension, mild mitral regurgitation, on metoprolol XL 25 mg p.o. daily for ongoing palpitations, 7/23 Cardio oncology consult w/Dr. Garza, on eliquis, no recurrence of RVR while inpatient for cycle 2 w/continuous cardiac monitoring; hydration plan and magnesium replacement to magnesium = 2 minimum added to D1 and D8 and IVF day 3. 8/4/2023 C3D3 patient went to ER for A-fib with palpitations (no other sx), HR 80-130s, labs WNL, CXR negative, given IV fluids, placed on diltiazem drip, HR improved to .  8/15/2023 cardiology follow-up: Increase metoprolol XL to 50 mg daily; During the days of chemo and 2 days after ONLY: take long acting diltiazem 120 mg daily with 30 mg of short acting diltiazem as needed for atrial fibrillation with HR >110.  8/18/2023 patient called in reporting heart rate up to 140, sensation of heart racing, improved to 67 after taking extra prescribed dose of diltiazem and then asymptomatic.  8/19/2023 patient presented to ER with chest tightness and palpitations, had taken total of 3 tabs of diltiazem 30mg short acting at that point, EKG with sinus rhythm, rate 63 with some noted bradycardia while in the ER, magnesium and potassium normal, creatinine slightly elevated, given 1 L IVF.  free drug application for Eliquis completed 8/18/2023 but income over guidelines  - anxiety- pt admits to having anxiety, checking HR more often than she thinks she should  - Delayed neutropenia- neutropenic thierry ANC 0.5 2 weeks out from cycle 1, afebrile, 6/23 DPD  deficiency testing negative; cycle 2 onwards 5FU bolus dropped   -Normocytic anemia- due to chemotherapy and iron deficiency, s/p ferric carboxymaltose 750mg x2 doses 6/14/23 and 6/28/23, 8/23 hgb 13.3  -Thrombocytopenia- 2/2 chemotherapy   - MARYA on CKD- following w/nephro- follow up 9/23, Cr improved from 3, drinking 2-3 16oz bottles per day and getting IV fluids day 1, day 3 and day 8 of each chemotherapy cycle  - diarrhea- grade 1, C2D4-5, middle of cycle 3, improved with imodium (4 total)   - neuropathy- grade 1, fingers and toes x3 days   - cold sensitivity- lasted 3 days in fingers and mouth, improved over 2-3 days, now resolved   - Port flipped-see my note from 8/2/2023, 8/21/2023 port removed and replacement with IR      REVIEW OF SYSTEMS:   A 14 point ROS was reviewed with pertinent positives and negatives in the HPI.        HOME MEDICATIONS:  Current Outpatient Medications   Medication Sig Dispense Refill     apixaban ANTICOAGULANT (ELIQUIS ANTICOAGULANT) 5 MG tablet Take 1 tablet (5 mg) by mouth 2 times daily 60 tablet 11     cyanocobalamin 1000 MCG TBCR Take 1,000 mcg by mouth daily 100 tablet 1     dexamethasone (DECADRON) 4 MG tablet Take 2 tablets (8 mg) by mouth daily Take for 2 days, starting the day after chemo. Take with food. 40 tablet 0     diltiazem (CARDIZEM) 30 MG tablet Take 1 tablet (30 mg) by mouth 3 times daily (Patient not taking: Reported on 8/23/2023) 90 tablet 0     diltiazem ER COATED BEADS (CARDIZEM CD/CARTIA XT) 120 MG 24 hr capsule Take 1 capsule (120 mg) by mouth daily On chemo days and 2 days post chemo infusion. 60 capsule 3     Ferrous Sulfate (IRON) 325 (65 Fe) MG tablet Take 1 tablet by mouth daily       lidocaine-prilocaine (EMLA) 2.5-2.5 % external cream Apply topically as needed for moderate pain Apply 15-20 minutes prior to port access 1 g 4     MAGNESIUM OXIDE 400 (240 Mg) MG tablet TAKE 1 TABLET (400 MG) BY MOUTH 2 TIMES DAILY 60 tablet 0     metoprolol succinate ER  (TOPROL XL) 50 MG 24 hr tablet Take 1 tablet (50 mg) by mouth daily 90 tablet 3     Multiple Vitamins-Iron (MULTI-DAY PLUS IRON PO) Take 1 tablet by mouth daily       sodium bicarbonate 650 MG tablet Take 1 tablet (650 mg) by mouth 2 times daily 120 tablet 3         ALLERGIES:  Allergies   Allergen Reactions     No Known Drug Allergy          PAST MEDICAL HISTORY:  Past Medical History:   Diagnosis Date     Arthropathia 08/05/2010     B12 deficiency anemia 10/21/2010     Benign essential hypertension with target blood pressure below 140/90 10/10/2016     CARDIOVASCULAR SCREENING; LDL GOAL LESS THAN 160 10/31/2010     Crohn's colitis (H) 02/15/2011     Dermatitis-dishydrotic eczema-severe 08/05/2010     Hiatal hernia      HTN (hypertension) 07/21/2011     Iron deficiency anemia 10/21/2010     Malignant neoplasm of sigmoid colon (H)      Obesity      Primary pulmonary hypertension (H) 04/06/2010         PAST SURGICAL HISTORY:  Past Surgical History:   Procedure Laterality Date     COLECTOMY WITHOUT COLOSTOMY N/A 4/6/2023    Procedure: Laparoscopic Converted to Open Total abdominal colectomy;  Surgeon: Jerome Ashley MD;  Location: [facility] OR     COLONOSCOPY  10/11/10     COLONOSCOPY N/A 2/27/2023    Procedure: ATTEMPTED COLONOSCOPY WITH SIGMOID STRICTURE BIOPSY;  Surgeon: Jonathan Alcocer MD;  Location:  GI     ESOPHAGOSCOPY, GASTROSCOPY, DUODENOSCOPY (EGD), COMBINED N/A 2/27/2023    Procedure: ESOPHAGOGASTRODUODENOSCOPY, WITH BIOPSY;  Surgeon: Jonathan Alcocer MD;  Location:  GI     HYSTERECTOMY TOTAL ABDOMINAL, BILATERAL SALPINGO-OOPHORECTOMY, COMBINED N/A 4/6/2023    Procedure: Hysterectomy total abdominal, bilateral salpingo-oophorectomy;  Surgeon: Sunitha Olea MD;  Location: [facility] OR     INSERT PORT VASCULAR ACCESS Right 5/25/2023    Procedure: Ultrasound-guided right internal jugular venous access port placement with fluoroscopy;  Surgeon: Martin Thomas DO;  Location:  OR     IR CHEST  PORT PLACEMENT > 5 YRS OF AGE  2023     IR PORT CHECK RIGHT  2023     IR PORT REMOVAL RIGHT  2023     SIGMOIDOSCOPY FLEXIBLE N/A 2023    Procedure: Sigmoidoscopy flexible;  Surgeon: Jerome Ashley MD;  Location: [facility] OR     SURGICAL HISTORY OF -   77    Perineorrhaphy, for widening vaginal orifice     ZZC EXPLORATORY OF ABDOMEN      laparoscopy         SOCIAL HISTORY:  Social History     Socioeconomic History     Marital status:      Spouse name: Not on file     Number of children: Not on file     Years of education: Not on file     Highest education level: Not on file   Occupational History     Not on file   Tobacco Use     Smoking status: Never     Passive exposure: Current     Smokeless tobacco: Never   Substance and Sexual Activity     Alcohol use: No     Drug use: No     Sexual activity: Yes     Partners: Male   Other Topics Concern     Parent/sibling w/ CABG, MI or angioplasty before 65F 55M? Not Asked   Social History Narrative     Not on file     Social Determinants of Health     Financial Resource Strain: Not on file   Food Insecurity: Not on file   Transportation Needs: Not on file   Physical Activity: Not on file   Stress: Not on file   Social Connections: Not on file   Intimate Partner Violence: Not on file   Housing Stability: Not on file         FAMILY HISTORY:  Family History   Problem Relation Age of Onset     Hypertension Mother         on meds, alive     Cerebrovascular Disease Father         stroke about age 65,  of cancer at 68 yrs     Diabetes Father         eventually took insulin     Anemia Father         Pernisios anemia     Kidney Disease Niece         kidney transplant     Kidney Disease Nephew         kidney transplant     Venous thrombosis No family hx of      Anesthesia Reaction No family hx of          PHYSICAL EXAM:  Vital signs:  LMP  (LMP Unknown)          LABS:   Latest Reference Range & Units 23 13:03   Sodium 136 - 145 mmol/L  139   Potassium 3.4 - 5.3 mmol/L 4.5   Chloride 98 - 107 mmol/L 105   Carbon Dioxide (CO2) 22 - 29 mmol/L 25   Urea Nitrogen 8.0 - 23.0 mg/dL 19.6   Creatinine 0.51 - 0.95 mg/dL 1.55 (H)   GFR Estimate >60 mL/min/1.73m2 37 (L)   Calcium 8.8 - 10.2 mg/dL 9.0   Anion Gap 7 - 15 mmol/L 9   Magnesium 1.7 - 2.3 mg/dL 1.7   Albumin 3.5 - 5.2 g/dL 4.1   Protein Total 6.4 - 8.3 g/dL 6.6   Alkaline Phosphatase 35 - 104 U/L 147 (H)   ALT 0 - 50 U/L 24   AST 0 - 45 U/L 25   Bilirubin Total <=1.2 mg/dL 0.5   Glucose 70 - 99 mg/dL 103 (H)   WBC 4.0 - 11.0 10e3/uL 4.7   Hemoglobin 11.7 - 15.7 g/dL 13.3   Hematocrit 35.0 - 47.0 % 41.3   Platelet Count 150 - 450 10e3/uL 145 (L)   RBC Count 3.80 - 5.20 10e6/uL 4.69   MCV 78 - 100 fL 88   MCH 26.5 - 33.0 pg 28.4   MCHC 31.5 - 36.5 g/dL 32.2   RDW 10.0 - 15.0 % 15.7 (H)   % Neutrophils % 78   % Lymphocytes % 13   % Monocytes % 8   % Eosinophils % 0   % Basophils % 0   Absolute Basophils 0.0 - 0.2 10e3/uL 0.0   Absolute Eosinophils 0.0 - 0.7 10e3/uL 0.0   Absolute Immature Granulocytes <=0.4 10e3/uL 0.1   Absolute Lymphocytes 0.8 - 5.3 10e3/uL 0.6 (L)   Absolute Monocytes 0.0 - 1.3 10e3/uL 0.4   % Immature Granulocytes % 1   Absolute Neutrophils 1.6 - 8.3 10e3/uL 3.6   Absolute NRBCs 10e3/uL 0.0   NRBCs per 100 WBC <1 /100 0   (H): Data is abnormally high  (L): Data is abnormally low  PATHOLOGY:    IMAGING:    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 mutated, MARISSA, TPS 50%  # sigmoid colon poorly-differentiated carcinoma, KRAS G13D mutated, loss of MLH1 and PMS2, TPS 70%  - 2/23 colonoscopy: A frond-like/villous partially obstructing large mass found in sigmoid colon, partially circumferential involving two thirds of the lumen circumference, 4 cm, unable to traverse,  with oozing, s/p biopsy, PATHOLOGY: Invasive poorly differentiated carcinoma, IHC panel excludes tumors of other origin, colorectal primary is favored, loss of nuclear expression of MLH1 and PMS2, MLH1 promoter methylation negative, [id number] mutation negative  -2/23 CT CAP: Shows known 4 cm proximal sigmoid colon mass with possible tumor extension (irregularity and stranding and adjacent pericolonic fat) without obstruction AND probable second mass 2.5 cm at hepatic flexure of colon; small lymph nodes adjacent to both masses (no lymphadenopathy by size criteria)  -3/23 PET:  a. There is increased FDG avidity of the sigmoid colon with focal lobular wall thickening consistent with biopsy-proven adenocarcinoma.  b. Secondary focus noted at the hepatic flexure demonstrates elevated FDG avidity and lobulated wall thickening highly suspicious for a additional primary.  c. Additionally there is a smaller focus of wall thickening with elevated FDG avidity along the left aspect of the transverse colon which is suspicious for a possible third focus of colonic malignancy.  - 3/23 CEA 6.8  -4/6/2023 laparoscopic converted to open total abdominal colectomy with ileorectal anastomosis, partial omentectomy, flexible sigmoidoscopy, TAHBSO  PATHOLOGY:  Of note, omental resection, terminal ileum, proximal and distal anastomotic rings, uterus, cervix, bilateral fallopian tubes and ovaries negative for malignancy    Mass #1 (ascending colon/hepatic flexure): Mixed neuroendocrine-non-neuroendocrine neoplasm (MINEN):  - Neuroendocrine carcinoma component (70%) and moderately-differentiated adenocarcinoma component (30%)  - Size = 5.0 cm, invading into muscularis propria, Positive LVI  - IHC: Neuroendocrine portion: Negative for chromogranin and INSM1 but strongly express synaptophysin  - IHC: Adenocarcinoma portion: Positive for CK20, CDX2 negative for CK7, PAX8, ER, S100  Ki-67 proliferation index of approximately 80%.  MARISSA    PDL1:  COMBINED POSITIVE SCORE (CPS): 55-60  TUMOR PROPORTION SCORE (TPS): 50%   pathogenic KRAS mutation (G13D) was identified (5.2%).  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  MMR testing: loss of MLH1 and PMS2  PDL1:  COMBINED POSITIVE SCORE (CPS): 80  TUMOR PROPORTION SCORE (TPS): 70%  pathogenic KRAS mutation (G13D) was identified (4.5%).  MMR testing: loss of MLH1 and PMS2, intact MSH2 and MSH6  AJCC 8th edition: stage 3A mpT2 pN1a    - One of forty-one sampled lymph nodes positive (1/41), d/w pathology- unable to determine which mass is metastatic to LN    - 4/23 MRI brain w/wo contrast LAURENT  - 5/26/23 CT CAP w/ contrast and IVF before and after CT (post op baseline prior to starting tx):  1.  3 mm nodule RML and 5 mm lateral EDSON nodule, minimally increased from previous  2.  New 4 mm EDSON groundglass density nodule  3.  Several prominent borderline enlarged mediastinal lymph nodes slightly increased from previous  4.  Splenomegaly 14.5 cm  5. S/p subtotal colectomy with ileorectal anastomosis with postsurgical changes along the ventral abdominal wall midline  - 5/8/23 I presented this case at [facility] CRC tumor board as above   - 5/19/23 second opinion at [facility] w/medical oncologist Dr. Acharya, thorough consultation and recommendations appreciated, overall agree w/FOLFOX x6 months, possible nivo or ipi nivo in second line; if metastatic platinum etoposide vs ipi nivo  - 5/25/23 port placement    -5/2023 genetic counseling: Negative for mutations detailed below     REGIMEN:  mFOLFOX6  q14 days x12 cycles/6 months  C1D1 5/31/23 (complicated by hospitalization for afib w/RVR C1D4  6/3/23), C2D1 7/19/23 (inpatient w/continuous cardiac monitoring), C3D1 8/2/2023 (outpatient, admitted to ER for A-fib with RVR C3D3 8/4/23), C4D1 8/16/23  oxaliplatin 64mg/m2 day 1 (dose reduced from 85mg/m2 2/2 Cr C1D1)  LV 350mg/m2 day 1  5FU 1200mg/m2 continuous infusion day 1-2 (total 2,400mg/m2 IV over 46-48 hours)  (5FU 400mg/m2 day 1 bolus (discontinued cycle 2 onwards))  PLUS IVF and magnesium level day 1 and day 8 with optimization to Mg= 2 at least, AND IVF day 3 on day of pump disconnect  PLUS (per cardiology recommendations) During the days of chemo and 2 days after ONLY: take long acting diltiazem 120 mg daily with 30 mg of short acting diltiazem as needed for atrial fibrillation with HR >110  Emetic risk: moderate  Febrile neutropenia risk: intermediate     C5D1 planned 8/30/23 pending labs    Treatment related AE:  - hearing changes-stable w/dose reduced oxaliplatin, flonase prn, ENT consultation pending 10/23/23  - Paroxysmal A-fib with RVR and PAC- 8/15/2023 cardiology follow-up: Increase metoprolol XL to 50 mg daily; During the days of chemo and 2 days after take long acting diltiazem 120 mg daily with 30 mg of short acting diltiazem as needed for atrial fibrillation with HR >110, cardiology follow-up 11/2/2023. 8/19/2023 ER visit noted-patient was not in A-fib and did not have RVR but did have symptoms of palpitations and noted bradycardia.  Will add additional IV fluids today 1 and day 3 (day of pump disconnect)- day 3 would need to be in infusion clinic otherwise will cost pt out of pocket- will check on this with infusion team, continue IV fluids and magnesium optimization on day 8, pt also to increase PO fluids, continue working with cardiology regarding cost effective anticoagulation (I discussed with cardiology that  I do not recommend changing to coumadin given CLASS D interaction w/5FU chemotherapy).  I believe there is an anxiety component that needs to be addressed, oncology psychology  consultation. If patient continues to have ER visits with every cycle of chemotherapy, will do the following: first- cardio revisit and dose reduce 5FU, if still not effective, then- inpatient chemotherapy with continuous cardiac monitoring for future treatments.  - anxiety- oncology psychology consultation   - Normocytic anemia- due to chemotherapy and iron deficiency, s/p ferric carboxymaltose 750mg x2 doses 6/14/23 and 6/28/23; repeat iron studies normal and hgb normal  -Thrombocytopenia- mild, 2/2 chemo  - MARYA on CKD- following w/nephro, f/u with nephro 9/21/23  - diarrhea- grade 1  - cold sensitivity- grade 1  - neuropathy- grade 1   - port flipped- port removed and replaced 8/21/2023 with IR     - 7/18/23 CT CAP- subcm lung nodules stable, borderline and mildly enlarged mediastinal LN and periportal LN stable, splenomegaly 15.7cm, Subtotal colectomy with ileorectal anastomosis. No acute inflammation or obstruction    - plan to repeat CT 10/23-ordered today    - f/u with Dr. Ashley 4/2024 for rigid proctoscopy     #parxosymal afib  - within 3 days of receiving 5FU, prior cardiac risk factors htn, prediabetes  - 6/3/23 presented to ER w/lightheadedness, dizziness, cardioverted s/p diltiazem ggt in ER  - 6/23 DPD deficiency testing negative  - currently on metoprolol XL 25 mg daily, apixaban 5 mg twice daily  - 6/30/2023 consultation with cardio oncologist Dr. Garza; I spoke with Dr. 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  Negative for mutations in APC, AXIN2, BMPR1A, CDH1, CHEK2, EPCAM, GREM1, MLH1, MSH2, MSH3, MSH6, MUTYH, NTHL1, PMS2, POLD1, POLE, PTEN, SMAD4, STK11, TP53, CDKN1B, MEN1, NF1, RET, TSC1, TSC2, and VHL genes    #Anemia secondary to iron deficiency and B12 deficiency  - terminal ileum removed at time of colon surgery, monitor for nutrient def  - 2/23 hgb 6.8, ferritin 21, iron sat index 10, TIBC 265, iron 27, b12 1493  - On B12 1000 mcg p.o. daily and ferrous sulfate 325 mg p.o. daily  - 4/9/23 s/p 1 uprbcs  - 5/9/23 hgb 11.3,5/30/23 hgb 9.4  - based on Ganzoni equation w/goal hgb 14 and 500mg needed for iron stores, pts iron deficit is 1400 mg  - s/p ferric carboxymaltose 750mg x2 doses 6/14/23 and 6/28/23  - 7/11/23 ferritin 1022, iron sat 22, TIBC 201, iron 44    - 8/23 hgb 13.3    #Crohn's  - dx since at least 2010     RTC 2 weeks for follow up with RADHA, labs, chemo  RTC 4 weeks for follow up with me, labs, chemo      Francisco Lee DO  Hematology/Oncology  [facility]      Again, thank you for allowing me to participate in the care of your patient.        Sincerely,        FRANCISCO LEE DO

## 2023-08-31 RX ORDER — PROCHLORPERAZINE MALEATE 10 MG
10 TABLET ORAL EVERY 6 HOURS PRN
COMMUNITY
End: 2024-06-03

## 2023-08-31 RX ORDER — LOPERAMIDE HCL 2 MG
2 CAPSULE ORAL DAILY PRN
COMMUNITY
End: 2024-09-10

## 2023-08-31 NOTE — PROGRESS NOTES
"8/31/23 Msg recd from Jane Jolley NP    20 mg every day. Thank you.     Rx for Xarelto 20 mg every day sent to [facility]     Pt updated via AskBot with transition instructions    Attempted to call pt, reached [facility] and requested callback    Jaimie 105 pm        9-  Pt called me today, she went to PU the Xarelto and it was  \"500 dollars\".  After talking with her ,I realized she had not called the [facility] path program to get enrolled. I gave her the phone number, she then told me she was not yet enrolled in  medicare D , and is planning to do that in October..  She is calling medicare to try and get enrolled ASAP., and after her remaining 10 days of free Xarelto are used, she will try to get a 2 week supply from the pharmacy.  Mmunns lpn  "

## 2023-08-31 NOTE — TELEPHONE ENCOUNTER
Medication reconciliation completed by RN. Form and chart forwarded to PCP for signatures    Malia Herrera RN on 8/31/2023 at 8:17 AM

## 2023-09-01 ENCOUNTER — INFUSION THERAPY VISIT (OUTPATIENT)
Dept: INFUSION THERAPY | Facility: CLINIC | Age: 68
End: 2023-09-01
Attending: INTERNAL MEDICINE
Payer: MEDICARE

## 2023-09-01 VITALS
HEART RATE: 64 BPM | SYSTOLIC BLOOD PRESSURE: 133 MMHG | RESPIRATION RATE: 16 BRPM | DIASTOLIC BLOOD PRESSURE: 66 MMHG | OXYGEN SATURATION: 96 % | TEMPERATURE: 98 F

## 2023-09-01 DIAGNOSIS — Z76.89 PREVENTION OF CHEMOTHERAPY-INDUCED NEUTROPENIA: ICD-10-CM

## 2023-09-01 DIAGNOSIS — C18.7 MALIGNANT NEOPLASM OF SIGMOID COLON (H): Primary | ICD-10-CM

## 2023-09-01 PROCEDURE — 96360 HYDRATION IV INFUSION INIT: CPT

## 2023-09-01 PROCEDURE — 250N000011 HC RX IP 250 OP 636: Mod: JZ | Performed by: INTERNAL MEDICINE

## 2023-09-01 PROCEDURE — 258N000003 HC RX IP 258 OP 636: Performed by: INTERNAL MEDICINE

## 2023-09-01 PROCEDURE — 96361 HYDRATE IV INFUSION ADD-ON: CPT

## 2023-09-01 RX ORDER — HEPARIN SODIUM (PORCINE) LOCK FLUSH IV SOLN 100 UNIT/ML 100 UNIT/ML
5 SOLUTION INTRAVENOUS
Status: DISCONTINUED | OUTPATIENT
Start: 2023-09-01 | End: 2023-09-01 | Stop reason: HOSPADM

## 2023-09-01 RX ADMIN — SODIUM CHLORIDE 1000 ML: 9 INJECTION, SOLUTION INTRAVENOUS at 13:55

## 2023-09-01 RX ADMIN — HEPARIN 5 ML: 100 SYRINGE at 15:57

## 2023-09-01 ASSESSMENT — PAIN SCALES - GENERAL: PAINLEVEL: NO PAIN (0)

## 2023-09-01 NOTE — PROGRESS NOTES
Infusion Nursing Note:  Nadia Ladd presents today for C5D3 pump disconnect with IVF.    Patient seen by provider today: No   present during visit today: Not Applicable.    Note: N/A.      Intravenous Access:  Implanted Port.    Treatment Conditions:  Not Applicable.      Post Infusion Assessment:  Patient tolerated infusion without incident.       Discharge Plan:   Departure Mode: Ambulatory.        Marycarmen Cardenas RN

## 2023-09-05 ENCOUNTER — VIRTUAL VISIT (OUTPATIENT)
Dept: ONCOLOGY | Facility: CLINIC | Age: 68
End: 2023-09-05
Attending: INTERNAL MEDICINE
Payer: MEDICARE

## 2023-09-05 ENCOUNTER — PATIENT OUTREACH (OUTPATIENT)
Dept: CARE COORDINATION | Facility: CLINIC | Age: 68
End: 2023-09-05

## 2023-09-05 DIAGNOSIS — C18.7 MALIGNANT NEOPLASM OF SIGMOID COLON (H): ICD-10-CM

## 2023-09-05 DIAGNOSIS — F41.9 ANXIETY: Primary | ICD-10-CM

## 2023-09-05 DIAGNOSIS — Z53.9 DIAGNOSIS NOT YET DEFINED: Primary | ICD-10-CM

## 2023-09-05 PROCEDURE — 90791 PSYCH DIAGNOSTIC EVALUATION: CPT | Mod: 95 | Performed by: PSYCHOLOGIST

## 2023-09-05 PROCEDURE — G0179 MD RECERTIFICATION HHA PT: HCPCS | Performed by: INTERNAL MEDICINE

## 2023-09-05 NOTE — PROGRESS NOTES
"    [facility] Oncology- Psychotherapy                                    Progress Note    Patient Name: Nadia Ladd  Date: 23         Service Type: Individual      Session Start Time: 11:00  Session End Time: 11:30     Session Length: 30 minutes    Session #: 1    Attendees: Client attended alone    Service Modality:  Phone Visit:      Provider verified identity through the following two step process.  Patient provided:  Patient     Telephone Visit: The patient's condition can be safely assessed and treated via synchronous audio telemedicine encounter.      Reason for Audio Telemedicine Visit: Services only offered telehealth    Originating Site (Patient Location): Patient's home    Distant Site (Provider Location): Provider Remote Setting- Home Office    Consent:  The patient/guardian has verbally consented to:     1. The potential risks and benefits of telemedicine (telephone visit) versus in person care;    The patient has been notified of the following:      \"We have found that certain health care needs can be provided without the need for a face to face visit.  This service lets us provide the care you need with a phone conversation.       I will have full access to your [facility] medical record during this entire phone call.   I will be taking notes for your medical record.      Since this is like an office visit, we will bill your insurance company for this service.       There are potential benefits and risks of telephone visits (e.g. limits to patient confidentiality) that differ from in-person visits.?Confidentiality still applies for telephone services, and nobody will record the visit.  It is important to be in a quiet, private space that is free of distractions (including cell phone or other devices) during the visit.??      If during the course of the call I believe a telephone visit is not appropriate, you will not be charged for this service\"     Consent has been obtained " for this service by care team member: Yes     DATA  Interactive Complexity: No  Crisis: No     Current / Ongoing Stressors and Concerns:   Anxiety, cancer, cancer treatment     Treatment Objective(s) Addressed in This Session:   identify coping strategies to more effectively address stressors     Intervention:   Motivational Interviewing: to help her focus on what she can control     ASSESSMENT: Current Emotional / Mental Status (status of significant symptoms):   Risk status (Self / Other harm or suicidal ideation)   Patient denies current fears or concerns for personal safety.   Patient denies current or recent suicidal ideation or behaviors.   Patient denies current or recent homicidal ideation or behaviors.   Patient denies current or recent self injurious behavior or ideation.   Patient denies other safety concerns.   Patient reports there has been no change in risk factors since their last session.     Patient reports there has been no change in protective factors since their last session.     Recommended that patient call 911 or go to the local ED should there be a change in any of these risk factors.     Appearance:   Appropriate    Eye Contact:   Good    Psychomotor Behavior: Normal    Attitude:   Cooperative    Orientation:   All   Speech    Rate / Production: Normal     Volume:  Normal    Mood:    Normal   Affect:    Appropriate    Thought Content:  Clear    Thought Form:  Coherent  Logical    Insight:    Good      Medication Review:   No changes to current psychiatric medication(s)     Medication Compliance:   Yes     Changes in Health Issues:   None reported     Chemical Use Review:   Substance Use: Chemical use reviewed, no active concerns identified      Tobacco Use: No current tobacco use.      Diagnosis:  1. Anxiety    2. Malignant neoplasm of sigmoid colon (H)        Collateral Reports Completed:   Routed note to Care Team Member(s)    PLAN: (Patient Tasks / Therapist Tasks / Other)  Focus her  thoughts on the aspects of her life she can control. Reduce her time spent talking about cancer.     Sarath Thompson PsyD                                                         ______________________________________________________________________    Individual Treatment Plan    Patient's Name: Nadia Ladd  YOB: 1955    Date of Creation: 9/5/23  Date Treatment Plan Last Reviewed/Revised:     DSM5 Diagnoses: 300.02 (F41.1) Generalized Anxiety Disorder  Psychosocial / Contextual Factors: cancer  Anticipated number of session for this episode of care: 3-6 sessions  Anticipation frequency of session: Every other week  Anticipated Duration of each session: 16-37 minutes  Treatment plan will be reviewed in 90 days or when goals have been changed.       MeasurableTreatment Goal(s) related to diagnosis / functional impairment(s)  Goal 1: Patient will focus on what she can control.     I will know I've met my goal when I feel less anxious.      Patient has reviewed and agreed to the above plan.      Sarath Thompson PsyD  September 5, 2023

## 2023-09-05 NOTE — LETTER
"    2023         RE: Nadia Ladd  [address]  [facility] [address]        Dear Colleague,    Thank you for referring your patient, Nadia Ladd, to the [facility]. Please see a copy of my visit note below.        [facility] Oncology- Psychotherapy                                    Progress Note    Patient Name: Nadia Ladd  Date: 23         Service Type: Individual      Session Start Time: 11:00  Session End Time: 11:30     Session Length: 30 minutes    Session #: 1    Attendees: Client attended alone    Service Modality:  Phone Visit:      Provider verified identity through the following two step process.  Patient provided:  Patient     Telephone Visit: The patient's condition can be safely assessed and treated via synchronous audio telemedicine encounter.      Reason for Audio Telemedicine Visit: Services only offered telehealth    Originating Site (Patient Location): Patient's home    Distant Site (Provider Location): Provider Remote Setting- Home Office    Consent:  The patient/guardian has verbally consented to:     1. The potential risks and benefits of telemedicine (telephone visit) versus in person care;    The patient has been notified of the following:      \"We have found that certain health care needs can be provided without the need for a face to face visit.  This service lets us provide the care you need with a phone conversation.       I will have full access to your [facility] medical record during this entire phone call.   I will be taking notes for your medical record.      Since this is like an office visit, we will bill your insurance company for this service.       There are potential benefits and risks of telephone visits (e.g. limits to patient confidentiality) that differ from in-person visits.?Confidentiality still applies for telephone services, and nobody will record the visit.  It is important to be in a quiet, private space " "that is free of distractions (including cell phone or other devices) during the visit.??      If during the course of the call I believe a telephone visit is not appropriate, you will not be charged for this service\"     Consent has been obtained for this service by care team member: Yes     DATA  Interactive Complexity: No  Crisis: No     Current / Ongoing Stressors and Concerns:   Anxiety, cancer, cancer treatment     Treatment Objective(s) Addressed in This Session:   identify coping strategies to more effectively address stressors     Intervention:   Motivational Interviewing: to help her focus on what she can control     ASSESSMENT: Current Emotional / Mental Status (status of significant symptoms):   Risk status (Self / Other harm or suicidal ideation)   Patient denies current fears or concerns for personal safety.   Patient denies current or recent suicidal ideation or behaviors.   Patient denies current or recent homicidal ideation or behaviors.   Patient denies current or recent self injurious behavior or ideation.   Patient denies other safety concerns.   Patient reports there has been no change in risk factors since their last session.     Patient reports there has been no change in protective factors since their last session.     Recommended that patient call 911 or go to the local ED should there be a change in any of these risk factors.     Appearance:   Appropriate    Eye Contact:   Good    Psychomotor Behavior: Normal    Attitude:   Cooperative    Orientation:   All   Speech    Rate / Production: Normal     Volume:  Normal    Mood:    Normal   Affect:    Appropriate    Thought Content:  Clear    Thought Form:  Coherent  Logical    Insight:    Good      Medication Review:   No changes to current psychiatric medication(s)     Medication Compliance:   Yes     Changes in Health Issues:   None reported     Chemical Use Review:   Substance Use: Chemical use reviewed, no active concerns identified "      Tobacco Use: No current tobacco use.      Diagnosis:  1. Anxiety    2. Malignant neoplasm of sigmoid colon (H)        Collateral Reports Completed:   Routed note to Care Team Member(s)    PLAN: (Patient Tasks / Therapist Tasks / Other)  Focus her thoughts on the aspects of her life she can control. Reduce her time spent talking about cancer.     Sarath Thompson PsyD                                                         ______________________________________________________________________    Individual Treatment Plan    Patient's Name: Nadia Ladd  YOB: 1955    Date of Creation: 9/5/23  Date Treatment Plan Last Reviewed/Revised:     DSM5 Diagnoses: 300.02 (F41.1) Generalized Anxiety Disorder  Psychosocial / Contextual Factors: cancer  Anticipated number of session for this episode of care: 3-6 sessions  Anticipation frequency of session: Every other week  Anticipated Duration of each session: 16-37 minutes  Treatment plan will be reviewed in 90 days or when goals have been changed.       MeasurableTreatment Goal(s) related to diagnosis / functional impairment(s)  Goal 1: Patient will focus on what she can control.     I will know I've met my goal when I feel less anxious.      Patient has reviewed and agreed to the above plan.      Sarath Thompson PsyD  September 5, 2023

## 2023-09-05 NOTE — PROGRESS NOTES
Oncology Distress Screening Follow-up  Clinical Social Work  [facility]    Identified Concern and Score From Distress Screenin. How concerned are you about your ability to eat? 0       2. How concerned are you about unintended weight loss or your current weight? 2       3. How concerned are you about feeling depressed or very sad? 0       4. How concerned are you about feeling anxious or very scared? 2       5. Do you struggle with the loss of meaning and dennys in your life? Not at all       6. How concerned are you about work and home life issues that may be affected by your cancer? 3       7. How concerned are you about knowing what resources are available to help you? 0       8. Do you currently have what you would describe as Confucianism or spiritual struggles? Not at all       You can also ask to be contacted by one of our Oncology Supportive Care professionals.   9. If you want to be contacted by one of our professionals, I can send a message to them right now. Oncology Social Worker             Date of Distress Screenin23    Intervention/Education Provided:  Writer attempted to connect with patient following an elevated distress screen. Left a message providing a writer contact information encouraging a return call. Sw will continue to be available as needed.     Follow-up Required:  No follow-up required at this time      KAMRAN Mcdowell, [facility]    [facility]  [phone]  [email or web address]

## 2023-09-06 ENCOUNTER — LAB (OUTPATIENT)
Dept: INFUSION THERAPY | Facility: CLINIC | Age: 68
End: 2023-09-06
Attending: INTERNAL MEDICINE
Payer: MEDICARE

## 2023-09-06 ENCOUNTER — MYC MEDICAL ADVICE (OUTPATIENT)
Dept: NEPHROLOGY | Facility: CLINIC | Age: 68
End: 2023-09-06

## 2023-09-06 VITALS
OXYGEN SATURATION: 97 % | SYSTOLIC BLOOD PRESSURE: 143 MMHG | BODY MASS INDEX: 33.03 KG/M2 | HEART RATE: 67 BPM | DIASTOLIC BLOOD PRESSURE: 68 MMHG | RESPIRATION RATE: 18 BRPM | WEIGHT: 174.8 LBS | TEMPERATURE: 98.2 F

## 2023-09-06 DIAGNOSIS — C18.9 MALIGNANT NEOPLASM OF COLON, UNSPECIFIED PART OF COLON (H): ICD-10-CM

## 2023-09-06 DIAGNOSIS — Z76.89 PREVENTION OF CHEMOTHERAPY-INDUCED NEUTROPENIA: ICD-10-CM

## 2023-09-06 DIAGNOSIS — D50.0 IRON DEFICIENCY ANEMIA DUE TO CHRONIC BLOOD LOSS: ICD-10-CM

## 2023-09-06 DIAGNOSIS — C18.7 MALIGNANT NEOPLASM OF SIGMOID COLON (H): Primary | ICD-10-CM

## 2023-09-06 DIAGNOSIS — T45.1X5A CHEMOTHERAPY-INDUCED NEUTROPENIA (H): ICD-10-CM

## 2023-09-06 DIAGNOSIS — D70.1 CHEMOTHERAPY-INDUCED NEUTROPENIA (H): ICD-10-CM

## 2023-09-06 LAB
ALBUMIN SERPL BCG-MCNC: 3.7 G/DL (ref 3.5–5.2)
ALP SERPL-CCNC: 128 U/L (ref 35–104)
ALT SERPL W P-5'-P-CCNC: 30 U/L (ref 0–50)
ANION GAP SERPL CALCULATED.3IONS-SCNC: 13 MMOL/L (ref 7–15)
AST SERPL W P-5'-P-CCNC: 35 U/L (ref 0–45)
BILIRUB SERPL-MCNC: 0.5 MG/DL
BUN SERPL-MCNC: 20 MG/DL (ref 8–23)
CALCIUM SERPL-MCNC: 8.8 MG/DL (ref 8.8–10.2)
CHLORIDE SERPL-SCNC: 105 MMOL/L (ref 98–107)
CREAT SERPL-MCNC: 1.18 MG/DL (ref 0.51–0.95)
DEPRECATED HCO3 PLAS-SCNC: 22 MMOL/L (ref 22–29)
EGFRCR SERPLBLD CKD-EPI 2021: 50 ML/MIN/1.73M2
GLUCOSE SERPL-MCNC: 123 MG/DL (ref 70–99)
MAGNESIUM SERPL-MCNC: 1.7 MG/DL (ref 1.7–2.3)
POTASSIUM SERPL-SCNC: 4.5 MMOL/L (ref 3.4–5.3)
PROT SERPL-MCNC: 6.1 G/DL (ref 6.4–8.3)
SODIUM SERPL-SCNC: 140 MMOL/L (ref 136–145)

## 2023-09-06 PROCEDURE — 36591 DRAW BLOOD OFF VENOUS DEVICE: CPT | Performed by: INTERNAL MEDICINE

## 2023-09-06 PROCEDURE — 96365 THER/PROPH/DIAG IV INF INIT: CPT

## 2023-09-06 PROCEDURE — 83735 ASSAY OF MAGNESIUM: CPT

## 2023-09-06 PROCEDURE — 250N000011 HC RX IP 250 OP 636: Mod: JZ | Performed by: INTERNAL MEDICINE

## 2023-09-06 PROCEDURE — 258N000003 HC RX IP 258 OP 636: Performed by: INTERNAL MEDICINE

## 2023-09-06 PROCEDURE — 80053 COMPREHEN METABOLIC PANEL: CPT | Performed by: INTERNAL MEDICINE

## 2023-09-06 RX ORDER — MEPERIDINE HYDROCHLORIDE 25 MG/ML
25 INJECTION INTRAMUSCULAR; INTRAVENOUS; SUBCUTANEOUS EVERY 30 MIN PRN
Status: CANCELLED | OUTPATIENT
Start: 2023-09-06

## 2023-09-06 RX ORDER — ALBUTEROL SULFATE 90 UG/1
1-2 AEROSOL, METERED RESPIRATORY (INHALATION)
Status: CANCELLED
Start: 2023-09-06

## 2023-09-06 RX ORDER — EPINEPHRINE 1 MG/ML
0.3 INJECTION, SOLUTION INTRAMUSCULAR; SUBCUTANEOUS EVERY 5 MIN PRN
Status: CANCELLED | OUTPATIENT
Start: 2023-09-06

## 2023-09-06 RX ORDER — HEPARIN SODIUM (PORCINE) LOCK FLUSH IV SOLN 100 UNIT/ML 100 UNIT/ML
5 SOLUTION INTRAVENOUS
Status: DISCONTINUED | OUTPATIENT
Start: 2023-09-06 | End: 2023-09-06 | Stop reason: HOSPADM

## 2023-09-06 RX ORDER — MAGNESIUM SULFATE HEPTAHYDRATE 40 MG/ML
2 INJECTION, SOLUTION INTRAVENOUS ONCE
Status: COMPLETED | OUTPATIENT
Start: 2023-09-06 | End: 2023-09-06

## 2023-09-06 RX ORDER — METHYLPREDNISOLONE SODIUM SUCCINATE 125 MG/2ML
125 INJECTION, POWDER, LYOPHILIZED, FOR SOLUTION INTRAMUSCULAR; INTRAVENOUS
Status: CANCELLED
Start: 2023-09-06

## 2023-09-06 RX ORDER — ALBUTEROL SULFATE 0.83 MG/ML
2.5 SOLUTION RESPIRATORY (INHALATION)
Status: CANCELLED | OUTPATIENT
Start: 2023-09-06

## 2023-09-06 RX ORDER — HEPARIN SODIUM,PORCINE 10 UNIT/ML
5 VIAL (ML) INTRAVENOUS
Status: CANCELLED | OUTPATIENT
Start: 2023-09-06

## 2023-09-06 RX ORDER — DIPHENHYDRAMINE HYDROCHLORIDE 50 MG/ML
50 INJECTION INTRAMUSCULAR; INTRAVENOUS
Status: CANCELLED
Start: 2023-09-06

## 2023-09-06 RX ORDER — HEPARIN SODIUM (PORCINE) LOCK FLUSH IV SOLN 100 UNIT/ML 100 UNIT/ML
5 SOLUTION INTRAVENOUS
Status: CANCELLED | OUTPATIENT
Start: 2023-09-06

## 2023-09-06 RX ADMIN — SODIUM CHLORIDE 1000 ML: 9 INJECTION, SOLUTION INTRAVENOUS at 13:29

## 2023-09-06 RX ADMIN — MAGNESIUM SULFATE HEPTAHYDRATE 2 G: 40 INJECTION, SOLUTION INTRAVENOUS at 14:09

## 2023-09-06 RX ADMIN — HEPARIN 5 ML: 100 SYRINGE at 15:15

## 2023-09-06 ASSESSMENT — PAIN SCALES - GENERAL: PAINLEVEL: NO PAIN (0)

## 2023-09-06 NOTE — TELEPHONE ENCOUNTER
Message sent via my chart with details on a rescheduled appointment with Dr. Brenner // 09.06.2023 MCE

## 2023-09-06 NOTE — PROGRESS NOTES
Infusion Nursing Note:  Nadia Ladd presents today for Port labs.    Patient seen by provider today: No   present during visit today: Not Applicable.    Note: N/A.      Intravenous Access:  Implanted Port.  Lab draw site L chest wall, Needle type Noncoring, Gauge 20,3/4in.  Labs drawn without difficulty.      Rosalina Argueta RN

## 2023-09-06 NOTE — PROGRESS NOTES
Infusion Nursing Note:  Nadia Ladd presents today for IVF    Patient seen by provider today: No   present during visit today: Not Applicable.    Note: Patient feeling pretty good. Less bothersome neuropathy in hands/feet in the last day. Says she has occas PVC's at night when in bed but it goes away after she relaxes. Denies SOA or chest pain or sx similar to when she has presented to the ER. No other new or pertinent sx  VSS, afebrile.       Intravenous Access:  Implanted Port.  Lab draw site L chest wall, Needle type Noncoring, Gauge 20,3/4in.  Labs drawn without difficulty.    Treatment Conditions:  Lab Results   Component Value Date     09/06/2023    POTASSIUM 4.5 09/06/2023    MAG 1.7 09/06/2023    CR 1.18 (H) 09/06/2023    LIVIER 8.8 09/06/2023    BILITOTAL 0.5 09/06/2023    ALBUMIN 3.7 09/06/2023    ALT 30 09/06/2023    AST 35 09/06/2023       Results reviewed, labs MET treatment parameters, ok to proceed with treatment. 1L NS given.   Mg level 1.7 today, 2 gms Mg Sulfate IV give per Dr. Bateman's request.      Post Infusion Assessment:  Patient tolerated infusion without incident.  Blood return noted pre and post infusion.  Site patent and intact, free from redness, edema or discomfort.  No evidence of extravasations.  Access discontinued per protocol.       Discharge Plan:   AVS to patient via MYCHART.  Patient will return 9/13 for next appointment.   Patient discharged in stable condition accompanied by: .  Departure Mode: Ambulatory.      Rosalina Argueta RN

## 2023-09-07 ENCOUNTER — TELEPHONE (OUTPATIENT)
Dept: NEPHROLOGY | Facility: CLINIC | Age: 68
End: 2023-09-07
Payer: MEDICARE

## 2023-09-07 NOTE — TELEPHONE ENCOUNTER
Replied to My Chart message with new day and time for follow up with Jordin rFey. // 09.07.2023 MCE

## 2023-09-12 ENCOUNTER — OFFICE VISIT (OUTPATIENT)
Dept: CARDIOLOGY | Facility: CLINIC | Age: 68
End: 2023-09-12
Attending: NURSE PRACTITIONER
Payer: MEDICARE

## 2023-09-12 VITALS
BODY MASS INDEX: 33.53 KG/M2 | HEIGHT: 61 IN | DIASTOLIC BLOOD PRESSURE: 86 MMHG | RESPIRATION RATE: 16 BRPM | HEART RATE: 72 BPM | WEIGHT: 177.6 LBS | OXYGEN SATURATION: 98 % | SYSTOLIC BLOOD PRESSURE: 144 MMHG

## 2023-09-12 DIAGNOSIS — N18.32 STAGE 3B CHRONIC KIDNEY DISEASE (H): ICD-10-CM

## 2023-09-12 DIAGNOSIS — I10 BENIGN ESSENTIAL HYPERTENSION WITH TARGET BLOOD PRESSURE BELOW 140/90: ICD-10-CM

## 2023-09-12 DIAGNOSIS — I48.0 PAROXYSMAL ATRIAL FIBRILLATION WITH RVR (H): Primary | ICD-10-CM

## 2023-09-12 PROCEDURE — 99214 OFFICE O/P EST MOD 30 MIN: CPT | Performed by: INTERNAL MEDICINE

## 2023-09-12 ASSESSMENT — PAIN SCALES - GENERAL: PAINLEVEL: NO PAIN (0)

## 2023-09-12 NOTE — PROGRESS NOTES
General Cardiology Clinic Progress Note  Nadia Ladd MRN# [id number]   YOB: 1955 Age: 68 year old       Reason for visit: Paroxysmal atrial fibrillation    History of presenting illness:    I had the opportunity to see Nadia Ladd at [facility] today for reevaluation of paroxysmal atrial fibrillation associated with chemotherapy for treatment of colon cancer.  Ms. Ladd has been seen here a few times since June for evaluation and treatment of paroxysmal atrial fibrillation which seems to correlate with her chemotherapy sessions for treatment of colon cancer.  She believes she has had 2 or 3 episodes of atrial fibrillation out of 5 chemotherapy sessions so far.  She is on Xarelto for stroke prevention.  She has been using a regimen of metoprolol succinate 50 mg daily, combined with diltiazem  mg daily on days that she has chemo and 2 days after chemo.  She has short acting diltiazem 30 mg tablets to use as needed as well.  She has also been getting IV fluids after chemo.  This combination of treatments has prevented recurrent atrial fibrillation over the last couple of chemo sessions.    She seems to be feeling well without any specific complaints.  During times of A-fib she has been markedly symptomatic.    She has mild chronic kidney disease with a creatinine which has improved most recently on 9/6/2023 to 1.18 with a GFR of 50.  Her echocardiogram is essentially normal.  I reviewed her twelve-lead ECG from 8/19/2023 and it demonstrated normal sinus rhythm without abnormality.    Her blood pressure today was a little elevated here but normal at home.  It was 144/86 here and 122/78 at home.  Heart rate is 72 and weight 177 pounds.  During chemo when she takes both diltiazem and metoprolol, her heart rates are a bit slower, usually in the 60s.  Her heart rhythm is regular today.            Assessment and Plan:     ASSESSMENT:    Ms. Nadia Ladd is a 68-year-old woman with  paroxysmal atrial fibrillation episodes associated with chemotherapy.  She has been able to prevent recurrent episodes of atrial fibrillation with a combination of metoprolol XL and diltiazem as described above.  IV fluids given after chemotherapy is also probably beneficial in preventing A-fib.  A-fib can sometimes be associated with dehydration and corticosteroid use.    I am glad that she is doing well today and she is certainly ready to proceed with her next round of chemotherapy starting tomorrow.  I agree with her current medication regimen I will not make any changes today.  We talked about some issues to watch for in terms of hypotension and bradycardia and she will notify us if she has any problems with those issues.    We will plan to see her back in cardiology clinic in 3 months for reevaluation.    John Ramos MD           Orders this Visit:  Orders Placed This Encounter   Procedures    Follow-Up with Cardiology    Follow-Up with Cardiology [facility]     No orders of the defined types were placed in this encounter.    There are no discontinued medications.    Today's clinic visit entailed:  Review of the result(s) of each unique test - base metabolic panel, ALT, ECG, echocardiogram  The following tests were independently interpreted by me as noted in my documentation: Electrocardiogram tracing  Ordering of each unique test  Prescription drug management  30 minutes spent by me on the date of the encounter doing chart review, history and exam, documentation and further activities per the note  Provider  Link to [facility] Help Grid     The level of medical decision making during this visit was of moderate complexity.           Review of Systems:     Review of Systems:  Skin:        Eyes:       ENT:       Respiratory:  Negative shortness of breath;cough;wheezing  Cardiovascular:  Negative;palpitations;chest pain;edema;lightheadedness;dizziness;syncope or near-syncope    Gastroenterology:      Genitourinary:      "  Musculoskeletal:       Neurologic:  Positive for numbness or tingling of hands;numbness or tingling of feet  Psychiatric:       Heme/Lymph/Imm:       Endocrine:                 Physical Exam:     Vitals: BP (!) 144/86 (BP Location: Right arm, Patient Position: Sitting, Cuff Size: Adult Large)   Pulse 72   Resp 16   Ht 1.549 m (5' 1\")   Wt 80.6 kg (177 lb 9.6 oz)   LMP  (LMP Unknown)   SpO2 98%   BMI 33.56 kg/m    Constitutional: Well nourished and in no apparent distress.  Eyes: Pupils equal, round. Sclerae anicteric.   HEENT: Normocephalic, atraumatic.   Neck: Supple. JVD   Respiratory: Breathing non-labored. Lungs clear to auscultation bilaterally. No crackles, wheezes, rhonchi, or rales.  Cardiovascular:  Regular rate and rhythm, normal S1 and S2. No murmur, rub, or gallop.  Skin: Warm, dry. No rashes, cyanosis, or xanthelasma.  Extremities: No edema.  Neurologic: No gross motor deficits. Alert, awake, and oriented to person, place and time.  Psychiatric: Affect appropriate.             Medications:     Current Outpatient Medications   Medication Sig Dispense Refill    cyanocobalamin 1000 MCG TBCR Take 1,000 mcg by mouth daily 100 tablet 1    dexamethasone (DECADRON) 4 MG tablet Take 2 tablets (8 mg) by mouth daily Take for 2 days, starting the day after chemo. Take with food. 40 tablet 0    diltiazem ER COATED BEADS (CARDIZEM CD/CARTIA XT) 120 MG 24 hr capsule Take 1 capsule (120 mg) by mouth daily On chemo days and 2 days post chemo infusion. 60 capsule 3    Ferrous Sulfate (IRON) 325 (65 Fe) MG tablet Take 1 tablet by mouth daily      loperamide (IMODIUM) 2 MG capsule Take 2 mg by mouth daily as needed for diarrhea      MAGNESIUM OXIDE 400 (240 Mg) MG tablet TAKE 1 TABLET (400 MG) BY MOUTH 2 TIMES DAILY 60 tablet 0    metoprolol succinate ER (TOPROL XL) 50 MG 24 hr tablet Take 1 tablet (50 mg) by mouth daily 90 tablet 3    Multiple Vitamins-Iron (MULTI-DAY PLUS IRON PO) Take 1 tablet by mouth daily   "    prochlorperazine (COMPAZINE) 10 MG tablet Take 10 mg by mouth every 6 hours as needed for nausea or vomiting      rivaroxaban ANTICOAGULANT (XARELTO) 20 MG TABS tablet Take 1 tablet (20 mg) by mouth daily (with dinner) 90 tablet 3    sodium bicarbonate 650 MG tablet Take 1 tablet (650 mg) by mouth 2 times daily 120 tablet 3    diltiazem (CARDIZEM) 30 MG tablet Take 1 tablet (30 mg) by mouth 3 times daily (Patient not taking: Reported on 2023) 90 tablet 0    lidocaine-prilocaine (EMLA) 2.5-2.5 % external cream Apply topically as needed for moderate pain Apply 15-20 minutes prior to port access (Patient not taking: Reported on 2023) 1 g 4       Family History   Problem Relation Age of Onset    Hypertension Mother         on meds, alive    Cerebrovascular Disease Father         stroke about age 65,  of cancer at 68 yrs    Diabetes Father         eventually took insulin    Anemia Father         Pernisios anemia    Kidney Disease Niece         kidney transplant    Kidney Disease Nephew         kidney transplant    Venous thrombosis No family hx of     Anesthesia Reaction No family hx of        Social History     Socioeconomic History    Marital status:      Spouse name: Not on file    Number of children: Not on file    Years of education: Not on file    Highest education level: Not on file   Occupational History    Not on file   Tobacco Use    Smoking status: Never     Passive exposure: Current    Smokeless tobacco: Never   Substance and Sexual Activity    Alcohol use: No    Drug use: No    Sexual activity: Yes     Partners: Male   Other Topics Concern    Parent/sibling w/ CABG, MI or angioplasty before 65F 55M? Not Asked   Social History Narrative    Not on file     Social Determinants of Health     Financial Resource Strain: Not on file   Food Insecurity: Not on file   Transportation Needs: Not on file   Physical Activity: Not on file   Stress: Not on file   Social Connections: Not on file    Intimate Partner Violence: Not on file   Housing Stability: Not on file            Past Medical History:     Past Medical History:   Diagnosis Date    Arthropathia 08/05/2010    B12 deficiency anemia 10/21/2010    Benign essential hypertension with target blood pressure below 140/90 10/10/2016    CARDIOVASCULAR SCREENING; LDL GOAL LESS THAN 160 10/31/2010    Crohn's colitis (H) 02/15/2011    Dermatitis-dishydrotic eczema-severe 08/05/2010    Hiatal hernia     HTN (hypertension) 07/21/2011    Iron deficiency anemia 10/21/2010    Malignant neoplasm of sigmoid colon (H)     Obesity     Primary pulmonary hypertension (H) 04/06/2010              Past Surgical History:     Past Surgical History:   Procedure Laterality Date    COLECTOMY WITHOUT COLOSTOMY N/A 4/6/2023    Procedure: Laparoscopic Converted to Open Total abdominal colectomy;  Surgeon: Jerome Ashley MD;  Location: [facility] OR    COLONOSCOPY  10/11/10    COLONOSCOPY N/A 2/27/2023    Procedure: ATTEMPTED COLONOSCOPY WITH SIGMOID STRICTURE BIOPSY;  Surgeon: Jonathan Alcocer MD;  Location:  GI    ESOPHAGOSCOPY, GASTROSCOPY, DUODENOSCOPY (EGD), COMBINED N/A 2/27/2023    Procedure: ESOPHAGOGASTRODUODENOSCOPY, WITH BIOPSY;  Surgeon: Jonathan Alcocer MD;  Location: [facility]    HYSTERECTOMY TOTAL ABDOMINAL, BILATERAL SALPINGO-OOPHORECTOMY, COMBINED N/A 4/6/2023    Procedure: Hysterectomy total abdominal, bilateral salpingo-oophorectomy;  Surgeon: Sunitha Olea MD;  Location: [facility] OR    INSERT PORT VASCULAR ACCESS Right 5/25/2023    Procedure: Ultrasound-guided right internal jugular venous access port placement with fluoroscopy;  Surgeon: Martin Thomas DO;  Location: [facility] OR    IR CHEST PORT PLACEMENT > 5 YRS OF AGE  8/21/2023    IR PORT CHECK RIGHT  7/18/2023    IR PORT REMOVAL RIGHT  8/21/2023    SIGMOIDOSCOPY FLEXIBLE N/A 4/6/2023    Procedure: Sigmoidoscopy flexible;  Surgeon: Jerome Ashley MD;  Location: [facility] OR    SURGICAL HISTORY OF  -   06/14/76    Perineorrhaphy, for widening vaginal orifice    [facility] EXPLORATORY OF ABDOMEN  1989    laparoscopy              Allergies:   No known drug allergy       Data:   All laboratory data reviewed:    Recent Labs   Lab Test 08/04/23  0915 07/11/23  1040 06/03/23  1311 02/16/23  0959 06/06/19  1031 03/12/18  1629 11/30/16  1628 09/23/15  1606   LDL  --   --   --   --  138*  --  133* 122   HDL  --   --   --   --  43*  --   --   --    NHDL  --   --   --   --  191*  --   --   --    CHOL  --   --   --   --  234*  --   --   --    TRIG  --   --   --   --  266*  --   --   --    TSH 2.39  --  1.55  --   --   --  3.19 2.72   IRON  --  44  --    < >  --   --  43 62   FEB  --  201*  --    < >  --   --  232* 210*   IRONSAT  --  22  --    < >  --   --  19 30   BRENT  --  1,022*  --    < >  --    < >  --  582*    < > = values in this interval not displayed.       Lab Results   Component Value Date    WBC 4.8 08/30/2023    WBC 7.2 07/08/2020    RBC 4.34 08/30/2023    RBC 4.24 07/08/2020    HGB 12.7 08/30/2023    HGB 12.7 07/08/2020    HCT 38.6 08/30/2023    HCT 40.7 07/08/2020    MCV 89 08/30/2023    MCV 96 07/08/2020    MCH 29.3 08/30/2023    MCH 30.0 07/08/2020    MCHC 32.9 08/30/2023    MCHC 31.2 (L) 07/08/2020    RDW 16.8 (H) 08/30/2023    RDW 13.9 07/08/2020     (L) 08/30/2023     07/08/2020       Lab Results   Component Value Date     09/06/2023     01/26/2021    POTASSIUM 4.5 09/06/2023    POTASSIUM 4.7 04/06/2023    POTASSIUM 4.3 10/09/2021    POTASSIUM 4.1 01/26/2021    CHLORIDE 105 09/06/2023    CHLORIDE 118 (H) 10/09/2021    CHLORIDE 111 (H) 01/26/2021    CO2 22 09/06/2023    CO2 20 10/09/2021    CO2 22 01/26/2021    ANIONGAP 13 09/06/2023    ANIONGAP 4 10/09/2021    ANIONGAP 7 01/26/2021     (H) 09/06/2023     (H) 05/10/2023     (H) 10/09/2021     (H) 01/26/2021    BUN 20.0 09/06/2023    BUN 25 10/09/2021    BUN 29 01/26/2021    CR 1.18 (H) 09/06/2023    CR 1.45  (H) 01/26/2021    GFRESTIMATED 50 (L) 09/06/2023    GFRESTIMATED 33 (L) 05/26/2023    GFRESTIMATED 38 (L) 01/26/2021    GFRESTBLACK 44 (L) 01/26/2021    LIVIER 8.8 09/06/2023    LIVIER 9.3 01/26/2021      Lab Results   Component Value Date    AST 35 09/06/2023    AST 17 01/26/2021    ALT 30 09/06/2023    ALT 22 01/26/2021       Lab Results   Component Value Date    A1C 4.8 03/12/2018       No results found for: ALEXANDER RAYO MD  [facility]

## 2023-09-12 NOTE — LETTER
9/12/2023    Nomi Cartwright, DO  [address] [place] [address]    RE: Nadia Ladd       Dear Colleague,     I had the pleasure of seeing Nadia Ladd in the [facility].    General Cardiology Clinic Progress Note  Nadia Ladd MRN# [id number]   YOB: 1955 Age: 68 year old       Reason for visit: Paroxysmal atrial fibrillation    History of presenting illness:    I had the opportunity to see Nadia Ladd at [facility] today for reevaluation of paroxysmal atrial fibrillation associated with chemotherapy for treatment of colon cancer.  Ms. Ladd has been seen here a few times since June for evaluation and treatment of paroxysmal atrial fibrillation which seems to correlate with her chemotherapy sessions for treatment of colon cancer.  She believes she has had 2 or 3 episodes of atrial fibrillation out of 5 chemotherapy sessions so far.  She is on Xarelto for stroke prevention.  She has been using a regimen of metoprolol succinate 50 mg daily, combined with diltiazem  mg daily on days that she has chemo and 2 days after chemo.  She has short acting diltiazem 30 mg tablets to use as needed as well.  She has also been getting IV fluids after chemo.  This combination of treatments has prevented recurrent atrial fibrillation over the last couple of chemo sessions.    She seems to be feeling well without any specific complaints.  During times of A-fib she has been markedly symptomatic.    She has mild chronic kidney disease with a creatinine which has improved most recently on 9/6/2023 to 1.18 with a GFR of 50.  Her echocardiogram is essentially normal.  I reviewed her twelve-lead ECG from 8/19/2023 and it demonstrated normal sinus rhythm without abnormality.    Her blood pressure today was a little elevated here but normal at home.  It was 144/86 here and 122/78 at home.  Heart rate is 72 and weight 177 pounds.  During chemo when she takes both  diltiazem and metoprolol, her heart rates are a bit slower, usually in the 60s.  Her heart rhythm is regular today.            Assessment and Plan:     ASSESSMENT:    Ms. Nadia Ladd is a 68-year-old woman with paroxysmal atrial fibrillation episodes associated with chemotherapy.  She has been able to prevent recurrent episodes of atrial fibrillation with a combination of metoprolol XL and diltiazem as described above.  IV fluids given after chemotherapy is also probably beneficial in preventing A-fib.  A-fib can sometimes be associated with dehydration and corticosteroid use.    I am glad that she is doing well today and she is certainly ready to proceed with her next round of chemotherapy starting tomorrow.  I agree with her current medication regimen I will not make any changes today.  We talked about some issues to watch for in terms of hypotension and bradycardia and she will notify us if she has any problems with those issues.    We will plan to see her back in cardiology clinic in 3 months for reevaluation.    John Ramos MD           Orders this Visit:  Orders Placed This Encounter   Procedures    Follow-Up with Cardiology    Follow-Up with Cardiology [facility]     No orders of the defined types were placed in this encounter.    There are no discontinued medications.    Today's clinic visit entailed:  Review of the result(s) of each unique test - base metabolic panel, ALT, ECG, echocardiogram  The following tests were independently interpreted by me as noted in my documentation: Electrocardiogram tracing  Ordering of each unique test  Prescription drug management  30 minutes spent by me on the date of the encounter doing chart review, history and exam, documentation and further activities per the note  Provider  Link to [facility] Help Grid     The level of medical decision making during this visit was of moderate complexity.           Review of Systems:     Review of Systems:  Skin:        Eyes:       ENT:      "  Respiratory:  Negative shortness of breath;cough;wheezing  Cardiovascular:  Negative;palpitations;chest pain;edema;lightheadedness;dizziness;syncope or near-syncope    Gastroenterology:      Genitourinary:       Musculoskeletal:       Neurologic:  Positive for numbness or tingling of hands;numbness or tingling of feet  Psychiatric:       Heme/Lymph/Imm:       Endocrine:                 Physical Exam:     Vitals: BP (!) 144/86 (BP Location: Right arm, Patient Position: Sitting, Cuff Size: Adult Large)   Pulse 72   Resp 16   Ht 1.549 m (5' 1\")   Wt 80.6 kg (177 lb 9.6 oz)   LMP  (LMP Unknown)   SpO2 98%   BMI 33.56 kg/m    Constitutional: Well nourished and in no apparent distress.  Eyes: Pupils equal, round. Sclerae anicteric.   HEENT: Normocephalic, atraumatic.   Neck: Supple. JVD   Respiratory: Breathing non-labored. Lungs clear to auscultation bilaterally. No crackles, wheezes, rhonchi, or rales.  Cardiovascular:  Regular rate and rhythm, normal S1 and S2. No murmur, rub, or gallop.  Skin: Warm, dry. No rashes, cyanosis, or xanthelasma.  Extremities: No edema.  Neurologic: No gross motor deficits. Alert, awake, and oriented to person, place and time.  Psychiatric: Affect appropriate.             Medications:     Current Outpatient Medications   Medication Sig Dispense Refill    cyanocobalamin 1000 MCG TBCR Take 1,000 mcg by mouth daily 100 tablet 1    dexamethasone (DECADRON) 4 MG tablet Take 2 tablets (8 mg) by mouth daily Take for 2 days, starting the day after chemo. Take with food. 40 tablet 0    diltiazem ER COATED BEADS (CARDIZEM CD/CARTIA XT) 120 MG 24 hr capsule Take 1 capsule (120 mg) by mouth daily On chemo days and 2 days post chemo infusion. 60 capsule 3    Ferrous Sulfate (IRON) 325 (65 Fe) MG tablet Take 1 tablet by mouth daily      loperamide (IMODIUM) 2 MG capsule Take 2 mg by mouth daily as needed for diarrhea      MAGNESIUM OXIDE 400 (240 Mg) MG tablet TAKE 1 TABLET (400 MG) BY MOUTH 2 " TIMES DAILY 60 tablet 0    metoprolol succinate ER (TOPROL XL) 50 MG 24 hr tablet Take 1 tablet (50 mg) by mouth daily 90 tablet 3    Multiple Vitamins-Iron (MULTI-DAY PLUS IRON PO) Take 1 tablet by mouth daily      prochlorperazine (COMPAZINE) 10 MG tablet Take 10 mg by mouth every 6 hours as needed for nausea or vomiting      rivaroxaban ANTICOAGULANT (XARELTO) 20 MG TABS tablet Take 1 tablet (20 mg) by mouth daily (with dinner) 90 tablet 3    sodium bicarbonate 650 MG tablet Take 1 tablet (650 mg) by mouth 2 times daily 120 tablet 3    diltiazem (CARDIZEM) 30 MG tablet Take 1 tablet (30 mg) by mouth 3 times daily (Patient not taking: Reported on 2023) 90 tablet 0    lidocaine-prilocaine (EMLA) 2.5-2.5 % external cream Apply topically as needed for moderate pain Apply 15-20 minutes prior to port access (Patient not taking: Reported on 2023) 1 g 4       Family History   Problem Relation Age of Onset    Hypertension Mother         on meds, alive    Cerebrovascular Disease Father         stroke about age 65,  of cancer at 68 yrs    Diabetes Father         eventually took insulin    Anemia Father         Pernisios anemia    Kidney Disease Niece         kidney transplant    Kidney Disease Nephew         kidney transplant    Venous thrombosis No family hx of     Anesthesia Reaction No family hx of        Social History     Socioeconomic History    Marital status:      Spouse name: Not on file    Number of children: Not on file    Years of education: Not on file    Highest education level: Not on file   Occupational History    Not on file   Tobacco Use    Smoking status: Never     Passive exposure: Current    Smokeless tobacco: Never   Substance and Sexual Activity    Alcohol use: No    Drug use: No    Sexual activity: Yes     Partners: Male   Other Topics Concern    Parent/sibling w/ CABG, MI or angioplasty before 65F 55M? Not Asked   Social History Narrative    Not on file     Social Determinants  of Health     Financial Resource Strain: Not on file   Food Insecurity: Not on file   Transportation Needs: Not on file   Physical Activity: Not on file   Stress: Not on file   Social Connections: Not on file   Intimate Partner Violence: Not on file   Housing Stability: Not on file            Past Medical History:     Past Medical History:   Diagnosis Date    Arthropathia 08/05/2010    B12 deficiency anemia 10/21/2010    Benign essential hypertension with target blood pressure below 140/90 10/10/2016    CARDIOVASCULAR SCREENING; LDL GOAL LESS THAN 160 10/31/2010    Crohn's colitis (H) 02/15/2011    Dermatitis-dishydrotic eczema-severe 08/05/2010    Hiatal hernia     HTN (hypertension) 07/21/2011    Iron deficiency anemia 10/21/2010    Malignant neoplasm of sigmoid colon (H)     Obesity     Primary pulmonary hypertension (H) 04/06/2010              Past Surgical History:     Past Surgical History:   Procedure Laterality Date    COLECTOMY WITHOUT COLOSTOMY N/A 4/6/2023    Procedure: Laparoscopic Converted to Open Total abdominal colectomy;  Surgeon: Jerome Ashley MD;  Location: [facility] OR    COLONOSCOPY  10/11/10    COLONOSCOPY N/A 2/27/2023    Procedure: ATTEMPTED COLONOSCOPY WITH SIGMOID STRICTURE BIOPSY;  Surgeon: Jonathan Alcocer MD;  Location:  GI    ESOPHAGOSCOPY, GASTROSCOPY, DUODENOSCOPY (EGD), COMBINED N/A 2/27/2023    Procedure: ESOPHAGOGASTRODUODENOSCOPY, WITH BIOPSY;  Surgeon: Jonathan Alcocer MD;  Location:  GI    HYSTERECTOMY TOTAL ABDOMINAL, BILATERAL SALPINGO-OOPHORECTOMY, COMBINED N/A 4/6/2023    Procedure: Hysterectomy total abdominal, bilateral salpingo-oophorectomy;  Surgeon: Sunitha Olea MD;  Location: [facility] OR    INSERT PORT VASCULAR ACCESS Right 5/25/2023    Procedure: Ultrasound-guided right internal jugular venous access port placement with fluoroscopy;  Surgeon: Martin Thomas DO;  Location: [facility] OR    IR CHEST PORT PLACEMENT > 5 YRS OF AGE  8/21/2023    IR PORT CHECK  RIGHT  7/18/2023    IR PORT REMOVAL RIGHT  8/21/2023    SIGMOIDOSCOPY FLEXIBLE N/A 4/6/2023    Procedure: Sigmoidoscopy flexible;  Surgeon: Jerome Ashley MD;  Location: [facility] OR    SURGICAL HISTORY OF -   06/14/76    Perineorrhaphy, for widening vaginal orifice    [facility] EXPLORATORY OF ABDOMEN  1989    laparoscopy              Allergies:   No known drug allergy       Data:   All laboratory data reviewed:    Recent Labs   Lab Test 08/04/23  0915 07/11/23  1040 06/03/23  1311 02/16/23  0959 06/06/19  1031 03/12/18  1629 11/30/16  1628 09/23/15  1606   LDL  --   --   --   --  138*  --  133* 122   HDL  --   --   --   --  43*  --   --   --    NHDL  --   --   --   --  191*  --   --   --    CHOL  --   --   --   --  234*  --   --   --    TRIG  --   --   --   --  266*  --   --   --    TSH 2.39  --  1.55  --   --   --  3.19 2.72   IRON  --  44  --    < >  --   --  43 62   FEB  --  201*  --    < >  --   --  232* 210*   IRONSAT  --  22  --    < >  --   --  19 30   BRENT  --  1,022*  --    < >  --    < >  --  582*    < > = values in this interval not displayed.       Lab Results   Component Value Date    WBC 4.8 08/30/2023    WBC 7.2 07/08/2020    RBC 4.34 08/30/2023    RBC 4.24 07/08/2020    HGB 12.7 08/30/2023    HGB 12.7 07/08/2020    HCT 38.6 08/30/2023    HCT 40.7 07/08/2020    MCV 89 08/30/2023    MCV 96 07/08/2020    MCH 29.3 08/30/2023    MCH 30.0 07/08/2020    MCHC 32.9 08/30/2023    MCHC 31.2 (L) 07/08/2020    RDW 16.8 (H) 08/30/2023    RDW 13.9 07/08/2020     (L) 08/30/2023     07/08/2020       Lab Results   Component Value Date     09/06/2023     01/26/2021    POTASSIUM 4.5 09/06/2023    POTASSIUM 4.7 04/06/2023    POTASSIUM 4.3 10/09/2021    POTASSIUM 4.1 01/26/2021    CHLORIDE 105 09/06/2023    CHLORIDE 118 (H) 10/09/2021    CHLORIDE 111 (H) 01/26/2021    CO2 22 09/06/2023    CO2 20 10/09/2021    CO2 22 01/26/2021    ANIONGAP 13 09/06/2023    ANIONGAP 4 10/09/2021    ANIONGAP 7  01/26/2021     (H) 09/06/2023     (H) 05/10/2023     (H) 10/09/2021     (H) 01/26/2021    BUN 20.0 09/06/2023    BUN 25 10/09/2021    BUN 29 01/26/2021    CR 1.18 (H) 09/06/2023    CR 1.45 (H) 01/26/2021    GFRESTIMATED 50 (L) 09/06/2023    GFRESTIMATED 33 (L) 05/26/2023    GFRESTIMATED 38 (L) 01/26/2021    GFRESTBLACK 44 (L) 01/26/2021    LIVIER 8.8 09/06/2023    LIVIER 9.3 01/26/2021      Lab Results   Component Value Date    AST 35 09/06/2023    AST 17 01/26/2021    ALT 30 09/06/2023    ALT 22 01/26/2021       Lab Results   Component Value Date    A1C 4.8 03/12/2018       No results found for: ALEXANDER RAYO MD  [facility]      Thank you for allowing me to participate in the care of your patient.      Sincerely,     ALEXANDER RENO MD     [facility]  cc:   Israel Garza MD  [address]  [facility]  [place] [address]

## 2023-09-13 ENCOUNTER — LAB (OUTPATIENT)
Dept: INFUSION THERAPY | Facility: CLINIC | Age: 68
End: 2023-09-13
Attending: INTERNAL MEDICINE
Payer: MEDICARE

## 2023-09-13 ENCOUNTER — VIRTUAL VISIT (OUTPATIENT)
Dept: ONCOLOGY | Facility: CLINIC | Age: 68
End: 2023-09-13
Attending: PHYSICIAN ASSISTANT
Payer: MEDICARE

## 2023-09-13 ENCOUNTER — APPOINTMENT (OUTPATIENT)
Dept: FAMILY MEDICINE | Facility: CLINIC | Age: 68
End: 2023-09-13
Payer: MEDICARE

## 2023-09-13 VITALS — WEIGHT: 177 LBS | BODY MASS INDEX: 33.42 KG/M2 | HEIGHT: 61 IN

## 2023-09-13 VITALS — BODY MASS INDEX: 33.78 KG/M2 | WEIGHT: 178.8 LBS

## 2023-09-13 VITALS
SYSTOLIC BLOOD PRESSURE: 140 MMHG | TEMPERATURE: 98 F | WEIGHT: 178.8 LBS | DIASTOLIC BLOOD PRESSURE: 67 MMHG | BODY MASS INDEX: 33.78 KG/M2 | OXYGEN SATURATION: 100 % | HEART RATE: 69 BPM | RESPIRATION RATE: 16 BRPM

## 2023-09-13 DIAGNOSIS — C18.9 MALIGNANT NEOPLASM OF COLON, UNSPECIFIED PART OF COLON (H): ICD-10-CM

## 2023-09-13 DIAGNOSIS — Z76.89 PREVENTION OF CHEMOTHERAPY-INDUCED NEUTROPENIA: Primary | ICD-10-CM

## 2023-09-13 DIAGNOSIS — C18.7 MALIGNANT NEOPLASM OF SIGMOID COLON (H): Primary | ICD-10-CM

## 2023-09-13 DIAGNOSIS — Z76.89 PREVENTION OF CHEMOTHERAPY-INDUCED NEUTROPENIA: ICD-10-CM

## 2023-09-13 DIAGNOSIS — D70.1 CHEMOTHERAPY-INDUCED NEUTROPENIA (H): ICD-10-CM

## 2023-09-13 DIAGNOSIS — T45.1X5A CHEMOTHERAPY-INDUCED NEUTROPENIA (H): ICD-10-CM

## 2023-09-13 DIAGNOSIS — C18.7 MALIGNANT NEOPLASM OF SIGMOID COLON (H): ICD-10-CM

## 2023-09-13 LAB
ALBUMIN SERPL BCG-MCNC: 3.8 G/DL (ref 3.5–5.2)
ALP SERPL-CCNC: 190 U/L (ref 35–104)
ALT SERPL W P-5'-P-CCNC: 32 U/L (ref 0–50)
ANION GAP SERPL CALCULATED.3IONS-SCNC: 14 MMOL/L (ref 7–15)
AST SERPL W P-5'-P-CCNC: 34 U/L (ref 0–45)
BASOPHILS # BLD AUTO: 0 10E3/UL (ref 0–0.2)
BASOPHILS NFR BLD AUTO: 0 %
BILIRUB SERPL-MCNC: 0.5 MG/DL
BUN SERPL-MCNC: 19.2 MG/DL (ref 8–23)
CALCIUM SERPL-MCNC: 9.2 MG/DL (ref 8.8–10.2)
CHLORIDE SERPL-SCNC: 105 MMOL/L (ref 98–107)
CREAT SERPL-MCNC: 1.5 MG/DL (ref 0.51–0.95)
DEPRECATED HCO3 PLAS-SCNC: 21 MMOL/L (ref 22–29)
EGFRCR SERPLBLD CKD-EPI 2021: 38 ML/MIN/1.73M2
EOSINOPHIL # BLD AUTO: 0 10E3/UL (ref 0–0.7)
EOSINOPHIL NFR BLD AUTO: 0 %
ERYTHROCYTE [DISTWIDTH] IN BLOOD BY AUTOMATED COUNT: 16.6 % (ref 10–15)
GLUCOSE SERPL-MCNC: 153 MG/DL (ref 70–99)
HCT VFR BLD AUTO: 38.8 % (ref 35–47)
HGB BLD-MCNC: 12.6 G/DL (ref 11.7–15.7)
IMM GRANULOCYTES # BLD: 0 10E3/UL
IMM GRANULOCYTES NFR BLD: 0 %
LYMPHOCYTES # BLD AUTO: 0.5 10E3/UL (ref 0.8–5.3)
LYMPHOCYTES NFR BLD AUTO: 11 %
MAGNESIUM SERPL-MCNC: 1.6 MG/DL (ref 1.7–2.3)
MCH RBC QN AUTO: 29 PG (ref 26.5–33)
MCHC RBC AUTO-ENTMCNC: 32.5 G/DL (ref 31.5–36.5)
MCV RBC AUTO: 89 FL (ref 78–100)
MONOCYTES # BLD AUTO: 0.5 10E3/UL (ref 0–1.3)
MONOCYTES NFR BLD AUTO: 13 %
NEUTROPHILS # BLD AUTO: 3.1 10E3/UL (ref 1.6–8.3)
NEUTROPHILS NFR BLD AUTO: 76 %
NRBC # BLD AUTO: 0 10E3/UL
NRBC BLD AUTO-RTO: 0 /100
PLATELET # BLD AUTO: 118 10E3/UL (ref 150–450)
POTASSIUM SERPL-SCNC: 4 MMOL/L (ref 3.4–5.3)
PROT SERPL-MCNC: 6.3 G/DL (ref 6.4–8.3)
RBC # BLD AUTO: 4.34 10E6/UL (ref 3.8–5.2)
SODIUM SERPL-SCNC: 140 MMOL/L (ref 136–145)
WBC # BLD AUTO: 4.1 10E3/UL (ref 4–11)

## 2023-09-13 PROCEDURE — 250N000011 HC RX IP 250 OP 636: Mod: JZ | Performed by: INTERNAL MEDICINE

## 2023-09-13 PROCEDURE — 99214 OFFICE O/P EST MOD 30 MIN: CPT | Mod: 95 | Performed by: PHYSICIAN ASSISTANT

## 2023-09-13 PROCEDURE — 96413 CHEMO IV INFUSION 1 HR: CPT

## 2023-09-13 PROCEDURE — 96375 TX/PRO/DX INJ NEW DRUG ADDON: CPT

## 2023-09-13 PROCEDURE — 96411 CHEMO IV PUSH ADDL DRUG: CPT

## 2023-09-13 PROCEDURE — 96367 TX/PROPH/DG ADDL SEQ IV INF: CPT

## 2023-09-13 PROCEDURE — 80053 COMPREHEN METABOLIC PANEL: CPT | Performed by: INTERNAL MEDICINE

## 2023-09-13 PROCEDURE — 83735 ASSAY OF MAGNESIUM: CPT

## 2023-09-13 PROCEDURE — 85025 COMPLETE CBC W/AUTO DIFF WBC: CPT | Performed by: INTERNAL MEDICINE

## 2023-09-13 PROCEDURE — 36591 DRAW BLOOD OFF VENOUS DEVICE: CPT | Performed by: INTERNAL MEDICINE

## 2023-09-13 PROCEDURE — 258N000003 HC RX IP 258 OP 636: Performed by: INTERNAL MEDICINE

## 2023-09-13 PROCEDURE — 96368 THER/DIAG CONCURRENT INF: CPT

## 2023-09-13 PROCEDURE — 96415 CHEMO IV INFUSION ADDL HR: CPT

## 2023-09-13 RX ORDER — MAGNESIUM SULFATE HEPTAHYDRATE 40 MG/ML
2 INJECTION, SOLUTION INTRAVENOUS ONCE
Status: COMPLETED | OUTPATIENT
Start: 2023-09-13 | End: 2023-09-13

## 2023-09-13 RX ORDER — HEPARIN SODIUM,PORCINE 10 UNIT/ML
5 VIAL (ML) INTRAVENOUS
Status: CANCELLED | OUTPATIENT
Start: 2023-09-13

## 2023-09-13 RX ORDER — METHYLPREDNISOLONE SODIUM SUCCINATE 125 MG/2ML
125 INJECTION, POWDER, LYOPHILIZED, FOR SOLUTION INTRAMUSCULAR; INTRAVENOUS
Status: CANCELLED
Start: 2023-09-13

## 2023-09-13 RX ORDER — HEPARIN SODIUM (PORCINE) LOCK FLUSH IV SOLN 100 UNIT/ML 100 UNIT/ML
5 SOLUTION INTRAVENOUS
Status: DISCONTINUED | OUTPATIENT
Start: 2023-09-13 | End: 2023-09-13 | Stop reason: HOSPADM

## 2023-09-13 RX ORDER — DIPHENHYDRAMINE HYDROCHLORIDE 50 MG/ML
50 INJECTION INTRAMUSCULAR; INTRAVENOUS
Status: CANCELLED
Start: 2023-09-13

## 2023-09-13 RX ORDER — EPINEPHRINE 1 MG/ML
0.3 INJECTION, SOLUTION INTRAMUSCULAR; SUBCUTANEOUS EVERY 5 MIN PRN
Status: CANCELLED | OUTPATIENT
Start: 2023-09-13

## 2023-09-13 RX ORDER — ALBUTEROL SULFATE 90 UG/1
1-2 AEROSOL, METERED RESPIRATORY (INHALATION)
Status: CANCELLED
Start: 2023-09-13

## 2023-09-13 RX ORDER — ALBUTEROL SULFATE 0.83 MG/ML
2.5 SOLUTION RESPIRATORY (INHALATION)
Status: CANCELLED | OUTPATIENT
Start: 2023-09-13

## 2023-09-13 RX ORDER — MEPERIDINE HYDROCHLORIDE 25 MG/ML
25 INJECTION INTRAMUSCULAR; INTRAVENOUS; SUBCUTANEOUS EVERY 30 MIN PRN
Status: CANCELLED | OUTPATIENT
Start: 2023-09-13

## 2023-09-13 RX ORDER — HEPARIN SODIUM,PORCINE 10 UNIT/ML
5 VIAL (ML) INTRAVENOUS
Status: DISCONTINUED | OUTPATIENT
Start: 2023-09-13 | End: 2023-09-13 | Stop reason: HOSPADM

## 2023-09-13 RX ORDER — HEPARIN SODIUM (PORCINE) LOCK FLUSH IV SOLN 100 UNIT/ML 100 UNIT/ML
5 SOLUTION INTRAVENOUS
Status: CANCELLED | OUTPATIENT
Start: 2023-09-13

## 2023-09-13 RX ORDER — ONDANSETRON 2 MG/ML
8 INJECTION INTRAMUSCULAR; INTRAVENOUS ONCE
Status: COMPLETED | OUTPATIENT
Start: 2023-09-13 | End: 2023-09-13

## 2023-09-13 RX ADMIN — DEXTROSE MONOHYDRATE 250 ML: 50 INJECTION, SOLUTION INTRAVENOUS at 11:46

## 2023-09-13 RX ADMIN — OXALIPLATIN 120 MG: 5 INJECTION, SOLUTION INTRAVENOUS at 11:51

## 2023-09-13 RX ADMIN — FOSAPREPITANT: 150 INJECTION, POWDER, LYOPHILIZED, FOR SOLUTION INTRAVENOUS at 11:30

## 2023-09-13 RX ADMIN — ONDANSETRON 8 MG: 2 INJECTION INTRAMUSCULAR; INTRAVENOUS at 11:17

## 2023-09-13 RX ADMIN — HEPARIN 5 ML: 100 SYRINGE at 09:28

## 2023-09-13 RX ADMIN — MAGNESIUM SULFATE HEPTAHYDRATE 2 G: 40 INJECTION, SOLUTION INTRAVENOUS at 10:56

## 2023-09-13 RX ADMIN — SODIUM CHLORIDE 500 ML: 9 INJECTION, SOLUTION INTRAVENOUS at 10:56

## 2023-09-13 RX ADMIN — DEXTROSE MONOHYDRATE 700 MG: 50 INJECTION, SOLUTION INTRAVENOUS at 11:47

## 2023-09-13 ASSESSMENT — PAIN SCALES - GENERAL
PAINLEVEL: NO PAIN (0)
PAINLEVEL: NO PAIN (0)

## 2023-09-13 NOTE — NURSING NOTE
Is the patient currently in the state of [place]? YES    Visit mode:VIDEO    If the visit is dropped, the patient can be reconnected by: VIDEO VISIT: Send to e-mail at: [email or web address]    Will anyone else be joining the visit? NO  (If patient encounters technical issues they should call [phone])    How would you like to obtain your AVS? MyChart    Are changes needed to the allergy or medication list? No    Reason for visit: SYDNI ZAMBRANO

## 2023-09-13 NOTE — PROGRESS NOTES
Oncology/Hematology Visit Note  Sep 13, 2023    Reason for Visit: Follow up of colon cancer    Primary Oncologist: Dr. Irene Bateman    Oncologic History:  Nadia Ladd is a 68 year old female who presents for follow-up     Patient has medical history including Crohn's disease on sulfasalazine, anemia secondary to iron deficiency and B12 deficiency, obesity, hypertension, prediabetes, eczema, pulmonary hypertension, hiatal hernia, mild splenomegaly (14.7 cm in 2/23)     She initially presented 2/2023 with fatigue and iron deficiency anemia. Colonoscopy confirmed sigmoid colon mass, biopsy with invasive poorly differentiated carcinoma, MLH-1 loss, PMS2 loss, negative MLH1 promoter methylation. CT with the proximal sigmoid colon mass and a hepatic flexure colon mass, associated LAD. PET confirmed multifocal disease with suspicion of a third lesion in left transverse colon. CEA 6.8. Negative genetic testing for Chan syndrome.     She underwent laparoscopic converted to open total abdominal colectomy with ileorectal anastomosis, partial omentectomy, flexible sigmoidoscopy, TAHBSO on 4/6/23.     Hepatic flexure mass with MINEN with +LVI, TPS 50% +AEQNI54G, mpT3 pN1a, stage 3B, MMR pending.    Sigmoid Colon Sigrid with poorly differentiated carcinoma, grade 3, loss of MLH1 and PMS2, TPS 70%, +KRAS G13D mutation, mpT2 pN1a, stage 3A    One of forty-one sampled lymph nodes positive (1/41), d/w pathology- unable to determine which mass is metastatic to LN.    NGS with KRAS G13D, TMB 17.    Admitted with ARF.      CT CAP with minimally increase subcentimeter pulmonary nodules, new GG nodule, several prominent mediastinal lymph nodes slightly increased, splenomegaly 14.5 cm. MRI brain no evidence of disease.     5/31/23 started mFOLFOX 6 w/dose reduced oxaliplatin for renal function.      C2D1 6/14/23 held due to new onset paroxysmal A-fib with RVR requiring hospitalization 6/3/2023 (also hypoMg, dehydrated). Started  "apixaban ([id number] 3) and metoprolol.     -7/18/2023  CT CAP- subcm lung nodules stable, borderline and mildly enlarged mediastinal LN and periportal LN stable, splenomegaly 15.7cm, Subtotal colectomy with ileorectal anastomosis. No acute inflammation or obstruction     - 7/19/2023 resumed  mFOLFOX6 C2D1  (inpatient w/continuous cardiac monitoring). Diltiazem added for infusions.     Interval History:  Doing well, offers no complaints or concerns. Continues the cardiac medications as directed with infusions. She has enjoyed meeting Dr. Thompson. No ED visits since last cycle.    Review of Systems:  A complete review of systems was negative except as noted in the HPI.     Past medical, Surgical, and social history:  Reviewed    Physical Examination:  Ht 1.549 m (5' 1\")   Wt 80.3 kg (177 lb)   LMP  (LMP Unknown)   BMI 33.44 kg/m    Wt Readings from Last 10 Encounters:   09/13/23 81.1 kg (178 lb 12.8 oz)   09/13/23 80.3 kg (177 lb)   09/13/23 81.1 kg (178 lb 12.8 oz)   09/12/23 80.6 kg (177 lb 9.6 oz)   09/06/23 79.3 kg (174 lb 12.8 oz)   08/30/23 80.7 kg (177 lb 14.4 oz)   08/30/23 79.4 kg (175 lb)   08/23/23 78.8 kg (173 lb 12.8 oz)   08/19/23 80.7 kg (178 lb)   08/16/23 80.3 kg (177 lb)     Video Visit - Exam Limited  General: Pleasant patient in no acute distress. No obvious skin lesions. Anicteric and no jaundice. Normal work of breathing. Abdomen non-distended. Calm, cooperative, appropriate. Mood euthymic. Cranial nerves and coordination grossly intact. Alert and oriented x 3.    Laboratory Data:  CBC, CMP, mag reviewed.       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 Thompson  - MARYA on CKD- following w/nephro, f/u with nephro 9/21/23  - diarrhea- grade 1  - cold sensitivity- grade 1  - neuropathy- grade 1      - Follow-up with Dr. Bateman in 2 weeks    - plan to repeat CT 10/23     - f/u with Dr. Ashley 4/2024 for rigid proctoscopy      #parxosymal afib  - Possible secondary to 5FU  - Continue metoprolol XL 50 mg daily  - Also takes diltiazem  mg daily with short acting 30 mg PRN on days 1-3 of chemo and PRN for HR >110 in a fib  - Continue Eliquis  - Follows closely with Dr. Garza  - Additional IV mag today, check days 1 and 8  - IVF days 1 and 3 to prevent dehydration    #Anemia secondary to iron deficiency and B12 deficiency  - terminal ileum removed at time of colon surgery, monitor for nutrient def  - On B12 1000 mcg p.o. daily and ferrous sulfate 325 mg p.o. daily     #Crohn's  - dx since at least 2010     25 minutes spent on the date of the encounter doing chart review, review of test results, interpretation of tests, patient visit, and documentation     Alva Putnam PA-C  [facility]   [facility]   [phone]      Virtual Visit Details    Type of service:  Phone Visit     Originating Location (pt. Location): Other Clinic    Distant Location (provider location):  On-site  Platform used for Video Visit: Juinor

## 2023-09-13 NOTE — LETTER
9/13/2023         RE: Nadia Ladd  [address]  [facility] [address]        Dear Colleague,    Thank you for referring your patient, Nadia Ladd, to the [facility] CANCER [facility]. Please see a copy of my visit note below.    Oncology/Hematology Visit Note  Sep 13, 2023    Reason for Visit: Follow up of colon cancer    Primary Oncologist: Dr. Irene Bateman    Oncologic History:  Nadia Ladd is a 68 year old female who presents for follow-up     Patient has medical history including Crohn's disease on sulfasalazine, anemia secondary to iron deficiency and B12 deficiency, obesity, hypertension, prediabetes, eczema, pulmonary hypertension, hiatal hernia, mild splenomegaly (14.7 cm in 2/23)     She initially presented 2/2023 with fatigue and iron deficiency anemia. Colonoscopy confirmed sigmoid colon mass, biopsy with invasive poorly differentiated carcinoma, MLH-1 loss, PMS2 loss, negative MLH1 promoter methylation. CT with the proximal sigmoid colon mass and a hepatic flexure colon mass, associated LAD. PET confirmed multifocal disease with suspicion of a third lesion in left transverse colon. CEA 6.8. Negative genetic testing for Chan syndrome.     She underwent laparoscopic converted to open total abdominal colectomy with ileorectal anastomosis, partial omentectomy, flexible sigmoidoscopy, TAHBSO on 4/6/23.     Hepatic flexure mass with MINEN with +LVI, TPS 50% +[id number], mpT3 pN1a, stage 3B, MMR pending.    Sigmoid Colon Sigrid with poorly differentiated carcinoma, grade 3, loss of MLH1 and PMS2, TPS 70%, +KRAS G13D mutation, mpT2 pN1a, stage 3A    One of forty-one sampled lymph nodes positive (1/41), d/w pathology- unable to determine which mass is metastatic to LN.    NGS with KRAS G13D, TMB 17.    Admitted with ARF.      CT CAP with minimally increase subcentimeter pulmonary nodules, new GG nodule, several prominent mediastinal lymph nodes slightly increased, splenomegaly 14.5 cm. MRI  "brain no evidence of disease.     5/31/23 started mFOLFOX 6 w/dose reduced oxaliplatin for renal function.      C2D1 6/14/23 held due to new onset paroxysmal A-fib with RVR requiring hospitalization 6/3/2023 (also hypoMg, dehydrated). Started apixaban ([id number]) and metoprolol.     -7/18/2023  CT CAP- subcm lung nodules stable, borderline and mildly enlarged mediastinal LN and periportal LN stable, splenomegaly 15.7cm, Subtotal colectomy with ileorectal anastomosis. No acute inflammation or obstruction     - 7/19/2023 resumed  mFOLFOX6 C2D1  (inpatient w/continuous cardiac monitoring). Diltiazem added for infusions.     Interval History:  Doing well, offers no complaints or concerns. Continues the cardiac medications as directed with infusions. She has enjoyed meeting Dr. Thompson. No ED visits since last cycle.    Review of Systems:  A complete review of systems was negative except as noted in the HPI.     Past medical, Surgical, and social history:  Reviewed    Physical Examination:  Ht 1.549 m (5' 1\")   Wt 80.3 kg (177 lb)   LMP  (LMP Unknown)   BMI 33.44 kg/m    Wt Readings from Last 10 Encounters:   09/13/23 81.1 kg (178 lb 12.8 oz)   09/13/23 80.3 kg (177 lb)   09/13/23 81.1 kg (178 lb 12.8 oz)   09/12/23 80.6 kg (177 lb 9.6 oz)   09/06/23 79.3 kg (174 lb 12.8 oz)   08/30/23 80.7 kg (177 lb 14.4 oz)   08/30/23 79.4 kg (175 lb)   08/23/23 78.8 kg (173 lb 12.8 oz)   08/19/23 80.7 kg (178 lb)   08/16/23 80.3 kg (177 lb)     Video Visit - Exam Limited  General: Pleasant patient in no acute distress. No obvious skin lesions. Anicteric and no jaundice. Normal work of breathing. Abdomen non-distended. Calm, cooperative, appropriate. Mood euthymic. Cranial nerves and coordination grossly intact. Alert and oriented x 3.    Laboratory Data:  CBC, CMP, mag reviewed.       Assessment and Plan:  Nadia Ladd is a 68 year old  female with:        # ascending colon/hepatic flexure Mixed " neuroendocrine-non-neuroendocrine neoplasm (MINEN, poorly differentiated neuroendocrine carcinoma and moderately differentiated adenocarcinoma), KRAS G13D mutated, MARISSA, TPS 50%  # sigmoid colon poorly-differentiated carcinoma, KRAS G13D mutated, loss of MLH1 and PMS2, TPS 70%  - Continue mFOLFOX every 3 weeks x 6 months  - dose reduced oxali for renal functions    Treatment related AE:  - hearing changes-stable w/dose reduced oxaliplatin, flonase prn, ENT consultation pending 10/23/23  - anxiety- follow with Dr. Thompson  - MARYA on CKD- following w/nephro, f/u with nephro 9/21/23  - diarrhea- grade 1  - cold sensitivity- grade 1  - neuropathy- grade 1      - Follow-up with Dr. Bateman in 2 weeks    - plan to repeat CT 10/23     - f/u with Dr. Ashley 4/2024 for rigid proctoscopy      #parxosymal afib  - Possible secondary to 5FU  - Continue metoprolol XL 50 mg daily  - Also takes diltiazem  mg daily with short acting 30 mg PRN on days 1-3 of chemo and PRN for HR >110 in a fib  - Continue Eliquis  - Follows closely with Dr. Garza  - Additional IV mag today, check days 1 and 8  - IVF days 1 and 3 to prevent dehydration    #Anemia secondary to iron deficiency and B12 deficiency  - terminal ileum removed at time of colon surgery, monitor for nutrient def  - On B12 1000 mcg p.o. daily and ferrous sulfate 325 mg p.o. daily     #Crohn's  - dx since at least 2010     25 minutes spent on the date of the encounter doing chart review, review of test results, interpretation of tests, patient visit, and documentation     Alva Putnam PA-C  [facility]   [phone]      Virtual Visit Details    Type of service:  Phone Visit     Originating Location (pt. Location): Other Clinic    Distant Location (provider location):  On-site  Platform used for Video Visit: Junior      Again, thank you for allowing me to participate in the care of your patient.        Sincerely,        ALVA PUTNAM PA-C

## 2023-09-13 NOTE — PROGRESS NOTES
"Infusion Nursing Note:  Nadia Ladd presents today for Chemo:: leucovorin calcium AND oxaliplatin (ELOXATIN) and magnesium replaced per protocol  Patient seen by provider today: Yes: Dr Bateman virtual visit   present during visit today: Not Applicable.    Note: fluorouracil (ADRUCIL) HOME infusion via CADD-- will return this Friday for disconnect.      Intravenous Access:  Implanted Port.    Treatment Conditions:  Lab Results   Component Value Date    HGB 12.6 09/13/2023    WBC 4.1 09/13/2023    ANEU 7.6 08/01/2023    ANEUTAUTO 3.1 09/13/2023     (L) 09/13/2023        Lab Results   Component Value Date     09/13/2023    POTASSIUM 4.0 09/13/2023    MAG 1.6 (L) 09/13/2023    CR 1.50 (H) 09/13/2023    LIVIER 9.2 09/13/2023    BILITOTAL 0.5 09/13/2023    ALBUMIN 3.8 09/13/2023    ALT 32 09/13/2023    AST 34 09/13/2023       Results reviewed, labs MET treatment parameters, ok to proceed with treatment.      Post Infusion Assessment:  Patient tolerated infusion without incident.  Patient observed for 15 minutes post Chemo per protocol.  Blood return noted pre and post infusion.  Site patent and intact, free from redness, edema or discomfort.  No evidence of extravasations.       Discharge Plan:   Patient declined prescription refills.  Discharge instructions reviewed with: Patient and Family.  Patient and/or family verbalized understanding of discharge instructions and all questions answered.  AVS to patient via "[employer]te[employer],SL"T.  Patient will return 9/15 for IVF and  fluorouracil (ADRUCIL) HOME INFUSION de access for next appointment.   Patient discharged in stable condition accompanied by: self and .  Departure Mode: Ambulatory.    Prior to discharge: Port is secured in place with tegaderm and flushed with 10cc NS with positive blood return noted.    Continuous home infusion CADD pump connected.    All connectors secured in place and clamps taped open.    Pump started, \"running\" noted on display " (CADD): YES.   Capillary element taped to pt's skin per protocol.  Pump Connection double checked with Eli ZAYAS.  Patient instructed to call our clinic or [facility] with any questions or concerns at home.  Patient verbalized understanding.    Patient set up for pump disconnect at our clinic on 9/15/23 at 1200.        Charlene Stokes RN

## 2023-09-13 NOTE — LETTER
9/13/2023         RE: Nadia Ladd  [address]  [facility] [address]        Dear Colleague,    Thank you for referring your patient, Nadia Ladd, to the [facility] CANCER [facility]. Please see a copy of my visit note below.    Oncology/Hematology Visit Note  Sep 13, 2023    Reason for Visit: Follow up of colon cancer    Primary Oncologist: Dr. Irene Bateman    Oncologic History:  Nadia Ladd is a 68 year old female who presents for follow-up     Patient has medical history including Crohn's disease on sulfasalazine, anemia secondary to iron deficiency and B12 deficiency, obesity, hypertension, prediabetes, eczema, pulmonary hypertension, hiatal hernia, mild splenomegaly (14.7 cm in 2/23)     She initially presented 2/2023 with fatigue and iron deficiency anemia. Colonoscopy confirmed sigmoid colon mass, biopsy with invasive poorly differentiated carcinoma, MLH-1 loss, PMS2 loss, negative MLH1 promoter methylation. CT with the proximal sigmoid colon mass and a hepatic flexure colon mass, associated LAD. PET confirmed multifocal disease with suspicion of a third lesion in left transverse colon. CEA 6.8. Negative genetic testing for Chan syndrome.     She underwent laparoscopic converted to open total abdominal colectomy with ileorectal anastomosis, partial omentectomy, flexible sigmoidoscopy, TAHBSO on 4/6/23.     Hepatic flexure mass with MINEN with +LVI, TPS 50% +[id number], mpT3 pN1a, stage 3B, MMR pending.    Sigmoid Colon Sigrid with poorly differentiated carcinoma, grade 3, loss of MLH1 and PMS2, TPS 70%, +KRAS G13D mutation, mpT2 pN1a, stage 3A    One of forty-one sampled lymph nodes positive (1/41), d/w pathology- unable to determine which mass is metastatic to LN.    NGS with KRAS G13D, TMB 17.    Admitted with ARF.      CT CAP with minimally increase subcentimeter pulmonary nodules, new GG nodule, several prominent mediastinal lymph nodes slightly increased, splenomegaly 14.5 cm. MRI  "brain no evidence of disease.     5/31/23 started mFOLFOX 6 w/dose reduced oxaliplatin for renal function.      C2D1 6/14/23 held due to new onset paroxysmal A-fib with RVR requiring hospitalization 6/3/2023 (also hypoMg, dehydrated). Started apixaban ([id number] 3) and metoprolol.     -7/18/2023  CT CAP- subcm lung nodules stable, borderline and mildly enlarged mediastinal LN and periportal LN stable, splenomegaly 15.7cm, Subtotal colectomy with ileorectal anastomosis. No acute inflammation or obstruction     - 7/19/2023 resumed  mFOLFOX6 C2D1  (inpatient w/continuous cardiac monitoring). Diltiazem added for infusions.     Interval History:  Doing well, offers no complaints or concerns. Continues the cardiac medications as directed with infusions. She has enjoyed meeting Dr. Thompson. No ED visits since last cycle.    Review of Systems:  A complete review of systems was negative except as noted in the HPI.     Past medical, Surgical, and social history:  Reviewed    Physical Examination:  Ht 1.549 m (5' 1\")   Wt 80.3 kg (177 lb)   LMP  (LMP Unknown)   BMI 33.44 kg/m    Wt Readings from Last 10 Encounters:   09/13/23 81.1 kg (178 lb 12.8 oz)   09/13/23 80.3 kg (177 lb)   09/13/23 81.1 kg (178 lb 12.8 oz)   09/12/23 80.6 kg (177 lb 9.6 oz)   09/06/23 79.3 kg (174 lb 12.8 oz)   08/30/23 80.7 kg (177 lb 14.4 oz)   08/30/23 79.4 kg (175 lb)   08/23/23 78.8 kg (173 lb 12.8 oz)   08/19/23 80.7 kg (178 lb)   08/16/23 80.3 kg (177 lb)     Video Visit - Exam Limited  General: Pleasant patient in no acute distress. No obvious skin lesions. Anicteric and no jaundice. Normal work of breathing. Abdomen non-distended. Calm, cooperative, appropriate. Mood euthymic. Cranial nerves and coordination grossly intact. Alert and oriented x 3.    Laboratory Data:  CBC, CMP, mag reviewed.       Assessment and Plan:  Nadia Ladd is a 68 year old  female with:        # ascending colon/hepatic flexure Mixed " neuroendocrine-non-neuroendocrine neoplasm (MINEN, poorly differentiated neuroendocrine carcinoma and moderately differentiated adenocarcinoma), KRAS G13D mutated, MARISSA, TPS 50%  # sigmoid colon poorly-differentiated carcinoma, KRAS G13D mutated, loss of MLH1 and PMS2, TPS 70%  - Continue mFOLFOX every 3 weeks x 6 months  - dose reduced oxali for renal functions    Treatment related AE:  - hearing changes-stable w/dose reduced oxaliplatin, flonase prn, ENT consultation pending 10/23/23  - anxiety- follow with Dr. Thompson  - MARYA on CKD- following w/nephro, f/u with nephro 9/21/23  - diarrhea- grade 1  - cold sensitivity- grade 1  - neuropathy- grade 1      - Follow-up with Dr. Bateman in 2 weeks    - plan to repeat CT 10/23     - f/u with Dr. Ashley 4/2024 for rigid proctoscopy      #parxosymal afib  - Possible secondary to 5FU  - Continue metoprolol XL 50 mg daily  - Also takes diltiazem  mg daily with short acting 30 mg PRN on days 1-3 of chemo and PRN for HR >110 in a fib  - Continue Eliquis  - Follows closely with Dr. Garza  - Additional IV mag today, check days 1 and 8  - IVF days 1 and 3 to prevent dehydration    #Anemia secondary to iron deficiency and B12 deficiency  - terminal ileum removed at time of colon surgery, monitor for nutrient def  - On B12 1000 mcg p.o. daily and ferrous sulfate 325 mg p.o. daily     #Crohn's  - dx since at least 2010     25 minutes spent on the date of the encounter doing chart review, review of test results, interpretation of tests, patient visit, and documentation     Alva Putnam PA-C  [facility]   [phone]      Virtual Visit Details    Type of service:  Phone Visit     Originating Location (pt. Location): Other Clinic    Distant Location (provider location):  On-site  Platform used for Video Visit: Junior      Again, thank you for allowing me to participate in the care of your patient.        Sincerely,        ALVA PUTNAM PA-C

## 2023-09-13 NOTE — PROGRESS NOTES
Infusion Nursing Note:  Nadia Ladd presents today for PORT labs.    Patient seen by provider today: Yes: RN visit and Virtual visit after labs drawn   present during visit today: Not Applicable.    Note: N/A.      Intravenous Access:  Lab draw site left chest wall, Needle type non-coring, Gauge 20 3/4.  Labs drawn without difficulty.  Implanted Port.        Charlene Stokes RN

## 2023-09-15 ENCOUNTER — INFUSION THERAPY VISIT (OUTPATIENT)
Dept: INFUSION THERAPY | Facility: CLINIC | Age: 68
End: 2023-09-15
Attending: INTERNAL MEDICINE
Payer: MEDICARE

## 2023-09-15 VITALS
OXYGEN SATURATION: 98 % | DIASTOLIC BLOOD PRESSURE: 60 MMHG | TEMPERATURE: 98.2 F | BODY MASS INDEX: 34.39 KG/M2 | RESPIRATION RATE: 16 BRPM | SYSTOLIC BLOOD PRESSURE: 127 MMHG | HEART RATE: 62 BPM | WEIGHT: 182 LBS

## 2023-09-15 DIAGNOSIS — C18.7 MALIGNANT NEOPLASM OF SIGMOID COLON (H): Primary | ICD-10-CM

## 2023-09-15 DIAGNOSIS — Z76.89 PREVENTION OF CHEMOTHERAPY-INDUCED NEUTROPENIA: ICD-10-CM

## 2023-09-15 PROCEDURE — 258N000003 HC RX IP 258 OP 636: Performed by: INTERNAL MEDICINE

## 2023-09-15 PROCEDURE — 96361 HYDRATE IV INFUSION ADD-ON: CPT

## 2023-09-15 PROCEDURE — 250N000011 HC RX IP 250 OP 636: Mod: JZ | Performed by: INTERNAL MEDICINE

## 2023-09-15 PROCEDURE — 96360 HYDRATION IV INFUSION INIT: CPT

## 2023-09-15 RX ORDER — HEPARIN SODIUM (PORCINE) LOCK FLUSH IV SOLN 100 UNIT/ML 100 UNIT/ML
5 SOLUTION INTRAVENOUS
Status: DISCONTINUED | OUTPATIENT
Start: 2023-09-15 | End: 2023-09-15 | Stop reason: HOSPADM

## 2023-09-15 RX ADMIN — SODIUM CHLORIDE 1000 ML: 9 INJECTION, SOLUTION INTRAVENOUS at 12:23

## 2023-09-15 RX ADMIN — HEPARIN 5 ML: 100 SYRINGE at 14:26

## 2023-09-15 ASSESSMENT — PAIN SCALES - GENERAL: PAINLEVEL: NO PAIN (0)

## 2023-09-15 NOTE — PROGRESS NOTES
Infusion Nursing Note:  Nadia Ladd presents today for C6D3 CADD pump disconnect with IVF.    Patient seen by provider today: No   present during visit today: Not Applicable.    Note: N/A.      Intravenous Access:  Implanted Port.    Treatment Conditions:  Not Applicable.      Post Infusion Assessment:  Patient tolerated infusion without incident.       Discharge Plan:   Patient discharged in stable condition accompanied by: .  Departure Mode: Ambulatory.      Marycarmen Cardenas RN

## 2023-09-19 ENCOUNTER — VIRTUAL VISIT (OUTPATIENT)
Dept: ONCOLOGY | Facility: CLINIC | Age: 68
End: 2023-09-19
Attending: PSYCHOLOGIST
Payer: MEDICARE

## 2023-09-19 DIAGNOSIS — F41.9 ANXIETY: Primary | ICD-10-CM

## 2023-09-19 PROCEDURE — 90832 PSYTX W PT 30 MINUTES: CPT | Mod: 95 | Performed by: PSYCHOLOGIST

## 2023-09-19 NOTE — LETTER
"    2023         RE: Nadia Ladd  [address]  [facility] [address]        Dear Colleague,    Thank you for referring your patient, Nadia Ladd, to the [facility] CANCER CLINIC. Please see a copy of my visit note below.        [facility] Oncology- Psychotherapy                                    Progress Note    Patient Name: Nadia Ladd  Date: 23         Service Type: Individual      Session Start Time: 11:00  Session End Time: 11:30     Session Length: 30 minutes    Session #: 2    Attendees: Client attended alone    Service Modality:  Phone Visit:      Provider verified identity through the following two step process.  Patient provided:  Patient     Telephone Visit: The patient's condition can be safely assessed and treated via synchronous audio telemedicine encounter.      Reason for Audio Telemedicine Visit: Services only offered telehealth    Originating Site (Patient Location): Patient's home    Distant Site (Provider Location): Provider Remote Setting- Home Office    Consent:  The patient/guardian has verbally consented to:     1. The potential risks and benefits of telemedicine (telephone visit) versus in person care;    The patient has been notified of the following:      \"We have found that certain health care needs can be provided without the need for a face to face visit.  This service lets us provide the care you need with a phone conversation.       I will have full access to your [facility] medical record during this entire phone call.   I will be taking notes for your medical record.      Since this is like an office visit, we will bill your insurance company for this service.       There are potential benefits and risks of telephone visits (e.g. limits to patient confidentiality) that differ from in-person visits.?Confidentiality still applies for telephone services, and nobody will record the visit.  It is important to be in a quiet, private " "space that is free of distractions (including cell phone or other devices) during the visit.??      If during the course of the call I believe a telephone visit is not appropriate, you will not be charged for this service\"     Consent has been obtained for this service by care team member: Yes     DATA  Interactive Complexity: No  Crisis: No     Current / Ongoing Stressors and Concerns:   Anxiety, cancer, cancer treatment     Treatment Objective(s) Addressed in This Session:   identify coping strategies to more effectively address stressors     Intervention:   Motivational Interviewing: to help her focus on what she can control     ASSESSMENT: Current Emotional / Mental Status (status of significant symptoms):   Risk status (Self / Other harm or suicidal ideation)   Patient denies current fears or concerns for personal safety.   Patient denies current or recent suicidal ideation or behaviors.   Patient denies current or recent homicidal ideation or behaviors.   Patient denies current or recent self injurious behavior or ideation.   Patient denies other safety concerns.   Patient reports there has been no change in risk factors since their last session.     Patient reports there has been no change in protective factors since their last session.     Recommended that patient call 911 or go to the local ED should there be a change in any of these risk factors.     Appearance:   Appropriate    Eye Contact:   Good    Psychomotor Behavior: Normal    Attitude:   Cooperative    Orientation:   All   Speech    Rate / Production: Normal     Volume:  Normal    Mood:    Normal   Affect:    Appropriate    Thought Content:  Clear    Thought Form:  Coherent  Logical    Insight:    Good      Medication Review:   No changes to current psychiatric medication(s)     Medication Compliance:   Yes     Changes in Health Issues:   None reported     Chemical Use Review:   Substance Use: Chemical use reviewed, no active concerns identified "      Tobacco Use: No current tobacco use.      Diagnosis:  1. Anxiety        Collateral Reports Completed:   Routed note to Care Team Member(s)    PLAN: (Patient Tasks / Therapist Tasks / Other)  Focus her thoughts on the aspects of her life she can control. Reduce her time spent talking about cancer.     Sarath Thompson PsyD                                                         ______________________________________________________________________    Individual Treatment Plan    Patient's Name: Nadia Ladd  YOB: 1955    Date of Creation: 9/5/23  Date Treatment Plan Last Reviewed/Revised:     DSM5 Diagnoses: 300.02 (F41.1) Generalized Anxiety Disorder  Psychosocial / Contextual Factors: cancer  Anticipated number of session for this episode of care: 3-6 sessions  Anticipation frequency of session: Every other week  Anticipated Duration of each session: 16-37 minutes  Treatment plan will be reviewed in 90 days or when goals have been changed.       MeasurableTreatment Goal(s) related to diagnosis / functional impairment(s)  Goal 1: Patient will focus on what she can control.     I will know I've met my goal when I feel less anxious.      Patient has reviewed and agreed to the above plan.      Sarath Thompson PsyD  September 19, 2023

## 2023-09-19 NOTE — PROGRESS NOTES
"    [facility] Oncology- Psychotherapy                                    Progress Note    Patient Name: Nadia Ladd  Date: 23         Service Type: Individual      Session Start Time: 11:00  Session End Time: 11:30     Session Length: 30 minutes    Session #: 2    Attendees: Client attended alone    Service Modality:  Phone Visit:      Provider verified identity through the following two step process.  Patient provided:  Patient     Telephone Visit: The patient's condition can be safely assessed and treated via synchronous audio telemedicine encounter.      Reason for Audio Telemedicine Visit: Services only offered telehealth    Originating Site (Patient Location): Patient's home    Distant Site (Provider Location): Provider Remote Setting- Home Office    Consent:  The patient/guardian has verbally consented to:     1. The potential risks and benefits of telemedicine (telephone visit) versus in person care;    The patient has been notified of the following:      \"We have found that certain health care needs can be provided without the need for a face to face visit.  This service lets us provide the care you need with a phone conversation.       I will have full access to your [facility] medical record during this entire phone call.   I will be taking notes for your medical record.      Since this is like an office visit, we will bill your insurance company for this service.       There are potential benefits and risks of telephone visits (e.g. limits to patient confidentiality) that differ from in-person visits.?Confidentiality still applies for telephone services, and nobody will record the visit.  It is important to be in a quiet, private space that is free of distractions (including cell phone or other devices) during the visit.??      If during the course of the call I believe a telephone visit is not appropriate, you will not be charged for this service\"     Consent has been obtained " for this service by care team member: Yes     DATA  Interactive Complexity: No  Crisis: No     Current / Ongoing Stressors and Concerns:   Anxiety, cancer, cancer treatment     Treatment Objective(s) Addressed in This Session:   identify coping strategies to more effectively address stressors     Intervention:   Motivational Interviewing: to help her focus on what she can control     ASSESSMENT: Current Emotional / Mental Status (status of significant symptoms):   Risk status (Self / Other harm or suicidal ideation)   Patient denies current fears or concerns for personal safety.   Patient denies current or recent suicidal ideation or behaviors.   Patient denies current or recent homicidal ideation or behaviors.   Patient denies current or recent self injurious behavior or ideation.   Patient denies other safety concerns.   Patient reports there has been no change in risk factors since their last session.     Patient reports there has been no change in protective factors since their last session.     Recommended that patient call 911 or go to the local ED should there be a change in any of these risk factors.     Appearance:   Appropriate    Eye Contact:   Good    Psychomotor Behavior: Normal    Attitude:   Cooperative    Orientation:   All   Speech    Rate / Production: Normal     Volume:  Normal    Mood:    Normal   Affect:    Appropriate    Thought Content:  Clear    Thought Form:  Coherent  Logical    Insight:    Good      Medication Review:   No changes to current psychiatric medication(s)     Medication Compliance:   Yes     Changes in Health Issues:   None reported     Chemical Use Review:   Substance Use: Chemical use reviewed, no active concerns identified      Tobacco Use: No current tobacco use.      Diagnosis:  1. Anxiety        Collateral Reports Completed:   Routed note to Care Team Member(s)    PLAN: (Patient Tasks / Therapist Tasks / Other)  Focus her thoughts on the aspects of her life she can  control. Reduce her time spent talking about cancer.     Sarath Thompson PsyD                                                         ______________________________________________________________________    Individual Treatment Plan    Patient's Name: Nadia Ladd  YOB: 1955    Date of Creation: 9/5/23  Date Treatment Plan Last Reviewed/Revised:     DSM5 Diagnoses: 300.02 (F41.1) Generalized Anxiety Disorder  Psychosocial / Contextual Factors: cancer  Anticipated number of session for this episode of care: 3-6 sessions  Anticipation frequency of session: Every other week  Anticipated Duration of each session: 16-37 minutes  Treatment plan will be reviewed in 90 days or when goals have been changed.       MeasurableTreatment Goal(s) related to diagnosis / functional impairment(s)  Goal 1: Patient will focus on what she can control.     I will know I've met my goal when I feel less anxious.      Patient has reviewed and agreed to the above plan.      Sarath Thompson PsyD  September 19, 2023

## 2023-09-20 ENCOUNTER — LAB (OUTPATIENT)
Dept: INFUSION THERAPY | Facility: CLINIC | Age: 68
End: 2023-09-20
Attending: INTERNAL MEDICINE
Payer: MEDICARE

## 2023-09-20 VITALS — SYSTOLIC BLOOD PRESSURE: 167 MMHG | DIASTOLIC BLOOD PRESSURE: 69 MMHG

## 2023-09-20 DIAGNOSIS — C18.7 MALIGNANT NEOPLASM OF SIGMOID COLON (H): Primary | ICD-10-CM

## 2023-09-20 DIAGNOSIS — Z76.89 PREVENTION OF CHEMOTHERAPY-INDUCED NEUTROPENIA: Primary | ICD-10-CM

## 2023-09-20 DIAGNOSIS — C18.9 MALIGNANT NEOPLASM OF COLON, UNSPECIFIED PART OF COLON (H): ICD-10-CM

## 2023-09-20 DIAGNOSIS — T45.1X5A CHEMOTHERAPY-INDUCED NEUTROPENIA (H): ICD-10-CM

## 2023-09-20 DIAGNOSIS — D70.1 CHEMOTHERAPY-INDUCED NEUTROPENIA (H): ICD-10-CM

## 2023-09-20 DIAGNOSIS — C18.7 MALIGNANT NEOPLASM OF SIGMOID COLON (H): ICD-10-CM

## 2023-09-20 DIAGNOSIS — Z76.89 PREVENTION OF CHEMOTHERAPY-INDUCED NEUTROPENIA: ICD-10-CM

## 2023-09-20 LAB
ALBUMIN SERPL BCG-MCNC: 4 G/DL (ref 3.5–5.2)
ALP SERPL-CCNC: 177 U/L (ref 35–104)
ALT SERPL W P-5'-P-CCNC: 47 U/L (ref 0–50)
ANION GAP SERPL CALCULATED.3IONS-SCNC: 13 MMOL/L (ref 7–15)
AST SERPL W P-5'-P-CCNC: 47 U/L (ref 0–45)
BILIRUB SERPL-MCNC: 0.5 MG/DL
BUN SERPL-MCNC: 26 MG/DL (ref 8–23)
CALCIUM SERPL-MCNC: 9.2 MG/DL (ref 8.8–10.2)
CHLORIDE SERPL-SCNC: 107 MMOL/L (ref 98–107)
CREAT SERPL-MCNC: 1.39 MG/DL (ref 0.51–0.95)
DEPRECATED HCO3 PLAS-SCNC: 20 MMOL/L (ref 22–29)
EGFRCR SERPLBLD CKD-EPI 2021: 41 ML/MIN/1.73M2
GLUCOSE SERPL-MCNC: 109 MG/DL (ref 70–99)
MAGNESIUM SERPL-MCNC: 1.7 MG/DL (ref 1.7–2.3)
POTASSIUM SERPL-SCNC: 4.4 MMOL/L (ref 3.4–5.3)
PROT SERPL-MCNC: 6.5 G/DL (ref 6.4–8.3)
SODIUM SERPL-SCNC: 140 MMOL/L (ref 136–145)

## 2023-09-20 PROCEDURE — 36591 DRAW BLOOD OFF VENOUS DEVICE: CPT | Performed by: INTERNAL MEDICINE

## 2023-09-20 PROCEDURE — 84155 ASSAY OF PROTEIN SERUM: CPT | Performed by: INTERNAL MEDICINE

## 2023-09-20 PROCEDURE — 82947 ASSAY GLUCOSE BLOOD QUANT: CPT | Performed by: INTERNAL MEDICINE

## 2023-09-20 PROCEDURE — 83735 ASSAY OF MAGNESIUM: CPT

## 2023-09-20 PROCEDURE — 258N000003 HC RX IP 258 OP 636: Performed by: INTERNAL MEDICINE

## 2023-09-20 PROCEDURE — 250N000011 HC RX IP 250 OP 636: Mod: JZ | Performed by: INTERNAL MEDICINE

## 2023-09-20 PROCEDURE — 82310 ASSAY OF CALCIUM: CPT | Performed by: INTERNAL MEDICINE

## 2023-09-20 RX ORDER — EPINEPHRINE 1 MG/ML
0.3 INJECTION, SOLUTION INTRAMUSCULAR; SUBCUTANEOUS EVERY 5 MIN PRN
Status: CANCELLED | OUTPATIENT
Start: 2023-09-20

## 2023-09-20 RX ORDER — ALBUTEROL SULFATE 90 UG/1
1-2 AEROSOL, METERED RESPIRATORY (INHALATION)
Status: CANCELLED
Start: 2023-09-20

## 2023-09-20 RX ORDER — HEPARIN SODIUM,PORCINE 10 UNIT/ML
5 VIAL (ML) INTRAVENOUS
Status: CANCELLED | OUTPATIENT
Start: 2023-09-20

## 2023-09-20 RX ORDER — MEPERIDINE HYDROCHLORIDE 25 MG/ML
25 INJECTION INTRAMUSCULAR; INTRAVENOUS; SUBCUTANEOUS EVERY 30 MIN PRN
Status: CANCELLED | OUTPATIENT
Start: 2023-09-20

## 2023-09-20 RX ORDER — ALBUTEROL SULFATE 0.83 MG/ML
2.5 SOLUTION RESPIRATORY (INHALATION)
Status: CANCELLED | OUTPATIENT
Start: 2023-09-20

## 2023-09-20 RX ORDER — METHYLPREDNISOLONE SODIUM SUCCINATE 125 MG/2ML
125 INJECTION, POWDER, LYOPHILIZED, FOR SOLUTION INTRAMUSCULAR; INTRAVENOUS
Status: CANCELLED
Start: 2023-09-20

## 2023-09-20 RX ORDER — HEPARIN SODIUM (PORCINE) LOCK FLUSH IV SOLN 100 UNIT/ML 100 UNIT/ML
5 SOLUTION INTRAVENOUS
Status: CANCELLED | OUTPATIENT
Start: 2023-09-20

## 2023-09-20 RX ORDER — DIPHENHYDRAMINE HYDROCHLORIDE 50 MG/ML
50 INJECTION INTRAMUSCULAR; INTRAVENOUS
Status: CANCELLED
Start: 2023-09-20

## 2023-09-20 RX ORDER — HEPARIN SODIUM (PORCINE) LOCK FLUSH IV SOLN 100 UNIT/ML 100 UNIT/ML
5 SOLUTION INTRAVENOUS
Status: DISCONTINUED | OUTPATIENT
Start: 2023-09-20 | End: 2023-09-20 | Stop reason: HOSPADM

## 2023-09-20 RX ADMIN — HEPARIN 5 ML: 100 SYRINGE at 15:45

## 2023-09-20 RX ADMIN — SODIUM CHLORIDE 1000 ML: 9 INJECTION, SOLUTION INTRAVENOUS at 13:39

## 2023-09-20 NOTE — PROGRESS NOTES
Infusion Nursing Note:  Nadia Ladd presents today for IVF, LABS..    Patient seen by provider today: No   present during visit today: Not Applicable.    Note: Patient doing fairly well. Took 2 tabs immodium, no further diarrhea. Appetite good. Trying to drink 3 bottles water a day. Encouraged to add flavoring or fruit. .      Intravenous Access:  Implanted Port.    Treatment Conditions:  Lab Results   Component Value Date     09/20/2023    POTASSIUM 4.4 09/20/2023    MAG 1.7 09/20/2023    CR 1.39 (H) 09/20/2023    LIVIER 9.2 09/20/2023    BILITOTAL 0.5 09/20/2023    ALBUMIN 4.0 09/20/2023    ALT 47 09/20/2023    AST 47 (H) 09/20/2023       No electrolyte replacement needed today. Mag WNL. .      Post Infusion Assessment:  Patient tolerated infusion without incident.  Blood return noted pre and post infusion.  Site patent and intact, free from redness, edema or discomfort.  Access discontinued per protocol.       Discharge Plan:   Patient discharged in stable condition accompanied by: self and son.  Departure Mode: Ambulatory.      Eli Ladd RN

## 2023-09-20 NOTE — PROGRESS NOTES
"Patient here for port lab draw prior to IVF.    Intravenous access:     Port needle gauge20 by 3/4 \".   Labs drawn without difficulty.   green  tubes drawn.     Port flushed with 20 ml NS after.       Eli Ladd RN          "

## 2023-09-24 NOTE — PROGRESS NOTES
[facility]    Hematology/Oncology Established Patient Follow Up Note      Today's Date: 9/26/2023    Reason for follow up: Sigmoid cancer    HISTORY OF PRESENT ILLNESS: Nadia Ladd is a 68 year old female who presents for follow-up    Patient has medical history including Crohn's disease on sulfasalazine, anemia secondary to iron deficiency and B12 deficiency, obesity, hypertension, prediabetes, eczema, pulmonary hypertension, hiatal hernia, mild splenomegaly (14.7 cm in 2/23)    - 2/23 pt noted increasing fatigue, found to have iron deficiency anemia w/hgb 6.8 (previously required RBC transfusion when dx w/Crohn's), did have intermittent changes in stool but not persistent (noted minimal blood in stool)   - 2/23 upper endoscopy for iron deficiency anemia and Crohn's disease: Hiatal hernia, normal stomach, normal duodenum  - 2/23 colonoscopy: A frond-like/villous partially obstructing large mass found in sigmoid colon, partially circumferential involving two thirds of the lumen circumference, 4 cm, unable to traverse, with oozing, s/p biopsy  PATHOLOGY:  A.  Stomach, antrum: Biopsy:  - Antral type mucosa with mild chronic inactive gastritis  - Immunostain for Helicobacter pylori is negative      B.  Colon, sigmoid, stricture: Biopsy:  - Invasive poorly differentiated carcinoma  The sigmoid stricture shows a high-grade carcinoma without definitive gland formation.  Given the poorly differentiated appearance, an immunohistochemical panel was performed to exclude the possibility of a tumor by direct extension or metastasis.   Immunohistochemical stains are performed and show the tumor to be diffusely positive for CK7 and partially positive for CK20 and SATB2.  There is no significant tumor reactivity for CDX2, GATA3, PAX8, TTF-1, and chromogranin.  Synaptophysin highlights very rare positive cells. Although the CK7/CK20 profile is not entirely typical for a colorectal carcinoma, immunostains  "for tumors of other origins are negative and a colorectal primary is still favored.   - Mismatch repair:  1) MLH1-loss of nuclear expression  2) MSH2-intact  3) MSH6-intact  4) PMS2-loss of nuclear expression  -MLH1 promoter methylation: NEGATIVE    -2/23 CT CAP:  A) 4 cm length mass in the proximal sigmoid colon. There is some irregularity and stranding in the adjacent pericolonic fat which may indicate tumor extension. There is no obstruction.   B) there is a second probable mass at the hepatic flexure of the colon measuring 2.5 cm in length   C)There are small lymph nodes in the mesial colon adjacent to the hepatic flexure abnormality and the sigmoid colonic mass. No lymphadenopathy by size criteria  D) There is submucosal fatty deposition in the colon, most severe in the distal sigmoid colon and rectum, which can be seen secondary to quiescent inflammatory bowel disease.  E) no liver lesion    -3/23 PET:  a. There is increased FDG avidity of the sigmoid colon with focal lobular wall thickening consistent with biopsy-proven adenocarcinoma.  b. Secondary focus noted at the hepatic flexure demonstrates elevated FDG avidity and lobulated wall thickening highly suspicious for a additional primary.  c. Additionally there is a smaller focus of wall thickening with elevated FDG avidity along the left aspect of the transverse colon  which is suspicious for a possible third focus of colonic malignancy.    -3/23 CEA 6.8  - 3/23 colorectal surgery consultation with - in the setting of Crohn's, at least sigmoid colectomy with possible total abdominal colectomy with either ileorectal anastomosis or end ileostomy (\"rectum can remain active and be actively surveyed annually\")    -3/23 genetic counseling, testing for Chan syndrome    - 4/5/2023 gynecologic oncology consultation for risk reduction surgery with hysterectomy and BSO at time of colectomy    -4/6/2023 laparoscopic converted to open total abdominal " colectomy with ileorectal anastomosis, partial omentectomy, flexible sigmoidoscopy, TAHBSO  A. OMENTUM, RESECTION:  - Adipose tissue with no significant histopathologic abnormality  - No evidence of malignancy     B. COLON, RESECTION:  Mass #1 (ascending colon/hepatic flexure): Mixed neuroendocrine-non-neuroendocrine neoplasm (MINEN):  - Neuroendocrine carcinoma component (70%) and moderately-differentiated adenocarcinoma component (30%)  - Size = 5.0 cm, invading into muscularis propria  - Positive LVI  - Negative macroscopic tumor perforation, negative PNI  - Negative surgical resection margins  - IHC: Neuroendocrine portion: Negative for chromogranin and INSM1 but strongly express synaptophysin  - IHC: Adenocarcinoma portion: Positive for CK20, CDX2 negative for CK7, PAX8, ER, S100  Ki-67 proliferation index of approximately 80%.  MMR:  Intact nuclear expression of MLH1, MSH2, MHS6, PMS2  PDL1:  COMBINED POSITIVE SCORE (CPS): 55-60  TUMOR PROPORTION SCORE (TPS): 50%   pathogenic KRAS mutation (G13D) was identified (5.2%).  AJCC 8th edition: stage 3B mpT3 pN1a     Mass#2 (sigmoid colon): Poorly-differentiated carcinoma:  - Grade 3  - Size = 5.7 cm, invading into muscularis propria  - Negative for microscopic tumor perforation, LVI, perineural invasion  - Negative surgical resection margins  - IHC: Positive for scar and keratin AE1/AE3, negative for CK7, CK20, CDX2, PAX8, ER, chromogranin, CD56, p40, synaptophysin, S100, INI1, p40, INSM1  Ki-67 proliferation index of approximately 70%.  MMR: loss of nuclear expression MLH1 and PMS2, intact nuclear expression MSH2 and MSH6  PDL1:  COMBINED POSITIVE SCORE (CPS): 80  TUMOR PROPORTION SCORE (TPS): 70%  pathogenic KRAS mutation (G13D) was identified (4.5%).  AJCC 8th edition: stage 3A mpT2 pN1a    - One of forty-one sampled lymph nodes positive (1/41)  - Benign appendix  - Tubular adenoma    C. UTERUS, CERVIX, BILATERAL FALLOPIAN TUBES & OVARIES, :  - Benign endometrial  polyps; atrophic endometrium  - Uterus, cervix, bilateral fallopian tubes, and ovaries with no significant morphologic abnormalities  - No evidence of dysplasia or malignancy     D. SMALL INTESTINE, TERMINAL ILEUM, TERMINAL ILIUM:  - Benign ileum  - No evidence of dysplasia or malignancy  - Negative for metastases to one of one sampled lymph node (0 /1)     E. PROXIMAL ANASTOMOTIC RING:  - Benign small intestine  - No evidence of dysplasia or malignancy     F. DISTAL ANASTOMOTIC RING:  - Benign colon  - No evidence of dysplasia or malignancy    CRC NGS:   --mutations positive for KRAS G13D mutation 5.2% mass 1, KRAS G13D mutation 4.5% mass 2 (negative for AKT1; BRAF; ERBB2; KRAS; NRAS; PIK3CA; PTEN)  --TMB score: 17.064 mut/Mb mass 1 (CPS 55-60, TPS 50%), 60.943 mut/Mb mass 2 (CPS 80, TPS 70%)  --fusion negative including NTRK and RET for mass 1 and 2 (also negative for AKT1, AKT3, ALK, AR, ASLFMI17, CLAUDINE, BCOR, BRAF, BRD3, BRD4, CAMTA1, CCNB3, CCND1, , CIC, CSF1, CSF1R, CTNNB1, DNAJB1, EGFR, EPC1, ERBB2, ERBB4, ERG, ESR1, ESRRA, ETV1, ETV4, ETV5, ETV6, EWSR1, FGFR1, FGFR2, FGFR3, FGR, FOS, FOSB, FOXO1, FOXO4, FOXR2, FUS, GLI1, GRB7, HMGA2, HRAS, IDH1, IDH2, INSR, JAK2, JAK3, JAZF1, KRAS, MAML2, MAP2K1, MAST1, MAST2, MEAF6, MET, MKL2, MN1, MSMB, MUSK, MYB, MYBL1, MYOD1, NCOA1, NCOA2, NOTCH1, NOTCH2, NR4A3, NRAS, NRG1, NTRK1, NTRK2, NTRK3, NUMBL1, NUTM1, PAX3, PDGFB, PDGFRA, PDGFRB, PHF1, PIK3CA, PKN1, PLAG1, PPARG, PRKACA, PRKCA, PRKCB, RAF1, RELA, RET, ROS1, RPSO2, RSPO3, SS18, STAT6, TAF15, TCF12, TERT, TFE3, TFEB, TFG, THADA, TMPRSS2, USP6, VGLL2, YAP1, YWHAE)    -4/11/2023 patient discharged from hospital, planning for 30 days of lovenox postop, oxycodone for pain (required 1 unit PRBC for anemia)    -5/8/23 I presented this case at [facility] CRC tumor board and their recommendations include:  - common to see this in Crohn's, overall poor prognosis, treat aggressively, may be poorly responsive to  chemotherapy  - standard of care is mFOLFOX 6 x 12 cycles  - acknowledge that pt has high TPS and likely response to immunotherapy however not sufficient data or standard of care in stage 3 adjuvant setting  - add MMR testing to mass #1 hepatic flexure mass    - 5/9/23 admitted for acute renal failure w/Cr 3.82 w/K 7.0    - 5/19/23 second opinion at [facility] w/medical oncologist Dr. Acharya, thorough consultation and recommendations appreciated     - pt would like to proceed with FOFLOX (with dose reduced oxaliplatin due to kidney function)    -5/2023 genetic counseling: Negative for mutations in EPCAM, MLH1, MSH2, MSH6, and PMS2 genes.  Negative for mutations in APC, AXIN2, BMPR1A, CDH1, CHEK2, EPCAM, GREM1, MLH1, MSH2, MSH3, MSH6, MUTYH, NTHL1, PMS2, POLD1, POLE, PTEN, SMAD4, STK11, TP53, CDKN1B, MEN1, NF1, RET, TSC1, TSC2, and VHL genes    - 5/25/23 port placement    - 5/26/23 CT CAP w/ contrast and IVF before and after CT (baseline prior to starting tx):  1.  3 mm nodule RML and 5 mm lateral EDSON nodule, minimally increased from previous  2.  New 4 mm EDSON groundglass density nodule  3.  Several prominent borderline enlarged mediastinal lymph nodes slightly increased from previous  4.  Splenomegaly 14.5 cm  5. S/p subtotal colectomy with ileorectal anastomosis with postsurgical changes along the ventral abdominal wall midline    -5/31/23 started mFOLFOX 6 w/dose reduced oxaliplatin    - C2D1 6/14/23 held due to new onset paroxysmal A-fib with RVR requiring hospitalization 6/3/2023 (also hypoMg, dehydrated)  - 6/19/2023 I presented this case at [facility] colorectal tumor board at the [facility], it is possible that paroxysmal A-fib may be related to 5-FU.  Options include withholding further treatment versus retrialing 5-FU under the guidance of cardio oncology in an inpatient setting.  No other adjuvant treatment options available, including immunotherapy  - 6/30/2023 consultation with cardio oncologist Dr. Garza; I  spoke with Dr. Garza 6/30/2023 as well, [id number] score 3, recommending starting apixaban 5 mg twice daily, okay to retrial FOLFOX while patient is on metoprolol  -7/18/2023  CT CAP- subcm lung nodules stable, borderline and mildly enlarged mediastinal LN and periportal LN stable, splenomegaly 15.7cm, Subtotal colectomy with ileorectal anastomosis. No acute inflammation or obstruction   - 7/19/2023 mFOLFOX6 C2D1  (inpatient w/continuous cardiac monitoring)      INTERIM HISTORY:  REGIMEN:  mFOLFOX6  q14 days x12 cycles/6 months  Starting weight 183lb   C1D1 5/31/23 (complicated by hospitalization for afib w/RVR C1D4 6/3/23), C2D1 7/19/23 (inpatient w/continuous cardiac monitoring), C3D1 8/2/2023 (outpatient, admitted to ER for A-fib with RVR C3D3 8/4/23), C4D1 8/16/23, C5D1 8/30/2023, C6D1 9/13/2023    oxaliplatin 64mg/m2 day 1 (dose reduced from 85mg/m2 2/2 Cr C1D1)  LV 350mg/m2 day 1  5FU 1200mg/m2 continuous infusion day 1-2 (total 2,400mg/m2 IV over 46-48 hours)  (5FU 400mg/m2 day 1 bolus (discontinued cycle 2 onwards))  PLUS IVF and magnesium replacement as needed day 1 and day 8 with optimization to Mg= 2 at least, AND IVF day 3 on day of pump disconnect  PLUS (per cardiology recommendations) During the days of chemo and 2 days after ONLY: take long acting diltiazem 120 mg daily with 30 mg of short acting diltiazem as needed for atrial fibrillation with HR >110  Emetic risk: moderate  Febrile neutropenia risk: intermediate     C7D1 9/26/23 pending labs    Treatment related AE:  - hearing changes-described as muffled with intermittent ringing with obscured hearing R>L- overall improved since 1st cycle, and improving few days after starting chemo, Flonase BID as needed, ENT consultation pending 10/23/23; (present prior to starting chemo, improved w/steroids, now stable and not getting better)   - Paroxysmal A-fib with RVR and PAC- admitted to [facility] 6/3/2023 - 6/5/2023 for this, 6/3/2023 echo EF  55-60%, mild pulmonary hypertension, mild mitral regurgitation, on metoprolol XL 25 mg p.o. daily for ongoing palpitations, 7/23 Cardio oncology consult w/Dr. Garza, on eliquis, no recurrence of RVR while inpatient for cycle 2 w/continuous cardiac monitoring; hydration plan and magnesium replacement to magnesium = 2 minimum added to D1 and D8 and IVF day 3. 8/4/2023 C3D3 patient went to ER for A-fib with palpitations (no other sx), HR 80-130s, labs WNL, CXR negative, given IV fluids, placed on diltiazem drip, HR improved to .  8/15/2023 cardiology follow-up: Increase metoprolol XL to 50 mg daily; During the days of chemo and 2 days after ONLY: take long acting diltiazem 120 mg daily with 30 mg of short acting diltiazem as needed for atrial fibrillation with HR >110.  8/18/2023 patient called in reporting heart rate up to 140, sensation of heart racing, improved to 67 after taking extra prescribed dose of diltiazem and then asymptomatic.  8/19/2023 patient presented to ER with chest tightness and palpitations, had taken total of 3 tabs of diltiazem 30mg short acting at that point, EKG with sinus rhythm, rate 63 with some noted bradycardia while in the ER, magnesium and potassium normal, creatinine slightly elevated, given 1 L IVF.  free drug application for Eliquis completed 8/18/2023 but income over guidelines, 9/12/2023 cardiology [id number] changes, follow-up 12/14/2023  - anxiety-working with psychology prn  - Delayed neutropenia- neutropenic thierry ANC 0.5 2 weeks out from cycle 1, afebrile, 6/23 DPD deficiency testing negative; resolved cycle 2 onwards 5FU bolus dropped   -Normocytic anemia- due to chemotherapy and iron deficiency, s/p ferric carboxymaltose 750mg x2 doses 6/14/23 and 6/28/23, 8/23 hgb 13.3, 9/23 hgb 12.1   -Thrombocytopenia- 2/2 chemotherapy, 9/23 platelet > 110 K  - MARYA on CKD- following w/nephro- follow up 9/23, Cr improved from 3, drinking 2-3 16oz bottles per day and getting IV fluids  day 1, day 3 and day 8 of each chemotherapy cycle, nephrology follow-up 12/4/2023  - diarrhea- grade 1, 2 episodes/week, also affected by food, improved with imodium (3-4 total)   - neuropathy- grade 1, fingers and toes, lasting 3-4 days   - cold sensitivity- grade 1, fingers and mouth, lasting 3-4 days, using straw   - Port flipped-see my note from 8/2/2023, 8/21/2023 port removed and replacement with IR      REVIEW OF SYSTEMS:   A 14 point ROS was reviewed with pertinent positives and negatives in the HPI.        HOME MEDICATIONS:  Current Outpatient Medications   Medication Sig Dispense Refill    cyanocobalamin 1000 MCG TBCR Take 1,000 mcg by mouth daily 100 tablet 1    dexamethasone (DECADRON) 4 MG tablet Take 2 tablets (8 mg) by mouth daily Take for 2 days, starting the day after chemo. Take with food. 40 tablet 0    diltiazem ER COATED BEADS (CARDIZEM CD/CARTIA XT) 120 MG 24 hr capsule Take 1 capsule (120 mg) by mouth daily On chemo days and 2 days post chemo infusion. 60 capsule 3    Ferrous Sulfate (IRON) 325 (65 Fe) MG tablet Take 1 tablet by mouth daily      lidocaine-prilocaine (EMLA) 2.5-2.5 % external cream Apply topically as needed for moderate pain Apply 15-20 minutes prior to port access 1 g 4    MAGNESIUM OXIDE 400 (240 Mg) MG tablet TAKE 1 TABLET (400 MG) BY MOUTH 2 TIMES DAILY 60 tablet 0    metoprolol succinate ER (TOPROL XL) 50 MG 24 hr tablet Take 1 tablet (50 mg) by mouth daily 90 tablet 3    Multiple Vitamins-Iron (MULTI-DAY PLUS IRON PO) Take 1 tablet by mouth daily      rivaroxaban ANTICOAGULANT (XARELTO) 20 MG TABS tablet Take 1 tablet (20 mg) by mouth daily (with dinner) 90 tablet 3    sodium bicarbonate 650 MG tablet Take 1 tablet (650 mg) by mouth 2 times daily 120 tablet 3    diltiazem (CARDIZEM) 30 MG tablet Take 1 tablet (30 mg) by mouth 3 times daily (Patient not taking: Reported on 9/26/2023) 90 tablet 0    loperamide (IMODIUM) 2 MG capsule Take 2 mg by mouth daily as needed for  diarrhea (Patient not taking: Reported on 9/26/2023)      prochlorperazine (COMPAZINE) 10 MG tablet Take 10 mg by mouth every 6 hours as needed for nausea or vomiting (Patient not taking: Reported on 9/26/2023)           ALLERGIES:  Allergies   Allergen Reactions    No Known Drug Allergy          PAST MEDICAL HISTORY:  Past Medical History:   Diagnosis Date    Arthropathia 08/05/2010    B12 deficiency anemia 10/21/2010    Benign essential hypertension with target blood pressure below 140/90 10/10/2016    CARDIOVASCULAR SCREENING; LDL GOAL LESS THAN 160 10/31/2010    Crohn's colitis (H) 02/15/2011    Dermatitis-dishydrotic eczema-severe 08/05/2010    Hiatal hernia     HTN (hypertension) 07/21/2011    Iron deficiency anemia 10/21/2010    Malignant neoplasm of sigmoid colon (H)     Obesity     Primary pulmonary hypertension (H) 04/06/2010         PAST SURGICAL HISTORY:  Past Surgical History:   Procedure Laterality Date    COLECTOMY WITHOUT COLOSTOMY N/A 4/6/2023    Procedure: Laparoscopic Converted to Open Total abdominal colectomy;  Surgeon: Jerome Ashley MD;  Location: [facility] OR    COLONOSCOPY  10/11/10    COLONOSCOPY N/A 2/27/2023    Procedure: ATTEMPTED COLONOSCOPY WITH SIGMOID STRICTURE BIOPSY;  Surgeon: Jonathan Alcocer MD;  Location:  GI    ESOPHAGOSCOPY, GASTROSCOPY, DUODENOSCOPY (EGD), COMBINED N/A 2/27/2023    Procedure: ESOPHAGOGASTRODUODENOSCOPY, WITH BIOPSY;  Surgeon: Jonathan Alcocer MD;  Location:  GI    HYSTERECTOMY TOTAL ABDOMINAL, BILATERAL SALPINGO-OOPHORECTOMY, COMBINED N/A 4/6/2023    Procedure: Hysterectomy total abdominal, bilateral salpingo-oophorectomy;  Surgeon: Sunitha Olea MD;  Location: [facility] OR    INSERT PORT VASCULAR ACCESS Right 5/25/2023    Procedure: Ultrasound-guided right internal jugular venous access port placement with fluoroscopy;  Surgeon: Martin Thomas DO;  Location: [facility] OR    IR CHEST PORT PLACEMENT > 5 YRS OF AGE  8/21/2023    IR PORT CHECK RIGHT   "2023    IR PORT REMOVAL RIGHT  2023    SIGMOIDOSCOPY FLEXIBLE N/A 2023    Procedure: Sigmoidoscopy flexible;  Surgeon: Jerome Ashley MD;  Location: [facility] OR    SURGICAL HISTORY OF -   77    Perineorrhaphy, for widening vaginal orifice    ZZC EXPLORATORY OF ABDOMEN      laparoscopy         SOCIAL HISTORY:  Social History     Socioeconomic History    Marital status:      Spouse name: Not on file    Number of children: Not on file    Years of education: Not on file    Highest education level: Not on file   Occupational History    Not on file   Tobacco Use    Smoking status: Never     Passive exposure: Current    Smokeless tobacco: Never   Substance and Sexual Activity    Alcohol use: No    Drug use: No    Sexual activity: Yes     Partners: Male   Other Topics Concern    Parent/sibling w/ CABG, MI or angioplasty before 65F 55M? Not Asked   Social History Narrative    Not on file     Social Determinants of Health     Financial Resource Strain: Not on file   Food Insecurity: Not on file   Transportation Needs: Not on file   Physical Activity: Not on file   Stress: Not on file   Social Connections: Not on file   Interpersonal Safety: Not on file   Housing Stability: Not on file         FAMILY HISTORY:  Family History   Problem Relation Age of Onset    Hypertension Mother         on meds, alive    Cerebrovascular Disease Father         stroke about age 65,  of cancer at 68 yrs    Diabetes Father         eventually took insulin    Anemia Father         Pernisios anemia    Kidney Disease Niece         kidney transplant    Kidney Disease Nephew         kidney transplant    Venous thrombosis No family hx of     Anesthesia Reaction No family hx of          PHYSICAL EXAM:  Vital signs:  /78 (BP Location: Right arm, Cuff Size: Adult Large)   Pulse 86   Temp (!) 96.6  F (35.9  C) (Temporal)   Resp 14   Ht 1.549 m (5' 1\")   Wt 81.2 kg (179 lb)   LMP  (LMP Unknown)   SpO2 99%  "  BMI 33.82 kg/m       ECO  GENERAL/CONSTITUTIONAL: No acute distress.  EYES: Pupils are equal and round. Extraocular movements intact without nystagmus.  No scleral icterus.  RESPIRATORY: Equal chest rise.   MUSCULOSKELETAL: Warm and well-perfused, no cyanosis, clubbing, or edema.   NEUROLOGIC: Cranial nerves are grossly intact. Alert, oriented to person, place and time, answers questions appropriately.  INTEGUMENTARY: No rashes or jaundice.  GAIT: Steady, does not use assistive device        LABS:   Latest Reference Range & Units 23 08:25   Sodium 136 - 145 mmol/L 141   Potassium 3.4 - 5.3 mmol/L 4.1   Chloride 98 - 107 mmol/L 109 (H)   Carbon Dioxide (CO2) 22 - 29 mmol/L 20 (L)   Urea Nitrogen 8.0 - 23.0 mg/dL 16.4   Creatinine 0.51 - 0.95 mg/dL 1.26 (H)   GFR Estimate >60 mL/min/1.73m2 47 (L)   Calcium 8.8 - 10.2 mg/dL 9.1   Anion Gap 7 - 15 mmol/L 12   Magnesium 1.7 - 2.3 mg/dL 1.7   Albumin 3.5 - 5.2 g/dL 3.5   Protein Total 6.4 - 8.3 g/dL 6.0 (L)   Alkaline Phosphatase 35 - 104 U/L 216 (H)   ALT 0 - 50 U/L 40   AST 0 - 45 U/L 47 (H)   Bilirubin Total <=1.2 mg/dL 0.5   Glucose 70 - 99 mg/dL 159 (H)   WBC 4.0 - 11.0 10e3/uL 4.1   Hemoglobin 11.7 - 15.7 g/dL 12.1   Hematocrit 35.0 - 47.0 % 35.8   Platelet Count 150 - 450 10e3/uL 118 (L)   RBC Count 3.80 - 5.20 10e6/uL 4.00   MCV 78 - 100 fL 90   MCH 26.5 - 33.0 pg 30.3   MCHC 31.5 - 36.5 g/dL 33.8   RDW 10.0 - 15.0 % 16.6 (H)   % Neutrophils % 77   % Lymphocytes % 10   % Monocytes % 12   % Eosinophils % 0   % Basophils % 0   Absolute Basophils 0.0 - 0.2 10e3/uL 0.0   Absolute Eosinophils 0.0 - 0.7 10e3/uL 0.0   Absolute Immature Granulocytes <=0.4 10e3/uL 0.0   Absolute Lymphocytes 0.8 - 5.3 10e3/uL 0.4 (L)   Absolute Monocytes 0.0 - 1.3 10e3/uL 0.5   % Immature Granulocytes % 1   Absolute Neutrophils 1.6 - 8.3 10e3/uL 3.2   Absolute NRBCs 10e3/uL 0.0   NRBCs per 100 WBC <1 /100 0   (H): Data is abnormally high  (L): Data is abnormally  low    PATHOLOGY:    IMAGING:    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 mutated, MARISSA, TPS 50%  # sigmoid colon poorly-differentiated carcinoma, KRAS G13D mutated, loss of MLH1 and PMS2, TPS 70%  - 2/23 colonoscopy: A frond-like/villous partially obstructing large mass found in sigmoid colon, partially circumferential involving two thirds of the lumen circumference, 4 cm, unable to traverse, with oozing, s/p biopsy, PATHOLOGY: Invasive poorly differentiated carcinoma, IHC panel excludes tumors of other origin, colorectal primary is favored, loss of nuclear expression of MLH1 and PMS2, MLH1 promoter methylation negative, [id number] mutation negative  -2/23 CT CAP: Shows known 4 cm proximal sigmoid colon mass with possible tumor extension (irregularity and stranding and adjacent pericolonic fat) without obstruction AND probable second mass 2.5 cm at hepatic flexure of colon; small lymph nodes adjacent to both masses (no lymphadenopathy by size criteria)  -3/23 PET:  a. There is increased FDG avidity of the sigmoid colon with focal lobular wall thickening consistent with biopsy-proven adenocarcinoma.  b. Secondary focus noted at the hepatic flexure demonstrates elevated FDG avidity and lobulated wall thickening highly suspicious for a additional primary.  c. Additionally there is a smaller focus of wall thickening with elevated FDG avidity along the left aspect of the transverse colon which is suspicious for a possible third focus of colonic malignancy.  - 3/23 CEA 6.8  -4/6/2023 laparoscopic converted to open total abdominal colectomy with ileorectal anastomosis, partial omentectomy, flexible sigmoidoscopy, TAHBSO  PATHOLOGY:  Of note, omental resection, terminal ileum, proximal and distal  anastomotic rings, uterus, cervix, bilateral fallopian tubes and ovaries negative for malignancy    Mass #1 (ascending colon/hepatic flexure): Mixed neuroendocrine-non-neuroendocrine neoplasm (MINEN):  - Neuroendocrine carcinoma component (70%) and moderately-differentiated adenocarcinoma component (30%)  - Size = 5.0 cm, invading into muscularis propria, Positive LVI  - IHC: Neuroendocrine portion: Negative for chromogranin and INSM1 but strongly express synaptophysin  - IHC: Adenocarcinoma portion: Positive for CK20, CDX2 negative for CK7, PAX8, ER, S100  Ki-67 proliferation index of approximately 80%.  MARISSA   PDL1:  COMBINED POSITIVE SCORE (CPS): 55-60  TUMOR PROPORTION SCORE (TPS): 50%   pathogenic KRAS mutation (G13D) was identified (5.2%).  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  MMR testing: loss of MLH1 and PMS2  PDL1:  COMBINED POSITIVE SCORE (CPS): 80  TUMOR PROPORTION SCORE (TPS): 70%  pathogenic KRAS mutation (G13D) was identified (4.5%).  MMR testing: loss of MLH1 and PMS2, intact MSH2 and MSH6  AJCC 8th edition: stage 3A mpT2 pN1a    - One of forty-one sampled lymph nodes positive (1/41), d/w pathology- unable to determine which mass is metastatic to LN    - 4/23 MRI brain w/wo contrast LAURENT  - 5/26/23 CT CAP w/ contrast and IVF before and after CT (post op baseline prior to starting tx):  1.  3 mm nodule RML and 5 mm lateral EDSON nodule, minimally increased from previous  2.  New 4 mm EDSON groundglass density nodule  3.  Several prominent borderline enlarged mediastinal lymph nodes slightly increased from previous  4.  Splenomegaly 14.5 cm  5. S/p subtotal colectomy with  ileorectal anastomosis with postsurgical changes along the ventral abdominal wall midline  - 5/8/23 I presented this case at [facility] tumor board as above   - [facility] Dr. Acharya, thorough consultation and recommendations appreciated, overall agree w/FOLFOX x6 months, possible nivo or ipi nivo in second line; if metastatic platinum etoposide vs ipi nivo  - 5/25/23 port placement    -5/2023 genetic counseling: Negative for mutations detailed below     REGIMEN:  [id number]  q14 days x12 cycles/6 months  Starting weight 183lb   C1D1 5/31/23 (complicated by hospitalization for afib w/RVR C1D4 6/3/23), C2D1 7/19/23 (inpatient w/continuous cardiac monitoring), C3D1 8/2/2023 (outpatient, admitted to ER for A-fib with RVR C3D3 8/4/23), C4D1 8/16/23, C5D1 8/30/2023, C6D1 9/13/2023    oxaliplatin 64mg/m2 day 1 (dose reduced from 85mg/m2 2/2 Cr C1D1)  LV 350mg/m2 day 1  5FU 1200mg/m2 continuous infusion day 1-2 (total 2,400mg/m2 IV over 46-48 hours)  (5FU 400mg/m2 day 1 bolus (discontinued cycle 2 onwards))  PLUS IVF and magnesium replacement as needed day 1 and day 8 with optimization to Mg= 2 at least, AND IVF day 3 on day of pump disconnect  PLUS (per cardiology recommendations) During the days of chemo and 2 days after ONLY: take long acting diltiazem 120 mg daily with 30 mg of short acting diltiazem as needed for atrial fibrillation with HR >110  Emetic risk: moderate  Febrile neutropenia risk: intermediate     C7D1 9/26/23 pending labs    Treatment related AE:  - hearing changes-stable w/dose reduced oxaliplatin, flonase prn, ENT consultation pending 10/23/23  - Paroxysmal A-fib with RVR and PAC- 8/15/2023 cardiology follow-up: Increase metoprolol XL to 50 mg daily; During the days of chemo and 2 days after take long acting diltiazem 120 mg daily with 30 mg of short acting diltiazem as needed for atrial fibrillation with HR >110, IV fluid day 1, 3, 8, cardiology follow-up  12/14/2023, psychology follow-up to address anxiety component, if patient continues to have ER visits with every cycle of chemotherapy, will do the following: first- cardio revisit and dose reduce 5FU, if still not effective, then- inpatient chemotherapy with continuous cardiac monitoring for future treatments.  - anxiety-continue following with oncology psychology team  - Normocytic anemia- due to chemotherapy and iron deficiency, s/p ferric carboxymaltose 750mg x2 doses 6/14/23 and 6/28/23; repeat iron studies normal and hgb normal, monitor  -Thrombocytopenia- mild, 2/2 chemo, monitor   - MARYA on CKD- following w/nephro, f/u with nephro 12/4/2023  - diarrhea- grade 1  - cold sensitivity- grade 1  - neuropathy- grade 1     - 7/18/23 CT CAP- subcm lung nodules stable, borderline and mildly enlarged mediastinal LN and periportal LN stable, splenomegaly 15.7cm, Subtotal colectomy with ileorectal anastomosis. No acute inflammation or obstruction    - move up repeat CT to now, then repeat again at end of tx    - f/u with Dr. Ashley 4/2024 for rigid proctoscopy     #parxosymal afib  - within 3 days of receiving 5FU, prior cardiac risk factors htn, prediabetes  - 6/3/23 presented to ER w/lightheadedness, dizziness, cardioverted s/p diltiazem ggt in ER  - 6/23 DPD deficiency testing negative  - currently on metoprolol XL 25 mg daily, apixaban 5 mg twice daily  - 6/30/2023 consultation with cardio oncologist Dr. Garza; I spoke with Dr. Gazra 6/30/2023 as well, [id number] score 3, recommending starting apixaban 5 mg twice daily, okay to retrial FOLFOX while patient is on metoprolol.  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  Negative for mutations in APC, AXIN2, BMPR1A, CDH1, CHEK2, EPCAM, GREM1, MLH1, MSH2, MSH3, MSH6, MUTYH, NTHL1, PMS2, POLD1, POLE, PTEN, SMAD4, STK11, TP53, CDKN1B, MEN1, NF1, RET, TSC1, TSC2, and VHL genes    #Anemia secondary to iron deficiency and B12 deficiency  - terminal ileum removed at time of colon surgery, monitor for nutrient def  - 2/23 hgb 6.8, ferritin 21, iron sat index 10, TIBC 265, iron 27, b12 1493  - On B12 1000 mcg p.o. daily and ferrous sulfate 325 mg p.o. daily  - 4/9/23 s/p 1 uprbcs  - 5/9/23 hgb 11.3,5/30/23 hgb 9.4  - based on Ganzoni equation w/goal hgb 14 and 500mg needed for iron stores, pts iron deficit is 1400 mg  - s/p ferric carboxymaltose 750mg x2 doses 6/14/23 and 6/28/23  - 7/11/23 ferritin 1022, iron sat 22, TIBC 201, iron 44    - 8/23 hgb 13.3    #Crohn's  - dx since at least 2010     RTC 2 weeks for follow up with [facility], labs, chemo (if CT completed, follow up with me instead of RADHA)  RTC 4 weeks for follow up with me, labs, chemo       Irene Bateman DO  Hematology/Oncology  [facility]

## 2023-09-26 ENCOUNTER — INFUSION THERAPY VISIT (OUTPATIENT)
Dept: INFUSION THERAPY | Facility: CLINIC | Age: 68
End: 2023-09-26
Attending: INTERNAL MEDICINE
Payer: MEDICARE

## 2023-09-26 ENCOUNTER — ONCOLOGY VISIT (OUTPATIENT)
Dept: ONCOLOGY | Facility: CLINIC | Age: 68
End: 2023-09-26
Payer: MEDICARE

## 2023-09-26 VITALS
SYSTOLIC BLOOD PRESSURE: 132 MMHG | BODY MASS INDEX: 33.79 KG/M2 | DIASTOLIC BLOOD PRESSURE: 78 MMHG | HEART RATE: 86 BPM | RESPIRATION RATE: 14 BRPM | TEMPERATURE: 96.6 F | WEIGHT: 179 LBS | HEIGHT: 61 IN | OXYGEN SATURATION: 99 %

## 2023-09-26 VITALS
TEMPERATURE: 98 F | OXYGEN SATURATION: 96 % | HEART RATE: 61 BPM | RESPIRATION RATE: 16 BRPM | SYSTOLIC BLOOD PRESSURE: 133 MMHG | DIASTOLIC BLOOD PRESSURE: 62 MMHG

## 2023-09-26 DIAGNOSIS — Z23 NEED FOR PROPHYLACTIC VACCINATION AND INOCULATION AGAINST INFLUENZA: ICD-10-CM

## 2023-09-26 DIAGNOSIS — Z76.89 PREVENTION OF CHEMOTHERAPY-INDUCED NEUTROPENIA: ICD-10-CM

## 2023-09-26 DIAGNOSIS — H93.8X3 OTOTOXICITY OF BOTH EARS: ICD-10-CM

## 2023-09-26 DIAGNOSIS — R68.89 COLD SENSITIVITY: ICD-10-CM

## 2023-09-26 DIAGNOSIS — C18.7 MALIGNANT NEOPLASM OF SIGMOID COLON (H): Primary | ICD-10-CM

## 2023-09-26 DIAGNOSIS — D69.59 CHEMOTHERAPY-INDUCED THROMBOCYTOPENIA: ICD-10-CM

## 2023-09-26 DIAGNOSIS — F41.9 ANXIETY: ICD-10-CM

## 2023-09-26 DIAGNOSIS — T45.1X5A CHEMOTHERAPY-INDUCED THROMBOCYTOPENIA: ICD-10-CM

## 2023-09-26 LAB
ALBUMIN SERPL BCG-MCNC: 3.5 G/DL (ref 3.5–5.2)
ALP SERPL-CCNC: 216 U/L (ref 35–104)
ALT SERPL W P-5'-P-CCNC: 40 U/L (ref 0–50)
ANION GAP SERPL CALCULATED.3IONS-SCNC: 12 MMOL/L (ref 7–15)
AST SERPL W P-5'-P-CCNC: 47 U/L (ref 0–45)
BASOPHILS # BLD AUTO: 0 10E3/UL (ref 0–0.2)
BASOPHILS NFR BLD AUTO: 0 %
BILIRUB SERPL-MCNC: 0.5 MG/DL
BUN SERPL-MCNC: 16.4 MG/DL (ref 8–23)
CALCIUM SERPL-MCNC: 9.1 MG/DL (ref 8.8–10.2)
CHLORIDE SERPL-SCNC: 109 MMOL/L (ref 98–107)
CREAT SERPL-MCNC: 1.26 MG/DL (ref 0.51–0.95)
DEPRECATED HCO3 PLAS-SCNC: 20 MMOL/L (ref 22–29)
EGFRCR SERPLBLD CKD-EPI 2021: 47 ML/MIN/1.73M2
EOSINOPHIL # BLD AUTO: 0 10E3/UL (ref 0–0.7)
EOSINOPHIL NFR BLD AUTO: 0 %
ERYTHROCYTE [DISTWIDTH] IN BLOOD BY AUTOMATED COUNT: 16.6 % (ref 10–15)
GLUCOSE SERPL-MCNC: 159 MG/DL (ref 70–99)
HCT VFR BLD AUTO: 35.8 % (ref 35–47)
HGB BLD-MCNC: 12.1 G/DL (ref 11.7–15.7)
IMM GRANULOCYTES # BLD: 0 10E3/UL
IMM GRANULOCYTES NFR BLD: 1 %
LYMPHOCYTES # BLD AUTO: 0.4 10E3/UL (ref 0.8–5.3)
LYMPHOCYTES NFR BLD AUTO: 10 %
MAGNESIUM SERPL-MCNC: 1.7 MG/DL (ref 1.7–2.3)
MCH RBC QN AUTO: 30.3 PG (ref 26.5–33)
MCHC RBC AUTO-ENTMCNC: 33.8 G/DL (ref 31.5–36.5)
MCV RBC AUTO: 90 FL (ref 78–100)
MONOCYTES # BLD AUTO: 0.5 10E3/UL (ref 0–1.3)
MONOCYTES NFR BLD AUTO: 12 %
NEUTROPHILS # BLD AUTO: 3.2 10E3/UL (ref 1.6–8.3)
NEUTROPHILS NFR BLD AUTO: 77 %
NRBC # BLD AUTO: 0 10E3/UL
NRBC BLD AUTO-RTO: 0 /100
PLATELET # BLD AUTO: 118 10E3/UL (ref 150–450)
POTASSIUM SERPL-SCNC: 4.1 MMOL/L (ref 3.4–5.3)
PROT SERPL-MCNC: 6 G/DL (ref 6.4–8.3)
RBC # BLD AUTO: 4 10E6/UL (ref 3.8–5.2)
SODIUM SERPL-SCNC: 141 MMOL/L (ref 136–145)
WBC # BLD AUTO: 4.1 10E3/UL (ref 4–11)

## 2023-09-26 PROCEDURE — 250N000011 HC RX IP 250 OP 636: Performed by: INTERNAL MEDICINE

## 2023-09-26 PROCEDURE — 96375 TX/PRO/DX INJ NEW DRUG ADDON: CPT

## 2023-09-26 PROCEDURE — 96415 CHEMO IV INFUSION ADDL HR: CPT

## 2023-09-26 PROCEDURE — 96368 THER/DIAG CONCURRENT INF: CPT

## 2023-09-26 PROCEDURE — 80053 COMPREHEN METABOLIC PANEL: CPT | Performed by: INTERNAL MEDICINE

## 2023-09-26 PROCEDURE — 96413 CHEMO IV INFUSION 1 HR: CPT

## 2023-09-26 PROCEDURE — 96367 TX/PROPH/DG ADDL SEQ IV INF: CPT

## 2023-09-26 PROCEDURE — 258N000003 HC RX IP 258 OP 636: Performed by: INTERNAL MEDICINE

## 2023-09-26 PROCEDURE — 85025 COMPLETE CBC W/AUTO DIFF WBC: CPT | Performed by: INTERNAL MEDICINE

## 2023-09-26 PROCEDURE — 96411 CHEMO IV PUSH ADDL DRUG: CPT

## 2023-09-26 PROCEDURE — 83735 ASSAY OF MAGNESIUM: CPT

## 2023-09-26 PROCEDURE — 36591 DRAW BLOOD OFF VENOUS DEVICE: CPT | Performed by: INTERNAL MEDICINE

## 2023-09-26 PROCEDURE — 99214 OFFICE O/P EST MOD 30 MIN: CPT | Performed by: INTERNAL MEDICINE

## 2023-09-26 RX ORDER — MAGNESIUM SULFATE HEPTAHYDRATE 40 MG/ML
2 INJECTION, SOLUTION INTRAVENOUS ONCE
Status: COMPLETED | OUTPATIENT
Start: 2023-09-26 | End: 2023-09-26

## 2023-09-26 RX ORDER — ONDANSETRON 2 MG/ML
8 INJECTION INTRAMUSCULAR; INTRAVENOUS ONCE
Status: COMPLETED | OUTPATIENT
Start: 2023-09-26 | End: 2023-09-26

## 2023-09-26 RX ORDER — HEPARIN SODIUM (PORCINE) LOCK FLUSH IV SOLN 100 UNIT/ML 100 UNIT/ML
5 SOLUTION INTRAVENOUS
Status: DISCONTINUED | OUTPATIENT
Start: 2023-09-26 | End: 2023-09-26 | Stop reason: HOSPADM

## 2023-09-26 RX ADMIN — OXALIPLATIN 120 MG: 5 INJECTION, SOLUTION INTRAVENOUS at 10:34

## 2023-09-26 RX ADMIN — FOSAPREPITANT: 150 INJECTION, POWDER, LYOPHILIZED, FOR SOLUTION INTRAVENOUS at 09:52

## 2023-09-26 RX ADMIN — MAGNESIUM SULFATE HEPTAHYDRATE 2 G: 40 INJECTION, SOLUTION INTRAVENOUS at 12:46

## 2023-09-26 RX ADMIN — DEXTROSE MONOHYDRATE 250 ML: 50 INJECTION, SOLUTION INTRAVENOUS at 10:26

## 2023-09-26 RX ADMIN — LEUCOVORIN CALCIUM 700 MG: 350 INJECTION, POWDER, LYOPHILIZED, FOR SOLUTION INTRAMUSCULAR; INTRAVENOUS at 10:38

## 2023-09-26 RX ADMIN — ONDANSETRON 8 MG: 2 INJECTION INTRAMUSCULAR; INTRAVENOUS at 09:52

## 2023-09-26 ASSESSMENT — PAIN SCALES - GENERAL: PAINLEVEL: NO PAIN (0)

## 2023-09-26 NOTE — LETTER
9/26/2023         RE: Nadia Ladd  [address]  [facility] [address]        Dear Colleague,    Thank you for referring your patient, Nadia Ladd, to the [facility]. Please see a copy of my visit note below.    [facility]    Hematology/Oncology Established Patient Follow Up Note      Today's Date: 9/26/2023    Reason for follow up: Sigmoid cancer    HISTORY OF PRESENT ILLNESS: Nadia Ladd is a 68 year old female who presents for follow-up    Patient has medical history including Crohn's disease on sulfasalazine, anemia secondary to iron deficiency and B12 deficiency, obesity, hypertension, prediabetes, eczema, pulmonary hypertension, hiatal hernia, mild splenomegaly (14.7 cm in 2/23)    - 2/23 pt noted increasing fatigue, found to have iron deficiency anemia w/hgb 6.8 (previously required RBC transfusion when dx w/Crohn's), did have intermittent changes in stool but not persistent (noted minimal blood in stool)   - 2/23 upper endoscopy for iron deficiency anemia and Crohn's disease: Hiatal hernia, normal stomach, normal duodenum  - 2/23 colonoscopy: A frond-like/villous partially obstructing large mass found in sigmoid colon, partially circumferential involving two thirds of the lumen circumference, 4 cm, unable to traverse, with oozing, s/p biopsy  PATHOLOGY:  A.  Stomach, antrum: Biopsy:  - Antral type mucosa with mild chronic inactive gastritis  - Immunostain for Helicobacter pylori is negative      B.  Colon, sigmoid, stricture: Biopsy:  - Invasive poorly differentiated carcinoma  The sigmoid stricture shows a high-grade carcinoma without definitive gland formation.  Given the poorly differentiated appearance, an immunohistochemical panel was performed to exclude the possibility of a tumor by direct extension or metastasis.   Immunohistochemical stains are performed and show the tumor to be diffusely positive for CK7 and partially positive for  "CK20 and SATB2.  There is no significant tumor reactivity for CDX2, GATA3, PAX8, TTF-1, and chromogranin.  Synaptophysin highlights very rare positive cells. Although the CK7/CK20 profile is not entirely typical for a colorectal carcinoma, immunostains for tumors of other origins are negative and a colorectal primary is still favored.   - Mismatch repair:  1) MLH1-loss of nuclear expression  2) MSH2-intact  3) MSH6-intact  4) PMS2-loss of nuclear expression  -MLH1 promoter methylation: NEGATIVE    -2/23 CT CAP:  A) 4 cm length mass in the proximal sigmoid colon. There is some irregularity and stranding in the adjacent pericolonic fat which may indicate tumor extension. There is no obstruction.   B) there is a second probable mass at the hepatic flexure of the colon measuring 2.5 cm in length   C)There are small lymph nodes in the mesial colon adjacent to the hepatic flexure abnormality and the sigmoid colonic mass. No lymphadenopathy by size criteria  D) There is submucosal fatty deposition in the colon, most severe in the distal sigmoid colon and rectum, which can be seen secondary to quiescent inflammatory bowel disease.  E) no liver lesion    -3/23 PET:  a. There is increased FDG avidity of the sigmoid colon with focal lobular wall thickening consistent with biopsy-proven adenocarcinoma.  b. Secondary focus noted at the hepatic flexure demonstrates elevated FDG avidity and lobulated wall thickening highly suspicious for a additional primary.  c. Additionally there is a smaller focus of wall thickening with elevated FDG avidity along the left aspect of the transverse colon  which is suspicious for a possible third focus of colonic malignancy.    -3/23 CEA 6.8  - 3/23 colorectal surgery consultation with - in the setting of Crohn's, at least sigmoid colectomy with possible total abdominal colectomy with either ileorectal anastomosis or end ileostomy (\"rectum can remain active and be actively surveyed " "annually\")    -3/23 genetic counseling, testing for Chan syndrome    - 4/5/2023 gynecologic oncology consultation for risk reduction surgery with hysterectomy and BSO at time of colectomy    -4/6/2023 laparoscopic converted to open total abdominal colectomy with ileorectal anastomosis, partial omentectomy, flexible sigmoidoscopy, TAHBSO  A. OMENTUM, RESECTION:  - Adipose tissue with no significant histopathologic abnormality  - No evidence of malignancy     B. COLON, RESECTION:  Mass #1 (ascending colon/hepatic flexure): Mixed neuroendocrine-non-neuroendocrine neoplasm (MINEN):  - Neuroendocrine carcinoma component (70%) and moderately-differentiated adenocarcinoma component (30%)  - Size = 5.0 cm, invading into muscularis propria  - Positive LVI  - Negative macroscopic tumor perforation, negative PNI  - Negative surgical resection margins  - IHC: Neuroendocrine portion: Negative for chromogranin and INSM1 but strongly express synaptophysin  - IHC: Adenocarcinoma portion: Positive for CK20, CDX2 negative for CK7, PAX8, ER, S100  Ki-67 proliferation index of approximately 80%.  MMR:  Intact nuclear expression of MLH1, MSH2, MHS6, PMS2  PDL1:  COMBINED POSITIVE SCORE (CPS): 55-60  TUMOR PROPORTION SCORE (TPS): 50%   pathogenic KRAS mutation (G13D) was identified (5.2%).  AJCC 8th edition: stage 3B mpT3 pN1a     Mass#2 (sigmoid colon): Poorly-differentiated carcinoma:  - Grade 3  - Size = 5.7 cm, invading into muscularis propria  - Negative for microscopic tumor perforation, LVI, perineural invasion  - Negative surgical resection margins  - IHC: Positive for scar and keratin AE1/AE3, negative for CK7, CK20, CDX2, PAX8, ER, chromogranin, CD56, p40, synaptophysin, S100, INI1, p40, INSM1  Ki-67 proliferation index of approximately 70%.  MMR: loss of nuclear expression MLH1 and PMS2, intact nuclear expression MSH2 and MSH6  PDL1:  COMBINED POSITIVE SCORE (CPS): 80  TUMOR PROPORTION SCORE (TPS): 70%  pathogenic KRAS " mutation (G13D) was identified (4.5%).  AJCC 8th edition: stage 3A mpT2 pN1a    - One of forty-one sampled lymph nodes positive (1/41)  - Benign appendix  - Tubular adenoma    C. UTERUS, CERVIX, BILATERAL FALLOPIAN TUBES & OVARIES, :  - Benign endometrial polyps; atrophic endometrium  - Uterus, cervix, bilateral fallopian tubes, and ovaries with no significant morphologic abnormalities  - No evidence of dysplasia or malignancy     D. SMALL INTESTINE, TERMINAL ILEUM, TERMINAL ILIUM:  - Benign ileum  - No evidence of dysplasia or malignancy  - Negative for metastases to one of one sampled lymph node (0 /1)     E. PROXIMAL ANASTOMOTIC RING:  - Benign small intestine  - No evidence of dysplasia or malignancy     F. DISTAL ANASTOMOTIC RING:  - Benign colon  - No evidence of dysplasia or malignancy    CRC NGS:   --mutations positive for KRAS G13D mutation 5.2% mass 1, KRAS G13D mutation 4.5% mass 2 (negative for AKT1; BRAF; ERBB2; KRAS; NRAS; PIK3CA; PTEN)  --TMB score: 17.064 mut/Mb mass 1 (CPS 55-60, TPS 50%), 60.943 mut/Mb mass 2 (CPS 80, TPS 70%)  --fusion negative including NTRK and RET for mass 1 and 2 (also negative for AKT1, AKT3, ALK, AR, LGRNUO45, CLAUDINE, BCOR, BRAF, BRD3, BRD4, CAMTA1, CCNB3, CCND1, , CIC, CSF1, CSF1R, CTNNB1, DNAJB1, EGFR, EPC1, ERBB2, ERBB4, ERG, ESR1, ESRRA, ETV1, ETV4, ETV5, ETV6, EWSR1, FGFR1, FGFR2, FGFR3, FGR, FOS, FOSB, FOXO1, FOXO4, FOXR2, FUS, GLI1, GRB7, HMGA2, HRAS, IDH1, IDH2, INSR, JAK2, JAK3, JAZF1, KRAS, MAML2, MAP2K1, MAST1, MAST2, MEAF6, MET, MKL2, MN1, MSMB, MUSK, MYB, MYBL1, MYOD1, NCOA1, NCOA2, NOTCH1, NOTCH2, NR4A3, NRAS, NRG1, NTRK1, NTRK2, NTRK3, NUMBL1, NUTM1, PAX3, PDGFB, PDGFRA, PDGFRB, PHF1, PIK3CA, PKN1, PLAG1, PPARG, PRKACA, PRKCA, PRKCB, RAF1, RELA, RET, ROS1, RPSO2, RSPO3, SS18, STAT6, TAF15, TCF12, TERT, TFE3, TFEB, TFG, THADA, TMPRSS2, USP6, VGLL2, YAP1, YWHAE)    -4/11/2023 patient discharged from hospital, planning for 30 days of lovenox postop, oxycodone  for pain (required 1 unit PRBC for anemia)    -5/8/23 I presented this case at [facility] the [facility] CRC tumor board and their recommendations include:  - common to see this in Crohn's, overall poor prognosis, treat aggressively, may be poorly responsive to chemotherapy  - standard of care is mFOLFOX 6 x 12 cycles  - acknowledge that pt has high TPS and likely response to immunotherapy however not sufficient data or standard of care in stage 3 adjuvant setting  - add MMR testing to mass #1 hepatic flexure mass    - 5/9/23 admitted for acute renal failure w/Cr 3.82 w/K 7.0    - 5/19/23 second opinion at [facility] w/medical oncologist Dr. Acharya, thorough consultation and recommendations appreciated     - pt would like to proceed with FOFLOX (with dose reduced oxaliplatin due to kidney function)    -5/2023 genetic counseling: Negative for mutations in EPCAM, MLH1, MSH2, MSH6, and PMS2 genes.  Negative for mutations in APC, AXIN2, BMPR1A, CDH1, CHEK2, EPCAM, GREM1, MLH1, MSH2, MSH3, MSH6, MUTYH, NTHL1, PMS2, POLD1, POLE, PTEN, SMAD4, STK11, TP53, CDKN1B, MEN1, NF1, RET, TSC1, TSC2, and VHL genes    - 5/25/23 port placement    - 5/26/23 CT CAP w/ contrast and IVF before and after CT (baseline prior to starting tx):  1.  3 mm nodule RML and 5 mm lateral EDSON nodule, minimally increased from previous  2.  New 4 mm EDSON groundglass density nodule  3.  Several prominent borderline enlarged mediastinal lymph nodes slightly increased from previous  4.  Splenomegaly 14.5 cm  5. S/p subtotal colectomy with ileorectal anastomosis with postsurgical changes along the ventral abdominal wall midline    -5/31/23 started mFOLFOX 6 w/dose reduced oxaliplatin    - C2D1 6/14/23 held due to new onset paroxysmal A-fib with RVR requiring hospitalization 6/3/2023 (also hypoMg, dehydrated)  - 6/19/2023 I presented this case at [facility] colorectal tumor board at the [facility], it is possible that paroxysmal A-fib may be related to 5-FU.  Options include  withholding further treatment versus retrialing 5-FU under the guidance of cardio oncology in an inpatient setting.  No other adjuvant treatment options available, including immunotherapy  - 6/30/2023 consultation with cardio oncologist Dr. Garza; I spoke with Dr. Garza 6/30/2023 as well, [id number] score 3, recommending starting apixaban 5 mg twice daily, okay to retrial FOLFOX while patient is on metoprolol  -7/18/2023  CT CAP- subcm lung nodules stable, borderline and mildly enlarged mediastinal LN and periportal LN stable, splenomegaly 15.7cm, Subtotal colectomy with ileorectal anastomosis. No acute inflammation or obstruction   - 7/19/2023 mFOLFOX6 C2D1  (inpatient w/continuous cardiac monitoring)      INTERIM HISTORY:  REGIMEN:  mFOLFOX6  q14 days x12 cycles/6 months  Starting weight 183lb   C1D1 5/31/23 (complicated by hospitalization for afib w/RVR C1D4 6/3/23), C2D1 7/19/23 (inpatient w/continuous cardiac monitoring), C3D1 8/2/2023 (outpatient, admitted to ER for A-fib with RVR C3D3 8/4/23), C4D1 8/16/23, C5D1 8/30/2023, C6D1 9/13/2023    oxaliplatin 64mg/m2 day 1 (dose reduced from 85mg/m2 2/2 Cr C1D1)  LV 350mg/m2 day 1  5FU 1200mg/m2 continuous infusion day 1-2 (total 2,400mg/m2 IV over 46-48 hours)  (5FU 400mg/m2 day 1 bolus (discontinued cycle 2 onwards))  PLUS IVF and magnesium replacement as needed day 1 and day 8 with optimization to Mg= 2 at least, AND IVF day 3 on day of pump disconnect  PLUS (per cardiology recommendations) During the days of chemo and 2 days after ONLY: take long acting diltiazem 120 mg daily with 30 mg of short acting diltiazem as needed for atrial fibrillation with HR >110  Emetic risk: moderate  Febrile neutropenia risk: intermediate     C7D1 9/26/23 pending labs    Treatment related AE:  - hearing changes-described as muffled with intermittent ringing with obscured hearing R>L- overall improved since 1st cycle, and improving few days after starting chemo, Flonase BID as  needed, ENT consultation pending 10/23/23; (present prior to starting chemo, improved w/steroids, now stable and not getting better)   - Paroxysmal A-fib with RVR and PAC- admitted to [facility] 6/3/2023 - 6/5/2023 for this, 6/3/2023 echo EF 55-60%, mild pulmonary hypertension, mild mitral regurgitation, on metoprolol XL 25 mg p.o. daily for ongoing palpitations, 7/23 Cardio oncology consult w/Dr. Garza, on eliquis, no recurrence of RVR while inpatient for cycle 2 w/continuous cardiac monitoring; hydration plan and magnesium replacement to magnesium = 2 minimum added to D1 and D8 and IVF day 3. 8/4/2023 C3D3 patient went to ER for A-fib with palpitations (no other sx), HR 80-130s, labs WNL, CXR negative, given IV fluids, placed on diltiazem drip, HR improved to .  8/15/2023 cardiology follow-up: Increase metoprolol XL to 50 mg daily; During the days of chemo and 2 days after ONLY: take long acting diltiazem 120 mg daily with 30 mg of short acting diltiazem as needed for atrial fibrillation with HR >110.  8/18/2023 patient called in reporting heart rate up to 140, sensation of heart racing, improved to 67 after taking extra prescribed dose of diltiazem and then asymptomatic.  8/19/2023 patient presented to ER with chest tightness and palpitations, had taken total of 3 tabs of diltiazem 30mg short acting at that point, EKG with sinus rhythm, rate 63 with some noted bradycardia while in the ER, magnesium and potassium normal, creatinine slightly elevated, given 1 L IVF.  free drug application for Eliquis completed 8/18/2023 but income over guidelines, 9/12/2023 cardiology [id number]-mh changes, follow-up 12/14/2023  - anxiety-working with psychology prn  - Delayed neutropenia- neutropenic thierry ANC 0.5 2 weeks out from cycle 1, afebrile, 6/23 DPD deficiency testing negative; resolved cycle 2 onwards 5FU bolus dropped   -Normocytic anemia- due to chemotherapy and iron deficiency, s/p ferric carboxymaltose  750mg x2 doses 6/14/23 and 6/28/23, 8/23 hgb 13.3, 9/23 hgb 12.1   -Thrombocytopenia- 2/2 chemotherapy, 9/23 platelet > 110 K  - MARYA on CKD- following w/nephro- follow up 9/23, Cr improved from 3, drinking 2-3 16oz bottles per day and getting IV fluids day 1, day 3 and day 8 of each chemotherapy cycle, nephrology follow-up 12/4/2023  - diarrhea- grade 1, 2 episodes/week, also affected by food, improved with imodium (3-4 total)   - neuropathy- grade 1, fingers and toes, lasting 3-4 days   - cold sensitivity- grade 1, fingers and mouth, lasting 3-4 days, using straw   - Port flipped-see my note from 8/2/2023, 8/21/2023 port removed and replacement with IR      REVIEW OF SYSTEMS:   A 14 point ROS was reviewed with pertinent positives and negatives in the HPI.        HOME MEDICATIONS:  Current Outpatient Medications   Medication Sig Dispense Refill     cyanocobalamin 1000 MCG TBCR Take 1,000 mcg by mouth daily 100 tablet 1     dexamethasone (DECADRON) 4 MG tablet Take 2 tablets (8 mg) by mouth daily Take for 2 days, starting the day after chemo. Take with food. 40 tablet 0     diltiazem ER COATED BEADS (CARDIZEM CD/CARTIA XT) 120 MG 24 hr capsule Take 1 capsule (120 mg) by mouth daily On chemo days and 2 days post chemo infusion. 60 capsule 3     Ferrous Sulfate (IRON) 325 (65 Fe) MG tablet Take 1 tablet by mouth daily       lidocaine-prilocaine (EMLA) 2.5-2.5 % external cream Apply topically as needed for moderate pain Apply 15-20 minutes prior to port access 1 g 4     MAGNESIUM OXIDE 400 (240 Mg) MG tablet TAKE 1 TABLET (400 MG) BY MOUTH 2 TIMES DAILY 60 tablet 0     metoprolol succinate ER (TOPROL XL) 50 MG 24 hr tablet Take 1 tablet (50 mg) by mouth daily 90 tablet 3     Multiple Vitamins-Iron (MULTI-DAY PLUS IRON PO) Take 1 tablet by mouth daily       rivaroxaban ANTICOAGULANT (XARELTO) 20 MG TABS tablet Take 1 tablet (20 mg) by mouth daily (with dinner) 90 tablet 3     sodium bicarbonate 650 MG tablet Take 1  tablet (650 mg) by mouth 2 times daily 120 tablet 3     diltiazem (CARDIZEM) 30 MG tablet Take 1 tablet (30 mg) by mouth 3 times daily (Patient not taking: Reported on 9/26/2023) 90 tablet 0     loperamide (IMODIUM) 2 MG capsule Take 2 mg by mouth daily as needed for diarrhea (Patient not taking: Reported on 9/26/2023)       prochlorperazine (COMPAZINE) 10 MG tablet Take 10 mg by mouth every 6 hours as needed for nausea or vomiting (Patient not taking: Reported on 9/26/2023)           ALLERGIES:  Allergies   Allergen Reactions     No Known Drug Allergy          PAST MEDICAL HISTORY:  Past Medical History:   Diagnosis Date     Arthropathia 08/05/2010     B12 deficiency anemia 10/21/2010     Benign essential hypertension with target blood pressure below 140/90 10/10/2016     CARDIOVASCULAR SCREENING; LDL GOAL LESS THAN 160 10/31/2010     Crohn's colitis (H) 02/15/2011     Dermatitis-dishydrotic eczema-severe 08/05/2010     Hiatal hernia      HTN (hypertension) 07/21/2011     Iron deficiency anemia 10/21/2010     Malignant neoplasm of sigmoid colon (H)      Obesity      Primary pulmonary hypertension (H) 04/06/2010         PAST SURGICAL HISTORY:  Past Surgical History:   Procedure Laterality Date     COLECTOMY WITHOUT COLOSTOMY N/A 4/6/2023    Procedure: Laparoscopic Converted to Open Total abdominal colectomy;  Surgeon: Jerome Ashley MD;  Location: [facility] OR     COLONOSCOPY  10/11/10     COLONOSCOPY N/A 2/27/2023    Procedure: ATTEMPTED COLONOSCOPY WITH SIGMOID STRICTURE BIOPSY;  Surgeon: Jonathan Alcocer MD;  Location:  GI     ESOPHAGOSCOPY, GASTROSCOPY, DUODENOSCOPY (EGD), COMBINED N/A 2/27/2023    Procedure: ESOPHAGOGASTRODUODENOSCOPY, WITH BIOPSY;  Surgeon: Jonathan Alcocer MD;  Location:  GI     HYSTERECTOMY TOTAL ABDOMINAL, BILATERAL SALPINGO-OOPHORECTOMY, COMBINED N/A 4/6/2023    Procedure: Hysterectomy total abdominal, bilateral salpingo-oophorectomy;  Surgeon: Sunitha Olea MD;  Location:   OR     INSERT PORT VASCULAR ACCESS Right 2023    Procedure: Ultrasound-guided right internal jugular venous access port placement with fluoroscopy;  Surgeon: Martin Thomas DO;  Location: [facility] OR     IR CHEST PORT PLACEMENT > 5 YRS OF AGE  2023     IR PORT CHECK RIGHT  2023     IR PORT REMOVAL RIGHT  2023     SIGMOIDOSCOPY FLEXIBLE N/A 2023    Procedure: Sigmoidoscopy flexible;  Surgeon: Jerome Ashley MD;  Location: [facility] OR     SURGICAL HISTORY OF -   77    Perineorrhaphy, for widening vaginal orifice     ZZC EXPLORATORY OF ABDOMEN      laparoscopy         SOCIAL HISTORY:  Social History     Socioeconomic History     Marital status:      Spouse name: Not on file     Number of children: Not on file     Years of education: Not on file     Highest education level: Not on file   Occupational History     Not on file   Tobacco Use     Smoking status: Never     Passive exposure: Current     Smokeless tobacco: Never   Substance and Sexual Activity     Alcohol use: No     Drug use: No     Sexual activity: Yes     Partners: Male   Other Topics Concern     Parent/sibling w/ CABG, MI or angioplasty before 65F 55M? Not Asked   Social History Narrative     Not on file     Social Determinants of Health     Financial Resource Strain: Not on file   Food Insecurity: Not on file   Transportation Needs: Not on file   Physical Activity: Not on file   Stress: Not on file   Social Connections: Not on file   Interpersonal Safety: Not on file   Housing Stability: Not on file         FAMILY HISTORY:  Family History   Problem Relation Age of Onset     Hypertension Mother         on meds, alive     Cerebrovascular Disease Father         stroke about age 65,  of cancer at 68 yrs     Diabetes Father         eventually took insulin     Anemia Father         Pernisios anemia     Kidney Disease Niece         kidney transplant     Kidney Disease Nephew         kidney transplant      "Venous thrombosis No family hx of      Anesthesia Reaction No family hx of          PHYSICAL EXAM:  Vital signs:  /78 (BP Location: Right arm, Cuff Size: Adult Large)   Pulse 86   Temp (!) 96.6  F (35.9  C) (Temporal)   Resp 14   Ht 1.549 m (5' 1\")   Wt 81.2 kg (179 lb)   LMP  (LMP Unknown)   SpO2 99%   BMI 33.82 kg/m       ECO  GENERAL/CONSTITUTIONAL: No acute distress.  EYES: Pupils are equal and round. Extraocular movements intact without nystagmus.  No scleral icterus.  RESPIRATORY: Equal chest rise.   MUSCULOSKELETAL: Warm and well-perfused, no cyanosis, clubbing, or edema.   NEUROLOGIC: Cranial nerves are grossly intact. Alert, oriented to person, place and time, answers questions appropriately.  INTEGUMENTARY: No rashes or jaundice.  GAIT: Steady, does not use assistive device        LABS:   Latest Reference Range & Units 23 08:25   Sodium 136 - 145 mmol/L 141   Potassium 3.4 - 5.3 mmol/L 4.1   Chloride 98 - 107 mmol/L 109 (H)   Carbon Dioxide (CO2) 22 - 29 mmol/L 20 (L)   Urea Nitrogen 8.0 - 23.0 mg/dL 16.4   Creatinine 0.51 - 0.95 mg/dL 1.26 (H)   GFR Estimate >60 mL/min/1.73m2 47 (L)   Calcium 8.8 - 10.2 mg/dL 9.1   Anion Gap 7 - 15 mmol/L 12   Magnesium 1.7 - 2.3 mg/dL 1.7   Albumin 3.5 - 5.2 g/dL 3.5   Protein Total 6.4 - 8.3 g/dL 6.0 (L)   Alkaline Phosphatase 35 - 104 U/L 216 (H)   ALT 0 - 50 U/L 40   AST 0 - 45 U/L 47 (H)   Bilirubin Total <=1.2 mg/dL 0.5   Glucose 70 - 99 mg/dL 159 (H)   WBC 4.0 - 11.0 10e3/uL 4.1   Hemoglobin 11.7 - 15.7 g/dL 12.1   Hematocrit 35.0 - 47.0 % 35.8   Platelet Count 150 - 450 10e3/uL 118 (L)   RBC Count 3.80 - 5.20 10e6/uL 4.00   MCV 78 - 100 fL 90   MCH 26.5 - 33.0 pg 30.3   MCHC 31.5 - 36.5 g/dL 33.8   RDW 10.0 - 15.0 % 16.6 (H)   % Neutrophils % 77   % Lymphocytes % 10   % Monocytes % 12   % Eosinophils % 0   % Basophils % 0   Absolute Basophils 0.0 - 0.2 10e3/uL 0.0   Absolute Eosinophils 0.0 - 0.7 10e3/uL 0.0   Absolute Immature Granulocytes " <=0.4 10e3/uL 0.0   Absolute Lymphocytes 0.8 - 5.3 10e3/uL 0.4 (L)   Absolute Monocytes 0.0 - 1.3 10e3/uL 0.5   % Immature Granulocytes % 1   Absolute Neutrophils 1.6 - 8.3 10e3/uL 3.2   Absolute NRBCs 10e3/uL 0.0   NRBCs per 100 WBC <1 /100 0   (H): Data is abnormally high  (L): Data is abnormally low    PATHOLOGY:    IMAGING:    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 mutated, MARISSA, TPS 50%  # sigmoid colon poorly-differentiated carcinoma, KRAS G13D mutated, loss of MLH1 and PMS2, TPS 70%  - 2/23 colonoscopy: A frond-like/villous partially obstructing large mass found in sigmoid colon, partially circumferential involving two thirds of the lumen circumference, 4 cm, unable to traverse, with oozing, s/p biopsy, PATHOLOGY: Invasive poorly differentiated carcinoma, IHC panel excludes tumors of other origin, colorectal primary is favored, loss of nuclear expression of MLH1 and PMS2, MLH1 promoter methylation negative, [id number] mutation negative  -2/23 CT CAP: Shows known 4 cm proximal sigmoid colon mass with possible tumor extension (irregularity and stranding and adjacent pericolonic fat) without obstruction AND probable second mass 2.5 cm at hepatic flexure of colon; small lymph nodes adjacent to both masses (no lymphadenopathy by size criteria)  -3/23 PET:  a. There is increased FDG avidity of the sigmoid colon with focal lobular wall thickening consistent with biopsy-proven adenocarcinoma.  b. Secondary focus noted at the hepatic flexure demonstrates elevated FDG avidity and lobulated wall thickening highly suspicious for a additional primary.  c. Additionally there is a smaller focus of wall thickening with elevated FDG avidity along the left aspect of the transverse colon which is suspicious  for a possible third focus of colonic malignancy.  - 3/23 CEA 6.8  -4/6/2023 laparoscopic converted to open total abdominal colectomy with ileorectal anastomosis, partial omentectomy, flexible sigmoidoscopy, TAHBSO  PATHOLOGY:  Of note, omental resection, terminal ileum, proximal and distal anastomotic rings, uterus, cervix, bilateral fallopian tubes and ovaries negative for malignancy    Mass #1 (ascending colon/hepatic flexure): Mixed neuroendocrine-non-neuroendocrine neoplasm (MINEN):  - Neuroendocrine carcinoma component (70%) and moderately-differentiated adenocarcinoma component (30%)  - Size = 5.0 cm, invading into muscularis propria, Positive LVI  - IHC: Neuroendocrine portion: Negative for chromogranin and INSM1 but strongly express synaptophysin  - IHC: Adenocarcinoma portion: Positive for CK20, CDX2 negative for CK7, PAX8, ER, S100  Ki-67 proliferation index of approximately 80%.  MARISSA   PDL1:  COMBINED POSITIVE SCORE (CPS): 55-60  TUMOR PROPORTION SCORE (TPS): 50%   pathogenic KRAS mutation (G13D) was identified (5.2%).  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  MMR testing: loss of MLH1 and PMS2  PDL1:  COMBINED POSITIVE SCORE (CPS): 80  TUMOR PROPORTION SCORE (TPS): 70%  pathogenic KRAS mutation (G13D) was identified (4.5%).  MMR testing: loss of MLH1 and PMS2, intact MSH2 and MSH6  AJCC 8th edition: stage 3A mpT2 pN1a    - One of forty-one sampled lymph nodes positive (1/41), d/w pathology- unable to determine which mass is metastatic to LN    - 4/23 MRI brain w/wo contrast LAURNET  - 5/26/23 CT CAP w/ contrast and IVF before and after CT (post op baseline prior to starting  tx):  1.  3 mm nodule RML and 5 mm lateral EDSON nodule, minimally increased from previous  2.  New 4 mm EDSON groundglass density nodule  3.  Several prominent borderline enlarged mediastinal lymph nodes slightly increased from previous  4.  Splenomegaly 14.5 cm  5. S/p subtotal colectomy with ileorectal anastomosis with postsurgical changes along the ventral abdominal wall midline  - 5/8/23 I presented this case at [facility] tumor board as above   - 5/19/23 second opinion at [facility] w/medical oncologist Dr. Acharya, thorough consultation and recommendations appreciated, overall agree w/FOLFOX x6 months, possible nivo or ipi nivo in second line; if metastatic platinum etoposide vs ipi nivo  - 5/25/23 port placement    -5/2023 genetic counseling: Negative for mutations detailed below     REGIMEN:  [id number]  q14 days x12 cycles/6 months  Starting weight 183lb   C1D1 5/31/23 (complicated by hospitalization for afib w/RVR C1D4 6/3/23), C2D1 7/19/23 (inpatient w/continuous cardiac monitoring), C3D1 8/2/2023 (outpatient, admitted to ER for A-fib with RVR C3D3 8/4/23), C4D1 8/16/23, C5D1 8/30/2023, C6D1 9/13/2023    oxaliplatin 64mg/m2 day 1 (dose reduced from 85mg/m2 2/2 Cr C1D1)  LV 350mg/m2 day 1  5FU 1200mg/m2 continuous infusion day 1-2 (total 2,400mg/m2 IV over 46-48 hours)  (5FU 400mg/m2 day 1 bolus (discontinued cycle 2 onwards))  PLUS IVF and magnesium replacement as needed day 1 and day 8 with optimization to Mg= 2 at least, AND IVF day 3 on day of pump disconnect  PLUS (per cardiology recommendations) During the days of chemo and 2 days after ONLY: take long acting diltiazem 120 mg daily with 30 mg of short acting diltiazem as needed for atrial fibrillation with HR >110  Emetic risk: moderate  Febrile neutropenia risk: intermediate     C7D1 9/26/23 pending labs    Treatment related AE:  - hearing changes-stable w/dose reduced oxaliplatin, flonase prn, ENT consultation pending 10/23/23  - Paroxysmal A-fib with RVR and  PAC- 8/15/2023 cardiology follow-up: Increase metoprolol XL to 50 mg daily; During the days of chemo and 2 days after take long acting diltiazem 120 mg daily with 30 mg of short acting diltiazem as needed for atrial fibrillation with HR >110, IV fluid day 1, 3, 8, cardiology follow-up 12/14/2023, psychology follow-up to address anxiety component, if patient continues to have ER visits with every cycle of chemotherapy, will do the following: first- cardio revisit and dose reduce 5FU, if still not effective, then- inpatient chemotherapy with continuous cardiac monitoring for future treatments.  - anxiety-continue following with oncology psychology team  - Normocytic anemia- due to chemotherapy and iron deficiency, s/p ferric carboxymaltose 750mg x2 doses 6/14/23 and 6/28/23; repeat iron studies normal and hgb normal, monitor  -Thrombocytopenia- mild, 2/2 chemo, monitor   - MARYA on CKD- following w/nephro, f/u with nephro 12/4/2023  - diarrhea- grade 1  - cold sensitivity- grade 1  - neuropathy- grade 1     - 7/18/23 CT CAP- subcm lung nodules stable, borderline and mildly enlarged mediastinal LN and periportal LN stable, splenomegaly 15.7cm, Subtotal colectomy with ileorectal anastomosis. No acute inflammation or obstruction    - move up repeat CT to now, then repeat again at end of tx    - f/u with Dr. Ashley 4/2024 for rigid proctoscopy     #parxosymal afib  - within 3 days of receiving 5FU, prior cardiac risk factors htn, prediabetes  - 6/3/23 presented to ER w/lightheadedness, dizziness, cardioverted s/p diltiazem ggt in ER  - 6/23 DPD deficiency testing negative  - currently on metoprolol XL 25 mg daily, apixaban 5 mg twice daily  - 6/30/2023 consultation with cardio oncologist Dr. Garza; I spoke with Dr. Garza 6/30/2023 as well, [id number] score 3, recommending starting apixaban 5 mg twice daily, okay to retrial FOLFOX while patient is on metoprolol.  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  Negative for mutations in APC, AXIN2, BMPR1A, CDH1, CHEK2, EPCAM, GREM1, MLH1, MSH2, MSH3, MSH6, MUTYH, NTHL1, PMS2, POLD1, POLE, PTEN, SMAD4, STK11, TP53, CDKN1B, MEN1, NF1, RET, TSC1, TSC2, and VHL genes    #Anemia secondary to iron deficiency and B12 deficiency  - terminal ileum removed at time of colon surgery, monitor for nutrient def  - 2/23 hgb 6.8, ferritin 21, iron sat index 10, TIBC 265, iron 27, b12 1493  - On B12 1000 mcg p.o. daily and ferrous sulfate 325 mg p.o. daily  - 4/9/23 s/p 1 uprbcs  - 5/9/23 hgb 11.3,5/30/23 hgb 9.4  - based on Ganzoni equation w/goal hgb 14 and 500mg needed for iron stores, pts iron deficit is 1400 mg  - s/p ferric carboxymaltose 750mg x2 doses 6/14/23 and 6/28/23  - 7/11/23 ferritin 1022, iron sat 22, TIBC 201, iron 44    - 8/23 hgb 13.3    #Crohn's  - dx since at least 2010     RTC 2 weeks for follow up with RADHA, labs, chemo (if CT completed, follow up with me instead of RADHA)  RTC 4 weeks for follow up with me, labs, chemo       Irene Bateman  DO  Hematology/Oncology  [facility]      Again, thank you for allowing me to participate in the care of your patient.        Sincerely,        FRANCISCO LEE, DO

## 2023-09-26 NOTE — PROGRESS NOTES
"Infusion Nursing Note:  Nadia Ladd presents today for C7d1 Leucovarin, Oxaliplatin and Flourouracil. Also mag replacement.    Patient seen by provider today: Yes: Fransico   present during visit today:NA.     Note: Nadia was seen by Dr Bateman today.  Feeling well. Only complaint is neuropathy to hands and feet and Fransico is aware.  Mag 2 grams given as directed.  Mag level was 1.7 but provider request replacement if under 2.      Intravenous Access:  Implanted Port.    Treatment Conditions:  Lab Results   Component Value Date    HGB 12.1 09/26/2023    WBC 4.1 09/26/2023    ANEU 7.6 08/01/2023    ANEUTAUTO 3.2 09/26/2023     (L) 09/26/2023        Lab Results   Component Value Date     09/26/2023    POTASSIUM 4.1 09/26/2023    MAG 1.7 09/26/2023    CR 1.26 (H) 09/26/2023    LIVIER 9.1 09/26/2023    BILITOTAL 0.5 09/26/2023    ALBUMIN 3.5 09/26/2023    ALT 40 09/26/2023    AST 47 (H) 09/26/2023       Results reviewed, labs MET treatment parameters, ok to proceed with treatment.      Post Infusion Assessment:  Patient tolerated infusion without incident.  Blood return noted pre and post infusion.  Site patent and intact, free from redness, edema or discomfort.  No evidence of extravasations.     Prior to discharge: Port is secured in place with tegaderm and flushed with 10cc NS with positive blood return noted.    Continuous home infusion CADD pump connected.    All connectors secured in place and clamps taped open.    Pump started, \"running\" noted on display (CADD): YES.   Capillary element taped to pt's skin per protocol.  Pump Connection double checked with Eli ZAYAS.  Patient instructed to call our clinic or [facility] with any questions or concerns at home.  Patient verbalized understanding.    Patient set up for pump disconnect at our clinic on 09/28.  Pump started on 09/26 at 1345      Discharge Plan:   Discharge instructions reviewed with: Patient.  Patient and/or family " verbalized understanding of discharge instructions and all questions answered.  Patient discharged in stable condition accompanied by: .  Departure Mode: Ambulatory.      Teri Magana RN

## 2023-09-26 NOTE — PROGRESS NOTES
Infusion Nursing Note:  Nadia Ladd presents today for port access for lab collection.        Note: 19g power needle, good blood return.  Labs collected, port flushed with 20cc saline. Pt returning to infusion department after appt with Fransico.      Intravenous Access:  Labs drawn without difficulty.  Implanted Port.    Treatment Conditions:  Not Applicable.      Post Infusion Assessment:  Site patent and intact, free from redness, edema or discomfort.     Teri Magana RN

## 2023-09-28 ENCOUNTER — HOSPITAL ENCOUNTER (OUTPATIENT)
Dept: CT IMAGING | Facility: CLINIC | Age: 68
Discharge: HOME | End: 2023-09-28
Attending: INTERNAL MEDICINE
Payer: MEDICARE

## 2023-09-28 ENCOUNTER — INFUSION THERAPY VISIT (OUTPATIENT)
Dept: INFUSION THERAPY | Facility: CLINIC | Age: 68
End: 2023-09-28
Attending: INTERNAL MEDICINE
Payer: MEDICARE

## 2023-09-28 VITALS
RESPIRATION RATE: 18 BRPM | SYSTOLIC BLOOD PRESSURE: 127 MMHG | HEART RATE: 65 BPM | WEIGHT: 183.1 LBS | BODY MASS INDEX: 34.6 KG/M2 | TEMPERATURE: 98.2 F | DIASTOLIC BLOOD PRESSURE: 51 MMHG | OXYGEN SATURATION: 96 %

## 2023-09-28 DIAGNOSIS — C18.7 MALIGNANT NEOPLASM OF SIGMOID COLON (H): ICD-10-CM

## 2023-09-28 DIAGNOSIS — C18.7 MALIGNANT NEOPLASM OF SIGMOID COLON (H): Primary | ICD-10-CM

## 2023-09-28 DIAGNOSIS — Z76.89 PREVENTION OF CHEMOTHERAPY-INDUCED NEUTROPENIA: ICD-10-CM

## 2023-09-28 PROCEDURE — 250N000011 HC RX IP 250 OP 636: Mod: JZ | Performed by: INTERNAL MEDICINE

## 2023-09-28 PROCEDURE — 74177 CT ABD & PELVIS W/CONTRAST: CPT | Mod: MG

## 2023-09-28 PROCEDURE — 258N000003 HC RX IP 258 OP 636: Performed by: INTERNAL MEDICINE

## 2023-09-28 PROCEDURE — 250N000011 HC RX IP 250 OP 636: Performed by: INTERNAL MEDICINE

## 2023-09-28 PROCEDURE — 96361 HYDRATE IV INFUSION ADD-ON: CPT

## 2023-09-28 PROCEDURE — G1010 CDSM STANSON: HCPCS

## 2023-09-28 PROCEDURE — 96360 HYDRATION IV INFUSION INIT: CPT | Mod: XU

## 2023-09-28 PROCEDURE — 250N000009 HC RX 250: Performed by: INTERNAL MEDICINE

## 2023-09-28 RX ORDER — IOPAMIDOL 755 MG/ML
500 INJECTION, SOLUTION INTRAVASCULAR ONCE
Status: COMPLETED | OUTPATIENT
Start: 2023-09-28 | End: 2023-09-28

## 2023-09-28 RX ORDER — HEPARIN SODIUM (PORCINE) LOCK FLUSH IV SOLN 100 UNIT/ML 100 UNIT/ML
5 SOLUTION INTRAVENOUS
Status: DISCONTINUED | OUTPATIENT
Start: 2023-09-28 | End: 2023-09-28 | Stop reason: HOSPADM

## 2023-09-28 RX ADMIN — HEPARIN 5 ML: 100 SYRINGE at 14:35

## 2023-09-28 RX ADMIN — SODIUM CHLORIDE 1000 ML: 9 INJECTION, SOLUTION INTRAVENOUS at 12:04

## 2023-09-28 RX ADMIN — IOPAMIDOL 90 ML: 755 INJECTION, SOLUTION INTRAVENOUS at 13:26

## 2023-09-28 RX ADMIN — SODIUM CHLORIDE 60 ML: 9 INJECTION, SOLUTION INTRAVENOUS at 13:25

## 2023-09-28 ASSESSMENT — PAIN SCALES - GENERAL: PAINLEVEL: NO PAIN (0)

## 2023-09-28 NOTE — PROGRESS NOTES
"Infusion Nursing Note:  Nadia Ladd presents today for IVF.    Patient seen by provider today: No   present during visit today: Not Applicable.    Note: Patient arrives ambulatory with spouse. She says she feels \" a little off\" today. Woke up feeling \"pressure\" in back of head this morning but is feeling better now. Ears are plugged but denies pain or tinnitus. No other pain or bothersome symptoms. VSS, afebrile.   We are giving IVF pre and post CT, pt also needed appt for IVF that is scheduled on C7D3 of treatment plan.    Intravenous Access:  Implanted Port.  Was accessed from chemo pump at home.  Fluorouracil bag empty, CADD pump showing 0ml volume remaining.   Site WDL.   Good blood return, flushes easily.      Lab Results   Component Value Date    HGB 12.1 09/26/2023    WBC 4.1 09/26/2023    ANEU 7.6 08/01/2023    ANEUTAUTO 3.2 09/26/2023     (L) 09/26/2023        Lab Results   Component Value Date     09/26/2023    POTASSIUM 4.1 09/26/2023    MAG 1.7 09/26/2023    CR 1.26 (H) 09/26/2023    LIVIER 9.1 09/26/2023    BILITOTAL 0.5 09/26/2023    ALBUMIN 3.5 09/26/2023    ALT 40 09/26/2023    AST 47 (H) 09/26/2023         Post Infusion Assessment:  Patient tolerated infusion without incident.  Blood return noted pre and post infusion and post CT.  Site patent and intact, free from redness, edema or discomfort.  No evidence of extravasations.  Access discontinued per protocol.       Discharge Plan:   AVS to patient via MYCHART.  Patient will return 10/3 for next appointment.   Patient discharged in stable condition accompanied by: .  Departure Mode: Ambulatory.      Rosalina Argueta RN  "

## 2023-10-02 DIAGNOSIS — I48.0 PAROXYSMAL ATRIAL FIBRILLATION WITH RVR (H): ICD-10-CM

## 2023-10-02 DIAGNOSIS — K52.9 CHRONIC DIARRHEA: ICD-10-CM

## 2023-10-02 DIAGNOSIS — E83.42 HYPOMAGNESEMIA: ICD-10-CM

## 2023-10-02 RX ORDER — LANOLIN ALCOHOL/MO/W.PET/CERES
400 CREAM (GRAM) TOPICAL 2 TIMES DAILY
Qty: 60 TABLET | Refills: 0 | Status: SHIPPED | OUTPATIENT
Start: 2023-10-02 | End: 2023-11-02

## 2023-10-03 ENCOUNTER — INFUSION THERAPY VISIT (OUTPATIENT)
Dept: INFUSION THERAPY | Facility: CLINIC | Age: 68
End: 2023-10-03
Attending: INTERNAL MEDICINE
Payer: MEDICARE

## 2023-10-03 VITALS
DIASTOLIC BLOOD PRESSURE: 77 MMHG | TEMPERATURE: 98.5 F | HEART RATE: 68 BPM | BODY MASS INDEX: 33.67 KG/M2 | OXYGEN SATURATION: 98 % | WEIGHT: 178.2 LBS | RESPIRATION RATE: 16 BRPM | SYSTOLIC BLOOD PRESSURE: 154 MMHG

## 2023-10-03 DIAGNOSIS — T45.1X5A CHEMOTHERAPY-INDUCED NEUTROPENIA (H): ICD-10-CM

## 2023-10-03 DIAGNOSIS — C18.9 MALIGNANT NEOPLASM OF COLON, UNSPECIFIED PART OF COLON (H): ICD-10-CM

## 2023-10-03 DIAGNOSIS — C18.7 MALIGNANT NEOPLASM OF SIGMOID COLON (H): Primary | ICD-10-CM

## 2023-10-03 DIAGNOSIS — Z76.89 PREVENTION OF CHEMOTHERAPY-INDUCED NEUTROPENIA: ICD-10-CM

## 2023-10-03 DIAGNOSIS — D70.1 CHEMOTHERAPY-INDUCED NEUTROPENIA (H): ICD-10-CM

## 2023-10-03 LAB
ALBUMIN SERPL BCG-MCNC: 3.8 G/DL (ref 3.5–5.2)
ALP SERPL-CCNC: 191 U/L (ref 35–104)
ALT SERPL W P-5'-P-CCNC: 43 U/L (ref 0–50)
ANION GAP SERPL CALCULATED.3IONS-SCNC: 12 MMOL/L (ref 7–15)
AST SERPL W P-5'-P-CCNC: 39 U/L (ref 0–45)
BILIRUB SERPL-MCNC: 0.6 MG/DL
BUN SERPL-MCNC: 24.2 MG/DL (ref 8–23)
CALCIUM SERPL-MCNC: 8.8 MG/DL (ref 8.8–10.2)
CHLORIDE SERPL-SCNC: 110 MMOL/L (ref 98–107)
CREAT SERPL-MCNC: 1.47 MG/DL (ref 0.51–0.95)
DEPRECATED HCO3 PLAS-SCNC: 22 MMOL/L (ref 22–29)
EGFRCR SERPLBLD CKD-EPI 2021: 39 ML/MIN/1.73M2
GLUCOSE SERPL-MCNC: 118 MG/DL (ref 70–99)
MAGNESIUM SERPL-MCNC: 1.7 MG/DL (ref 1.7–2.3)
POTASSIUM SERPL-SCNC: 4.4 MMOL/L (ref 3.4–5.3)
PROT SERPL-MCNC: 6.2 G/DL (ref 6.4–8.3)
SODIUM SERPL-SCNC: 144 MMOL/L (ref 135–145)

## 2023-10-03 PROCEDURE — 96365 THER/PROPH/DIAG IV INF INIT: CPT

## 2023-10-03 PROCEDURE — 83735 ASSAY OF MAGNESIUM: CPT

## 2023-10-03 PROCEDURE — 250N000011 HC RX IP 250 OP 636: Mod: JZ | Performed by: INTERNAL MEDICINE

## 2023-10-03 PROCEDURE — 80053 COMPREHEN METABOLIC PANEL: CPT | Performed by: INTERNAL MEDICINE

## 2023-10-03 PROCEDURE — 36591 DRAW BLOOD OFF VENOUS DEVICE: CPT | Performed by: INTERNAL MEDICINE

## 2023-10-03 PROCEDURE — 258N000003 HC RX IP 258 OP 636: Performed by: INTERNAL MEDICINE

## 2023-10-03 RX ORDER — MAGNESIUM SULFATE HEPTAHYDRATE 40 MG/ML
2 INJECTION, SOLUTION INTRAVENOUS ONCE
Status: COMPLETED | OUTPATIENT
Start: 2023-10-03 | End: 2023-10-03

## 2023-10-03 RX ORDER — ALBUTEROL SULFATE 0.83 MG/ML
2.5 SOLUTION RESPIRATORY (INHALATION)
Status: CANCELLED | OUTPATIENT
Start: 2023-10-03

## 2023-10-03 RX ORDER — HEPARIN SODIUM (PORCINE) LOCK FLUSH IV SOLN 100 UNIT/ML 100 UNIT/ML
5 SOLUTION INTRAVENOUS
Status: CANCELLED | OUTPATIENT
Start: 2023-10-03

## 2023-10-03 RX ORDER — MEPERIDINE HYDROCHLORIDE 25 MG/ML
25 INJECTION INTRAMUSCULAR; INTRAVENOUS; SUBCUTANEOUS EVERY 30 MIN PRN
Status: CANCELLED | OUTPATIENT
Start: 2023-10-03

## 2023-10-03 RX ORDER — ALBUTEROL SULFATE 90 UG/1
1-2 AEROSOL, METERED RESPIRATORY (INHALATION)
Status: CANCELLED
Start: 2023-10-03

## 2023-10-03 RX ORDER — HEPARIN SODIUM,PORCINE 10 UNIT/ML
5 VIAL (ML) INTRAVENOUS
Status: CANCELLED | OUTPATIENT
Start: 2023-10-03

## 2023-10-03 RX ORDER — DIPHENHYDRAMINE HYDROCHLORIDE 50 MG/ML
50 INJECTION INTRAMUSCULAR; INTRAVENOUS
Status: CANCELLED
Start: 2023-10-03

## 2023-10-03 RX ORDER — EPINEPHRINE 1 MG/ML
0.3 INJECTION, SOLUTION INTRAMUSCULAR; SUBCUTANEOUS EVERY 5 MIN PRN
Status: CANCELLED | OUTPATIENT
Start: 2023-10-03

## 2023-10-03 RX ORDER — HEPARIN SODIUM (PORCINE) LOCK FLUSH IV SOLN 100 UNIT/ML 100 UNIT/ML
5 SOLUTION INTRAVENOUS
Status: DISCONTINUED | OUTPATIENT
Start: 2023-10-03 | End: 2023-10-03 | Stop reason: HOSPADM

## 2023-10-03 RX ORDER — METHYLPREDNISOLONE SODIUM SUCCINATE 125 MG/2ML
125 INJECTION, POWDER, LYOPHILIZED, FOR SOLUTION INTRAMUSCULAR; INTRAVENOUS
Status: CANCELLED
Start: 2023-10-03

## 2023-10-03 RX ADMIN — MAGNESIUM SULFATE HEPTAHYDRATE 2 G: 40 INJECTION, SOLUTION INTRAVENOUS at 11:27

## 2023-10-03 RX ADMIN — SODIUM CHLORIDE 1000 ML: 9 INJECTION, SOLUTION INTRAVENOUS at 10:30

## 2023-10-03 RX ADMIN — HEPARIN 5 ML: 100 SYRINGE at 12:34

## 2023-10-03 ASSESSMENT — PAIN SCALES - GENERAL: PAINLEVEL: NO PAIN (0)

## 2023-10-03 NOTE — PROGRESS NOTES
Infusion Nursing Note:  Nadia Ladd presents today for Port labs.    Patient seen by provider today: No   present during visit today: Not Applicable.    Note: Port draw prior to IVF. See other infusion therapy visit note from today for treatment conditions for Mg and other documentation.      Intravenous Access:  Implanted Port.  Lab draw site L chest wall, Needle type Noncoring, Gauge 20,3/4in.  Labs drawn without difficulty.      Rosalina Argueta RN

## 2023-10-03 NOTE — PROGRESS NOTES
Infusion Nursing Note:  Nadia Ladd presents today for IVF.    Patient seen by provider today: No   present during visit today: Not Applicable.    Note: Patient arrives ambulatory, feeling pretty good today. Mild fatigue. Neuropathy a constant, mainly in hands, but she said it is better today. No other pertinent sx or concerns. VSS, afebrile.       Intravenous Access:  Implanted Port.    Treatment Conditions:  Lab Results   Component Value Date     10/03/2023    POTASSIUM 4.4 10/03/2023    MAG 1.7 10/03/2023    CR 1.47 (H) 10/03/2023    LIVIER 8.8 10/03/2023    BILITOTAL 0.6 10/03/2023    ALBUMIN 3.8 10/03/2023    ALT 43 10/03/2023    AST 39 10/03/2023       Mg level 1.7 today, Goal is to have Mg at 2 or greater per Dr. Bateman so pt was given 2 gms Mg Sulfate IV today.      Post Infusion Assessment:  Patient tolerated infusion without incident.  Blood return noted pre and post infusion.  Site patent and intact, free from redness, edema or discomfort.  No evidence of extravasations.  Access discontinued per protocol.       Discharge Plan:   [facility]  Patient will return 10/11 for next appointment.   Patient discharged in stable condition accompanied by: .  Departure Mode: Ambulatory.      Rosalina Argueta RN

## 2023-10-09 NOTE — TELEPHONE ENCOUNTER
Hello- I am checking on the status of this encounter. Please advise if further steps need to be taken.

## 2023-10-10 NOTE — PROGRESS NOTES
ENT Consultation    Nadia Ladd who is a 68 year old female seen in consultation at the request of Dr. Bateman.      History of Present Illness - Nadia Ladd is a 68 year old female Consult    Patient presents for evaluation of asymmetric hearing loss.  She actually noticed significant decrease in the hearing involving both the ears as well as increasing tinnitus since completion of chemotherapy.  This was quite recent.  Previously patient has had hearing loss and felt that the right tonsil was 4 years worse than left.  However after completion of chemo she noticed that both ears have gotten worse and the tinnitus has gotten more pronounced.  We do not have any prior audiogram to compare with.  Patient previously had MRI more for evaluation of any metastatic disease but the no other pathology was found.  EXAM: MR BRAIN W/O and W CONTRAST  LOCATION: [facility]  DATE/TIME: 4/20/2023 5:57 PM CDT     INDICATION: Poorly differentiated neuroendocrine carcinoma.  COMPARISON: None.  CONTRAST: 7.5 mL Gadavist  TECHNIQUE: Routine multiplanar multisequence head MRI without and with intravenous contrast.     FINDINGS:  INTRACRANIAL CONTENTS: No acute or subacute infarct. No mass, acute hemorrhage, or extra-axial fluid collections. Scattered nonspecific T2/FLAIR hyperintensities within the cerebral white matter most consistent with minimal chronic microvascular ischemic   change. Mild generalized cerebral atrophy. No hydrocephalus. Mild cerebellar atrophy. No pathologic contrast enhancement.     SELLA: No abnormality accounting for technique.     OSSEOUS STRUCTURES/SOFT TISSUES: Normal marrow signal. The major intracranial vascular flow voids are maintained.      ORBITS: No abnormality accounting for technique.      SINUSES/MASTOIDS: Opacified left sphenoid sinus. Mild mucosal thickening in the ethmoid and maxillary sinuses. No middle ear or mastoid effusion.                                                                        IMPRESSION:  1.  No evidence of intracranial metastatic disease.    BP Readings from Last 1 Encounters:   10/23/23 (!) 140/2       BP noted to be elevated today in office.  Patient to follow up with Primary Care provider regarding elevated blood pressure.    Nadia IS NOT a smoker/uses chewing tobacco.        Past Medical History -   Past Medical History:   Diagnosis Date    Arthropathia 08/05/2010    B12 deficiency anemia 10/21/2010    Benign essential hypertension with target blood pressure below 140/90 10/10/2016    CARDIOVASCULAR SCREENING; LDL GOAL LESS THAN 160 10/31/2010    Crohn's colitis (H) 02/15/2011    Dermatitis-dishydrotic eczema-severe 08/05/2010    Hiatal hernia     HTN (hypertension) 07/21/2011    Iron deficiency anemia 10/21/2010    Malignant neoplasm of sigmoid colon (H)     Obesity     Primary pulmonary hypertension (H) 04/06/2010       Current Medications -   Current Outpatient Medications:     cyanocobalamin 1000 MCG TBCR, Take 1,000 mcg by mouth daily, Disp: 100 tablet, Rfl: 1    dexamethasone (DECADRON) 4 MG tablet, Take 2 tablets (8 mg) by mouth daily Take for 2 days, starting the day after chemo. Take with food., Disp: 40 tablet, Rfl: 0    Ferrous Sulfate (IRON) 325 (65 Fe) MG tablet, Take 1 tablet by mouth daily, Disp: , Rfl:     lidocaine-prilocaine (EMLA) 2.5-2.5 % external cream, Apply topically as needed for moderate pain Apply 15-20 minutes prior to port access, Disp: 1 g, Rfl: 4    MAGNESIUM OXIDE 400 (240 Mg) MG tablet, TAKE 1 TABLET BY MOUTH TWICE A DAY, Disp: 60 tablet, Rfl: 0    metoprolol succinate ER (TOPROL XL) 50 MG 24 hr tablet, Take 1 tablet (50 mg) by mouth daily, Disp: 90 tablet, Rfl: 3    Multiple Vitamins-Iron (MULTI-DAY PLUS IRON PO), Take 1 tablet by mouth daily, Disp: , Rfl:     rivaroxaban ANTICOAGULANT (XARELTO) 20 MG TABS tablet, Take 1 tablet (20 mg) by mouth daily (with dinner), Disp: 90 tablet, Rfl: 3    sodium bicarbonate  "650 MG tablet, Take 1 tablet (650 mg) by mouth 2 times daily, Disp: 120 tablet, Rfl: 3    diltiazem (CARDIZEM) 30 MG tablet, Take 1 tablet (30 mg) by mouth 3 times daily (Patient not taking: Reported on 10/23/2023), Disp: 90 tablet, Rfl: 0    diltiazem ER COATED BEADS (CARDIZEM CD/CARTIA XT) 120 MG 24 hr capsule, Take 1 capsule (120 mg) by mouth daily On chemo days and 2 days post chemo infusion. (Patient not taking: Reported on 10/23/2023), Disp: 60 capsule, Rfl: 3    loperamide (IMODIUM) 2 MG capsule, Take 2 mg by mouth daily as needed for diarrhea (Patient not taking: Reported on 10/23/2023), Disp: , Rfl:     prochlorperazine (COMPAZINE) 10 MG tablet, Take 10 mg by mouth every 6 hours as needed for nausea or vomiting (Patient not taking: Reported on 2023), Disp: , Rfl:     Allergies -   Allergies   Allergen Reactions    No Known Drug Allergy        Social History -   Social History     Socioeconomic History    Marital status:    Tobacco Use    Smoking status: Never     Passive exposure: Current    Smokeless tobacco: Never   Substance and Sexual Activity    Alcohol use: No    Drug use: No    Sexual activity: Yes     Partners: Male       Family History -   Family History   Problem Relation Age of Onset    Hypertension Mother         on meds, alive    Cerebrovascular Disease Father         stroke about age 65,  of cancer at 68 yrs    Diabetes Father         eventually took insulin    Anemia Father         Pernisios anemia    Kidney Disease Niece         kidney transplant    Kidney Disease Nephew         kidney transplant    Venous thrombosis No family hx of     Anesthesia Reaction No family hx of        Review of Systems - As per HPI and PMHx, otherwise review of system review of the head and neck negative. Otherwise 10+ review of system is negative    Physical Exam  BP (!) 140/2 (BP Location: Right arm, Cuff Size: Adult Large)   Pulse 94   Temp 97.4  F (36.3  C) (Temporal)   Ht 1.549 m (5' 1\")   " Wt 83 kg (183 lb)   LMP  (LMP Unknown)   SpO2 98%   BMI 34.58 kg/m    BMI: Body mass index is 34.58 kg/m .    General - The patient is well nourished and well developed, and appears to have good nutritional status.  Alert and oriented to person and place, answers questions and cooperates with examination appropriately.    SKIN - No suspicious lesions or rashes.  Respiration - No respiratory distress.  Head and Face - Normocephalic and atraumatic, with no gross asymmetry noted of the contour of the facial features.  The facial nerve is intact, with strong symmetric movements.    Voice and Breathing - The patient was breathing comfortably without the use of accessory muscles. The patients voice was clear and strong, and had appropriate pitch and quality.    Ears - Bilateral pinna and EACs with normal appearing overlying skin. Tympanic membrane intact with good mobility on pneumatic otoscopy bilaterally. Bony landmarks of the ossicular chain are normal. The tympanic membranes are normal in appearance. No retraction, perforation, or masses.  No fluid or purulence was seen in the external canal or the middle ear.     Eyes - Extraocular movements intact.  Sclera were not icteric or injected, conjunctiva were pink and moist.      Neuro - Nonfocal neuro exam is normal, CN 2 through 12 intact, normal gait and muscle tone.      Performed in clinic today:  Audiologic Studies - An audiogram and tympanogram were performed today as part of the evaluation and personally reviewed. The tympanogram shows Type A curves on the right and Type A curves on the left, with normal canal volumes and middle ear pressures.  The audiogram showed moderate SNHL on the right and mild to moderate SNHL in the high frequencies on the left.      Word recognition score 58% on the right versus 90% on the left  A/P - Nadia Ladd is a 68 year old female with asymmetric sensorineural hearing loss especially after him received her chemotherapy however  relatively recent MRI of the brain that was normal.  At this point patient of course will exercise hearing protection and will get hearing aids for both ears especially on the right as soon as possible to protect remaining word recognition score.  Patient will follow-up in 6 months for audiogram.      Jean-Claude Zurita MD

## 2023-10-11 ENCOUNTER — LAB (OUTPATIENT)
Dept: INFUSION THERAPY | Facility: CLINIC | Age: 68
End: 2023-10-11
Attending: INTERNAL MEDICINE
Payer: MEDICARE

## 2023-10-11 ENCOUNTER — VIRTUAL VISIT (OUTPATIENT)
Dept: ONCOLOGY | Facility: CLINIC | Age: 68
End: 2023-10-11
Payer: MEDICARE

## 2023-10-11 ENCOUNTER — ALLIED HEALTH/NURSE VISIT (OUTPATIENT)
Dept: FAMILY MEDICINE | Facility: CLINIC | Age: 68
End: 2023-10-11
Payer: MEDICARE

## 2023-10-11 VITALS
TEMPERATURE: 97.9 F | SYSTOLIC BLOOD PRESSURE: 121 MMHG | BODY MASS INDEX: 34.92 KG/M2 | OXYGEN SATURATION: 98 % | WEIGHT: 184.8 LBS | RESPIRATION RATE: 18 BRPM | DIASTOLIC BLOOD PRESSURE: 60 MMHG | HEART RATE: 57 BPM

## 2023-10-11 DIAGNOSIS — D69.59 CHEMOTHERAPY-INDUCED THROMBOCYTOPENIA: ICD-10-CM

## 2023-10-11 DIAGNOSIS — D50.0 IRON DEFICIENCY ANEMIA DUE TO CHRONIC BLOOD LOSS: ICD-10-CM

## 2023-10-11 DIAGNOSIS — C18.3 MALIGNANT NEOPLASM OF HEPATIC FLEXURE (H): ICD-10-CM

## 2023-10-11 DIAGNOSIS — F41.9 ANXIETY: ICD-10-CM

## 2023-10-11 DIAGNOSIS — D70.1 CHEMOTHERAPY-INDUCED NEUTROPENIA (H): ICD-10-CM

## 2023-10-11 DIAGNOSIS — C18.9 MALIGNANT NEOPLASM OF COLON, UNSPECIFIED PART OF COLON (H): Primary | ICD-10-CM

## 2023-10-11 DIAGNOSIS — C18.7 MALIGNANT NEOPLASM OF SIGMOID COLON (H): Primary | ICD-10-CM

## 2023-10-11 DIAGNOSIS — R68.89 COLD SENSITIVITY: ICD-10-CM

## 2023-10-11 DIAGNOSIS — I48.91 ATRIAL FIBRILLATION, UNSPECIFIED TYPE (H): ICD-10-CM

## 2023-10-11 DIAGNOSIS — H93.8X3 OTOTOXICITY OF BOTH EARS: ICD-10-CM

## 2023-10-11 DIAGNOSIS — Z76.89 PREVENTION OF CHEMOTHERAPY-INDUCED NEUTROPENIA: Primary | ICD-10-CM

## 2023-10-11 DIAGNOSIS — T45.1X5A CHEMOTHERAPY-INDUCED THROMBOCYTOPENIA: ICD-10-CM

## 2023-10-11 DIAGNOSIS — T45.1X5A CHEMOTHERAPY-INDUCED NEUTROPENIA (H): ICD-10-CM

## 2023-10-11 DIAGNOSIS — C18.7 MALIGNANT NEOPLASM OF SIGMOID COLON (H): ICD-10-CM

## 2023-10-11 LAB
ALBUMIN SERPL BCG-MCNC: 3.5 G/DL (ref 3.5–5.2)
ALP SERPL-CCNC: 251 U/L (ref 35–104)
ALT SERPL W P-5'-P-CCNC: 41 U/L (ref 0–50)
ANION GAP SERPL CALCULATED.3IONS-SCNC: 13 MMOL/L (ref 7–15)
AST SERPL W P-5'-P-CCNC: 50 U/L (ref 0–45)
BASO+EOS+MONOS # BLD AUTO: ABNORMAL 10*3/UL
BASO+EOS+MONOS NFR BLD AUTO: ABNORMAL %
BASOPHILS # BLD AUTO: 0 10E3/UL (ref 0–0.2)
BASOPHILS NFR BLD AUTO: 0 %
BILIRUB SERPL-MCNC: 0.5 MG/DL
BUN SERPL-MCNC: 14.1 MG/DL (ref 8–23)
CALCIUM SERPL-MCNC: 9 MG/DL (ref 8.8–10.2)
CEA SERPL-MCNC: 2 NG/ML
CHLORIDE SERPL-SCNC: 109 MMOL/L (ref 98–107)
CREAT SERPL-MCNC: 1.23 MG/DL (ref 0.51–0.95)
DEPRECATED HCO3 PLAS-SCNC: 21 MMOL/L (ref 22–29)
EGFRCR SERPLBLD CKD-EPI 2021: 48 ML/MIN/1.73M2
EOSINOPHIL # BLD AUTO: 0 10E3/UL (ref 0–0.7)
EOSINOPHIL NFR BLD AUTO: 0 %
ERYTHROCYTE [DISTWIDTH] IN BLOOD BY AUTOMATED COUNT: 17.2 % (ref 10–15)
GLUCOSE SERPL-MCNC: 193 MG/DL (ref 70–99)
HCT VFR BLD AUTO: 33.6 % (ref 35–47)
HGB BLD-MCNC: 11.1 G/DL (ref 11.7–15.7)
IMM GRANULOCYTES # BLD: 0 10E3/UL
IMM GRANULOCYTES NFR BLD: 1 %
LYMPHOCYTES # BLD AUTO: 0.4 10E3/UL (ref 0.8–5.3)
LYMPHOCYTES NFR BLD AUTO: 14 %
MAGNESIUM SERPL-MCNC: 1.7 MG/DL (ref 1.7–2.3)
MCH RBC QN AUTO: 31 PG (ref 26.5–33)
MCHC RBC AUTO-ENTMCNC: 33 G/DL (ref 31.5–36.5)
MCV RBC AUTO: 94 FL (ref 78–100)
MONOCYTES # BLD AUTO: 0.5 10E3/UL (ref 0–1.3)
MONOCYTES NFR BLD AUTO: 18 %
NEUTROPHILS # BLD AUTO: 2 10E3/UL (ref 1.6–8.3)
NEUTROPHILS NFR BLD AUTO: 67 %
NRBC # BLD AUTO: 0 10E3/UL
NRBC BLD AUTO-RTO: 0 /100
PLATELET # BLD AUTO: 107 10E3/UL (ref 150–450)
POTASSIUM SERPL-SCNC: 4.1 MMOL/L (ref 3.4–5.3)
PROT SERPL-MCNC: 5.9 G/DL (ref 6.4–8.3)
RBC # BLD AUTO: 3.58 10E6/UL (ref 3.8–5.2)
SODIUM SERPL-SCNC: 143 MMOL/L (ref 135–145)
WBC # BLD AUTO: 2.9 10E3/UL (ref 4–11)

## 2023-10-11 PROCEDURE — 96375 TX/PRO/DX INJ NEW DRUG ADDON: CPT

## 2023-10-11 PROCEDURE — 96368 THER/DIAG CONCURRENT INF: CPT

## 2023-10-11 PROCEDURE — 96367 TX/PROPH/DG ADDL SEQ IV INF: CPT

## 2023-10-11 PROCEDURE — 36415 COLL VENOUS BLD VENIPUNCTURE: CPT | Performed by: INTERNAL MEDICINE

## 2023-10-11 PROCEDURE — 82378 CARCINOEMBRYONIC ANTIGEN: CPT | Performed by: INTERNAL MEDICINE

## 2023-10-11 PROCEDURE — 96413 CHEMO IV INFUSION 1 HR: CPT

## 2023-10-11 PROCEDURE — 250N000011 HC RX IP 250 OP 636: Performed by: INTERNAL MEDICINE

## 2023-10-11 PROCEDURE — 96415 CHEMO IV INFUSION ADDL HR: CPT

## 2023-10-11 PROCEDURE — 85025 COMPLETE CBC W/AUTO DIFF WBC: CPT | Performed by: INTERNAL MEDICINE

## 2023-10-11 PROCEDURE — 96411 CHEMO IV PUSH ADDL DRUG: CPT

## 2023-10-11 PROCEDURE — 99214 OFFICE O/P EST MOD 30 MIN: CPT | Mod: 95 | Performed by: INTERNAL MEDICINE

## 2023-10-11 PROCEDURE — 258N000003 HC RX IP 258 OP 636: Performed by: INTERNAL MEDICINE

## 2023-10-11 PROCEDURE — 83735 ASSAY OF MAGNESIUM: CPT

## 2023-10-11 PROCEDURE — 80053 COMPREHEN METABOLIC PANEL: CPT | Performed by: INTERNAL MEDICINE

## 2023-10-11 PROCEDURE — 99207 PR NO CHARGE NURSE ONLY: CPT

## 2023-10-11 RX ORDER — ALBUTEROL SULFATE 90 UG/1
1-2 AEROSOL, METERED RESPIRATORY (INHALATION)
Status: CANCELLED
Start: 2023-10-11

## 2023-10-11 RX ORDER — MEPERIDINE HYDROCHLORIDE 25 MG/ML
25 INJECTION INTRAMUSCULAR; INTRAVENOUS; SUBCUTANEOUS EVERY 30 MIN PRN
Status: CANCELLED | OUTPATIENT
Start: 2023-10-11

## 2023-10-11 RX ORDER — DIPHENHYDRAMINE HYDROCHLORIDE 50 MG/ML
50 INJECTION INTRAMUSCULAR; INTRAVENOUS
Status: CANCELLED
Start: 2023-10-11

## 2023-10-11 RX ORDER — HEPARIN SODIUM,PORCINE 10 UNIT/ML
5 VIAL (ML) INTRAVENOUS
Status: CANCELLED | OUTPATIENT
Start: 2023-10-11

## 2023-10-11 RX ORDER — ONDANSETRON 2 MG/ML
8 INJECTION INTRAMUSCULAR; INTRAVENOUS ONCE
Status: COMPLETED | OUTPATIENT
Start: 2023-10-11 | End: 2023-10-11

## 2023-10-11 RX ORDER — ALBUTEROL SULFATE 0.83 MG/ML
2.5 SOLUTION RESPIRATORY (INHALATION)
Status: CANCELLED | OUTPATIENT
Start: 2023-10-11

## 2023-10-11 RX ORDER — HEPARIN SODIUM (PORCINE) LOCK FLUSH IV SOLN 100 UNIT/ML 100 UNIT/ML
5 SOLUTION INTRAVENOUS
Status: CANCELLED | OUTPATIENT
Start: 2023-10-11

## 2023-10-11 RX ORDER — EPINEPHRINE 1 MG/ML
0.3 INJECTION, SOLUTION INTRAMUSCULAR; SUBCUTANEOUS EVERY 5 MIN PRN
Status: CANCELLED | OUTPATIENT
Start: 2023-10-11

## 2023-10-11 RX ORDER — HEPARIN SODIUM (PORCINE) LOCK FLUSH IV SOLN 100 UNIT/ML 100 UNIT/ML
5 SOLUTION INTRAVENOUS
Status: DISCONTINUED | OUTPATIENT
Start: 2023-10-11 | End: 2023-10-11 | Stop reason: HOSPADM

## 2023-10-11 RX ORDER — METHYLPREDNISOLONE SODIUM SUCCINATE 125 MG/2ML
125 INJECTION, POWDER, LYOPHILIZED, FOR SOLUTION INTRAMUSCULAR; INTRAVENOUS
Status: CANCELLED
Start: 2023-10-11

## 2023-10-11 RX ADMIN — SODIUM CHLORIDE 500 ML: 9 INJECTION, SOLUTION INTRAVENOUS at 09:29

## 2023-10-11 RX ADMIN — DEXTROSE MONOHYDRATE 250 ML: 50 INJECTION, SOLUTION INTRAVENOUS at 10:22

## 2023-10-11 RX ADMIN — OXALIPLATIN 120 MG: 5 INJECTION, SOLUTION INTRAVENOUS at 10:25

## 2023-10-11 RX ADMIN — ONDANSETRON 8 MG: 2 INJECTION INTRAMUSCULAR; INTRAVENOUS at 09:39

## 2023-10-11 RX ADMIN — LEUCOVORIN CALCIUM 700 MG: 350 INJECTION, POWDER, LYOPHILIZED, FOR SOLUTION INTRAMUSCULAR; INTRAVENOUS at 10:24

## 2023-10-11 RX ADMIN — FOSAPREPITANT: 150 INJECTION, POWDER, LYOPHILIZED, FOR SOLUTION INTRAVENOUS at 09:58

## 2023-10-11 ASSESSMENT — PAIN SCALES - GENERAL: PAINLEVEL: NO PAIN (0)

## 2023-10-11 NOTE — PROGRESS NOTES
"Infusion Nursing Note:  Nadia Ladd presents today for C8D1 Oxaliplatin with IVF and 5FU home infusion connect.    Patient seen by provider today: Yes. Virtual visit with Fransico.   present during visit today: Not Applicable.    Note: Pt feeling well today. Pleased with her Fransico appt and her recent scans.      Intravenous Access:  Implanted Port.    Treatment Conditions:  Lab Results   Component Value Date    HGB 11.1 (L) 10/11/2023    WBC 2.9 (L) 10/11/2023    ANEU 7.6 08/01/2023    ANEUTAUTO 2.0 10/11/2023     (L) 10/11/2023        Lab Results   Component Value Date     10/11/2023    POTASSIUM 4.1 10/11/2023    MAG 1.7 10/11/2023    CR 1.23 (H) 10/11/2023    LIVIER 9.0 10/11/2023    BILITOTAL 0.5 10/11/2023    ALBUMIN 3.5 10/11/2023    ALT 41 10/11/2023    AST 50 (H) 10/11/2023       Results reviewed, labs MET treatment parameters, ok to proceed with treatment.      Post Infusion Assessment:  Patient tolerated infusion without incident.  Patient observed for 15 minutes post chemo per protocol.     Prior to discharge: Port is secured in place with tegaderm and flushed with 10cc NS with positive blood return noted.    Continuous home infusion CADD pump connected.    All connectors secured in place and clamps taped open.    Pump started, \"running\" noted on display (CADD): YES.   Capillary element taped to pt's skin per protocol.  Pump Connection double checked with MICHAEL Ladd RN.  Patient instructed to call our clinic or [facility] with any questions or concerns at home.  Patient verbalized understanding.    Patient set up for pump disconnect at our clinic on Friday, 10/13/2023.      Discharge Plan:   Patient discharged in stable condition accompanied by: .  Departure Mode: Ambulatory.      Marycarmen Cardenas RN  "

## 2023-10-11 NOTE — LETTER
10/11/2023         RE: Nadia Ladd  [address]  [facility] [address]        Dear Colleague,    Thank you for referring your patient, Nadia Ladd, to the [facility] [place]. Please see a copy of my visit note below.    Video-Visit Details     Video Start Time: 8:50am     Type of service:  Video Visit     Video End Time: 9:14AM    Originating Location (pt. Location): Home     Distant Location (provider location):  [facility] off-site     Platform used for Video Visit: Clearleap     [facility]    Hematology/Oncology Established Patient Follow Up Note      Today's Date: 10/11/2023    Reason for follow up: Sigmoid cancer    HISTORY OF PRESENT ILLNESS: Nadia Ladd is a 68 year old female who presents for follow-up    Patient has medical history including Crohn's disease on sulfasalazine, anemia secondary to iron deficiency and B12 deficiency, obesity, hypertension, prediabetes, eczema, pulmonary hypertension, hiatal hernia, mild splenomegaly (14.7 cm in 2/23)    - 2/23 pt noted increasing fatigue, found to have iron deficiency anemia w/hgb 6.8 (previously required RBC transfusion when dx w/Crohn's), did have intermittent changes in stool but not persistent (noted minimal blood in stool)   - 2/23 upper endoscopy for iron deficiency anemia and Crohn's disease: Hiatal hernia, normal stomach, normal duodenum  - 2/23 colonoscopy: A frond-like/villous partially obstructing large mass found in sigmoid colon, partially circumferential involving two thirds of the lumen circumference, 4 cm, unable to traverse, with oozing, s/p biopsy  PATHOLOGY:  A.  Stomach, antrum: Biopsy:  - Antral type mucosa with mild chronic inactive gastritis  - Immunostain for Helicobacter pylori is negative      B.  Colon, sigmoid, stricture: Biopsy:  - Invasive poorly differentiated carcinoma  The sigmoid stricture shows a high-grade carcinoma without definitive gland formation.  Given the  poorly differentiated appearance, an immunohistochemical panel was performed to exclude the possibility of a tumor by direct extension or metastasis.   Immunohistochemical stains are performed and show the tumor to be diffusely positive for CK7 and partially positive for CK20 and SATB2.  There is no significant tumor reactivity for CDX2, GATA3, PAX8, TTF-1, and chromogranin.  Synaptophysin highlights very rare positive cells. Although the CK7/CK20 profile is not entirely typical for a colorectal carcinoma, immunostains for tumors of other origins are negative and a colorectal primary is still favored.   - Mismatch repair:  1) MLH1-loss of nuclear expression  2) MSH2-intact  3) MSH6-intact  4) PMS2-loss of nuclear expression  -MLH1 promoter methylation: NEGATIVE    -2/23 CT CAP:  A) 4 cm length mass in the proximal sigmoid colon. There is some irregularity and stranding in the adjacent pericolonic fat which may indicate tumor extension. There is no obstruction.   B) there is a second probable mass at the hepatic flexure of the colon measuring 2.5 cm in length   C)There are small lymph nodes in the mesial colon adjacent to the hepatic flexure abnormality and the sigmoid colonic mass. No lymphadenopathy by size criteria  D) There is submucosal fatty deposition in the colon, most severe in the distal sigmoid colon and rectum, which can be seen secondary to quiescent inflammatory bowel disease.  E) no liver lesion    -3/23 PET:  a. There is increased FDG avidity of the sigmoid colon with focal lobular wall thickening consistent with biopsy-proven adenocarcinoma.  b. Secondary focus noted at the hepatic flexure demonstrates elevated FDG avidity and lobulated wall thickening highly suspicious for a additional primary.  c. Additionally there is a smaller focus of wall thickening with elevated FDG avidity along the left aspect of the transverse colon  which is suspicious for a possible third focus of colonic  "malignancy.    -3/23 CEA 6.8  - 3/23 colorectal surgery consultation with - in the setting of Crohn's, at least sigmoid colectomy with possible total abdominal colectomy with either ileorectal anastomosis or end ileostomy (\"rectum can remain active and be actively surveyed annually\")    -3/23 genetic counseling, testing for Chan syndrome    - 4/5/2023 gynecologic oncology consultation for risk reduction surgery with hysterectomy and BSO at time of colectomy    -4/6/2023 laparoscopic converted to open total abdominal colectomy with ileorectal anastomosis, partial omentectomy, flexible sigmoidoscopy, TAHBSO  A. OMENTUM, RESECTION:  - Adipose tissue with no significant histopathologic abnormality  - No evidence of malignancy     B. COLON, RESECTION:  Mass #1 (ascending colon/hepatic flexure): Mixed neuroendocrine-non-neuroendocrine neoplasm (MINEN):  - Neuroendocrine carcinoma component (70%) and moderately-differentiated adenocarcinoma component (30%)  - Size = 5.0 cm, invading into muscularis propria  - Positive LVI  - Negative macroscopic tumor perforation, negative PNI  - Negative surgical resection margins  - IHC: Neuroendocrine portion: Negative for chromogranin and INSM1 but strongly express synaptophysin  - IHC: Adenocarcinoma portion: Positive for CK20, CDX2 negative for CK7, PAX8, ER, S100  Ki-67 proliferation index of approximately 80%.  MMR:  Intact nuclear expression of MLH1, MSH2, MHS6, PMS2  PDL1:  COMBINED POSITIVE SCORE (CPS): 55-60  TUMOR PROPORTION SCORE (TPS): 50%   pathogenic KRAS mutation (G13D) was identified (5.2%).  AJCC 8th edition: stage 3B mpT3 pN1a     Mass#2 (sigmoid colon): Poorly-differentiated carcinoma:  - Grade 3  - Size = 5.7 cm, invading into muscularis propria  - Negative for microscopic tumor perforation, LVI, perineural invasion  - Negative surgical resection margins  - IHC: Positive for scar and keratin AE1/AE3, negative for CK7, CK20, CDX2, PAX8, ER, chromogranin, " CD56, p40, synaptophysin, S100, INI1, p40, INSM1  Ki-67 proliferation index of approximately 70%.  MMR: loss of nuclear expression MLH1 and PMS2, intact nuclear expression MSH2 and MSH6  PDL1:  COMBINED POSITIVE SCORE (CPS): 80  TUMOR PROPORTION SCORE (TPS): 70%  pathogenic KRAS mutation (G13D) was identified (4.5%).  AJCC 8th edition: stage 3A mpT2 pN1a    - One of forty-one sampled lymph nodes positive (1/41)  - Benign appendix  - Tubular adenoma    C. UTERUS, CERVIX, BILATERAL FALLOPIAN TUBES & OVARIES, :  - Benign endometrial polyps; atrophic endometrium  - Uterus, cervix, bilateral fallopian tubes, and ovaries with no significant morphologic abnormalities  - No evidence of dysplasia or malignancy     D. SMALL INTESTINE, TERMINAL ILEUM, TERMINAL ILIUM:  - Benign ileum  - No evidence of dysplasia or malignancy  - Negative for metastases to one of one sampled lymph node (0 /1)     E. PROXIMAL ANASTOMOTIC RING:  - Benign small intestine  - No evidence of dysplasia or malignancy     F. DISTAL ANASTOMOTIC RING:  - Benign colon  - No evidence of dysplasia or malignancy    CRC NGS:   --mutations positive for KRAS G13D mutation 5.2% mass 1, KRAS G13D mutation 4.5% mass 2 (negative for AKT1; BRAF; ERBB2; KRAS; NRAS; PIK3CA; PTEN)  --TMB score: 17.064 mut/Mb mass 1 (CPS 55-60, TPS 50%), 60.943 mut/Mb mass 2 (CPS 80, TPS 70%)  --fusion negative including NTRK and RET for mass 1 and 2 (also negative for AKT1, AKT3, ALK, AR, SDRPXT69, CLAUDINE, BCOR, BRAF, BRD3, BRD4, CAMTA1, CCNB3, CCND1, , CIC, CSF1, CSF1R, CTNNB1, DNAJB1, EGFR, EPC1, ERBB2, ERBB4, ERG, ESR1, ESRRA, ETV1, ETV4, ETV5, ETV6, EWSR1, FGFR1, FGFR2, FGFR3, FGR, FOS, FOSB, FOXO1, FOXO4, FOXR2, FUS, GLI1, GRB7, HMGA2, HRAS, IDH1, IDH2, INSR, JAK2, JAK3, JAZF1, KRAS, MAML2, MAP2K1, MAST1, MAST2, MEAF6, MET, MKL2, MN1, MSMB, MUSK, MYB, MYBL1, MYOD1, NCOA1, NCOA2, NOTCH1, NOTCH2, NR4A3, NRAS, NRG1, NTRK1, NTRK2, NTRK3, NUMBL1, NUTM1, PAX3, PDGFB, PDGFRA, PDGFRB,  PHF1, PIK3CA, PKN1, PLAG1, PPARG, PRKACA, PRKCA, PRKCB, RAF1, RELA, RET, ROS1, RPSO2, RSPO3, SS18, STAT6, TAF15, TCF12, TERT, TFE3, TFEB, TFG, THADA, TMPRSS2, USP6, VGLL2, YAP1, YWHAE)    -4/11/2023 patient discharged from hospital, planning for 30 days of lovenox postop, oxycodone for pain (required 1 unit PRBC for anemia)    -5/8/23 I presented this case at [facility] CRC tumor board and their recommendations include:  - common to see this in Crohn's, overall poor prognosis, treat aggressively, may be poorly responsive to chemotherapy  - standard of care is mFOLFOX 6 x 12 cycles  - acknowledge that pt has high TPS and likely response to immunotherapy however not sufficient data or standard of care in stage 3 adjuvant setting  - add MMR testing to mass #1 hepatic flexure mass    - 5/9/23 admitted for acute renal failure w/Cr 3.82 w/K 7.0    - 5/19/23 second opinion at [facility] w/medical oncologist Dr. Acharya, thorough consultation and recommendations appreciated     - pt would like to proceed with FOFLOX (with dose reduced oxaliplatin due to kidney function)    -5/2023 genetic counseling: Negative for mutations in EPCAM, MLH1, MSH2, MSH6, and PMS2 genes.  Negative for mutations in APC, AXIN2, BMPR1A, CDH1, CHEK2, EPCAM, GREM1, MLH1, MSH2, MSH3, MSH6, MUTYH, NTHL1, PMS2, POLD1, POLE, PTEN, SMAD4, STK11, TP53, CDKN1B, MEN1, NF1, RET, TSC1, TSC2, and VHL genes    - 5/25/23 port placement    - 5/26/23 CT CAP w/ contrast and IVF before and after CT (baseline prior to starting tx):  1.  3 mm nodule RML and 5 mm lateral EDSON nodule, minimally increased from previous  2.  New 4 mm EDSON groundglass density nodule  3.  Several prominent borderline enlarged mediastinal lymph nodes slightly increased from previous  4.  Splenomegaly 14.5 cm  5. S/p subtotal colectomy with ileorectal anastomosis with postsurgical changes along the ventral abdominal wall midline    -5/31/23 started mFOLFOX 6 w/dose reduced oxaliplatin    - C2D1 6/14/23 held  due to new onset paroxysmal A-fib with RVR requiring hospitalization 6/3/2023 (also hypoMg, dehydrated)  - 6/19/2023 I presented this case at [facility] colorectal tumor board at the [facility], it is possible that paroxysmal A-fib may be related to 5-FU.  Options include withholding further treatment versus retrialing 5-FU under the guidance of cardio oncology in an inpatient setting.  No other adjuvant treatment options available, including immunotherapy  - 6/30/2023 consultation with cardio oncologist Dr. Garza; I spoke with Dr. Garza 6/30/2023 as well, [id number] score 3, recommending starting apixaban 5 mg twice daily, okay to retrial FOLFOX while patient is on metoprolol  -7/18/2023  CT CAP- subcm lung nodules stable, borderline and mildly enlarged mediastinal LN and periportal LN stable, splenomegaly 15.7cm, Subtotal colectomy with ileorectal anastomosis. No acute inflammation or obstruction   - 7/19/2023 mFOLFOX6 C2D1  (inpatient w/continuous cardiac monitoring)      INTERIM HISTORY:  REGIMEN:  mFOLFOX6  q14 days x12 cycles/6 months  Starting weight 183lb   C1D1 5/31/23 (complicated by hospitalization for afib w/RVR C1D4 6/3/23), C2D1 7/19/23 (inpatient w/continuous cardiac monitoring), C3D1 8/2/2023 (outpatient, admitted to ER for A-fib with RVR C3D3 8/4/23), C4D1 8/16/23, C5D1 8/30/2023, C6D1 9/13/2023, C7D1 9/26/23    oxaliplatin 64mg/m2 day 1 (dose reduced from 85mg/m2 2/2 Cr C1D1)  LV 350mg/m2 day 1  5FU 1200mg/m2 continuous infusion day 1-2 (total 2,400mg/m2 IV over 46-48 hours)  (5FU 400mg/m2 day 1 bolus (discontinued cycle 2 onwards))  PLUS IVF and magnesium replacement as needed day 1 and day 8 with optimization to Mg= 2 at least, AND IVF day 3 on day of pump disconnect  PLUS (per cardiology recommendations) During the days of chemo and 2 days after ONLY: take long acting diltiazem 120 mg daily with 30 mg of short acting diltiazem as needed for atrial fibrillation with HR >110  Emetic risk:  moderate  Febrile neutropenia risk: intermediate     C8D1 10/11/23 pending labs    Treatment related AE:  - hearing changes-described as muffled with intermittent ringing with obscured hearing R>L- overall improved since 1st cycle, and improving few days after starting chemo, Flonase BID as needed, ENT consultation pending 10/23/23; (present prior to starting chemo, improved w/steroids, now stable and not getting better)   - Paroxysmal A-fib with RVR and PAC- admitted to [facility] 6/3/2023 - 6/5/2023 for this, 6/3/2023 echo EF 55-60%, mild pulmonary hypertension, mild mitral regurgitation, on metoprolol XL 25 mg p.o. daily for ongoing palpitations, 7/23 Cardio oncology consult w/Dr. Garza, on eliquis, no recurrence of RVR while inpatient for cycle 2 w/continuous cardiac monitoring; IVF and magnesium replacement to magnesium = 2 minimum added to D1 and D8 and IVF day 3. 8/4/2023 C3D3 patient went to ER for A-fib with palpitations (no other sx), HR 80-130s, labs WNL, CXR negative, given IV fluids, placed on diltiazem drip, HR improved to .  8/15/2023 cardiology follow-up: Increase metoprolol XL to 50 mg daily; During the days of chemo and 2 days after ONLY: take long acting diltiazem 120 mg daily with 30 mg of short acting diltiazem as needed for atrial fibrillation with HR >110.  8/18/2023 patient called in reporting heart rate up to 140, sensation of heart racing, improved to 67 after taking extra prescribed dose of diltiazem and then asymptomatic.  8/19/2023 patient presented to ER with chest tightness and palpitations, had taken total of 3 tabs of diltiazem 30mg short acting at that point, EKG with sinus rhythm, rate 63 with some noted bradycardia while in the ER, magnesium and potassium normal, creatinine slightly elevated, given 1 L IVF.  free drug application for Eliquis completed 8/18/2023 but income over guidelines, 9/12/2023 cardiology [id number] changes, follow-up 12/14/2023; still finds  "xarelto unaffordable  - anxiety- working with psychology prn  - Delayed neutropenia- neutropenic thierry ANC 0.5 2 weeks out from cycle 1, afebrile, 6/23 DPD deficiency testing negative; resolved cycle 2 onwards 5FU bolus dropped   -Normocytic anemia- due to chemotherapy and iron deficiency, s/p ferric carboxymaltose 750mg x2 doses 6/14/23 and 6/28/23, 7/23 iron studies improved.  8/23 hgb 13.3, 9/23 hgb 12.1, 10/23 hgb 11.1  -Thrombocytopenia- 2/2 chemotherapy, no bruising or bleeding, for now plt >75K  - MARYA on CKD- following w/nephro- follow up 9/23, Cr improved from 3, drinking 2.5 16oz bottles per day and getting IV fluids day 1, day 3 and day 8 of each chemotherapy cycle, nephrology follow-up 12/4/2023  - elevated LFTs- AST 50, Alk phos 251, liver normal on 9/23 CT  - diarrhea- grade 1, 2 episodes/week, also affected by food, improved with imodium 2 total)   - neuropathy- grade 1, tingling fingers > toes, lasting 3-4 days   - cold sensitivity- grade 1, fingers and mouth, most severe right after tx and gradually improves over 2 weeks, using straw   - Port flipped-see my note from 8/2/2023, 8/21/2023 port removed and replacement with IR  - \"bottom is sore\"- pt has hemorrhoids, no active bleeding or thrombosed hemorrhoids but notes soreness after chemo       -9/23 CT CAP (after cycle 6)  -Subtotal colectomy, rectal anastomosis appears stable, some adjacent mild scarring, no bowel obstruction or inflammation, no free fluid  -No new adenopathy, no liver lesions  -Stable EDSON 3 mm nodule, other stable nodules at EDSON, RML  -Stable several small thoracic lymph nodes in mediastinum  -Mild wall thickening of the gallbladder  -Spleen is smaller measuring 13.7 cm  -Stable regions of bilateral cortical thinning in the kidneys, with few tiny cysts    REVIEW OF SYSTEMS:   A 14 point ROS was reviewed with pertinent positives and negatives in the HPI.        HOME MEDICATIONS:  Current Outpatient Medications   Medication Sig " Dispense Refill     cyanocobalamin 1000 MCG TBCR Take 1,000 mcg by mouth daily 100 tablet 1     dexamethasone (DECADRON) 4 MG tablet Take 2 tablets (8 mg) by mouth daily Take for 2 days, starting the day after chemo. Take with food. 40 tablet 0     diltiazem (CARDIZEM) 30 MG tablet Take 1 tablet (30 mg) by mouth 3 times daily (Patient not taking: Reported on 9/26/2023) 90 tablet 0     diltiazem ER COATED BEADS (CARDIZEM CD/CARTIA XT) 120 MG 24 hr capsule Take 1 capsule (120 mg) by mouth daily On chemo days and 2 days post chemo infusion. 60 capsule 3     Ferrous Sulfate (IRON) 325 (65 Fe) MG tablet Take 1 tablet by mouth daily       lidocaine-prilocaine (EMLA) 2.5-2.5 % external cream Apply topically as needed for moderate pain Apply 15-20 minutes prior to port access 1 g 4     loperamide (IMODIUM) 2 MG capsule Take 2 mg by mouth daily as needed for diarrhea (Patient not taking: Reported on 9/26/2023)       MAGNESIUM OXIDE 400 (240 Mg) MG tablet TAKE 1 TABLET BY MOUTH TWICE A DAY 60 tablet 0     metoprolol succinate ER (TOPROL XL) 50 MG 24 hr tablet Take 1 tablet (50 mg) by mouth daily 90 tablet 3     Multiple Vitamins-Iron (MULTI-DAY PLUS IRON PO) Take 1 tablet by mouth daily       prochlorperazine (COMPAZINE) 10 MG tablet Take 10 mg by mouth every 6 hours as needed for nausea or vomiting (Patient not taking: Reported on 9/26/2023)       rivaroxaban ANTICOAGULANT (XARELTO) 20 MG TABS tablet Take 1 tablet (20 mg) by mouth daily (with dinner) 90 tablet 3     sodium bicarbonate 650 MG tablet Take 1 tablet (650 mg) by mouth 2 times daily 120 tablet 3         ALLERGIES:  Allergies   Allergen Reactions     No Known Drug Allergy          PAST MEDICAL HISTORY:  Past Medical History:   Diagnosis Date     Arthropathia 08/05/2010     B12 deficiency anemia 10/21/2010     Benign essential hypertension with target blood pressure below 140/90 10/10/2016     CARDIOVASCULAR SCREENING; LDL GOAL LESS THAN 160 10/31/2010     Crohn's  colitis (H) 02/15/2011     Dermatitis-dishydrotic eczema-severe 08/05/2010     Hiatal hernia      HTN (hypertension) 07/21/2011     Iron deficiency anemia 10/21/2010     Malignant neoplasm of sigmoid colon (H)      Obesity      Primary pulmonary hypertension (H) 04/06/2010         PAST SURGICAL HISTORY:  Past Surgical History:   Procedure Laterality Date     COLECTOMY WITHOUT COLOSTOMY N/A 4/6/2023    Procedure: Laparoscopic Converted to Open Total abdominal colectomy;  Surgeon: Jerome Ashley MD;  Location: [facility]     COLONOSCOPY  10/11/10     COLONOSCOPY N/A 2/27/2023    Procedure: ATTEMPTED COLONOSCOPY WITH SIGMOID STRICTURE BIOPSY;  Surgeon: Jonathan Alcocer MD;  Location: [facility]     ESOPHAGOSCOPY, GASTROSCOPY, DUODENOSCOPY (EGD), COMBINED N/A 2/27/2023    Procedure: ESOPHAGOGASTRODUODENOSCOPY, WITH BIOPSY;  Surgeon: Jonathan Alcocer MD;  Location: [facility]     HYSTERECTOMY TOTAL ABDOMINAL, BILATERAL SALPINGO-OOPHORECTOMY, COMBINED N/A 4/6/2023    Procedure: Hysterectomy total abdominal, bilateral salpingo-oophorectomy;  Surgeon: Sunitha Olea MD;  Location: [facility] OR     INSERT PORT VASCULAR ACCESS Right 5/25/2023    Procedure: Ultrasound-guided right internal jugular venous access port placement with fluoroscopy;  Surgeon: Martin Thomas DO;  Location: [facility]     IR CHEST PORT PLACEMENT > 5 YRS OF AGE  8/21/2023     IR PORT CHECK RIGHT  7/18/2023     IR PORT REMOVAL RIGHT  8/21/2023     SIGMOIDOSCOPY FLEXIBLE N/A 4/6/2023    Procedure: Sigmoidoscopy flexible;  Surgeon: Jerome Ashley MD;  Location: [facility] OR     SURGICAL HISTORY OF -   06/14/76    Perineorrhaphy, for widening vaginal orifice     ZZC EXPLORATORY OF ABDOMEN  1989    laparoscopy         SOCIAL HISTORY:  Social History     Socioeconomic History     Marital status:      Spouse name: Not on file     Number of children: Not on file     Years of education: Not on file     Highest education level: Not on file    Occupational History     Not on file   Tobacco Use     Smoking status: Never     Passive exposure: Current     Smokeless tobacco: Never   Substance and Sexual Activity     Alcohol use: No     Drug use: No     Sexual activity: Yes     Partners: Male   Other Topics Concern     Parent/sibling w/ CABG, MI or angioplasty before 65F 55M? Not Asked   Social History Narrative     Not on file     Social Determinants of Health     Financial Resource Strain: Not on file   Food Insecurity: Not on file   Transportation Needs: Not on file   Physical Activity: Not on file   Stress: Not on file   Social Connections: Not on file   Interpersonal Safety: Not on file   Housing Stability: Not on file         FAMILY HISTORY:  Family History   Problem Relation Age of Onset     Hypertension Mother         on meds, alive     Cerebrovascular Disease Father         stroke about age 65,  of cancer at 68 yrs     Diabetes Father         eventually took insulin     Anemia Father         Pernisios anemia     Kidney Disease Niece         kidney transplant     Kidney Disease Nephew         kidney transplant     Venous thrombosis No family hx of      Anesthesia Reaction No family hx of          PHYSICAL EXAM:  Vital signs:  LMP  (LMP Unknown)          LABS:    PATHOLOGY:    IMAGING:  Narrative & Impression   CT CHEST/ABDOMEN/PELVIS WITH CONTRAST 2023 1:37 PM     CLINICAL HISTORY: Colon cancer status post resection, on chemotherapy.  Interim scan to assess response/recurrence of disease. Malignant  neoplasm of sigmoid colon (H).     TECHNIQUE: CT scan of the chest, abdomen, and pelvis was performed  following injection of IV contrast. Multiplanar reformats were  obtained. Dose reduction techniques were used.   CONTRAST: ISOVUE-370, 90 mL     COMPARISON: CT chest, abdomen and pelvis 2023.     FINDINGS:   LUNGS AND PLEURA: Stable mild areas of atelectasis. No new worrisome  airspace disease. No effusion. Stable ill-defined left upper  lobe 3 mm  nodule series 4 image 98. Other stable nodules at the left upper lobe  image 106, right middle lobe image 161.     MEDIASTINUM/AXILLAE: Stable several small thoracic lymph nodes at the  mediastinum. No new adenopathy. No acute mediastinal abnormality. Left  chest Port-A-Cath again seen.     CORONARY ARTERY CALCIFICATION: None.     HEPATOBILIARY: No new worrisome hepatic lesion. There is mild wall  thickening of the gallbladder.     PANCREAS: Normal.     SPLEEN: No focal lesion. Craniocaudal length of spleen is smaller  measuring 13.7 cm.     ADRENAL GLANDS: Normal.     KIDNEYS/BLADDER: Stable regions of bilateral cortical thinning. A few  tiny cysts noted without specific imaging follow-up recommended. No  hydronephrosis or stone. Bladder is unremarkable.     BOWEL: Subtotal colectomy. Rectal anastomosis appears stable. There is  some adjacent mild scarring. No bowel obstruction or inflammation. No  free fluid.     PELVIC ORGANS: No new worrisome pelvic abnormality.     ADDITIONAL FINDINGS: No new adenopathy.     MUSCULOSKELETAL: Spine degenerative changes. No focal suspicious bone  lesion.                                                                      IMPRESSION:  1.  Stable exam without evidence for new disease.  2.  Splenomegaly is slightly smaller.  3.  Stable small pulmonary nodules.  4.  Stable lymph nodes again identified.     VIKI ESCOBAR MD        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 mutated, MARISSA, TPS 50%  # sigmoid colon poorly-differentiated carcinoma, KRAS G13D mutated, loss of MLH1 and PMS2, TPS 70%  - 2/23 colonoscopy: A frond-like/villous partially obstructing large mass found in sigmoid colon, partially circumferential involving two thirds of the lumen  circumference, 4 cm, unable to traverse, with oozing, s/p biopsy, PATHOLOGY: Invasive poorly differentiated carcinoma, IHC panel excludes tumors of other origin, colorectal primary is favored, loss of nuclear expression of MLH1 and PMS2, MLH1 promoter methylation negative, [id number] mutation negative  -2/23 CT CAP: Shows known 4 cm proximal sigmoid colon mass with possible tumor extension (irregularity and stranding and adjacent pericolonic fat) without obstruction AND probable second mass 2.5 cm at hepatic flexure of colon; small lymph nodes adjacent to both masses (no lymphadenopathy by size criteria)  -3/23 PET:  a. There is increased FDG avidity of the sigmoid colon with focal lobular wall thickening consistent with biopsy-proven adenocarcinoma.  b. Secondary focus noted at the hepatic flexure demonstrates elevated FDG avidity and lobulated wall thickening highly suspicious for a additional primary.  c. Additionally there is a smaller focus of wall thickening with elevated FDG avidity along the left aspect of the transverse colon which is suspicious for a possible third focus of colonic malignancy.  - 3/23 CEA 6.8  -4/6/2023 laparoscopic converted to open total abdominal colectomy with ileorectal anastomosis, partial omentectomy, flexible sigmoidoscopy, TAHBSO  PATHOLOGY:  Of note, omental resection, terminal ileum, proximal and distal anastomotic rings, uterus, cervix, bilateral fallopian tubes and ovaries negative for malignancy    Mass #1 (ascending colon/hepatic flexure): Mixed neuroendocrine-non-neuroendocrine neoplasm (MINEN):  - Neuroendocrine carcinoma component (70%) and moderately-differentiated adenocarcinoma component (30%)  - Size = 5.0 cm, invading into muscularis propria, Positive LVI  - IHC: Neuroendocrine portion: Negative for chromogranin and INSM1 but strongly express synaptophysin  - IHC: Adenocarcinoma portion: Positive for CK20, CDX2 negative for CK7, PAX8, ER, S100  Ki-67 proliferation  index of approximately 80%.  MARISSA   PDL1:  COMBINED POSITIVE SCORE (CPS): 55-60  TUMOR PROPORTION SCORE (TPS): 50%   pathogenic KRAS mutation (G13D) was identified (5.2%).  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  MMR testing: loss of MLH1 and PMS2  PDL1:  COMBINED POSITIVE SCORE (CPS): 80  TUMOR PROPORTION SCORE (TPS): 70%  pathogenic KRAS mutation (G13D) was identified (4.5%).  MMR testing: loss of MLH1 and PMS2, intact MSH2 and MSH6  AJCC 8th edition: stage 3A mpT2 pN1a    - One of forty-one sampled lymph nodes positive (1/41), d/w pathology- unable to determine which mass is metastatic to LN    - 4/23 MRI brain w/wo contrast LAURENT  - 5/26/23 CT CAP w/ contrast and IVF before and after CT (post op baseline prior to starting tx):  1.  3 mm nodule RML and 5 mm lateral EDSON nodule, minimally increased from previous  2.  New 4 mm EDSON groundglass density nodule  3.  Several prominent borderline enlarged mediastinal lymph nodes slightly increased from previous  4.  Splenomegaly 14.5 cm  5. S/p subtotal colectomy with ileorectal anastomosis with postsurgical changes along the ventral abdominal wall midline  - 5/8/23 I presented this case at [facility] tumor board as above   - 5/19/23 second opinion at [facility] w/medical oncologist Dr. Acharya, thorough consultation and recommendations appreciated, overall agree w/FOLFOX x6 months, possible nivo or ipi nivo in second line; if metastatic platinum etoposide vs ipi nivo  - 5/25/23 port placement    -5/2023 genetic counseling: Negative for mutations detailed below     REGIMEN:  mFOLFOX6  q14 days x12 cycles/6 months  Starting weight 183lb   C1D1  5/31/23 (complicated by hospitalization for afib w/RVR C1D4 6/3/23), C2D1 7/19/23 (inpatient w/continuous cardiac monitoring), C3D1 8/2/2023 (outpatient, admitted to ER for A-fib with RVR C3D3 8/4/23), C4D1 8/16/23, C5D1 8/30/2023, C6D1 9/13/2023, C7D1 9/26/23    oxaliplatin 64mg/m2 day 1 (dose reduced from 85mg/m2 2/2 Cr C1D1)  LV 350mg/m2 day 1  5FU 1200mg/m2 continuous infusion day 1-2 (total 2,400mg/m2 IV over 46-48 hours)  (5FU 400mg/m2 day 1 bolus (discontinued cycle 2 onwards))  PLUS IVF and magnesium replacement as needed day 1 and day 8 with optimization to Mg= 2 at least, AND IVF day 3 on day of pump disconnect  PLUS (per cardiology recommendations) During the days of chemo and 2 days after ONLY: take long acting diltiazem 120 mg daily with 30 mg of short acting diltiazem as needed for atrial fibrillation with HR >110  Emetic risk: moderate  Febrile neutropenia risk: intermediate     C8D1 10/11/23 pending labs    Treatment related AE:  - hearing changes-stable w/dose reduced oxaliplatin, flonase prn, ENT consultation pending 10/23/23  - Paroxysmal A-fib with RVR and PAC- 8/15/2023 cardiology follow-up: Increase metoprolol XL to 50 mg daily; During the days of chemo and 2 days after take long acting diltiazem 120 mg daily with 30 mg of short acting diltiazem as needed for atrial fibrillation with HR >110, IV fluid day 1, 3, 8, cardiology follow-up 12/14/2023, social work consult for medication affordability   - anxiety- continue following with oncology psychology team  - Normocytic anemia- due to chemotherapy and iron deficiency, s/p ferric carboxymaltose 750mg x2 doses 6/14/23 and 6/28/23; repeat iron studies normal. If hgb continues to decrease, repeat anemia work up  -Thrombocytopenia- mild, 2/2 chemo, plt remain >75K- can continue chemo, monitor   - MARYA on CKD- following w/nephro, f/u with nephro 12/4/2023; continue increasing PO fluids, continue day 8 IVF  - elevated LFTs- AST 50, Alk phos 251, liver  normal on 9/23 CT, if continues to increase will check GGT  - diarrhea- grade 1  - cold sensitivity- grade 1  - neuropathy- grade 1   - hemorrhoids- witch hazel pads, sitz baths prn    Imaging:  -9/23 CT CAP (after 6 cycles of mFOLFOX 6 without 5-FU bolus):  -Subtotal colectomy with stable appearing rectal anastomosis, some adjacent mild scarring, no bowel obstruction or inflammation, no free fluid, no new adenopathy, no liver lesions, no bone lesions  -Mild wall thickening of the gallbladder  - Stable small pulmonary nodules and small thoracic lymph nodes at mediastinum, splenomegaly smaller 13.7 cm    - 7/18/23 CT CAP (repeat baseline after treatment delay due to A-fib)- subcm lung nodules stable, borderline and mildly enlarged mediastinal LN and periportal LN stable, splenomegaly 15.7cm, Subtotal colectomy with ileorectal anastomosis. No acute inflammation or obstruction    -Repeat CT CAP after cycle 12, end of 12/23- to be ordered later     Labs:  3/23 CEA 6.8  5/23 CEA 1.8  7/23 CEA 2.8  10/23 CEA pending  12/23 CEA pending    Other:  - f/u with Dr. Ashley 4/2024 for rigid proctoscopy     #parxosymal afib  - within 3 days of receiving 5FU, prior cardiac risk factors htn, prediabetes  - 6/3/23 presented to ER w/lightheadedness, dizziness, cardioverted s/p diltiazem ggt in ER  - 6/23 DPD deficiency testing negative  - currently on metoprolol XL 25 mg daily, apixaban 5 mg twice daily  - 6/30/2023 consultation with cardio oncologist Dr. Garza; I spoke with Dr. Garza 6/30/2023 as well, [id number] score 3, recommending starting apixaban 5 mg twice daily, okay to retrial FOLFOX while patient is on metoprolol.  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  Negative for mutations in APC, AXIN2, BMPR1A, CDH1, CHEK2, EPCAM, GREM1, MLH1, MSH2, MSH3, MSH6, MUTYH, NTHL1, PMS2, POLD1, POLE, PTEN, SMAD4, STK11, TP53, CDKN1B, MEN1, NF1, RET, TSC1, TSC2, and VHL genes    #Anemia secondary to iron deficiency and B12 deficiency  - terminal ileum removed at time of colon surgery, monitor for nutrient def  - 2/23 hgb 6.8, ferritin 21, iron sat index 10, TIBC 265, iron 27, b12 1493  - On B12 1000 mcg p.o. daily and ferrous sulfate 325 mg p.o. daily  - 4/9/23 s/p 1 uprbcs  - 5/9/23 hgb 11.3,5/30/23 hgb 9.4  - based on Ganzoni equation w/goal hgb 14 and 500mg needed for iron stores, pts iron deficit is 1400 mg  - s/p ferric carboxymaltose 750mg x2 doses 6/14/23 and 6/28/23  - 7/11/23 ferritin 1022, iron sat 22, TIBC 201, iron 44    - 8/23 hgb 13.3    #Crohn's  - dx since at least 2010     RTC 2 weeks for follow up with RADHA, labs, chemo #9  RTC 4 weeks for follow up with me, labs, chemo #10  RTC 6 weeks for follow up with RADHA, labs, chemo #11  RTC 8 weeks for follow up with me, labs, chemo #12      Irene Bateman,   Hematology/Oncology  [facility]      Again, thank you for allowing me to participate in the care of  your patient.        Sincerely,        FRANCISCO LEE, DO

## 2023-10-11 NOTE — PROGRESS NOTES
Infusion Nursing Note:  Nadia Ladd presents today for port labs.    Patient seen by provider today: Yes: virtual with Fransico after lab draw.   present during visit today: Not Applicable.    Note: N/A.      Intravenous Access:  Labs drawn without difficulty.  Implanted Port.      Marycarmen Cardenas RN

## 2023-10-11 NOTE — PROGRESS NOTES
Video-Visit Details     Video Start Time: 8:50am     Type of service:  Video Visit     Video End Time: 9:14AM    Originating Location (pt. Location): Home     Distant Location (provider location):   [facility] [place] off-site     Platform used for Video Visit: [facility]    Hematology/Oncology Established Patient Follow Up Note      Today's Date: 10/11/2023    Reason for follow up: Sigmoid cancer    HISTORY OF PRESENT ILLNESS: Nadia Ladd is a 68 year old female who presents for follow-up    Patient has medical history including Crohn's disease on sulfasalazine, anemia secondary to iron deficiency and B12 deficiency, obesity, hypertension, prediabetes, eczema, pulmonary hypertension, hiatal hernia, mild splenomegaly (14.7 cm in 2/23)    - 2/23 pt noted increasing fatigue, found to have iron deficiency anemia w/hgb 6.8 (previously required RBC transfusion when dx w/Crohn's), did have intermittent changes in stool but not persistent (noted minimal blood in stool)   - 2/23 upper endoscopy for iron deficiency anemia and Crohn's disease: Hiatal hernia, normal stomach, normal duodenum  - 2/23 colonoscopy: A frond-like/villous partially obstructing large mass found in sigmoid colon, partially circumferential involving two thirds of the lumen circumference, 4 cm, unable to traverse, with oozing, s/p biopsy  PATHOLOGY:  A.  Stomach, antrum: Biopsy:  - Antral type mucosa with mild chronic inactive gastritis  - Immunostain for Helicobacter pylori is negative      B.  Colon, sigmoid, stricture: Biopsy:  - Invasive poorly differentiated carcinoma  The sigmoid stricture shows a high-grade carcinoma without definitive gland formation.  Given the poorly differentiated appearance, an immunohistochemical panel was performed to exclude the possibility of a tumor by direct extension or metastasis.   Immunohistochemical stains are performed and show the tumor to be diffusely positive for CK7 and  "partially positive for CK20 and SATB2.  There is no significant tumor reactivity for CDX2, GATA3, PAX8, TTF-1, and chromogranin.  Synaptophysin highlights very rare positive cells. Although the CK7/CK20 profile is not entirely typical for a colorectal carcinoma, immunostains for tumors of other origins are negative and a colorectal primary is still favored.   - Mismatch repair:  1) MLH1-loss of nuclear expression  2) MSH2-intact  3) MSH6-intact  4) PMS2-loss of nuclear expression  -MLH1 promoter methylation: NEGATIVE    -2/23 CT CAP:  A) 4 cm length mass in the proximal sigmoid colon. There is some irregularity and stranding in the adjacent pericolonic fat which may indicate tumor extension. There is no obstruction.   B) there is a second probable mass at the hepatic flexure of the colon measuring 2.5 cm in length   C)There are small lymph nodes in the mesial colon adjacent to the hepatic flexure abnormality and the sigmoid colonic mass. No lymphadenopathy by size criteria  D) There is submucosal fatty deposition in the colon, most severe in the distal sigmoid colon and rectum, which can be seen secondary to quiescent inflammatory bowel disease.  E) no liver lesion    -3/23 PET:  a. There is increased FDG avidity of the sigmoid colon with focal lobular wall thickening consistent with biopsy-proven adenocarcinoma.  b. Secondary focus noted at the hepatic flexure demonstrates elevated FDG avidity and lobulated wall thickening highly suspicious for a additional primary.  c. Additionally there is a smaller focus of wall thickening with elevated FDG avidity along the left aspect of the transverse colon  which is suspicious for a possible third focus of colonic malignancy.    -3/23 CEA 6.8  - 3/23 colorectal surgery consultation with - in the setting of Crohn's, at least sigmoid colectomy with possible total abdominal colectomy with either ileorectal anastomosis or end ileostomy (\"rectum can remain active and " "be actively surveyed annually\")    -3/23 genetic counseling, testing for Chan syndrome    - 4/5/2023 gynecologic oncology consultation for risk reduction surgery with hysterectomy and BSO at time of colectomy    -4/6/2023 laparoscopic converted to open total abdominal colectomy with ileorectal anastomosis, partial omentectomy, flexible sigmoidoscopy, TAHBSO  A. OMENTUM, RESECTION:  - Adipose tissue with no significant histopathologic abnormality  - No evidence of malignancy     B. COLON, RESECTION:  Mass #1 (ascending colon/hepatic flexure): Mixed neuroendocrine-non-neuroendocrine neoplasm (MINEN):  - Neuroendocrine carcinoma component (70%) and moderately-differentiated adenocarcinoma component (30%)  - Size = 5.0 cm, invading into muscularis propria  - Positive LVI  - Negative macroscopic tumor perforation, negative PNI  - Negative surgical resection margins  - IHC: Neuroendocrine portion: Negative for chromogranin and INSM1 but strongly express synaptophysin  - IHC: Adenocarcinoma portion: Positive for CK20, CDX2 negative for CK7, PAX8, ER, S100  Ki-67 proliferation index of approximately 80%.  MMR:  Intact nuclear expression of MLH1, MSH2, MHS6, PMS2  PDL1:  COMBINED POSITIVE SCORE (CPS): 55-60  TUMOR PROPORTION SCORE (TPS): 50%   pathogenic KRAS mutation (G13D) was identified (5.2%).  AJCC 8th edition: stage 3B mpT3 pN1a     Mass#2 (sigmoid colon): Poorly-differentiated carcinoma:  - Grade 3  - Size = 5.7 cm, invading into muscularis propria  - Negative for microscopic tumor perforation, LVI, perineural invasion  - Negative surgical resection margins  - IHC: Positive for scar and keratin AE1/AE3, negative for CK7, CK20, CDX2, PAX8, ER, chromogranin, CD56, p40, synaptophysin, S100, INI1, p40, INSM1  Ki-67 proliferation index of approximately 70%.  MMR: loss of nuclear expression MLH1 and PMS2, intact nuclear expression MSH2 and MSH6  PDL1:  COMBINED POSITIVE SCORE (CPS): 80  TUMOR PROPORTION SCORE (TPS): " 70%  pathogenic KRAS mutation (G13D) was identified (4.5%).  AJCC 8th edition: stage 3A mpT2 pN1a    - One of forty-one sampled lymph nodes positive (1/41)  - Benign appendix  - Tubular adenoma    C. UTERUS, CERVIX, BILATERAL FALLOPIAN TUBES & OVARIES, :  - Benign endometrial polyps; atrophic endometrium  - Uterus, cervix, bilateral fallopian tubes, and ovaries with no significant morphologic abnormalities  - No evidence of dysplasia or malignancy     D. SMALL INTESTINE, TERMINAL ILEUM, TERMINAL ILIUM:  - Benign ileum  - No evidence of dysplasia or malignancy  - Negative for metastases to one of one sampled lymph node (0 /1)     E. PROXIMAL ANASTOMOTIC RING:  - Benign small intestine  - No evidence of dysplasia or malignancy     F. DISTAL ANASTOMOTIC RING:  - Benign colon  - No evidence of dysplasia or malignancy    CRC NGS:   --mutations positive for KRAS G13D mutation 5.2% mass 1, KRAS G13D mutation 4.5% mass 2 (negative for AKT1; BRAF; ERBB2; KRAS; NRAS; PIK3CA; PTEN)  --TMB score: 17.064 mut/Mb mass 1 (CPS 55-60, TPS 50%), 60.943 mut/Mb mass 2 (CPS 80, TPS 70%)  --fusion negative including NTRK and RET for mass 1 and 2 (also negative for AKT1, AKT3, ALK, AR, CCZTIK37, CLAUDINE, BCOR, BRAF, BRD3, BRD4, CAMTA1, CCNB3, CCND1, , CIC, CSF1, CSF1R, CTNNB1, DNAJB1, EGFR, EPC1, ERBB2, ERBB4, ERG, ESR1, ESRRA, ETV1, ETV4, ETV5, ETV6, EWSR1, FGFR1, FGFR2, FGFR3, FGR, FOS, FOSB, FOXO1, FOXO4, FOXR2, FUS, GLI1, GRB7, HMGA2, HRAS, IDH1, IDH2, INSR, JAK2, JAK3, JAZF1, KRAS, MAML2, MAP2K1, MAST1, MAST2, MEAF6, MET, MKL2, MN1, MSMB, MUSK, MYB, MYBL1, MYOD1, NCOA1, NCOA2, NOTCH1, NOTCH2, NR4A3, NRAS, NRG1, NTRK1, NTRK2, NTRK3, NUMBL1, NUTM1, PAX3, PDGFB, PDGFRA, PDGFRB, PHF1, PIK3CA, PKN1, PLAG1, PPARG, PRKACA, PRKCA, PRKCB, RAF1, RELA, RET, ROS1, RPSO2, RSPO3, SS18, STAT6, TAF15, TCF12, TERT, TFE3, TFEB, TFG, THADA, TMPRSS2, USP6, VGLL2, YAP1, YWHAE)    -4/11/2023 patient discharged from hospital, planning for 30 days of  lovenox postop, oxycodone for pain (required 1 unit PRBC for anemia)    -5/8/23 I presented this case at [facility] CRC tumor board and their recommendations include:  - common to see this in Crohn's, overall poor prognosis, treat aggressively, may be poorly responsive to chemotherapy  - standard of care is mFOLFOX 6 x 12 cycles  - acknowledge that pt has high TPS and likely response to immunotherapy however not sufficient data or standard of care in stage 3 adjuvant setting  - add MMR testing to mass #1 hepatic flexure mass    - 5/9/23 admitted for acute renal failure w/Cr 3.82 w/K 7.0    - 5/19/23 second opinion at [facility] w/medical oncologist Dr. Acharya, thorough consultation and recommendations appreciated     - pt would like to proceed with FOFLOX (with dose reduced oxaliplatin due to kidney function)    -5/2023 genetic counseling: Negative for mutations in EPCAM, MLH1, MSH2, MSH6, and PMS2 genes.  Negative for mutations in APC, AXIN2, BMPR1A, CDH1, CHEK2, EPCAM, GREM1, MLH1, MSH2, MSH3, MSH6, MUTYH, NTHL1, PMS2, POLD1, POLE, PTEN, SMAD4, STK11, TP53, CDKN1B, MEN1, NF1, RET, TSC1, TSC2, and VHL genes    - 5/25/23 port placement    - 5/26/23 CT CAP w/ contrast and IVF before and after CT (baseline prior to starting tx):  1.  3 mm nodule RML and 5 mm lateral EDSON nodule, minimally increased from previous  2.  New 4 mm EDSON groundglass density nodule  3.  Several prominent borderline enlarged mediastinal lymph nodes slightly increased from previous  4.  Splenomegaly 14.5 cm  5. S/p subtotal colectomy with ileorectal anastomosis with postsurgical changes along the ventral abdominal wall midline    -5/31/23 started mFOLFOX 6 w/dose reduced oxaliplatin    - C2D1 6/14/23 held due to new onset paroxysmal A-fib with RVR requiring hospitalization 6/3/2023 (also hypoMg, dehydrated)  - 6/19/2023 I presented this case at [facility] colorectal tumor board at the [facility], it is possible that paroxysmal A-fib may be related to  5-FU.  Options include withholding further treatment versus retrialing 5-FU under the guidance of cardio oncology in an inpatient setting.  No other adjuvant treatment options available, including immunotherapy  - 6/30/2023 consultation with cardio oncologist Dr. Garza; I spoke with Dr. Garza 6/30/2023 as well, [id number] score 3, recommending starting apixaban 5 mg twice daily, okay to retrial FOLFOX while patient is on metoprolol  -7/18/2023  CT CAP- subcm lung nodules stable, borderline and mildly enlarged mediastinal LN and periportal LN stable, splenomegaly 15.7cm, Subtotal colectomy with ileorectal anastomosis. No acute inflammation or obstruction   - 7/19/2023 mFOLFOX6 C2D1  (inpatient w/continuous cardiac monitoring)      INTERIM HISTORY:  REGIMEN:  mFOLFOX6  q14 days x12 cycles/6 months  Starting weight 183lb   C1D1 5/31/23 (complicated by hospitalization for afib w/RVR C1D4 6/3/23), C2D1 7/19/23 (inpatient w/continuous cardiac monitoring), C3D1 8/2/2023 (outpatient, admitted to ER for A-fib with RVR C3D3 8/4/23), C4D1 8/16/23, C5D1 8/30/2023, C6D1 9/13/2023, C7D1 9/26/23    oxaliplatin 64mg/m2 day 1 (dose reduced from 85mg/m2 2/2 Cr C1D1)  LV 350mg/m2 day 1  5FU 1200mg/m2 continuous infusion day 1-2 (total 2,400mg/m2 IV over 46-48 hours)  (5FU 400mg/m2 day 1 bolus (discontinued cycle 2 onwards))  PLUS IVF and magnesium replacement as needed day 1 and day 8 with optimization to Mg= 2 at least, AND IVF day 3 on day of pump disconnect  PLUS (per cardiology recommendations) During the days of chemo and 2 days after ONLY: take long acting diltiazem 120 mg daily with 30 mg of short acting diltiazem as needed for atrial fibrillation with HR >110  Emetic risk: moderate  Febrile neutropenia risk: intermediate     C8D1 10/11/23 pending labs    Treatment related AE:  - hearing changes-described as muffled with intermittent ringing with obscured hearing R>L- overall improved since 1st cycle, and improving few days  after starting chemo, Flonase BID as needed, ENT consultation pending 10/23/23; (present prior to starting chemo, improved w/steroids, now stable and not getting better)   - Paroxysmal A-fib with RVR and PAC- admitted to [facility] 6/3/2023 - 6/5/2023 for this, 6/3/2023 echo EF 55-60%, mild pulmonary hypertension, mild mitral regurgitation, on metoprolol XL 25 mg p.o. daily for ongoing palpitations, 7/23 Cardio oncology consult w/Dr. Garza, on eliquis, no recurrence of RVR while inpatient for cycle 2 w/continuous cardiac monitoring; IVF and magnesium replacement to magnesium = 2 minimum added to D1 and D8 and IVF day 3. 8/4/2023 C3D3 patient went to ER for A-fib with palpitations (no other sx), HR 80-130s, labs WNL, CXR negative, given IV fluids, placed on diltiazem drip, HR improved to .  8/15/2023 cardiology follow-up: Increase metoprolol XL to 50 mg daily; During the days of chemo and 2 days after ONLY: take long acting diltiazem 120 mg daily with 30 mg of short acting diltiazem as needed for atrial fibrillation with HR >110.  8/18/2023 patient called in reporting heart rate up to 140, sensation of heart racing, improved to 67 after taking extra prescribed dose of diltiazem and then asymptomatic.  8/19/2023 patient presented to ER with chest tightness and palpitations, had taken total of 3 tabs of diltiazem 30mg short acting at that point, EKG with sinus rhythm, rate 63 with some noted bradycardia while in the ER, magnesium and potassium normal, creatinine slightly elevated, given 1 L IVF.  free drug application for Eliquis completed 8/18/2023 but income over guidelines, 9/12/2023 cardiology [id number] changes, follow-up 12/14/2023; still finds xarelto unaffordable  - anxiety- working with psychology prn  - Delayed neutropenia- neutropenic thierry ANC 0.5 2 weeks out from cycle 1, afebrile, 6/23 DPD deficiency testing negative; resolved cycle 2 onwards 5FU bolus dropped   -Normocytic anemia- due  "to chemotherapy and iron deficiency, s/p ferric carboxymaltose 750mg x2 doses 6/14/23 and 6/28/23, 7/23 iron studies improved.  8/23 hgb 13.3, 9/23 hgb 12.1, 10/23 hgb 11.1  -Thrombocytopenia- 2/2 chemotherapy, no bruising or bleeding, for now plt >75K  - MARYA on CKD- following w/nephro- follow up 9/23, Cr improved from 3, drinking 2.5 16oz bottles per day and getting IV fluids day 1, day 3 and day 8 of each chemotherapy cycle, nephrology follow-up 12/4/2023  - elevated LFTs- AST 50, Alk phos 251, liver normal on 9/23 CT  - diarrhea- grade 1, 2 episodes/week, also affected by food, improved with imodium 2 total)   - neuropathy- grade 1, tingling fingers > toes, lasting 3-4 days   - cold sensitivity- grade 1, fingers and mouth, most severe right after tx and gradually improves over 2 weeks, using straw   - Port flipped-see my note from 8/2/2023, 8/21/2023 port removed and replacement with IR  - \"bottom is sore\"- pt has hemorrhoids, no active bleeding or thrombosed hemorrhoids but notes soreness after chemo       -9/23 CT CAP (after cycle 6)  -Subtotal colectomy, rectal anastomosis appears stable, some adjacent mild scarring, no bowel obstruction or inflammation, no free fluid  -No new adenopathy, no liver lesions  -Stable EDSON 3 mm nodule, other stable nodules at EDSON, RML  -Stable several small thoracic lymph nodes in mediastinum  -Mild wall thickening of the gallbladder  -Spleen is smaller measuring 13.7 cm  -Stable regions of bilateral cortical thinning in the kidneys, with few tiny cysts    REVIEW OF SYSTEMS:   A 14 point ROS was reviewed with pertinent positives and negatives in the HPI.        HOME MEDICATIONS:  Current Outpatient Medications   Medication Sig Dispense Refill    cyanocobalamin 1000 MCG TBCR Take 1,000 mcg by mouth daily 100 tablet 1    dexamethasone (DECADRON) 4 MG tablet Take 2 tablets (8 mg) by mouth daily Take for 2 days, starting the day after chemo. Take with food. 40 tablet 0    diltiazem " (CARDIZEM) 30 MG tablet Take 1 tablet (30 mg) by mouth 3 times daily (Patient not taking: Reported on 9/26/2023) 90 tablet 0    diltiazem ER COATED BEADS (CARDIZEM CD/CARTIA XT) 120 MG 24 hr capsule Take 1 capsule (120 mg) by mouth daily On chemo days and 2 days post chemo infusion. 60 capsule 3    Ferrous Sulfate (IRON) 325 (65 Fe) MG tablet Take 1 tablet by mouth daily      lidocaine-prilocaine (EMLA) 2.5-2.5 % external cream Apply topically as needed for moderate pain Apply 15-20 minutes prior to port access 1 g 4    loperamide (IMODIUM) 2 MG capsule Take 2 mg by mouth daily as needed for diarrhea (Patient not taking: Reported on 9/26/2023)      MAGNESIUM OXIDE 400 (240 Mg) MG tablet TAKE 1 TABLET BY MOUTH TWICE A DAY 60 tablet 0    metoprolol succinate ER (TOPROL XL) 50 MG 24 hr tablet Take 1 tablet (50 mg) by mouth daily 90 tablet 3    Multiple Vitamins-Iron (MULTI-DAY PLUS IRON PO) Take 1 tablet by mouth daily      prochlorperazine (COMPAZINE) 10 MG tablet Take 10 mg by mouth every 6 hours as needed for nausea or vomiting (Patient not taking: Reported on 9/26/2023)      rivaroxaban ANTICOAGULANT (XARELTO) 20 MG TABS tablet Take 1 tablet (20 mg) by mouth daily (with dinner) 90 tablet 3    sodium bicarbonate 650 MG tablet Take 1 tablet (650 mg) by mouth 2 times daily 120 tablet 3         ALLERGIES:  Allergies   Allergen Reactions    No Known Drug Allergy          PAST MEDICAL HISTORY:  Past Medical History:   Diagnosis Date    Arthropathia 08/05/2010    B12 deficiency anemia 10/21/2010    Benign essential hypertension with target blood pressure below 140/90 10/10/2016    CARDIOVASCULAR SCREENING; LDL GOAL LESS THAN 160 10/31/2010    Crohn's colitis (H) 02/15/2011    Dermatitis-dishydrotic eczema-severe 08/05/2010    Hiatal hernia     HTN (hypertension) 07/21/2011    Iron deficiency anemia 10/21/2010    Malignant neoplasm of sigmoid colon (H)     Obesity     Primary pulmonary hypertension (H) 04/06/2010          PAST SURGICAL HISTORY:  Past Surgical History:   Procedure Laterality Date    COLECTOMY WITHOUT COLOSTOMY N/A 4/6/2023    Procedure: Laparoscopic Converted to Open Total abdominal colectomy;  Surgeon: Jerome Ashley MD;  Location: [facility] OR    COLONOSCOPY  10/11/10    COLONOSCOPY N/A 2/27/2023    Procedure: ATTEMPTED COLONOSCOPY WITH SIGMOID STRICTURE BIOPSY;  Surgeon: Jonathan Alcocer MD;  Location: [facility]    ESOPHAGOSCOPY, GASTROSCOPY, DUODENOSCOPY (EGD), COMBINED N/A 2/27/2023    Procedure: ESOPHAGOGASTRODUODENOSCOPY, WITH BIOPSY;  Surgeon: Jonathan Alcocer MD;  Location: [facility]    HYSTERECTOMY TOTAL ABDOMINAL, BILATERAL SALPINGO-OOPHORECTOMY, COMBINED N/A 4/6/2023    Procedure: Hysterectomy total abdominal, bilateral salpingo-oophorectomy;  Surgeon: Sunitha Olea MD;  Location: [facility] OR    INSERT PORT VASCULAR ACCESS Right 5/25/2023    Procedure: Ultrasound-guided right internal jugular venous access port placement with fluoroscopy;  Surgeon: Martin Thomas DO;  Location: [facility]    IR CHEST PORT PLACEMENT > 5 YRS OF AGE  8/21/2023    IR PORT CHECK RIGHT  7/18/2023    IR PORT REMOVAL RIGHT  8/21/2023    SIGMOIDOSCOPY FLEXIBLE N/A 4/6/2023    Procedure: Sigmoidoscopy flexible;  Surgeon: Jerome Ashley MD;  Location: [facility] OR    SURGICAL HISTORY OF -   06[birth year]    Perineorrhaphy, for widening vaginal orifice    ZZC EXPLORATORY OF ABDOMEN  1989    laparoscopy         SOCIAL HISTORY:  Social History     Socioeconomic History    Marital status:      Spouse name: Not on file    Number of children: Not on file    Years of education: Not on file    Highest education level: Not on file   Occupational History    Not on file   Tobacco Use    Smoking status: Never     Passive exposure: Current    Smokeless tobacco: Never   Substance and Sexual Activity    Alcohol use: No    Drug use: No    Sexual activity: Yes     Partners: Male   Other Topics Concern    Parent/sibling w/ CABG, MI or  angioplasty before 65F 55M? Not Asked   Social History Narrative    Not on file     Social Determinants of Health     Financial Resource Strain: Not on file   Food Insecurity: Not on file   Transportation Needs: Not on file   Physical Activity: Not on file   Stress: Not on file   Social Connections: Not on file   Interpersonal Safety: Not on file   Housing Stability: Not on file         FAMILY HISTORY:  Family History   Problem Relation Age of Onset    Hypertension Mother         on meds, alive    Cerebrovascular Disease Father         stroke about age 65,  of cancer at 68 yrs    Diabetes Father         eventually took insulin    Anemia Father         Pernisios anemia    Kidney Disease Niece         kidney transplant    Kidney Disease Nephew         kidney transplant    Venous thrombosis No family hx of     Anesthesia Reaction No family hx of          PHYSICAL EXAM:  Vital signs:  LMP  (LMP Unknown)          LABS:    PATHOLOGY:    IMAGING:  Narrative & Impression   CT CHEST/ABDOMEN/PELVIS WITH CONTRAST 2023 1:37 PM     CLINICAL HISTORY: Colon cancer status post resection, on chemotherapy.  Interim scan to assess response/recurrence of disease. Malignant  neoplasm of sigmoid colon (H).     TECHNIQUE: CT scan of the chest, abdomen, and pelvis was performed  following injection of IV contrast. Multiplanar reformats were  obtained. Dose reduction techniques were used.   CONTRAST: ISOVUE-370, 90 mL     COMPARISON: CT chest, abdomen and pelvis 2023.     FINDINGS:   LUNGS AND PLEURA: Stable mild areas of atelectasis. No new worrisome  airspace disease. No effusion. Stable ill-defined left upper lobe 3 mm  nodule series 4 image 98. Other stable nodules at the left upper lobe  image 106, right middle lobe image 161.     MEDIASTINUM/AXILLAE: Stable several small thoracic lymph nodes at the  mediastinum. No new adenopathy. No acute mediastinal abnormality. Left  chest Port-A-Cath again seen.     CORONARY ARTERY  CALCIFICATION: None.     HEPATOBILIARY: No new worrisome hepatic lesion. There is mild wall  thickening of the gallbladder.     PANCREAS: Normal.     SPLEEN: No focal lesion. Craniocaudal length of spleen is smaller  measuring 13.7 cm.     ADRENAL GLANDS: Normal.     KIDNEYS/BLADDER: Stable regions of bilateral cortical thinning. A few  tiny cysts noted without specific imaging follow-up recommended. No  hydronephrosis or stone. Bladder is unremarkable.     BOWEL: Subtotal colectomy. Rectal anastomosis appears stable. There is  some adjacent mild scarring. No bowel obstruction or inflammation. No  free fluid.     PELVIC ORGANS: No new worrisome pelvic abnormality.     ADDITIONAL FINDINGS: No new adenopathy.     MUSCULOSKELETAL: Spine degenerative changes. No focal suspicious bone  lesion.                                                                      IMPRESSION:  1.  Stable exam without evidence for new disease.  2.  Splenomegaly is slightly smaller.  3.  Stable small pulmonary nodules.  4.  Stable lymph nodes again identified.     VIKI ESCOBAR MD        ASSESSMENT/PLAN:  Nadai Ladd is a 68 year old  female with:      # ascending colon/hepatic flexure Mixed neuroendocrine-non-neuroendocrine neoplasm (MINEN, poorly differentiated neuroendocrine carcinoma and moderately differentiated adenocarcinoma), KRAS G13D mutated, MARISSA, TPS 50%  # sigmoid colon poorly-differentiated carcinoma, KRAS G13D mutated, loss of MLH1 and PMS2, TPS 70%  - 2/23 colonoscopy: A frond-like/villous partially obstructing large mass found in sigmoid colon, partially circumferential involving two thirds of the lumen circumference, 4 cm, unable to traverse, with oozing, s/p biopsy, PATHOLOGY: Invasive poorly differentiated carcinoma, IHC panel excludes tumors of other origin, colorectal primary is favored, loss of nuclear expression of MLH1 and PMS2, MLH1 promoter methylation negative, [id number] mutation negative  -2/23 CT CAP: Shows  known 4 cm proximal sigmoid colon mass with possible tumor extension (irregularity and stranding and adjacent pericolonic fat) without obstruction AND probable second mass 2.5 cm at hepatic flexure of colon; small lymph nodes adjacent to both masses (no lymphadenopathy by size criteria)  -3/23 PET:  a. There is increased FDG avidity of the sigmoid colon with focal lobular wall thickening consistent with biopsy-proven adenocarcinoma.  b. Secondary focus noted at the hepatic flexure demonstrates elevated FDG avidity and lobulated wall thickening highly suspicious for a additional primary.  c. Additionally there is a smaller focus of wall thickening with elevated FDG avidity along the left aspect of the transverse colon which is suspicious for a possible third focus of colonic malignancy.  - 3/23 CEA 6.8  -4/6/2023 laparoscopic converted to open total abdominal colectomy with ileorectal anastomosis, partial omentectomy, flexible sigmoidoscopy, TAHBSO  PATHOLOGY:  Of note, omental resection, terminal ileum, proximal and distal anastomotic rings, uterus, cervix, bilateral fallopian tubes and ovaries negative for malignancy    Mass #1 (ascending colon/hepatic flexure): Mixed neuroendocrine-non-neuroendocrine neoplasm (MINEN):  - Neuroendocrine carcinoma component (70%) and moderately-differentiated adenocarcinoma component (30%)  - Size = 5.0 cm, invading into muscularis propria, Positive LVI  - IHC: Neuroendocrine portion: Negative for chromogranin and INSM1 but strongly express synaptophysin  - IHC: Adenocarcinoma portion: Positive for CK20, CDX2 negative for CK7, PAX8, ER, S100  Ki-67 proliferation index of approximately 80%.  MARISSA   PDL1:  COMBINED POSITIVE SCORE (CPS): 55-60  TUMOR PROPORTION SCORE (TPS): 50%   pathogenic KRAS mutation (G13D) was identified (5.2%).  MMR testing: intact  AJCC 8th edition: stage 3B mpT3 pN1a     Mass#2 (sigmoid colon): Poorly-differentiated carcinoma:  - Grade 3  - Size = 5.7 cm,  invading into muscularis propria  - IHC: Positive for scar and keratin AE1/AE3, negative for CK7, CK20, CDX2, PAX8, ER, chromogranin, CD56, p40, synaptophysin, S100, INI1, p40, INSM1  Ki-67 proliferation index of approximately 70%.  MMR testing: loss of MLH1 and PMS2  PDL1:  COMBINED POSITIVE SCORE (CPS): 80  TUMOR PROPORTION SCORE (TPS): 70%  pathogenic KRAS mutation (G13D) was identified (4.5%).  MMR testing: loss of MLH1 and PMS2, intact MSH2 and MSH6  AJCC 8th edition: stage 3A mpT2 pN1a    - One of forty-one sampled lymph nodes positive (1/41), d/w pathology- unable to determine which mass is metastatic to LN    - 4/23 MRI brain w/wo contrast LAURENT  - 5/26/23 CT CAP w/ contrast and IVF before and after CT (post op baseline prior to starting tx):  1.  3 mm nodule RML and 5 mm lateral EDSON nodule, minimally increased from previous  2.  New 4 mm EDSON groundglass density nodule  3.  Several prominent borderline enlarged mediastinal lymph nodes slightly increased from previous  4.  Splenomegaly 14.5 cm  5. S/p subtotal colectomy with ileorectal anastomosis with postsurgical changes along the ventral abdominal wall midline  - 5/8/23 I presented this case at [facility] tumor board as above   - 5/19/23 second opinion at [facility] w/medical oncologist Dr. Acharya, thorough consultation and recommendations appreciated, overall agree w/FOLFOX x6 months, possible nivo or ipi nivo in second line; if metastatic platinum etoposide vs ipi nivo  - 5/25/23 port placement    -5/2023 genetic counseling: Negative for mutations detailed below     REGIMEN:  mFOLFOX6  q14 days x12 cycles/6 months  Starting weight 183lb   C1D1 5/31/23 (complicated by hospitalization for afib w/RVR C1D4 6/3/23), C2D1 7/19/23 (inpatient w/continuous cardiac monitoring), C3D1 8/2/2023 (outpatient, admitted to ER for A-fib with RVR C3D3 8/4/23), C4D1 8/16/23, C5D1 8/30/2023, C6D1 9/13/2023, C7D1 9/26/23    oxaliplatin 64mg/m2 day 1 (dose reduced from 85mg/m2 2/2 Cr  C1D1)  LV 350mg/m2 day 1  5FU 1200mg/m2 continuous infusion day 1-2 (total 2,400mg/m2 IV over 46-48 hours)  (5FU 400mg/m2 day 1 bolus (discontinued cycle 2 onwards))  PLUS IVF and magnesium replacement as needed day 1 and day 8 with optimization to Mg= 2 at least, AND IVF day 3 on day of pump disconnect  PLUS (per cardiology recommendations) During the days of chemo and 2 days after ONLY: take long acting diltiazem 120 mg daily with 30 mg of short acting diltiazem as needed for atrial fibrillation with HR >110  Emetic risk: moderate  Febrile neutropenia risk: intermediate     C8D1 10/11/23 pending labs    Treatment related AE:  - hearing changes-stable w/dose reduced oxaliplatin, flonase prn, ENT consultation pending 10/23/23  - Paroxysmal A-fib with RVR and PAC- 8/15/2023 cardiology follow-up: Increase metoprolol XL to 50 mg daily; During the days of chemo and 2 days after take long acting diltiazem 120 mg daily with 30 mg of short acting diltiazem as needed for atrial fibrillation with HR >110, IV fluid day 1, 3, 8, cardiology follow-up 12/14/2023, social work consult for medication affordability   - anxiety- continue following with oncology psychology team  - Normocytic anemia- due to chemotherapy and iron deficiency, s/p ferric carboxymaltose 750mg x2 doses 6/14/23 and 6/28/23; repeat iron studies normal. If hgb continues to decrease, repeat anemia work up  -Thrombocytopenia- mild, 2/2 chemo, plt remain >75K- can continue chemo, monitor   - MARYA on CKD- following w/nephro, f/u with nephro 12/4/2023; continue increasing PO fluids, continue day 8 IVF  - elevated LFTs- AST 50, Alk phos 251, liver normal on 9/23 CT, if continues to increase will check GGT  - diarrhea- grade 1  - cold sensitivity- grade 1  - neuropathy- grade 1   - hemorrhoids- witch hazel pads, sitz baths prn    Imaging:  -9/23 CT CAP (after 6 cycles of mFOLFOX 6 without 5-FU bolus):  -Subtotal colectomy with stable appearing rectal anastomosis, some  adjacent mild scarring, no bowel obstruction or inflammation, no free fluid, no new adenopathy, no liver lesions, no bone lesions  -Mild wall thickening of the gallbladder  - Stable small pulmonary nodules and small thoracic lymph nodes at mediastinum, splenomegaly smaller 13.7 cm    - 7/18/23 CT CAP (repeat baseline after treatment delay due to A-fib)- subcm lung nodules stable, borderline and mildly enlarged mediastinal LN and periportal LN stable, splenomegaly 15.7cm, Subtotal colectomy with ileorectal anastomosis. No acute inflammation or obstruction    -Repeat CT CAP after cycle 12, end of 12/23- to be ordered later     Labs:  3/23 CEA 6.8  5/23 CEA 1.8  7/23 CEA 2.8  10/23 CEA pending  12/23 CEA pending    Other:  - f/u with Dr. Ashley 4/2024 for rigid proctoscopy     #parxosymal afib  - within 3 days of receiving 5FU, prior cardiac risk factors htn, prediabetes  - 6/3/23 presented to ER w/lightheadedness, dizziness, cardioverted s/p diltiazem ggt in ER  - 6/23 DPD deficiency testing negative  - currently on metoprolol XL 25 mg daily, apixaban 5 mg twice daily  - 6/30/2023 consultation with cardio oncologist Dr. Garza; I spoke with Dr. Garza 6/30/2023 as well, [id number] score 3, recommending starting apixaban 5 mg twice daily, okay to retrial FOLFOX while patient is on metoprolol.  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  Negative for mutations in APC, AXIN2, BMPR1A, CDH1, CHEK2, EPCAM, GREM1, MLH1, MSH2, MSH3, MSH6, MUTYH, NTHL1, PMS2, POLD1, POLE, PTEN, SMAD4, STK11, TP53, CDKN1B, MEN1, NF1, RET, TSC1, TSC2, and VHL genes    #Anemia secondary to iron deficiency and B12 deficiency  - terminal ileum removed at time of colon surgery, monitor for nutrient def  - 2/23 hgb 6.8, ferritin 21, iron sat index 10, TIBC 265, iron 27, b12 1493  - On B12 1000 mcg p.o. daily and ferrous sulfate 325 mg p.o. daily  - 4/9/23 s/p 1 uprbcs  - 5/9/23 hgb 11.3,5/30/23 hgb 9.4  - based on Ganzoni equation w/goal hgb 14 and 500mg needed for iron stores, pts iron deficit is 1400 mg  - s/p ferric carboxymaltose 750mg x2 doses 6/14/23 and 6/28/23  - 7/11/23 ferritin 1022, iron sat 22, TIBC 201, iron 44    - 8/23 hgb 13.3    #Crohn's  - dx since at least 2010     RTC 2 weeks for follow up with RADHA, labs, chemo #9  RTC 4 weeks for follow up with me, labs, chemo #10  RTC 6 weeks for follow up with RADHA, labs, chemo #11  RTC 8 weeks for follow up with me, labs, chemo #12      Irene Bateman DO  Hematology/Oncology  [facility]

## 2023-10-11 NOTE — PROGRESS NOTES
Patient arrived in clinic for iPad assistance for a video visit with Dr. Bateman.     Karen Rahman RN on 10/11/2023 at 8:37 AM

## 2023-10-11 NOTE — LETTER
10/11/2023         RE: Nadia Ladd  [address]  [facility] [address]        Dear Colleague,    Thank you for referring your patient, Nadia Ladd, to the [facility] [place]. Please see a copy of my visit note below.    Video-Visit Details     Video Start Time: 8:50am     Type of service:  Video Visit     Video End Time: 9:14AM    Originating Location (pt. Location): Home     Distant Location (provider location):  [facility] off-site     Platform used for Video Visit: [employer]     [facility]    Hematology/Oncology Established Patient Follow Up Note      Today's Date: 10/11/2023    Reason for follow up: Sigmoid cancer    HISTORY OF PRESENT ILLNESS: Nadia Ladd is a 68 year old female who presents for follow-up    Patient has medical history including Crohn's disease on sulfasalazine, anemia secondary to iron deficiency and B12 deficiency, obesity, hypertension, prediabetes, eczema, pulmonary hypertension, hiatal hernia, mild splenomegaly (14.7 cm in 2/23)    - 2/23 pt noted increasing fatigue, found to have iron deficiency anemia w/hgb 6.8 (previously required RBC transfusion when dx w/Crohn's), did have intermittent changes in stool but not persistent (noted minimal blood in stool)   - 2/23 upper endoscopy for iron deficiency anemia and Crohn's disease: Hiatal hernia, normal stomach, normal duodenum  - 2/23 colonoscopy: A frond-like/villous partially obstructing large mass found in sigmoid colon, partially circumferential involving two thirds of the lumen circumference, 4 cm, unable to traverse, with oozing, s/p biopsy  PATHOLOGY:  A.  Stomach, antrum: Biopsy:  - Antral type mucosa with mild chronic inactive gastritis  - Immunostain for Helicobacter pylori is negative      B.  Colon, sigmoid, stricture: Biopsy:  - Invasive poorly differentiated carcinoma  The sigmoid stricture shows a high-grade carcinoma without definitive gland formation.  Given the  poorly differentiated appearance, an immunohistochemical panel was performed to exclude the possibility of a tumor by direct extension or metastasis.   Immunohistochemical stains are performed and show the tumor to be diffusely positive for CK7 and partially positive for CK20 and SATB2.  There is no significant tumor reactivity for CDX2, GATA3, PAX8, TTF-1, and chromogranin.  Synaptophysin highlights very rare positive cells. Although the CK7/CK20 profile is not entirely typical for a colorectal carcinoma, immunostains for tumors of other origins are negative and a colorectal primary is still favored.   - Mismatch repair:  1) MLH1-loss of nuclear expression  2) MSH2-intact  3) MSH6-intact  4) PMS2-loss of nuclear expression  -MLH1 promoter methylation: NEGATIVE    -2/23 CT CAP:  A) 4 cm length mass in the proximal sigmoid colon. There is some irregularity and stranding in the adjacent pericolonic fat which may indicate tumor extension. There is no obstruction.   B) there is a second probable mass at the hepatic flexure of the colon measuring 2.5 cm in length   C)There are small lymph nodes in the mesial colon adjacent to the hepatic flexure abnormality and the sigmoid colonic mass. No lymphadenopathy by size criteria  D) There is submucosal fatty deposition in the colon, most severe in the distal sigmoid colon and rectum, which can be seen secondary to quiescent inflammatory bowel disease.  E) no liver lesion    -3/23 PET:  a. There is increased FDG avidity of the sigmoid colon with focal lobular wall thickening consistent with biopsy-proven adenocarcinoma.  b. Secondary focus noted at the hepatic flexure demonstrates elevated FDG avidity and lobulated wall thickening highly suspicious for a additional primary.  c. Additionally there is a smaller focus of wall thickening with elevated FDG avidity along the left aspect of the transverse colon  which is suspicious for a possible third focus of colonic  "malignancy.    -3/23 CEA 6.8  - 3/23 colorectal surgery consultation with - in the setting of Crohn's, at least sigmoid colectomy with possible total abdominal colectomy with either ileorectal anastomosis or end ileostomy (\"rectum can remain active and be actively surveyed annually\")    -3/23 genetic counseling, testing for Chan syndrome    - 4/5/2023 gynecologic oncology consultation for risk reduction surgery with hysterectomy and BSO at time of colectomy    -4/6/2023 laparoscopic converted to open total abdominal colectomy with ileorectal anastomosis, partial omentectomy, flexible sigmoidoscopy, TAHBSO  A. OMENTUM, RESECTION:  - Adipose tissue with no significant histopathologic abnormality  - No evidence of malignancy     B. COLON, RESECTION:  Mass #1 (ascending colon/hepatic flexure): Mixed neuroendocrine-non-neuroendocrine neoplasm (MINEN):  - Neuroendocrine carcinoma component (70%) and moderately-differentiated adenocarcinoma component (30%)  - Size = 5.0 cm, invading into muscularis propria  - Positive LVI  - Negative macroscopic tumor perforation, negative PNI  - Negative surgical resection margins  - IHC: Neuroendocrine portion: Negative for chromogranin and INSM1 but strongly express synaptophysin  - IHC: Adenocarcinoma portion: Positive for CK20, CDX2 negative for CK7, PAX8, ER, S100  Ki-67 proliferation index of approximately 80%.  MMR:  Intact nuclear expression of MLH1, MSH2, MHS6, PMS2  PDL1:  COMBINED POSITIVE SCORE (CPS): 55-60  TUMOR PROPORTION SCORE (TPS): 50%   pathogenic KRAS mutation (G13D) was identified (5.2%).  AJCC 8th edition: stage 3B mpT3 pN1a     Mass#2 (sigmoid colon): Poorly-differentiated carcinoma:  - Grade 3  - Size = 5.7 cm, invading into muscularis propria  - Negative for microscopic tumor perforation, LVI, perineural invasion  - Negative surgical resection margins  - IHC: Positive for scar and keratin AE1/AE3, negative for CK7, CK20, CDX2, PAX8, ER, chromogranin, " CD56, p40, synaptophysin, S100, INI1, p40, INSM1  Ki-67 proliferation index of approximately 70%.  MMR: loss of nuclear expression MLH1 and PMS2, intact nuclear expression MSH2 and MSH6  PDL1:  COMBINED POSITIVE SCORE (CPS): 80  TUMOR PROPORTION SCORE (TPS): 70%  pathogenic KRAS mutation (G13D) was identified (4.5%).  AJCC 8th edition: stage 3A mpT2 pN1a    - One of forty-one sampled lymph nodes positive (1/41)  - Benign appendix  - Tubular adenoma    C. UTERUS, CERVIX, BILATERAL FALLOPIAN TUBES & OVARIES, :  - Benign endometrial polyps; atrophic endometrium  - Uterus, cervix, bilateral fallopian tubes, and ovaries with no significant morphologic abnormalities  - No evidence of dysplasia or malignancy     D. SMALL INTESTINE, TERMINAL ILEUM, TERMINAL ILIUM:  - Benign ileum  - No evidence of dysplasia or malignancy  - Negative for metastases to one of one sampled lymph node (0 /1)     E. PROXIMAL ANASTOMOTIC RING:  - Benign small intestine  - No evidence of dysplasia or malignancy     F. DISTAL ANASTOMOTIC RING:  - Benign colon  - No evidence of dysplasia or malignancy    CRC NGS:   --mutations positive for KRAS G13D mutation 5.2% mass 1, KRAS G13D mutation 4.5% mass 2 (negative for AKT1; BRAF; ERBB2; KRAS; NRAS; PIK3CA; PTEN)  --TMB score: 17.064 mut/Mb mass 1 (CPS 55-60, TPS 50%), 60.943 mut/Mb mass 2 (CPS 80, TPS 70%)  --fusion negative including NTRK and RET for mass 1 and 2 (also negative for AKT1, AKT3, ALK, AR, NORDOP63, CLAUDINE, BCOR, BRAF, BRD3, BRD4, CAMTA1, CCNB3, CCND1, , CIC, CSF1, CSF1R, CTNNB1, DNAJB1, EGFR, EPC1, ERBB2, ERBB4, ERG, ESR1, ESRRA, ETV1, ETV4, ETV5, ETV6, EWSR1, FGFR1, FGFR2, FGFR3, FGR, FOS, FOSB, FOXO1, FOXO4, FOXR2, FUS, GLI1, GRB7, HMGA2, HRAS, IDH1, IDH2, INSR, JAK2, JAK3, JAZF1, KRAS, MAML2, MAP2K1, MAST1, MAST2, MEAF6, MET, MKL2, MN1, MSMB, MUSK, MYB, MYBL1, MYOD1, NCOA1, NCOA2, NOTCH1, NOTCH2, NR4A3, NRAS, NRG1, NTRK1, NTRK2, NTRK3, NUMBL1, NUTM1, PAX3, PDGFB, PDGFRA, PDGFRB,  PHF1, PIK3CA, PKN1, PLAG1, PPARG, PRKACA, PRKCA, PRKCB, RAF1, RELA, RET, ROS1, RPSO2, RSPO3, SS18, STAT6, TAF15, TCF12, TERT, TFE3, TFEB, TFG, THADA, TMPRSS2, USP6, VGLL2, YAP1, YWHAE)    -4/11/2023 patient discharged from hospital, planning for 30 days of lovenox postop, oxycodone for pain (required 1 unit PRBC for anemia)    -5/8/23 [facility] and their recommendations include:  - common to see this in Crohn's, overall poor prognosis, treat aggressively, may be poorly responsive to chemotherapy  - standard of care is mFOLFOX 6 x 12 cycles  - acknowledge that pt has high TPS and likely response to immunotherapy however not sufficient data or standard of care in stage 3 adjuvant setting  - add MMR testing to mass #1 hepatic flexure mass    - 5/9/23 admitted for acute renal failure w/Cr 3.82 w/K 7.0    - 5/19/23 second opinion at [facility] w/medical oncologist Dr. Acharya, thorough consultation and recommendations appreciated     - pt would like to proceed with FOFLOX (with dose reduced oxaliplatin due to kidney function)    -5/2023 genetic counseling: Negative for mutations in EPCAM, MLH1, MSH2, MSH6, and PMS2 genes.  Negative for mutations in APC, AXIN2, BMPR1A, CDH1, CHEK2, EPCAM, GREM1, MLH1, MSH2, MSH3, MSH6, MUTYH, NTHL1, PMS2, POLD1, POLE, PTEN, SMAD4, STK11, TP53, CDKN1B, MEN1, NF1, RET, TSC1, TSC2, and VHL genes    - 5/25/23 port placement    - 5/26/23 CT CAP w/ contrast and IVF before and after CT (baseline prior to starting tx):  1.  3 mm nodule RML and 5 mm lateral EDSON nodule, minimally increased from previous  2.  New 4 mm EDSON groundglass density nodule  3.  Several prominent borderline enlarged mediastinal lymph nodes slightly increased from previous  4.  Splenomegaly 14.5 cm  5. S/p subtotal colectomy with ileorectal anastomosis with postsurgical changes along the ventral abdominal wall midline    -5/31/23 started mFOLFOX 6 w/dose reduced oxaliplatin    - C2D1 6/14/23 held  due to new onset paroxysmal A-fib with RVR requiring hospitalization 6/3/2023 (also hypoMg, dehydrated)  - 6/19/2023 I presented this case at MTD colorectal tumor board at the [facility], it is possible that paroxysmal A-fib may be related to 5-FU.  Options include withholding further treatment versus retrialing 5-FU under the guidance of cardio oncology in an inpatient setting.  No other adjuvant treatment options available, including immunotherapy  - 6/30/2023 consultation with cardio oncologist Dr. Garza; I spoke with Dr. Garza 6/30/2023 as well, SLE6OS2-OBIp score 3, recommending starting apixaban 5 mg twice daily, okay to retrial FOLFOX while patient is on metoprolol  -7/18/2023  CT CAP- subcm lung nodules stable, borderline and mildly enlarged mediastinal LN and periportal LN stable, splenomegaly 15.7cm, Subtotal colectomy with ileorectal anastomosis. No acute inflammation or obstruction   - 7/19/2023 mFOLFOX6 C2D1  (inpatient w/continuous cardiac monitoring)      INTERIM HISTORY:  REGIMEN:  mFOLFOX6  q14 days x12 cycles/6 months  Starting weight 183lb   C1D1 5/31/23 (complicated by hospitalization for afib w/RVR C1D4 6/3/23), C2D1 7/19/23 (inpatient w/continuous cardiac monitoring), C3D1 8/2/2023 (outpatient, admitted to ER for A-fib with RVR C3D3 8/4/23), C4D1 8/16/23, C5D1 8/30/2023, C6D1 9/13/2023, C7D1 9/26/23    oxaliplatin 64mg/m2 day 1 (dose reduced from 85mg/m2 2/2 Cr C1D1)  LV 350mg/m2 day 1  5FU 1200mg/m2 continuous infusion day 1-2 (total 2,400mg/m2 IV over 46-48 hours)  (5FU 400mg/m2 day 1 bolus (discontinued cycle 2 onwards))  PLUS IVF and magnesium replacement as needed day 1 and day 8 with optimization to Mg= 2 at least, AND IVF day 3 on day of pump disconnect  PLUS (per cardiology recommendations) During the days of chemo and 2 days after ONLY: take long acting diltiazem 120 mg daily with 30 mg of short acting diltiazem as needed for atrial fibrillation with HR >110  Emetic risk:  moderate  Febrile neutropenia risk: intermediate     C8D1 10/11/23 pending labs    Treatment related AE:  - hearing changes-described as muffled with intermittent ringing with obscured hearing R>L- overall improved since 1st cycle, and improving few days after starting chemo, Flonase BID as needed, ENT consultation pending 10/23/23; (present prior to starting chemo, improved w/steroids, now stable and not getting better)   - Paroxysmal A-fib with RVR and PAC- admitted to [facility] 6/3/2023 - 6/5/2023 for this, 6/3/2023 echo EF 55-60%, mild pulmonary hypertension, mild mitral regurgitation, on metoprolol XL 25 mg p.o. daily for ongoing palpitations, 7/23 Cardio oncology consult w/Dr. Garza, on eliquis, no recurrence of RVR while inpatient for cycle 2 w/continuous cardiac monitoring; IVF and magnesium replacement to magnesium = 2 minimum added to D1 and D8 and IVF day 3. 8/4/2023 C3D3 patient went to ER for A-fib with palpitations (no other sx), HR 80-130s, labs WNL, CXR negative, given IV fluids, placed on diltiazem drip, HR improved to .  8/15/2023 cardiology follow-up: Increase metoprolol XL to 50 mg daily; During the days of chemo and 2 days after ONLY: take long acting diltiazem 120 mg daily with 30 mg of short acting diltiazem as needed for atrial fibrillation with HR >110.  8/18/2023 patient called in reporting heart rate up to 140, sensation of heart racing, improved to 67 after taking extra prescribed dose of diltiazem and then asymptomatic.  8/19/2023 patient presented to ER with chest tightness and palpitations, had taken total of 3 tabs of diltiazem 30mg short acting at that point, EKG with sinus rhythm, rate 63 with some noted bradycardia while in the ER, magnesium and potassium normal, creatinine slightly elevated, given 1 L IVF.  free drug application for Eliquis completed 8/18/2023 but income over guidelines, 9/12/2023 cardiology [id number]-[id number] changes, follow-up 12/14/2023; still finds  "xarelto unaffordable  - anxiety- working with psychology prn  - Delayed neutropenia- neutropenic thierry ANC 0.5 2 weeks out from cycle 1, afebrile, 6/23 DPD deficiency testing negative; resolved cycle 2 onwards 5FU bolus dropped   -Normocytic anemia- due to chemotherapy and iron deficiency, s/p ferric carboxymaltose 750mg x2 doses 6/14/23 and 6/28/23, 7/23 iron studies improved.  8/23 hgb 13.3, 9/23 hgb 12.1, 10/23 hgb 11.1  -Thrombocytopenia- 2/2 chemotherapy, no bruising or bleeding, for now plt >75K  - MARYA on CKD- following w/nephro- follow up 9/23, Cr improved from 3, drinking 2.5 16oz bottles per day and getting IV fluids day 1, day 3 and day 8 of each chemotherapy cycle, nephrology follow-up 12/4/2023  - elevated LFTs- AST 50, Alk phos 251, liver normal on 9/23 CT  - diarrhea- grade 1, 2 episodes/week, also affected by food, improved with imodium 2 total)   - neuropathy- grade 1, tingling fingers > toes, lasting 3-4 days   - cold sensitivity- grade 1, fingers and mouth, most severe right after tx and gradually improves over 2 weeks, using straw   - Port flipped-see my note from 8/2/2023, 8/21/2023 port removed and replacement with IR  - \"bottom is sore\"- pt has hemorrhoids, no active bleeding or thrombosed hemorrhoids but notes soreness after chemo       -9/23 CT CAP (after cycle 6)  -Subtotal colectomy, rectal anastomosis appears stable, some adjacent mild scarring, no bowel obstruction or inflammation, no free fluid  -No new adenopathy, no liver lesions  -Stable EDSON 3 mm nodule, other stable nodules at EDSON, RML  -Stable several small thoracic lymph nodes in mediastinum  -Mild wall thickening of the gallbladder  -Spleen is smaller measuring 13.7 cm  -Stable regions of bilateral cortical thinning in the kidneys, with few tiny cysts    REVIEW OF SYSTEMS:   A 14 point ROS was reviewed with pertinent positives and negatives in the HPI.        HOME MEDICATIONS:  Current Outpatient Medications   Medication Sig " Dispense Refill     cyanocobalamin 1000 MCG TBCR Take 1,000 mcg by mouth daily 100 tablet 1     dexamethasone (DECADRON) 4 MG tablet Take 2 tablets (8 mg) by mouth daily Take for 2 days, starting the day after chemo. Take with food. 40 tablet 0     diltiazem (CARDIZEM) 30 MG tablet Take 1 tablet (30 mg) by mouth 3 times daily (Patient not taking: Reported on 9/26/2023) 90 tablet 0     diltiazem ER COATED BEADS (CARDIZEM CD/CARTIA XT) 120 MG 24 hr capsule Take 1 capsule (120 mg) by mouth daily On chemo days and 2 days post chemo infusion. 60 capsule 3     Ferrous Sulfate (IRON) 325 (65 Fe) MG tablet Take 1 tablet by mouth daily       lidocaine-prilocaine (EMLA) 2.5-2.5 % external cream Apply topically as needed for moderate pain Apply 15-20 minutes prior to port access 1 g 4     loperamide (IMODIUM) 2 MG capsule Take 2 mg by mouth daily as needed for diarrhea (Patient not taking: Reported on 9/26/2023)       MAGNESIUM OXIDE 400 (240 Mg) MG tablet TAKE 1 TABLET BY MOUTH TWICE A DAY 60 tablet 0     metoprolol succinate ER (TOPROL XL) 50 MG 24 hr tablet Take 1 tablet (50 mg) by mouth daily 90 tablet 3     Multiple Vitamins-Iron (MULTI-DAY PLUS IRON PO) Take 1 tablet by mouth daily       prochlorperazine (COMPAZINE) 10 MG tablet Take 10 mg by mouth every 6 hours as needed for nausea or vomiting (Patient not taking: Reported on 9/26/2023)       rivaroxaban ANTICOAGULANT (XARELTO) 20 MG TABS tablet Take 1 tablet (20 mg) by mouth daily (with dinner) 90 tablet 3     sodium bicarbonate 650 MG tablet Take 1 tablet (650 mg) by mouth 2 times daily 120 tablet 3         ALLERGIES:  Allergies   Allergen Reactions     No Known Drug Allergy          PAST MEDICAL HISTORY:  Past Medical History:   Diagnosis Date     Arthropathia 08/05/2010     B12 deficiency anemia 10/21/2010     Benign essential hypertension with target blood pressure below 140/90 10/10/2016     CARDIOVASCULAR SCREENING; LDL GOAL LESS THAN 160 10/31/2010     Crohn's  colitis (H) 02/15/2011     Dermatitis-dishydrotic eczema-severe 08/05/2010     Hiatal hernia      HTN (hypertension) 07/21/2011     Iron deficiency anemia 10/21/2010     Malignant neoplasm of sigmoid colon (H)      Obesity      Primary pulmonary hypertension (H) 04/06/2010         PAST SURGICAL HISTORY:  Past Surgical History:   Procedure Laterality Date     COLECTOMY WITHOUT COLOSTOMY N/A 4/6/2023    Procedure: Laparoscopic Converted to Open Total abdominal colectomy;  Surgeon: Jerome Ashley MD;  Location: [facility] OR     COLONOSCOPY  10/11/10     COLONOSCOPY N/A 2/27/2023    Procedure: ATTEMPTED COLONOSCOPY WITH SIGMOID STRICTURE BIOPSY;  Surgeon: Jonathan Alcocer MD;  Location: [facility]     ESOPHAGOSCOPY, GASTROSCOPY, DUODENOSCOPY (EGD), COMBINED N/A 2/27/2023    Procedure: ESOPHAGOGASTRODUODENOSCOPY, WITH BIOPSY;  Surgeon: Jonathan Alcocer MD;  Location: [facility]     HYSTERECTOMY TOTAL ABDOMINAL, BILATERAL SALPINGO-OOPHORECTOMY, COMBINED N/A 4/6/2023    Procedure: Hysterectomy total abdominal, bilateral salpingo-oophorectomy;  Surgeon: Sunitha Olea MD;  Location: [facility] OR     INSERT PORT VASCULAR ACCESS Right 5/25/2023    Procedure: Ultrasound-guided right internal jugular venous access port placement with fluoroscopy;  Surgeon: Martin Thomas DO;  Location: [facility]     IR CHEST PORT PLACEMENT > 5 YRS OF AGE  8/21/2023     IR PORT CHECK RIGHT  7/18/2023     IR PORT REMOVAL RIGHT  8/21/2023     SIGMOIDOSCOPY FLEXIBLE N/A 4/6/2023    Procedure: Sigmoidoscopy flexible;  Surgeon: Jerome Ashley MD;  Location: [facility] OR     SURGICAL HISTORY OF -   06/14/76    Perineorrhaphy, for widening vaginal orifice     ZZC EXPLORATORY OF ABDOMEN  1989    laparoscopy         SOCIAL HISTORY:  Social History     Socioeconomic History     Marital status:      Spouse name: Not on file     Number of children: Not on file     Years of education: Not on file     Highest education level: Not on file    Occupational History     Not on file   Tobacco Use     Smoking status: Never     Passive exposure: Current     Smokeless tobacco: Never   Substance and Sexual Activity     Alcohol use: No     Drug use: No     Sexual activity: Yes     Partners: Male   Other Topics Concern     Parent/sibling w/ CABG, MI or angioplasty before 65F 55M? Not Asked   Social History Narrative     Not on file     Social Determinants of Health     Financial Resource Strain: Not on file   Food Insecurity: Not on file   Transportation Needs: Not on file   Physical Activity: Not on file   Stress: Not on file   Social Connections: Not on file   Interpersonal Safety: Not on file   Housing Stability: Not on file         FAMILY HISTORY:  Family History   Problem Relation Age of Onset     Hypertension Mother         on meds, alive     Cerebrovascular Disease Father         stroke about age 65,  of cancer at 68 yrs     Diabetes Father         eventually took insulin     Anemia Father         Pernisios anemia     Kidney Disease Niece         kidney transplant     Kidney Disease Nephew         kidney transplant     Venous thrombosis No family hx of      Anesthesia Reaction No family hx of          PHYSICAL EXAM:  Vital signs:  LMP  (LMP Unknown)          LABS:    PATHOLOGY:    IMAGING:  Narrative & Impression   CT CHEST/ABDOMEN/PELVIS WITH CONTRAST 2023 1:37 PM     CLINICAL HISTORY: Colon cancer status post resection, on chemotherapy.  Interim scan to assess response/recurrence of disease. Malignant  neoplasm of sigmoid colon (H).     TECHNIQUE: CT scan of the chest, abdomen, and pelvis was performed  following injection of IV contrast. Multiplanar reformats were  obtained. Dose reduction techniques were used.   CONTRAST: ISOVUE-370, 90 mL     COMPARISON: CT chest, abdomen and pelvis 2023.     FINDINGS:   LUNGS AND PLEURA: Stable mild areas of atelectasis. No new worrisome  airspace disease. No effusion. Stable ill-defined left upper  lobe 3 mm  nodule series 4 image 98. Other stable nodules at the left upper lobe  image 106, right middle lobe image 161.     MEDIASTINUM/AXILLAE: Stable several small thoracic lymph nodes at the  mediastinum. No new adenopathy. No acute mediastinal abnormality. Left  chest Port-A-Cath again seen.     CORONARY ARTERY CALCIFICATION: None.     HEPATOBILIARY: No new worrisome hepatic lesion. There is mild wall  thickening of the gallbladder.     PANCREAS: Normal.     SPLEEN: No focal lesion. Craniocaudal length of spleen is smaller  measuring 13.7 cm.     ADRENAL GLANDS: Normal.     KIDNEYS/BLADDER: Stable regions of bilateral cortical thinning. A few  tiny cysts noted without specific imaging follow-up recommended. No  hydronephrosis or stone. Bladder is unremarkable.     BOWEL: Subtotal colectomy. Rectal anastomosis appears stable. There is  some adjacent mild scarring. No bowel obstruction or inflammation. No  free fluid.     PELVIC ORGANS: No new worrisome pelvic abnormality.     ADDITIONAL FINDINGS: No new adenopathy.     MUSCULOSKELETAL: Spine degenerative changes. No focal suspicious bone  lesion.                                                                      IMPRESSION:  1.  Stable exam without evidence for new disease.  2.  Splenomegaly is slightly smaller.  3.  Stable small pulmonary nodules.  4.  Stable lymph nodes again identified.     VIKI ESCOBAR MD        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 mutated, MARISSA, TPS 50%  # sigmoid colon poorly-differentiated carcinoma, KRAS G13D mutated, loss of MLH1 and PMS2, TPS 70%  - 2/23 colonoscopy: A frond-like/villous partially obstructing large mass found in sigmoid colon, partially circumferential involving two thirds of the lumen  circumference, 4 cm, unable to traverse, with oozing, s/p biopsy, PATHOLOGY: Invasive poorly differentiated carcinoma, IHC panel excludes tumors of other origin, colorectal primary is favored, loss of nuclear expression of MLH1 and PMS2, MLH1 promoter methylation negative, [id number] mutation negative  -2/23 CT CAP: Shows known 4 cm proximal sigmoid colon mass with possible tumor extension (irregularity and stranding and adjacent pericolonic fat) without obstruction AND probable second mass 2.5 cm at hepatic flexure of colon; small lymph nodes adjacent to both masses (no lymphadenopathy by size criteria)  -3/23 PET:  a. There is increased FDG avidity of the sigmoid colon with focal lobular wall thickening consistent with biopsy-proven adenocarcinoma.  b. Secondary focus noted at the hepatic flexure demonstrates elevated FDG avidity and lobulated wall thickening highly suspicious for a additional primary.  c. Additionally there is a smaller focus of wall thickening with elevated FDG avidity along the left aspect of the transverse colon which is suspicious for a possible third focus of colonic malignancy.  - 3/23 CEA 6.8  -4/6/2023 laparoscopic converted to open total abdominal colectomy with ileorectal anastomosis, partial omentectomy, flexible sigmoidoscopy, TAHBSO  PATHOLOGY:  Of note, omental resection, terminal ileum, proximal and distal anastomotic rings, uterus, cervix, bilateral fallopian tubes and ovaries negative for malignancy    Mass #1 (ascending colon/hepatic flexure): Mixed neuroendocrine-non-neuroendocrine neoplasm (MINEN):  - Neuroendocrine carcinoma component (70%) and moderately-differentiated adenocarcinoma component (30%)  - Size = 5.0 cm, invading into muscularis propria, Positive LVI  - IHC: Neuroendocrine portion: Negative for chromogranin and INSM1 but strongly express synaptophysin  - IHC: Adenocarcinoma portion: Positive for CK20, CDX2 negative for CK7, PAX8, ER, S100  Ki-67 proliferation  index of approximately 80%.  MARISSA   PDL1:  COMBINED POSITIVE SCORE (CPS): 55-60  TUMOR PROPORTION SCORE (TPS): 50%   pathogenic KRAS mutation (G13D) was identified (5.2%).  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  MMR testing: loss of MLH1 and PMS2  PDL1:  COMBINED POSITIVE SCORE (CPS): 80  TUMOR PROPORTION SCORE (TPS): 70%  pathogenic KRAS mutation (G13D) was identified (4.5%).  MMR testing: loss of MLH1 and PMS2, intact MSH2 and MSH6  AJCC 8th edition: stage 3A mpT2 pN1a    - One of forty-one sampled lymph nodes positive (1/41), d/w pathology- unable to determine which mass is metastatic to LN    - 4/23 MRI brain w/wo contrast LAURENT  - 5/26/23 CT CAP w/ contrast and IVF before and after CT (post op baseline prior to starting tx):  1.  3 mm nodule RML and 5 mm lateral EDSON nodule, minimally increased from previous  2.  New 4 mm EDSON groundglass density nodule  3.  Several prominent borderline enlarged mediastinal lymph nodes slightly increased from previous  4.  Splenomegaly 14.5 cm  5. S/p subtotal colectomy with ileorectal anastomosis with postsurgical changes along the ventral abdominal wall midline  - 5/8/23 I presented this case [facility] tumor board as above   - 5/19/23 second opinion at [facility] w/medical oncologist Dr. Acharya, thorough consultation and recommendations appreciated, overall agree w/FOLFOX x6 months, possible nivo or ipi nivo in second line; if metastatic platinum etoposide vs ipi nivo  - 5/25/23 port placement    -5/2023 genetic counseling: Negative for mutations detailed below     REGIMEN:  mFOLFOX6  q14 days x12 cycles/6 months  Starting weight 183lb   C1D1  5/31/23 (complicated by hospitalization for afib w/RVR C1D4 6/3/23), C2D1 7/19/23 (inpatient w/continuous cardiac monitoring), C3D1 8/2/2023 (outpatient, admitted to ER for A-fib with RVR C3D3 8/4/23), C4D1 8/16/23, C5D1 8/30/2023, C6D1 9/13/2023, C7D1 9/26/23    oxaliplatin 64mg/m2 day 1 (dose reduced from 85mg/m2 2/2 Cr C1D1)  LV 350mg/m2 day 1  5FU 1200mg/m2 continuous infusion day 1-2 (total 2,400mg/m2 IV over 46-48 hours)  (5FU 400mg/m2 day 1 bolus (discontinued cycle 2 onwards))  PLUS IVF and magnesium replacement as needed day 1 and day 8 with optimization to Mg= 2 at least, AND IVF day 3 on day of pump disconnect  PLUS (per cardiology recommendations) During the days of chemo and 2 days after ONLY: take long acting diltiazem 120 mg daily with 30 mg of short acting diltiazem as needed for atrial fibrillation with HR >110  Emetic risk: moderate  Febrile neutropenia risk: intermediate     C8D1 10/11/23 pending labs    Treatment related AE:  - hearing changes-stable w/dose reduced oxaliplatin, flonase prn, ENT consultation pending 10/23/23  - Paroxysmal A-fib with RVR and PAC- 8/15/2023 cardiology follow-up: Increase metoprolol XL to 50 mg daily; During the days of chemo and 2 days after take long acting diltiazem 120 mg daily with 30 mg of short acting diltiazem as needed for atrial fibrillation with HR >110, IV fluid day 1, 3, 8, cardiology follow-up 12/14/2023, social work consult for medication affordability   - anxiety- continue following with oncology psychology team  - Normocytic anemia- due to chemotherapy and iron deficiency, s/p ferric carboxymaltose 750mg x2 doses 6/14/23 and 6/28/23; repeat iron studies normal. If hgb continues to decrease, repeat anemia work up  -Thrombocytopenia- mild, 2/2 chemo, plt remain >75K- can continue chemo, monitor   - MARYA on CKD- following w/nephro, f/u with nephro 12/4/2023; continue increasing PO fluids, continue day 8 IVF  - elevated LFTs- AST 50, Alk phos 251, liver  normal on 9/23 CT, if continues to increase will check GGT  - diarrhea- grade 1  - cold sensitivity- grade 1  - neuropathy- grade 1   - hemorrhoids- witch hazel pads, sitz baths prn    Imaging:  -9/23 CT CAP (after 6 cycles of mFOLFOX 6 without 5-FU bolus):  -Subtotal colectomy with stable appearing rectal anastomosis, some adjacent mild scarring, no bowel obstruction or inflammation, no free fluid, no new adenopathy, no liver lesions, no bone lesions  -Mild wall thickening of the gallbladder  - Stable small pulmonary nodules and small thoracic lymph nodes at mediastinum, splenomegaly smaller 13.7 cm    - 7/18/23 CT CAP (repeat baseline after treatment delay due to A-fib)- subcm lung nodules stable, borderline and mildly enlarged mediastinal LN and periportal LN stable, splenomegaly 15.7cm, Subtotal colectomy with ileorectal anastomosis. No acute inflammation or obstruction    -Repeat CT CAP after cycle 12, end of 12/23- to be ordered later     Labs:  3/23 CEA 6.8  5/23 CEA 1.8  7/23 CEA 2.8  10/23 CEA pending  12/23 CEA pending    Other:  - f/u with Dr. Ashley 4/2024 for rigid proctoscopy     #parxosymal afib  - within 3 days of receiving 5FU, prior cardiac risk factors htn, prediabetes  - 6/3/23 presented to ER w/lightheadedness, dizziness, cardioverted s/p diltiazem ggt in ER  - 6/23 DPD deficiency testing negative  - currently on metoprolol XL 25 mg daily, apixaban 5 mg twice daily  - 6/30/2023 consultation with cardio oncologist Dr. Garza; I spoke with Dr. Garza 6/30/2023 as well, ERI2LX1-QVMh score 3, recommending starting apixaban 5 mg twice daily, okay to retrial FOLFOX while patient is on metoprolol.  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  Negative for mutations in APC, AXIN2, BMPR1A, CDH1, CHEK2, EPCAM, GREM1, MLH1, MSH2, MSH3, MSH6, MUTYH, NTHL1, PMS2, POLD1, POLE, PTEN, SMAD4, STK11, TP53, CDKN1B, MEN1, NF1, RET, TSC1, TSC2, and VHL genes    #Anemia secondary to iron deficiency and B12 deficiency  - terminal ileum removed at time of colon surgery, monitor for nutrient def  - 2/23 hgb 6.8, ferritin 21, iron sat index 10, TIBC 265, iron 27, b12 1493  - On B12 1000 mcg p.o. daily and ferrous sulfate 325 mg p.o. daily  - 4/9/23 s/p 1 uprbcs  - 5/9/23 hgb 11.3,5/30/23 hgb 9.4  - based on Ganzoni equation w/goal hgb 14 and 500mg needed for iron stores, pts iron deficit is 1400 mg  - s/p ferric carboxymaltose 750mg x2 doses 6/14/23 and 6/28/23  - 7/11/23 ferritin 1022, iron sat 22, TIBC 201, iron 44    - 8/23 hgb 13.3    #Crohn's  - dx since at least 2010     RTC 2 weeks for follow up with RADHA, labs, chemo #9  RTC 4 weeks for follow up with me, labs, chemo #10  RTC 6 weeks for follow up with RADHA, labs, chemo #11  RTC 8 weeks for follow up with me, labs, chemo #12      Irene Bateman,   Hematology/Oncology  [facility]      Again, thank you for allowing me to participate in the care of  your patient.        Sincerely,        FRANCISCO LEE, DO

## 2023-10-11 NOTE — NURSING NOTE
Is the patient currently in the state of [place]? YES    Visit mode:VIDEO    If the visit is dropped, the patient can be reconnected by: VIDEO VISIT: Text to cell phone:   Telephone Information:   Mobile [phone]     Will anyone else be joining the visit? NO    How would you like to obtain your AVS? MyChart    Are changes needed to the allergy or medication list? Pt stated no changes to allergies and Pt stated no med changes    Reason for visit: Oncology Clinic Visit (Colon ca), Results (Labs 10/11, ct 09/28), Infusion (10/11 c8d1 Modified FOLFOX-6), and Video Visit    Annamarie Harry LPN

## 2023-10-12 ENCOUNTER — PATIENT OUTREACH (OUTPATIENT)
Dept: CARE COORDINATION | Facility: CLINIC | Age: 68
End: 2023-10-12
Payer: MEDICARE

## 2023-10-12 ENCOUNTER — TELEPHONE (OUTPATIENT)
Dept: ONCOLOGY | Facility: CLINIC | Age: 68
End: 2023-10-12
Payer: MEDICARE

## 2023-10-12 NOTE — Clinical Note
Jerome Childers,  When Nadia comes to clinic tomorrow can you assist her in faxing me her electricity bill? I am working on trying to get her a little financial support to help with costs.   Gladys-  Thanks so much for looking to see if there are additional funding options for the [facility].   Dr. Bateman- for your awareness- social work chaparrita support is more often for non-medication costs. Pharmacy is best when a patient has a medication cost concern.  Thanks! -Lizet

## 2023-10-12 NOTE — PROGRESS NOTES
Social Work - Intervention  [employer]  Data/Intervention:  Patient Name: Nadia Ladd Goes By: Nadia    /Age: 1955 (68 year old)     Visit Type: telephone  Referral Source: Dr. Bateman  Reason for Referral: Medication Affordability:     xarelto unafforable, pt cannot be on coumadin 2/2 interaction with chemo.     Psychosocial Information/Concerns:  This clinician called Nadia who reported that her Xarelto is $530/month. Nadia reports that she has been in communication with [employer] and reported that she anticipates costs will reduce to $80/month in 2024 when her Part D goes into effect. Nadia reports that she has paid 2023, but that she is anxious about payments for 2023, 2024, 2024, 3/2024 & 2024. Nadia reports that she had 's income (social security and pensions) is $5,253/month (family of 2).     Nadia reports that family also sees large monthly bills for electricity ([employer]) due to 's health condition that requires additional energy needs overnight.     SW discussed role of pharmacy liaison, and outreached to Gladys Tatum to explore if there are other options that Nadai might be eligible for to assist with medication costs.     Nadia endorsed that she would be interested in [employer] application as well as [facility] [employer] application to assist with costs.       Assessment/Plan:  1) This clinician completed [employer] on Nadia's behalf today.   2) Onc SW will await faxed paperwork to support [facility] application for electricity support.   3) Appreciate Gladys Tatum exploring if additional options are available for cost support of monthly Xeralto.   4) Onc SW to update care team.      Provided patient/family with contact information and availability.    Lizet Hull, MSW, LICSW, OSW-C  Clinical - Adult Oncology  She/Her/Hers  Phone: [phone]  [facility]: MICHAEL, Thu  *every other Tue,  8am-4:30pm  [facility]: W, F, *every other Tue, 8am-4:30pm

## 2023-10-13 ENCOUNTER — TELEPHONE (OUTPATIENT)
Dept: CARDIOLOGY | Facility: CLINIC | Age: 68
End: 2023-10-13

## 2023-10-13 ENCOUNTER — INFUSION THERAPY VISIT (OUTPATIENT)
Dept: INFUSION THERAPY | Facility: CLINIC | Age: 68
End: 2023-10-13
Attending: INTERNAL MEDICINE
Payer: MEDICARE

## 2023-10-13 VITALS
WEIGHT: 187 LBS | RESPIRATION RATE: 16 BRPM | DIASTOLIC BLOOD PRESSURE: 57 MMHG | TEMPERATURE: 97.4 F | HEART RATE: 57 BPM | OXYGEN SATURATION: 99 % | BODY MASS INDEX: 35.33 KG/M2 | SYSTOLIC BLOOD PRESSURE: 122 MMHG

## 2023-10-13 DIAGNOSIS — Z76.89 PREVENTION OF CHEMOTHERAPY-INDUCED NEUTROPENIA: ICD-10-CM

## 2023-10-13 DIAGNOSIS — I48.0 PAROXYSMAL ATRIAL FIBRILLATION (H): ICD-10-CM

## 2023-10-13 DIAGNOSIS — T45.1X5A CHEMOTHERAPY-INDUCED NEUTROPENIA (H): ICD-10-CM

## 2023-10-13 DIAGNOSIS — D70.1 CHEMOTHERAPY-INDUCED NEUTROPENIA (H): ICD-10-CM

## 2023-10-13 DIAGNOSIS — C18.7 MALIGNANT NEOPLASM OF SIGMOID COLON (H): ICD-10-CM

## 2023-10-13 DIAGNOSIS — C18.9 MALIGNANT NEOPLASM OF COLON, UNSPECIFIED PART OF COLON (H): Primary | ICD-10-CM

## 2023-10-13 PROCEDURE — 258N000003 HC RX IP 258 OP 636: Performed by: INTERNAL MEDICINE

## 2023-10-13 PROCEDURE — 96360 HYDRATION IV INFUSION INIT: CPT

## 2023-10-13 PROCEDURE — 250N000011 HC RX IP 250 OP 636: Mod: JZ | Performed by: INTERNAL MEDICINE

## 2023-10-13 PROCEDURE — 96361 HYDRATE IV INFUSION ADD-ON: CPT

## 2023-10-13 RX ORDER — MEPERIDINE HYDROCHLORIDE 25 MG/ML
25 INJECTION INTRAMUSCULAR; INTRAVENOUS; SUBCUTANEOUS EVERY 30 MIN PRN
Status: CANCELLED | OUTPATIENT
Start: 2023-10-13

## 2023-10-13 RX ORDER — ALBUTEROL SULFATE 90 UG/1
1-2 AEROSOL, METERED RESPIRATORY (INHALATION)
Status: CANCELLED
Start: 2023-10-13

## 2023-10-13 RX ORDER — METHYLPREDNISOLONE SODIUM SUCCINATE 125 MG/2ML
125 INJECTION, POWDER, LYOPHILIZED, FOR SOLUTION INTRAMUSCULAR; INTRAVENOUS
Status: CANCELLED
Start: 2023-10-13

## 2023-10-13 RX ORDER — HEPARIN SODIUM,PORCINE 10 UNIT/ML
5 VIAL (ML) INTRAVENOUS
Status: CANCELLED | OUTPATIENT
Start: 2023-10-13

## 2023-10-13 RX ORDER — DIPHENHYDRAMINE HYDROCHLORIDE 50 MG/ML
50 INJECTION INTRAMUSCULAR; INTRAVENOUS
Status: CANCELLED
Start: 2023-10-13

## 2023-10-13 RX ORDER — ALBUTEROL SULFATE 0.83 MG/ML
2.5 SOLUTION RESPIRATORY (INHALATION)
Status: CANCELLED | OUTPATIENT
Start: 2023-10-13

## 2023-10-13 RX ORDER — EPINEPHRINE 1 MG/ML
0.3 INJECTION, SOLUTION INTRAMUSCULAR; SUBCUTANEOUS EVERY 5 MIN PRN
Status: CANCELLED | OUTPATIENT
Start: 2023-10-13

## 2023-10-13 RX ORDER — HEPARIN SODIUM (PORCINE) LOCK FLUSH IV SOLN 100 UNIT/ML 100 UNIT/ML
5 SOLUTION INTRAVENOUS
Status: CANCELLED | OUTPATIENT
Start: 2023-10-13

## 2023-10-13 RX ORDER — HEPARIN SODIUM (PORCINE) LOCK FLUSH IV SOLN 100 UNIT/ML 100 UNIT/ML
5 SOLUTION INTRAVENOUS
Status: DISCONTINUED | OUTPATIENT
Start: 2023-10-13 | End: 2023-10-13 | Stop reason: HOSPADM

## 2023-10-13 RX ADMIN — SODIUM CHLORIDE 1000 ML: 9 INJECTION, SOLUTION INTRAVENOUS at 12:02

## 2023-10-13 RX ADMIN — HEPARIN 5 ML: 100 SYRINGE at 14:03

## 2023-10-13 NOTE — TELEPHONE ENCOUNTER
Request for script for patient assistance program for AC.  Sent to specialty pharmacy.  MARQUEZ Alegria

## 2023-10-13 NOTE — PROGRESS NOTES
Infusion Nursing Note:  Nadia Ladd presents today for pump disconnect and IV fluids.    Patient seen by provider today: No   present during visit today: Not Applicable.    Note: pt here today with her  for pump disconnect and IV fluids.  Pt is generally feeling well.  1 liter of normal saline given today over 2 hours. .      Intravenous Access:  Implanted Port.    Treatment Conditions:  Not Applicable.      Post Infusion Assessment:  Access discontinued per protocol.       Discharge Plan:   Patient discharged in stable condition accompanied by: self and .  Departure Mode: Ambulatory  Pt has next appt on 10/18/2023.      Etelvina Barrientos RN

## 2023-10-18 ENCOUNTER — LAB (OUTPATIENT)
Dept: INFUSION THERAPY | Facility: CLINIC | Age: 68
End: 2023-10-18
Attending: INTERNAL MEDICINE
Payer: MEDICARE

## 2023-10-18 ENCOUNTER — PATIENT OUTREACH (OUTPATIENT)
Dept: CARE COORDINATION | Facility: CLINIC | Age: 68
End: 2023-10-18

## 2023-10-18 VITALS
WEIGHT: 182.5 LBS | RESPIRATION RATE: 16 BRPM | HEART RATE: 62 BPM | OXYGEN SATURATION: 98 % | DIASTOLIC BLOOD PRESSURE: 73 MMHG | BODY MASS INDEX: 34.48 KG/M2 | SYSTOLIC BLOOD PRESSURE: 152 MMHG | TEMPERATURE: 96.8 F

## 2023-10-18 DIAGNOSIS — C18.7 MALIGNANT NEOPLASM OF SIGMOID COLON (H): Primary | ICD-10-CM

## 2023-10-18 DIAGNOSIS — Z76.89 PREVENTION OF CHEMOTHERAPY-INDUCED NEUTROPENIA: ICD-10-CM

## 2023-10-18 DIAGNOSIS — C18.9 MALIGNANT NEOPLASM OF COLON, UNSPECIFIED PART OF COLON (H): ICD-10-CM

## 2023-10-18 DIAGNOSIS — D70.1 CHEMOTHERAPY-INDUCED NEUTROPENIA (H): ICD-10-CM

## 2023-10-18 DIAGNOSIS — T45.1X5A CHEMOTHERAPY-INDUCED NEUTROPENIA (H): ICD-10-CM

## 2023-10-18 LAB
ALBUMIN SERPL BCG-MCNC: 3.9 G/DL (ref 3.5–5.2)
ALP SERPL-CCNC: 240 U/L (ref 35–104)
ALT SERPL W P-5'-P-CCNC: 50 U/L (ref 0–50)
ANION GAP SERPL CALCULATED.3IONS-SCNC: 13 MMOL/L (ref 7–15)
AST SERPL W P-5'-P-CCNC: 45 U/L (ref 0–45)
BILIRUB SERPL-MCNC: 0.8 MG/DL
BUN SERPL-MCNC: 22 MG/DL (ref 8–23)
CALCIUM SERPL-MCNC: 8.9 MG/DL (ref 8.8–10.2)
CHLORIDE SERPL-SCNC: 108 MMOL/L (ref 98–107)
CREAT SERPL-MCNC: 1.22 MG/DL (ref 0.51–0.95)
DEPRECATED HCO3 PLAS-SCNC: 21 MMOL/L (ref 22–29)
EGFRCR SERPLBLD CKD-EPI 2021: 48 ML/MIN/1.73M2
GLUCOSE SERPL-MCNC: 106 MG/DL (ref 70–99)
MAGNESIUM SERPL-MCNC: 1.7 MG/DL (ref 1.7–2.3)
POTASSIUM SERPL-SCNC: 4.3 MMOL/L (ref 3.4–5.3)
PROT SERPL-MCNC: 6.5 G/DL (ref 6.4–8.3)
SODIUM SERPL-SCNC: 142 MMOL/L (ref 135–145)

## 2023-10-18 PROCEDURE — 258N000003 HC RX IP 258 OP 636: Performed by: INTERNAL MEDICINE

## 2023-10-18 PROCEDURE — 96365 THER/PROPH/DIAG IV INF INIT: CPT

## 2023-10-18 PROCEDURE — 80053 COMPREHEN METABOLIC PANEL: CPT | Performed by: INTERNAL MEDICINE

## 2023-10-18 PROCEDURE — 83735 ASSAY OF MAGNESIUM: CPT

## 2023-10-18 PROCEDURE — 250N000011 HC RX IP 250 OP 636: Mod: JZ | Performed by: INTERNAL MEDICINE

## 2023-10-18 PROCEDURE — 36591 DRAW BLOOD OFF VENOUS DEVICE: CPT | Performed by: INTERNAL MEDICINE

## 2023-10-18 RX ORDER — MAGNESIUM SULFATE HEPTAHYDRATE 40 MG/ML
2 INJECTION, SOLUTION INTRAVENOUS ONCE
Status: COMPLETED | OUTPATIENT
Start: 2023-10-18 | End: 2023-10-18

## 2023-10-18 RX ORDER — ALBUTEROL SULFATE 90 UG/1
1-2 AEROSOL, METERED RESPIRATORY (INHALATION)
Status: CANCELLED
Start: 2023-10-18

## 2023-10-18 RX ORDER — HEPARIN SODIUM (PORCINE) LOCK FLUSH IV SOLN 100 UNIT/ML 100 UNIT/ML
5 SOLUTION INTRAVENOUS
Status: CANCELLED | OUTPATIENT
Start: 2023-10-18

## 2023-10-18 RX ORDER — HEPARIN SODIUM,PORCINE 10 UNIT/ML
5 VIAL (ML) INTRAVENOUS
Status: CANCELLED | OUTPATIENT
Start: 2023-10-18

## 2023-10-18 RX ORDER — METHYLPREDNISOLONE SODIUM SUCCINATE 125 MG/2ML
125 INJECTION, POWDER, LYOPHILIZED, FOR SOLUTION INTRAMUSCULAR; INTRAVENOUS
Status: CANCELLED
Start: 2023-10-18

## 2023-10-18 RX ORDER — MEPERIDINE HYDROCHLORIDE 25 MG/ML
25 INJECTION INTRAMUSCULAR; INTRAVENOUS; SUBCUTANEOUS EVERY 30 MIN PRN
Status: CANCELLED | OUTPATIENT
Start: 2023-10-18

## 2023-10-18 RX ORDER — EPINEPHRINE 1 MG/ML
0.3 INJECTION, SOLUTION INTRAMUSCULAR; SUBCUTANEOUS EVERY 5 MIN PRN
Status: CANCELLED | OUTPATIENT
Start: 2023-10-18

## 2023-10-18 RX ORDER — ALBUTEROL SULFATE 0.83 MG/ML
2.5 SOLUTION RESPIRATORY (INHALATION)
Status: CANCELLED | OUTPATIENT
Start: 2023-10-18

## 2023-10-18 RX ORDER — HEPARIN SODIUM,PORCINE 10 UNIT/ML
5 VIAL (ML) INTRAVENOUS
Status: DISCONTINUED | OUTPATIENT
Start: 2023-10-18 | End: 2023-10-18 | Stop reason: HOSPADM

## 2023-10-18 RX ORDER — HEPARIN SODIUM (PORCINE) LOCK FLUSH IV SOLN 100 UNIT/ML 100 UNIT/ML
5 SOLUTION INTRAVENOUS
Status: DISCONTINUED | OUTPATIENT
Start: 2023-10-18 | End: 2023-10-18 | Stop reason: HOSPADM

## 2023-10-18 RX ORDER — DIPHENHYDRAMINE HYDROCHLORIDE 50 MG/ML
50 INJECTION INTRAMUSCULAR; INTRAVENOUS
Status: CANCELLED
Start: 2023-10-18

## 2023-10-18 RX ADMIN — HEPARIN 5 ML: 100 SYRINGE at 16:10

## 2023-10-18 RX ADMIN — SODIUM CHLORIDE 1000 ML: 9 INJECTION, SOLUTION INTRAVENOUS at 14:10

## 2023-10-18 RX ADMIN — MAGNESIUM SULFATE HEPTAHYDRATE 2 G: 40 INJECTION, SOLUTION INTRAVENOUS at 15:08

## 2023-10-18 ASSESSMENT — PAIN SCALES - GENERAL: PAINLEVEL: NO PAIN (0)

## 2023-10-18 NOTE — PROGRESS NOTES
Infusion Nursing Note:  Nadia Ladd presents today for port labs.    Patient seen by provider today: No   present during visit today: Not Applicable.    Note: Pt here today for port labs prior to her treatment of IV fluids today. Labs drawn without difficulty.       Intravenous Access:  Implanted Port.    Treatment Conditions:  Not Applicable.        Etelvina Barrientos RN

## 2023-10-18 NOTE — PROGRESS NOTES
Social Work - Follow-Up  [facility]    Data/Intervention:    Patient Name: Nadia aLdd Goes By: Nadia    /Age: 1955 (68 year old)    Reason for Follow-Up:  Financial Support    Intervention/Education/Resources Provided:  This clinician called and spoke with Nadia, reporting that this clinician is sending funding request for $189 for 1 month of electricity support. SW informed Nadia that she should outreach to her electricity provider and inform them of [facility] funding coming. Nadia knows to outreach with any issues that arise. Grateful of financial support.     Assessment/Plan:  Onc SW will continue to be available as needed for ongoing psychosocial support.     Previously provided patient/family with writer's contact information and availability.    Lizet Hull, MSW, LICSW, OSW-C  Clinical - Adult Oncology  She/Her/Hers  Phone: [phone]  [facility]: M, Thu  *every other Tue, 8am-4:30pm  [facility]: W, F, *every other Tue, 8am-4:30pm

## 2023-10-20 ENCOUNTER — TELEPHONE (OUTPATIENT)
Dept: GASTROENTEROLOGY | Facility: CLINIC | Age: 68
End: 2023-10-20
Payer: MEDICARE

## 2023-10-20 NOTE — TELEPHONE ENCOUNTER
"Endoscopy Scheduling Screen    Have you had a positive Covid test in the last 14 days?  No    Are you active on MyChart?   Yes    What insurance is in the chart?  Other:  MEDICARE    Ordering/Referring Provider:     SHAYAN WOOTEN      (If ordering provider performs procedure, schedule with ordering provider unless otherwise instructed. )    BMI: Estimated body mass index is 34.48 kg/m  as calculated from the following:    Height as of 9/26/23: 1.549 m (5' 1\").    Weight as of 10/18/23: 82.8 kg (182 lb 8 oz).     Sedation Ordered  moderate sedation.   If patient BMI > 50 do not schedule in [facility].    If patient BMI > 45 do not schedule at [facility].    Are you taking methadone or Suboxone?  No    Are you taking any prescription medications for pain 3 or more times per week?   No    Do you have a history of malignant hyperthermia or adverse reaction to anesthesia?  No    (Females) Are you currently pregnant?   No     Have you been diagnosed or told you have pulmonary hypertension?   Yes Schedule patient in Hospital with MAC sedation.    Do you have an LVAD?  No    Have you been told you have moderate to severe sleep apnea?  No    Have you been told you have COPD, asthma, or any other lung disease?  No    Do you have any heart conditions?  Yes     In the past 6 months, have you had any hospitalizations for heart related issues including cardiomyopathy, heart attack, or stent placement?  No    Do you have any implantable devices in your body (pacemaker, ICD)?  No    Do you take nitroglycerine?  No    Have you ever had an organ transplant?   No    Have you ever had or are you awaiting a heart or lung transplant?   No    Have you had a stroke or transient ischemic attack (TIA aka \"mini stroke\" in the last 6 months?   No    Have you been diagnosed with or been told you have cirrhosis of the liver?   No    Are you currently on dialysis?   No    Do you need assistance transferring?   No    BMI: Estimated body mass index " "is 34.48 kg/m  as calculated from the following:    Height as of 9/26/23: 1.549 m (5' 1\").    Weight as of 10/18/23: 82.8 kg (182 lb 8 oz).     Is patients BMI > 40 and scheduling location [facility]?  No    Do you take an injectable medication for weight loss or diabetes (excluding insulin)?  No    Do you take the medication Naltrexone?  No    Do you take blood thinners?  Yes     Are you taking Effient/Prasugrel?  No, you must contact your prescribing provider for direction on holding or bridging with a different medication.       Prep   Are you currently on dialysis or do you have chronic kidney disease?  Yes (Golytely Prep)    Do you have a diagnosis of diabetes?  No    Do you have a diagnosis of cystic fibrosis (CF)?  No    On a regular basis do you go 3 -5 days between bowel movements?  No    BMI > 40?  No    Preferred Pharmacy:    [id number] - [address] [address]  Phone: [phone] Fax: [phone]    Final Scheduling Details   Message sent to Dr. Ashley to verify who to schedule procedure with within a hospital setting. Message also sent to anesthesia due to recent heart related hospitalization.  "

## 2023-10-20 NOTE — TELEPHONE ENCOUNTER
Pre Assessment RN Review    Focused Assessments    Cardiac Review    Patient has hx of hospitalization for a cardiac event/procedure in the last 6 months. Contacted PCP / ordering provider to discuss appropriateness of procedure and recommended delay of procedure for at least 6 months. Appropriateness of procedure will be reevaluated according to provider recommendation.      Scheduling Status & Recommendations    Delay scheduling, awaiting clinical input. Message sent to Sent message to [facility] anesthesia to review       Annamarie Myrick RN on 10/20/2023 at 4:12 PM

## 2023-10-22 NOTE — PROGRESS NOTES
[facility]    Hematology/Oncology Established Patient Follow Up Note      Today's Date: 10/24/2023    Reason for follow up: Sigmoid cancer    HISTORY OF PRESENT ILLNESS: Nadia Ladd is a 68 year old female who presents for follow-up    Patient has medical history including Crohn's disease on sulfasalazine, anemia secondary to iron deficiency and B12 deficiency, obesity, hypertension, prediabetes, eczema, pulmonary hypertension, hiatal hernia, mild splenomegaly (14.7 cm in 2/23)    - 2/23 pt noted increasing fatigue, found to have iron deficiency anemia w/hgb 6.8 (previously required RBC transfusion when dx w/Crohn's), did have intermittent changes in stool but not persistent (noted minimal blood in stool)   - 2/23 upper endoscopy for iron deficiency anemia and Crohn's disease: Hiatal hernia, normal stomach, normal duodenum  - 2/23 colonoscopy: A frond-like/villous partially obstructing large mass found in sigmoid colon, partially circumferential involving two thirds of the lumen circumference, 4 cm, unable to traverse, with oozing, s/p biopsy  PATHOLOGY:  A.  Stomach, antrum: Biopsy:  - Antral type mucosa with mild chronic inactive gastritis  - Immunostain for Helicobacter pylori is negative      B.  Colon, sigmoid, stricture: Biopsy:  - Invasive poorly differentiated carcinoma  The sigmoid stricture shows a high-grade carcinoma without definitive gland formation.  Given the poorly differentiated appearance, an immunohistochemical panel was performed to exclude the possibility of a tumor by direct extension or metastasis.   Immunohistochemical stains are performed and show the tumor to be diffusely positive for CK7 and partially positive for CK20 and SATB2.  There is no significant tumor reactivity for CDX2, GATA3, PAX8, TTF-1, and chromogranin.  Synaptophysin highlights very rare positive cells. Although the CK7/CK20 profile is not entirely typical for a colorectal carcinoma,  "immunostains for tumors of other origins are negative and a colorectal primary is still favored.   - Mismatch repair:  1) MLH1-loss of nuclear expression  2) MSH2-intact  3) MSH6-intact  4) PMS2-loss of nuclear expression  -MLH1 promoter methylation: NEGATIVE    -2/23 CT CAP:  A) 4 cm length mass in the proximal sigmoid colon. There is some irregularity and stranding in the adjacent pericolonic fat which may indicate tumor extension. There is no obstruction.   B) there is a second probable mass at the hepatic flexure of the colon measuring 2.5 cm in length   C)There are small lymph nodes in the mesial colon adjacent to the hepatic flexure abnormality and the sigmoid colonic mass. No lymphadenopathy by size criteria  D) There is submucosal fatty deposition in the colon, most severe in the distal sigmoid colon and rectum, which can be seen secondary to quiescent inflammatory bowel disease.  E) no liver lesion    -3/23 PET:  a. There is increased FDG avidity of the sigmoid colon with focal lobular wall thickening consistent with biopsy-proven adenocarcinoma.  b. Secondary focus noted at the hepatic flexure demonstrates elevated FDG avidity and lobulated wall thickening highly suspicious for a additional primary.  c. Additionally there is a smaller focus of wall thickening with elevated FDG avidity along the left aspect of the transverse colon  which is suspicious for a possible third focus of colonic malignancy.    -3/23 CEA 6.8  - 3/23 colorectal surgery consultation with - in the setting of Crohn's, at least sigmoid colectomy with possible total abdominal colectomy with either ileorectal anastomosis or end ileostomy (\"rectum can remain active and be actively surveyed annually\")    -3/23 genetic counseling, testing for Chan syndrome    - 4/5/2023 gynecologic oncology consultation for risk reduction surgery with hysterectomy and BSO at time of colectomy    -4/6/2023 laparoscopic converted to open total " abdominal colectomy with ileorectal anastomosis, partial omentectomy, flexible sigmoidoscopy, TAHBSO  A. OMENTUM, RESECTION:  - Adipose tissue with no significant histopathologic abnormality  - No evidence of malignancy     B. COLON, RESECTION:  Mass #1 (ascending colon/hepatic flexure): Mixed neuroendocrine-non-neuroendocrine neoplasm (MINEN):  - Neuroendocrine carcinoma component (70%) and moderately-differentiated adenocarcinoma component (30%)  - Size = 5.0 cm, invading into muscularis propria  - Positive LVI  - Negative macroscopic tumor perforation, negative PNI  - Negative surgical resection margins  - IHC: Neuroendocrine portion: Negative for chromogranin and INSM1 but strongly express synaptophysin  - IHC: Adenocarcinoma portion: Positive for CK20, CDX2 negative for CK7, PAX8, ER, S100  Ki-67 proliferation index of approximately 80%.  MMR:  Intact nuclear expression of MLH1, MSH2, MHS6, PMS2  PDL1:  COMBINED POSITIVE SCORE (CPS): 55-60  TUMOR PROPORTION SCORE (TPS): 50%   pathogenic KRAS mutation (G13D) was identified (5.2%).  AJCC 8th edition: stage 3B mpT3 pN1a     Mass#2 (sigmoid colon): Poorly-differentiated carcinoma:  - Grade 3  - Size = 5.7 cm, invading into muscularis propria  - Negative for microscopic tumor perforation, LVI, perineural invasion  - Negative surgical resection margins  - IHC: Positive for scar and keratin AE1/AE3, negative for CK7, CK20, CDX2, PAX8, ER, chromogranin, CD56, p40, synaptophysin, S100, INI1, p40, INSM1  Ki-67 proliferation index of approximately 70%.  MMR: loss of nuclear expression MLH1 and PMS2, intact nuclear expression MSH2 and MSH6  PDL1:  COMBINED POSITIVE SCORE (CPS): 80  TUMOR PROPORTION SCORE (TPS): 70%  pathogenic KRAS mutation (G13D) was identified (4.5%).  AJCC 8th edition: stage 3A mpT2 pN1a    - One of forty-one sampled lymph nodes positive (1/41)  - Benign appendix  - Tubular adenoma    C. UTERUS, CERVIX, BILATERAL FALLOPIAN TUBES & OVARIES, :  - Benign  endometrial polyps; atrophic endometrium  - Uterus, cervix, bilateral fallopian tubes, and ovaries with no significant morphologic abnormalities  - No evidence of dysplasia or malignancy     D. SMALL INTESTINE, TERMINAL ILEUM, TERMINAL ILIUM:  - Benign ileum  - No evidence of dysplasia or malignancy  - Negative for metastases to one of one sampled lymph node (0 /1)     E. PROXIMAL ANASTOMOTIC RING:  - Benign small intestine  - No evidence of dysplasia or malignancy     F. DISTAL ANASTOMOTIC RING:  - Benign colon  - No evidence of dysplasia or malignancy    CRC NGS:   --mutations positive for KRAS G13D mutation 5.2% mass 1, KRAS G13D mutation 4.5% mass 2 (negative for AKT1; BRAF; ERBB2; KRAS; NRAS; PIK3CA; PTEN)  --TMB score: 17.064 mut/Mb mass 1 (CPS 55-60, TPS 50%), 60.943 mut/Mb mass 2 (CPS 80, TPS 70%)  --fusion negative including NTRK and RET for mass 1 and 2 (also negative for AKT1, AKT3, ALK, AR, OTODWY13, CLAUDINE, BCOR, BRAF, BRD3, BRD4, CAMTA1, CCNB3, CCND1, , CIC, CSF1, CSF1R, CTNNB1, DNAJB1, EGFR, EPC1, ERBB2, ERBB4, ERG, ESR1, ESRRA, ETV1, ETV4, ETV5, ETV6, EWSR1, FGFR1, FGFR2, FGFR3, FGR, FOS, FOSB, FOXO1, FOXO4, FOXR2, FUS, GLI1, GRB7, HMGA2, HRAS, IDH1, IDH2, INSR, JAK2, JAK3, JAZF1, KRAS, MAML2, MAP2K1, MAST1, MAST2, MEAF6, MET, MKL2, MN1, MSMB, MUSK, MYB, MYBL1, MYOD1, NCOA1, NCOA2, NOTCH1, NOTCH2, NR4A3, NRAS, NRG1, NTRK1, NTRK2, NTRK3, NUMBL1, NUTM1, PAX3, PDGFB, PDGFRA, PDGFRB, PHF1, PIK3CA, PKN1, PLAG1, PPARG, PRKACA, PRKCA, PRKCB, RAF1, RELA, RET, ROS1, RPSO2, RSPO3, SS18, STAT6, TAF15, TCF12, TERT, TFE3, TFEB, TFG, THADA, TMPRSS2, USP6, VGLL2, YAP1, YWHAE)    -4/11/2023 patient discharged from hospital, planning for 30 days of lovenox postop, oxycodone for pain (required 1 unit PRBC for anemia)    -5/8/23 I presented this case at [facility] CRC tumor board and their recommendations include:  - common to see this in Crohn's, overall poor prognosis, treat aggressively, may be poorly responsive to  chemotherapy  - standard of care is mFOLFOX 6 x 12 cycles  - acknowledge that pt has high TPS and likely response to immunotherapy however not sufficient data or standard of care in stage 3 adjuvant setting  - add MMR testing to mass #1 hepatic flexure mass    - 5/9/23 admitted for acute renal failure w/Cr 3.82 w/K 7.0    - 5/19/23 second opinion at [facility] w/medical oncologist Dr. Acharya, thorough consultation and recommendations appreciated     - pt would like to proceed with FOFLOX (with dose reduced oxaliplatin due to kidney function)    -5/2023 genetic counseling: Negative for mutations in EPCAM, MLH1, MSH2, MSH6, and PMS2 genes.  Negative for mutations in APC, AXIN2, BMPR1A, CDH1, CHEK2, EPCAM, GREM1, MLH1, MSH2, MSH3, MSH6, MUTYH, NTHL1, PMS2, POLD1, POLE, PTEN, SMAD4, STK11, TP53, CDKN1B, MEN1, NF1, RET, TSC1, TSC2, and VHL genes    - 5/25/23 port placement    - 5/26/23 CT CAP w/ contrast and IVF before and after CT (baseline prior to starting tx):  1.  3 mm nodule RML and 5 mm lateral EDSON nodule, minimally increased from previous  2.  New 4 mm EDSON groundglass density nodule  3.  Several prominent borderline enlarged mediastinal lymph nodes slightly increased from previous  4.  Splenomegaly 14.5 cm  5. S/p subtotal colectomy with ileorectal anastomosis with postsurgical changes along the ventral abdominal wall midline    -5/31/23 started mFOLFOX 6 w/dose reduced oxaliplatin    - C2D1 6/14/23 held due to new onset paroxysmal A-fib with RVR requiring hospitalization 6/3/2023 (also hypoMg, dehydrated)  - 6/19/2023 I presented this case at [facility] colorectal tumor board at the [facility], it is possible that paroxysmal A-fib may be related to 5-FU.  Options include withholding further treatment versus retrialing 5-FU under the guidance of cardio oncology in an inpatient setting.  No other adjuvant treatment options available, including immunotherapy  - 6/30/2023 consultation with cardio oncologist Dr. Garza; I  spoke with Dr. Garza 6/30/2023 as well, [id number] score 3, recommending starting apixaban 5 mg twice daily, okay to retrial FOLFOX while patient is on metoprolol  -7/18/2023  CT CAP- subcm lung nodules stable, borderline and mildly enlarged mediastinal LN and periportal LN stable, splenomegaly 15.7cm, Subtotal colectomy with ileorectal anastomosis. No acute inflammation or obstruction   - 7/19/2023 mFOLFOX6 C2D1  (inpatient w/continuous cardiac monitoring)      -9/23 CT CAP (after cycle 6)  -Subtotal colectomy, rectal anastomosis appears stable, some adjacent mild scarring, no bowel obstruction or inflammation, no free fluid  -No new adenopathy, no liver lesions  -Stable EDSON 3 mm nodule, other stable nodules at EDSON, RML  -Stable several small thoracic lymph nodes in mediastinum  -Mild wall thickening of the gallbladder  -Spleen is smaller measuring 13.7 cm  -Stable regions of bilateral cortical thinning in the kidneys, with few tiny cysts    INTERIM HISTORY:  REGIMEN:  mFOLFOX6  q14 days x12 cycles/6 months  Starting weight 183lb   C1D1 5/31/23 (complicated by hospitalization for afib w/RVR C1D4 6/3/23), C2D1 7/19/23 (inpatient w/continuous cardiac monitoring), C3D1 8/2/2023 (outpatient, admitted to ER for A-fib with RVR C3D3 8/4/23), C4D1 8/16/23, C5D1 8/30/2023, C6D1 9/13/2023, C7D1 9/26/23, C8D1 10/11/23    oxaliplatin 64mg/m2 day 1 (dose reduced from 85mg/m2 2/2 Cr C1D1)  LV 350mg/m2 day 1  5FU 1200mg/m2 continuous infusion day 1-2 (total 2,400mg/m2 IV over 46-48 hours)  (5FU 400mg/m2 day 1 bolus (discontinued cycle 2 onwards))  PLUS IVF and magnesium replacement as needed day 1 and day 8 with optimization to Mg= 2 at least, AND IVF day 3 on day of pump disconnect  PLUS (per cardiology recommendations) During the days of chemo and 2 days after ONLY: take long acting diltiazem 120 mg daily with 30 mg of short acting diltiazem as needed for atrial fibrillation with HR >110  Emetic risk: moderate  Febrile  neutropenia risk: intermediate     C9D1 10/25/23 pending labs    Treatment related AE:  - hearing changes- described as muffled with intermittent ringing with obscured hearing R>L- overall improved since 1st cycle, and improving few days after starting chemo, Flonase BID as needed, ENT consultation 10/23/23- plan for hearing aids b/l; (present prior to starting chemo, improved w/steroids, now stable)   - Paroxysmal A-fib with RVR and PAC- admitted to [facility] 6/3/2023 - 6/5/2023 for this, 6/3/2023 echo EF 55-60%, mild pulmonary hypertension, mild mitral regurgitation, on metoprolol XL 25 mg p.o. daily for ongoing palpitations, 7/23 Cardio oncology consult w/Dr. Garza, on eliquis, no recurrence of RVR while inpatient for cycle 2 w/continuous cardiac monitoring; IVF and magnesium replacement to magnesium = 2 minimum added to D1 and D8 and IVF day 3. 8/4/2023 C3D3 patient went to ER for A-fib with palpitations (no other sx), HR 80-130s, labs WNL, CXR negative, given IV fluids, placed on diltiazem drip, HR improved to .  8/15/2023 cardiology follow-up: Increase metoprolol XL to 50 mg daily; During the days of chemo and 2 days after ONLY: take long acting diltiazem 120 mg daily with 30 mg of short acting diltiazem as needed for atrial fibrillation with HR >110.  8/18/2023 patient called in reporting heart rate up to 140, sensation of heart racing, improved to 67 after taking extra prescribed dose of diltiazem and then asymptomatic.  8/19/2023 patient presented to ER with chest tightness and palpitations, had taken total of 3 tabs of diltiazem 30mg short acting at that point, EKG with sinus rhythm, rate 63 with some noted bradycardia while in the ER, magnesium and potassium normal, creatinine slightly elevated, given 1 L IVF.  free drug application for Eliquis completed 8/18/2023 but income over guidelines, 9/12/2023 cardiology spwlra-wu-wy changes, follow-up 12/14/2023; xarelto now afforable   -  "anxiety- working with psychology prn  - Delayed neutropenia- neutropenic thierry ANC 0.5 2 weeks out from cycle 1, afebrile, 6/23 DPD deficiency testing negative; resolved cycle 2 onwards 5FU bolus dropped   -Normocytic anemia- due to chemotherapy and iron deficiency, s/p ferric carboxymaltose 750mg x2 doses 6/14/23 and 6/28/23, 7/23 iron studies improved.  8/23 hgb 13.3, 9/23 hgb 12.1, 10/23 hgb 11.1  -Thrombocytopenia- 2/2 chemotherapy, no bruising or bleeding, for now plt >75K, while on xarelto  - MARYA on CKD- following w/nephro- follow up 9/23, Cr improved from 3, drinking 2.5 16oz bottles per day and getting IV fluids day 1, day 3 and day 8 of each chemotherapy cycle, nephrology follow-up 12/4/2023  - elevated LFTs- AST 50, Alk phos 251, liver normal on 9/23 CT  - diarrhea- grade 1, 2 episodes/week, also affected by food, improved with imodium 2 total   - neuropathy- grade 1, tingling fingers > toes, grade 1 numbness b/l 3rd digit, lasting 3-4 days   - cold sensitivity- grade 1, fingers and mouth, most severe right after tx and gradually improves over 2 weeks, using straw   - Port flipped-see my note from 8/2/2023, 8/21/2023 port removed and replacement with IR  - \"bottom is sore\"- pt has hemorrhoids, no active bleeding or thrombosed hemorrhoids but notes soreness after chemo, witch hazel wipes are helping, hasn't tried sitz bath yet        REVIEW OF SYSTEMS:   A 14 point ROS was reviewed with pertinent positives and negatives in the HPI.        HOME MEDICATIONS:  Current Outpatient Medications   Medication Sig Dispense Refill    cyanocobalamin 1000 MCG TBCR Take 1,000 mcg by mouth daily 100 tablet 1    dexamethasone (DECADRON) 4 MG tablet Take 2 tablets (8 mg) by mouth daily Take for 2 days, starting the day after chemo. Take with food. 40 tablet 0    diltiazem ER COATED BEADS (CARDIZEM CD/CARTIA XT) 120 MG 24 hr capsule Take 1 capsule (120 mg) by mouth daily On chemo days and 2 days post chemo infusion. 60 " capsule 3    Ferrous Sulfate (IRON) 325 (65 Fe) MG tablet Take 1 tablet by mouth daily      lidocaine-prilocaine (EMLA) 2.5-2.5 % external cream Apply topically as needed for moderate pain Apply 15-20 minutes prior to port access 1 g 4    loperamide (IMODIUM) 2 MG capsule Take 2 mg by mouth daily as needed for diarrhea      MAGNESIUM OXIDE 400 (240 Mg) MG tablet TAKE 1 TABLET BY MOUTH TWICE A DAY 60 tablet 0    metoprolol succinate ER (TOPROL XL) 50 MG 24 hr tablet Take 1 tablet (50 mg) by mouth daily 90 tablet 3    Multiple Vitamins-Iron (MULTI-DAY PLUS IRON PO) Take 1 tablet by mouth daily      prochlorperazine (COMPAZINE) 10 MG tablet Take 10 mg by mouth every 6 hours as needed for nausea or vomiting      rivaroxaban ANTICOAGULANT (XARELTO) 20 MG TABS tablet Take 1 tablet (20 mg) by mouth daily (with dinner) 90 tablet 3    sodium bicarbonate 650 MG tablet Take 1 tablet (650 mg) by mouth 2 times daily 120 tablet 3    diltiazem (CARDIZEM) 30 MG tablet Take 1 tablet (30 mg) by mouth 3 times daily (Patient not taking: Reported on 10/24/2023) 90 tablet 0         ALLERGIES:  Allergies   Allergen Reactions    No Known Drug Allergy          PAST MEDICAL HISTORY:  Past Medical History:   Diagnosis Date    Arthropathia 08/05/2010    B12 deficiency anemia 10/21/2010    Benign essential hypertension with target blood pressure below 140/90 10/10/2016    CARDIOVASCULAR SCREENING; LDL GOAL LESS THAN 160 10/31/2010    Crohn's colitis (H) 02/15/2011    Dermatitis-dishydrotic eczema-severe 08/05/2010    Hiatal hernia     HTN (hypertension) 07/21/2011    Iron deficiency anemia 10/21/2010    Malignant neoplasm of sigmoid colon (H)     Obesity     Primary pulmonary hypertension (H) 04/06/2010         PAST SURGICAL HISTORY:  Past Surgical History:   Procedure Laterality Date    COLECTOMY WITHOUT COLOSTOMY N/A 4/6/2023    Procedure: Laparoscopic Converted to Open Total abdominal colectomy;  Surgeon: Jerome Ashley MD;   Location: [facility] OR    COLONOSCOPY  10/11/10    COLONOSCOPY N/A 2/27/2023    Procedure: ATTEMPTED COLONOSCOPY WITH SIGMOID STRICTURE BIOPSY;  Surgeon: Jonathan Alcocer MD;  Location: [facility]    ESOPHAGOSCOPY, GASTROSCOPY, DUODENOSCOPY (EGD), COMBINED N/A 2/27/2023    Procedure: ESOPHAGOGASTRODUODENOSCOPY, WITH BIOPSY;  Surgeon: Jonathan Alcocer MD;  Location: [facility]    HYSTERECTOMY TOTAL ABDOMINAL, BILATERAL SALPINGO-OOPHORECTOMY, COMBINED N/A 4/6/2023    Procedure: Hysterectomy total abdominal, bilateral salpingo-oophorectomy;  Surgeon: Sunitha Olea MD;  Location: [facility] OR    INSERT PORT VASCULAR ACCESS Right 5/25/2023    Procedure: Ultrasound-guided right internal jugular venous access port placement with fluoroscopy;  Surgeon: Martin Thomas DO;  Location: [facility]    IR CHEST PORT PLACEMENT > 5 YRS OF AGE  8/21/2023    IR PORT CHECK RIGHT  7/18/2023    IR PORT REMOVAL RIGHT  8/21/2023    SIGMOIDOSCOPY FLEXIBLE N/A 4/6/2023    Procedure: Sigmoidoscopy flexible;  Surgeon: Jerome Ashley MD;  Location: [facility] OR    SURGICAL HISTORY OF -   06[birth year]14[birth year]    Perineorrhaphy, for widening vaginal orifice    ZZC EXPLORATORY OF ABDOMEN  1989    laparoscopy         SOCIAL HISTORY:  Social History     Socioeconomic History    Marital status:      Spouse name: Not on file    Number of children: Not on file    Years of education: Not on file    Highest education level: Not on file   Occupational History    Not on file   Tobacco Use    Smoking status: Never     Passive exposure: Current    Smokeless tobacco: Never   Substance and Sexual Activity    Alcohol use: No    Drug use: No    Sexual activity: Yes     Partners: Male   Other Topics Concern    Parent/sibling w/ CABG, MI or angioplasty before 65F 55M? Not Asked   Social History Narrative    Not on file     Social Determinants of Health     Financial Resource Strain: Not on file   Food Insecurity: Not on file   Transportation Needs: Not on file   Physical  "Activity: Not on file   Stress: Not on file   Social Connections: Not on file   Interpersonal Safety: Not on file   Housing Stability: Not on file         FAMILY HISTORY:  Family History   Problem Relation Age of Onset    Hypertension Mother         on meds, alive    Cerebrovascular Disease Father         stroke about age 65,  of cancer at 68 yrs    Diabetes Father         eventually took insulin    Anemia Father         Pernisios anemia    Kidney Disease Niece         kidney transplant    Kidney Disease Nephew         kidney transplant    Venous thrombosis No family hx of     Anesthesia Reaction No family hx of          PHYSICAL EXAM:  Vital signs:  /74 (BP Location: Right arm, Patient Position: Sitting, Cuff Size: Adult Large)   Pulse 96   Temp 96.8  F (36  C) (Temporal)   Resp 16   Ht 1.549 m (5' 1\")   Wt 83.3 kg (183 lb 9 oz)   LMP  (LMP Unknown)   SpO2 99%   BMI 34.68 kg/m       ECO  GENERAL/CONSTITUTIONAL: No acute distress.  EYES: Pupils are equal and round. Extraocular movements intact without nystagmus.  No scleral icterus.  RESPIRATORY: Equal chest rise.   MUSCULOSKELETAL: Warm and well-perfused, no cyanosis, clubbing, or edema.   NEUROLOGIC: Cranial nerves are grossly intact. Alert, oriented to person, place and time, answers questions appropriately.  INTEGUMENTARY: No rashes or jaundice. Port in place, no particular bruising  GAIT: Steady, does not use assistive device        LABS:  Pending    PATHOLOGY:    IMAGING:      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 mutated, MARISSA, TPS 50%  # sigmoid colon poorly-differentiated carcinoma, KRAS G13D mutated, loss of MLH1 and PMS2, TPS 70%  -  colonoscopy: A frond-like/villous partially obstructing large mass found in " sigmoid colon, partially circumferential involving two thirds of the lumen circumference, 4 cm, unable to traverse, with oozing, s/p biopsy, PATHOLOGY: Invasive poorly differentiated carcinoma, IHC panel excludes tumors of other origin, colorectal primary is favored, loss of nuclear expression of MLH1 and PMS2, MLH1 promoter methylation negative, [id number] mutation negative  -2/23 CT CAP: Shows known 4 cm proximal sigmoid colon mass with possible tumor extension (irregularity and stranding and adjacent pericolonic fat) without obstruction AND probable second mass 2.5 cm at hepatic flexure of colon; small lymph nodes adjacent to both masses (no lymphadenopathy by size criteria)  -3/23 PET:  a. There is increased FDG avidity of the sigmoid colon with focal lobular wall thickening consistent with biopsy-proven adenocarcinoma.  b. Secondary focus noted at the hepatic flexure demonstrates elevated FDG avidity and lobulated wall thickening highly suspicious for a additional primary.  c. Additionally there is a smaller focus of wall thickening with elevated FDG avidity along the left aspect of the transverse colon which is suspicious for a possible third focus of colonic malignancy.  - 3/23 CEA 6.8  -4/6/2023 laparoscopic converted to open total abdominal colectomy with ileorectal anastomosis, partial omentectomy, flexible sigmoidoscopy, TAHBSO  PATHOLOGY:  Of note, omental resection, terminal ileum, proximal and distal anastomotic rings, uterus, cervix, bilateral fallopian tubes and ovaries negative for malignancy    Mass #1 (ascending colon/hepatic flexure): Mixed neuroendocrine-non-neuroendocrine neoplasm (MINEN):  - Neuroendocrine carcinoma component (70%) and moderately-differentiated adenocarcinoma component (30%)  - Size = 5.0 cm, invading into muscularis propria, Positive LVI  - IHC: Neuroendocrine portion: Negative for chromogranin and INSM1 but strongly express synaptophysin  - IHC: Adenocarcinoma portion:  Positive for CK20, CDX2 negative for CK7, PAX8, ER, S100  Ki-67 proliferation index of approximately 80%.  MARISSA   PDL1:  COMBINED POSITIVE SCORE (CPS): 55-60  TUMOR PROPORTION SCORE (TPS): 50%   pathogenic KRAS mutation (G13D) was identified (5.2%).  MMR testing: intact  AJCC 8th edition: stage 3B mpT3 pN1a     Mass #2 (sigmoid colon): Poorly-differentiated carcinoma:  - Grade 3  - Size = 5.7 cm, invading into muscularis propria  - IHC: Positive for scar and keratin AE1/AE3, negative for CK7, CK20, CDX2, PAX8, ER, chromogranin, CD56, p40, synaptophysin, S100, INI1, p40, INSM1  Ki-67 proliferation index of approximately 70%.  MMR testing: loss of MLH1 and PMS2  PDL1:  COMBINED POSITIVE SCORE (CPS): 80  TUMOR PROPORTION SCORE (TPS): 70%  pathogenic KRAS mutation (G13D) was identified (4.5%).  MMR testing: loss of MLH1 and PMS2, intact MSH2 and MSH6  AJCC 8th edition: stage 3A mpT2 pN1a    - One of forty-one sampled lymph nodes positive (1/41), d/w pathology- unable to determine which mass is metastatic to LN    - 4/23 MRI brain w/wo contrast LAURENT  - 5/26/23 CT CAP w/ contrast and IVF before and after CT (post op baseline prior to starting tx):  1.  3 mm nodule RML and 5 mm lateral EDSON nodule, minimally increased from previous  2.  New 4 mm EDSON groundglass density nodule  3.  Several prominent borderline enlarged mediastinal lymph nodes slightly increased from previous  4.  Splenomegaly 14.5 cm  5. S/p subtotal colectomy with ileorectal anastomosis with postsurgical changes along the ventral abdominal wall midline  - 5/8/23 I presented this case at [facility] CRC tumor board as above   - 5/19/23 second opinion at [facility] w/medical oncologist Dr. Acharya, thorough consultation and recommendations appreciated, overall agree w/FOLFOX x6 months, possible nivo or ipi nivo in second line; if metastatic platinum etoposide vs ipi nivo  - 5/25/23 port placement    -5/2023 genetic counseling: Negative for mutations detailed below      REGIMEN:  mFOLFOX6  q14 days x12 cycles/6 months  Starting weight 183lb   C1D1 5/31/23 (complicated by hospitalization for afib w/RVR C1D4 6/3/23), C2D1 7/19/23 (inpatient w/continuous cardiac monitoring), C3D1 8/2/2023 (outpatient, admitted to ER for A-fib with RVR C3D3 8/4/23), C4D1 8/16/23, C5D1 8/30/2023, C6D1 9/13/2023, C7D1 9/26/23, C8D1 10/11/23    oxaliplatin 64mg/m2 day 1 (dose reduced from 85mg/m2 2/2 Cr C1D1)  LV 350mg/m2 day 1  5FU 1200mg/m2 continuous infusion day 1-2 (total 2,400mg/m2 IV over 46-48 hours)  (5FU 400mg/m2 day 1 bolus (discontinued cycle 2 onwards))  PLUS IVF and magnesium replacement as needed day 1 and day 8 with optimization to Mg= 2 at least, AND IVF day 3 on day of pump disconnect  PLUS (per cardiology recommendations) During the days of chemo and 2 days after ONLY: take long acting diltiazem 120 mg daily with 30 mg of short acting diltiazem as needed for atrial fibrillation with HR >110  Emetic risk: moderate  Febrile neutropenia risk: intermediate     C9D1 10/25/23 pending labs w/special attention to ANC, plt, LFTs, Cr    Treatment related AE:  - hearing changes-stable w/dose reduced oxaliplatin, flonase prn, ENT consultation 10/23/23- pending b/l hearing aids  - Paroxysmal A-fib with RVR and PAC- 8/15/2023 cardiology follow-up: Increase metoprolol XL to 50 mg daily; During the days of chemo and 2 days after take long acting diltiazem 120 mg daily with 30 mg of short acting diltiazem as needed for atrial fibrillation with HR >110, IV fluid day 1, 3, 8, cardiology follow-up 12/14/2023, xarelto now affordable  - anxiety- continue following with oncology psychology team  - Normocytic anemia- due to chemotherapy and iron deficiency, s/p ferric carboxymaltose 750mg x2 doses 6/14/23 and 6/28/23; repeat iron studies normal. If hgb continues to decrease, repeat anemia work up  - neutropenia- last ANC 2.0, monitor labs tomorrow  -Thrombocytopenia- mild, 2/2 chemo, plt remain >75K- can  continue chemo, monitor as pt is on xarelto   - MARYA on CKD- following w/nephro, f/u with nephro 12/4/2023; continue increasing PO fluids, continue day 1, 3, 8 IVF  - elevated LFTs- AST 50, Alk phos 251, liver normal on 9/23 CT,  check alk phos isoenzymes tomorrow  - diarrhea- grade 1  - neuropathy- grade 1   - cold sensitivity- grade 1  - hemorrhoids- witch hazel pads, sitz baths prn    Imaging:  -9/23 CT CAP (after 6 cycles of mFOLFOX 6 without 5-FU bolus):  -Subtotal colectomy with stable appearing rectal anastomosis, some adjacent mild scarring, no bowel obstruction or inflammation, no free fluid, no new adenopathy, no liver lesions, no bone lesions  -Mild wall thickening of the gallbladder  - Stable small pulmonary nodules and small thoracic lymph nodes at mediastinum, splenomegaly smaller 13.7 cm    - 7/18/23 CT CAP (repeat baseline after treatment delay due to A-fib)- subcm lung nodules stable, borderline and mildly enlarged mediastinal LN and periportal LN stable, splenomegaly 15.7cm, Subtotal colectomy with ileorectal anastomosis. No acute inflammation or obstruction    -Repeat CT CAP after cycle 12, beginning of 1/2024- ordered today    Labs:  3/23 CEA 6.8  5/23 CEA 1.8  7/23 CEA 2.8  10/23 CEA 2.0  12/23 CEA pending    Other:  - f/u with Dr. Ashley 4/2024 for rigid proctoscopy     #parxosymal afib  - within 3 days of receiving 5FU, prior cardiac risk factors htn, prediabetes  - 6/3/23 presented to ER w/lightheadedness, dizziness, cardioverted s/p diltiazem ggt in ER  - 6/23 DPD deficiency testing negative  - currently on metoprolol XL 25 mg daily, apixaban 5 mg twice daily  - 6/30/2023 consultation with cardio oncologist Dr. Garza; I spoke with Dr. Garza 6/30/2023 as well, [id number] score 3, recommending starting apixaban 5 mg twice daily, okay to retrial FOLFOX while patient is on metoprolol.  Patient is currently on Xarelto  - 8/15/2023 cardiology follow-up: Increase metoprolol XL to 50 mg daily;  During the days of chemo and 2 days after take long acting diltiazem 120 mg daily with 30 mg of short acting diltiazem as needed for atrial fibrillation with HR >110  - continue follow up with cardiology, last seen 9/23 and follow-up pending 12/23  - I do not recommend switching anticoagulation to coumadin given CLASS D interaction w/5FU chemotherapy, I messaged cardiology regarding this previously  - social work and pharmacy team were able to help secure lower xarelto price fo rpt     #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  Negative for mutations in APC, AXIN2, BMPR1A, CDH1, CHEK2, EPCAM, GREM1, MLH1, MSH2, MSH3, MSH6, MUTYH, NTHL1, PMS2, POLD1, POLE, PTEN, SMAD4, STK11, TP53, CDKN1B, MEN1, NF1, RET, TSC1, TSC2, and VHL genes    #Anemia secondary to iron deficiency and B12 deficiency  - terminal ileum removed at time of colon surgery, monitor for nutrient def  - 2/23 hgb 6.8, ferritin 21, iron sat index 10, TIBC 265, iron 27, b12 1493  - On B12 1000 mcg p.o. daily and ferrous sulfate 325 mg p.o. daily  - 4/9/23 s/p 1 uprbcs  - 5/9/23 hgb 11.3,5/30/23 hgb 9.4  - based on Ganzoni equation w/goal hgb 14 and 500mg needed for iron stores, pts iron deficit is 1400 mg  - s/p ferric carboxymaltose 750mg x2 doses 6/14/23 and 6/28/23  - 7/11/23 ferritin 1022, iron sat 22, TIBC 201, iron 44    - 8/23 hgb 13.3  - 10/23 hgb 11.1    #Crohn's  - dx since at least 2010     RTC 2 weeks for follow up with me, labs, chemo #10  RTC 4 weeks for follow up with RADHA, labs, chemo #11  RTC 6 weeks for follow up  with me, labs, chemo #12      Irene DO Fransico  Hematology/Oncology  [facility]

## 2023-10-23 ENCOUNTER — OFFICE VISIT (OUTPATIENT)
Dept: OTOLARYNGOLOGY | Facility: CLINIC | Age: 68
End: 2023-10-23

## 2023-10-23 ENCOUNTER — OFFICE VISIT (OUTPATIENT)
Dept: AUDIOLOGY | Facility: CLINIC | Age: 68
End: 2023-10-23
Payer: MEDICARE

## 2023-10-23 VITALS
OXYGEN SATURATION: 98 % | BODY MASS INDEX: 34.55 KG/M2 | HEART RATE: 94 BPM | DIASTOLIC BLOOD PRESSURE: 2 MMHG | HEIGHT: 61 IN | TEMPERATURE: 97.4 F | SYSTOLIC BLOOD PRESSURE: 140 MMHG | WEIGHT: 183 LBS

## 2023-10-23 DIAGNOSIS — Z79.899 IMMUNOSUPPRESSED DUE TO CHEMOTHERAPY (H): ICD-10-CM

## 2023-10-23 DIAGNOSIS — H90.3 SENSORINEURAL HEARING LOSS, ASYMMETRICAL: Primary | ICD-10-CM

## 2023-10-23 DIAGNOSIS — D84.821 IMMUNOSUPPRESSED DUE TO CHEMOTHERAPY (H): ICD-10-CM

## 2023-10-23 DIAGNOSIS — T45.1X5A IMMUNOSUPPRESSED DUE TO CHEMOTHERAPY (H): ICD-10-CM

## 2023-10-23 DIAGNOSIS — H93.11 TINNITUS, RIGHT: ICD-10-CM

## 2023-10-23 DIAGNOSIS — C18.7 MALIGNANT NEOPLASM OF SIGMOID COLON (H): ICD-10-CM

## 2023-10-23 PROCEDURE — 92557 COMPREHENSIVE HEARING TEST: CPT | Performed by: AUDIOLOGIST

## 2023-10-23 PROCEDURE — 92550 TYMPANOMETRY & REFLEX THRESH: CPT | Performed by: AUDIOLOGIST

## 2023-10-23 PROCEDURE — 99203 OFFICE O/P NEW LOW 30 MIN: CPT | Performed by: OTOLARYNGOLOGY

## 2023-10-23 ASSESSMENT — PAIN SCALES - GENERAL: PAINLEVEL: NO PAIN (0)

## 2023-10-23 NOTE — PROGRESS NOTES
AUDIOLOGY REPORT     SUMMARY: Audiology visit completed. See audiogram for results.     RECOMMENDATIONS: Follow-up with ENT    Delmar Nuñez Licensed Audiologist #[id number]

## 2023-10-23 NOTE — TELEPHONE ENCOUNTER
Ok for MAC flex sig at the [facility].  Per HAFSA Klein RN on 10/23/2023 at 9:11 AM

## 2023-10-23 NOTE — LETTER
10/23/2023         RE: Nadia Ladd  [address]  [place] [facility] [address]        Dear Colleague,    Thank you for referring your patient, Nadia Ladd, to the [facility]. Please see a copy of my visit note below.    ENT Consultation    Nadia Ladd who is a 68 year old female seen in consultation at the request of Dr. Bateman.      History of Present Illness - Nadia Ladd is a 68 year old female Consult    Patient presents for evaluation of asymmetric hearing loss.  She actually noticed significant decrease in the hearing involving both the ears as well as increasing tinnitus since completion of chemotherapy.  This was quite recent.  Previously patient has had hearing loss and felt that the right tonsil was 4 years worse than left.  However after completion of chemo she noticed that both ears have gotten worse and the tinnitus has gotten more pronounced.  We do not have any prior audiogram to compare with.  Patient previously had MRI more for evaluation of any metastatic disease but the no other pathology was found.  EXAM: MR BRAIN W/O and W CONTRAST  LOCATION: [facility]  DATE/TIME: 4/20/2023 5:57 PM CDT     INDICATION: Poorly differentiated neuroendocrine carcinoma.  COMPARISON: None.  CONTRAST: 7.5 mL Gadavist  TECHNIQUE: Routine multiplanar multisequence head MRI without and with intravenous contrast.     FINDINGS:  INTRACRANIAL CONTENTS: No acute or subacute infarct. No mass, acute hemorrhage, or extra-axial fluid collections. Scattered nonspecific T2/FLAIR hyperintensities within the cerebral white matter most consistent with minimal chronic microvascular ischemic   change. Mild generalized cerebral atrophy. No hydrocephalus. Mild cerebellar atrophy. No pathologic contrast enhancement.     SELLA: No abnormality accounting for technique.     OSSEOUS STRUCTURES/SOFT TISSUES: Normal marrow signal. The major intracranial vascular flow voids are  maintained.      ORBITS: No abnormality accounting for technique.      SINUSES/MASTOIDS: Opacified left sphenoid sinus. Mild mucosal thickening in the ethmoid and maxillary sinuses. No middle ear or mastoid effusion.                                                                       IMPRESSION:  1.  No evidence of intracranial metastatic disease.    BP Readings from Last 1 Encounters:   10/23/23 (!) 140/2       BP noted to be elevated today in office.  Patient to follow up with Primary Care provider regarding elevated blood pressure.    Nadia IS NOT a smoker/uses chewing tobacco.        Past Medical History -   Past Medical History:   Diagnosis Date     Arthropathia 08/05/2010     B12 deficiency anemia 10/21/2010     Benign essential hypertension with target blood pressure below 140/90 10/10/2016     CARDIOVASCULAR SCREENING; LDL GOAL LESS THAN 160 10/31/2010     Crohn's colitis (H) 02/15/2011     Dermatitis-dishydrotic eczema-severe 08/05/2010     Hiatal hernia      HTN (hypertension) 07/21/2011     Iron deficiency anemia 10/21/2010     Malignant neoplasm of sigmoid colon (H)      Obesity      Primary pulmonary hypertension (H) 04/06/2010       Current Medications -   Current Outpatient Medications:      cyanocobalamin 1000 MCG TBCR, Take 1,000 mcg by mouth daily, Disp: 100 tablet, Rfl: 1     dexamethasone (DECADRON) 4 MG tablet, Take 2 tablets (8 mg) by mouth daily Take for 2 days, starting the day after chemo. Take with food., Disp: 40 tablet, Rfl: 0     Ferrous Sulfate (IRON) 325 (65 Fe) MG tablet, Take 1 tablet by mouth daily, Disp: , Rfl:      lidocaine-prilocaine (EMLA) 2.5-2.5 % external cream, Apply topically as needed for moderate pain Apply 15-20 minutes prior to port access, Disp: 1 g, Rfl: 4     MAGNESIUM OXIDE 400 (240 Mg) MG tablet, TAKE 1 TABLET BY MOUTH TWICE A DAY, Disp: 60 tablet, Rfl: 0     metoprolol succinate ER (TOPROL XL) 50 MG 24 hr tablet, Take 1 tablet (50 mg) by mouth daily, Disp: 90  tablet, Rfl: 3     Multiple Vitamins-Iron (MULTI-DAY PLUS IRON PO), Take 1 tablet by mouth daily, Disp: , Rfl:      rivaroxaban ANTICOAGULANT (XARELTO) 20 MG TABS tablet, Take 1 tablet (20 mg) by mouth daily (with dinner), Disp: 90 tablet, Rfl: 3     sodium bicarbonate 650 MG tablet, Take 1 tablet (650 mg) by mouth 2 times daily, Disp: 120 tablet, Rfl: 3     diltiazem (CARDIZEM) 30 MG tablet, Take 1 tablet (30 mg) by mouth 3 times daily (Patient not taking: Reported on 10/23/2023), Disp: 90 tablet, Rfl: 0     diltiazem ER COATED BEADS (CARDIZEM CD/CARTIA XT) 120 MG 24 hr capsule, Take 1 capsule (120 mg) by mouth daily On chemo days and 2 days post chemo infusion. (Patient not taking: Reported on 10/23/2023), Disp: 60 capsule, Rfl: 3     loperamide (IMODIUM) 2 MG capsule, Take 2 mg by mouth daily as needed for diarrhea (Patient not taking: Reported on 10/23/2023), Disp: , Rfl:      prochlorperazine (COMPAZINE) 10 MG tablet, Take 10 mg by mouth every 6 hours as needed for nausea or vomiting (Patient not taking: Reported on 2023), Disp: , Rfl:     Allergies -   Allergies   Allergen Reactions     No Known Drug Allergy        Social History -   Social History     Socioeconomic History     Marital status:    Tobacco Use     Smoking status: Never     Passive exposure: Current     Smokeless tobacco: Never   Substance and Sexual Activity     Alcohol use: No     Drug use: No     Sexual activity: Yes     Partners: Male       Family History -   Family History   Problem Relation Age of Onset     Hypertension Mother         on meds, alive     Cerebrovascular Disease Father         stroke about age 65,  of cancer at 68 yrs     Diabetes Father         eventually took insulin     Anemia Father         Pernisios anemia     Kidney Disease Niece         kidney transplant     Kidney Disease Nephew         kidney transplant     Venous thrombosis No family hx of      Anesthesia Reaction No family hx of        Review of  "Systems - As per HPI and PMHx, otherwise review of system review of the head and neck negative. Otherwise 10+ review of system is negative    Physical Exam  BP (!) 140/2 (BP Location: Right arm, Cuff Size: Adult Large)   Pulse 94   Temp 97.4  F (36.3  C) (Temporal)   Ht 1.549 m (5' 1\")   Wt 83 kg (183 lb)   LMP  (LMP Unknown)   SpO2 98%   BMI 34.58 kg/m    BMI: Body mass index is 34.58 kg/m .    General - The patient is well nourished and well developed, and appears to have good nutritional status.  Alert and oriented to person and place, answers questions and cooperates with examination appropriately.    SKIN - No suspicious lesions or rashes.  Respiration - No respiratory distress.  Head and Face - Normocephalic and atraumatic, with no gross asymmetry noted of the contour of the facial features.  The facial nerve is intact, with strong symmetric movements.    Voice and Breathing - The patient was breathing comfortably without the use of accessory muscles. The patients voice was clear and strong, and had appropriate pitch and quality.    Ears - Bilateral pinna and EACs with normal appearing overlying skin. Tympanic membrane intact with good mobility on pneumatic otoscopy bilaterally. Bony landmarks of the ossicular chain are normal. The tympanic membranes are normal in appearance. No retraction, perforation, or masses.  No fluid or purulence was seen in the external canal or the middle ear.     Eyes - Extraocular movements intact.  Sclera were not icteric or injected, conjunctiva were pink and moist.      Neuro - Nonfocal neuro exam is normal, CN 2 through 12 intact, normal gait and muscle tone.      Performed in clinic today:  Audiologic Studies - An audiogram and tympanogram were performed today as part of the evaluation and personally reviewed. The tympanogram shows Type A curves on the right and Type A curves on the left, with normal canal volumes and middle ear pressures.  The audiogram showed moderate " SNHL on the right and mild to moderate SNHL in the high frequencies on the left.      Word recognition score 58% on the right versus 90% on the left  BOBY/P - Nadia Ladd is a 68 year old female with asymmetric sensorineural hearing loss especially after him received her chemotherapy however relatively recent MRI of the brain that was normal.  At this point patient of course will exercise hearing protection and will get hearing aids for both ears especially on the right as soon as possible to protect remaining word recognition score.  Patient will follow-up in 6 months for audiogram.      Jean-Claude Zurita MD       Again, thank you for allowing me to participate in the care of your patient.        Sincerely,        Jean-Claude Zurita MD, MD

## 2023-10-24 ENCOUNTER — ONCOLOGY VISIT (OUTPATIENT)
Dept: ONCOLOGY | Facility: CLINIC | Age: 68
End: 2023-10-24
Payer: MEDICARE

## 2023-10-24 VITALS
SYSTOLIC BLOOD PRESSURE: 134 MMHG | DIASTOLIC BLOOD PRESSURE: 74 MMHG | BODY MASS INDEX: 34.66 KG/M2 | TEMPERATURE: 96.8 F | OXYGEN SATURATION: 99 % | HEIGHT: 61 IN | HEART RATE: 96 BPM | RESPIRATION RATE: 16 BRPM | WEIGHT: 183.56 LBS

## 2023-10-24 DIAGNOSIS — D69.59 CHEMOTHERAPY-INDUCED THROMBOCYTOPENIA: ICD-10-CM

## 2023-10-24 DIAGNOSIS — R68.89 COLD SENSITIVITY: ICD-10-CM

## 2023-10-24 DIAGNOSIS — C18.7 MALIGNANT NEOPLASM OF SIGMOID COLON (H): Primary | ICD-10-CM

## 2023-10-24 DIAGNOSIS — H93.8X3 OTOTOXICITY OF BOTH EARS: ICD-10-CM

## 2023-10-24 DIAGNOSIS — D50.0 IRON DEFICIENCY ANEMIA DUE TO CHRONIC BLOOD LOSS: ICD-10-CM

## 2023-10-24 DIAGNOSIS — T45.1X5A CHEMOTHERAPY-INDUCED THROMBOCYTOPENIA: ICD-10-CM

## 2023-10-24 PROCEDURE — 99214 OFFICE O/P EST MOD 30 MIN: CPT | Performed by: INTERNAL MEDICINE

## 2023-10-24 ASSESSMENT — PAIN SCALES - GENERAL: PAINLEVEL: NO PAIN (0)

## 2023-10-24 NOTE — LETTER
10/24/2023         RE: Nadia Ladd  [address]  [facility] [address]        Dear Colleague,    Thank you for referring your patient, Nadia Ladd, to the [facility]. Please see a copy of my visit note below.    [facility]    Hematology/Oncology Established Patient Follow Up Note      Today's Date: 10/24/2023    Reason for follow up: Sigmoid cancer    HISTORY OF PRESENT ILLNESS: Nadia Ladd is a 68 year old female who presents for follow-up    Patient has medical history including Crohn's disease on sulfasalazine, anemia secondary to iron deficiency and B12 deficiency, obesity, hypertension, prediabetes, eczema, pulmonary hypertension, hiatal hernia, mild splenomegaly (14.7 cm in 2/23)    - 2/23 pt noted increasing fatigue, found to have iron deficiency anemia w/hgb 6.8 (previously required RBC transfusion when dx w/Crohn's), did have intermittent changes in stool but not persistent (noted minimal blood in stool)   - 2/23 upper endoscopy for iron deficiency anemia and Crohn's disease: Hiatal hernia, normal stomach, normal duodenum  - 2/23 colonoscopy: A frond-like/villous partially obstructing large mass found in sigmoid colon, partially circumferential involving two thirds of the lumen circumference, 4 cm, unable to traverse, with oozing, s/p biopsy  PATHOLOGY:  A.  Stomach, antrum: Biopsy:  - Antral type mucosa with mild chronic inactive gastritis  - Immunostain for Helicobacter pylori is negative      B.  Colon, sigmoid, stricture: Biopsy:  - Invasive poorly differentiated carcinoma  The sigmoid stricture shows a high-grade carcinoma without definitive gland formation.  Given the poorly differentiated appearance, an immunohistochemical panel was performed to exclude the possibility of a tumor by direct extension or metastasis.   Immunohistochemical stains are performed and show the tumor to be diffusely positive for CK7 and partially positive for  "CK20 and SATB2.  There is no significant tumor reactivity for CDX2, GATA3, PAX8, TTF-1, and chromogranin.  Synaptophysin highlights very rare positive cells. Although the CK7/CK20 profile is not entirely typical for a colorectal carcinoma, immunostains for tumors of other origins are negative and a colorectal primary is still favored.   - Mismatch repair:  1) MLH1-loss of nuclear expression  2) MSH2-intact  3) MSH6-intact  4) PMS2-loss of nuclear expression  -MLH1 promoter methylation: NEGATIVE    -2/23 CT CAP:  A) 4 cm length mass in the proximal sigmoid colon. There is some irregularity and stranding in the adjacent pericolonic fat which may indicate tumor extension. There is no obstruction.   B) there is a second probable mass at the hepatic flexure of the colon measuring 2.5 cm in length   C)There are small lymph nodes in the mesial colon adjacent to the hepatic flexure abnormality and the sigmoid colonic mass. No lymphadenopathy by size criteria  D) There is submucosal fatty deposition in the colon, most severe in the distal sigmoid colon and rectum, which can be seen secondary to quiescent inflammatory bowel disease.  E) no liver lesion    -3/23 PET:  a. There is increased FDG avidity of the sigmoid colon with focal lobular wall thickening consistent with biopsy-proven adenocarcinoma.  b. Secondary focus noted at the hepatic flexure demonstrates elevated FDG avidity and lobulated wall thickening highly suspicious for a additional primary.  c. Additionally there is a smaller focus of wall thickening with elevated FDG avidity along the left aspect of the transverse colon  which is suspicious for a possible third focus of colonic malignancy.    -3/23 CEA 6.8  - 3/23 colorectal surgery consultation with - in the setting of Crohn's, at least sigmoid colectomy with possible total abdominal colectomy with either ileorectal anastomosis or end ileostomy (\"rectum can remain active and be actively surveyed " "annually\")    -3/23 genetic counseling, testing for Chan syndrome    - 4/5/2023 gynecologic oncology consultation for risk reduction surgery with hysterectomy and BSO at time of colectomy    -4/6/2023 laparoscopic converted to open total abdominal colectomy with ileorectal anastomosis, partial omentectomy, flexible sigmoidoscopy, TAHBSO  A. OMENTUM, RESECTION:  - Adipose tissue with no significant histopathologic abnormality  - No evidence of malignancy     B. COLON, RESECTION:  Mass #1 (ascending colon/hepatic flexure): Mixed neuroendocrine-non-neuroendocrine neoplasm (MINEN):  - Neuroendocrine carcinoma component (70%) and moderately-differentiated adenocarcinoma component (30%)  - Size = 5.0 cm, invading into muscularis propria  - Positive LVI  - Negative macroscopic tumor perforation, negative PNI  - Negative surgical resection margins  - IHC: Neuroendocrine portion: Negative for chromogranin and INSM1 but strongly express synaptophysin  - IHC: Adenocarcinoma portion: Positive for CK20, CDX2 negative for CK7, PAX8, ER, S100  Ki-67 proliferation index of approximately 80%.  MMR:  Intact nuclear expression of MLH1, MSH2, MHS6, PMS2  PDL1:  COMBINED POSITIVE SCORE (CPS): 55-60  TUMOR PROPORTION SCORE (TPS): 50%   pathogenic KRAS mutation (G13D) was identified (5.2%).  AJCC 8th edition: stage 3B mpT3 pN1a     Mass#2 (sigmoid colon): Poorly-differentiated carcinoma:  - Grade 3  - Size = 5.7 cm, invading into muscularis propria  - Negative for microscopic tumor perforation, LVI, perineural invasion  - Negative surgical resection margins  - IHC: Positive for scar and keratin AE1/AE3, negative for CK7, CK20, CDX2, PAX8, ER, chromogranin, CD56, p40, synaptophysin, S100, INI1, p40, INSM1  Ki-67 proliferation index of approximately 70%.  MMR: loss of nuclear expression MLH1 and PMS2, intact nuclear expression MSH2 and MSH6  PDL1:  COMBINED POSITIVE SCORE (CPS): 80  TUMOR PROPORTION SCORE (TPS): 70%  pathogenic KRAS " mutation (G13D) was identified (4.5%).  AJCC 8th edition: stage 3A mpT2 pN1a    - One of forty-one sampled lymph nodes positive (1/41)  - Benign appendix  - Tubular adenoma    C. UTERUS, CERVIX, BILATERAL FALLOPIAN TUBES & OVARIES, :  - Benign endometrial polyps; atrophic endometrium  - Uterus, cervix, bilateral fallopian tubes, and ovaries with no significant morphologic abnormalities  - No evidence of dysplasia or malignancy     D. SMALL INTESTINE, TERMINAL ILEUM, TERMINAL ILIUM:  - Benign ileum  - No evidence of dysplasia or malignancy  - Negative for metastases to one of one sampled lymph node (0 /1)     E. PROXIMAL ANASTOMOTIC RING:  - Benign small intestine  - No evidence of dysplasia or malignancy     F. DISTAL ANASTOMOTIC RING:  - Benign colon  - No evidence of dysplasia or malignancy    CRC NGS:   --mutations positive for KRAS G13D mutation 5.2% mass 1, KRAS G13D mutation 4.5% mass 2 (negative for AKT1; BRAF; ERBB2; KRAS; NRAS; PIK3CA; PTEN)  --TMB score: 17.064 mut/Mb mass 1 (CPS 55-60, TPS 50%), 60.943 mut/Mb mass 2 (CPS 80, TPS 70%)  --fusion negative including NTRK and RET for mass 1 and 2 (also negative for AKT1, AKT3, ALK, AR, CYOZTX25, CLAUDINE, BCOR, BRAF, BRD3, BRD4, CAMTA1, CCNB3, CCND1, , CIC, CSF1, CSF1R, CTNNB1, DNAJB1, EGFR, EPC1, ERBB2, ERBB4, ERG, ESR1, ESRRA, ETV1, ETV4, ETV5, ETV6, EWSR1, FGFR1, FGFR2, FGFR3, FGR, FOS, FOSB, FOXO1, FOXO4, FOXR2, FUS, GLI1, GRB7, HMGA2, HRAS, IDH1, IDH2, INSR, JAK2, JAK3, JAZF1, KRAS, MAML2, MAP2K1, MAST1, MAST2, MEAF6, MET, MKL2, MN1, MSMB, MUSK, MYB, MYBL1, MYOD1, NCOA1, NCOA2, NOTCH1, NOTCH2, NR4A3, NRAS, NRG1, NTRK1, NTRK2, NTRK3, NUMBL1, NUTM1, PAX3, PDGFB, PDGFRA, PDGFRB, PHF1, PIK3CA, PKN1, PLAG1, PPARG, PRKACA, PRKCA, PRKCB, RAF1, RELA, RET, ROS1, RPSO2, RSPO3, SS18, STAT6, TAF15, TCF12, TERT, TFE3, TFEB, TFG, THADA, TMPRSS2, USP6, VGLL2, YAP1, YWHAE)    -4/11/2023 patient discharged from hospital, planning for 30 days of lovenox postop, oxycodone  for pain (required 1 unit PRBC for anemia)    -5/8/23 I presented this case at [facility] CRC tumor board and their recommendations include:  - common to see this in Crohn's, overall poor prognosis, treat aggressively, may be poorly responsive to chemotherapy  - standard of care is mFOLFOX 6 x 12 cycles  - acknowledge that pt has high TPS and likely response to immunotherapy however not sufficient data or standard of care in stage 3 adjuvant setting  - add MMR testing to mass #1 hepatic flexure mass    - 5/9/23 admitted for acute renal failure w/Cr 3.82 w/K 7.0    - 5/19/23 second opinion at [facility] w/medical oncologist Dr. Acharya, thorough consultation and recommendations appreciated     - pt would like to proceed with FOFLOX (with dose reduced oxaliplatin due to kidney function)    -5/2023 genetic counseling: Negative for mutations in EPCAM, MLH1, MSH2, MSH6, and PMS2 genes.  Negative for mutations in APC, AXIN2, BMPR1A, CDH1, CHEK2, EPCAM, GREM1, MLH1, MSH2, MSH3, MSH6, MUTYH, NTHL1, PMS2, POLD1, POLE, PTEN, SMAD4, STK11, TP53, CDKN1B, MEN1, NF1, RET, TSC1, TSC2, and VHL genes    - 5/25/23 port placement    - 5/26/23 CT CAP w/ contrast and IVF before and after CT (baseline prior to starting tx):  1.  3 mm nodule RML and 5 mm lateral EDSON nodule, minimally increased from previous  2.  New 4 mm EDSON groundglass density nodule  3.  Several prominent borderline enlarged mediastinal lymph nodes slightly increased from previous  4.  Splenomegaly 14.5 cm  5. S/p subtotal colectomy with ileorectal anastomosis with postsurgical changes along the ventral abdominal wall midline    -5/31/23 started mFOLFOX 6 w/dose reduced oxaliplatin    - C2D1 6/14/23 held due to new onset paroxysmal A-fib with RVR requiring hospitalization 6/3/2023 (also hypoMg, dehydrated)  - 6/19/2023 I presented this case at [facility] colorectal tumor board at the [facility], it is possible that paroxysmal A-fib may be related to 5-FU.  Options include  withholding further treatment versus retrialing 5-FU under the guidance of cardio oncology in an inpatient setting.  No other adjuvant treatment options available, including immunotherapy  - 6/30/2023 consultation with cardio oncologist Dr. Garza; I spoke with Dr. Garza 6/30/2023 as well, [id number] score 3, recommending starting apixaban 5 mg twice daily, okay to retrial FOLFOX while patient is on metoprolol  -7/18/2023  CT CAP- subcm lung nodules stable, borderline and mildly enlarged mediastinal LN and periportal LN stable, splenomegaly 15.7cm, Subtotal colectomy with ileorectal anastomosis. No acute inflammation or obstruction   - 7/19/2023 mFOLFOX6 C2D1  (inpatient w/continuous cardiac monitoring)      -9/23 CT CAP (after cycle 6)  -Subtotal colectomy, rectal anastomosis appears stable, some adjacent mild scarring, no bowel obstruction or inflammation, no free fluid  -No new adenopathy, no liver lesions  -Stable EDSON 3 mm nodule, other stable nodules at EDSON, RML  -Stable several small thoracic lymph nodes in mediastinum  -Mild wall thickening of the gallbladder  -Spleen is smaller measuring 13.7 cm  -Stable regions of bilateral cortical thinning in the kidneys, with few tiny cysts    INTERIM HISTORY:  REGIMEN:  mFOLFOX6  q14 days x12 cycles/6 months  Starting weight 183lb   C1D1 5/31/23 (complicated by hospitalization for afib w/RVR C1D4 6/3/23), C2D1 7/19/23 (inpatient w/continuous cardiac monitoring), C3D1 8/2/2023 (outpatient, admitted to ER for A-fib with RVR C3D3 8/4/23), C4D1 8/16/23, C5D1 8/30/2023, C6D1 9/13/2023, C7D1 9/26/23, C8D1 10/11/23    oxaliplatin 64mg/m2 day 1 (dose reduced from 85mg/m2 2/2 Cr C1D1)  LV 350mg/m2 day 1  5FU 1200mg/m2 continuous infusion day 1-2 (total 2,400mg/m2 IV over 46-48 hours)  (5FU 400mg/m2 day 1 bolus (discontinued cycle 2 onwards))  PLUS IVF and magnesium replacement as needed day 1 and day 8 with optimization to Mg= 2 at least, AND IVF day 3 on day of pump  disconnect  PLUS (per cardiology recommendations) During the days of chemo and 2 days after ONLY: take long acting diltiazem 120 mg daily with 30 mg of short acting diltiazem as needed for atrial fibrillation with HR >110  Emetic risk: moderate  Febrile neutropenia risk: intermediate     C9D1 10/25/23 pending labs    Treatment related AE:  - hearing changes- described as muffled with intermittent ringing with obscured hearing R>L- overall improved since 1st cycle, and improving few days after starting chemo, Flonase BID as needed, ENT consultation 10/23/23- plan for hearing aids b/l; (present prior to starting chemo, improved w/steroids, now stable)   - Paroxysmal A-fib with RVR and PAC- admitted to [facility] 6/3/2023 - 6/5/2023 for this, 6/3/2023 echo EF 55-60%, mild pulmonary hypertension, mild mitral regurgitation, on metoprolol XL 25 mg p.o. daily for ongoing palpitations, 7/23 Cardio oncology consult w/Dr. Garza, on eliquis, no recurrence of RVR while inpatient for cycle 2 w/continuous cardiac monitoring; IVF and magnesium replacement to magnesium = 2 minimum added to D1 and D8 and IVF day 3. 8/4/2023 C3D3 patient went to ER for A-fib with palpitations (no other sx), HR 80-130s, labs WNL, CXR negative, given IV fluids, placed on diltiazem drip, HR improved to .  8/15/2023 cardiology follow-up: Increase metoprolol XL to 50 mg daily; During the days of chemo and 2 days after ONLY: take long acting diltiazem 120 mg daily with 30 mg of short acting diltiazem as needed for atrial fibrillation with HR >110.  8/18/2023 patient called in reporting heart rate up to 140, sensation of heart racing, improved to 67 after taking extra prescribed dose of diltiazem and then asymptomatic.  8/19/2023 patient presented to ER with chest tightness and palpitations, had taken total of 3 tabs of diltiazem 30mg short acting at that point, EKG with sinus rhythm, rate 63 with some noted bradycardia while in the ER,  "magnesium and potassium normal, creatinine slightly elevated, given 1 L IVF.  free drug application for Eliquis completed 8/18/2023 but income over guidelines, 9/12/2023 cardiology [id number] changes, follow-up 12/14/2023; xarelto now afforable   - anxiety- working with psychology prn  - Delayed neutropenia- neutropenic thierry ANC 0.5 2 weeks out from cycle 1, afebrile, 6/23 DPD deficiency testing negative; resolved cycle 2 onwards 5FU bolus dropped   -Normocytic anemia- due to chemotherapy and iron deficiency, s/p ferric carboxymaltose 750mg x2 doses 6/14/23 and 6/28/23, 7/23 iron studies improved.  8/23 hgb 13.3, 9/23 hgb 12.1, 10/23 hgb 11.1  -Thrombocytopenia- 2/2 chemotherapy, no bruising or bleeding, for now plt >75K, while on xarelto  - MARYA on CKD- following w/nephro- follow up 9/23, Cr improved from 3, drinking 2.5 16oz bottles per day and getting IV fluids day 1, day 3 and day 8 of each chemotherapy cycle, nephrology follow-up 12/4/2023  - elevated LFTs- AST 50, Alk phos 251, liver normal on 9/23 CT  - diarrhea- grade 1, 2 episodes/week, also affected by food, improved with imodium 2 total   - neuropathy- grade 1, tingling fingers > toes, grade 1 numbness b/l 3rd digit, lasting 3-4 days   - cold sensitivity- grade 1, fingers and mouth, most severe right after tx and gradually improves over 2 weeks, using straw   - Port flipped-see my note from 8/2/2023, 8/21/2023 port removed and replacement with IR  - \"bottom is sore\"- pt has hemorrhoids, no active bleeding or thrombosed hemorrhoids but notes soreness after chemo, witch hazel wipes are helping, hasn't tried sitz bath yet        REVIEW OF SYSTEMS:   A 14 point ROS was reviewed with pertinent positives and negatives in the HPI.        HOME MEDICATIONS:  Current Outpatient Medications   Medication Sig Dispense Refill     cyanocobalamin 1000 MCG TBCR Take 1,000 mcg by mouth daily 100 tablet 1     dexamethasone (DECADRON) 4 MG tablet Take 2 tablets (8 mg) by " mouth daily Take for 2 days, starting the day after chemo. Take with food. 40 tablet 0     diltiazem ER COATED BEADS (CARDIZEM CD/CARTIA XT) 120 MG 24 hr capsule Take 1 capsule (120 mg) by mouth daily On chemo days and 2 days post chemo infusion. 60 capsule 3     Ferrous Sulfate (IRON) 325 (65 Fe) MG tablet Take 1 tablet by mouth daily       lidocaine-prilocaine (EMLA) 2.5-2.5 % external cream Apply topically as needed for moderate pain Apply 15-20 minutes prior to port access 1 g 4     loperamide (IMODIUM) 2 MG capsule Take 2 mg by mouth daily as needed for diarrhea       MAGNESIUM OXIDE 400 (240 Mg) MG tablet TAKE 1 TABLET BY MOUTH TWICE A DAY 60 tablet 0     metoprolol succinate ER (TOPROL XL) 50 MG 24 hr tablet Take 1 tablet (50 mg) by mouth daily 90 tablet 3     Multiple Vitamins-Iron (MULTI-DAY PLUS IRON PO) Take 1 tablet by mouth daily       prochlorperazine (COMPAZINE) 10 MG tablet Take 10 mg by mouth every 6 hours as needed for nausea or vomiting       rivaroxaban ANTICOAGULANT (XARELTO) 20 MG TABS tablet Take 1 tablet (20 mg) by mouth daily (with dinner) 90 tablet 3     sodium bicarbonate 650 MG tablet Take 1 tablet (650 mg) by mouth 2 times daily 120 tablet 3     diltiazem (CARDIZEM) 30 MG tablet Take 1 tablet (30 mg) by mouth 3 times daily (Patient not taking: Reported on 10/24/2023) 90 tablet 0         ALLERGIES:  Allergies   Allergen Reactions     No Known Drug Allergy          PAST MEDICAL HISTORY:  Past Medical History:   Diagnosis Date     Arthropathia 08/05/2010     B12 deficiency anemia 10/21/2010     Benign essential hypertension with target blood pressure below 140/90 10/10/2016     CARDIOVASCULAR SCREENING; LDL GOAL LESS THAN 160 10/31/2010     Crohn's colitis (H) 02/15/2011     Dermatitis-dishydrotic eczema-severe 08/05/2010     Hiatal hernia      HTN (hypertension) 07/21/2011     Iron deficiency anemia 10/21/2010     Malignant neoplasm of sigmoid colon (H)      Obesity      Primary pulmonary  hypertension (H) 04/06/2010         PAST SURGICAL HISTORY:  Past Surgical History:   Procedure Laterality Date     COLECTOMY WITHOUT COLOSTOMY N/A 4/6/2023    Procedure: Laparoscopic Converted to Open Total abdominal colectomy;  Surgeon: Jerome Ashley MD;  Location: [facility] OR     COLONOSCOPY  10/11/10     COLONOSCOPY N/A 2/27/2023    Procedure: ATTEMPTED COLONOSCOPY WITH SIGMOID STRICTURE BIOPSY;  Surgeon: Jonathan Alcocer MD;  Location: [facility]     ESOPHAGOSCOPY, GASTROSCOPY, DUODENOSCOPY (EGD), COMBINED N/A 2/27/2023    Procedure: ESOPHAGOGASTRODUODENOSCOPY, WITH BIOPSY;  Surgeon: Jonathan Alcocer MD;  Location: [facility]     HYSTERECTOMY TOTAL ABDOMINAL, BILATERAL SALPINGO-OOPHORECTOMY, COMBINED N/A 4/6/2023    Procedure: Hysterectomy total abdominal, bilateral salpingo-oophorectomy;  Surgeon: Sunitha Olea MD;  Location: [facility] OR     INSERT PORT VASCULAR ACCESS Right 5/25/2023    Procedure: Ultrasound-guided right internal jugular venous access port placement with fluoroscopy;  Surgeon: Martin Thomas DO;  Location: [facility] OR     IR CHEST PORT PLACEMENT > 5 YRS OF AGE  8/21/2023     IR PORT CHECK RIGHT  7/18/2023     IR PORT REMOVAL RIGHT  8/21/2023     SIGMOIDOSCOPY FLEXIBLE N/A 4/6/2023    Procedure: Sigmoidoscopy flexible;  Surgeon: Jerome Ashley MD;  Location: [facility] OR     SURGICAL HISTORY OF -   06/14[birth year]76    Perineorrhaphy, for widening vaginal orifice     ZZC EXPLORATORY OF ABDOMEN  1989    laparoscopy         SOCIAL HISTORY:  Social History     Socioeconomic History     Marital status:      Spouse name: Not on file     Number of children: Not on file     Years of education: Not on file     Highest education level: Not on file   Occupational History     Not on file   Tobacco Use     Smoking status: Never     Passive exposure: Current     Smokeless tobacco: Never   Substance and Sexual Activity     Alcohol use: No     Drug use: No     Sexual activity: Yes     Partners: Male   Other  "Topics Concern     Parent/sibling w/ CABG, MI or angioplasty before 65F 55M? Not Asked   Social History Narrative     Not on file     Social Determinants of Health     Financial Resource Strain: Not on file   Food Insecurity: Not on file   Transportation Needs: Not on file   Physical Activity: Not on file   Stress: Not on file   Social Connections: Not on file   Interpersonal Safety: Not on file   Housing Stability: Not on file         FAMILY HISTORY:  Family History   Problem Relation Age of Onset     Hypertension Mother         on meds, alive     Cerebrovascular Disease Father         stroke about age 65,  of cancer at 68 yrs     Diabetes Father         eventually took insulin     Anemia Father         Pernisios anemia     Kidney Disease Niece         kidney transplant     Kidney Disease Nephew         kidney transplant     Venous thrombosis No family hx of      Anesthesia Reaction No family hx of          PHYSICAL EXAM:  Vital signs:  /74 (BP Location: Right arm, Patient Position: Sitting, Cuff Size: Adult Large)   Pulse 96   Temp 96.8  F (36  C) (Temporal)   Resp 16   Ht 1.549 m (5' 1\")   Wt 83.3 kg (183 lb 9 oz)   LMP  (LMP Unknown)   SpO2 99%   BMI 34.68 kg/m       ECO  GENERAL/CONSTITUTIONAL: No acute distress.  EYES: Pupils are equal and round. Extraocular movements intact without nystagmus.  No scleral icterus.  RESPIRATORY: Equal chest rise.   MUSCULOSKELETAL: Warm and well-perfused, no cyanosis, clubbing, or edema.   NEUROLOGIC: Cranial nerves are grossly intact. Alert, oriented to person, place and time, answers questions appropriately.  INTEGUMENTARY: No rashes or jaundice. Port in place, no particular bruising  GAIT: Steady, does not use assistive device        LABS:  Pending    PATHOLOGY:    IMAGING:      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, poorly " differentiated neuroendocrine carcinoma and moderately differentiated adenocarcinoma), KRAS G13D mutated, MARISSA, TPS 50%  # sigmoid colon poorly-differentiated carcinoma, KRAS G13D mutated, loss of MLH1 and PMS2, TPS 70%  - 2/23 colonoscopy: A frond-like/villous partially obstructing large mass found in sigmoid colon, partially circumferential involving two thirds of the lumen circumference, 4 cm, unable to traverse, with oozing, s/p biopsy, PATHOLOGY: Invasive poorly differentiated carcinoma, IHC panel excludes tumors of other origin, colorectal primary is favored, loss of nuclear expression of MLH1 and PMS2, MLH1 promoter methylation negative, [id number] mutation negative  -2/23 CT CAP: Shows known 4 cm proximal sigmoid colon mass with possible tumor extension (irregularity and stranding and adjacent pericolonic fat) without obstruction AND probable second mass 2.5 cm at hepatic flexure of colon; small lymph nodes adjacent to both masses (no lymphadenopathy by size criteria)  -3/23 PET:  a. There is increased FDG avidity of the sigmoid colon with focal lobular wall thickening consistent with biopsy-proven adenocarcinoma.  b. Secondary focus noted at the hepatic flexure demonstrates elevated FDG avidity and lobulated wall thickening highly suspicious for a additional primary.  c. Additionally there is a smaller focus of wall thickening with elevated FDG avidity along the left aspect of the transverse colon which is suspicious for a possible third focus of colonic malignancy.  - 3/23 CEA 6.8  -4/6/2023 laparoscopic converted to open total abdominal colectomy with ileorectal anastomosis, partial omentectomy, flexible sigmoidoscopy, TAHBSO  PATHOLOGY:  Of note, omental resection, terminal ileum, proximal and distal anastomotic rings, uterus, cervix, bilateral fallopian tubes and ovaries negative for malignancy    Mass #1 (ascending colon/hepatic flexure): Mixed neuroendocrine-non-neuroendocrine neoplasm (MINEN):  -  Neuroendocrine carcinoma component (70%) and moderately-differentiated adenocarcinoma component (30%)  - Size = 5.0 cm, invading into muscularis propria, Positive LVI  - IHC: Neuroendocrine portion: Negative for chromogranin and INSM1 but strongly express synaptophysin  - IHC: Adenocarcinoma portion: Positive for CK20, CDX2 negative for CK7, PAX8, ER, S100  Ki-67 proliferation index of approximately 80%.  MARISSA   PDL1:  COMBINED POSITIVE SCORE (CPS): 55-60  TUMOR PROPORTION SCORE (TPS): 50%   pathogenic KRAS mutation (G13D) was identified (5.2%).  MMR testing: intact  AJCC 8th edition: stage 3B mpT3 pN1a     Mass #2 (sigmoid colon): Poorly-differentiated carcinoma:  - Grade 3  - Size = 5.7 cm, invading into muscularis propria  - IHC: Positive for scar and keratin AE1/AE3, negative for CK7, CK20, CDX2, PAX8, ER, chromogranin, CD56, p40, synaptophysin, S100, INI1, p40, INSM1  Ki-67 proliferation index of approximately 70%.  MMR testing: loss of MLH1 and PMS2  PDL1:  COMBINED POSITIVE SCORE (CPS): 80  TUMOR PROPORTION SCORE (TPS): 70%  pathogenic KRAS mutation (G13D) was identified (4.5%).  MMR testing: loss of MLH1 and PMS2, intact MSH2 and MSH6  AJCC 8th edition: stage 3A mpT2 pN1a    - One of forty-one sampled lymph nodes positive (1/41), d/w pathology- unable to determine which mass is metastatic to LN    - 4/23 MRI brain w/wo contrast LAURENT  - 5/26/23 CT CAP w/ contrast and IVF before and after CT (post op baseline prior to starting tx):  1.  3 mm nodule RML and 5 mm lateral EDSON nodule, minimally increased from previous  2.  New 4 mm EDSON groundglass density nodule  3.  Several prominent borderline enlarged mediastinal lymph nodes slightly increased from previous  4.  Splenomegaly 14.5 cm  5. S/p subtotal colectomy with ileorectal anastomosis with postsurgical changes along the ventral abdominal wall midline  - 5/8/23 I presented this case at [facility] CRC tumor board as above   - 5/19/23 second opinion at [facility]  w/medical oncologist Dr. Acharya, thorough consultation and recommendations appreciated, overall agree w/FOLFOX x6 months, possible nivo or ipi nivo in second line; if metastatic platinum etoposide vs ipi nivo  - 5/25/23 port placement    -5/2023 genetic counseling: Negative for mutations detailed below     REGIMEN:  mFOLFOX6  q14 days x12 cycles/6 months  Starting weight 183lb   C1D1 5/31/23 (complicated by hospitalization for afib w/RVR C1D4 6/3/23), C2D1 7/19/23 (inpatient w/continuous cardiac monitoring), C3D1 8/2/2023 (outpatient, admitted to ER for A-fib with RVR C3D3 8/4/23), C4D1 8/16/23, C5D1 8/30/2023, C6D1 9/13/2023, C7D1 9/26/23, C8D1 10/11/23    oxaliplatin 64mg/m2 day 1 (dose reduced from 85mg/m2 2/2 Cr C1D1)  LV 350mg/m2 day 1  5FU 1200mg/m2 continuous infusion day 1-2 (total 2,400mg/m2 IV over 46-48 hours)  (5FU 400mg/m2 day 1 bolus (discontinued cycle 2 onwards))  PLUS IVF and magnesium replacement as needed day 1 and day 8 with optimization to Mg= 2 at least, AND IVF day 3 on day of pump disconnect  PLUS (per cardiology recommendations) During the days of chemo and 2 days after ONLY: take long acting diltiazem 120 mg daily with 30 mg of short acting diltiazem as needed for atrial fibrillation with HR >110  Emetic risk: moderate  Febrile neutropenia risk: intermediate     C9D1 10/25/23 pending labs w/special attention to ANC, plt, LFTs, Cr    Treatment related AE:  - hearing changes-stable w/dose reduced oxaliplatin, flonase prn, ENT consultation 10/23/23- pending b/l hearing aids  - Paroxysmal A-fib with RVR and PAC- 8/15/2023 cardiology follow-up: Increase metoprolol XL to 50 mg daily; During the days of chemo and 2 days after take long acting diltiazem 120 mg daily with 30 mg of short acting diltiazem as needed for atrial fibrillation with HR >110, IV fluid day 1, 3, 8, cardiology follow-up 12/14/2023, xarelto now affordable  - anxiety- continue following with oncology psychology team  - Abril  anemia- due to chemotherapy and iron deficiency, s/p ferric carboxymaltose 750mg x2 doses 6/14/23 and 6/28/23; repeat iron studies normal. If hgb continues to decrease, repeat anemia work up  - neutropenia- last ANC 2.0, monitor labs tomorrow  -Thrombocytopenia- mild, 2/2 chemo, plt remain >75K- can continue chemo, monitor as pt is on xarelto   - MARYA on CKD- following w/nephro, f/u with nephro 12/4/2023; continue increasing PO fluids, continue day 1, 3, 8 IVF  - elevated LFTs- AST 50, Alk phos 251, liver normal on 9/23 CT,  check alk phos isoenzymes tomorrow  - diarrhea- grade 1  - neuropathy- grade 1   - cold sensitivity- grade 1  - hemorrhoids- witch hazel pads, sitz baths prn    Imaging:  -9/23 CT CAP (after 6 cycles of mFOLFOX 6 without 5-FU bolus):  -Subtotal colectomy with stable appearing rectal anastomosis, some adjacent mild scarring, no bowel obstruction or inflammation, no free fluid, no new adenopathy, no liver lesions, no bone lesions  -Mild wall thickening of the gallbladder  - Stable small pulmonary nodules and small thoracic lymph nodes at mediastinum, splenomegaly smaller 13.7 cm    - 7/18/23 CT CAP (repeat baseline after treatment delay due to A-fib)- subcm lung nodules stable, borderline and mildly enlarged mediastinal LN and periportal LN stable, splenomegaly 15.7cm, Subtotal colectomy with ileorectal anastomosis. No acute inflammation or obstruction    -Repeat CT CAP after cycle 12, beginning of 1/2024- ordered today    Labs:  3/23 CEA 6.8  5/23 CEA 1.8  7/23 CEA 2.8  10/23 CEA 2.0  12/23 CEA pending    Other:  - f/u with Dr. Ashley 4/2024 for rigid proctoscopy     #parxosymal afib  - within 3 days of receiving 5FU, prior cardiac risk factors htn, prediabetes  - 6/3/23 presented to ER w/lightheadedness, dizziness, cardioverted s/p diltiazem ggt in ER  - 6/23 DPD deficiency testing negative  - currently on metoprolol XL 25 mg daily, apixaban 5 mg twice daily  - 6/30/2023 consultation with  cardio oncologist Dr. Garza; I spoke with Dr. Garza 6/30/2023 as well, [id number] score 3, recommending starting apixaban 5 mg twice daily, okay to retrial FOLFOX while patient is on metoprolol.  Patient is currently on Xarelto  - 8/15/2023 cardiology follow-up: Increase metoprolol XL to 50 mg daily; During the days of chemo and 2 days after take long acting diltiazem 120 mg daily with 30 mg of short acting diltiazem as needed for atrial fibrillation with HR >110  - continue follow up with cardiology, last seen 9/23 and follow-up pending 12/23  - I do not recommend switching anticoagulation to coumadin given CLASS D interaction w/5FU chemotherapy, I messaged cardiology regarding this previously  - social work and pharmacy team were able to help secure lower xarelto price fo rpt     #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  Negative for mutations in APC, AXIN2, BMPR1A, CDH1, CHEK2, EPCAM, GREM1, MLH1, MSH2, MSH3, MSH6, MUTYH, NTHL1, PMS2, POLD1, POLE, PTEN, SMAD4, STK11, TP53, CDKN1B, MEN1, NF1, RET, TSC1, TSC2, and VHL genes    #Anemia secondary to iron deficiency and B12 deficiency  - terminal ileum removed at time of colon surgery, monitor for nutrient def  - 2/23 hgb 6.8, ferritin 21, iron sat index 10, TIBC 265, iron 27, b12 1493  - On B12 1000 mcg p.o. daily and ferrous sulfate 325 mg p.o. daily  - 4/9/23 s/p 1 uprbcs  - 5/9/23 hgb 11.3,5/30/23 hgb 9.4  - based on Ganzoni equation w/goal hgb 14 and 500mg needed for iron stores, pts iron deficit is 1400 mg  - s/p ferric carboxymaltose  750mg x2 doses 6/14/23 and 6/28/23  - 7/11/23 ferritin 1022, iron sat 22, TIBC 201, iron 44    - 8/23 hgb 13.3  - 10/23 hgb 11.1    #Crohn's  - dx since at least 2010     RTC 2 weeks for follow up with me, labs, chemo #10  RTC 4 weeks for follow up with RADHA, labs, chemo #11  RTC 6 weeks for follow up with me, labs, chemo #12      Francisco Lee DO  Hematology/Oncology  [facility]      Again, thank you for allowing me to participate in the care of your patient.        Sincerely,        FRANCISCO LEE DO

## 2023-10-25 ENCOUNTER — LAB (OUTPATIENT)
Dept: INFUSION THERAPY | Facility: CLINIC | Age: 68
End: 2023-10-25
Attending: INTERNAL MEDICINE
Payer: MEDICARE

## 2023-10-25 VITALS
TEMPERATURE: 97.9 F | RESPIRATION RATE: 18 BRPM | HEART RATE: 61 BPM | WEIGHT: 185.7 LBS | BODY MASS INDEX: 35.09 KG/M2 | SYSTOLIC BLOOD PRESSURE: 151 MMHG | DIASTOLIC BLOOD PRESSURE: 55 MMHG | OXYGEN SATURATION: 98 %

## 2023-10-25 DIAGNOSIS — C18.7 MALIGNANT NEOPLASM OF SIGMOID COLON (H): Primary | ICD-10-CM

## 2023-10-25 DIAGNOSIS — Z76.89 PREVENTION OF CHEMOTHERAPY-INDUCED NEUTROPENIA: ICD-10-CM

## 2023-10-25 DIAGNOSIS — C18.7 MALIGNANT NEOPLASM OF SIGMOID COLON (H): ICD-10-CM

## 2023-10-25 DIAGNOSIS — Z76.89 PREVENTION OF CHEMOTHERAPY-INDUCED NEUTROPENIA: Primary | ICD-10-CM

## 2023-10-25 LAB
ALBUMIN SERPL BCG-MCNC: 3.6 G/DL (ref 3.5–5.2)
ALP SERPL-CCNC: 282 U/L (ref 35–104)
ALT SERPL W P-5'-P-CCNC: 42 U/L (ref 0–50)
ANION GAP SERPL CALCULATED.3IONS-SCNC: 14 MMOL/L (ref 7–15)
AST SERPL W P-5'-P-CCNC: 49 U/L (ref 0–45)
BASOPHILS # BLD AUTO: 0 10E3/UL (ref 0–0.2)
BASOPHILS NFR BLD AUTO: 0 %
BILIRUB SERPL-MCNC: 0.6 MG/DL
BUN SERPL-MCNC: 15.9 MG/DL (ref 8–23)
CALCIUM SERPL-MCNC: 8.9 MG/DL (ref 8.8–10.2)
CHLORIDE SERPL-SCNC: 107 MMOL/L (ref 98–107)
CREAT SERPL-MCNC: 1.3 MG/DL (ref 0.51–0.95)
DEPRECATED HCO3 PLAS-SCNC: 20 MMOL/L (ref 22–29)
EGFRCR SERPLBLD CKD-EPI 2021: 45 ML/MIN/1.73M2
EOSINOPHIL # BLD AUTO: 0 10E3/UL (ref 0–0.7)
EOSINOPHIL NFR BLD AUTO: 0 %
ERYTHROCYTE [DISTWIDTH] IN BLOOD BY AUTOMATED COUNT: 16.8 % (ref 10–15)
GLUCOSE SERPL-MCNC: 201 MG/DL (ref 70–99)
HCT VFR BLD AUTO: 32.7 % (ref 35–47)
HGB BLD-MCNC: 10.8 G/DL (ref 11.7–15.7)
IMM GRANULOCYTES # BLD: 0 10E3/UL
IMM GRANULOCYTES NFR BLD: 0 %
LYMPHOCYTES # BLD AUTO: 0.5 10E3/UL (ref 0.8–5.3)
LYMPHOCYTES NFR BLD AUTO: 16 %
MAGNESIUM SERPL-MCNC: 1.6 MG/DL (ref 1.7–2.3)
MCH RBC QN AUTO: 30.9 PG (ref 26.5–33)
MCHC RBC AUTO-ENTMCNC: 33 G/DL (ref 31.5–36.5)
MCV RBC AUTO: 94 FL (ref 78–100)
MONOCYTES # BLD AUTO: 0.4 10E3/UL (ref 0–1.3)
MONOCYTES NFR BLD AUTO: 15 %
NEUTROPHILS # BLD AUTO: 2.1 10E3/UL (ref 1.6–8.3)
NEUTROPHILS NFR BLD AUTO: 69 %
NRBC # BLD AUTO: 0 10E3/UL
NRBC BLD AUTO-RTO: 0 /100
PLATELET # BLD AUTO: 97 10E3/UL (ref 150–450)
POTASSIUM SERPL-SCNC: 4.2 MMOL/L (ref 3.4–5.3)
PROT SERPL-MCNC: 6 G/DL (ref 6.4–8.3)
RBC # BLD AUTO: 3.49 10E6/UL (ref 3.8–5.2)
SODIUM SERPL-SCNC: 141 MMOL/L (ref 135–145)
WBC # BLD AUTO: 3 10E3/UL (ref 4–11)

## 2023-10-25 PROCEDURE — 96375 TX/PRO/DX INJ NEW DRUG ADDON: CPT

## 2023-10-25 PROCEDURE — 96413 CHEMO IV INFUSION 1 HR: CPT

## 2023-10-25 PROCEDURE — 85025 COMPLETE CBC W/AUTO DIFF WBC: CPT | Performed by: INTERNAL MEDICINE

## 2023-10-25 PROCEDURE — 96368 THER/DIAG CONCURRENT INF: CPT

## 2023-10-25 PROCEDURE — 80053 COMPREHEN METABOLIC PANEL: CPT | Performed by: INTERNAL MEDICINE

## 2023-10-25 PROCEDURE — 83735 ASSAY OF MAGNESIUM: CPT

## 2023-10-25 PROCEDURE — 96411 CHEMO IV PUSH ADDL DRUG: CPT

## 2023-10-25 PROCEDURE — 36415 COLL VENOUS BLD VENIPUNCTURE: CPT | Performed by: INTERNAL MEDICINE

## 2023-10-25 PROCEDURE — 258N000003 HC RX IP 258 OP 636: Performed by: INTERNAL MEDICINE

## 2023-10-25 PROCEDURE — 84075 ASSAY ALKALINE PHOSPHATASE: CPT | Mod: 91 | Performed by: INTERNAL MEDICINE

## 2023-10-25 PROCEDURE — 96367 TX/PROPH/DG ADDL SEQ IV INF: CPT

## 2023-10-25 PROCEDURE — 96415 CHEMO IV INFUSION ADDL HR: CPT

## 2023-10-25 PROCEDURE — 250N000011 HC RX IP 250 OP 636: Mod: JZ | Performed by: INTERNAL MEDICINE

## 2023-10-25 RX ORDER — HEPARIN SODIUM (PORCINE) LOCK FLUSH IV SOLN 100 UNIT/ML 100 UNIT/ML
5 SOLUTION INTRAVENOUS
Status: CANCELLED | OUTPATIENT
Start: 2023-10-25

## 2023-10-25 RX ORDER — MAGNESIUM SULFATE HEPTAHYDRATE 40 MG/ML
2 INJECTION, SOLUTION INTRAVENOUS ONCE
Status: COMPLETED | OUTPATIENT
Start: 2023-10-25 | End: 2023-10-25

## 2023-10-25 RX ORDER — ALBUTEROL SULFATE 0.83 MG/ML
2.5 SOLUTION RESPIRATORY (INHALATION)
Status: CANCELLED | OUTPATIENT
Start: 2023-10-25

## 2023-10-25 RX ORDER — ALBUTEROL SULFATE 90 UG/1
1-2 AEROSOL, METERED RESPIRATORY (INHALATION)
Status: CANCELLED
Start: 2023-10-25

## 2023-10-25 RX ORDER — EPINEPHRINE 1 MG/ML
0.3 INJECTION, SOLUTION INTRAMUSCULAR; SUBCUTANEOUS EVERY 5 MIN PRN
Status: CANCELLED | OUTPATIENT
Start: 2023-10-25

## 2023-10-25 RX ORDER — METHYLPREDNISOLONE SODIUM SUCCINATE 125 MG/2ML
125 INJECTION, POWDER, LYOPHILIZED, FOR SOLUTION INTRAMUSCULAR; INTRAVENOUS
Status: CANCELLED
Start: 2023-10-25

## 2023-10-25 RX ORDER — MEPERIDINE HYDROCHLORIDE 25 MG/ML
25 INJECTION INTRAMUSCULAR; INTRAVENOUS; SUBCUTANEOUS EVERY 30 MIN PRN
Status: CANCELLED | OUTPATIENT
Start: 2023-10-25

## 2023-10-25 RX ORDER — DIPHENHYDRAMINE HYDROCHLORIDE 50 MG/ML
50 INJECTION INTRAMUSCULAR; INTRAVENOUS
Status: CANCELLED
Start: 2023-10-25

## 2023-10-25 RX ORDER — HEPARIN SODIUM,PORCINE 10 UNIT/ML
5 VIAL (ML) INTRAVENOUS
Status: CANCELLED | OUTPATIENT
Start: 2023-10-25

## 2023-10-25 RX ORDER — ONDANSETRON 2 MG/ML
8 INJECTION INTRAMUSCULAR; INTRAVENOUS ONCE
Status: COMPLETED | OUTPATIENT
Start: 2023-10-25 | End: 2023-10-25

## 2023-10-25 RX ADMIN — SODIUM CHLORIDE 500 ML: 9 INJECTION, SOLUTION INTRAVENOUS at 10:17

## 2023-10-25 RX ADMIN — MAGNESIUM SULFATE HEPTAHYDRATE 2 G: 40 INJECTION, SOLUTION INTRAVENOUS at 13:31

## 2023-10-25 RX ADMIN — FOSAPREPITANT: 150 INJECTION, POWDER, LYOPHILIZED, FOR SOLUTION INTRAVENOUS at 10:50

## 2023-10-25 RX ADMIN — OXALIPLATIN 120 MG: 5 INJECTION, SOLUTION INTRAVENOUS at 11:20

## 2023-10-25 RX ADMIN — ONDANSETRON 8 MG: 2 INJECTION INTRAMUSCULAR; INTRAVENOUS at 10:21

## 2023-10-25 RX ADMIN — LEUCOVORIN CALCIUM 700 MG: 350 INJECTION, POWDER, LYOPHILIZED, FOR SOLUTION INTRAMUSCULAR; INTRAVENOUS at 11:19

## 2023-10-25 RX ADMIN — DEXTROSE MONOHYDRATE 250 ML: 50 INJECTION, SOLUTION INTRAVENOUS at 11:20

## 2023-10-25 ASSESSMENT — PAIN SCALES - GENERAL: PAINLEVEL: NO PAIN (0)

## 2023-10-25 NOTE — PROGRESS NOTES
"Infusion Nursing Note:  Nadia Ladd presents today for C9d1 Leucovorin, oxaliplatin and flourouracil pump.    Patient seen by provider today: No   present during visit today: Not Applicable.    Note: Nadia is feeling well today.  States that she gets neuropathy to hands and feet but it is mostly resolved by start of new cycle.  Occasional diarrhea.  No pain.  Appetite good.  Mag at 1.6 today and will be replaced with 2G iv.      Intravenous Access:  Implanted Port.    Treatment Conditions:  Lab Results   Component Value Date    HGB 10.8 (L) 10/25/2023    WBC 3.0 (L) 10/25/2023    ANEU 7.6 08/01/2023    ANEUTAUTO 2.1 10/25/2023    PLT 97 (L) 10/25/2023        Lab Results   Component Value Date     10/25/2023    POTASSIUM 4.2 10/25/2023    MAG 1.6 (L) 10/25/2023    CR 1.30 (H) 10/25/2023    LIVIER 8.9 10/25/2023    BILITOTAL 0.6 10/25/2023    ALBUMIN 3.6 10/25/2023    ALT 42 10/25/2023    AST 49 (H) 10/25/2023       Results reviewed, labs MET treatment parameters, ok to proceed with treatment.      Post Infusion Assessment:  Patient tolerated infusion without incident.  Site patent and intact, free from redness, edema or discomfort.  No evidence of extravasations.   Prior to discharge: Port is secured in place with tegaderm and flushed with 10cc NS with positive blood return noted.    Continuous home infusion CADD pump connected.    All connectors secured in place and clamps taped open.    Pump started, \"running\" noted on display (CADD): YES.   Pump Connection double checked with Eli ZAYAS.  Patient instructed to call our clinic or [facility] with any questions or concerns at home.  Patient verbalized understanding.    Patient set up for pump disconnect at our clinic on 10/27 at 1230.          Discharge Plan:   Discharge instructions reviewed with: Patient.  Patient and/or family verbalized understanding of discharge instructions and all questions answered.  Patient discharged in stable " condition accompanied by: .  Departure Mode: Ambulatory.      Teri Magana RN

## 2023-10-25 NOTE — PROGRESS NOTES
Port accessed for lab collection.  Port is tilted downward.  All 3 nodules are palpable.  Easily accessed and good blood return.

## 2023-10-27 ENCOUNTER — INFUSION THERAPY VISIT (OUTPATIENT)
Dept: INFUSION THERAPY | Facility: CLINIC | Age: 68
End: 2023-10-27
Attending: INTERNAL MEDICINE
Payer: MEDICARE

## 2023-10-27 VITALS
SYSTOLIC BLOOD PRESSURE: 124 MMHG | RESPIRATION RATE: 16 BRPM | HEART RATE: 66 BPM | DIASTOLIC BLOOD PRESSURE: 54 MMHG | TEMPERATURE: 98.6 F | OXYGEN SATURATION: 97 %

## 2023-10-27 DIAGNOSIS — Z76.89 PREVENTION OF CHEMOTHERAPY-INDUCED NEUTROPENIA: Primary | ICD-10-CM

## 2023-10-27 DIAGNOSIS — C18.7 MALIGNANT NEOPLASM OF SIGMOID COLON (H): ICD-10-CM

## 2023-10-27 PROCEDURE — 258N000003 HC RX IP 258 OP 636: Performed by: INTERNAL MEDICINE

## 2023-10-27 PROCEDURE — 250N000011 HC RX IP 250 OP 636: Mod: JZ | Performed by: INTERNAL MEDICINE

## 2023-10-27 PROCEDURE — 96360 HYDRATION IV INFUSION INIT: CPT

## 2023-10-27 PROCEDURE — 96361 HYDRATE IV INFUSION ADD-ON: CPT

## 2023-10-27 RX ORDER — HEPARIN SODIUM (PORCINE) LOCK FLUSH IV SOLN 100 UNIT/ML 100 UNIT/ML
5 SOLUTION INTRAVENOUS
Status: DISCONTINUED | OUTPATIENT
Start: 2023-10-27 | End: 2023-10-27 | Stop reason: HOSPADM

## 2023-10-27 RX ADMIN — HEPARIN 5 ML: 100 SYRINGE at 14:05

## 2023-10-27 RX ADMIN — SODIUM CHLORIDE 1000 ML: 9 INJECTION, SOLUTION INTRAVENOUS at 12:32

## 2023-10-27 NOTE — PROGRESS NOTES
"Infusion Nursing Note:  Nadia Ladd presents today for C9D3 Pump disconnect and IVF.    Patient seen by provider today: No   present during visit today: Not Applicable.    Note: Nadia arrives ambulatory, \"feeling just a little off today\" but she says that is normal the first few days after chemo. Mild fatigue, a little unsteady at times. Denies lightheadedness. VSS, afebrile.       Intravenous Access:  Implanted Port.  Site WDL.   Chemo pump disconnected, entire Fluorouracil volume infused.   Good blood return.     Treatment Conditions:  Not Applicable.      Post Infusion Assessment:  Patient tolerated IVF infusion without incident.  Blood return noted pre and post infusion.  Site patent and intact, free from redness, edema or discomfort.  No evidence of extravasations.  Access discontinued per protocol.       Discharge Plan:   [facility]  Patient will return 11/1 for next appointment.   Patient discharged in stable condition accompanied by: .  Departure Mode: Ambulatory.      Rosalina Argueta RN  "

## 2023-10-28 LAB
ALP BONE SERPL-CCNC: 53 U/L
ALP LIVER SERPL-CCNC: 240 U/L
ALP OTHER SERPL-CCNC: 0 U/L
ALP SERPL-CCNC: 293 U/L

## 2023-10-31 ENCOUNTER — TELEPHONE (OUTPATIENT)
Dept: INFUSION THERAPY | Facility: CLINIC | Age: 68
End: 2023-10-31
Payer: MEDICARE

## 2023-10-31 NOTE — TELEPHONE ENCOUNTER
Move 10/23/23 CT up to now  Rest of chemo as needed to be scheduled  RTC 2 weeks for follow up with RAD[facility], labs, chemo- virtual (if CT done within 2 weeks, have pt see me instead of RADHA)  RTC 4 weeks for follow up with me, labs, chemo- in person    RTC 2 weeks for follow up with me, labs, chemo #10  RTC 4 weeks for follow up with RAD[facility], labs, chemo #11  RTC 6 weeks for follow up with me, labs, chemo #12  CT CAP beginning of January 2024     Hi  and Liseth,     Please review the 2 checkout notes, Jelly sent this question to me and I cannot answer this? What checkout notes should we be following? Thank You Lelo

## 2023-11-01 ENCOUNTER — LAB (OUTPATIENT)
Dept: INFUSION THERAPY | Facility: CLINIC | Age: 68
End: 2023-11-01
Attending: INTERNAL MEDICINE
Payer: MEDICARE

## 2023-11-01 VITALS
SYSTOLIC BLOOD PRESSURE: 143 MMHG | TEMPERATURE: 97.9 F | HEART RATE: 60 BPM | RESPIRATION RATE: 16 BRPM | DIASTOLIC BLOOD PRESSURE: 61 MMHG | OXYGEN SATURATION: 98 % | WEIGHT: 181 LBS | BODY MASS INDEX: 34.2 KG/M2

## 2023-11-01 DIAGNOSIS — C18.9 MALIGNANT NEOPLASM OF COLON, UNSPECIFIED PART OF COLON (H): Primary | ICD-10-CM

## 2023-11-01 DIAGNOSIS — T45.1X5A CHEMOTHERAPY-INDUCED NEUTROPENIA (H): ICD-10-CM

## 2023-11-01 DIAGNOSIS — D70.1 CHEMOTHERAPY-INDUCED NEUTROPENIA (H): ICD-10-CM

## 2023-11-01 DIAGNOSIS — C18.9 MALIGNANT NEOPLASM OF COLON, UNSPECIFIED PART OF COLON (H): ICD-10-CM

## 2023-11-01 DIAGNOSIS — C18.7 MALIGNANT NEOPLASM OF SIGMOID COLON (H): Primary | ICD-10-CM

## 2023-11-01 DIAGNOSIS — Z76.89 PREVENTION OF CHEMOTHERAPY-INDUCED NEUTROPENIA: ICD-10-CM

## 2023-11-01 LAB
ALBUMIN SERPL BCG-MCNC: 3.9 G/DL (ref 3.5–5.2)
ALP SERPL-CCNC: 265 U/L (ref 35–104)
ALT SERPL W P-5'-P-CCNC: 45 U/L (ref 0–50)
ANION GAP SERPL CALCULATED.3IONS-SCNC: 13 MMOL/L (ref 7–15)
AST SERPL W P-5'-P-CCNC: 48 U/L (ref 0–45)
BILIRUB SERPL-MCNC: 0.7 MG/DL
BUN SERPL-MCNC: 22.8 MG/DL (ref 8–23)
CALCIUM SERPL-MCNC: 8.9 MG/DL (ref 8.8–10.2)
CHLORIDE SERPL-SCNC: 106 MMOL/L (ref 98–107)
CREAT SERPL-MCNC: 1.47 MG/DL (ref 0.51–0.95)
DEPRECATED HCO3 PLAS-SCNC: 20 MMOL/L (ref 22–29)
EGFRCR SERPLBLD CKD-EPI 2021: 39 ML/MIN/1.73M2
GLUCOSE SERPL-MCNC: 143 MG/DL (ref 70–99)
MAGNESIUM SERPL-MCNC: 1.6 MG/DL (ref 1.7–2.3)
POTASSIUM SERPL-SCNC: 4.3 MMOL/L (ref 3.4–5.3)
PROT SERPL-MCNC: 6.3 G/DL (ref 6.4–8.3)
SODIUM SERPL-SCNC: 139 MMOL/L (ref 135–145)

## 2023-11-01 PROCEDURE — 80053 COMPREHEN METABOLIC PANEL: CPT | Performed by: INTERNAL MEDICINE

## 2023-11-01 PROCEDURE — 250N000011 HC RX IP 250 OP 636: Mod: JZ | Performed by: INTERNAL MEDICINE

## 2023-11-01 PROCEDURE — 36415 COLL VENOUS BLD VENIPUNCTURE: CPT | Performed by: INTERNAL MEDICINE

## 2023-11-01 PROCEDURE — 83735 ASSAY OF MAGNESIUM: CPT

## 2023-11-01 PROCEDURE — 96365 THER/PROPH/DIAG IV INF INIT: CPT

## 2023-11-01 PROCEDURE — 258N000003 HC RX IP 258 OP 636: Performed by: INTERNAL MEDICINE

## 2023-11-01 RX ORDER — HEPARIN SODIUM (PORCINE) LOCK FLUSH IV SOLN 100 UNIT/ML 100 UNIT/ML
5 SOLUTION INTRAVENOUS
Status: DISCONTINUED | OUTPATIENT
Start: 2023-11-01 | End: 2023-11-01 | Stop reason: HOSPADM

## 2023-11-01 RX ORDER — HEPARIN SODIUM (PORCINE) LOCK FLUSH IV SOLN 100 UNIT/ML 100 UNIT/ML
5 SOLUTION INTRAVENOUS
Status: CANCELLED | OUTPATIENT
Start: 2023-11-01

## 2023-11-01 RX ORDER — HEPARIN SODIUM,PORCINE 10 UNIT/ML
5 VIAL (ML) INTRAVENOUS
Status: CANCELLED | OUTPATIENT
Start: 2023-11-01

## 2023-11-01 RX ORDER — DIPHENHYDRAMINE HYDROCHLORIDE 50 MG/ML
50 INJECTION INTRAMUSCULAR; INTRAVENOUS
Status: CANCELLED
Start: 2023-11-01

## 2023-11-01 RX ORDER — ALBUTEROL SULFATE 0.83 MG/ML
2.5 SOLUTION RESPIRATORY (INHALATION)
Status: CANCELLED | OUTPATIENT
Start: 2023-11-01

## 2023-11-01 RX ORDER — MAGNESIUM SULFATE HEPTAHYDRATE 40 MG/ML
2 INJECTION, SOLUTION INTRAVENOUS ONCE
Status: COMPLETED | OUTPATIENT
Start: 2023-11-01 | End: 2023-11-01

## 2023-11-01 RX ORDER — EPINEPHRINE 1 MG/ML
0.3 INJECTION, SOLUTION INTRAMUSCULAR; SUBCUTANEOUS EVERY 5 MIN PRN
Status: CANCELLED | OUTPATIENT
Start: 2023-11-01

## 2023-11-01 RX ORDER — METHYLPREDNISOLONE SODIUM SUCCINATE 125 MG/2ML
125 INJECTION, POWDER, LYOPHILIZED, FOR SOLUTION INTRAMUSCULAR; INTRAVENOUS
Status: CANCELLED
Start: 2023-11-01

## 2023-11-01 RX ORDER — MEPERIDINE HYDROCHLORIDE 25 MG/ML
25 INJECTION INTRAMUSCULAR; INTRAVENOUS; SUBCUTANEOUS EVERY 30 MIN PRN
Status: CANCELLED | OUTPATIENT
Start: 2023-11-01

## 2023-11-01 RX ORDER — ALBUTEROL SULFATE 90 UG/1
1-2 AEROSOL, METERED RESPIRATORY (INHALATION)
Status: CANCELLED
Start: 2023-11-01

## 2023-11-01 RX ADMIN — SODIUM CHLORIDE 1000 ML: 9 INJECTION, SOLUTION INTRAVENOUS at 14:16

## 2023-11-01 RX ADMIN — MAGNESIUM SULFATE IN WATER 2 G: 40 INJECTION, SOLUTION INTRAVENOUS at 15:15

## 2023-11-01 RX ADMIN — Medication 5 ML: at 16:25

## 2023-11-01 ASSESSMENT — PAIN SCALES - GENERAL: PAINLEVEL: NO PAIN (0)

## 2023-11-01 NOTE — PROGRESS NOTES
Infusion Nursing Note:  Nadia Ladd presents today for C9D8 IVF with Magnesium sulfate replacement.   Patient seen by provider today: No   present during visit today: Not Applicable.    Note: Feeling well today. Reports that she had a spell of a fib last Friday that lasted 1-2 hrs.      Intravenous Access:  Implanted Port.    Treatment Conditions:  Lab Results   Component Value Date     11/01/2023    POTASSIUM 4.3 11/01/2023    MAG 1.6 (L) 11/01/2023    CR 1.47 (H) 11/01/2023    LIVIER 8.9 11/01/2023    BILITOTAL 0.7 11/01/2023    ALBUMIN 3.9 11/01/2023    ALT 45 11/01/2023    AST 48 (H) 11/01/2023     Magnesium 1.6.    Post Infusion Assessment:  Patient tolerated infusion without incident.       Discharge Plan:   Patient discharged in stable condition accompanied by: .  Departure Mode: Ambulatory.      Marycarmen Cardenas RN

## 2023-11-02 ENCOUNTER — TELEPHONE (OUTPATIENT)
Dept: ADMISSION | Facility: CLINIC | Age: 68
End: 2023-11-02

## 2023-11-02 DIAGNOSIS — E83.42 HYPOMAGNESEMIA: ICD-10-CM

## 2023-11-02 DIAGNOSIS — K52.9 CHRONIC DIARRHEA: ICD-10-CM

## 2023-11-02 DIAGNOSIS — I48.0 PAROXYSMAL ATRIAL FIBRILLATION WITH RVR (H): ICD-10-CM

## 2023-11-02 RX ORDER — MAGNESIUM OXIDE 400 MG/1
400 TABLET ORAL 2 TIMES DAILY
Qty: 60 TABLET | Refills: 0 | Status: SHIPPED | OUTPATIENT
Start: 2023-11-02 | End: 2023-11-24

## 2023-11-02 NOTE — TELEPHONE ENCOUNTER
Reason for Call:  Other call back    Detailed comments: Patient called she would like to speak with a nurse in regards to some questions she has about the covid shot and then she would also like to speak about some concerns she has about chemo, she wouldn't give much details.     Phone Number Patient can be reached at: Cell number on file:    Telephone Information:   Mobile [phone]       Best Time: any    Can we leave a detailed message on this number? YES    Call taken on 11/2/2023 at 11:02 AM by Indy Olivia

## 2023-11-03 ENCOUNTER — PATIENT OUTREACH (OUTPATIENT)
Dept: GASTROENTEROLOGY | Facility: CLINIC | Age: 68
End: 2023-11-03

## 2023-11-03 ENCOUNTER — OFFICE VISIT (OUTPATIENT)
Dept: AUDIOLOGY | Facility: CLINIC | Age: 68
End: 2023-11-03
Payer: MEDICARE

## 2023-11-03 DIAGNOSIS — H90.3 SENSORINEURAL HEARING LOSS, ASYMMETRICAL: Primary | ICD-10-CM

## 2023-11-03 DIAGNOSIS — Z12.11 SPECIAL SCREENING FOR MALIGNANT NEOPLASMS, COLON: Primary | ICD-10-CM

## 2023-11-03 DIAGNOSIS — C18.7 MALIGNANT NEOPLASM OF SIGMOID COLON (H): Primary | ICD-10-CM

## 2023-11-03 PROCEDURE — V5010 ASSESSMENT FOR HEARING AID: HCPCS | Performed by: AUDIOLOGIST

## 2023-11-03 NOTE — TELEPHONE ENCOUNTER
Writer spoke with patient regarding below. Patient stated that she was told by Dr. Bateman to wait for lab results, to see if she can get her COVID vaccine. Patient also wanted to update Dr. Bateman that she did have an episode of A-Fib after her pump disconnect on 10/27. Patient was able to manage this with her diltiazem. Patient sees cardiology on 12/14. Patient aware that Dr. Bateman is out of clinic until 11/7, please advise on COVID vaccine.    Megan Valera RN on 11/3/2023 at 3:33 PM

## 2023-11-03 NOTE — PROGRESS NOTES
AUDIOLOGY REPORT    SUBJECTIVE:   Nadia Ladd is a 68 year old female was seen in the Audiology Clinic at  [facility] on 11/03/23 to discuss concerns with hearing and functional communication difficulties. The patient was unaccompanied. Nadia was seen on 10/23/2023, and results revealed a moderate rising to low normal sloping to severe sensorineural hearing loss right ear and mild sloping to severe sensorineural hearing loss left ear. The patient was medically evaluated and determined to be cleared for binaural hearing aids by PAVAN Zurita M.D. Nadia notes difficulty with communication in a variety of listening situations.    OBJECTIVE:  Nadia wanted to discuss hearing aid options, and had questions about cost. Her chemo and other medical treatments have caused some financial difficulty, but she notes she does not qualify for state programs.    We discussed [facility] [place] [facility], I can look into the [facility] [place], and we discussed gifted hearing aids (she noted she has some from her brother-in-law who is a [place]).    ASSESSMENT:     ICD-10-CM    1. Sensorineural hearing loss, asymmetrical  H90.3 No Charge, Hearing Aid Clinic Visit        PLAN:   Nadia is going to drop off the hearing aids next week. I will see if I can program them, if so we can schedule a visit. Please contact this clinic with any questions or concerns.      Rober Nuñez.  [place] Licensed Audiologist #[id number]

## 2023-11-03 NOTE — PROGRESS NOTES
"CRC Screening Colonoscopy Referral Review    Patient meets the inclusion criteria for screening colonoscopy standing order.    Ordering/Referring Provider:  Nomi Cartwright     BMI: Estimated body mass index is 34.2 kg/m  as calculated from the following:    Height as of 10/24/23: 1.549 m (5' 1\").    Weight as of 11/1/23: 82.1 kg (181 lb).     Sedation:  Does patient have any of the following conditions affecting sedation?  No medical conditions affecting sedation.    Previous Scopes:  Any previous recommendations or follow up needs based on previous scope?  na / No recommendations.    Medical Concerns to Postpone Order:  Does patient have any of the following medical concerns that should postpone/delay colonoscopy referral?  No medical conditions affecting colonoscopy referral.    Final Referral Details:  Based on patient's medical history patient is appropriate for referral order with moderate sedation. If patient's BMI > 50 do not schedule in ASC.  "

## 2023-11-08 ENCOUNTER — VIRTUAL VISIT (OUTPATIENT)
Dept: ONCOLOGY | Facility: CLINIC | Age: 68
End: 2023-11-08
Payer: MEDICARE

## 2023-11-08 ENCOUNTER — INFUSION THERAPY VISIT (OUTPATIENT)
Dept: INFUSION THERAPY | Facility: CLINIC | Age: 68
End: 2023-11-08
Attending: INTERNAL MEDICINE
Payer: MEDICARE

## 2023-11-08 ENCOUNTER — ALLIED HEALTH/NURSE VISIT (OUTPATIENT)
Dept: FAMILY MEDICINE | Facility: CLINIC | Age: 68
End: 2023-11-08
Payer: MEDICARE

## 2023-11-08 VITALS — BODY MASS INDEX: 33.79 KG/M2 | WEIGHT: 179 LBS | HEIGHT: 61 IN

## 2023-11-08 VITALS
RESPIRATION RATE: 16 BRPM | SYSTOLIC BLOOD PRESSURE: 153 MMHG | DIASTOLIC BLOOD PRESSURE: 67 MMHG | WEIGHT: 186.2 LBS | TEMPERATURE: 98.4 F | HEART RATE: 62 BPM | OXYGEN SATURATION: 98 % | BODY MASS INDEX: 35.18 KG/M2

## 2023-11-08 DIAGNOSIS — R74.8 ALKALINE PHOSPHATASE ELEVATION: ICD-10-CM

## 2023-11-08 DIAGNOSIS — T45.1X5A CHEMOTHERAPY-INDUCED NEUTROPENIA (H): ICD-10-CM

## 2023-11-08 DIAGNOSIS — C18.7 MALIGNANT NEOPLASM OF SIGMOID COLON (H): Primary | ICD-10-CM

## 2023-11-08 DIAGNOSIS — T45.1X5A CHEMOTHERAPY-INDUCED THROMBOCYTOPENIA: ICD-10-CM

## 2023-11-08 DIAGNOSIS — D50.0 IRON DEFICIENCY ANEMIA DUE TO CHRONIC BLOOD LOSS: ICD-10-CM

## 2023-11-08 DIAGNOSIS — D64.81 ANTINEOPLASTIC CHEMOTHERAPY INDUCED ANEMIA: ICD-10-CM

## 2023-11-08 DIAGNOSIS — T45.1X5A CHEMOTHERAPY-INDUCED NEUROPATHY (H): ICD-10-CM

## 2023-11-08 DIAGNOSIS — D69.59 CHEMOTHERAPY-INDUCED THROMBOCYTOPENIA: ICD-10-CM

## 2023-11-08 DIAGNOSIS — C18.9 MALIGNANT NEOPLASM OF COLON, UNSPECIFIED PART OF COLON (H): ICD-10-CM

## 2023-11-08 DIAGNOSIS — Z76.89 PREVENTION OF CHEMOTHERAPY-INDUCED NEUTROPENIA: Primary | ICD-10-CM

## 2023-11-08 DIAGNOSIS — C18.9 MALIGNANT NEOPLASM OF COLON, UNSPECIFIED PART OF COLON (H): Primary | ICD-10-CM

## 2023-11-08 DIAGNOSIS — R68.89 COLD SENSITIVITY: ICD-10-CM

## 2023-11-08 DIAGNOSIS — G62.0 CHEMOTHERAPY-INDUCED NEUROPATHY (H): ICD-10-CM

## 2023-11-08 DIAGNOSIS — H93.8X3 OTOTOXICITY OF BOTH EARS: ICD-10-CM

## 2023-11-08 DIAGNOSIS — T45.1X5A ANTINEOPLASTIC CHEMOTHERAPY INDUCED ANEMIA: ICD-10-CM

## 2023-11-08 DIAGNOSIS — C18.7 MALIGNANT NEOPLASM OF SIGMOID COLON (H): ICD-10-CM

## 2023-11-08 DIAGNOSIS — D70.1 CHEMOTHERAPY-INDUCED NEUTROPENIA (H): ICD-10-CM

## 2023-11-08 DIAGNOSIS — C18.3 MALIGNANT NEOPLASM OF HEPATIC FLEXURE (H): ICD-10-CM

## 2023-11-08 LAB
ALBUMIN SERPL BCG-MCNC: 3.7 G/DL (ref 3.5–5.2)
ALP SERPL-CCNC: 329 U/L (ref 35–104)
ALT SERPL W P-5'-P-CCNC: 45 U/L (ref 0–50)
ANION GAP SERPL CALCULATED.3IONS-SCNC: 10 MMOL/L (ref 7–15)
AST SERPL W P-5'-P-CCNC: 56 U/L (ref 0–45)
BASOPHILS # BLD AUTO: 0 10E3/UL (ref 0–0.2)
BASOPHILS NFR BLD AUTO: 0 %
BILIRUB SERPL-MCNC: 0.5 MG/DL
BUN SERPL-MCNC: 17.7 MG/DL (ref 8–23)
CALCIUM SERPL-MCNC: 8.8 MG/DL (ref 8.8–10.2)
CHLORIDE SERPL-SCNC: 109 MMOL/L (ref 98–107)
CREAT SERPL-MCNC: 1.31 MG/DL (ref 0.51–0.95)
DEPRECATED HCO3 PLAS-SCNC: 23 MMOL/L (ref 22–29)
EGFRCR SERPLBLD CKD-EPI 2021: 44 ML/MIN/1.73M2
EOSINOPHIL # BLD AUTO: 0 10E3/UL (ref 0–0.7)
EOSINOPHIL NFR BLD AUTO: 0 %
ERYTHROCYTE [DISTWIDTH] IN BLOOD BY AUTOMATED COUNT: 16.7 % (ref 10–15)
FERRITIN SERPL-MCNC: 954 NG/ML (ref 11–328)
GLUCOSE SERPL-MCNC: 135 MG/DL (ref 70–99)
HCT VFR BLD AUTO: 31.9 % (ref 35–47)
HGB BLD-MCNC: 10.7 G/DL (ref 11.7–15.7)
IMM GRANULOCYTES # BLD: 0 10E3/UL
IMM GRANULOCYTES NFR BLD: 0 %
IRON BINDING CAPACITY (ROCHE): 246 UG/DL (ref 240–430)
IRON SATN MFR SERPL: 41 % (ref 15–46)
IRON SERPL-MCNC: 102 UG/DL (ref 37–145)
LYMPHOCYTES # BLD AUTO: 0.6 10E3/UL (ref 0.8–5.3)
LYMPHOCYTES NFR BLD AUTO: 17 %
MAGNESIUM SERPL-MCNC: 1.8 MG/DL (ref 1.7–2.3)
MCH RBC QN AUTO: 32.1 PG (ref 26.5–33)
MCHC RBC AUTO-ENTMCNC: 33.5 G/DL (ref 31.5–36.5)
MCV RBC AUTO: 96 FL (ref 78–100)
MONOCYTES # BLD AUTO: 0.6 10E3/UL (ref 0–1.3)
MONOCYTES NFR BLD AUTO: 19 %
NEUTROPHILS # BLD AUTO: 2.2 10E3/UL (ref 1.6–8.3)
NEUTROPHILS NFR BLD AUTO: 64 %
NRBC # BLD AUTO: 0 10E3/UL
NRBC BLD AUTO-RTO: 0 /100
PLATELET # BLD AUTO: 107 10E3/UL (ref 150–450)
POTASSIUM SERPL-SCNC: 4.2 MMOL/L (ref 3.4–5.3)
PROT SERPL-MCNC: 6.1 G/DL (ref 6.4–8.3)
RBC # BLD AUTO: 3.33 10E6/UL (ref 3.8–5.2)
SODIUM SERPL-SCNC: 142 MMOL/L (ref 135–145)
VIT B12 SERPL-MCNC: 665 PG/ML (ref 232–1245)
WBC # BLD AUTO: 3.4 10E3/UL (ref 4–11)

## 2023-11-08 PROCEDURE — 96367 TX/PROPH/DG ADDL SEQ IV INF: CPT

## 2023-11-08 PROCEDURE — 83735 ASSAY OF MAGNESIUM: CPT

## 2023-11-08 PROCEDURE — 36591 DRAW BLOOD OFF VENOUS DEVICE: CPT | Performed by: INTERNAL MEDICINE

## 2023-11-08 PROCEDURE — 99214 OFFICE O/P EST MOD 30 MIN: CPT | Mod: 95 | Performed by: INTERNAL MEDICINE

## 2023-11-08 PROCEDURE — 250N000011 HC RX IP 250 OP 636: Mod: JZ | Performed by: INTERNAL MEDICINE

## 2023-11-08 PROCEDURE — 82728 ASSAY OF FERRITIN: CPT | Performed by: INTERNAL MEDICINE

## 2023-11-08 PROCEDURE — 96375 TX/PRO/DX INJ NEW DRUG ADDON: CPT

## 2023-11-08 PROCEDURE — 99207 PR NO CHARGE NURSE ONLY: CPT

## 2023-11-08 PROCEDURE — 80053 COMPREHEN METABOLIC PANEL: CPT | Performed by: INTERNAL MEDICINE

## 2023-11-08 PROCEDURE — 258N000003 HC RX IP 258 OP 636: Performed by: INTERNAL MEDICINE

## 2023-11-08 PROCEDURE — 82607 VITAMIN B-12: CPT | Performed by: INTERNAL MEDICINE

## 2023-11-08 PROCEDURE — 36415 COLL VENOUS BLD VENIPUNCTURE: CPT | Performed by: INTERNAL MEDICINE

## 2023-11-08 PROCEDURE — 96415 CHEMO IV INFUSION ADDL HR: CPT

## 2023-11-08 PROCEDURE — 96368 THER/DIAG CONCURRENT INF: CPT

## 2023-11-08 PROCEDURE — 85004 AUTOMATED DIFF WBC COUNT: CPT | Performed by: INTERNAL MEDICINE

## 2023-11-08 PROCEDURE — 96413 CHEMO IV INFUSION 1 HR: CPT

## 2023-11-08 PROCEDURE — 83550 IRON BINDING TEST: CPT | Performed by: INTERNAL MEDICINE

## 2023-11-08 PROCEDURE — 82747 ASSAY OF FOLIC ACID RBC: CPT | Performed by: INTERNAL MEDICINE

## 2023-11-08 PROCEDURE — 96411 CHEMO IV PUSH ADDL DRUG: CPT

## 2023-11-08 RX ORDER — GABAPENTIN 300 MG/1
300 CAPSULE ORAL 3 TIMES DAILY
Qty: 90 CAPSULE | Refills: 1 | Status: SHIPPED | OUTPATIENT
Start: 2023-11-08 | End: 2024-01-02

## 2023-11-08 RX ORDER — ALBUTEROL SULFATE 0.83 MG/ML
2.5 SOLUTION RESPIRATORY (INHALATION)
Status: CANCELLED | OUTPATIENT
Start: 2023-11-08

## 2023-11-08 RX ORDER — MEPERIDINE HYDROCHLORIDE 25 MG/ML
25 INJECTION INTRAMUSCULAR; INTRAVENOUS; SUBCUTANEOUS EVERY 30 MIN PRN
Status: CANCELLED | OUTPATIENT
Start: 2023-11-08

## 2023-11-08 RX ORDER — HEPARIN SODIUM,PORCINE 10 UNIT/ML
5 VIAL (ML) INTRAVENOUS
Status: CANCELLED | OUTPATIENT
Start: 2023-11-08

## 2023-11-08 RX ORDER — DIPHENHYDRAMINE HYDROCHLORIDE 50 MG/ML
50 INJECTION INTRAMUSCULAR; INTRAVENOUS
Status: CANCELLED
Start: 2023-11-08

## 2023-11-08 RX ORDER — HEPARIN SODIUM (PORCINE) LOCK FLUSH IV SOLN 100 UNIT/ML 100 UNIT/ML
5 SOLUTION INTRAVENOUS
Status: CANCELLED | OUTPATIENT
Start: 2023-11-08

## 2023-11-08 RX ORDER — METHYLPREDNISOLONE SODIUM SUCCINATE 125 MG/2ML
125 INJECTION, POWDER, LYOPHILIZED, FOR SOLUTION INTRAMUSCULAR; INTRAVENOUS
Status: CANCELLED
Start: 2023-11-08

## 2023-11-08 RX ORDER — EPINEPHRINE 1 MG/ML
0.3 INJECTION, SOLUTION INTRAMUSCULAR; SUBCUTANEOUS EVERY 5 MIN PRN
Status: CANCELLED | OUTPATIENT
Start: 2023-11-08

## 2023-11-08 RX ORDER — ONDANSETRON 2 MG/ML
8 INJECTION INTRAMUSCULAR; INTRAVENOUS ONCE
Status: COMPLETED | OUTPATIENT
Start: 2023-11-08 | End: 2023-11-08

## 2023-11-08 RX ORDER — ALBUTEROL SULFATE 90 UG/1
1-2 AEROSOL, METERED RESPIRATORY (INHALATION)
Status: CANCELLED
Start: 2023-11-08

## 2023-11-08 RX ORDER — MAGNESIUM SULFATE HEPTAHYDRATE 40 MG/ML
2 INJECTION, SOLUTION INTRAVENOUS ONCE
Status: COMPLETED | OUTPATIENT
Start: 2023-11-08 | End: 2023-11-08

## 2023-11-08 RX ORDER — DEXAMETHASONE 4 MG/1
8 TABLET ORAL DAILY
Qty: 12 TABLET | Refills: 0 | Status: SHIPPED | OUTPATIENT
Start: 2023-11-08 | End: 2024-03-12

## 2023-11-08 RX ADMIN — DEXAMETHASONE SODIUM PHOSPHATE: 10 INJECTION, SOLUTION INTRAMUSCULAR; INTRAVENOUS at 12:59

## 2023-11-08 RX ADMIN — ONDANSETRON 8 MG: 2 INJECTION INTRAMUSCULAR; INTRAVENOUS at 12:53

## 2023-11-08 RX ADMIN — MAGNESIUM SULFATE HEPTAHYDRATE 2 G: 40 INJECTION, SOLUTION INTRAVENOUS at 12:17

## 2023-11-08 RX ADMIN — DEXTROSE MONOHYDRATE 250 ML: 50 INJECTION, SOLUTION INTRAVENOUS at 13:21

## 2023-11-08 RX ADMIN — OXALIPLATIN 120 MG: 5 INJECTION, SOLUTION INTRAVENOUS at 13:34

## 2023-11-08 RX ADMIN — SODIUM CHLORIDE 500 ML: 9 INJECTION, SOLUTION INTRAVENOUS at 12:00

## 2023-11-08 RX ADMIN — LEUCOVORIN CALCIUM 700 MG: 350 INJECTION, POWDER, LYOPHILIZED, FOR SOLUTION INTRAMUSCULAR; INTRAVENOUS at 13:36

## 2023-11-08 ASSESSMENT — PAIN SCALES - GENERAL
PAINLEVEL: NO PAIN (0)
PAINLEVEL: NO PAIN (0)

## 2023-11-08 NOTE — NURSING NOTE
Is the patient currently in the state of [place]? YES    Visit mode:VIDEO    If the visit is dropped, the patient can be reconnected by: VIDEO VISIT: Text to cell phone:   Telephone Information:   Mobile [phone]       Will anyone else be joining the visit? NO  (If patient encounters technical issues they should call [phone]    How would you like to obtain your AVS? MyChart    Are changes needed to the allergy or medication list? Pt stated no med changes    Reason for visit: Oncology Clinic Visit (Colon ca), Infusion (11/08 c10d1  Modified FOLFOX-6), Video Visit, and RECHECK    No other vitals to report per pt    Janette HONEYCUTTF

## 2023-11-08 NOTE — PROGRESS NOTES
Video-Visit Details     Video Start Time: 10:27AM     Type of service:  Video Visit     Video End Time: 10:45AM    Originating Location (pt. Location): Home     Distant Location (provider location):  [facility] off site     Platform used for Video Visit: [facility]    Hematology/Oncology Established Patient Follow Up Note      Today's Date: 11/8/2023    Reason for follow up: Sigmoid cancer    HISTORY OF PRESENT ILLNESS: Nadia Ladd is a 68 year old female who presents for follow-up    Patient has medical history including Crohn's disease on sulfasalazine, anemia secondary to iron deficiency and B12 deficiency, obesity, hypertension, prediabetes, eczema, pulmonary hypertension, hiatal hernia, mild splenomegaly (14.7 cm in 2/23)    - 2/23 pt noted increasing fatigue, found to have iron deficiency anemia w/hgb 6.8 (previously required RBC transfusion when dx w/Crohn's), did have intermittent changes in stool but not persistent (noted minimal blood in stool)   - 2/23 upper endoscopy for iron deficiency anemia and Crohn's disease: Hiatal hernia, normal stomach, normal duodenum  - 2/23 colonoscopy: A frond-like/villous partially obstructing large mass found in sigmoid colon, partially circumferential involving two thirds of the lumen circumference, 4 cm, unable to traverse, with oozing, s/p biopsy  PATHOLOGY:  A.  Stomach, antrum: Biopsy:  - Antral type mucosa with mild chronic inactive gastritis  - Immunostain for Helicobacter pylori is negative      B.  Colon, sigmoid, stricture: Biopsy:  - Invasive poorly differentiated carcinoma  The sigmoid stricture shows a high-grade carcinoma without definitive gland formation.  Given the poorly differentiated appearance, an immunohistochemical panel was performed to exclude the possibility of a tumor by direct extension or metastasis.   Immunohistochemical stains are performed and show the tumor to be diffusely positive for CK7 and  "partially positive for CK20 and SATB2.  There is no significant tumor reactivity for CDX2, GATA3, PAX8, TTF-1, and chromogranin.  Synaptophysin highlights very rare positive cells. Although the CK7/CK20 profile is not entirely typical for a colorectal carcinoma, immunostains for tumors of other origins are negative and a colorectal primary is still favored.   - Mismatch repair:  1) MLH1-loss of nuclear expression  2) MSH2-intact  3) MSH6-intact  4) PMS2-loss of nuclear expression  -MLH1 promoter methylation: NEGATIVE    -2/23 CT CAP:  A) 4 cm length mass in the proximal sigmoid colon. There is some irregularity and stranding in the adjacent pericolonic fat which may indicate tumor extension. There is no obstruction.   B) there is a second probable mass at the hepatic flexure of the colon measuring 2.5 cm in length   C)There are small lymph nodes in the mesial colon adjacent to the hepatic flexure abnormality and the sigmoid colonic mass. No lymphadenopathy by size criteria  D) There is submucosal fatty deposition in the colon, most severe in the distal sigmoid colon and rectum, which can be seen secondary to quiescent inflammatory bowel disease.  E) no liver lesion    -3/23 PET:  a. There is increased FDG avidity of the sigmoid colon with focal lobular wall thickening consistent with biopsy-proven adenocarcinoma.  b. Secondary focus noted at the hepatic flexure demonstrates elevated FDG avidity and lobulated wall thickening highly suspicious for a additional primary.  c. Additionally there is a smaller focus of wall thickening with elevated FDG avidity along the left aspect of the transverse colon  which is suspicious for a possible third focus of colonic malignancy.    -3/23 CEA 6.8  - 3/23 colorectal surgery consultation with - in the setting of Crohn's, at least sigmoid colectomy with possible total abdominal colectomy with either ileorectal anastomosis or end ileostomy (\"rectum can remain active and " "be actively surveyed annually\")    -3/23 genetic counseling, testing for Chan syndrome    - 4/5/2023 gynecologic oncology consultation for risk reduction surgery with hysterectomy and BSO at time of colectomy    -4/6/2023 laparoscopic converted to open total abdominal colectomy with ileorectal anastomosis, partial omentectomy, flexible sigmoidoscopy, TAHBSO  A. OMENTUM, RESECTION:  - Adipose tissue with no significant histopathologic abnormality  - No evidence of malignancy     B. COLON, RESECTION:  Mass #1 (ascending colon/hepatic flexure): Mixed neuroendocrine-non-neuroendocrine neoplasm (MINEN):  - Neuroendocrine carcinoma component (70%) and moderately-differentiated adenocarcinoma component (30%)  - Size = 5.0 cm, invading into muscularis propria  - Positive LVI  - Negative macroscopic tumor perforation, negative PNI  - Negative surgical resection margins  - IHC: Neuroendocrine portion: Negative for chromogranin and INSM1 but strongly express synaptophysin  - IHC: Adenocarcinoma portion: Positive for CK20, CDX2 negative for CK7, PAX8, ER, S100  Ki-67 proliferation index of approximately 80%.  MMR:  Intact nuclear expression of MLH1, MSH2, MHS6, PMS2  PDL1:  COMBINED POSITIVE SCORE (CPS): 55-60  TUMOR PROPORTION SCORE (TPS): 50%   pathogenic KRAS mutation (G13D) was identified (5.2%).  AJCC 8th edition: stage 3B mpT3 pN1a     Mass#2 (sigmoid colon): Poorly-differentiated carcinoma:  - Grade 3  - Size = 5.7 cm, invading into muscularis propria  - Negative for microscopic tumor perforation, LVI, perineural invasion  - Negative surgical resection margins  - IHC: Positive for scar and keratin AE1/AE3, negative for CK7, CK20, CDX2, PAX8, ER, chromogranin, CD56, p40, synaptophysin, S100, INI1, p40, INSM1  Ki-67 proliferation index of approximately 70%.  MMR: loss of nuclear expression MLH1 and PMS2, intact nuclear expression MSH2 and MSH6  PDL1:  COMBINED POSITIVE SCORE (CPS): 80  TUMOR PROPORTION SCORE (TPS): " 70%  pathogenic KRAS mutation (G13D) was identified (4.5%).  AJCC 8th edition: stage 3A mpT2 pN1a    - One of forty-one sampled lymph nodes positive (1/41)  - Benign appendix  - Tubular adenoma    C. UTERUS, CERVIX, BILATERAL FALLOPIAN TUBES & OVARIES, :  - Benign endometrial polyps; atrophic endometrium  - Uterus, cervix, bilateral fallopian tubes, and ovaries with no significant morphologic abnormalities  - No evidence of dysplasia or malignancy     D. SMALL INTESTINE, TERMINAL ILEUM, TERMINAL ILIUM:  - Benign ileum  - No evidence of dysplasia or malignancy  - Negative for metastases to one of one sampled lymph node (0 /1)     E. PROXIMAL ANASTOMOTIC RING:  - Benign small intestine  - No evidence of dysplasia or malignancy     F. DISTAL ANASTOMOTIC RING:  - Benign colon  - No evidence of dysplasia or malignancy    CRC NGS:   --mutations positive for KRAS G13D mutation 5.2% mass 1, KRAS G13D mutation 4.5% mass 2 (negative for AKT1; BRAF; ERBB2; KRAS; NRAS; PIK3CA; PTEN)  --TMB score: 17.064 mut/Mb mass 1 (CPS 55-60, TPS 50%), 60.943 mut/Mb mass 2 (CPS 80, TPS 70%)  --fusion negative including NTRK and RET for mass 1 and 2 (also negative for AKT1, AKT3, ALK, AR, VMPCLZ87, CLAUDINE, BCOR, BRAF, BRD3, BRD4, CAMTA1, CCNB3, CCND1, , CIC, CSF1, CSF1R, CTNNB1, DNAJB1, EGFR, EPC1, ERBB2, ERBB4, ERG, ESR1, ESRRA, ETV1, ETV4, ETV5, ETV6, EWSR1, FGFR1, FGFR2, FGFR3, FGR, FOS, FOSB, FOXO1, FOXO4, FOXR2, FUS, GLI1, GRB7, HMGA2, HRAS, IDH1, IDH2, INSR, JAK2, JAK3, JAZF1, KRAS, MAML2, MAP2K1, MAST1, MAST2, MEAF6, MET, MKL2, MN1, MSMB, MUSK, MYB, MYBL1, MYOD1, NCOA1, NCOA2, NOTCH1, NOTCH2, NR4A3, NRAS, NRG1, NTRK1, NTRK2, NTRK3, NUMBL1, NUTM1, PAX3, PDGFB, PDGFRA, PDGFRB, PHF1, PIK3CA, PKN1, PLAG1, PPARG, PRKACA, PRKCA, PRKCB, RAF1, RELA, RET, ROS1, RPSO2, RSPO3, SS18, STAT6, TAF15, TCF12, TERT, TFE3, TFEB, TFG, THADA, TMPRSS2, USP6, VGLL2, YAP1, YWHAE)    -4/11/2023 patient discharged from hospital, planning for 30 days of  lovenox postop, oxycodone for pain (required 1 unit PRBC for anemia)    -5/8/23 I presented this case at [facility] tumor board and their recommendations include:  - common to see this in Crohn's, overall poor prognosis, treat aggressively, may be poorly responsive to chemotherapy  - standard of care is mFOLFOX 6 x 12 cycles  - acknowledge that pt has high TPS and likely response to immunotherapy however not sufficient data or standard of care in stage 3 adjuvant setting  - add MMR testing to mass #1 hepatic flexure mass    - 5/9/23 admitted for acute renal failure w/Cr 3.82 w/K 7.0    - 5/19/23 second opinion at [facility] w/medical oncologist Dr. Acharya, thorough consultation and recommendations appreciated     - pt would like to proceed with FOFLOX (with dose reduced oxaliplatin due to kidney function)    -5/2023 genetic counseling: Negative for mutations in EPCAM, MLH1, MSH2, MSH6, and PMS2 genes.  Negative for mutations in APC, AXIN2, BMPR1A, CDH1, CHEK2, EPCAM, GREM1, MLH1, MSH2, MSH3, MSH6, MUTYH, NTHL1, PMS2, POLD1, POLE, PTEN, SMAD4, STK11, TP53, CDKN1B, MEN1, NF1, RET, TSC1, TSC2, and VHL genes    - 5/25/23 port placement    - 5/26/23 CT CAP w/ contrast and IVF before and after CT (baseline prior to starting tx):  1.  3 mm nodule RML and 5 mm lateral EDSON nodule, minimally increased from previous  2.  New 4 mm EDSON groundglass density nodule  3.  Several prominent borderline enlarged mediastinal lymph nodes slightly increased from previous  4.  Splenomegaly 14.5 cm  5. S/p subtotal colectomy with ileorectal anastomosis with postsurgical changes along the ventral abdominal wall midline    -5/31/23 started mFOLFOX 6 w/dose reduced oxaliplatin    - C2D1 6/14/23 held due to new onset paroxysmal A-fib with RVR requiring hospitalization 6/3/2023 (also hypoMg, dehydrated)  - 6/19/2023 I presented this case at [facility] colorectal tumor board at the [facility], it is possible that paroxysmal A-fib may be related to  5-FU.  Options include withholding further treatment versus retrialing 5-FU under the guidance of cardio oncology in an inpatient setting.  No other adjuvant treatment options available, including immunotherapy  - 6/30/2023 consultation with cardio oncologist Dr. Garza; I spoke with Dr. Garza 6/30/2023 as well, [id number] score 3, recommending starting apixaban 5 mg twice daily, okay to retrial FOLFOX while patient is on metoprolol  -7/18/2023  CT CAP- subcm lung nodules stable, borderline and mildly enlarged mediastinal LN and periportal LN stable, splenomegaly 15.7cm, Subtotal colectomy with ileorectal anastomosis. No acute inflammation or obstruction   - 7/19/2023 mFOLFOX6 C2D1  (inpatient w/continuous cardiac monitoring)      -9/23 CT CAP (after cycle 6)  -Subtotal colectomy, rectal anastomosis appears stable, some adjacent mild scarring, no bowel obstruction or inflammation, no free fluid  -No new adenopathy, no liver lesions  -Stable EDSON 3 mm nodule, other stable nodules at EDSON, RML  -Stable several small thoracic lymph nodes in mediastinum  -Mild wall thickening of the gallbladder  -Spleen is smaller measuring 13.7 cm  -Stable regions of bilateral cortical thinning in the kidneys, with few tiny cysts    INTERIM HISTORY:  REGIMEN:  mFOLFOX6  q14 days x12 cycles/6 months  Starting weight 183lb   C1D1 5/31/23 (complicated by hospitalization for afib w/RVR C1D4 6/3/23), C2D1 7/19/23 (inpatient w/continuous cardiac monitoring), C3D1 8/2/2023 (outpatient, admitted to ER for A-fib with RVR C3D3 8/4/23), C4D1 8/16/23, C5D1 8/30/2023, C6D1 9/13/2023, C7D1 9/26/23, C8D1 10/11/23, C9D1 10/25/23    oxaliplatin 64mg/m2 day 1 (dose reduced from 85mg/m2 2/2 Cr C1D1)  LV 350mg/m2 day 1  5FU 1200mg/m2 continuous infusion day 1-2 (total 2,400mg/m2 IV over 46-48 hours)  (5FU 400mg/m2 day 1 bolus (discontinued cycle 2 onwards))  PLUS IVF and magnesium replacement as needed day 1 and day 8 with optimization to Mg= 2 at least, AND  IVF day 3 on day of pump disconnect  PLUS (per cardiology recommendations) During the days of chemo and 2 days after ONLY: take long acting diltiazem 120 mg daily with 30 mg of short acting diltiazem as needed for atrial fibrillation with HR >110  Emetic risk: moderate  Febrile neutropenia risk: intermediate     C10D1 11/8/23 pending labs    Treatment related AE:  - hearing changes- described as muffled with intermittent ringing with obscured hearing R>L- overall improved since 1st cycle, and improving few days after starting chemo, Flonase BID as needed, ENT consultation 10/23/23- plan for hearing aids b/l; (present prior to starting chemo, improved w/steroids, now stable)   - Paroxysmal A-fib with RVR and PAC- admitted to [facility] 6/3/2023 - 6/5/2023 for this, 6/3/2023 echo EF 55-60%, mild pulmonary hypertension, mild mitral regurgitation, on metoprolol XL 25 mg p.o. daily for ongoing palpitations, 7/23 Cardio oncology consult w/Dr. Garza, on eliquis, no recurrence of RVR while inpatient for cycle 2 w/continuous cardiac monitoring; IVF and magnesium replacement to magnesium = 2 minimum added to D1 and D8 and IVF day 3. 8/4/2023 C3D3 patient went to ER for A-fib with palpitations (no other sx), HR 80-130s, labs WNL, CXR negative, given IV fluids, placed on diltiazem drip, HR improved to .  8/15/2023 cardiology follow-up: Increase metoprolol XL to 50 mg daily; During the days of chemo and 2 days after ONLY: take long acting diltiazem 120 mg daily with 30 mg of short acting diltiazem as needed for atrial fibrillation with HR >110.  8/18/2023 patient called in reporting heart rate up to 140, sensation of heart racing, improved to 67 after taking extra prescribed dose of diltiazem and then asymptomatic.  8/19/2023 patient presented to ER with chest tightness and palpitations, had taken total of 3 tabs of diltiazem 30mg short acting at that point, EKG with sinus rhythm, rate 63 with some noted  "bradycardia while in the ER, magnesium and potassium normal, creatinine slightly elevated, given 1 L IVF.  xarelto now affordable; 9/12/2023 cardiology [id number] changes, 10/27/23 C9D3 disconnect pt \"had episode of afib\", heart racing to HR 140s, that was managed with short acting diltiazem and deep breathing, notably pt w/persistently low Mg despite 2g IV Mg replacement for Mg 1.6, cardiology follow-up 12/14/2023;   - anxiety- working with psychology prn  - Delayed neutropenia- neutropenic thierry ANC 0.5 2 weeks out from cycle 1, afebrile, 6/23 DPD deficiency testing negative; resolved cycle 2 onwards 5FU bolus dropped   -Normocytic anemia- due to chemotherapy and iron deficiency, s/p ferric carboxymaltose 750mg x2 doses 6/14/23 and 6/28/23, 7/23 iron studies improved.  8/23 hgb 13.3, 9/23 hgb 12.1, 10/23 hgb 11.1, 11/23 hgb 10.8  -Thrombocytopenia- 2/2 chemotherapy, no bruising or bleeding, for now plt >75K, while on xarelto  - MARYA on CKD- following w/nephro- follow up 9/23, Cr improved from 3, drinking 2.5 16oz bottles per day and getting IV fluids day 1, day 3 and day 8 of each chemotherapy cycle, nephrology follow-up 12/4/2023  - elevated LFTs- AST 50, Alk phos 282, alk phos iso enzyme- majority liver, liver normal on 9/23 CT;   - diarrhea- grade 1, 2 episodes/week, also affected by food, improved with imodium 2 total   - neuropathy- grade 1, tingling fingers > toes, grade 1 numbness b/l 3rd digit, lasting 3-4 days   - cold sensitivity- grade 1, fingers and mouth, most severe right after tx and previously improving before next cycle due and now it is not, using straw and gloves when outside   - Port flipped-see my note from 8/2/2023, 8/21/2023 port removed and replacement with IR  - \"bottom is sore\"- pt has hemorrhoids, no active bleeding or thrombosed hemorrhoids but notes soreness after chemo, witch hazel wipes are helping, sitz bath are helping         REVIEW OF SYSTEMS:   A 14 point ROS was reviewed with " pertinent positives and negatives in the HPI.        HOME MEDICATIONS:  Current Outpatient Medications   Medication Sig Dispense Refill    cyanocobalamin 1000 MCG TBCR Take 1,000 mcg by mouth daily 100 tablet 1    dexamethasone (DECADRON) 4 MG tablet Take 2 tablets (8 mg) by mouth daily Take for 2 days, starting the day after chemo. Take with food. 40 tablet 0    diltiazem (CARDIZEM) 30 MG tablet Take 1 tablet (30 mg) by mouth 3 times daily (Patient not taking: Reported on 10/24/2023) 90 tablet 0    diltiazem ER COATED BEADS (CARDIZEM CD/CARTIA XT) 120 MG 24 hr capsule Take 1 capsule (120 mg) by mouth daily On chemo days and 2 days post chemo infusion. 60 capsule 3    Ferrous Sulfate (IRON) 325 (65 Fe) MG tablet Take 1 tablet by mouth daily      lidocaine-prilocaine (EMLA) 2.5-2.5 % external cream Apply topically as needed for moderate pain Apply 15-20 minutes prior to port access 1 g 4    loperamide (IMODIUM) 2 MG capsule Take 2 mg by mouth daily as needed for diarrhea      magnesium oxide 400 MG tablet Take 1 tablet (400 mg) by mouth 2 times daily 60 tablet 0    metoprolol succinate ER (TOPROL XL) 50 MG 24 hr tablet Take 1 tablet (50 mg) by mouth daily 90 tablet 3    Multiple Vitamins-Iron (MULTI-DAY PLUS IRON PO) Take 1 tablet by mouth daily      prochlorperazine (COMPAZINE) 10 MG tablet Take 10 mg by mouth every 6 hours as needed for nausea or vomiting      rivaroxaban ANTICOAGULANT (XARELTO) 20 MG TABS tablet Take 1 tablet (20 mg) by mouth daily (with dinner) 90 tablet 3    sodium bicarbonate 650 MG tablet Take 1 tablet (650 mg) by mouth 2 times daily 120 tablet 3         ALLERGIES:  Allergies   Allergen Reactions    No Known Drug Allergy          PAST MEDICAL HISTORY:  Past Medical History:   Diagnosis Date    Arthropathia 08/05/2010    B12 deficiency anemia 10/21/2010    Benign essential hypertension with target blood pressure below 140/90 10/10/2016    CARDIOVASCULAR SCREENING; LDL GOAL LESS THAN 160  10/31/2010    Crohn's colitis (H) 02/15/2011    Dermatitis-dishydrotic eczema-severe 08/05/2010    Hiatal hernia     HTN (hypertension) 07/21/2011    Iron deficiency anemia 10/21/2010    Malignant neoplasm of sigmoid colon (H)     Obesity     Primary pulmonary hypertension (H) 04/06/2010         PAST SURGICAL HISTORY:  Past Surgical History:   Procedure Laterality Date    COLECTOMY WITHOUT COLOSTOMY N/A 4/6/2023    Procedure: Laparoscopic Converted to Open Total abdominal colectomy;  Surgeon: Jerome Ashley MD;  Location: [facility] OR    COLONOSCOPY  10/11/10    COLONOSCOPY N/A 2/27/2023    Procedure: ATTEMPTED COLONOSCOPY WITH SIGMOID STRICTURE BIOPSY;  Surgeon: Jonathan Alcocer MD;  Location: [facility]    ESOPHAGOSCOPY, GASTROSCOPY, DUODENOSCOPY (EGD), COMBINED N/A 2/27/2023    Procedure: ESOPHAGOGASTRODUODENOSCOPY, WITH BIOPSY;  Surgeon: Jonathan Alcocer MD;  Location: [facility]    HYSTERECTOMY TOTAL ABDOMINAL, BILATERAL SALPINGO-OOPHORECTOMY, COMBINED N/A 4/6/2023    Procedure: Hysterectomy total abdominal, bilateral salpingo-oophorectomy;  Surgeon: Sunitha Olea MD;  Location: [facility] OR    INSERT PORT VASCULAR ACCESS Right 5/25/2023    Procedure: Ultrasound-guided right internal jugular venous access port placement with fluoroscopy;  Surgeon: Martin Thomas DO;  Location: [facility]    IR CHEST PORT PLACEMENT > 5 YRS OF AGE  8/21/2023    IR PORT CHECK RIGHT  7/18/2023    IR PORT REMOVAL RIGHT  8/21/2023    SIGMOIDOSCOPY FLEXIBLE N/A 4/6/2023    Procedure: Sigmoidoscopy flexible;  Surgeon: Jerome Ashley MD;  Location: [facility] OR    SURGICAL HISTORY OF -   06[birth year]14[birth year]76    Perineorrhaphy, for widening vaginal orifice    ZZC EXPLORATORY OF ABDOMEN  1989    laparoscopy         SOCIAL HISTORY:  Social History     Socioeconomic History    Marital status:      Spouse name: Not on file    Number of children: Not on file    Years of education: Not on file    Highest education level: Not on file    Occupational History    Not on file   Tobacco Use    Smoking status: Never     Passive exposure: Current    Smokeless tobacco: Never   Substance and Sexual Activity    Alcohol use: No    Drug use: No    Sexual activity: Yes     Partners: Male   Other Topics Concern    Parent/sibling w/ CABG, MI or angioplasty before 65F 55M? Not Asked   Social History Narrative    Not on file     Social Determinants of Health     Financial Resource Strain: Not on file   Food Insecurity: Not on file   Transportation Needs: Not on file   Physical Activity: Not on file   Stress: Not on file   Social Connections: Not on file   Interpersonal Safety: Not on file   Housing Stability: Not on file         FAMILY HISTORY:  Family History   Problem Relation Age of Onset    Hypertension Mother         on meds, alive    Cerebrovascular Disease Father         stroke about age 65,  of cancer at 68 yrs    Diabetes Father         eventually took insulin    Anemia Father         Pernisios anemia    Kidney Disease Niece         kidney transplant    Kidney Disease Nephew         kidney transplant    Venous thrombosis No family hx of     Anesthesia Reaction No family hx of          PHYSICAL EXAM:  Vital signs:  LMP  (LMP Unknown)        LABS:   Latest Reference Range & Units 23 11:17   Sodium 135 - 145 mmol/L 142   Potassium 3.4 - 5.3 mmol/L 4.2   Chloride 98 - 107 mmol/L 109 (H)   Carbon Dioxide (CO2) 22 - 29 mmol/L 23   Urea Nitrogen 8.0 - 23.0 mg/dL 17.7   Creatinine 0.51 - 0.95 mg/dL 1.31 (H)   GFR Estimate >60 mL/min/1.73m2 44 (L)   Calcium 8.8 - 10.2 mg/dL 8.8   Anion Gap 7 - 15 mmol/L 10   Magnesium 1.7 - 2.3 mg/dL 1.8   Albumin 3.5 - 5.2 g/dL 3.7   Protein Total 6.4 - 8.3 g/dL 6.1 (L)   Alkaline Phosphatase 35 - 104 U/L 329 (H)   ALT 0 - 50 U/L 45   AST 0 - 45 U/L 56 (H)   Bilirubin Total <=1.2 mg/dL 0.5   Ferritin 11 - 328 ng/mL 954 (H)   Glucose 70 - 99 mg/dL 135 (H)   Iron 37 - 145 ug/dL 102   Iron Binding Capacity 240 - 430 ug/dL  246   Iron Sat Index 15 - 46 % 41   WBC 4.0 - 11.0 10e3/uL 3.4 (L)   Hemoglobin 11.7 - 15.7 g/dL 10.7 (L)   Hematocrit 35.0 - 47.0 % 31.9 (L)   Platelet Count 150 - 450 10e3/uL 107 (L)   RBC Count 3.80 - 5.20 10e6/uL 3.33 (L)   MCV 78 - 100 fL 96   MCH 26.5 - 33.0 pg 32.1   MCHC 31.5 - 36.5 g/dL 33.5   RDW 10.0 - 15.0 % 16.7 (H)   % Neutrophils % 64   % Lymphocytes % 17   % Monocytes % 19   % Eosinophils % 0   % Basophils % 0   Absolute Basophils 0.0 - 0.2 10e3/uL 0.0   Absolute Eosinophils 0.0 - 0.7 10e3/uL 0.0   Absolute Immature Granulocytes <=0.4 10e3/uL 0.0   Absolute Lymphocytes 0.8 - 5.3 10e3/uL 0.6 (L)   Absolute Monocytes 0.0 - 1.3 10e3/uL 0.6   % Immature Granulocytes % 0   Absolute Neutrophils 1.6 - 8.3 10e3/uL 2.2   Absolute NRBCs 10e3/uL 0.0   NRBCs per 100 WBC <1 /100 0   (H): Data is abnormally high  (L): Data is abnormally low    PATHOLOGY:    IMAGING:      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 mutated, MARISSA, TPS 50%  # sigmoid colon poorly-differentiated carcinoma, KRAS G13D mutated, loss of MLH1 and PMS2, TPS 70%  - 2/23 colonoscopy: A frond-like/villous partially obstructing large mass found in sigmoid colon, partially circumferential involving two thirds of the lumen circumference, 4 cm, unable to traverse, with oozing, s/p biopsy, PATHOLOGY: Invasive poorly differentiated carcinoma, IHC panel excludes tumors of other origin, colorectal primary is favored, loss of nuclear expression of MLH1 and PMS2, MLH1 promoter methylation negative, [id number] mutation negative  -2/23 CT CAP: Shows known 4 cm proximal sigmoid colon mass with possible tumor extension (irregularity and stranding and adjacent pericolonic fat) without obstruction AND probable second mass 2.5 cm at hepatic  flexure of colon; small lymph nodes adjacent to both masses (no lymphadenopathy by size criteria)  -3/23 PET:  a. There is increased FDG avidity of the sigmoid colon with focal lobular wall thickening consistent with biopsy-proven adenocarcinoma.  b. Secondary focus noted at the hepatic flexure demonstrates elevated FDG avidity and lobulated wall thickening highly suspicious for a additional primary.  c. Additionally there is a smaller focus of wall thickening with elevated FDG avidity along the left aspect of the transverse colon which is suspicious for a possible third focus of colonic malignancy.  - 3/23 CEA 6.8  -4/6/2023 laparoscopic converted to open total abdominal colectomy with ileorectal anastomosis, partial omentectomy, flexible sigmoidoscopy, TAHBSO  PATHOLOGY:  Of note, omental resection, terminal ileum, proximal and distal anastomotic rings, uterus, cervix, bilateral fallopian tubes and ovaries negative for malignancy    Mass #1 (ascending colon/hepatic flexure): Mixed neuroendocrine-non-neuroendocrine neoplasm (MINEN):  - Neuroendocrine carcinoma component (70%) and moderately-differentiated adenocarcinoma component (30%)  - Size = 5.0 cm, invading into muscularis propria, Positive LVI  - IHC: Neuroendocrine portion: Negative for chromogranin and INSM1 but strongly express synaptophysin  - IHC: Adenocarcinoma portion: Positive for CK20, CDX2 negative for CK7, PAX8, ER, S100  Ki-67 proliferation index of approximately 80%.  MARISSA   PDL1:  COMBINED POSITIVE SCORE (CPS): 55-60  TUMOR PROPORTION SCORE (TPS): 50%   pathogenic KRAS mutation (G13D) was identified (5.2%).  MMR testing: intact  AJCC 8th edition: stage 3B mpT3 pN1a     Mass #2 (sigmoid colon): Poorly-differentiated carcinoma:  - Grade 3  - Size = 5.7 cm, invading into muscularis propria  - IHC: Positive for scar and keratin AE1/AE3, negative for CK7, CK20, CDX2, PAX8, ER, chromogranin, CD56, p40, synaptophysin, S100, INI1, p40, INSM1  Ki-67  proliferation index of approximately 70%.  MMR testing: loss of MLH1 and PMS2  PDL1:  COMBINED POSITIVE SCORE (CPS): 80  TUMOR PROPORTION SCORE (TPS): 70%  pathogenic KRAS mutation (G13D) was identified (4.5%).  MMR testing: loss of MLH1 and PMS2, intact MSH2 and MSH6  AJCC 8th edition: stage 3A mpT2 pN1a    - One of forty-one sampled lymph nodes positive (1/41), d/w pathology- unable to determine which mass is metastatic to LN    - 4/23 MRI brain w/wo contrast LAURENT  - 5/26/23 CT CAP w/ contrast and IVF before and after CT (post op baseline prior to starting tx):  1.  3 mm nodule RML and 5 mm lateral EDSON nodule, minimally increased from previous  2.  New 4 mm EDSON groundglass density nodule  3.  Several prominent borderline enlarged mediastinal lymph nodes slightly increased from previous  4.  Splenomegaly 14.5 cm  5. S/p subtotal colectomy with ileorectal anastomosis with postsurgical changes along the ventral abdominal wall midline  - 5/8/23 I presented this case at [facility] tumor board as above   - 5/19/23 second opinion at [facility] w/medical oncologist Dr. Acharya, thorough consultation and recommendations appreciated, overall agree w/FOLFOX x6 months, possible nivo or ipi nivo in second line; if metastatic platinum etoposide vs ipi nivo  - 5/25/23 port placement    -5/2023 genetic counseling: Negative for mutations detailed below     REGIMEN:  mFOLFOX6  q14 days x12 cycles/6 months  Starting weight 183lb   C1D1 5/31/23 (complicated by hospitalization for afib w/RVR C1D4 6/3/23), C2D1 7/19/23 (inpatient w/continuous cardiac monitoring), C3D1 8/2/2023 (outpatient, admitted to ER for A-fib with RVR C3D3 8/4/23), C4D1 8/16/23, C5D1 8/30/2023, C6D1 9/13/2023, C7D1 9/26/23, C8D1 10/11/23, C9D1 10/25/23    oxaliplatin 64mg/m2 day 1 (dose reduced from 85mg/m2 2/2 Cr C1D1)  LV 350mg/m2 day 1  5FU 1200mg/m2 continuous infusion day 1-2 (total 2,400mg/m2 IV over 46-48 hours)  (5FU 400mg/m2 day 1 bolus (discontinued cycle 2  onwards))  PLUS IVF and magnesium replacement as needed day 1 and day 8 with optimization to Mg= 2 at least, AND IVF day 3 on day of pump disconnect  PLUS (per cardiology recommendations) During the days of chemo and 2 days after ONLY: take long acting diltiazem 120 mg daily with 30 mg of short acting diltiazem as needed for atrial fibrillation with HR >110  Emetic risk: moderate  Febrile neutropenia risk: intermediate     C10D1 11/8/23 pending labs w/special attention to ANC, plt, LFTs, Cr    Treatment related AE:  - hearing changes-stable w/dose reduced oxaliplatin, flonase prn, pending b/l hearing aids  - Paroxysmal A-fib with RVR and PAC- 8/15/2023 cardiology follow-up: Increase metoprolol XL to 50 mg daily; During the days of chemo and 2 days after take long acting diltiazem 120 mg daily with 30 mg of short acting diltiazem as needed for atrial fibrillation with HR >110, IV fluid day 1, 3, 8; IV Mg to goal Mg 2 day 1 and 8- updated orders for Mg replacement added to plan (ie if Mg 1.6 give 4g Mg not 2g give persistent hypoMg), cardiology follow-up 12/14/2023  - anxiety- continue following with oncology psychology team  - Normocytic anemia- due to chemotherapy and iron deficiency, s/p ferric carboxymaltose 750mg x2 doses 6/14/23 and 6/28/23; repeat iron studies normal. Repeat iron studies, B12, RBC folate today  -Thrombocytopenia- mild, 2/2 chemo, plt remain >75K- can continue chemo, monitor as pt is on xarelto   - MARYA on CKD- following w/nephro, f/u with nephro 12/4/2023; continue increasing PO fluids, continue day 1, 3, 8 IVF  - elevated LFTs- AST 50, Alk phos 282, alk phos iso enzyme- majority liver, liver normal on 9/23 CT, will check liver MRI  - diarrhea- grade 1  - neuropathy- grade 1   - cold sensitivity- grade 1, start gabapentin starting at bedtime and titrating up to BID and then TID if needed  - hemorrhoids- witch hazel pads, sitz baths prn      Imaging:  -9/23 CT CAP (after 6 cycles of mFOLFOX 6  without 5-FU bolus):  -Subtotal colectomy with stable appearing rectal anastomosis, some adjacent mild scarring, no bowel obstruction or inflammation, no free fluid, no new adenopathy, no liver lesions, no bone lesions  -Mild wall thickening of the gallbladder  - Stable small pulmonary nodules and small thoracic lymph nodes at mediastinum, splenomegaly smaller 13.7 cm    - 7/18/23 CT CAP (repeat baseline after treatment delay due to A-fib)- subcm lung nodules stable, borderline and mildly enlarged mediastinal LN and periportal LN stable, splenomegaly 15.7cm, Subtotal colectomy with ileorectal anastomosis. No acute inflammation or obstruction    -Repeat CT CAP after cycle 12, 1/2/2024    Labs:  3/23 CEA 6.8  5/23 CEA 1.8  7/23 CEA 2.8  10/23 CEA 2.0  12/23 CEA pending    Other:  - f/u with Dr. Ashley 4/2024 for rigid proctoscopy     #parxosymal afib  - within 3 days of receiving 5FU, prior cardiac risk factors htn, prediabetes  - 6/3/23 presented to ER w/lightheadedness, dizziness, cardioverted s/p diltiazem ggt in ER  - 6/23 DPD deficiency testing negative  - currently on metoprolol XL 25 mg daily, apixaban 5 mg twice daily  - 6/30/2023 consultation with cardio oncologist Dr. Garza; I spoke with Dr. Garza 6/30/2023 as well, [id number] score 3, recommending starting apixaban 5 mg twice daily, okay to retrial FOLFOX while patient is on metoprolol.  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  Negative for mutations in APC, AXIN2, BMPR1A, CDH1, CHEK2, EPCAM, GREM1, MLH1, MSH2, MSH3, MSH6, MUTYH, NTHL1, PMS2, POLD1, POLE, PTEN, SMAD4, STK11, TP53, CDKN1B, MEN1, NF1, RET, TSC1, TSC2, and VHL genes    #Anemia secondary to iron deficiency and B12 deficiency  - terminal ileum removed at time of colon surgery, monitor for nutrient def  - 2/23 hgb 6.8, ferritin 21, iron sat index 10, TIBC 265, iron 27, b12 1493  - On B12 1000 mcg p.o. daily and ferrous sulfate 325 mg p.o. daily  - 4/9/23 s/p 1 uprbcs  - 5/9/23 hgb 11.3,5/30/23 hgb 9.4  - based on Ganzoni equation w/goal hgb 14 and 500mg needed for iron stores, pts iron deficit is 1400 mg  - s/p ferric carboxymaltose 750mg x2 doses 6/14/23 and 6/28/23  - 7/11/23 ferritin 1022, iron sat 22, TIBC 201, iron 44    - 8/23 hgb 13.3  - 10/23 hgb 11.1  - anemia workup as above    #Crohn's  - dx since at least 2010     RTC 2 weeks for follow up with RADHA, labs, chemo #11  RTC 4 weeks for follow up with me, labs, chemo #12      Irene Bateman DO  Hematology/Oncology  [facility]

## 2023-11-08 NOTE — PROGRESS NOTES
"Infusion Nursing Note:  Nadia Ladd presents today for C10D1 Leucovorin, Oxaliplatin and 5FU pump with mag replacement.  Patient seen by provider today: Yes: Virtual with Fransico.   present during visit today: Not Applicable.    Note: Feeling well today.      Intravenous Access:  Implanted Port.    Treatment Conditions:  Lab Results   Component Value Date    HGB 10.7 (L) 11/08/2023    WBC 3.4 (L) 11/08/2023    ANEU 7.6 08/01/2023    ANEUTAUTO 2.2 11/08/2023     (L) 11/08/2023        Lab Results   Component Value Date     11/08/2023    POTASSIUM 4.2 11/08/2023    MAG 1.8 11/08/2023    CR 1.31 (H) 11/08/2023    LIVIER 8.8 11/08/2023    BILITOTAL 0.5 11/08/2023    ALBUMIN 3.7 11/08/2023    ALT 45 11/08/2023    AST 56 (H) 11/08/2023     Mag 1.8    Results reviewed, labs MET treatment parameters, ok to proceed with treatment.      Post Infusion Assessment:  Patient tolerated infusion without incident.     Prior to discharge: Port is secured in place with tegaderm and flushed with 10cc NS with positive blood return noted.    Continuous home infusion CADD pump connected.    All connectors secured in place and clamps taped open.    Pump started, \"running\" noted on display (CADD): YES.   Capillary element taped to pt's skin per protocol.  Pump Connection double checked with LES Ladd RN.  Patient instructed to call our clinic or [facility] with any questions or concerns at home.  Patient verbalized understanding.    Patient set up for pump disconnect at our clinic on Friday, 11/10 @ 1400..      Discharge Plan:   Patient discharged in stable condition accompanied by: self.  Departure Mode: Ambulatory.      Marycarmen Cardenas RN  "

## 2023-11-08 NOTE — PROGRESS NOTES
Infusion Nursing Note:  Nadia Ladd presents today for port labs.    Patient seen by provider today: Yes: Virtual with Fransico.   present during visit today: Not Applicable.    Note: N/A.      Intravenous Access:  Labs drawn without difficulty.      Marycarmen Cardenas RN

## 2023-11-08 NOTE — LETTER
11/8/2023         RE: Nadia Ladd  [address]  [facility] [address]        Dear Colleague,    Thank you for referring your patient, Nadia Ladd, to the [facility]. Please see a copy of my visit note below.    Video-Visit Details     Video Start Time: 10:27AM     Type of service:  Video Visit     Video End Time: 10:45AM    Originating Location (pt. Location): Home     Distant Location (provider location):  [facility] off site     Platform used for Video Visit: Bio     [facility]    Hematology/Oncology Established Patient Follow Up Note      Today's Date: 11/8/2023    Reason for follow up: Sigmoid cancer    HISTORY OF PRESENT ILLNESS: Nadia Ladd is a 68 year old female who presents for follow-up    Patient has medical history including Crohn's disease on sulfasalazine, anemia secondary to iron deficiency and B12 deficiency, obesity, hypertension, prediabetes, eczema, pulmonary hypertension, hiatal hernia, mild splenomegaly (14.7 cm in 2/23)    - 2/23 pt noted increasing fatigue, found to have iron deficiency anemia w/hgb 6.8 (previously required RBC transfusion when dx w/Crohn's), did have intermittent changes in stool but not persistent (noted minimal blood in stool)   - 2/23 upper endoscopy for iron deficiency anemia and Crohn's disease: Hiatal hernia, normal stomach, normal duodenum  - 2/23 colonoscopy: A frond-like/villous partially obstructing large mass found in sigmoid colon, partially circumferential involving two thirds of the lumen circumference, 4 cm, unable to traverse, with oozing, s/p biopsy  PATHOLOGY:  A.  Stomach, antrum: Biopsy:  - Antral type mucosa with mild chronic inactive gastritis  - Immunostain for Helicobacter pylori is negative      B.  Colon, sigmoid, stricture: Biopsy:  - Invasive poorly differentiated carcinoma  The sigmoid stricture shows a high-grade carcinoma without definitive gland formation.  Given the  poorly differentiated appearance, an immunohistochemical panel was performed to exclude the possibility of a tumor by direct extension or metastasis.   Immunohistochemical stains are performed and show the tumor to be diffusely positive for CK7 and partially positive for CK20 and SATB2.  There is no significant tumor reactivity for CDX2, GATA3, PAX8, TTF-1, and chromogranin.  Synaptophysin highlights very rare positive cells. Although the CK7/CK20 profile is not entirely typical for a colorectal carcinoma, immunostains for tumors of other origins are negative and a colorectal primary is still favored.   - Mismatch repair:  1) MLH1-loss of nuclear expression  2) MSH2-intact  3) MSH6-intact  4) PMS2-loss of nuclear expression  -MLH1 promoter methylation: NEGATIVE    -2/23 CT CAP:  A) 4 cm length mass in the proximal sigmoid colon. There is some irregularity and stranding in the adjacent pericolonic fat which may indicate tumor extension. There is no obstruction.   B) there is a second probable mass at the hepatic flexure of the colon measuring 2.5 cm in length   C)There are small lymph nodes in the mesial colon adjacent to the hepatic flexure abnormality and the sigmoid colonic mass. No lymphadenopathy by size criteria  D) There is submucosal fatty deposition in the colon, most severe in the distal sigmoid colon and rectum, which can be seen secondary to quiescent inflammatory bowel disease.  E) no liver lesion    -3/23 PET:  a. There is increased FDG avidity of the sigmoid colon with focal lobular wall thickening consistent with biopsy-proven adenocarcinoma.  b. Secondary focus noted at the hepatic flexure demonstrates elevated FDG avidity and lobulated wall thickening highly suspicious for a additional primary.  c. Additionally there is a smaller focus of wall thickening with elevated FDG avidity along the left aspect of the transverse colon  which is suspicious for a possible third focus of colonic  "malignancy.    -3/23 CEA 6.8  - 3/23 colorectal surgery consultation with - in the setting of Crohn's, at least sigmoid colectomy with possible total abdominal colectomy with either ileorectal anastomosis or end ileostomy (\"rectum can remain active and be actively surveyed annually\")    -3/23 genetic counseling, testing for Chan syndrome    - 4/5/2023 gynecologic oncology consultation for risk reduction surgery with hysterectomy and BSO at time of colectomy    -4/6/2023 laparoscopic converted to open total abdominal colectomy with ileorectal anastomosis, partial omentectomy, flexible sigmoidoscopy, TAHBSO  A. OMENTUM, RESECTION:  - Adipose tissue with no significant histopathologic abnormality  - No evidence of malignancy     B. COLON, RESECTION:  Mass #1 (ascending colon/hepatic flexure): Mixed neuroendocrine-non-neuroendocrine neoplasm (MINEN):  - Neuroendocrine carcinoma component (70%) and moderately-differentiated adenocarcinoma component (30%)  - Size = 5.0 cm, invading into muscularis propria  - Positive LVI  - Negative macroscopic tumor perforation, negative PNI  - Negative surgical resection margins  - IHC: Neuroendocrine portion: Negative for chromogranin and INSM1 but strongly express synaptophysin  - IHC: Adenocarcinoma portion: Positive for CK20, CDX2 negative for CK7, PAX8, ER, S100  Ki-67 proliferation index of approximately 80%.  MMR:  Intact nuclear expression of MLH1, MSH2, MHS6, PMS2  PDL1:  COMBINED POSITIVE SCORE (CPS): 55-60  TUMOR PROPORTION SCORE (TPS): 50%   pathogenic KRAS mutation (G13D) was identified (5.2%).  AJCC 8th edition: stage 3B mpT3 pN1a     Mass#2 (sigmoid colon): Poorly-differentiated carcinoma:  - Grade 3  - Size = 5.7 cm, invading into muscularis propria  - Negative for microscopic tumor perforation, LVI, perineural invasion  - Negative surgical resection margins  - IHC: Positive for scar and keratin AE1/AE3, negative for CK7, CK20, CDX2, PAX8, ER, chromogranin, " CD56, p40, synaptophysin, S100, INI1, p40, INSM1  Ki-67 proliferation index of approximately 70%.  MMR: loss of nuclear expression MLH1 and PMS2, intact nuclear expression MSH2 and MSH6  PDL1:  COMBINED POSITIVE SCORE (CPS): 80  TUMOR PROPORTION SCORE (TPS): 70%  pathogenic KRAS mutation (G13D) was identified (4.5%).  AJCC 8th edition: stage 3A mpT2 pN1a    - One of forty-one sampled lymph nodes positive (1/41)  - Benign appendix  - Tubular adenoma    C. UTERUS, CERVIX, BILATERAL FALLOPIAN TUBES & OVARIES, :  - Benign endometrial polyps; atrophic endometrium  - Uterus, cervix, bilateral fallopian tubes, and ovaries with no significant morphologic abnormalities  - No evidence of dysplasia or malignancy     D. SMALL INTESTINE, TERMINAL ILEUM, TERMINAL ILIUM:  - Benign ileum  - No evidence of dysplasia or malignancy  - Negative for metastases to one of one sampled lymph node (0 /1)     E. PROXIMAL ANASTOMOTIC RING:  - Benign small intestine  - No evidence of dysplasia or malignancy     F. DISTAL ANASTOMOTIC RING:  - Benign colon  - No evidence of dysplasia or malignancy    CRC NGS:   --mutations positive for KRAS G13D mutation 5.2% mass 1, KRAS G13D mutation 4.5% mass 2 (negative for AKT1; BRAF; ERBB2; KRAS; NRAS; PIK3CA; PTEN)  --TMB score: 17.064 mut/Mb mass 1 (CPS 55-60, TPS 50%), 60.943 mut/Mb mass 2 (CPS 80, TPS 70%)  --fusion negative including NTRK and RET for mass 1 and 2 (also negative for AKT1, AKT3, ALK, AR, AROXFY73, CLAUDINE, BCOR, BRAF, BRD3, BRD4, CAMTA1, CCNB3, CCND1, , CIC, CSF1, CSF1R, CTNNB1, DNAJB1, EGFR, EPC1, ERBB2, ERBB4, ERG, ESR1, ESRRA, ETV1, ETV4, ETV5, ETV6, EWSR1, FGFR1, FGFR2, FGFR3, FGR, FOS, FOSB, FOXO1, FOXO4, FOXR2, FUS, GLI1, GRB7, HMGA2, HRAS, IDH1, IDH2, INSR, JAK2, JAK3, JAZF1, KRAS, MAML2, MAP2K1, MAST1, MAST2, MEAF6, MET, MKL2, MN1, MSMB, MUSK, MYB, MYBL1, MYOD1, NCOA1, NCOA2, NOTCH1, NOTCH2, NR4A3, NRAS, NRG1, NTRK1, NTRK2, NTRK3, NUMBL1, NUTM1, PAX3, PDGFB, PDGFRA, PDGFRB,  PHF1, PIK3CA, PKN1, PLAG1, PPARG, PRKACA, PRKCA, PRKCB, RAF1, RELA, RET, ROS1, RPSO2, RSPO3, SS18, STAT6, TAF15, TCF12, TERT, TFE3, TFEB, TFG, THADA, TMPRSS2, USP6, VGLL2, YAP1, YWHAE)    -4/11/2023 patient discharged from hospital, planning for 30 days of lovenox postop, oxycodone for pain (required 1 unit PRBC for anemia)    -5/8/23 I presented this case at [facility] CRC tumor board and their recommendations include:  - common to see this in Crohn's, overall poor prognosis, treat aggressively, may be poorly responsive to chemotherapy  - standard of care is mFOLFOX 6 x 12 cycles  - acknowledge that pt has high TPS and likely response to immunotherapy however not sufficient data or standard of care in stage 3 adjuvant setting  - add MMR testing to mass #1 hepatic flexure mass    - 5/9/23 admitted for acute renal failure w/Cr 3.82 w/K 7.0    - 5/19/23 second opinion at [facility] w/medical oncologist Dr. Acharya, thorough consultation and recommendations appreciated     - pt would like to proceed with FOFLOX (with dose reduced oxaliplatin due to kidney function)    -5/2023 genetic counseling: Negative for mutations in EPCAM, MLH1, MSH2, MSH6, and PMS2 genes.  Negative for mutations in APC, AXIN2, BMPR1A, CDH1, CHEK2, EPCAM, GREM1, MLH1, MSH2, MSH3, MSH6, MUTYH, NTHL1, PMS2, POLD1, POLE, PTEN, SMAD4, STK11, TP53, CDKN1B, MEN1, NF1, RET, TSC1, TSC2, and VHL genes    - 5/25/23 port placement    - 5/26/23 CT CAP w/ contrast and IVF before and after CT (baseline prior to starting tx):  1.  3 mm nodule RML and 5 mm lateral EDSON nodule, minimally increased from previous  2.  New 4 mm EDSON groundglass density nodule  3.  Several prominent borderline enlarged mediastinal lymph nodes slightly increased from previous  4.  Splenomegaly 14.5 cm  5. S/p subtotal colectomy with ileorectal anastomosis with postsurgical changes along the ventral abdominal wall midline    -5/31/23 started mFOLFOX 6 w/dose reduced oxaliplatin    - C2D1 6/14/23 held  due to new onset paroxysmal A-fib with RVR requiring hospitalization 6/3/2023 (also hypoMg, dehydrated)  - 6/19/2023 I presented this case at [facility] colorectal tumor board at the [facility], it is possible that paroxysmal A-fib may be related to 5-FU.  Options include withholding further treatment versus retrialing 5-FU under the guidance of cardio oncology in an inpatient setting.  No other adjuvant treatment options available, including immunotherapy  - 6/30/2023 consultation with cardio oncologist Dr. Garza; I spoke with Dr. Garza 6/30/2023 as well, [id number] score 3, recommending starting apixaban 5 mg twice daily, okay to retrial FOLFOX while patient is on metoprolol  -7/18/2023  CT CAP- subcm lung nodules stable, borderline and mildly enlarged mediastinal LN and periportal LN stable, splenomegaly 15.7cm, Subtotal colectomy with ileorectal anastomosis. No acute inflammation or obstruction   - 7/19/2023 mFOLFOX6 C2D1  (inpatient w/continuous cardiac monitoring)      -9/23 CT CAP (after cycle 6)  -Subtotal colectomy, rectal anastomosis appears stable, some adjacent mild scarring, no bowel obstruction or inflammation, no free fluid  -No new adenopathy, no liver lesions  -Stable EDSON 3 mm nodule, other stable nodules at EDSON, RML  -Stable several small thoracic lymph nodes in mediastinum  -Mild wall thickening of the gallbladder  -Spleen is smaller measuring 13.7 cm  -Stable regions of bilateral cortical thinning in the kidneys, with few tiny cysts    INTERIM HISTORY:  REGIMEN:  mFOLFOX6  q14 days x12 cycles/6 months  Starting weight 183lb   C1D1 5/31/23 (complicated by hospitalization for afib w/RVR C1D4 6/3/23), C2D1 7/19/23 (inpatient w/continuous cardiac monitoring), C3D1 8/2/2023 (outpatient, admitted to ER for A-fib with RVR C3D3 8/4/23), C4D1 8/16/23, C5D1 8/30/2023, C6D1 9/13/2023, C7D1 9/26/23, C8D1 10/11/23, C9D1 10/25/23    oxaliplatin 64mg/m2 day 1 (dose reduced from 85mg/m2 2/2 Cr C1D1)  LV  350mg/m2 day 1  5FU 1200mg/m2 continuous infusion day 1-2 (total 2,400mg/m2 IV over 46-48 hours)  (5FU 400mg/m2 day 1 bolus (discontinued cycle 2 onwards))  PLUS IVF and magnesium replacement as needed day 1 and day 8 with optimization to Mg= 2 at least, AND IVF day 3 on day of pump disconnect  PLUS (per cardiology recommendations) During the days of chemo and 2 days after ONLY: take long acting diltiazem 120 mg daily with 30 mg of short acting diltiazem as needed for atrial fibrillation with HR >110  Emetic risk: moderate  Febrile neutropenia risk: intermediate     C10D1 11/8/23 pending labs    Treatment related AE:  - hearing changes- described as muffled with intermittent ringing with obscured hearing R>L- overall improved since 1st cycle, and improving few days after starting chemo, Flonase BID as needed, ENT consultation 10/23/23- plan for hearing aids b/l; (present prior to starting chemo, improved w/steroids, now stable)   - Paroxysmal A-fib with RVR and PAC- admitted to [facility] 6/3/2023 - 6/5/2023 for this, 6/3/2023 echo EF 55-60%, mild pulmonary hypertension, mild mitral regurgitation, on metoprolol XL 25 mg p.o. daily for ongoing palpitations, 7/23 Cardio oncology consult w/Dr. Garza, on eliquis, no recurrence of RVR while inpatient for cycle 2 w/continuous cardiac monitoring; IVF and magnesium replacement to magnesium = 2 minimum added to D1 and D8 and IVF day 3. 8/4/2023 C3D3 patient went to ER for A-fib with palpitations (no other sx), HR 80-130s, labs WNL, CXR negative, given IV fluids, placed on diltiazem drip, HR improved to .  8/15/2023 cardiology follow-up: Increase metoprolol XL to 50 mg daily; During the days of chemo and 2 days after ONLY: take long acting diltiazem 120 mg daily with 30 mg of short acting diltiazem as needed for atrial fibrillation with HR >110.  8/18/2023 patient called in reporting heart rate up to 140, sensation of heart racing, improved to 67 after taking  "extra prescribed dose of diltiazem and then asymptomatic.  8/19/2023 patient presented to ER with chest tightness and palpitations, had taken total of 3 tabs of diltiazem 30mg short acting at that point, EKG with sinus rhythm, rate 63 with some noted bradycardia while in the ER, magnesium and potassium normal, creatinine slightly elevated, given 1 L IVF.  xarelto now affordable; 9/12/2023 cardiology szddcv-ic-yh changes, 10/27/23 C9D3 disconnect pt \"had episode of afib\", heart racing to HR 140s, that was managed with short acting diltiazem and deep breathing, notably pt w/persistently low Mg despite 2g IV Mg replacement for Mg 1.6, cardiology follow-up 12/14/2023;   - anxiety- working with psychology prn  - Delayed neutropenia- neutropenic thierry ANC 0.5 2 weeks out from cycle 1, afebrile, 6/23 DPD deficiency testing negative; resolved cycle 2 onwards 5FU bolus dropped   -Normocytic anemia- due to chemotherapy and iron deficiency, s/p ferric carboxymaltose 750mg x2 doses 6/14/23 and 6/28/23, 7/23 iron studies improved.  8/23 hgb 13.3, 9/23 hgb 12.1, 10/23 hgb 11.1, 11/23 hgb 10.8  -Thrombocytopenia- 2/2 chemotherapy, no bruising or bleeding, for now plt >75K, while on xarelto  - MARYA on CKD- following w/nephro- follow up 9/23, Cr improved from 3, drinking 2.5 16oz bottles per day and getting IV fluids day 1, day 3 and day 8 of each chemotherapy cycle, nephrology follow-up 12/4/2023  - elevated LFTs- AST 50, Alk phos 282, alk phos iso enzyme- majority liver, liver normal on 9/23 CT;   - diarrhea- grade 1, 2 episodes/week, also affected by food, improved with imodium 2 total   - neuropathy- grade 1, tingling fingers > toes, grade 1 numbness b/l 3rd digit, lasting 3-4 days   - cold sensitivity- grade 1, fingers and mouth, most severe right after tx and previously improving before next cycle due and now it is not, using straw and gloves when outside   - Port flipped-see my note from 8/2/2023, 8/21/2023 port removed and " "replacement with IR  - \"bottom is sore\"- pt has hemorrhoids, no active bleeding or thrombosed hemorrhoids but notes soreness after chemo, witch hazel wipes are helping, sitz bath are helping         REVIEW OF SYSTEMS:   A 14 point ROS was reviewed with pertinent positives and negatives in the HPI.        HOME MEDICATIONS:  Current Outpatient Medications   Medication Sig Dispense Refill     cyanocobalamin 1000 MCG TBCR Take 1,000 mcg by mouth daily 100 tablet 1     dexamethasone (DECADRON) 4 MG tablet Take 2 tablets (8 mg) by mouth daily Take for 2 days, starting the day after chemo. Take with food. 40 tablet 0     diltiazem (CARDIZEM) 30 MG tablet Take 1 tablet (30 mg) by mouth 3 times daily (Patient not taking: Reported on 10/24/2023) 90 tablet 0     diltiazem ER COATED BEADS (CARDIZEM CD/CARTIA XT) 120 MG 24 hr capsule Take 1 capsule (120 mg) by mouth daily On chemo days and 2 days post chemo infusion. 60 capsule 3     Ferrous Sulfate (IRON) 325 (65 Fe) MG tablet Take 1 tablet by mouth daily       lidocaine-prilocaine (EMLA) 2.5-2.5 % external cream Apply topically as needed for moderate pain Apply 15-20 minutes prior to port access 1 g 4     loperamide (IMODIUM) 2 MG capsule Take 2 mg by mouth daily as needed for diarrhea       magnesium oxide 400 MG tablet Take 1 tablet (400 mg) by mouth 2 times daily 60 tablet 0     metoprolol succinate ER (TOPROL XL) 50 MG 24 hr tablet Take 1 tablet (50 mg) by mouth daily 90 tablet 3     Multiple Vitamins-Iron (MULTI-DAY PLUS IRON PO) Take 1 tablet by mouth daily       prochlorperazine (COMPAZINE) 10 MG tablet Take 10 mg by mouth every 6 hours as needed for nausea or vomiting       rivaroxaban ANTICOAGULANT (XARELTO) 20 MG TABS tablet Take 1 tablet (20 mg) by mouth daily (with dinner) 90 tablet 3     sodium bicarbonate 650 MG tablet Take 1 tablet (650 mg) by mouth 2 times daily 120 tablet 3         ALLERGIES:  Allergies   Allergen Reactions     No Known Drug Allergy  "         PAST MEDICAL HISTORY:  Past Medical History:   Diagnosis Date     Arthropathia 08/05/2010     B12 deficiency anemia 10/21/2010     Benign essential hypertension with target blood pressure below 140/90 10/10/2016     CARDIOVASCULAR SCREENING; LDL GOAL LESS THAN 160 10/31/2010     Crohn's colitis (H) 02/15/2011     Dermatitis-dishydrotic eczema-severe 08/05/2010     Hiatal hernia      HTN (hypertension) 07/21/2011     Iron deficiency anemia 10/21/2010     Malignant neoplasm of sigmoid colon (H)      Obesity      Primary pulmonary hypertension (H) 04/06/2010         PAST SURGICAL HISTORY:  Past Surgical History:   Procedure Laterality Date     COLECTOMY WITHOUT COLOSTOMY N/A 4/6/2023    Procedure: Laparoscopic Converted to Open Total abdominal colectomy;  Surgeon: Jerome Ashley MD;  Location: [facility] OR     COLONOSCOPY  10/11/10     COLONOSCOPY N/A 2/27/2023    Procedure: ATTEMPTED COLONOSCOPY WITH SIGMOID STRICTURE BIOPSY;  Surgeon: Jonathan Alcocer MD;  Location:  GI     ESOPHAGOSCOPY, GASTROSCOPY, DUODENOSCOPY (EGD), COMBINED N/A 2/27/2023    Procedure: ESOPHAGOGASTRODUODENOSCOPY, WITH BIOPSY;  Surgeon: Jonathan Alcocer MD;  Location: [facility] GI     HYSTERECTOMY TOTAL ABDOMINAL, BILATERAL SALPINGO-OOPHORECTOMY, COMBINED N/A 4/6/2023    Procedure: Hysterectomy total abdominal, bilateral salpingo-oophorectomy;  Surgeon: Sunitha Olea MD;  Location: [facility] OR     INSERT PORT VASCULAR ACCESS Right 5/25/2023    Procedure: Ultrasound-guided right internal jugular venous access port placement with fluoroscopy;  Surgeon: Martin Thomas DO;  Location: [facility] OR     IR CHEST PORT PLACEMENT > 5 YRS OF AGE  8/21/2023     IR PORT CHECK RIGHT  7/18/2023     IR PORT REMOVAL RIGHT  8/21/2023     SIGMOIDOSCOPY FLEXIBLE N/A 4/6/2023    Procedure: Sigmoidoscopy flexible;  Surgeon: Jerome Ashley MD;  Location: [facility] OR     SURGICAL HISTORY OF -   06/14[birth year]76    Perineorrhaphy, for widening vaginal orifice      ZZC EXPLORATORY OF ABDOMEN  1989    laparoscopy         SOCIAL HISTORY:  Social History     Socioeconomic History     Marital status:      Spouse name: Not on file     Number of children: Not on file     Years of education: Not on file     Highest education level: Not on file   Occupational History     Not on file   Tobacco Use     Smoking status: Never     Passive exposure: Current     Smokeless tobacco: Never   Substance and Sexual Activity     Alcohol use: No     Drug use: No     Sexual activity: Yes     Partners: Male   Other Topics Concern     Parent/sibling w/ CABG, MI or angioplasty before 65F 55M? Not Asked   Social History Narrative     Not on file     Social Determinants of Health     Financial Resource Strain: Not on file   Food Insecurity: Not on file   Transportation Needs: Not on file   Physical Activity: Not on file   Stress: Not on file   Social Connections: Not on file   Interpersonal Safety: Not on file   Housing Stability: Not on file         FAMILY HISTORY:  Family History   Problem Relation Age of Onset     Hypertension Mother         on meds, alive     Cerebrovascular Disease Father         stroke about age 65,  of cancer at 68 yrs     Diabetes Father         eventually took insulin     Anemia Father         Pernisios anemia     Kidney Disease Niece         kidney transplant     Kidney Disease Nephew         kidney transplant     Venous thrombosis No family hx of      Anesthesia Reaction No family hx of          PHYSICAL EXAM:  Vital signs:  LMP  (LMP Unknown)        LABS:   Latest Reference Range & Units 23 11:17   Sodium 135 - 145 mmol/L 142   Potassium 3.4 - 5.3 mmol/L 4.2   Chloride 98 - 107 mmol/L 109 (H)   Carbon Dioxide (CO2) 22 - 29 mmol/L 23   Urea Nitrogen 8.0 - 23.0 mg/dL 17.7   Creatinine 0.51 - 0.95 mg/dL 1.31 (H)   GFR Estimate >60 mL/min/1.73m2 44 (L)   Calcium 8.8 - 10.2 mg/dL 8.8   Anion Gap 7 - 15 mmol/L 10   Magnesium 1.7 - 2.3 mg/dL 1.8   Albumin 3.5 - 5.2  g/dL 3.7   Protein Total 6.4 - 8.3 g/dL 6.1 (L)   Alkaline Phosphatase 35 - 104 U/L 329 (H)   ALT 0 - 50 U/L 45   AST 0 - 45 U/L 56 (H)   Bilirubin Total <=1.2 mg/dL 0.5   Ferritin 11 - 328 ng/mL 954 (H)   Glucose 70 - 99 mg/dL 135 (H)   Iron 37 - 145 ug/dL 102   Iron Binding Capacity 240 - 430 ug/dL 246   Iron Sat Index 15 - 46 % 41   WBC 4.0 - 11.0 10e3/uL 3.4 (L)   Hemoglobin 11.7 - 15.7 g/dL 10.7 (L)   Hematocrit 35.0 - 47.0 % 31.9 (L)   Platelet Count 150 - 450 10e3/uL 107 (L)   RBC Count 3.80 - 5.20 10e6/uL 3.33 (L)   MCV 78 - 100 fL 96   MCH 26.5 - 33.0 pg 32.1   MCHC 31.5 - 36.5 g/dL 33.5   RDW 10.0 - 15.0 % 16.7 (H)   % Neutrophils % 64   % Lymphocytes % 17   % Monocytes % 19   % Eosinophils % 0   % Basophils % 0   Absolute Basophils 0.0 - 0.2 10e3/uL 0.0   Absolute Eosinophils 0.0 - 0.7 10e3/uL 0.0   Absolute Immature Granulocytes <=0.4 10e3/uL 0.0   Absolute Lymphocytes 0.8 - 5.3 10e3/uL 0.6 (L)   Absolute Monocytes 0.0 - 1.3 10e3/uL 0.6   % Immature Granulocytes % 0   Absolute Neutrophils 1.6 - 8.3 10e3/uL 2.2   Absolute NRBCs 10e3/uL 0.0   NRBCs per 100 WBC <1 /100 0   (H): Data is abnormally high  (L): Data is abnormally low    PATHOLOGY:    IMAGING:      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 mutated, MARISSA, TPS 50%  # sigmoid colon poorly-differentiated carcinoma, KRAS G13D mutated, loss of MLH1 and PMS2, TPS 70%  - 2/23 colonoscopy: A frond-like/villous partially obstructing large mass found in sigmoid colon, partially circumferential involving two thirds of the lumen circumference, 4 cm, unable to traverse, with oozing, s/p biopsy, PATHOLOGY: Invasive poorly differentiated carcinoma, IHC panel excludes tumors of other origin, colorectal primary is favored, loss of nuclear  expression of MLH1 and PMS2, MLH1 promoter methylation negative, [id number] mutation negative  -2/23 CT CAP: Shows known 4 cm proximal sigmoid colon mass with possible tumor extension (irregularity and stranding and adjacent pericolonic fat) without obstruction AND probable second mass 2.5 cm at hepatic flexure of colon; small lymph nodes adjacent to both masses (no lymphadenopathy by size criteria)  -3/23 PET:  a. There is increased FDG avidity of the sigmoid colon with focal lobular wall thickening consistent with biopsy-proven adenocarcinoma.  b. Secondary focus noted at the hepatic flexure demonstrates elevated FDG avidity and lobulated wall thickening highly suspicious for a additional primary.  c. Additionally there is a smaller focus of wall thickening with elevated FDG avidity along the left aspect of the transverse colon which is suspicious for a possible third focus of colonic malignancy.  - 3/23 CEA 6.8  -4/6/2023 laparoscopic converted to open total abdominal colectomy with ileorectal anastomosis, partial omentectomy, flexible sigmoidoscopy, TAHBSO  PATHOLOGY:  Of note, omental resection, terminal ileum, proximal and distal anastomotic rings, uterus, cervix, bilateral fallopian tubes and ovaries negative for malignancy    Mass #1 (ascending colon/hepatic flexure): Mixed neuroendocrine-non-neuroendocrine neoplasm (MINEN):  - Neuroendocrine carcinoma component (70%) and moderately-differentiated adenocarcinoma component (30%)  - Size = 5.0 cm, invading into muscularis propria, Positive LVI  - IHC: Neuroendocrine portion: Negative for chromogranin and INSM1 but strongly express synaptophysin  - IHC: Adenocarcinoma portion: Positive for CK20, CDX2 negative for CK7, PAX8, ER, S100  Ki-67 proliferation index of approximately 80%.  MARISSA   PDL1:  COMBINED POSITIVE SCORE (CPS): 55-60  TUMOR PROPORTION SCORE (TPS): 50%   pathogenic KRAS mutation (G13D) was identified (5.2%).  MMR testing: intact  AJCC 8th edition:  stage 3B mpT3 pN1a     Mass #2 (sigmoid colon): Poorly-differentiated carcinoma:  - Grade 3  - Size = 5.7 cm, invading into muscularis propria  - IHC: Positive for scar and keratin AE1/AE3, negative for CK7, CK20, CDX2, PAX8, ER, chromogranin, CD56, p40, synaptophysin, S100, INI1, p40, INSM1  Ki-67 proliferation index of approximately 70%.  MMR testing: loss of MLH1 and PMS2  PDL1:  COMBINED POSITIVE SCORE (CPS): 80  TUMOR PROPORTION SCORE (TPS): 70%  pathogenic KRAS mutation (G13D) was identified (4.5%).  MMR testing: loss of MLH1 and PMS2, intact MSH2 and MSH6  AJCC 8th edition: stage 3A mpT2 pN1a    - One of forty-one sampled lymph nodes positive (1/41), d/w pathology- unable to determine which mass is metastatic to LN    - 4/23 MRI brain w/wo contrast LAURENT  - 5/26/23 CT CAP w/ contrast and IVF before and after CT (post op baseline prior to starting tx):  1.  3 mm nodule RML and 5 mm lateral EDSON nodule, minimally increased from previous  2.  New 4 mm EDSON groundglass density nodule  3.  Several prominent borderline enlarged mediastinal lymph nodes slightly increased from previous  4.  Splenomegaly 14.5 cm  5. S/p subtotal colectomy with ileorectal anastomosis with postsurgical changes along the ventral abdominal wall midline  - 5/8/23 I presented this case at [facility] CRC tumor board as above   - 5/19/23 second opinion at [facility] w/medical oncologist Dr. Acharya, thorough consultation and recommendations appreciated, overall agree w/FOLFOX x6 months, possible nivo or ipi nivo in second line; if metastatic platinum etoposide vs ipi nivo  - 5/25/23 port placement    -5/2023 genetic counseling: Negative for mutations detailed below     REGIMEN:  mFOLFOX6  q14 days x12 cycles/6 months  Starting weight 183lb   C1D1 5/31/23 (complicated by hospitalization for afib w/RVR C1D4 6/3/23), C2D1 7/19/23 (inpatient w/continuous cardiac monitoring), C3D1 8/2/2023 (outpatient, admitted to ER for A-fib with RVR C3D3 8/4/23), C4D1 8/16/23,  C5D1 8/30/2023, C6D1 9/13/2023, C7D1 9/26/23, C8D1 10/11/23, C9D1 10/25/23    oxaliplatin 64mg/m2 day 1 (dose reduced from 85mg/m2 2/2 Cr C1D1)  LV 350mg/m2 day 1  5FU 1200mg/m2 continuous infusion day 1-2 (total 2,400mg/m2 IV over 46-48 hours)  (5FU 400mg/m2 day 1 bolus (discontinued cycle 2 onwards))  PLUS IVF and magnesium replacement as needed day 1 and day 8 with optimization to Mg= 2 at least, AND IVF day 3 on day of pump disconnect  PLUS (per cardiology recommendations) During the days of chemo and 2 days after ONLY: take long acting diltiazem 120 mg daily with 30 mg of short acting diltiazem as needed for atrial fibrillation with HR >110  Emetic risk: moderate  Febrile neutropenia risk: intermediate     C10D1 11/8/23 pending labs w/special attention to ANC, plt, LFTs, Cr    Treatment related AE:  - hearing changes-stable w/dose reduced oxaliplatin, flonase prn, pending b/l hearing aids  - Paroxysmal A-fib with RVR and PAC- 8/15/2023 cardiology follow-up: Increase metoprolol XL to 50 mg daily; During the days of chemo and 2 days after take long acting diltiazem 120 mg daily with 30 mg of short acting diltiazem as needed for atrial fibrillation with HR >110, IV fluid day 1, 3, 8; IV Mg to goal Mg 2 day 1 and 8- updated orders for Mg replacement added to plan (ie if Mg 1.6 give 4g Mg not 2g give persistent hypoMg), cardiology follow-up 12/14/2023  - anxiety- continue following with oncology psychology team  - Normocytic anemia- due to chemotherapy and iron deficiency, s/p ferric carboxymaltose 750mg x2 doses 6/14/23 and 6/28/23; repeat iron studies normal. Repeat iron studies, B12, RBC folate today  -Thrombocytopenia- mild, 2/2 chemo, plt remain >75K- can continue chemo, monitor as pt is on xarelto   - MARYA on CKD- following w/nephro, f/u with nephro 12/4/2023; continue increasing PO fluids, continue day 1, 3, 8 IVF  - elevated LFTs- AST 50, Alk phos 282, alk phos iso enzyme- majority liver, liver normal on 9/23  CT, will check liver MRI  - diarrhea- grade 1  - neuropathy- grade 1   - cold sensitivity- grade 1, start gabapentin starting at bedtime and titrating up to BID and then TID if needed  - hemorrhoids- witch hazel pads, sitz baths prn      Imaging:  -9/23 CT CAP (after 6 cycles of mFOLFOX 6 without 5-FU bolus):  -Subtotal colectomy with stable appearing rectal anastomosis, some adjacent mild scarring, no bowel obstruction or inflammation, no free fluid, no new adenopathy, no liver lesions, no bone lesions  -Mild wall thickening of the gallbladder  - Stable small pulmonary nodules and small thoracic lymph nodes at mediastinum, splenomegaly smaller 13.7 cm    - 7/18/23 CT CAP (repeat baseline after treatment delay due to A-fib)- subcm lung nodules stable, borderline and mildly enlarged mediastinal LN and periportal LN stable, splenomegaly 15.7cm, Subtotal colectomy with ileorectal anastomosis. No acute inflammation or obstruction    -Repeat CT CAP after cycle 12, 1/2/2024    Labs:  3/23 CEA 6.8  5/23 CEA 1.8  7/23 CEA 2.8  10/23 CEA 2.0  12/23 CEA pending    Other:  - f/u with Dr. Ashley 4/2024 for rigid proctoscopy     #parxosymal afib  - within 3 days of receiving 5FU, prior cardiac risk factors htn, prediabetes  - 6/3/23 presented to ER w/lightheadedness, dizziness, cardioverted s/p diltiazem ggt in ER  - 6/23 DPD deficiency testing negative  - currently on metoprolol XL 25 mg daily, apixaban 5 mg twice daily  - 6/30/2023 consultation with cardio oncologist Dr. Garza; I spoke with Dr. Garza 6/30/2023 as well, [id number] score 3, recommending starting apixaban 5 mg twice daily, okay to retrial FOLFOX while patient is on metoprolol.  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  Negative for mutations in APC, AXIN2, BMPR1A, CDH1, CHEK2, EPCAM, GREM1, MLH1, MSH2, MSH3, MSH6, MUTYH, NTHL1, PMS2, POLD1, POLE, PTEN, SMAD4, STK11, TP53, CDKN1B, MEN1, NF1, RET, TSC1, TSC2, and VHL genes    #Anemia secondary to iron deficiency and B12 deficiency  - terminal ileum removed at time of colon surgery, monitor for nutrient def  - 2/23 hgb 6.8, ferritin 21, iron sat index 10, TIBC 265, iron 27, b12 1493  - On B12 1000 mcg p.o. daily and ferrous sulfate 325 mg p.o. daily  - 4/9/23 s/p 1 uprbcs  - 5/9/23 hgb 11.3,5/30/23 hgb 9.4  - based on Ganzoni equation w/goal hgb 14 and 500mg needed for iron stores, pts iron deficit is 1400 mg  - s/p ferric carboxymaltose 750mg x2 doses 6/14/23 and 6/28/23  - 7/11/23 ferritin 1022, iron sat 22, TIBC 201, iron 44    - 8/23 hgb 13.3  - 10/23 hgb 11.1  - anemia workup as above    #Crohn's  - dx since at least 2010     RTC 2 weeks for follow up with RADHA, labs, chemo #11  RTC 4 weeks for follow up with me, labs, chemo #12      Francisco Lee DO  Hematology/Oncology  [facility]      Again, thank you for allowing me to participate in the care of your patient.        Sincerely,        FRANCISCO LEE DO

## 2023-11-08 NOTE — LETTER
11/8/2023         RE: Nadia Ladd  [address]  [place] [address]        Dear Colleague,    Thank you for referring your patient, Nadia Ladd, to the [facility]. Please see a copy of my visit note below.    Video-Visit Details     Video Start Time: 10:27AM     Type of service:  Video Visit     Video End Time: 10:45AM    Originating Location (pt. Location): Home     Distant Location (provider location):  [facility] off site     Platform used for Video Visit: Liqueo     [facility]    Hematology/Oncology Established Patient Follow Up Note      Today's Date: 11/8/2023    Reason for follow up: Sigmoid cancer    HISTORY OF PRESENT ILLNESS: Nadia Ladd is a 68 year old female who presents for follow-up    Patient has medical history including Crohn's disease on sulfasalazine, anemia secondary to iron deficiency and B12 deficiency, obesity, hypertension, prediabetes, eczema, pulmonary hypertension, hiatal hernia, mild splenomegaly (14.7 cm in 2/23)    - 2/23 pt noted increasing fatigue, found to have iron deficiency anemia w/hgb 6.8 (previously required RBC transfusion when dx w/Crohn's), did have intermittent changes in stool but not persistent (noted minimal blood in stool)   - 2/23 upper endoscopy for iron deficiency anemia and Crohn's disease: Hiatal hernia, normal stomach, normal duodenum  - 2/23 colonoscopy: A frond-like/villous partially obstructing large mass found in sigmoid colon, partially circumferential involving two thirds of the lumen circumference, 4 cm, unable to traverse, with oozing, s/p biopsy  PATHOLOGY:  A.  Stomach, antrum: Biopsy:  - Antral type mucosa with mild chronic inactive gastritis  - Immunostain for Helicobacter pylori is negative      B.  Colon, sigmoid, stricture: Biopsy:  - Invasive poorly differentiated carcinoma  The sigmoid stricture shows a high-grade carcinoma without definitive gland formation.  Given the  poorly differentiated appearance, an immunohistochemical panel was performed to exclude the possibility of a tumor by direct extension or metastasis.   Immunohistochemical stains are performed and show the tumor to be diffusely positive for CK7 and partially positive for CK20 and SATB2.  There is no significant tumor reactivity for CDX2, GATA3, PAX8, TTF-1, and chromogranin.  Synaptophysin highlights very rare positive cells. Although the CK7/CK20 profile is not entirely typical for a colorectal carcinoma, immunostains for tumors of other origins are negative and a colorectal primary is still favored.   - Mismatch repair:  1) MLH1-loss of nuclear expression  2) MSH2-intact  3) MSH6-intact  4) PMS2-loss of nuclear expression  -MLH1 promoter methylation: NEGATIVE    -2/23 CT CAP:  A) 4 cm length mass in the proximal sigmoid colon. There is some irregularity and stranding in the adjacent pericolonic fat which may indicate tumor extension. There is no obstruction.   B) there is a second probable mass at the hepatic flexure of the colon measuring 2.5 cm in length   C)There are small lymph nodes in the mesial colon adjacent to the hepatic flexure abnormality and the sigmoid colonic mass. No lymphadenopathy by size criteria  D) There is submucosal fatty deposition in the colon, most severe in the distal sigmoid colon and rectum, which can be seen secondary to quiescent inflammatory bowel disease.  E) no liver lesion    -3/23 PET:  a. There is increased FDG avidity of the sigmoid colon with focal lobular wall thickening consistent with biopsy-proven adenocarcinoma.  b. Secondary focus noted at the hepatic flexure demonstrates elevated FDG avidity and lobulated wall thickening highly suspicious for a additional primary.  c. Additionally there is a smaller focus of wall thickening with elevated FDG avidity along the left aspect of the transverse colon  which is suspicious for a possible third focus of colonic  "malignancy.    -3/23 CEA 6.8  - 3/23 colorectal surgery consultation with - in the setting of Crohn's, at least sigmoid colectomy with possible total abdominal colectomy with either ileorectal anastomosis or end ileostomy (\"rectum can remain active and be actively surveyed annually\")    -3/23 genetic counseling, testing for Chan syndrome    - 4/5/2023 gynecologic oncology consultation for risk reduction surgery with hysterectomy and BSO at time of colectomy    -4/6/2023 laparoscopic converted to open total abdominal colectomy with ileorectal anastomosis, partial omentectomy, flexible sigmoidoscopy, TAHBSO  A. OMENTUM, RESECTION:  - Adipose tissue with no significant histopathologic abnormality  - No evidence of malignancy     B. COLON, RESECTION:  Mass #1 (ascending colon/hepatic flexure): Mixed neuroendocrine-non-neuroendocrine neoplasm (MINEN):  - Neuroendocrine carcinoma component (70%) and moderately-differentiated adenocarcinoma component (30%)  - Size = 5.0 cm, invading into muscularis propria  - Positive LVI  - Negative macroscopic tumor perforation, negative PNI  - Negative surgical resection margins  - IHC: Neuroendocrine portion: Negative for chromogranin and INSM1 but strongly express synaptophysin  - IHC: Adenocarcinoma portion: Positive for CK20, CDX2 negative for CK7, PAX8, ER, S100  Ki-67 proliferation index of approximately 80%.  MMR:  Intact nuclear expression of MLH1, MSH2, MHS6, PMS2  PDL1:  COMBINED POSITIVE SCORE (CPS): 55-60  TUMOR PROPORTION SCORE (TPS): 50%   pathogenic KRAS mutation (G13D) was identified (5.2%).  AJCC 8th edition: stage 3B mpT3 pN1a     Mass#2 (sigmoid colon): Poorly-differentiated carcinoma:  - Grade 3  - Size = 5.7 cm, invading into muscularis propria  - Negative for microscopic tumor perforation, LVI, perineural invasion  - Negative surgical resection margins  - IHC: Positive for scar and keratin AE1/AE3, negative for CK7, CK20, CDX2, PAX8, ER, chromogranin, " CD56, p40, synaptophysin, S100, INI1, p40, INSM1  Ki-67 proliferation index of approximately 70%.  MMR: loss of nuclear expression MLH1 and PMS2, intact nuclear expression MSH2 and MSH6  PDL1:  COMBINED POSITIVE SCORE (CPS): 80  TUMOR PROPORTION SCORE (TPS): 70%  pathogenic KRAS mutation (G13D) was identified (4.5%).  AJCC 8th edition: stage 3A mpT2 pN1a    - One of forty-one sampled lymph nodes positive (1/41)  - Benign appendix  - Tubular adenoma    C. UTERUS, CERVIX, BILATERAL FALLOPIAN TUBES & OVARIES, :  - Benign endometrial polyps; atrophic endometrium  - Uterus, cervix, bilateral fallopian tubes, and ovaries with no significant morphologic abnormalities  - No evidence of dysplasia or malignancy     D. SMALL INTESTINE, TERMINAL ILEUM, TERMINAL ILIUM:  - Benign ileum  - No evidence of dysplasia or malignancy  - Negative for metastases to one of one sampled lymph node (0 /1)     E. PROXIMAL ANASTOMOTIC RING:  - Benign small intestine  - No evidence of dysplasia or malignancy     F. DISTAL ANASTOMOTIC RING:  - Benign colon  - No evidence of dysplasia or malignancy    CRC NGS:   --mutations positive for KRAS G13D mutation 5.2% mass 1, KRAS G13D mutation 4.5% mass 2 (negative for AKT1; BRAF; ERBB2; KRAS; NRAS; PIK3CA; PTEN)  --TMB score: 17.064 mut/Mb mass 1 (CPS 55-60, TPS 50%), 60.943 mut/Mb mass 2 (CPS 80, TPS 70%)  --fusion negative including NTRK and RET for mass 1 and 2 (also negative for AKT1, AKT3, ALK, AR, UWYUZS12, CLAUDINE, BCOR, BRAF, BRD3, BRD4, CAMTA1, CCNB3, CCND1, , CIC, CSF1, CSF1R, CTNNB1, DNAJB1, EGFR, EPC1, ERBB2, ERBB4, ERG, ESR1, ESRRA, ETV1, ETV4, ETV5, ETV6, EWSR1, FGFR1, FGFR2, FGFR3, FGR, FOS, FOSB, FOXO1, FOXO4, FOXR2, FUS, GLI1, GRB7, HMGA2, HRAS, IDH1, IDH2, INSR, JAK2, JAK3, JAZF1, KRAS, MAML2, MAP2K1, MAST1, MAST2, MEAF6, MET, MKL2, MN1, MSMB, MUSK, MYB, MYBL1, MYOD1, NCOA1, NCOA2, NOTCH1, NOTCH2, NR4A3, NRAS, NRG1, NTRK1, NTRK2, NTRK3, NUMBL1, NUTM1, PAX3, PDGFB, PDGFRA, PDGFRB,  PHF1, PIK3CA, PKN1, PLAG1, PPARG, PRKACA, PRKCA, PRKCB, RAF1, RELA, RET, ROS1, RPSO2, RSPO3, SS18, STAT6, TAF15, TCF12, TERT, TFE3, TFEB, TFG, THADA, TMPRSS2, USP6, VGLL2, YAP1, YWHAE)    -4/11/2023 patient discharged from hospital, planning for 30 days of lovenox postop, oxycodone for pain (required 1 unit PRBC for anemia)    -5/8/23 I presented this case at [facility] CRC tumor board and their recommendations include:  - common to see this in Crohn's, overall poor prognosis, treat aggressively, may be poorly responsive to chemotherapy  - standard of care is mFOLFOX 6 x 12 cycles  - acknowledge that pt has high TPS and likely response to immunotherapy however not sufficient data or standard of care in stage 3 adjuvant setting  - add MMR testing to mass #1 hepatic flexure mass    - 5/9/23 admitted for acute renal failure w/Cr 3.82 w/K 7.0    - 5/19/23 second opinion at [facility] w/medical oncologist Dr. Acharya, thorough consultation and recommendations appreciated     - pt would like to proceed with FOFLOX (with dose reduced oxaliplatin due to kidney function)    -5/2023 genetic counseling: Negative for mutations in EPCAM, MLH1, MSH2, MSH6, and PMS2 genes.  Negative for mutations in APC, AXIN2, BMPR1A, CDH1, CHEK2, EPCAM, GREM1, MLH1, MSH2, MSH3, MSH6, MUTYH, NTHL1, PMS2, POLD1, POLE, PTEN, SMAD4, STK11, TP53, CDKN1B, MEN1, NF1, RET, TSC1, TSC2, and VHL genes    - 5/25/23 port placement    - 5/26/23 CT CAP w/ contrast and IVF before and after CT (baseline prior to starting tx):  1.  3 mm nodule RML and 5 mm lateral EDSON nodule, minimally increased from previous  2.  New 4 mm EDSON groundglass density nodule  3.  Several prominent borderline enlarged mediastinal lymph nodes slightly increased from previous  4.  Splenomegaly 14.5 cm  5. S/p subtotal colectomy with ileorectal anastomosis with postsurgical changes along the ventral abdominal wall midline    -5/31/23 started mFOLFOX 6 w/dose reduced oxaliplatin    - C2D1 6/14/23 held  due to new onset paroxysmal A-fib with RVR requiring hospitalization 6/3/2023 (also hypoMg, dehydrated)  - 6/19/2023 I presented this case at MTD colorectal tumor board at the [facility], it is possible that paroxysmal A-fib may be related to 5-FU.  Options include withholding further treatment versus retrialing 5-FU under the guidance of cardio oncology in an inpatient setting.  No other adjuvant treatment options available, including immunotherapy  - 6/30/2023 consultation with cardio oncologist Dr. Garza; I spoke with Dr. Garza 6/30/2023 as well, [id number] score 3, recommending starting apixaban 5 mg twice daily, okay to retrial FOLFOX while patient is on metoprolol  -7/18/2023  CT CAP- subcm lung nodules stable, borderline and mildly enlarged mediastinal LN and periportal LN stable, splenomegaly 15.7cm, Subtotal colectomy with ileorectal anastomosis. No acute inflammation or obstruction   - 7/19/2023 mFOLFOX6 C2D1  (inpatient w/continuous cardiac monitoring)      -9/23 CT CAP (after cycle 6)  -Subtotal colectomy, rectal anastomosis appears stable, some adjacent mild scarring, no bowel obstruction or inflammation, no free fluid  -No new adenopathy, no liver lesions  -Stable EDSON 3 mm nodule, other stable nodules at EDSON, RML  -Stable several small thoracic lymph nodes in mediastinum  -Mild wall thickening of the gallbladder  -Spleen is smaller measuring 13.7 cm  -Stable regions of bilateral cortical thinning in the kidneys, with few tiny cysts    INTERIM HISTORY:  REGIMEN:  mFOLFOX6  q14 days x12 cycles/6 months  Starting weight 183lb   C1D1 5/31/23 (complicated by hospitalization for afib w/RVR C1D4 6/3/23), C2D1 7/19/23 (inpatient w/continuous cardiac monitoring), C3D1 8/2/2023 (outpatient, admitted to ER for A-fib with RVR C3D3 8/4/23), C4D1 8/16/23, C5D1 8/30/2023, C6D1 9/13/2023, C7D1 9/26/23, C8D1 10/11/23, C9D1 10/25/23    oxaliplatin 64mg/m2 day 1 (dose reduced from 85mg/m2 2/2 Cr C1D1)  LV  350mg/m2 day 1  5FU 1200mg/m2 continuous infusion day 1-2 (total 2,400mg/m2 IV over 46-48 hours)  (5FU 400mg/m2 day 1 bolus (discontinued cycle 2 onwards))  PLUS IVF and magnesium replacement as needed day 1 and day 8 with optimization to Mg= 2 at least, AND IVF day 3 on day of pump disconnect  PLUS (per cardiology recommendations) During the days of chemo and 2 days after ONLY: take long acting diltiazem 120 mg daily with 30 mg of short acting diltiazem as needed for atrial fibrillation with HR >110  Emetic risk: moderate  Febrile neutropenia risk: intermediate     C10D1 11/8/23 pending labs    Treatment related AE:  - hearing changes- described as muffled with intermittent ringing with obscured hearing R>L- overall improved since 1st cycle, and improving few days after starting chemo, Flonase BID as needed, ENT consultation 10/23/23- plan for hearing aids b/l; (present prior to starting chemo, improved w/steroids, now stable)   - Paroxysmal A-fib with RVR and PAC- admitted to [facility] 6/3/2023 - 6/5/2023 for this, 6/3/2023 echo EF 55-60%, mild pulmonary hypertension, mild mitral regurgitation, on metoprolol XL 25 mg p.o. daily for ongoing palpitations, 7/23 Cardio oncology consult w/Dr. Garza, on eliquis, no recurrence of RVR while inpatient for cycle 2 w/continuous cardiac monitoring; IVF and magnesium replacement to magnesium = 2 minimum added to D1 and D8 and IVF day 3. 8/4/2023 C3D3 patient went to ER for A-fib with palpitations (no other sx), HR 80-130s, labs WNL, CXR negative, given IV fluids, placed on diltiazem drip, HR improved to .  8/15/2023 cardiology follow-up: Increase metoprolol XL to 50 mg daily; During the days of chemo and 2 days after ONLY: take long acting diltiazem 120 mg daily with 30 mg of short acting diltiazem as needed for atrial fibrillation with HR >110.  8/18/2023 patient called in reporting heart rate up to 140, sensation of heart racing, improved to 67 after taking  "extra prescribed dose of diltiazem and then asymptomatic.  8/19/2023 patient presented to ER with chest tightness and palpitations, had taken total of 3 tabs of diltiazem 30mg short acting at that point, EKG with sinus rhythm, rate 63 with some noted bradycardia while in the ER, magnesium and potassium normal, creatinine slightly elevated, given 1 L IVF.  xarelto now affordable; 9/12/2023 cardiology [id number] changes, 10/27/23 C9D3 disconnect pt \"had episode of afib\", heart racing to HR 140s, that was managed with short acting diltiazem and deep breathing, notably pt w/persistently low Mg despite 2g IV Mg replacement for Mg 1.6, cardiology follow-up 12/14/2023;   - anxiety- working with psychology prn  - Delayed neutropenia- neutropenic thierry ANC 0.5 2 weeks out from cycle 1, afebrile, 6/23 DPD deficiency testing negative; resolved cycle 2 onwards 5FU bolus dropped   -Normocytic anemia- due to chemotherapy and iron deficiency, s/p ferric carboxymaltose 750mg x2 doses 6/14/23 and 6/28/23, 7/23 iron studies improved.  8/23 hgb 13.3, 9/23 hgb 12.1, 10/23 hgb 11.1, 11/23 hgb 10.8  -Thrombocytopenia- 2/2 chemotherapy, no bruising or bleeding, for now plt >75K, while on xarelto  - MARYA on CKD- following w/nephro- follow up 9/23, Cr improved from 3, drinking 2.5 16oz bottles per day and getting IV fluids day 1, day 3 and day 8 of each chemotherapy cycle, nephrology follow-up 12/4/2023  - elevated LFTs- AST 50, Alk phos 282, alk phos iso enzyme- majority liver, liver normal on 9/23 CT;   - diarrhea- grade 1, 2 episodes/week, also affected by food, improved with imodium 2 total   - neuropathy- grade 1, tingling fingers > toes, grade 1 numbness b/l 3rd digit, lasting 3-4 days   - cold sensitivity- grade 1, fingers and mouth, most severe right after tx and previously improving before next cycle due and now it is not, using straw and gloves when outside   - Port flipped-see my note from 8/2/2023, 8/21/2023 port removed and " "replacement with IR  - \"bottom is sore\"- pt has hemorrhoids, no active bleeding or thrombosed hemorrhoids but notes soreness after chemo, witch hazel wipes are helping, sitz bath are helping         REVIEW OF SYSTEMS:   A 14 point ROS was reviewed with pertinent positives and negatives in the HPI.        HOME MEDICATIONS:  Current Outpatient Medications   Medication Sig Dispense Refill     cyanocobalamin 1000 MCG TBCR Take 1,000 mcg by mouth daily 100 tablet 1     dexamethasone (DECADRON) 4 MG tablet Take 2 tablets (8 mg) by mouth daily Take for 2 days, starting the day after chemo. Take with food. 40 tablet 0     diltiazem (CARDIZEM) 30 MG tablet Take 1 tablet (30 mg) by mouth 3 times daily (Patient not taking: Reported on 10/24/2023) 90 tablet 0     diltiazem ER COATED BEADS (CARDIZEM CD/CARTIA XT) 120 MG 24 hr capsule Take 1 capsule (120 mg) by mouth daily On chemo days and 2 days post chemo infusion. 60 capsule 3     Ferrous Sulfate (IRON) 325 (65 Fe) MG tablet Take 1 tablet by mouth daily       lidocaine-prilocaine (EMLA) 2.5-2.5 % external cream Apply topically as needed for moderate pain Apply 15-20 minutes prior to port access 1 g 4     loperamide (IMODIUM) 2 MG capsule Take 2 mg by mouth daily as needed for diarrhea       magnesium oxide 400 MG tablet Take 1 tablet (400 mg) by mouth 2 times daily 60 tablet 0     metoprolol succinate ER (TOPROL XL) 50 MG 24 hr tablet Take 1 tablet (50 mg) by mouth daily 90 tablet 3     Multiple Vitamins-Iron (MULTI-DAY PLUS IRON PO) Take 1 tablet by mouth daily       prochlorperazine (COMPAZINE) 10 MG tablet Take 10 mg by mouth every 6 hours as needed for nausea or vomiting       rivaroxaban ANTICOAGULANT (XARELTO) 20 MG TABS tablet Take 1 tablet (20 mg) by mouth daily (with dinner) 90 tablet 3     sodium bicarbonate 650 MG tablet Take 1 tablet (650 mg) by mouth 2 times daily 120 tablet 3         ALLERGIES:  Allergies   Allergen Reactions     No Known Drug Allergy  "         PAST MEDICAL HISTORY:  Past Medical History:   Diagnosis Date     Arthropathia 08/05/2010     B12 deficiency anemia 10/21/2010     Benign essential hypertension with target blood pressure below 140/90 10/10/2016     CARDIOVASCULAR SCREENING; LDL GOAL LESS THAN 160 10/31/2010     Crohn's colitis (H) 02/15/2011     Dermatitis-dishydrotic eczema-severe 08/05/2010     Hiatal hernia      HTN (hypertension) 07/21/2011     Iron deficiency anemia 10/21/2010     Malignant neoplasm of sigmoid colon (H)      Obesity      Primary pulmonary hypertension (H) 04/06/2010         PAST SURGICAL HISTORY:  Past Surgical History:   Procedure Laterality Date     COLECTOMY WITHOUT COLOSTOMY N/A 4/6/2023    Procedure: Laparoscopic Converted to Open Total abdominal colectomy;  Surgeon: Jerome Ashley MD;  Location: [facility] OR     COLONOSCOPY  10/11/10     COLONOSCOPY N/A 2/27/2023    Procedure: ATTEMPTED COLONOSCOPY WITH SIGMOID STRICTURE BIOPSY;  Surgeon: Jonathan Alcocer MD;  Location:  GI     ESOPHAGOSCOPY, GASTROSCOPY, DUODENOSCOPY (EGD), COMBINED N/A 2/27/2023    Procedure: ESOPHAGOGASTRODUODENOSCOPY, WITH BIOPSY;  Surgeon: Jonathan Alcocer MD;  Location: [facility] GI     HYSTERECTOMY TOTAL ABDOMINAL, BILATERAL SALPINGO-OOPHORECTOMY, COMBINED N/A 4/6/2023    Procedure: Hysterectomy total abdominal, bilateral salpingo-oophorectomy;  Surgeon: Sunitha Olea MD;  Location: [facility] OR     INSERT PORT VASCULAR ACCESS Right 5/25/2023    Procedure: Ultrasound-guided right internal jugular venous access port placement with fluoroscopy;  Surgeon: Martin Thomas DO;  Location: [facility] OR     IR CHEST PORT PLACEMENT > 5 YRS OF AGE  8/21/2023     IR PORT CHECK RIGHT  7/18/2023     IR PORT REMOVAL RIGHT  8/21/2023     SIGMOIDOSCOPY FLEXIBLE N/A 4/6/2023    Procedure: Sigmoidoscopy flexible;  Surgeon: Jerome Ashley MD;  Location: [facility] OR     SURGICAL HISTORY OF -   06/14/76    Perineorrhaphy, for widening vaginal orifice      ZZC EXPLORATORY OF ABDOMEN  1989    laparoscopy         SOCIAL HISTORY:  Social History     Socioeconomic History     Marital status:      Spouse name: Not on file     Number of children: Not on file     Years of education: Not on file     Highest education level: Not on file   Occupational History     Not on file   Tobacco Use     Smoking status: Never     Passive exposure: Current     Smokeless tobacco: Never   Substance and Sexual Activity     Alcohol use: No     Drug use: No     Sexual activity: Yes     Partners: Male   Other Topics Concern     Parent/sibling w/ CABG, MI or angioplasty before 65F 55M? Not Asked   Social History Narrative     Not on file     Social Determinants of Health     Financial Resource Strain: Not on file   Food Insecurity: Not on file   Transportation Needs: Not on file   Physical Activity: Not on file   Stress: Not on file   Social Connections: Not on file   Interpersonal Safety: Not on file   Housing Stability: Not on file         FAMILY HISTORY:  Family History   Problem Relation Age of Onset     Hypertension Mother         on meds, alive     Cerebrovascular Disease Father         stroke about age 65,  of cancer at 68 yrs     Diabetes Father         eventually took insulin     Anemia Father         Pernisios anemia     Kidney Disease Niece         kidney transplant     Kidney Disease Nephew         kidney transplant     Venous thrombosis No family hx of      Anesthesia Reaction No family hx of          PHYSICAL EXAM:  Vital signs:  LMP  (LMP Unknown)        LABS:   Latest Reference Range & Units 23 11:17   Sodium 135 - 145 mmol/L 142   Potassium 3.4 - 5.3 mmol/L 4.2   Chloride 98 - 107 mmol/L 109 (H)   Carbon Dioxide (CO2) 22 - 29 mmol/L 23   Urea Nitrogen 8.0 - 23.0 mg/dL 17.7   Creatinine 0.51 - 0.95 mg/dL 1.31 (H)   GFR Estimate >60 mL/min/1.73m2 44 (L)   Calcium 8.8 - 10.2 mg/dL 8.8   Anion Gap 7 - 15 mmol/L 10   Magnesium 1.7 - 2.3 mg/dL 1.8   Albumin 3.5 - 5.2  g/dL 3.7   Protein Total 6.4 - 8.3 g/dL 6.1 (L)   Alkaline Phosphatase 35 - 104 U/L 329 (H)   ALT 0 - 50 U/L 45   AST 0 - 45 U/L 56 (H)   Bilirubin Total <=1.2 mg/dL 0.5   Ferritin 11 - 328 ng/mL 954 (H)   Glucose 70 - 99 mg/dL 135 (H)   Iron 37 - 145 ug/dL 102   Iron Binding Capacity 240 - 430 ug/dL 246   Iron Sat Index 15 - 46 % 41   WBC 4.0 - 11.0 10e3/uL 3.4 (L)   Hemoglobin 11.7 - 15.7 g/dL 10.7 (L)   Hematocrit 35.0 - 47.0 % 31.9 (L)   Platelet Count 150 - 450 10e3/uL 107 (L)   RBC Count 3.80 - 5.20 10e6/uL 3.33 (L)   MCV 78 - 100 fL 96   MCH 26.5 - 33.0 pg 32.1   MCHC 31.5 - 36.5 g/dL 33.5   RDW 10.0 - 15.0 % 16.7 (H)   % Neutrophils % 64   % Lymphocytes % 17   % Monocytes % 19   % Eosinophils % 0   % Basophils % 0   Absolute Basophils 0.0 - 0.2 10e3/uL 0.0   Absolute Eosinophils 0.0 - 0.7 10e3/uL 0.0   Absolute Immature Granulocytes <=0.4 10e3/uL 0.0   Absolute Lymphocytes 0.8 - 5.3 10e3/uL 0.6 (L)   Absolute Monocytes 0.0 - 1.3 10e3/uL 0.6   % Immature Granulocytes % 0   Absolute Neutrophils 1.6 - 8.3 10e3/uL 2.2   Absolute NRBCs 10e3/uL 0.0   NRBCs per 100 WBC <1 /100 0   (H): Data is abnormally high  (L): Data is abnormally low    PATHOLOGY:    IMAGING:      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 mutated, MARISSA, TPS 50%  # sigmoid colon poorly-differentiated carcinoma, KRAS G13D mutated, loss of MLH1 and PMS2, TPS 70%  - 2/23 colonoscopy: A frond-like/villous partially obstructing large mass found in sigmoid colon, partially circumferential involving two thirds of the lumen circumference, 4 cm, unable to traverse, with oozing, s/p biopsy, PATHOLOGY: Invasive poorly differentiated carcinoma, IHC panel excludes tumors of other origin, colorectal primary is favored, loss of nuclear  expression of MLH1 and PMS2, MLH1 promoter methylation negative, [id number] mutation negative  -2/23 CT CAP: Shows known 4 cm proximal sigmoid colon mass with possible tumor extension (irregularity and stranding and adjacent pericolonic fat) without obstruction AND probable second mass 2.5 cm at hepatic flexure of colon; small lymph nodes adjacent to both masses (no lymphadenopathy by size criteria)  -3/23 PET:  a. There is increased FDG avidity of the sigmoid colon with focal lobular wall thickening consistent with biopsy-proven adenocarcinoma.  b. Secondary focus noted at the hepatic flexure demonstrates elevated FDG avidity and lobulated wall thickening highly suspicious for a additional primary.  c. Additionally there is a smaller focus of wall thickening with elevated FDG avidity along the left aspect of the transverse colon which is suspicious for a possible third focus of colonic malignancy.  - 3/23 CEA 6.8  -4/6/2023 laparoscopic converted to open total abdominal colectomy with ileorectal anastomosis, partial omentectomy, flexible sigmoidoscopy, TAHBSO  PATHOLOGY:  Of note, omental resection, terminal ileum, proximal and distal anastomotic rings, uterus, cervix, bilateral fallopian tubes and ovaries negative for malignancy    Mass #1 (ascending colon/hepatic flexure): Mixed neuroendocrine-non-neuroendocrine neoplasm (MINEN):  - Neuroendocrine carcinoma component (70%) and moderately-differentiated adenocarcinoma component (30%)  - Size = 5.0 cm, invading into muscularis propria, Positive LVI  - IHC: Neuroendocrine portion: Negative for chromogranin and INSM1 but strongly express synaptophysin  - IHC: Adenocarcinoma portion: Positive for CK20, CDX2 negative for CK7, PAX8, ER, S100  Ki-67 proliferation index of approximately 80%.  MARISSA   PDL1:  COMBINED POSITIVE SCORE (CPS): 55-60  TUMOR PROPORTION SCORE (TPS): 50%   pathogenic KRAS mutation (G13D) was identified (5.2%).  MMR testing: intact  AJCC 8th edition:  stage 3B mpT3 pN1a     Mass #2 (sigmoid colon): Poorly-differentiated carcinoma:  - Grade 3  - Size = 5.7 cm, invading into muscularis propria  - IHC: Positive for scar and keratin AE1/AE3, negative for CK7, CK20, CDX2, PAX8, ER, chromogranin, CD56, p40, synaptophysin, S100, INI1, p40, INSM1  Ki-67 proliferation index of approximately 70%.  MMR testing: loss of MLH1 and PMS2  PDL1:  COMBINED POSITIVE SCORE (CPS): 80  TUMOR PROPORTION SCORE (TPS): 70%  pathogenic KRAS mutation (G13D) was identified (4.5%).  MMR testing: loss of MLH1 and PMS2, intact MSH2 and MSH6  AJCC 8th edition: stage 3A mpT2 pN1a    - One of forty-one sampled lymph nodes positive (1/41), d/w pathology- unable to determine which mass is metastatic to LN    - 4/23 MRI brain w/wo contrast LAURENT  - 5/26/23 CT CAP w/ contrast and IVF before and after CT (post op baseline prior to starting tx):  1.  3 mm nodule RML and 5 mm lateral EDSON nodule, minimally increased from previous  2.  New 4 mm EDSON groundglass density nodule  3.  Several prominent borderline enlarged mediastinal lymph nodes slightly increased from previous  4.  Splenomegaly 14.5 cm  5. S/p subtotal colectomy with ileorectal anastomosis with postsurgical changes along the ventral abdominal wall midline  - 5/8/23 I presented this case at [facility] tumor board as above   - 5/19/23 second opinion at [facility] w/medical oncologist Dr. Acharya, thorough consultation and recommendations appreciated, overall agree w/FOLFOX x6 months, possible nivo or ipi nivo in second line; if metastatic platinum etoposide vs ipi nivo  - 5/25/23 port placement    -5/2023 genetic counseling: Negative for mutations detailed below     REGIMEN:  mFOLFOX6  q14 days x12 cycles/6 months  Starting weight 183lb   C1D1 5/31/23 (complicated by hospitalization for afib w/RVR C1D4 6/3/23), C2D1 7/19/23 (inpatient w/continuous cardiac monitoring), C3D1 8/2/2023 (outpatient, admitted to ER for A-fib with RVR C3D3 8/4/23), C4D1 8/16/23,  C5D1 8/30/2023, C6D1 9/13/2023, C7D1 9/26/23, C8D1 10/11/23, C9D1 10/25/23    oxaliplatin 64mg/m2 day 1 (dose reduced from 85mg/m2 2/2 Cr C1D1)  LV 350mg/m2 day 1  5FU 1200mg/m2 continuous infusion day 1-2 (total 2,400mg/m2 IV over 46-48 hours)  (5FU 400mg/m2 day 1 bolus (discontinued cycle 2 onwards))  PLUS IVF and magnesium replacement as needed day 1 and day 8 with optimization to Mg= 2 at least, AND IVF day 3 on day of pump disconnect  PLUS (per cardiology recommendations) During the days of chemo and 2 days after ONLY: take long acting diltiazem 120 mg daily with 30 mg of short acting diltiazem as needed for atrial fibrillation with HR >110  Emetic risk: moderate  Febrile neutropenia risk: intermediate     C10D1 11/8/23 pending labs w/special attention to ANC, plt, LFTs, Cr    Treatment related AE:  - hearing changes-stable w/dose reduced oxaliplatin, flonase prn, pending b/l hearing aids  - Paroxysmal A-fib with RVR and PAC- 8/15/2023 cardiology follow-up: Increase metoprolol XL to 50 mg daily; During the days of chemo and 2 days after take long acting diltiazem 120 mg daily with 30 mg of short acting diltiazem as needed for atrial fibrillation with HR >110, IV fluid day 1, 3, 8; IV Mg to goal Mg 2 day 1 and 8- updated orders for Mg replacement added to plan (ie if Mg 1.6 give 4g Mg not 2g give persistent hypoMg), cardiology follow-up 12/14/2023  - anxiety- continue following with oncology psychology team  - Normocytic anemia- due to chemotherapy and iron deficiency, s/p ferric carboxymaltose 750mg x2 doses 6/14/23 and 6/28/23; repeat iron studies normal. Repeat iron studies, B12, RBC folate today  -Thrombocytopenia- mild, 2/2 chemo, plt remain >75K- can continue chemo, monitor as pt is on xarelto   - MARYA on CKD- following w/nephro, f/u with nephro 12/4/2023; continue increasing PO fluids, continue day 1, 3, 8 IVF  - elevated LFTs- AST 50, Alk phos 282, alk phos iso enzyme- majority liver, liver normal on 9/23  CT, will check liver MRI  - diarrhea- grade 1  - neuropathy- grade 1   - cold sensitivity- grade 1, start gabapentin starting at bedtime and titrating up to BID and then TID if needed  - hemorrhoids- witch hazel pads, sitz baths prn      Imaging:  -9/23 CT CAP (after 6 cycles of mFOLFOX 6 without 5-FU bolus):  -Subtotal colectomy with stable appearing rectal anastomosis, some adjacent mild scarring, no bowel obstruction or inflammation, no free fluid, no new adenopathy, no liver lesions, no bone lesions  -Mild wall thickening of the gallbladder  - Stable small pulmonary nodules and small thoracic lymph nodes at mediastinum, splenomegaly smaller 13.7 cm    - 7/18/23 CT CAP (repeat baseline after treatment delay due to A-fib)- subcm lung nodules stable, borderline and mildly enlarged mediastinal LN and periportal LN stable, splenomegaly 15.7cm, Subtotal colectomy with ileorectal anastomosis. No acute inflammation or obstruction    -Repeat CT CAP after cycle 12, 1/2/2024    Labs:  3/23 CEA 6.8  5/23 CEA 1.8  7/23 CEA 2.8  10/23 CEA 2.0  12/23 CEA pending    Other:  - f/u with Dr. Ashley 4/2024 for rigid proctoscopy     #parxosymal afib  - within 3 days of receiving 5FU, prior cardiac risk factors htn, prediabetes  - 6/3/23 presented to ER w/lightheadedness, dizziness, cardioverted s/p diltiazem ggt in ER  - 6/23 DPD deficiency testing negative  - currently on metoprolol XL 25 mg daily, apixaban 5 mg twice daily  - 6/30/2023 consultation with cardio oncologist Dr. Garza; I spoke with Dr. Garza 6/30/2023 as well, [id number] score 3, recommending starting apixaban 5 mg twice daily, okay to retrial FOLFOX while patient is on metoprolol.  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  Negative for mutations in APC, AXIN2, BMPR1A, CDH1, CHEK2, EPCAM, GREM1, MLH1, MSH2, MSH3, MSH6, MUTYH, NTHL1, PMS2, POLD1, POLE, PTEN, SMAD4, STK11, TP53, CDKN1B, MEN1, NF1, RET, TSC1, TSC2, and VHL genes    #Anemia secondary to iron deficiency and B12 deficiency  - terminal ileum removed at time of colon surgery, monitor for nutrient def  - 2/23 hgb 6.8, ferritin 21, iron sat index 10, TIBC 265, iron 27, b12 1493  - On B12 1000 mcg p.o. daily and ferrous sulfate 325 mg p.o. daily  - 4/9/23 s/p 1 uprbcs  - 5/9/23 hgb 11.3,5/30/23 hgb 9.4  - based on Ganzoni equation w/goal hgb 14 and 500mg needed for iron stores, pts iron deficit is 1400 mg  - s/p ferric carboxymaltose 750mg x2 doses 6/14/23 and 6/28/23  - 7/11/23 ferritin 1022, iron sat 22, TIBC 201, iron 44    - 8/23 hgb 13.3  - 10/23 hgb 11.1  - anemia workup as above    #Crohn's  - dx since at least 2010     RTC 2 weeks for follow up with RADHA, labs, chemo #11  RTC 4 weeks for follow up with me, labs, chemo #12      Francisco Lee DO  Hematology/Oncology  [facility]      Again, thank you for allowing me to participate in the care of your patient.        Sincerely,        FRANCISCO LEE DO

## 2023-11-09 ENCOUNTER — ALLIED HEALTH/NURSE VISIT (OUTPATIENT)
Dept: AUDIOLOGY | Facility: CLINIC | Age: 68
End: 2023-11-09
Payer: MEDICARE

## 2023-11-09 DIAGNOSIS — H90.3 SENSORINEURAL HEARING LOSS, ASYMMETRICAL: Primary | ICD-10-CM

## 2023-11-09 PROCEDURE — V5010 ASSESSMENT FOR HEARING AID: HCPCS | Performed by: AUDIOLOGIST

## 2023-11-09 PROCEDURE — 99207 PR NO CHARGE LOS: CPT | Performed by: AUDIOLOGIST

## 2023-11-09 NOTE — NURSING NOTE
DISCHARGE PLAN:  Next appointments: See patient instruction section  Departure Mode: video  Accompanied by:    minutes for medical assistant discharge (face to face time)     Nadia Ladd is here today for onc video follow up.  Patient was not seen by medical assistant at time of the appointment.   Appointments previously scheduled for the 2 wk and 4 wk follow ups with labs and chemo. The earliest I can get her in anywhere for the stat mri is 11/17 in [place].  did call techs and stated stat but that is the best they can do. I sent Dr. Fransico seay msg on this. I called pt with appts. [facility] and does not need calendar mailed out. See patient instructions and Oncologist's Progress note for further details. Questions and concerns addressed to patient's satisfaction. Patient verbalized and demonstrated understanding of plan.  Contact information provided and patient is encouraged to call with any that arise in the interim of care.    Jelly RODRIGUEZ [facility]  [phone]  11/9/2023, 1:03 PM

## 2023-11-10 ENCOUNTER — TELEPHONE (OUTPATIENT)
Dept: GASTROENTEROLOGY | Facility: CLINIC | Age: 68
End: 2023-11-10
Payer: MEDICARE

## 2023-11-10 ENCOUNTER — INFUSION THERAPY VISIT (OUTPATIENT)
Dept: INFUSION THERAPY | Facility: CLINIC | Age: 68
End: 2023-11-10
Attending: INTERNAL MEDICINE
Payer: MEDICARE

## 2023-11-10 VITALS
DIASTOLIC BLOOD PRESSURE: 54 MMHG | HEART RATE: 59 BPM | TEMPERATURE: 96.6 F | OXYGEN SATURATION: 99 % | SYSTOLIC BLOOD PRESSURE: 131 MMHG | RESPIRATION RATE: 16 BRPM

## 2023-11-10 DIAGNOSIS — Z76.89 PREVENTION OF CHEMOTHERAPY-INDUCED NEUTROPENIA: Primary | ICD-10-CM

## 2023-11-10 DIAGNOSIS — C18.7 MALIGNANT NEOPLASM OF SIGMOID COLON (H): ICD-10-CM

## 2023-11-10 LAB — FOLATE RBC-MCNC: NORMAL NG/ML

## 2023-11-10 PROCEDURE — 250N000011 HC RX IP 250 OP 636: Mod: JZ | Performed by: INTERNAL MEDICINE

## 2023-11-10 PROCEDURE — 96360 HYDRATION IV INFUSION INIT: CPT

## 2023-11-10 PROCEDURE — 258N000003 HC RX IP 258 OP 636: Performed by: INTERNAL MEDICINE

## 2023-11-10 PROCEDURE — 96361 HYDRATE IV INFUSION ADD-ON: CPT

## 2023-11-10 RX ORDER — HEPARIN SODIUM (PORCINE) LOCK FLUSH IV SOLN 100 UNIT/ML 100 UNIT/ML
5 SOLUTION INTRAVENOUS
Status: DISCONTINUED | OUTPATIENT
Start: 2023-11-10 | End: 2023-11-10 | Stop reason: HOSPADM

## 2023-11-10 RX ADMIN — HEPARIN 5 ML: 100 SYRINGE at 16:14

## 2023-11-10 RX ADMIN — SODIUM CHLORIDE 1000 ML: 9 INJECTION, SOLUTION INTRAVENOUS at 14:10

## 2023-11-10 ASSESSMENT — PAIN SCALES - GENERAL: PAINLEVEL: NO PAIN (0)

## 2023-11-10 NOTE — PROGRESS NOTES
"Infusion Nursing Note:  Nadia Ladd presents today for pump disconnect and IVF.    Patient seen by provider today: No   present during visit today: Not Applicable.    Note: Pt here today with her  for pump disconnect and IV fluids.  Patient states she has been feeling okay.  States she always feels \"just a little off\"  right after chemo.  Was started on gabapentin recently for neuropathy but hasn't noticed any differences yet.       Intravenous Access:  Implanted Port.    Treatment Conditions:  Not Applicable.      Post Infusion Assessment:  Patient tolerated infusion without incident.  Blood return noted pre and post infusion.  Site patent and intact, free from redness, edema or discomfort.  Access discontinued per protocol.       Discharge Plan:   Patient discharged in stable condition accompanied by: self and .  Departure Mode: Ambulatory.  Patient has appointment to return on 11/15/2023    Etelvina Barrientos RN  "

## 2023-11-10 NOTE — TELEPHONE ENCOUNTER
"Endoscopy Scheduling Screen    Have you had a positive Covid test in the last 14 days?  No    Are you active on MyChart?   Yes    What insurance is in the chart?  Other:  MEDICARE    Ordering/Referring Provider: Jerome Ashley MD     (If ordering provider performs procedure, schedule with ordering provider unless otherwise instructed. )    BMI: Estimated body mass index is 35.18 kg/m  as calculated from the following:    Height as of 11/8/23: 1.549 m (5' 1\").    Weight as of 11/8/23: 84.5 kg (186 lb 3.2 oz).     Sedation Ordered  moderate sedation.   If patient BMI > 50 do not schedule in [facility].    If patient BMI > 45 do not schedule at [facility].    Are you taking methadone or Suboxone?  No    Are you taking any prescription medications for pain 3 or more times per week?   No    Do you have a history of malignant hyperthermia or adverse reaction to anesthesia?  No    (Females) Are you currently pregnant?   No     Have you been diagnosed or told you have pulmonary hypertension?   No    Do you have an LVAD?  No    Have you been told you have moderate to severe sleep apnea?  No    Have you been told you have COPD, asthma, or any other lung disease?  No    Do you have any heart conditions?  No     Have you ever had an organ transplant?   No    Have you ever had or are you awaiting a heart or lung transplant?   No    Have you had a stroke or transient ischemic attack (TIA aka \"mini stroke\" in the last 6 months?   No    Have you been diagnosed with or been told you have cirrhosis of the liver?   No    Are you currently on dialysis?   No    Do you need assistance transferring?   No    BMI: Estimated body mass index is 35.18 kg/m  as calculated from the following:    Height as of 11/8/23: 1.549 m (5' 1\").    Weight as of 11/8/23: 84.5 kg (186 lb 3.2 oz).     Is patients BMI > 40 and scheduling location [facility]?  No    Do you take an injectable medication for weight loss or diabetes (excluding insulin)?  No    Do you take " the medication Naltrexone?  No    Do you take blood thinners?  Yes     Are you taking Effient/Prasugrel?  No, you must contact your prescribing provider for direction on holding or bridging with a different medication.       Prep   Are you currently on dialysis or do you have chronic kidney disease?  Yes (Golytely Prep)    Do you have a diagnosis of diabetes?  No    Do you have a diagnosis of cystic fibrosis (CF)?  No    On a regular basis do you go 3 -5 days between bowel movements?  No    BMI > 40?  No    Preferred Pharmacy:      [id number] - [address] [address]  Phone: [phone] Fax: [phone]      Final Scheduling Details   Colonoscopy prep sent?  FLEX SIG PREP    Procedure scheduled  Flexible sigmoidoscopy    Surgeon:  SUGAR     Date of procedure:  2/21     Pre-OP / PAC:   No - Not required for this site.    Location  [facility] - [facility] - Patient preference.    Sedation   Moderate Sedation - Per order.      Patient Reminders:   You will receive a call from a Nurse to review instructions and health history.  This assessment must be completed prior to your procedure.  Failure to complete the Nurse assessment may result in the procedure being cancelled.      On the day of your procedure, please designate an adult(s) who can drive you home stay with you for the next 24 hours. The medicines used in the exam will make you sleepy. You will not be able to drive.      You cannot take public transportation, ride share services, or non-medical taxi service without a responsible caregiver.  Medical transport services are allowed with the requirement that a responsible caregiver will receive you at your destination.  We require that drivers and caregivers are confirmed prior to your procedure.

## 2023-11-15 ENCOUNTER — INFUSION THERAPY VISIT (OUTPATIENT)
Dept: INFUSION THERAPY | Facility: CLINIC | Age: 68
End: 2023-11-15
Attending: INTERNAL MEDICINE
Payer: MEDICARE

## 2023-11-15 VITALS
HEART RATE: 64 BPM | RESPIRATION RATE: 16 BRPM | SYSTOLIC BLOOD PRESSURE: 140 MMHG | OXYGEN SATURATION: 97 % | DIASTOLIC BLOOD PRESSURE: 61 MMHG | TEMPERATURE: 96.6 F

## 2023-11-15 DIAGNOSIS — D70.1 CHEMOTHERAPY-INDUCED NEUTROPENIA (H): ICD-10-CM

## 2023-11-15 DIAGNOSIS — Z76.89 PREVENTION OF CHEMOTHERAPY-INDUCED NEUTROPENIA: ICD-10-CM

## 2023-11-15 DIAGNOSIS — T45.1X5A CHEMOTHERAPY-INDUCED NEUTROPENIA (H): ICD-10-CM

## 2023-11-15 DIAGNOSIS — C18.7 MALIGNANT NEOPLASM OF SIGMOID COLON (H): Primary | ICD-10-CM

## 2023-11-15 DIAGNOSIS — C18.9 MALIGNANT NEOPLASM OF COLON, UNSPECIFIED PART OF COLON (H): ICD-10-CM

## 2023-11-15 LAB
ALBUMIN SERPL BCG-MCNC: 3.8 G/DL (ref 3.5–5.2)
ALP SERPL-CCNC: 294 U/L (ref 40–150)
ALT SERPL W P-5'-P-CCNC: 52 U/L (ref 0–50)
ANION GAP SERPL CALCULATED.3IONS-SCNC: 11 MMOL/L (ref 7–15)
AST SERPL W P-5'-P-CCNC: 51 U/L (ref 0–45)
BILIRUB SERPL-MCNC: 0.7 MG/DL
BUN SERPL-MCNC: 19 MG/DL (ref 8–23)
CALCIUM SERPL-MCNC: 9 MG/DL (ref 8.8–10.2)
CHLORIDE SERPL-SCNC: 108 MMOL/L (ref 98–107)
CREAT SERPL-MCNC: 1.29 MG/DL (ref 0.51–0.95)
DEPRECATED HCO3 PLAS-SCNC: 23 MMOL/L (ref 22–29)
EGFRCR SERPLBLD CKD-EPI 2021: 45 ML/MIN/1.73M2
GLUCOSE SERPL-MCNC: 129 MG/DL (ref 70–99)
MAGNESIUM SERPL-MCNC: 1.6 MG/DL (ref 1.7–2.3)
POTASSIUM SERPL-SCNC: 4.3 MMOL/L (ref 3.4–5.3)
PROT SERPL-MCNC: 6.3 G/DL (ref 6.4–8.3)
SODIUM SERPL-SCNC: 142 MMOL/L (ref 135–145)

## 2023-11-15 PROCEDURE — 80053 COMPREHEN METABOLIC PANEL: CPT | Performed by: INTERNAL MEDICINE

## 2023-11-15 PROCEDURE — 96366 THER/PROPH/DIAG IV INF ADDON: CPT

## 2023-11-15 PROCEDURE — 83735 ASSAY OF MAGNESIUM: CPT

## 2023-11-15 PROCEDURE — 250N000011 HC RX IP 250 OP 636: Mod: JZ | Performed by: INTERNAL MEDICINE

## 2023-11-15 PROCEDURE — 36591 DRAW BLOOD OFF VENOUS DEVICE: CPT | Performed by: INTERNAL MEDICINE

## 2023-11-15 PROCEDURE — 96365 THER/PROPH/DIAG IV INF INIT: CPT

## 2023-11-15 PROCEDURE — 258N000003 HC RX IP 258 OP 636: Performed by: INTERNAL MEDICINE

## 2023-11-15 RX ORDER — HEPARIN SODIUM (PORCINE) LOCK FLUSH IV SOLN 100 UNIT/ML 100 UNIT/ML
5 SOLUTION INTRAVENOUS
Status: DISCONTINUED | OUTPATIENT
Start: 2023-11-15 | End: 2023-11-15 | Stop reason: HOSPADM

## 2023-11-15 RX ORDER — MEPERIDINE HYDROCHLORIDE 25 MG/ML
25 INJECTION INTRAMUSCULAR; INTRAVENOUS; SUBCUTANEOUS EVERY 30 MIN PRN
Status: CANCELLED | OUTPATIENT
Start: 2023-11-15

## 2023-11-15 RX ORDER — HEPARIN SODIUM,PORCINE 10 UNIT/ML
5 VIAL (ML) INTRAVENOUS
Status: CANCELLED | OUTPATIENT
Start: 2023-11-15

## 2023-11-15 RX ORDER — METHYLPREDNISOLONE SODIUM SUCCINATE 125 MG/2ML
125 INJECTION, POWDER, LYOPHILIZED, FOR SOLUTION INTRAMUSCULAR; INTRAVENOUS
Status: CANCELLED
Start: 2023-11-15

## 2023-11-15 RX ORDER — ALBUTEROL SULFATE 0.83 MG/ML
2.5 SOLUTION RESPIRATORY (INHALATION)
Status: CANCELLED | OUTPATIENT
Start: 2023-11-15

## 2023-11-15 RX ORDER — HEPARIN SODIUM (PORCINE) LOCK FLUSH IV SOLN 100 UNIT/ML 100 UNIT/ML
5 SOLUTION INTRAVENOUS
Status: CANCELLED | OUTPATIENT
Start: 2023-11-15

## 2023-11-15 RX ORDER — MAGNESIUM SULFATE HEPTAHYDRATE 40 MG/ML
4 INJECTION, SOLUTION INTRAVENOUS ONCE
Status: COMPLETED | OUTPATIENT
Start: 2023-11-15 | End: 2023-11-15

## 2023-11-15 RX ORDER — DIPHENHYDRAMINE HYDROCHLORIDE 50 MG/ML
50 INJECTION INTRAMUSCULAR; INTRAVENOUS
Status: CANCELLED
Start: 2023-11-15

## 2023-11-15 RX ORDER — ALBUTEROL SULFATE 90 UG/1
1-2 AEROSOL, METERED RESPIRATORY (INHALATION)
Status: CANCELLED
Start: 2023-11-15

## 2023-11-15 RX ORDER — EPINEPHRINE 1 MG/ML
0.3 INJECTION, SOLUTION INTRAMUSCULAR; SUBCUTANEOUS EVERY 5 MIN PRN
Status: CANCELLED | OUTPATIENT
Start: 2023-11-15

## 2023-11-15 RX ADMIN — MAGNESIUM SULFATE HEPTAHYDRATE 4 G: 40 INJECTION, SOLUTION INTRAVENOUS at 14:14

## 2023-11-15 RX ADMIN — SODIUM CHLORIDE 1000 ML: 9 INJECTION, SOLUTION INTRAVENOUS at 13:36

## 2023-11-15 RX ADMIN — HEPARIN 5 ML: 100 SYRINGE at 16:14

## 2023-11-15 NOTE — PROGRESS NOTES
Infusion Nursing Note:  Nadia Ladd presents today for IVF, Magnesium IV replacement.    Patient seen by provider today: No   present during visit today: Not Applicable.    Note: Patient ambulated to IVO. Denies pain. Neuropathy in hands/feet, gabapentin started last week. No improvements noted so far.  Here for IVF, Mag 1.6, so IV replacement ordered for today.       Intravenous Access:  Implanted Port.    Treatment Conditions:  Lab Results   Component Value Date     11/15/2023    POTASSIUM 4.3 11/15/2023    MAG 1.6 (L) 11/15/2023    CR 1.29 (H) 11/15/2023    LIVIER 9.0 11/15/2023    BILITOTAL 0.7 11/15/2023    ALBUMIN 3.8 11/15/2023    ALT 52 (H) 11/15/2023    AST 51 (H) 11/15/2023       Magnesium-1.6, ordered IV replacement per tx orders. .      Post Infusion Assessment:  Patient tolerated infusion without incident.  Blood return noted pre and post infusion.  Site patent and intact, free from redness, edema or discomfort.  Access discontinued per protocol.       Discharge Plan:     Patient discharged in stable condition accompanied by: self and .      Eli Ladd RN

## 2023-11-15 NOTE — PROGRESS NOTES
"Patient here for port lab draw.    Intravenous access:     Port needle gauge20 by 3/4 \".   Labs drawn without difficulty.  green tubes drawn.     Port flushed with 20 ml NS after.       Eli Ladd RN          "

## 2023-11-17 ENCOUNTER — HOSPITAL ENCOUNTER (OUTPATIENT)
Dept: MRI IMAGING | Facility: CLINIC | Age: 68
Discharge: HOME | End: 2023-11-17
Attending: INTERNAL MEDICINE | Admitting: INTERNAL MEDICINE
Payer: MEDICARE

## 2023-11-17 DIAGNOSIS — C18.7 MALIGNANT NEOPLASM OF SIGMOID COLON (H): ICD-10-CM

## 2023-11-17 DIAGNOSIS — R74.8 ALKALINE PHOSPHATASE ELEVATION: ICD-10-CM

## 2023-11-17 PROCEDURE — 74183 MRI ABD W/O CNTR FLWD CNTR: CPT | Mod: MG

## 2023-11-17 PROCEDURE — G1010 CDSM STANSON: HCPCS

## 2023-11-17 PROCEDURE — A9585 GADOBUTROL INJECTION: HCPCS | Mod: JZ | Performed by: INTERNAL MEDICINE

## 2023-11-17 PROCEDURE — 255N000002 HC RX 255 OP 636: Mod: JZ | Performed by: INTERNAL MEDICINE

## 2023-11-17 RX ORDER — GADOBUTROL 604.72 MG/ML
10 INJECTION INTRAVENOUS ONCE
Status: COMPLETED | OUTPATIENT
Start: 2023-11-17 | End: 2023-11-17

## 2023-11-17 RX ADMIN — GADOBUTROL 10 ML: 604.72 INJECTION INTRAVENOUS at 08:30

## 2023-11-22 ENCOUNTER — ALLIED HEALTH/NURSE VISIT (OUTPATIENT)
Dept: FAMILY MEDICINE | Facility: CLINIC | Age: 68
End: 2023-11-22
Payer: MEDICARE

## 2023-11-22 ENCOUNTER — VIRTUAL VISIT (OUTPATIENT)
Dept: ONCOLOGY | Facility: CLINIC | Age: 68
End: 2023-11-22
Attending: NURSE PRACTITIONER
Payer: MEDICARE

## 2023-11-22 ENCOUNTER — LAB (OUTPATIENT)
Dept: INFUSION THERAPY | Facility: CLINIC | Age: 68
End: 2023-11-22
Attending: INTERNAL MEDICINE
Payer: MEDICARE

## 2023-11-22 VITALS
WEIGHT: 189.1 LBS | SYSTOLIC BLOOD PRESSURE: 150 MMHG | BODY MASS INDEX: 35.73 KG/M2 | OXYGEN SATURATION: 99 % | TEMPERATURE: 96.9 F | HEART RATE: 60 BPM | DIASTOLIC BLOOD PRESSURE: 67 MMHG

## 2023-11-22 VITALS — WEIGHT: 189 LBS | HEIGHT: 61 IN | BODY MASS INDEX: 35.68 KG/M2

## 2023-11-22 DIAGNOSIS — C18.7 MALIGNANT NEOPLASM OF SIGMOID COLON (H): Primary | ICD-10-CM

## 2023-11-22 DIAGNOSIS — C18.7 MALIGNANT NEOPLASM OF SIGMOID COLON (H): ICD-10-CM

## 2023-11-22 DIAGNOSIS — Z76.89 PREVENTION OF CHEMOTHERAPY-INDUCED NEUTROPENIA: ICD-10-CM

## 2023-11-22 DIAGNOSIS — Z76.89 PREVENTION OF CHEMOTHERAPY-INDUCED NEUTROPENIA: Primary | ICD-10-CM

## 2023-11-22 LAB
ACANTHOCYTES BLD QL SMEAR: NORMAL
ALBUMIN SERPL BCG-MCNC: 3.6 G/DL (ref 3.5–5.2)
ALP SERPL-CCNC: 347 U/L (ref 40–150)
ALT SERPL W P-5'-P-CCNC: 46 U/L (ref 0–50)
ANION GAP SERPL CALCULATED.3IONS-SCNC: 11 MMOL/L (ref 7–15)
AST SERPL W P-5'-P-CCNC: 57 U/L (ref 0–45)
AUER BODIES BLD QL SMEAR: NORMAL
BASO STIPL BLD QL SMEAR: NORMAL
BASOPHILS # BLD AUTO: 0 10E3/UL (ref 0–0.2)
BASOPHILS NFR BLD AUTO: 0 %
BILIRUB SERPL-MCNC: 0.6 MG/DL
BITE CELLS BLD QL SMEAR: NORMAL
BLISTER CELLS BLD QL SMEAR: NORMAL
BUN SERPL-MCNC: 19.1 MG/DL (ref 8–23)
BURR CELLS BLD QL SMEAR: NORMAL
CALCIUM SERPL-MCNC: 9 MG/DL (ref 8.8–10.2)
CHLORIDE SERPL-SCNC: 110 MMOL/L (ref 98–107)
CREAT SERPL-MCNC: 1.54 MG/DL (ref 0.51–0.95)
DACRYOCYTES BLD QL SMEAR: NORMAL
DEPRECATED HCO3 PLAS-SCNC: 22 MMOL/L (ref 22–29)
EGFRCR SERPLBLD CKD-EPI 2021: 37 ML/MIN/1.73M2
ELLIPTOCYTES BLD QL SMEAR: NORMAL
EOSINOPHIL # BLD AUTO: 0 10E3/UL (ref 0–0.7)
EOSINOPHIL NFR BLD AUTO: 0 %
ERYTHROCYTE [DISTWIDTH] IN BLOOD BY AUTOMATED COUNT: 15.8 % (ref 10–15)
FRAGMENTS BLD QL SMEAR: NORMAL
GLUCOSE SERPL-MCNC: 190 MG/DL (ref 70–99)
HCT VFR BLD AUTO: 31.9 % (ref 35–47)
HGB BLD-MCNC: 10.2 G/DL (ref 11.7–15.7)
HGB C CRYSTALS: NORMAL
HOWELL-JOLLY BOD BLD QL SMEAR: NORMAL
IMM GRANULOCYTES # BLD: 0 10E3/UL
IMM GRANULOCYTES NFR BLD: 0 %
LYMPHOCYTES # BLD AUTO: 0.3 10E3/UL (ref 0.8–5.3)
LYMPHOCYTES NFR BLD AUTO: 12 %
MAGNESIUM SERPL-MCNC: 1.7 MG/DL (ref 1.7–2.3)
MCH RBC QN AUTO: 32.1 PG (ref 26.5–33)
MCHC RBC AUTO-ENTMCNC: 32 G/DL (ref 31.5–36.5)
MCV RBC AUTO: 100 FL (ref 78–100)
MONOCYTES # BLD AUTO: 0.6 10E3/UL (ref 0–1.3)
MONOCYTES NFR BLD AUTO: 21 %
NEUTROPHILS # BLD AUTO: 1.8 10E3/UL (ref 1.6–8.3)
NEUTROPHILS NFR BLD AUTO: 67 %
NEUTS HYPERSEG BLD QL SMEAR: NORMAL
NRBC # BLD AUTO: 0 10E3/UL
NRBC BLD AUTO-RTO: 0 /100
PLAT MORPH BLD: NORMAL
PLATELET # BLD AUTO: 98 10E3/UL (ref 150–450)
POLYCHROMASIA BLD QL SMEAR: NORMAL
POTASSIUM SERPL-SCNC: 4.1 MMOL/L (ref 3.4–5.3)
PROT SERPL-MCNC: 6 G/DL (ref 6.4–8.3)
RBC # BLD AUTO: 3.18 10E6/UL (ref 3.8–5.2)
RBC AGGLUT BLD QL: NORMAL
RBC MORPH BLD: NORMAL
ROULEAUX BLD QL SMEAR: NORMAL
SICKLE CELLS BLD QL SMEAR: NORMAL
SMUDGE CELLS BLD QL SMEAR: NORMAL
SODIUM SERPL-SCNC: 143 MMOL/L (ref 135–145)
SPHEROCYTES BLD QL SMEAR: NORMAL
STOMATOCYTES BLD QL SMEAR: NORMAL
TARGETS BLD QL SMEAR: NORMAL
TOXIC GRANULES BLD QL SMEAR: NORMAL
VARIANT LYMPHS BLD QL SMEAR: NORMAL
WBC # BLD AUTO: 2.7 10E3/UL (ref 4–11)

## 2023-11-22 PROCEDURE — 99207 PR NO CHARGE NURSE ONLY: CPT

## 2023-11-22 PROCEDURE — 96361 HYDRATE IV INFUSION ADD-ON: CPT

## 2023-11-22 PROCEDURE — 80053 COMPREHEN METABOLIC PANEL: CPT | Performed by: INTERNAL MEDICINE

## 2023-11-22 PROCEDURE — 85025 COMPLETE CBC W/AUTO DIFF WBC: CPT | Performed by: INTERNAL MEDICINE

## 2023-11-22 PROCEDURE — 258N000003 HC RX IP 258 OP 636: Performed by: INTERNAL MEDICINE

## 2023-11-22 PROCEDURE — G0498 CHEMO EXTEND IV INFUS W/PUMP: HCPCS

## 2023-11-22 PROCEDURE — 96375 TX/PRO/DX INJ NEW DRUG ADDON: CPT

## 2023-11-22 PROCEDURE — 96413 CHEMO IV INFUSION 1 HR: CPT

## 2023-11-22 PROCEDURE — 96367 TX/PROPH/DG ADDL SEQ IV INF: CPT

## 2023-11-22 PROCEDURE — 83735 ASSAY OF MAGNESIUM: CPT

## 2023-11-22 PROCEDURE — 250N000011 HC RX IP 250 OP 636: Mod: JZ | Performed by: INTERNAL MEDICINE

## 2023-11-22 PROCEDURE — 96415 CHEMO IV INFUSION ADDL HR: CPT

## 2023-11-22 PROCEDURE — 99214 OFFICE O/P EST MOD 30 MIN: CPT | Mod: 95 | Performed by: NURSE PRACTITIONER

## 2023-11-22 PROCEDURE — 36591 DRAW BLOOD OFF VENOUS DEVICE: CPT | Performed by: INTERNAL MEDICINE

## 2023-11-22 PROCEDURE — 96368 THER/DIAG CONCURRENT INF: CPT

## 2023-11-22 RX ORDER — ONDANSETRON 2 MG/ML
8 INJECTION INTRAMUSCULAR; INTRAVENOUS ONCE
Status: COMPLETED | OUTPATIENT
Start: 2023-11-22 | End: 2023-11-22

## 2023-11-22 RX ORDER — MAGNESIUM SULFATE HEPTAHYDRATE 40 MG/ML
2 INJECTION, SOLUTION INTRAVENOUS ONCE
Status: COMPLETED | OUTPATIENT
Start: 2023-11-22 | End: 2023-11-22

## 2023-11-22 RX ORDER — HEPARIN SODIUM (PORCINE) LOCK FLUSH IV SOLN 100 UNIT/ML 100 UNIT/ML
5 SOLUTION INTRAVENOUS
Status: DISCONTINUED | OUTPATIENT
Start: 2023-11-22 | End: 2023-11-22 | Stop reason: HOSPADM

## 2023-11-22 RX ADMIN — FOSAPREPITANT: 150 INJECTION, POWDER, LYOPHILIZED, FOR SOLUTION INTRAVENOUS at 10:57

## 2023-11-22 RX ADMIN — ONDANSETRON 8 MG: 2 INJECTION INTRAMUSCULAR; INTRAVENOUS at 09:38

## 2023-11-22 RX ADMIN — MAGNESIUM SULFATE HEPTAHYDRATE 2 G: 40 INJECTION, SOLUTION INTRAVENOUS at 09:59

## 2023-11-22 RX ADMIN — DEXTROSE MONOHYDRATE 250 ML: 50 INJECTION, SOLUTION INTRAVENOUS at 11:27

## 2023-11-22 RX ADMIN — SODIUM CHLORIDE 500 ML: 9 INJECTION, SOLUTION INTRAVENOUS at 09:25

## 2023-11-22 RX ADMIN — OXALIPLATIN 120 MG: 5 INJECTION, SOLUTION INTRAVENOUS at 11:31

## 2023-11-22 RX ADMIN — LEUCOVORIN CALCIUM 700 MG: 350 INJECTION, POWDER, LYOPHILIZED, FOR SOLUTION INTRAMUSCULAR; INTRAVENOUS at 11:26

## 2023-11-22 ASSESSMENT — PAIN SCALES - GENERAL
PAINLEVEL: NO PAIN (0)
PAINLEVEL: NO PAIN (0)

## 2023-11-22 NOTE — Clinical Note
"    11/22/2023         RE: Nadia Ladd  [address]  [place] [facility] [address]        Dear Colleague,    Thank you for referring your patient, Nadia Ladd, to the [facility] [place]. Please see a copy of my visit note below.      Virtual Visit Details    Type of service:  Video Visit   Video Start Time: {video visit start/end time for provider to select:406419}  Video End Time:{video visit start/end time for provider to select:808930}    Originating Location (pt. Location): {video visit patient location:953807::\"Home\"}  {PROVIDER LOCATION On-site should be selected for visits conducted from your clinic location or adjoining [facility] hospital, academic office, or other nearby [facility] building. Off-site should be selected for all other provider locations, including home:872152}  Distant Location (provider location):  {virtual location provider:609263}  Platform used for Video Visit: {Virtual Visit Platforms:695949::\"naaya\"}         Is the patient currently in the state of [place]? YES    Visit mode:VIDEO    If the visit is dropped, the patient can be reconnected by: VIDEO VISIT: Send to e-mail at: [email or web address]    Will anyone else be joining the visit? NO  (If patient encounters technical issues they should call [phone])    How would you like to obtain your AVS? MyChart    Are changes needed to the allergy or medication list? No    Reason for visit: Video Visit (Follow UP )    Jasmin Soto F         Again, thank you for allowing me to participate in the care of your patient.        Sincerely,        LUIS Oconnor CNP  "

## 2023-11-22 NOTE — Clinical Note
"    11/22/2023         RE: Nadia Ladd  [address]  [place] [address]        Dear Colleague,    Thank you for referring your patient, Nadia Ladd, to the [facility] [place]. Please see a copy of my visit note below.      Virtual Visit Details    Type of service:  Video Visit   Video Start Time: {video visit start/end time for provider to select:201781}  Video End Time:{video visit start/end time for provider to select:132716}    Originating Location (pt. Location): {video visit patient location:130856::\"Home\"}  {PROVIDER LOCATION On-site should be selected for visits conducted from your clinic location or adjoining [facility] hospital, academic office, or other nearby [facility] building. Off-site should be selected for all other provider locations, including home:071005}  Distant Location (provider location):  {virtual location provider:465603}  Platform used for Video Visit: {Virtual Visit Platforms:808431::\"Groupsite\"}         Is the patient currently in the state of [place]? YES    Visit mode:VIDEO    If the visit is dropped, the patient can be reconnected by: VIDEO VISIT: Send to e-mail at: [email or web address]    Will anyone else be joining the visit? NO  (If patient encounters technical issues they should call [phone])    How would you like to obtain your AVS? MyChart    Are changes needed to the allergy or medication list? No    Reason for visit: Video Visit (Follow UP )    Jasmin Soto F         Again, thank you for allowing me to participate in the care of your patient.        Sincerely,        LUIS Oconnor CNP  "

## 2023-11-22 NOTE — PROGRESS NOTES
Infusion Nursing Note:  Nadia Ladd presents today for port labs.    Patient seen by provider today: Yes: Petros Villareal   present during visit today: Not Applicable.    Note: Pt with her  for port labs prior to her appointment with Petros Villareal.      Intravenous Access:  Implanted Port.    Treatment Conditions:  Lab Results   Component Value Date    HGB 10.2 (L) 11/22/2023    WBC 2.7 (L) 11/22/2023    ANEU 7.6 08/01/2023    ANEUTAUTO 1.8 11/22/2023    PLT 98 (L) 11/22/2023        Lab Results   Component Value Date     11/22/2023    POTASSIUM 4.1 11/22/2023    MAG 1.7 11/22/2023    CR 1.54 (H) 11/22/2023    LIVIER 9.0 11/22/2023    BILITOTAL 0.6 11/22/2023    ALBUMIN 3.6 11/22/2023    ALT 46 11/22/2023    AST 57 (H) 11/22/2023       Results reviewed, labs MET treatment parameters, ok to proceed with treatment.      Post Infusion Assessment:  Blood return noted. Labs drawn without difficulty.    Etelvina Barrientos RN

## 2023-11-22 NOTE — PROGRESS NOTES
"Infusion Nursing Note:  Nadia Ladd presents today for C11D1 and magnesium replacement.  Oxilaplatin and Fluorouracil pump  Patient seen by provider today: Yes: Petros Villareal   present during visit today: Not Applicable.    Note: Pt here today for C11D1. Pt has been feeling well.  Has some neuropathy and gabapentin was increased today by provider.  Pt did need magnesium replacement today which was given prior to her treatment with her IV fluids.     Prior to discharge: Port is secured in place with tegaderm and flushed with 10cc NS with positive blood return noted.    Continuous home infusion CADD pump connected.    All connectors secured in place and clamps taped open.    Pump started, \"running\" noted on display (CADD): YES.   Capillary element taped to pt's skin per protocol.  Pump Connection double checked with Teri Magana RN.  Patient instructed to call our clinic or [facility] with any questions or concerns at home.  Patient verbalized understanding.    Patient set up for pump disconnect at our clinic on 11/24/2023 at 1200.        Intravenous Access:  Implanted Port.    Treatment Conditions:  Lab Results   Component Value Date    HGB 10.2 (L) 11/22/2023    WBC 2.7 (L) 11/22/2023    ANEU 7.6 08/01/2023    ANEUTAUTO 1.8 11/22/2023    PLT 98 (L) 11/22/2023        Lab Results   Component Value Date     11/22/2023    POTASSIUM 4.1 11/22/2023    MAG 1.7 11/22/2023    CR 1.54 (H) 11/22/2023    LIVIER 9.0 11/22/2023    BILITOTAL 0.6 11/22/2023    ALBUMIN 3.6 11/22/2023    ALT 46 11/22/2023    AST 57 (H) 11/22/2023       Results reviewed, labs MET treatment parameters, ok to proceed with treatment.      Post Infusion Assessment:  Patient tolerated infusion without incident.  Blood return noted pre and post infusion.  Site patent and intact, free from redness, edema or discomfort.       Discharge Plan:   Patient discharged in stable condition accompanied by: self and .  Departure Mode: " Ambulatory.  Pt has appointment to return to infusion on 11/24/2023 for pump disconnect and IVF      Etelvina Barrientos RN

## 2023-11-22 NOTE — PROGRESS NOTES
Video-Visit Details  Type of service:  Video Visit     Video Start Time:0900  Video End Time:0915    Originating Location (pt. Location): Home     Distant Location (provider location):  [facility] [place]      Oncology/Hematology Visit Note  Nov 22, 2023    Reason for Visit: follow up of sigmoid cancer    Patient has medical history including Crohn's disease on sulfasalazine, anemia secondary to iron deficiency and B12 deficiency, obesity, hypertension, prediabetes, eczema, pulmonary hypertension, hiatal hernia, mild splenomegaly (14.7 cm in 2/23)     She initially presented 2/2023 with fatigue and iron deficiency anemia. Colonoscopy confirmed sigmoid colon mass, biopsy with invasive poorly differentiated carcinoma, MLH-1 loss, PMS2 loss, negative MLH1 promoter methylation. CT with the proximal sigmoid colon mass and a hepatic flexure colon mass, associated LAD. PET confirmed multifocal disease with suspicion of a third lesion in left transverse colon. CEA 6.8. Negative genetic testing for Chan syndrome.      She underwent laparoscopic converted to open total abdominal colectomy with ileorectal anastomosis, partial omentectomy, flexible sigmoidoscopy, TAHBSO on 4/6/23.      Hepatic flexure mass with MINEN with +LVI, TPS 50% +[id number], mpT3 pN1a, stage 3B, MMR pending.     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     NGS with KRAS G13D, TMB 17.     Admitted with ARF.      CT CAP with minimally increase subcentimeter pulmonary nodules, new GG nodule, several prominent mediastinal lymph nodes slightly increased, splenomegaly 14.5 cm. MRI brain no evidence of disease.     5/31/23 started mFOLFOX 6 w/dose reduced oxaliplatin for renal function.      C2D1 6/14/23 held due to new  onset paroxysmal A-fib with RVR requiring hospitalization 6/3/2023 (also hypoMg, dehydrated). Started apixaban ([id number]) and metoprolol.      -7/18/2023  CT CAP- subcm lung nodules stable, borderline and mildly enlarged mediastinal LN and periportal LN stable, splenomegaly 15.7cm, Subtotal colectomy with ileorectal anastomosis. No acute inflammation or obstruction      - 7/19/2023 resumed  mFOLFOX6 C2D1  (inpatient w/continuous cardiac monitoring). Diltiazem added for infusions.            Interval History:  Patient overall reports feeling well.  Denies fevers chills sweats cough shortness of breath chest pain nausea abdominal pain or bleeding  Continues to have hypersensitivity to cold she is taking gabapentin at night.  Denies swelling of the extremities  Reports overall good energy and appetite    Review of Systems:  14 point ROS of systems including Constitutional, Eyes, Respiratory, Cardiovascular, Gastroenterology, Genitourinary, Integumentary, Muscularskeletal, Psychiatric were all negative except for pertinent positives noted in my HPI.      Physical Examination:  Video visit-no audible wheezing or cough.  Patient does not appear to be in any distress    Laboratory Data:  CBC and CMP results reviewed    Assessment and Plan:      Sigmoid cancer  Follows with Dr. Bateman  -MRI of the liver reviewed with patient-stable lesion in the liver stable splenomegaly no metastatic disease in the abdomen  Labs reviewed abnormalities discussed  -Continue with mFOLFOX  Oxaliplatin reduced due to Katrin on CKD  Patient has follow-up appointment with Dr. Bateman with next cycle of treatment      KATRIN on CKD  Follows with nephrology  12/04/23 patient has follow-up appointment with nephrologist  In addition she is getting fluids after chemo to prevent dehydration    Neuropathy  -Patient is on gabapentin 1 tablet at night started on 11/08  -Patient reports she is having some more sensitivity to cold  Increase gabapentin to twice  daily    Paroxysmal A-fib  Possible secondary to 5-Follow-up  Follows closely with cardiology  She states she takes diltiazem  mg daily with short acting 30 mg on days 1-3 with chemo  Patient is also on metoprolol  Patient is on IV fluids and magnesium supplement as needed day 1 and day 8  -Replace magnesium as per recommendation today  Continue with Xarelto  Management per cardiology       Anemia  Secondary to treatment iron deficiency and B12 deficiency  Continue with B12 1000 mg p.o. daily and iron    Leukopenia  Secondary to treatment.   Normal ANC  Check temperature frequently in the event of fever chills sweats or any signs symptoms of infection call our clinic or go to ER    Thrombocytopenia  Overall stable platelet count at 98  In the event of bleeding bruising fall edema or injury call our clinic or go to ER      Please call clinic with any changes in health condition or questions        LUIS Oconnor [facility] [place]     Chart documentation with Dragon Voice recognition Software. Although reviewed after completion, some words and grammatical errors may remain.

## 2023-11-24 ENCOUNTER — INFUSION THERAPY VISIT (OUTPATIENT)
Dept: INFUSION THERAPY | Facility: CLINIC | Age: 68
End: 2023-11-24
Attending: INTERNAL MEDICINE
Payer: MEDICARE

## 2023-11-24 VITALS
DIASTOLIC BLOOD PRESSURE: 51 MMHG | RESPIRATION RATE: 16 BRPM | OXYGEN SATURATION: 97 % | SYSTOLIC BLOOD PRESSURE: 125 MMHG | HEART RATE: 68 BPM | TEMPERATURE: 98.3 F

## 2023-11-24 DIAGNOSIS — Z76.89 PREVENTION OF CHEMOTHERAPY-INDUCED NEUTROPENIA: Primary | ICD-10-CM

## 2023-11-24 DIAGNOSIS — C18.7 MALIGNANT NEOPLASM OF SIGMOID COLON (H): ICD-10-CM

## 2023-11-24 DIAGNOSIS — I48.0 PAROXYSMAL ATRIAL FIBRILLATION WITH RVR (H): ICD-10-CM

## 2023-11-24 DIAGNOSIS — E83.42 HYPOMAGNESEMIA: ICD-10-CM

## 2023-11-24 DIAGNOSIS — K52.9 CHRONIC DIARRHEA: ICD-10-CM

## 2023-11-24 PROCEDURE — 258N000003 HC RX IP 258 OP 636: Performed by: INTERNAL MEDICINE

## 2023-11-24 PROCEDURE — 96360 HYDRATION IV INFUSION INIT: CPT

## 2023-11-24 PROCEDURE — 250N000011 HC RX IP 250 OP 636: Mod: JZ | Performed by: INTERNAL MEDICINE

## 2023-11-24 PROCEDURE — 96361 HYDRATE IV INFUSION ADD-ON: CPT

## 2023-11-24 RX ORDER — MAGNESIUM OXIDE 400 MG/1
400 TABLET ORAL 2 TIMES DAILY
Qty: 60 TABLET | Refills: 0 | Status: SHIPPED | OUTPATIENT
Start: 2023-11-24 | End: 2023-12-22

## 2023-11-24 RX ORDER — HEPARIN SODIUM (PORCINE) LOCK FLUSH IV SOLN 100 UNIT/ML 100 UNIT/ML
5 SOLUTION INTRAVENOUS
Status: DISCONTINUED | OUTPATIENT
Start: 2023-11-24 | End: 2023-11-24 | Stop reason: HOSPADM

## 2023-11-24 RX ADMIN — HEPARIN 5 ML: 100 SYRINGE at 14:11

## 2023-11-24 RX ADMIN — SODIUM CHLORIDE 1000 ML: 9 INJECTION, SOLUTION INTRAVENOUS at 12:09

## 2023-11-24 ASSESSMENT — PAIN SCALES - GENERAL: PAINLEVEL: NO PAIN (0)

## 2023-11-24 NOTE — PROGRESS NOTES
Infusion Nursing Note:  Nadia Ladd presents today for pump disconnect and IVF.    Patient seen by provider today: No   present during visit today: Not Applicable.    Note: Nadia is feeling well. A little queasy and mild dizziness but this has been her baseline after home chemo infusion. States that neurontin makes her feel dizzy so she just takes it at night. Was feeling well enough to celebrate Thanksgiving yesterday.      Intravenous Access:  Implanted Port.    Treatment Conditions:  Not Applicable.      Post Infusion Assessment:  Patient tolerated infusion without incident.  Blood return noted pre and post infusion.  Site patent and intact, free from redness, edema or discomfort.  No evidence of extravasations.  Access discontinued per protocol.       Discharge Plan:   Discharge instructions reviewed with: Patient.  Patient and/or family verbalized understanding of discharge instructions and all questions answered.  Patient discharged in stable condition accompanied by: .  Departure Mode: Ambulatory.      Teri Magana RN

## 2023-11-24 NOTE — TELEPHONE ENCOUNTER
Writer contacted Nicole LUKE, patient will be contacted today to get scheduled. Patient has infusion 8/16. Nicole aware that ideally this should be replaced infusion appt.     Megan Valera RN on 8/10/2023 at 2:02 PM    
yes

## 2023-11-24 NOTE — PROGRESS NOTES
INITIAL CHART SCRUB FOR VISIT    Appointment: Return:     Provider: Dr. Brenner    Diagnosis: CKD 3 b/metabolic acidosis    Labs entered per Provider and/or Nephrology Protocol: Yes Labs already placed by provider    Communicated to patient: Appointment reminder to be done by Melvin Lopez or nurse.      Liliane Jacobson LPN  Nephrology  [phone]

## 2023-11-29 ENCOUNTER — LAB (OUTPATIENT)
Dept: INFUSION THERAPY | Facility: CLINIC | Age: 68
End: 2023-11-29
Attending: INTERNAL MEDICINE
Payer: MEDICARE

## 2023-11-29 VITALS
SYSTOLIC BLOOD PRESSURE: 143 MMHG | WEIGHT: 183.7 LBS | BODY MASS INDEX: 34.71 KG/M2 | DIASTOLIC BLOOD PRESSURE: 57 MMHG | OXYGEN SATURATION: 99 % | TEMPERATURE: 98.3 F | RESPIRATION RATE: 16 BRPM | HEART RATE: 59 BPM

## 2023-11-29 DIAGNOSIS — C18.9 MALIGNANT NEOPLASM OF COLON, UNSPECIFIED PART OF COLON (H): Primary | ICD-10-CM

## 2023-11-29 DIAGNOSIS — Z76.89 PREVENTION OF CHEMOTHERAPY-INDUCED NEUTROPENIA: ICD-10-CM

## 2023-11-29 DIAGNOSIS — T45.1X5A CHEMOTHERAPY-INDUCED NEUTROPENIA (H): ICD-10-CM

## 2023-11-29 DIAGNOSIS — D70.1 CHEMOTHERAPY-INDUCED NEUTROPENIA (H): ICD-10-CM

## 2023-11-29 DIAGNOSIS — C18.7 MALIGNANT NEOPLASM OF SIGMOID COLON (H): Primary | ICD-10-CM

## 2023-11-29 LAB
ALBUMIN SERPL BCG-MCNC: 3.9 G/DL (ref 3.5–5.2)
ALP SERPL-CCNC: 339 U/L (ref 40–150)
ALT SERPL W P-5'-P-CCNC: 62 U/L (ref 0–50)
ANION GAP SERPL CALCULATED.3IONS-SCNC: 12 MMOL/L (ref 7–15)
AST SERPL W P-5'-P-CCNC: 61 U/L (ref 0–45)
BILIRUB SERPL-MCNC: 0.7 MG/DL
BUN SERPL-MCNC: 19.2 MG/DL (ref 8–23)
CALCIUM SERPL-MCNC: 9 MG/DL (ref 8.8–10.2)
CHLORIDE SERPL-SCNC: 108 MMOL/L (ref 98–107)
CREAT SERPL-MCNC: 1.3 MG/DL (ref 0.51–0.95)
DEPRECATED HCO3 PLAS-SCNC: 22 MMOL/L (ref 22–29)
EGFRCR SERPLBLD CKD-EPI 2021: 45 ML/MIN/1.73M2
GLUCOSE SERPL-MCNC: 120 MG/DL (ref 70–99)
MAGNESIUM SERPL-MCNC: 1.7 MG/DL (ref 1.7–2.3)
POTASSIUM SERPL-SCNC: 4.2 MMOL/L (ref 3.4–5.3)
PROT SERPL-MCNC: 6.4 G/DL (ref 6.4–8.3)
SODIUM SERPL-SCNC: 142 MMOL/L (ref 135–145)

## 2023-11-29 PROCEDURE — 36415 COLL VENOUS BLD VENIPUNCTURE: CPT | Performed by: INTERNAL MEDICINE

## 2023-11-29 PROCEDURE — 82435 ASSAY OF BLOOD CHLORIDE: CPT | Performed by: INTERNAL MEDICINE

## 2023-11-29 PROCEDURE — 82374 ASSAY BLOOD CARBON DIOXIDE: CPT | Performed by: INTERNAL MEDICINE

## 2023-11-29 PROCEDURE — 258N000003 HC RX IP 258 OP 636: Performed by: INTERNAL MEDICINE

## 2023-11-29 PROCEDURE — 83735 ASSAY OF MAGNESIUM: CPT

## 2023-11-29 PROCEDURE — 250N000011 HC RX IP 250 OP 636: Mod: JZ | Performed by: INTERNAL MEDICINE

## 2023-11-29 PROCEDURE — 96365 THER/PROPH/DIAG IV INF INIT: CPT

## 2023-11-29 RX ORDER — HEPARIN SODIUM,PORCINE 10 UNIT/ML
5 VIAL (ML) INTRAVENOUS
Status: CANCELLED | OUTPATIENT
Start: 2023-11-29

## 2023-11-29 RX ORDER — MEPERIDINE HYDROCHLORIDE 25 MG/ML
25 INJECTION INTRAMUSCULAR; INTRAVENOUS; SUBCUTANEOUS EVERY 30 MIN PRN
Status: CANCELLED | OUTPATIENT
Start: 2023-11-29

## 2023-11-29 RX ORDER — HEPARIN SODIUM (PORCINE) LOCK FLUSH IV SOLN 100 UNIT/ML 100 UNIT/ML
5 SOLUTION INTRAVENOUS
Status: DISCONTINUED | OUTPATIENT
Start: 2023-11-29 | End: 2023-11-29 | Stop reason: HOSPADM

## 2023-11-29 RX ORDER — ALBUTEROL SULFATE 0.83 MG/ML
2.5 SOLUTION RESPIRATORY (INHALATION)
Status: CANCELLED | OUTPATIENT
Start: 2023-11-29

## 2023-11-29 RX ORDER — MAGNESIUM SULFATE HEPTAHYDRATE 40 MG/ML
2 INJECTION, SOLUTION INTRAVENOUS ONCE
Status: COMPLETED | OUTPATIENT
Start: 2023-11-29 | End: 2023-11-29

## 2023-11-29 RX ORDER — METHYLPREDNISOLONE SODIUM SUCCINATE 125 MG/2ML
125 INJECTION, POWDER, LYOPHILIZED, FOR SOLUTION INTRAMUSCULAR; INTRAVENOUS
Status: CANCELLED
Start: 2023-11-29

## 2023-11-29 RX ORDER — DIPHENHYDRAMINE HYDROCHLORIDE 50 MG/ML
50 INJECTION INTRAMUSCULAR; INTRAVENOUS
Status: CANCELLED
Start: 2023-11-29

## 2023-11-29 RX ORDER — HEPARIN SODIUM (PORCINE) LOCK FLUSH IV SOLN 100 UNIT/ML 100 UNIT/ML
5 SOLUTION INTRAVENOUS
Status: CANCELLED | OUTPATIENT
Start: 2023-11-29

## 2023-11-29 RX ORDER — EPINEPHRINE 1 MG/ML
0.3 INJECTION, SOLUTION INTRAMUSCULAR; SUBCUTANEOUS EVERY 5 MIN PRN
Status: CANCELLED | OUTPATIENT
Start: 2023-11-29

## 2023-11-29 RX ORDER — ALBUTEROL SULFATE 90 UG/1
1-2 AEROSOL, METERED RESPIRATORY (INHALATION)
Status: CANCELLED
Start: 2023-11-29

## 2023-11-29 RX ADMIN — SODIUM CHLORIDE 1000 ML: 9 INJECTION, SOLUTION INTRAVENOUS at 13:55

## 2023-11-29 RX ADMIN — MAGNESIUM SULFATE HEPTAHYDRATE 2 G: 40 INJECTION, SOLUTION INTRAVENOUS at 14:27

## 2023-11-29 RX ADMIN — HEPARIN 5 ML: 100 SYRINGE at 16:01

## 2023-11-29 ASSESSMENT — PAIN SCALES - GENERAL: PAINLEVEL: NO PAIN (0)

## 2023-11-29 NOTE — PROGRESS NOTES
"Infusion Nursing Note:  Nadia Ladd presents today for C11D8 IVF with mag replacement.  Patient seen by provider today: No   present during visit today: Not Applicable.    Note: Last chemo went well for Nadia. Today she's really having troubles with neuropathy in her fingers. Says it \"drive's me crazy!\"      Intravenous Access:  Implanted Port.    Treatment Conditions:  Lab Results   Component Value Date     11/29/2023    POTASSIUM 4.2 11/29/2023    MAG 1.7 11/29/2023    CR 1.30 (H) 11/29/2023    LIVIER 9.0 11/29/2023    BILITOTAL 0.7 11/29/2023    ALBUMIN 3.9 11/29/2023    ALT 62 (H) 11/29/2023    AST 61 (H) 11/29/2023     Magnesium 1.7  Treatment parameters met.    Post Infusion Assessment:  Patient tolerated infusion without incident.       Discharge Plan:   Patient discharged in stable condition accompanied by: .  Departure Mode: Ambulatory.      Marycarmen Cardenas RN  "

## 2023-12-01 NOTE — PROGRESS NOTES
AUDIOLOGY REPORT    SUBJECTIVE:  Nadia Ladd is a 68 year old female dropped gifted hearing aids off in the Audiology Clinic at the [facility] on 11/9/2023 for hearing aid check. Previous evaluation 10/23/2023 showed moderate rising to minimal sloping to severe sensorineural hearing loss right ear and minimal sloping to severe sensorineural hearing loss left ear.  The patient was gifted binaural [employer] Alta2 Pro RITE devices.    OBJECTIVE:   Hearing aid maintenance was completed to change to 85 dB receivers, clean/clear suly ports, and vacuum battery contacts. Biologic listening check revealed appropriate function. Connected devices to programming software and completed first fit experienced.    No charge visit today (in warranty hearing aid check).    ASSESSMENT:   A hearing aid check was completed today.  Changes to hearing aid was completed as outlined above.   PLAN:  Nadia was notified by [employer] that the devices are ready for programming, discussed charges $255. Please call this clinic with any questions regarding today s appointment.    Rober Nuñez.  [place] Licensed Audiologist #[id number]

## 2023-12-04 ENCOUNTER — OFFICE VISIT (OUTPATIENT)
Dept: NEPHROLOGY | Facility: CLINIC | Age: 68
End: 2023-12-04
Attending: INTERNAL MEDICINE
Payer: MEDICARE

## 2023-12-04 ENCOUNTER — MYC MEDICAL ADVICE (OUTPATIENT)
Dept: NEPHROLOGY | Facility: CLINIC | Age: 68
End: 2023-12-04

## 2023-12-04 ENCOUNTER — LAB (OUTPATIENT)
Dept: LAB | Facility: CLINIC | Age: 68
End: 2023-12-04
Attending: INTERNAL MEDICINE
Payer: MEDICARE

## 2023-12-04 VITALS
BODY MASS INDEX: 35.12 KG/M2 | DIASTOLIC BLOOD PRESSURE: 82 MMHG | HEIGHT: 61 IN | WEIGHT: 186 LBS | HEART RATE: 82 BPM | SYSTOLIC BLOOD PRESSURE: 138 MMHG

## 2023-12-04 DIAGNOSIS — I48.0 PAROXYSMAL ATRIAL FIBRILLATION WITH RVR (H): ICD-10-CM

## 2023-12-04 DIAGNOSIS — K52.9 CHRONIC DIARRHEA: ICD-10-CM

## 2023-12-04 DIAGNOSIS — E55.9 VITAMIN D DEFICIENCY: Primary | ICD-10-CM

## 2023-12-04 DIAGNOSIS — N18.32 STAGE 3B CHRONIC KIDNEY DISEASE (H): Primary | ICD-10-CM

## 2023-12-04 DIAGNOSIS — E55.9 VITAMIN D DEFICIENCY: ICD-10-CM

## 2023-12-04 DIAGNOSIS — N25.81 SECONDARY HYPERPARATHYROIDISM (H): ICD-10-CM

## 2023-12-04 DIAGNOSIS — C18.9 MALIGNANT NEOPLASM OF COLON, UNSPECIFIED PART OF COLON (H): ICD-10-CM

## 2023-12-04 DIAGNOSIS — C18.7 MALIGNANT NEOPLASM OF SIGMOID COLON (H): ICD-10-CM

## 2023-12-04 DIAGNOSIS — N18.31 STAGE 3A CHRONIC KIDNEY DISEASE (CKD) (H): ICD-10-CM

## 2023-12-04 DIAGNOSIS — E87.20 METABOLIC ACIDOSIS, NORMAL ANION GAP (NAG): ICD-10-CM

## 2023-12-04 DIAGNOSIS — Z76.89 PREVENTION OF CHEMOTHERAPY-INDUCED NEUTROPENIA: ICD-10-CM

## 2023-12-04 LAB
ALBUMIN MFR UR ELPH: 22 MG/DL
ALBUMIN SERPL BCG-MCNC: 3.7 G/DL (ref 3.5–5.2)
ALBUMIN UR-MCNC: 20 MG/DL
ALP SERPL-CCNC: 359 U/L (ref 40–150)
ALT SERPL W P-5'-P-CCNC: 48 U/L (ref 0–50)
ANION GAP SERPL CALCULATED.3IONS-SCNC: 9 MMOL/L (ref 7–15)
APPEARANCE UR: CLEAR
AST SERPL W P-5'-P-CCNC: 60 U/L (ref 0–45)
BASOPHILS # BLD AUTO: 0 10E3/UL (ref 0–0.2)
BASOPHILS NFR BLD AUTO: 0 %
BILIRUB SERPL-MCNC: 0.6 MG/DL
BILIRUB UR QL STRIP: NEGATIVE
BUN SERPL-MCNC: 14.6 MG/DL (ref 8–23)
CALCIUM SERPL-MCNC: 9.3 MG/DL (ref 8.8–10.2)
CHLORIDE SERPL-SCNC: 110 MMOL/L (ref 98–107)
CHOLEST SERPL-MCNC: 196 MG/DL
COLOR UR AUTO: YELLOW
CREAT SERPL-MCNC: 1.36 MG/DL (ref 0.51–0.95)
CREAT UR-MCNC: 174 MG/DL
CREAT UR-MCNC: 178 MG/DL
DEPRECATED HCO3 PLAS-SCNC: 24 MMOL/L (ref 22–29)
EGFRCR SERPLBLD CKD-EPI 2021: 42 ML/MIN/1.73M2
EOSINOPHIL # BLD AUTO: 0 10E3/UL (ref 0–0.7)
EOSINOPHIL NFR BLD AUTO: 0 %
ERYTHROCYTE [DISTWIDTH] IN BLOOD BY AUTOMATED COUNT: 15.6 % (ref 10–15)
GLUCOSE SERPL-MCNC: 148 MG/DL (ref 70–99)
GLUCOSE UR STRIP-MCNC: 150 MG/DL
HCT VFR BLD AUTO: 32.2 % (ref 35–47)
HDLC SERPL-MCNC: 65 MG/DL
HGB BLD-MCNC: 10.7 G/DL (ref 11.7–15.7)
HGB UR QL STRIP: NEGATIVE
IMM GRANULOCYTES # BLD: 0 10E3/UL
IMM GRANULOCYTES NFR BLD: 0 %
KETONES UR STRIP-MCNC: NEGATIVE MG/DL
LDLC SERPL CALC-MCNC: 69 MG/DL
LEUKOCYTE ESTERASE UR QL STRIP: ABNORMAL
LYMPHOCYTES # BLD AUTO: 0.5 10E3/UL (ref 0.8–5.3)
LYMPHOCYTES NFR BLD AUTO: 13 %
MAGNESIUM SERPL-MCNC: 1.9 MG/DL (ref 1.7–2.3)
MCH RBC QN AUTO: 32.6 PG (ref 26.5–33)
MCHC RBC AUTO-ENTMCNC: 33.2 G/DL (ref 31.5–36.5)
MCV RBC AUTO: 98 FL (ref 78–100)
MICROALBUMIN UR-MCNC: 31.4 MG/L
MICROALBUMIN/CREAT UR: 17.64 MG/G CR (ref 0–25)
MONOCYTES # BLD AUTO: 0.7 10E3/UL (ref 0–1.3)
MONOCYTES NFR BLD AUTO: 20 %
MUCOUS THREADS #/AREA URNS LPF: PRESENT /LPF
NEUTROPHILS # BLD AUTO: 2.3 10E3/UL (ref 1.6–8.3)
NEUTROPHILS NFR BLD AUTO: 67 %
NITRATE UR QL: NEGATIVE
NONHDLC SERPL-MCNC: 131 MG/DL
NRBC # BLD AUTO: 0 10E3/UL
NRBC BLD AUTO-RTO: 0 /100
PH UR STRIP: 5 [PH] (ref 5–7)
PHOSPHATE SERPL-MCNC: 2.7 MG/DL (ref 2.5–4.5)
PLATELET # BLD AUTO: 83 10E3/UL (ref 150–450)
POTASSIUM SERPL-SCNC: 4.5 MMOL/L (ref 3.4–5.3)
PROT SERPL-MCNC: 6.3 G/DL (ref 6.4–8.3)
PROT/CREAT 24H UR: 0.13 MG/MG CR (ref 0–0.2)
PTH-INTACT SERPL-MCNC: 79 PG/ML (ref 15–65)
RBC # BLD AUTO: 3.28 10E6/UL (ref 3.8–5.2)
RBC URINE: 2 /HPF
SODIUM SERPL-SCNC: 143 MMOL/L (ref 135–145)
SP GR UR STRIP: 1.02 (ref 1–1.03)
SQUAMOUS EPITHELIAL: 3 /HPF
TRIGL SERPL-MCNC: 308 MG/DL
UROBILINOGEN UR STRIP-MCNC: NORMAL MG/DL
VIT D+METAB SERPL-MCNC: 16 NG/ML (ref 20–50)
WBC # BLD AUTO: 3.5 10E3/UL (ref 4–11)
WBC URINE: 5 /HPF

## 2023-12-04 PROCEDURE — 99000 SPECIMEN HANDLING OFFICE-LAB: CPT | Performed by: PATHOLOGY

## 2023-12-04 PROCEDURE — 81001 URINALYSIS AUTO W/SCOPE: CPT | Performed by: PATHOLOGY

## 2023-12-04 PROCEDURE — 85025 COMPLETE CBC W/AUTO DIFF WBC: CPT | Performed by: PATHOLOGY

## 2023-12-04 PROCEDURE — 82306 VITAMIN D 25 HYDROXY: CPT | Performed by: INTERNAL MEDICINE

## 2023-12-04 PROCEDURE — 83970 ASSAY OF PARATHORMONE: CPT | Performed by: PATHOLOGY

## 2023-12-04 PROCEDURE — 80061 LIPID PANEL: CPT | Performed by: PATHOLOGY

## 2023-12-04 PROCEDURE — G0463 HOSPITAL OUTPT CLINIC VISIT: HCPCS | Performed by: INTERNAL MEDICINE

## 2023-12-04 PROCEDURE — 83735 ASSAY OF MAGNESIUM: CPT | Performed by: PATHOLOGY

## 2023-12-04 PROCEDURE — 36415 COLL VENOUS BLD VENIPUNCTURE: CPT | Performed by: PATHOLOGY

## 2023-12-04 PROCEDURE — 84156 ASSAY OF PROTEIN URINE: CPT | Performed by: PATHOLOGY

## 2023-12-04 PROCEDURE — 99215 OFFICE O/P EST HI 40 MIN: CPT | Performed by: INTERNAL MEDICINE

## 2023-12-04 PROCEDURE — 80053 COMPREHEN METABOLIC PANEL: CPT | Performed by: PATHOLOGY

## 2023-12-04 PROCEDURE — 82570 ASSAY OF URINE CREATININE: CPT | Performed by: INTERNAL MEDICINE

## 2023-12-04 PROCEDURE — 84100 ASSAY OF PHOSPHORUS: CPT | Performed by: PATHOLOGY

## 2023-12-04 RX ORDER — SODIUM BICARBONATE 650 MG/1
650 TABLET ORAL 2 TIMES DAILY
Qty: 180 TABLET | Refills: 3 | Status: SHIPPED | OUTPATIENT
Start: 2023-12-04

## 2023-12-04 ASSESSMENT — PAIN SCALES - GENERAL: PAINLEVEL: NO PAIN (0)

## 2023-12-04 NOTE — PROGRESS NOTES
"  Nephrology Progress Note  12/04/2023   Chief complaint: Follow-up CKD stage 3 in colon cancer patient  History of Present Illness:    Nadia Ladd is a 67 year old F with Hx of poorly differentiated colon cancer s/p total colectomy and ALEXANDER BSO in 4/23, crohn;s disease, CKD stage 3, HTN  who is here for post-hospitalization for MARYA and hyperkalemia.     The patient has history of Crohn's disease for over 20 years and has been taking sulfasalazine.  Initially, the symptom has been controlled but 3 to 4 months ago the patient started to have increasing diarrhea abdominal pain and lightheadedness.  She was found to have iron deficiency anemia and she underwent colonoscopy and was found to have colonic mass concerning for cancer in Feb 2023.  She then underwent laparoscopic converted to open total abdominal colectomy with ileorectal anastomosis, partial omentectomy, flexible sigmoidoscopy and ALEXANDER BSO in April 2023.  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  Shortly after surgery the patient did not have good appetite and noted more loose stool.  She was dismissed with her blood pressure medication including hydrochlorothiazide/Triamterene (for which she has been taking it for many many years).   On 5/9/2023, the patient noticed that she has some sinus problem as well as feeling \"dizziness\" 1 day prior to present to the oncology clinic.  Otherwise, she denies any symptoms of nausea vomiting, diarrhea, orthostatic symptoms, lower extremity edema or change in urination.  She does not take any NSAIDs.  Her appetite has been gradually improving. She was then seen by Oncology clinic with a plan to discuss Chemotherapy but was noted to have hyperkalemia at 8.0.  The specimen was hemolyzed but upon repeat it was 7.6. She was sent to ED at [facility] and transferred to [facility] on 5/9/23.  [place] [facility] repeat potassium " improved to 5.5 and 5.3 (after shifting).  The patient has baseline creatinine of 1.3-1.6 but she is not aware that she has chronic kidney disease and she never been seen by kidney doctor.  During surgery her creatinine has been stable and on dismissal on 4/11/23, her creatinine was 1.34.  However her creatinine upon presentation on 5/9/23 was 3.82. UA on 5/9/23 showed WBC 22 but no blood. Neg protein.   Been treated conservatively with IV fluid, Lokelma with improvement in potassium level and creatinine function and she was dismissed.      She was started on FOLFOX C1D1 on 5/31/23.  However, shortly after that, she felt dizzy and sent to ED. to have A-fib with RVR and was was admited between 6/3-6/5/23.  She received diltiazem and spontaneously converted in the ED oncology is concerned it might be related to 5-FU and she has been referred to cardiology.      6/13/23: She is here for posthospitalization follow-up.  She reports bit of leg swelling. Her diarrhea has improved and she would consider that this is normal for her. Her appetite is good. No problem with urination. She still has frequent BMs. She just bought BP machine at home and they were 130/70s.     Labs 6/8/23: Creatinine 1.18, EGFR 51, BUN 11, 3.6, sodium 145, calcium 8.7, albumin 4.1. Hemoglobin 10.1, blood cell 3.0, platelet 164.  UA on 6/30/2020 showed white blood cell 7 cells no RBC. UPCR 0.06 g/g.   12/4/23: Today, she is doing good. She tolerated chemotherapy ok. She will get the last dose this Wednesday. She has intermittent diarrhea. She has a bit of swelling after chemo and goes away after a couple of days. No problem with urination. Her BP a bit high when she goes for chemotherapy. She takes diltiazem 30 mg as needed and when she has chemo/Afib.   Labs today show creatinine 1.36, BUN 14.6, GFR 42, potassium 4.5,, dioxide 24, calcium 9.3, phosphorus 2.7, albumin 3.7.  Vitamin D 16, PTH 79. UA showed no blood and UPCR 0.13 g/g.  Positive urine  glucose.    Past medical history  Past Medical History:   Diagnosis Date    Arthropathia 08/05/2010    B12 deficiency anemia 10/21/2010    Benign essential hypertension with target blood pressure below 140/90 10/10/2016    CARDIOVASCULAR SCREENING; LDL GOAL LESS THAN 160 10/31/2010    Crohn's colitis (H) 02/15/2011    Dermatitis-dishydrotic eczema-severe 08/05/2010    Hiatal hernia     HTN (hypertension) 07/21/2011    Iron deficiency anemia 10/21/2010    Malignant neoplasm of sigmoid colon (H)     Obesity     Primary pulmonary hypertension (H) 04/06/2010       Past surgical history  Past Surgical History:   Procedure Laterality Date    COLECTOMY WITHOUT COLOSTOMY N/A 4/6/2023    Procedure: Laparoscopic Converted to Open Total abdominal colectomy;  Surgeon: Jerome Ashley MD;  Location: [facility] OR    COLONOSCOPY  10/11/10    COLONOSCOPY N/A 2/27/2023    Procedure: ATTEMPTED COLONOSCOPY WITH SIGMOID STRICTURE BIOPSY;  Surgeon: Jonathan Alcocer MD;  Location:  GI    ESOPHAGOSCOPY, GASTROSCOPY, DUODENOSCOPY (EGD), COMBINED N/A 2/27/2023    Procedure: ESOPHAGOGASTRODUODENOSCOPY, WITH BIOPSY;  Surgeon: Jonathan Alcocer MD;  Location: [facility] GI    HYSTERECTOMY TOTAL ABDOMINAL, BILATERAL SALPINGO-OOPHORECTOMY, COMBINED N/A 4/6/2023    Procedure: Hysterectomy total abdominal, bilateral salpingo-oophorectomy;  Surgeon: Sunitha Olea MD;  Location: [facility] OR    INSERT PORT VASCULAR ACCESS Right 5/25/2023    Procedure: Ultrasound-guided right internal jugular venous access port placement with fluoroscopy;  Surgeon: Martin Thomas DO;  Location: [facility] OR    IR CHEST PORT PLACEMENT > 5 YRS OF AGE  8/21/2023    IR PORT CHECK RIGHT  7/18/2023    IR PORT REMOVAL RIGHT  8/21/2023    SIGMOIDOSCOPY FLEXIBLE N/A 4/6/2023    Procedure: Sigmoidoscopy flexible;  Surgeon: Jerome Ashley MD;  Location: [facility] OR    SURGICAL HISTORY OF -   06/14/76    Perineorrhaphy, for widening vaginal orifice    ZZC EXPLORATORY OF  "ABDOMEN  1989    laparoscopy         Review of Systems:   14 systems were reviewed and all negative except as mentioned above.   Current Medications:  Current Outpatient Medications   Medication    cyanocobalamin 1000 MCG TBCR    dexAMETHasone (DECADRON) 4 MG tablet    diltiazem (CARDIZEM) 30 MG tablet    diltiazem ER COATED BEADS (CARDIZEM CD/CARTIA XT) 120 MG 24 hr capsule    Ferrous Sulfate (IRON) 325 (65 Fe) MG tablet    gabapentin (NEURONTIN) 300 MG capsule    lidocaine-prilocaine (EMLA) 2.5-2.5 % external cream    loperamide (IMODIUM) 2 MG capsule    magnesium oxide 400 MG tablet    metoprolol succinate ER (TOPROL XL) 50 MG 24 hr tablet    Multiple Vitamins-Iron (MULTI-DAY PLUS IRON PO)    prochlorperazine (COMPAZINE) 10 MG tablet    rivaroxaban ANTICOAGULANT (XARELTO) 20 MG TABS tablet    sodium bicarbonate 650 MG tablet     No current facility-administered medications for this visit.       Physical Exam:   /82   Pulse 82   Ht 1.549 m (5' 1\")   Wt 84.4 kg (186 lb)   LMP  (LMP Unknown)   BMI 35.14 kg/m     Body mass index is 35.14 kg/m .    GENERAL APPEARANCE: Alert, not in acute distress  EYES:  Not pale conjunctiva, pupils equal  HENT: Mouth without ulcers or lesions  PULM: lungs clear to auscultation bilaterally, equal air movement, no clubbing  CV: regular rhythm, normal rate, no rub     -JVD no distended.      -edema: Rt leg: trace edema and left leg non swelling.   GI: soft,  - tender, no distended, bowel sounds are present  INTEGUMENT: No rash  NEURO:  Non focal. No asterixis.     Labs:   All labs reviewed by me  Last Renal Panel:  Sodium   Date Value Ref Range Status   12/04/2023 143 135 - 145 mmol/L Final     Comment:     Reference intervals for this test were updated on 09/26/2023 to more accurately reflect our healthy population. There may be differences in the flagging of prior results with similar values performed with this method. Interpretation of those prior results can be made in " the context of the updated reference intervals.    01/26/2021 140 133 - 144 mmol/L Final     Potassium   Date Value Ref Range Status   12/04/2023 4.5 3.4 - 5.3 mmol/L Final   10/09/2021 4.3 3.4 - 5.3 mmol/L Final   01/26/2021 4.1 3.4 - 5.3 mmol/L Final     Potassium POCT   Date Value Ref Range Status   04/06/2023 4.7 3.5 - 5.0 mmol/L Final     Chloride   Date Value Ref Range Status   12/04/2023 110 (H) 98 - 107 mmol/L Final   10/09/2021 118 (H) 94 - 109 mmol/L Final   01/26/2021 111 (H) 94 - 109 mmol/L Final     Carbon Dioxide   Date Value Ref Range Status   01/26/2021 22 20 - 32 mmol/L Final     Carbon Dioxide (CO2)   Date Value Ref Range Status   12/04/2023 24 22 - 29 mmol/L Final   10/09/2021 20 20 - 32 mmol/L Final     Anion Gap   Date Value Ref Range Status   12/04/2023 9 7 - 15 mmol/L Final   10/09/2021 4 3 - 14 mmol/L Final   01/26/2021 7 3 - 14 mmol/L Final     Glucose   Date Value Ref Range Status   12/04/2023 148 (H) 70 - 99 mg/dL Final   10/09/2021 159 (H) 70 - 99 mg/dL Final   01/26/2021 107 (H) 70 - 99 mg/dL Final     GLUCOSE BY METER POCT   Date Value Ref Range Status   05/10/2023 116 (H) 70 - 99 mg/dL Final     Comment:     /RN Notified     Urea Nitrogen   Date Value Ref Range Status   12/04/2023 14.6 8.0 - 23.0 mg/dL Final   10/09/2021 25 7 - 30 mg/dL Final   01/26/2021 29 7 - 30 mg/dL Final     Creatinine   Date Value Ref Range Status   12/04/2023 1.36 (H) 0.51 - 0.95 mg/dL Final   01/26/2021 1.45 (H) 0.52 - 1.04 mg/dL Final     GFR Estimate   Date Value Ref Range Status   12/04/2023 42 (L) >60 mL/min/1.73m2 Final   01/26/2021 38 (L) >60 mL/min/[1.73_m2] Final     Comment:     Non  GFR Calc  Starting 12/18/2018, serum creatinine based estimated GFR (eGFR) will be   calculated using the Chronic Kidney Disease Epidemiology Collaboration   (CKD-EPI) equation.       GFR, ESTIMATED POCT   Date Value Ref Range Status   05/26/2023 33 (L) >60 mL/min/1.73m2 Final     Calcium   Date Value  Ref Range Status   12/04/2023 9.3 8.8 - 10.2 mg/dL Final   01/26/2021 9.3 8.5 - 10.1 mg/dL Final     Phosphorus   Date Value Ref Range Status   12/04/2023 2.7 2.5 - 4.5 mg/dL Final   09/14/2010 3.7 2.5 - 4.5 mg/dL Final     Albumin   Date Value Ref Range Status   12/04/2023 3.7 3.5 - 5.2 g/dL Final   01/26/2021 4.1 3.4 - 5.0 g/dL Final       Imaging:  I reviewed imaging studies.     Assessment & Recommendations:   Problem list  # Hx MARYA stage 2 likely secondary to ATN from hypotension (diuretic use and increase bowel movement)  # CKD stage 3; DDx chronic dehydration/recurrent MARYA from Crohn's disease, Hypertension or Sulfasalazine (stopped in 4/23); BL Cr 1.3-1.6  # GLycosuria  Etiology of acute kidney injury that occurred in May 23 was likely from ATN in the setting of unaware hypotension/dehydration from ongoing diuretic use and increase BM after surgery. UA was bland.UPCR was negative. Kidney ultrasound showed no evidence of hydronephrosis or masses but that showed that she had some mild atrophic changes consistent with chronic kidney disease.      In regard to CKD, she has baseline creatinine of 1.3-1.6.  Again, etiology likely multifactorial in the setting of chronic dehydration/recurrent MARYA from Crohn's, hypertension or sulfasalazine use which can be associated with AIN.  Sulfasalazine was discontinued since April 2023 so should no longer contribute.  UA appears to be clean and no protein so less likely GN. IgAN is associated with Crohn's but UA is not suggestive of that.      She has been on FOLFOX6, oxaliplatin may cause MARYA int he form of ATN but rare. Her volume status should be maintained especially in the setting of chronic diarrhea in the setting of Crohn's disease.  At this time, her creatinine remains stable at 1.36.  Electrolyte has been stable without hypokalemia or metabolic acidosis.  She is having glycosuria without diabetes.  These may raise the possibility of incomplete Fanconi syndrome.   Which could be happened in the setting of oxaliplatin.  Given that the patient will receive the last dose of chemo this Wednesday.  I do not think that we need to hold chemo at this time.    - Stay well hydrated, dehydration is one of the major risk factor for MARYA in platinum-based regimen   -Continue to observe blood pressure while holding blood pressure medication  # Metabolci acidosis  Bicarb is 24.  She is on sodium bicarb 650 mg twice daily.  Will continue that for now  # Hx of Hyperkalemia; resolved  Likely in the setting of triamterene use, high K diet and MARYA. Now resolved.  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   FOLFOX6 C1D1 on 5/31/23 but has Afib shortly after. S/p mFOLFOX6 x 12 cycles.   # Anemia in CKD and cancer   # Leukopenia and thrombocytopenia  Hb 10.7. Likely multifactorial but most ly form chemo.  Not indicated for ESAs, given recent cancer.  # Hypertension; monitor blood pressure medication  # New onset Afib  Previously on triamterene/hydrochlorothiazide but being held May 2023.  Currently she is on metoprolol ER 50 mg daily and diltiazem 120 mg as needed before chemo.   # BMD  # Vit D deficiency  # Secondary hyperparathyroidism  Vit D is 16 and PTH is 79.  Cements phosphorus are normal  # Crohn's disease  She was off Sulfazalazine since April 23. Diarrhea is stable.      Follow-up in 6 months with labs    I spent  40 minutes on the date of the encounter doing chart review, history and exam, documentation and further activities as noted above. 20 minutes of this visit is dedicated to direct patient interaction via phone/video/face-to-face.    Santana Brenner MD on 12/04/2023

## 2023-12-04 NOTE — LETTER
"12/4/2023       RE: Nadia Ladd  [address]  [facility] [address]     Dear Colleague,    Thank you for referring your patient, Nadia Ladd, to the [facility] [place] [facility]. Please see a copy of my visit note below.      Nephrology Progress Note  12/04/2023   Chief complaint: Follow-up CKD stage 3 in colon cancer patient  History of Present Illness:    Nadia Ladd is a 67 year old F with Hx of poorly differentiated colon cancer s/p total colectomy and ALEXANDER BSO in 4/23, crohn;s disease, CKD stage 3, HTN  who is here for post-hospitalization for MARYA and hyperkalemia.     The patient has history of Crohn's disease for over 20 years and has been taking sulfasalazine.  Initially, the symptom has been controlled but 3 to 4 months ago the patient started to have increasing diarrhea abdominal pain and lightheadedness.  She was found to have iron deficiency anemia and she underwent colonoscopy and was found to have colonic mass concerning for cancer in Feb 2023.  She then underwent laparoscopic converted to open total abdominal colectomy with ileorectal anastomosis, partial omentectomy, flexible sigmoidoscopy and ALEXANDER BSO in April 2023.  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  Shortly after surgery the patient did not have good appetite and noted more loose stool.  She was dismissed with her blood pressure medication including hydrochlorothiazide/Triamterene (for which she has been taking it for many many years).   On 5/9/2023, the patient noticed that she has some sinus problem as well as feeling \"dizziness\" 1 day prior to present to the oncology clinic.  Otherwise, she denies any symptoms of nausea vomiting, diarrhea, orthostatic symptoms, lower extremity edema or change in urination.  She does not take any NSAIDs.  Her appetite has been gradually " improving. She was then seen by Oncology clinic with a plan to discuss Chemotherapy but was noted to have hyperkalemia at 8.0.  The specimen was hemolyzed but upon repeat it was 7.6. She was sent to ED at [facility] and transferred to [facility] on 5/9/23.  Upon arriving at the [facility], repeat potassium improved to 5.5 and 5.3 (after shifting).  The patient has baseline creatinine of 1.3-1.6 but she is not aware that she has chronic kidney disease and she never been seen by kidney doctor.  During surgery her creatinine has been stable and on dismissal on 4/11/23, her creatinine was 1.34.  However her creatinine upon presentation on 5/9/23 was 3.82. UA on 5/9/23 showed WBC 22 but no blood. Neg protein.   Been treated conservatively with IV fluid, Lokelma with improvement in potassium level and creatinine function and she was dismissed.      She was started on FOLFOX C1D1 on 5/31/23.  However, shortly after that, she felt dizzy and sent to ED. to have A-fib with RVR and was was admited between 6/3-6/5/23.  She received diltiazem and spontaneously converted in the ED oncology is concerned it might be related to 5-FU and she has been referred to cardiology.      6/13/23: She is here for posthospitalization follow-up.  She reports bit of leg swelling. Her diarrhea has improved and she would consider that this is normal for her. Her appetite is good. No problem with urination. She still has frequent BMs. She just bought BP machine at home and they were 130/70s.     Labs 6/8/23: Creatinine 1.18, EGFR 51, BUN 11, 3.6, sodium 145, calcium 8.7, albumin 4.1. Hemoglobin 10.1, blood cell 3.0, platelet 164.  UA on 6/30/2020 showed white blood cell 7 cells no RBC. UPCR 0.06 g/g.   12/4/23: Today, she is doing good. She tolerated chemotherapy ok. She will get the last dose this Wednesday. She has intermittent diarrhea. She has a bit of swelling after chemo and goes away after a couple of days. No problem with urination. Her  BP a bit high when she goes for chemotherapy. She takes diltiazem 30 mg as needed and when she has chemo/Afib.   Labs today show creatinine 1.36, BUN 14.6, GFR 42, potassium 4.5,, dioxide 24, calcium 9.3, phosphorus 2.7, albumin 3.7.  Vitamin D 16, PTH 79. UA showed no blood and UPCR 0.13 g/g.  Positive urine glucose.    Past medical history  Past Medical History:   Diagnosis Date    Arthropathia 08/05/2010    B12 deficiency anemia 10/21/2010    Benign essential hypertension with target blood pressure below 140/90 10/10/2016    CARDIOVASCULAR SCREENING; LDL GOAL LESS THAN 160 10/31/2010    Crohn's colitis (H) 02/15/2011    Dermatitis-dishydrotic eczema-severe 08/05/2010    Hiatal hernia     HTN (hypertension) 07/21/2011    Iron deficiency anemia 10/21/2010    Malignant neoplasm of sigmoid colon (H)     Obesity     Primary pulmonary hypertension (H) 04/06/2010       Past surgical history  Past Surgical History:   Procedure Laterality Date    COLECTOMY WITHOUT COLOSTOMY N/A 4/6/2023    Procedure: Laparoscopic Converted to Open Total abdominal colectomy;  Surgeon: Jerome Ashley MD;  Location: [facility] OR    COLONOSCOPY  10/11/10    COLONOSCOPY N/A 2/27/2023    Procedure: ATTEMPTED COLONOSCOPY WITH SIGMOID STRICTURE BIOPSY;  Surgeon: Jonathan Alcocer MD;  Location:  GI    ESOPHAGOSCOPY, GASTROSCOPY, DUODENOSCOPY (EGD), COMBINED N/A 2/27/2023    Procedure: ESOPHAGOGASTRODUODENOSCOPY, WITH BIOPSY;  Surgeon: Jonathan Alcocer MD;  Location:  GI    HYSTERECTOMY TOTAL ABDOMINAL, BILATERAL SALPINGO-OOPHORECTOMY, COMBINED N/A 4/6/2023    Procedure: Hysterectomy total abdominal, bilateral salpingo-oophorectomy;  Surgeon: Sunitha Olea MD;  Location: [facility] OR    INSERT PORT VASCULAR ACCESS Right 5/25/2023    Procedure: Ultrasound-guided right internal jugular venous access port placement with fluoroscopy;  Surgeon: Martin Thomas DO;  Location: [facility] OR    IR CHEST PORT PLACEMENT > 5 YRS OF AGE  8/21/2023    IR  "PORT CHECK RIGHT  7/18/2023    IR PORT REMOVAL RIGHT  8/21/2023    SIGMOIDOSCOPY FLEXIBLE N/A 4/6/2023    Procedure: Sigmoidoscopy flexible;  Surgeon: Jerome Ashley MD;  Location: [facility] OR    SURGICAL HISTORY OF -   06/14/76    Perineorrhaphy, for widening vaginal orifice    [facility] EXPLORATORY OF ABDOMEN  1989    laparoscopy         Review of Systems:   14 systems were reviewed and all negative except as mentioned above.   Current Medications:  Current Outpatient Medications   Medication    cyanocobalamin 1000 MCG TBCR    dexAMETHasone (DECADRON) 4 MG tablet    diltiazem (CARDIZEM) 30 MG tablet    diltiazem ER COATED BEADS (CARDIZEM CD/CARTIA XT) 120 MG 24 hr capsule    Ferrous Sulfate (IRON) 325 (65 Fe) MG tablet    gabapentin (NEURONTIN) 300 MG capsule    lidocaine-prilocaine (EMLA) 2.5-2.5 % external cream    loperamide (IMODIUM) 2 MG capsule    magnesium oxide 400 MG tablet    metoprolol succinate ER (TOPROL XL) 50 MG 24 hr tablet    Multiple Vitamins-Iron (MULTI-DAY PLUS IRON PO)    prochlorperazine (COMPAZINE) 10 MG tablet    rivaroxaban ANTICOAGULANT (XARELTO) 20 MG TABS tablet    sodium bicarbonate 650 MG tablet     No current facility-administered medications for this visit.       Physical Exam:   /82   Pulse 82   Ht 1.549 m (5' 1\")   Wt 84.4 kg (186 lb)   LMP  (LMP Unknown)   BMI 35.14 kg/m     Body mass index is 35.14 kg/m .    GENERAL APPEARANCE: Alert, not in acute distress  EYES:  Not pale conjunctiva, pupils equal  HENT: Mouth without ulcers or lesions  PULM: lungs clear to auscultation bilaterally, equal air movement, no clubbing  CV: regular rhythm, normal rate, no rub     -JVD no distended.      -edema: Rt leg: trace edema and left leg non swelling.   GI: soft,  - tender, no distended, bowel sounds are present  INTEGUMENT: No rash  NEURO:  Non focal. No asterixis.     Labs:   All labs reviewed by me  Last Renal Panel:  Sodium   Date Value Ref Range Status   12/04/2023 143 135 - " 145 mmol/L Final     Comment:     Reference intervals for this test were updated on 09/26/2023 to more accurately reflect our healthy population. There may be differences in the flagging of prior results with similar values performed with this method. Interpretation of those prior results can be made in the context of the updated reference intervals.    01/26/2021 140 133 - 144 mmol/L Final     Potassium   Date Value Ref Range Status   12/04/2023 4.5 3.4 - 5.3 mmol/L Final   10/09/2021 4.3 3.4 - 5.3 mmol/L Final   01/26/2021 4.1 3.4 - 5.3 mmol/L Final     Potassium POCT   Date Value Ref Range Status   04/06/2023 4.7 3.5 - 5.0 mmol/L Final     Chloride   Date Value Ref Range Status   12/04/2023 110 (H) 98 - 107 mmol/L Final   10/09/2021 118 (H) 94 - 109 mmol/L Final   01/26/2021 111 (H) 94 - 109 mmol/L Final     Carbon Dioxide   Date Value Ref Range Status   01/26/2021 22 20 - 32 mmol/L Final     Carbon Dioxide (CO2)   Date Value Ref Range Status   12/04/2023 24 22 - 29 mmol/L Final   10/09/2021 20 20 - 32 mmol/L Final     Anion Gap   Date Value Ref Range Status   12/04/2023 9 7 - 15 mmol/L Final   10/09/2021 4 3 - 14 mmol/L Final   01/26/2021 7 3 - 14 mmol/L Final     Glucose   Date Value Ref Range Status   12/04/2023 148 (H) 70 - 99 mg/dL Final   10/09/2021 159 (H) 70 - 99 mg/dL Final   01/26/2021 107 (H) 70 - 99 mg/dL Final     GLUCOSE BY METER POCT   Date Value Ref Range Status   05/10/2023 116 (H) 70 - 99 mg/dL Final     Comment:     /RN Notified     Urea Nitrogen   Date Value Ref Range Status   12/04/2023 14.6 8.0 - 23.0 mg/dL Final   10/09/2021 25 7 - 30 mg/dL Final   01/26/2021 29 7 - 30 mg/dL Final     Creatinine   Date Value Ref Range Status   12/04/2023 1.36 (H) 0.51 - 0.95 mg/dL Final   01/26/2021 1.45 (H) 0.52 - 1.04 mg/dL Final     GFR Estimate   Date Value Ref Range Status   12/04/2023 42 (L) >60 mL/min/1.73m2 Final   01/26/2021 38 (L) >60 mL/min/[1.73_m2] Final     Comment:     Non   GFR Calc  Starting 12/18/2018, serum creatinine based estimated GFR (eGFR) will be   calculated using the Chronic Kidney Disease Epidemiology Collaboration   (CKD-EPI) equation.       GFR, ESTIMATED POCT   Date Value Ref Range Status   05/26/2023 33 (L) >60 mL/min/1.73m2 Final     Calcium   Date Value Ref Range Status   12/04/2023 9.3 8.8 - 10.2 mg/dL Final   01/26/2021 9.3 8.5 - 10.1 mg/dL Final     Phosphorus   Date Value Ref Range Status   12/04/2023 2.7 2.5 - 4.5 mg/dL Final   09/14/2010 3.7 2.5 - 4.5 mg/dL Final     Albumin   Date Value Ref Range Status   12/04/2023 3.7 3.5 - 5.2 g/dL Final   01/26/2021 4.1 3.4 - 5.0 g/dL Final       Imaging:  I reviewed imaging studies.     Assessment & Recommendations:   Problem list  # Hx MARYA stage 2 likely secondary to ATN from hypotension (diuretic use and increase bowel movement)  # CKD stage 3; DDx chronic dehydration/recurrent MARYA from Crohn's disease, Hypertension or Sulfasalazine (stopped in 4/23); BL Cr 1.3-1.6  # GLycosuria  Etiology of acute kidney injury that occurred in May 23 was likely from ATN in the setting of unaware hypotension/dehydration from ongoing diuretic use and increase BM after surgery. UA was bland.UPCR was negative. Kidney ultrasound showed no evidence of hydronephrosis or masses but that showed that she had some mild atrophic changes consistent with chronic kidney disease.      In regard to CKD, she has baseline creatinine of 1.3-1.6.  Again, etiology likely multifactorial in the setting of chronic dehydration/recurrent MARYA from Crohn's, hypertension or sulfasalazine use which can be associated with AIN.  Sulfasalazine was discontinued since April 2023 so should no longer contribute.  UA appears to be clean and no protein so less likely GN. IgAN is associated with Crohn's but UA is not suggestive of that.      She has been on FOLFOX6, oxaliplatin may cause MARYA int he form of ATN but rare. Her volume status should be maintained especially in the  setting of chronic diarrhea in the setting of Crohn's disease.  At this time, her creatinine remains stable at 1.36.  Electrolyte has been stable without hypokalemia or metabolic acidosis.  She is having glycosuria without diabetes.  These may raise the possibility of incomplete Fanconi syndrome.  Which could be happened in the setting of oxaliplatin.  Given that the patient will receive the last dose of chemo this Wednesday.  I do not think that we need to hold chemo at this time.    - Stay well hydrated, dehydration is one of the major risk factor for MARYA in platinum-based regimen   -Continue to observe blood pressure while holding blood pressure medication  # Metabolci acidosis  Bicarb is 24.  She is on sodium bicarb 650 mg twice daily.  Will continue that for now  # Hx of Hyperkalemia; resolved  Likely in the setting of triamterene use, high K diet and MARYA. Now resolved.  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   FOLFOX6 C1D1 on 5/31/23 but has Afib shortly after. S/p mFOLFOX6 x 12 cycles.   # Anemia in CKD and cancer   # Leukopenia and thrombocytopenia  Hb 10.7. Likely multifactorial but most ly form chemo.  Not indicated for ESAs, given recent cancer.  # Hypertension; monitor blood pressure medication  # New onset Afib  Previously on triamterene/hydrochlorothiazide but being held May 2023.  Currently she is on metoprolol ER 50 mg daily and diltiazem 120 mg as needed before chemo.   # BMD  # Vit D deficiency  # Secondary hyperparathyroidism  Vit D is 16 and PTH is 79.  Cements phosphorus are normal  # Crohn's disease  She was off Sulfazalazine since April 23. Diarrhea is stable.      Follow-up in 6 months with labs    I spent  40 minutes on the date of the encounter doing chart review, history and exam, documentation and further activities as noted above. 20 minutes of this visit is dedicated  to direct patient interaction via phone/video/face-to-face.    Santana Brenner MD on 12/04/2023

## 2023-12-04 NOTE — PATIENT INSTRUCTIONS
Kidney functions are stable  I think your chemo is slightly affecting your kidney but not much to the point that I would recommend holding it  See you in 6 months with labs

## 2023-12-05 RX ORDER — CHOLECALCIFEROL (VITAMIN D3) 50 MCG
1 TABLET ORAL DAILY
Qty: 30 TABLET | Refills: 6 | Status: SHIPPED | OUTPATIENT
Start: 2023-12-05 | End: 2024-06-26

## 2023-12-05 NOTE — PROGRESS NOTES
Video-Visit Details     Video Start Time: 9:17AM     Type of service:  Video Visit     Video End Time: 9:34AM    Originating Location (pt. Location): Home     Distant Location (provider location):  [facility] off site     Platform used for Video Visit: [facility]    Hematology/Oncology Established Patient Follow Up Note      Today's Date: 12/6/2023    Reason for follow up: Sigmoid cancer    HISTORY OF PRESENT ILLNESS: Nadia Ladd is a 68 year old female who presents for follow-up    Patient has medical history including Crohn's disease on sulfasalazine, anemia secondary to iron deficiency and B12 deficiency, obesity, hypertension, prediabetes, eczema, pulmonary hypertension, hiatal hernia, mild splenomegaly (14.7 cm in 2/23)    - 2/23 pt noted increasing fatigue, found to have iron deficiency anemia w/hgb 6.8 (previously required RBC transfusion when dx w/Crohn's), did have intermittent changes in stool but not persistent (noted minimal blood in stool)   - 2/23 upper endoscopy for iron deficiency anemia and Crohn's disease: Hiatal hernia, normal stomach, normal duodenum  - 2/23 colonoscopy: A frond-like/villous partially obstructing large mass found in sigmoid colon, partially circumferential involving two thirds of the lumen circumference, 4 cm, unable to traverse, with oozing, s/p biopsy  PATHOLOGY:  A.  Stomach, antrum: Biopsy:  - Antral type mucosa with mild chronic inactive gastritis  - Immunostain for Helicobacter pylori is negative      B.  Colon, sigmoid, stricture: Biopsy:  - Invasive poorly differentiated carcinoma  The sigmoid stricture shows a high-grade carcinoma without definitive gland formation.  Given the poorly differentiated appearance, an immunohistochemical panel was performed to exclude the possibility of a tumor by direct extension or metastasis.   Immunohistochemical stains are performed and show the tumor to be diffusely positive for CK7 and  "partially positive for CK20 and SATB2.  There is no significant tumor reactivity for CDX2, GATA3, PAX8, TTF-1, and chromogranin.  Synaptophysin highlights very rare positive cells. Although the CK7/CK20 profile is not entirely typical for a colorectal carcinoma, immunostains for tumors of other origins are negative and a colorectal primary is still favored.   - Mismatch repair:  1) MLH1-loss of nuclear expression  2) MSH2-intact  3) MSH6-intact  4) PMS2-loss of nuclear expression  -MLH1 promoter methylation: NEGATIVE    -2/23 CT CAP:  A) 4 cm length mass in the proximal sigmoid colon. There is some irregularity and stranding in the adjacent pericolonic fat which may indicate tumor extension. There is no obstruction.   B) there is a second probable mass at the hepatic flexure of the colon measuring 2.5 cm in length   C)There are small lymph nodes in the mesial colon adjacent to the hepatic flexure abnormality and the sigmoid colonic mass. No lymphadenopathy by size criteria  D) There is submucosal fatty deposition in the colon, most severe in the distal sigmoid colon and rectum, which can be seen secondary to quiescent inflammatory bowel disease.  E) no liver lesion    -3/23 PET:  a. There is increased FDG avidity of the sigmoid colon with focal lobular wall thickening consistent with biopsy-proven adenocarcinoma.  b. Secondary focus noted at the hepatic flexure demonstrates elevated FDG avidity and lobulated wall thickening highly suspicious for a additional primary.  c. Additionally there is a smaller focus of wall thickening with elevated FDG avidity along the left aspect of the transverse colon  which is suspicious for a possible third focus of colonic malignancy.    -3/23 CEA 6.8  - 3/23 colorectal surgery consultation with - in the setting of Crohn's, at least sigmoid colectomy with possible total abdominal colectomy with either ileorectal anastomosis or end ileostomy (\"rectum can remain active and " "be actively surveyed annually\")    -3/23 genetic counseling, testing for Chan syndrome    - 4/5/2023 gynecologic oncology consultation for risk reduction surgery with hysterectomy and BSO at time of colectomy    -4/6/2023 laparoscopic converted to open total abdominal colectomy with ileorectal anastomosis, partial omentectomy, flexible sigmoidoscopy, TAHBSO  A. OMENTUM, RESECTION:  - Adipose tissue with no significant histopathologic abnormality  - No evidence of malignancy     B. COLON, RESECTION:  Mass #1 (ascending colon/hepatic flexure): Mixed neuroendocrine-non-neuroendocrine neoplasm (MINEN):  - Neuroendocrine carcinoma component (70%) and moderately-differentiated adenocarcinoma component (30%)  - Size = 5.0 cm, invading into muscularis propria  - Positive LVI  - Negative macroscopic tumor perforation, negative PNI  - Negative surgical resection margins  - IHC: Neuroendocrine portion: Negative for chromogranin and INSM1 but strongly express synaptophysin  - IHC: Adenocarcinoma portion: Positive for CK20, CDX2 negative for CK7, PAX8, ER, S100  Ki-67 proliferation index of approximately 80%.  MMR:  Intact nuclear expression of MLH1, MSH2, MHS6, PMS2  PDL1:  COMBINED POSITIVE SCORE (CPS): 55-60  TUMOR PROPORTION SCORE (TPS): 50%   pathogenic KRAS mutation (G13D) was identified (5.2%).  AJCC 8th edition: stage 3B mpT3 pN1a     Mass#2 (sigmoid colon): Poorly-differentiated carcinoma:  - Grade 3  - Size = 5.7 cm, invading into muscularis propria  - Negative for microscopic tumor perforation, LVI, perineural invasion  - Negative surgical resection margins  - IHC: Positive for scar and keratin AE1/AE3, negative for CK7, CK20, CDX2, PAX8, ER, chromogranin, CD56, p40, synaptophysin, S100, INI1, p40, INSM1  Ki-67 proliferation index of approximately 70%.  MMR: loss of nuclear expression MLH1 and PMS2, intact nuclear expression MSH2 and MSH6  PDL1:  COMBINED POSITIVE SCORE (CPS): 80  TUMOR PROPORTION SCORE (TPS): " 70%  pathogenic KRAS mutation (G13D) was identified (4.5%).  AJCC 8th edition: stage 3A mpT2 pN1a    - One of forty-one sampled lymph nodes positive (1/41)  - Benign appendix  - Tubular adenoma    C. UTERUS, CERVIX, BILATERAL FALLOPIAN TUBES & OVARIES, :  - Benign endometrial polyps; atrophic endometrium  - Uterus, cervix, bilateral fallopian tubes, and ovaries with no significant morphologic abnormalities  - No evidence of dysplasia or malignancy     D. SMALL INTESTINE, TERMINAL ILEUM, TERMINAL ILIUM:  - Benign ileum  - No evidence of dysplasia or malignancy  - Negative for metastases to one of one sampled lymph node (0 /1)     E. PROXIMAL ANASTOMOTIC RING:  - Benign small intestine  - No evidence of dysplasia or malignancy     F. DISTAL ANASTOMOTIC RING:  - Benign colon  - No evidence of dysplasia or malignancy    CRC NGS:   --mutations positive for KRAS G13D mutation 5.2% mass 1, KRAS G13D mutation 4.5% mass 2 (negative for AKT1; BRAF; ERBB2; KRAS; NRAS; PIK3CA; PTEN)  --TMB score: 17.064 mut/Mb mass 1 (CPS 55-60, TPS 50%), 60.943 mut/Mb mass 2 (CPS 80, TPS 70%)  --fusion negative including NTRK and RET for mass 1 and 2 (also negative for AKT1, AKT3, ALK, AR, KSZHTR90, CLAUDINE, BCOR, BRAF, BRD3, BRD4, CAMTA1, CCNB3, CCND1, , CIC, CSF1, CSF1R, CTNNB1, DNAJB1, EGFR, EPC1, ERBB2, ERBB4, ERG, ESR1, ESRRA, ETV1, ETV4, ETV5, ETV6, EWSR1, FGFR1, FGFR2, FGFR3, FGR, FOS, FOSB, FOXO1, FOXO4, FOXR2, FUS, GLI1, GRB7, HMGA2, HRAS, IDH1, IDH2, INSR, JAK2, JAK3, JAZF1, KRAS, MAML2, MAP2K1, MAST1, MAST2, MEAF6, MET, MKL2, MN1, MSMB, MUSK, MYB, MYBL1, MYOD1, NCOA1, NCOA2, NOTCH1, NOTCH2, NR4A3, NRAS, NRG1, NTRK1, NTRK2, NTRK3, NUMBL1, NUTM1, PAX3, PDGFB, PDGFRA, PDGFRB, PHF1, PIK3CA, PKN1, PLAG1, PPARG, PRKACA, PRKCA, PRKCB, RAF1, RELA, RET, ROS1, RPSO2, RSPO3, SS18, STAT6, TAF15, TCF12, TERT, TFE3, TFEB, TFG, THADA, TMPRSS2, USP6, VGLL2, YAP1, YWHAE)    -4/11/2023 patient discharged from hospital, planning for 30 days of  lovenox postop, oxycodone for pain (required 1 unit PRBC for anemia)    -5/8/23 I presented this case at [facility] CRC tumor board and their recommendations include:  - common to see this in Crohn's, overall poor prognosis, treat aggressively, may be poorly responsive to chemotherapy  - standard of care is mFOLFOX 6 x 12 cycles  - acknowledge that pt has high TPS and likely response to immunotherapy however not sufficient data or standard of care in stage 3 adjuvant setting  - add MMR testing to mass #1 hepatic flexure mass    - 5/9/23 admitted for acute renal failure w/Cr 3.82 w/K 7.0    - 5/19/23 second opinion at [facility] w/medical oncologist Dr. Acharya, thorough consultation and recommendations appreciated     - pt would like to proceed with FOFLOX (with dose reduced oxaliplatin due to kidney function)    -5/2023 genetic counseling: Negative for mutations in EPCAM, MLH1, MSH2, MSH6, and PMS2 genes.  Negative for mutations in APC, AXIN2, BMPR1A, CDH1, CHEK2, EPCAM, GREM1, MLH1, MSH2, MSH3, MSH6, MUTYH, NTHL1, PMS2, POLD1, POLE, PTEN, SMAD4, STK11, TP53, CDKN1B, MEN1, NF1, RET, TSC1, TSC2, and VHL genes    - 5/25/23 port placement    - 5/26/23 CT CAP w/ contrast and IVF before and after CT (baseline prior to starting tx):  1.  3 mm nodule RML and 5 mm lateral EDSON nodule, minimally increased from previous  2.  New 4 mm EDSON groundglass density nodule  3.  Several prominent borderline enlarged mediastinal lymph nodes slightly increased from previous  4.  Splenomegaly 14.5 cm  5. S/p subtotal colectomy with ileorectal anastomosis with postsurgical changes along the ventral abdominal wall midline    -5/31/23 started mFOLFOX 6 w/dose reduced oxaliplatin    - C2D1 6/14/23 held due to new onset paroxysmal A-fib with RVR requiring hospitalization 6/3/2023 (also hypoMg, dehydrated)  - 6/19/2023 I presented this case at [facility] colorectal tumor board at the [facility], it is possible that paroxysmal A-fib may be related to  5-FU.  Options include withholding further treatment versus retrialing 5-FU under the guidance of cardio oncology in an inpatient setting.  No other adjuvant treatment options available, including immunotherapy  - 6/30/2023 consultation with cardio oncologist Dr. Garza; I spoke with Dr. Garza 6/30/2023 as well, [id number] score 3, recommending starting apixaban 5 mg twice daily, okay to retrial FOLFOX while patient is on metoprolol  -7/18/2023  CT CAP- subcm lung nodules stable, borderline and mildly enlarged mediastinal LN and periportal LN stable, splenomegaly 15.7cm, Subtotal colectomy with ileorectal anastomosis. No acute inflammation or obstruction   - 7/19/2023 mFOLFOX6 C2D1  (inpatient w/continuous cardiac monitoring)      -9/23 CT CAP (after cycle 6)  -Subtotal colectomy, rectal anastomosis appears stable, some adjacent mild scarring, no bowel obstruction or inflammation, no free fluid  -No new adenopathy, no liver lesions  -Stable EDSON 3 mm nodule, other stable nodules at EDSON, RML  -Stable several small thoracic lymph nodes in mediastinum  -Mild wall thickening of the gallbladder  -Spleen is smaller measuring 13.7 cm  -Stable regions of bilateral cortical thinning in the kidneys, with few tiny cysts    INTERIM HISTORY:  REGIMEN:  mFOLFOX6  q14 days x12 cycles/6 months  Starting weight 183lb   C1D1 5/31/23 (complicated by hospitalization for afib w/RVR C1D4 6/3/23), C2D1 7/19/23 (inpatient w/continuous cardiac monitoring), C3D1 8/2/2023 (outpatient, admitted to ER for A-fib with RVR C3D3 8/4/23), C4D1 8/16/23, C5D1 8/30/2023, C6D1 9/13/2023, C7D1 9/26/23, C8D1 10/11/23, C9D1 10/25/23, C10D1 11/8/23, C11D1 11/22/23    oxaliplatin 64mg/m2 day 1 (dose reduced from 85mg/m2 2/2 Cr C1D1)  LV 350mg/m2 day 1  5FU 1200mg/m2 continuous infusion day 1-2 (total 2,400mg/m2 IV over 46-48 hours)  (5FU 400mg/m2 day 1 bolus (discontinued cycle 2 onwards))  PLUS IVF and magnesium replacement as needed day 1 and day 8 with  optimization to Mg= 2 at least, AND IVF day 3 on day of pump disconnect  PLUS (per cardiology recommendations) During the days of chemo and 2 days after ONLY: take long acting diltiazem 120 mg daily with 30 mg of short acting diltiazem as needed for atrial fibrillation with HR >110  Emetic risk: moderate  Febrile neutropenia risk: intermediate     C12D1 12/6/23 pending labs    Treatment related AE:  - hearing changes- described as muffled with intermittent ringing with obscured hearing R>L- overall improved since 1st cycle, and improving few days after starting chemo, Flonase BID as needed, ENT consultation 10/23/23- 11/23 received hearing aids b/l but need to be programmed; (present prior to starting chemo, improved w/steroids, now stable)   - Paroxysmal A-fib with RVR and PAC- [facility] 6/3/2023 - 6/5/2023 for this, 6/3/2023 echo EF 55-60%, mild pulmonary hypertension, mild mitral regurgitation, on metoprolol XL 25 mg p.o. daily for ongoing palpitations, 7/23 Cardio oncology consult w/Dr. Garza, on eliquis, no recurrence of RVR while inpatient for cycle 2 w/continuous cardiac monitoring; IVF and magnesium replacement to magnesium = 2 minimum added to D1 and D8 and IVF day 3. 8/4/2023 C3D3 patient went to ER for A-fib with palpitations (no other sx), HR 80-130s, labs WNL, CXR negative, given IV fluids, placed on diltiazem drip, HR improved to .  8/15/2023 cardiology follow-up: Increase metoprolol XL to 50 mg daily; During the days of chemo and 2 days after ONLY: take long acting diltiazem 120 mg daily with 30 mg of short acting diltiazem as needed for atrial fibrillation with HR >110.  8/18/2023 patient called in reporting heart rate up to 140, sensation of heart racing, improved to 67 after taking extra prescribed dose of diltiazem and then asymptomatic.  8/19/2023 patient presented to ER with chest tightness and palpitations, had taken total of 3 tabs of diltiazem 30mg short acting at  "that point, EKG with sinus rhythm, rate 63 with some noted bradycardia while in the ER, magnesium and potassium normal, creatinine slightly elevated, given 1 L IVF.  xarelto now affordable; 9/12/2023 cardiology [id number] changes, 10/27/23 C9D3 disconnect pt \"had episode of afib\", heart racing to HR 140s, that was managed with short acting diltiazem and deep breathing, notably pt w/persistently low Mg despite 2g IV Mg replacement for Mg 1.6, cardiology follow-up 12/14/2023;   - anxiety- working with psychology prn  - Delayed neutropenia- neutropenic thierry ANC 0.5 2 weeks out from cycle 1, afebrile, 6/23 DPD deficiency testing negative; resolved cycle 2 onwards 5FU bolus dropped; 12/6/23 ANC 1.2  -Normocytic anemia- due to chemotherapy and iron deficiency, s/p ferric carboxymaltose 750mg x2 doses 6/14/23 and 6/28/23, 7/23 iron studies improved.  8/23 hgb 13.3, 9/23 hgb 12.1, 10/23 hgb 11.1, 11/23 hgb 10.8; 11/23 iron studies, B12, RBC folate WNL  -Thrombocytopenia- 2/2 chemotherapy, no bruising or bleeding, 12/6/23 plt 72K, for now plt >75K, while on xarelto  - MARYA on CKD- following w/nephro- follow up 9/23, Cr improved from 3, drinking 2.5 16oz bottles per day and getting IV fluids day 1, day 3 and day 8 of each chemotherapy cycle, nephrology follow-up 12/4/2023 noted w/next follow up 6/2024  - elevated LFTs-  alk phos iso enzyme- majority liver, liver normal on 9/23 CT and 11/23 MRI liver.   - diarrhea- grade 1, 2 episodes/week, also affected by food, improved with imodium 2 total   - neuropathy- grade 1, tingling fingers > toes, grade 2 numbness in all fingers instead of just b/l 3rd digit and that is making her drop things sometimes, lasting 2 weeks now instead of 3-4 days; 11/23 started gabapentin, taking at bedtime because AM dose made her dizzy/off balance   - cold sensitivity- grade 2, fingers and mouth, most severe right after tx and previously improving before next cycle due and now it is not, using straw " "and gloves when outside   - Port flipped-see my note from 8/2/2023, 8/21/2023 port removed and replacement with IR  - \"bottom is sore\"- pt has hemorrhoids, no active bleeding or thrombosed hemorrhoids but notes soreness after chemo, witch hazel wipes are helping, sitz bath are helping             REVIEW OF SYSTEMS:   A 14 point ROS was reviewed with pertinent positives and negatives in the HPI.        HOME MEDICATIONS:  Current Outpatient Medications   Medication Sig Dispense Refill    cyanocobalamin 1000 MCG TBCR Take 1,000 mcg by mouth daily 100 tablet 1    dexAMETHasone (DECADRON) 4 MG tablet Take 2 tablets (8 mg) by mouth daily Take for 2 days, starting the day after chemo cycle 10, 11 and 12. Take with food. 12 tablet 0    diltiazem (CARDIZEM) 30 MG tablet Take 1 tablet (30 mg) by mouth 3 times daily 90 tablet 0    diltiazem ER COATED BEADS (CARDIZEM CD/CARTIA XT) 120 MG 24 hr capsule Take 1 capsule (120 mg) by mouth daily On chemo days and 2 days post chemo infusion. 60 capsule 3    Ferrous Sulfate (IRON) 325 (65 Fe) MG tablet Take 1 tablet by mouth daily      gabapentin (NEURONTIN) 300 MG capsule Take 1 capsule (300 mg) by mouth 3 times daily 90 capsule 1    lidocaine-prilocaine (EMLA) 2.5-2.5 % external cream Apply topically as needed for moderate pain Apply 15-20 minutes prior to port access 1 g 4    loperamide (IMODIUM) 2 MG capsule Take 2 mg by mouth daily as needed for diarrhea      magnesium oxide 400 MG tablet Take 1 tablet (400 mg) by mouth 2 times daily 60 tablet 0    metoprolol succinate ER (TOPROL XL) 50 MG 24 hr tablet Take 1 tablet (50 mg) by mouth daily 90 tablet 3    Multiple Vitamins-Iron (MULTI-DAY PLUS IRON PO) Take 1 tablet by mouth daily      prochlorperazine (COMPAZINE) 10 MG tablet Take 10 mg by mouth every 6 hours as needed for nausea or vomiting      rivaroxaban ANTICOAGULANT (XARELTO) 20 MG TABS tablet Take 1 tablet (20 mg) by mouth daily (with dinner) 90 tablet 3    sodium " bicarbonate 650 MG tablet Take 1 tablet (650 mg) by mouth 2 times daily 180 tablet 3    vitamin D3 (CHOLECALCIFEROL) 50 mcg (2000 units) tablet Take 1 tablet (50 mcg) by mouth daily 30 tablet 6         ALLERGIES:  Allergies   Allergen Reactions    No Known Drug Allergy          PAST MEDICAL HISTORY:  Past Medical History:   Diagnosis Date    Arthropathia 08/05/2010    B12 deficiency anemia 10/21/2010    Benign essential hypertension with target blood pressure below 140/90 10/10/2016    CARDIOVASCULAR SCREENING; LDL GOAL LESS THAN 160 10/31/2010    Crohn's colitis (H) 02/15/2011    Dermatitis-dishydrotic eczema-severe 08/05/2010    Hiatal hernia     HTN (hypertension) 07/21/2011    Iron deficiency anemia 10/21/2010    Malignant neoplasm of sigmoid colon (H)     Obesity     Primary pulmonary hypertension (H) 04/06/2010         PAST SURGICAL HISTORY:  Past Surgical History:   Procedure Laterality Date    COLECTOMY WITHOUT COLOSTOMY N/A 4/6/2023    Procedure: Laparoscopic Converted to Open Total abdominal colectomy;  Surgeon: Jerome Ashley MD;  Location: [facility] OR    COLONOSCOPY  10/11/10    COLONOSCOPY N/A 2/27/2023    Procedure: ATTEMPTED COLONOSCOPY WITH SIGMOID STRICTURE BIOPSY;  Surgeon: Jonathan Alcocer MD;  Location: [facility]    ESOPHAGOSCOPY, GASTROSCOPY, DUODENOSCOPY (EGD), COMBINED N/A 2/27/2023    Procedure: ESOPHAGOGASTRODUODENOSCOPY, WITH BIOPSY;  Surgeon: Jonathan Alcocer MD;  Location: [facility]    HYSTERECTOMY TOTAL ABDOMINAL, BILATERAL SALPINGO-OOPHORECTOMY, COMBINED N/A 4/6/2023    Procedure: Hysterectomy total abdominal, bilateral salpingo-oophorectomy;  Surgeon: Sunitha Olea MD;  Location: [facility] OR    INSERT PORT VASCULAR ACCESS Right 5/25/2023    Procedure: Ultrasound-guided right internal jugular venous access port placement with fluoroscopy;  Surgeon: Martin Thomas DO;  Location: [facility]    IR CHEST PORT PLACEMENT > 5 YRS OF AGE  8/21/2023    IR PORT CHECK RIGHT  7/18/2023    IR PORT  REMOVAL RIGHT  2023    SIGMOIDOSCOPY FLEXIBLE N/A 2023    Procedure: Sigmoidoscopy flexible;  Surgeon: Jerome Ashley MD;  Location: [facility] OR    SURGICAL HISTORY OF -   77    Perineorrhaphy, for widening vaginal orifice    ZZC EXPLORATORY OF ABDOMEN      laparoscopy         SOCIAL HISTORY:  Social History     Socioeconomic History    Marital status:      Spouse name: Not on file    Number of children: Not on file    Years of education: Not on file    Highest education level: Not on file   Occupational History    Not on file   Tobacco Use    Smoking status: Never     Passive exposure: Current    Smokeless tobacco: Never   Substance and Sexual Activity    Alcohol use: No    Drug use: No    Sexual activity: Yes     Partners: Male   Other Topics Concern    Parent/sibling w/ CABG, MI or angioplasty before 65F 55M? Not Asked   Social History Narrative    Not on file     Social Determinants of Health     Financial Resource Strain: Not on file   Food Insecurity: Not on file   Transportation Needs: Not on file   Physical Activity: Not on file   Stress: Not on file   Social Connections: Not on file   Interpersonal Safety: Not on file   Housing Stability: Not on file         FAMILY HISTORY:  Family History   Problem Relation Age of Onset    Hypertension Mother         on meds, alive    Cerebrovascular Disease Father         stroke about age 65,  of cancer at 68 yrs    Diabetes Father         eventually took insulin    Anemia Father         Pernisios anemia    Kidney Disease Niece         kidney transplant    Kidney Disease Nephew         kidney transplant    Venous thrombosis No family hx of     Anesthesia Reaction No family hx of          PHYSICAL EXAM:  Vital signs:  LMP  (LMP Unknown)        LABS:   Latest Reference Range & Units 23 08:49   Sodium 135 - 145 mmol/L 143   Potassium 3.4 - 5.3 mmol/L 3.9   Chloride 98 - 107 mmol/L 110 (H)   Carbon Dioxide (CO2) 22 - 29 mmol/L 22    Urea Nitrogen 8.0 - 23.0 mg/dL 13.9   Creatinine 0.51 - 0.95 mg/dL 1.34 (H)   GFR Estimate >60 mL/min/1.73m2 43 (L)   Calcium 8.8 - 10.2 mg/dL 8.7 (L)   Anion Gap 7 - 15 mmol/L 11   Magnesium 1.7 - 2.3 mg/dL 1.6 (L)   Albumin 3.5 - 5.2 g/dL 3.4 (L)   Protein Total 6.4 - 8.3 g/dL 5.5 (L)   Alkaline Phosphatase 40 - 150 U/L 346 (H)   ALT 0 - 50 U/L 49   AST 0 - 45 U/L 62 (H)   Bilirubin Total <=1.2 mg/dL 0.6   Glucose 70 - 99 mg/dL 242 (H)   WBC 4.0 - 11.0 10e3/uL 2.0 (L)   Hemoglobin 11.7 - 15.7 g/dL 9.3 (L)   Hematocrit 35.0 - 47.0 % 28.2 (L)   Platelet Count 150 - 450 10e3/uL 72 (L)   RBC Count 3.80 - 5.20 10e6/uL 2.81 (L)   MCV 78 - 100 fL 100   MCH 26.5 - 33.0 pg 33.1 (H)   MCHC 31.5 - 36.5 g/dL 33.0   RDW 10.0 - 15.0 % 15.4 (H)   % Neutrophils % 62   % Lymphocytes % 15   % Monocytes % 21   % Eosinophils % 0   % Basophils % 0   Absolute Basophils 0.0 - 0.2 10e3/uL 0.0   Absolute Eosinophils 0.0 - 0.7 10e3/uL 0.0   Absolute Immature Granulocytes <=0.4 10e3/uL 0.0   Absolute Lymphocytes 0.8 - 5.3 10e3/uL 0.3 (L)   Absolute Monocytes 0.0 - 1.3 10e3/uL 0.4   % Immature Granulocytes % 2   Absolute Neutrophils 1.6 - 8.3 10e3/uL 1.2 (L)   Absolute NRBCs 10e3/uL 0.0   NRBCs per 100 WBC <1 /100 0   RBC Morphology  Confirmed RBC Indices   Platelet Morphology Automated Count Confirmed. Platelet morphology is normal.  Automated Count Confirmed. Platelet morphology is normal.   (H): Data is abnormally high  (L): Data is abnormally low    PATHOLOGY:    IMAGING:  Narrative & Impression   MRI ABDOMEN WITH CONTRAST     CLINICAL HISTORY:  Increased LFTs.  The current and nonneuroendocrine tumor and sigmoid carcinoma status  post colectomy. On adjuvant chemotherapy.     TECHNIQUE: MRI of the abdomen without and with intravenous contrast.  Contrast dose: 10 ml of Gadavist.     Comparison study: CT of the abdomen and pelvis on 9/28/23, 7/18/2023,  5/26/2023, 2/27/2023 and PET CT on 3/24/2023     FINDINGS:  Liver: In the central  liver, abutting the middle hepatic vein and  above the portal vein confluence, there is a lobulated T2 hyperintense  lesion measuring 2.0 x 1.0 cm (series 4, image 19 and series 17, image  22) with heterogeneous enhancement best seen on the postcontrast  subtraction images (series 14 and series 19, image 45). This likely  corresponds to a similar sized lesion present on multiple prior CTs  dating back to 2/27/2023. There was no hypermetabolic activity in this  location seen on the PET-CT of 3/24/2023. This is likely a benign  entity such as a hemangioma. No hepatic steatosis. No other focal  suspicious lesions in the liver. Patent portal and hepatic veins.     Other findings: Stable splenomegaly with the spleen measuring 15.4 cm  in length. Colectomy with ileorectal anastomosis. The gallbladder,  pancreas, adrenal glands and visualized portions of the kidneys are  within normal limits. Normal caliber visualized loops of small bowel.  No lymphadenopathy in the abdomen. No abdominal aortic aneurysm. No  abdominal ascites.                                                                      IMPRESSION:  1. No metastatic disease in the abdomen.  2. Stable lesion in the central liver that was not hypermetabolic on  prior PET-CT on 3/24/2023, likely a benign entity such as a  hemangioma.  3. Stable splenomegaly.     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 mutated, MARISSA, TPS 50%  # sigmoid colon poorly-differentiated carcinoma, KRAS G13D mutated, loss of MLH1 and PMS2, TPS 70%  - 2/23 colonoscopy: A frond-like/villous partially obstructing large mass found in sigmoid colon, partially circumferential involving two thirds of the lumen circumference, 4 cm, unable to traverse, with  oozing, s/p biopsy, PATHOLOGY: Invasive poorly differentiated carcinoma, IHC panel excludes tumors of other origin, colorectal primary is favored, loss of nuclear expression of MLH1 and PMS2, MLH1 promoter methylation negative, [id number] mutation negative  -2/23 CT CAP: Shows known 4 cm proximal sigmoid colon mass with possible tumor extension (irregularity and stranding and adjacent pericolonic fat) without obstruction AND probable second mass 2.5 cm at hepatic flexure of colon; small lymph nodes adjacent to both masses (no lymphadenopathy by size criteria)  -3/23 PET:  a. There is increased FDG avidity of the sigmoid colon with focal lobular wall thickening consistent with biopsy-proven adenocarcinoma.  b. Secondary focus noted at the hepatic flexure demonstrates elevated FDG avidity and lobulated wall thickening highly suspicious for a additional primary.  c. Additionally there is a smaller focus of wall thickening with elevated FDG avidity along the left aspect of the transverse colon which is suspicious for a possible third focus of colonic malignancy.  - 3/23 CEA 6.8  -4/6/2023 laparoscopic converted to open total abdominal colectomy with ileorectal anastomosis, partial omentectomy, flexible sigmoidoscopy, TAHBSO  PATHOLOGY:  Of note, omental resection, terminal ileum, proximal and distal anastomotic rings, uterus, cervix, bilateral fallopian tubes and ovaries negative for malignancy    Mass #1 (ascending colon/hepatic flexure): Mixed neuroendocrine-non-neuroendocrine neoplasm (MINEN):  - Neuroendocrine carcinoma component (70%) and moderately-differentiated adenocarcinoma component (30%)  - Size = 5.0 cm, invading into muscularis propria, Positive LVI  - IHC: Neuroendocrine portion: Negative for chromogranin and INSM1 but strongly express synaptophysin  - IHC: Adenocarcinoma portion: Positive for CK20, CDX2 negative for CK7, PAX8, ER, S100  Ki-67 proliferation index of approximately 80%.  MARISSA    PDL1:  COMBINED POSITIVE SCORE (CPS): 55-60  TUMOR PROPORTION SCORE (TPS): 50%   pathogenic KRAS mutation (G13D) was identified (5.2%).  MMR testing: intact  AJCC 8th edition: stage 3B mpT3 pN1a     Mass #2 (sigmoid colon): Poorly-differentiated carcinoma:  - Grade 3  - Size = 5.7 cm, invading into muscularis propria  - IHC: Positive for scar and keratin AE1/AE3, negative for CK7, CK20, CDX2, PAX8, ER, chromogranin, CD56, p40, synaptophysin, S100, INI1, p40, INSM1  Ki-67 proliferation index of approximately 70%.  MMR testing: loss of MLH1 and PMS2  PDL1:  COMBINED POSITIVE SCORE (CPS): 80  TUMOR PROPORTION SCORE (TPS): 70%  pathogenic KRAS mutation (G13D) was identified (4.5%).  MMR testing: loss of MLH1 and PMS2, intact MSH2 and MSH6  AJCC 8th edition: stage 3A mpT2 pN1a    - One of forty-one sampled lymph nodes positive (1/41), d/w pathology- unable to determine which mass is metastatic to LN    - 4/23 MRI brain w/wo contrast LAURENT  - 5/26/23 CT CAP w/ contrast and IVF before and after CT (post op baseline prior to starting tx):  1.  3 mm nodule RML and 5 mm lateral EDSON nodule, minimally increased from previous  2.  New 4 mm EDSON groundglass density nodule  3.  Several prominent borderline enlarged mediastinal lymph nodes slightly increased from previous  4.  Splenomegaly 14.5 cm  5. S/p subtotal colectomy with ileorectal anastomosis with postsurgical changes along the ventral abdominal wall midline  - 5/8/23 I presented this case at [facility] tumor board as above   - 5/19/23 second opinion at [facility] w/medical oncologist Dr. Acharya, thorough consultation and recommendations appreciated, overall agree w/FOLFOX x6 months, possible nivo or ipi nivo in second line; if metastatic platinum etoposide vs ipi nivo  - 5/25/23 port placement    -5/2023 genetic counseling: Negative for mutations detailed below     REGIMEN:  mFOLFOX6  q14 days x12 cycles/6 months  Starting weight 183lb   C1D1 5/31/23 (complicated by  hospitalization for afib w/RVR C1D4 6/3/23), C2D1 7/19/23 (inpatient w/continuous cardiac monitoring), C3D1 8/2/2023 (outpatient, admitted to ER for A-fib with RVR C3D3 8/4/23), C4D1 8/16/23, C5D1 8/30/2023, C6D1 9/13/2023, C7D1 9/26/23, C8D1 10/11/23, C9D1 10/25/23, C10D1 11/8/23, C11D1 11/22/23    oxaliplatin 64mg/m2 day 1 (dose reduced from 85mg/m2 2/2 Cr C1D1)  LV 350mg/m2 day 1  5FU 1200mg/m2 continuous infusion day 1-2 (total 2,400mg/m2 IV over 46-48 hours)  (5FU 400mg/m2 day 1 bolus (discontinued cycle 2 onwards))  PLUS IVF and magnesium replacement as needed day 1 and day 8 with optimization to Mg= 2 at least, AND IVF day 3 on day of pump disconnect  PLUS (per cardiology recommendations) During the days of chemo and 2 days after ONLY: take long acting diltiazem 120 mg daily with 30 mg of short acting diltiazem as needed for atrial fibrillation with HR >110  Emetic risk: moderate  Febrile neutropenia risk: intermediate     C12D1 12/6/23 to be delayed 1 week 2/2 cytopenias  Still give IVF and Mg today    Treatment related AE:  - hearing changes-stable w/dose reduced oxaliplatin, flonase prn, continue use of b/l hearing aids (awaiting programming and follow up appt)  - Paroxysmal A-fib with RVR and PAC- 8/15/2023 cardiology follow-up: Increase metoprolol XL to 50 mg daily; During the days of chemo and 2 days after take long acting diltiazem 120 mg daily with 30 mg of short acting diltiazem as needed for atrial fibrillation with HR >110, IV fluid day 1, 3, 8; IV Mg to goal Mg 2 day 1 and 8- updated orders for Mg replacement added to plan (ie if Mg 1.6 give 4g Mg not 2g given persistent hypoMg), cardiology follow-up 12/14/2023  - anxiety- continue following with oncology psychology team  - Normocytic anemia- due to chemotherapy and iron deficiency, s/p ferric carboxymaltose 750mg x2 doses 6/14/23 and 6/28/23; repeat iron studies normal. 11/23 iron studies, B12, RBC folate WNL  - neutropenia- ANC 1.2, delay chemo  x1 week, monitor for neutropenic fever  -Thrombocytopenia- 12/6/23 plt 72K, delay chemo x1 week, continue xarelto, monitor for bleeding/bruising   - MARYA on CKD- following w/nephro, f/u with nephro 12/4/2023 noted w/next follow up 6/2024; continue increasing PO fluids, continue day 1, 3, 8 IVF  - hypoMg- Mg 1.6 today, will give 4g Mg  - elevated LFTs- AST 62- stable, Alk phos 346- improving, alk phos iso enzyme- majority liver, liver normal on 9/23 CT and 11/23 MRI (no mets)  - diarrhea- grade 1  - neuropathy- grade 2,  continue gabapentin 300mg at bedtime, will start 100mg qAM and afternoon and titrate up to 200mg then 300mg as tolerated  - cold sensitivity- grade 2  - hemorrhoids- witch hazel pads, sitz baths prn      Imaging:  -11/23 MRI liver: negative for mets, stable cental liver hemangioma and splenomegaly    -9/23 CT CAP (after 6 cycles of mFOLFOX 6 without 5-FU bolus):  -Subtotal colectomy with stable appearing rectal anastomosis, some adjacent mild scarring, no bowel obstruction or inflammation, no free fluid, no new adenopathy, no liver lesions, no bone lesions  -Mild wall thickening of the gallbladder  - Stable small pulmonary nodules and small thoracic lymph nodes at mediastinum, splenomegaly smaller 13.7 cm    - 7/18/23 CT CAP (repeat baseline after treatment delay due to A-fib)- subcm lung nodules stable, borderline and mildly enlarged mediastinal LN and periportal LN stable, splenomegaly 15.7cm, Subtotal colectomy with ileorectal anastomosis. No acute inflammation or obstruction    -Repeat CT CAP after cycle 12, 1/2/2024    Labs:  3/23 CEA 6.8  5/23 CEA 1.8  7/23 CEA 2.8  10/23 CEA 2.0  12/23 CEA pending    Other:  - f/u with Dr. Ashley 4/2024 for rigid proctoscopy     #parxosymal afib  - within 3 days of receiving 5FU, prior cardiac risk factors htn, prediabetes  - 6/3/23 presented to ER w/lightheadedness, dizziness, cardioverted s/p diltiazem ggt in ER  - 6/23 DPD deficiency testing negative  -  currently on metoprolol XL 25 mg daily, apixaban 5 mg twice daily  - 6/30/2023 consultation with cardio oncologist Dr. Garza; I spoke with Dr. Garza 6/30/2023 as well, [id number]-LDOo score 3, recommending starting apixaban 5 mg twice daily, okay to retrial FOLFOX while patient is on metoprolol.  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  Negative for mutations in APC, AXIN2, BMPR1A, CDH1, CHEK2, EPCAM, GREM1, MLH1, MSH2, MSH3, MSH6, MUTYH, NTHL1, PMS2, POLD1, POLE, PTEN, SMAD4, STK11, TP53, CDKN1B, MEN1, NF1, RET, TSC1, TSC2, and VHL genes    #Anemia secondary to iron deficiency and B12 deficiency  - terminal ileum removed at time of colon surgery, monitor for nutrient def  - 2/23 hgb 6.8, ferritin 21, iron sat index 10, TIBC 265, iron 27, b12 1493  - On B12 1000 mcg p.o. daily and ferrous sulfate 325 mg p.o. daily  - 4/9/23 s/p 1 uprbcs  - 5/9/23 hgb 11.3,5/30/23 hgb 9.4  - based on Ganzoni equation w/goal hgb 14 and 500mg needed for iron stores, pts iron deficit is 1400 mg  - s/p ferric  carboxymaltose 750mg x2 doses 6/14/23 and 6/28/23  - 7/11/23 ferritin 1022, iron sat 22, TIBC 201, iron 44    - 8/23 hgb 13.3  - 10/23 hgb 11.1  - 11/23 iron studies, B12, RBC folate WNL    #Crohn's  - dx since at least 2010     RTC 1 week for follow up with RADHA, labs, chemo  RTC 4 weeks for follow up with me, labs (after CT)      Irene Bateman DO  Hematology/Oncology  [facility]

## 2023-12-05 NOTE — TELEPHONE ENCOUNTER
Vitamin D level is 16. Started Cholecalciferol 2000 U daily.     Santana Brenner MD on 12/5/2023 at 11:34 AM

## 2023-12-06 ENCOUNTER — LAB (OUTPATIENT)
Dept: INFUSION THERAPY | Facility: CLINIC | Age: 68
End: 2023-12-06
Attending: INTERNAL MEDICINE
Payer: MEDICARE

## 2023-12-06 ENCOUNTER — APPOINTMENT (OUTPATIENT)
Dept: FAMILY MEDICINE | Facility: CLINIC | Age: 68
End: 2023-12-06
Payer: MEDICARE

## 2023-12-06 ENCOUNTER — VIRTUAL VISIT (OUTPATIENT)
Dept: ONCOLOGY | Facility: CLINIC | Age: 68
End: 2023-12-06
Payer: MEDICARE

## 2023-12-06 VITALS
TEMPERATURE: 97.7 F | SYSTOLIC BLOOD PRESSURE: 127 MMHG | RESPIRATION RATE: 16 BRPM | HEART RATE: 64 BPM | DIASTOLIC BLOOD PRESSURE: 55 MMHG | OXYGEN SATURATION: 99 %

## 2023-12-06 VITALS — HEIGHT: 61 IN | WEIGHT: 186 LBS | BODY MASS INDEX: 35.12 KG/M2

## 2023-12-06 VITALS — BODY MASS INDEX: 36.43 KG/M2 | WEIGHT: 192.8 LBS

## 2023-12-06 DIAGNOSIS — D70.1 CHEMOTHERAPY-INDUCED NEUTROPENIA (H): ICD-10-CM

## 2023-12-06 DIAGNOSIS — Z76.89 PREVENTION OF CHEMOTHERAPY-INDUCED NEUTROPENIA: ICD-10-CM

## 2023-12-06 DIAGNOSIS — C18.9 MALIGNANT NEOPLASM OF COLON, UNSPECIFIED PART OF COLON (H): Primary | ICD-10-CM

## 2023-12-06 DIAGNOSIS — D69.59 CHEMOTHERAPY-INDUCED THROMBOCYTOPENIA: ICD-10-CM

## 2023-12-06 DIAGNOSIS — T45.1X5A CHEMOTHERAPY-INDUCED NEUTROPENIA (H): ICD-10-CM

## 2023-12-06 DIAGNOSIS — T45.1X5A ANTINEOPLASTIC CHEMOTHERAPY INDUCED ANEMIA: ICD-10-CM

## 2023-12-06 DIAGNOSIS — C18.9 MALIGNANT NEOPLASM OF COLON, UNSPECIFIED PART OF COLON (H): ICD-10-CM

## 2023-12-06 DIAGNOSIS — C18.7 MALIGNANT NEOPLASM OF SIGMOID COLON (H): Primary | ICD-10-CM

## 2023-12-06 DIAGNOSIS — D64.81 ANTINEOPLASTIC CHEMOTHERAPY INDUCED ANEMIA: ICD-10-CM

## 2023-12-06 DIAGNOSIS — C18.7 MALIGNANT NEOPLASM OF SIGMOID COLON (H): ICD-10-CM

## 2023-12-06 DIAGNOSIS — T45.1X5A CHEMOTHERAPY-INDUCED NEUROPATHY (H): ICD-10-CM

## 2023-12-06 DIAGNOSIS — E83.42 HYPOMAGNESEMIA: ICD-10-CM

## 2023-12-06 DIAGNOSIS — T45.1X5A CHEMOTHERAPY-INDUCED THROMBOCYTOPENIA: ICD-10-CM

## 2023-12-06 DIAGNOSIS — G62.0 CHEMOTHERAPY-INDUCED NEUROPATHY (H): ICD-10-CM

## 2023-12-06 LAB
ACANTHOCYTES BLD QL SMEAR: NORMAL
ALBUMIN SERPL BCG-MCNC: 3.4 G/DL (ref 3.5–5.2)
ALP SERPL-CCNC: 346 U/L (ref 40–150)
ALT SERPL W P-5'-P-CCNC: 49 U/L (ref 0–50)
ANION GAP SERPL CALCULATED.3IONS-SCNC: 11 MMOL/L (ref 7–15)
AST SERPL W P-5'-P-CCNC: 62 U/L (ref 0–45)
AUER BODIES BLD QL SMEAR: NORMAL
BASO STIPL BLD QL SMEAR: NORMAL
BASOPHILS # BLD AUTO: 0 10E3/UL (ref 0–0.2)
BASOPHILS NFR BLD AUTO: 0 %
BILIRUB SERPL-MCNC: 0.6 MG/DL
BITE CELLS BLD QL SMEAR: NORMAL
BLISTER CELLS BLD QL SMEAR: NORMAL
BUN SERPL-MCNC: 13.9 MG/DL (ref 8–23)
BURR CELLS BLD QL SMEAR: NORMAL
CALCIUM SERPL-MCNC: 8.7 MG/DL (ref 8.8–10.2)
CEA SERPL-MCNC: 2.1 NG/ML
CHLORIDE SERPL-SCNC: 110 MMOL/L (ref 98–107)
CREAT SERPL-MCNC: 1.34 MG/DL (ref 0.51–0.95)
DACRYOCYTES BLD QL SMEAR: NORMAL
DEPRECATED HCO3 PLAS-SCNC: 22 MMOL/L (ref 22–29)
EGFRCR SERPLBLD CKD-EPI 2021: 43 ML/MIN/1.73M2
ELLIPTOCYTES BLD QL SMEAR: NORMAL
EOSINOPHIL # BLD AUTO: 0 10E3/UL (ref 0–0.7)
EOSINOPHIL NFR BLD AUTO: 0 %
ERYTHROCYTE [DISTWIDTH] IN BLOOD BY AUTOMATED COUNT: 15.4 % (ref 10–15)
FRAGMENTS BLD QL SMEAR: NORMAL
GLUCOSE SERPL-MCNC: 242 MG/DL (ref 70–99)
HCT VFR BLD AUTO: 28.2 % (ref 35–47)
HGB BLD-MCNC: 9.3 G/DL (ref 11.7–15.7)
HGB C CRYSTALS: NORMAL
HOWELL-JOLLY BOD BLD QL SMEAR: NORMAL
IMM GRANULOCYTES # BLD: 0 10E3/UL
IMM GRANULOCYTES NFR BLD: 2 %
LYMPHOCYTES # BLD AUTO: 0.3 10E3/UL (ref 0.8–5.3)
LYMPHOCYTES NFR BLD AUTO: 15 %
MAGNESIUM SERPL-MCNC: 1.6 MG/DL (ref 1.7–2.3)
MCH RBC QN AUTO: 33.1 PG (ref 26.5–33)
MCHC RBC AUTO-ENTMCNC: 33 G/DL (ref 31.5–36.5)
MCV RBC AUTO: 100 FL (ref 78–100)
MONOCYTES # BLD AUTO: 0.4 10E3/UL (ref 0–1.3)
MONOCYTES NFR BLD AUTO: 21 %
NEUTROPHILS # BLD AUTO: 1.2 10E3/UL (ref 1.6–8.3)
NEUTROPHILS NFR BLD AUTO: 62 %
NEUTS HYPERSEG BLD QL SMEAR: NORMAL
NRBC # BLD AUTO: 0 10E3/UL
NRBC BLD AUTO-RTO: 0 /100
PLAT MORPH BLD: NORMAL
PLATELET # BLD AUTO: 72 10E3/UL (ref 150–450)
POLYCHROMASIA BLD QL SMEAR: NORMAL
POTASSIUM SERPL-SCNC: 3.9 MMOL/L (ref 3.4–5.3)
PROT SERPL-MCNC: 5.5 G/DL (ref 6.4–8.3)
RBC # BLD AUTO: 2.81 10E6/UL (ref 3.8–5.2)
RBC AGGLUT BLD QL: NORMAL
RBC MORPH BLD: NORMAL
ROULEAUX BLD QL SMEAR: NORMAL
SICKLE CELLS BLD QL SMEAR: NORMAL
SMUDGE CELLS BLD QL SMEAR: NORMAL
SODIUM SERPL-SCNC: 143 MMOL/L (ref 135–145)
SPHEROCYTES BLD QL SMEAR: NORMAL
STOMATOCYTES BLD QL SMEAR: NORMAL
TARGETS BLD QL SMEAR: NORMAL
TOXIC GRANULES BLD QL SMEAR: NORMAL
VARIANT LYMPHS BLD QL SMEAR: NORMAL
WBC # BLD AUTO: 2 10E3/UL (ref 4–11)

## 2023-12-06 PROCEDURE — 250N000011 HC RX IP 250 OP 636: Mod: JZ | Performed by: INTERNAL MEDICINE

## 2023-12-06 PROCEDURE — 258N000003 HC RX IP 258 OP 636: Performed by: INTERNAL MEDICINE

## 2023-12-06 PROCEDURE — 96366 THER/PROPH/DIAG IV INF ADDON: CPT

## 2023-12-06 PROCEDURE — 80053 COMPREHEN METABOLIC PANEL: CPT | Performed by: INTERNAL MEDICINE

## 2023-12-06 PROCEDURE — 83735 ASSAY OF MAGNESIUM: CPT | Performed by: INTERNAL MEDICINE

## 2023-12-06 PROCEDURE — 96365 THER/PROPH/DIAG IV INF INIT: CPT

## 2023-12-06 PROCEDURE — 99214 OFFICE O/P EST MOD 30 MIN: CPT | Mod: 95 | Performed by: INTERNAL MEDICINE

## 2023-12-06 PROCEDURE — 85025 COMPLETE CBC W/AUTO DIFF WBC: CPT | Performed by: INTERNAL MEDICINE

## 2023-12-06 PROCEDURE — 36591 DRAW BLOOD OFF VENOUS DEVICE: CPT | Performed by: INTERNAL MEDICINE

## 2023-12-06 PROCEDURE — 82378 CARCINOEMBRYONIC ANTIGEN: CPT | Performed by: INTERNAL MEDICINE

## 2023-12-06 RX ORDER — GABAPENTIN 100 MG/1
200 CAPSULE ORAL
Qty: 60 CAPSULE | Refills: 3 | Status: SHIPPED | OUTPATIENT
Start: 2023-12-06 | End: 2024-01-02

## 2023-12-06 RX ORDER — ALBUTEROL SULFATE 90 UG/1
1-2 AEROSOL, METERED RESPIRATORY (INHALATION)
Status: CANCELLED
Start: 2023-12-06

## 2023-12-06 RX ORDER — DIPHENHYDRAMINE HYDROCHLORIDE 50 MG/ML
50 INJECTION INTRAMUSCULAR; INTRAVENOUS
Status: CANCELLED
Start: 2023-12-06

## 2023-12-06 RX ORDER — HEPARIN SODIUM (PORCINE) LOCK FLUSH IV SOLN 100 UNIT/ML 100 UNIT/ML
5 SOLUTION INTRAVENOUS
Status: CANCELLED | OUTPATIENT
Start: 2023-12-06

## 2023-12-06 RX ORDER — HEPARIN SODIUM,PORCINE 10 UNIT/ML
5 VIAL (ML) INTRAVENOUS
Status: CANCELLED | OUTPATIENT
Start: 2023-12-06

## 2023-12-06 RX ORDER — EPINEPHRINE 1 MG/ML
0.3 INJECTION, SOLUTION INTRAMUSCULAR; SUBCUTANEOUS EVERY 5 MIN PRN
Status: CANCELLED | OUTPATIENT
Start: 2023-12-06

## 2023-12-06 RX ORDER — MEPERIDINE HYDROCHLORIDE 25 MG/ML
25 INJECTION INTRAMUSCULAR; INTRAVENOUS; SUBCUTANEOUS EVERY 30 MIN PRN
Status: CANCELLED | OUTPATIENT
Start: 2023-12-06

## 2023-12-06 RX ORDER — ALBUTEROL SULFATE 0.83 MG/ML
2.5 SOLUTION RESPIRATORY (INHALATION)
Status: CANCELLED | OUTPATIENT
Start: 2023-12-06

## 2023-12-06 RX ORDER — METHYLPREDNISOLONE SODIUM SUCCINATE 125 MG/2ML
125 INJECTION, POWDER, LYOPHILIZED, FOR SOLUTION INTRAMUSCULAR; INTRAVENOUS
Status: CANCELLED
Start: 2023-12-06

## 2023-12-06 RX ORDER — HEPARIN SODIUM (PORCINE) LOCK FLUSH IV SOLN 100 UNIT/ML 100 UNIT/ML
5 SOLUTION INTRAVENOUS
Status: DISCONTINUED | OUTPATIENT
Start: 2023-12-06 | End: 2023-12-06 | Stop reason: HOSPADM

## 2023-12-06 RX ORDER — MAGNESIUM SULFATE HEPTAHYDRATE 40 MG/ML
4 INJECTION, SOLUTION INTRAVENOUS ONCE
Status: COMPLETED | OUTPATIENT
Start: 2023-12-06 | End: 2023-12-06

## 2023-12-06 RX ADMIN — HEPARIN 5 ML: 100 SYRINGE at 12:17

## 2023-12-06 RX ADMIN — SODIUM CHLORIDE 500 ML: 9 INJECTION, SOLUTION INTRAVENOUS at 10:07

## 2023-12-06 RX ADMIN — MAGNESIUM SULFATE HEPTAHYDRATE 4 G: 40 INJECTION, SOLUTION INTRAVENOUS at 10:14

## 2023-12-06 NOTE — PROGRESS NOTES
Infusion Nursing Note:  Nadia Ladd presents today for C12D1 Folfox-DEFERRED.  Will have Mg & IVF.  Patient seen by provider today: Yes: Dr. Bateman.   present during visit today: Not Applicable.    Note: Patient arrives after Provider visit. See Provider note from today. Pt will be deferred for chemo treatment to next week. Orders for IVF and Mg Sulfate IV today. Pt is aware and accepting. VSS, afebrile. Mild fatigue. Denies pain.   Dr. Bateman stated that patient does not need to come for IVF this Friday as previously planned. Pt. informed.       Intravenous Access:  Implanted Port.    Treatment Conditions:  Lab Results   Component Value Date    HGB 9.3 (L) 12/06/2023    WBC 2.0 (L) 12/06/2023    ANEU 7.6 08/01/2023    ANEUTAUTO 1.2 (L) 12/06/2023    PLT 72 (L) 12/06/2023        Lab Results   Component Value Date     12/06/2023    POTASSIUM 3.9 12/06/2023    MAG 1.6 (L) 12/06/2023    CR 1.34 (H) 12/06/2023    LIVIER 8.7 (L) 12/06/2023    BILITOTAL 0.6 12/06/2023    ALBUMIN 3.4 (L) 12/06/2023    ALT 49 12/06/2023    AST 62 (H) 12/06/2023       Results reviewed, labs did NOT meet treatment parameters: Plts 72, ANC borderline, Dr. Bateman would like to defer chemo treatment one week.  Will replace Mg with 4gms Mg IV today.    Post Infusion Assessment:  Patient tolerated infusion without incident.  Blood return noted pre and post infusion.  Site patent and intact, free from redness, edema or discomfort.  No evidence of extravasations.  Access discontinued per protocol.       Discharge Plan:   [facility]  Patient will return 12/13 for next appointment.   Patient discharged in stable condition accompanied by: .  Departure Mode: Ambulatory.      Rosalina Argueta RN

## 2023-12-06 NOTE — PROGRESS NOTES
Here for port labs prior to provider visit and chemo.  Tolerated lab draw well and pt proceeded to provider visit w/ dr Bateman in [facility].

## 2023-12-06 NOTE — NURSING NOTE
Is the patient currently in the state of [place]? YES    Visit mode:VIDEO    If the visit is dropped, the patient can be reconnected by: TELEPHONE VISIT: Phone number: [phone]    Will anyone else be joining the visit? NO  (If patient encounters technical issues they should call [phone]    How would you like to obtain your AVS? MyChart    Are changes needed to the allergy or medication list? No    Reason for visit: RECHECK    Jina ZAMBRANO

## 2023-12-06 NOTE — LETTER
12/6/2023         RE: Nadia Ladd  [address]  [facility] [address]        Dear Colleague,    Thank you for referring your patient, Nadia Ladd, to the [facility]. Please see a copy of my visit note below.    Video-Visit Details     Video Start Time: 9:17AM     Type of service:  Video Visit     Video End Time: 9:34AM    Originating Location (pt. Location): Home     Distant Location (provider location):  [facility] off site     Platform used for Video Visit: Well     [facility]    Hematology/Oncology Established Patient Follow Up Note      Today's Date: 12/6/2023    Reason for follow up: Sigmoid cancer    HISTORY OF PRESENT ILLNESS: Nadia Ladd is a 68 year old female who presents for follow-up    Patient has medical history including Crohn's disease on sulfasalazine, anemia secondary to iron deficiency and B12 deficiency, obesity, hypertension, prediabetes, eczema, pulmonary hypertension, hiatal hernia, mild splenomegaly (14.7 cm in 2/23)    - 2/23 pt noted increasing fatigue, found to have iron deficiency anemia w/hgb 6.8 (previously required RBC transfusion when dx w/Crohn's), did have intermittent changes in stool but not persistent (noted minimal blood in stool)   - 2/23 upper endoscopy for iron deficiency anemia and Crohn's disease: Hiatal hernia, normal stomach, normal duodenum  - 2/23 colonoscopy: A frond-like/villous partially obstructing large mass found in sigmoid colon, partially circumferential involving two thirds of the lumen circumference, 4 cm, unable to traverse, with oozing, s/p biopsy  PATHOLOGY:  A.  Stomach, antrum: Biopsy:  - Antral type mucosa with mild chronic inactive gastritis  - Immunostain for Helicobacter pylori is negative      B.  Colon, sigmoid, stricture: Biopsy:  - Invasive poorly differentiated carcinoma  The sigmoid stricture shows a high-grade carcinoma without definitive gland formation.  Given the  poorly differentiated appearance, an immunohistochemical panel was performed to exclude the possibility of a tumor by direct extension or metastasis.   Immunohistochemical stains are performed and show the tumor to be diffusely positive for CK7 and partially positive for CK20 and SATB2.  There is no significant tumor reactivity for CDX2, GATA3, PAX8, TTF-1, and chromogranin.  Synaptophysin highlights very rare positive cells. Although the CK7/CK20 profile is not entirely typical for a colorectal carcinoma, immunostains for tumors of other origins are negative and a colorectal primary is still favored.   - Mismatch repair:  1) MLH1-loss of nuclear expression  2) MSH2-intact  3) MSH6-intact  4) PMS2-loss of nuclear expression  -MLH1 promoter methylation: NEGATIVE    -2/23 CT CAP:  A) 4 cm length mass in the proximal sigmoid colon. There is some irregularity and stranding in the adjacent pericolonic fat which may indicate tumor extension. There is no obstruction.   B) there is a second probable mass at the hepatic flexure of the colon measuring 2.5 cm in length   C)There are small lymph nodes in the mesial colon adjacent to the hepatic flexure abnormality and the sigmoid colonic mass. No lymphadenopathy by size criteria  D) There is submucosal fatty deposition in the colon, most severe in the distal sigmoid colon and rectum, which can be seen secondary to quiescent inflammatory bowel disease.  E) no liver lesion    -3/23 PET:  a. There is increased FDG avidity of the sigmoid colon with focal lobular wall thickening consistent with biopsy-proven adenocarcinoma.  b. Secondary focus noted at the hepatic flexure demonstrates elevated FDG avidity and lobulated wall thickening highly suspicious for a additional primary.  c. Additionally there is a smaller focus of wall thickening with elevated FDG avidity along the left aspect of the transverse colon  which is suspicious for a possible third focus of colonic  "malignancy.    -3/23 CEA 6.8  - 3/23 colorectal surgery consultation with - in the setting of Crohn's, at least sigmoid colectomy with possible total abdominal colectomy with either ileorectal anastomosis or end ileostomy (\"rectum can remain active and be actively surveyed annually\")    -3/23 genetic counseling, testing for Chan syndrome    - 4/5/2023 gynecologic oncology consultation for risk reduction surgery with hysterectomy and BSO at time of colectomy    -4/6/2023 laparoscopic converted to open total abdominal colectomy with ileorectal anastomosis, partial omentectomy, flexible sigmoidoscopy, TAHBSO  A. OMENTUM, RESECTION:  - Adipose tissue with no significant histopathologic abnormality  - No evidence of malignancy     B. COLON, RESECTION:  Mass #1 (ascending colon/hepatic flexure): Mixed neuroendocrine-non-neuroendocrine neoplasm (MINEN):  - Neuroendocrine carcinoma component (70%) and moderately-differentiated adenocarcinoma component (30%)  - Size = 5.0 cm, invading into muscularis propria  - Positive LVI  - Negative macroscopic tumor perforation, negative PNI  - Negative surgical resection margins  - IHC: Neuroendocrine portion: Negative for chromogranin and INSM1 but strongly express synaptophysin  - IHC: Adenocarcinoma portion: Positive for CK20, CDX2 negative for CK7, PAX8, ER, S100  Ki-67 proliferation index of approximately 80%.  MMR:  Intact nuclear expression of MLH1, MSH2, MHS6, PMS2  PDL1:  COMBINED POSITIVE SCORE (CPS): 55-60  TUMOR PROPORTION SCORE (TPS): 50%   pathogenic KRAS mutation (G13D) was identified (5.2%).  AJCC 8th edition: stage 3B mpT3 pN1a     Mass#2 (sigmoid colon): Poorly-differentiated carcinoma:  - Grade 3  - Size = 5.7 cm, invading into muscularis propria  - Negative for microscopic tumor perforation, LVI, perineural invasion  - Negative surgical resection margins  - IHC: Positive for scar and keratin AE1/AE3, negative for CK7, CK20, CDX2, PAX8, ER, chromogranin, " CD56, p40, synaptophysin, S100, INI1, p40, INSM1  Ki-67 proliferation index of approximately 70%.  MMR: loss of nuclear expression MLH1 and PMS2, intact nuclear expression MSH2 and MSH6  PDL1:  COMBINED POSITIVE SCORE (CPS): 80  TUMOR PROPORTION SCORE (TPS): 70%  pathogenic KRAS mutation (G13D) was identified (4.5%).  AJCC 8th edition: stage 3A mpT2 pN1a    - One of forty-one sampled lymph nodes positive (1/41)  - Benign appendix  - Tubular adenoma    C. UTERUS, CERVIX, BILATERAL FALLOPIAN TUBES & OVARIES, :  - Benign endometrial polyps; atrophic endometrium  - Uterus, cervix, bilateral fallopian tubes, and ovaries with no significant morphologic abnormalities  - No evidence of dysplasia or malignancy     D. SMALL INTESTINE, TERMINAL ILEUM, TERMINAL ILIUM:  - Benign ileum  - No evidence of dysplasia or malignancy  - Negative for metastases to one of one sampled lymph node (0 /1)     E. PROXIMAL ANASTOMOTIC RING:  - Benign small intestine  - No evidence of dysplasia or malignancy     F. DISTAL ANASTOMOTIC RING:  - Benign colon  - No evidence of dysplasia or malignancy    CRC NGS:   --mutations positive for KRAS G13D mutation 5.2% mass 1, KRAS G13D mutation 4.5% mass 2 (negative for AKT1; BRAF; ERBB2; KRAS; NRAS; PIK3CA; PTEN)  --TMB score: 17.064 mut/Mb mass 1 (CPS 55-60, TPS 50%), 60.943 mut/Mb mass 2 (CPS 80, TPS 70%)  --fusion negative including NTRK and RET for mass 1 and 2 (also negative for AKT1, AKT3, ALK, AR, XYPKJV25, CLAUDINE, BCOR, BRAF, BRD3, BRD4, CAMTA1, CCNB3, CCND1, , CIC, CSF1, CSF1R, CTNNB1, DNAJB1, EGFR, EPC1, ERBB2, ERBB4, ERG, ESR1, ESRRA, ETV1, ETV4, ETV5, ETV6, EWSR1, FGFR1, FGFR2, FGFR3, FGR, FOS, FOSB, FOXO1, FOXO4, FOXR2, FUS, GLI1, GRB7, HMGA2, HRAS, IDH1, IDH2, INSR, JAK2, JAK3, JAZF1, KRAS, MAML2, MAP2K1, MAST1, MAST2, MEAF6, MET, MKL2, MN1, MSMB, MUSK, MYB, MYBL1, MYOD1, NCOA1, NCOA2, NOTCH1, NOTCH2, NR4A3, NRAS, NRG1, NTRK1, NTRK2, NTRK3, NUMBL1, NUTM1, PAX3, PDGFB, PDGFRA, PDGFRB,  PHF1, PIK3CA, PKN1, PLAG1, PPARG, PRKACA, PRKCA, PRKCB, RAF1, RELA, RET, ROS1, RPSO2, RSPO3, SS18, STAT6, TAF15, TCF12, TERT, TFE3, TFEB, TFG, THADA, TMPRSS2, USP6, VGLL2, YAP1, YWHAE)    -4/11/2023 patient discharged from hospital, planning for 30 days of lovenox postop, oxycodone for pain (required 1 unit PRBC for anemia)    -5/8/23 I presented this case at [facility] CRC tumor board and their recommendations include:  - common to see this in Crohn's, overall poor prognosis, treat aggressively, may be poorly responsive to chemotherapy  - standard of care is mFOLFOX 6 x 12 cycles  - acknowledge that pt has high TPS and likely response to immunotherapy however not sufficient data or standard of care in stage 3 adjuvant setting  - add MMR testing to mass #1 hepatic flexure mass    - 5/9/23 admitted for acute renal failure w/Cr 3.82 w/K 7.0    - 5/19/23 second opinion at [facility] w/medical oncologist Dr. Acharya, thorough consultation and recommendations appreciated     - pt would like to proceed with FOFLOX (with dose reduced oxaliplatin due to kidney function)    -5/2023 genetic counseling: Negative for mutations in EPCAM, MLH1, MSH2, MSH6, and PMS2 genes.  Negative for mutations in APC, AXIN2, BMPR1A, CDH1, CHEK2, EPCAM, GREM1, MLH1, MSH2, MSH3, MSH6, MUTYH, NTHL1, PMS2, POLD1, POLE, PTEN, SMAD4, STK11, TP53, CDKN1B, MEN1, NF1, RET, TSC1, TSC2, and VHL genes    - 5/25/23 port placement    - 5/26/23 CT CAP w/ contrast and IVF before and after CT (baseline prior to starting tx):  1.  3 mm nodule RML and 5 mm lateral EDSON nodule, minimally increased from previous  2.  New 4 mm EDSON groundglass density nodule  3.  Several prominent borderline enlarged mediastinal lymph nodes slightly increased from previous  4.  Splenomegaly 14.5 cm  5. S/p subtotal colectomy with ileorectal anastomosis with postsurgical changes along the ventral abdominal wall midline    -5/31/23 started mFOLFOX 6 w/dose reduced oxaliplatin    - C2D1 6/14/23 held  due to new onset paroxysmal A-fib with RVR requiring hospitalization 6/3/2023 (also hypoMg, dehydrated)  - 6/19/2023 I presented this case at MTD colorectal tumor board at the [facility], it is possible that paroxysmal A-fib may be related to 5-FU.  Options include withholding further treatment versus retrialing 5-FU under the guidance of cardio oncology in an inpatient setting.  No other adjuvant treatment options available, including immunotherapy  - 6/30/2023 consultation with cardio oncologist Dr. Garza; I spoke with Dr. Garza 6/30/2023 as well, [id number] score 3, recommending starting apixaban 5 mg twice daily, okay to retrial FOLFOX while patient is on metoprolol  -7/18/2023  CT CAP- subcm lung nodules stable, borderline and mildly enlarged mediastinal LN and periportal LN stable, splenomegaly 15.7cm, Subtotal colectomy with ileorectal anastomosis. No acute inflammation or obstruction   - 7/19/2023 mFOLFOX6 C2D1  (inpatient w/continuous cardiac monitoring)      -9/23 CT CAP (after cycle 6)  -Subtotal colectomy, rectal anastomosis appears stable, some adjacent mild scarring, no bowel obstruction or inflammation, no free fluid  -No new adenopathy, no liver lesions  -Stable EDSON 3 mm nodule, other stable nodules at EDSON, RML  -Stable several small thoracic lymph nodes in mediastinum  -Mild wall thickening of the gallbladder  -Spleen is smaller measuring 13.7 cm  -Stable regions of bilateral cortical thinning in the kidneys, with few tiny cysts    INTERIM HISTORY:  REGIMEN:  mFOLFOX6  q14 days x12 cycles/6 months  Starting weight 183lb   C1D1 5/31/23 (complicated by hospitalization for afib w/RVR C1D4 6/3/23), C2D1 7/19/23 (inpatient w/continuous cardiac monitoring), C3D1 8/2/2023 (outpatient, admitted to ER for A-fib with RVR C3D3 8/4/23), C4D1 8/16/23, C5D1 8/30/2023, C6D1 9/13/2023, C7D1 9/26/23, C8D1 10/11/23, C9D1 10/25/23, C10D1 11/8/23, C11D1 11/22/23    oxaliplatin 64mg/m2 day 1 (dose reduced  from 85mg/m2 2/2 Cr C1D1)  LV 350mg/m2 day 1  5FU 1200mg/m2 continuous infusion day 1-2 (total 2,400mg/m2 IV over 46-48 hours)  (5FU 400mg/m2 day 1 bolus (discontinued cycle 2 onwards))  PLUS IVF and magnesium replacement as needed day 1 and day 8 with optimization to Mg= 2 at least, AND IVF day 3 on day of pump disconnect  PLUS (per cardiology recommendations) During the days of chemo and 2 days after ONLY: take long acting diltiazem 120 mg daily with 30 mg of short acting diltiazem as needed for atrial fibrillation with HR >110  Emetic risk: moderate  Febrile neutropenia risk: intermediate     C12D1 12/6/23 pending labs    Treatment related AE:  - hearing changes- described as muffled with intermittent ringing with obscured hearing R>L- overall improved since 1st cycle, and improving few days after starting chemo, Flonase BID as needed, ENT consultation 10/23/23- 11/23 received hearing aids b/l but need to be programmed; (present prior to starting chemo, improved w/steroids, now stable)   - Paroxysmal A-fib with RVR and PAC- admitted to [facility] 6/3/2023 - 6/5/2023 for this, 6/3/2023 echo EF 55-60%, mild pulmonary hypertension, mild mitral regurgitation, on metoprolol XL 25 mg p.o. daily for ongoing palpitations, 7/23 Cardio oncology consult w/Dr. Garza, on eliquis, no recurrence of RVR while inpatient for cycle 2 w/continuous cardiac monitoring; IVF and magnesium replacement to magnesium = 2 minimum added to D1 and D8 and IVF day 3. 8/4/2023 C3D3 patient went to ER for A-fib with palpitations (no other sx), HR 80-130s, labs WNL, CXR negative, given IV fluids, placed on diltiazem drip, HR improved to .  8/15/2023 cardiology follow-up: Increase metoprolol XL to 50 mg daily; During the days of chemo and 2 days after ONLY: take long acting diltiazem 120 mg daily with 30 mg of short acting diltiazem as needed for atrial fibrillation with HR >110.  8/18/2023 patient called in reporting heart rate up  "to 140, sensation of heart racing, improved to 67 after taking extra prescribed dose of diltiazem and then asymptomatic.  8/19/2023 patient presented to ER with chest tightness and palpitations, had taken total of 3 tabs of diltiazem 30mg short acting at that point, EKG with sinus rhythm, rate 63 with some noted bradycardia while in the ER, magnesium and potassium normal, creatinine slightly elevated, given 1 L IVF.  xarelto now affordable; 9/12/2023 cardiology [id number] changes, 10/27/23 C9D3 disconnect pt \"had episode of afib\", heart racing to HR 140s, that was managed with short acting diltiazem and deep breathing, notably pt w/persistently low Mg despite 2g IV Mg replacement for Mg 1.6, cardiology follow-up 12/14/2023;   - anxiety- working with psychology prn  - Delayed neutropenia- neutropenic thierry ANC 0.5 2 weeks out from cycle 1, afebrile, 6/23 DPD deficiency testing negative; resolved cycle 2 onwards 5FU bolus dropped; 12/6/23 ANC 1.2  -Normocytic anemia- due to chemotherapy and iron deficiency, s/p ferric carboxymaltose 750mg x2 doses 6/14/23 and 6/28/23, 7/23 iron studies improved.  8/23 hgb 13.3, 9/23 hgb 12.1, 10/23 hgb 11.1, 11/23 hgb 10.8; 11/23 iron studies, B12, RBC folate WNL  -Thrombocytopenia- 2/2 chemotherapy, no bruising or bleeding, 12/6/23 plt 72K, for now plt >75K, while on xarelto  - MARYA on CKD- following w/nephro- follow up 9/23, Cr improved from 3, drinking 2.5 16oz bottles per day and getting IV fluids day 1, day 3 and day 8 of each chemotherapy cycle, nephrology follow-up 12/4/2023 noted w/next follow up 6/2024  - elevated LFTs-  alk phos iso enzyme- majority liver, liver normal on 9/23 CT and 11/23 MRI liver.   - diarrhea- grade 1, 2 episodes/week, also affected by food, improved with imodium 2 total   - neuropathy- grade 1, tingling fingers > toes, grade 2 numbness in all fingers instead of just b/l 3rd digit and that is making her drop things sometimes, lasting 2 weeks now instead " "of 3-4 days; 11/23 started gabapentin, taking at bedtime because AM dose made her dizzy/off balance   - cold sensitivity- grade 2, fingers and mouth, most severe right after tx and previously improving before next cycle due and now it is not, using straw and gloves when outside   - Port flipped-see my note from 8/2/2023, 8/21/2023 port removed and replacement with IR  - \"bottom is sore\"- pt has hemorrhoids, no active bleeding or thrombosed hemorrhoids but notes soreness after chemo, witch hazel wipes are helping, sitz bath are helping             REVIEW OF SYSTEMS:   A 14 point ROS was reviewed with pertinent positives and negatives in the HPI.        HOME MEDICATIONS:  Current Outpatient Medications   Medication Sig Dispense Refill     cyanocobalamin 1000 MCG TBCR Take 1,000 mcg by mouth daily 100 tablet 1     dexAMETHasone (DECADRON) 4 MG tablet Take 2 tablets (8 mg) by mouth daily Take for 2 days, starting the day after chemo cycle 10, 11 and 12. Take with food. 12 tablet 0     diltiazem (CARDIZEM) 30 MG tablet Take 1 tablet (30 mg) by mouth 3 times daily 90 tablet 0     diltiazem ER COATED BEADS (CARDIZEM CD/CARTIA XT) 120 MG 24 hr capsule Take 1 capsule (120 mg) by mouth daily On chemo days and 2 days post chemo infusion. 60 capsule 3     Ferrous Sulfate (IRON) 325 (65 Fe) MG tablet Take 1 tablet by mouth daily       gabapentin (NEURONTIN) 300 MG capsule Take 1 capsule (300 mg) by mouth 3 times daily 90 capsule 1     lidocaine-prilocaine (EMLA) 2.5-2.5 % external cream Apply topically as needed for moderate pain Apply 15-20 minutes prior to port access 1 g 4     loperamide (IMODIUM) 2 MG capsule Take 2 mg by mouth daily as needed for diarrhea       magnesium oxide 400 MG tablet Take 1 tablet (400 mg) by mouth 2 times daily 60 tablet 0     metoprolol succinate ER (TOPROL XL) 50 MG 24 hr tablet Take 1 tablet (50 mg) by mouth daily 90 tablet 3     Multiple Vitamins-Iron (MULTI-DAY PLUS IRON PO) Take 1 tablet by " mouth daily       prochlorperazine (COMPAZINE) 10 MG tablet Take 10 mg by mouth every 6 hours as needed for nausea or vomiting       rivaroxaban ANTICOAGULANT (XARELTO) 20 MG TABS tablet Take 1 tablet (20 mg) by mouth daily (with dinner) 90 tablet 3     sodium bicarbonate 650 MG tablet Take 1 tablet (650 mg) by mouth 2 times daily 180 tablet 3     vitamin D3 (CHOLECALCIFEROL) 50 mcg (2000 units) tablet Take 1 tablet (50 mcg) by mouth daily 30 tablet 6         ALLERGIES:  Allergies   Allergen Reactions     No Known Drug Allergy          PAST MEDICAL HISTORY:  Past Medical History:   Diagnosis Date     Arthropathia 08/05/2010     B12 deficiency anemia 10/21/2010     Benign essential hypertension with target blood pressure below 140/90 10/10/2016     CARDIOVASCULAR SCREENING; LDL GOAL LESS THAN 160 10/31/2010     Crohn's colitis (H) 02/15/2011     Dermatitis-dishydrotic eczema-severe 08/05/2010     Hiatal hernia      HTN (hypertension) 07/21/2011     Iron deficiency anemia 10/21/2010     Malignant neoplasm of sigmoid colon (H)      Obesity      Primary pulmonary hypertension (H) 04/06/2010         PAST SURGICAL HISTORY:  Past Surgical History:   Procedure Laterality Date     COLECTOMY WITHOUT COLOSTOMY N/A 4/6/2023    Procedure: Laparoscopic Converted to Open Total abdominal colectomy;  Surgeon: Jerome Ashley MD;  Location: [facility] OR     COLONOSCOPY  10/11/10     COLONOSCOPY N/A 2/27/2023    Procedure: ATTEMPTED COLONOSCOPY WITH SIGMOID STRICTURE BIOPSY;  Surgeon: Jonathan Alcocer MD;  Location:  GI     ESOPHAGOSCOPY, GASTROSCOPY, DUODENOSCOPY (EGD), COMBINED N/A 2/27/2023    Procedure: ESOPHAGOGASTRODUODENOSCOPY, WITH BIOPSY;  Surgeon: Jonathan Alcocer MD;  Location: [facility]     HYSTERECTOMY TOTAL ABDOMINAL, BILATERAL SALPINGO-OOPHORECTOMY, COMBINED N/A 4/6/2023    Procedure: Hysterectomy total abdominal, bilateral salpingo-oophorectomy;  Surgeon: Sunitha Olea MD;  Location: [facility] OR     INSERT PORT  VASCULAR ACCESS Right 2023    Procedure: Ultrasound-guided right internal jugular venous access port placement with fluoroscopy;  Surgeon: Martin Thomas DO;  Location: [facility] OR     IR CHEST PORT PLACEMENT > 5 YRS OF AGE  2023     IR PORT CHECK RIGHT  2023     IR PORT REMOVAL RIGHT  2023     SIGMOIDOSCOPY FLEXIBLE N/A 2023    Procedure: Sigmoidoscopy flexible;  Surgeon: Jerome Ashley MD;  Location: [facility] OR     SURGICAL HISTORY OF -   77    Perineorrhaphy, for widening vaginal orifice     ZZC EXPLORATORY OF ABDOMEN      laparoscopy         SOCIAL HISTORY:  Social History     Socioeconomic History     Marital status:      Spouse name: Not on file     Number of children: Not on file     Years of education: Not on file     Highest education level: Not on file   Occupational History     Not on file   Tobacco Use     Smoking status: Never     Passive exposure: Current     Smokeless tobacco: Never   Substance and Sexual Activity     Alcohol use: No     Drug use: No     Sexual activity: Yes     Partners: Male   Other Topics Concern     Parent/sibling w/ CABG, MI or angioplasty before 65F 55M? Not Asked   Social History Narrative     Not on file     Social Determinants of Health     Financial Resource Strain: Not on file   Food Insecurity: Not on file   Transportation Needs: Not on file   Physical Activity: Not on file   Stress: Not on file   Social Connections: Not on file   Interpersonal Safety: Not on file   Housing Stability: Not on file         FAMILY HISTORY:  Family History   Problem Relation Age of Onset     Hypertension Mother         on meds, alive     Cerebrovascular Disease Father         stroke about age 65,  of cancer at 68 yrs     Diabetes Father         eventually took insulin     Anemia Father         Pernisios anemia     Kidney Disease Niece         kidney transplant     Kidney Disease Nephew         kidney transplant     Venous thrombosis No  family hx of      Anesthesia Reaction No family hx of          PHYSICAL EXAM:  Vital signs:  LMP  (LMP Unknown)        LABS:   Latest Reference Range & Units 12/06/23 08:49   Sodium 135 - 145 mmol/L 143   Potassium 3.4 - 5.3 mmol/L 3.9   Chloride 98 - 107 mmol/L 110 (H)   Carbon Dioxide (CO2) 22 - 29 mmol/L 22   Urea Nitrogen 8.0 - 23.0 mg/dL 13.9   Creatinine 0.51 - 0.95 mg/dL 1.34 (H)   GFR Estimate >60 mL/min/1.73m2 43 (L)   Calcium 8.8 - 10.2 mg/dL 8.7 (L)   Anion Gap 7 - 15 mmol/L 11   Magnesium 1.7 - 2.3 mg/dL 1.6 (L)   Albumin 3.5 - 5.2 g/dL 3.4 (L)   Protein Total 6.4 - 8.3 g/dL 5.5 (L)   Alkaline Phosphatase 40 - 150 U/L 346 (H)   ALT 0 - 50 U/L 49   AST 0 - 45 U/L 62 (H)   Bilirubin Total <=1.2 mg/dL 0.6   Glucose 70 - 99 mg/dL 242 (H)   WBC 4.0 - 11.0 10e3/uL 2.0 (L)   Hemoglobin 11.7 - 15.7 g/dL 9.3 (L)   Hematocrit 35.0 - 47.0 % 28.2 (L)   Platelet Count 150 - 450 10e3/uL 72 (L)   RBC Count 3.80 - 5.20 10e6/uL 2.81 (L)   MCV 78 - 100 fL 100   MCH 26.5 - 33.0 pg 33.1 (H)   MCHC 31.5 - 36.5 g/dL 33.0   RDW 10.0 - 15.0 % 15.4 (H)   % Neutrophils % 62   % Lymphocytes % 15   % Monocytes % 21   % Eosinophils % 0   % Basophils % 0   Absolute Basophils 0.0 - 0.2 10e3/uL 0.0   Absolute Eosinophils 0.0 - 0.7 10e3/uL 0.0   Absolute Immature Granulocytes <=0.4 10e3/uL 0.0   Absolute Lymphocytes 0.8 - 5.3 10e3/uL 0.3 (L)   Absolute Monocytes 0.0 - 1.3 10e3/uL 0.4   % Immature Granulocytes % 2   Absolute Neutrophils 1.6 - 8.3 10e3/uL 1.2 (L)   Absolute NRBCs 10e3/uL 0.0   NRBCs per 100 WBC <1 /100 0   RBC Morphology  Confirmed RBC Indices   Platelet Morphology Automated Count Confirmed. Platelet morphology is normal.  Automated Count Confirmed. Platelet morphology is normal.   (H): Data is abnormally high  (L): Data is abnormally low    PATHOLOGY:    IMAGING:  Narrative & Impression   MRI ABDOMEN WITH CONTRAST     CLINICAL HISTORY:  Increased LFTs.  The current and nonneuroendocrine tumor and sigmoid carcinoma  status  post colectomy. On adjuvant chemotherapy.     TECHNIQUE: MRI of the abdomen without and with intravenous contrast.  Contrast dose: 10 ml of Gadavist.     Comparison study: CT of the abdomen and pelvis on 9/28/23, 7/18/2023,  5/26/2023, 2/27/2023 and PET CT on 3/24/2023     FINDINGS:  Liver: In the central liver, abutting the middle hepatic vein and  above the portal vein confluence, there is a lobulated T2 hyperintense  lesion measuring 2.0 x 1.0 cm (series 4, image 19 and series 17, image  22) with heterogeneous enhancement best seen on the postcontrast  subtraction images (series 14 and series 19, image 45). This likely  corresponds to a similar sized lesion present on multiple prior CTs  dating back to 2/27/2023. There was no hypermetabolic activity in this  location seen on the PET-CT of 3/24/2023. This is likely a benign  entity such as a hemangioma. No hepatic steatosis. No other focal  suspicious lesions in the liver. Patent portal and hepatic veins.     Other findings: Stable splenomegaly with the spleen measuring 15.4 cm  in length. Colectomy with ileorectal anastomosis. The gallbladder,  pancreas, adrenal glands and visualized portions of the kidneys are  within normal limits. Normal caliber visualized loops of small bowel.  No lymphadenopathy in the abdomen. No abdominal aortic aneurysm. No  abdominal ascites.                                                                      IMPRESSION:  1. No metastatic disease in the abdomen.  2. Stable lesion in the central liver that was not hypermetabolic on  prior PET-CT on 3/24/2023, likely a benign entity such as a  hemangioma.  3. Stable splenomegaly.     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 mutated, MARISSA,  TPS 50%  # sigmoid colon poorly-differentiated carcinoma, KRAS G13D mutated, loss of MLH1 and PMS2, TPS 70%  - 2/23 colonoscopy: A frond-like/villous partially obstructing large mass found in sigmoid colon, partially circumferential involving two thirds of the lumen circumference, 4 cm, unable to traverse, with oozing, s/p biopsy, PATHOLOGY: Invasive poorly differentiated carcinoma, IHC panel excludes tumors of other origin, colorectal primary is favored, loss of nuclear expression of MLH1 and PMS2, MLH1 promoter methylation negative, [id number] mutation negative  -2/23 CT CAP: Shows known 4 cm proximal sigmoid colon mass with possible tumor extension (irregularity and stranding and adjacent pericolonic fat) without obstruction AND probable second mass 2.5 cm at hepatic flexure of colon; small lymph nodes adjacent to both masses (no lymphadenopathy by size criteria)  -3/23 PET:  a. There is increased FDG avidity of the sigmoid colon with focal lobular wall thickening consistent with biopsy-proven adenocarcinoma.  b. Secondary focus noted at the hepatic flexure demonstrates elevated FDG avidity and lobulated wall thickening highly suspicious for a additional primary.  c. Additionally there is a smaller focus of wall thickening with elevated FDG avidity along the left aspect of the transverse colon which is suspicious for a possible third focus of colonic malignancy.  - 3/23 CEA 6.8  -4/6/2023 laparoscopic converted to open total abdominal colectomy with ileorectal anastomosis, partial omentectomy, flexible sigmoidoscopy, TAHBSO  PATHOLOGY:  Of note, omental resection, terminal ileum, proximal and distal anastomotic rings, uterus, cervix, bilateral fallopian tubes and ovaries negative for malignancy    Mass #1 (ascending colon/hepatic flexure): Mixed neuroendocrine-non-neuroendocrine neoplasm (MINEN):  - Neuroendocrine carcinoma component (70%) and moderately-differentiated adenocarcinoma component (30%)  - Size = 5.0  cm, invading into muscularis propria, Positive LVI  - IHC: Neuroendocrine portion: Negative for chromogranin and INSM1 but strongly express synaptophysin  - IHC: Adenocarcinoma portion: Positive for CK20, CDX2 negative for CK7, PAX8, ER, S100  Ki-67 proliferation index of approximately 80%.  MARISSA   PDL1:  COMBINED POSITIVE SCORE (CPS): 55-60  TUMOR PROPORTION SCORE (TPS): 50%   pathogenic KRAS mutation (G13D) was identified (5.2%).  MMR testing: intact  AJCC 8th edition: stage 3B mpT3 pN1a     Mass #2 (sigmoid colon): Poorly-differentiated carcinoma:  - Grade 3  - Size = 5.7 cm, invading into muscularis propria  - IHC: Positive for scar and keratin AE1/AE3, negative for CK7, CK20, CDX2, PAX8, ER, chromogranin, CD56, p40, synaptophysin, S100, INI1, p40, INSM1  Ki-67 proliferation index of approximately 70%.  MMR testing: loss of MLH1 and PMS2  PDL1:  COMBINED POSITIVE SCORE (CPS): 80  TUMOR PROPORTION SCORE (TPS): 70%  pathogenic KRAS mutation (G13D) was identified (4.5%).  MMR testing: loss of MLH1 and PMS2, intact MSH2 and MSH6  AJCC 8th edition: stage 3A mpT2 pN1a    - One of forty-one sampled lymph nodes positive (1/41), d/w pathology- unable to determine which mass is metastatic to LN    - 4/23 MRI brain w/wo contrast LAURENT  - 5/26/23 CT CAP w/ contrast and IVF before and after CT (post op baseline prior to starting tx):  1.  3 mm nodule RML and 5 mm lateral EDSON nodule, minimally increased from previous  2.  New 4 mm EDSON groundglass density nodule  3.  Several prominent borderline enlarged mediastinal lymph nodes slightly increased from previous  4.  Splenomegaly 14.5 cm  5. S/p subtotal colectomy with ileorectal anastomosis with postsurgical changes along the ventral abdominal wall midline  - 5/8/23 I presented this case at [facility] tumor board as above   - 5/19/23 second opinion at [facility] w/medical oncologist Dr. Acharya, thorough consultation and recommendations appreciated, overall agree w/FOLFOX x6 months,  possible nivo or ipi nivo in second line; if metastatic platinum etoposide vs ipi nivo  - 5/25/23 port placement    -5/2023 genetic counseling: Negative for mutations detailed below     REGIMEN:  mFOLFOX6  q14 days x12 cycles/6 months  Starting weight 183lb   C1D1 5/31/23 (complicated by hospitalization for afib w/RVR C1D4 6/3/23), C2D1 7/19/23 (inpatient w/continuous cardiac monitoring), C3D1 8/2/2023 (outpatient, admitted to ER for A-fib with RVR C3D3 8/4/23), C4D1 8/16/23, C5D1 8/30/2023, C6D1 9/13/2023, C7D1 9/26/23, C8D1 10/11/23, C9D1 10/25/23, C10D1 11/8/23, C11D1 11/22/23    oxaliplatin 64mg/m2 day 1 (dose reduced from 85mg/m2 2/2 Cr C1D1)  LV 350mg/m2 day 1  5FU 1200mg/m2 continuous infusion day 1-2 (total 2,400mg/m2 IV over 46-48 hours)  (5FU 400mg/m2 day 1 bolus (discontinued cycle 2 onwards))  PLUS IVF and magnesium replacement as needed day 1 and day 8 with optimization to Mg= 2 at least, AND IVF day 3 on day of pump disconnect  PLUS (per cardiology recommendations) During the days of chemo and 2 days after ONLY: take long acting diltiazem 120 mg daily with 30 mg of short acting diltiazem as needed for atrial fibrillation with HR >110  Emetic risk: moderate  Febrile neutropenia risk: intermediate     C12D1 12/6/23 to be delayed 1 week 2/2 cytopenias  Still give IVF and Mg today    Treatment related AE:  - hearing changes-stable w/dose reduced oxaliplatin, flonase prn, continue use of b/l hearing aids (awaiting programming and follow up appt)  - Paroxysmal A-fib with RVR and PAC- 8/15/2023 cardiology follow-up: Increase metoprolol XL to 50 mg daily; During the days of chemo and 2 days after take long acting diltiazem 120 mg daily with 30 mg of short acting diltiazem as needed for atrial fibrillation with HR >110, IV fluid day 1, 3, 8; IV Mg to goal Mg 2 day 1 and 8- updated orders for Mg replacement added to plan (ie if Mg 1.6 give 4g Mg not 2g given persistent hypoMg), cardiology follow-up 12/14/2023  -  anxiety- continue following with oncology psychology team  - Normocytic anemia- due to chemotherapy and iron deficiency, s/p ferric carboxymaltose 750mg x2 doses 6/14/23 and 6/28/23; repeat iron studies normal. 11/23 iron studies, B12, RBC folate WNL  - neutropenia- ANC 1.2, delay chemo x1 week, monitor for neutropenic fever  -Thrombocytopenia- 12/6/23 plt 72K, delay chemo x1 week, continue xarelto, monitor for bleeding/bruising   - MARYA on CKD- following w/nephro, f/u with nephro 12/4/2023 noted w/next follow up 6/2024; continue increasing PO fluids, continue day 1, 3, 8 IVF  - hypoMg- Mg 1.6 today, will give 4g Mg  - elevated LFTs- AST 62- stable, Alk phos 346- improving, alk phos iso enzyme- majority liver, liver normal on 9/23 CT and 11/23 MRI (no mets)  - diarrhea- grade 1  - neuropathy- grade 2,  continue gabapentin 300mg at bedtime, will start 100mg qAM and afternoon and titrate up to 200mg then 300mg as tolerated  - cold sensitivity- grade 2  - hemorrhoids- witch hazel pads, sitz baths prn      Imaging:  -11/23 MRI liver: negative for mets, stable cental liver hemangioma and splenomegaly    -9/23 CT CAP (after 6 cycles of mFOLFOX 6 without 5-FU bolus):  -Subtotal colectomy with stable appearing rectal anastomosis, some adjacent mild scarring, no bowel obstruction or inflammation, no free fluid, no new adenopathy, no liver lesions, no bone lesions  -Mild wall thickening of the gallbladder  - Stable small pulmonary nodules and small thoracic lymph nodes at mediastinum, splenomegaly smaller 13.7 cm    - 7/18/23 CT CAP (repeat baseline after treatment delay due to A-fib)- subcm lung nodules stable, borderline and mildly enlarged mediastinal LN and periportal LN stable, splenomegaly 15.7cm, Subtotal colectomy with ileorectal anastomosis. No acute inflammation or obstruction    -Repeat CT CAP after cycle 12, 1/2/2024    Labs:  3/23 CEA 6.8  5/23 CEA 1.8  7/23 CEA 2.8  10/23 CEA 2.0  12/23 CEA pending    Other:  -  f/u with Dr. Ashley 4/2024 for rigid proctoscopy     #parxosymal afib  - within 3 days of receiving 5FU, prior cardiac risk factors htn, prediabetes  - 6/3/23 presented to ER w/lightheadedness, dizziness, cardioverted s/p diltiazem ggt in ER  - 6/23 DPD deficiency testing negative  - currently on metoprolol XL 25 mg daily, apixaban 5 mg twice daily  - 6/30/2023 consultation with cardio oncologist Dr. Garza; I spoke with Dr. Garza 6/30/2023 as well, [id number] score 3, recommending starting apixaban 5 mg twice daily, okay to retrial FOLFOX while patient is on metoprolol.  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  Negative for mutations in APC, AXIN2, BMPR1A, CDH1, CHEK2, EPCAM, GREM1, MLH1, MSH2, MSH3, MSH6, MUTYH, NTHL1, PMS2, POLD1, POLE, PTEN, SMAD4, STK11, TP53, CDKN1B, MEN1, NF1, RET, TSC1, TSC2, and VHL genes    #Anemia secondary to iron deficiency and B12 deficiency  - terminal ileum removed at time of colon surgery, monitor for nutrient def  - 2/23 hgb 6.8, ferritin 21, iron sat  index 10, TIBC 265, iron 27, b12 1493  - On B12 1000 mcg p.o. daily and ferrous sulfate 325 mg p.o. daily  - 4/9/23 s/p 1 uprbcs  - 5/9/23 hgb 11.3,5/30/23 hgb 9.4  - based on Ganzoni equation w/goal hgb 14 and 500mg needed for iron stores, pts iron deficit is 1400 mg  - s/p ferric carboxymaltose 750mg x2 doses 6/14/23 and 6/28/23  - 7/11/23 ferritin 1022, iron sat 22, TIBC 201, iron 44    - 8/23 hgb 13.3  - 10/23 hgb 11.1  - 11/23 iron studies, B12, RBC folate WNL    #Crohn's  - dx since at least 2010     RTC 1 week for follow up with RADHA, labs, chemo  RTC 4 weeks for follow up with me, labs (after CT)      Francisco Lee DO  Hematology/Oncology  [facility]      Again, thank you for allowing me to participate in the care of your patient.        Sincerely,        FRANCISCO LEE DO

## 2023-12-06 NOTE — LETTER
12/6/2023         RE: Nadia Ladd  [address]  [facility] [address]        Dear Colleague,    Thank you for referring your patient, Nadia Ladd, to the [facility]. Please see a copy of my visit note below.    Video-Visit Details     Video Start Time: 9:17AM     Type of service:  Video Visit     Video End Time: 9:34AM    Originating Location (pt. Location): Home     Distant Location (provider location):  [facility] off site     Platform used for Video Visit: Well     [facility]    Hematology/Oncology Established Patient Follow Up Note      Today's Date: 12/6/2023    Reason for follow up: Sigmoid cancer    HISTORY OF PRESENT ILLNESS: Nadia Ladd is a 68 year old female who presents for follow-up    Patient has medical history including Crohn's disease on sulfasalazine, anemia secondary to iron deficiency and B12 deficiency, obesity, hypertension, prediabetes, eczema, pulmonary hypertension, hiatal hernia, mild splenomegaly (14.7 cm in 2/23)    - 2/23 pt noted increasing fatigue, found to have iron deficiency anemia w/hgb 6.8 (previously required RBC transfusion when dx w/Crohn's), did have intermittent changes in stool but not persistent (noted minimal blood in stool)   - 2/23 upper endoscopy for iron deficiency anemia and Crohn's disease: Hiatal hernia, normal stomach, normal duodenum  - 2/23 colonoscopy: A frond-like/villous partially obstructing large mass found in sigmoid colon, partially circumferential involving two thirds of the lumen circumference, 4 cm, unable to traverse, with oozing, s/p biopsy  PATHOLOGY:  A.  Stomach, antrum: Biopsy:  - Antral type mucosa with mild chronic inactive gastritis  - Immunostain for Helicobacter pylori is negative      B.  Colon, sigmoid, stricture: Biopsy:  - Invasive poorly differentiated carcinoma  The sigmoid stricture shows a high-grade carcinoma without definitive gland formation.  Given the  poorly differentiated appearance, an immunohistochemical panel was performed to exclude the possibility of a tumor by direct extension or metastasis.   Immunohistochemical stains are performed and show the tumor to be diffusely positive for CK7 and partially positive for CK20 and SATB2.  There is no significant tumor reactivity for CDX2, GATA3, PAX8, TTF-1, and chromogranin.  Synaptophysin highlights very rare positive cells. Although the CK7/CK20 profile is not entirely typical for a colorectal carcinoma, immunostains for tumors of other origins are negative and a colorectal primary is still favored.   - Mismatch repair:  1) MLH1-loss of nuclear expression  2) MSH2-intact  3) MSH6-intact  4) PMS2-loss of nuclear expression  -MLH1 promoter methylation: NEGATIVE    -2/23 CT CAP:  A) 4 cm length mass in the proximal sigmoid colon. There is some irregularity and stranding in the adjacent pericolonic fat which may indicate tumor extension. There is no obstruction.   B) there is a second probable mass at the hepatic flexure of the colon measuring 2.5 cm in length   C)There are small lymph nodes in the mesial colon adjacent to the hepatic flexure abnormality and the sigmoid colonic mass. No lymphadenopathy by size criteria  D) There is submucosal fatty deposition in the colon, most severe in the distal sigmoid colon and rectum, which can be seen secondary to quiescent inflammatory bowel disease.  E) no liver lesion    -3/23 PET:  a. There is increased FDG avidity of the sigmoid colon with focal lobular wall thickening consistent with biopsy-proven adenocarcinoma.  b. Secondary focus noted at the hepatic flexure demonstrates elevated FDG avidity and lobulated wall thickening highly suspicious for a additional primary.  c. Additionally there is a smaller focus of wall thickening with elevated FDG avidity along the left aspect of the transverse colon  which is suspicious for a possible third focus of colonic  "malignancy.    -3/23 CEA 6.8  - 3/23 colorectal surgery consultation with - in the setting of Crohn's, at least sigmoid colectomy with possible total abdominal colectomy with either ileorectal anastomosis or end ileostomy (\"rectum can remain active and be actively surveyed annually\")    -3/23 genetic counseling, testing for Chan syndrome    - 4/5/2023 gynecologic oncology consultation for risk reduction surgery with hysterectomy and BSO at time of colectomy    -4/6/2023 laparoscopic converted to open total abdominal colectomy with ileorectal anastomosis, partial omentectomy, flexible sigmoidoscopy, TAHBSO  A. OMENTUM, RESECTION:  - Adipose tissue with no significant histopathologic abnormality  - No evidence of malignancy     B. COLON, RESECTION:  Mass #1 (ascending colon/hepatic flexure): Mixed neuroendocrine-non-neuroendocrine neoplasm (MINEN):  - Neuroendocrine carcinoma component (70%) and moderately-differentiated adenocarcinoma component (30%)  - Size = 5.0 cm, invading into muscularis propria  - Positive LVI  - Negative macroscopic tumor perforation, negative PNI  - Negative surgical resection margins  - IHC: Neuroendocrine portion: Negative for chromogranin and INSM1 but strongly express synaptophysin  - IHC: Adenocarcinoma portion: Positive for CK20, CDX2 negative for CK7, PAX8, ER, S100  Ki-67 proliferation index of approximately 80%.  MMR:  Intact nuclear expression of MLH1, MSH2, MHS6, PMS2  PDL1:  COMBINED POSITIVE SCORE (CPS): 55-60  TUMOR PROPORTION SCORE (TPS): 50%   pathogenic KRAS mutation (G13D) was identified (5.2%).  AJCC 8th edition: stage 3B mpT3 pN1a     Mass#2 (sigmoid colon): Poorly-differentiated carcinoma:  - Grade 3  - Size = 5.7 cm, invading into muscularis propria  - Negative for microscopic tumor perforation, LVI, perineural invasion  - Negative surgical resection margins  - IHC: Positive for scar and keratin AE1/AE3, negative for CK7, CK20, CDX2, PAX8, ER, chromogranin, " CD56, p40, synaptophysin, S100, INI1, p40, INSM1  Ki-67 proliferation index of approximately 70%.  MMR: loss of nuclear expression MLH1 and PMS2, intact nuclear expression MSH2 and MSH6  PDL1:  COMBINED POSITIVE SCORE (CPS): 80  TUMOR PROPORTION SCORE (TPS): 70%  pathogenic KRAS mutation (G13D) was identified (4.5%).  AJCC 8th edition: stage 3A mpT2 pN1a    - One of forty-one sampled lymph nodes positive (1/41)  - Benign appendix  - Tubular adenoma    C. UTERUS, CERVIX, BILATERAL FALLOPIAN TUBES & OVARIES, :  - Benign endometrial polyps; atrophic endometrium  - Uterus, cervix, bilateral fallopian tubes, and ovaries with no significant morphologic abnormalities  - No evidence of dysplasia or malignancy     D. SMALL INTESTINE, TERMINAL ILEUM, TERMINAL ILIUM:  - Benign ileum  - No evidence of dysplasia or malignancy  - Negative for metastases to one of one sampled lymph node (0 /1)     E. PROXIMAL ANASTOMOTIC RING:  - Benign small intestine  - No evidence of dysplasia or malignancy     F. DISTAL ANASTOMOTIC RING:  - Benign colon  - No evidence of dysplasia or malignancy    CRC NGS:   --mutations positive for KRAS G13D mutation 5.2% mass 1, KRAS G13D mutation 4.5% mass 2 (negative for AKT1; BRAF; ERBB2; KRAS; NRAS; PIK3CA; PTEN)  --TMB score: 17.064 mut/Mb mass 1 (CPS 55-60, TPS 50%), 60.943 mut/Mb mass 2 (CPS 80, TPS 70%)  --fusion negative including NTRK and RET for mass 1 and 2 (also negative for AKT1, AKT3, ALK, AR, CGYRFA44, CLAUDINE, BCOR, BRAF, BRD3, BRD4, CAMTA1, CCNB3, CCND1, , CIC, CSF1, CSF1R, CTNNB1, DNAJB1, EGFR, EPC1, ERBB2, ERBB4, ERG, ESR1, ESRRA, ETV1, ETV4, ETV5, ETV6, EWSR1, FGFR1, FGFR2, FGFR3, FGR, FOS, FOSB, FOXO1, FOXO4, FOXR2, FUS, GLI1, GRB7, HMGA2, HRAS, IDH1, IDH2, INSR, JAK2, JAK3, JAZF1, KRAS, MAML2, MAP2K1, MAST1, MAST2, MEAF6, MET, MKL2, MN1, MSMB, MUSK, MYB, MYBL1, MYOD1, NCOA1, NCOA2, NOTCH1, NOTCH2, NR4A3, NRAS, NRG1, NTRK1, NTRK2, NTRK3, NUMBL1, NUTM1, PAX3, PDGFB, PDGFRA, PDGFRB,  PHF1, PIK3CA, PKN1, PLAG1, PPARG, PRKACA, PRKCA, PRKCB, RAF1, RELA, RET, ROS1, RPSO2, RSPO3, SS18, STAT6, TAF15, TCF12, TERT, TFE3, TFEB, TFG, THADA, TMPRSS2, USP6, VGLL2, YAP1, YWHAE)    -4/11/2023 patient discharged from hospital, planning for 30 days of lovenox postop, oxycodone for pain (required 1 unit PRBC for anemia)    -5/8/23 I presented this case at [facility] CRC tumor board and their recommendations include:  - common to see this in Crohn's, overall poor prognosis, treat aggressively, may be poorly responsive to chemotherapy  - standard of care is mFOLFOX 6 x 12 cycles  - acknowledge that pt has high TPS and likely response to immunotherapy however not sufficient data or standard of care in stage 3 adjuvant setting  - add MMR testing to mass #1 hepatic flexure mass    - 5/9/23 admitted for acute renal failure w/Cr 3.82 w/K 7.0    - 5/19/23 second opinion at [facility] w/medical oncologist Dr. Acharya, thorough consultation and recommendations appreciated     - pt would like to proceed with FOFLOX (with dose reduced oxaliplatin due to kidney function)    -5/2023 genetic counseling: Negative for mutations in EPCAM, MLH1, MSH2, MSH6, and PMS2 genes.  Negative for mutations in APC, AXIN2, BMPR1A, CDH1, CHEK2, EPCAM, GREM1, MLH1, MSH2, MSH3, MSH6, MUTYH, NTHL1, PMS2, POLD1, POLE, PTEN, SMAD4, STK11, TP53, CDKN1B, MEN1, NF1, RET, TSC1, TSC2, and VHL genes    - 5/25/23 port placement    - 5/26/23 CT CAP w/ contrast and IVF before and after CT (baseline prior to starting tx):  1.  3 mm nodule RML and 5 mm lateral EDSON nodule, minimally increased from previous  2.  New 4 mm EDSON groundglass density nodule  3.  Several prominent borderline enlarged mediastinal lymph nodes slightly increased from previous  4.  Splenomegaly 14.5 cm  5. S/p subtotal colectomy with ileorectal anastomosis with postsurgical changes along the ventral abdominal wall midline    -5/31/23 started mFOLFOX 6 w/dose reduced oxaliplatin    - C2D1 6/14/23 held  due to new onset paroxysmal A-fib with RVR requiring hospitalization 6/3/2023 (also hypoMg, dehydrated)  - 6/19/2023 I presented this case at MTD colorectal tumor board at the [facility], it is possible that paroxysmal A-fib may be related to 5-FU.  Options include withholding further treatment versus retrialing 5-FU under the guidance of cardio oncology in an inpatient setting.  No other adjuvant treatment options available, including immunotherapy  - 6/30/2023 consultation with cardio oncologist Dr. Garza; I spoke with Dr. Garza 6/30/2023 as well, [id number] score 3, recommending starting apixaban 5 mg twice daily, okay to retrial FOLFOX while patient is on metoprolol  -7/18/2023  CT CAP- subcm lung nodules stable, borderline and mildly enlarged mediastinal LN and periportal LN stable, splenomegaly 15.7cm, Subtotal colectomy with ileorectal anastomosis. No acute inflammation or obstruction   - 7/19/2023 mFOLFOX6 C2D1  (inpatient w/continuous cardiac monitoring)      -9/23 CT CAP (after cycle 6)  -Subtotal colectomy, rectal anastomosis appears stable, some adjacent mild scarring, no bowel obstruction or inflammation, no free fluid  -No new adenopathy, no liver lesions  -Stable EDSON 3 mm nodule, other stable nodules at EDSON, RML  -Stable several small thoracic lymph nodes in mediastinum  -Mild wall thickening of the gallbladder  -Spleen is smaller measuring 13.7 cm  -Stable regions of bilateral cortical thinning in the kidneys, with few tiny cysts    INTERIM HISTORY:  REGIMEN:  mFOLFOX6  q14 days x12 cycles/6 months  Starting weight 183lb   C1D1 5/31/23 (complicated by hospitalization for afib w/RVR C1D4 6/3/23), C2D1 7/19/23 (inpatient w/continuous cardiac monitoring), C3D1 8/2/2023 (outpatient, admitted to ER for A-fib with RVR C3D3 8/4/23), C4D1 8/16/23, C5D1 8/30/2023, C6D1 9/13/2023, C7D1 9/26/23, C8D1 10/11/23, C9D1 10/25/23, C10D1 11/8/23, C11D1 11/22/23    oxaliplatin 64mg/m2 day 1 (dose reduced  from 85mg/m2 2/2 Cr C1D1)  LV 350mg/m2 day 1  5FU 1200mg/m2 continuous infusion day 1-2 (total 2,400mg/m2 IV over 46-48 hours)  (5FU 400mg/m2 day 1 bolus (discontinued cycle 2 onwards))  PLUS IVF and magnesium replacement as needed day 1 and day 8 with optimization to Mg= 2 at least, AND IVF day 3 on day of pump disconnect  PLUS (per cardiology recommendations) During the days of chemo and 2 days after ONLY: take long acting diltiazem 120 mg daily with 30 mg of short acting diltiazem as needed for atrial fibrillation with HR >110  Emetic risk: moderate  Febrile neutropenia risk: intermediate     C12D1 12/6/23 pending labs    Treatment related AE:  - hearing changes- described as muffled with intermittent ringing with obscured hearing R>L- overall improved since 1st cycle, and improving few days after starting chemo, Flonase BID as needed, ENT consultation 10/23/23- 11/23 received hearing aids b/l but need to be programmed; (present prior to starting chemo, improved w/steroids, now stable)   - Paroxysmal A-fib with RVR and PAC- admitted to [facility] 6/3/2023 - 6/5/2023 for this, 6/3/2023 echo EF 55-60%, mild pulmonary hypertension, mild mitral regurgitation, on metoprolol XL 25 mg p.o. daily for ongoing palpitations, 7/23 Cardio oncology consult w/Dr. Garza, on eliquis, no recurrence of RVR while inpatient for cycle 2 w/continuous cardiac monitoring; IVF and magnesium replacement to magnesium = 2 minimum added to D1 and D8 and IVF day 3. 8/4/2023 C3D3 patient went to ER for A-fib with palpitations (no other sx), HR 80-130s, labs WNL, CXR negative, given IV fluids, placed on diltiazem drip, HR improved to .  8/15/2023 cardiology follow-up: Increase metoprolol XL to 50 mg daily; During the days of chemo and 2 days after ONLY: take long acting diltiazem 120 mg daily with 30 mg of short acting diltiazem as needed for atrial fibrillation with HR >110.  8/18/2023 patient called in reporting heart rate up  "to 140, sensation of heart racing, improved to 67 after taking extra prescribed dose of diltiazem and then asymptomatic.  8/19/2023 patient presented to ER with chest tightness and palpitations, had taken total of 3 tabs of diltiazem 30mg short acting at that point, EKG with sinus rhythm, rate 63 with some noted bradycardia while in the ER, magnesium and potassium normal, creatinine slightly elevated, given 1 L IVF.  xarelto now affordable; 9/12/2023 cardiology [id number]-td-hf changes, 10/27/23 C9D3 disconnect pt \"had episode of afib\", heart racing to HR 140s, that was managed with short acting diltiazem and deep breathing, notably pt w/persistently low Mg despite 2g IV Mg replacement for Mg 1.6, cardiology follow-up 12/14/2023;   - anxiety- working with psychology prn  - Delayed neutropenia- neutropenic thierry ANC 0.5 2 weeks out from cycle 1, afebrile, 6/23 DPD deficiency testing negative; resolved cycle 2 onwards 5FU bolus dropped; 12/6/23 ANC 1.2  -Normocytic anemia- due to chemotherapy and iron deficiency, s/p ferric carboxymaltose 750mg x2 doses 6/14/23 and 6/28/23, 7/23 iron studies improved.  8/23 hgb 13.3, 9/23 hgb 12.1, 10/23 hgb 11.1, 11/23 hgb 10.8; 11/23 iron studies, B12, RBC folate WNL  -Thrombocytopenia- 2/2 chemotherapy, no bruising or bleeding, 12/6/23 plt 72K, for now plt >75K, while on xarelto  - MARYA on CKD- following w/nephro- follow up 9/23, Cr improved from 3, drinking 2.5 16oz bottles per day and getting IV fluids day 1, day 3 and day 8 of each chemotherapy cycle, nephrology follow-up 12/4/2023 noted w/next follow up 6/2024  - elevated LFTs-  alk phos iso enzyme- majority liver, liver normal on 9/23 CT and 11/23 MRI liver.   - diarrhea- grade 1, 2 episodes/week, also affected by food, improved with imodium 2 total   - neuropathy- grade 1, tingling fingers > toes, grade 2 numbness in all fingers instead of just b/l 3rd digit and that is making her drop things sometimes, lasting 2 weeks now instead " "of 3-4 days; 11/23 started gabapentin, taking at bedtime because AM dose made her dizzy/off balance   - cold sensitivity- grade 2, fingers and mouth, most severe right after tx and previously improving before next cycle due and now it is not, using straw and gloves when outside   - Port flipped-see my note from 8/2/2023, 8/21/2023 port removed and replacement with IR  - \"bottom is sore\"- pt has hemorrhoids, no active bleeding or thrombosed hemorrhoids but notes soreness after chemo, witch hazel wipes are helping, sitz bath are helping             REVIEW OF SYSTEMS:   A 14 point ROS was reviewed with pertinent positives and negatives in the HPI.        HOME MEDICATIONS:  Current Outpatient Medications   Medication Sig Dispense Refill     cyanocobalamin 1000 MCG TBCR Take 1,000 mcg by mouth daily 100 tablet 1     dexAMETHasone (DECADRON) 4 MG tablet Take 2 tablets (8 mg) by mouth daily Take for 2 days, starting the day after chemo cycle 10, 11 and 12. Take with food. 12 tablet 0     diltiazem (CARDIZEM) 30 MG tablet Take 1 tablet (30 mg) by mouth 3 times daily 90 tablet 0     diltiazem ER COATED BEADS (CARDIZEM CD/CARTIA XT) 120 MG 24 hr capsule Take 1 capsule (120 mg) by mouth daily On chemo days and 2 days post chemo infusion. 60 capsule 3     Ferrous Sulfate (IRON) 325 (65 Fe) MG tablet Take 1 tablet by mouth daily       gabapentin (NEURONTIN) 300 MG capsule Take 1 capsule (300 mg) by mouth 3 times daily 90 capsule 1     lidocaine-prilocaine (EMLA) 2.5-2.5 % external cream Apply topically as needed for moderate pain Apply 15-20 minutes prior to port access 1 g 4     loperamide (IMODIUM) 2 MG capsule Take 2 mg by mouth daily as needed for diarrhea       magnesium oxide 400 MG tablet Take 1 tablet (400 mg) by mouth 2 times daily 60 tablet 0     metoprolol succinate ER (TOPROL XL) 50 MG 24 hr tablet Take 1 tablet (50 mg) by mouth daily 90 tablet 3     Multiple Vitamins-Iron (MULTI-DAY PLUS IRON PO) Take 1 tablet by " mouth daily       prochlorperazine (COMPAZINE) 10 MG tablet Take 10 mg by mouth every 6 hours as needed for nausea or vomiting       rivaroxaban ANTICOAGULANT (XARELTO) 20 MG TABS tablet Take 1 tablet (20 mg) by mouth daily (with dinner) 90 tablet 3     sodium bicarbonate 650 MG tablet Take 1 tablet (650 mg) by mouth 2 times daily 180 tablet 3     vitamin D3 (CHOLECALCIFEROL) 50 mcg (2000 units) tablet Take 1 tablet (50 mcg) by mouth daily 30 tablet 6         ALLERGIES:  Allergies   Allergen Reactions     No Known Drug Allergy          PAST MEDICAL HISTORY:  Past Medical History:   Diagnosis Date     Arthropathia 08/05/2010     B12 deficiency anemia 10/21/2010     Benign essential hypertension with target blood pressure below 140/90 10/10/2016     CARDIOVASCULAR SCREENING; LDL GOAL LESS THAN 160 10/31/2010     Crohn's colitis (H) 02/15/2011     Dermatitis-dishydrotic eczema-severe 08/05/2010     Hiatal hernia      HTN (hypertension) 07/21/2011     Iron deficiency anemia 10/21/2010     Malignant neoplasm of sigmoid colon (H)      Obesity      Primary pulmonary hypertension (H) 04/06/2010         PAST SURGICAL HISTORY:  Past Surgical History:   Procedure Laterality Date     COLECTOMY WITHOUT COLOSTOMY N/A 4/6/2023    Procedure: Laparoscopic Converted to Open Total abdominal colectomy;  Surgeon: Jerome Ashley MD;  Location: [facility] OR     COLONOSCOPY  10/11/10     COLONOSCOPY N/A 2/27/2023    Procedure: ATTEMPTED COLONOSCOPY WITH SIGMOID STRICTURE BIOPSY;  Surgeon: Jonathan Alcocer MD;  Location:  GI     ESOPHAGOSCOPY, GASTROSCOPY, DUODENOSCOPY (EGD), COMBINED N/A 2/27/2023    Procedure: ESOPHAGOGASTRODUODENOSCOPY, WITH BIOPSY;  Surgeon: Jonathan Alcocer MD;  Location: [facility]     HYSTERECTOMY TOTAL ABDOMINAL, BILATERAL SALPINGO-OOPHORECTOMY, COMBINED N/A 4/6/2023    Procedure: Hysterectomy total abdominal, bilateral salpingo-oophorectomy;  Surgeon: Sunitha Olea MD;  Location: [facility] OR     INSERT PORT  VASCULAR ACCESS Right 2023    Procedure: Ultrasound-guided right internal jugular venous access port placement with fluoroscopy;  Surgeon: Martin Thomas DO;  Location: [facility] OR     IR CHEST PORT PLACEMENT > 5 YRS OF AGE  2023     IR PORT CHECK RIGHT  2023     IR PORT REMOVAL RIGHT  2023     SIGMOIDOSCOPY FLEXIBLE N/A 2023    Procedure: Sigmoidoscopy flexible;  Surgeon: Jerome Ashley MD;  Location: [facility] OR     SURGICAL HISTORY OF -   77    Perineorrhaphy, for widening vaginal orifice     ZZC EXPLORATORY OF ABDOMEN      laparoscopy         SOCIAL HISTORY:  Social History     Socioeconomic History     Marital status:      Spouse name: Not on file     Number of children: Not on file     Years of education: Not on file     Highest education level: Not on file   Occupational History     Not on file   Tobacco Use     Smoking status: Never     Passive exposure: Current     Smokeless tobacco: Never   Substance and Sexual Activity     Alcohol use: No     Drug use: No     Sexual activity: Yes     Partners: Male   Other Topics Concern     Parent/sibling w/ CABG, MI or angioplasty before 65F 55M? Not Asked   Social History Narrative     Not on file     Social Determinants of Health     Financial Resource Strain: Not on file   Food Insecurity: Not on file   Transportation Needs: Not on file   Physical Activity: Not on file   Stress: Not on file   Social Connections: Not on file   Interpersonal Safety: Not on file   Housing Stability: Not on file         FAMILY HISTORY:  Family History   Problem Relation Age of Onset     Hypertension Mother         on meds, alive     Cerebrovascular Disease Father         stroke about age 65,  of cancer at 68 yrs     Diabetes Father         eventually took insulin     Anemia Father         Pernisios anemia     Kidney Disease Niece         kidney transplant     Kidney Disease Nephew         kidney transplant     Venous thrombosis No  family hx of      Anesthesia Reaction No family hx of          PHYSICAL EXAM:  Vital signs:  LMP  (LMP Unknown)        LABS:   Latest Reference Range & Units 12/06/23 08:49   Sodium 135 - 145 mmol/L 143   Potassium 3.4 - 5.3 mmol/L 3.9   Chloride 98 - 107 mmol/L 110 (H)   Carbon Dioxide (CO2) 22 - 29 mmol/L 22   Urea Nitrogen 8.0 - 23.0 mg/dL 13.9   Creatinine 0.51 - 0.95 mg/dL 1.34 (H)   GFR Estimate >60 mL/min/1.73m2 43 (L)   Calcium 8.8 - 10.2 mg/dL 8.7 (L)   Anion Gap 7 - 15 mmol/L 11   Magnesium 1.7 - 2.3 mg/dL 1.6 (L)   Albumin 3.5 - 5.2 g/dL 3.4 (L)   Protein Total 6.4 - 8.3 g/dL 5.5 (L)   Alkaline Phosphatase 40 - 150 U/L 346 (H)   ALT 0 - 50 U/L 49   AST 0 - 45 U/L 62 (H)   Bilirubin Total <=1.2 mg/dL 0.6   Glucose 70 - 99 mg/dL 242 (H)   WBC 4.0 - 11.0 10e3/uL 2.0 (L)   Hemoglobin 11.7 - 15.7 g/dL 9.3 (L)   Hematocrit 35.0 - 47.0 % 28.2 (L)   Platelet Count 150 - 450 10e3/uL 72 (L)   RBC Count 3.80 - 5.20 10e6/uL 2.81 (L)   MCV 78 - 100 fL 100   MCH 26.5 - 33.0 pg 33.1 (H)   MCHC 31.5 - 36.5 g/dL 33.0   RDW 10.0 - 15.0 % 15.4 (H)   % Neutrophils % 62   % Lymphocytes % 15   % Monocytes % 21   % Eosinophils % 0   % Basophils % 0   Absolute Basophils 0.0 - 0.2 10e3/uL 0.0   Absolute Eosinophils 0.0 - 0.7 10e3/uL 0.0   Absolute Immature Granulocytes <=0.4 10e3/uL 0.0   Absolute Lymphocytes 0.8 - 5.3 10e3/uL 0.3 (L)   Absolute Monocytes 0.0 - 1.3 10e3/uL 0.4   % Immature Granulocytes % 2   Absolute Neutrophils 1.6 - 8.3 10e3/uL 1.2 (L)   Absolute NRBCs 10e3/uL 0.0   NRBCs per 100 WBC <1 /100 0   RBC Morphology  Confirmed RBC Indices   Platelet Morphology Automated Count Confirmed. Platelet morphology is normal.  Automated Count Confirmed. Platelet morphology is normal.   (H): Data is abnormally high  (L): Data is abnormally low    PATHOLOGY:    IMAGING:  Narrative & Impression   MRI ABDOMEN WITH CONTRAST     CLINICAL HISTORY:  Increased LFTs.  The current and nonneuroendocrine tumor and sigmoid carcinoma  status  post colectomy. On adjuvant chemotherapy.     TECHNIQUE: MRI of the abdomen without and with intravenous contrast.  Contrast dose: 10 ml of Gadavist.     Comparison study: CT of the abdomen and pelvis on 9/28/23, 7/18/2023,  5/26/2023, 2/27/2023 and PET CT on 3/24/2023     FINDINGS:  Liver: In the central liver, abutting the middle hepatic vein and  above the portal vein confluence, there is a lobulated T2 hyperintense  lesion measuring 2.0 x 1.0 cm (series 4, image 19 and series 17, image  22) with heterogeneous enhancement best seen on the postcontrast  subtraction images (series 14 and series 19, image 45). This likely  corresponds to a similar sized lesion present on multiple prior CTs  dating back to 2/27/2023. There was no hypermetabolic activity in this  location seen on the PET-CT of 3/24/2023. This is likely a benign  entity such as a hemangioma. No hepatic steatosis. No other focal  suspicious lesions in the liver. Patent portal and hepatic veins.     Other findings: Stable splenomegaly with the spleen measuring 15.4 cm  in length. Colectomy with ileorectal anastomosis. The gallbladder,  pancreas, adrenal glands and visualized portions of the kidneys are  within normal limits. Normal caliber visualized loops of small bowel.  No lymphadenopathy in the abdomen. No abdominal aortic aneurysm. No  abdominal ascites.                                                                      IMPRESSION:  1. No metastatic disease in the abdomen.  2. Stable lesion in the central liver that was not hypermetabolic on  prior PET-CT on 3/24/2023, likely a benign entity such as a  hemangioma.  3. Stable splenomegaly.     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 mutated, MARISSA,  TPS 50%  # sigmoid colon poorly-differentiated carcinoma, KRAS G13D mutated, loss of MLH1 and PMS2, TPS 70%  - 2/23 colonoscopy: A frond-like/villous partially obstructing large mass found in sigmoid colon, partially circumferential involving two thirds of the lumen circumference, 4 cm, unable to traverse, with oozing, s/p biopsy, PATHOLOGY: Invasive poorly differentiated carcinoma, IHC panel excludes tumors of other origin, colorectal primary is favored, loss of nuclear expression of MLH1 and PMS2, MLH1 promoter methylation negative, [id number] mutation negative  -2/23 CT CAP: Shows known 4 cm proximal sigmoid colon mass with possible tumor extension (irregularity and stranding and adjacent pericolonic fat) without obstruction AND probable second mass 2.5 cm at hepatic flexure of colon; small lymph nodes adjacent to both masses (no lymphadenopathy by size criteria)  -3/23 PET:  a. There is increased FDG avidity of the sigmoid colon with focal lobular wall thickening consistent with biopsy-proven adenocarcinoma.  b. Secondary focus noted at the hepatic flexure demonstrates elevated FDG avidity and lobulated wall thickening highly suspicious for a additional primary.  c. Additionally there is a smaller focus of wall thickening with elevated FDG avidity along the left aspect of the transverse colon which is suspicious for a possible third focus of colonic malignancy.  - 3/23 CEA 6.8  -4/6/2023 laparoscopic converted to open total abdominal colectomy with ileorectal anastomosis, partial omentectomy, flexible sigmoidoscopy, TAHBSO  PATHOLOGY:  Of note, omental resection, terminal ileum, proximal and distal anastomotic rings, uterus, cervix, bilateral fallopian tubes and ovaries negative for malignancy    Mass #1 (ascending colon/hepatic flexure): Mixed neuroendocrine-non-neuroendocrine neoplasm (MINEN):  - Neuroendocrine carcinoma component (70%) and moderately-differentiated adenocarcinoma component (30%)  - Size = 5.0  cm, invading into muscularis propria, Positive LVI  - IHC: Neuroendocrine portion: Negative for chromogranin and INSM1 but strongly express synaptophysin  - IHC: Adenocarcinoma portion: Positive for CK20, CDX2 negative for CK7, PAX8, ER, S100  Ki-67 proliferation index of approximately 80%.  MARISSA   PDL1:  COMBINED POSITIVE SCORE (CPS): 55-60  TUMOR PROPORTION SCORE (TPS): 50%   pathogenic KRAS mutation (G13D) was identified (5.2%).  MMR testing: intact  AJCC 8th edition: stage 3B mpT3 pN1a     Mass #2 (sigmoid colon): Poorly-differentiated carcinoma:  - Grade 3  - Size = 5.7 cm, invading into muscularis propria  - IHC: Positive for scar and keratin AE1/AE3, negative for CK7, CK20, CDX2, PAX8, ER, chromogranin, CD56, p40, synaptophysin, S100, INI1, p40, INSM1  Ki-67 proliferation index of approximately 70%.  MMR testing: loss of MLH1 and PMS2  PDL1:  COMBINED POSITIVE SCORE (CPS): 80  TUMOR PROPORTION SCORE (TPS): 70%  pathogenic KRAS mutation (G13D) was identified (4.5%).  MMR testing: loss of MLH1 and PMS2, intact MSH2 and MSH6  AJCC 8th edition: stage 3A mpT2 pN1a    - One of forty-one sampled lymph nodes positive (1/41), d/w pathology- unable to determine which mass is metastatic to LN    - 4/23 MRI brain w/wo contrast LAURENT  - 5/26/23 CT CAP w/ contrast and IVF before and after CT (post op baseline prior to starting tx):  1.  3 mm nodule RML and 5 mm lateral EDSON nodule, minimally increased from previous  2.  New 4 mm EDSON groundglass density nodule  3.  Several prominent borderline enlarged mediastinal lymph nodes slightly increased from previous  4.  Splenomegaly 14.5 cm  5. S/p subtotal colectomy with ileorectal anastomosis with postsurgical changes along the ventral abdominal wall midline  - 5/8/23 I presented this case at [facility] tumor board as above   - 5/19/23 second opinion at [facility] w/medical oncologist Dr. Acharya, thorough consultation and recommendations appreciated, overall agree w/FOLFOX x6 months,  possible nivo or ipi nivo in second line; if metastatic platinum etoposide vs ipi nivo  - 5/25/23 port placement    -5/2023 genetic counseling: Negative for mutations detailed below     REGIMEN:  mFOLFOX6  q14 days x12 cycles/6 months  Starting weight 183lb   C1D1 5/31/23 (complicated by hospitalization for afib w/RVR C1D4 6/3/23), C2D1 7/19/23 (inpatient w/continuous cardiac monitoring), C3D1 8/2/2023 (outpatient, admitted to ER for A-fib with RVR C3D3 8/4/23), C4D1 8/16/23, C5D1 8/30/2023, C6D1 9/13/2023, C7D1 9/26/23, C8D1 10/11/23, C9D1 10/25/23, C10D1 11/8/23, C11D1 11/22/23    oxaliplatin 64mg/m2 day 1 (dose reduced from 85mg/m2 2/2 Cr C1D1)  LV 350mg/m2 day 1  5FU 1200mg/m2 continuous infusion day 1-2 (total 2,400mg/m2 IV over 46-48 hours)  (5FU 400mg/m2 day 1 bolus (discontinued cycle 2 onwards))  PLUS IVF and magnesium replacement as needed day 1 and day 8 with optimization to Mg= 2 at least, AND IVF day 3 on day of pump disconnect  PLUS (per cardiology recommendations) During the days of chemo and 2 days after ONLY: take long acting diltiazem 120 mg daily with 30 mg of short acting diltiazem as needed for atrial fibrillation with HR >110  Emetic risk: moderate  Febrile neutropenia risk: intermediate     C12D1 12/6/23 to be delayed 1 week 2/2 cytopenias  Still give IVF and Mg today    Treatment related AE:  - hearing changes-stable w/dose reduced oxaliplatin, flonase prn, continue use of b/l hearing aids (awaiting programming and follow up appt)  - Paroxysmal A-fib with RVR and PAC- 8/15/2023 cardiology follow-up: Increase metoprolol XL to 50 mg daily; During the days of chemo and 2 days after take long acting diltiazem 120 mg daily with 30 mg of short acting diltiazem as needed for atrial fibrillation with HR >110, IV fluid day 1, 3, 8; IV Mg to goal Mg 2 day 1 and 8- updated orders for Mg replacement added to plan (ie if Mg 1.6 give 4g Mg not 2g given persistent hypoMg), cardiology follow-up 12/14/2023  -  anxiety- continue following with oncology psychology team  - Normocytic anemia- due to chemotherapy and iron deficiency, s/p ferric carboxymaltose 750mg x2 doses 6/14/23 and 6/28/23; repeat iron studies normal. 11/23 iron studies, B12, RBC folate WNL  - neutropenia- ANC 1.2, delay chemo x1 week, monitor for neutropenic fever  -Thrombocytopenia- 12/6/23 plt 72K, delay chemo x1 week, continue xarelto, monitor for bleeding/bruising   - MARYA on CKD- following w/nephro, f/u with nephro 12/4/2023 noted w/next follow up 6/2024; continue increasing PO fluids, continue day 1, 3, 8 IVF  - hypoMg- Mg 1.6 today, will give 4g Mg  - elevated LFTs- AST 62- stable, Alk phos 346- improving, alk phos iso enzyme- majority liver, liver normal on 9/23 CT and 11/23 MRI (no mets)  - diarrhea- grade 1  - neuropathy- grade 2,  continue gabapentin 300mg at bedtime, will start 100mg qAM and afternoon and titrate up to 200mg then 300mg as tolerated  - cold sensitivity- grade 2  - hemorrhoids- witch hazel pads, sitz baths prn      Imaging:  -11/23 MRI liver: negative for mets, stable cental liver hemangioma and splenomegaly    -9/23 CT CAP (after 6 cycles of mFOLFOX 6 without 5-FU bolus):  -Subtotal colectomy with stable appearing rectal anastomosis, some adjacent mild scarring, no bowel obstruction or inflammation, no free fluid, no new adenopathy, no liver lesions, no bone lesions  -Mild wall thickening of the gallbladder  - Stable small pulmonary nodules and small thoracic lymph nodes at mediastinum, splenomegaly smaller 13.7 cm    - 7/18/23 CT CAP (repeat baseline after treatment delay due to A-fib)- subcm lung nodules stable, borderline and mildly enlarged mediastinal LN and periportal LN stable, splenomegaly 15.7cm, Subtotal colectomy with ileorectal anastomosis. No acute inflammation or obstruction    -Repeat CT CAP after cycle 12, 1/2/2024    Labs:  3/23 CEA 6.8  5/23 CEA 1.8  7/23 CEA 2.8  10/23 CEA 2.0  12/23 CEA pending    Other:  -  f/u with Dr. Ashley 4/2024 for rigid proctoscopy     #parxosymal afib  - within 3 days of receiving 5FU, prior cardiac risk factors htn, prediabetes  - 6/3/23 presented to ER w/lightheadedness, dizziness, cardioverted s/p diltiazem ggt in ER  - 6/23 DPD deficiency testing negative  - currently on metoprolol XL 25 mg daily, apixaban 5 mg twice daily  - 6/30/2023 consultation with cardio oncologist Dr. Garza; I spoke with Dr. Garza 6/30/2023 as well, [id number] score 3, recommending starting apixaban 5 mg twice daily, okay to retrial FOLFOX while patient is on metoprolol.  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  Negative for mutations in APC, AXIN2, BMPR1A, CDH1, CHEK2, EPCAM, GREM1, MLH1, MSH2, MSH3, MSH6, MUTYH, NTHL1, PMS2, POLD1, POLE, PTEN, SMAD4, STK11, TP53, CDKN1B, MEN1, NF1, RET, TSC1, TSC2, and VHL genes    #Anemia secondary to iron deficiency and B12 deficiency  - terminal ileum removed at time of colon surgery, monitor for nutrient def  - 2/23 hgb 6.8, ferritin 21, iron sat  index 10, TIBC 265, iron 27, b12 1493  - On B12 1000 mcg p.o. daily and ferrous sulfate 325 mg p.o. daily  - 4/9/23 s/p 1 uprbcs  - 5/9/23 hgb 11.3,5/30/23 hgb 9.4  - based on Ganzoni equation w/goal hgb 14 and 500mg needed for iron stores, pts iron deficit is 1400 mg  - s/p ferric carboxymaltose 750mg x2 doses 6/14/23 and 6/28/23  - 7/11/23 ferritin 1022, iron sat 22, TIBC 201, iron 44    - 8/23 hgb 13.3  - 10/23 hgb 11.1  - 11/23 iron studies, B12, RBC folate WNL    #Crohn's  - dx since at least 2010     RTC 1 week for follow up with RDAHA, labs, chemo  RTC 4 weeks for follow up with me, labs (after CT)      Francisco Lee DO  Hematology/Oncology  [facility]      Again, thank you for allowing me to participate in the care of your patient.        Sincerely,        FRANCISCO LEE DO

## 2023-12-07 NOTE — NURSING NOTE
DISCHARGE PLAN:  Next appointments: See patient instruction section  Departure Mode: Ambulatory  Accompanied by:    minutes for medical assistant discharge (face to face time)     Nadia Ladd is here today for onc follow up.  Patient was not seen by medical assistant at time of the appointment.   Appointments scheduled for everything requested. I called pt and informed her. She does look at [facility] for her appts. See patient instructions and Oncologist's Progress note for further details. Questions and concerns addressed to patient's satisfaction. Patient verbalized and demonstrated understanding of plan.  Contact information provided and patient is encouraged to call with any that arise in the interim of care.    Jelly Champagne  [facility]  [phone]  12/7/2023, 9:25 AM

## 2023-12-11 NOTE — PROGRESS NOTES
Virtual Visit Details    Type of service:  Video Visit   Video Start Time: 3:41 PM  Video End Time:4:07 PM    Originating Location (pt. Location): Other  cancer clinic  Distant Location (provider location):  On-site  Platform used for Video Visit: [facility]      Oncology/Hematology Visit Note    Dec 12, 2023    Reason for visit: Follow-up sigmoid cancer    Oncology HPI: Nadia Ladd is a 68 year old female with sigmoid cancer.  She initially presented in February 2023 with fatigue and iron deficiency anemia.  Colonoscopy confirmed sigmoid mass, biopsy with invasive poorly differentiated carcinoma.  CT with proximal sigmoid colon mass, hepatic flexure colon mass and associated lymphadenopathy.  PET confirmed multifocal disease with suspicion of third lesion in the left transverse colon, CEA was 6.8.  Negative genetic testing for Chan syndrome.    She underwent laparoscopic converted to open total abdominal colectomy with ileorectal anastomosis, partial omentectomy, flexible sigmoidoscopy, TAHBSO on 4/6/23.      Hepatic flexure mass with MINEN with +LVI, TPS 50% +[id number], mpT3 pN1a, stage 3B, MMR pending.     Sigmoid Colon Mass with poorly differentiated carcinoma, grade 3, loss of MLH1 and PMS2, TPS 70%, +KRAS G13D mutation, mpT2 pN1a, stage 3A     One of forty-one sampled lymph nodes positive (1/41), d/w pathology- unable to determine which mass is metastatic to LN.     NGS with KRAS G13D, TMB 17.     CT CAP with minimally increase subcentimeter pulmonary nodules, new GG nodule, several prominent mediastinal lymph nodes slightly increased, splenomegaly 14.5 cm. MRI brain no evidence of disease.     --5/31/23 started mFOLFOX 6 w/dose reduced oxaliplatin for renal function.   --6/14/23 C2D1 held due to new onset paroxysmal A-fib with RVR requiring hospitalization 6/3/2023 (also hypoMg, dehydrated). Started apixaban ([id number]) and metoprolol.   -7/18/2023  CT CAP- subcm lung nodules stable, borderline and  mildly enlarged mediastinal LN and periportal LN stable, splenomegaly 15.7cm, Subtotal colectomy with ileorectal anastomosis. No acute inflammation or obstruction   -7/19/2023 resumed  mFOLFOX6 C2D1  (inpatient w/continuous cardiac monitoring). Diltiazem added for infusions.  --9/23/23 CT CAP (after 6 cycles), no new LAD, no liver lesions  --11/17/23 MRI liver, stable, no metastatic disease     Video visit today for modified FOLFOX cycle 12.    Interval History: Nadia was seen over video today and she continues to tolerate FOLFOX pretty well.  She has noticed a rash on her face, the last 3 days, but she states that she has been also wearing her mask more religiously in public, this could be the irritation.  She has ongoing neuropathy with cold sensitivity, this is stable.  She has been dropping things, but her feet are actually a bit better.  She continues on gabapentin 100 mg in the morning, 300 mg at bedtime, this is a stable dose for her.  She has noticed some right ankle swelling for the last 2 to 3 days, no pain, history of gout in the past, but not similar.  No redness or warmth.  Swelling is better with elevation.  No history of blood clots, chest pain or shortness of breath.  Currently on anticoagulation for A-fib.  No fever, chills, vomiting, diarrhea, headache, vision changes or mouth sores.    Review of Systems: See interval hx. Denies fevers, chills, HA, dizziness, changes in vision, cough, sore throat, CP, SOB, abdominal pain, N/V, diarrhea, changes in urination, bleeding, bruising.     PMHx and Social Hx reviewed per EPIC.      Medications:  Current Outpatient Medications   Medication Sig Dispense Refill    cyanocobalamin 1000 MCG TBCR Take 1,000 mcg by mouth daily 100 tablet 1    dexAMETHasone (DECADRON) 4 MG tablet Take 2 tablets (8 mg) by mouth daily Take for 2 days, starting the day after chemo cycle 10, 11 and 12. Take with food. 12 tablet 0    diltiazem (CARDIZEM) 30 MG tablet Take 1 tablet (30  mg) by mouth 3 times daily 90 tablet 0    diltiazem ER COATED BEADS (CARDIZEM CD/CARTIA XT) 120 MG 24 hr capsule Take 1 capsule (120 mg) by mouth daily On chemo days and 2 days post chemo infusion. 60 capsule 3    Ferrous Sulfate (IRON) 325 (65 Fe) MG tablet Take 1 tablet by mouth daily      gabapentin (NEURONTIN) 100 MG capsule Take 2 capsules (200 mg) by mouth daily (with breakfast) for 120 days 60 capsule 3    gabapentin (NEURONTIN) 300 MG capsule Take 1 capsule (300 mg) by mouth 3 times daily 90 capsule 1    lidocaine-prilocaine (EMLA) 2.5-2.5 % external cream Apply topically as needed for moderate pain Apply 15-20 minutes prior to port access 1 g 4    loperamide (IMODIUM) 2 MG capsule Take 2 mg by mouth daily as needed for diarrhea      magnesium oxide 400 MG tablet Take 1 tablet (400 mg) by mouth 2 times daily 60 tablet 0    metoprolol succinate ER (TOPROL XL) 50 MG 24 hr tablet Take 1 tablet (50 mg) by mouth daily 90 tablet 3    Multiple Vitamins-Iron (MULTI-DAY PLUS IRON PO) Take 1 tablet by mouth daily      prochlorperazine (COMPAZINE) 10 MG tablet Take 10 mg by mouth every 6 hours as needed for nausea or vomiting      rivaroxaban ANTICOAGULANT (XARELTO) 20 MG TABS tablet Take 1 tablet (20 mg) by mouth daily (with dinner) 90 tablet 3    sodium bicarbonate 650 MG tablet Take 1 tablet (650 mg) by mouth 2 times daily 180 tablet 3    vitamin D3 (CHOLECALCIFEROL) 50 mcg (2000 units) tablet Take 1 tablet (50 mcg) by mouth daily 30 tablet 6       Allergies   Allergen Reactions    No Known Drug Allergy          EXAM:    LMP  (LMP Unknown) Video, no VS.     GENERAL:  Female, in no acute distress.  Alert and oriented x3. Well groomed.   HEENT:  Normocephalic, atraumatic. No conjunctival injection or eye swelling.   LUNGS:  Nonlabored breathing, no cough or audible wheezing, able to speak full sentences.  MSK: Full ROM UE.    SKIN: No rash on exposed skin.   NEURO: CN grossly intact, speech normal  PSYCH: Mentation  appears normal, insight and judgement intact      Labs: PENDING    Imaging:   EXAM: US LOWER EXTREMITY VENOUS DUPLEX RIGHT  LOCATION: [facility]  DATE: 12/12/2023     INDICATION: Right lower extremity pain, swelling and edema.  COMPARISON: None.  TECHNIQUE: Venous Duplex ultrasound of the right lower extremity with and without compression, augmentation and duplex. Color flow and spectral Doppler with waveform analysis performed.     FINDINGS: Exam includes the common femoral, femoral, popliteal, and contralateral common femoral veins as well as segmentally visualized deep calf veins and greater saphenous vein.      RIGHT: No deep vein thrombosis. No superficial thrombophlebitis. No popliteal cyst.                                                                      IMPRESSION:  1.  No deep venous thrombosis in the right lower extremity.    Impression/Plan:   Sigmoid cancer: Follows with Dr. Bateman  -MRI of the liver 11/17/2023 reviewed, stable.  No metastatic disease in the abdomen.  -Oxaliplatin reduced due to MARYA with CKD  -Continue mFOLFOX  -1/2/2024 CT CAP, Dr. Bateman    Ankle swelling: Right ankle, unable to visualize over video.  -Stat ultrasound negative for DVT  -recommended elevation     MARYA on CKD: Follows with nephrology, Dr. Brenner.    -She continues getting fluids after chemotherapy to prevent dehydration.    Neuropathy: Currently gabapentin 100 mg in the a.m., 300 mg in the p.m.  This is going well for her.  -Continue current gabapentin dose    Paroxysmal A-fib: Possibly secondary to 5-FU, closely follows cardiology.  -Continue diltiazem, metoprolol, magnesium, Xarelto    Anemia: Iron deficiency, B12 deficiency likely secondary to treatment  -Continue with B12 1000 mg p.o. daily and iron  -Labs pending today     Leukopenia: Likely secondary to treatment, labs pending today.      Thrombocytopenia: Likely secondary to treatment, platelets have been dropping  lately.  -Platelets 72 K on 12/6, pending today  -No bleeding       Chart documentation with Dragon Voice recognition Software. Although reviewed after completion, some words and grammatical errors may remain.    40 minutes spent on the date of the encounter doing chart review, review of test results, interpretation of tests, patient visit, and documentation     Elsa Santillan PA-C  Hematology/Oncology  [facility]

## 2023-12-12 ENCOUNTER — APPOINTMENT (OUTPATIENT)
Dept: FAMILY MEDICINE | Facility: CLINIC | Age: 68
End: 2023-12-12
Payer: MEDICARE

## 2023-12-12 ENCOUNTER — VIRTUAL VISIT (OUTPATIENT)
Dept: ONCOLOGY | Facility: CLINIC | Age: 68
End: 2023-12-12
Attending: PHYSICIAN ASSISTANT
Payer: MEDICARE

## 2023-12-12 ENCOUNTER — HOSPITAL ENCOUNTER (OUTPATIENT)
Dept: ULTRASOUND IMAGING | Facility: CLINIC | Age: 68
Discharge: HOME | End: 2023-12-12
Attending: PHYSICIAN ASSISTANT | Admitting: PHYSICIAN ASSISTANT
Payer: MEDICARE

## 2023-12-12 VITALS — HEIGHT: 61 IN | WEIGHT: 176 LBS | BODY MASS INDEX: 33.23 KG/M2 | HEART RATE: 72 BPM

## 2023-12-12 DIAGNOSIS — T45.1X5A CHEMOTHERAPY-INDUCED NEUROPATHY (H): ICD-10-CM

## 2023-12-12 DIAGNOSIS — D69.59 CHEMOTHERAPY-INDUCED THROMBOCYTOPENIA: ICD-10-CM

## 2023-12-12 DIAGNOSIS — C18.7 MALIGNANT NEOPLASM OF SIGMOID COLON (H): Primary | ICD-10-CM

## 2023-12-12 DIAGNOSIS — M79.89 RIGHT LEG SWELLING: ICD-10-CM

## 2023-12-12 DIAGNOSIS — T45.1X5A CHEMOTHERAPY-INDUCED NEUTROPENIA (H): ICD-10-CM

## 2023-12-12 DIAGNOSIS — T45.1X5A CHEMOTHERAPY-INDUCED THROMBOCYTOPENIA: ICD-10-CM

## 2023-12-12 DIAGNOSIS — Z76.89 PREVENTION OF CHEMOTHERAPY-INDUCED NEUTROPENIA: ICD-10-CM

## 2023-12-12 DIAGNOSIS — D70.1 CHEMOTHERAPY-INDUCED NEUTROPENIA (H): ICD-10-CM

## 2023-12-12 DIAGNOSIS — G62.0 CHEMOTHERAPY-INDUCED NEUROPATHY (H): ICD-10-CM

## 2023-12-12 PROCEDURE — 93971 EXTREMITY STUDY: CPT | Mod: RT

## 2023-12-12 PROCEDURE — 99215 OFFICE O/P EST HI 40 MIN: CPT | Mod: 95 | Performed by: PHYSICIAN ASSISTANT

## 2023-12-12 ASSESSMENT — PAIN SCALES - GENERAL: PAINLEVEL: NO PAIN (0)

## 2023-12-12 NOTE — LETTER
12/12/2023         RE: Nadia Ladd  [address]  [facility] [address]        Dear Colleague,    Thank you for referring your patient, Nadia Ladd, to the [facility]. Please see a copy of my visit note below.    Virtual Visit Details    Type of service:  Video Visit   Video Start Time: 3:41 PM  Video End Time:4:07 PM    Originating Location (pt. Location): Other  cancer clinic  Distant Location (provider location):  On-site  Platform used for Video Visit: [facility]      Oncology/Hematology Visit Note    Dec 12, 2023    Reason for visit: Follow-up sigmoid cancer    Oncology HPI: Nadia Ladd is a 68 year old female with sigmoid cancer.  She initially presented in February 2023 with fatigue and iron deficiency anemia.  Colonoscopy confirmed sigmoid mass, biopsy with invasive poorly differentiated carcinoma.  CT with proximal sigmoid colon mass, hepatic flexure colon mass and associated lymphadenopathy.  PET confirmed multifocal disease with suspicion of third lesion in the left transverse colon, CEA was 6.8.  Negative genetic testing for Chan syndrome.    She underwent laparoscopic converted to open total abdominal colectomy with ileorectal anastomosis, partial omentectomy, flexible sigmoidoscopy, TAHBSO on 4/6/23.      Hepatic flexure mass with MINEN with +LVI, TPS 50% +[id number], mpT3 pN1a, stage 3B, MMR pending.     Sigmoid Colon Mass with poorly differentiated carcinoma, grade 3, loss of MLH1 and PMS2, TPS 70%, +KRAS G13D mutation, mpT2 pN1a, stage 3A     One of forty-one sampled lymph nodes positive (1/41), d/w pathology- unable to determine which mass is metastatic to LN.     NGS with KRAS G13D, TMB 17.     CT CAP with minimally increase subcentimeter pulmonary nodules, new GG nodule, several prominent mediastinal lymph nodes slightly increased, splenomegaly 14.5 cm. MRI brain no evidence of disease.     --5/31/23 started mFOLFOX 6 w/dose reduced oxaliplatin for renal  function.   --6/14/23 C2D1 held due to new onset paroxysmal A-fib with RVR requiring hospitalization 6/3/2023 (also hypoMg, dehydrated). Started apixaban ([id number]) and metoprolol.   -7/18/2023  CT CAP- subcm lung nodules stable, borderline and mildly enlarged mediastinal LN and periportal LN stable, splenomegaly 15.7cm, Subtotal colectomy with ileorectal anastomosis. No acute inflammation or obstruction   -7/19/2023 resumed  mFOLFOX6 C2D1  (inpatient w/continuous cardiac monitoring). Diltiazem added for infusions.  --9/23/23 CT CAP (after 6 cycles), no new LAD, no liver lesions  --11/17/23 MRI liver, stable, no metastatic disease     Video visit today for modified FOLFOX cycle 12.    Interval History: Nadia was seen over video today and she continues to tolerate FOLFOX pretty well.  She has noticed a rash on her face, the last 3 days, but she states that she has been also wearing her mask more religiously in public, this could be the irritation.  She has ongoing neuropathy with cold sensitivity, this is stable.  She has been dropping things, but her feet are actually a bit better.  She continues on gabapentin 100 mg in the morning, 300 mg at bedtime, this is a stable dose for her.  She has noticed some right ankle swelling for the last 2 to 3 days, no pain, history of gout in the past, but not similar.  No redness or warmth.  Swelling is better with elevation.  No history of blood clots, chest pain or shortness of breath.  Currently on anticoagulation for A-fib.  No fever, chills, vomiting, diarrhea, headache, vision changes or mouth sores.    Review of Systems: See interval hx. Denies fevers, chills, HA, dizziness, changes in vision, cough, sore throat, CP, SOB, abdominal pain, N/V, diarrhea, changes in urination, bleeding, bruising.     PMHx and Social Hx reviewed per EPIC.      Medications:  Current Outpatient Medications   Medication Sig Dispense Refill     cyanocobalamin 1000 MCG TBCR Take 1,000 mcg by  mouth daily 100 tablet 1     dexAMETHasone (DECADRON) 4 MG tablet Take 2 tablets (8 mg) by mouth daily Take for 2 days, starting the day after chemo cycle 10, 11 and 12. Take with food. 12 tablet 0     diltiazem (CARDIZEM) 30 MG tablet Take 1 tablet (30 mg) by mouth 3 times daily 90 tablet 0     diltiazem ER COATED BEADS (CARDIZEM CD/CARTIA XT) 120 MG 24 hr capsule Take 1 capsule (120 mg) by mouth daily On chemo days and 2 days post chemo infusion. 60 capsule 3     Ferrous Sulfate (IRON) 325 (65 Fe) MG tablet Take 1 tablet by mouth daily       gabapentin (NEURONTIN) 100 MG capsule Take 2 capsules (200 mg) by mouth daily (with breakfast) for 120 days 60 capsule 3     gabapentin (NEURONTIN) 300 MG capsule Take 1 capsule (300 mg) by mouth 3 times daily 90 capsule 1     lidocaine-prilocaine (EMLA) 2.5-2.5 % external cream Apply topically as needed for moderate pain Apply 15-20 minutes prior to port access 1 g 4     loperamide (IMODIUM) 2 MG capsule Take 2 mg by mouth daily as needed for diarrhea       magnesium oxide 400 MG tablet Take 1 tablet (400 mg) by mouth 2 times daily 60 tablet 0     metoprolol succinate ER (TOPROL XL) 50 MG 24 hr tablet Take 1 tablet (50 mg) by mouth daily 90 tablet 3     Multiple Vitamins-Iron (MULTI-DAY PLUS IRON PO) Take 1 tablet by mouth daily       prochlorperazine (COMPAZINE) 10 MG tablet Take 10 mg by mouth every 6 hours as needed for nausea or vomiting       rivaroxaban ANTICOAGULANT (XARELTO) 20 MG TABS tablet Take 1 tablet (20 mg) by mouth daily (with dinner) 90 tablet 3     sodium bicarbonate 650 MG tablet Take 1 tablet (650 mg) by mouth 2 times daily 180 tablet 3     vitamin D3 (CHOLECALCIFEROL) 50 mcg (2000 units) tablet Take 1 tablet (50 mcg) by mouth daily 30 tablet 6       Allergies   Allergen Reactions     No Known Drug Allergy          EXAM:    LMP  (LMP Unknown) Video, no VS.     GENERAL:  Female, in no acute distress.  Alert and oriented x3. Well groomed.   HEENT:   Normocephalic, atraumatic. No conjunctival injection or eye swelling.   LUNGS:  Nonlabored breathing, no cough or audible wheezing, able to speak full sentences.  MSK: Full ROM UE.    SKIN: No rash on exposed skin.   NEURO: CN grossly intact, speech normal  PSYCH: Mentation appears normal, insight and judgement intact      Labs: PENDING    Imaging:   EXAM: US LOWER EXTREMITY VENOUS DUPLEX RIGHT  LOCATION: [facility]  DATE: 12/12/2023     INDICATION: Right lower extremity pain, swelling and edema.  COMPARISON: None.  TECHNIQUE: Venous Duplex ultrasound of the right lower extremity with and without compression, augmentation and duplex. Color flow and spectral Doppler with waveform analysis performed.     FINDINGS: Exam includes the common femoral, femoral, popliteal, and contralateral common femoral veins as well as segmentally visualized deep calf veins and greater saphenous vein.      RIGHT: No deep vein thrombosis. No superficial thrombophlebitis. No popliteal cyst.                                                                      IMPRESSION:  1.  No deep venous thrombosis in the right lower extremity.    Impression/Plan:   Sigmoid cancer: Follows with Dr. Bateman  -MRI of the liver 11/17/2023 reviewed, stable.  No metastatic disease in the abdomen.  -Oxaliplatin reduced due to MARYA with CKD  -Continue mFOLFOX  -1/2/2024 CT CAP, Dr. Bateman    Ankle swelling: Right ankle, unable to visualize over video.  -Stat ultrasound negative for DVT  -recommended elevation     MARYA on CKD: Follows with nephrology, Dr. Brenner.    -She continues getting fluids after chemotherapy to prevent dehydration.    Neuropathy: Currently gabapentin 100 mg in the a.m., 300 mg in the p.m.  This is going well for her.  -Continue current gabapentin dose    Paroxysmal A-fib: Possibly secondary to 5-FU, closely follows cardiology.  -Continue diltiazem, metoprolol, magnesium, Xarelto    Anemia: Iron deficiency, B12  deficiency likely secondary to treatment  -Continue with B12 1000 mg p.o. daily and iron  -Labs pending today     Leukopenia: Likely secondary to treatment, labs pending today.      Thrombocytopenia: Likely secondary to treatment, platelets have been dropping lately.  -Platelets 72 K on 12/6, pending today  -No bleeding       Chart documentation with Dragon Voice recognition Software. Although reviewed after completion, some words and grammatical errors may remain.    40 minutes spent on the date of the encounter doing chart review, review of test results, interpretation of tests, patient visit, and documentation     Elsa Santillan PA-C  Hematology/Oncology  [facility]                  Again, thank you for allowing me to participate in the care of your patient.        Sincerely,        Elsa Santillan PA-C

## 2023-12-12 NOTE — NURSING NOTE
Is the patient currently in the state of [place]? YES    Visit mode:VIDEO    If the visit is dropped, the patient can be reconnected by: VIDEO VISIT: Text to cell phone:   Telephone Information:   Mobile [phone]       Will anyone else be joining the visit? NO  (If patient encounters technical issues they should call [phone])    How would you like to obtain your AVS? MyChart    Are changes needed to the allergy or medication list? No    Patient declined individual allergy and medication review by support staff because patient denies any changes since echeck-in completion and states all information entered during echeck-in remains accurate.    Reason for visit: SYDNI Staley MA VVF

## 2023-12-12 NOTE — LETTER
12/12/2023         RE: Nadia Ladd  [address]  [facility] [address]        Dear Colleague,    Thank you for referring your patient, Nadia Ladd, to the [facility]. Please see a copy of my visit note below.    Virtual Visit Details    Type of service:  Video Visit   Video Start Time: 3:41 PM  Video End Time:4:07 PM    Originating Location (pt. Location): Other  cancer clinic  Distant Location (provider location):  On-site  Platform used for Video Visit: [employer]      Oncology/Hematology Visit Note    Dec 12, 2023    Reason for visit: Follow-up sigmoid cancer    Oncology HPI: Nadia Ladd is a 68 year old female with sigmoid cancer.  She initially presented in February 2023 with fatigue and iron deficiency anemia.  Colonoscopy confirmed sigmoid mass, biopsy with invasive poorly differentiated carcinoma.  CT with proximal sigmoid colon mass, hepatic flexure colon mass and associated lymphadenopathy.  PET confirmed multifocal disease with suspicion of third lesion in the left transverse colon, CEA was 6.8.  Negative genetic testing for Chan syndrome.    She underwent laparoscopic converted to open total abdominal colectomy with ileorectal anastomosis, partial omentectomy, flexible sigmoidoscopy, TAHBSO on 4/6/23.      Hepatic flexure mass with MINEN with +LVI, TPS 50% +[id number], mpT3 pN1a, stage 3B, MMR pending.     Sigmoid Colon Mass with poorly differentiated carcinoma, grade 3, loss of MLH1 and PMS2, TPS 70%, +KRAS G13D mutation, mpT2 pN1a, stage 3A     One of forty-one sampled lymph nodes positive (1/41), d/w pathology- unable to determine which mass is metastatic to LN.     NGS with KRAS G13D, TMB 17.     CT CAP with minimally increase subcentimeter pulmonary nodules, new GG nodule, several prominent mediastinal lymph nodes slightly increased, splenomegaly 14.5 cm. MRI brain no evidence of disease.     --5/31/23 started mFOLFOX 6 w/dose reduced oxaliplatin for renal  function.   --6/14/23 C2D1 held due to new onset paroxysmal A-fib with RVR requiring hospitalization 6/3/2023 (also hypoMg, dehydrated). Started apixaban ([id number] 3) and metoprolol.   -7/18/2023  CT CAP- subcm lung nodules stable, borderline and mildly enlarged mediastinal LN and periportal LN stable, splenomegaly 15.7cm, Subtotal colectomy with ileorectal anastomosis. No acute inflammation or obstruction   -7/19/2023 resumed  mFOLFOX6 C2D1  (inpatient w/continuous cardiac monitoring). Diltiazem added for infusions.  --9/23/23 CT CAP (after 6 cycles), no new LAD, no liver lesions  --11/17/23 MRI liver, stable, no metastatic disease     Video visit today for modified FOLFOX cycle 12.    Interval History: Nadia was seen over video today and she continues to tolerate FOLFOX pretty well.  She has noticed a rash on her face, the last 3 days, but she states that she has been also wearing her mask more religiously in public, this could be the irritation.  She has ongoing neuropathy with cold sensitivity, this is stable.  She has been dropping things, but her feet are actually a bit better.  She continues on gabapentin 100 mg in the morning, 300 mg at bedtime, this is a stable dose for her.  She has noticed some right ankle swelling for the last 2 to 3 days, no pain, history of gout in the past, but not similar.  No redness or warmth.  Swelling is better with elevation.  No history of blood clots, chest pain or shortness of breath.  Currently on anticoagulation for A-fib.  No fever, chills, vomiting, diarrhea, headache, vision changes or mouth sores.    Review of Systems: See interval hx. Denies fevers, chills, HA, dizziness, changes in vision, cough, sore throat, CP, SOB, abdominal pain, N/V, diarrhea, changes in urination, bleeding, bruising.     PMHx and Social Hx reviewed per EPIC.      Medications:  Current Outpatient Medications   Medication Sig Dispense Refill     cyanocobalamin 1000 MCG TBCR Take 1,000 mcg by  mouth daily 100 tablet 1     dexAMETHasone (DECADRON) 4 MG tablet Take 2 tablets (8 mg) by mouth daily Take for 2 days, starting the day after chemo cycle 10, 11 and 12. Take with food. 12 tablet 0     diltiazem (CARDIZEM) 30 MG tablet Take 1 tablet (30 mg) by mouth 3 times daily 90 tablet 0     diltiazem ER COATED BEADS (CARDIZEM CD/CARTIA XT) 120 MG 24 hr capsule Take 1 capsule (120 mg) by mouth daily On chemo days and 2 days post chemo infusion. 60 capsule 3     Ferrous Sulfate (IRON) 325 (65 Fe) MG tablet Take 1 tablet by mouth daily       gabapentin (NEURONTIN) 100 MG capsule Take 2 capsules (200 mg) by mouth daily (with breakfast) for 120 days 60 capsule 3     gabapentin (NEURONTIN) 300 MG capsule Take 1 capsule (300 mg) by mouth 3 times daily 90 capsule 1     lidocaine-prilocaine (EMLA) 2.5-2.5 % external cream Apply topically as needed for moderate pain Apply 15-20 minutes prior to port access 1 g 4     loperamide (IMODIUM) 2 MG capsule Take 2 mg by mouth daily as needed for diarrhea       magnesium oxide 400 MG tablet Take 1 tablet (400 mg) by mouth 2 times daily 60 tablet 0     metoprolol succinate ER (TOPROL XL) 50 MG 24 hr tablet Take 1 tablet (50 mg) by mouth daily 90 tablet 3     Multiple Vitamins-Iron (MULTI-DAY PLUS IRON PO) Take 1 tablet by mouth daily       prochlorperazine (COMPAZINE) 10 MG tablet Take 10 mg by mouth every 6 hours as needed for nausea or vomiting       rivaroxaban ANTICOAGULANT (XARELTO) 20 MG TABS tablet Take 1 tablet (20 mg) by mouth daily (with dinner) 90 tablet 3     sodium bicarbonate 650 MG tablet Take 1 tablet (650 mg) by mouth 2 times daily 180 tablet 3     vitamin D3 (CHOLECALCIFEROL) 50 mcg (2000 units) tablet Take 1 tablet (50 mcg) by mouth daily 30 tablet 6       Allergies   Allergen Reactions     No Known Drug Allergy          EXAM:    LMP  (LMP Unknown) Video, no VS.     GENERAL:  Female, in no acute distress.  Alert and oriented x3. Well groomed.   HEENT:   Normocephalic, atraumatic. No conjunctival injection or eye swelling.   LUNGS:  Nonlabored breathing, no cough or audible wheezing, able to speak full sentences.  MSK: Full ROM UE.    SKIN: No rash on exposed skin.   NEURO: CN grossly intact, speech normal  PSYCH: Mentation appears normal, insight and judgement intact      Labs: PENDING    Imaging:   EXAM: US LOWER EXTREMITY VENOUS DUPLEX RIGHT  LOCATION: [facility]  DATE: 12/12/2023     INDICATION: Right lower extremity pain, swelling and edema.  COMPARISON: None.  TECHNIQUE: Venous Duplex ultrasound of the right lower extremity with and without compression, augmentation and duplex. Color flow and spectral Doppler with waveform analysis performed.     FINDINGS: Exam includes the common femoral, femoral, popliteal, and contralateral common femoral veins as well as segmentally visualized deep calf veins and greater saphenous vein.      RIGHT: No deep vein thrombosis. No superficial thrombophlebitis. No popliteal cyst.                                                                      IMPRESSION:  1.  No deep venous thrombosis in the right lower extremity.    Impression/Plan:   Sigmoid cancer: Follows with Dr. Bateman  -MRI of the liver 11/17/2023 reviewed, stable.  No metastatic disease in the abdomen.  -Oxaliplatin reduced due to MARYA with CKD  -Continue mFOLFOX  -1/2/2024 CT CAP, Dr. Bateman    Ankle swelling: Right ankle, unable to visualize over video.  -Stat ultrasound negative for DVT  -recommended elevation     MARYA on CKD: Follows with nephrology, Dr. Brenner.    -She continues getting fluids after chemotherapy to prevent dehydration.    Neuropathy: Currently gabapentin 100 mg in the a.m., 300 mg in the p.m.  This is going well for her.  -Continue current gabapentin dose    Paroxysmal A-fib: Possibly secondary to 5-FU, closely follows cardiology.  -Continue diltiazem, metoprolol, magnesium, Xarelto    Anemia: Iron deficiency, B12  deficiency likely secondary to treatment  -Continue with B12 1000 mg p.o. daily and iron  -Labs pending today     Leukopenia: Likely secondary to treatment, labs pending today.      Thrombocytopenia: Likely secondary to treatment, platelets have been dropping lately.  -Platelets 72 K on 12/6, pending today  -No bleeding       Chart documentation with Dragon Voice recognition Software. Although reviewed after completion, some words and grammatical errors may remain.    40 minutes spent on the date of the encounter doing chart review, review of test results, interpretation of tests, patient visit, and documentation     Elsa Santillan PA-C  Hematology/Oncology  [facility]                  Again, thank you for allowing me to participate in the care of your patient.        Sincerely,        Elsa Santillan PA-C

## 2023-12-13 ENCOUNTER — LAB (OUTPATIENT)
Dept: INFUSION THERAPY | Facility: CLINIC | Age: 68
End: 2023-12-13
Attending: INTERNAL MEDICINE
Payer: MEDICARE

## 2023-12-13 VITALS
TEMPERATURE: 97.6 F | HEART RATE: 62 BPM | WEIGHT: 190.3 LBS | HEIGHT: 61 IN | BODY MASS INDEX: 35.93 KG/M2 | SYSTOLIC BLOOD PRESSURE: 145 MMHG | OXYGEN SATURATION: 96 % | DIASTOLIC BLOOD PRESSURE: 47 MMHG | RESPIRATION RATE: 18 BRPM

## 2023-12-13 DIAGNOSIS — D70.1 CHEMOTHERAPY-INDUCED NEUTROPENIA (H): ICD-10-CM

## 2023-12-13 DIAGNOSIS — C18.9 MALIGNANT NEOPLASM OF COLON, UNSPECIFIED PART OF COLON (H): ICD-10-CM

## 2023-12-13 DIAGNOSIS — Z76.89 PREVENTION OF CHEMOTHERAPY-INDUCED NEUTROPENIA: ICD-10-CM

## 2023-12-13 DIAGNOSIS — C18.7 MALIGNANT NEOPLASM OF SIGMOID COLON (H): Primary | ICD-10-CM

## 2023-12-13 DIAGNOSIS — T45.1X5A CHEMOTHERAPY-INDUCED NEUTROPENIA (H): ICD-10-CM

## 2023-12-13 LAB
ALBUMIN SERPL BCG-MCNC: 3.5 G/DL (ref 3.5–5.2)
ALP SERPL-CCNC: 386 U/L (ref 40–150)
ALT SERPL W P-5'-P-CCNC: 39 U/L (ref 0–50)
ANION GAP SERPL CALCULATED.3IONS-SCNC: 10 MMOL/L (ref 7–15)
AST SERPL W P-5'-P-CCNC: 50 U/L (ref 0–45)
BASOPHILS # BLD AUTO: 0 10E3/UL (ref 0–0.2)
BASOPHILS NFR BLD AUTO: 0 %
BILIRUB SERPL-MCNC: 0.6 MG/DL
BUN SERPL-MCNC: 16.6 MG/DL (ref 8–23)
CALCIUM SERPL-MCNC: 9 MG/DL (ref 8.8–10.2)
CEA SERPL-MCNC: 2.3 NG/ML
CHLORIDE SERPL-SCNC: 110 MMOL/L (ref 98–107)
CREAT SERPL-MCNC: 1.55 MG/DL (ref 0.51–0.95)
DEPRECATED HCO3 PLAS-SCNC: 23 MMOL/L (ref 22–29)
EGFRCR SERPLBLD CKD-EPI 2021: 36 ML/MIN/1.73M2
EOSINOPHIL # BLD AUTO: 0 10E3/UL (ref 0–0.7)
EOSINOPHIL NFR BLD AUTO: 0 %
ERYTHROCYTE [DISTWIDTH] IN BLOOD BY AUTOMATED COUNT: 15.2 % (ref 10–15)
GLUCOSE SERPL-MCNC: 173 MG/DL (ref 70–99)
HCT VFR BLD AUTO: 32.3 % (ref 35–47)
HGB BLD-MCNC: 10.3 G/DL (ref 11.7–15.7)
IMM GRANULOCYTES # BLD: 0 10E3/UL
IMM GRANULOCYTES NFR BLD: 1 %
LYMPHOCYTES # BLD AUTO: 0.5 10E3/UL (ref 0.8–5.3)
LYMPHOCYTES NFR BLD AUTO: 24 %
MAGNESIUM SERPL-MCNC: 1.6 MG/DL (ref 1.7–2.3)
MCH RBC QN AUTO: 32.7 PG (ref 26.5–33)
MCHC RBC AUTO-ENTMCNC: 31.9 G/DL (ref 31.5–36.5)
MCV RBC AUTO: 103 FL (ref 78–100)
MONOCYTES # BLD AUTO: 0.4 10E3/UL (ref 0–1.3)
MONOCYTES NFR BLD AUTO: 20 %
NEUTROPHILS # BLD AUTO: 1.2 10E3/UL (ref 1.6–8.3)
NEUTROPHILS NFR BLD AUTO: 55 %
NRBC # BLD AUTO: 0 10E3/UL
NRBC BLD AUTO-RTO: 0 /100
PLATELET # BLD AUTO: 109 10E3/UL (ref 150–450)
POTASSIUM SERPL-SCNC: 4.4 MMOL/L (ref 3.4–5.3)
PROT SERPL-MCNC: 5.9 G/DL (ref 6.4–8.3)
RBC # BLD AUTO: 3.15 10E6/UL (ref 3.8–5.2)
SODIUM SERPL-SCNC: 143 MMOL/L (ref 135–145)
WBC # BLD AUTO: 2.2 10E3/UL (ref 4–11)

## 2023-12-13 PROCEDURE — 96367 TX/PROPH/DG ADDL SEQ IV INF: CPT

## 2023-12-13 PROCEDURE — 258N000003 HC RX IP 258 OP 636: Performed by: PHYSICIAN ASSISTANT

## 2023-12-13 PROCEDURE — 96368 THER/DIAG CONCURRENT INF: CPT

## 2023-12-13 PROCEDURE — 96375 TX/PRO/DX INJ NEW DRUG ADDON: CPT

## 2023-12-13 PROCEDURE — 83735 ASSAY OF MAGNESIUM: CPT

## 2023-12-13 PROCEDURE — 96415 CHEMO IV INFUSION ADDL HR: CPT

## 2023-12-13 PROCEDURE — 250N000011 HC RX IP 250 OP 636: Mod: JZ | Performed by: INTERNAL MEDICINE

## 2023-12-13 PROCEDURE — 96413 CHEMO IV INFUSION 1 HR: CPT

## 2023-12-13 PROCEDURE — 250N000011 HC RX IP 250 OP 636: Performed by: PHYSICIAN ASSISTANT

## 2023-12-13 PROCEDURE — 96411 CHEMO IV PUSH ADDL DRUG: CPT

## 2023-12-13 PROCEDURE — 80053 COMPREHEN METABOLIC PANEL: CPT | Performed by: INTERNAL MEDICINE

## 2023-12-13 PROCEDURE — 36591 DRAW BLOOD OFF VENOUS DEVICE: CPT | Performed by: INTERNAL MEDICINE

## 2023-12-13 PROCEDURE — 82378 CARCINOEMBRYONIC ANTIGEN: CPT | Performed by: INTERNAL MEDICINE

## 2023-12-13 PROCEDURE — 85025 COMPLETE CBC W/AUTO DIFF WBC: CPT | Performed by: INTERNAL MEDICINE

## 2023-12-13 RX ORDER — HEPARIN SODIUM,PORCINE 10 UNIT/ML
5 VIAL (ML) INTRAVENOUS
Status: CANCELLED | OUTPATIENT
Start: 2023-12-13

## 2023-12-13 RX ORDER — HEPARIN SODIUM (PORCINE) LOCK FLUSH IV SOLN 100 UNIT/ML 100 UNIT/ML
5 SOLUTION INTRAVENOUS
Status: CANCELLED | OUTPATIENT
Start: 2023-12-13

## 2023-12-13 RX ORDER — MEPERIDINE HYDROCHLORIDE 25 MG/ML
25 INJECTION INTRAMUSCULAR; INTRAVENOUS; SUBCUTANEOUS EVERY 30 MIN PRN
Status: CANCELLED | OUTPATIENT
Start: 2023-12-13

## 2023-12-13 RX ORDER — ONDANSETRON 2 MG/ML
8 INJECTION INTRAMUSCULAR; INTRAVENOUS ONCE
Status: COMPLETED | OUTPATIENT
Start: 2023-12-13 | End: 2023-12-13

## 2023-12-13 RX ORDER — LORAZEPAM 2 MG/ML
0.5 INJECTION INTRAMUSCULAR EVERY 4 HOURS PRN
Status: CANCELLED | OUTPATIENT
Start: 2023-12-13

## 2023-12-13 RX ORDER — EPINEPHRINE 1 MG/ML
0.3 INJECTION, SOLUTION INTRAMUSCULAR; SUBCUTANEOUS EVERY 5 MIN PRN
Status: CANCELLED | OUTPATIENT
Start: 2023-12-13

## 2023-12-13 RX ORDER — MAGNESIUM SULFATE HEPTAHYDRATE 40 MG/ML
4 INJECTION, SOLUTION INTRAVENOUS ONCE
Status: COMPLETED | OUTPATIENT
Start: 2023-12-13 | End: 2023-12-13

## 2023-12-13 RX ORDER — ONDANSETRON 2 MG/ML
8 INJECTION INTRAMUSCULAR; INTRAVENOUS ONCE
Status: CANCELLED | OUTPATIENT
Start: 2023-12-13

## 2023-12-13 RX ORDER — HEPARIN SODIUM (PORCINE) LOCK FLUSH IV SOLN 100 UNIT/ML 100 UNIT/ML
5 SOLUTION INTRAVENOUS
Status: DISCONTINUED | OUTPATIENT
Start: 2023-12-13 | End: 2023-12-13 | Stop reason: HOSPADM

## 2023-12-13 RX ORDER — METHYLPREDNISOLONE SODIUM SUCCINATE 125 MG/2ML
125 INJECTION, POWDER, LYOPHILIZED, FOR SOLUTION INTRAMUSCULAR; INTRAVENOUS
Status: CANCELLED
Start: 2023-12-13

## 2023-12-13 RX ORDER — ALBUTEROL SULFATE 0.83 MG/ML
2.5 SOLUTION RESPIRATORY (INHALATION)
Status: CANCELLED | OUTPATIENT
Start: 2023-12-13

## 2023-12-13 RX ORDER — EPINEPHRINE 1 MG/ML
0.3 INJECTION, SOLUTION, CONCENTRATE INTRAVENOUS EVERY 5 MIN PRN
Status: CANCELLED | OUTPATIENT
Start: 2023-12-13

## 2023-12-13 RX ORDER — ALBUTEROL SULFATE 90 UG/1
1-2 AEROSOL, METERED RESPIRATORY (INHALATION)
Status: CANCELLED
Start: 2023-12-13

## 2023-12-13 RX ORDER — DIPHENHYDRAMINE HYDROCHLORIDE 50 MG/ML
50 INJECTION INTRAMUSCULAR; INTRAVENOUS
Status: CANCELLED
Start: 2023-12-13

## 2023-12-13 RX ADMIN — DEXTROSE MONOHYDRATE 700 MG: 50 INJECTION, SOLUTION INTRAVENOUS at 13:28

## 2023-12-13 RX ADMIN — MAGNESIUM SULFATE HEPTAHYDRATE 4 G: 40 INJECTION, SOLUTION INTRAVENOUS at 13:23

## 2023-12-13 RX ADMIN — FOSAPREPITANT: 150 INJECTION, POWDER, LYOPHILIZED, FOR SOLUTION INTRAVENOUS at 12:32

## 2023-12-13 RX ADMIN — OXALIPLATIN 120 MG: 5 INJECTION, SOLUTION INTRAVENOUS at 13:27

## 2023-12-13 RX ADMIN — ONDANSETRON 8 MG: 2 INJECTION INTRAMUSCULAR; INTRAVENOUS at 12:14

## 2023-12-13 RX ADMIN — DEXTROSE MONOHYDRATE 250 ML: 50 INJECTION, SOLUTION INTRAVENOUS at 12:09

## 2023-12-13 RX ADMIN — SODIUM CHLORIDE 500 ML: 9 INJECTION, SOLUTION INTRAVENOUS at 12:10

## 2023-12-13 RX ADMIN — FAMOTIDINE 20 MG: 10 INJECTION, SOLUTION INTRAVENOUS at 12:16

## 2023-12-13 ASSESSMENT — PAIN SCALES - GENERAL: PAINLEVEL: NO PAIN (0)

## 2023-12-13 NOTE — PROGRESS NOTES
"Infusion Nursing Note:  Nadia Ladd presents today for C12D1 Modified FOLFOX.    Patient seen by provider yesterday: Yes: GABRIELA Santillan   present during visit today: Not Applicable.    Note: Patient ambulated to IVO with . Denies pain. VSS. No complaints.       Intravenous Access:  Implanted Port.    Treatment Conditions:  Lab Results   Component Value Date    HGB 10.3 (L) 12/13/2023    WBC 2.2 (L) 12/13/2023    ANEU 7.6 08/01/2023    ANEUTAUTO 1.2 (L) 12/13/2023     (L) 12/13/2023        Lab Results   Component Value Date     12/13/2023    POTASSIUM 4.4 12/13/2023    MAG 1.6 (L) 12/13/2023    CR 1.55 (H) 12/13/2023    LIVIER 9.0 12/13/2023    BILITOTAL 0.6 12/13/2023    ALBUMIN 3.5 12/13/2023    ALT 39 12/13/2023    AST 50 (H) 12/13/2023       Results reviewed, labs MET treatment parameters, ok to proceed with treatment.  Mag-1.6, IV Replacement ordered-4 GM over 2 hours. .      Post Infusion Assessment:  Patient tolerated infusion without incident.  Blood return noted pre and post infusion.  Site patent and intact, free from redness, edema or discomfort.   Prior to discharge: Port is secured in place with tegaderm and flushed with 10cc NS with positive blood return noted.    Continuous home infusion CADD pump connected.    All connectors secured in place and clamps taped open.    Pump started, \"running\" noted on display (CADD): YES.   Capillary element taped to pt's skin per protocol.  Pump Connection double checked with Rosalina Argueta RN.  Patient instructed to call our clinic or [facility] with any questions or concerns at home.  Patient verbalized understanding.    Patient set up for pump disconnect at our clinic on 12/15/23 at 1400.        Discharge Plan:   Discharge instructions reviewed with: Patient.  Patient discharged in stable condition accompanied by: self and .  Departure Mode: Ambulatory.      Eli Ladd RN  "

## 2023-12-13 NOTE — PROGRESS NOTES
"Patient here for port lab draw prior to chemo.    Intravenous access:     Port needle gauge19 by 3/4 \".   Labs drawn without difficulty.  Red/gel, green and purple tubes drawn.     Port flushed with 20 ml NS after.       Eli Ladd RN          "

## 2023-12-14 ENCOUNTER — OFFICE VISIT (OUTPATIENT)
Dept: CARDIOLOGY | Facility: CLINIC | Age: 68
End: 2023-12-14
Payer: MEDICARE

## 2023-12-14 VITALS
BODY MASS INDEX: 36.82 KG/M2 | DIASTOLIC BLOOD PRESSURE: 70 MMHG | OXYGEN SATURATION: 98 % | SYSTOLIC BLOOD PRESSURE: 140 MMHG | WEIGHT: 195 LBS | HEIGHT: 61 IN | HEART RATE: 72 BPM

## 2023-12-14 DIAGNOSIS — I10 BENIGN ESSENTIAL HYPERTENSION WITH TARGET BLOOD PRESSURE BELOW 140/90: Primary | ICD-10-CM

## 2023-12-14 DIAGNOSIS — I48.0 PAROXYSMAL ATRIAL FIBRILLATION WITH RVR (H): ICD-10-CM

## 2023-12-14 DIAGNOSIS — I48.0 PAROXYSMAL ATRIAL FIBRILLATION (H): ICD-10-CM

## 2023-12-14 PROCEDURE — 99214 OFFICE O/P EST MOD 30 MIN: CPT | Performed by: NURSE PRACTITIONER

## 2023-12-14 ASSESSMENT — PAIN SCALES - GENERAL: PAINLEVEL: NO PAIN (0)

## 2023-12-14 NOTE — PATIENT INSTRUCTIONS
TODAY'S RECOMMENDATIONS:    Continue metoprolol and Xaretlo without changes  Keep diltiazem as needed  Please follow up with cardiology in 1 year.    If you have questions or concerns please call clinic at [phone].    Please call [phone] for scheduling.      It was a pleasure seeing you today!

## 2023-12-14 NOTE — LETTER
12/14/2023    Nomi Cartwright, DO  [address] [place] [address]    RE: Nadia Ladd       Dear Colleague,     I had the pleasure of seeing Nadia Ladd in the [facility].    General Cardiology Clinic Progress Note  Nadia Ladd MRN# [id number]   YOB: 1955 Age: 68 year old     Primary cardiologist: Dr. Ramos    Reason for visit: Paroxysmal atrial fibrillation     History of presenting illness:    Nadia Ladd is a pleasant 68 year old patient with past medical history significant for:    Colon cancer: 2 different types of colon cancer with adenocarcinoma of the hepatic flexure sigmoid tumor that appeared to be predominantly neuroendocrine. S/p surgical resection 4/2023 with colectomy and started on FOLFOX chemotherapy 5/31/2023. Follows with Dr. CECILIA Bateman at [facility].   Paroxysmal atrial fibrillation with RVR  [id number] score 3 (gender, age, hypertension)  Hypertension  CKD, stage IIIb  Prediabetes  Crohn's disease  Anemia    She was initially evaluated by Dr. Garza on 6/30/2023 after her first dose of chemotherapy she presented to the emergency department with lightheadedness with positional changes.  She was found to have new onset of atrial fibrillation with RVR.  She was admitted to [facility] for 2 days and was started on rate control with IV diltiazem.  Ultimately, she converted to sinus rhythm and was discharged home on metoprolol XL 25 mg daily.    She was followed up by my colleague LUIS Holloway, CNP on 8/3/2023 and reported that after her second round of chemotherapy the day prior she had palpitations but did not have atrial fibrillation.  It was recommended that she continue Eliquis. Diltiazem 30 mg as needed every 4-6 hours for palpitations or atrial fibrillation with RVR was recommended.    Unfortunately, on 8/4/2023 she received chemotherapy and again developed atrial fibrillation with RVR.  She was placed on IV  diltiazem infusion with improved rate control.  Ultimately, she was discharged home and converted later.      I met Nadia in clinic on 8/15/2023 and we increase metoprolol XL to 50 mg daily and recommended using long-acting diltiazem 120 mg daily on the days of chemoinfusion and 2 days after.  Also as needed short acting diltiazem was prescribed for atrial fibrillation with RVR.    Due to palpitations that she presented to the ED on 8/19/2023.  Fortunately, she was not in atrial fibrillation at that time.  She was given IV fluids and returned home.    After her her second ED visit she met with Dr. Ramos who was reassured that she had not had any A-fib post her recent infusions.  And recommended IV fluids given after chemo be continued.    Today she returns for a routine follow-up.  She has had no prolonged episodes of atrial fibrillation since her last visit with the above regimen.  There was 1 occasion when she utilized as needed short acting diltiazem that terminated the A-fib quickly.  She is very happy to report that she is currently undergoing her final chemoinfusion that will end tomorrow.    Diagnotic studies:  EKG (8/15/2023): Sinus rhythm at 71 bpm  Echocardiogram (6/5/2023): Normal biventricular size, structure and function, mild pulmonary hypertension, mild MR without wall motion abnormalities normal biatrial size  Echocardiogram (4/2023): Normal biventricular systolic function without effusion and no significant wall motion or valvular abnormalities.  Normal biatrial size          Assessment and Plan:     ASSESSMENT:    Paroxysmal atrial fibrillation with RVR  In the setting of chemoinfusion  [id number] score of 3 on Xarelto.  Currently on rate control with metoprolol XL 25 mg daily.   During the days of chemo and 2 days after take long acting diltiazem 120 mg daily with 30 mg of short acting diltiazem as needed for atrial fibrillation with HR >110. and Cardizem 30 mg 3 times  daily.    Hypertension  Currently well controlled    Colon cancer  Status post resection and colectomy  Underwent chemotherapy and currently on last infusion    PLAN:     Metoprolol XL to 50 mg daily and Xarelto 20 mg daily  Keep diltiazem to use as needed for breakthrough but hopefully breakthrough will be less frequent given she is finishing chemo.    Okay to hold anticoagulation 2 to 3 days prior to procedures or per protocol  Return to cardiology in 1 year or sooner if needed     Orders this Visit:  Orders Placed This Encounter   Procedures    Follow-Up with Cardiology RADHA     Orders Placed This Encounter   Medications    rivaroxaban ANTICOAGULANT (XARELTO) 20 MG TABS tablet     Sig: Take 1 tablet (20 mg) by mouth daily (with dinner)     Dispense:  90 tablet     Refill:  3     Medications Discontinued During This Encounter   Medication Reason    rivaroxaban ANTICOAGULANT (XARELTO) 20 MG TABS tablet Reorder (No AVS)         Today's clinic visit entailed:  Review of the result(s) of each unique test - echo, EKGs,   Prescription drug management  30 minutes spent by me on the date of the encounter doing chart review, history and exam, documentation and further activities per the note  Provider  Link to [facility] Help Grid     The level of medical decision making during this visit was of moderate complexity.           Review of Systems:     Review of Systems:  Skin:  Negative     Eyes:  Negative    ENT:  Negative    Respiratory:  Negative    Cardiovascular:  Negative for;palpitations;chest pain Positive for;edema;lightheadedness  Gastroenterology: Negative    Genitourinary:  Negative    Musculoskeletal:  Positive for muscular weakness  Neurologic:  Positive for numbness or tingling of hands;incoordination  Psychiatric:  Negative    Heme/Lymph/Imm:  Negative    Endocrine:  Negative              Physical Exam:     Vitals: BP (!) 140/70 (BP Location: Right arm, Patient Position: Sitting, Cuff Size: Adult Large)   Pulse 72   " Ht 1.549 m (5' 0.98\")   Wt 88.5 kg (195 lb)   LMP  (LMP Unknown)   SpO2 98%   BMI 36.86 kg/m    Constitutional: Well nourished and in no apparent distress.  Eyes: Pupils equal, round. Sclerae anicteric.   HEENT: Normocephalic, atraumatic.   Neck: Supple. JVD   Respiratory: Breathing non-labored. Lungs clear to auscultation bilaterally. No crackles, wheezes, rhonchi, or rales.  Cardiovascular: Regular rate and rhythm, normal S1 and S2. No murmur, rub, or gallop.  Skin: Warm, dry. No rashes, cyanosis, or xanthelasma.  Extremities: No edema.  Neurologic: No gross motor deficits. Alert, awake, and oriented to person, place and time.  Psychiatric: Affect appropriate.        CURRENT MEDICATIONS:  Current Outpatient Medications   Medication Sig Dispense Refill    cyanocobalamin 1000 MCG TBCR Take 1,000 mcg by mouth daily 100 tablet 1    dexAMETHasone (DECADRON) 4 MG tablet Take 2 tablets (8 mg) by mouth daily Take for 2 days, starting the day after chemo cycle 10, 11 and 12. Take with food. 12 tablet 0    diltiazem (CARDIZEM) 30 MG tablet Take 1 tablet (30 mg) by mouth 3 times daily 90 tablet 0    diltiazem ER COATED BEADS (CARDIZEM CD/CARTIA XT) 120 MG 24 hr capsule Take 1 capsule (120 mg) by mouth daily On chemo days and 2 days post chemo infusion. 60 capsule 3    Ferrous Sulfate (IRON) 325 (65 Fe) MG tablet Take 1 tablet by mouth daily      gabapentin (NEURONTIN) 100 MG capsule Take 2 capsules (200 mg) by mouth daily (with breakfast) for 120 days 60 capsule 3    gabapentin (NEURONTIN) 300 MG capsule Take 1 capsule (300 mg) by mouth 3 times daily 90 capsule 1    lidocaine-prilocaine (EMLA) 2.5-2.5 % external cream Apply topically as needed for moderate pain Apply 15-20 minutes prior to port access 1 g 4    loperamide (IMODIUM) 2 MG capsule Take 2 mg by mouth daily as needed for diarrhea      magnesium oxide 400 MG tablet Take 1 tablet (400 mg) by mouth 2 times daily 60 tablet 0    metoprolol succinate ER (TOPROL " XL) 50 MG 24 hr tablet Take 1 tablet (50 mg) by mouth daily 90 tablet 3    Multiple Vitamins-Iron (MULTI-DAY PLUS IRON PO) Take 1 tablet by mouth daily      prochlorperazine (COMPAZINE) 10 MG tablet Take 10 mg by mouth every 6 hours as needed for nausea or vomiting      rivaroxaban ANTICOAGULANT (XARELTO) 20 MG TABS tablet Take 1 tablet (20 mg) by mouth daily (with dinner) 90 tablet 3    sodium bicarbonate 650 MG tablet Take 1 tablet (650 mg) by mouth 2 times daily 180 tablet 3    vitamin D3 (CHOLECALCIFEROL) 50 mcg (2000 units) tablet Take 1 tablet (50 mcg) by mouth daily 30 tablet 6       ALLERGIES  Allergies   Allergen Reactions    No Known Drug Allergy          PAST MEDICAL HISTORY:  Past Medical History:   Diagnosis Date    Arthropathia 08/05/2010    B12 deficiency anemia 10/21/2010    Benign essential hypertension with target blood pressure below 140/90 10/10/2016    CARDIOVASCULAR SCREENING; LDL GOAL LESS THAN 160 10/31/2010    Crohn's colitis (H) 02/15/2011    Dermatitis-dishydrotic eczema-severe 08/05/2010    Hiatal hernia     HTN (hypertension) 07/21/2011    Iron deficiency anemia 10/21/2010    Malignant neoplasm of sigmoid colon (H)     Obesity     Primary pulmonary hypertension (H) 04/06/2010       PAST SURGICAL HISTORY:  Past Surgical History:   Procedure Laterality Date    COLECTOMY WITHOUT COLOSTOMY N/A 4/6/2023    Procedure: Laparoscopic Converted to Open Total abdominal colectomy;  Surgeon: Jerome Ashley MD;  Location: [facility] OR    COLONOSCOPY  10/11/10    COLONOSCOPY N/A 2/27/2023    Procedure: ATTEMPTED COLONOSCOPY WITH SIGMOID STRICTURE BIOPSY;  Surgeon: Jonathan Alcocer MD;  Location:  GI    ESOPHAGOSCOPY, GASTROSCOPY, DUODENOSCOPY (EGD), COMBINED N/A 2/27/2023    Procedure: ESOPHAGOGASTRODUODENOSCOPY, WITH BIOPSY;  Surgeon: Jonathan Alcocer MD;  Location:  GI    HYSTERECTOMY TOTAL ABDOMINAL, BILATERAL SALPINGO-OOPHORECTOMY, COMBINED N/A 4/6/2023    Procedure: Hysterectomy total  abdominal, bilateral salpingo-oophorectomy;  Surgeon: Sunitha Olea MD;  Location: [facility] OR    INSERT PORT VASCULAR ACCESS Right 2023    Procedure: Ultrasound-guided right internal jugular venous access port placement with fluoroscopy;  Surgeon: Martin Thomas DO;  Location: [facility]    IR CHEST PORT PLACEMENT > 5 YRS OF AGE  2023    IR PORT CHECK RIGHT  2023    IR PORT REMOVAL RIGHT  2023    SIGMOIDOSCOPY FLEXIBLE N/A 2023    Procedure: Sigmoidoscopy flexible;  Surgeon: Jerome Ashley MD;  Location: [facility] OR    SURGICAL HISTORY OF -   77    Perineorrhaphy, for widening vaginal orifice    ZZC EXPLORATORY OF ABDOMEN      laparoscopy       FAMILY HISTORY:  Family History   Problem Relation Age of Onset    Hypertension Mother         on meds, alive    Cerebrovascular Disease Father         stroke about age 65,  of cancer at 68 yrs    Diabetes Father         eventually took insulin    Anemia Father         Pernisios anemia    Kidney Disease Niece         kidney transplant    Kidney Disease Nephew         kidney transplant    Venous thrombosis No family hx of     Anesthesia Reaction No family hx of        SOCIAL HISTORY:  Social History     Socioeconomic History    Marital status:    Tobacco Use    Smoking status: Never     Passive exposure: Current    Smokeless tobacco: Never   Substance and Sexual Activity    Alcohol use: No    Drug use: No    Sexual activity: Yes     Partners: Male     Thank you for allowing me to participate in the care of your patient.      Sincerely,     LUIS Smith [facility]  cc:   John Ramos MD  [address]  [place] [address]

## 2023-12-14 NOTE — PROGRESS NOTES
General Cardiology Clinic Progress Note  Nadia Ladd MRN# [id number]   YOB: 1955 Age: 68 year old     Primary cardiologist: Dr. Ramos    Reason for visit: Paroxysmal atrial fibrillation     History of presenting illness:    Nadia Ladd is a pleasant 68 year old patient with past medical history significant for:    Colon cancer: 2 different types of colon cancer with adenocarcinoma of the hepatic flexure sigmoid tumor that appeared to be predominantly neuroendocrine. S/p surgical resection 4/2023 with colectomy and started on FOLFOX chemotherapy 5/31/2023. Follows with Dr. CECILIA Bateman at [facility].   Paroxysmal atrial fibrillation with RVR  [id number] score 3 (gender, age, hypertension)  Hypertension  CKD, stage IIIb  Prediabetes  Crohn's disease  Anemia    She was initially evaluated by Dr. Garza on 6/30/2023 after her first dose of chemotherapy she presented to the emergency department with lightheadedness with positional changes.  She was found to have new onset of atrial fibrillation with RVR.  She was admitted to [facility] for 2 days and was started on rate control with IV diltiazem.  Ultimately, she converted to sinus rhythm and was discharged home on metoprolol XL 25 mg daily.    She was followed up by my colleague LUIS Holloway, CNP on 8/3/2023 and reported that after her second round of chemotherapy the day prior she had palpitations but did not have atrial fibrillation.  It was recommended that she continue Eliquis. Diltiazem 30 mg as needed every 4-6 hours for palpitations or atrial fibrillation with RVR was recommended.    Unfortunately, on 8/4/2023 she received chemotherapy and again developed atrial fibrillation with RVR.  She was placed on IV diltiazem infusion with improved rate control.  Ultimately, she was discharged home and converted later.      I met Nadia in clinic on 8/15/2023 and we increase metoprolol XL to 50 mg daily and recommended using  long-acting diltiazem 120 mg daily on the days of chemoinfusion and 2 days after.  Also as needed short acting diltiazem was prescribed for atrial fibrillation with RVR.    Due to palpitations that she presented to the ED on 8/19/2023.  Fortunately, she was not in atrial fibrillation at that time.  She was given IV fluids and returned home.    After her her second ED visit she met with Dr. Rmaos who was reassured that she had not had any A-fib post her recent infusions.  And recommended IV fluids given after chemo be continued.    Today she returns for a routine follow-up.  She has had no prolonged episodes of atrial fibrillation since her last visit with the above regimen.  There was 1 occasion when she utilized as needed short acting diltiazem that terminated the A-fib quickly.  She is very happy to report that she is currently undergoing her final chemoinfusion that will end tomorrow.    Diagnotic studies:  EKG (8/15/2023): Sinus rhythm at 71 bpm  Echocardiogram (6/5/2023): Normal biventricular size, structure and function, mild pulmonary hypertension, mild MR without wall motion abnormalities normal biatrial size  Echocardiogram (4/2023): Normal biventricular systolic function without effusion and no significant wall motion or valvular abnormalities.  Normal biatrial size          Assessment and Plan:     ASSESSMENT:    Paroxysmal atrial fibrillation with RVR  In the setting of chemoinfusion  [id number] score of 3 on Xarelto.  Currently on rate control with metoprolol XL 25 mg daily.   During the days of chemo and 2 days after take long acting diltiazem 120 mg daily with 30 mg of short acting diltiazem as needed for atrial fibrillation with HR >110. and Cardizem 30 mg 3 times daily.    Hypertension  Currently well controlled    Colon cancer  Status post resection and colectomy  Underwent chemotherapy and currently on last infusion    PLAN:     Metoprolol XL to 50 mg daily and Xarelto 20 mg daily  Keep  "diltiazem to use as needed for breakthrough but hopefully breakthrough will be less frequent given she is finishing chemo.    Okay to hold anticoagulation 2 to 3 days prior to procedures or per protocol  Return to cardiology in 1 year or sooner if needed     Orders this Visit:  Orders Placed This Encounter   Procedures    Follow-Up with Cardiology RADHA     Orders Placed This Encounter   Medications    rivaroxaban ANTICOAGULANT (XARELTO) 20 MG TABS tablet     Sig: Take 1 tablet (20 mg) by mouth daily (with dinner)     Dispense:  90 tablet     Refill:  3     Medications Discontinued During This Encounter   Medication Reason    rivaroxaban ANTICOAGULANT (XARELTO) 20 MG TABS tablet Reorder (No AVS)         Today's clinic visit entailed:  Review of the result(s) of each unique test - echo, EKGs,   Prescription drug management  30 minutes spent by me on the date of the encounter doing chart review, history and exam, documentation and further activities per the note  Provider  Link to [facility] Help Grid     The level of medical decision making during this visit was of moderate complexity.           Review of Systems:     Review of Systems:  Skin:  Negative     Eyes:  Negative    ENT:  Negative    Respiratory:  Negative    Cardiovascular:  Negative for;palpitations;chest pain Positive for;edema;lightheadedness  Gastroenterology: Negative    Genitourinary:  Negative    Musculoskeletal:  Positive for muscular weakness  Neurologic:  Positive for numbness or tingling of hands;incoordination  Psychiatric:  Negative    Heme/Lymph/Imm:  Negative    Endocrine:  Negative              Physical Exam:     Vitals: BP (!) 140/70 (BP Location: Right arm, Patient Position: Sitting, Cuff Size: Adult Large)   Pulse 72   Ht 1.549 m (5' 0.98\")   Wt 88.5 kg (195 lb)   LMP  (LMP Unknown)   SpO2 98%   BMI 36.86 kg/m    Constitutional: Well nourished and in no apparent distress.  Eyes: Pupils equal, round. Sclerae anicteric.   HEENT: " Normocephalic, atraumatic.   Neck: Supple. JVD   Respiratory: Breathing non-labored. Lungs clear to auscultation bilaterally. No crackles, wheezes, rhonchi, or rales.  Cardiovascular: Regular rate and rhythm, normal S1 and S2. No murmur, rub, or gallop.  Skin: Warm, dry. No rashes, cyanosis, or xanthelasma.  Extremities: No edema.  Neurologic: No gross motor deficits. Alert, awake, and oriented to person, place and time.  Psychiatric: Affect appropriate.        CURRENT MEDICATIONS:  Current Outpatient Medications   Medication Sig Dispense Refill    cyanocobalamin 1000 MCG TBCR Take 1,000 mcg by mouth daily 100 tablet 1    dexAMETHasone (DECADRON) 4 MG tablet Take 2 tablets (8 mg) by mouth daily Take for 2 days, starting the day after chemo cycle 10, 11 and 12. Take with food. 12 tablet 0    diltiazem (CARDIZEM) 30 MG tablet Take 1 tablet (30 mg) by mouth 3 times daily 90 tablet 0    diltiazem ER COATED BEADS (CARDIZEM CD/CARTIA XT) 120 MG 24 hr capsule Take 1 capsule (120 mg) by mouth daily On chemo days and 2 days post chemo infusion. 60 capsule 3    Ferrous Sulfate (IRON) 325 (65 Fe) MG tablet Take 1 tablet by mouth daily      gabapentin (NEURONTIN) 100 MG capsule Take 2 capsules (200 mg) by mouth daily (with breakfast) for 120 days 60 capsule 3    gabapentin (NEURONTIN) 300 MG capsule Take 1 capsule (300 mg) by mouth 3 times daily 90 capsule 1    lidocaine-prilocaine (EMLA) 2.5-2.5 % external cream Apply topically as needed for moderate pain Apply 15-20 minutes prior to port access 1 g 4    loperamide (IMODIUM) 2 MG capsule Take 2 mg by mouth daily as needed for diarrhea      magnesium oxide 400 MG tablet Take 1 tablet (400 mg) by mouth 2 times daily 60 tablet 0    metoprolol succinate ER (TOPROL XL) 50 MG 24 hr tablet Take 1 tablet (50 mg) by mouth daily 90 tablet 3    Multiple Vitamins-Iron (MULTI-DAY PLUS IRON PO) Take 1 tablet by mouth daily      prochlorperazine (COMPAZINE) 10 MG tablet Take 10 mg by mouth  every 6 hours as needed for nausea or vomiting      rivaroxaban ANTICOAGULANT (XARELTO) 20 MG TABS tablet Take 1 tablet (20 mg) by mouth daily (with dinner) 90 tablet 3    sodium bicarbonate 650 MG tablet Take 1 tablet (650 mg) by mouth 2 times daily 180 tablet 3    vitamin D3 (CHOLECALCIFEROL) 50 mcg (2000 units) tablet Take 1 tablet (50 mcg) by mouth daily 30 tablet 6       ALLERGIES  Allergies   Allergen Reactions    No Known Drug Allergy          PAST MEDICAL HISTORY:  Past Medical History:   Diagnosis Date    Arthropathia 08/05/2010    B12 deficiency anemia 10/21/2010    Benign essential hypertension with target blood pressure below 140/90 10/10/2016    CARDIOVASCULAR SCREENING; LDL GOAL LESS THAN 160 10/31/2010    Crohn's colitis (H) 02/15/2011    Dermatitis-dishydrotic eczema-severe 08/05/2010    Hiatal hernia     HTN (hypertension) 07/21/2011    Iron deficiency anemia 10/21/2010    Malignant neoplasm of sigmoid colon (H)     Obesity     Primary pulmonary hypertension (H) 04/06/2010       PAST SURGICAL HISTORY:  Past Surgical History:   Procedure Laterality Date    COLECTOMY WITHOUT COLOSTOMY N/A 4/6/2023    Procedure: Laparoscopic Converted to Open Total abdominal colectomy;  Surgeon: Jerome Ashley MD;  Location: [facility]    COLONOSCOPY  10/11/10    COLONOSCOPY N/A 2/27/2023    Procedure: ATTEMPTED COLONOSCOPY WITH SIGMOID STRICTURE BIOPSY;  Surgeon: Jonathan Alcocer MD;  Location:  GI    ESOPHAGOSCOPY, GASTROSCOPY, DUODENOSCOPY (EGD), COMBINED N/A 2/27/2023    Procedure: ESOPHAGOGASTRODUODENOSCOPY, WITH BIOPSY;  Surgeon: Jonathan Alcocer MD;  Location:  GI    HYSTERECTOMY TOTAL ABDOMINAL, BILATERAL SALPINGO-OOPHORECTOMY, COMBINED N/A 4/6/2023    Procedure: Hysterectomy total abdominal, bilateral salpingo-oophorectomy;  Surgeon: Sunitha Olea MD;  Location: [facility] OR    INSERT PORT VASCULAR ACCESS Right 5/25/2023    Procedure: Ultrasound-guided right internal jugular venous access port  placement with fluoroscopy;  Surgeon: Martin Thomas DO;  Location: [facility]    IR CHEST PORT PLACEMENT > 5 YRS OF AGE  2023    IR PORT CHECK RIGHT  2023    IR PORT REMOVAL RIGHT  2023    SIGMOIDOSCOPY FLEXIBLE N/A 2023    Procedure: Sigmoidoscopy flexible;  Surgeon: Jerome Ashley MD;  Location: [facility] OR    SURGICAL HISTORY OF -   77    Perineorrhaphy, for widening vaginal orifice    ZZC EXPLORATORY OF ABDOMEN      laparoscopy       FAMILY HISTORY:  Family History   Problem Relation Age of Onset    Hypertension Mother         on meds, alive    Cerebrovascular Disease Father         stroke about age 65,  of cancer at 68 yrs    Diabetes Father         eventually took insulin    Anemia Father         Pernisios anemia    Kidney Disease Niece         kidney transplant    Kidney Disease Nephew         kidney transplant    Venous thrombosis No family hx of     Anesthesia Reaction No family hx of        SOCIAL HISTORY:  Social History     Socioeconomic History    Marital status:    Tobacco Use    Smoking status: Never     Passive exposure: Current    Smokeless tobacco: Never   Substance and Sexual Activity    Alcohol use: No    Drug use: No    Sexual activity: Yes     Partners: Male

## 2023-12-15 ENCOUNTER — INFUSION THERAPY VISIT (OUTPATIENT)
Dept: INFUSION THERAPY | Facility: CLINIC | Age: 68
End: 2023-12-15
Attending: INTERNAL MEDICINE
Payer: MEDICARE

## 2023-12-15 VITALS
RESPIRATION RATE: 16 BRPM | OXYGEN SATURATION: 99 % | DIASTOLIC BLOOD PRESSURE: 61 MMHG | HEART RATE: 54 BPM | TEMPERATURE: 98.3 F | SYSTOLIC BLOOD PRESSURE: 130 MMHG

## 2023-12-15 DIAGNOSIS — C18.7 MALIGNANT NEOPLASM OF SIGMOID COLON (H): ICD-10-CM

## 2023-12-15 DIAGNOSIS — Z76.89 PREVENTION OF CHEMOTHERAPY-INDUCED NEUTROPENIA: Primary | ICD-10-CM

## 2023-12-15 PROCEDURE — 258N000003 HC RX IP 258 OP 636: Performed by: INTERNAL MEDICINE

## 2023-12-15 PROCEDURE — 96360 HYDRATION IV INFUSION INIT: CPT

## 2023-12-15 PROCEDURE — 250N000011 HC RX IP 250 OP 636: Mod: JZ | Performed by: INTERNAL MEDICINE

## 2023-12-15 PROCEDURE — 96361 HYDRATE IV INFUSION ADD-ON: CPT

## 2023-12-15 RX ORDER — HEPARIN SODIUM (PORCINE) LOCK FLUSH IV SOLN 100 UNIT/ML 100 UNIT/ML
5 SOLUTION INTRAVENOUS
Status: DISCONTINUED | OUTPATIENT
Start: 2023-12-15 | End: 2023-12-15 | Stop reason: HOSPADM

## 2023-12-15 RX ADMIN — HEPARIN 5 ML: 100 SYRINGE at 15:56

## 2023-12-15 RX ADMIN — SODIUM CHLORIDE 1000 ML: 9 INJECTION, SOLUTION INTRAVENOUS at 13:53

## 2023-12-15 ASSESSMENT — PAIN SCALES - GENERAL: PAINLEVEL: NO PAIN (0)

## 2023-12-15 NOTE — PROGRESS NOTES
Infusion Nursing Note:  Nadia Ladd presents today for chemo pump disconnect and IVF.    Patient seen by provider today: No   present during visit today: Not Applicable.    Note: Patient ambulated to IVO with . Feeling fatigued with some nausea. VSS. Afebrile.  No  pain. .      Intravenous Access:  Implanted Port.    Treatment Conditions:  Not Applicable.      Post Infusion Assessment:  Patient tolerated infusion without incident.  Blood return noted pre and post infusion.  Site patent and intact, free from redness, edema or discomfort.  Access discontinued per protocol.       Discharge Plan:   Patient discharged in stable condition accompanied by: self/.  Departure Mode: Ambulatory.      Eli Ladd RN

## 2023-12-18 ENCOUNTER — OFFICE VISIT (OUTPATIENT)
Dept: AUDIOLOGY | Facility: CLINIC | Age: 68
End: 2023-12-18
Payer: MEDICARE

## 2023-12-18 DIAGNOSIS — H90.3 SENSORINEURAL HEARING LOSS, ASYMMETRICAL: Primary | ICD-10-CM

## 2023-12-18 PROCEDURE — 92593 PR HEARING AID CHECK, BINAURAL: CPT | Performed by: AUDIOLOGIST

## 2023-12-18 NOTE — PROGRESS NOTES
AUDIOLOGY REPORT    SUBJECTIVE:   Nadia Ladd is a 68 year old female who was seen in the Audiology Clinic at the [facility] for a fitting of gifted Oticon Alta2 Pro RITE hearing aids. Previous results evaluation 10/23/2023 showed moderate rising to mild sloping to severe sensorineural hearing loss right ear and mild sloping to severe senosrineural hearing loss left ear. Dr. Zurita provided medical clearance.  OBJECTIVE:   The hearing aid conformity evaluation was completed.The hearing aids were placed and they provided a good fit. Real-ear-probe-microphone measurements were completed on the [employer] system and were a good match to NAL-NL2 target prescriptive targets. Nadia was oriented to proper hearing aid use, care, cleaning (no water, dry brush), batteries (size: BATTERY SIZE: 312, insertion/removal, toxicity, low-battery signal), aid insertion/removal, user booklet, warranty information, storage cases, and other hearing aid details. The patient confirmed understanding of hearing aid use and care, and showed proper insertion of hearing aid and batteries while in the office today.Nadia reported good volume and sound quality today.   Hearing aids were programmed as follows:  Program 1:Universal/All-Around/General    EAR(S) FIT: Bilateral  HEARING AID MODEL NAME: Oticon Alta2 Pro  HEARING AID STYLE: -in-the-ear behind-the-ear  EARMOLDS/TIP: Small double dome  SERIAL NUMBERS: Right: [id number] Left: [id number]    ASSESSMENT:   Binaural Oticon Alta2 Pro hearing aid(s) were fit today. Verification measures were performed.   PLAN:  Nadia will return for follow-up in 2-3 weeks for a hearing aid review appointment. Please call this clinic with questions regarding today s appointment.    Delmar Nuñez Licensed Audiologist #[id number]

## 2023-12-20 ENCOUNTER — INFUSION THERAPY VISIT (OUTPATIENT)
Dept: INFUSION THERAPY | Facility: CLINIC | Age: 68
End: 2023-12-20
Attending: INTERNAL MEDICINE
Payer: MEDICARE

## 2023-12-20 VITALS
BODY MASS INDEX: 35.28 KG/M2 | DIASTOLIC BLOOD PRESSURE: 65 MMHG | TEMPERATURE: 98.8 F | SYSTOLIC BLOOD PRESSURE: 146 MMHG | OXYGEN SATURATION: 98 % | HEART RATE: 67 BPM | WEIGHT: 186.6 LBS | RESPIRATION RATE: 16 BRPM

## 2023-12-20 DIAGNOSIS — C18.9 MALIGNANT NEOPLASM OF COLON, UNSPECIFIED PART OF COLON (H): Primary | ICD-10-CM

## 2023-12-20 DIAGNOSIS — C18.7 MALIGNANT NEOPLASM OF SIGMOID COLON (H): Primary | ICD-10-CM

## 2023-12-20 DIAGNOSIS — D70.1 CHEMOTHERAPY-INDUCED NEUTROPENIA (H): ICD-10-CM

## 2023-12-20 DIAGNOSIS — T45.1X5A CHEMOTHERAPY-INDUCED NEUTROPENIA (H): ICD-10-CM

## 2023-12-20 DIAGNOSIS — Z76.89 PREVENTION OF CHEMOTHERAPY-INDUCED NEUTROPENIA: ICD-10-CM

## 2023-12-20 LAB
ALBUMIN SERPL BCG-MCNC: 3.9 G/DL (ref 3.5–5.2)
ALP SERPL-CCNC: 408 U/L (ref 40–150)
ALT SERPL W P-5'-P-CCNC: 50 U/L (ref 0–50)
ANION GAP SERPL CALCULATED.3IONS-SCNC: 11 MMOL/L (ref 7–15)
AST SERPL W P-5'-P-CCNC: 57 U/L (ref 0–45)
BILIRUB SERPL-MCNC: 0.7 MG/DL
BUN SERPL-MCNC: 23.4 MG/DL (ref 8–23)
CALCIUM SERPL-MCNC: 9 MG/DL (ref 8.8–10.2)
CHLORIDE SERPL-SCNC: 108 MMOL/L (ref 98–107)
CREAT SERPL-MCNC: 1.47 MG/DL (ref 0.51–0.95)
DEPRECATED HCO3 PLAS-SCNC: 21 MMOL/L (ref 22–29)
EGFRCR SERPLBLD CKD-EPI 2021: 39 ML/MIN/1.73M2
GLUCOSE SERPL-MCNC: 110 MG/DL (ref 70–99)
MAGNESIUM SERPL-MCNC: 1.5 MG/DL (ref 1.7–2.3)
POTASSIUM SERPL-SCNC: 4.4 MMOL/L (ref 3.4–5.3)
PROT SERPL-MCNC: 6.3 G/DL (ref 6.4–8.3)
SODIUM SERPL-SCNC: 140 MMOL/L (ref 135–145)

## 2023-12-20 PROCEDURE — 80053 COMPREHEN METABOLIC PANEL: CPT | Performed by: INTERNAL MEDICINE

## 2023-12-20 PROCEDURE — 250N000011 HC RX IP 250 OP 636: Performed by: INTERNAL MEDICINE

## 2023-12-20 PROCEDURE — 83735 ASSAY OF MAGNESIUM: CPT | Performed by: INTERNAL MEDICINE

## 2023-12-20 PROCEDURE — 96365 THER/PROPH/DIAG IV INF INIT: CPT

## 2023-12-20 PROCEDURE — 258N000003 HC RX IP 258 OP 636: Performed by: INTERNAL MEDICINE

## 2023-12-20 PROCEDURE — 36415 COLL VENOUS BLD VENIPUNCTURE: CPT | Performed by: INTERNAL MEDICINE

## 2023-12-20 RX ORDER — HEPARIN SODIUM,PORCINE 10 UNIT/ML
5 VIAL (ML) INTRAVENOUS
Status: CANCELLED | OUTPATIENT
Start: 2023-12-20

## 2023-12-20 RX ORDER — HEPARIN SODIUM (PORCINE) LOCK FLUSH IV SOLN 100 UNIT/ML 100 UNIT/ML
5 SOLUTION INTRAVENOUS
Status: DISCONTINUED | OUTPATIENT
Start: 2023-12-20 | End: 2023-12-20 | Stop reason: HOSPADM

## 2023-12-20 RX ORDER — MEPERIDINE HYDROCHLORIDE 25 MG/ML
25 INJECTION INTRAMUSCULAR; INTRAVENOUS; SUBCUTANEOUS EVERY 30 MIN PRN
Status: CANCELLED | OUTPATIENT
Start: 2023-12-20

## 2023-12-20 RX ORDER — MAGNESIUM SULFATE HEPTAHYDRATE 40 MG/ML
4 INJECTION, SOLUTION INTRAVENOUS ONCE
Status: COMPLETED | OUTPATIENT
Start: 2023-12-20 | End: 2023-12-20

## 2023-12-20 RX ORDER — HEPARIN SODIUM (PORCINE) LOCK FLUSH IV SOLN 100 UNIT/ML 100 UNIT/ML
5 SOLUTION INTRAVENOUS
Status: CANCELLED | OUTPATIENT
Start: 2023-12-20

## 2023-12-20 RX ORDER — EPINEPHRINE 1 MG/ML
0.3 INJECTION, SOLUTION, CONCENTRATE INTRAVENOUS EVERY 5 MIN PRN
Status: CANCELLED | OUTPATIENT
Start: 2023-12-20

## 2023-12-20 RX ORDER — METHYLPREDNISOLONE SODIUM SUCCINATE 125 MG/2ML
125 INJECTION, POWDER, LYOPHILIZED, FOR SOLUTION INTRAMUSCULAR; INTRAVENOUS
Status: CANCELLED
Start: 2023-12-20

## 2023-12-20 RX ORDER — ALBUTEROL SULFATE 0.83 MG/ML
2.5 SOLUTION RESPIRATORY (INHALATION)
Status: CANCELLED | OUTPATIENT
Start: 2023-12-20

## 2023-12-20 RX ORDER — DIPHENHYDRAMINE HYDROCHLORIDE 50 MG/ML
50 INJECTION INTRAMUSCULAR; INTRAVENOUS
Status: CANCELLED
Start: 2023-12-20

## 2023-12-20 RX ORDER — ALBUTEROL SULFATE 90 UG/1
1-2 AEROSOL, METERED RESPIRATORY (INHALATION)
Status: CANCELLED
Start: 2023-12-20

## 2023-12-20 RX ADMIN — MAGNESIUM SULFATE IN WATER 4 G: 40 INJECTION, SOLUTION INTRAVENOUS at 14:41

## 2023-12-20 RX ADMIN — HEPARIN 5 ML: 100 SYRINGE at 16:00

## 2023-12-20 RX ADMIN — SODIUM CHLORIDE 1000 ML: 9 INJECTION, SOLUTION INTRAVENOUS at 13:51

## 2023-12-20 ASSESSMENT — PAIN SCALES - GENERAL: PAINLEVEL: NO PAIN (0)

## 2023-12-20 NOTE — PROGRESS NOTES
Infusion Nursing Note:  Nadia Ladd presents today for C12D8 IVF with mag replacement.    Patient seen by provider today: No   present during visit today: Not Applicable.    Note: Had a little nausea with last tx. Good today.      Intravenous Access:  Implanted Port.    Treatment Conditions:  Lab Results   Component Value Date     12/20/2023    POTASSIUM 4.4 12/20/2023    MAG 1.5 (L) 12/20/2023    CR 1.47 (H) 12/20/2023    LIVIER 9.0 12/20/2023    BILITOTAL 0.7 12/20/2023    ALBUMIN 3.9 12/20/2023    ALT 50 12/20/2023    AST 57 (H) 12/20/2023     Magnesium 1.5    Post Infusion Assessment:  Patient tolerated infusion without incident.       Discharge Plan:   Patient discharged in stable condition accompanied by: .  Departure Mode: Ambulatory.      Marycarmen Cardenas RN

## 2023-12-22 DIAGNOSIS — E83.42 HYPOMAGNESEMIA: ICD-10-CM

## 2023-12-22 DIAGNOSIS — K52.9 CHRONIC DIARRHEA: ICD-10-CM

## 2023-12-22 DIAGNOSIS — I48.0 PAROXYSMAL ATRIAL FIBRILLATION WITH RVR (H): ICD-10-CM

## 2023-12-22 RX ORDER — LANOLIN ALCOHOL/MO/W.PET/CERES
400 CREAM (GRAM) TOPICAL 2 TIMES DAILY
Qty: 60 TABLET | Refills: 0 | Status: SHIPPED | OUTPATIENT
Start: 2023-12-22 | End: 2024-01-22

## 2023-12-29 ENCOUNTER — TELEPHONE (OUTPATIENT)
Dept: CARDIOLOGY | Facility: CLINIC | Age: 68
End: 2023-12-29
Payer: MEDICARE

## 2023-12-29 DIAGNOSIS — I48.0 PAROXYSMAL ATRIAL FIBRILLATION (H): ICD-10-CM

## 2023-12-29 NOTE — TELEPHONE ENCOUNTER
[facility] Call Center    Phone Message    May a detailed message be left on voicemail: yes     Reason for Call: Medication Refill Request    Has the patient contacted the pharmacy for the refill? Yes   Name of medication being requested: rivaroxaban ANTICOAGULANT (XARELTO) 20 MG TABS tablet   Provider who prescribed the medication: Marce Sandy  Pharmacy: [facility] ([phone])  Date medication is needed: 12/29/23    **Nadia is in need of this refill by 4 PM today because she is trying to avoid paying $500**    Action Taken: Other: Cardiology    Travel Screening: Not Applicable    Thank you!  [facility]

## 2024-01-02 ENCOUNTER — HOSPITAL ENCOUNTER (OUTPATIENT)
Dept: CT IMAGING | Facility: CLINIC | Age: 69
Discharge: HOME | End: 2024-01-02
Attending: INTERNAL MEDICINE
Payer: COMMERCIAL

## 2024-01-02 ENCOUNTER — ONCOLOGY VISIT (OUTPATIENT)
Dept: ONCOLOGY | Facility: CLINIC | Age: 69
End: 2024-01-02
Payer: COMMERCIAL

## 2024-01-02 ENCOUNTER — LAB (OUTPATIENT)
Dept: INFUSION THERAPY | Facility: CLINIC | Age: 69
End: 2024-01-02
Attending: INTERNAL MEDICINE
Payer: COMMERCIAL

## 2024-01-02 VITALS
RESPIRATION RATE: 15 BRPM | BODY MASS INDEX: 36.53 KG/M2 | OXYGEN SATURATION: 99 % | HEIGHT: 61 IN | SYSTOLIC BLOOD PRESSURE: 134 MMHG | TEMPERATURE: 97.9 F | DIASTOLIC BLOOD PRESSURE: 84 MMHG | WEIGHT: 193.5 LBS | HEART RATE: 84 BPM

## 2024-01-02 DIAGNOSIS — C18.7 MALIGNANT NEOPLASM OF SIGMOID COLON (H): ICD-10-CM

## 2024-01-02 DIAGNOSIS — T45.1X5A CHEMOTHERAPY-INDUCED NEUROPATHY (H): ICD-10-CM

## 2024-01-02 DIAGNOSIS — T45.1X5A CHEMOTHERAPY-INDUCED NEUTROPENIA (H): ICD-10-CM

## 2024-01-02 DIAGNOSIS — D70.1 CHEMOTHERAPY-INDUCED NEUTROPENIA (H): ICD-10-CM

## 2024-01-02 DIAGNOSIS — G62.0 CHEMOTHERAPY-INDUCED NEUROPATHY (H): ICD-10-CM

## 2024-01-02 DIAGNOSIS — C18.9 MALIGNANT NEOPLASM OF COLON, UNSPECIFIED PART OF COLON (H): Primary | ICD-10-CM

## 2024-01-02 LAB
CREAT BLD-MCNC: 1.5 MG/DL (ref 0.5–1)
EGFRCR SERPLBLD CKD-EPI 2021: 38 ML/MIN/1.73M2

## 2024-01-02 PROCEDURE — 71260 CT THORAX DX C+: CPT

## 2024-01-02 PROCEDURE — 82565 ASSAY OF CREATININE: CPT

## 2024-01-02 PROCEDURE — 250N000011 HC RX IP 250 OP 636: Performed by: INTERNAL MEDICINE

## 2024-01-02 PROCEDURE — 250N000009 HC RX 250: Performed by: INTERNAL MEDICINE

## 2024-01-02 PROCEDURE — 99214 OFFICE O/P EST MOD 30 MIN: CPT | Performed by: INTERNAL MEDICINE

## 2024-01-02 RX ORDER — ALBUTEROL SULFATE 90 UG/1
1-2 AEROSOL, METERED RESPIRATORY (INHALATION)
Status: CANCELLED
Start: 2024-01-02

## 2024-01-02 RX ORDER — HEPARIN SODIUM (PORCINE) LOCK FLUSH IV SOLN 100 UNIT/ML 100 UNIT/ML
5 SOLUTION INTRAVENOUS
Status: DISCONTINUED | OUTPATIENT
Start: 2024-01-02 | End: 2024-01-02 | Stop reason: HOSPADM

## 2024-01-02 RX ORDER — HEPARIN SODIUM (PORCINE) LOCK FLUSH IV SOLN 100 UNIT/ML 100 UNIT/ML
5 SOLUTION INTRAVENOUS
Status: CANCELLED | OUTPATIENT
Start: 2024-01-02

## 2024-01-02 RX ORDER — IOPAMIDOL 755 MG/ML
500 INJECTION, SOLUTION INTRAVASCULAR ONCE
Status: COMPLETED | OUTPATIENT
Start: 2024-01-02 | End: 2024-01-02

## 2024-01-02 RX ORDER — HEPARIN SODIUM,PORCINE 10 UNIT/ML
5 VIAL (ML) INTRAVENOUS
Status: CANCELLED | OUTPATIENT
Start: 2024-01-02

## 2024-01-02 RX ORDER — MEPERIDINE HYDROCHLORIDE 25 MG/ML
25 INJECTION INTRAMUSCULAR; INTRAVENOUS; SUBCUTANEOUS EVERY 30 MIN PRN
Status: CANCELLED | OUTPATIENT
Start: 2024-01-02

## 2024-01-02 RX ORDER — ALBUTEROL SULFATE 0.83 MG/ML
2.5 SOLUTION RESPIRATORY (INHALATION)
Status: CANCELLED | OUTPATIENT
Start: 2024-01-02

## 2024-01-02 RX ORDER — EPINEPHRINE 1 MG/ML
0.3 INJECTION, SOLUTION, CONCENTRATE INTRAVENOUS EVERY 5 MIN PRN
Status: CANCELLED | OUTPATIENT
Start: 2024-01-02

## 2024-01-02 RX ORDER — DIPHENHYDRAMINE HYDROCHLORIDE 50 MG/ML
50 INJECTION INTRAMUSCULAR; INTRAVENOUS
Status: CANCELLED
Start: 2024-01-02

## 2024-01-02 RX ORDER — METHYLPREDNISOLONE SODIUM SUCCINATE 125 MG/2ML
125 INJECTION, POWDER, LYOPHILIZED, FOR SOLUTION INTRAMUSCULAR; INTRAVENOUS
Status: CANCELLED
Start: 2024-01-02

## 2024-01-02 RX ADMIN — HEPARIN 5 ML: 100 SYRINGE at 10:31

## 2024-01-02 RX ADMIN — SODIUM CHLORIDE 61 ML: 9 INJECTION, SOLUTION INTRAVENOUS at 10:13

## 2024-01-02 RX ADMIN — IOPAMIDOL 90 ML: 755 INJECTION, SOLUTION INTRAVENOUS at 10:16

## 2024-01-02 ASSESSMENT — PAIN SCALES - GENERAL: PAINLEVEL: NO PAIN (0)

## 2024-01-02 NOTE — PROGRESS NOTES
[facility]    Hematology/Oncology Established Patient Follow Up Note      Today's Date: 1/2/24    Reason for follow up: Sigmoid cancer    HISTORY OF PRESENT ILLNESS: Nadia Ladd is a 68 year old female who presents for follow-up    Patient has medical history including Crohn's disease on sulfasalazine, anemia secondary to iron deficiency and B12 deficiency, obesity, hypertension, prediabetes, eczema, pulmonary hypertension, hiatal hernia, mild splenomegaly (14.7 cm in 2/23)    - 2/23 pt noted increasing fatigue, found to have iron deficiency anemia w/hgb 6.8 (previously required RBC transfusion when dx w/Crohn's), did have intermittent changes in stool but not persistent (noted minimal blood in stool)   - 2/23 upper endoscopy for iron deficiency anemia and Crohn's disease: Hiatal hernia, normal stomach, normal duodenum  - 2/23 colonoscopy: A frond-like/villous partially obstructing large mass found in sigmoid colon, partially circumferential involving two thirds of the lumen circumference, 4 cm, unable to traverse, with oozing, s/p biopsy  PATHOLOGY:  A.  Stomach, antrum: Biopsy:  - Antral type mucosa with mild chronic inactive gastritis  - Immunostain for Helicobacter pylori is negative      B.  Colon, sigmoid, stricture: Biopsy:  - Invasive poorly differentiated carcinoma  The sigmoid stricture shows a high-grade carcinoma without definitive gland formation.  Given the poorly differentiated appearance, an immunohistochemical panel was performed to exclude the possibility of a tumor by direct extension or metastasis.   Immunohistochemical stains are performed and show the tumor to be diffusely positive for CK7 and partially positive for CK20 and SATB2.  There is no significant tumor reactivity for CDX2, GATA3, PAX8, TTF-1, and chromogranin.  Synaptophysin highlights very rare positive cells. Although the CK7/CK20 profile is not entirely typical for a colorectal carcinoma, immunostains  "for tumors of other origins are negative and a colorectal primary is still favored.   - Mismatch repair:  1) MLH1-loss of nuclear expression  2) MSH2-intact  3) MSH6-intact  4) PMS2-loss of nuclear expression  -MLH1 promoter methylation: NEGATIVE    -2/23 CT CAP:  A) 4 cm length mass in the proximal sigmoid colon. There is some irregularity and stranding in the adjacent pericolonic fat which may indicate tumor extension. There is no obstruction.   B) there is a second probable mass at the hepatic flexure of the colon measuring 2.5 cm in length   C)There are small lymph nodes in the mesial colon adjacent to the hepatic flexure abnormality and the sigmoid colonic mass. No lymphadenopathy by size criteria  D) There is submucosal fatty deposition in the colon, most severe in the distal sigmoid colon and rectum, which can be seen secondary to quiescent inflammatory bowel disease.  E) no liver lesion    -3/23 PET:  a. There is increased FDG avidity of the sigmoid colon with focal lobular wall thickening consistent with biopsy-proven adenocarcinoma.  b. Secondary focus noted at the hepatic flexure demonstrates elevated FDG avidity and lobulated wall thickening highly suspicious for a additional primary.  c. Additionally there is a smaller focus of wall thickening with elevated FDG avidity along the left aspect of the transverse colon  which is suspicious for a possible third focus of colonic malignancy.    -3/23 CEA 6.8  - 3/23 colorectal surgery consultation with - in the setting of Crohn's, at least sigmoid colectomy with possible total abdominal colectomy with either ileorectal anastomosis or end ileostomy (\"rectum can remain active and be actively surveyed annually\")    -3/23 genetic counseling, testing for Chan syndrome    - 4/5/2023 gynecologic oncology consultation for risk reduction surgery with hysterectomy and BSO at time of colectomy    -4/6/2023 laparoscopic converted to open total abdominal " colectomy with ileorectal anastomosis, partial omentectomy, flexible sigmoidoscopy, TAHBSO  A. OMENTUM, RESECTION:  - Adipose tissue with no significant histopathologic abnormality  - No evidence of malignancy     B. COLON, RESECTION:  Mass #1 (ascending colon/hepatic flexure): Mixed neuroendocrine-non-neuroendocrine neoplasm (MINEN):  - Neuroendocrine carcinoma component (70%) and moderately-differentiated adenocarcinoma component (30%)  - Size = 5.0 cm, invading into muscularis propria  - Positive LVI  - Negative macroscopic tumor perforation, negative PNI  - Negative surgical resection margins  - IHC: Neuroendocrine portion: Negative for chromogranin and INSM1 but strongly express synaptophysin  - IHC: Adenocarcinoma portion: Positive for CK20, CDX2 negative for CK7, PAX8, ER, S100  Ki-67 proliferation index of approximately 80%.  MMR:  Intact nuclear expression of MLH1, MSH2, MHS6, PMS2  PDL1:  COMBINED POSITIVE SCORE (CPS): 55-60  TUMOR PROPORTION SCORE (TPS): 50%   pathogenic KRAS mutation (G13D) was identified (5.2%).  AJCC 8th edition: stage 3B mpT3 pN1a     Mass#2 (sigmoid colon): Poorly-differentiated carcinoma:  - Grade 3  - Size = 5.7 cm, invading into muscularis propria  - Negative for microscopic tumor perforation, LVI, perineural invasion  - Negative surgical resection margins  - IHC: Positive for scar and keratin AE1/AE3, negative for CK7, CK20, CDX2, PAX8, ER, chromogranin, CD56, p40, synaptophysin, S100, INI1, p40, INSM1  Ki-67 proliferation index of approximately 70%.  MMR: loss of nuclear expression MLH1 and PMS2, intact nuclear expression MSH2 and MSH6  PDL1:  COMBINED POSITIVE SCORE (CPS): 80  TUMOR PROPORTION SCORE (TPS): 70%  pathogenic KRAS mutation (G13D) was identified (4.5%).  AJCC 8th edition: stage 3A mpT2 pN1a    - One of forty-one sampled lymph nodes positive (1/41)  - Benign appendix  - Tubular adenoma    C. UTERUS, CERVIX, BILATERAL FALLOPIAN TUBES & OVARIES, :  - Benign endometrial  polyps; atrophic endometrium  - Uterus, cervix, bilateral fallopian tubes, and ovaries with no significant morphologic abnormalities  - No evidence of dysplasia or malignancy     D. SMALL INTESTINE, TERMINAL ILEUM, TERMINAL ILIUM:  - Benign ileum  - No evidence of dysplasia or malignancy  - Negative for metastases to one of one sampled lymph node (0 /1)     E. PROXIMAL ANASTOMOTIC RING:  - Benign small intestine  - No evidence of dysplasia or malignancy     F. DISTAL ANASTOMOTIC RING:  - Benign colon  - No evidence of dysplasia or malignancy    CRC NGS:   --mutations positive for KRAS G13D mutation 5.2% mass 1, KRAS G13D mutation 4.5% mass 2 (negative for AKT1; BRAF; ERBB2; KRAS; NRAS; PIK3CA; PTEN)  --TMB score: 17.064 mut/Mb mass 1 (CPS 55-60, TPS 50%), 60.943 mut/Mb mass 2 (CPS 80, TPS 70%)  --fusion negative including NTRK and RET for mass 1 and 2 (also negative for AKT1, AKT3, ALK, AR, PJYROY28, CLAUDINE, BCOR, BRAF, BRD3, BRD4, CAMTA1, CCNB3, CCND1, , CIC, CSF1, CSF1R, CTNNB1, DNAJB1, EGFR, EPC1, ERBB2, ERBB4, ERG, ESR1, ESRRA, ETV1, ETV4, ETV5, ETV6, EWSR1, FGFR1, FGFR2, FGFR3, FGR, FOS, FOSB, FOXO1, FOXO4, FOXR2, FUS, GLI1, GRB7, HMGA2, HRAS, IDH1, IDH2, INSR, JAK2, JAK3, JAZF1, KRAS, MAML2, MAP2K1, MAST1, MAST2, MEAF6, MET, MKL2, MN1, MSMB, MUSK, MYB, MYBL1, MYOD1, NCOA1, NCOA2, NOTCH1, NOTCH2, NR4A3, NRAS, NRG1, NTRK1, NTRK2, NTRK3, NUMBL1, NUTM1, PAX3, PDGFB, PDGFRA, PDGFRB, PHF1, PIK3CA, PKN1, PLAG1, PPARG, PRKACA, PRKCA, PRKCB, RAF1, RELA, RET, ROS1, RPSO2, RSPO3, SS18, STAT6, TAF15, TCF12, TERT, TFE3, TFEB, TFG, THADA, TMPRSS2, USP6, VGLL2, YAP1, YWHAE)    -4/11/2023 patient discharged from hospital, planning for 30 days of lovenox postop, oxycodone for pain (required 1 unit PRBC for anemia)    -5/8/23 I presented this case at [facility] CRC tumor board and their recommendations include:  - common to see this in Crohn's, overall poor prognosis, treat aggressively, may be poorly responsive to  chemotherapy  - standard of care is mFOLFOX 6 x 12 cycles  - acknowledge that pt has high TPS and likely response to immunotherapy however not sufficient data or standard of care in stage 3 adjuvant setting  - add MMR testing to mass #1 hepatic flexure mass    - 5/9/23 admitted for acute renal failure w/Cr 3.82 w/K 7.0    - 5/19/23 second opinion at [facility] w/medical oncologist Dr. Acharya, thorough consultation and recommendations appreciated     - pt would like to proceed with FOFLOX (with dose reduced oxaliplatin due to kidney function)    -5/2023 genetic counseling: Negative for mutations in EPCAM, MLH1, MSH2, MSH6, and PMS2 genes.  Negative for mutations in APC, AXIN2, BMPR1A, CDH1, CHEK2, EPCAM, GREM1, MLH1, MSH2, MSH3, MSH6, MUTYH, NTHL1, PMS2, POLD1, POLE, PTEN, SMAD4, STK11, TP53, CDKN1B, MEN1, NF1, RET, TSC1, TSC2, and VHL genes    - 5/25/23 port placement    - 5/26/23 CT CAP w/ contrast and IVF before and after CT (baseline prior to starting tx):  1.  3 mm nodule RML and 5 mm lateral EDSON nodule, minimally increased from previous  2.  New 4 mm EDSON groundglass density nodule  3.  Several prominent borderline enlarged mediastinal lymph nodes slightly increased from previous  4.  Splenomegaly 14.5 cm  5. S/p subtotal colectomy with ileorectal anastomosis with postsurgical changes along the ventral abdominal wall midline    -5/31/23 started mFOLFOX 6 w/dose reduced oxaliplatin    - C2D1 6/14/23 held due to new onset paroxysmal A-fib with RVR requiring hospitalization 6/3/2023 (also hypoMg, dehydrated)  - 6/19/2023 I presented this case at [facility] colorectal tumor board at the [facility], it is possible that paroxysmal A-fib may be related to 5-FU.  Options include withholding further treatment versus retrialing 5-FU under the guidance of cardio oncology in an inpatient setting.  No other adjuvant treatment options available, including immunotherapy  - 6/30/2023 consultation with cardio oncologist Dr. Garza; I  spoke with Dr. Garza 6/30/2023 as well, [id number] score 3, recommending starting apixaban 5 mg twice daily, okay to retrial FOLFOX while patient is on metoprolol  -7/18/2023  CT CAP- subcm lung nodules stable, borderline and mildly enlarged mediastinal LN and periportal LN stable, splenomegaly 15.7cm, Subtotal colectomy with ileorectal anastomosis. No acute inflammation or obstruction   - 7/19/2023 mFOLFOX6 C2D1  (inpatient w/continuous cardiac monitoring)      -9/23 CT CAP (after cycle 6)  -Subtotal colectomy, rectal anastomosis appears stable, some adjacent mild scarring, no bowel obstruction or inflammation, no free fluid  -No new adenopathy, no liver lesions  -Stable EDSON 3 mm nodule, other stable nodules at EDSON, RML  -Stable several small thoracic lymph nodes in mediastinum  -Mild wall thickening of the gallbladder  -Spleen is smaller measuring 13.7 cm  -Stable regions of bilateral cortical thinning in the kidneys, with few tiny cysts    INTERIM HISTORY:    - 5/31/23-12/12/23 adjuvant mFOLFOX 6 q14 days x12 cycles/6 months w/dose reduced oxaliplatin and dropped 5FU bolus C2 onwards, IVF and IV Mg replacement weekly and 2/2 recurrent afib w/RVR- During the days of chemo and 2 days after ONLY: take long acting diltiazem 120 mg daily with 30 mg of short acting diltiazem as needed for atrial fibrillation with HR >110 (C1D1 w/oxaliplatin dose reduced from 85mg/m2 to 64mg/m2 2/2 renal function, 5/31/23 (complicated by hospitalization for afib w/RVR C1D4 6/3/23), C2D1 7/19/23 (w/o 5FU bolus C2 onwards, inpatient w/continuous cardiac monitoring), C3D1 8/2/2023 (outpatient, admitted to ER for A-fib with RVR C3D3 8/4/23), C4D1 8/16/23, C5D1 8/30/2023, C6D1 9/13/2023, C7D1 9/26/23, C8D1 10/11/23, C9D1 10/25/23, C10D1 11/8/23, C11D1 11/22/23. C12D1 12/12/23 (1 week delay 2/2 cytopenias)      Treatment related AE:  - hearing changes-stable w/dose reduced oxaliplatin, flonase prn, continue use of b/l hearing aids  -  Paroxysmal A-fib with RVR and PAC- cardiology follow-up 12/14/2023 noted- no longer on long acting diltiazem, only using short acting prn; continuing metoprolol and xarelto and plan for follow up 12/2024  - anxiety- continue following with oncology psychology team  - Normocytic anemia- due to chemotherapy and iron deficiency, s/p ferric carboxymaltose 750mg x2 doses 6/14/23 and 6/28/23; repeat iron studies normal. 11/23 iron studies, B12, RBC folate WNL  - neutropenia- 12/23 ANC 1.2, monitor for neutropenic fever  -Thrombocytopenia- 12/23 plt 109K, continue xarelto, monitor for bleeding/bruising   - MARYA on CKD- following w/nephro, f/u with nephro 12/4/2023 noted w/next follow up 6/2024; continue increasing PO fluids  - hypoMg- Mg 1.5 12/20/23, given replacement; repeat w/next labs  - elevated LFTs- AST 62- stable, Alk phos 346- improving, alk phos iso enzyme- majority liver, liver normal on 9/23 CT and 11/23 MRI (no mets)  - diarrhea- resolved  - neuropathy- grade 2,  continue gabapentin 100mg in AM and 300mg at bedtime, add gabapentin 100mg in afternoon and titrate up to 200mg then 300mg as tolerated  - cold sensitivity- grade 1, improving       REVIEW OF SYSTEMS:   A 14 point ROS was reviewed with pertinent positives and negatives in the HPI.        HOME MEDICATIONS:  Current Outpatient Medications   Medication Sig Dispense Refill    cyanocobalamin 1000 MCG TBCR Take 1,000 mcg by mouth daily 100 tablet 1    dexAMETHasone (DECADRON) 4 MG tablet Take 2 tablets (8 mg) by mouth daily Take for 2 days, starting the day after chemo cycle 10, 11 and 12. Take with food. 12 tablet 0    Ferrous Sulfate (IRON) 325 (65 Fe) MG tablet Take 1 tablet by mouth daily      gabapentin (NEURONTIN) 100 MG capsule Take 2 capsules (200 mg) by mouth daily (with breakfast) for 120 days 60 capsule 3    gabapentin (NEURONTIN) 300 MG capsule Take 1 capsule (300 mg) by mouth 3 times daily 90 capsule 1    lidocaine-prilocaine (EMLA) 2.5-2.5 %  external cream Apply topically as needed for moderate pain Apply 15-20 minutes prior to port access 1 g 4    loperamide (IMODIUM) 2 MG capsule Take 2 mg by mouth daily as needed for diarrhea      MAGNESIUM OXIDE 400 (240 Mg) MG tablet TAKE 1 TABLET (400 MG) BY MOUTH 2 TIMES DAILY 60 tablet 0    metoprolol succinate ER (TOPROL XL) 50 MG 24 hr tablet Take 1 tablet (50 mg) by mouth daily 90 tablet 3    Multiple Vitamins-Iron (MULTI-DAY PLUS IRON PO) Take 1 tablet by mouth daily      rivaroxaban ANTICOAGULANT (XARELTO) 20 MG TABS tablet Take 1 tablet (20 mg) by mouth daily (with dinner) 90 tablet 3    sodium bicarbonate 650 MG tablet Take 1 tablet (650 mg) by mouth 2 times daily 180 tablet 3    vitamin D3 (CHOLECALCIFEROL) 50 mcg (2000 units) tablet Take 1 tablet (50 mcg) by mouth daily 30 tablet 6    diltiazem (CARDIZEM) 30 MG tablet Take 1 tablet (30 mg) by mouth 3 times daily (Patient not taking: Reported on 1/2/2024) 90 tablet 0    diltiazem ER COATED BEADS (CARDIZEM CD/CARTIA XT) 120 MG 24 hr capsule Take 1 capsule (120 mg) by mouth daily On chemo days and 2 days post chemo infusion. (Patient not taking: Reported on 1/2/2024) 60 capsule 3    prochlorperazine (COMPAZINE) 10 MG tablet Take 10 mg by mouth every 6 hours as needed for nausea or vomiting (Patient not taking: Reported on 1/2/2024)           ALLERGIES:  Allergies   Allergen Reactions    No Known Drug Allergy          PAST MEDICAL HISTORY:  Past Medical History:   Diagnosis Date    Arthropathia 08/05/2010    B12 deficiency anemia 10/21/2010    Benign essential hypertension with target blood pressure below 140/90 10/10/2016    CARDIOVASCULAR SCREENING; LDL GOAL LESS THAN 160 10/31/2010    Crohn's colitis (H) 02/15/2011    Dermatitis-dishydrotic eczema-severe 08/05/2010    Hiatal hernia     HTN (hypertension) 07/21/2011    Iron deficiency anemia 10/21/2010    Malignant neoplasm of sigmoid colon (H)     Obesity     Primary pulmonary hypertension (H) 04/06/2010          PAST SURGICAL HISTORY:  Past Surgical History:   Procedure Laterality Date    COLECTOMY WITHOUT COLOSTOMY N/A 4/6/2023    Procedure: Laparoscopic Converted to Open Total abdominal colectomy;  Surgeon: Jerome Ashley MD;  Location: [facility]    COLONOSCOPY  10/11/10    COLONOSCOPY N/A 2/27/2023    Procedure: ATTEMPTED COLONOSCOPY WITH SIGMOID STRICTURE BIOPSY;  Surgeon: Jonathan Alcocer MD;  Location: [facility]    ESOPHAGOSCOPY, GASTROSCOPY, DUODENOSCOPY (EGD), COMBINED N/A 2/27/2023    Procedure: ESOPHAGOGASTRODUODENOSCOPY, WITH BIOPSY;  Surgeon: Jonathan Alcocer MD;  Location: [facility]    HYSTERECTOMY TOTAL ABDOMINAL, BILATERAL SALPINGO-OOPHORECTOMY, COMBINED N/A 4/6/2023    Procedure: Hysterectomy total abdominal, bilateral salpingo-oophorectomy;  Surgeon: Sunitha Olea MD;  Location: [facility] OR    INSERT PORT VASCULAR ACCESS Right 5/25/2023    Procedure: Ultrasound-guided right internal jugular venous access port placement with fluoroscopy;  Surgeon: Martin Thomas DO;  Location: [facility]    IR CHEST PORT PLACEMENT > 5 YRS OF AGE  8/21/2023    IR PORT CHECK RIGHT  7/18/2023    IR PORT REMOVAL RIGHT  8/21/2023    SIGMOIDOSCOPY FLEXIBLE N/A 4/6/2023    Procedure: Sigmoidoscopy flexible;  Surgeon: Jerome Ashley MD;  Location: [facility] OR    SURGICAL HISTORY OF -   06/14/76    Perineorrhaphy, for widening vaginal orifice    ZZC EXPLORATORY OF ABDOMEN  1989    laparoscopy         SOCIAL HISTORY:  Social History     Socioeconomic History    Marital status:      Spouse name: Not on file    Number of children: Not on file    Years of education: Not on file    Highest education level: Not on file   Occupational History    Not on file   Tobacco Use    Smoking status: Never     Passive exposure: Current    Smokeless tobacco: Never   Substance and Sexual Activity    Alcohol use: No    Drug use: No    Sexual activity: Yes     Partners: Male   Other Topics Concern    Parent/sibling w/ CABG, MI or  "angioplasty before 65F 55M? Not Asked   Social History Narrative    Not on file     Social Determinants of Health     Financial Resource Strain: Not on file   Food Insecurity: Not on file   Transportation Needs: Not on file   Physical Activity: Not on file   Stress: Not on file   Social Connections: Not on file   Interpersonal Safety: Not on file   Housing Stability: Not on file         FAMILY HISTORY:  Family History   Problem Relation Age of Onset    Hypertension Mother         on meds, alive    Cerebrovascular Disease Father         stroke about age 65,  of cancer at 68 yrs    Diabetes Father         eventually took insulin    Anemia Father         Pernisios anemia    Kidney Disease Niece         kidney transplant    Kidney Disease Nephew         kidney transplant    Venous thrombosis No family hx of     Anesthesia Reaction No family hx of          PHYSICAL EXAM:  Vital signs:  /84 (BP Location: Right arm, Patient Position: Sitting, Cuff Size: Adult Large)   Pulse 84   Temp 97.9  F (36.6  C) (Temporal)   Resp 15   Ht 1.549 m (5' 1\")   Wt 87.8 kg (193 lb 8 oz)   LMP  (LMP Unknown)   SpO2 99%   BMI 36.56 kg/m         ECO  GENERAL/CONSTITUTIONAL: No acute distress.  EYES: Pupils are equal and round. Extraocular movements intact without nystagmus.  No scleral icterus.  RESPIRATORY: Equal chest rise.   ABDOMEN: soft NT ND, no palpable hernia   MUSCULOSKELETAL: Warm and well-perfused, no cyanosis, clubbing, or edema.   NEUROLOGIC: Cranial nerves are grossly intact. Alert, oriented to person, place and time, answers questions appropriately.  INTEGUMENTARY: No rashes or jaundice.  GAIT: Steady, does not use assistive device    LABS:   Latest Reference Range & Units 23 11:38 23 13:43 24 09:53   Sodium 135 - 145 mmol/L 143 140    Potassium 3.4 - 5.3 mmol/L 4.4 4.4    Chloride 98 - 107 mmol/L 110 (H) 108 (H)    Carbon Dioxide (CO2) 22 - 29 mmol/L 23 21 (L)    Urea Nitrogen 8.0 - 23.0 " mg/dL 16.6 23.4 (H)    Creatinine 0.51 - 0.95 mg/dL 1.55 (H) 1.47 (H)    Creatinine POCT 0.5 - 1.0 mg/dL   1.5 (H)   GFR Estimate >60 mL/min/1.73m2 36 (L) 39 (L)    GFR, ESTIMATED POCT >60 mL/min/1.73m2   38 (L)   Calcium 8.8 - 10.2 mg/dL 9.0 9.0    Anion Gap 7 - 15 mmol/L 10 11    Magnesium 1.7 - 2.3 mg/dL 1.6 (L) 1.5 (L)    Albumin 3.5 - 5.2 g/dL 3.5 3.9    Protein Total 6.4 - 8.3 g/dL 5.9 (L) 6.3 (L)    Alkaline Phosphatase 40 - 150 U/L 386 (H) 408 (H)    ALT 0 - 50 U/L 39 50    AST 0 - 45 U/L 50 (H) 57 (H)    Bilirubin Total <=1.2 mg/dL 0.6 0.7    CEA ng/mL 2.3     Glucose 70 - 99 mg/dL 173 (H) 110 (H)    WBC 4.0 - 11.0 10e3/uL 2.2 (L)     Hemoglobin 11.7 - 15.7 g/dL 10.3 (L)     Hematocrit 35.0 - 47.0 % 32.3 (L)     Platelet Count 150 - 450 10e3/uL 109 (L)     RBC Count 3.80 - 5.20 10e6/uL 3.15 (L)     MCV 78 - 100 fL 103 (H)     MCH 26.5 - 33.0 pg 32.7     MCHC 31.5 - 36.5 g/dL 31.9     RDW 10.0 - 15.0 % 15.2 (H)     % Neutrophils % 55     % Lymphocytes % 24     % Monocytes % 20     % Eosinophils % 0     % Basophils % 0     Absolute Basophils 0.0 - 0.2 10e3/uL 0.0     Absolute Eosinophils 0.0 - 0.7 10e3/uL 0.0     Absolute Immature Granulocytes <=0.4 10e3/uL 0.0     Absolute Lymphocytes 0.8 - 5.3 10e3/uL 0.5 (L)     Absolute Monocytes 0.0 - 1.3 10e3/uL 0.4     % Immature Granulocytes % 1     Absolute Neutrophils 1.6 - 8.3 10e3/uL 1.2 (L)     Absolute NRBCs 10e3/uL 0.0     NRBCs per 100 WBC <1 /100 0     (H): Data is abnormally high  (L): Data is abnormally low    PATHOLOGY:    IMAGING:  Preliminary Result  Exam Information    Exam Date Exam Time Accession # Performing Department Results    1/2/24 10:25 AM [id number] [facility]      PACS Images     Show images for CT Chest/Abdomen/Pelvis w Contrast     Study Result    Narrative & Impression   CT CHEST/ABDOMEN/PELVIS WITH CONTRAST January 2, 2024 10:25 AM     CLINICAL HISTORY: Sigmoid and hepatic flexure cancer status post  total  colectomy, status post adjuvant chemo, CT to assess response.  Malignant neoplasm of sigmoid colon (H).     TECHNIQUE: CT scan of the chest, abdomen, and pelvis was performed  following injection of IV contrast. Multiplanar reformats were  obtained. Dose reduction techniques were used.   CONTRAST: Isovue-370, 90 mL.     COMPARISON: 9/28/2023 and 7/18/2023.     FINDINGS:   LUNGS AND PLEURA: Stable 3 mm hazy nodule in the lateral left upper  lobe on image 97 of series 4. Additional stable scattered tiny nodules  are seen, for example a tiny nodule in the right middle lobe on image  167. No evidence of new or enlarging nodules. Mild atelectasis in the  left base. No infiltrate or consolidation. No effusions.     MEDIASTINUM/AXILLAE: Left chest port is present. Stable prominent  right paratracheal lymph node measures up to 9 mm in short axis. No  adenopathy through the mediastinum or axillary regions. No pericardial  effusion.     CORONARY ARTERY CALCIFICATION: None.     HEPATOBILIARY: Stable linear nonmass-like low-attenuation in the right  hepatic lobe on image 122 of series 3. This is indeterminate, but the  stability suggests a benign process.     PANCREAS: Normal.     SPLEEN: Splenomegaly is noted measuring up to 16.1 cm in AP dimension.  No focal lesions.     ADRENAL GLANDS: Normal.     KIDNEYS/BLADDER: Stable scarring is seen through both kidneys. No  evidence for mass or hydronephrosis on either side. The bladder is  unremarkable.     BOWEL: Moderate size duodenal diverticulum. No bowel obstruction.  Subtotal colectomy is noted with ileorectal anastomosis. Mild edema  adjacent to the anastomosis is unchanged. There is a single loop of  nonobstructed small bowel extending into a small ventral abdominal  wall hernia in the mid pelvis.     PELVIC ORGANS: Postsurgical changes are noted as outlined above. No  free fluid or adenopathy in the pelvis.     ADDITIONAL FINDINGS: The aorta is normal in caliber.  Stable borderline  enlarged portacaval lymph node on image 153 of series 3 measures 10 mm  in short axis.     MUSCULOSKELETAL: Mild degenerative changes are noted through the  spine.                                                                      IMPRESSION:  1.  Stable tiny nodules in both lungs.  2.  Stable borderline enlarged portacaval lymph node.  3.  Splenomegaly slightly increased from previous study.  4.  Postsurgical changes of subtotal colectomy with ileorectal  anastomosis.  5.  Moderate-sized duodenal diverticulum.  6.  Stable nonmass-like area of low attenuation in the right hepatic  lobe adjacent to the middle hepatic vein could represent some focal  fatty infiltration. Attention is recommended to this area on future  exams, but the stability is reassuring.   This result has not been signed. Information might be incomplete.         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 mutated, MARISSA, TPS 50%  # sigmoid colon poorly-differentiated carcinoma, KRAS G13D mutated, loss of MLH1 and PMS2, TPS 70%  - 2/23 colonoscopy: A frond-like/villous partially obstructing large mass found in sigmoid colon, partially circumferential involving two thirds of the lumen circumference, 4 cm, unable to traverse, with oozing, s/p biopsy, PATHOLOGY: Invasive poorly differentiated carcinoma, IHC panel excludes tumors of other origin, colorectal primary is favored, loss of nuclear expression of MLH1 and PMS2, MLH1 promoter methylation negative, [id number] mutation negative  -2/23 CT CAP: Shows known 4 cm proximal sigmoid colon mass with possible tumor extension (irregularity and stranding and adjacent pericolonic fat) without obstruction AND probable second mass 2.5 cm at hepatic flexure of colon; small  lymph nodes adjacent to both masses (no lymphadenopathy by size criteria)  -3/23 PET:  a. There is increased FDG avidity of the sigmoid colon with focal lobular wall thickening consistent with biopsy-proven adenocarcinoma.  b. Secondary focus noted at the hepatic flexure demonstrates elevated FDG avidity and lobulated wall thickening highly suspicious for a additional primary.  c. Additionally there is a smaller focus of wall thickening with elevated FDG avidity along the left aspect of the transverse colon which is suspicious for a possible third focus of colonic malignancy.  - 3/23 CEA 6.8  -4/6/2023 laparoscopic converted to open total abdominal colectomy with ileorectal anastomosis, partial omentectomy, flexible sigmoidoscopy, TAHBSO  PATHOLOGY:  Of note, omental resection, terminal ileum, proximal and distal anastomotic rings, uterus, cervix, bilateral fallopian tubes and ovaries negative for malignancy    Mass #1 (ascending colon/hepatic flexure): Mixed neuroendocrine-non-neuroendocrine neoplasm (MINEN):  - Neuroendocrine carcinoma component (70%) and moderately-differentiated adenocarcinoma component (30%)  - Size = 5.0 cm, invading into muscularis propria, Positive LVI  - IHC: Neuroendocrine portion: Negative for chromogranin and INSM1 but strongly express synaptophysin  - IHC: Adenocarcinoma portion: Positive for CK20, CDX2 negative for CK7, PAX8, ER, S100  Ki-67 proliferation index of approximately 80%.  MARISSA   PDL1:  COMBINED POSITIVE SCORE (CPS): 55-60  TUMOR PROPORTION SCORE (TPS): 50%   pathogenic KRAS mutation (G13D) was identified (5.2%).  MMR testing: intact  AJCC 8th edition: stage 3B mpT3 pN1a     Mass #2 (sigmoid colon): Poorly-differentiated carcinoma:  - Grade 3  - Size = 5.7 cm, invading into muscularis propria  - IHC: Positive for scar and keratin AE1/AE3, negative for CK7, CK20, CDX2, PAX8, ER, chromogranin, CD56, p40, synaptophysin, S100, INI1, p40, INSM1  Ki-67 proliferation index of  approximately 70%.  MMR testing: loss of MLH1 and PMS2  PDL1:  COMBINED POSITIVE SCORE (CPS): 80  TUMOR PROPORTION SCORE (TPS): 70%  pathogenic KRAS mutation (G13D) was identified (4.5%).  MMR testing: loss of MLH1 and PMS2, intact MSH2 and MSH6  AJCC 8th edition: stage 3A mpT2 pN1a    - One of forty-one sampled lymph nodes positive (1/41), d/w pathology- unable to determine which mass is metastatic to LN    - 4/23 MRI brain w/wo contrast LAURENT  - 5/26/23 CT CAP w/ contrast and IVF before and after CT (post op baseline prior to starting tx):  1.  3 mm nodule RML and 5 mm lateral EDSON nodule, minimally increased from previous  2.  New 4 mm EDSON groundglass density nodule  3.  Several prominent borderline enlarged mediastinal lymph nodes slightly increased from previous  4.  Splenomegaly 14.5 cm  5. S/p subtotal colectomy with ileorectal anastomosis with postsurgical changes along the ventral abdominal wall midline  - 5/8/23 I presented this case at [facility] tumor board as above   - 5/19/23 second opinion at [facility] w/medical oncologist Dr. Acharya, thorough consultation and recommendations appreciated, overall agree w/FOLFOX x6 months, possible nivo or ipi nivo in second line; if metastatic platinum etoposide vs ipi nivo  - 5/25/23 port placement    -5/2023 genetic counseling: Negative for mutations detailed below     Treatment:  -4/6/2023 laparoscopic converted to open total abdominal colectomy with ileorectal anastomosis, partial omentectomy, flexible sigmoidoscopy, TAHBSO (details above)  - 5/31/23-12/12/23 adjuvant mFOLFOX 6 q14 days x12 cycles/6 months w/dose reduced oxaliplatin and dropped 5FU bolus C2 onwards, IVF and IV Mg replacement weekly and 2/2 recurrent afib w/RVR- During the days of chemo and 2 days after ONLY: take long acting diltiazem 120 mg daily with 30 mg of short acting diltiazem as needed for atrial fibrillation with HR >110 (C1D1 w/oxaliplatin dose reduced from 85mg/m2 to 64mg/m2 2/2 renal function,  5/31/23 (complicated by hospitalization for afib w/RVR C1D4 6/3/23), C2D1 7/19/23 (w/o 5FU bolus C2 onwards, inpatient w/continuous cardiac monitoring), C3D1 8/2/2023 (outpatient, admitted to ER for A-fib with RVR C3D3 8/4/23), C4D1 8/16/23, C5D1 8/30/2023, C6D1 9/13/2023, C7D1 9/26/23, C8D1 10/11/23, C9D1 10/25/23, C10D1 11/8/23, C11D1 11/22/23. C12D1 12/12/23 (1 week delay 2/2 cytopenias)    Treatment related AE:  - hearing changes-stable w/dose reduced oxaliplatin, flonase prn, continue use of b/l hearing aids  - Paroxysmal A-fib with RVR and PAC- cardiology follow-up 12/14/2023 noted- no longer on long acting diltiazem, only using short acting prn; continuing metoprolol and xarelto and plan for follow up 12/2024  - anxiety- continue following with oncology psychology team  - Normocytic anemia- due to chemotherapy and iron deficiency, s/p ferric carboxymaltose 750mg x2 doses 6/14/23 and 6/28/23; repeat iron studies normal. 11/23 iron studies, B12, RBC folate WNL  - neutropenia- 12/23 ANC 1.2, monitor for neutropenic fever  -Thrombocytopenia- 12/23 plt 109K, continue xarelto, monitor for bleeding/bruising   - MARYA on CKD- following w/nephro, f/u with nephro 12/4/2023 noted w/next follow up 6/2024; continue increasing PO fluids  - hypoMg- Mg 1.5 12/20/23, given replacement; repeat w/next labs  - elevated LFTs- AST 62- stable, Alk phos 346- improving, alk phos iso enzyme- majority liver, liver normal on 9/23 CT and 11/23 MRI (no mets)  - diarrhea- resolved  - neuropathy- grade 2,  continue gabapentin 100mg in AM and 300mg at bedtime, add gabapentin 100mg in afternoon and titrate up to 200mg then 300mg as tolerated- rxed today  - cold sensitivity- grade 1, improving     Imaging:  - 1/2/24 CT CAP (after 12 cycles of mFOLFOx w/o 5FU bolus): PRELIMINARY   1.  Stable tiny nodules in both lungs.  2.  Stable borderline enlarged portacaval lymph node.  3.  Splenomegaly slightly increased from previous study.  4.   Postsurgical changes of subtotal colectomy with ileorectal anastomosis.  5.  Moderate-sized duodenal diverticulum.  6.  Stable nonmass-like area of low attenuation in the right hepatic lobe adjacent to the middle hepatic vein could represent some focal fatty infiltration. Attention is recommended to this area on future exams, but the stability is reassuring.    -11/23 MRI liver: negative for mets, stable cental liver hemangioma and splenomegaly    -9/23 CT CAP (after 6 cycles of mFOLFOX 6 without 5-FU bolus):  -Subtotal colectomy with stable appearing rectal anastomosis, some adjacent mild scarring, no bowel obstruction or inflammation, no free fluid, no new adenopathy, no liver lesions, no bone lesions  -Mild wall thickening of the gallbladder  - Stable small pulmonary nodules and small thoracic lymph nodes at mediastinum, splenomegaly smaller 13.7 cm    - 7/18/23 CT CAP (repeat baseline after treatment delay due to A-fib)- subcm lung nodules stable, borderline and mildly enlarged mediastinal LN and periportal LN stable, splenomegaly 15.7cm, Subtotal colectomy with ileorectal anastomosis. No acute inflammation or obstruction    Labs:  3/23 CEA 6.8  5/23 CEA 1.8  7/23 CEA 2.8  10/23 CEA 2.0  12/23 CEA 2.3    PLAN:  - repeat CBC, CMP, Mg w/next labs   - port flush q6-8 weeks  - continue active surveillance:    History and physical examination every 3-6 mo for 2 y, then every 6 mo for a total of 5 y   CEA every 3-6 mo for 2 y, then every 6 mo for a total of 5 y   Chest/abdominal/pelvic CT every 6-12 mo (category 2B for frequency <12 mo) from date of surgery for a total of 5 y   Colonoscopy in 1 y after surgery except if no preoperative colonoscopy due to obstructing lesion, colonoscopy in 3-6 mo   If advanced adenoma, repeat in 1 y   If no advanced adenoma, repeat in 3 y, then every 5 y  - f/u with Dr. Ashley 2/21/24 for sigmoidoscopy   - H&P and CEA due 4/24- ordered today  - repeat CT CAP 7/2024- to be ordered  later      #parxosymal afib  - within 3 days of receiving 5FU, prior cardiac risk factors htn, prediabetes  - 6/3/23 presented to ER w/lightheadedness, dizziness, cardioverted s/p diltiazem ggt in ER  - 6/23 DPD deficiency testing negative  - currently on metoprolol XL 25 mg daily, apixaban 5 mg twice daily  - 6/30/2023 consultation with cardio oncologist Dr. Garza; I spoke with Dr. Garza 6/30/2023 as well, [id number] score 3, recommending starting apixaban 5 mg twice daily, okay to retrial FOLFOX while patient is on metoprolol.  Patient is currently on Xarelto  - 8/15/2023 cardiology follow-up: Increase metoprolol XL to 50 mg daily; During the days of chemo and 2 days after take long acting diltiazem 120 mg daily with 30 mg of short acting diltiazem as needed for atrial fibrillation with HR >110  - cardiology follow-up 12/14/2023 noted- no longer on long acting diltiazem, only using short acting prn; continuing metoprolol and xarelto and plan for follow up 12/2024  - I do not recommend switching anticoagulation to coumadin given CLASS D interaction w/5FU chemotherapy, I messaged cardiology regarding this previously    #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  Negative for mutations in APC, AXIN2, BMPR1A, CDH1, CHEK2, EPCAM, GREM1, MLH1, MSH2, MSH3, MSH6, MUTYH, NTHL1, PMS2, POLD1, POLE, PTEN, SMAD4, STK11, TP53, CDKN1B, MEN1, NF1, RET, TSC1, TSC2, and VHL genes    #Anemia secondary to iron deficiency and B12 deficiency  - terminal ileum removed at time of colon surgery, monitor for nutrient  def  - 2/23 hgb 6.8, ferritin 21, iron sat index 10, TIBC 265, iron 27, b12 1493  - On B12 1000 mcg p.o. daily and ferrous sulfate 325 mg p.o. daily  - 4/9/23 s/p 1 uprbcs  - 5/9/23 hgb 11.3,5/30/23 hgb 9.4  - based on Ganzoni equation w/goal hgb 14 and 500mg needed for iron stores, pts iron deficit is 1400 mg  - s/p ferric carboxymaltose 750mg x2 doses 6/14/23 and 6/28/23  - 7/11/23 ferritin 1022, iron sat 22, TIBC 201, iron 44    - 8/23 hgb 13.3  - 10/23 hgb 11.1  - 11/23 iron studies, B12, RBC folate WNL    #Crohn's  - dx since at least 2010     RTC 3 months for follow up with me, labs 1-2 days prior to visit       Irene DO Fransico  Hematology/Oncology  [facility]

## 2024-01-02 NOTE — LETTER
1/2/2024         RE: Nadia Ladd  [address]  [facility] [address]        Dear Colleague,    Thank you for referring your patient, Nadia Ladd, to the [facility]. Please see a copy of my visit note below.    [facility]    Hematology/Oncology Established Patient Follow Up Note      Today's Date: 1/2/24    Reason for follow up: Sigmoid cancer    HISTORY OF PRESENT ILLNESS: Nadia Ladd is a 68 year old female who presents for follow-up    Patient has medical history including Crohn's disease on sulfasalazine, anemia secondary to iron deficiency and B12 deficiency, obesity, hypertension, prediabetes, eczema, pulmonary hypertension, hiatal hernia, mild splenomegaly (14.7 cm in 2/23)    - 2/23 pt noted increasing fatigue, found to have iron deficiency anemia w/hgb 6.8 (previously required RBC transfusion when dx w/Crohn's), did have intermittent changes in stool but not persistent (noted minimal blood in stool)   - 2/23 upper endoscopy for iron deficiency anemia and Crohn's disease: Hiatal hernia, normal stomach, normal duodenum  - 2/23 colonoscopy: A frond-like/villous partially obstructing large mass found in sigmoid colon, partially circumferential involving two thirds of the lumen circumference, 4 cm, unable to traverse, with oozing, s/p biopsy  PATHOLOGY:  A.  Stomach, antrum: Biopsy:  - Antral type mucosa with mild chronic inactive gastritis  - Immunostain for Helicobacter pylori is negative      B.  Colon, sigmoid, stricture: Biopsy:  - Invasive poorly differentiated carcinoma  The sigmoid stricture shows a high-grade carcinoma without definitive gland formation.  Given the poorly differentiated appearance, an immunohistochemical panel was performed to exclude the possibility of a tumor by direct extension or metastasis.   Immunohistochemical stains are performed and show the tumor to be diffusely positive for CK7 and partially positive for CK20  "and SATB2.  There is no significant tumor reactivity for CDX2, GATA3, PAX8, TTF-1, and chromogranin.  Synaptophysin highlights very rare positive cells. Although the CK7/CK20 profile is not entirely typical for a colorectal carcinoma, immunostains for tumors of other origins are negative and a colorectal primary is still favored.   - Mismatch repair:  1) MLH1-loss of nuclear expression  2) MSH2-intact  3) MSH6-intact  4) PMS2-loss of nuclear expression  -MLH1 promoter methylation: NEGATIVE    -2/23 CT CAP:  A) 4 cm length mass in the proximal sigmoid colon. There is some irregularity and stranding in the adjacent pericolonic fat which may indicate tumor extension. There is no obstruction.   B) there is a second probable mass at the hepatic flexure of the colon measuring 2.5 cm in length   C)There are small lymph nodes in the mesial colon adjacent to the hepatic flexure abnormality and the sigmoid colonic mass. No lymphadenopathy by size criteria  D) There is submucosal fatty deposition in the colon, most severe in the distal sigmoid colon and rectum, which can be seen secondary to quiescent inflammatory bowel disease.  E) no liver lesion    -3/23 PET:  a. There is increased FDG avidity of the sigmoid colon with focal lobular wall thickening consistent with biopsy-proven adenocarcinoma.  b. Secondary focus noted at the hepatic flexure demonstrates elevated FDG avidity and lobulated wall thickening highly suspicious for a additional primary.  c. Additionally there is a smaller focus of wall thickening with elevated FDG avidity along the left aspect of the transverse colon  which is suspicious for a possible third focus of colonic malignancy.    -3/23 CEA 6.8  - 3/23 colorectal surgery consultation with - in the setting of Crohn's, at least sigmoid colectomy with possible total abdominal colectomy with either ileorectal anastomosis or end ileostomy (\"rectum can remain active and be actively surveyed " "annually\")    -3/23 genetic counseling, testing for Chan syndrome    - 4/5/2023 gynecologic oncology consultation for risk reduction surgery with hysterectomy and BSO at time of colectomy    -4/6/2023 laparoscopic converted to open total abdominal colectomy with ileorectal anastomosis, partial omentectomy, flexible sigmoidoscopy, TAHBSO  A. OMENTUM, RESECTION:  - Adipose tissue with no significant histopathologic abnormality  - No evidence of malignancy     B. COLON, RESECTION:  Mass #1 (ascending colon/hepatic flexure): Mixed neuroendocrine-non-neuroendocrine neoplasm (MINEN):  - Neuroendocrine carcinoma component (70%) and moderately-differentiated adenocarcinoma component (30%)  - Size = 5.0 cm, invading into muscularis propria  - Positive LVI  - Negative macroscopic tumor perforation, negative PNI  - Negative surgical resection margins  - IHC: Neuroendocrine portion: Negative for chromogranin and INSM1 but strongly express synaptophysin  - IHC: Adenocarcinoma portion: Positive for CK20, CDX2 negative for CK7, PAX8, ER, S100  Ki-67 proliferation index of approximately 80%.  MMR:  Intact nuclear expression of MLH1, MSH2, MHS6, PMS2  PDL1:  COMBINED POSITIVE SCORE (CPS): 55-60  TUMOR PROPORTION SCORE (TPS): 50%   pathogenic KRAS mutation (G13D) was identified (5.2%).  AJCC 8th edition: stage 3B mpT3 pN1a     Mass#2 (sigmoid colon): Poorly-differentiated carcinoma:  - Grade 3  - Size = 5.7 cm, invading into muscularis propria  - Negative for microscopic tumor perforation, LVI, perineural invasion  - Negative surgical resection margins  - IHC: Positive for scar and keratin AE1/AE3, negative for CK7, CK20, CDX2, PAX8, ER, chromogranin, CD56, p40, synaptophysin, S100, INI1, p40, INSM1  Ki-67 proliferation index of approximately 70%.  MMR: loss of nuclear expression MLH1 and PMS2, intact nuclear expression MSH2 and MSH6  PDL1:  COMBINED POSITIVE SCORE (CPS): 80  TUMOR PROPORTION SCORE (TPS): 70%  pathogenic KRAS " mutation (G13D) was identified (4.5%).  AJCC 8th edition: stage 3A mpT2 pN1a    - One of forty-one sampled lymph nodes positive (1/41)  - Benign appendix  - Tubular adenoma    C. UTERUS, CERVIX, BILATERAL FALLOPIAN TUBES & OVARIES, :  - Benign endometrial polyps; atrophic endometrium  - Uterus, cervix, bilateral fallopian tubes, and ovaries with no significant morphologic abnormalities  - No evidence of dysplasia or malignancy     D. SMALL INTESTINE, TERMINAL ILEUM, TERMINAL ILIUM:  - Benign ileum  - No evidence of dysplasia or malignancy  - Negative for metastases to one of one sampled lymph node (0 /1)     E. PROXIMAL ANASTOMOTIC RING:  - Benign small intestine  - No evidence of dysplasia or malignancy     F. DISTAL ANASTOMOTIC RING:  - Benign colon  - No evidence of dysplasia or malignancy    CRC NGS:   --mutations positive for KRAS G13D mutation 5.2% mass 1, KRAS G13D mutation 4.5% mass 2 (negative for AKT1; BRAF; ERBB2; KRAS; NRAS; PIK3CA; PTEN)  --TMB score: 17.064 mut/Mb mass 1 (CPS 55-60, TPS 50%), 60.943 mut/Mb mass 2 (CPS 80, TPS 70%)  --fusion negative including NTRK and RET for mass 1 and 2 (also negative for AKT1, AKT3, ALK, AR, BBZCII36, CLAUDINE, BCOR, BRAF, BRD3, BRD4, CAMTA1, CCNB3, CCND1, , CIC, CSF1, CSF1R, CTNNB1, DNAJB1, EGFR, EPC1, ERBB2, ERBB4, ERG, ESR1, ESRRA, ETV1, ETV4, ETV5, ETV6, EWSR1, FGFR1, FGFR2, FGFR3, FGR, FOS, FOSB, FOXO1, FOXO4, FOXR2, FUS, GLI1, GRB7, HMGA2, HRAS, IDH1, IDH2, INSR, JAK2, JAK3, JAZF1, KRAS, MAML2, MAP2K1, MAST1, MAST2, MEAF6, MET, MKL2, MN1, MSMB, MUSK, MYB, MYBL1, MYOD1, NCOA1, NCOA2, NOTCH1, NOTCH2, NR4A3, NRAS, NRG1, NTRK1, NTRK2, NTRK3, NUMBL1, NUTM1, PAX3, PDGFB, PDGFRA, PDGFRB, PHF1, PIK3CA, PKN1, PLAG1, PPARG, PRKACA, PRKCA, PRKCB, RAF1, RELA, RET, ROS1, RPSO2, RSPO3, SS18, STAT6, TAF15, TCF12, TERT, TFE3, TFEB, TFG, THADA, TMPRSS2, USP6, VGLL2, YAP1, YWHAE)    -4/11/2023 patient discharged from hospital, planning for 30 days of lovenox postop, oxycodone  for pain (required 1 unit PRBC for anemia)    -5/8/23 I presented this case at [facility] CRC tumor board and their recommendations include:  - common to see this in Crohn's, overall poor prognosis, treat aggressively, may be poorly responsive to chemotherapy  - standard of care is mFOLFOX 6 x 12 cycles  - acknowledge that pt has high TPS and likely response to immunotherapy however not sufficient data or standard of care in stage 3 adjuvant setting  - add MMR testing to mass #1 hepatic flexure mass    - 5/9/23 admitted for acute renal failure w/Cr 3.82 w/K 7.0    - 5/19/23 second opinion at [facility] w/medical oncologist Dr. Acharya, thorough consultation and recommendations appreciated     - pt would like to proceed with FOFLOX (with dose reduced oxaliplatin due to kidney function)    -5/2023 genetic counseling: Negative for mutations in EPCAM, MLH1, MSH2, MSH6, and PMS2 genes.  Negative for mutations in APC, AXIN2, BMPR1A, CDH1, CHEK2, EPCAM, GREM1, MLH1, MSH2, MSH3, MSH6, MUTYH, NTHL1, PMS2, POLD1, POLE, PTEN, SMAD4, STK11, TP53, CDKN1B, MEN1, NF1, RET, TSC1, TSC2, and VHL genes    - 5/25/23 port placement    - 5/26/23 CT CAP w/ contrast and IVF before and after CT (baseline prior to starting tx):  1.  3 mm nodule RML and 5 mm lateral EDSON nodule, minimally increased from previous  2.  New 4 mm EDSON groundglass density nodule  3.  Several prominent borderline enlarged mediastinal lymph nodes slightly increased from previous  4.  Splenomegaly 14.5 cm  5. S/p subtotal colectomy with ileorectal anastomosis with postsurgical changes along the ventral abdominal wall midline    -5/31/23 started mFOLFOX 6 w/dose reduced oxaliplatin    - C2D1 6/14/23 held due to new onset paroxysmal A-fib with RVR requiring hospitalization 6/3/2023 (also hypoMg, dehydrated)  - 6/19/2023 I presented this case at [facility] colorectal tumor board at the [facility], it is possible that paroxysmal A-fib may be related to 5-FU.  Options include  withholding further treatment versus retrialing 5-FU under the guidance of cardio oncology in an inpatient setting.  No other adjuvant treatment options available, including immunotherapy  - 6/30/2023 consultation with cardio oncologist Dr. Garza; I spoke with Dr. Garza 6/30/2023 as well, [id number] score 3, recommending starting apixaban 5 mg twice daily, okay to retrial FOLFOX while patient is on metoprolol  -7/18/2023  CT CAP- subcm lung nodules stable, borderline and mildly enlarged mediastinal LN and periportal LN stable, splenomegaly 15.7cm, Subtotal colectomy with ileorectal anastomosis. No acute inflammation or obstruction   - 7/19/2023 mFOLFOX6 C2D1  (inpatient w/continuous cardiac monitoring)      -9/23 CT CAP (after cycle 6)  -Subtotal colectomy, rectal anastomosis appears stable, some adjacent mild scarring, no bowel obstruction or inflammation, no free fluid  -No new adenopathy, no liver lesions  -Stable EDSON 3 mm nodule, other stable nodules at EDSON, RML  -Stable several small thoracic lymph nodes in mediastinum  -Mild wall thickening of the gallbladder  -Spleen is smaller measuring 13.7 cm  -Stable regions of bilateral cortical thinning in the kidneys, with few tiny cysts    INTERIM HISTORY:    - 5/31/23-12/12/23 adjuvant mFOLFOX 6 q14 days x12 cycles/6 months w/dose reduced oxaliplatin and dropped 5FU bolus C2 onwards, IVF and IV Mg replacement weekly and 2/2 recurrent afib w/RVR- During the days of chemo and 2 days after ONLY: take long acting diltiazem 120 mg daily with 30 mg of short acting diltiazem as needed for atrial fibrillation with HR >110 (C1D1 w/oxaliplatin dose reduced from 85mg/m2 to 64mg/m2 2/2 renal function, 5/31/23 (complicated by hospitalization for afib w/RVR C1D4 6/3/23), C2D1 7/19/23 (w/o 5FU bolus C2 onwards, inpatient w/continuous cardiac monitoring), C3D1 8/2/2023 (outpatient, admitted to ER for A-fib with RVR C3D3 8/4/23), C4D1 8/16/23, C5D1 8/30/2023, C6D1 9/13/2023, C7D1  9/26/23, C8D1 10/11/23, C9D1 10/25/23, C10D1 11/8/23, C11D1 11/22/23. C12D1 12/12/23 (1 week delay 2/2 cytopenias)      Treatment related AE:  - hearing changes-stable w/dose reduced oxaliplatin, flonase prn, continue use of b/l hearing aids  - Paroxysmal A-fib with RVR and PAC- cardiology follow-up 12/14/2023 noted- no longer on long acting diltiazem, only using short acting prn; continuing metoprolol and xarelto and plan for follow up 12/2024  - anxiety- continue following with oncology psychology team  - Normocytic anemia- due to chemotherapy and iron deficiency, s/p ferric carboxymaltose 750mg x2 doses 6/14/23 and 6/28/23; repeat iron studies normal. 11/23 iron studies, B12, RBC folate WNL  - neutropenia- 12/23 ANC 1.2, monitor for neutropenic fever  -Thrombocytopenia- 12/23 plt 109K, continue xarelto, monitor for bleeding/bruising   - MARYA on CKD- following w/nephro, f/u with nephro 12/4/2023 noted w/next follow up 6/2024; continue increasing PO fluids  - hypoMg- Mg 1.5 12/20/23, given replacement; repeat w/next labs  - elevated LFTs- AST 62- stable, Alk phos 346- improving, alk phos iso enzyme- majority liver, liver normal on 9/23 CT and 11/23 MRI (no mets)  - diarrhea- resolved  - neuropathy- grade 2,  continue gabapentin 100mg in AM and 300mg at bedtime, add gabapentin 100mg in afternoon and titrate up to 200mg then 300mg as tolerated  - cold sensitivity- grade 1, improving       REVIEW OF SYSTEMS:   A 14 point ROS was reviewed with pertinent positives and negatives in the HPI.        HOME MEDICATIONS:  Current Outpatient Medications   Medication Sig Dispense Refill     cyanocobalamin 1000 MCG TBCR Take 1,000 mcg by mouth daily 100 tablet 1     dexAMETHasone (DECADRON) 4 MG tablet Take 2 tablets (8 mg) by mouth daily Take for 2 days, starting the day after chemo cycle 10, 11 and 12. Take with food. 12 tablet 0     Ferrous Sulfate (IRON) 325 (65 Fe) MG tablet Take 1 tablet by mouth daily       gabapentin  (NEURONTIN) 100 MG capsule Take 2 capsules (200 mg) by mouth daily (with breakfast) for 120 days 60 capsule 3     gabapentin (NEURONTIN) 300 MG capsule Take 1 capsule (300 mg) by mouth 3 times daily 90 capsule 1     lidocaine-prilocaine (EMLA) 2.5-2.5 % external cream Apply topically as needed for moderate pain Apply 15-20 minutes prior to port access 1 g 4     loperamide (IMODIUM) 2 MG capsule Take 2 mg by mouth daily as needed for diarrhea       MAGNESIUM OXIDE 400 (240 Mg) MG tablet TAKE 1 TABLET (400 MG) BY MOUTH 2 TIMES DAILY 60 tablet 0     metoprolol succinate ER (TOPROL XL) 50 MG 24 hr tablet Take 1 tablet (50 mg) by mouth daily 90 tablet 3     Multiple Vitamins-Iron (MULTI-DAY PLUS IRON PO) Take 1 tablet by mouth daily       rivaroxaban ANTICOAGULANT (XARELTO) 20 MG TABS tablet Take 1 tablet (20 mg) by mouth daily (with dinner) 90 tablet 3     sodium bicarbonate 650 MG tablet Take 1 tablet (650 mg) by mouth 2 times daily 180 tablet 3     vitamin D3 (CHOLECALCIFEROL) 50 mcg (2000 units) tablet Take 1 tablet (50 mcg) by mouth daily 30 tablet 6     diltiazem (CARDIZEM) 30 MG tablet Take 1 tablet (30 mg) by mouth 3 times daily (Patient not taking: Reported on 1/2/2024) 90 tablet 0     diltiazem ER COATED BEADS (CARDIZEM CD/CARTIA XT) 120 MG 24 hr capsule Take 1 capsule (120 mg) by mouth daily On chemo days and 2 days post chemo infusion. (Patient not taking: Reported on 1/2/2024) 60 capsule 3     prochlorperazine (COMPAZINE) 10 MG tablet Take 10 mg by mouth every 6 hours as needed for nausea or vomiting (Patient not taking: Reported on 1/2/2024)           ALLERGIES:  Allergies   Allergen Reactions     No Known Drug Allergy          PAST MEDICAL HISTORY:  Past Medical History:   Diagnosis Date     Arthropathia 08/05/2010     B12 deficiency anemia 10/21/2010     Benign essential hypertension with target blood pressure below 140/90 10/10/2016     CARDIOVASCULAR SCREENING; LDL GOAL LESS THAN 160 10/31/2010      Crohn's colitis (H) 02/15/2011     Dermatitis-dishydrotic eczema-severe 08/05/2010     Hiatal hernia      HTN (hypertension) 07/21/2011     Iron deficiency anemia 10/21/2010     Malignant neoplasm of sigmoid colon (H)      Obesity      Primary pulmonary hypertension (H) 04/06/2010         PAST SURGICAL HISTORY:  Past Surgical History:   Procedure Laterality Date     COLECTOMY WITHOUT COLOSTOMY N/A 4/6/2023    Procedure: Laparoscopic Converted to Open Total abdominal colectomy;  Surgeon: Jerome Ashley MD;  Location: [facility] OR     COLONOSCOPY  10/11/10     COLONOSCOPY N/A 2/27/2023    Procedure: ATTEMPTED COLONOSCOPY WITH SIGMOID STRICTURE BIOPSY;  Surgeon: Jonathan Alcocer MD;  Location: [facility]     ESOPHAGOSCOPY, GASTROSCOPY, DUODENOSCOPY (EGD), COMBINED N/A 2/27/2023    Procedure: ESOPHAGOGASTRODUODENOSCOPY, WITH BIOPSY;  Surgeon: Jonathan Alcocer MD;  Location: [facility]     HYSTERECTOMY TOTAL ABDOMINAL, BILATERAL SALPINGO-OOPHORECTOMY, COMBINED N/A 4/6/2023    Procedure: Hysterectomy total abdominal, bilateral salpingo-oophorectomy;  Surgeon: Sunitha Olea MD;  Location: [facility] OR     INSERT PORT VASCULAR ACCESS Right 5/25/2023    Procedure: Ultrasound-guided right internal jugular venous access port placement with fluoroscopy;  Surgeon: Martin Thomas DO;  Location: [facility]     IR CHEST PORT PLACEMENT > 5 YRS OF AGE  8/21/2023     IR PORT CHECK RIGHT  7/18/2023     IR PORT REMOVAL RIGHT  8/21/2023     SIGMOIDOSCOPY FLEXIBLE N/A 4/6/2023    Procedure: Sigmoidoscopy flexible;  Surgeon: Jerome Ashley MD;  Location: [facility] OR     SURGICAL HISTORY OF -   06/14/76    Perineorrhaphy, for widening vaginal orifice     ZZC EXPLORATORY OF ABDOMEN  1989    laparoscopy         SOCIAL HISTORY:  Social History     Socioeconomic History     Marital status:      Spouse name: Not on file     Number of children: Not on file     Years of education: Not on file     Highest education level: Not on file  "  Occupational History     Not on file   Tobacco Use     Smoking status: Never     Passive exposure: Current     Smokeless tobacco: Never   Substance and Sexual Activity     Alcohol use: No     Drug use: No     Sexual activity: Yes     Partners: Male   Other Topics Concern     Parent/sibling w/ CABG, MI or angioplasty before 65F 55M? Not Asked   Social History Narrative     Not on file     Social Determinants of Health     Financial Resource Strain: Not on file   Food Insecurity: Not on file   Transportation Needs: Not on file   Physical Activity: Not on file   Stress: Not on file   Social Connections: Not on file   Interpersonal Safety: Not on file   Housing Stability: Not on file         FAMILY HISTORY:  Family History   Problem Relation Age of Onset     Hypertension Mother         on meds, alive     Cerebrovascular Disease Father         stroke about age 65,  of cancer at 68 yrs     Diabetes Father         eventually took insulin     Anemia Father         Pernisios anemia     Kidney Disease Niece         kidney transplant     Kidney Disease Nephew         kidney transplant     Venous thrombosis No family hx of      Anesthesia Reaction No family hx of          PHYSICAL EXAM:  Vital signs:  /84 (BP Location: Right arm, Patient Position: Sitting, Cuff Size: Adult Large)   Pulse 84   Temp 97.9  F (36.6  C) (Temporal)   Resp 15   Ht 1.549 m (5' 1\")   Wt 87.8 kg (193 lb 8 oz)   LMP  (LMP Unknown)   SpO2 99%   BMI 36.56 kg/m         ECO  GENERAL/CONSTITUTIONAL: No acute distress.  EYES: Pupils are equal and round. Extraocular movements intact without nystagmus.  No scleral icterus.  RESPIRATORY: Equal chest rise.   ABDOMEN: soft NT ND, no palpable hernia   MUSCULOSKELETAL: Warm and well-perfused, no cyanosis, clubbing, or edema.   NEUROLOGIC: Cranial nerves are grossly intact. Alert, oriented to person, place and time, answers questions appropriately.  INTEGUMENTARY: No rashes or jaundice.  GAIT: " Steady, does not use assistive device    LABS:   Latest Reference Range & Units 12/13/23 11:38 12/20/23 13:43 01/02/24 09:53   Sodium 135 - 145 mmol/L 143 140    Potassium 3.4 - 5.3 mmol/L 4.4 4.4    Chloride 98 - 107 mmol/L 110 (H) 108 (H)    Carbon Dioxide (CO2) 22 - 29 mmol/L 23 21 (L)    Urea Nitrogen 8.0 - 23.0 mg/dL 16.6 23.4 (H)    Creatinine 0.51 - 0.95 mg/dL 1.55 (H) 1.47 (H)    Creatinine POCT 0.5 - 1.0 mg/dL   1.5 (H)   GFR Estimate >60 mL/min/1.73m2 36 (L) 39 (L)    GFR, ESTIMATED POCT >60 mL/min/1.73m2   38 (L)   Calcium 8.8 - 10.2 mg/dL 9.0 9.0    Anion Gap 7 - 15 mmol/L 10 11    Magnesium 1.7 - 2.3 mg/dL 1.6 (L) 1.5 (L)    Albumin 3.5 - 5.2 g/dL 3.5 3.9    Protein Total 6.4 - 8.3 g/dL 5.9 (L) 6.3 (L)    Alkaline Phosphatase 40 - 150 U/L 386 (H) 408 (H)    ALT 0 - 50 U/L 39 50    AST 0 - 45 U/L 50 (H) 57 (H)    Bilirubin Total <=1.2 mg/dL 0.6 0.7    CEA ng/mL 2.3     Glucose 70 - 99 mg/dL 173 (H) 110 (H)    WBC 4.0 - 11.0 10e3/uL 2.2 (L)     Hemoglobin 11.7 - 15.7 g/dL 10.3 (L)     Hematocrit 35.0 - 47.0 % 32.3 (L)     Platelet Count 150 - 450 10e3/uL 109 (L)     RBC Count 3.80 - 5.20 10e6/uL 3.15 (L)     MCV 78 - 100 fL 103 (H)     MCH 26.5 - 33.0 pg 32.7     MCHC 31.5 - 36.5 g/dL 31.9     RDW 10.0 - 15.0 % 15.2 (H)     % Neutrophils % 55     % Lymphocytes % 24     % Monocytes % 20     % Eosinophils % 0     % Basophils % 0     Absolute Basophils 0.0 - 0.2 10e3/uL 0.0     Absolute Eosinophils 0.0 - 0.7 10e3/uL 0.0     Absolute Immature Granulocytes <=0.4 10e3/uL 0.0     Absolute Lymphocytes 0.8 - 5.3 10e3/uL 0.5 (L)     Absolute Monocytes 0.0 - 1.3 10e3/uL 0.4     % Immature Granulocytes % 1     Absolute Neutrophils 1.6 - 8.3 10e3/uL 1.2 (L)     Absolute NRBCs 10e3/uL 0.0     NRBCs per 100 WBC <1 /100 0     (H): Data is abnormally high  (L): Data is abnormally low    PATHOLOGY:    IMAGING:  Preliminary Result  Exam Information    Exam Date Exam Time Accession # Performing Department Results     1/2/24 10:25 AM [id number] [facility]      PACS Images     Show images for CT Chest/Abdomen/Pelvis w Contrast     Study Result    Narrative & Impression   CT CHEST/ABDOMEN/PELVIS WITH CONTRAST January 2, 2024 10:25 AM     CLINICAL HISTORY: Sigmoid and hepatic flexure cancer status post total  colectomy, status post adjuvant chemo, CT to assess response.  Malignant neoplasm of sigmoid colon (H).     TECHNIQUE: CT scan of the chest, abdomen, and pelvis was performed  following injection of IV contrast. Multiplanar reformats were  obtained. Dose reduction techniques were used.   CONTRAST: Isovue-370, 90 mL.     COMPARISON: 9/28/2023 and 7/18/2023.     FINDINGS:   LUNGS AND PLEURA: Stable 3 mm hazy nodule in the lateral left upper  lobe on image 97 of series 4. Additional stable scattered tiny nodules  are seen, for example a tiny nodule in the right middle lobe on image  167. No evidence of new or enlarging nodules. Mild atelectasis in the  left base. No infiltrate or consolidation. No effusions.     MEDIASTINUM/AXILLAE: Left chest port is present. Stable prominent  right paratracheal lymph node measures up to 9 mm in short axis. No  adenopathy through the mediastinum or axillary regions. No pericardial  effusion.     CORONARY ARTERY CALCIFICATION: None.     HEPATOBILIARY: Stable linear nonmass-like low-attenuation in the right  hepatic lobe on image 122 of series 3. This is indeterminate, but the  stability suggests a benign process.     PANCREAS: Normal.     SPLEEN: Splenomegaly is noted measuring up to 16.1 cm in AP dimension.  No focal lesions.     ADRENAL GLANDS: Normal.     KIDNEYS/BLADDER: Stable scarring is seen through both kidneys. No  evidence for mass or hydronephrosis on either side. The bladder is  unremarkable.     BOWEL: Moderate size duodenal diverticulum. No bowel obstruction.  Subtotal colectomy is noted with ileorectal anastomosis. Mild edema  adjacent to the anastomosis  is unchanged. There is a single loop of  nonobstructed small bowel extending into a small ventral abdominal  wall hernia in the mid pelvis.     PELVIC ORGANS: Postsurgical changes are noted as outlined above. No  free fluid or adenopathy in the pelvis.     ADDITIONAL FINDINGS: The aorta is normal in caliber. Stable borderline  enlarged portacaval lymph node on image 153 of series 3 measures 10 mm  in short axis.     MUSCULOSKELETAL: Mild degenerative changes are noted through the  spine.                                                                      IMPRESSION:  1.  Stable tiny nodules in both lungs.  2.  Stable borderline enlarged portacaval lymph node.  3.  Splenomegaly slightly increased from previous study.  4.  Postsurgical changes of subtotal colectomy with ileorectal  anastomosis.  5.  Moderate-sized duodenal diverticulum.  6.  Stable nonmass-like area of low attenuation in the right hepatic  lobe adjacent to the middle hepatic vein could represent some focal  fatty infiltration. Attention is recommended to this area on future  exams, but the stability is reassuring.   This result has not been signed. Information might be incomplete.         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 mutated, MARISSA, TPS 50%  # sigmoid colon poorly-differentiated carcinoma, KRAS G13D mutated, loss of MLH1 and PMS2, TPS 70%  - 2/23 colonoscopy: A frond-like/villous partially obstructing large mass found in sigmoid colon, partially circumferential involving two thirds of the lumen circumference, 4 cm, unable to traverse, with oozing, s/p biopsy, PATHOLOGY: Invasive poorly differentiated carcinoma, IHC panel excludes tumors of other origin, colorectal primary is favored, loss of nuclear expression of MLH1  and PMS2, MLH1 promoter methylation negative, [id number] mutation negative  -2/23 CT CAP: Shows known 4 cm proximal sigmoid colon mass with possible tumor extension (irregularity and stranding and adjacent pericolonic fat) without obstruction AND probable second mass 2.5 cm at hepatic flexure of colon; small lymph nodes adjacent to both masses (no lymphadenopathy by size criteria)  -3/23 PET:  a. There is increased FDG avidity of the sigmoid colon with focal lobular wall thickening consistent with biopsy-proven adenocarcinoma.  b. Secondary focus noted at the hepatic flexure demonstrates elevated FDG avidity and lobulated wall thickening highly suspicious for a additional primary.  c. Additionally there is a smaller focus of wall thickening with elevated FDG avidity along the left aspect of the transverse colon which is suspicious for a possible third focus of colonic malignancy.  - 3/23 CEA 6.8  -4/6/2023 laparoscopic converted to open total abdominal colectomy with ileorectal anastomosis, partial omentectomy, flexible sigmoidoscopy, TAHBSO  PATHOLOGY:  Of note, omental resection, terminal ileum, proximal and distal anastomotic rings, uterus, cervix, bilateral fallopian tubes and ovaries negative for malignancy    Mass #1 (ascending colon/hepatic flexure): Mixed neuroendocrine-non-neuroendocrine neoplasm (MINEN):  - Neuroendocrine carcinoma component (70%) and moderately-differentiated adenocarcinoma component (30%)  - Size = 5.0 cm, invading into muscularis propria, Positive LVI  - IHC: Neuroendocrine portion: Negative for chromogranin and INSM1 but strongly express synaptophysin  - IHC: Adenocarcinoma portion: Positive for CK20, CDX2 negative for CK7, PAX8, ER, S100  Ki-67 proliferation index of approximately 80%.  MARISSA   PDL1:  COMBINED POSITIVE SCORE (CPS): 55-60  TUMOR PROPORTION SCORE (TPS): 50%   pathogenic KRAS mutation (G13D) was identified (5.2%).  MMR testing: intact  AJCC 8th edition: stage 3B mpT3 pN1a      Mass #2 (sigmoid colon): Poorly-differentiated carcinoma:  - Grade 3  - Size = 5.7 cm, invading into muscularis propria  - IHC: Positive for scar and keratin AE1/AE3, negative for CK7, CK20, CDX2, PAX8, ER, chromogranin, CD56, p40, synaptophysin, S100, INI1, p40, INSM1  Ki-67 proliferation index of approximately 70%.  MMR testing: loss of MLH1 and PMS2  PDL1:  COMBINED POSITIVE SCORE (CPS): 80  TUMOR PROPORTION SCORE (TPS): 70%  pathogenic KRAS mutation (G13D) was identified (4.5%).  MMR testing: loss of MLH1 and PMS2, intact MSH2 and MSH6  AJCC 8th edition: stage 3A mpT2 pN1a    - One of forty-one sampled lymph nodes positive (1/41), d/w pathology- unable to determine which mass is metastatic to LN    - 4/23 MRI brain w/wo contrast LAURENT  - 5/26/23 CT CAP w/ contrast and IVF before and after CT (post op baseline prior to starting tx):  1.  3 mm nodule RML and 5 mm lateral EDSON nodule, minimally increased from previous  2.  New 4 mm EDSON groundglass density nodule  3.  Several prominent borderline enlarged mediastinal lymph nodes slightly increased from previous  4.  Splenomegaly 14.5 cm  5. S/p subtotal colectomy with ileorectal anastomosis with postsurgical changes along the ventral abdominal wall midline  - 5/8/23 I presented this case at [facility] tumor board as above   - 5/19/23 second opinion at [facility] w/medical oncologist Dr. Acharya, thorough consultation and recommendations appreciated, overall agree w/FOLFOX x6 months, possible nivo or ipi nivo in second line; if metastatic platinum etoposide vs ipi nivo  - 5/25/23 port placement    -5/2023 genetic counseling: Negative for mutations detailed below     Treatment:  -4/6/2023 laparoscopic converted to open total abdominal colectomy with ileorectal anastomosis, partial omentectomy, flexible sigmoidoscopy, TAHBSO (details above)  - 5/31/23-12/12/23 adjuvant mFOLFOX 6 q14 days x12 cycles/6 months w/dose reduced oxaliplatin and dropped 5FU bolus C2 onwards, IVF and  IV Mg replacement weekly and 2/2 recurrent afib w/RVR- During the days of chemo and 2 days after ONLY: take long acting diltiazem 120 mg daily with 30 mg of short acting diltiazem as needed for atrial fibrillation with HR >110 (C1D1 w/oxaliplatin dose reduced from 85mg/m2 to 64mg/m2 2/2 renal function, 5/31/23 (complicated by hospitalization for afib w/RVR C1D4 6/3/23), C2D1 7/19/23 (w/o 5FU bolus C2 onwards, inpatient w/continuous cardiac monitoring), C3D1 8/2/2023 (outpatient, admitted to ER for A-fib with RVR C3D3 8/4/23), C4D1 8/16/23, C5D1 8/30/2023, C6D1 9/13/2023, C7D1 9/26/23, C8D1 10/11/23, C9D1 10/25/23, C10D1 11/8/23, C11D1 11/22/23. C12D1 12/12/23 (1 week delay 2/2 cytopenias)    Treatment related AE:  - hearing changes-stable w/dose reduced oxaliplatin, flonase prn, continue use of b/l hearing aids  - Paroxysmal A-fib with RVR and PAC- cardiology follow-up 12/14/2023 noted- no longer on long acting diltiazem, only using short acting prn; continuing metoprolol and xarelto and plan for follow up 12/2024  - anxiety- continue following with oncology psychology team  - Normocytic anemia- due to chemotherapy and iron deficiency, s/p ferric carboxymaltose 750mg x2 doses 6/14/23 and 6/28/23; repeat iron studies normal. 11/23 iron studies, B12, RBC folate WNL  - neutropenia- 12/23 ANC 1.2, monitor for neutropenic fever  -Thrombocytopenia- 12/23 plt 109K, continue xarelto, monitor for bleeding/bruising   - MARYA on CKD- following w/nephro, f/u with nephro 12/4/2023 noted w/next follow up 6/2024; continue increasing PO fluids  - hypoMg- Mg 1.5 12/20/23, given replacement; repeat w/next labs  - elevated LFTs- AST 62- stable, Alk phos 346- improving, alk phos iso enzyme- majority liver, liver normal on 9/23 CT and 11/23 MRI (no mets)  - diarrhea- resolved  - neuropathy- grade 2,  continue gabapentin 100mg in AM and 300mg at bedtime, add gabapentin 100mg in afternoon and titrate up to 200mg then 300mg as tolerated-  rxed today  - cold sensitivity- grade 1, improving     Imaging:  - 1/2/24 CT CAP (after 12 cycles of mFOLFOx w/o 5FU bolus): PRELIMINARY   1.  Stable tiny nodules in both lungs.  2.  Stable borderline enlarged portacaval lymph node.  3.  Splenomegaly slightly increased from previous study.  4.  Postsurgical changes of subtotal colectomy with ileorectal anastomosis.  5.  Moderate-sized duodenal diverticulum.  6.  Stable nonmass-like area of low attenuation in the right hepatic lobe adjacent to the middle hepatic vein could represent some focal fatty infiltration. Attention is recommended to this area on future exams, but the stability is reassuring.    -11/23 MRI liver: negative for mets, stable cental liver hemangioma and splenomegaly    -9/23 CT CAP (after 6 cycles of mFOLFOX 6 without 5-FU bolus):  -Subtotal colectomy with stable appearing rectal anastomosis, some adjacent mild scarring, no bowel obstruction or inflammation, no free fluid, no new adenopathy, no liver lesions, no bone lesions  -Mild wall thickening of the gallbladder  - Stable small pulmonary nodules and small thoracic lymph nodes at mediastinum, splenomegaly smaller 13.7 cm    - 7/18/23 CT CAP (repeat baseline after treatment delay due to A-fib)- subcm lung nodules stable, borderline and mildly enlarged mediastinal LN and periportal LN stable, splenomegaly 15.7cm, Subtotal colectomy with ileorectal anastomosis. No acute inflammation or obstruction    Labs:  3/23 CEA 6.8  5/23 CEA 1.8  7/23 CEA 2.8  10/23 CEA 2.0  12/23 CEA 2.3    PLAN:  - repeat CBC, CMP, Mg w/next labs   - port flush q6-8 weeks  - continue active surveillance:     History and physical examination every 3-6 mo for 2 y, then every 6 mo for a total of 5 y    CEA every 3-6 mo for 2 y, then every 6 mo for a total of 5 y    Chest/abdominal/pelvic CT every 6-12 mo (category 2B for frequency <12 mo) from date of surgery for a total of 5 y    Colonoscopy in 1 y after surgery except if no  preoperative colonoscopy due to obstructing lesion, colonoscopy in 3-6 mo   If advanced adenoma, repeat in 1 y   If no advanced adenoma, repeat in 3 y, then every 5 y  - f/u with Dr. Ashley 2/21/24 for sigmoidoscopy   - H&P and CEA due 4/24- ordered today  - repeat CT CAP 7/2024- to be ordered later      #parxosymal afib  - within 3 days of receiving 5FU, prior cardiac risk factors htn, prediabetes  - 6/3/23 presented to ER w/lightheadedness, dizziness, cardioverted s/p diltiazem ggt in ER  - 6/23 DPD deficiency testing negative  - currently on metoprolol XL 25 mg daily, apixaban 5 mg twice daily  - 6/30/2023 consultation with cardio oncologist Dr. Garza; I spoke with Dr. Garza 6/30/2023 as well, [id number] score 3, recommending starting apixaban 5 mg twice daily, okay to retrial FOLFOX while patient is on metoprolol.  Patient is currently on Xarelto  - 8/15/2023 cardiology follow-up: Increase metoprolol XL to 50 mg daily; During the days of chemo and 2 days after take long acting diltiazem 120 mg daily with 30 mg of short acting diltiazem as needed for atrial fibrillation with HR >110  - cardiology follow-up 12/14/2023 noted- no longer on long acting diltiazem, only using short acting prn; continuing metoprolol and xarelto and plan for follow up 12/2024  - I do not recommend switching anticoagulation to coumadin given CLASS D interaction w/5FU chemotherapy, I messaged cardiology regarding this previously    #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  Negative for mutations in APC, AXIN2,  BMPR1A, CDH1, CHEK2, EPCAM, GREM1, MLH1, MSH2, MSH3, MSH6, MUTYH, NTHL1, PMS2, POLD1, POLE, PTEN, SMAD4, STK11, TP53, CDKN1B, MEN1, NF1, RET, TSC1, TSC2, and VHL genes    #Anemia secondary to iron deficiency and B12 deficiency  - terminal ileum removed at time of colon surgery, monitor for nutrient def  - 2/23 hgb 6.8, ferritin 21, iron sat index 10, TIBC 265, iron 27, b12 1493  - On B12 1000 mcg p.o. daily and ferrous sulfate 325 mg p.o. daily  - 4/9/23 s/p 1 uprbcs  - 5/9/23 hgb 11.3,5/30/23 hgb 9.4  - based on Ganzoni equation w/goal hgb 14 and 500mg needed for iron stores, pts iron deficit is 1400 mg  - s/p ferric carboxymaltose 750mg x2 doses 6/14/23 and 6/28/23  - 7/11/23 ferritin 1022, iron sat 22, TIBC 201, iron 44    - 8/23 hgb 13.3  - 10/23 hgb 11.1  - 11/23 iron studies, B12, RBC folate WNL    #Crohn's  - dx since at least 2010     RTC 3 months for follow up with me, labs 1-2 days prior to visit       Francisco Lee DO  Hematology/Oncology  [facility]      Again, thank you for allowing me to participate in the care of your patient.        Sincerely,        FRANCISCO LEE DO

## 2024-01-02 NOTE — PROGRESS NOTES
Infusion Nursing Note:  Nadiaterrance Ladd presents today for port access for CT.    Patient seen by provider today: No   present during visit today: Not Applicable.    Note: Port accessed with 19g power needle. Good blood return. Lab collected. Flushed with 20cc saline and 5cc heparin prior to removal.      Intravenous Access:  Labs drawn without difficulty.  Implanted Port.    Treatment Conditions:  Not Applicable.      Post Infusion Assessment:  Site patent and intact, free from redness, edema or discomfort.  No evidence of extravasations.  Access discontinued per protocol.       Discharge Plan:   Discharge instructions reviewed with: Patient.  Patient and/or family verbalized understanding of discharge instructions and all questions answered.  Patient discharged in stable condition accompanied by: self.  Departure Mode: Ambulatory.      Teri Magana RN

## 2024-01-05 RX ORDER — GABAPENTIN 100 MG/1
200 CAPSULE ORAL 2 TIMES DAILY
Qty: 120 CAPSULE | Refills: 3 | Status: SHIPPED | OUTPATIENT
Start: 2024-01-05 | End: 2024-04-02 | Stop reason: DRUGHIGH

## 2024-01-05 RX ORDER — GABAPENTIN 300 MG/1
300 CAPSULE ORAL AT BEDTIME
Qty: 90 CAPSULE | Refills: 1 | Status: SHIPPED | OUTPATIENT
Start: 2024-01-05 | End: 2024-04-02 | Stop reason: DRUGHIGH

## 2024-01-10 ENCOUNTER — TELEPHONE (OUTPATIENT)
Dept: CARDIOLOGY | Facility: CLINIC | Age: 69
End: 2024-01-10
Payer: COMMERCIAL

## 2024-01-10 DIAGNOSIS — I48.0 PAROXYSMAL ATRIAL FIBRILLATION (H): ICD-10-CM

## 2024-01-10 NOTE — TELEPHONE ENCOUNTER
[facility] Call Center    Phone Message    May a detailed message be left on voicemail: yes     Reason for Call: Medication Refill Request    Has the patient contacted the pharmacy for the refill? Yes   Name of medication being requested: rivaroxaban ANTICOAGULANT (XARELTO) 20 MG TABS tablet   Provider who prescribed the medication:   Pharmacy:  [facility] [place] [phone], fax [phone]      Date medication is needed: 1/22       Action Taken: Other: Cardiology    Travel Screening: Not Applicable      Thank you!  [facility]

## 2024-01-17 ENCOUNTER — PATIENT OUTREACH (OUTPATIENT)
Dept: CARE COORDINATION | Facility: CLINIC | Age: 69
End: 2024-01-17
Payer: COMMERCIAL

## 2024-01-18 ENCOUNTER — OFFICE VISIT (OUTPATIENT)
Dept: AUDIOLOGY | Facility: CLINIC | Age: 69
End: 2024-01-18
Payer: COMMERCIAL

## 2024-01-18 DIAGNOSIS — H90.3 SENSORINEURAL HEARING LOSS, ASYMMETRICAL: Primary | ICD-10-CM

## 2024-01-18 PROCEDURE — V5010 ASSESSMENT FOR HEARING AID: HCPCS | Performed by: AUDIOLOGIST

## 2024-01-18 NOTE — PROGRESS NOTES
AUDIOLOGY REPORT     SUBJECTIVE:   Nadia Ladd is a 68 year old female who was seen in the Audiology Clinic at the [facility] for follow up after fitting of Oticon Alta2 Pro RITE hearing aids. Previous results evaluation 10/23/2023 showed moderate rising to mild sloping to severe sensorineural hearing loss right ear and mild sloping to severe senosrineural hearing loss left ear. Dr. Zurita provided medical clearance. She notes good benefit overall. She notes some feedback from the right device.    OBJECTIVE:   Changed from small double dome to a tulip dome right and she is using a small double dome left.     ASSESSMENT:   Follow up and changes noted above, binaural Oticon Alta2 Pro hearing aid(s).     Verification measures were performed at the fitting 12/18/2023.       PLAN:  Nadia will return as needed for hearing aid maintenance and programming. Discussed drop off service if an appointment isn't available in a reasonable time. Please call this clinic with questions regarding today s appointment.     Delmar Nuñez Licensed Audiologist #[id number]

## 2024-01-22 DIAGNOSIS — I48.0 PAROXYSMAL ATRIAL FIBRILLATION WITH RVR (H): ICD-10-CM

## 2024-01-22 DIAGNOSIS — E83.42 HYPOMAGNESEMIA: ICD-10-CM

## 2024-01-22 DIAGNOSIS — K52.9 CHRONIC DIARRHEA: ICD-10-CM

## 2024-01-22 RX ORDER — LANOLIN ALCOHOL/MO/W.PET/CERES
400 CREAM (GRAM) TOPICAL 2 TIMES DAILY
Qty: 60 TABLET | Refills: 0 | Status: SHIPPED | OUTPATIENT
Start: 2024-01-22 | End: 2024-02-28

## 2024-01-31 ENCOUNTER — PATIENT OUTREACH (OUTPATIENT)
Dept: CARE COORDINATION | Facility: CLINIC | Age: 69
End: 2024-01-31
Payer: COMMERCIAL

## 2024-02-09 ENCOUNTER — TELEPHONE (OUTPATIENT)
Dept: GASTROENTEROLOGY | Facility: CLINIC | Age: 69
End: 2024-02-09
Payer: COMMERCIAL

## 2024-02-09 NOTE — TELEPHONE ENCOUNTER
Caller: Nadia Ladd    Reason for Reschedule/Cancellation (please be detailed, any staff messages or encounters to note?): Patient needs MAC due to RN review, TE dated 10/20      Prior to reschedule please review:  Ordering Provider: Jerome Ashley MD  Sedation Determined: MAC PER ANESTHESIA REVIEW  Does patient have any ASC Exclusions, please identify?: PT HAS PULM HYPERTENSION BUT OKAY FOR ASC IF UNDER MAC      Notes on Cancelled Procedure:  Procedure: FLEXIBLE SIGMOIDOSCOPY [Flex Sig]   Date: 2/21  Location: [facility]; [address] [place] [address], [address] [place] [address]  Surgeon: SUGAR      Rescheduled: Yes  Procedure: FLEXIBLE SIGMOIDOSCOPY [Flex Sig]   Date: 5/1  Location: [facility]; [address] [place] [address], [address] [place] [address]  Surgeon: SUGAR  Sedation Level Scheduled  MAC,  Reason for Sedation Level PER ANESTHESIA REVIEW  Prep/Instructions updated and sent: Globili       Send In - basket message to Panc - Tae Pool if EUS  procedure is canceled or rescheduled: [ N/A, YES or NO] N/A

## 2024-02-20 ENCOUNTER — TELEPHONE (OUTPATIENT)
Dept: ANTICOAGULATION | Facility: CLINIC | Age: 69
End: 2024-02-20
Payer: COMMERCIAL

## 2024-02-20 DIAGNOSIS — I48.0 PAROXYSMAL ATRIAL FIBRILLATION (H): ICD-10-CM

## 2024-02-20 NOTE — TELEPHONE ENCOUNTER
ANTICOAGULATION DIRECT ORAL ANTICOAGULANT MONITORING    SUBJECTIVE     The [facility] is evaluating Nadia Ladd's Rivaroxaban (Xarelto) as part of its Anticoagulation Monitoring Program.    Indication:Atrial Fibrillation  Current dose per medication list: Rivaroxaban 20 mg Daily  Recent hospitalizations/ED/Office Visits for bleeding/clotting concerns: Yes: Admit 8/21 for port removal and replacement.   Other bleeding or side effect concerns: No  Additional findings: Cards visit 12/14/23. Follow up in 1 year. Oncology visit 1/2/24. Off chemo as of 12/23. Follow up in 3 months.     OBJECTIVE     Age: 68 year old    Wt Readings from Last 2 Encounters:   01/02/24 87.8 kg (193 lb 8 oz)   12/20/23 84.6 kg (186 lb 9.6 oz)      Lab Results   Component Value Date    CR 1.5 (H) 01/02/2024    CR 1.47 (H) 12/20/2023    CR 1.55 (H) 12/13/2023     Creatinine Clearance (using actual bodyweight, mL/min): 50    Lab Results   Component Value Date    HGB 10.3 12/13/2023    HGB 12.7 07/08/2020     12/13/2023     07/08/2020     ASSESSMENT/PLAN     A chart review for Direct Oral Anticoagulant (DOAC) Stewardship has been completed for:     Dosing: recommend adjustment to Rivaroxaban 15 mg Daily for CrCl <= 50 mL/min (using actual bodyweight) (consistent with package insert dosing)    Plan made per [facility] anticoagulation protocol    Emma BENJAMIN RN  Anticoagulation Clinic

## 2024-02-27 ENCOUNTER — LAB (OUTPATIENT)
Dept: INFUSION THERAPY | Facility: CLINIC | Age: 69
End: 2024-02-27
Attending: INTERNAL MEDICINE
Payer: COMMERCIAL

## 2024-02-27 DIAGNOSIS — D70.1 CHEMOTHERAPY-INDUCED NEUTROPENIA (H): ICD-10-CM

## 2024-02-27 DIAGNOSIS — T45.1X5A CHEMOTHERAPY-INDUCED NEUTROPENIA (H): ICD-10-CM

## 2024-02-27 DIAGNOSIS — C18.7 MALIGNANT NEOPLASM OF SIGMOID COLON (H): ICD-10-CM

## 2024-02-27 DIAGNOSIS — C18.9 MALIGNANT NEOPLASM OF COLON, UNSPECIFIED PART OF COLON (H): Primary | ICD-10-CM

## 2024-02-27 LAB
ALBUMIN SERPL BCG-MCNC: 3.9 G/DL (ref 3.5–5.2)
ALP SERPL-CCNC: 432 U/L (ref 40–150)
ALT SERPL W P-5'-P-CCNC: 36 U/L (ref 0–50)
ANION GAP SERPL CALCULATED.3IONS-SCNC: 12 MMOL/L (ref 7–15)
AST SERPL W P-5'-P-CCNC: 41 U/L (ref 0–45)
BASOPHILS # BLD AUTO: 0 10E3/UL (ref 0–0.2)
BASOPHILS NFR BLD AUTO: 0 %
BILIRUB SERPL-MCNC: 0.8 MG/DL
BUN SERPL-MCNC: 27.2 MG/DL (ref 8–23)
CALCIUM SERPL-MCNC: 9.3 MG/DL (ref 8.8–10.2)
CHLORIDE SERPL-SCNC: 106 MMOL/L (ref 98–107)
CREAT SERPL-MCNC: 1.74 MG/DL (ref 0.51–0.95)
DEPRECATED HCO3 PLAS-SCNC: 22 MMOL/L (ref 22–29)
EGFRCR SERPLBLD CKD-EPI 2021: 32 ML/MIN/1.73M2
EOSINOPHIL # BLD AUTO: 0 10E3/UL (ref 0–0.7)
EOSINOPHIL NFR BLD AUTO: 0 %
ERYTHROCYTE [DISTWIDTH] IN BLOOD BY AUTOMATED COUNT: 11.5 % (ref 10–15)
GLUCOSE SERPL-MCNC: 155 MG/DL (ref 70–99)
HCT VFR BLD AUTO: 37.7 % (ref 35–47)
HGB BLD-MCNC: 12.7 G/DL (ref 11.7–15.7)
IMM GRANULOCYTES # BLD: 0 10E3/UL
IMM GRANULOCYTES NFR BLD: 0 %
LYMPHOCYTES # BLD AUTO: 0.5 10E3/UL (ref 0.8–5.3)
LYMPHOCYTES NFR BLD AUTO: 12 %
MAGNESIUM SERPL-MCNC: 1.8 MG/DL (ref 1.7–2.3)
MCH RBC QN AUTO: 32 PG (ref 26.5–33)
MCHC RBC AUTO-ENTMCNC: 33.7 G/DL (ref 31.5–36.5)
MCV RBC AUTO: 95 FL (ref 78–100)
MONOCYTES # BLD AUTO: 0.4 10E3/UL (ref 0–1.3)
MONOCYTES NFR BLD AUTO: 10 %
NEUTROPHILS # BLD AUTO: 3.5 10E3/UL (ref 1.6–8.3)
NEUTROPHILS NFR BLD AUTO: 78 %
NRBC # BLD AUTO: 0 10E3/UL
NRBC BLD AUTO-RTO: 0 /100
PLATELET # BLD AUTO: 129 10E3/UL (ref 150–450)
POTASSIUM SERPL-SCNC: 4.3 MMOL/L (ref 3.4–5.3)
PROT SERPL-MCNC: 6.7 G/DL (ref 6.4–8.3)
RBC # BLD AUTO: 3.97 10E6/UL (ref 3.8–5.2)
SODIUM SERPL-SCNC: 140 MMOL/L (ref 135–145)
WBC # BLD AUTO: 4.5 10E3/UL (ref 4–11)

## 2024-02-27 PROCEDURE — 36591 DRAW BLOOD OFF VENOUS DEVICE: CPT

## 2024-02-27 PROCEDURE — 80053 COMPREHEN METABOLIC PANEL: CPT

## 2024-02-27 PROCEDURE — 83735 ASSAY OF MAGNESIUM: CPT

## 2024-02-27 PROCEDURE — 85004 AUTOMATED DIFF WBC COUNT: CPT

## 2024-02-27 PROCEDURE — 250N000011 HC RX IP 250 OP 636: Performed by: INTERNAL MEDICINE

## 2024-02-27 RX ORDER — MEPERIDINE HYDROCHLORIDE 25 MG/ML
25 INJECTION INTRAMUSCULAR; INTRAVENOUS; SUBCUTANEOUS EVERY 30 MIN PRN
Status: CANCELLED | OUTPATIENT
Start: 2024-02-27

## 2024-02-27 RX ORDER — EPINEPHRINE 1 MG/ML
0.3 INJECTION, SOLUTION, CONCENTRATE INTRAVENOUS EVERY 5 MIN PRN
Status: CANCELLED | OUTPATIENT
Start: 2024-02-27

## 2024-02-27 RX ORDER — METHYLPREDNISOLONE SODIUM SUCCINATE 125 MG/2ML
125 INJECTION, POWDER, LYOPHILIZED, FOR SOLUTION INTRAMUSCULAR; INTRAVENOUS
Status: CANCELLED
Start: 2024-02-27

## 2024-02-27 RX ORDER — HEPARIN SODIUM (PORCINE) LOCK FLUSH IV SOLN 100 UNIT/ML 100 UNIT/ML
5 SOLUTION INTRAVENOUS
Status: DISCONTINUED | OUTPATIENT
Start: 2024-02-27 | End: 2024-02-27 | Stop reason: HOSPADM

## 2024-02-27 RX ORDER — HEPARIN SODIUM (PORCINE) LOCK FLUSH IV SOLN 100 UNIT/ML 100 UNIT/ML
5 SOLUTION INTRAVENOUS
Status: CANCELLED | OUTPATIENT
Start: 2024-02-27

## 2024-02-27 RX ORDER — HEPARIN SODIUM,PORCINE 10 UNIT/ML
5 VIAL (ML) INTRAVENOUS
Status: CANCELLED | OUTPATIENT
Start: 2024-02-27

## 2024-02-27 RX ORDER — ALBUTEROL SULFATE 0.83 MG/ML
2.5 SOLUTION RESPIRATORY (INHALATION)
Status: CANCELLED | OUTPATIENT
Start: 2024-02-27

## 2024-02-27 RX ORDER — ALBUTEROL SULFATE 90 UG/1
1-2 AEROSOL, METERED RESPIRATORY (INHALATION)
Status: CANCELLED
Start: 2024-02-27

## 2024-02-27 RX ORDER — DIPHENHYDRAMINE HYDROCHLORIDE 50 MG/ML
50 INJECTION INTRAMUSCULAR; INTRAVENOUS
Status: CANCELLED
Start: 2024-02-27

## 2024-02-27 RX ADMIN — HEPARIN 5 ML: 100 SYRINGE at 10:52

## 2024-02-27 NOTE — PROGRESS NOTES
Infusion Nursing Note:  Nadia Ladd presents today for Port labs.    Patient seen by provider today: No   present during visit today: Not Applicable.    Note: Pt waited to see today's lab results. Mg 1.8, stable. Creat is elevated as with previous: 1.74, pt says she need to do better with oral hydration. Different ways to incorporate fluid hydration discussed and encouraged with patient. She is receptive.   Nadia has an appt with Nephrology coming up in June.       Intravenous Access:  Implanted Port.   Lab draw site L chest wall, Needle type Noncoring, Gauge 20,3/4in.  Labs drawn without difficulty.  Deaccessed with Heparin flush.   Site WDL.      Lab Results   Component Value Date    HGB 12.7 02/27/2024    WBC 4.5 02/27/2024    ANEU 7.6 08/01/2023    ANEUTAUTO 3.5 02/27/2024     (L) 02/27/2024        Lab Results   Component Value Date     02/27/2024    POTASSIUM 4.3 02/27/2024    MAG 1.8 02/27/2024    CR 1.74 (H) 02/27/2024    LIVIER 9.3 02/27/2024    BILITOTAL 0.8 02/27/2024    ALBUMIN 3.9 02/27/2024    ALT 36 02/27/2024    AST 41 02/27/2024         Discharge Plan:   AVS to patient via MYCHART.  Patient will return 4/1 for next appointment.   Patient discharged in stable condition accompanied by: .  Departure Mode: Ambulatory.      Rosalina Argueta RN

## 2024-02-28 DIAGNOSIS — L30.9 DERMATITIS: ICD-10-CM

## 2024-02-28 DIAGNOSIS — E83.42 HYPOMAGNESEMIA: ICD-10-CM

## 2024-02-28 DIAGNOSIS — I48.0 PAROXYSMAL ATRIAL FIBRILLATION WITH RVR (H): ICD-10-CM

## 2024-02-28 DIAGNOSIS — K52.9 CHRONIC DIARRHEA: ICD-10-CM

## 2024-02-28 RX ORDER — LANOLIN ALCOHOL/MO/W.PET/CERES
400 CREAM (GRAM) TOPICAL 2 TIMES DAILY
Qty: 60 TABLET | Refills: 0 | Status: SHIPPED | OUTPATIENT
Start: 2024-02-28 | End: 2024-03-26

## 2024-02-28 RX ORDER — FLUOCINONIDE 0.5 MG/G
CREAM TOPICAL
Qty: 60 G | Refills: 1 | Status: SHIPPED | OUTPATIENT
Start: 2024-02-28 | End: 2024-06-24

## 2024-03-12 ENCOUNTER — OFFICE VISIT (OUTPATIENT)
Dept: CARDIOLOGY | Facility: CLINIC | Age: 69
End: 2024-03-12
Payer: COMMERCIAL

## 2024-03-12 VITALS
RESPIRATION RATE: 16 BRPM | SYSTOLIC BLOOD PRESSURE: 146 MMHG | OXYGEN SATURATION: 98 % | WEIGHT: 196 LBS | HEART RATE: 74 BPM | DIASTOLIC BLOOD PRESSURE: 84 MMHG | HEIGHT: 61 IN | BODY MASS INDEX: 37 KG/M2

## 2024-03-12 DIAGNOSIS — N18.32 STAGE 3B CHRONIC KIDNEY DISEASE (H): ICD-10-CM

## 2024-03-12 DIAGNOSIS — E78.5 HYPERLIPIDEMIA LDL GOAL <100: ICD-10-CM

## 2024-03-12 DIAGNOSIS — I48.0 PAROXYSMAL ATRIAL FIBRILLATION WITH RVR (H): ICD-10-CM

## 2024-03-12 DIAGNOSIS — I10 BENIGN ESSENTIAL HYPERTENSION WITH TARGET BLOOD PRESSURE BELOW 140/90: Primary | ICD-10-CM

## 2024-03-12 PROCEDURE — 99214 OFFICE O/P EST MOD 30 MIN: CPT | Performed by: INTERNAL MEDICINE

## 2024-03-12 PROCEDURE — G2211 COMPLEX E/M VISIT ADD ON: HCPCS | Performed by: INTERNAL MEDICINE

## 2024-03-12 ASSESSMENT — PAIN SCALES - GENERAL: PAINLEVEL: NO PAIN (0)

## 2024-03-12 NOTE — PROGRESS NOTES
General Cardiology Clinic Progress Note  Nadia Ladd MRN# [id number]   YOB: 1955 Age: 68 year old       Reason for visit: Paroxysmal atrial fibrillation    History of presenting illness:    I had the opportunity to see Nadia Ladd at [facility] today for reevaluation of paroxysmal atrial fibrillation.  She was having problems with episodes of atrial fibrillation occurring at the time of chemotherapy for treatment of colon cancer.  She started chemotherapy last fall after undergoing colectomy and was having recurrent episodes of atrial fibrillation during more than half of her chemo episodes, possibly precipitated by steroids or dehydration.  She has been on Xarelto for stroke prevention consistently since her episodes began as well as metoprolol succinate 50 mg daily.  She was using diltiazem  mg daily on chemo days and for up to 2 days after chemotherapy.    Fortunately, since completing chemotherapy in December, she has had only 1 episode of recognized atrial fibrillation which occurred a few days ago.  She took some of her short acting diltiazem 30 mg tablets and her atrial fibrillation resolved spontaneously.  Her heart rates were high during that episode, in the 130s, but she did not have concerning symptoms of chest pain, shortness of breath, lightheadedness, or syncope.  She is symptomatic with rapid heart beating sensation.    Her blood pressure here today is running a bit high at 146/84, but at home she has been getting blood pressures in the 120s over 70s consistently.  Her heart rate is 74.  On examination her heart rhythm is regular she has no cardiac murmurs and no carotid bruits              Assessment and Plan:     ASSESSMENT:    Ms. Nadia Ladd is a 68-year-old woman with paroxysmal atrial fibrillation which has occurred more frequently during treatments with chemotherapy for colon cancer, but tended to be self-limited.  She remains on Xarelto for stroke  prevention and metoprolol for rate control.  She has had just 1 episode recently since completing chemotherapy in December and it again resolved spontaneously after a few hours with the use of short acting diltiazem.    She has some chronic kidney disease which she is managing with her nephrologist.  Her cholesterol numbers are excellent.  Liver function test is normal.  Her echocardiogram is essentially normal from June 2023.    I am pleased with her current A-fib control and I will not make any changes in her medication program.  Will continue to follow-up with her in cardiology clinic periodically.           Orders this Visit:  Orders Placed This Encounter   Procedures    Basic metabolic panel    CBC with platelets    Follow-Up with Cardiology    Echocardiogram Complete     No orders of the defined types were placed in this encounter.    Medications Discontinued During This Encounter   Medication Reason    dexAMETHasone (DECADRON) 4 MG tablet        Today's clinic visit entailed:    30 minutes spent by me on the date of the encounter doing chart review, history and exam, documentation and further activities per the note  Provider  Link to [facility] Help Grid     The level of medical decision making during this visit was of high complexity.           Review of Systems:     Review of Systems:  Skin:    itching   Eyes:  Negative    ENT:  Positive for hearing loss  Respiratory:  Negative shortness of breath;cough;wheezing  Cardiovascular:  Negative for;edema;fatigue;lightheadedness;dizziness palpitations;chest pain;Positive for  Gastroenterology: Negative    Genitourinary:  Negative    Musculoskeletal:  Positive for back pain  Neurologic:  Positive for numbness or tingling of hands  Psychiatric:  Negative    Heme/Lymph/Imm:  Negative allergies  Endocrine:  Negative              Physical Exam:     Vitals: BP (!) 146/84 (BP Location: Right arm, Patient Position: Sitting, Cuff Size: Adult Large)   Pulse 74   Resp 16   Ht  "1.549 m (5' 1\")   Wt 88.9 kg (196 lb)   LMP  (LMP Unknown)   SpO2 98%   BMI 37.03 kg/m    Constitutional: Well nourished and in no apparent distress.  Eyes: Pupils equal, round. Sclerae anicteric.   HEENT: Normocephalic, atraumatic.   Neck: Supple. JVD   Respiratory: Breathing non-labored. Lungs clear to auscultation bilaterally. No crackles, wheezes, rhonchi, or rales.  Cardiovascular:  Regular rate and rhythm, normal S1 and S2. No murmur, rub, or gallop.  Skin: Warm, dry. No rashes, cyanosis, or xanthelasma.  Extremities: No edema.  Neurologic: No gross motor deficits. Alert, awake, and oriented to person, place and time.  Psychiatric: Affect appropriate.             Medications:     Current Outpatient Medications   Medication Sig Dispense Refill    cyanocobalamin 1000 MCG TBCR Take 1,000 mcg by mouth daily 100 tablet 1    Ferrous Sulfate (IRON) 325 (65 Fe) MG tablet Take 1 tablet by mouth daily      fluocinonide (LIDEX) 0.05 % external cream APPLY A SMALL AMOUNT TO AFFECTED AREA(S) TWICE DAILY AS NEEDED 60 g 1    gabapentin (NEURONTIN) 100 MG capsule Take 2 capsules (200 mg) by mouth 2 times daily 120 capsule 3    gabapentin (NEURONTIN) 300 MG capsule Take 1 capsule (300 mg) by mouth at bedtime 90 capsule 1    lidocaine-prilocaine (EMLA) 2.5-2.5 % external cream Apply topically as needed for moderate pain Apply 15-20 minutes prior to port access 1 g 4    loperamide (IMODIUM) 2 MG capsule Take 2 mg by mouth daily as needed for diarrhea      MAGNESIUM OXIDE 400 (240 Mg) MG tablet TAKE 1 TABLET (400 MG) BY MOUTH 2 TIMES DAILY 60 tablet 0    metoprolol succinate ER (TOPROL XL) 50 MG 24 hr tablet Take 1 tablet (50 mg) by mouth daily 90 tablet 3    Multiple Vitamins-Iron (MULTI-DAY PLUS IRON PO) Take 1 tablet by mouth daily      rivaroxaban ANTICOAGULANT (XARELTO) 20 MG TABS tablet Take 1 tablet (20 mg) by mouth daily (with dinner) 90 tablet 3    sodium bicarbonate 650 MG tablet Take 1 tablet (650 mg) by mouth 2 " times daily 180 tablet 3    vitamin D3 (CHOLECALCIFEROL) 50 mcg (2000 units) tablet Take 1 tablet (50 mcg) by mouth daily 30 tablet 6    diltiazem (CARDIZEM) 30 MG tablet Take 1 tablet (30 mg) by mouth 3 times daily (Patient not taking: Reported on 2024) 90 tablet 0    diltiazem ER COATED BEADS (CARDIZEM CD/CARTIA XT) 120 MG 24 hr capsule Take 1 capsule (120 mg) by mouth daily On chemo days and 2 days post chemo infusion. (Patient not taking: Reported on 2024) 60 capsule 3    prochlorperazine (COMPAZINE) 10 MG tablet Take 10 mg by mouth every 6 hours as needed for nausea or vomiting (Patient not taking: Reported on 2024)         Family History   Problem Relation Age of Onset    Hypertension Mother         on meds, alive    Cerebrovascular Disease Father         stroke about age 65,  of cancer at 68 yrs    Diabetes Father         eventually took insulin    Anemia Father         Pernisios anemia    Kidney Disease Niece         kidney transplant    Kidney Disease Nephew         kidney transplant    Venous thrombosis No family hx of     Anesthesia Reaction No family hx of        Social History     Socioeconomic History    Marital status:      Spouse name: Not on file    Number of children: Not on file    Years of education: Not on file    Highest education level: Not on file   Occupational History    Not on file   Tobacco Use    Smoking status: Never     Passive exposure: Current    Smokeless tobacco: Never   Substance and Sexual Activity    Alcohol use: No    Drug use: No    Sexual activity: Yes     Partners: Male   Other Topics Concern    Parent/sibling w/ CABG, MI or angioplasty before 65F 55M? Not Asked   Social History Narrative    Not on file     Social Determinants of Health     Financial Resource Strain: Not on file   Food Insecurity: Not on file   Transportation Needs: Not on file   Physical Activity: Not on file   Stress: Not on file   Social Connections: Not on file   Interpersonal  Safety: Not on file   Housing Stability: Not on file            Past Medical History:     Past Medical History:   Diagnosis Date    Arthropathia 08/05/2010    B12 deficiency anemia 10/21/2010    Benign essential hypertension with target blood pressure below 140/90 10/10/2016    CARDIOVASCULAR SCREENING; LDL GOAL LESS THAN 160 10/31/2010    Crohn's colitis (H) 02/15/2011    Dermatitis-dishydrotic eczema-severe 08/05/2010    Hiatal hernia     HTN (hypertension) 07/21/2011    Iron deficiency anemia 10/21/2010    Malignant neoplasm of sigmoid colon (H)     Obesity     Primary pulmonary hypertension (H) 04/06/2010              Past Surgical History:     Past Surgical History:   Procedure Laterality Date    COLECTOMY WITHOUT COLOSTOMY N/A 4/6/2023    Procedure: Laparoscopic Converted to Open Total abdominal colectomy;  Surgeon: Jerome Ashley MD;  Location: [facility] OR    COLONOSCOPY  10/11/10    COLONOSCOPY N/A 2/27/2023    Procedure: ATTEMPTED COLONOSCOPY WITH SIGMOID STRICTURE BIOPSY;  Surgeon: Jonathan Alcocer MD;  Location:  GI    ESOPHAGOSCOPY, GASTROSCOPY, DUODENOSCOPY (EGD), COMBINED N/A 2/27/2023    Procedure: ESOPHAGOGASTRODUODENOSCOPY, WITH BIOPSY;  Surgeon: Jonathan Alcocer MD;  Location: [facility]    HYSTERECTOMY TOTAL ABDOMINAL, BILATERAL SALPINGO-OOPHORECTOMY, COMBINED N/A 4/6/2023    Procedure: Hysterectomy total abdominal, bilateral salpingo-oophorectomy;  Surgeon: Sunitha Olea MD;  Location: [facility] OR    INSERT PORT VASCULAR ACCESS Right 5/25/2023    Procedure: Ultrasound-guided right internal jugular venous access port placement with fluoroscopy;  Surgeon: Martin Thomas DO;  Location: [facility] OR    IR CHEST PORT PLACEMENT > 5 YRS OF AGE  8/21/2023    IR PORT CHECK RIGHT  7/18/2023    IR PORT REMOVAL RIGHT  8/21/2023    SIGMOIDOSCOPY FLEXIBLE N/A 4/6/2023    Procedure: Sigmoidoscopy flexible;  Surgeon: Jerome Ashley MD;  Location: [facility] OR    SURGICAL HISTORY OF -   06/14/76     Perineorrhaphy, for widening vaginal orifice    [facility] EXPLORATORY OF ABDOMEN  1989    laparoscopy              Allergies:   No known drug allergy       Data:   All laboratory data reviewed:    Recent Labs   Lab Test 12/04/23  1018 11/08/23  1117 08/04/23  0915 07/11/23  1040 06/03/23  1311 02/16/23  0959 06/06/19  1031 03/12/18  1629 11/30/16  1628   LDL 69  --   --   --   --   --  138*  --  133*   HDL 65  --   --   --   --   --  43*  --   --    NHDL 131*  --   --   --   --   --  191*  --   --    CHOL 196  --   --   --   --   --  234*  --   --    TRIG 308*  --   --   --   --   --  266*  --   --    TSH  --   --  2.39  --  1.55  --   --   --  3.19   IRON  --  102  --    < >  --    < >  --   --  43   FEB  --  246  --    < >  --    < >  --   --  232*   IRONSAT  --  41  --    < >  --    < >  --   --  19   BRENT  --  954*  --    < >  --    < >  --    < >  --     < > = values in this interval not displayed.       Lab Results   Component Value Date    WBC 4.5 02/27/2024    WBC 7.2 07/08/2020    RBC 3.97 02/27/2024    RBC 4.24 07/08/2020    HGB 12.7 02/27/2024    HGB 12.7 07/08/2020    HCT 37.7 02/27/2024    HCT 40.7 07/08/2020    MCV 95 02/27/2024    MCV 96 07/08/2020    MCH 32.0 02/27/2024    MCH 30.0 07/08/2020    MCHC 33.7 02/27/2024    MCHC 31.2 (L) 07/08/2020    RDW 11.5 02/27/2024    RDW 13.9 07/08/2020     (L) 02/27/2024     07/08/2020       Lab Results   Component Value Date     02/27/2024     01/26/2021    POTASSIUM 4.3 02/27/2024    POTASSIUM 4.7 04/06/2023    POTASSIUM 4.3 10/09/2021    POTASSIUM 4.1 01/26/2021    CHLORIDE 106 02/27/2024    CHLORIDE 118 (H) 10/09/2021    CHLORIDE 111 (H) 01/26/2021    CO2 22 02/27/2024    CO2 20 10/09/2021    CO2 22 01/26/2021    ANIONGAP 12 02/27/2024    ANIONGAP 4 10/09/2021    ANIONGAP 7 01/26/2021     (H) 02/27/2024     (H) 05/10/2023     (H) 10/09/2021     (H) 01/26/2021    BUN 27.2 (H) 02/27/2024    BUN 25 10/09/2021    BUN  "29 01/26/2021    CR 1.74 (H) 02/27/2024    CR 1.45 (H) 01/26/2021    GFRESTIMATED 32 (L) 02/27/2024    GFRESTIMATED 38 (L) 01/02/2024    GFRESTIMATED 38 (L) 01/26/2021    GFRESTBLACK 44 (L) 01/26/2021    LIVIER 9.3 02/27/2024    LIVIER 9.3 01/26/2021      Lab Results   Component Value Date    AST 41 02/27/2024    AST 17 01/26/2021    ALT 36 02/27/2024    ALT 22 01/26/2021       Lab Results   Component Value Date    A1C 4.8 03/12/2018       No results found for: \"INR\"      ALEXANDER RENO MD  [facility]  "

## 2024-03-12 NOTE — LETTER
3/12/2024    Nomi Cartwright, DO  [address] [place] [address]    RE: Nadia Ladd       Dear Colleague,     I had the pleasure of seeing Nadia Ladd in the [facility].    General Cardiology Clinic Progress Note  Nadia Ladd MRN# [id number]   YOB: 1955 Age: 68 year old       Reason for visit: Paroxysmal atrial fibrillation    History of presenting illness:    I had the opportunity to see Nadia Ladd at [facility] today for reevaluation of paroxysmal atrial fibrillation.  She was having problems with episodes of atrial fibrillation occurring at the time of chemotherapy for treatment of colon cancer.  She started chemotherapy last fall after undergoing colectomy and was having recurrent episodes of atrial fibrillation during more than half of her chemo episodes, possibly precipitated by steroids or dehydration.  She has been on Xarelto for stroke prevention consistently since her episodes began as well as metoprolol succinate 50 mg daily.  She was using diltiazem  mg daily on chemo days and for up to 2 days after chemotherapy.    Fortunately, since completing chemotherapy in December, she has had only 1 episode of recognized atrial fibrillation which occurred a few days ago.  She took some of her short acting diltiazem 30 mg tablets and her atrial fibrillation resolved spontaneously.  Her heart rates were high during that episode, in the 130s, but she did not have concerning symptoms of chest pain, shortness of breath, lightheadedness, or syncope.  She is symptomatic with rapid heart beating sensation.    Her blood pressure here today is running a bit high at 146/84, but at home she has been getting blood pressures in the 120s over 70s consistently.  Her heart rate is 74.  On examination her heart rhythm is regular she has no cardiac murmurs and no carotid bruits              Assessment and Plan:     ASSESSMENT:    Ms. Nadia Ladd is a  68-year-old woman with paroxysmal atrial fibrillation which has occurred more frequently during treatments with chemotherapy for colon cancer, but tended to be self-limited.  She remains on Xarelto for stroke prevention and metoprolol for rate control.  She has had just 1 episode recently since completing chemotherapy in December and it again resolved spontaneously after a few hours with the use of short acting diltiazem.    She has some chronic kidney disease which she is managing with her nephrologist.  Her cholesterol numbers are excellent.  Liver function test is normal.  Her echocardiogram is essentially normal from June 2023.    I am pleased with her current A-fib control and I will not make any changes in her medication program.  Will continue to follow-up with her in cardiology clinic periodically.           Orders this Visit:  Orders Placed This Encounter   Procedures    Basic metabolic panel    CBC with platelets    Follow-Up with Cardiology    Echocardiogram Complete     No orders of the defined types were placed in this encounter.    Medications Discontinued During This Encounter   Medication Reason    dexAMETHasone (DECADRON) 4 MG tablet        Today's clinic visit entailed:    30 minutes spent by me on the date of the encounter doing chart review, history and exam, documentation and further activities per the note  Provider  Link to [facility] Help Grid     The level of medical decision making during this visit was of high complexity.           Review of Systems:     Review of Systems:  Skin:    itching   Eyes:  Negative    ENT:  Positive for hearing loss  Respiratory:  Negative shortness of breath;cough;wheezing  Cardiovascular:  Negative for;edema;fatigue;lightheadedness;dizziness palpitations;chest pain;Positive for  Gastroenterology: Negative    Genitourinary:  Negative    Musculoskeletal:  Positive for back pain  Neurologic:  Positive for numbness or tingling of hands  Psychiatric:  Negative   "  Heme/Lymph/Imm:  Negative allergies  Endocrine:  Negative              Physical Exam:     Vitals: BP (!) 146/84 (BP Location: Right arm, Patient Position: Sitting, Cuff Size: Adult Large)   Pulse 74   Resp 16   Ht 1.549 m (5' 1\")   Wt 88.9 kg (196 lb)   LMP  (LMP Unknown)   SpO2 98%   BMI 37.03 kg/m    Constitutional: Well nourished and in no apparent distress.  Eyes: Pupils equal, round. Sclerae anicteric.   HEENT: Normocephalic, atraumatic.   Neck: Supple. JVD   Respiratory: Breathing non-labored. Lungs clear to auscultation bilaterally. No crackles, wheezes, rhonchi, or rales.  Cardiovascular:  Regular rate and rhythm, normal S1 and S2. No murmur, rub, or gallop.  Skin: Warm, dry. No rashes, cyanosis, or xanthelasma.  Extremities: No edema.  Neurologic: No gross motor deficits. Alert, awake, and oriented to person, place and time.  Psychiatric: Affect appropriate.             Medications:     Current Outpatient Medications   Medication Sig Dispense Refill    cyanocobalamin 1000 MCG TBCR Take 1,000 mcg by mouth daily 100 tablet 1    Ferrous Sulfate (IRON) 325 (65 Fe) MG tablet Take 1 tablet by mouth daily      fluocinonide (LIDEX) 0.05 % external cream APPLY A SMALL AMOUNT TO AFFECTED AREA(S) TWICE DAILY AS NEEDED 60 g 1    gabapentin (NEURONTIN) 100 MG capsule Take 2 capsules (200 mg) by mouth 2 times daily 120 capsule 3    gabapentin (NEURONTIN) 300 MG capsule Take 1 capsule (300 mg) by mouth at bedtime 90 capsule 1    lidocaine-prilocaine (EMLA) 2.5-2.5 % external cream Apply topically as needed for moderate pain Apply 15-20 minutes prior to port access 1 g 4    loperamide (IMODIUM) 2 MG capsule Take 2 mg by mouth daily as needed for diarrhea      MAGNESIUM OXIDE 400 (240 Mg) MG tablet TAKE 1 TABLET (400 MG) BY MOUTH 2 TIMES DAILY 60 tablet 0    metoprolol succinate ER (TOPROL XL) 50 MG 24 hr tablet Take 1 tablet (50 mg) by mouth daily 90 tablet 3    Multiple Vitamins-Iron (MULTI-DAY PLUS IRON PO) Take " 1 tablet by mouth daily      rivaroxaban ANTICOAGULANT (XARELTO) 20 MG TABS tablet Take 1 tablet (20 mg) by mouth daily (with dinner) 90 tablet 3    sodium bicarbonate 650 MG tablet Take 1 tablet (650 mg) by mouth 2 times daily 180 tablet 3    vitamin D3 (CHOLECALCIFEROL) 50 mcg (2000 units) tablet Take 1 tablet (50 mcg) by mouth daily 30 tablet 6    diltiazem (CARDIZEM) 30 MG tablet Take 1 tablet (30 mg) by mouth 3 times daily (Patient not taking: Reported on 2024) 90 tablet 0    diltiazem ER COATED BEADS (CARDIZEM CD/CARTIA XT) 120 MG 24 hr capsule Take 1 capsule (120 mg) by mouth daily On chemo days and 2 days post chemo infusion. (Patient not taking: Reported on 2024) 60 capsule 3    prochlorperazine (COMPAZINE) 10 MG tablet Take 10 mg by mouth every 6 hours as needed for nausea or vomiting (Patient not taking: Reported on 2024)         Family History   Problem Relation Age of Onset    Hypertension Mother         on meds, alive    Cerebrovascular Disease Father         stroke about age 65,  of cancer at 68 yrs    Diabetes Father         eventually took insulin    Anemia Father         Pernisios anemia    Kidney Disease Niece         kidney transplant    Kidney Disease Nephew         kidney transplant    Venous thrombosis No family hx of     Anesthesia Reaction No family hx of        Social History     Socioeconomic History    Marital status:      Spouse name: Not on file    Number of children: Not on file    Years of education: Not on file    Highest education level: Not on file   Occupational History    Not on file   Tobacco Use    Smoking status: Never     Passive exposure: Current    Smokeless tobacco: Never   Substance and Sexual Activity    Alcohol use: No    Drug use: No    Sexual activity: Yes     Partners: Male   Other Topics Concern    Parent/sibling w/ CABG, MI or angioplasty before 65F 55M? Not Asked   Social History Narrative    Not on file     Social Determinants of Health      Financial Resource Strain: Not on file   Food Insecurity: Not on file   Transportation Needs: Not on file   Physical Activity: Not on file   Stress: Not on file   Social Connections: Not on file   Interpersonal Safety: Not on file   Housing Stability: Not on file            Past Medical History:     Past Medical History:   Diagnosis Date    Arthropathia 08/05/2010    B12 deficiency anemia 10/21/2010    Benign essential hypertension with target blood pressure below 140/90 10/10/2016    CARDIOVASCULAR SCREENING; LDL GOAL LESS THAN 160 10/31/2010    Crohn's colitis (H) 02/15/2011    Dermatitis-dishydrotic eczema-severe 08/05/2010    Hiatal hernia     HTN (hypertension) 07/21/2011    Iron deficiency anemia 10/21/2010    Malignant neoplasm of sigmoid colon (H)     Obesity     Primary pulmonary hypertension (H) 04/06/2010              Past Surgical History:     Past Surgical History:   Procedure Laterality Date    COLECTOMY WITHOUT COLOSTOMY N/A 4/6/2023    Procedure: Laparoscopic Converted to Open Total abdominal colectomy;  Surgeon: Jerome Ashley MD;  Location: [facility] OR    COLONOSCOPY  10/11/10    COLONOSCOPY N/A 2/27/2023    Procedure: ATTEMPTED COLONOSCOPY WITH SIGMOID STRICTURE BIOPSY;  Surgeon: Jonathan Alcocer MD;  Location:  GI    ESOPHAGOSCOPY, GASTROSCOPY, DUODENOSCOPY (EGD), COMBINED N/A 2/27/2023    Procedure: ESOPHAGOGASTRODUODENOSCOPY, WITH BIOPSY;  Surgeon: Jonathan Alcocer MD;  Location:  GI    HYSTERECTOMY TOTAL ABDOMINAL, BILATERAL SALPINGO-OOPHORECTOMY, COMBINED N/A 4/6/2023    Procedure: Hysterectomy total abdominal, bilateral salpingo-oophorectomy;  Surgeon: Sunitha Olea MD;  Location: [facility] OR    INSERT PORT VASCULAR ACCESS Right 5/25/2023    Procedure: Ultrasound-guided right internal jugular venous access port placement with fluoroscopy;  Surgeon: Martin Thomas DO;  Location: [facility] OR    IR CHEST PORT PLACEMENT > 5 YRS OF AGE  8/21/2023    IR PORT CHECK RIGHT  7/18/2023     IR PORT REMOVAL RIGHT  8/21/2023    SIGMOIDOSCOPY FLEXIBLE N/A 4/6/2023    Procedure: Sigmoidoscopy flexible;  Surgeon: Jerome Ashley MD;  Location: [facility] OR    SURGICAL HISTORY OF -   06/14/76    Perineorrhaphy, for widening vaginal orifice    ZZC EXPLORATORY OF ABDOMEN  1989    laparoscopy              Allergies:   No known drug allergy       Data:   All laboratory data reviewed:    Recent Labs   Lab Test 12/04/23  1018 11/08/23  1117 08/04/23  0915 07/11/23  1040 06/03/23  1311 02/16/23  0959 06/06/19  1031 03/12/18  1629 11/30/16  1628   LDL 69  --   --   --   --   --  138*  --  133*   HDL 65  --   --   --   --   --  43*  --   --    NHDL 131*  --   --   --   --   --  191*  --   --    CHOL 196  --   --   --   --   --  234*  --   --    TRIG 308*  --   --   --   --   --  266*  --   --    TSH  --   --  2.39  --  1.55  --   --   --  3.19   IRON  --  102  --    < >  --    < >  --   --  43   FEB  --  246  --    < >  --    < >  --   --  232*   IRONSAT  --  41  --    < >  --    < >  --   --  19   BRENT  --  954*  --    < >  --    < >  --    < >  --     < > = values in this interval not displayed.       Lab Results   Component Value Date    WBC 4.5 02/27/2024    WBC 7.2 07/08/2020    RBC 3.97 02/27/2024    RBC 4.24 07/08/2020    HGB 12.7 02/27/2024    HGB 12.7 07/08/2020    HCT 37.7 02/27/2024    HCT 40.7 07/08/2020    MCV 95 02/27/2024    MCV 96 07/08/2020    MCH 32.0 02/27/2024    MCH 30.0 07/08/2020    MCHC 33.7 02/27/2024    MCHC 31.2 (L) 07/08/2020    RDW 11.5 02/27/2024    RDW 13.9 07/08/2020     (L) 02/27/2024     07/08/2020       Lab Results   Component Value Date     02/27/2024     01/26/2021    POTASSIUM 4.3 02/27/2024    POTASSIUM 4.7 04/06/2023    POTASSIUM 4.3 10/09/2021    POTASSIUM 4.1 01/26/2021    CHLORIDE 106 02/27/2024    CHLORIDE 118 (H) 10/09/2021    CHLORIDE 111 (H) 01/26/2021    CO2 22 02/27/2024    CO2 20 10/09/2021    CO2 22 01/26/2021    ANIONGAP 12 02/27/2024  "   ANIONGAP 4 10/09/2021    ANIONGAP 7 01/26/2021     (H) 02/27/2024     (H) 05/10/2023     (H) 10/09/2021     (H) 01/26/2021    BUN 27.2 (H) 02/27/2024    BUN 25 10/09/2021    BUN 29 01/26/2021    CR 1.74 (H) 02/27/2024    CR 1.45 (H) 01/26/2021    GFRESTIMATED 32 (L) 02/27/2024    GFRESTIMATED 38 (L) 01/02/2024    GFRESTIMATED 38 (L) 01/26/2021    GFRESTBLACK 44 (L) 01/26/2021    LIVIER 9.3 02/27/2024    LIVIER 9.3 01/26/2021      Lab Results   Component Value Date    AST 41 02/27/2024    AST 17 01/26/2021    ALT 36 02/27/2024    ALT 22 01/26/2021       Lab Results   Component Value Date    A1C 4.8 03/12/2018       No results found for: \"INR\"      ALEXANDER RAMOS MD  [facility]      Thank you for allowing me to participate in the care of your patient.      Sincerely,     ALEXANDER RAMOS MD     [facility]  cc:   Alexander Ramos MD  [address]  [place] [id number]      "

## 2024-03-12 NOTE — TELEPHONE ENCOUNTER
ANTICOAGULATION  STEWARDSHIP    Spoke with Nadia to review advised dosage change for Rivaroxaban (Xarelto).    Education provided: how to take rivaroxaban (Xarelto) safety: take dose with evening meal (or largest meal of day); at the same time each day, may finish the supply of 20 mg tablets that you have at home before starting the new dose. , what to do in the event of a missed dose, not discontinue therapy without talking to their provider first, may take longer than usual for bleeding to stop and patient may bruise or bleed more easily; any unusual bleeding to their provider. , and notifying provider of surgeries or procedures 1-2 weeks in advance    They verbalized understanding of new Rivaroxaban (Xarelto) dose/tablet strength  They were notified Rx for new dosage would be sent to preferred pharmacy    Saad Lane RN  [facility]

## 2024-03-12 NOTE — TELEPHONE ENCOUNTER
John Ramos MD  You; Medina Emanuel, RN8 minutes ago (4:25 PM)     EE  Yes, I agree with dose reduction of Xarelto to 15 mg daily. Thank you. John Ramos MD

## 2024-03-16 ENCOUNTER — HEALTH MAINTENANCE LETTER (OUTPATIENT)
Age: 69
End: 2024-03-16

## 2024-03-26 DIAGNOSIS — E83.42 HYPOMAGNESEMIA: ICD-10-CM

## 2024-03-26 DIAGNOSIS — I48.0 PAROXYSMAL ATRIAL FIBRILLATION WITH RVR (H): ICD-10-CM

## 2024-03-26 DIAGNOSIS — K52.9 CHRONIC DIARRHEA: ICD-10-CM

## 2024-03-26 RX ORDER — LANOLIN ALCOHOL/MO/W.PET/CERES
400 CREAM (GRAM) TOPICAL 2 TIMES DAILY
Qty: 60 TABLET | Refills: 0 | Status: SHIPPED | OUTPATIENT
Start: 2024-03-26 | End: 2024-04-25

## 2024-04-01 ENCOUNTER — LAB (OUTPATIENT)
Dept: INFUSION THERAPY | Facility: CLINIC | Age: 69
End: 2024-04-01
Attending: INTERNAL MEDICINE
Payer: COMMERCIAL

## 2024-04-01 VITALS
OXYGEN SATURATION: 97 % | RESPIRATION RATE: 18 BRPM | SYSTOLIC BLOOD PRESSURE: 138 MMHG | HEART RATE: 73 BPM | DIASTOLIC BLOOD PRESSURE: 72 MMHG

## 2024-04-01 DIAGNOSIS — C18.7 MALIGNANT NEOPLASM OF SIGMOID COLON (H): ICD-10-CM

## 2024-04-01 DIAGNOSIS — E83.42 HYPOMAGNESEMIA: ICD-10-CM

## 2024-04-01 DIAGNOSIS — C18.9 MALIGNANT NEOPLASM OF COLON, UNSPECIFIED PART OF COLON (H): Primary | ICD-10-CM

## 2024-04-01 DIAGNOSIS — T45.1X5A CHEMOTHERAPY-INDUCED NEUTROPENIA (H): ICD-10-CM

## 2024-04-01 DIAGNOSIS — D70.1 CHEMOTHERAPY-INDUCED NEUTROPENIA (H): ICD-10-CM

## 2024-04-01 LAB
ALBUMIN SERPL BCG-MCNC: 4.2 G/DL (ref 3.5–5.2)
ALP SERPL-CCNC: 342 U/L (ref 40–150)
ALT SERPL W P-5'-P-CCNC: 34 U/L (ref 0–50)
ANION GAP SERPL CALCULATED.3IONS-SCNC: 12 MMOL/L (ref 7–15)
AST SERPL W P-5'-P-CCNC: 34 U/L (ref 0–45)
BASOPHILS # BLD AUTO: 0 10E3/UL (ref 0–0.2)
BASOPHILS NFR BLD AUTO: 1 %
BILIRUB SERPL-MCNC: 1 MG/DL
BUN SERPL-MCNC: 31.7 MG/DL (ref 8–23)
CALCIUM SERPL-MCNC: 9.5 MG/DL (ref 8.8–10.2)
CEA SERPL-MCNC: 2.1 NG/ML
CHLORIDE SERPL-SCNC: 104 MMOL/L (ref 98–107)
CREAT SERPL-MCNC: 2.14 MG/DL (ref 0.51–0.95)
DEPRECATED HCO3 PLAS-SCNC: 24 MMOL/L (ref 22–29)
EGFRCR SERPLBLD CKD-EPI 2021: 25 ML/MIN/1.73M2
EOSINOPHIL # BLD AUTO: 0 10E3/UL (ref 0–0.7)
EOSINOPHIL NFR BLD AUTO: 0 %
ERYTHROCYTE [DISTWIDTH] IN BLOOD BY AUTOMATED COUNT: 12 % (ref 10–15)
GLUCOSE SERPL-MCNC: 109 MG/DL (ref 70–99)
HCT VFR BLD AUTO: 39.5 % (ref 35–47)
HGB BLD-MCNC: 13.3 G/DL (ref 11.7–15.7)
IMM GRANULOCYTES # BLD: 0 10E3/UL
IMM GRANULOCYTES NFR BLD: 0 %
LYMPHOCYTES # BLD AUTO: 0.7 10E3/UL (ref 0.8–5.3)
LYMPHOCYTES NFR BLD AUTO: 13 %
MCH RBC QN AUTO: 30.9 PG (ref 26.5–33)
MCHC RBC AUTO-ENTMCNC: 33.7 G/DL (ref 31.5–36.5)
MCV RBC AUTO: 92 FL (ref 78–100)
MONOCYTES # BLD AUTO: 0.6 10E3/UL (ref 0–1.3)
MONOCYTES NFR BLD AUTO: 10 %
NEUTROPHILS # BLD AUTO: 4.3 10E3/UL (ref 1.6–8.3)
NEUTROPHILS NFR BLD AUTO: 77 %
NRBC # BLD AUTO: 0 10E3/UL
NRBC BLD AUTO-RTO: 0 /100
PLATELET # BLD AUTO: 132 10E3/UL (ref 150–450)
POTASSIUM SERPL-SCNC: 4.5 MMOL/L (ref 3.4–5.3)
PROT SERPL-MCNC: 7.2 G/DL (ref 6.4–8.3)
RBC # BLD AUTO: 4.31 10E6/UL (ref 3.8–5.2)
SODIUM SERPL-SCNC: 140 MMOL/L (ref 135–145)
WBC # BLD AUTO: 5.6 10E3/UL (ref 4–11)

## 2024-04-01 PROCEDURE — 250N000011 HC RX IP 250 OP 636: Performed by: INTERNAL MEDICINE

## 2024-04-01 PROCEDURE — 83735 ASSAY OF MAGNESIUM: CPT

## 2024-04-01 PROCEDURE — 82378 CARCINOEMBRYONIC ANTIGEN: CPT

## 2024-04-01 PROCEDURE — 85025 COMPLETE CBC W/AUTO DIFF WBC: CPT

## 2024-04-01 PROCEDURE — 82040 ASSAY OF SERUM ALBUMIN: CPT

## 2024-04-01 PROCEDURE — 36591 DRAW BLOOD OFF VENOUS DEVICE: CPT

## 2024-04-01 RX ORDER — HEPARIN SODIUM (PORCINE) LOCK FLUSH IV SOLN 100 UNIT/ML 100 UNIT/ML
5 SOLUTION INTRAVENOUS
Status: DISCONTINUED | OUTPATIENT
Start: 2024-04-01 | End: 2024-04-01 | Stop reason: HOSPADM

## 2024-04-01 RX ORDER — ALBUTEROL SULFATE 90 UG/1
1-2 AEROSOL, METERED RESPIRATORY (INHALATION)
Status: CANCELLED
Start: 2024-04-01

## 2024-04-01 RX ORDER — MEPERIDINE HYDROCHLORIDE 25 MG/ML
25 INJECTION INTRAMUSCULAR; INTRAVENOUS; SUBCUTANEOUS EVERY 30 MIN PRN
Status: CANCELLED | OUTPATIENT
Start: 2024-04-01

## 2024-04-01 RX ORDER — ALBUTEROL SULFATE 0.83 MG/ML
2.5 SOLUTION RESPIRATORY (INHALATION)
Status: CANCELLED | OUTPATIENT
Start: 2024-04-01

## 2024-04-01 RX ORDER — HEPARIN SODIUM (PORCINE) LOCK FLUSH IV SOLN 100 UNIT/ML 100 UNIT/ML
5 SOLUTION INTRAVENOUS
Status: CANCELLED | OUTPATIENT
Start: 2024-04-01

## 2024-04-01 RX ORDER — EPINEPHRINE 1 MG/ML
0.3 INJECTION, SOLUTION, CONCENTRATE INTRAVENOUS EVERY 5 MIN PRN
Status: CANCELLED | OUTPATIENT
Start: 2024-04-01

## 2024-04-01 RX ORDER — METHYLPREDNISOLONE SODIUM SUCCINATE 125 MG/2ML
125 INJECTION, POWDER, LYOPHILIZED, FOR SOLUTION INTRAMUSCULAR; INTRAVENOUS
Status: CANCELLED
Start: 2024-04-01

## 2024-04-01 RX ORDER — DIPHENHYDRAMINE HYDROCHLORIDE 50 MG/ML
50 INJECTION INTRAMUSCULAR; INTRAVENOUS
Status: CANCELLED
Start: 2024-04-01

## 2024-04-01 RX ORDER — HEPARIN SODIUM,PORCINE 10 UNIT/ML
5 VIAL (ML) INTRAVENOUS
Status: CANCELLED | OUTPATIENT
Start: 2024-04-01

## 2024-04-01 RX ADMIN — HEPARIN 5 ML: 100 SYRINGE at 10:24

## 2024-04-01 ASSESSMENT — PAIN SCALES - GENERAL: PAINLEVEL: NO PAIN (0)

## 2024-04-01 NOTE — PROGRESS NOTES
Infusion Nursing Note:  Nadiaterrance Ladd presents today for Port labs only.    Patient seen by provider today: No   present during visit today: Not Applicable.    Note: Pt here for port labs only.  Port easily accessed with brisk blood return.  De-accessed, heparin locked.  Pt states she has been feeling good.  Vitally stable, afebrile.  Will be seeing Dr. Bateman tomorrow.  Denies any new questions or concerns at this time.      Intravenous Access:  Labs drawn without difficulty.  Implanted Port.    Treatment Conditions:  Not Applicable.    Discharge Plan:   Discharge instructions reviewed with: Patient.  Patient and/or family verbalized understanding of discharge instructions and all questions answered.  AVS to patient via [employer].  Patient will return 4/2/24 for next appointment.   Patient discharged in stable condition accompanied by: self.  Departure Mode: Ambulatory.      Jane Vásquez RN

## 2024-04-02 ENCOUNTER — ONCOLOGY VISIT (OUTPATIENT)
Dept: ONCOLOGY | Facility: CLINIC | Age: 69
End: 2024-04-02
Payer: COMMERCIAL

## 2024-04-02 ENCOUNTER — INFUSION THERAPY VISIT (OUTPATIENT)
Dept: INFUSION THERAPY | Facility: CLINIC | Age: 69
End: 2024-04-02
Attending: INTERNAL MEDICINE
Payer: COMMERCIAL

## 2024-04-02 VITALS
SYSTOLIC BLOOD PRESSURE: 148 MMHG | RESPIRATION RATE: 18 BRPM | OXYGEN SATURATION: 95 % | HEART RATE: 85 BPM | DIASTOLIC BLOOD PRESSURE: 76 MMHG | BODY MASS INDEX: 37.6 KG/M2 | TEMPERATURE: 97 F | WEIGHT: 199 LBS

## 2024-04-02 VITALS
SYSTOLIC BLOOD PRESSURE: 149 MMHG | TEMPERATURE: 97.4 F | OXYGEN SATURATION: 97 % | DIASTOLIC BLOOD PRESSURE: 62 MMHG | HEART RATE: 70 BPM

## 2024-04-02 DIAGNOSIS — T45.1X5A CHEMOTHERAPY-INDUCED NEUTROPENIA (H): ICD-10-CM

## 2024-04-02 DIAGNOSIS — C18.7 MALIGNANT NEOPLASM OF SIGMOID COLON (H): ICD-10-CM

## 2024-04-02 DIAGNOSIS — E83.42 HYPOMAGNESEMIA: Primary | ICD-10-CM

## 2024-04-02 DIAGNOSIS — N17.9 AKI (ACUTE KIDNEY INJURY) (H): ICD-10-CM

## 2024-04-02 DIAGNOSIS — C7A.1 MALIGNANT POORLY DIFFERENTIATED NEUROENDOCRINE CARCINOMA (H): ICD-10-CM

## 2024-04-02 DIAGNOSIS — D69.59 CHEMOTHERAPY-INDUCED THROMBOCYTOPENIA: ICD-10-CM

## 2024-04-02 DIAGNOSIS — G62.0 CHEMOTHERAPY-INDUCED NEUROPATHY (H): ICD-10-CM

## 2024-04-02 DIAGNOSIS — R74.8 ALKALINE PHOSPHATASE ELEVATION: ICD-10-CM

## 2024-04-02 DIAGNOSIS — D70.1 CHEMOTHERAPY-INDUCED NEUTROPENIA (H): ICD-10-CM

## 2024-04-02 DIAGNOSIS — C18.9 MALIGNANT NEOPLASM OF COLON, UNSPECIFIED PART OF COLON (H): Primary | ICD-10-CM

## 2024-04-02 DIAGNOSIS — T45.1X5A CHEMOTHERAPY-INDUCED THROMBOCYTOPENIA: ICD-10-CM

## 2024-04-02 DIAGNOSIS — T45.1X5A CHEMOTHERAPY-INDUCED NEUROPATHY (H): ICD-10-CM

## 2024-04-02 LAB — MAGNESIUM SERPL-MCNC: 1.9 MG/DL (ref 1.7–2.3)

## 2024-04-02 PROCEDURE — 258N000003 HC RX IP 258 OP 636: Performed by: INTERNAL MEDICINE

## 2024-04-02 PROCEDURE — 250N000011 HC RX IP 250 OP 636: Performed by: INTERNAL MEDICINE

## 2024-04-02 PROCEDURE — 99214 OFFICE O/P EST MOD 30 MIN: CPT | Performed by: INTERNAL MEDICINE

## 2024-04-02 PROCEDURE — 96360 HYDRATION IV INFUSION INIT: CPT

## 2024-04-02 RX ORDER — ALBUTEROL SULFATE 0.83 MG/ML
2.5 SOLUTION RESPIRATORY (INHALATION)
Status: CANCELLED | OUTPATIENT
Start: 2024-04-02

## 2024-04-02 RX ORDER — EPINEPHRINE 1 MG/ML
0.3 INJECTION, SOLUTION, CONCENTRATE INTRAVENOUS EVERY 5 MIN PRN
Status: CANCELLED | OUTPATIENT
Start: 2024-04-02

## 2024-04-02 RX ORDER — DIPHENHYDRAMINE HYDROCHLORIDE 50 MG/ML
50 INJECTION INTRAMUSCULAR; INTRAVENOUS
Status: CANCELLED
Start: 2024-04-02

## 2024-04-02 RX ORDER — HEPARIN SODIUM,PORCINE 10 UNIT/ML
5 VIAL (ML) INTRAVENOUS
Status: CANCELLED | OUTPATIENT
Start: 2024-04-02

## 2024-04-02 RX ORDER — MEPERIDINE HYDROCHLORIDE 25 MG/ML
25 INJECTION INTRAMUSCULAR; INTRAVENOUS; SUBCUTANEOUS EVERY 30 MIN PRN
Status: CANCELLED | OUTPATIENT
Start: 2024-04-02

## 2024-04-02 RX ORDER — HEPARIN SODIUM (PORCINE) LOCK FLUSH IV SOLN 100 UNIT/ML 100 UNIT/ML
5 SOLUTION INTRAVENOUS
Status: DISCONTINUED | OUTPATIENT
Start: 2024-04-02 | End: 2024-04-02 | Stop reason: HOSPADM

## 2024-04-02 RX ORDER — HEPARIN SODIUM (PORCINE) LOCK FLUSH IV SOLN 100 UNIT/ML 100 UNIT/ML
5 SOLUTION INTRAVENOUS
Status: CANCELLED | OUTPATIENT
Start: 2024-04-02

## 2024-04-02 RX ORDER — GABAPENTIN 300 MG/1
300 CAPSULE ORAL 3 TIMES DAILY
Qty: 90 CAPSULE | Refills: 3 | Status: SHIPPED | OUTPATIENT
Start: 2024-04-02 | End: 2024-08-08

## 2024-04-02 RX ORDER — ALBUTEROL SULFATE 90 UG/1
1-2 AEROSOL, METERED RESPIRATORY (INHALATION)
Status: CANCELLED
Start: 2024-04-02

## 2024-04-02 RX ORDER — METHYLPREDNISOLONE SODIUM SUCCINATE 125 MG/2ML
125 INJECTION, POWDER, LYOPHILIZED, FOR SOLUTION INTRAMUSCULAR; INTRAVENOUS
Status: CANCELLED
Start: 2024-04-02

## 2024-04-02 RX ADMIN — HEPARIN 5 ML: 100 SYRINGE at 16:27

## 2024-04-02 RX ADMIN — SODIUM CHLORIDE 1000 ML: 9 INJECTION, SOLUTION INTRAVENOUS at 14:56

## 2024-04-02 ASSESSMENT — PAIN SCALES - GENERAL: PAINLEVEL: NO PAIN (0)

## 2024-04-02 NOTE — PROGRESS NOTES
Infusion Nursing Note:  Nadia Ladd presents today for IVF.    Patient seen by provider today: Yes: Dr. Bateman.   present during visit today: Not Applicable.    Note: N/A.      Intravenous Access:  Implanted Port.      Giving IVF today due to Elevated Creat:  Lab Results   Component Value Date     04/01/2024    POTASSIUM 4.5 04/01/2024    MAG 1.9 04/01/2024    CR 2.14 (H) 04/01/2024    LIVIER 9.5 04/01/2024    BILITOTAL 1.0 04/01/2024    ALBUMIN 4.2 04/01/2024    ALT 34 04/01/2024    AST 34 04/01/2024         Post Infusion Assessment:  Patient tolerated infusion without incident.  Blood return noted pre and post infusion.  Site patent and intact, free from redness, edema or discomfort.  No evidence of extravasations.  Access discontinued per protocol.       Discharge Plan:   Copy of AVS reviewed with patient and/or family.  Patient will return in 1 week for port labs at next appointment.  Patient discharged in stable condition accompanied by: .  Departure Mode: Ambulatory.      Rosalina Argueta RN

## 2024-04-02 NOTE — PROGRESS NOTES
[facility]    Hematology/Oncology Established Patient Follow Up Note      Today's Date: 4/2/24    Reason for follow up: Sigmoid cancer    HISTORY OF PRESENT ILLNESS: Nadia Ladd is a 68 year old female who presents for follow-up    Patient has medical history including Crohn's disease on sulfasalazine, anemia secondary to iron deficiency and B12 deficiency, obesity, hypertension, prediabetes, eczema, pulmonary hypertension, hiatal hernia, mild splenomegaly (14.7 cm in 2/23)    - 2/23 pt noted increasing fatigue, found to have iron deficiency anemia w/hgb 6.8 (previously required RBC transfusion when dx w/Crohn's), did have intermittent changes in stool but not persistent (noted minimal blood in stool)   - 2/23 upper endoscopy for iron deficiency anemia and Crohn's disease: Hiatal hernia, normal stomach, normal duodenum  - 2/23 colonoscopy: A frond-like/villous partially obstructing large mass found in sigmoid colon, partially circumferential involving two thirds of the lumen circumference, 4 cm, unable to traverse, with oozing, s/p biopsy  PATHOLOGY:  A.  Stomach, antrum: Biopsy:  - Antral type mucosa with mild chronic inactive gastritis  - Immunostain for Helicobacter pylori is negative      B.  Colon, sigmoid, stricture: Biopsy:  - Invasive poorly differentiated carcinoma  The sigmoid stricture shows a high-grade carcinoma without definitive gland formation.  Given the poorly differentiated appearance, an immunohistochemical panel was performed to exclude the possibility of a tumor by direct extension or metastasis.   Immunohistochemical stains are performed and show the tumor to be diffusely positive for CK7 and partially positive for CK20 and SATB2.  There is no significant tumor reactivity for CDX2, GATA3, PAX8, TTF-1, and chromogranin.  Synaptophysin highlights very rare positive cells. Although the CK7/CK20 profile is not entirely typical for a colorectal carcinoma, immunostains  "for tumors of other origins are negative and a colorectal primary is still favored.   - Mismatch repair:  1) MLH1-loss of nuclear expression  2) MSH2-intact  3) MSH6-intact  4) PMS2-loss of nuclear expression  -MLH1 promoter methylation: NEGATIVE    -2/23 CT CAP:  A) 4 cm length mass in the proximal sigmoid colon. There is some irregularity and stranding in the adjacent pericolonic fat which may indicate tumor extension. There is no obstruction.   B) there is a second probable mass at the hepatic flexure of the colon measuring 2.5 cm in length   C)There are small lymph nodes in the mesial colon adjacent to the hepatic flexure abnormality and the sigmoid colonic mass. No lymphadenopathy by size criteria  D) There is submucosal fatty deposition in the colon, most severe in the distal sigmoid colon and rectum, which can be seen secondary to quiescent inflammatory bowel disease.  E) no liver lesion    -3/23 PET:  a. There is increased FDG avidity of the sigmoid colon with focal lobular wall thickening consistent with biopsy-proven adenocarcinoma.  b. Secondary focus noted at the hepatic flexure demonstrates elevated FDG avidity and lobulated wall thickening highly suspicious for a additional primary.  c. Additionally there is a smaller focus of wall thickening with elevated FDG avidity along the left aspect of the transverse colon  which is suspicious for a possible third focus of colonic malignancy.    -3/23 CEA 6.8  - 3/23 colorectal surgery consultation with - in the setting of Crohn's, at least sigmoid colectomy with possible total abdominal colectomy with either ileorectal anastomosis or end ileostomy (\"rectum can remain active and be actively surveyed annually\")    -3/23 genetic counseling, testing for Chan syndrome    - 4/5/2023 gynecologic oncology consultation for risk reduction surgery with hysterectomy and BSO at time of colectomy    -4/6/2023 laparoscopic converted to open total abdominal " colectomy with ileorectal anastomosis, partial omentectomy, flexible sigmoidoscopy, TAHBSO  A. OMENTUM, RESECTION:  - Adipose tissue with no significant histopathologic abnormality  - No evidence of malignancy     B. COLON, RESECTION:  Mass #1 (ascending colon/hepatic flexure): Mixed neuroendocrine-non-neuroendocrine neoplasm (MINEN):  - Neuroendocrine carcinoma component (70%) and moderately-differentiated adenocarcinoma component (30%)  - Size = 5.0 cm, invading into muscularis propria  - Positive LVI  - Negative macroscopic tumor perforation, negative PNI  - Negative surgical resection margins  - IHC: Neuroendocrine portion: Negative for chromogranin and INSM1 but strongly express synaptophysin  - IHC: Adenocarcinoma portion: Positive for CK20, CDX2 negative for CK7, PAX8, ER, S100  Ki-67 proliferation index of approximately 80%.  MMR:  Intact nuclear expression of MLH1, MSH2, MHS6, PMS2  PDL1:  COMBINED POSITIVE SCORE (CPS): 55-60  TUMOR PROPORTION SCORE (TPS): 50%   pathogenic KRAS mutation (G13D) was identified (5.2%).  AJCC 8th edition: stage 3B mpT3 pN1a     Mass#2 (sigmoid colon): Poorly-differentiated carcinoma:  - Grade 3  - Size = 5.7 cm, invading into muscularis propria  - Negative for microscopic tumor perforation, LVI, perineural invasion  - Negative surgical resection margins  - IHC: Positive for scar and keratin AE1/AE3, negative for CK7, CK20, CDX2, PAX8, ER, chromogranin, CD56, p40, synaptophysin, S100, INI1, p40, INSM1  Ki-67 proliferation index of approximately 70%.  MMR: loss of nuclear expression MLH1 and PMS2, intact nuclear expression MSH2 and MSH6  PDL1:  COMBINED POSITIVE SCORE (CPS): 80  TUMOR PROPORTION SCORE (TPS): 70%  pathogenic KRAS mutation (G13D) was identified (4.5%).  AJCC 8th edition: stage 3A mpT2 pN1a    - One of forty-one sampled lymph nodes positive (1/41)  - Benign appendix  - Tubular adenoma    C. UTERUS, CERVIX, BILATERAL FALLOPIAN TUBES & OVARIES, :  - Benign endometrial  polyps; atrophic endometrium  - Uterus, cervix, bilateral fallopian tubes, and ovaries with no significant morphologic abnormalities  - No evidence of dysplasia or malignancy     D. SMALL INTESTINE, TERMINAL ILEUM, TERMINAL ILIUM:  - Benign ileum  - No evidence of dysplasia or malignancy  - Negative for metastases to one of one sampled lymph node (0 /1)     E. PROXIMAL ANASTOMOTIC RING:  - Benign small intestine  - No evidence of dysplasia or malignancy     F. DISTAL ANASTOMOTIC RING:  - Benign colon  - No evidence of dysplasia or malignancy    CRC NGS:   --mutations positive for KRAS G13D mutation 5.2% mass 1, KRAS G13D mutation 4.5% mass 2 (negative for AKT1; BRAF; ERBB2; KRAS; NRAS; PIK3CA; PTEN)  --TMB score: 17.064 mut/Mb mass 1 (CPS 55-60, TPS 50%), 60.943 mut/Mb mass 2 (CPS 80, TPS 70%)  --fusion negative including NTRK and RET for mass 1 and 2 (also negative for AKT1, AKT3, ALK, AR, NYXPMU60, CLAUDINE, BCOR, BRAF, BRD3, BRD4, CAMTA1, CCNB3, CCND1, , CIC, CSF1, CSF1R, CTNNB1, DNAJB1, EGFR, EPC1, ERBB2, ERBB4, ERG, ESR1, ESRRA, ETV1, ETV4, ETV5, ETV6, EWSR1, FGFR1, FGFR2, FGFR3, FGR, FOS, FOSB, FOXO1, FOXO4, FOXR2, FUS, GLI1, GRB7, HMGA2, HRAS, IDH1, IDH2, INSR, JAK2, JAK3, JAZF1, KRAS, MAML2, MAP2K1, MAST1, MAST2, MEAF6, MET, MKL2, MN1, MSMB, MUSK, MYB, MYBL1, MYOD1, NCOA1, NCOA2, NOTCH1, NOTCH2, NR4A3, NRAS, NRG1, NTRK1, NTRK2, NTRK3, NUMBL1, NUTM1, PAX3, PDGFB, PDGFRA, PDGFRB, PHF1, PIK3CA, PKN1, PLAG1, PPARG, PRKACA, PRKCA, PRKCB, RAF1, RELA, RET, ROS1, RPSO2, RSPO3, SS18, STAT6, TAF15, TCF12, TERT, TFE3, TFEB, TFG, THADA, TMPRSS2, USP6, VGLL2, YAP1, YWHAE)    -4/11/2023 patient discharged from hospital, planning for 30 days of lovenox postop, oxycodone for pain (required 1 unit PRBC for anemia)    -5/8/23 I presented this case at [facility] CRC tumor board and their recommendations include:  - common to see this in Crohn's, overall poor prognosis, treat aggressively, may be poorly responsive to  chemotherapy  - standard of care is mFOLFOX 6 x 12 cycles  - acknowledge that pt has high TPS and likely response to immunotherapy however not sufficient data or standard of care in stage 3 adjuvant setting  - add MMR testing to mass #1 hepatic flexure mass    - 5/9/23 admitted for acute renal failure w/Cr 3.82 w/K 7.0    - 5/19/23 second opinion at [facility] w/medical oncologist Dr. Acharya, thorough consultation and recommendations appreciated     - pt would like to proceed with FOFLOX (with dose reduced oxaliplatin due to kidney function)    -5/2023 genetic counseling: Negative for mutations in EPCAM, MLH1, MSH2, MSH6, and PMS2 genes.  Negative for mutations in APC, AXIN2, BMPR1A, CDH1, CHEK2, EPCAM, GREM1, MLH1, MSH2, MSH3, MSH6, MUTYH, NTHL1, PMS2, POLD1, POLE, PTEN, SMAD4, STK11, TP53, CDKN1B, MEN1, NF1, RET, TSC1, TSC2, and VHL genes    - 5/25/23 port placement    - 5/26/23 CT CAP w/ contrast and IVF before and after CT (baseline prior to starting tx):  1.  3 mm nodule RML and 5 mm lateral EDSON nodule, minimally increased from previous  2.  New 4 mm EDSON groundglass density nodule  3.  Several prominent borderline enlarged mediastinal lymph nodes slightly increased from previous  4.  Splenomegaly 14.5 cm  5. S/p subtotal colectomy with ileorectal anastomosis with postsurgical changes along the ventral abdominal wall midline    -5/31/23 started mFOLFOX 6 w/dose reduced oxaliplatin    - C2D1 6/14/23 held due to new onset paroxysmal A-fib with RVR requiring hospitalization 6/3/2023 (also hypoMg, dehydrated)  - 6/19/2023 I presented this case at [facility] colorectal tumor board at the [facility], it is possible that paroxysmal A-fib may be related to 5-FU.  Options include withholding further treatment versus retrialing 5-FU under the guidance of cardio oncology in an inpatient setting.  No other adjuvant treatment options available, including immunotherapy  - 6/30/2023 consultation with cardio oncologist Dr. Garza; I  spoke with Dr. Garza 6/30/2023 as well, [id number] score 3, recommending starting apixaban 5 mg twice daily, okay to retrial FOLFOX while patient is on metoprolol  -7/18/2023  CT CAP- subcm lung nodules stable, borderline and mildly enlarged mediastinal LN and periportal LN stable, splenomegaly 15.7cm, Subtotal colectomy with ileorectal anastomosis. No acute inflammation or obstruction   - 7/19/2023 mFOLFOX6 C2D1  (inpatient w/continuous cardiac monitoring)      -9/23 CT CAP (after cycle 6)  -Subtotal colectomy, rectal anastomosis appears stable, some adjacent mild scarring, no bowel obstruction or inflammation, no free fluid  -No new adenopathy, no liver lesions  -Stable EDSON 3 mm nodule, other stable nodules at EDSON, RML  -Stable several small thoracic lymph nodes in mediastinum  -Mild wall thickening of the gallbladder  -Spleen is smaller measuring 13.7 cm  -Stable regions of bilateral cortical thinning in the kidneys, with few tiny cysts    - 5/31/23-12/12/23 adjuvant mFOLFOX 6 q14 days x12 cycles/6 months w/dose reduced oxaliplatin and dropped 5FU bolus C2 onwards, IVF and IV Mg replacement weekly and 2/2 recurrent afib w/RVR- During the days of chemo and 2 days after ONLY: take long acting diltiazem 120 mg daily with 30 mg of short acting diltiazem as needed for atrial fibrillation with HR >110 (C1D1 w/oxaliplatin dose reduced from 85mg/m2 to 64mg/m2 2/2 renal function, 5/31/23 (complicated by hospitalization for afib w/RVR C1D4 6/3/23), C2D1 7/19/23 (w/o 5FU bolus C2 onwards, inpatient w/continuous cardiac monitoring), C3D1 8/2/2023 (outpatient, admitted to ER for A-fib with RVR C3D3 8/4/23), C4D1 8/16/23, C5D1 8/30/2023, C6D1 9/13/2023, C7D1 9/26/23, C8D1 10/11/23, C9D1 10/25/23, C10D1 11/8/23, C11D1 11/22/23. C12D1 12/12/23 (1 week delay 2/2 cytopenias)    INTERIM HISTORY:  Treatment related AE:  - hearing changes-stable w/dose reduced oxaliplatin, continue use of b/l hearing aids  - Paroxysmal A-fib with  RVR and PAC- cardiology follow-up 12/14/2023 noted- no longer on long acting diltiazem, only using short acting prn; continuing metoprolol and xarelto 15mg and plan for follow up 12/2024  -Thrombocytopenia-  4/24 platelet 132 K, no bleeding while on xarelto  - MARYA on CKD- following w/nephro, f/u with nephro 12/4/2023 noted w/next follow up 6/2024; 4/21 creatinine 2.14, planning for IVF today, increasing PO fluids- about 60oz   - hypoMg- previously requiring IV Mg, now on oral Mg BID, 4/24 Mg 1.9  - elevated LFTs- AST 62- stable, Alk phos 346- improving, alk phos iso enzyme- majority liver, liver normal on 9/23 CT and 11/23 MRI (no mets)  - neuropathy- grade 2,  feet>hands, continue gabapentin 200mg in AM, 200mg in afternoon and 300mg at bedtime; likely component of arthritis too          REVIEW OF SYSTEMS:   A 14 point ROS was reviewed with pertinent positives and negatives in the HPI.        HOME MEDICATIONS:  Current Outpatient Medications   Medication Sig Dispense Refill    gabapentin (NEURONTIN) 300 MG capsule Take 1 capsule (300 mg) by mouth 3 times daily 90 capsule 3    cyanocobalamin 1000 MCG TBCR Take 1,000 mcg by mouth daily 100 tablet 1    diltiazem (CARDIZEM) 30 MG tablet Take 1 tablet (30 mg) by mouth 3 times daily (Patient not taking: Reported on 1/2/2024) 90 tablet 0    diltiazem ER COATED BEADS (CARDIZEM CD/CARTIA XT) 120 MG 24 hr capsule Take 1 capsule (120 mg) by mouth daily On chemo days and 2 days post chemo infusion. (Patient not taking: Reported on 1/2/2024) 60 capsule 3    Ferrous Sulfate (IRON) 325 (65 Fe) MG tablet Take 1 tablet by mouth daily      fluocinonide (LIDEX) 0.05 % external cream APPLY A SMALL AMOUNT TO AFFECTED AREA(S) TWICE DAILY AS NEEDED 60 g 1    lidocaine-prilocaine (EMLA) 2.5-2.5 % external cream Apply topically as needed for moderate pain Apply 15-20 minutes prior to port access 1 g 4    loperamide (IMODIUM) 2 MG capsule Take 2 mg by mouth daily as needed for diarrhea       MAGNESIUM OXIDE 400 (240 Mg) MG tablet TAKE 1 TABLET (400 MG) BY MOUTH 2 TIMES DAILY 60 tablet 0    metoprolol succinate ER (TOPROL XL) 50 MG 24 hr tablet Take 1 tablet (50 mg) by mouth daily 90 tablet 3    Multiple Vitamins-Iron (MULTI-DAY PLUS IRON PO) Take 1 tablet by mouth daily      prochlorperazine (COMPAZINE) 10 MG tablet Take 10 mg by mouth every 6 hours as needed for nausea or vomiting (Patient not taking: Reported on 1/2/2024)      rivaroxaban ANTICOAGULANT (XARELTO) 15 MG TABS tablet Take 1 tablet (15 mg) by mouth daily (with dinner) 90 tablet 2    sodium bicarbonate 650 MG tablet Take 1 tablet (650 mg) by mouth 2 times daily 180 tablet 3    vitamin D3 (CHOLECALCIFEROL) 50 mcg (2000 units) tablet Take 1 tablet (50 mcg) by mouth daily 30 tablet 6         ALLERGIES:  Allergies   Allergen Reactions    No Known Drug Allergy          PAST MEDICAL HISTORY:  Past Medical History:   Diagnosis Date    Arthropathia 08/05/2010    B12 deficiency anemia 10/21/2010    Benign essential hypertension with target blood pressure below 140/90 10/10/2016    CARDIOVASCULAR SCREENING; LDL GOAL LESS THAN 160 10/31/2010    Crohn's colitis (H) 02/15/2011    Dermatitis-dishydrotic eczema-severe 08/05/2010    Hiatal hernia     HTN (hypertension) 07/21/2011    Iron deficiency anemia 10/21/2010    Malignant neoplasm of sigmoid colon (H)     Obesity     Primary pulmonary hypertension (H) 04/06/2010         PAST SURGICAL HISTORY:  Past Surgical History:   Procedure Laterality Date    COLECTOMY WITHOUT COLOSTOMY N/A 4/6/2023    Procedure: Laparoscopic Converted to Open Total abdominal colectomy;  Surgeon: Jerome Ashley MD;  Location: [facility] OR    COLONOSCOPY  10/11/10    COLONOSCOPY N/A 2/27/2023    Procedure: ATTEMPTED COLONOSCOPY WITH SIGMOID STRICTURE BIOPSY;  Surgeon: Jonathan Alcocer MD;  Location: [facility]    ESOPHAGOSCOPY, GASTROSCOPY, DUODENOSCOPY (EGD), COMBINED N/A 2/27/2023    Procedure: ESOPHAGOGASTRODUODENOSCOPY, WITH  BIOPSY;  Surgeon: Jonathan Alcocer MD;  Location: [facility]    HYSTERECTOMY TOTAL ABDOMINAL, BILATERAL SALPINGO-OOPHORECTOMY, COMBINED N/A 4/6/2023    Procedure: Hysterectomy total abdominal, bilateral salpingo-oophorectomy;  Surgeon: Sunitha Olea MD;  Location: [facility] OR    INSERT PORT VASCULAR ACCESS Right 5/25/2023    Procedure: Ultrasound-guided right internal jugular venous access port placement with fluoroscopy;  Surgeon: Martin Thomas DO;  Location: [facility]    IR CHEST PORT PLACEMENT > 5 YRS OF AGE  8/21/2023    IR PORT CHECK RIGHT  7/18/2023    IR PORT REMOVAL RIGHT  8/21/2023    SIGMOIDOSCOPY FLEXIBLE N/A 4/6/2023    Procedure: Sigmoidoscopy flexible;  Surgeon: Jerome Ashley MD;  Location: [facility] OR    SURGICAL HISTORY OF -   06[birth year]14[birth year]76    Perineorrhaphy, for widening vaginal orifice    ZZC EXPLORATORY OF ABDOMEN  1989    laparoscopy         SOCIAL HISTORY:  Social History     Socioeconomic History    Marital status:      Spouse name: Not on file    Number of children: Not on file    Years of education: Not on file    Highest education level: Not on file   Occupational History    Not on file   Tobacco Use    Smoking status: Never     Passive exposure: Current    Smokeless tobacco: Never   Substance and Sexual Activity    Alcohol use: No    Drug use: No    Sexual activity: Yes     Partners: Male   Other Topics Concern    Parent/sibling w/ CABG, MI or angioplasty before 65F 55M? Not Asked   Social History Narrative    Not on file     Social Determinants of Health     Financial Resource Strain: Not on file   Food Insecurity: Not on file   Transportation Needs: Not on file   Physical Activity: Not on file   Stress: Not on file   Social Connections: Not on file   Interpersonal Safety: Not on file   Housing Stability: Not on file         FAMILY HISTORY:  Family History   Problem Relation Age of Onset    Hypertension Mother         on meds, alive    Cerebrovascular Disease Father         stroke  about age 65,  of cancer at 68 yrs    Diabetes Father         eventually took insulin    Anemia Father         Pernisios anemia    Kidney Disease Niece         kidney transplant    Kidney Disease Nephew         kidney transplant    Venous thrombosis No family hx of     Anesthesia Reaction No family hx of          PHYSICAL EXAM:  Vital signs:  BP (!) 148/76   Pulse 85   Temp 97  F (36.1  C) (Temporal)   Resp 18   Wt 90.3 kg (199 lb)   LMP  (LMP Unknown)   SpO2 95%   BMI 37.60 kg/m         ECO  GENERAL/CONSTITUTIONAL: No acute distress.  EYES: Pupils are equal and round. Extraocular movements intact without nystagmus.  No scleral icterus.  RESPIRATORY: Equal chest rise.   ABDOMEN: soft NT ND, no palpable hernia   MUSCULOSKELETAL: Warm and well-perfused, no cyanosis, clubbing, or edema.   NEUROLOGIC: Cranial nerves are grossly intact. Alert, oriented to person, place and time, answers questions appropriately.  INTEGUMENTARY: No rashes or jaundice.  GAIT: Steady, does not use assistive device    LABS:   Latest Reference Range & Units 24 10:05   Sodium 135 - 145 mmol/L 140   Potassium 3.4 - 5.3 mmol/L 4.5   Chloride 98 - 107 mmol/L 104   Carbon Dioxide (CO2) 22 - 29 mmol/L 24   Urea Nitrogen 8.0 - 23.0 mg/dL 31.7 (H)   Creatinine 0.51 - 0.95 mg/dL 2.14 (H)   GFR Estimate >60 mL/min/1.73m2 25 (L)   Calcium 8.8 - 10.2 mg/dL 9.5   Anion Gap 7 - 15 mmol/L 12   Albumin 3.5 - 5.2 g/dL 4.2   Protein Total 6.4 - 8.3 g/dL 7.2   Alkaline Phosphatase 40 - 150 U/L 342 (H)   ALT 0 - 50 U/L 34   AST 0 - 45 U/L 34   Bilirubin Total <=1.2 mg/dL 1.0   CEA ng/mL 2.1   Glucose 70 - 99 mg/dL 109 (H)   WBC 4.0 - 11.0 10e3/uL 5.6   Hemoglobin 11.7 - 15.7 g/dL 13.3   Hematocrit 35.0 - 47.0 % 39.5   Platelet Count 150 - 450 10e3/uL 132 (L)   RBC Count 3.80 - 5.20 10e6/uL 4.31   MCV 78 - 100 fL 92   MCH 26.5 - 33.0 pg 30.9   MCHC 31.5 - 36.5 g/dL 33.7   RDW 10.0 - 15.0 % 12.0   % Neutrophils % 77   % Lymphocytes % 13   %  Monocytes % 10   % Eosinophils % 0   % Basophils % 1   Absolute Basophils 0.0 - 0.2 10e3/uL 0.0   Absolute Eosinophils 0.0 - 0.7 10e3/uL 0.0   Absolute Immature Granulocytes <=0.4 10e3/uL 0.0   Absolute Lymphocytes 0.8 - 5.3 10e3/uL 0.7 (L)   Absolute Monocytes 0.0 - 1.3 10e3/uL 0.6   % Immature Granulocytes % 0   Absolute Neutrophils 1.6 - 8.3 10e3/uL 4.3   Absolute NRBCs 10e3/uL 0.0   NRBCs per 100 WBC <1 /100 0   (H): Data is abnormally high  (L): Data is abnormally low    PATHOLOGY:    IMAGING:    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 mutated, MARISSA, TPS 50%  # sigmoid colon poorly-differentiated carcinoma, KRAS G13D mutated, loss of MLH1 and PMS2, TPS 70%  - 2/23 colonoscopy: A frond-like/villous partially obstructing large mass found in sigmoid colon, partially circumferential involving two thirds of the lumen circumference, 4 cm, unable to traverse, with oozing, s/p biopsy, PATHOLOGY: Invasive poorly differentiated carcinoma, IHC panel excludes tumors of other origin, colorectal primary is favored, loss of nuclear expression of MLH1 and PMS2, MLH1 promoter methylation negative, [id number] mutation negative  -2/23 CT CAP: Shows known 4 cm proximal sigmoid colon mass with possible tumor extension (irregularity and stranding and adjacent pericolonic fat) without obstruction AND probable second mass 2.5 cm at hepatic flexure of colon; small lymph nodes adjacent to both masses (no lymphadenopathy by size criteria)  -3/23 PET:  a. There is increased FDG avidity of the sigmoid colon with focal lobular wall thickening consistent with biopsy-proven adenocarcinoma.  b. Secondary focus noted at the hepatic flexure demonstrates elevated FDG avidity and lobulated wall thickening highly suspicious for a  additional primary.  c. Additionally there is a smaller focus of wall thickening with elevated FDG avidity along the left aspect of the transverse colon which is suspicious for a possible third focus of colonic malignancy.  - 3/23 CEA 6.8  -4/6/2023 laparoscopic converted to open total abdominal colectomy with ileorectal anastomosis, partial omentectomy, flexible sigmoidoscopy, TAHBSO  PATHOLOGY:  Of note, omental resection, terminal ileum, proximal and distal anastomotic rings, uterus, cervix, bilateral fallopian tubes and ovaries negative for malignancy    Mass #1 (ascending colon/hepatic flexure): Mixed neuroendocrine-non-neuroendocrine neoplasm (MINEN):  - Neuroendocrine carcinoma component (70%) and moderately-differentiated adenocarcinoma component (30%)  - Size = 5.0 cm, invading into muscularis propria, Positive LVI  - IHC: Neuroendocrine portion: Negative for chromogranin and INSM1 but strongly express synaptophysin  - IHC: Adenocarcinoma portion: Positive for CK20, CDX2 negative for CK7, PAX8, ER, S100  Ki-67 proliferation index of approximately 80%.  MARISSA   PDL1:  COMBINED POSITIVE SCORE (CPS): 55-60  TUMOR PROPORTION SCORE (TPS): 50%   pathogenic KRAS mutation (G13D) was identified (5.2%).  MMR testing: intact  AJCC 8th edition: stage 3B mpT3 pN1a     Mass #2 (sigmoid colon): Poorly-differentiated carcinoma:  - Grade 3  - Size = 5.7 cm, invading into muscularis propria  - IHC: Positive for scar and keratin AE1/AE3, negative for CK7, CK20, CDX2, PAX8, ER, chromogranin, CD56, p40, synaptophysin, S100, INI1, p40, INSM1  Ki-67 proliferation index of approximately 70%.  MMR testing: loss of MLH1 and PMS2  PDL1:  COMBINED POSITIVE SCORE (CPS): 80  TUMOR PROPORTION SCORE (TPS): 70%  pathogenic KRAS mutation (G13D) was identified (4.5%).  MMR testing: loss of MLH1 and PMS2, intact MSH2 and MSH6  AJCC 8th edition: stage 3A mpT2 pN1a    - One of forty-one sampled lymph nodes positive (1/41), d/w pathology- unable  to determine which mass is metastatic to LN    - 4/23 MRI brain w/wo contrast LAURENT  - 5/26/23 CT CAP w/ contrast and IVF before and after CT (post op baseline prior to starting tx):  1.  3 mm nodule RML and 5 mm lateral EDSON nodule, minimally increased from previous  2.  New 4 mm EDSON groundglass density nodule  3.  Several prominent borderline enlarged mediastinal lymph nodes slightly increased from previous  4.  Splenomegaly 14.5 cm  5. S/p subtotal colectomy with ileorectal anastomosis with postsurgical changes along the ventral abdominal wall midline  - 5/8/23 I presented this case at [facility] CRC tumor board as above   - 5/19/23 second opinion at [facility] w/medical oncologist Dr. Acharya, thorough consultation and recommendations appreciated, overall agree w/FOLFOX x6 months, possible nivo or ipi nivo in second line; if metastatic platinum etoposide vs ipi nivo  - 5/25/23 port placement    -5/2023 genetic counseling: Negative for mutations detailed below     Treatment:  -4/6/2023 laparoscopic converted to open total abdominal colectomy with ileorectal anastomosis, partial omentectomy, flexible sigmoidoscopy, TAHBSO (details above)  - 5/31/23-12/12/23 adjuvant mFOLFOX 6 q14 days x12 cycles/6 months w/dose reduced oxaliplatin and dropped 5FU bolus C2 onwards, IVF and IV Mg replacement weekly and 2/2 recurrent afib w/RVR- During the days of chemo and 2 days after ONLY: take long acting diltiazem 120 mg daily with 30 mg of short acting diltiazem as needed for atrial fibrillation with HR >110 (C1D1 w/oxaliplatin dose reduced from 85mg/m2 to 64mg/m2 2/2 renal function, 5/31/23 (complicated by hospitalization for afib w/RVR C1D4 6/3/23), C2D1 7/19/23 (w/o 5FU bolus C2 onwards, inpatient w/continuous cardiac monitoring), C3D1 8/2/2023 (outpatient, admitted to ER for A-fib with RVR C3D3 8/4/23), C4D1 8/16/23, C5D1 8/30/2023, C6D1 9/13/2023, C7D1 9/26/23, C8D1 10/11/23, C9D1 10/25/23, C10D1 11/8/23, C11D1 11/22/23. C12D1 12/12/23  (1 week delay 2/2 cytopenias)    Treatment related AE:  - hearing changes-stable w/dose reduced oxaliplatin, continue use of b/l hearing aids  - Paroxysmal A-fib with RVR and PAC- cardiology follow-up 12/14/2023 noted- no longer on long acting diltiazem, only using short acting prn; continuing metoprolol and xarelto and plan for follow up 12/2024  -Thrombocytopenia-  4/24 platelet 132K, continue xarelto  - MARYA on CKD- following w/nephro,  nephro follow up 6/2024; 4/21 creatinine 2.14, planning for IVF today and repeat CMP in 7-10 days- if still elevated, follow up with nephrology sooner, cardiology dose adjusted xarelto, continue increasing PO fluids  - hypoMg- continue oral Mg BID  - elevated LFTs- AST 62- stable, Alk phos 346- improving, alk phos iso enzyme- majority liver, liver normal on 9/23 CT and 11/23 MRI (no mets), hepatology referral   - neuropathy- grade 2,  titrate gabapentin to 300mg TID as tolerated; likely component of arthritis too      Imaging:  - 1/2/24 CT CAP (after 12 cycles of mFOLFOx w/o 5FU bolus):   1.  Stable tiny nodules in both lungs.  2.  Stable borderline enlarged portacaval lymph node.  3.  Splenomegaly slightly increased from previous study.  4.  Postsurgical changes of subtotal colectomy with ileorectal anastomosis.  5.  Moderate-sized duodenal diverticulum.  6.  Stable nonmass-like area of low attenuation in the right hepatic lobe adjacent to the middle hepatic vein could represent some focal fatty infiltration. Attention is recommended to this area on future exams, but the stability is reassuring.    -11/23 MRI liver: negative for mets, stable cental liver hemangioma and splenomegaly    Labs:  3/23 CEA 6.8  5/23 CEA 1.8  7/23 CEA 2.8  10/23 CEA 2.0  12/23 CEA 2.3  4/24 CEA 2.1    PLAN:  - port flush q6-8 weeks  - continue active surveillance:    History and physical examination every 3-6 mo for 2 y, then every 6 mo for a total of 5 y   CEA every 3-6 mo for 2 y, then every 6 mo for a  total of 5 y   Chest/abdominal/pelvic CT every 6-12 mo (category 2B for frequency <12 mo) from date of surgery for a total of 5 y   Colonoscopy in 1 y after surgery except if no preoperative colonoscopy due to obstructing lesion, colonoscopy in 3-6 mo   If advanced adenoma, repeat in 1 y   If no advanced adenoma, repeat in 3 y, then every 5 y  - f/u with Dr. Ashley 5/1/24 for sigmoidoscopy   - H&P and CEA due 7/24- ordered today  - repeat CT CAP 7/2024 w/IVF before and after- ordered today      #parxosymal afib  - within 3 days of receiving 5FU, prior cardiac risk factors htn, prediabetes  - 6/3/23 presented to ER w/lightheadedness, dizziness, cardioverted s/p diltiazem ggt in ER  - 6/23 DPD deficiency testing negative  - currently on metoprolol XL 25 mg daily, apixaban 5 mg twice daily  - 6/30/2023 consultation with cardio oncologist Dr. Garza; I spoke with Dr. Garza 6/30/2023 as well, [id number] score 3, recommending starting apixaban 5 mg twice daily, okay to retrial FOLFOX while patient is on metoprolol.  Patient is currently on Xarelto  - 8/15/2023 cardiology follow-up: Increase metoprolol XL to 50 mg daily; During the days of chemo and 2 days after take long acting diltiazem 120 mg daily with 30 mg of short acting diltiazem as needed for atrial fibrillation with HR >110  - cardiology follow-up 12/14/2023 noted- no longer on long acting diltiazem, only using short acting prn; continuing metoprolol and xarelto and plan for follow up 12/2024  - I do not recommend switching anticoagulation to coumadin given CLASS D interaction w/5FU chemotherapy, I messaged cardiology regarding this previously    #Anemia secondary to iron deficiency and B12 deficiency  - terminal ileum removed at time of colon surgery, monitor for nutrient def  - 2/23 hgb 6.8, ferritin 21, iron sat index 10, TIBC 265, iron 27, b12 1493  - On B12 1000 mcg p.o. daily and ferrous sulfate 325 mg p.o. daily  - 4/9/23 s/p 1 uprbcs  - 5/9/23 b  11.3,5/30/23 hgb 9.4  - based on Ganzoni equation w/goal hgb 14 and 500mg needed for iron stores, pts iron deficit is 1400 mg  - s/p ferric carboxymaltose 750mg x2 doses 6/14/23 and 6/28/23  - 7/11/23 ferritin 1022, iron sat 22, TIBC 201, iron 44    - 8/23 hgb 13.3  - 10/23 hgb 11.1  - 11/23 iron studies, B12, RBC folate WNL    #Crohn's  - dx since at least 2010     RTC 3 months for follow up with me, labs 1-2 days prior to visit (after CT)      Irene Bateman DO  Hematology/Oncology  [facility]

## 2024-04-02 NOTE — LETTER
4/2/2024         RE: Nadia Ladd  [address]  [place] [address]        Dear Colleague,    Thank you for referring your patient, Nadia Ladd, to the [facility]. Please see a copy of my visit note below.    [facility]    Hematology/Oncology Established Patient Follow Up Note      Today's Date: 4/2/24    Reason for follow up: Sigmoid cancer    HISTORY OF PRESENT ILLNESS: Nadia Ladd is a 68 year old female who presents for follow-up    Patient has medical history including Crohn's disease on sulfasalazine, anemia secondary to iron deficiency and B12 deficiency, obesity, hypertension, prediabetes, eczema, pulmonary hypertension, hiatal hernia, mild splenomegaly (14.7 cm in 2/23)    - 2/23 pt noted increasing fatigue, found to have iron deficiency anemia w/hgb 6.8 (previously required RBC transfusion when dx w/Crohn's), did have intermittent changes in stool but not persistent (noted minimal blood in stool)   - 2/23 upper endoscopy for iron deficiency anemia and Crohn's disease: Hiatal hernia, normal stomach, normal duodenum  - 2/23 colonoscopy: A frond-like/villous partially obstructing large mass found in sigmoid colon, partially circumferential involving two thirds of the lumen circumference, 4 cm, unable to traverse, with oozing, s/p biopsy  PATHOLOGY:  A.  Stomach, antrum: Biopsy:  - Antral type mucosa with mild chronic inactive gastritis  - Immunostain for Helicobacter pylori is negative      B.  Colon, sigmoid, stricture: Biopsy:  - Invasive poorly differentiated carcinoma  The sigmoid stricture shows a high-grade carcinoma without definitive gland formation.  Given the poorly differentiated appearance, an immunohistochemical panel was performed to exclude the possibility of a tumor by direct extension or metastasis.   Immunohistochemical stains are performed and show the tumor to be diffusely positive for CK7 and partially positive for CK20  "and SATB2.  There is no significant tumor reactivity for CDX2, GATA3, PAX8, TTF-1, and chromogranin.  Synaptophysin highlights very rare positive cells. Although the CK7/CK20 profile is not entirely typical for a colorectal carcinoma, immunostains for tumors of other origins are negative and a colorectal primary is still favored.   - Mismatch repair:  1) MLH1-loss of nuclear expression  2) MSH2-intact  3) MSH6-intact  4) PMS2-loss of nuclear expression  -MLH1 promoter methylation: NEGATIVE    -2/23 CT CAP:  A) 4 cm length mass in the proximal sigmoid colon. There is some irregularity and stranding in the adjacent pericolonic fat which may indicate tumor extension. There is no obstruction.   B) there is a second probable mass at the hepatic flexure of the colon measuring 2.5 cm in length   C)There are small lymph nodes in the mesial colon adjacent to the hepatic flexure abnormality and the sigmoid colonic mass. No lymphadenopathy by size criteria  D) There is submucosal fatty deposition in the colon, most severe in the distal sigmoid colon and rectum, which can be seen secondary to quiescent inflammatory bowel disease.  E) no liver lesion    -3/23 PET:  a. There is increased FDG avidity of the sigmoid colon with focal lobular wall thickening consistent with biopsy-proven adenocarcinoma.  b. Secondary focus noted at the hepatic flexure demonstrates elevated FDG avidity and lobulated wall thickening highly suspicious for a additional primary.  c. Additionally there is a smaller focus of wall thickening with elevated FDG avidity along the left aspect of the transverse colon  which is suspicious for a possible third focus of colonic malignancy.    -3/23 CEA 6.8  - 3/23 colorectal surgery consultation with - in the setting of Crohn's, at least sigmoid colectomy with possible total abdominal colectomy with either ileorectal anastomosis or end ileostomy (\"rectum can remain active and be actively surveyed " "annually\")    -3/23 genetic counseling, testing for Chan syndrome    - 4/5/2023 gynecologic oncology consultation for risk reduction surgery with hysterectomy and BSO at time of colectomy    -4/6/2023 laparoscopic converted to open total abdominal colectomy with ileorectal anastomosis, partial omentectomy, flexible sigmoidoscopy, TAHBSO  A. OMENTUM, RESECTION:  - Adipose tissue with no significant histopathologic abnormality  - No evidence of malignancy     B. COLON, RESECTION:  Mass #1 (ascending colon/hepatic flexure): Mixed neuroendocrine-non-neuroendocrine neoplasm (MINEN):  - Neuroendocrine carcinoma component (70%) and moderately-differentiated adenocarcinoma component (30%)  - Size = 5.0 cm, invading into muscularis propria  - Positive LVI  - Negative macroscopic tumor perforation, negative PNI  - Negative surgical resection margins  - IHC: Neuroendocrine portion: Negative for chromogranin and INSM1 but strongly express synaptophysin  - IHC: Adenocarcinoma portion: Positive for CK20, CDX2 negative for CK7, PAX8, ER, S100  Ki-67 proliferation index of approximately 80%.  MMR:  Intact nuclear expression of MLH1, MSH2, MHS6, PMS2  PDL1:  COMBINED POSITIVE SCORE (CPS): 55-60  TUMOR PROPORTION SCORE (TPS): 50%   pathogenic KRAS mutation (G13D) was identified (5.2%).  AJCC 8th edition: stage 3B mpT3 pN1a     Mass#2 (sigmoid colon): Poorly-differentiated carcinoma:  - Grade 3  - Size = 5.7 cm, invading into muscularis propria  - Negative for microscopic tumor perforation, LVI, perineural invasion  - Negative surgical resection margins  - IHC: Positive for scar and keratin AE1/AE3, negative for CK7, CK20, CDX2, PAX8, ER, chromogranin, CD56, p40, synaptophysin, S100, INI1, p40, INSM1  Ki-67 proliferation index of approximately 70%.  MMR: loss of nuclear expression MLH1 and PMS2, intact nuclear expression MSH2 and MSH6  PDL1:  COMBINED POSITIVE SCORE (CPS): 80  TUMOR PROPORTION SCORE (TPS): 70%  pathogenic KRAS " mutation (G13D) was identified (4.5%).  AJCC 8th edition: stage 3A mpT2 pN1a    - One of forty-one sampled lymph nodes positive (1/41)  - Benign appendix  - Tubular adenoma    C. UTERUS, CERVIX, BILATERAL FALLOPIAN TUBES & OVARIES, :  - Benign endometrial polyps; atrophic endometrium  - Uterus, cervix, bilateral fallopian tubes, and ovaries with no significant morphologic abnormalities  - No evidence of dysplasia or malignancy     D. SMALL INTESTINE, TERMINAL ILEUM, TERMINAL ILIUM:  - Benign ileum  - No evidence of dysplasia or malignancy  - Negative for metastases to one of one sampled lymph node (0 /1)     E. PROXIMAL ANASTOMOTIC RING:  - Benign small intestine  - No evidence of dysplasia or malignancy     F. DISTAL ANASTOMOTIC RING:  - Benign colon  - No evidence of dysplasia or malignancy    CRC NGS:   --mutations positive for KRAS G13D mutation 5.2% mass 1, KRAS G13D mutation 4.5% mass 2 (negative for AKT1; BRAF; ERBB2; KRAS; NRAS; PIK3CA; PTEN)  --TMB score: 17.064 mut/Mb mass 1 (CPS 55-60, TPS 50%), 60.943 mut/Mb mass 2 (CPS 80, TPS 70%)  --fusion negative including NTRK and RET for mass 1 and 2 (also negative for AKT1, AKT3, ALK, AR, WWCTGS98, CLAUDINE, BCOR, BRAF, BRD3, BRD4, CAMTA1, CCNB3, CCND1, , CIC, CSF1, CSF1R, CTNNB1, DNAJB1, EGFR, EPC1, ERBB2, ERBB4, ERG, ESR1, ESRRA, ETV1, ETV4, ETV5, ETV6, EWSR1, FGFR1, FGFR2, FGFR3, FGR, FOS, FOSB, FOXO1, FOXO4, FOXR2, FUS, GLI1, GRB7, HMGA2, HRAS, IDH1, IDH2, INSR, JAK2, JAK3, JAZF1, KRAS, MAML2, MAP2K1, MAST1, MAST2, MEAF6, MET, MKL2, MN1, MSMB, MUSK, MYB, MYBL1, MYOD1, NCOA1, NCOA2, NOTCH1, NOTCH2, NR4A3, NRAS, NRG1, NTRK1, NTRK2, NTRK3, NUMBL1, NUTM1, PAX3, PDGFB, PDGFRA, PDGFRB, PHF1, PIK3CA, PKN1, PLAG1, PPARG, PRKACA, PRKCA, PRKCB, RAF1, RELA, RET, ROS1, RPSO2, RSPO3, SS18, STAT6, TAF15, TCF12, TERT, TFE3, TFEB, TFG, THADA, TMPRSS2, USP6, VGLL2, YAP1, YWHAE)    -4/11/2023 patient discharged from hospital, planning for 30 days of lovenox postop, oxycodone  for pain (required 1 unit PRBC for anemia)    -5/8/23 I presented this case at [facility] CRC tumor board and their recommendations include:  - common to see this in Crohn's, overall poor prognosis, treat aggressively, may be poorly responsive to chemotherapy  - standard of care is mFOLFOX 6 x 12 cycles  - acknowledge that pt has high TPS and likely response to immunotherapy however not sufficient data or standard of care in stage 3 adjuvant setting  - add MMR testing to mass #1 hepatic flexure mass    - 5/9/23 admitted for acute renal failure w/Cr 3.82 w/K 7.0    - 5/19/23 second opinion at [facility] w/medical oncologist Dr. Acharya, thorough consultation and recommendations appreciated     - pt would like to proceed with FOFLOX (with dose reduced oxaliplatin due to kidney function)    -5/2023 genetic counseling: Negative for mutations in EPCAM, MLH1, MSH2, MSH6, and PMS2 genes.  Negative for mutations in APC, AXIN2, BMPR1A, CDH1, CHEK2, EPCAM, GREM1, MLH1, MSH2, MSH3, MSH6, MUTYH, NTHL1, PMS2, POLD1, POLE, PTEN, SMAD4, STK11, TP53, CDKN1B, MEN1, NF1, RET, TSC1, TSC2, and VHL genes    - 5/25/23 port placement    - 5/26/23 CT CAP w/ contrast and IVF before and after CT (baseline prior to starting tx):  1.  3 mm nodule RML and 5 mm lateral EDSON nodule, minimally increased from previous  2.  New 4 mm EDSON groundglass density nodule  3.  Several prominent borderline enlarged mediastinal lymph nodes slightly increased from previous  4.  Splenomegaly 14.5 cm  5. S/p subtotal colectomy with ileorectal anastomosis with postsurgical changes along the ventral abdominal wall midline    -5/31/23 started mFOLFOX 6 w/dose reduced oxaliplatin    - C2D1 6/14/23 held due to new onset paroxysmal A-fib with RVR requiring hospitalization 6/3/2023 (also hypoMg, dehydrated)  - 6/19/2023 I presented this case at [facility] colorectal tumor board at the [facility], it is possible that paroxysmal A-fib may be related to 5-FU.  Options include  withholding further treatment versus retrialing 5-FU under the guidance of cardio oncology in an inpatient setting.  No other adjuvant treatment options available, including immunotherapy  - 6/30/2023 consultation with cardio oncologist Dr. Garza; I spoke with Dr. Garza 6/30/2023 as well, [id number] score 3, recommending starting apixaban 5 mg twice daily, okay to retrial FOLFOX while patient is on metoprolol  -7/18/2023  CT CAP- subcm lung nodules stable, borderline and mildly enlarged mediastinal LN and periportal LN stable, splenomegaly 15.7cm, Subtotal colectomy with ileorectal anastomosis. No acute inflammation or obstruction   - 7/19/2023 mFOLFOX6 C2D1  (inpatient w/continuous cardiac monitoring)      -9/23 CT CAP (after cycle 6)  -Subtotal colectomy, rectal anastomosis appears stable, some adjacent mild scarring, no bowel obstruction or inflammation, no free fluid  -No new adenopathy, no liver lesions  -Stable EDSON 3 mm nodule, other stable nodules at EDSON, RML  -Stable several small thoracic lymph nodes in mediastinum  -Mild wall thickening of the gallbladder  -Spleen is smaller measuring 13.7 cm  -Stable regions of bilateral cortical thinning in the kidneys, with few tiny cysts    - 5/31/23-12/12/23 adjuvant mFOLFOX 6 q14 days x12 cycles/6 months w/dose reduced oxaliplatin and dropped 5FU bolus C2 onwards, IVF and IV Mg replacement weekly and 2/2 recurrent afib w/RVR- During the days of chemo and 2 days after ONLY: take long acting diltiazem 120 mg daily with 30 mg of short acting diltiazem as needed for atrial fibrillation with HR >110 (C1D1 w/oxaliplatin dose reduced from 85mg/m2 to 64mg/m2 2/2 renal function, 5/31/23 (complicated by hospitalization for afib w/RVR C1D4 6/3/23), C2D1 7/19/23 (w/o 5FU bolus C2 onwards, inpatient w/continuous cardiac monitoring), C3D1 8/2/2023 (outpatient, admitted to ER for A-fib with RVR C3D3 8/4/23), C4D1 8/16/23, C5D1 8/30/2023, C6D1 9/13/2023, C7D1 9/26/23, C8D1 10/11/23,  C9D1 10/25/23, C10D1 11/8/23, C11D1 11/22/23. C12D1 12/12/23 (1 week delay 2/2 cytopenias)    INTERIM HISTORY:  Treatment related AE:  - hearing changes-stable w/dose reduced oxaliplatin, continue use of b/l hearing aids  - Paroxysmal A-fib with RVR and PAC- cardiology follow-up 12/14/2023 noted- no longer on long acting diltiazem, only using short acting prn; continuing metoprolol and xarelto 15mg and plan for follow up 12/2024  -Thrombocytopenia-  4/24 platelet 132 K, no bleeding while on xarelto  - MARYA on CKD- following w/nephro, f/u with nephro 12/4/2023 noted w/next follow up 6/2024; 4/21 creatinine 2.14, planning for IVF today, increasing PO fluids- about 60oz   - hypoMg- previously requiring IV Mg, now on oral Mg BID, 4/24 Mg 1.9  - elevated LFTs- AST 62- stable, Alk phos 346- improving, alk phos iso enzyme- majority liver, liver normal on 9/23 CT and 11/23 MRI (no mets)  - neuropathy- grade 2,  feet>hands, continue gabapentin 200mg in AM, 200mg in afternoon and 300mg at bedtime; likely component of arthritis too          REVIEW OF SYSTEMS:   A 14 point ROS was reviewed with pertinent positives and negatives in the HPI.        HOME MEDICATIONS:  Current Outpatient Medications   Medication Sig Dispense Refill     gabapentin (NEURONTIN) 300 MG capsule Take 1 capsule (300 mg) by mouth 3 times daily 90 capsule 3     cyanocobalamin 1000 MCG TBCR Take 1,000 mcg by mouth daily 100 tablet 1     diltiazem (CARDIZEM) 30 MG tablet Take 1 tablet (30 mg) by mouth 3 times daily (Patient not taking: Reported on 1/2/2024) 90 tablet 0     diltiazem ER COATED BEADS (CARDIZEM CD/CARTIA XT) 120 MG 24 hr capsule Take 1 capsule (120 mg) by mouth daily On chemo days and 2 days post chemo infusion. (Patient not taking: Reported on 1/2/2024) 60 capsule 3     Ferrous Sulfate (IRON) 325 (65 Fe) MG tablet Take 1 tablet by mouth daily       fluocinonide (LIDEX) 0.05 % external cream APPLY A SMALL AMOUNT TO AFFECTED AREA(S) TWICE DAILY AS  NEEDED 60 g 1     lidocaine-prilocaine (EMLA) 2.5-2.5 % external cream Apply topically as needed for moderate pain Apply 15-20 minutes prior to port access 1 g 4     loperamide (IMODIUM) 2 MG capsule Take 2 mg by mouth daily as needed for diarrhea       MAGNESIUM OXIDE 400 (240 Mg) MG tablet TAKE 1 TABLET (400 MG) BY MOUTH 2 TIMES DAILY 60 tablet 0     metoprolol succinate ER (TOPROL XL) 50 MG 24 hr tablet Take 1 tablet (50 mg) by mouth daily 90 tablet 3     Multiple Vitamins-Iron (MULTI-DAY PLUS IRON PO) Take 1 tablet by mouth daily       prochlorperazine (COMPAZINE) 10 MG tablet Take 10 mg by mouth every 6 hours as needed for nausea or vomiting (Patient not taking: Reported on 1/2/2024)       rivaroxaban ANTICOAGULANT (XARELTO) 15 MG TABS tablet Take 1 tablet (15 mg) by mouth daily (with dinner) 90 tablet 2     sodium bicarbonate 650 MG tablet Take 1 tablet (650 mg) by mouth 2 times daily 180 tablet 3     vitamin D3 (CHOLECALCIFEROL) 50 mcg (2000 units) tablet Take 1 tablet (50 mcg) by mouth daily 30 tablet 6         ALLERGIES:  Allergies   Allergen Reactions     No Known Drug Allergy          PAST MEDICAL HISTORY:  Past Medical History:   Diagnosis Date     Arthropathia 08/05/2010     B12 deficiency anemia 10/21/2010     Benign essential hypertension with target blood pressure below 140/90 10/10/2016     CARDIOVASCULAR SCREENING; LDL GOAL LESS THAN 160 10/31/2010     Crohn's colitis (H) 02/15/2011     Dermatitis-dishydrotic eczema-severe 08/05/2010     Hiatal hernia      HTN (hypertension) 07/21/2011     Iron deficiency anemia 10/21/2010     Malignant neoplasm of sigmoid colon (H)      Obesity      Primary pulmonary hypertension (H) 04/06/2010         PAST SURGICAL HISTORY:  Past Surgical History:   Procedure Laterality Date     COLECTOMY WITHOUT COLOSTOMY N/A 4/6/2023    Procedure: Laparoscopic Converted to Open Total abdominal colectomy;  Surgeon: Jerome Ashley MD;  Location: [facility] OR     COLONOSCOPY   10/11/10     COLONOSCOPY N/A 2/27/2023    Procedure: ATTEMPTED COLONOSCOPY WITH SIGMOID STRICTURE BIOPSY;  Surgeon: Jonathan Alcocer MD;  Location: [facility]     ESOPHAGOSCOPY, GASTROSCOPY, DUODENOSCOPY (EGD), COMBINED N/A 2/27/2023    Procedure: ESOPHAGOGASTRODUODENOSCOPY, WITH BIOPSY;  Surgeon: Jonathan Alcocer MD;  Location: [facility]     HYSTERECTOMY TOTAL ABDOMINAL, BILATERAL SALPINGO-OOPHORECTOMY, COMBINED N/A 4/6/2023    Procedure: Hysterectomy total abdominal, bilateral salpingo-oophorectomy;  Surgeon: Sunitha Olea MD;  Location: [facility] OR     INSERT PORT VASCULAR ACCESS Right 5/25/2023    Procedure: Ultrasound-guided right internal jugular venous access port placement with fluoroscopy;  Surgeon: Martin Thomas DO;  Location: [facility]     IR CHEST PORT PLACEMENT > 5 YRS OF AGE  8/21/2023     IR PORT CHECK RIGHT  7/18/2023     IR PORT REMOVAL RIGHT  8/21/2023     SIGMOIDOSCOPY FLEXIBLE N/A 4/6/2023    Procedure: Sigmoidoscopy flexible;  Surgeon: Jerome Ashley MD;  Location: [facility] OR     SURGICAL HISTORY OF -   06/14/76    Perineorrhaphy, for widening vaginal orifice     ZZC EXPLORATORY OF ABDOMEN  1989    laparoscopy         SOCIAL HISTORY:  Social History     Socioeconomic History     Marital status:      Spouse name: Not on file     Number of children: Not on file     Years of education: Not on file     Highest education level: Not on file   Occupational History     Not on file   Tobacco Use     Smoking status: Never     Passive exposure: Current     Smokeless tobacco: Never   Substance and Sexual Activity     Alcohol use: No     Drug use: No     Sexual activity: Yes     Partners: Male   Other Topics Concern     Parent/sibling w/ CABG, MI or angioplasty before 65F 55M? Not Asked   Social History Narrative     Not on file     Social Determinants of Health     Financial Resource Strain: Not on file   Food Insecurity: Not on file   Transportation Needs: Not on file   Physical Activity: Not on  file   Stress: Not on file   Social Connections: Not on file   Interpersonal Safety: Not on file   Housing Stability: Not on file         FAMILY HISTORY:  Family History   Problem Relation Age of Onset     Hypertension Mother         on meds, alive     Cerebrovascular Disease Father         stroke about age 65,  of cancer at 68 yrs     Diabetes Father         eventually took insulin     Anemia Father         Pernisios anemia     Kidney Disease Niece         kidney transplant     Kidney Disease Nephew         kidney transplant     Venous thrombosis No family hx of      Anesthesia Reaction No family hx of          PHYSICAL EXAM:  Vital signs:  BP (!) 148/76   Pulse 85   Temp 97  F (36.1  C) (Temporal)   Resp 18   Wt 90.3 kg (199 lb)   LMP  (LMP Unknown)   SpO2 95%   BMI 37.60 kg/m         ECO  GENERAL/CONSTITUTIONAL: No acute distress.  EYES: Pupils are equal and round. Extraocular movements intact without nystagmus.  No scleral icterus.  RESPIRATORY: Equal chest rise.   ABDOMEN: soft NT ND, no palpable hernia   MUSCULOSKELETAL: Warm and well-perfused, no cyanosis, clubbing, or edema.   NEUROLOGIC: Cranial nerves are grossly intact. Alert, oriented to person, place and time, answers questions appropriately.  INTEGUMENTARY: No rashes or jaundice.  GAIT: Steady, does not use assistive device    LABS:   Latest Reference Range & Units 24 10:05   Sodium 135 - 145 mmol/L 140   Potassium 3.4 - 5.3 mmol/L 4.5   Chloride 98 - 107 mmol/L 104   Carbon Dioxide (CO2) 22 - 29 mmol/L 24   Urea Nitrogen 8.0 - 23.0 mg/dL 31.7 (H)   Creatinine 0.51 - 0.95 mg/dL 2.14 (H)   GFR Estimate >60 mL/min/1.73m2 25 (L)   Calcium 8.8 - 10.2 mg/dL 9.5   Anion Gap 7 - 15 mmol/L 12   Albumin 3.5 - 5.2 g/dL 4.2   Protein Total 6.4 - 8.3 g/dL 7.2   Alkaline Phosphatase 40 - 150 U/L 342 (H)   ALT 0 - 50 U/L 34   AST 0 - 45 U/L 34   Bilirubin Total <=1.2 mg/dL 1.0   CEA ng/mL 2.1   Glucose 70 - 99 mg/dL 109 (H)   WBC 4.0 - 11.0  10e3/uL 5.6   Hemoglobin 11.7 - 15.7 g/dL 13.3   Hematocrit 35.0 - 47.0 % 39.5   Platelet Count 150 - 450 10e3/uL 132 (L)   RBC Count 3.80 - 5.20 10e6/uL 4.31   MCV 78 - 100 fL 92   MCH 26.5 - 33.0 pg 30.9   MCHC 31.5 - 36.5 g/dL 33.7   RDW 10.0 - 15.0 % 12.0   % Neutrophils % 77   % Lymphocytes % 13   % Monocytes % 10   % Eosinophils % 0   % Basophils % 1   Absolute Basophils 0.0 - 0.2 10e3/uL 0.0   Absolute Eosinophils 0.0 - 0.7 10e3/uL 0.0   Absolute Immature Granulocytes <=0.4 10e3/uL 0.0   Absolute Lymphocytes 0.8 - 5.3 10e3/uL 0.7 (L)   Absolute Monocytes 0.0 - 1.3 10e3/uL 0.6   % Immature Granulocytes % 0   Absolute Neutrophils 1.6 - 8.3 10e3/uL 4.3   Absolute NRBCs 10e3/uL 0.0   NRBCs per 100 WBC <1 /100 0   (H): Data is abnormally high  (L): Data is abnormally low    PATHOLOGY:    IMAGING:    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 mutated, MARISSA, TPS 50%  # sigmoid colon poorly-differentiated carcinoma, KRAS G13D mutated, loss of MLH1 and PMS2, TPS 70%  - 2/23 colonoscopy: A frond-like/villous partially obstructing large mass found in sigmoid colon, partially circumferential involving two thirds of the lumen circumference, 4 cm, unable to traverse, with oozing, s/p biopsy, PATHOLOGY: Invasive poorly differentiated carcinoma, IHC panel excludes tumors of other origin, colorectal primary is favored, loss of nuclear expression of MLH1 and PMS2, MLH1 promoter methylation negative, [id number] mutation negative  -2/23 CT CAP: Shows known 4 cm proximal sigmoid colon mass with possible tumor extension (irregularity and stranding and adjacent pericolonic fat) without obstruction AND probable second mass 2.5 cm at hepatic flexure of colon; small lymph nodes adjacent to both masses (no  lymphadenopathy by size criteria)  -3/23 PET:  a. There is increased FDG avidity of the sigmoid colon with focal lobular wall thickening consistent with biopsy-proven adenocarcinoma.  b. Secondary focus noted at the hepatic flexure demonstrates elevated FDG avidity and lobulated wall thickening highly suspicious for a additional primary.  c. Additionally there is a smaller focus of wall thickening with elevated FDG avidity along the left aspect of the transverse colon which is suspicious for a possible third focus of colonic malignancy.  - 3/23 CEA 6.8  -4/6/2023 laparoscopic converted to open total abdominal colectomy with ileorectal anastomosis, partial omentectomy, flexible sigmoidoscopy, TAHBSO  PATHOLOGY:  Of note, omental resection, terminal ileum, proximal and distal anastomotic rings, uterus, cervix, bilateral fallopian tubes and ovaries negative for malignancy    Mass #1 (ascending colon/hepatic flexure): Mixed neuroendocrine-non-neuroendocrine neoplasm (MINEN):  - Neuroendocrine carcinoma component (70%) and moderately-differentiated adenocarcinoma component (30%)  - Size = 5.0 cm, invading into muscularis propria, Positive LVI  - IHC: Neuroendocrine portion: Negative for chromogranin and INSM1 but strongly express synaptophysin  - IHC: Adenocarcinoma portion: Positive for CK20, CDX2 negative for CK7, PAX8, ER, S100  Ki-67 proliferation index of approximately 80%.  MARISSA   PDL1:  COMBINED POSITIVE SCORE (CPS): 55-60  TUMOR PROPORTION SCORE (TPS): 50%   pathogenic KRAS mutation (G13D) was identified (5.2%).  MMR testing: intact  AJCC 8th edition: stage 3B mpT3 pN1a     Mass #2 (sigmoid colon): Poorly-differentiated carcinoma:  - Grade 3  - Size = 5.7 cm, invading into muscularis propria  - IHC: Positive for scar and keratin AE1/AE3, negative for CK7, CK20, CDX2, PAX8, ER, chromogranin, CD56, p40, synaptophysin, S100, INI1, p40, INSM1  Ki-67 proliferation index of approximately 70%.  MMR testing: loss of MLH1  and PMS2  PDL1:  COMBINED POSITIVE SCORE (CPS): 80  TUMOR PROPORTION SCORE (TPS): 70%  pathogenic KRAS mutation (G13D) was identified (4.5%).  MMR testing: loss of MLH1 and PMS2, intact MSH2 and MSH6  AJCC 8th edition: stage 3A mpT2 pN1a    - One of forty-one sampled lymph nodes positive (1/41), d/w pathology- unable to determine which mass is metastatic to LN    - 4/23 MRI brain w/wo contrast LAURENT  - 5/26/23 CT CAP w/ contrast and IVF before and after CT (post op baseline prior to starting tx):  1.  3 mm nodule RML and 5 mm lateral EDSON nodule, minimally increased from previous  2.  New 4 mm EDSON groundglass density nodule  3.  Several prominent borderline enlarged mediastinal lymph nodes slightly increased from previous  4.  Splenomegaly 14.5 cm  5. S/p subtotal colectomy with ileorectal anastomosis with postsurgical changes along the ventral abdominal wall midline  - 5/8/23 I presented this case at [facility] tumor board as above   - 5/19/23 second opinion at [facility] w/medical oncologist Dr. Acharya, thorough consultation and recommendations appreciated, overall agree w/FOLFOX x6 months, possible nivo or ipi nivo in second line; if metastatic platinum etoposide vs ipi nivo  - 5/25/23 port placement    -5/2023 genetic counseling: Negative for mutations detailed below     Treatment:  -4/6/2023 laparoscopic converted to open total abdominal colectomy with ileorectal anastomosis, partial omentectomy, flexible sigmoidoscopy, TAHBSO (details above)  - 5/31/23-12/12/23 adjuvant mFOLFOX 6 q14 days x12 cycles/6 months w/dose reduced oxaliplatin and dropped 5FU bolus C2 onwards, IVF and IV Mg replacement weekly and 2/2 recurrent afib w/RVR- During the days of chemo and 2 days after ONLY: take long acting diltiazem 120 mg daily with 30 mg of short acting diltiazem as needed for atrial fibrillation with HR >110 (C1D1 w/oxaliplatin dose reduced from 85mg/m2 to 64mg/m2 2/2 renal function, 5/31/23 (complicated by hospitalization for  afib w/RVR C1D4 6/3/23), C2D1 7/19/23 (w/o 5FU bolus C2 onwards, inpatient w/continuous cardiac monitoring), C3D1 8/2/2023 (outpatient, admitted to ER for A-fib with RVR C3D3 8/4/23), C4D1 8/16/23, C5D1 8/30/2023, C6D1 9/13/2023, C7D1 9/26/23, C8D1 10/11/23, C9D1 10/25/23, C10D1 11/8/23, C11D1 11/22/23. C12D1 12/12/23 (1 week delay 2/2 cytopenias)    Treatment related AE:  - hearing changes-stable w/dose reduced oxaliplatin, continue use of b/l hearing aids  - Paroxysmal A-fib with RVR and PAC- cardiology follow-up 12/14/2023 noted- no longer on long acting diltiazem, only using short acting prn; continuing metoprolol and xarelto and plan for follow up 12/2024  -Thrombocytopenia-  4/24 platelet 132K, continue xarelto  - MARYA on CKD- following w/nephro,  nephro follow up 6/2024; 4/21 creatinine 2.14, planning for IVF today and repeat CMP in 7-10 days- if still elevated, follow up with nephrology sooner, cardiology dose adjusted xarelto, continue increasing PO fluids  - hypoMg- continue oral Mg BID  - elevated LFTs- AST 62- stable, Alk phos 346- improving, alk phos iso enzyme- majority liver, liver normal on 9/23 CT and 11/23 MRI (no mets), hepatology referral   - neuropathy- grade 2,  titrate gabapentin to 300mg TID as tolerated; likely component of arthritis too      Imaging:  - 1/2/24 CT CAP (after 12 cycles of mFOLFOx w/o 5FU bolus):   1.  Stable tiny nodules in both lungs.  2.  Stable borderline enlarged portacaval lymph node.  3.  Splenomegaly slightly increased from previous study.  4.  Postsurgical changes of subtotal colectomy with ileorectal anastomosis.  5.  Moderate-sized duodenal diverticulum.  6.  Stable nonmass-like area of low attenuation in the right hepatic lobe adjacent to the middle hepatic vein could represent some focal fatty infiltration. Attention is recommended to this area on future exams, but the stability is reassuring.    -11/23 MRI liver: negative for mets, stable cental liver hemangioma  and splenomegaly    Labs:  3/23 CEA 6.8  5/23 CEA 1.8  7/23 CEA 2.8  10/23 CEA 2.0  12/23 CEA 2.3  4/24 CEA 2.1    PLAN:  - port flush q6-8 weeks  - continue active surveillance:     History and physical examination every 3-6 mo for 2 y, then every 6 mo for a total of 5 y    CEA every 3-6 mo for 2 y, then every 6 mo for a total of 5 y    Chest/abdominal/pelvic CT every 6-12 mo (category 2B for frequency <12 mo) from date of surgery for a total of 5 y    Colonoscopy in 1 y after surgery except if no preoperative colonoscopy due to obstructing lesion, colonoscopy in 3-6 mo   If advanced adenoma, repeat in 1 y   If no advanced adenoma, repeat in 3 y, then every 5 y  - f/u with Dr. Ashley 5/1/24 for sigmoidoscopy   - H&P and CEA due 7/24- ordered today  - repeat CT CAP 7/2024 w/IVF before and after- ordered today      #parxosymal afib  - within 3 days of receiving 5FU, prior cardiac risk factors htn, prediabetes  - 6/3/23 presented to ER w/lightheadedness, dizziness, cardioverted s/p diltiazem ggt in ER  - 6/23 DPD deficiency testing negative  - currently on metoprolol XL 25 mg daily, apixaban 5 mg twice daily  - 6/30/2023 consultation with cardio oncologist Dr. Garza; I spoke with Dr. Garza 6/30/2023 as well, [id number] score 3, recommending starting apixaban 5 mg twice daily, okay to retrial FOLFOX while patient is on metoprolol.  Patient is currently on Xarelto  - 8/15/2023 cardiology follow-up: Increase metoprolol XL to 50 mg daily; During the days of chemo and 2 days after take long acting diltiazem 120 mg daily with 30 mg of short acting diltiazem as needed for atrial fibrillation with HR >110  - cardiology follow-up 12/14/2023 noted- no longer on long acting diltiazem, only using short acting prn; continuing metoprolol and xarelto and plan for follow up 12/2024  - I do not recommend switching anticoagulation to coumadin given CLASS D interaction w/5FU chemotherapy, I messaged cardiology regarding this  previously    #Anemia secondary to iron deficiency and B12 deficiency  - terminal ileum removed at time of colon surgery, monitor for nutrient def  - 2/23 hgb 6.8, ferritin 21, iron sat index 10, TIBC 265, iron 27, b12 1493  - On B12 1000 mcg p.o. daily and ferrous sulfate 325 mg p.o. daily  - 4/9/23 s/p 1 uprbcs  - 5/9/23 hgb 11.3,5/30/23 hgb 9.4  - based on Ganzoni equation w/goal hgb 14 and 500mg needed for iron stores, pts iron deficit is 1400 mg  - s/p ferric carboxymaltose 750mg x2 doses 6/14/23 and 6/28/23  - 7/11/23 ferritin 1022, iron sat 22, TIBC 201, iron 44    - 8/23 hgb 13.3  - 10/23 hgb 11.1  - 11/23 iron studies, B12, RBC folate WNL    #Crohn's  - dx since at least 2010     RTC 3 months for follow up with me, labs 1-2 days prior to visit (after CT)      Francisco Lee DO  Hematology/Oncology  [facility]        Again, thank you for allowing me to participate in the care of your patient.        Sincerely,        FRANCISCO LEE DO

## 2024-04-08 NOTE — PROGRESS NOTES
Patient not seen by me since June 2023.  If patient needs renewal of her transfusion orders, I recommend this be forwarded on to her oncologist.    Chay

## 2024-04-09 ENCOUNTER — HOSPITAL ENCOUNTER (OUTPATIENT)
Dept: ULTRASOUND IMAGING | Facility: CLINIC | Age: 69
Discharge: HOME | End: 2024-04-09
Attending: INTERNAL MEDICINE | Admitting: INTERNAL MEDICINE
Payer: COMMERCIAL

## 2024-04-09 DIAGNOSIS — N17.9 AKI (ACUTE KIDNEY INJURY) (H): ICD-10-CM

## 2024-04-09 PROCEDURE — 76770 US EXAM ABDO BACK WALL COMP: CPT

## 2024-04-09 NOTE — PROGRESS NOTES
"I received a request to \"update\" infusion orders.  It now looks like this has been addressed by the patient's oncologist.    Please disregard.    Chay"

## 2024-04-10 ENCOUNTER — LAB (OUTPATIENT)
Dept: INFUSION THERAPY | Facility: CLINIC | Age: 69
End: 2024-04-10
Attending: INTERNAL MEDICINE
Payer: COMMERCIAL

## 2024-04-10 DIAGNOSIS — D70.1 CHEMOTHERAPY-INDUCED NEUTROPENIA (H): ICD-10-CM

## 2024-04-10 DIAGNOSIS — N17.9 AKI (ACUTE KIDNEY INJURY) (H): ICD-10-CM

## 2024-04-10 DIAGNOSIS — C18.9 MALIGNANT NEOPLASM OF COLON, UNSPECIFIED PART OF COLON (H): Primary | ICD-10-CM

## 2024-04-10 DIAGNOSIS — T45.1X5A CHEMOTHERAPY-INDUCED NEUTROPENIA (H): ICD-10-CM

## 2024-04-10 LAB
ALBUMIN SERPL BCG-MCNC: 4.1 G/DL (ref 3.5–5.2)
ALP SERPL-CCNC: 353 U/L (ref 40–150)
ALT SERPL W P-5'-P-CCNC: 42 U/L (ref 0–50)
ANION GAP SERPL CALCULATED.3IONS-SCNC: 12 MMOL/L (ref 7–15)
AST SERPL W P-5'-P-CCNC: 48 U/L (ref 0–45)
BILIRUB SERPL-MCNC: 0.9 MG/DL
BUN SERPL-MCNC: 32.5 MG/DL (ref 8–23)
CALCIUM SERPL-MCNC: 10 MG/DL (ref 8.8–10.2)
CHLORIDE SERPL-SCNC: 106 MMOL/L (ref 98–107)
CREAT SERPL-MCNC: 1.82 MG/DL (ref 0.51–0.95)
DEPRECATED HCO3 PLAS-SCNC: 25 MMOL/L (ref 22–29)
EGFRCR SERPLBLD CKD-EPI 2021: 30 ML/MIN/1.73M2
GLUCOSE SERPL-MCNC: 90 MG/DL (ref 70–99)
POTASSIUM SERPL-SCNC: 4.4 MMOL/L (ref 3.4–5.3)
PROT SERPL-MCNC: 7.3 G/DL (ref 6.4–8.3)
SODIUM SERPL-SCNC: 143 MMOL/L (ref 135–145)

## 2024-04-10 PROCEDURE — 250N000011 HC RX IP 250 OP 636: Performed by: INTERNAL MEDICINE

## 2024-04-10 PROCEDURE — 80053 COMPREHEN METABOLIC PANEL: CPT

## 2024-04-10 PROCEDURE — 36592 COLLECT BLOOD FROM PICC: CPT

## 2024-04-10 RX ORDER — EPINEPHRINE 1 MG/ML
0.3 INJECTION, SOLUTION, CONCENTRATE INTRAVENOUS EVERY 5 MIN PRN
Status: CANCELLED | OUTPATIENT
Start: 2024-04-10

## 2024-04-10 RX ORDER — DIPHENHYDRAMINE HYDROCHLORIDE 50 MG/ML
50 INJECTION INTRAMUSCULAR; INTRAVENOUS
Status: CANCELLED
Start: 2024-04-10

## 2024-04-10 RX ORDER — HEPARIN SODIUM,PORCINE 10 UNIT/ML
5 VIAL (ML) INTRAVENOUS
Status: CANCELLED | OUTPATIENT
Start: 2024-04-10

## 2024-04-10 RX ORDER — HEPARIN SODIUM (PORCINE) LOCK FLUSH IV SOLN 100 UNIT/ML 100 UNIT/ML
5 SOLUTION INTRAVENOUS
Status: CANCELLED | OUTPATIENT
Start: 2024-04-10

## 2024-04-10 RX ORDER — MEPERIDINE HYDROCHLORIDE 25 MG/ML
25 INJECTION INTRAMUSCULAR; INTRAVENOUS; SUBCUTANEOUS EVERY 30 MIN PRN
Status: CANCELLED | OUTPATIENT
Start: 2024-04-10

## 2024-04-10 RX ORDER — ALBUTEROL SULFATE 0.83 MG/ML
2.5 SOLUTION RESPIRATORY (INHALATION)
Status: CANCELLED | OUTPATIENT
Start: 2024-04-10

## 2024-04-10 RX ORDER — METHYLPREDNISOLONE SODIUM SUCCINATE 125 MG/2ML
125 INJECTION, POWDER, LYOPHILIZED, FOR SOLUTION INTRAMUSCULAR; INTRAVENOUS
Status: CANCELLED
Start: 2024-04-10

## 2024-04-10 RX ORDER — HEPARIN SODIUM (PORCINE) LOCK FLUSH IV SOLN 100 UNIT/ML 100 UNIT/ML
5 SOLUTION INTRAVENOUS
Status: DISCONTINUED | OUTPATIENT
Start: 2024-04-10 | End: 2024-04-10 | Stop reason: HOSPADM

## 2024-04-10 RX ORDER — ALBUTEROL SULFATE 90 UG/1
1-2 AEROSOL, METERED RESPIRATORY (INHALATION)
Status: CANCELLED
Start: 2024-04-10

## 2024-04-10 RX ADMIN — HEPARIN 5 ML: 100 SYRINGE at 14:55

## 2024-04-10 NOTE — PROGRESS NOTES
"Patient here for port lab draw.    Intravenous access:     Port needle gauge20 by 3/4 \".   Labs drawn without difficulty.  green tubes drawn.     Port flushed with 20 ml NS after then heparin.       Eli Ladd RN          "

## 2024-04-16 ENCOUNTER — TELEPHONE (OUTPATIENT)
Dept: GASTROENTEROLOGY | Facility: CLINIC | Age: 69
End: 2024-04-16
Payer: COMMERCIAL

## 2024-04-16 NOTE — TELEPHONE ENCOUNTER
Pre visit planning completed.    Needs tap water enemas due to low GFR    Procedure details:    Patient scheduled for Flexible sigmoidoscopy  on 5/1/24.     Arrival time: 0830. Procedure time 0930    Facility location: [facility]; [address] [place] [address], 5[address] Floor, [place] [address]. Check in location: 5th Floor.    Sedation type: MAC    Pre op exam needed? N/A    Indication for procedure:   Malignant neoplasm of sigmoid colon            Chart review:     Electronic implanted devices? No    Recent diagnosis of diverticulitis within the last 6 weeks? No    Diabetic? Prediabetic.       Medication review:    Anticoagulants? Yes Rivaroxaban (Xarelto): Recommended HOLD 2 days before procedure.  Consult with your managing provider.    NSAIDS? No    Other medication HOLDING recommendations:  Ferrous sulfate (iron supplements): HOLD 7 days before procedure.      Prep for procedure:     Bowel prep recommendation: Enemas - TAP WATER - GFR 30  Due to: standard bowel prep.    Prep instructions sent via [employer]         Annamarie Myrick RN  Endoscopy Procedure Pre Assessment RN  [phone] option 4

## 2024-04-17 NOTE — TELEPHONE ENCOUNTER
Called patient in attempts to complete pre assessment for upcoming Flexible sigmoidoscopy  scheduled on 5/1/24.    Patient requesting a call back later this afternoon.     Annamarie Hartley RN  Endoscopy Procedure Pre Assessment RN  [phone] option 4

## 2024-04-17 NOTE — TELEPHONE ENCOUNTER
Pre assessment completed for upcoming procedure.   (Please see previous telephone encounter notes for complete details)      Procedure details:    Arrival time and facility location reviewed.    Pre op exam needed? N/A    Designated  policy reviewed. Instructed to have someone stay 24  hours post procedure.       Medication review:    Medications reviewed. Please see supporting documentation below. Holding recommendations discussed (if applicable).       Prep for procedure:     Procedure prep instructions reviewed.        Any additional information needed:  N/A      Patient  verbalized understanding and had no questions or concerns at this time.      Annamarie Hartley RN  Endoscopy Procedure Pre Assessment RN  [phone] option 4

## 2024-04-24 DIAGNOSIS — E83.42 HYPOMAGNESEMIA: ICD-10-CM

## 2024-04-24 DIAGNOSIS — K52.9 CHRONIC DIARRHEA: ICD-10-CM

## 2024-04-24 DIAGNOSIS — I48.0 PAROXYSMAL ATRIAL FIBRILLATION WITH RVR (H): ICD-10-CM

## 2024-04-25 RX ORDER — LANOLIN ALCOHOL/MO/W.PET/CERES
400 CREAM (GRAM) TOPICAL 2 TIMES DAILY
Qty: 60 TABLET | Refills: 0 | Status: SHIPPED | OUTPATIENT
Start: 2024-04-25 | End: 2024-05-28

## 2024-05-01 ENCOUNTER — ANESTHESIA EVENT (OUTPATIENT)
Dept: SURGERY | Facility: AMBULATORY SURGERY CENTER | Age: 69
End: 2024-05-01
Payer: COMMERCIAL

## 2024-05-01 ENCOUNTER — ANESTHESIA (OUTPATIENT)
Dept: SURGERY | Facility: AMBULATORY SURGERY CENTER | Age: 69
End: 2024-05-01
Payer: COMMERCIAL

## 2024-05-01 ENCOUNTER — HOSPITAL ENCOUNTER (OUTPATIENT)
Facility: AMBULATORY SURGERY CENTER | Age: 69
Discharge: HOME | End: 2024-05-01
Attending: SURGERY
Payer: COMMERCIAL

## 2024-05-01 VITALS — HEART RATE: 73 BPM

## 2024-05-01 VITALS
OXYGEN SATURATION: 97 % | TEMPERATURE: 97.3 F | HEART RATE: 63 BPM | RESPIRATION RATE: 16 BRPM | HEIGHT: 61 IN | BODY MASS INDEX: 37.57 KG/M2 | DIASTOLIC BLOOD PRESSURE: 70 MMHG | WEIGHT: 199 LBS | SYSTOLIC BLOOD PRESSURE: 118 MMHG

## 2024-05-01 DIAGNOSIS — K52.9 CHRONIC DIARRHEA: ICD-10-CM

## 2024-05-01 DIAGNOSIS — C18.7 MALIGNANT NEOPLASM OF SIGMOID COLON (H): Primary | ICD-10-CM

## 2024-05-01 LAB — FLEXIBLE SIGMOIDOSCOPY: NORMAL

## 2024-05-01 PROCEDURE — 45330 DIAGNOSTIC SIGMOIDOSCOPY: CPT

## 2024-05-01 PROCEDURE — 45330 DIAGNOSTIC SIGMOIDOSCOPY: CPT | Performed by: ANESTHESIOLOGY

## 2024-05-01 PROCEDURE — G0104 CA SCREEN;FLEXI SIGMOIDSCOPE: HCPCS

## 2024-05-01 RX ORDER — PROPOFOL 10 MG/ML
INJECTION, EMULSION INTRAVENOUS CONTINUOUS PRN
Status: DISCONTINUED | OUTPATIENT
Start: 2024-05-01 | End: 2024-05-01

## 2024-05-01 RX ORDER — FLUMAZENIL 0.1 MG/ML
0.2 INJECTION, SOLUTION INTRAVENOUS
Status: ACTIVE | OUTPATIENT
Start: 2024-05-01 | End: 2024-05-01

## 2024-05-01 RX ORDER — SODIUM CHLORIDE, SODIUM LACTATE, POTASSIUM CHLORIDE, CALCIUM CHLORIDE 600; 310; 30; 20 MG/100ML; MG/100ML; MG/100ML; MG/100ML
INJECTION, SOLUTION INTRAVENOUS CONTINUOUS PRN
Status: DISCONTINUED | OUTPATIENT
Start: 2024-05-01 | End: 2024-05-01

## 2024-05-01 RX ORDER — NALOXONE HYDROCHLORIDE 0.4 MG/ML
0.2 INJECTION, SOLUTION INTRAMUSCULAR; INTRAVENOUS; SUBCUTANEOUS
Status: DISCONTINUED | OUTPATIENT
Start: 2024-05-01 | End: 2024-05-02 | Stop reason: HOSPADM

## 2024-05-01 RX ORDER — ONDANSETRON 2 MG/ML
4 INJECTION INTRAMUSCULAR; INTRAVENOUS EVERY 6 HOURS PRN
Status: DISCONTINUED | OUTPATIENT
Start: 2024-05-01 | End: 2024-05-02 | Stop reason: HOSPADM

## 2024-05-01 RX ORDER — HEPARIN SODIUM (PORCINE) LOCK FLUSH IV SOLN 100 UNIT/ML 100 UNIT/ML
5-10 SOLUTION INTRAVENOUS
Status: DISCONTINUED | OUTPATIENT
Start: 2024-05-01 | End: 2024-05-02 | Stop reason: HOSPADM

## 2024-05-01 RX ORDER — PROCHLORPERAZINE MALEATE 5 MG
5 TABLET ORAL EVERY 6 HOURS PRN
Status: DISCONTINUED | OUTPATIENT
Start: 2024-05-01 | End: 2024-05-02 | Stop reason: HOSPADM

## 2024-05-01 RX ORDER — HEPARIN SODIUM,PORCINE 10 UNIT/ML
5-10 VIAL (ML) INTRAVENOUS
Status: DISCONTINUED | OUTPATIENT
Start: 2024-05-01 | End: 2024-05-02 | Stop reason: HOSPADM

## 2024-05-01 RX ORDER — NALOXONE HYDROCHLORIDE 0.4 MG/ML
0.4 INJECTION, SOLUTION INTRAMUSCULAR; INTRAVENOUS; SUBCUTANEOUS
Status: DISCONTINUED | OUTPATIENT
Start: 2024-05-01 | End: 2024-05-02 | Stop reason: HOSPADM

## 2024-05-01 RX ORDER — LIDOCAINE 40 MG/G
CREAM TOPICAL
Status: DISCONTINUED | OUTPATIENT
Start: 2024-05-01 | End: 2024-05-02 | Stop reason: HOSPADM

## 2024-05-01 RX ORDER — ONDANSETRON 2 MG/ML
4 INJECTION INTRAMUSCULAR; INTRAVENOUS
Status: DISCONTINUED | OUTPATIENT
Start: 2024-05-01 | End: 2024-05-02 | Stop reason: HOSPADM

## 2024-05-01 RX ORDER — SODIUM CHLORIDE, SODIUM LACTATE, POTASSIUM CHLORIDE, CALCIUM CHLORIDE 600; 310; 30; 20 MG/100ML; MG/100ML; MG/100ML; MG/100ML
INJECTION, SOLUTION INTRAVENOUS CONTINUOUS
Status: DISCONTINUED | OUTPATIENT
Start: 2024-05-01 | End: 2024-05-02 | Stop reason: HOSPADM

## 2024-05-01 RX ORDER — PROPOFOL 10 MG/ML
INJECTION, EMULSION INTRAVENOUS PRN
Status: DISCONTINUED | OUTPATIENT
Start: 2024-05-01 | End: 2024-05-01

## 2024-05-01 RX ORDER — ONDANSETRON 4 MG/1
4 TABLET, ORALLY DISINTEGRATING ORAL EVERY 6 HOURS PRN
Status: DISCONTINUED | OUTPATIENT
Start: 2024-05-01 | End: 2024-05-02 | Stop reason: HOSPADM

## 2024-05-01 RX ORDER — HEPARIN SODIUM,PORCINE 10 UNIT/ML
5-10 VIAL (ML) INTRAVENOUS EVERY 24 HOURS
Status: DISCONTINUED | OUTPATIENT
Start: 2024-05-01 | End: 2024-05-02 | Stop reason: HOSPADM

## 2024-05-01 RX ADMIN — HEPARIN SODIUM (PORCINE) LOCK FLUSH IV SOLN 100 UNIT/ML 5 ML: 100 SOLUTION at 10:29

## 2024-05-01 RX ADMIN — SODIUM CHLORIDE, SODIUM LACTATE, POTASSIUM CHLORIDE, CALCIUM CHLORIDE: 600; 310; 30; 20 INJECTION, SOLUTION INTRAVENOUS at 09:45

## 2024-05-01 RX ADMIN — PROPOFOL 30 MG: 10 INJECTION, EMULSION INTRAVENOUS at 09:49

## 2024-05-01 RX ADMIN — PROPOFOL 200 MCG/KG/MIN: 10 INJECTION, EMULSION INTRAVENOUS at 09:47

## 2024-05-01 NOTE — DISCHARGE INSTRUCTIONS
Discharge Instructions after Colonoscopy  or Sigmoidoscopy    Today you had a ____ Colonoscopy ____ Sigmoidoscopy    Activity and Diet  You were given medicine for pain. You may be dizzy or sleepy.  For 24 hours:   Do not drive or use heavy equipment.   Do not make important decisions.   Do not drink any alcohol.  You may return to your normal diet and medicines.    Discomfort   Air was placed in your colon during the exam in order to see it. Walking helps to pass the air.   You may take Tylenol (acetaminophen) for pain unless your doctor has told you not to.  Do not take aspirin or ibuprofen (Advil, Motrin, or other anti-inflammatory  drugs) for _____ days.    Follow-up  ____ We took small tissue samples or polyps to study. Your doctor will call you with the results  within two weeks.    When to call:    Call right away if you have:   Unusual pain in belly or chest pain not relieved with passing air.   More than 1 to 2 Tablespoons of bleeding from your rectum.   Fever above 100.6  F (37.5  C).    If you have severe pain, bleeding, or shortness of breath, go to an emergency room.    If you have questions, call:  Monday to Friday, 8 a.m. to 4:30 p.m.  Central Scheduling Department: [phone]    After hours  [facility]: [phone] (Ask for the GI fellow on call)

## 2024-05-01 NOTE — ANESTHESIA CARE TRANSFER NOTE
Patient: Nadia Ladd    Procedure: Procedure(s):  Sigmoidoscopy flexible       Diagnosis: Malignant neoplasm of sigmoid colon (H) [C18.7]  Diagnosis Additional Information: No value filed.    Anesthesia Type:   MAC     Note:    Oropharynx: spontaneously breathing  Level of Consciousness: awake  Oxygen Supplementation: room air    Independent Airway: airway patency satisfactory and stable  Dentition: dentition unchanged  Vital Signs Stable: post-procedure vital signs reviewed and stable  Report to RN Given: handoff report given  Patient transferred to: Phase II    Handoff Report: Identifed the Patient, Identified the Reponsible Provider, Reviewed the pertinent medical history, Discussed the surgical course, Reviewed Intra-OP anesthesia mangement and issues during anesthesia, Set expectations for post-procedure period and Allowed opportunity for questions and acknowledgement of understanding      Vitals:  Vitals Value Taken Time   BP 99/58 05/01/24 0958   Temp 36.1  C (96.9  F) 05/01/24 0958   Pulse 74 05/01/24 0958   Resp 16 05/01/24 0958   SpO2 95 % 05/01/24 0958       Electronically Signed By: LUIS Ferrell CRNA  May 1, 2024  10:02 AM

## 2024-05-01 NOTE — ANESTHESIA PREPROCEDURE EVALUATION
Anesthesia Pre-Procedure Evaluation    Patient: Nadia Ladd   MRN: [id number] : 1955        Procedure : Procedure(s):  Sigmoidoscopy flexible          Past Medical History:   Diagnosis Date    Arthropathia 2010    B12 deficiency anemia 10/21/2010    Benign essential hypertension with target blood pressure below 140/90 10/10/2016    CARDIOVASCULAR SCREENING; LDL GOAL LESS THAN 160 10/31/2010    Crohn's colitis (H) 02/15/2011    Dermatitis-dishydrotic eczema-severe 2010    Hiatal hernia     HTN (hypertension) 2011    Iron deficiency anemia 10/21/2010    Malignant neoplasm of sigmoid colon (H)     Obesity     Primary pulmonary hypertension (H) 2010      Past Surgical History:   Procedure Laterality Date    COLECTOMY WITHOUT COLOSTOMY N/A 2023    Procedure: Laparoscopic Converted to Open Total abdominal colectomy;  Surgeon: Jerome Ashley MD;  Location: [facility] OR    COLONOSCOPY  10/11/10    COLONOSCOPY N/A 2023    Procedure: ATTEMPTED COLONOSCOPY WITH SIGMOID STRICTURE BIOPSY;  Surgeon: Jonathan Alcocer MD;  Location:  GI    ESOPHAGOSCOPY, GASTROSCOPY, DUODENOSCOPY (EGD), COMBINED N/A 2023    Procedure: ESOPHAGOGASTRODUODENOSCOPY, WITH BIOPSY;  Surgeon: Jonathan Alcocer MD;  Location:  GI    HYSTERECTOMY TOTAL ABDOMINAL, BILATERAL SALPINGO-OOPHORECTOMY, COMBINED N/A 2023    Procedure: Hysterectomy total abdominal, bilateral salpingo-oophorectomy;  Surgeon: Sunitha Olea MD;  Location: [facility] OR    INSERT PORT VASCULAR ACCESS Right 2023    Procedure: Ultrasound-guided right internal jugular venous access port placement with fluoroscopy;  Surgeon: Martin Thomas DO;  Location: [facility] OR    IR CHEST PORT PLACEMENT > 5 YRS OF AGE  2023    IR PORT CHECK RIGHT  2023    IR PORT REMOVAL RIGHT  2023    SIGMOIDOSCOPY FLEXIBLE N/A 2023    Procedure: Sigmoidoscopy flexible;  Surgeon: Jerome Ashley MD;  Location: [facility] OR     SURGICAL HISTORY OF -   06/14/76    Perineorrhaphy, for widening vaginal orifice    [facility] EXPLORATORY OF ABDOMEN  1989    laparoscopy      Allergies   Allergen Reactions    No Known Drug Allergy       Social History     Tobacco Use    Smoking status: Never     Passive exposure: Current    Smokeless tobacco: Never   Substance Use Topics    Alcohol use: No      Wt Readings from Last 1 Encounters:   04/02/24 90.3 kg (199 lb)        Anesthesia Evaluation            ROS/MED HX  ENT/Pulmonary:  - neg pulmonary ROS     Neurologic:  - neg neurologic ROS     Cardiovascular:     (+)  hypertension- -   -  - -                                      METS/Exercise Tolerance: 3 - Able to walk 1-2 blocks without stopping    Hematologic:  - neg hematologic  ROS     Musculoskeletal:  - neg musculoskeletal ROS     GI/Hepatic:     (+)      hiatal hernia,              Renal/Genitourinary:     (+) renal disease,             Endo:     (+)               Obesity,       Psychiatric/Substance Use:     (+) psychiatric history anxiety       Infectious Disease:       Malignancy:   (+) Malignancy, History of GI.GI CA  Remission status post Surgery.      Other:            Physical Exam    Airway        Mallampati: II   TM distance: > 3 FB   Neck ROM: full   Mouth opening: > 3 cm    Respiratory Devices and Support         Dental       (+) Minor Abnormalities - some fillings, tiny chips      Cardiovascular   cardiovascular exam normal          Pulmonary   pulmonary exam normal                OUTSIDE LABS:  CBC:   Lab Results   Component Value Date    WBC 5.6 04/01/2024    WBC 4.5 02/27/2024    HGB 13.3 04/01/2024    HGB 12.7 02/27/2024    HCT 39.5 04/01/2024    HCT 37.7 02/27/2024     (L) 04/01/2024     (L) 02/27/2024     BMP:   Lab Results   Component Value Date     04/10/2024     04/01/2024    POTASSIUM 4.4 04/10/2024    POTASSIUM 4.5 04/01/2024    CHLORIDE 106 04/10/2024    CHLORIDE 104 04/01/2024    CO2 25 04/10/2024    CO2  "24 04/01/2024    BUN 32.5 (H) 04/10/2024    BUN 31.7 (H) 04/01/2024    CR 1.82 (H) 04/10/2024    CR 2.14 (H) 04/01/2024    GLC 90 04/10/2024     (H) 04/01/2024     COAGS: No results found for: \"PTT\", \"INR\", \"FIBR\"  POC: No results found for: \"BGM\", \"HCG\", \"HCGS\"  HEPATIC:   Lab Results   Component Value Date    ALBUMIN 4.1 04/10/2024    PROTTOTAL 7.3 04/10/2024    ALT 42 04/10/2024    AST 48 (H) 04/10/2024    ALKPHOS 353 (H) 04/10/2024    BILITOTAL 0.9 04/10/2024     OTHER:   Lab Results   Component Value Date    PH 7.24 (L) 04/06/2023    LACT 0.9 04/06/2023    A1C 4.8 03/12/2018    LIVIER 10.0 04/10/2024    PHOS 2.7 12/04/2023    MAG 1.9 04/01/2024    LIPASE 70 (H) 06/03/2023    TSH 2.39 08/04/2023    T4 1.25 09/14/2010    CRP 20.4 (H) 10/09/2021    SED 10 10/09/2021       Anesthesia Plan    ASA Status:  2    NPO Status:  NPO Appropriate    Anesthesia Type: MAC.     - Reason for MAC: immobility needed      Maintenance: Balanced.        Consents    Anesthesia Plan(s) and associated risks, benefits, and realistic alternatives discussed. Questions answered and patient/representative(s) expressed understanding.     - Discussed: Risks, Benefits and Alternatives for BOTH SEDATION and the PROCEDURE were discussed     - Discussed with:  Patient      - Extended Intubation/Ventilatory Support Discussed: No.      - Patient is DNR/DNI Status: No     Use of blood products discussed: No .     Postoperative Care    Pain management: Multi-modal analgesia.   PONV prophylaxis: Ondansetron (or other 5HT-3), Dexamethasone or Solumedrol     Comments:               Lisa Myers MD    I have reviewed the pertinent notes and labs in the chart from the past 30 days and (re)examined the patient.  Any updates or changes from those notes are reflected in this note.            # Drug Induced Coagulation Defect: home medication list includes an anticoagulant medication       "

## 2024-05-01 NOTE — ANESTHESIA POSTPROCEDURE EVALUATION
Patient: Nadia Ladd    Procedure: Procedure(s):  Sigmoidoscopy flexible       Anesthesia Type:  MAC    Note:  Disposition: Outpatient   Postop Pain Control: Uneventful            Sign Out: Well controlled pain   PONV:    Neuro/Psych: Uneventful            Sign Out: Acceptable/Baseline neuro status   Airway/Respiratory: Uneventful            Sign Out: Acceptable/Baseline resp. status   CV/Hemodynamics: Uneventful            Sign Out: Acceptable CV status; No obvious hypovolemia; No obvious fluid overload   Other NRE: NONE   DID A NON-ROUTINE EVENT OCCUR?            Last vitals:  Vitals Value Taken Time   /70 05/01/24 1030   Temp 36.3  C (97.3  F) 05/01/24 1030   Pulse 63 05/01/24 1030   Resp 16 05/01/24 1030   SpO2 97 % 05/01/24 1030       Electronically Signed By: Lisa Myers MD  May 1, 2024  10:59 AM

## 2024-05-14 ENCOUNTER — LAB (OUTPATIENT)
Dept: INFUSION THERAPY | Facility: CLINIC | Age: 69
End: 2024-05-14
Attending: INTERNAL MEDICINE
Payer: COMMERCIAL

## 2024-05-14 DIAGNOSIS — T45.1X5A CHEMOTHERAPY-INDUCED NEUTROPENIA (H): ICD-10-CM

## 2024-05-14 DIAGNOSIS — C18.9 MALIGNANT NEOPLASM OF COLON, UNSPECIFIED PART OF COLON (H): Primary | ICD-10-CM

## 2024-05-14 DIAGNOSIS — D70.1 CHEMOTHERAPY-INDUCED NEUTROPENIA (H): ICD-10-CM

## 2024-05-14 PROCEDURE — 96523 IRRIG DRUG DELIVERY DEVICE: CPT

## 2024-05-14 PROCEDURE — 250N000011 HC RX IP 250 OP 636: Performed by: INTERNAL MEDICINE

## 2024-05-14 RX ORDER — HEPARIN SODIUM,PORCINE 10 UNIT/ML
5 VIAL (ML) INTRAVENOUS
Status: CANCELLED | OUTPATIENT
Start: 2024-05-14

## 2024-05-14 RX ORDER — ALBUTEROL SULFATE 0.83 MG/ML
2.5 SOLUTION RESPIRATORY (INHALATION)
Status: CANCELLED | OUTPATIENT
Start: 2024-05-14

## 2024-05-14 RX ORDER — METHYLPREDNISOLONE SODIUM SUCCINATE 125 MG/2ML
125 INJECTION, POWDER, LYOPHILIZED, FOR SOLUTION INTRAMUSCULAR; INTRAVENOUS
Status: CANCELLED
Start: 2024-05-14

## 2024-05-14 RX ORDER — HEPARIN SODIUM (PORCINE) LOCK FLUSH IV SOLN 100 UNIT/ML 100 UNIT/ML
5 SOLUTION INTRAVENOUS
Status: CANCELLED | OUTPATIENT
Start: 2024-05-14

## 2024-05-14 RX ORDER — EPINEPHRINE 1 MG/ML
0.3 INJECTION, SOLUTION, CONCENTRATE INTRAVENOUS EVERY 5 MIN PRN
Status: CANCELLED | OUTPATIENT
Start: 2024-05-14

## 2024-05-14 RX ORDER — MEPERIDINE HYDROCHLORIDE 25 MG/ML
25 INJECTION INTRAMUSCULAR; INTRAVENOUS; SUBCUTANEOUS EVERY 30 MIN PRN
Status: CANCELLED | OUTPATIENT
Start: 2024-05-14

## 2024-05-14 RX ORDER — HEPARIN SODIUM (PORCINE) LOCK FLUSH IV SOLN 100 UNIT/ML 100 UNIT/ML
5 SOLUTION INTRAVENOUS
Status: DISCONTINUED | OUTPATIENT
Start: 2024-05-14 | End: 2024-05-14 | Stop reason: HOSPADM

## 2024-05-14 RX ORDER — DIPHENHYDRAMINE HYDROCHLORIDE 50 MG/ML
50 INJECTION INTRAMUSCULAR; INTRAVENOUS
Status: CANCELLED
Start: 2024-05-14

## 2024-05-14 RX ORDER — ALBUTEROL SULFATE 90 UG/1
1-2 AEROSOL, METERED RESPIRATORY (INHALATION)
Status: CANCELLED
Start: 2024-05-14

## 2024-05-14 RX ADMIN — HEPARIN 5 ML: 100 SYRINGE at 13:06

## 2024-05-22 ENCOUNTER — TELEPHONE (OUTPATIENT)
Dept: ONCOLOGY | Facility: CLINIC | Age: 69
End: 2024-05-22
Payer: COMMERCIAL

## 2024-05-22 NOTE — TELEPHONE ENCOUNTER
Oncology Nurse Triage    Situation:   Nadia reporting the following symptoms:increased neuropathy to feet    Background:   Treating Provider:   Dr. Bateman    Date of last office visit: 4/2/24    Recent Treatments: IVF on 4/2/24 d/t elevated creatinine. Active surveillance.    Assessment:     Onset: Reports she has a hx of neuropathy to hands and feet for which she takes Gabapentin 900 mg daily. Usually she finds good relief with this however in the last few days, sx have increased to both feet. She is feeling increased pain. Right foot more than the left. Pain is rated at 6-7/10. Pain is better with rest and worse when applying pressure or walking. She has mild swelling to the top of her right foot. Area is not hot to touch.No redness or any other skin changes. Denies CP or SOB. No fever or chills. No other sx noted.     She is looking for recommendations from the care team to help with sx.      Recommendations:     Discussed monitoring skin and feet for any new or worsening sx. She should wear socks and shoes to protect feet. Keep skin moisturized. She will call us if she develops any new or worsening sx.     Routing to provider for further review and recommendations.     Azul Kumari, RN, BSN, PHN, OCN  [facility]

## 2024-05-28 DIAGNOSIS — I48.0 PAROXYSMAL ATRIAL FIBRILLATION WITH RVR (H): ICD-10-CM

## 2024-05-28 DIAGNOSIS — K52.9 CHRONIC DIARRHEA: ICD-10-CM

## 2024-05-28 DIAGNOSIS — E83.42 HYPOMAGNESEMIA: ICD-10-CM

## 2024-05-28 RX ORDER — LANOLIN ALCOHOL/MO/W.PET/CERES
400 CREAM (GRAM) TOPICAL 2 TIMES DAILY
Qty: 60 TABLET | Refills: 0 | Status: SHIPPED | OUTPATIENT
Start: 2024-05-28 | End: 2024-06-24

## 2024-05-28 NOTE — TELEPHONE ENCOUNTER
Writer called patient, left a message to call back.    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

## 2024-05-29 NOTE — TELEPHONE ENCOUNTER
Called patient and discussed message below, understanding verbalized. States sx have improved. She feels she may have had an episode of gout. She will monitor and follow up as directed.     Azul Kumari RN, BSN, PHN, OCN  [facility]

## 2024-06-03 ENCOUNTER — OFFICE VISIT (OUTPATIENT)
Dept: NEPHROLOGY | Facility: CLINIC | Age: 69
End: 2024-06-03
Attending: INTERNAL MEDICINE
Payer: COMMERCIAL

## 2024-06-03 ENCOUNTER — LAB (OUTPATIENT)
Dept: LAB | Facility: CLINIC | Age: 69
End: 2024-06-03
Attending: INTERNAL MEDICINE
Payer: COMMERCIAL

## 2024-06-03 VITALS
WEIGHT: 206.1 LBS | HEART RATE: 69 BPM | DIASTOLIC BLOOD PRESSURE: 85 MMHG | SYSTOLIC BLOOD PRESSURE: 144 MMHG | BODY MASS INDEX: 38.94 KG/M2 | OXYGEN SATURATION: 96 %

## 2024-06-03 DIAGNOSIS — E55.9 VITAMIN D DEFICIENCY: ICD-10-CM

## 2024-06-03 DIAGNOSIS — E87.20 METABOLIC ACIDOSIS, NORMAL ANION GAP (NAG): ICD-10-CM

## 2024-06-03 DIAGNOSIS — N18.32 STAGE 3B CHRONIC KIDNEY DISEASE (H): ICD-10-CM

## 2024-06-03 DIAGNOSIS — C18.9 MALIGNANT NEOPLASM OF COLON, UNSPECIFIED PART OF COLON (H): ICD-10-CM

## 2024-06-03 DIAGNOSIS — N18.32 STAGE 3B CHRONIC KIDNEY DISEASE (H): Primary | ICD-10-CM

## 2024-06-03 DIAGNOSIS — N25.81 SECONDARY HYPERPARATHYROIDISM (H): ICD-10-CM

## 2024-06-03 LAB
ALBUMIN MFR UR ELPH: <6 MG/DL
ALBUMIN SERPL BCG-MCNC: 4.1 G/DL (ref 3.5–5.2)
ALBUMIN UR-MCNC: NEGATIVE MG/DL
ANION GAP SERPL CALCULATED.3IONS-SCNC: 11 MMOL/L (ref 7–15)
APPEARANCE UR: CLEAR
BASOPHILS # BLD AUTO: 0 10E3/UL (ref 0–0.2)
BASOPHILS NFR BLD AUTO: 1 %
BILIRUB UR QL STRIP: NEGATIVE
BUN SERPL-MCNC: 32 MG/DL (ref 8–23)
CALCIUM SERPL-MCNC: 9.7 MG/DL (ref 8.8–10.2)
CHLORIDE SERPL-SCNC: 104 MMOL/L (ref 98–107)
COLOR UR AUTO: ABNORMAL
CREAT SERPL-MCNC: 1.86 MG/DL (ref 0.51–0.95)
CREAT UR-MCNC: 64.3 MG/DL
DEPRECATED HCO3 PLAS-SCNC: 26 MMOL/L (ref 22–29)
EGFRCR SERPLBLD CKD-EPI 2021: 29 ML/MIN/1.73M2
EOSINOPHIL # BLD AUTO: 0 10E3/UL (ref 0–0.7)
EOSINOPHIL NFR BLD AUTO: 0 %
ERYTHROCYTE [DISTWIDTH] IN BLOOD BY AUTOMATED COUNT: 12.8 % (ref 10–15)
GLUCOSE SERPL-MCNC: 97 MG/DL (ref 70–99)
GLUCOSE UR STRIP-MCNC: NEGATIVE MG/DL
HCT VFR BLD AUTO: 40.2 % (ref 35–47)
HGB BLD-MCNC: 13.1 G/DL (ref 11.7–15.7)
HGB UR QL STRIP: NEGATIVE
IMM GRANULOCYTES # BLD: 0.1 10E3/UL
IMM GRANULOCYTES NFR BLD: 1 %
KETONES UR STRIP-MCNC: NEGATIVE MG/DL
LEUKOCYTE ESTERASE UR QL STRIP: ABNORMAL
LYMPHOCYTES # BLD AUTO: 0.7 10E3/UL (ref 0.8–5.3)
LYMPHOCYTES NFR BLD AUTO: 12 %
MCH RBC QN AUTO: 29.5 PG (ref 26.5–33)
MCHC RBC AUTO-ENTMCNC: 32.6 G/DL (ref 31.5–36.5)
MCV RBC AUTO: 91 FL (ref 78–100)
MONOCYTES # BLD AUTO: 0.5 10E3/UL (ref 0–1.3)
MONOCYTES NFR BLD AUTO: 9 %
MUCOUS THREADS #/AREA URNS LPF: PRESENT /LPF
NEUTROPHILS # BLD AUTO: 4.5 10E3/UL (ref 1.6–8.3)
NEUTROPHILS NFR BLD AUTO: 77 %
NITRATE UR QL: NEGATIVE
NRBC # BLD AUTO: 0 10E3/UL
NRBC BLD AUTO-RTO: 0 /100
PH UR STRIP: 5.5 [PH] (ref 5–7)
PHOSPHATE SERPL-MCNC: 3.5 MG/DL (ref 2.5–4.5)
PLATELET # BLD AUTO: 161 10E3/UL (ref 150–450)
POTASSIUM SERPL-SCNC: 4.5 MMOL/L (ref 3.4–5.3)
PROT/CREAT 24H UR: NORMAL MG/G{CREAT}
PTH-INTACT SERPL-MCNC: 26 PG/ML (ref 15–65)
RBC # BLD AUTO: 4.44 10E6/UL (ref 3.8–5.2)
RBC URINE: 2 /HPF
SODIUM SERPL-SCNC: 141 MMOL/L (ref 135–145)
SP GR UR STRIP: 1.01 (ref 1–1.03)
SQUAMOUS EPITHELIAL: 2 /HPF
TRANSITIONAL EPI: <1 /HPF
UROBILINOGEN UR STRIP-MCNC: NORMAL MG/DL
VIT D+METAB SERPL-MCNC: 32 NG/ML (ref 20–50)
WBC # BLD AUTO: 5.9 10E3/UL (ref 4–11)
WBC URINE: 6 /HPF

## 2024-06-03 PROCEDURE — 36591 DRAW BLOOD OFF VENOUS DEVICE: CPT

## 2024-06-03 PROCEDURE — G0463 HOSPITAL OUTPT CLINIC VISIT: HCPCS | Performed by: INTERNAL MEDICINE

## 2024-06-03 PROCEDURE — 83970 ASSAY OF PARATHORMONE: CPT

## 2024-06-03 PROCEDURE — 250N000011 HC RX IP 250 OP 636: Performed by: INTERNAL MEDICINE

## 2024-06-03 PROCEDURE — 85048 AUTOMATED LEUKOCYTE COUNT: CPT

## 2024-06-03 PROCEDURE — 82043 UR ALBUMIN QUANTITATIVE: CPT

## 2024-06-03 PROCEDURE — 84156 ASSAY OF PROTEIN URINE: CPT

## 2024-06-03 PROCEDURE — 81001 URINALYSIS AUTO W/SCOPE: CPT

## 2024-06-03 PROCEDURE — 99215 OFFICE O/P EST HI 40 MIN: CPT | Performed by: INTERNAL MEDICINE

## 2024-06-03 PROCEDURE — 82306 VITAMIN D 25 HYDROXY: CPT

## 2024-06-03 PROCEDURE — G2211 COMPLEX E/M VISIT ADD ON: HCPCS | Performed by: INTERNAL MEDICINE

## 2024-06-03 PROCEDURE — 80069 RENAL FUNCTION PANEL: CPT

## 2024-06-03 RX ORDER — HEPARIN SODIUM (PORCINE) LOCK FLUSH IV SOLN 100 UNIT/ML 100 UNIT/ML
5 SOLUTION INTRAVENOUS ONCE
Status: COMPLETED | OUTPATIENT
Start: 2024-06-03 | End: 2024-06-03

## 2024-06-03 RX ADMIN — Medication 5 ML: at 12:59

## 2024-06-03 ASSESSMENT — PAIN SCALES - GENERAL: PAINLEVEL: NO PAIN (0)

## 2024-06-03 NOTE — LETTER
"6/3/2024       RE: Nadia Ladd  [address]  [facility] [address]     Dear Colleague,    Thank you for referring your patient, Nadia Ladd, to the [facility] [place] at [facility]. Please see a copy of my visit note below.      Nephrology Progress Note  06/03/2024   Chief complaint: Follow-up CKD stage 3 in colon cancer patient  History of Present Illness:    Nadia Ladd is a 67 year old F with Hx of poorly differentiated colon cancer s/p total colectomy and ALEXANDER BSO in 4/23, crohn;s disease, CKD stage 3, HTN  who is here for post-hospitalization for MARYA and hyperkalemia.     The patient has history of Crohn's disease for over 20 years and has been taking sulfasalazine.  Initially, the symptom has been controlled but 3 to 4 months ago the patient started to have increasing diarrhea abdominal pain and lightheadedness.  She was found to have iron deficiency anemia and she underwent colonoscopy and was found to have colonic mass concerning for cancer in Feb 2023.  She then underwent laparoscopic converted to open total abdominal colectomy with ileorectal anastomosis, partial omentectomy, flexible sigmoidoscopy and ALEXANDER BSO in April 2023.  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  Shortly after surgery the patient did not have good appetite and noted more loose stool.  She was dismissed with her blood pressure medication including hydrochlorothiazide/Triamterene (for which she has been taking it for many many years).   On 5/9/2023, the patient noticed that she has some sinus problem as well as feeling \"dizziness\" 1 day prior to present to the oncology clinic.  Otherwise, she denies any symptoms of nausea vomiting, diarrhea, orthostatic symptoms, lower extremity edema or change in urination.  She does not take any NSAIDs.  Her appetite has been gradually " improving. She was then seen by Oncology clinic with a plan to discuss Chemotherapy but was noted to have hyperkalemia at 8.0.  The specimen was hemolyzed but upon repeat it was 7.6. She was sent to ED at [facility] and transferred to [facility] on 5/9/23.  Upon arriving at the [facility], repeat potassium improved to 5.5 and 5.3 (after shifting).  The patient has baseline creatinine of 1.3-1.6 but she is not aware that she has chronic kidney disease and she never been seen by kidney doctor.  During surgery her creatinine has been stable and on dismissal on 4/11/23, her creatinine was 1.34.  However her creatinine upon presentation on 5/9/23 was 3.82. UA on 5/9/23 showed WBC 22 but no blood. Neg protein.   Been treated conservatively with IV fluid, Lokelma with improvement in potassium level and creatinine function and she was dismissed.      She was started on FOLFOX C1D1 on 5/31/23.  However, shortly after that, she felt dizzy and sent to ED. to have A-fib with RVR and was was admited between 6/3-6/5/23.  She received diltiazem and spontaneously converted in the ED oncology is concerned it might be related to 5-FU and she has been referred to cardiology.      6/13/23: She is here for posthospitalization follow-up.  She reports bit of leg swelling. Her diarrhea has improved and she would consider that this is normal for her. Her appetite is good. No problem with urination. She still has frequent BMs. She just bought BP machine at home and they were 130/70s.     Labs 6/8/23: Creatinine 1.18, EGFR 51, BUN 11, 3.6, sodium 145, calcium 8.7, albumin 4.1. Hemoglobin 10.1, blood cell 3.0, platelet 164.  UA on 6/30/2020 showed white blood cell 7 cells no RBC. UPCR 0.06 g/g.   12/4/23: Today, she is doing good. She tolerated chemotherapy ok. She will get the last dose this Wednesday. She has intermittent diarrhea. She has a bit of swelling after chemo and goes away after a couple of days. No problem with urination. Her  BP a bit high when she goes for chemotherapy. She takes diltiazem 30 mg as needed and when she has chemo/Afib.   Labs today show creatinine 1.36, BUN 14.6, GFR 42, potassium 4.5,, dioxide 24, calcium 9.3, phosphorus 2.7, albumin 3.7.  Vitamin D 16, PTH 79. UA showed no blood and UPCR 0.13 g/g.  Positive urine glucose.  6/3/24:  In the interim, she has MARYA with Cr increased to 2.14 on 4/21/24. She received IVF and Cr improved to 1.80 but never back to her previous baseline ~1.3.  Further inquiry showed that she has no history of illnesses, volume loss or newly started medication during that time. She does take tylenol but no NSAIDs. No new medications. She infrequently has soft stool. BP varies  120-140/60-80 mmHg. She does not feel ligthheadedness or dizziness. She has no problem when urinates. Of note, she has gained ~ 26 Lbs since completed chemotherapy. Labs today show creatinine 1.86, GFR 29, BUN 32, calcium 9.7, phosphorus 3.5, albumin 4.1.  Hemoglobin 13.1, white blood cell 5.9, hematocrit 40.2, platelet 161.  UA showed small leukocyte esterase, 6 WBC and 2 RBC. UPCR undetectable.     Past medical history  Past Medical History:   Diagnosis Date     Arthropathia 08/05/2010     B12 deficiency anemia 10/21/2010     Benign essential hypertension with target blood pressure below 140/90 10/10/2016     CARDIOVASCULAR SCREENING; LDL GOAL LESS THAN 160 10/31/2010     Crohn's colitis (H) 02/15/2011     Dermatitis-dishydrotic eczema-severe 08/05/2010     Hiatal hernia      HTN (hypertension) 07/21/2011     Iron deficiency anemia 10/21/2010     Malignant neoplasm of sigmoid colon (H)      Obesity      Primary pulmonary hypertension (H) 04/06/2010       Past surgical history  Past Surgical History:   Procedure Laterality Date     COLECTOMY WITHOUT COLOSTOMY N/A 4/6/2023    Procedure: Laparoscopic Converted to Open Total abdominal colectomy;  Surgeon: Jerome Ashley MD;  Location: [facility] OR     COLONOSCOPY  10/11/10      COLONOSCOPY N/A 2/27/2023    Procedure: ATTEMPTED COLONOSCOPY WITH SIGMOID STRICTURE BIOPSY;  Surgeon: Jonathan Alcocer MD;  Location: [facility]     ESOPHAGOSCOPY, GASTROSCOPY, DUODENOSCOPY (EGD), COMBINED N/A 2/27/2023    Procedure: ESOPHAGOGASTRODUODENOSCOPY, WITH BIOPSY;  Surgeon: Jonathan Alcocer MD;  Location: [facility]     HYSTERECTOMY TOTAL ABDOMINAL, BILATERAL SALPINGO-OOPHORECTOMY, COMBINED N/A 4/6/2023    Procedure: Hysterectomy total abdominal, bilateral salpingo-oophorectomy;  Surgeon: Sunitha Olea MD;  Location: [facility] OR     INSERT PORT VASCULAR ACCESS Right 5/25/2023    Procedure: Ultrasound-guided right internal jugular venous access port placement with fluoroscopy;  Surgeon: Martin Thomas DO;  Location: [facility]     IR CHEST PORT PLACEMENT > 5 YRS OF AGE  8/21/2023     IR PORT CHECK RIGHT  7/18/2023     IR PORT REMOVAL RIGHT  8/21/2023     SIGMOIDOSCOPY FLEXIBLE N/A 4/6/2023    Procedure: Sigmoidoscopy flexible;  Surgeon: Jerome Ashley MD;  Location: [facility] OR     SIGMOIDOSCOPY FLEXIBLE N/A 5/1/2024    Procedure: Sigmoidoscopy flexible;  Surgeon: Jerome Ashley MD;  Location: [facility] OR     SURGICAL HISTORY OF -   06/14/76    Perineorrhaphy, for widening vaginal orifice     Z EXPLORATORY OF ABDOMEN  1989    laparoscopy         Review of Systems:   14 systems were reviewed and all negative except as mentioned above.   Current Medications:  Current Outpatient Medications   Medication Sig Dispense Refill     cyanocobalamin 1000 MCG TBCR Take 1,000 mcg by mouth daily 100 tablet 1     diltiazem (CARDIZEM) 30 MG tablet Take 1 tablet (30 mg) by mouth 3 times daily 90 tablet 0     diltiazem ER COATED BEADS (CARDIZEM CD/CARTIA XT) 120 MG 24 hr capsule Take 1 capsule (120 mg) by mouth daily On chemo days and 2 days post chemo infusion. 60 capsule 3     Ferrous Sulfate (IRON) 325 (65 Fe) MG tablet Take 1 tablet by mouth daily       fluocinonide (LIDEX) 0.05 % external cream APPLY A  SMALL AMOUNT TO AFFECTED AREA(S) TWICE DAILY AS NEEDED 60 g 1     gabapentin (NEURONTIN) 300 MG capsule Take 1 capsule (300 mg) by mouth 3 times daily 90 capsule 3     lidocaine-prilocaine (EMLA) 2.5-2.5 % external cream Apply topically as needed for moderate pain Apply 15-20 minutes prior to port access 1 g 4     MAGNESIUM OXIDE 400 (240 Mg) MG tablet TAKE 1 TABLET (400 MG) BY MOUTH 2 TIMES DAILY 60 tablet 0     metoprolol succinate ER (TOPROL XL) 50 MG 24 hr tablet Take 1 tablet (50 mg) by mouth daily 90 tablet 3     Multiple Vitamins-Iron (MULTI-DAY PLUS IRON PO) Take 1 tablet by mouth daily       rivaroxaban ANTICOAGULANT (XARELTO) 15 MG TABS tablet Take 1 tablet (15 mg) by mouth daily (with dinner) 90 tablet 2     sodium bicarbonate 650 MG tablet Take 1 tablet (650 mg) by mouth 2 times daily 180 tablet 3     vitamin D3 (CHOLECALCIFEROL) 50 mcg (2000 units) tablet Take 1 tablet (50 mcg) by mouth daily 30 tablet 6     loperamide (IMODIUM) 2 MG capsule Take 2 mg by mouth daily as needed for diarrhea (Patient not taking: Reported on 6/3/2024)       prochlorperazine (COMPAZINE) 10 MG tablet Take 10 mg by mouth every 6 hours as needed for nausea or vomiting (Patient not taking: Reported on 6/3/2024)       No current facility-administered medications for this visit.       Physical Exam:   BP (!) 144/85 (BP Location: Left arm, Patient Position: Sitting, Cuff Size: Adult Large)   Pulse 69   Wt 93.5 kg (206 lb 1.6 oz)   LMP  (LMP Unknown)   SpO2 96%   BMI 38.94 kg/m     Body mass index is 38.94 kg/m .    GENERAL APPEARANCE: Alert, not in acute distress  EYES:  Not pale conjunctiva, pupils equal  HENT: Mouth without ulcers or lesions  PULM: lungs clear to auscultation bilaterally, equal air movement, no clubbing  CV: regular rhythm, normal rate, no rub     -JVD no distended.      -edema: Rt leg: trace edema and left leg non swelling.   GI: soft,  - tender, no distended, bowel sounds are present  INTEGUMENT: No  rash  NEURO:  Non focal. No asterixis.     Labs:   All labs reviewed by me  Last Renal Panel:  Sodium   Date Value Ref Range Status   06/03/2024 141 135 - 145 mmol/L Final     Comment:     Reference intervals for this test were updated on 09/26/2023 to more accurately reflect our healthy population. There may be differences in the flagging of prior results with similar values performed with this method. Interpretation of those prior results can be made in the context of the updated reference intervals.    01/26/2021 140 133 - 144 mmol/L Final     Potassium   Date Value Ref Range Status   06/03/2024 4.5 3.4 - 5.3 mmol/L Final   10/09/2021 4.3 3.4 - 5.3 mmol/L Final   01/26/2021 4.1 3.4 - 5.3 mmol/L Final     Potassium POCT   Date Value Ref Range Status   04/06/2023 4.7 3.5 - 5.0 mmol/L Final     Chloride   Date Value Ref Range Status   06/03/2024 104 98 - 107 mmol/L Final   10/09/2021 118 (H) 94 - 109 mmol/L Final   01/26/2021 111 (H) 94 - 109 mmol/L Final     Carbon Dioxide   Date Value Ref Range Status   01/26/2021 22 20 - 32 mmol/L Final     Carbon Dioxide (CO2)   Date Value Ref Range Status   06/03/2024 26 22 - 29 mmol/L Final   10/09/2021 20 20 - 32 mmol/L Final     Anion Gap   Date Value Ref Range Status   06/03/2024 11 7 - 15 mmol/L Final   10/09/2021 4 3 - 14 mmol/L Final   01/26/2021 7 3 - 14 mmol/L Final     Glucose   Date Value Ref Range Status   06/03/2024 97 70 - 99 mg/dL Final   10/09/2021 159 (H) 70 - 99 mg/dL Final   01/26/2021 107 (H) 70 - 99 mg/dL Final     GLUCOSE BY METER POCT   Date Value Ref Range Status   05/10/2023 116 (H) 70 - 99 mg/dL Final     Comment:     /RN Notified     Urea Nitrogen   Date Value Ref Range Status   06/03/2024 32.0 (H) 8.0 - 23.0 mg/dL Final   10/09/2021 25 7 - 30 mg/dL Final   01/26/2021 29 7 - 30 mg/dL Final     Creatinine   Date Value Ref Range Status   06/03/2024 1.86 (H) 0.51 - 0.95 mg/dL Final   01/26/2021 1.45 (H) 0.52 - 1.04 mg/dL Final     GFR Estimate   Date  Value Ref Range Status   06/03/2024 29 (L) >60 mL/min/1.73m2 Final   01/26/2021 38 (L) >60 mL/min/[1.73_m2] Final     Comment:     Non  GFR Calc  Starting 12/18/2018, serum creatinine based estimated GFR (eGFR) will be   calculated using the Chronic Kidney Disease Epidemiology Collaboration   (CKD-EPI) equation.       GFR, ESTIMATED POCT   Date Value Ref Range Status   01/02/2024 38 (L) >60 mL/min/1.73m2 Final     Calcium   Date Value Ref Range Status   06/03/2024 9.7 8.8 - 10.2 mg/dL Final   01/26/2021 9.3 8.5 - 10.1 mg/dL Final     Phosphorus   Date Value Ref Range Status   06/03/2024 3.5 2.5 - 4.5 mg/dL Final   09/14/2010 3.7 2.5 - 4.5 mg/dL Final     Albumin   Date Value Ref Range Status   06/03/2024 4.1 3.5 - 5.2 g/dL Final   01/26/2021 4.1 3.4 - 5.0 g/dL Final       Imaging:  I reviewed imaging studies.     Assessment & Recommendations:   Problem list  # Hx MARYA stage 2 likely secondary to ATN from hypotension (diuretic use and increase bowel movement)  # CKD stage 3B- progressed to stage 4; DDx chronic dehydration/recurrent MARYA from Crohn's disease, Hypertension or Sulfasalazine (stopped in 4/23); BL Cr 1.3-1.6  # GLycosuria  Etiology of acute kidney injury that occurred in May 23 was likely from ATN in the setting of unaware hypotension/dehydration from ongoing diuretic use and increase BM after surgery. UA was bland.UPCR was negative. Kidney ultrasound showed no evidence of hydronephrosis or masses but that showed that she had some mild atrophic changes consistent with chronic kidney disease.      In regard to CKD, she has baseline creatinine of 1.3-1.6 prior to MARYA in May 2023.  Again, etiology likely multifactorial in the setting of chronic dehydration/recurrent MARYA from Crohn's, hypertension or sulfasalazine use which can be associated with AIN.  Sulfasalazine was discontinued since April 2023 so should no longer contributed.  UA appears to be clean and no protein so less likely GN. IgAN  is associated with Crohn's but UA is not suggestive of that.      She has been on FOLFOX6, oxaliplatin may cause MARYA in the form of ATN but rare. Her Cr in December was 1.3-1.5.  Electrolyte has been stable without hypokalemia or metabolic acidosis.  She was having glycosuria without diabetes. These may raise the possibility of incomplete Fanconi syndrome.  Which could be happened in the setting of oxaliplatin. Glycosuria has resolved since.      Interestingly, her creatinine increased to 1.74 on 2/27/2024 and 2.1 4/1/2024.  She received IV fluid bolus and creatinine improved to 1.8 and still remains stable at 1.86 mg/dL as of today.  Her UA today remain bland except white blood cell 6 cell per high-power field and squamous Patillo cell 2 cells.  No blood and undetectable proteinuria.  It is perplexing to me why her creatinine increased after completion of chemotherapy (last dose of oxaliplatin 12/13/23).  Ddx included 1. Late effect of oxaliplatin 2. Other casues like GI loss and etc (due to Hx of Crohn's) and 3. Gaining muscle which raises Cr without affecting GFR (she has gained back 26 Lbs).  At this time I do not think we need to do a kidney biopsy since her creatinine seems to be stable at 1.8.  Will check BMP in 1 month to assess the stability.  In the meantime I asked her to drink more water at least 70 ounces per day and avoid nephrotoxic agents.    - Stay well hydrated, dehydration is one of the major risk factor for MARYA in platinum-based regimen   - Continue to observe blood pressure while holding blood pressure medication  # Metabolic acidosis  Bicarb is 26.  She is on sodium bicarb 650 mg twice daily.  Will continue that for now  # Hx of Hyperkalemia; resolved  Likely in the setting of triamterene use, high K diet and MARYA. Now resolved. Off Triamterene.  # Colon cancer s/p total colectomy and ALEXANDER BSO in 4/23  # Neuroendocrine carcinoma component (70%) and moderately-differentiated adenocarcinoma  component (30%).   FOLFOX6 C1D1 on 5/31/23 but has Afib shortly after. S/p mFOLFOX6 x 12 cycles (last dose of oxaliplatin on 12/13/2023)   # Anemia in CKD and cancer   # Leukopenia and thrombocytopenia  Hb 13.1. Likely multifactorial but most ly form chemo.  Not indicated for ESAs, given recent cancer.  # Hypertension; monitor blood pressure medication  # New onset Afib  Previously on triamterene/hydrochlorothiazide but being held May 2023.  Currently she is on metoprolol ER 50 mg daily (Was on diltiazem 120 mg as needed before chemo)  # BMD  # Vit D deficiency  # Secondary hyperparathyroidism  12/4/23: Vit D is 16 and PTH is 79. 6/3/24, PTH improved to 26.  Ca and phosphorus are normal. Currently on vitamin D3 2000 units daily.  # Crohn's disease  She was off Sulfazalazine since April 23. Diarrhea is stable.      Follow-up in 3 months with labs    The longitudinal plan of care for CKD 3/4, HTN, vit D def, Colon CA was addressed during this visit. Due to the added complexity in care, I will continue to support Nadia Ladd in the subsequent management of this condition(s) and with the ongoing continuity of care of this condition(s).       I spent  40 minutes on the date of the encounter doing chart review, history and exam, documentation and further activities as noted above. 20 minutes of this visit is dedicated to direct patient interaction via face-to-face.    Santana Brenner MD on 06/03/2024            Again, thank you for allowing me to participate in the care of your patient.      Sincerely,    Santana Brenner MD

## 2024-06-03 NOTE — NURSING NOTE
Chief Complaint   Patient presents with    Port Draw     Labs drawn via port access by lab RN       Port blood draw with heparin flush by lab RN.     Anayeli Mesa RN

## 2024-06-03 NOTE — PROGRESS NOTES
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     The patient has history of Crohn's disease for over 20 years and has been taking sulfasalazine.  Initially, the symptom has been controlled but 3 to 4 months ago the patient started to have increasing diarrhea abdominal pain and lightheadedness.  She was found to have iron deficiency anemia and she underwent colonoscopy and was found to have colonic mass concerning for cancer in Feb 2023.  She then underwent laparoscopic converted to open total abdominal colectomy with ileorectal anastomosis, partial omentectomy, flexible sigmoidoscopy and ALEXANDER BSO in April 2023.  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  Shortly after surgery the patient did not have good appetite and noted more loose stool.  She was dismissed with her blood pressure medication including hydrochlorothiazide/Triamterene (for which she has been taking it for many many years).   On 5/9/2023, the patient noticed that she has some sinus problem as well as feeling \"dizziness\" 1 day prior to present to the oncology clinic.  Otherwise, she denies any symptoms of nausea vomiting, diarrhea, orthostatic symptoms, lower extremity edema or change in urination.  She does not take any NSAIDs.  Her appetite has been gradually improving. She was then seen by Oncology clinic with a plan to discuss Chemotherapy but was noted to have hyperkalemia at 8.0.  The specimen was hemolyzed but upon repeat it was 7.6. She was sent to ED at [facility] and transferred to [facility] on 5/9/23.  Upon arriving at the [facility], repeat potassium " improved to 5.5 and 5.3 (after shifting).  The patient has baseline creatinine of 1.3-1.6 but she is not aware that she has chronic kidney disease and she never been seen by kidney doctor.  During surgery her creatinine has been stable and on dismissal on 4/11/23, her creatinine was 1.34.  However her creatinine upon presentation on 5/9/23 was 3.82. UA on 5/9/23 showed WBC 22 but no blood. Neg protein.   Been treated conservatively with IV fluid, Lokelma with improvement in potassium level and creatinine function and she was dismissed.      She was started on FOLFOX C1D1 on 5/31/23.  However, shortly after that, she felt dizzy and sent to ED. to have A-fib with RVR and was was admited between 6/3-6/5/23.  She received diltiazem and spontaneously converted in the ED oncology is concerned it might be related to 5-FU and she has been referred to cardiology.      6/13/23: She is here for posthospitalization follow-up.  She reports bit of leg swelling. Her diarrhea has improved and she would consider that this is normal for her. Her appetite is good. No problem with urination. She still has frequent BMs. She just bought BP machine at home and they were 130/70s.     Labs 6/8/23: Creatinine 1.18, EGFR 51, BUN 11, 3.6, sodium 145, calcium 8.7, albumin 4.1. Hemoglobin 10.1, blood cell 3.0, platelet 164.  UA on 6/30/2020 showed white blood cell 7 cells no RBC. UPCR 0.06 g/g.   12/4/23: Today, she is doing good. She tolerated chemotherapy ok. She will get the last dose this Wednesday. She has intermittent diarrhea. She has a bit of swelling after chemo and goes away after a couple of days. No problem with urination. Her BP a bit high when she goes for chemotherapy. She takes diltiazem 30 mg as needed and when she has chemo/Afib.   Labs today show creatinine 1.36, BUN 14.6, GFR 42, potassium 4.5,, dioxide 24, calcium 9.3, phosphorus 2.7, albumin 3.7.  Vitamin D 16, PTH 79. UA showed no blood and UPCR 0.13 g/g.  Positive urine  glucose.  6/3/24:  In the interim, she has MARYA with Cr increased to 2.14 on 4/21/24. She received IVF and Cr improved to 1.80 but never back to her previous baseline ~1.3.  Further inquiry showed that she has no history of illnesses, volume loss or newly started medication during that time. She does take tylenol but no NSAIDs. No new medications. She infrequently has soft stool. BP varies  120-140/60-80 mmHg. She does not feel ligthheadedness or dizziness. She has no problem when urinates. Of note, she has gained ~ 26 Lbs since completed chemotherapy. Labs today show creatinine 1.86, GFR 29, BUN 32, calcium 9.7, phosphorus 3.5, albumin 4.1.  Hemoglobin 13.1, white blood cell 5.9, hematocrit 40.2, platelet 161.  UA showed small leukocyte esterase, 6 WBC and 2 RBC. UPCR undetectable.     Past medical history  Past Medical History:   Diagnosis Date    Arthropathia 08/05/2010    B12 deficiency anemia 10/21/2010    Benign essential hypertension with target blood pressure below 140/90 10/10/2016    CARDIOVASCULAR SCREENING; LDL GOAL LESS THAN 160 10/31/2010    Crohn's colitis (H) 02/15/2011    Dermatitis-dishydrotic eczema-severe 08/05/2010    Hiatal hernia     HTN (hypertension) 07/21/2011    Iron deficiency anemia 10/21/2010    Malignant neoplasm of sigmoid colon (H)     Obesity     Primary pulmonary hypertension (H) 04/06/2010       Past surgical history  Past Surgical History:   Procedure Laterality Date    COLECTOMY WITHOUT COLOSTOMY N/A 4/6/2023    Procedure: Laparoscopic Converted to Open Total abdominal colectomy;  Surgeon: Jerome Ashley MD;  Location: [facility] OR    COLONOSCOPY  10/11/10    COLONOSCOPY N/A 2/27/2023    Procedure: ATTEMPTED COLONOSCOPY WITH SIGMOID STRICTURE BIOPSY;  Surgeon: Jonathan Alcocer MD;  Location:  GI    ESOPHAGOSCOPY, GASTROSCOPY, DUODENOSCOPY (EGD), COMBINED N/A 2/27/2023    Procedure: ESOPHAGOGASTRODUODENOSCOPY, WITH BIOPSY;  Surgeon: Jonathan Alcocer MD;  Location:  GI     HYSTERECTOMY TOTAL ABDOMINAL, BILATERAL SALPINGO-OOPHORECTOMY, COMBINED N/A 4/6/2023    Procedure: Hysterectomy total abdominal, bilateral salpingo-oophorectomy;  Surgeon: Sunitha Olea MD;  Location: [facility] OR    INSERT PORT VASCULAR ACCESS Right 5/25/2023    Procedure: Ultrasound-guided right internal jugular venous access port placement with fluoroscopy;  Surgeon: Martin Thomas DO;  Location: [facility] OR    IR CHEST PORT PLACEMENT > 5 YRS OF AGE  8/21/2023    IR PORT CHECK RIGHT  7/18/2023    IR PORT REMOVAL RIGHT  8/21/2023    SIGMOIDOSCOPY FLEXIBLE N/A 4/6/2023    Procedure: Sigmoidoscopy flexible;  Surgeon: Jerome Ashley MD;  Location: [facility] OR    SIGMOIDOSCOPY FLEXIBLE N/A 5/1/2024    Procedure: Sigmoidoscopy flexible;  Surgeon: Jerome Ashley MD;  Location: [facility] OR    SURGICAL HISTORY OF -   06/14/76    Perineorrhaphy, for widening vaginal orifice    Z EXPLORATORY OF ABDOMEN  1989    laparoscopy         Review of Systems:   14 systems were reviewed and all negative except as mentioned above.   Current Medications:  Current Outpatient Medications   Medication Sig Dispense Refill    cyanocobalamin 1000 MCG TBCR Take 1,000 mcg by mouth daily 100 tablet 1    diltiazem (CARDIZEM) 30 MG tablet Take 1 tablet (30 mg) by mouth 3 times daily 90 tablet 0    diltiazem ER COATED BEADS (CARDIZEM CD/CARTIA XT) 120 MG 24 hr capsule Take 1 capsule (120 mg) by mouth daily On chemo days and 2 days post chemo infusion. 60 capsule 3    Ferrous Sulfate (IRON) 325 (65 Fe) MG tablet Take 1 tablet by mouth daily      fluocinonide (LIDEX) 0.05 % external cream APPLY A SMALL AMOUNT TO AFFECTED AREA(S) TWICE DAILY AS NEEDED 60 g 1    gabapentin (NEURONTIN) 300 MG capsule Take 1 capsule (300 mg) by mouth 3 times daily 90 capsule 3    lidocaine-prilocaine (EMLA) 2.5-2.5 % external cream Apply topically as needed for moderate pain Apply 15-20 minutes prior to port access 1 g 4    MAGNESIUM OXIDE 400 (240 Mg)  MG tablet TAKE 1 TABLET (400 MG) BY MOUTH 2 TIMES DAILY 60 tablet 0    metoprolol succinate ER (TOPROL XL) 50 MG 24 hr tablet Take 1 tablet (50 mg) by mouth daily 90 tablet 3    Multiple Vitamins-Iron (MULTI-DAY PLUS IRON PO) Take 1 tablet by mouth daily      rivaroxaban ANTICOAGULANT (XARELTO) 15 MG TABS tablet Take 1 tablet (15 mg) by mouth daily (with dinner) 90 tablet 2    sodium bicarbonate 650 MG tablet Take 1 tablet (650 mg) by mouth 2 times daily 180 tablet 3    vitamin D3 (CHOLECALCIFEROL) 50 mcg (2000 units) tablet Take 1 tablet (50 mcg) by mouth daily 30 tablet 6    loperamide (IMODIUM) 2 MG capsule Take 2 mg by mouth daily as needed for diarrhea (Patient not taking: Reported on 6/3/2024)      prochlorperazine (COMPAZINE) 10 MG tablet Take 10 mg by mouth every 6 hours as needed for nausea or vomiting (Patient not taking: Reported on 6/3/2024)       No current facility-administered medications for this visit.       Physical Exam:   BP (!) 144/85 (BP Location: Left arm, Patient Position: Sitting, Cuff Size: Adult Large)   Pulse 69   Wt 93.5 kg (206 lb 1.6 oz)   LMP  (LMP Unknown)   SpO2 96%   BMI 38.94 kg/m     Body mass index is 38.94 kg/m .    GENERAL APPEARANCE: Alert, not in acute distress  EYES:  Not pale conjunctiva, pupils equal  HENT: Mouth without ulcers or lesions  PULM: lungs clear to auscultation bilaterally, equal air movement, no clubbing  CV: regular rhythm, normal rate, no rub     -JVD no distended.      -edema: Rt leg: trace edema and left leg non swelling.   GI: soft,  - tender, no distended, bowel sounds are present  INTEGUMENT: No rash  NEURO:  Non focal. No asterixis.     Labs:   All labs reviewed by me  Last Renal Panel:  Sodium   Date Value Ref Range Status   06/03/2024 141 135 - 145 mmol/L Final     Comment:     Reference intervals for this test were updated on 09/26/2023 to more accurately reflect our healthy population. There may be differences in the flagging of prior results  with similar values performed with this method. Interpretation of those prior results can be made in the context of the updated reference intervals.    01/26/2021 140 133 - 144 mmol/L Final     Potassium   Date Value Ref Range Status   06/03/2024 4.5 3.4 - 5.3 mmol/L Final   10/09/2021 4.3 3.4 - 5.3 mmol/L Final   01/26/2021 4.1 3.4 - 5.3 mmol/L Final     Potassium POCT   Date Value Ref Range Status   04/06/2023 4.7 3.5 - 5.0 mmol/L Final     Chloride   Date Value Ref Range Status   06/03/2024 104 98 - 107 mmol/L Final   10/09/2021 118 (H) 94 - 109 mmol/L Final   01/26/2021 111 (H) 94 - 109 mmol/L Final     Carbon Dioxide   Date Value Ref Range Status   01/26/2021 22 20 - 32 mmol/L Final     Carbon Dioxide (CO2)   Date Value Ref Range Status   06/03/2024 26 22 - 29 mmol/L Final   10/09/2021 20 20 - 32 mmol/L Final     Anion Gap   Date Value Ref Range Status   06/03/2024 11 7 - 15 mmol/L Final   10/09/2021 4 3 - 14 mmol/L Final   01/26/2021 7 3 - 14 mmol/L Final     Glucose   Date Value Ref Range Status   06/03/2024 97 70 - 99 mg/dL Final   10/09/2021 159 (H) 70 - 99 mg/dL Final   01/26/2021 107 (H) 70 - 99 mg/dL Final     GLUCOSE BY METER POCT   Date Value Ref Range Status   05/10/2023 116 (H) 70 - 99 mg/dL Final     Comment:     /RN Notified     Urea Nitrogen   Date Value Ref Range Status   06/03/2024 32.0 (H) 8.0 - 23.0 mg/dL Final   10/09/2021 25 7 - 30 mg/dL Final   01/26/2021 29 7 - 30 mg/dL Final     Creatinine   Date Value Ref Range Status   06/03/2024 1.86 (H) 0.51 - 0.95 mg/dL Final   01/26/2021 1.45 (H) 0.52 - 1.04 mg/dL Final     GFR Estimate   Date Value Ref Range Status   06/03/2024 29 (L) >60 mL/min/1.73m2 Final   01/26/2021 38 (L) >60 mL/min/[1.73_m2] Final     Comment:     Non  GFR Calc  Starting 12/18/2018, serum creatinine based estimated GFR (eGFR) will be   calculated using the Chronic Kidney Disease Epidemiology Collaboration   (CKD-EPI) equation.       GFR, ESTIMATED POCT    Date Value Ref Range Status   01/02/2024 38 (L) >60 mL/min/1.73m2 Final     Calcium   Date Value Ref Range Status   06/03/2024 9.7 8.8 - 10.2 mg/dL Final   01/26/2021 9.3 8.5 - 10.1 mg/dL Final     Phosphorus   Date Value Ref Range Status   06/03/2024 3.5 2.5 - 4.5 mg/dL Final   09/14/2010 3.7 2.5 - 4.5 mg/dL Final     Albumin   Date Value Ref Range Status   06/03/2024 4.1 3.5 - 5.2 g/dL Final   01/26/2021 4.1 3.4 - 5.0 g/dL Final       Imaging:  I reviewed imaging studies.     Assessment & Recommendations:   Problem list  # Hx MARYA stage 2 likely secondary to ATN from hypotension (diuretic use and increase bowel movement)  # CKD stage 3B- progressed to stage 4; DDx chronic dehydration/recurrent MARYA from Crohn's disease, Hypertension or Sulfasalazine (stopped in 4/23); BL Cr 1.3-1.6  # GLycosuria  Etiology of acute kidney injury that occurred in May 23 was likely from ATN in the setting of unaware hypotension/dehydration from ongoing diuretic use and increase BM after surgery. UA was bland.UPCR was negative. Kidney ultrasound showed no evidence of hydronephrosis or masses but that showed that she had some mild atrophic changes consistent with chronic kidney disease.      In regard to CKD, she has baseline creatinine of 1.3-1.6 prior to MARYA in May 2023.  Again, etiology likely multifactorial in the setting of chronic dehydration/recurrent MARYA from Crohn's, hypertension or sulfasalazine use which can be associated with AIN.  Sulfasalazine was discontinued since April 2023 so should no longer contributed.  UA appears to be clean and no protein so less likely GN. IgAN is associated with Crohn's but UA is not suggestive of that.      She has been on FOLFOX6, oxaliplatin may cause MARYA in the form of ATN but rare. Her Cr in December was 1.3-1.5.  Electrolyte has been stable without hypokalemia or metabolic acidosis.  She was having glycosuria without diabetes. These may raise the possibility of incomplete Fanconi  syndrome.  Which could be happened in the setting of oxaliplatin. Glycosuria has resolved since.      Interestingly, her creatinine increased to 1.74 on 2/27/2024 and 2.1 4/1/2024.  She received IV fluid bolus and creatinine improved to 1.8 and still remains stable at 1.86 mg/dL as of today.  Her UA today remain bland except white blood cell 6 cell per high-power field and squamous Patillo cell 2 cells.  No blood and undetectable proteinuria.  It is perplexing to me why her creatinine increased after completion of chemotherapy (last dose of oxaliplatin 12/13/23).  Ddx included 1. Late effect of oxaliplatin 2. Other casues like GI loss and etc (due to Hx of Crohn's) and 3. Gaining muscle which raises Cr without affecting GFR (she has gained back 26 Lbs).  At this time I do not think we need to do a kidney biopsy since her creatinine seems to be stable at 1.8.  Will check BMP in 1 month to assess the stability.  In the meantime I asked her to drink more water at least 70 ounces per day and avoid nephrotoxic agents.    - Stay well hydrated, dehydration is one of the major risk factor for MARYA in platinum-based regimen   - Continue to observe blood pressure while holding blood pressure medication  # Metabolic acidosis  Bicarb is 26.  She is on sodium bicarb 650 mg twice daily.  Will continue that for now  # Hx of Hyperkalemia; resolved  Likely in the setting of triamterene use, high K diet and MARYA. Now resolved. Off Triamterene.  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   FOLFOX6 C1D1 on 5/31/23 but has Afib shortly after. S/p mFOLFOX6 x 12 cycles (last dose of oxaliplatin on 12/13/2023)   # Anemia in CKD and cancer   # Leukopenia and thrombocytopenia  Hb 13.1. Likely multifactorial but most ly form chemo.  Not indicated for ESAs, given recent cancer.  # Hypertension; monitor blood pressure  medication  # New onset Afib  Previously on triamterene/hydrochlorothiazide but being held May 2023.  Currently she is on metoprolol ER 50 mg daily (Was on diltiazem 120 mg as needed before chemo)  # BMD  # Vit D deficiency  # Secondary hyperparathyroidism  12/4/23: Vit D is 16 and PTH is 79. 6/3/24, PTH improved to 26.  Ca and phosphorus are normal. Currently on vitamin D3 2000 units daily.  # Crohn's disease  She was off Sulfazalazine since April 23. Diarrhea is stable.      Follow-up in 3 months with labs    The longitudinal plan of care for CKD 3/4, HTN, vit D def, Colon CA was addressed during this visit. Due to the added complexity in care, I will continue to support Nadia Ladd in the subsequent management of this condition(s) and with the ongoing continuity of care of this condition(s).       I spent  40 minutes on the date of the encounter doing chart review, history and exam, documentation and further activities as noted above. 20 minutes of this visit is dedicated to direct patient interaction via face-to-face.    Santana Brenner MD on 06/03/2024

## 2024-06-03 NOTE — NURSING NOTE
Chief Complaint   Patient presents with    RECHECK     Follow up.      Vitals:    06/03/24 1313 06/03/24 1315 06/03/24 1316   BP: 138/81 136/83 (!) 144/85   BP Location: Left arm Left arm Left arm   Patient Position: Sitting Sitting Sitting   Cuff Size: Adult Large Adult Large Adult Large   Pulse: 69     SpO2: 96%     Weight: 93.5 kg (206 lb 1.6 oz)         BP Readings from Last 3 Encounters:   06/03/24 (!) 144/85   05/01/24 118/70   04/02/24 (!) 149/62       BP (!) 144/85 (BP Location: Left arm, Patient Position: Sitting, Cuff Size: Adult Large)   Pulse 69   Wt 93.5 kg (206 lb 1.6 oz)   LMP  (LMP Unknown)   SpO2 96%   BMI 38.94 kg/m       Gretta Henderson

## 2024-06-03 NOTE — PATIENT INSTRUCTIONS
Stay well hydrated  Drink about ~ 70 Oz  Will monitor kidney function in 1 month  Try to lose weight if available  See you in 3 months

## 2024-06-04 ENCOUNTER — OFFICE VISIT (OUTPATIENT)
Dept: INTERNAL MEDICINE | Facility: CLINIC | Age: 69
End: 2024-06-04
Payer: COMMERCIAL

## 2024-06-04 VITALS
BODY MASS INDEX: 38.71 KG/M2 | HEART RATE: 68 BPM | SYSTOLIC BLOOD PRESSURE: 128 MMHG | HEIGHT: 61 IN | DIASTOLIC BLOOD PRESSURE: 84 MMHG | OXYGEN SATURATION: 96 % | RESPIRATION RATE: 16 BRPM | WEIGHT: 205 LBS | TEMPERATURE: 97.7 F

## 2024-06-04 DIAGNOSIS — Z12.31 ENCOUNTER FOR SCREENING MAMMOGRAM FOR BREAST CANCER: ICD-10-CM

## 2024-06-04 DIAGNOSIS — N18.32 STAGE 3B CHRONIC KIDNEY DISEASE (H): Primary | ICD-10-CM

## 2024-06-04 DIAGNOSIS — C7A.1 MALIGNANT POORLY DIFFERENTIATED NEUROENDOCRINE CARCINOMA (H): ICD-10-CM

## 2024-06-04 DIAGNOSIS — I10 BENIGN ESSENTIAL HYPERTENSION WITH TARGET BLOOD PRESSURE BELOW 140/90: ICD-10-CM

## 2024-06-04 DIAGNOSIS — Z12.11 SCREEN FOR COLON CANCER: ICD-10-CM

## 2024-06-04 DIAGNOSIS — D70.1 CHEMOTHERAPY-INDUCED NEUTROPENIA (H): ICD-10-CM

## 2024-06-04 DIAGNOSIS — Z78.0 MENOPAUSE: ICD-10-CM

## 2024-06-04 DIAGNOSIS — T45.1X5A CHEMOTHERAPY-INDUCED NEUTROPENIA (H): ICD-10-CM

## 2024-06-04 DIAGNOSIS — K50.10 CROHN'S DISEASE OF LARGE INTESTINE WITHOUT COMPLICATION (H): ICD-10-CM

## 2024-06-04 DIAGNOSIS — I48.0 PAROXYSMAL ATRIAL FIBRILLATION WITH RVR (H): ICD-10-CM

## 2024-06-04 PROBLEM — E66.01 MORBID OBESITY (H): Status: RESOLVED | Noted: 2019-06-06 | Resolved: 2024-06-04

## 2024-06-04 PROBLEM — Z79.69 IMMUNOSUPPRESSED DUE TO CHEMOTHERAPY: Status: RESOLVED | Noted: 2023-06-04 | Resolved: 2024-06-04

## 2024-06-04 PROBLEM — Z79.899 IMMUNOSUPPRESSED DUE TO CHEMOTHERAPY (H): Status: RESOLVED | Noted: 2023-06-04 | Resolved: 2024-06-04

## 2024-06-04 PROBLEM — E66.812 CLASS 2 SEVERE OBESITY DUE TO EXCESS CALORIES WITH SERIOUS COMORBIDITY IN ADULT (H): Status: ACTIVE | Noted: 2024-06-04

## 2024-06-04 PROBLEM — E66.01 CLASS 2 SEVERE OBESITY DUE TO EXCESS CALORIES WITH SERIOUS COMORBIDITY IN ADULT (H): Status: ACTIVE | Noted: 2024-06-04

## 2024-06-04 PROBLEM — D84.821 IMMUNOSUPPRESSED DUE TO CHEMOTHERAPY (H): Status: RESOLVED | Noted: 2023-06-04 | Resolved: 2024-06-04

## 2024-06-04 PROCEDURE — 99214 OFFICE O/P EST MOD 30 MIN: CPT | Performed by: INTERNAL MEDICINE

## 2024-06-04 PROCEDURE — G2211 COMPLEX E/M VISIT ADD ON: HCPCS | Performed by: INTERNAL MEDICINE

## 2024-06-04 RX ORDER — RESPIRATORY SYNCYTIAL VIRUS VACCINE 120MCG/0.5
0.5 KIT INTRAMUSCULAR ONCE
Qty: 1 EACH | Refills: 0 | Status: CANCELLED | OUTPATIENT
Start: 2024-06-04 | End: 2024-06-04

## 2024-06-04 NOTE — PROGRESS NOTES
"      Yo Zuniga is a 68 year old, presenting for the following health issues:  RECHECK (Discuss any other options for neuropathy)      6/4/2024     2:34 PM   Additional Questions   Roomed by Irlanda DE LOS SANTOS reviewed including:             Complaint, History of Chief Complaint, Corresponding Review of Systems, and Complaint Specific Physical Examination.    #1   Patient complains of some neuropathy in her hands and feet.  Is currently taking gabapentin 300 mg 3 times daily.  Has a history of chemotherapy for malignant poorly differentiated neuroendocrine carcinoma.  Notes that she is doing well from the stent point.  Follows with her oncologist and gastroenterologist regularly.  Denies side effects from the gabapentin.  Denies fatigue, or excess lethargy.          Exam:   LUNGS: clear bilaterally, airflow is brisk, no intercostal retraction or stridor is noted. No coughing is noted during visit.   NEURO: Pt is alert and appropriate. No neurologic lateralization is noted. Cranial nerves 2-12 are intact. Peripheral sensory and motor function are grossly normal.       #2   Follow-up on chronic atrial fibrillation.  Paroxysmal in nature.  Currently in sinus rhythm.  Continues Xarelto for stroke prophylaxis.  Avoiding NSAIDs or gastric irritants.          Exam:  HEART:  regular without rubs, clicks, gallops, or murmurs. PMI is nondisplaced. Upstrokes are brisk. S1,S2 are heard.   GI: Abdomen is soft, without rebound, guarding or tenderness. Bowel sounds are appropriate. No renal bruits are heard.      Patient has been interviewed, applicable history and applied review of systems have been performed.    Vital Signs:   /84   Pulse 68   Temp 97.7  F (36.5  C) (Temporal)   Resp 16   Ht 1.549 m (5' 1\")   Wt 93 kg (205 lb)   LMP  (LMP Unknown)   SpO2 96%   BMI 38.73 kg/m        Decision Making    Problem and Complexity     1. Stage 3b chronic kidney disease (H)  Currently stable.  Check " microalbumin  - Albumin Random Urine Quantitative with Creat Ratio; Future    2. Benign essential hypertension with target blood pressure below 140/90  Blood pressure controlled at 120/72.  Continue metoprolol      3. Malignant poorly differentiated neuroendocrine carcinoma (H)  Follow-up with oncology    4. Crohn's disease of large intestine without complication (H)  Follow-up with gastroenterology.    5. Paroxysmal atrial fibrillation with RVR (H)  Continue anticoagulation.    6. Chemotherapy-induced neutropenia (H)  Recommend increasing gabapentin to 600 mg 3 times daily on a trial basis.  She will escalate to this dose 1 pill/day every 3 days.    7. Encounter for screening mammogram for breast cancer  Screening  - MA Screening Bilateral w/ Boni; Future    8. Menopause  Screening  - DEXA HIP/PELVIS/SPINE - Future; Future    9. Screen for colon cancer  Patient will have colonoscopies as set up by her gastroenterologist as she does have a history of Crohn's disease.                                FOLLOW UP   I have asked the patient to make an appointment for followup with me in 4 months or as needed.  She will report back in 2 to 3 weeks virtually for response of her gabapentin change.    Regarding routine vaccinations:  I have reviewed the patient's vaccination schedule and discussed the benefits of prophylactic vaccination in detail.  I recommend the patient contact their pharmacist for vaccinations.  Discussed that most insurance companies now favor reimbursement to the pharmacies and it will financially behoove the patient to have vaccinations performed at their pharmacy.        I have carefully explained the diagnosis and treatment options to the patient.  The patient has displayed an understanding of the above, and all subsequent questions were answered.      DO DIANNA Bullard    Portions of this note were produced using [employer]  Although every attempt at real-time proof reading has been  made, occasional grammar/syntax errors may have been missed.

## 2024-06-05 LAB
CREAT UR-MCNC: 63 MG/DL
MICROALBUMIN UR-MCNC: <12 MG/L
MICROALBUMIN/CREAT UR: NORMAL MG/G{CREAT}

## 2024-06-10 ENCOUNTER — ALLIED HEALTH/NURSE VISIT (OUTPATIENT)
Dept: AUDIOLOGY | Facility: CLINIC | Age: 69
End: 2024-06-10
Payer: COMMERCIAL

## 2024-06-10 DIAGNOSIS — H90.3 SENSORINEURAL HEARING LOSS, ASYMMETRICAL: Primary | ICD-10-CM

## 2024-06-10 PROCEDURE — V5299 HEARING SERVICE: HCPCS

## 2024-06-10 NOTE — PROGRESS NOTES
HEARING AID DROP-OFF     Background:              Patient dropped off their hearing aid with the report of hearing aid not working                             SIDE: Right                          : [place]                          TYPE: [place] 2 pro                          S/N: [id number]                          WARRANTY: ??     Procedures:              Initially the hearing aid was weak sounding, but after changing the wax traps and brushing the microphone ports, there was good sound quality. Hearing aid ready for pick-up     Plan:              Patient was contacted in regards to status of hearing aid dropped off today.      NO CHARGE     Azalea Richter CCC-A  Licensed Audiologist  [place] #[id number]  Kelly 10, 2024

## 2024-06-24 DIAGNOSIS — I48.0 PAROXYSMAL ATRIAL FIBRILLATION WITH RVR (H): ICD-10-CM

## 2024-06-24 DIAGNOSIS — L30.9 DERMATITIS: ICD-10-CM

## 2024-06-24 DIAGNOSIS — E83.42 HYPOMAGNESEMIA: ICD-10-CM

## 2024-06-24 DIAGNOSIS — K52.9 CHRONIC DIARRHEA: ICD-10-CM

## 2024-06-24 DIAGNOSIS — E55.9 VITAMIN D DEFICIENCY: ICD-10-CM

## 2024-06-24 RX ORDER — LANOLIN ALCOHOL/MO/W.PET/CERES
400 CREAM (GRAM) TOPICAL 2 TIMES DAILY
Qty: 60 TABLET | Refills: 0 | Status: SHIPPED | OUTPATIENT
Start: 2024-06-24 | End: 2024-07-30

## 2024-06-24 RX ORDER — FLUOCINONIDE 0.5 MG/G
CREAM TOPICAL
Qty: 60 G | Refills: 1 | Status: SHIPPED | OUTPATIENT
Start: 2024-06-24

## 2024-06-26 RX ORDER — CHOLECALCIFEROL (VITAMIN D3) 50 MCG
50 TABLET ORAL DAILY
Qty: 30 TABLET | Refills: 6 | Status: SHIPPED | OUTPATIENT
Start: 2024-06-26

## 2024-07-02 ENCOUNTER — HOSPITAL ENCOUNTER (OUTPATIENT)
Dept: CT IMAGING | Facility: CLINIC | Age: 69
Discharge: HOME | End: 2024-07-02
Attending: INTERNAL MEDICINE
Payer: COMMERCIAL

## 2024-07-02 ENCOUNTER — INFUSION THERAPY VISIT (OUTPATIENT)
Dept: INFUSION THERAPY | Facility: CLINIC | Age: 69
End: 2024-07-02
Attending: INTERNAL MEDICINE
Payer: COMMERCIAL

## 2024-07-02 VITALS
TEMPERATURE: 98 F | HEART RATE: 70 BPM | RESPIRATION RATE: 18 BRPM | DIASTOLIC BLOOD PRESSURE: 70 MMHG | OXYGEN SATURATION: 98 % | SYSTOLIC BLOOD PRESSURE: 140 MMHG

## 2024-07-02 DIAGNOSIS — C18.7 MALIGNANT NEOPLASM OF SIGMOID COLON (H): ICD-10-CM

## 2024-07-02 DIAGNOSIS — C18.9 MALIGNANT NEOPLASM OF COLON, UNSPECIFIED PART OF COLON (H): Primary | ICD-10-CM

## 2024-07-02 DIAGNOSIS — T45.1X5A CHEMOTHERAPY-INDUCED NEUTROPENIA (H): ICD-10-CM

## 2024-07-02 DIAGNOSIS — C7A.1 MALIGNANT POORLY DIFFERENTIATED NEUROENDOCRINE CARCINOMA (H): ICD-10-CM

## 2024-07-02 DIAGNOSIS — D70.1 CHEMOTHERAPY-INDUCED NEUTROPENIA (H): ICD-10-CM

## 2024-07-02 LAB
ALBUMIN SERPL BCG-MCNC: 4 G/DL (ref 3.5–5.2)
ALP SERPL-CCNC: 252 U/L (ref 40–150)
ALT SERPL W P-5'-P-CCNC: 34 U/L (ref 0–50)
ANION GAP SERPL CALCULATED.3IONS-SCNC: 14 MMOL/L (ref 7–15)
AST SERPL W P-5'-P-CCNC: 35 U/L (ref 0–45)
BASOPHILS # BLD AUTO: 0 10E3/UL (ref 0–0.2)
BASOPHILS NFR BLD AUTO: 0 %
BILIRUB SERPL-MCNC: 0.7 MG/DL
BUN SERPL-MCNC: 31.7 MG/DL (ref 8–23)
CALCIUM SERPL-MCNC: 9.9 MG/DL (ref 8.8–10.2)
CEA SERPL-MCNC: 2 NG/ML
CHLORIDE SERPL-SCNC: 104 MMOL/L (ref 98–107)
CREAT SERPL-MCNC: 1.7 MG/DL (ref 0.51–0.95)
DEPRECATED HCO3 PLAS-SCNC: 24 MMOL/L (ref 22–29)
EGFRCR SERPLBLD CKD-EPI 2021: 33 ML/MIN/1.73M2
EOSINOPHIL # BLD AUTO: 0 10E3/UL (ref 0–0.7)
EOSINOPHIL NFR BLD AUTO: 0 %
ERYTHROCYTE [DISTWIDTH] IN BLOOD BY AUTOMATED COUNT: 13.2 % (ref 10–15)
GLUCOSE SERPL-MCNC: 114 MG/DL (ref 70–99)
HCT VFR BLD AUTO: 38.3 % (ref 35–47)
HGB BLD-MCNC: 12.9 G/DL (ref 11.7–15.7)
IMM GRANULOCYTES # BLD: 0.1 10E3/UL
IMM GRANULOCYTES NFR BLD: 1 %
LYMPHOCYTES # BLD AUTO: 0.6 10E3/UL (ref 0.8–5.3)
LYMPHOCYTES NFR BLD AUTO: 10 %
MCH RBC QN AUTO: 29.9 PG (ref 26.5–33)
MCHC RBC AUTO-ENTMCNC: 33.7 G/DL (ref 31.5–36.5)
MCV RBC AUTO: 89 FL (ref 78–100)
MONOCYTES # BLD AUTO: 0.7 10E3/UL (ref 0–1.3)
MONOCYTES NFR BLD AUTO: 11 %
NEUTROPHILS # BLD AUTO: 4.8 10E3/UL (ref 1.6–8.3)
NEUTROPHILS NFR BLD AUTO: 78 %
NRBC # BLD AUTO: 0 10E3/UL
NRBC BLD AUTO-RTO: 0 /100
PLATELET # BLD AUTO: 130 10E3/UL (ref 150–450)
POTASSIUM SERPL-SCNC: 4.3 MMOL/L (ref 3.4–5.3)
PROT SERPL-MCNC: 6.7 G/DL (ref 6.4–8.3)
RBC # BLD AUTO: 4.32 10E6/UL (ref 3.8–5.2)
SODIUM SERPL-SCNC: 142 MMOL/L (ref 135–145)
WBC # BLD AUTO: 6.1 10E3/UL (ref 4–11)

## 2024-07-02 PROCEDURE — 82378 CARCINOEMBRYONIC ANTIGEN: CPT

## 2024-07-02 PROCEDURE — 36591 DRAW BLOOD OFF VENOUS DEVICE: CPT

## 2024-07-02 PROCEDURE — 96360 HYDRATION IV INFUSION INIT: CPT

## 2024-07-02 PROCEDURE — 258N000003 HC RX IP 258 OP 636: Performed by: INTERNAL MEDICINE

## 2024-07-02 PROCEDURE — 71260 CT THORAX DX C+: CPT

## 2024-07-02 PROCEDURE — 250N000009 HC RX 250: Performed by: INTERNAL MEDICINE

## 2024-07-02 PROCEDURE — 82040 ASSAY OF SERUM ALBUMIN: CPT

## 2024-07-02 PROCEDURE — 250N000011 HC RX IP 250 OP 636: Performed by: INTERNAL MEDICINE

## 2024-07-02 PROCEDURE — 85025 COMPLETE CBC W/AUTO DIFF WBC: CPT

## 2024-07-02 RX ORDER — DIPHENHYDRAMINE HYDROCHLORIDE 50 MG/ML
50 INJECTION INTRAMUSCULAR; INTRAVENOUS
Start: 2024-07-02

## 2024-07-02 RX ORDER — EPINEPHRINE 1 MG/ML
0.3 INJECTION, SOLUTION, CONCENTRATE INTRAVENOUS EVERY 5 MIN PRN
OUTPATIENT
Start: 2024-07-02

## 2024-07-02 RX ORDER — HEPARIN SODIUM (PORCINE) LOCK FLUSH IV SOLN 100 UNIT/ML 100 UNIT/ML
5 SOLUTION INTRAVENOUS
Status: DISCONTINUED | OUTPATIENT
Start: 2024-07-02 | End: 2024-07-02 | Stop reason: HOSPADM

## 2024-07-02 RX ORDER — HEPARIN SODIUM,PORCINE 10 UNIT/ML
5 VIAL (ML) INTRAVENOUS
OUTPATIENT
Start: 2024-07-02

## 2024-07-02 RX ORDER — MEPERIDINE HYDROCHLORIDE 25 MG/ML
25 INJECTION INTRAMUSCULAR; INTRAVENOUS; SUBCUTANEOUS EVERY 30 MIN PRN
OUTPATIENT
Start: 2024-07-02

## 2024-07-02 RX ORDER — METHYLPREDNISOLONE SODIUM SUCCINATE 125 MG/2ML
125 INJECTION, POWDER, LYOPHILIZED, FOR SOLUTION INTRAMUSCULAR; INTRAVENOUS
Start: 2024-07-02

## 2024-07-02 RX ORDER — ALBUTEROL SULFATE 90 UG/1
1-2 AEROSOL, METERED RESPIRATORY (INHALATION)
Start: 2024-07-02

## 2024-07-02 RX ORDER — HEPARIN SODIUM (PORCINE) LOCK FLUSH IV SOLN 100 UNIT/ML 100 UNIT/ML
5 SOLUTION INTRAVENOUS
OUTPATIENT
Start: 2024-07-02

## 2024-07-02 RX ORDER — ALBUTEROL SULFATE 0.83 MG/ML
2.5 SOLUTION RESPIRATORY (INHALATION)
OUTPATIENT
Start: 2024-07-02

## 2024-07-02 RX ORDER — IOPAMIDOL 755 MG/ML
500 INJECTION, SOLUTION INTRAVASCULAR ONCE
Status: COMPLETED | OUTPATIENT
Start: 2024-07-02 | End: 2024-07-02

## 2024-07-02 RX ADMIN — SODIUM CHLORIDE 500 ML: 9 INJECTION, SOLUTION INTRAVENOUS at 08:25

## 2024-07-02 RX ADMIN — SODIUM CHLORIDE 61 ML: 9 INJECTION, SOLUTION INTRAVENOUS at 09:30

## 2024-07-02 RX ADMIN — HEPARIN 5 ML: 100 SYRINGE at 10:26

## 2024-07-02 RX ADMIN — IOPAMIDOL 90 ML: 755 INJECTION, SOLUTION INTRAVENOUS at 09:31

## 2024-07-02 RX ADMIN — SODIUM CHLORIDE 500 ML: 9 INJECTION, SOLUTION INTRAVENOUS at 09:43

## 2024-07-02 ASSESSMENT — PAIN SCALES - GENERAL: PAINLEVEL: NO PAIN (0)

## 2024-07-02 NOTE — PROGRESS NOTES
Infusion Nursing Note:  Nadia Ladd presents today for Port access for CT/Port labs/IVF.    Patient seen by provider today: No   present during visit today: Not Applicable.    Note: Nadia arrives today for Port labs, IVF and CT scan. Denies sx or concerns.       Intravenous Access:  Implanted Port.   Lab draw site L chest wall, Needle type Power, Gauge 19,3/4in.  Labs drawn without difficulty.      Lab Results   Component Value Date    HGB 12.9 07/02/2024    WBC 6.1 07/02/2024    ANEU 7.6 08/01/2023    ANEUTAUTO 4.8 07/02/2024     (L) 07/02/2024        Lab Results   Component Value Date     07/02/2024    POTASSIUM 4.3 07/02/2024    MAG 1.9 04/01/2024    CR 1.70 (H) 07/02/2024    LIVIER 9.9 07/02/2024    BILITOTAL 0.7 07/02/2024    ALBUMIN 4.0 07/02/2024    ALT 34 07/02/2024    AST 35 07/02/2024         Post CT Assessment:  Patient tolerated infusion without incident.  Blood return noted pre and post CT scan.  Site patent and intact, free from redness, edema or discomfort.  No evidence of extravasations.  Access discontinued per protocol.       Discharge Plan:   [facility]  Patient will return 8/13 for next appointment for Port flush.   Patient discharged in stable condition accompanied by: .  Departure Mode: Ambulatory.      Rosalina Argueta RN

## 2024-07-09 ENCOUNTER — ONCOLOGY VISIT (OUTPATIENT)
Dept: ONCOLOGY | Facility: CLINIC | Age: 69
End: 2024-07-09
Payer: COMMERCIAL

## 2024-07-09 VITALS
SYSTOLIC BLOOD PRESSURE: 110 MMHG | OXYGEN SATURATION: 97 % | WEIGHT: 207.38 LBS | DIASTOLIC BLOOD PRESSURE: 76 MMHG | HEIGHT: 61 IN | HEART RATE: 81 BPM | TEMPERATURE: 97.4 F | BODY MASS INDEX: 39.16 KG/M2

## 2024-07-09 DIAGNOSIS — C7A.1 MALIGNANT POORLY DIFFERENTIATED NEUROENDOCRINE CARCINOMA (H): ICD-10-CM

## 2024-07-09 DIAGNOSIS — T45.1X5A CHEMOTHERAPY-INDUCED NEUROPATHY (H): ICD-10-CM

## 2024-07-09 DIAGNOSIS — G62.0 CHEMOTHERAPY-INDUCED NEUROPATHY (H): ICD-10-CM

## 2024-07-09 DIAGNOSIS — R74.8 ALKALINE PHOSPHATASE ELEVATION: ICD-10-CM

## 2024-07-09 DIAGNOSIS — C18.7 MALIGNANT NEOPLASM OF SIGMOID COLON (H): Primary | ICD-10-CM

## 2024-07-09 DIAGNOSIS — I48.0 PAROXYSMAL ATRIAL FIBRILLATION WITH RVR (H): ICD-10-CM

## 2024-07-09 PROCEDURE — 99214 OFFICE O/P EST MOD 30 MIN: CPT | Performed by: INTERNAL MEDICINE

## 2024-07-09 RX ORDER — DILTIAZEM HYDROCHLORIDE 30 MG/1
30 TABLET, FILM COATED ORAL 3 TIMES DAILY PRN
Qty: 15 TABLET | Refills: 1 | COMMUNITY
Start: 2024-07-09 | End: 2024-07-09

## 2024-07-09 RX ORDER — GABAPENTIN 300 MG/1
CAPSULE ORAL
Qty: 120 CAPSULE | Refills: 3 | Status: SHIPPED | OUTPATIENT
Start: 2024-07-09 | End: 2024-07-30

## 2024-07-09 ASSESSMENT — PAIN SCALES - GENERAL: PAINLEVEL: NO PAIN (0)

## 2024-07-09 NOTE — LETTER
7/9/2024      Nadia Ladd  [address]  [facility] [address]      Dear Colleague,    Thank you for referring your patient, Nadia Ladd, to the [facility]. Please see a copy of my visit note below.    [facility]    Hematology/Oncology Established Patient Follow Up Note      Today's Date: 7/9/24    Reason for follow up: Sigmoid cancer    HISTORY OF PRESENT ILLNESS: Nadia Ladd is a 68 year old female who presents for follow-up    Patient has medical history including Crohn's disease on sulfasalazine, anemia secondary to iron deficiency and B12 deficiency, obesity, hypertension, prediabetes, eczema, pulmonary hypertension, hiatal hernia, mild splenomegaly (14.7 cm in 2/23)    - 2/23 pt noted increasing fatigue, found to have iron deficiency anemia w/hgb 6.8 (previously required RBC transfusion when dx w/Crohn's), did have intermittent changes in stool but not persistent (noted minimal blood in stool)   - 2/23 upper endoscopy for iron deficiency anemia and Crohn's disease: Hiatal hernia, normal stomach, normal duodenum  - 2/23 colonoscopy: A frond-like/villous partially obstructing large mass found in sigmoid colon, partially circumferential involving two thirds of the lumen circumference, 4 cm, unable to traverse, with oozing, s/p biopsy  PATHOLOGY:  A.  Stomach, antrum: Biopsy:  - Antral type mucosa with mild chronic inactive gastritis  - Immunostain for Helicobacter pylori is negative      B.  Colon, sigmoid, stricture: Biopsy:  - Invasive poorly differentiated carcinoma  The sigmoid stricture shows a high-grade carcinoma without definitive gland formation.  Given the poorly differentiated appearance, an immunohistochemical panel was performed to exclude the possibility of a tumor by direct extension or metastasis.   Immunohistochemical stains are performed and show the tumor to be diffusely positive for CK7 and partially positive for CK20 and SATB2.   "There is no significant tumor reactivity for CDX2, GATA3, PAX8, TTF-1, and chromogranin.  Synaptophysin highlights very rare positive cells. Although the CK7/CK20 profile is not entirely typical for a colorectal carcinoma, immunostains for tumors of other origins are negative and a colorectal primary is still favored.   - Mismatch repair:  1) MLH1-loss of nuclear expression  2) MSH2-intact  3) MSH6-intact  4) PMS2-loss of nuclear expression  -MLH1 promoter methylation: NEGATIVE    -2/23 CT CAP:  A) 4 cm length mass in the proximal sigmoid colon. There is some irregularity and stranding in the adjacent pericolonic fat which may indicate tumor extension. There is no obstruction.   B) there is a second probable mass at the hepatic flexure of the colon measuring 2.5 cm in length   C)There are small lymph nodes in the mesial colon adjacent to the hepatic flexure abnormality and the sigmoid colonic mass. No lymphadenopathy by size criteria  D) There is submucosal fatty deposition in the colon, most severe in the distal sigmoid colon and rectum, which can be seen secondary to quiescent inflammatory bowel disease.  E) no liver lesion    -3/23 PET:  a. There is increased FDG avidity of the sigmoid colon with focal lobular wall thickening consistent with biopsy-proven adenocarcinoma.  b. Secondary focus noted at the hepatic flexure demonstrates elevated FDG avidity and lobulated wall thickening highly suspicious for a additional primary.  c. Additionally there is a smaller focus of wall thickening with elevated FDG avidity along the left aspect of the transverse colon  which is suspicious for a possible third focus of colonic malignancy.    -3/23 CEA 6.8  - 3/23 colorectal surgery consultation with - in the setting of Crohn's, at least sigmoid colectomy with possible total abdominal colectomy with either ileorectal anastomosis or end ileostomy (\"rectum can remain active and be actively surveyed " "annually\")    -3/23 genetic counseling, testing for Chan syndrome    - 4/5/2023 gynecologic oncology consultation for risk reduction surgery with hysterectomy and BSO at time of colectomy    -4/6/2023 laparoscopic converted to open total abdominal colectomy with ileorectal anastomosis, partial omentectomy, flexible sigmoidoscopy, TAHBSO  A. OMENTUM, RESECTION:  - Adipose tissue with no significant histopathologic abnormality  - No evidence of malignancy     B. COLON, RESECTION:  Mass #1 (ascending colon/hepatic flexure): Mixed neuroendocrine-non-neuroendocrine neoplasm (MINEN):  - Neuroendocrine carcinoma component (70%) and moderately-differentiated adenocarcinoma component (30%)  - Size = 5.0 cm, invading into muscularis propria  - Positive LVI  - Negative macroscopic tumor perforation, negative PNI  - Negative surgical resection margins  - IHC: Neuroendocrine portion: Negative for chromogranin and INSM1 but strongly express synaptophysin  - IHC: Adenocarcinoma portion: Positive for CK20, CDX2 negative for CK7, PAX8, ER, S100  Ki-67 proliferation index of approximately 80%.  MMR:  Intact nuclear expression of MLH1, MSH2, MHS6, PMS2  PDL1:  COMBINED POSITIVE SCORE (CPS): 55-60  TUMOR PROPORTION SCORE (TPS): 50%   pathogenic KRAS mutation (G13D) was identified (5.2%).  AJCC 8th edition: stage 3B mpT3 pN1a     Mass#2 (sigmoid colon): Poorly-differentiated carcinoma:  - Grade 3  - Size = 5.7 cm, invading into muscularis propria  - Negative for microscopic tumor perforation, LVI, perineural invasion  - Negative surgical resection margins  - IHC: Positive for scar and keratin AE1/AE3, negative for CK7, CK20, CDX2, PAX8, ER, chromogranin, CD56, p40, synaptophysin, S100, INI1, p40, INSM1  Ki-67 proliferation index of approximately 70%.  MMR: loss of nuclear expression MLH1 and PMS2, intact nuclear expression MSH2 and MSH6  PDL1:  COMBINED POSITIVE SCORE (CPS): 80  TUMOR PROPORTION SCORE (TPS): 70%  pathogenic KRAS " mutation (G13D) was identified (4.5%).  AJCC 8th edition: stage 3A mpT2 pN1a    - One of forty-one sampled lymph nodes positive (1/41)  - Benign appendix  - Tubular adenoma    C. UTERUS, CERVIX, BILATERAL FALLOPIAN TUBES & OVARIES, :  - Benign endometrial polyps; atrophic endometrium  - Uterus, cervix, bilateral fallopian tubes, and ovaries with no significant morphologic abnormalities  - No evidence of dysplasia or malignancy     D. SMALL INTESTINE, TERMINAL ILEUM, TERMINAL ILIUM:  - Benign ileum  - No evidence of dysplasia or malignancy  - Negative for metastases to one of one sampled lymph node (0 /1)     E. PROXIMAL ANASTOMOTIC RING:  - Benign small intestine  - No evidence of dysplasia or malignancy     F. DISTAL ANASTOMOTIC RING:  - Benign colon  - No evidence of dysplasia or malignancy    CRC NGS:   --mutations positive for KRAS G13D mutation 5.2% mass 1, KRAS G13D mutation 4.5% mass 2 (negative for AKT1; BRAF; ERBB2; KRAS; NRAS; PIK3CA; PTEN)  --TMB score: 17.064 mut/Mb mass 1 (CPS 55-60, TPS 50%), 60.943 mut/Mb mass 2 (CPS 80, TPS 70%)  --fusion negative including NTRK and RET for mass 1 and 2 (also negative for AKT1, AKT3, ALK, AR, CDJLAW93, CLAUDINE, BCOR, BRAF, BRD3, BRD4, CAMTA1, CCNB3, CCND1, , CIC, CSF1, CSF1R, CTNNB1, DNAJB1, EGFR, EPC1, ERBB2, ERBB4, ERG, ESR1, ESRRA, ETV1, ETV4, ETV5, ETV6, EWSR1, FGFR1, FGFR2, FGFR3, FGR, FOS, FOSB, FOXO1, FOXO4, FOXR2, FUS, GLI1, GRB7, HMGA2, HRAS, IDH1, IDH2, INSR, JAK2, JAK3, JAZF1, KRAS, MAML2, MAP2K1, MAST1, MAST2, MEAF6, MET, MKL2, MN1, MSMB, MUSK, MYB, MYBL1, MYOD1, NCOA1, NCOA2, NOTCH1, NOTCH2, NR4A3, NRAS, NRG1, NTRK1, NTRK2, NTRK3, NUMBL1, NUTM1, PAX3, PDGFB, PDGFRA, PDGFRB, PHF1, PIK3CA, PKN1, PLAG1, PPARG, PRKACA, PRKCA, PRKCB, RAF1, RELA, RET, ROS1, RPSO2, RSPO3, SS18, STAT6, TAF15, TCF12, TERT, TFE3, TFEB, TFG, THADA, TMPRSS2, USP6, VGLL2, YAP1, YWHAE)    -4/11/2023 patient discharged from hospital, planning for 30 days of lovenox postop, oxycodone  for pain (required 1 unit PRBC for anemia)    -5/8/23 I presented this case at [facility] CRC tumor board and their recommendations include:  - common to see this in Crohn's, overall poor prognosis, treat aggressively, may be poorly responsive to chemotherapy  - standard of care is mFOLFOX 6 x 12 cycles  - acknowledge that pt has high TPS and likely response to immunotherapy however not sufficient data or standard of care in stage 3 adjuvant setting  - add MMR testing to mass #1 hepatic flexure mass    - 5/9/23 admitted for acute renal failure w/Cr 3.82 w/K 7.0    - 5/19/23 second opinion at [facility] w/medical oncologist Dr. Acharya, thorough consultation and recommendations appreciated     - pt would like to proceed with FOFLOX (with dose reduced oxaliplatin due to kidney function)    -5/2023 genetic counseling: Negative for mutations in EPCAM, MLH1, MSH2, MSH6, and PMS2 genes.  Negative for mutations in APC, AXIN2, BMPR1A, CDH1, CHEK2, EPCAM, GREM1, MLH1, MSH2, MSH3, MSH6, MUTYH, NTHL1, PMS2, POLD1, POLE, PTEN, SMAD4, STK11, TP53, CDKN1B, MEN1, NF1, RET, TSC1, TSC2, and VHL genes    - 5/25/23 port placement    - 5/26/23 CT CAP w/ contrast and IVF before and after CT (baseline prior to starting tx):  1.  3 mm nodule RML and 5 mm lateral EDSON nodule, minimally increased from previous  2.  New 4 mm EDSON groundglass density nodule  3.  Several prominent borderline enlarged mediastinal lymph nodes slightly increased from previous  4.  Splenomegaly 14.5 cm  5. S/p subtotal colectomy with ileorectal anastomosis with postsurgical changes along the ventral abdominal wall midline    -5/31/23 started mFOLFOX 6 w/dose reduced oxaliplatin    - C2D1 6/14/23 held due to new onset paroxysmal A-fib with RVR requiring hospitalization 6/3/2023 (also hypoMg, dehydrated)  - 6/19/2023 I presented this case at [facility] colorectal tumor board at the [facility], it is possible that paroxysmal A-fib may be related to 5-FU.  Options include  withholding further treatment versus retrialing 5-FU under the guidance of cardio oncology in an inpatient setting.  No other adjuvant treatment options available, including immunotherapy  - 6/30/2023 consultation with cardio oncologist Dr. Garza; I spoke with Dr. Garza 6/30/2023 as well, [id number] score 3, recommending starting apixaban 5 mg twice daily, okay to retrial FOLFOX while patient is on metoprolol  -7/18/2023  CT CAP- subcm lung nodules stable, borderline and mildly enlarged mediastinal LN and periportal LN stable, splenomegaly 15.7cm, Subtotal colectomy with ileorectal anastomosis. No acute inflammation or obstruction   - 7/19/2023 mFOLFOX6 C2D1  (inpatient w/continuous cardiac monitoring)      -9/23 CT CAP (after cycle 6)  -Subtotal colectomy, rectal anastomosis appears stable, some adjacent mild scarring, no bowel obstruction or inflammation, no free fluid  -No new adenopathy, no liver lesions  -Stable EDSON 3 mm nodule, other stable nodules at EDSON, RML  -Stable several small thoracic lymph nodes in mediastinum  -Mild wall thickening of the gallbladder  -Spleen is smaller measuring 13.7 cm  -Stable regions of bilateral cortical thinning in the kidneys, with few tiny cysts    - 5/31/23-12/12/23 adjuvant mFOLFOX 6 q14 days x12 cycles/6 months w/dose reduced oxaliplatin and dropped 5FU bolus C2 onwards, IVF and IV Mg replacement weekly and 2/2 recurrent afib w/RVR- During the days of chemo and 2 days after ONLY: take long acting diltiazem 120 mg daily with 30 mg of short acting diltiazem as needed for atrial fibrillation with HR >110 (C1D1 w/oxaliplatin dose reduced from 85mg/m2 to 64mg/m2 2/2 renal function, 5/31/23 (complicated by hospitalization for afib w/RVR C1D4 6/3/23), C2D1 7/19/23 (w/o 5FU bolus C2 onwards, inpatient w/continuous cardiac monitoring), C3D1 8/2/2023 (outpatient, admitted to ER for A-fib with RVR C3D3 8/4/23), C4D1 8/16/23, C5D1 8/30/2023, C6D1 9/13/2023, C7D1 9/26/23, C8D1 10/11/23,  C9D1 10/25/23, C10D1 11/8/23, C11D1 11/22/23. C12D1 12/12/23 (1 week delay 2/2 cytopenias)    INTERIM HISTORY:    Treatment related AE:  - hearing changes-stable w/dose reduced oxaliplatin, continue use of b/l hearing aids  - thrombocytopenia-  7/24 platelet 130K, no bleeding while on xarelto  - elevated LFTs- alk phos 2/24 432->252 7/24, alk phos iso enzyme- majority liver, liver normal on 9/23 CT and 11/23 MRI (no mets), hepatology consult 7/15/24  - neuropathy- grade 2, feet>hands, RLE>LLE, continue gabapentin 300mg TID; likely component of arthritis too  - afib- reports heart racing in AM, for the 1st time in months, took diltiazem that she has prn and sx resolved. Staying hydrated- 4 16 oz bottle water per day    No weight loss, abdominal pain, melena, hematochezia    - 7/24 CT CAP w/contrast  - New scattered bilateral subsegmental reticular/ground glass opacities  and a few very small consolidative opacities. The groundglass  opacities are most pronounced in the right mid lung along the minor  fissure and central left upper lobe and in the posterior lung bases.   - The left lateral upper lobe a 3 mm pulmonary nodule (4:105) remains  unchanged dating back to most distant prior CT from February 2023. Few  scattered pulmonary micronodules (for example right middle lobe 4:171)  are unchanged. No suspicious new or enlarging pulmonary nodules.  - A right paratracheal lymph node measures up to 7  mm in short axis (2:15) unchanged to slightly decreased in size when  accounting for difference in technique from priors  - Stable linear nonmass-like low attenuation lesion in  the right hepatic lobe abutting the middle hepatic vein (3:123). This  is probably benign, previously evaluated on liver MRI 11/17/2023 and  not hypermetabolic on prior PET/CT 3/24/2023  - Unchanged splenomegaly measuring up to 15.5 cm maximally in  craniocaudal dimension  - Status post subtotal colectomy with ileorectal anastomosis.  Previously  described mild edema adjacent to the anastomosis has resolved.   Normal caliber bowel, no findings to suggest bowel obstruction. Again  a short segment of a single loop of nonobstructed small bowel extends  into a small ventral abdominal wall hernia in the mid pelvis.  - Unchanged borderline enlarged portacaval lymph node  (3:155) measuring up to 10 mm in short axis dating back to February 2023.    Pt reports at time of scan she had URI sx, thought it was allergies vs cold end of June, pt had sx of congestion and cough after her son was ill, no fevers. Pts son was sick, but he was unsure if he had COVID 2/2 him losing sense of taste.      REVIEW OF SYSTEMS:   A 14 point ROS was reviewed with pertinent positives and negatives in the HPI.        HOME MEDICATIONS:  Current Outpatient Medications   Medication Sig Dispense Refill     cyanocobalamin 1000 MCG TBCR Take 1,000 mcg by mouth daily 100 tablet 1     diltiazem (CARDIZEM) 30 MG tablet Take 1 tablet (30 mg) by mouth 3 times daily as needed (take for afib or increaed palpitations) 15 tablet 1     fluocinonide (LIDEX) 0.05 % external cream APPLY A SMALL AMOUNT TO AFFECTED AREA(S) TWICE DAILY AS NEEDED 60 g 1     gabapentin (NEURONTIN) 300 MG capsule Take 1 capsule (300 mg) by mouth 3 times daily 90 capsule 3     lidocaine-prilocaine (EMLA) 2.5-2.5 % external cream Apply topically as needed for moderate pain Apply 15-20 minutes prior to port access 1 g 4     MAGNESIUM OXIDE 400 (240 Mg) MG tablet TAKE 1 TABLET (400 MG) BY MOUTH 2 TIMES DAILY 60 tablet 0     metoprolol succinate ER (TOPROL XL) 50 MG 24 hr tablet Take 1 tablet (50 mg) by mouth daily 90 tablet 3     Multiple Vitamins-Iron (MULTI-DAY PLUS IRON PO) Take 1 tablet by mouth daily       rivaroxaban ANTICOAGULANT (XARELTO) 15 MG TABS tablet Take 1 tablet (15 mg) by mouth daily (with dinner) 90 tablet 2     sodium bicarbonate 650 MG tablet Take 1 tablet (650 mg) by mouth 2 times daily 180 tablet 3      VITAMIN D3 50 MCG (2000 UT) tablet TAKE 1 TABLET (50 MCG) BY MOUTH DAILY 30 tablet 6     loperamide (IMODIUM) 2 MG capsule Take 2 mg by mouth daily as needed for diarrhea (Patient not taking: Reported on 6/3/2024)           ALLERGIES:  Allergies   Allergen Reactions     No Known Drug Allergy          PAST MEDICAL HISTORY:  Past Medical History:   Diagnosis Date     Arthropathia 08/05/2010     B12 deficiency anemia 10/21/2010     Benign essential hypertension with target blood pressure below 140/90 10/10/2016     CARDIOVASCULAR SCREENING; LDL GOAL LESS THAN 160 10/31/2010     Crohn's colitis (H) 02/15/2011     Dermatitis-dishydrotic eczema-severe 08/05/2010     Hiatal hernia      HTN (hypertension) 07/21/2011     Iron deficiency anemia 10/21/2010     Malignant neoplasm of sigmoid colon (H)      Obesity      Primary pulmonary hypertension (H) 04/06/2010         PAST SURGICAL HISTORY:  Past Surgical History:   Procedure Laterality Date     COLECTOMY WITHOUT COLOSTOMY N/A 4/6/2023    Procedure: Laparoscopic Converted to Open Total abdominal colectomy;  Surgeon: Jerome Ashley MD;  Location: [facility] OR     COLONOSCOPY  10/11/10     COLONOSCOPY N/A 2/27/2023    Procedure: ATTEMPTED COLONOSCOPY WITH SIGMOID STRICTURE BIOPSY;  Surgeon: Jonathan Alcocer MD;  Location:  GI     ESOPHAGOSCOPY, GASTROSCOPY, DUODENOSCOPY (EGD), COMBINED N/A 2/27/2023    Procedure: ESOPHAGOGASTRODUODENOSCOPY, WITH BIOPSY;  Surgeon: Jonathan Alcocer MD;  Location:  GI     HYSTERECTOMY TOTAL ABDOMINAL, BILATERAL SALPINGO-OOPHORECTOMY, COMBINED N/A 4/6/2023    Procedure: Hysterectomy total abdominal, bilateral salpingo-oophorectomy;  Surgeon: Sunitha Olea MD;  Location: [facility] OR     INSERT PORT VASCULAR ACCESS Right 5/25/2023    Procedure: Ultrasound-guided right internal jugular venous access port placement with fluoroscopy;  Surgeon: Martin Thomas DO;  Location: [facility] OR     IR CHEST PORT PLACEMENT > 5 YRS OF AGE  8/21/2023      IR PORT CHECK RIGHT  7/18/2023     IR PORT REMOVAL RIGHT  8/21/2023     SIGMOIDOSCOPY FLEXIBLE N/A 4/6/2023    Procedure: Sigmoidoscopy flexible;  Surgeon: Jerome Ashley MD;  Location: [facility] OR     SIGMOIDOSCOPY FLEXIBLE N/A 5/1/2024    Procedure: Sigmoidoscopy flexible;  Surgeon: Jerome Ashley MD;  Location: [facility] OR     SURGICAL HISTORY OF -   06[birth year]    Perineorrhaphy, for widening vaginal orifice     ZZC EXPLORATORY OF ABDOMEN  1989    laparoscopy         SOCIAL HISTORY:  Social History     Socioeconomic History     Marital status:      Spouse name: Not on file     Number of children: Not on file     Years of education: Not on file     Highest education level: Not on file   Occupational History     Not on file   Tobacco Use     Smoking status: Never     Passive exposure: Current     Smokeless tobacco: Never   Substance and Sexual Activity     Alcohol use: No     Drug use: No     Sexual activity: Yes     Partners: Male   Other Topics Concern     Parent/sibling w/ CABG, MI or angioplasty before 65F 55M? Not Asked   Social History Narrative     Not on file     Social Determinants of Health     Financial Resource Strain: Not on file   Food Insecurity: Not on file   Transportation Needs: Not on file   Physical Activity: Not on file   Stress: Not on file   Social Connections: Not on file   Interpersonal Safety: Low Risk  (6/4/2024)    Interpersonal Safety      Do you feel physically and emotionally safe where you currently live?: Yes      Within the past 12 months, have you been hit, slapped, kicked or otherwise physically hurt by someone?: No      Within the past 12 months, have you been humiliated or emotionally abused in other ways by your partner or ex-partner?: No   Housing Stability: Not on file         FAMILY HISTORY:  Family History   Problem Relation Age of Onset     Hypertension Mother         on meds, alive     Cerebrovascular Disease Father         stroke about age 65,  " of cancer at 68 yrs     Diabetes Father         eventually took insulin     Anemia Father         Pernisios anemia     Kidney Disease Niece         kidney transplant     Kidney Disease Nephew         kidney transplant     Venous thrombosis No family hx of      Anesthesia Reaction No family hx of          PHYSICAL EXAM:  Vital signs:  /76 (Patient Position: Sitting, Cuff Size: Adult Regular)   Pulse 81   Temp 97.4  F (36.3  C) (Temporal)   Ht 1.549 m (5' 1\")   Wt 94.1 kg (207 lb 6 oz)   LMP  (LMP Unknown)   SpO2 97%   BMI 39.18 kg/m         ECO  GENERAL/CONSTITUTIONAL: No acute distress.  EYES: Pupils are equal and round. Extraocular movements intact without nystagmus.  No scleral icterus.  RESPIRATORY: Equal chest rise.   ABDOMEN: soft NT ND, no palpable hernia   MUSCULOSKELETAL: Warm and well-perfused, no cyanosis, clubbing, or edema.   NEUROLOGIC: Cranial nerves are grossly intact. Alert, oriented to person, place and time, answers questions appropriately.  INTEGUMENTARY: tatchy macular w/irregular borders on b/l LE,   GAIT: Steady, does not use assistive device    LABS:   Latest Reference Range & Units 24 08:27   Sodium 135 - 145 mmol/L 142   Potassium 3.4 - 5.3 mmol/L 4.3   Chloride 98 - 107 mmol/L 104   Carbon Dioxide (CO2) 22 - 29 mmol/L 24   Urea Nitrogen 8.0 - 23.0 mg/dL 31.7 (H)   Creatinine 0.51 - 0.95 mg/dL 1.70 (H)   GFR Estimate >60 mL/min/1.73m2 33 (L)   Calcium 8.8 - 10.2 mg/dL 9.9   Anion Gap 7 - 15 mmol/L 14   Albumin 3.5 - 5.2 g/dL 4.0   Protein Total 6.4 - 8.3 g/dL 6.7   Alkaline Phosphatase 40 - 150 U/L 252 (H)   ALT 0 - 50 U/L 34   AST 0 - 45 U/L 35   Bilirubin Total <=1.2 mg/dL 0.7   CEA ng/mL 2.0   Glucose 70 - 99 mg/dL 114 (H)   WBC 4.0 - 11.0 10e3/uL 6.1   Hemoglobin 11.7 - 15.7 g/dL 12.9   Hematocrit 35.0 - 47.0 % 38.3   Platelet Count 150 - 450 10e3/uL 130 (L)   RBC Count 3.80 - 5.20 10e6/uL 4.32   MCV 78 - 100 fL 89   MCH 26.5 - 33.0 pg 29.9   MCHC 31.5 - 36.5 " g/dL 33.7   RDW 10.0 - 15.0 % 13.2   % Neutrophils % 78   % Lymphocytes % 10   % Monocytes % 11   % Eosinophils % 0   % Basophils % 0   Absolute Basophils 0.0 - 0.2 10e3/uL 0.0   Absolute Eosinophils 0.0 - 0.7 10e3/uL 0.0   Absolute Immature Granulocytes <=0.4 10e3/uL 0.1   Absolute Lymphocytes 0.8 - 5.3 10e3/uL 0.6 (L)   Absolute Monocytes 0.0 - 1.3 10e3/uL 0.7   % Immature Granulocytes % 1   Absolute Neutrophils 1.6 - 8.3 10e3/uL 4.8   Absolute NRBCs 10e3/uL 0.0   NRBCs per 100 WBC <1 /100 0   (H): Data is abnormally high  (L): Data is abnormally low    PATHOLOGY:    IMAGING:  Narrative & Impression   CT CHEST/ABDOMEN/PELVIS W CONTRAST 7/2/2024 9:47 AM     HISTORY: Sigmoid and hepatic flexure cancer status post total  colectomy, status post adjuvant chemo, CT to assess response-  surveillance.; Malignant neoplasm of sigmoid colon (H); Malignant  poorly differentiated neuroendocrine carcinoma (H)     COMPARISON: Comparison is made with prior CT chest/abdomen/pelvis  including most recent from 1/2/2024 and 9/20/2023.     TECHNIQUE: CT scan of the chest, abdomen, and pelvis was performed  following injection of IV contrast. Multiplanar reformats were  obtained. Dose reduction techniques were used.   CONTRAST: 90 mL [id number]     FINDINGS:   LUNGS AND PLEURA:   New scattered bilateral subsegmental reticular/ground glass opacities  and a few very small consolidative opacities. The groundglass  opacities are most pronounced in the right mid lung along the minor  fissure and central left upper lobe and in the posterior lung bases.     The left lateral upper lobe a 3 mm pulmonary nodule (4:105) remains  unchanged dating back to most distant prior CT from February 2023. Few  scattered pulmonary micronodules (for example right middle lobe 4:171)  are unchanged. No suspicious new or enlarging pulmonary nodules.     Central airways are patent. No pleural effusion.     MEDIASTINUM/AXILLAE: Left chest port in place. No  enlarging or new  thoracic lymph nodes. A right paratracheal lymph node measures up to 7  mm in short axis (2:15) unchanged to slightly decreased in size when  accounting for difference in technique from priors. Normal heart size.  No pericardial effusion. .      CORONARY ARTERY CALCIFICATION: None.     HEPATOBILIARY: Stable linear nonmass-like low attenuation lesion in  the right hepatic lobe abutting the middle hepatic vein (3:123). This  is probably benign, previously evaluated on liver MRI 11/17/2023 and  not hypermetabolic on prior PET/CT 3/24/2023. Recommend continued  attention on surveillance imaging. No new hepatic lesion. No calcified  gallstones. No biliary duct dilation.     PANCREAS: Normal.     SPLEEN: Unchanged splenomegaly measuring up to 15.5 cm maximally in  craniocaudal dimension (6:58).     ADRENAL GLANDS: Normal.     KIDNEYS/BLADDER: Unchanged bilateral cortical scarring. No  hydronephrosis or urolithiasis.     BOWEL:   Unchanged duodenal diverticulum.  Status post subtotal colectomy with ileorectal anastomosis. Previously  described mild edema adjacent to the anastomosis has resolved.   Normal caliber bowel, no findings to suggest bowel obstruction. Again  a short segment of a single loop of nonobstructed small bowel extends  into a small ventral abdominal wall hernia in the mid pelvis.     LYMPH NODES: Unchanged borderline enlarged portacaval lymph node  (3:155) measuring up to 10 mm in short axis dating back to February 2023.     VASCULATURE: Unremarkable.     PELVIC ORGANS: Hysterectomy. No free fluid in the pelvis.     MUSCULOSKELETAL: No focal destructive osseous lesion. Multilevel  degenerative changes throughout the spine.                                                                      IMPRESSION:  1.  New bilateral small patchy multifocal prominently groundglass  opacities in the mid and upper lungs probably represent  inflammation/infection and subsegmental atelectasis.  2.   Stable small pulmonary nodules.  3.  No significant change in splenomegaly.     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 mutated, MARISSA, TPS 50%  # sigmoid colon poorly-differentiated carcinoma, KRAS G13D mutated, loss of MLH1 and PMS2, TPS 70%  - 2/23 colonoscopy: A frond-like/villous partially obstructing large mass found in sigmoid colon, partially circumferential involving two thirds of the lumen circumference, 4 cm, unable to traverse, with oozing, s/p biopsy, PATHOLOGY: Invasive poorly differentiated carcinoma, IHC panel excludes tumors of other origin, colorectal primary is favored, loss of nuclear expression of MLH1 and PMS2, MLH1 promoter methylation negative, [id number] mutation negative  -2/23 CT CAP: Shows known 4 cm proximal sigmoid colon mass with possible tumor extension (irregularity and stranding and adjacent pericolonic fat) without obstruction AND probable second mass 2.5 cm at hepatic flexure of colon; small lymph nodes adjacent to both masses (no lymphadenopathy by size criteria)  -3/23 PET:  a. There is increased FDG avidity of the sigmoid colon with focal lobular wall thickening consistent with biopsy-proven adenocarcinoma.  b. Secondary focus noted at the hepatic flexure demonstrates elevated FDG avidity and lobulated wall thickening highly suspicious for a additional primary.  c. Additionally there is a smaller focus of wall thickening with elevated FDG avidity along the left aspect of the transverse colon which is suspicious for a possible third focus of colonic malignancy.  - 3/23 CEA 6.8  -4/6/2023 laparoscopic converted to open total abdominal colectomy with ileorectal anastomosis, partial omentectomy, flexible sigmoidoscopy,  TAHBSO  PATHOLOGY:  Of note, omental resection, terminal ileum, proximal and distal anastomotic rings, uterus, cervix, bilateral fallopian tubes and ovaries negative for malignancy    Mass #1 (ascending colon/hepatic flexure): Mixed neuroendocrine-non-neuroendocrine neoplasm (MINEN):  - Neuroendocrine carcinoma component (70%) and moderately-differentiated adenocarcinoma component (30%)  - Size = 5.0 cm, invading into muscularis propria, Positive LVI  - IHC: Neuroendocrine portion: Negative for chromogranin and INSM1 but strongly express synaptophysin  - IHC: Adenocarcinoma portion: Positive for CK20, CDX2 negative for CK7, PAX8, ER, S100  Ki-67 proliferation index of approximately 80%.  MARISSA   PDL1:  COMBINED POSITIVE SCORE (CPS): 55-60  TUMOR PROPORTION SCORE (TPS): 50%   pathogenic KRAS mutation (G13D) was identified (5.2%).  MMR testing: intact  AJCC 8th edition: stage 3B mpT3 pN1a     Mass #2 (sigmoid colon): Poorly-differentiated carcinoma:  - Grade 3  - Size = 5.7 cm, invading into muscularis propria  - IHC: Positive for scar and keratin AE1/AE3, negative for CK7, CK20, CDX2, PAX8, ER, chromogranin, CD56, p40, synaptophysin, S100, INI1, p40, INSM1  Ki-67 proliferation index of approximately 70%.  MMR testing: loss of MLH1 and PMS2  PDL1:  COMBINED POSITIVE SCORE (CPS): 80  TUMOR PROPORTION SCORE (TPS): 70%  pathogenic KRAS mutation (G13D) was identified (4.5%).  MMR testing: loss of MLH1 and PMS2, intact MSH2 and MSH6  AJCC 8th edition: stage 3A mpT2 pN1a    - One of forty-one sampled lymph nodes positive (1/41), d/w pathology- unable to determine which mass is metastatic to LN    - 4/23 MRI brain w/wo contrast LAURENT  - 5/26/23 CT CAP w/ contrast and IVF before and after CT (post op baseline prior to starting tx):  1.  3 mm nodule RML and 5 mm lateral EDSON nodule, minimally increased from previous  2.  New 4 mm EDSON groundglass density nodule  3.  Several prominent borderline enlarged mediastinal lymph nodes  slightly increased from previous  4.  Splenomegaly 14.5 cm  5. S/p subtotal colectomy with ileorectal anastomosis with postsurgical changes along the ventral abdominal wall midline  - 5/8/23 I presented this case at [facility] tumor board as above   - 5/19/23 second opinion at [facility] w/medical oncologist Dr. Acharya, thorough consultation and recommendations appreciated, overall agree w/FOLFOX x6 months, possible nivo or ipi nivo in second line; if metastatic platinum etoposide vs ipi nivo  - 5/25/23 port placement    -5/2023 genetic counseling: Negative for mutations detailed below     Treatment:  -4/6/2023 laparoscopic converted to open total abdominal colectomy with ileorectal anastomosis, partial omentectomy, flexible sigmoidoscopy, TAHBSO (details above)  - 5/31/23-12/12/23 adjuvant mFOLFOX 6 q14 days x12 cycles/6 months w/dose reduced oxaliplatin and dropped 5FU bolus C2 onwards, IVF and IV Mg replacement weekly and 2/2 recurrent afib w/RVR- During the days of chemo and 2 days after ONLY: take long acting diltiazem 120 mg daily with 30 mg of short acting diltiazem as needed for atrial fibrillation with HR >110 (C1D1 w/oxaliplatin dose reduced from 85mg/m2 to 64mg/m2 2/2 renal function, 5/31/23 (complicated by hospitalization for afib w/RVR C1D4 6/3/23), C2D1 7/19/23 (w/o 5FU bolus C2 onwards, inpatient w/continuous cardiac monitoring), C3D1 8/2/2023 (outpatient, admitted to ER for A-fib with RVR C3D3 8/4/23), C4D1 8/16/23, C5D1 8/30/2023, C6D1 9/13/2023, C7D1 9/26/23, C8D1 10/11/23, C9D1 10/25/23, C10D1 11/8/23, C11D1 11/22/23. C12D1 12/12/23 (1 week delay 2/2 cytopenias)    Treatment related AE:  - hearing changes-stable w/dose reduced oxaliplatin, continue use of b/l hearing aids  - thrombocytopenia-  7/24 platelet 130K, w/splenomegaly, no bleeding while on xarelto  - elevated LFTs- alk phos 2/24 432->252 7/24, alk phos iso enzyme- majority liver, liver normal on 9/23 CT and 11/23 MRI (no mets), hepatology  consult 7/15/24  - neuropathy- grade 2, feet>hands, RLE>LLE, increase gabapentin from 300mg TID to 300mg AM and afternoon, 600mg in PM- rx given; likely component of arthritis too  - afib- w/palpitation and prn diltiazem use, follow up cardiology      Imaging:  - 7/24 CT CAP  1.  New bilateral small patchy multifocal prominently groundglass opacities in the mid and upper lungs probably represent inflammation/infection and subsegmental atelectasis.  2.  Stable small pulmonary nodules. Stable right paratracheal LN 0.7 cm and portacaval LN 1.0 cm. Stable right hepatic lobe lesion- probably benign (previously evaluated on liver MRI 11/17/2023 and not hypermetabolic on prior PET/CT 3/24/2023). Status post subtotal colectomy with ileorectal anastomosis. Previously described mild edema adjacent to the anastomosis has resolved.  3.  No significant change in splenomegaly.  4. Status post subtotal colectomy with ileorectal anastomosis. Previously described mild edema adjacent to the anastomosis has resolved. Normal caliber bowel, no findings to suggest bowel obstruction. Again a short segment of a single loop of nonobstructed small bowel extends into a small ventral abdominal wall hernia in the mid pelvis.    - 1/2/24 CT CAP (after 12 cycles of mFOLFOx w/o 5FU bolus):   1.  Stable tiny nodules in both lungs.  2.  Stable borderline enlarged portacaval lymph node.  3.  Splenomegaly slightly increased from previous study.  4.  Postsurgical changes of subtotal colectomy with ileorectal anastomosis.  5.  Moderate-sized duodenal diverticulum.  6.  Stable nonmass-like area of low attenuation in the right hepatic lobe adjacent to the middle hepatic vein could represent some focal fatty infiltration. Attention is recommended to this area on future exams, but the stability is reassuring.    -11/23 MRI liver: negative for mets, stable cental liver hemangioma and splenomegaly    Labs:  3/23 CEA 6.8  5/23 CEA 1.8  7/23 CEA 2.8  10/23 CEA  2.0  12/23 CEA 2.3  4/24 CEA 2.1  7/24 CEA 2.0    Procedures  - 5/1/24 flex sig with Dr. Ashley: The entire examined rectum appeared normal.  Patent side-to-end ileorectal anastomosis; easily transversed with adult flexible sigmoidoscope       PLAN:  - port flush q6-8 weeks, discussed port removal and pt would like port removed  - continue active surveillance:     History and physical examination every 3-6 mo for 2 y, then every 6 mo for a total of 5 y    CEA every 3-6 mo for 2 y, then every 6 mo for a total of 5 y    Chest/abdominal/pelvic CT every 6-12 mo (category 2B for frequency <12 mo) from date of surgery for a total of 5 y    Colonoscopy in 1 y after surgery except if no preoperative colonoscopy due to obstructing lesion, colonoscopy in 3-6 mo   If advanced adenoma, repeat in 1 y   If no advanced adenoma, repeat in 3 y, then every 5 y  - H&P and CEA due 10/24- ordered today  - repeat CT CAP 10/2024 w/IVF before and after- short term follow up to follow up on lung findings, I think this is inflammation related 2/2 preceding URI rather than her cancer    #parxosymal afib  - within 3 days of receiving 5FU, prior cardiac risk factors htn, prediabetes  - 6/3/23 presented to ER w/lightheadedness, dizziness, cardioverted s/p diltiazem ggt in ER  - 6/23 DPD deficiency testing negative  - currently on metoprolol XL 25 mg daily, apixaban 5 mg twice daily  - 6/30/2023 consultation with cardio oncologist Dr. Garza; I spoke with Dr. Garza 6/30/2023 as well, [id number] score 3, recommending starting apixaban 5 mg twice daily, okay to retrial FOLFOX while patient is on metoprolol.  Patient is currently on Xarelto  - 8/15/2023 cardiology follow-up: Increase metoprolol XL to 50 mg daily; During the days of chemo and 2 days after take long acting diltiazem 120 mg daily with 30 mg of short acting diltiazem as needed for atrial fibrillation with HR >110  - cardiology follow-up 12/14/2023 noted- no longer on long acting  diltiazem, only using short acting prn; continuing metoprolol and xarelto and plan for follow up 12/2024      #Anemia secondary to iron deficiency and B12 deficiency  - terminal ileum removed at time of colon surgery, monitor for nutrient def  - 2/23 hgb 6.8, ferritin 21, iron sat index 10, TIBC 265, iron 27, b12 1493  - On B12 1000 mcg p.o. daily and ferrous sulfate 325 mg p.o. daily  - 4/9/23 s/p 1 uprbcs  - 5/9/23 hgb 11.3,5/30/23 hgb 9.4  - based on Ganzoni equation w/goal hgb 14 and 500mg needed for iron stores, pts iron deficit is 1400 mg  - s/p ferric carboxymaltose 750mg x2 doses 6/14/23 and 6/28/23  - 7/11/23 ferritin 1022, iron sat 22, TIBC 201, iron 44    - 8/23 hgb 13.3  - 10/23 hgb 11.1  - 11/23 iron studies, B12, RBC folate WNL  - 7/24 hgb 12.9    #rash  - full body (extremities and trunk), patchy macular w/irregular borders, w/pruritus initially, x1 month  - change all products to scent and dye free (hypoallergenic), if persistent sx follow up with PCP    #Crohn's  - dx since at least 2010     RTC 3 months for follow up with Dr. Fernandez, labs 1-2 days prior to visit (after CT)- pt prefers in person visits only at [place]      Francisco Lee DO  Hematology/Oncology  [facility]        Again, thank you for allowing me to participate in the care of your patient.        Sincerely,        FRANCISCO LEE DO

## 2024-07-09 NOTE — PROGRESS NOTES
[facility] [place] [facility]    Hematology/Oncology Established Patient Follow Up Note      Today's Date: 7/9/24    Reason for follow up: Sigmoid cancer    HISTORY OF PRESENT ILLNESS: Nadia Ladd is a 68 year old female who presents for follow-up    Patient has medical history including Crohn's disease on sulfasalazine, anemia secondary to iron deficiency and B12 deficiency, obesity, hypertension, prediabetes, eczema, pulmonary hypertension, hiatal hernia, mild splenomegaly (14.7 cm in 2/23)    - 2/23 pt noted increasing fatigue, found to have iron deficiency anemia w/hgb 6.8 (previously required RBC transfusion when dx w/Crohn's), did have intermittent changes in stool but not persistent (noted minimal blood in stool)   - 2/23 upper endoscopy for iron deficiency anemia and Crohn's disease: Hiatal hernia, normal stomach, normal duodenum  - 2/23 colonoscopy: A frond-like/villous partially obstructing large mass found in sigmoid colon, partially circumferential involving two thirds of the lumen circumference, 4 cm, unable to traverse, with oozing, s/p biopsy  PATHOLOGY:  A.  Stomach, antrum: Biopsy:  - Antral type mucosa with mild chronic inactive gastritis  - Immunostain for Helicobacter pylori is negative      B.  Colon, sigmoid, stricture: Biopsy:  - Invasive poorly differentiated carcinoma  The sigmoid stricture shows a high-grade carcinoma without definitive gland formation.  Given the poorly differentiated appearance, an immunohistochemical panel was performed to exclude the possibility of a tumor by direct extension or metastasis.   Immunohistochemical stains are performed and show the tumor to be diffusely positive for CK7 and partially positive for CK20 and SATB2.  There is no significant tumor reactivity for CDX2, GATA3, PAX8, TTF-1, and chromogranin.  Synaptophysin highlights very rare positive cells. Although the CK7/CK20 profile is not entirely typical for a colorectal carcinoma, immunostains  "for tumors of other origins are negative and a colorectal primary is still favored.   - Mismatch repair:  1) MLH1-loss of nuclear expression  2) MSH2-intact  3) MSH6-intact  4) PMS2-loss of nuclear expression  -MLH1 promoter methylation: NEGATIVE    -2/23 CT CAP:  A) 4 cm length mass in the proximal sigmoid colon. There is some irregularity and stranding in the adjacent pericolonic fat which may indicate tumor extension. There is no obstruction.   B) there is a second probable mass at the hepatic flexure of the colon measuring 2.5 cm in length   C)There are small lymph nodes in the mesial colon adjacent to the hepatic flexure abnormality and the sigmoid colonic mass. No lymphadenopathy by size criteria  D) There is submucosal fatty deposition in the colon, most severe in the distal sigmoid colon and rectum, which can be seen secondary to quiescent inflammatory bowel disease.  E) no liver lesion    -3/23 PET:  a. There is increased FDG avidity of the sigmoid colon with focal lobular wall thickening consistent with biopsy-proven adenocarcinoma.  b. Secondary focus noted at the hepatic flexure demonstrates elevated FDG avidity and lobulated wall thickening highly suspicious for a additional primary.  c. Additionally there is a smaller focus of wall thickening with elevated FDG avidity along the left aspect of the transverse colon  which is suspicious for a possible third focus of colonic malignancy.    -3/23 CEA 6.8  - 3/23 colorectal surgery consultation with - in the setting of Crohn's, at least sigmoid colectomy with possible total abdominal colectomy with either ileorectal anastomosis or end ileostomy (\"rectum can remain active and be actively surveyed annually\")    -3/23 genetic counseling, testing for Chan syndrome    - 4/5/2023 gynecologic oncology consultation for risk reduction surgery with hysterectomy and BSO at time of colectomy    -4/6/2023 laparoscopic converted to open total abdominal " colectomy with ileorectal anastomosis, partial omentectomy, flexible sigmoidoscopy, TAHBSO  A. OMENTUM, RESECTION:  - Adipose tissue with no significant histopathologic abnormality  - No evidence of malignancy     B. COLON, RESECTION:  Mass #1 (ascending colon/hepatic flexure): Mixed neuroendocrine-non-neuroendocrine neoplasm (MINEN):  - Neuroendocrine carcinoma component (70%) and moderately-differentiated adenocarcinoma component (30%)  - Size = 5.0 cm, invading into muscularis propria  - Positive LVI  - Negative macroscopic tumor perforation, negative PNI  - Negative surgical resection margins  - IHC: Neuroendocrine portion: Negative for chromogranin and INSM1 but strongly express synaptophysin  - IHC: Adenocarcinoma portion: Positive for CK20, CDX2 negative for CK7, PAX8, ER, S100  Ki-67 proliferation index of approximately 80%.  MMR:  Intact nuclear expression of MLH1, MSH2, MHS6, PMS2  PDL1:  COMBINED POSITIVE SCORE (CPS): 55-60  TUMOR PROPORTION SCORE (TPS): 50%   pathogenic KRAS mutation (G13D) was identified (5.2%).  AJCC 8th edition: stage 3B mpT3 pN1a     Mass#2 (sigmoid colon): Poorly-differentiated carcinoma:  - Grade 3  - Size = 5.7 cm, invading into muscularis propria  - Negative for microscopic tumor perforation, LVI, perineural invasion  - Negative surgical resection margins  - IHC: Positive for scar and keratin AE1/AE3, negative for CK7, CK20, CDX2, PAX8, ER, chromogranin, CD56, p40, synaptophysin, S100, INI1, p40, INSM1  Ki-67 proliferation index of approximately 70%.  MMR: loss of nuclear expression MLH1 and PMS2, intact nuclear expression MSH2 and MSH6  PDL1:  COMBINED POSITIVE SCORE (CPS): 80  TUMOR PROPORTION SCORE (TPS): 70%  pathogenic KRAS mutation (G13D) was identified (4.5%).  AJCC 8th edition: stage 3A mpT2 pN1a    - One of forty-one sampled lymph nodes positive (1/41)  - Benign appendix  - Tubular adenoma    C. UTERUS, CERVIX, BILATERAL FALLOPIAN TUBES & OVARIES, :  - Benign endometrial  polyps; atrophic endometrium  - Uterus, cervix, bilateral fallopian tubes, and ovaries with no significant morphologic abnormalities  - No evidence of dysplasia or malignancy     D. SMALL INTESTINE, TERMINAL ILEUM, TERMINAL ILIUM:  - Benign ileum  - No evidence of dysplasia or malignancy  - Negative for metastases to one of one sampled lymph node (0 /1)     E. PROXIMAL ANASTOMOTIC RING:  - Benign small intestine  - No evidence of dysplasia or malignancy     F. DISTAL ANASTOMOTIC RING:  - Benign colon  - No evidence of dysplasia or malignancy    CRC NGS:   --mutations positive for KRAS G13D mutation 5.2% mass 1, KRAS G13D mutation 4.5% mass 2 (negative for AKT1; BRAF; ERBB2; KRAS; NRAS; PIK3CA; PTEN)  --TMB score: 17.064 mut/Mb mass 1 (CPS 55-60, TPS 50%), 60.943 mut/Mb mass 2 (CPS 80, TPS 70%)  --fusion negative including NTRK and RET for mass 1 and 2 (also negative for AKT1, AKT3, ALK, AR, HFNDAE64, CLAUDINE, BCOR, BRAF, BRD3, BRD4, CAMTA1, CCNB3, CCND1, , CIC, CSF1, CSF1R, CTNNB1, DNAJB1, EGFR, EPC1, ERBB2, ERBB4, ERG, ESR1, ESRRA, ETV1, ETV4, ETV5, ETV6, EWSR1, FGFR1, FGFR2, FGFR3, FGR, FOS, FOSB, FOXO1, FOXO4, FOXR2, FUS, GLI1, GRB7, HMGA2, HRAS, IDH1, IDH2, INSR, JAK2, JAK3, JAZF1, KRAS, MAML2, MAP2K1, MAST1, MAST2, MEAF6, MET, MKL2, MN1, MSMB, MUSK, MYB, MYBL1, MYOD1, NCOA1, NCOA2, NOTCH1, NOTCH2, NR4A3, NRAS, NRG1, NTRK1, NTRK2, NTRK3, NUMBL1, NUTM1, PAX3, PDGFB, PDGFRA, PDGFRB, PHF1, PIK3CA, PKN1, PLAG1, PPARG, PRKACA, PRKCA, PRKCB, RAF1, RELA, RET, ROS1, RPSO2, RSPO3, SS18, STAT6, TAF15, TCF12, TERT, TFE3, TFEB, TFG, THADA, TMPRSS2, USP6, VGLL2, YAP1, YWHAE)    -4/11/2023 patient discharged from hospital, planning for 30 days of lovenox postop, oxycodone for pain (required 1 unit PRBC for anemia)    -5/8/23 I presented this case at Counts include 234 beds at the [facility] CRC tumor board and their recommendations include:  - common to see this in Crohn's, overall poor prognosis, treat aggressively, may be poorly responsive to  chemotherapy  - standard of care is mFOLFOX 6 x 12 cycles  - acknowledge that pt has high TPS and likely response to immunotherapy however not sufficient data or standard of care in stage 3 adjuvant setting  - add MMR testing to mass #1 hepatic flexure mass    - 5/9/23 admitted for acute renal failure w/Cr 3.82 w/K 7.0    - 5/19/23 second opinion at [facility] w/medical oncologist Dr. Acharya, thorough consultation and recommendations appreciated     - pt would like to proceed with FOFLOX (with dose reduced oxaliplatin due to kidney function)    -5/2023 genetic counseling: Negative for mutations in EPCAM, MLH1, MSH2, MSH6, and PMS2 genes.  Negative for mutations in APC, AXIN2, BMPR1A, CDH1, CHEK2, EPCAM, GREM1, MLH1, MSH2, MSH3, MSH6, MUTYH, NTHL1, PMS2, POLD1, POLE, PTEN, SMAD4, STK11, TP53, CDKN1B, MEN1, NF1, RET, TSC1, TSC2, and VHL genes    - 5/25/23 port placement    - 5/26/23 CT CAP w/ contrast and IVF before and after CT (baseline prior to starting tx):  1.  3 mm nodule RML and 5 mm lateral EDSON nodule, minimally increased from previous  2.  New 4 mm EDSON groundglass density nodule  3.  Several prominent borderline enlarged mediastinal lymph nodes slightly increased from previous  4.  Splenomegaly 14.5 cm  5. S/p subtotal colectomy with ileorectal anastomosis with postsurgical changes along the ventral abdominal wall midline    -5/31/23 started mFOLFOX 6 w/dose reduced oxaliplatin    - C2D1 6/14/23 held due to new onset paroxysmal A-fib with RVR requiring hospitalization 6/3/2023 (also hypoMg, dehydrated)  - 6/19/2023 I presented this case at [facility] colorectal tumor board at the [facility], it is possible that paroxysmal A-fib may be related to 5-FU.  Options include withholding further treatment versus retrialing 5-FU under the guidance of cardio oncology in an inpatient setting.  No other adjuvant treatment options available, including immunotherapy  - 6/30/2023 consultation with cardio oncologist Dr. Garza; I  spoke with Dr. Garza 6/30/2023 as well, [id number] score 3, recommending starting apixaban 5 mg twice daily, okay to retrial FOLFOX while patient is on metoprolol  -7/18/2023  CT CAP- subcm lung nodules stable, borderline and mildly enlarged mediastinal LN and periportal LN stable, splenomegaly 15.7cm, Subtotal colectomy with ileorectal anastomosis. No acute inflammation or obstruction   - 7/19/2023 mFOLFOX6 C2D1  (inpatient w/continuous cardiac monitoring)      -9/23 CT CAP (after cycle 6)  -Subtotal colectomy, rectal anastomosis appears stable, some adjacent mild scarring, no bowel obstruction or inflammation, no free fluid  -No new adenopathy, no liver lesions  -Stable EDSON 3 mm nodule, other stable nodules at EDSON, RML  -Stable several small thoracic lymph nodes in mediastinum  -Mild wall thickening of the gallbladder  -Spleen is smaller measuring 13.7 cm  -Stable regions of bilateral cortical thinning in the kidneys, with few tiny cysts    - 5/31/23-12/12/23 adjuvant mFOLFOX 6 q14 days x12 cycles/6 months w/dose reduced oxaliplatin and dropped 5FU bolus C2 onwards, IVF and IV Mg replacement weekly and 2/2 recurrent afib w/RVR- During the days of chemo and 2 days after ONLY: take long acting diltiazem 120 mg daily with 30 mg of short acting diltiazem as needed for atrial fibrillation with HR >110 (C1D1 w/oxaliplatin dose reduced from 85mg/m2 to 64mg/m2 2/2 renal function, 5/31/23 (complicated by hospitalization for afib w/RVR C1D4 6/3/23), C2D1 7/19/23 (w/o 5FU bolus C2 onwards, inpatient w/continuous cardiac monitoring), C3D1 8/2/2023 (outpatient, admitted to ER for A-fib with RVR C3D3 8/4/23), C4D1 8/16/23, C5D1 8/30/2023, C6D1 9/13/2023, C7D1 9/26/23, C8D1 10/11/23, C9D1 10/25/23, C10D1 11/8/23, C11D1 11/22/23. C12D1 12/12/23 (1 week delay 2/2 cytopenias)    INTERIM HISTORY:    Treatment related AE:  - hearing changes-stable w/dose reduced oxaliplatin, continue use of b/l hearing aids  - thrombocytopenia-   7/24 platelet 130K, no bleeding while on xarelto  - elevated LFTs- alk phos 2/24 432->252 7/24, alk phos iso enzyme- majority liver, liver normal on 9/23 CT and 11/23 MRI (no mets), hepatology consult 7/15/24  - neuropathy- grade 2, feet>hands, RLE>LLE, continue gabapentin 300mg TID; likely component of arthritis too  - afib- reports heart racing in AM, for the 1st time in months, took diltiazem that she has prn and sx resolved. Staying hydrated- 4 16 oz bottle water per day    No weight loss, abdominal pain, melena, hematochezia    - 7/24 CT CAP w/contrast  - New scattered bilateral subsegmental reticular/ground glass opacities  and a few very small consolidative opacities. The groundglass  opacities are most pronounced in the right mid lung along the minor  fissure and central left upper lobe and in the posterior lung bases.   - The left lateral upper lobe a 3 mm pulmonary nodule (4:105) remains  unchanged dating back to most distant prior CT from February 2023. Few  scattered pulmonary micronodules (for example right middle lobe 4:171)  are unchanged. No suspicious new or enlarging pulmonary nodules.  - A right paratracheal lymph node measures up to 7  mm in short axis (2:15) unchanged to slightly decreased in size when  accounting for difference in technique from priors  - Stable linear nonmass-like low attenuation lesion in  the right hepatic lobe abutting the middle hepatic vein (3:123). This  is probably benign, previously evaluated on liver MRI 11/17/2023 and  not hypermetabolic on prior PET/CT 3/24/2023  - Unchanged splenomegaly measuring up to 15.5 cm maximally in  craniocaudal dimension  - Status post subtotal colectomy with ileorectal anastomosis. Previously  described mild edema adjacent to the anastomosis has resolved.   Normal caliber bowel, no findings to suggest bowel obstruction. Again  a short segment of a single loop of nonobstructed small bowel extends  into a small ventral abdominal wall hernia  in the mid pelvis.  - Unchanged borderline enlarged portacaval lymph node  (3:155) measuring up to 10 mm in short axis dating back to February 2023.    Pt reports at time of scan she had URI sx, thought it was allergies vs cold end of June, pt had sx of congestion and cough after her son was ill, no fevers. Pts son was sick, but he was unsure if he had COVID 2/2 him losing sense of taste.      REVIEW OF SYSTEMS:   A 14 point ROS was reviewed with pertinent positives and negatives in the HPI.        HOME MEDICATIONS:  Current Outpatient Medications   Medication Sig Dispense Refill    cyanocobalamin 1000 MCG TBCR Take 1,000 mcg by mouth daily 100 tablet 1    diltiazem (CARDIZEM) 30 MG tablet Take 1 tablet (30 mg) by mouth 3 times daily as needed (take for afib or increaed palpitations) 15 tablet 1    fluocinonide (LIDEX) 0.05 % external cream APPLY A SMALL AMOUNT TO AFFECTED AREA(S) TWICE DAILY AS NEEDED 60 g 1    gabapentin (NEURONTIN) 300 MG capsule Take 1 capsule (300 mg) by mouth 3 times daily 90 capsule 3    lidocaine-prilocaine (EMLA) 2.5-2.5 % external cream Apply topically as needed for moderate pain Apply 15-20 minutes prior to port access 1 g 4    MAGNESIUM OXIDE 400 (240 Mg) MG tablet TAKE 1 TABLET (400 MG) BY MOUTH 2 TIMES DAILY 60 tablet 0    metoprolol succinate ER (TOPROL XL) 50 MG 24 hr tablet Take 1 tablet (50 mg) by mouth daily 90 tablet 3    Multiple Vitamins-Iron (MULTI-DAY PLUS IRON PO) Take 1 tablet by mouth daily      rivaroxaban ANTICOAGULANT (XARELTO) 15 MG TABS tablet Take 1 tablet (15 mg) by mouth daily (with dinner) 90 tablet 2    sodium bicarbonate 650 MG tablet Take 1 tablet (650 mg) by mouth 2 times daily 180 tablet 3    VITAMIN D3 50 MCG (2000 UT) tablet TAKE 1 TABLET (50 MCG) BY MOUTH DAILY 30 tablet 6    loperamide (IMODIUM) 2 MG capsule Take 2 mg by mouth daily as needed for diarrhea (Patient not taking: Reported on 6/3/2024)           ALLERGIES:  Allergies   Allergen Reactions     No Known Drug Allergy          PAST MEDICAL HISTORY:  Past Medical History:   Diagnosis Date    Arthropathia 08/05/2010    B12 deficiency anemia 10/21/2010    Benign essential hypertension with target blood pressure below 140/90 10/10/2016    CARDIOVASCULAR SCREENING; LDL GOAL LESS THAN 160 10/31/2010    Crohn's colitis (H) 02/15/2011    Dermatitis-dishydrotic eczema-severe 08/05/2010    Hiatal hernia     HTN (hypertension) 07/21/2011    Iron deficiency anemia 10/21/2010    Malignant neoplasm of sigmoid colon (H)     Obesity     Primary pulmonary hypertension (H) 04/06/2010         PAST SURGICAL HISTORY:  Past Surgical History:   Procedure Laterality Date    COLECTOMY WITHOUT COLOSTOMY N/A 4/6/2023    Procedure: Laparoscopic Converted to Open Total abdominal colectomy;  Surgeon: Jerome Ashley MD;  Location: [facility] OR    COLONOSCOPY  10/11/10    COLONOSCOPY N/A 2/27/2023    Procedure: ATTEMPTED COLONOSCOPY WITH SIGMOID STRICTURE BIOPSY;  Surgeon: Jonathan Alcocer MD;  Location:  GI    ESOPHAGOSCOPY, GASTROSCOPY, DUODENOSCOPY (EGD), COMBINED N/A 2/27/2023    Procedure: ESOPHAGOGASTRODUODENOSCOPY, WITH BIOPSY;  Surgeon: Jonathan Alcocer MD;  Location: [facility]    HYSTERECTOMY TOTAL ABDOMINAL, BILATERAL SALPINGO-OOPHORECTOMY, COMBINED N/A 4/6/2023    Procedure: Hysterectomy total abdominal, bilateral salpingo-oophorectomy;  Surgeon: Sunitha Olea MD;  Location: [facility] OR    INSERT PORT VASCULAR ACCESS Right 5/25/2023    Procedure: Ultrasound-guided right internal jugular venous access port placement with fluoroscopy;  Surgeon: Martin Thomas DO;  Location: [facility] OR    IR CHEST PORT PLACEMENT > 5 YRS OF AGE  8/21/2023    IR PORT CHECK RIGHT  7/18/2023    IR PORT REMOVAL RIGHT  8/21/2023    SIGMOIDOSCOPY FLEXIBLE N/A 4/6/2023    Procedure: Sigmoidoscopy flexible;  Surgeon: Jerome Ashley MD;  Location: [facility] OR    SIGMOIDOSCOPY FLEXIBLE N/A 5/1/2024    Procedure: Sigmoidoscopy flexible;  Surgeon:  Jerome Ashley MD;  [place] [facility] [place]    SURGICAL HISTORY OF -   77    Perineorrhaphy, for widening vaginal orifice    ZZC EXPLORATORY OF ABDOMEN  1989    laparoscopy         SOCIAL HISTORY:  Social History     Socioeconomic History    Marital status:      Spouse name: Not on file    Number of children: Not on file    Years of education: Not on file    Highest education level: Not on file   Occupational History    Not on file   Tobacco Use    Smoking status: Never     Passive exposure: Current    Smokeless tobacco: Never   Substance and Sexual Activity    Alcohol use: No    Drug use: No    Sexual activity: Yes     Partners: Male   Other Topics Concern    Parent/sibling w/ CABG, MI or angioplasty before 65F 55M? Not Asked   Social History Narrative    Not on file     Social Determinants of Health     Financial Resource Strain: Not on file   Food Insecurity: Not on file   Transportation Needs: Not on file   Physical Activity: Not on file   Stress: Not on file   Social Connections: Not on file   Interpersonal Safety: Low Risk  (2024)    Interpersonal Safety     Do you feel physically and emotionally safe where you currently live?: Yes     Within the past 12 months, have you been hit, slapped, kicked or otherwise physically hurt by someone?: No     Within the past 12 months, have you been humiliated or emotionally abused in other ways by your partner or ex-partner?: No   Housing Stability: Not on file         FAMILY HISTORY:  Family History   Problem Relation Age of Onset    Hypertension Mother         on meds, alive    Cerebrovascular Disease Father         stroke about age 65,  of cancer at 68 yrs    Diabetes Father         eventually took insulin    Anemia Father         Pernisios anemia    Kidney Disease Niece         kidney transplant    Kidney Disease Nephew         kidney transplant    Venous thrombosis No family hx of     Anesthesia Reaction No family hx of          PHYSICAL  "EXAM:  Vital signs:  /76 (Patient Position: Sitting, Cuff Size: Adult Regular)   Pulse 81   Temp 97.4  F (36.3  C) (Temporal)   Ht 1.549 m (5' 1\")   Wt 94.1 kg (207 lb 6 oz)   LMP  (LMP Unknown)   SpO2 97%   BMI 39.18 kg/m         ECO  GENERAL/CONSTITUTIONAL: No acute distress.  EYES: Pupils are equal and round. Extraocular movements intact without nystagmus.  No scleral icterus.  RESPIRATORY: Equal chest rise.   ABDOMEN: soft NT ND, no palpable hernia   MUSCULOSKELETAL: Warm and well-perfused, no cyanosis, clubbing, or edema.   NEUROLOGIC: Cranial nerves are grossly intact. Alert, oriented to person, place and time, answers questions appropriately.  INTEGUMENTARY: tatchy macular w/irregular borders on b/l LE,   GAIT: Steady, does not use assistive device    LABS:   Latest Reference Range & Units 24 08:27   Sodium 135 - 145 mmol/L 142   Potassium 3.4 - 5.3 mmol/L 4.3   Chloride 98 - 107 mmol/L 104   Carbon Dioxide (CO2) 22 - 29 mmol/L 24   Urea Nitrogen 8.0 - 23.0 mg/dL 31.7 (H)   Creatinine 0.51 - 0.95 mg/dL 1.70 (H)   GFR Estimate >60 mL/min/1.73m2 33 (L)   Calcium 8.8 - 10.2 mg/dL 9.9   Anion Gap 7 - 15 mmol/L 14   Albumin 3.5 - 5.2 g/dL 4.0   Protein Total 6.4 - 8.3 g/dL 6.7   Alkaline Phosphatase 40 - 150 U/L 252 (H)   ALT 0 - 50 U/L 34   AST 0 - 45 U/L 35   Bilirubin Total <=1.2 mg/dL 0.7   CEA ng/mL 2.0   Glucose 70 - 99 mg/dL 114 (H)   WBC 4.0 - 11.0 10e3/uL 6.1   Hemoglobin 11.7 - 15.7 g/dL 12.9   Hematocrit 35.0 - 47.0 % 38.3   Platelet Count 150 - 450 10e3/uL 130 (L)   RBC Count 3.80 - 5.20 10e6/uL 4.32   MCV 78 - 100 fL 89   MCH 26.5 - 33.0 pg 29.9   MCHC 31.5 - 36.5 g/dL 33.7   RDW 10.0 - 15.0 % 13.2   % Neutrophils % 78   % Lymphocytes % 10   % Monocytes % 11   % Eosinophils % 0   % Basophils % 0   Absolute Basophils 0.0 - 0.2 10e3/uL 0.0   Absolute Eosinophils 0.0 - 0.7 10e3/uL 0.0   Absolute Immature Granulocytes <=0.4 10e3/uL 0.1   Absolute Lymphocytes 0.8 - 5.3 10e3/uL 0.6 " (L)   Absolute Monocytes 0.0 - 1.3 10e3/uL 0.7   % Immature Granulocytes % 1   Absolute Neutrophils 1.6 - 8.3 10e3/uL 4.8   Absolute NRBCs 10e3/uL 0.0   NRBCs per 100 WBC <1 /100 0   (H): Data is abnormally high  (L): Data is abnormally low    PATHOLOGY:    IMAGING:  Narrative & Impression   CT CHEST/ABDOMEN/PELVIS W CONTRAST 7/2/2024 9:47 AM     HISTORY: Sigmoid and hepatic flexure cancer status post total  colectomy, status post adjuvant chemo, CT to assess response-  surveillance.; Malignant neoplasm of sigmoid colon (H); Malignant  poorly differentiated neuroendocrine carcinoma (H)     COMPARISON: Comparison is made with prior CT chest/abdomen/pelvis  including most recent from 1/2/2024 and 9/20/2023.     TECHNIQUE: CT scan of the chest, abdomen, and pelvis was performed  following injection of IV contrast. Multiplanar reformats were  obtained. Dose reduction techniques were used.   CONTRAST: 90 mL [id number]     FINDINGS:   LUNGS AND PLEURA:   New scattered bilateral subsegmental reticular/ground glass opacities  and a few very small consolidative opacities. The groundglass  opacities are most pronounced in the right mid lung along the minor  fissure and central left upper lobe and in the posterior lung bases.     The left lateral upper lobe a 3 mm pulmonary nodule (4:105) remains  unchanged dating back to most distant prior CT from February 2023. Few  scattered pulmonary micronodules (for example right middle lobe 4:171)  are unchanged. No suspicious new or enlarging pulmonary nodules.     Central airways are patent. No pleural effusion.     MEDIASTINUM/AXILLAE: Left chest port in place. No enlarging or new  thoracic lymph nodes. A right paratracheal lymph node measures up to 7  mm in short axis (2:15) unchanged to slightly decreased in size when  accounting for difference in technique from priors. Normal heart size.  No pericardial effusion. .      CORONARY ARTERY CALCIFICATION: None.     HEPATOBILIARY:  Stable linear nonmass-like low attenuation lesion in  the right hepatic lobe abutting the middle hepatic vein (3:123). This  is probably benign, previously evaluated on liver MRI 11/17/2023 and  not hypermetabolic on prior PET/CT 3/24/2023. Recommend continued  attention on surveillance imaging. No new hepatic lesion. No calcified  gallstones. No biliary duct dilation.     PANCREAS: Normal.     SPLEEN: Unchanged splenomegaly measuring up to 15.5 cm maximally in  craniocaudal dimension (6:58).     ADRENAL GLANDS: Normal.     KIDNEYS/BLADDER: Unchanged bilateral cortical scarring. No  hydronephrosis or urolithiasis.     BOWEL:   Unchanged duodenal diverticulum.  Status post subtotal colectomy with ileorectal anastomosis. Previously  described mild edema adjacent to the anastomosis has resolved.   Normal caliber bowel, no findings to suggest bowel obstruction. Again  a short segment of a single loop of nonobstructed small bowel extends  into a small ventral abdominal wall hernia in the mid pelvis.     LYMPH NODES: Unchanged borderline enlarged portacaval lymph node  (3:155) measuring up to 10 mm in short axis dating back to February 2023.     VASCULATURE: Unremarkable.     PELVIC ORGANS: Hysterectomy. No free fluid in the pelvis.     MUSCULOSKELETAL: No focal destructive osseous lesion. Multilevel  degenerative changes throughout the spine.                                                                      IMPRESSION:  1.  New bilateral small patchy multifocal prominently groundglass  opacities in the mid and upper lungs probably represent  inflammation/infection and subsegmental atelectasis.  2.  Stable small pulmonary nodules.  3.  No significant change in splenomegaly.     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), KRAS G13D mutated, MARISSA, TPS 50%  # sigmoid colon poorly-differentiated carcinoma, KRAS G13D mutated, loss of MLH1 and PMS2, TPS 70%  - 2/23 colonoscopy: A frond-like/villous partially obstructing large mass found in sigmoid colon, partially circumferential involving two thirds of the lumen circumference, 4 cm, unable to traverse, with oozing, s/p biopsy, PATHOLOGY: Invasive poorly differentiated carcinoma, IHC panel excludes tumors of other origin, colorectal primary is favored, loss of nuclear expression of MLH1 and PMS2, MLH1 promoter methylation negative, [id number] mutation negative  -2/23 CT CAP: Shows known 4 cm proximal sigmoid colon mass with possible tumor extension (irregularity and stranding and adjacent pericolonic fat) without obstruction AND probable second mass 2.5 cm at hepatic flexure of colon; small lymph nodes adjacent to both masses (no lymphadenopathy by size criteria)  -3/23 PET:  a. There is increased FDG avidity of the sigmoid colon with focal lobular wall thickening consistent with biopsy-proven adenocarcinoma.  b. Secondary focus noted at the hepatic flexure demonstrates elevated FDG avidity and lobulated wall thickening highly suspicious for a additional primary.  c. Additionally there is a smaller focus of wall thickening with elevated FDG avidity along the left aspect of the transverse colon which is suspicious for a possible third focus of colonic malignancy.  - 3/23 CEA 6.8  -4/6/2023 laparoscopic converted to open total abdominal colectomy with ileorectal anastomosis, partial omentectomy, flexible sigmoidoscopy, TAHBSO  PATHOLOGY:  Of note, omental resection, terminal ileum, proximal and distal anastomotic rings, uterus, cervix, bilateral fallopian tubes and ovaries negative for malignancy    Mass #1 (ascending colon/hepatic flexure): Mixed neuroendocrine-non-neuroendocrine neoplasm (MINEN):  - Neuroendocrine carcinoma component (70%) and  moderately-differentiated adenocarcinoma component (30%)  - Size = 5.0 cm, invading into muscularis propria, Positive LVI  - IHC: Neuroendocrine portion: Negative for chromogranin and INSM1 but strongly express synaptophysin  - IHC: Adenocarcinoma portion: Positive for CK20, CDX2 negative for CK7, PAX8, ER, S100  Ki-67 proliferation index of approximately 80%.  MARISSA   PDL1:  COMBINED POSITIVE SCORE (CPS): 55-60  TUMOR PROPORTION SCORE (TPS): 50%   pathogenic KRAS mutation (G13D) was identified (5.2%).  MMR testing: intact  AJCC 8th edition: stage 3B mpT3 pN1a     Mass #2 (sigmoid colon): Poorly-differentiated carcinoma:  - Grade 3  - Size = 5.7 cm, invading into muscularis propria  - IHC: Positive for scar and keratin AE1/AE3, negative for CK7, CK20, CDX2, PAX8, ER, chromogranin, CD56, p40, synaptophysin, S100, INI1, p40, INSM1  Ki-67 proliferation index of approximately 70%.  MMR testing: loss of MLH1 and PMS2  PDL1:  COMBINED POSITIVE SCORE (CPS): 80  TUMOR PROPORTION SCORE (TPS): 70%  pathogenic KRAS mutation (G13D) was identified (4.5%).  MMR testing: loss of MLH1 and PMS2, intact MSH2 and MSH6  AJCC 8th edition: stage 3A mpT2 pN1a    - One of forty-one sampled lymph nodes positive (1/41), d/w pathology- unable to determine which mass is metastatic to LN    - 4/23 MRI brain w/wo contrast LAURENT  - 5/26/23 CT CAP w/ contrast and IVF before and after CT (post op baseline prior to starting tx):  1.  3 mm nodule RML and 5 mm lateral EDSON nodule, minimally increased from previous  2.  New 4 mm EDSON groundglass density nodule  3.  Several prominent borderline enlarged mediastinal lymph nodes slightly increased from previous  4.  Splenomegaly 14.5 cm  5. S/p subtotal colectomy with ileorectal anastomosis with postsurgical changes along the ventral abdominal wall midline  - 5/8/23 I presented this case at [facility] tumor board as above   - 5/19/23 second opinion at [facility] w/medical oncologist Dr. Acharya, thorough consultation  and recommendations appreciated, overall agree w/FOLFOX x6 months, possible nivo or ipi nivo in second line; if metastatic platinum etoposide vs ipi nivo  - 5/25/23 port placement    -5/2023 genetic counseling: Negative for mutations detailed below     Treatment:  -4/6/2023 laparoscopic converted to open total abdominal colectomy with ileorectal anastomosis, partial omentectomy, flexible sigmoidoscopy, TAHBSO (details above)  - 5/31/23-12/12/23 adjuvant mFOLFOX 6 q14 days x12 cycles/6 months w/dose reduced oxaliplatin and dropped 5FU bolus C2 onwards, IVF and IV Mg replacement weekly and 2/2 recurrent afib w/RVR- During the days of chemo and 2 days after ONLY: take long acting diltiazem 120 mg daily with 30 mg of short acting diltiazem as needed for atrial fibrillation with HR >110 (C1D1 w/oxaliplatin dose reduced from 85mg/m2 to 64mg/m2 2/2 renal function, 5/31/23 (complicated by hospitalization for afib w/RVR C1D4 6/3/23), C2D1 7/19/23 (w/o 5FU bolus C2 onwards, inpatient w/continuous cardiac monitoring), C3D1 8/2/2023 (outpatient, admitted to ER for A-fib with RVR C3D3 8/4/23), C4D1 8/16/23, C5D1 8/30/2023, C6D1 9/13/2023, C7D1 9/26/23, C8D1 10/11/23, C9D1 10/25/23, C10D1 11/8/23, C11D1 11/22/23. C12D1 12/12/23 (1 week delay 2/2 cytopenias)    Treatment related AE:  - hearing changes-stable w/dose reduced oxaliplatin, continue use of b/l hearing aids  - thrombocytopenia-  7/24 platelet 130K, w/splenomegaly, no bleeding while on xarelto  - elevated LFTs- alk phos 2/24 432->252 7/24, alk phos iso enzyme- majority liver, liver normal on 9/23 CT and 11/23 MRI (no mets), hepatology consult 7/15/24  - neuropathy- grade 2, feet>hands, RLE>LLE, increase gabapentin from 300mg TID to 300mg AM and afternoon, 600mg in PM- rx given; likely component of arthritis too  - afib- w/palpitation and prn diltiazem use, follow up cardiology      Imaging:  - 7/24 CT CAP  1.  New bilateral small patchy multifocal prominently groundglass  opacities in the mid and upper lungs probably represent inflammation/infection and subsegmental atelectasis.  2.  Stable small pulmonary nodules. Stable right paratracheal LN 0.7 cm and portacaval LN 1.0 cm. Stable right hepatic lobe lesion- probably benign (previously evaluated on liver MRI 11/17/2023 and not hypermetabolic on prior PET/CT 3/24/2023). Status post subtotal colectomy with ileorectal anastomosis. Previously described mild edema adjacent to the anastomosis has resolved.  3.  No significant change in splenomegaly.  4. Status post subtotal colectomy with ileorectal anastomosis. Previously described mild edema adjacent to the anastomosis has resolved. Normal caliber bowel, no findings to suggest bowel obstruction. Again a short segment of a single loop of nonobstructed small bowel extends into a small ventral abdominal wall hernia in the mid pelvis.    - 1/2/24 CT CAP (after 12 cycles of mFOLFOx w/o 5FU bolus):   1.  Stable tiny nodules in both lungs.  2.  Stable borderline enlarged portacaval lymph node.  3.  Splenomegaly slightly increased from previous study.  4.  Postsurgical changes of subtotal colectomy with ileorectal anastomosis.  5.  Moderate-sized duodenal diverticulum.  6.  Stable nonmass-like area of low attenuation in the right hepatic lobe adjacent to the middle hepatic vein could represent some focal fatty infiltration. Attention is recommended to this area on future exams, but the stability is reassuring.    -11/23 MRI liver: negative for mets, stable cental liver hemangioma and splenomegaly    Labs:  3/23 CEA 6.8  5/23 CEA 1.8  7/23 CEA 2.8  10/23 CEA 2.0  12/23 CEA 2.3  4/24 CEA 2.1  7/24 CEA 2.0    Procedures  - 5/1/24 flex sig with Dr. Ashley: The entire examined rectum appeared normal.  Patent side-to-end ileorectal anastomosis; easily transversed with adult flexible sigmoidoscope       PLAN:  - port flush q6-8 weeks, discussed port removal and pt would like port removed  -  continue active surveillance:    History and physical examination every 3-6 mo for 2 y, then every 6 mo for a total of 5 y   CEA every 3-6 mo for 2 y, then every 6 mo for a total of 5 y   Chest/abdominal/pelvic CT every 6-12 mo (category 2B for frequency <12 mo) from date of surgery for a total of 5 y   Colonoscopy in 1 y after surgery except if no preoperative colonoscopy due to obstructing lesion, colonoscopy in 3-6 mo   If advanced adenoma, repeat in 1 y   If no advanced adenoma, repeat in 3 y, then every 5 y  - H&P and CEA due 10/24- ordered today  - repeat CT CAP 10/2024 w/IVF before and after- short term follow up to follow up on lung findings, I think this is inflammation related 2/2 preceding URI rather than her cancer    #parxosymal afib  - within 3 days of receiving 5FU, prior cardiac risk factors htn, prediabetes  - 6/3/23 presented to ER w/lightheadedness, dizziness, cardioverted s/p diltiazem ggt in ER  - 6/23 DPD deficiency testing negative  - currently on metoprolol XL 25 mg daily, apixaban 5 mg twice daily  - 6/30/2023 consultation with cardio oncologist Dr. Garza; I spoke with Dr. Garza 6/30/2023 as well, [id number] score 3, recommending starting apixaban 5 mg twice daily, okay to retrial FOLFOX while patient is on metoprolol.  Patient is currently on Xarelto  - 8/15/2023 cardiology follow-up: Increase metoprolol XL to 50 mg daily; During the days of chemo and 2 days after take long acting diltiazem 120 mg daily with 30 mg of short acting diltiazem as needed for atrial fibrillation with HR >110  - cardiology follow-up 12/14/2023 noted- no longer on long acting diltiazem, only using short acting prn; continuing metoprolol and xarelto and plan for follow up 12/2024      #Anemia secondary to iron deficiency and B12 deficiency  - terminal ileum removed at time of colon surgery, monitor for nutrient def  - 2/23 hgb 6.8, ferritin 21, iron sat index 10, TIBC 265, iron 27, b12 1493  - On B12 1000 mcg p.o.  daily and ferrous sulfate 325 mg p.o. daily  - 4/9/23 s/p 1 uprbcs  - 5/9/23 hgb 11.3,5/30/23 hgb 9.4  - based on Ganzoni equation w/goal hgb 14 and 500mg needed for iron stores, pts iron deficit is 1400 mg  - s/p ferric carboxymaltose 750mg x2 doses 6/14/23 and 6/28/23  - 7/11/23 ferritin 1022, iron sat 22, TIBC 201, iron 44    - 8/23 hgb 13.3  - 10/23 hgb 11.1  - 11/23 iron studies, B12, RBC folate WNL  - 7/24 hgb 12.9    #rash  - full body (extremities and trunk), patchy macular w/irregular borders, w/pruritus initially, x1 month  - change all products to scent and dye free (hypoallergenic), if persistent sx follow up with PCP    #Crohn's  - dx since at least 2010     RTC 3 months for follow up with Dr. Fernandez, labs 1-2 days prior to visit (after CT)- pt prefers in person visits only at [place]      Irene Bateman, DO  Hematology/Oncology  [facility]

## 2024-07-15 ENCOUNTER — OFFICE VISIT (OUTPATIENT)
Dept: GASTROENTEROLOGY | Facility: CLINIC | Age: 69
End: 2024-07-15
Attending: INTERNAL MEDICINE
Payer: COMMERCIAL

## 2024-07-15 VITALS
SYSTOLIC BLOOD PRESSURE: 138 MMHG | HEART RATE: 72 BPM | BODY MASS INDEX: 39.04 KG/M2 | OXYGEN SATURATION: 96 % | WEIGHT: 206.8 LBS | DIASTOLIC BLOOD PRESSURE: 81 MMHG | HEIGHT: 61 IN

## 2024-07-15 DIAGNOSIS — Z11.59 ENCOUNTER FOR SCREENING FOR OTHER VIRAL DISEASES: ICD-10-CM

## 2024-07-15 DIAGNOSIS — R74.8 ALKALINE PHOSPHATASE ELEVATION: ICD-10-CM

## 2024-07-15 LAB
ALBUMIN SERPL BCG-MCNC: 4.3 G/DL (ref 3.5–5.2)
ALP SERPL-CCNC: 253 U/L (ref 40–150)
ALT SERPL W P-5'-P-CCNC: 28 U/L (ref 0–50)
ANION GAP SERPL CALCULATED.3IONS-SCNC: 12 MMOL/L (ref 7–15)
AST SERPL W P-5'-P-CCNC: 48 U/L (ref 0–45)
BILIRUB DIRECT SERPL-MCNC: <0.2 MG/DL (ref 0–0.3)
BILIRUB SERPL-MCNC: 0.7 MG/DL
BUN SERPL-MCNC: 28.3 MG/DL (ref 8–23)
CALCIUM SERPL-MCNC: 9.8 MG/DL (ref 8.8–10.2)
CHLORIDE SERPL-SCNC: 105 MMOL/L (ref 98–107)
CREAT SERPL-MCNC: 2.03 MG/DL (ref 0.51–0.95)
EGFRCR SERPLBLD CKD-EPI 2021: 26 ML/MIN/1.73M2
FERRITIN SERPL-MCNC: 195 NG/ML (ref 11–328)
GLUCOSE SERPL-MCNC: 95 MG/DL (ref 70–99)
HCO3 SERPL-SCNC: 24 MMOL/L (ref 22–29)
POTASSIUM SERPL-SCNC: 4.7 MMOL/L (ref 3.4–5.3)
PROT SERPL-MCNC: 7.3 G/DL (ref 6.4–8.3)
SODIUM SERPL-SCNC: 141 MMOL/L (ref 135–145)

## 2024-07-15 PROCEDURE — 86381 MITOCHONDRIAL ANTIBODY EACH: CPT | Performed by: STUDENT IN AN ORGANIZED HEALTH CARE EDUCATION/TRAINING PROGRAM

## 2024-07-15 PROCEDURE — 86704 HEP B CORE ANTIBODY TOTAL: CPT | Mod: GZ | Performed by: STUDENT IN AN ORGANIZED HEALTH CARE EDUCATION/TRAINING PROGRAM

## 2024-07-15 PROCEDURE — 87340 HEPATITIS B SURFACE AG IA: CPT | Mod: GZ | Performed by: STUDENT IN AN ORGANIZED HEALTH CARE EDUCATION/TRAINING PROGRAM

## 2024-07-15 PROCEDURE — 99000 SPECIMEN HANDLING OFFICE-LAB: CPT | Performed by: STUDENT IN AN ORGANIZED HEALTH CARE EDUCATION/TRAINING PROGRAM

## 2024-07-15 PROCEDURE — 36415 COLL VENOUS BLD VENIPUNCTURE: CPT | Performed by: STUDENT IN AN ORGANIZED HEALTH CARE EDUCATION/TRAINING PROGRAM

## 2024-07-15 PROCEDURE — 86364 TISS TRNSGLTMNASE EA IG CLAS: CPT | Performed by: STUDENT IN AN ORGANIZED HEALTH CARE EDUCATION/TRAINING PROGRAM

## 2024-07-15 PROCEDURE — 80053 COMPREHEN METABOLIC PANEL: CPT | Mod: 90 | Performed by: STUDENT IN AN ORGANIZED HEALTH CARE EDUCATION/TRAINING PROGRAM

## 2024-07-15 PROCEDURE — 99204 OFFICE O/P NEW MOD 45 MIN: CPT | Performed by: STUDENT IN AN ORGANIZED HEALTH CARE EDUCATION/TRAINING PROGRAM

## 2024-07-15 PROCEDURE — 82248 BILIRUBIN DIRECT: CPT | Performed by: STUDENT IN AN ORGANIZED HEALTH CARE EDUCATION/TRAINING PROGRAM

## 2024-07-15 PROCEDURE — 84080 ASSAY ALKALINE PHOSPHATASES: CPT | Mod: 90 | Performed by: STUDENT IN AN ORGANIZED HEALTH CARE EDUCATION/TRAINING PROGRAM

## 2024-07-15 PROCEDURE — 82728 ASSAY OF FERRITIN: CPT | Performed by: STUDENT IN AN ORGANIZED HEALTH CARE EDUCATION/TRAINING PROGRAM

## 2024-07-15 ASSESSMENT — PAIN SCALES - GENERAL: PAINLEVEL: NO PAIN (0)

## 2024-07-15 NOTE — NURSING NOTE
"Chief Complaint   Patient presents with    New Patient     New consult for alkaline phosphatase elevation.     She requests these members of her care team be copied on today's visit information:  PCP: Nomi Cartwright    Referring Provider:  Irene Bateman DO  [address]  [place] [address]    Vitals:    07/15/24 1343   BP: 138/81   BP Location: Left arm   Patient Position: Sitting   Cuff Size: Adult Large   Pulse: 72   SpO2: 96%   Weight: 93.8 kg (206 lb 12.8 oz)   Height: 1.549 m (5' 1\")     Body mass index is 39.07 kg/m .    Medications were reconciled.        Oneyda Harris CMA    "

## 2024-07-15 NOTE — LETTER
7/15/2024      Nadia Ladd  [address]  [facility] [address]      Dear Colleague,    Thank you for referring your patient, Nadia Ladd, to the [facility]. Please see a copy of my visit note below.    [facility] New Patient Visit    Date of Visit: 7/15/2024    Reason for referral: elevated alkaline phosphatase    Subjective: Ms. Ladd is a 68-year-old woman with a history of colon cancer and colon neuroendocrine tumor who presents for evaluation of elevated alkaline phosphatase.    Prior to her cancer diagnosis, she has a history of Crohn's disease.  She had been on sulfasalazine.  She does not think she ever took Imuran.  No history of prediabetes or high cholesterol.  Diagnosed with A-fib in the setting of her chemotherapy.  Has mildly elevated pulmonary pressures on echocardiogram.  Does not drink alcohol.    No previous known liver disease, abnormal LFTs, imaging tests. No known history of liver decompensation    Alkaline phosphatase noted to be mildly elevated 7/2023, MRI liver November 2023 was unrevealing.  Her MRI showed a 2 cm liver lesion that was not PET avid and has been stable on serial imaging.  This is thought to be a potential hemangioma.  Liver enzymes peaked in December 2023 around the time when she stopped chemotherapy (FOLFOX), alkaline phosphatase is gradually decreasing.  She finished chemotherapy in December, is now on surveillance.  Her CT scans have not shown recurrent disease.  Her CT scan does not show any process in her liver other than the small lesion that was previously identified.  She is noted to have splenomegaly on imaging.    ROS: 14 point ROS negative except for positives noted in HPI.    PMHx:  Past Medical History:   Diagnosis Date     Arthropathia 08/05/2010     B12 deficiency anemia 10/21/2010     Benign essential hypertension with target blood pressure below 140/90 10/10/2016     CARDIOVASCULAR SCREENING; LDL GOAL  LESS THAN 160 10/31/2010     Crohn's colitis (H) 02/15/2011     Dermatitis-dishydrotic eczema-severe 08/05/2010     Hiatal hernia      HTN (hypertension) 07/21/2011     Iron deficiency anemia 10/21/2010     Malignant neoplasm of sigmoid colon (H)      Obesity      Primary pulmonary hypertension (H) 04/06/2010       PSHx:  Past Surgical History:   Procedure Laterality Date     COLECTOMY WITHOUT COLOSTOMY N/A 4/6/2023    Procedure: Laparoscopic Converted to Open Total abdominal colectomy;  Surgeon: Jerome Ashley MD;  Location: [facility]     COLONOSCOPY  10/11/10     COLONOSCOPY N/A 2/27/2023    Procedure: ATTEMPTED COLONOSCOPY WITH SIGMOID STRICTURE BIOPSY;  Surgeon: Jonathan Alcocer MD;  Location: [facility]     ESOPHAGOSCOPY, GASTROSCOPY, DUODENOSCOPY (EGD), COMBINED N/A 2/27/2023    Procedure: ESOPHAGOGASTRODUODENOSCOPY, WITH BIOPSY;  Surgeon: Jonathan Alcocer MD;  Location: [facility]     HYSTERECTOMY TOTAL ABDOMINAL, BILATERAL SALPINGO-OOPHORECTOMY, COMBINED N/A 4/6/2023    Procedure: Hysterectomy total abdominal, bilateral salpingo-oophorectomy;  Surgeon: Sunitha Olea MD;  Location: [facility] OR     INSERT PORT VASCULAR ACCESS Right 5/25/2023    Procedure: Ultrasound-guided right internal jugular venous access port placement with fluoroscopy;  Surgeon: Martin Thomas DO;  Location: [facility]     IR CHEST PORT PLACEMENT > 5 YRS OF AGE  8/21/2023     IR PORT CHECK RIGHT  7/18/2023     IR PORT REMOVAL RIGHT  8/21/2023     SIGMOIDOSCOPY FLEXIBLE N/A 4/6/2023    Procedure: Sigmoidoscopy flexible;  Surgeon: Jerome Ashely MD;  Location: [facility] OR     SIGMOIDOSCOPY FLEXIBLE N/A 5/1/2024    Procedure: Sigmoidoscopy flexible;  Surgeon: Jerome Ashley MD;  Location: [facility] OR     SURGICAL HISTORY OF -   [birth year]    Perineorrhaphy, for widening vaginal orifice     ZZC EXPLORATORY OF ABDOMEN  1989    laparoscopy       FamHx:  Family History   Problem Relation Age of Onset     Hypertension Mother          on meds, alive     Cerebrovascular Disease Father         stroke about age 65,  of cancer at 68 yrs     Diabetes Father         eventually took insulin     Anemia Father         Pernisios anemia     Kidney Disease Niece         kidney transplant     Kidney Disease Nephew         kidney transplant     Venous thrombosis No family hx of      Anesthesia Reaction No family hx of      No family history of liver disease, liver cancer    SocHx:  Social History     Socioeconomic History     Marital status:      Spouse name: Not on file     Number of children: Not on file     Years of education: Not on file     Highest education level: Not on file   Occupational History     Not on file   Tobacco Use     Smoking status: Never     Passive exposure: Current     Smokeless tobacco: Never   Vaping Use     Vaping status: Never Used   Substance and Sexual Activity     Alcohol use: No     Drug use: No     Sexual activity: Yes     Partners: Male   Other Topics Concern     Parent/sibling w/ CABG, MI or angioplasty before 65F 55M? Not Asked   Social History Narrative     Not on file     Social Determinants of Health     Financial Resource Strain: Not on file   Food Insecurity: Not on file   Transportation Needs: Not on file   Physical Activity: Not on file   Stress: Not on file   Social Connections: Not on file   Interpersonal Safety: Low Risk  (2024)    Interpersonal Safety      Do you feel physically and emotionally safe where you currently live?: Yes      Within the past 12 months, have you been hit, slapped, kicked or otherwise physically hurt by someone?: No      Within the past 12 months, have you been humiliated or emotionally abused in other ways by your partner or ex-partner?: No   Housing Stability: Not on file       Medications:  Current Outpatient Medications   Medication Sig Dispense Refill     cyanocobalamin 1000 MCG TBCR Take 1,000 mcg by mouth daily 100 tablet 1     fluocinonide (LIDEX) 0.05 % external  "cream APPLY A SMALL AMOUNT TO AFFECTED AREA(S) TWICE DAILY AS NEEDED 60 g 1     gabapentin (NEURONTIN) 300 MG capsule Take 300mg in AM, 300mg in afternoon and 600mg at night. 120 capsule 3     gabapentin (NEURONTIN) 300 MG capsule Take 1 capsule (300 mg) by mouth 3 times daily 90 capsule 3     lidocaine-prilocaine (EMLA) 2.5-2.5 % external cream Apply topically as needed for moderate pain Apply 15-20 minutes prior to port access 1 g 4     MAGNESIUM OXIDE 400 (240 Mg) MG tablet TAKE 1 TABLET (400 MG) BY MOUTH 2 TIMES DAILY 60 tablet 0     metoprolol succinate ER (TOPROL XL) 50 MG 24 hr tablet Take 1 tablet (50 mg) by mouth daily 90 tablet 3     Multiple Vitamins-Iron (MULTI-DAY PLUS IRON PO) Take 1 tablet by mouth daily       rivaroxaban ANTICOAGULANT (XARELTO) 15 MG TABS tablet Take 1 tablet (15 mg) by mouth daily (with dinner) 90 tablet 2     sodium bicarbonate 650 MG tablet Take 1 tablet (650 mg) by mouth 2 times daily 180 tablet 3     VITAMIN D3 50 MCG (2000 UT) tablet TAKE 1 TABLET (50 MCG) BY MOUTH DAILY 30 tablet 6     loperamide (IMODIUM) 2 MG capsule Take 2 mg by mouth daily as needed for diarrhea (Patient not taking: Reported on 6/3/2024)       No current facility-administered medications for this visit.     No OTCs, herbals    Allergies:  Allergies   Allergen Reactions     No Known Drug Allergy        Objective:  /81 (BP Location: Left arm, Patient Position: Sitting, Cuff Size: Adult Large)   Pulse 72   Ht 1.549 m (5' 1\")   Wt 93.8 kg (206 lb 12.8 oz)   LMP  (LMP Unknown)   SpO2 96%   BMI 39.07 kg/m    Constitutional: pleasant woman in NAD  Eyes: non icteric  Respiratory: Normal respiratory excursion   MSK: normal range of motion of visualized extremities  Abd: Non distended  Skin: No jaundice  Psychiatric: normal mood and orientation    Labs:  Last Comprehensive Metabolic Panel:  Sodium   Date Value Ref Range Status   07/02/2024 142 135 - 145 mmol/L Final   01/26/2021 140 133 - 144 mmol/L " Final     Potassium   Date Value Ref Range Status   07/02/2024 4.3 3.4 - 5.3 mmol/L Final   10/09/2021 4.3 3.4 - 5.3 mmol/L Final   01/26/2021 4.1 3.4 - 5.3 mmol/L Final     Potassium POCT   Date Value Ref Range Status   04/06/2023 4.7 3.5 - 5.0 mmol/L Final     Chloride   Date Value Ref Range Status   07/02/2024 104 98 - 107 mmol/L Final   10/09/2021 118 (H) 94 - 109 mmol/L Final   01/26/2021 111 (H) 94 - 109 mmol/L Final     Carbon Dioxide   Date Value Ref Range Status   01/26/2021 22 20 - 32 mmol/L Final     Carbon Dioxide (CO2)   Date Value Ref Range Status   07/02/2024 24 22 - 29 mmol/L Final   10/09/2021 20 20 - 32 mmol/L Final     Anion Gap   Date Value Ref Range Status   07/02/2024 14 7 - 15 mmol/L Final   10/09/2021 4 3 - 14 mmol/L Final   01/26/2021 7 3 - 14 mmol/L Final     Glucose   Date Value Ref Range Status   07/02/2024 114 (H) 70 - 99 mg/dL Final   10/09/2021 159 (H) 70 - 99 mg/dL Final   01/26/2021 107 (H) 70 - 99 mg/dL Final     GLUCOSE BY METER POCT   Date Value Ref Range Status   05/10/2023 116 (H) 70 - 99 mg/dL Final     Comment:     Dr/RN Notified     Urea Nitrogen   Date Value Ref Range Status   07/02/2024 31.7 (H) 8.0 - 23.0 mg/dL Final   10/09/2021 25 7 - 30 mg/dL Final   01/26/2021 29 7 - 30 mg/dL Final     Creatinine   Date Value Ref Range Status   07/02/2024 1.70 (H) 0.51 - 0.95 mg/dL Final   01/26/2021 1.45 (H) 0.52 - 1.04 mg/dL Final     GFR Estimate   Date Value Ref Range Status   07/02/2024 33 (L) >60 mL/min/1.73m2 Final     Comment:     eGFR calculated using 2021 CKD-EPI equation.   01/26/2021 38 (L) >60 mL/min/[1.73_m2] Final     Comment:     Non  GFR Calc  Starting 12/18/2018, serum creatinine based estimated GFR (eGFR) will be   calculated using the Chronic Kidney Disease Epidemiology Collaboration   (CKD-EPI) equation.       GFR, ESTIMATED POCT   Date Value Ref Range Status   01/02/2024 38 (L) >60 mL/min/1.73m2 Final     Calcium   Date Value Ref Range Status    07/02/2024 9.9 8.8 - 10.2 mg/dL Final   01/26/2021 9.3 8.5 - 10.1 mg/dL Final     Bilirubin Total   Date Value Ref Range Status   07/02/2024 0.7 <=1.2 mg/dL Final   01/26/2021 0.5 0.2 - 1.3 mg/dL Final     Alkaline Phosphatase   Date Value Ref Range Status   07/02/2024 252 (H) 40 - 150 U/L Final   01/26/2021 136 40 - 150 U/L Final     ALT   Date Value Ref Range Status   07/02/2024 34 0 - 50 U/L Final     Comment:     Reference intervals for this test were updated on 6/12/2023 to more accurately reflect our healthy population. There may be differences in the flagging of prior results with similar values performed with this method. Interpretation of those prior results can be made in the context of the updated reference intervals.     01/26/2021 22 0 - 50 U/L Final     AST   Date Value Ref Range Status   07/02/2024 35 0 - 45 U/L Final     Comment:     Reference intervals for this test were updated on 6/12/2023 to more accurately reflect our healthy population. There may be differences in the flagging of prior results with similar values performed with this method. Interpretation of those prior results can be made in the context of the updated reference intervals.   01/26/2021 17 0 - 45 U/L Final       Lab Results   Component Value Date    WBC 6.1 07/02/2024    WBC 7.2 07/08/2020     Lab Results   Component Value Date    RBC 4.32 07/02/2024    RBC 4.24 07/08/2020     Lab Results   Component Value Date    HGB 12.9 07/02/2024    HGB 12.7 07/08/2020     Lab Results   Component Value Date    HCT 38.3 07/02/2024    HCT 40.7 07/08/2020     Lab Results   Component Value Date    MCV 89 07/02/2024    MCV 96 07/08/2020     Lab Results   Component Value Date    MCH 29.9 07/02/2024    MCH 30.0 07/08/2020     Lab Results   Component Value Date    MCHC 33.7 07/02/2024    MCHC 31.2 07/08/2020     Lab Results   Component Value Date    RDW 13.2 07/02/2024    RDW 13.9 07/08/2020     Lab Results   Component Value Date      07/02/2024     07/08/2020         Previous work-up:   Lab Results   Component Value Date    HCVAB Nonreactive 07/08/2020    BRENT 954 (H) 11/08/2023    IRONSAT 41 11/08/2023    TSH 2.39 08/04/2023    CHOL 196 12/04/2023    HDL 65 12/04/2023    LDL 69 12/04/2023    TRIG 308 (H) 12/04/2023    A1C 4.8 03/12/2018      Lab Results   Component Value Date    SPECDES  05/24/2023     Blood: ACD      LDRESULTS  04/21/2023     Fusion Event: NEGATIVE           Imaging: reviewed in EHR    Independently reviewed labs and imaging.     Assessment/Plan: Ms. Ladd is a 68-year-old woman with a history of colon cancer and colon neuroendocrine tumor who presents for evaluation of elevated alkaline phosphatase.    Her alkaline phosphatase is improving at her most recent labs.  Suspect this was related to chemotherapy or hepatic congestion in the setting of mild pulmonary hypertension and A-fib in the setting of her chemotherapy.  Will do serologic workup to rule out other causes of intrinsic liver disease.  She may have some underlying metabolic liver disease although did not have steatosis on her imaging in the past.  She has splenomegaly on imaging which could indicate underlying fibrosis or noncirrhotic portal hypertension.  Her imaging has not shown any clear metastatic spread of her malignancy and she is on active surveillance.  If this workup for elevated liver enzymes is negative, would consider percutaneous liver biopsy.  Discussed liver biopsy would be looking for microvascular metastatic disease versus medication effect. She had recent liver imaging that did not show any metastases or biliary dilation.  If her alkaline phosphatase is gradually improving would consider just further monitoring.    Orders Placed This Encounter   Procedures     Hepatic function panel     Basic metabolic panel     Hepatitis B core antibody     Hepatitis B surface antigen     Mitochondrial M2 Antibody IgG     Tissue transglutaminase hanna IgA  and IgG     Ferritin       RTC 6 months - can cancel his LFTs improved    Senia Meadows MD MS  Hepatology/Liver Transplant  [facility]        Again, thank you for allowing me to participate in the care of your patient.        Sincerely,        Senia Meadows MD

## 2024-07-15 NOTE — PROGRESS NOTES
[facility] New Patient Visit    Date of Visit: 7/15/2024    Reason for referral: elevated alkaline phosphatase    Subjective: Ms. Ladd is a 68-year-old woman with a history of colon cancer and colon neuroendocrine tumor who presents for evaluation of elevated alkaline phosphatase.    Prior to her cancer diagnosis, she has a history of Crohn's disease.  She had been on sulfasalazine.  She does not think she ever took Imuran.  No history of prediabetes or high cholesterol.  Diagnosed with A-fib in the setting of her chemotherapy.  Has mildly elevated pulmonary pressures on echocardiogram.  Does not drink alcohol.    No previous known liver disease, abnormal LFTs, imaging tests. No known history of liver decompensation    Alkaline phosphatase noted to be mildly elevated 7/2023, MRI liver November 2023 was unrevealing.  Her MRI showed a 2 cm liver lesion that was not PET avid and has been stable on serial imaging.  This is thought to be a potential hemangioma.  Liver enzymes peaked in December 2023 around the time when she stopped chemotherapy (FOLFOX), alkaline phosphatase is gradually decreasing.  She finished chemotherapy in December, is now on surveillance.  Her CT scans have not shown recurrent disease.  Her CT scan does not show any process in her liver other than the small lesion that was previously identified.  She is noted to have splenomegaly on imaging.    ROS: 14 point ROS negative except for positives noted in HPI.    PMHx:  Past Medical History:   Diagnosis Date    Arthropathia 08/05/2010    B12 deficiency anemia 10/21/2010    Benign essential hypertension with target blood pressure below 140/90 10/10/2016    CARDIOVASCULAR SCREENING; LDL GOAL LESS THAN 160 10/31/2010    Crohn's colitis (H) 02/15/2011    Dermatitis-dishydrotic eczema-severe 08/05/2010    Hiatal hernia     HTN (hypertension) 07/21/2011    Iron deficiency anemia 10/21/2010    Malignant neoplasm of sigmoid colon  (H)     Obesity     Primary pulmonary hypertension (H) 2010       PSHx:  Past Surgical History:   Procedure Laterality Date    COLECTOMY WITHOUT COLOSTOMY N/A 2023    Procedure: Laparoscopic Converted to Open Total abdominal colectomy;  Surgeon: Jerome Ashley MD;  Location: [facility]    COLONOSCOPY  10/11/10    COLONOSCOPY N/A 2023    Procedure: ATTEMPTED COLONOSCOPY WITH SIGMOID STRICTURE BIOPSY;  Surgeon: Jonathan Alcocer MD;  [facility]    ESOPHAGOSCOPY, GASTROSCOPY, DUODENOSCOPY (EGD), COMBINED N/A 2023    Procedure: ESOPHAGOGASTRODUODENOSCOPY, WITH BIOPSY;  Surgeon: Jonathan Alcocer MD;  Location: [facility]    HYSTERECTOMY TOTAL ABDOMINAL, BILATERAL SALPINGO-OOPHORECTOMY, COMBINED N/A 2023    Procedure: Hysterectomy total abdominal, bilateral salpingo-oophorectomy;  Surgeon: Sunitha Olea MD;  Location: [facility]    INSERT PORT VASCULAR ACCESS Right 2023    Procedure: Ultrasound-guided right internal jugular venous access port placement with fluoroscopy;  Surgeon: Martin Thomas DO;  Location: [facility]    IR CHEST PORT PLACEMENT > 5 YRS OF AGE  2023    IR PORT CHECK RIGHT  2023    IR PORT REMOVAL RIGHT  2023    SIGMOIDOSCOPY FLEXIBLE N/A 2023    Procedure: Sigmoidoscopy flexible;  Surgeon: Jerome Ashley MD;  Location: [facility]    SIGMOIDOSCOPY FLEXIBLE N/A 2024    Procedure: Sigmoidoscopy flexible;  Surgeon: Jerome Ashley MD;  Location: [facility] OR    SURGICAL HISTORY OF -   77    Perineorrhaphy, for widening vaginal orifice    [email or web address] EXPLORATORY OF ABDOMEN      laparoscopy       FamHx:  Family History   Problem Relation Age of Onset    Hypertension Mother         on meds, alive    Cerebrovascular Disease Father         stroke about age 65,  of cancer at 68 yrs    Diabetes Father         eventually took insulin    Anemia Father         Pernisios anemia    Kidney Disease Niece         kidney transplant    Kidney  Disease Nephew         kidney transplant    Venous thrombosis No family hx of     Anesthesia Reaction No family hx of      No family history of liver disease, liver cancer    SocHx:  Social History     Socioeconomic History    Marital status:      Spouse name: Not on file    Number of children: Not on file    Years of education: Not on file    Highest education level: Not on file   Occupational History    Not on file   Tobacco Use    Smoking status: Never     Passive exposure: Current    Smokeless tobacco: Never   Vaping Use    Vaping status: Never Used   Substance and Sexual Activity    Alcohol use: No    Drug use: No    Sexual activity: Yes     Partners: Male   Other Topics Concern    Parent/sibling w/ CABG, MI or angioplasty before 65F 55M? Not Asked   Social History Narrative    Not on file     Social Determinants of Health     Financial Resource Strain: Not on file   Food Insecurity: Not on file   Transportation Needs: Not on file   Physical Activity: Not on file   Stress: Not on file   Social Connections: Not on file   Interpersonal Safety: Low Risk  (6/4/2024)    Interpersonal Safety     Do you feel physically and emotionally safe where you currently live?: Yes     Within the past 12 months, have you been hit, slapped, kicked or otherwise physically hurt by someone?: No     Within the past 12 months, have you been humiliated or emotionally abused in other ways by your partner or ex-partner?: No   Housing Stability: Not on file       Medications:  Current Outpatient Medications   Medication Sig Dispense Refill    cyanocobalamin 1000 MCG TBCR Take 1,000 mcg by mouth daily 100 tablet 1    fluocinonide (LIDEX) 0.05 % external cream APPLY A SMALL AMOUNT TO AFFECTED AREA(S) TWICE DAILY AS NEEDED 60 g 1    gabapentin (NEURONTIN) 300 MG capsule Take 300mg in AM, 300mg in afternoon and 600mg at night. 120 capsule 3    gabapentin (NEURONTIN) 300 MG capsule Take 1 capsule (300 mg) by mouth 3 times daily 90  "capsule 3    lidocaine-prilocaine (EMLA) 2.5-2.5 % external cream Apply topically as needed for moderate pain Apply 15-20 minutes prior to port access 1 g 4    MAGNESIUM OXIDE 400 (240 Mg) MG tablet TAKE 1 TABLET (400 MG) BY MOUTH 2 TIMES DAILY 60 tablet 0    metoprolol succinate ER (TOPROL XL) 50 MG 24 hr tablet Take 1 tablet (50 mg) by mouth daily 90 tablet 3    Multiple Vitamins-Iron (MULTI-DAY PLUS IRON PO) Take 1 tablet by mouth daily      rivaroxaban ANTICOAGULANT (XARELTO) 15 MG TABS tablet Take 1 tablet (15 mg) by mouth daily (with dinner) 90 tablet 2    sodium bicarbonate 650 MG tablet Take 1 tablet (650 mg) by mouth 2 times daily 180 tablet 3    VITAMIN D3 50 MCG (2000 UT) tablet TAKE 1 TABLET (50 MCG) BY MOUTH DAILY 30 tablet 6    loperamide (IMODIUM) 2 MG capsule Take 2 mg by mouth daily as needed for diarrhea (Patient not taking: Reported on 6/3/2024)       No current facility-administered medications for this visit.     No OTCs, herbals    Allergies:  Allergies   Allergen Reactions    No Known Drug Allergy        Objective:  /81 (BP Location: Left arm, Patient Position: Sitting, Cuff Size: Adult Large)   Pulse 72   Ht 1.549 m (5' 1\")   Wt 93.8 kg (206 lb 12.8 oz)   LMP  (LMP Unknown)   SpO2 96%   BMI 39.07 kg/m    Constitutional: pleasant woman in NAD  Eyes: non icteric  Respiratory: Normal respiratory excursion   MSK: normal range of motion of visualized extremities  Abd: Non distended  Skin: No jaundice  Psychiatric: normal mood and orientation    Labs:  Last Comprehensive Metabolic Panel:  Sodium   Date Value Ref Range Status   07/02/2024 142 135 - 145 mmol/L Final   01/26/2021 140 133 - 144 mmol/L Final     Potassium   Date Value Ref Range Status   07/02/2024 4.3 3.4 - 5.3 mmol/L Final   10/09/2021 4.3 3.4 - 5.3 mmol/L Final   01/26/2021 4.1 3.4 - 5.3 mmol/L Final     Potassium POCT   Date Value Ref Range Status   04/06/2023 4.7 3.5 - 5.0 mmol/L Final     Chloride   Date Value Ref Range " Status   07/02/2024 104 98 - 107 mmol/L Final   10/09/2021 118 (H) 94 - 109 mmol/L Final   01/26/2021 111 (H) 94 - 109 mmol/L Final     Carbon Dioxide   Date Value Ref Range Status   01/26/2021 22 20 - 32 mmol/L Final     Carbon Dioxide (CO2)   Date Value Ref Range Status   07/02/2024 24 22 - 29 mmol/L Final   10/09/2021 20 20 - 32 mmol/L Final     Anion Gap   Date Value Ref Range Status   07/02/2024 14 7 - 15 mmol/L Final   10/09/2021 4 3 - 14 mmol/L Final   01/26/2021 7 3 - 14 mmol/L Final     Glucose   Date Value Ref Range Status   07/02/2024 114 (H) 70 - 99 mg/dL Final   10/09/2021 159 (H) 70 - 99 mg/dL Final   01/26/2021 107 (H) 70 - 99 mg/dL Final     GLUCOSE BY METER POCT   Date Value Ref Range Status   05/10/2023 116 (H) 70 - 99 mg/dL Final     Comment:     Dr/RN Notified     Urea Nitrogen   Date Value Ref Range Status   07/02/2024 31.7 (H) 8.0 - 23.0 mg/dL Final   10/09/2021 25 7 - 30 mg/dL Final   01/26/2021 29 7 - 30 mg/dL Final     Creatinine   Date Value Ref Range Status   07/02/2024 1.70 (H) 0.51 - 0.95 mg/dL Final   01/26/2021 1.45 (H) 0.52 - 1.04 mg/dL Final     GFR Estimate   Date Value Ref Range Status   07/02/2024 33 (L) >60 mL/min/1.73m2 Final     Comment:     eGFR calculated using 2021 CKD-EPI equation.   01/26/2021 38 (L) >60 mL/min/[1.73_m2] Final     Comment:     Non  GFR Calc  Starting 12/18/2018, serum creatinine based estimated GFR (eGFR) will be   calculated using the Chronic Kidney Disease Epidemiology Collaboration   (CKD-EPI) equation.       GFR, ESTIMATED POCT   Date Value Ref Range Status   01/02/2024 38 (L) >60 mL/min/1.73m2 Final     Calcium   Date Value Ref Range Status   07/02/2024 9.9 8.8 - 10.2 mg/dL Final   01/26/2021 9.3 8.5 - 10.1 mg/dL Final     Bilirubin Total   Date Value Ref Range Status   07/02/2024 0.7 <=1.2 mg/dL Final   01/26/2021 0.5 0.2 - 1.3 mg/dL Final     Alkaline Phosphatase   Date Value Ref Range Status   07/02/2024 252 (H) 40 - 150 U/L Final    01/26/2021 136 40 - 150 U/L Final     ALT   Date Value Ref Range Status   07/02/2024 34 0 - 50 U/L Final     Comment:     Reference intervals for this test were updated on 6/12/2023 to more accurately reflect our healthy population. There may be differences in the flagging of prior results with similar values performed with this method. Interpretation of those prior results can be made in the context of the updated reference intervals.     01/26/2021 22 0 - 50 U/L Final     AST   Date Value Ref Range Status   07/02/2024 35 0 - 45 U/L Final     Comment:     Reference intervals for this test were updated on 6/12/2023 to more accurately reflect our healthy population. There may be differences in the flagging of prior results with similar values performed with this method. Interpretation of those prior results can be made in the context of the updated reference intervals.   01/26/2021 17 0 - 45 U/L Final       Lab Results   Component Value Date    WBC 6.1 07/02/2024    WBC 7.2 07/08/2020     Lab Results   Component Value Date    RBC 4.32 07/02/2024    RBC 4.24 07/08/2020     Lab Results   Component Value Date    HGB 12.9 07/02/2024    HGB 12.7 07/08/2020     Lab Results   Component Value Date    HCT 38.3 07/02/2024    HCT 40.7 07/08/2020     Lab Results   Component Value Date    MCV 89 07/02/2024    MCV 96 07/08/2020     Lab Results   Component Value Date    MCH 29.9 07/02/2024    MCH 30.0 07/08/2020     Lab Results   Component Value Date    MCHC 33.7 07/02/2024    MCHC 31.2 07/08/2020     Lab Results   Component Value Date    RDW 13.2 07/02/2024    RDW 13.9 07/08/2020     Lab Results   Component Value Date     07/02/2024     07/08/2020         Previous work-up:   Lab Results   Component Value Date    HCVAB Nonreactive 07/08/2020    BRENT 954 (H) 11/08/2023    IRONSAT 41 11/08/2023    TSH 2.39 08/04/2023    CHOL 196 12/04/2023    HDL 65 12/04/2023    LDL 69 12/04/2023    TRIG 308 (H) 12/04/2023    A1C 4.8  03/12/2018      Lab Results   Component Value Date    SPECDES  05/24/2023     Blood: ACD      LDRESULTS  04/21/2023     Fusion Event: NEGATIVE           Imaging: reviewed in EHR    Independently reviewed labs and imaging.     Assessment/Plan: Ms. Ladd is a 68-year-old woman with a history of colon cancer and colon neuroendocrine tumor who presents for evaluation of elevated alkaline phosphatase.    Her alkaline phosphatase is improving at her most recent labs.  Suspect this was related to chemotherapy or hepatic congestion in the setting of mild pulmonary hypertension and A-fib in the setting of her chemotherapy.  Will do serologic workup to rule out other causes of intrinsic liver disease.  She may have some underlying metabolic liver disease although did not have steatosis on her imaging in the past.  She has splenomegaly on imaging which could indicate underlying fibrosis or noncirrhotic portal hypertension.  Her imaging has not shown any clear metastatic spread of her malignancy and she is on active surveillance.  If this workup for elevated liver enzymes is negative, would consider percutaneous liver biopsy.  Discussed liver biopsy would be looking for microvascular metastatic disease versus medication effect. She had recent liver imaging that did not show any metastases or biliary dilation.  If her alkaline phosphatase is gradually improving would consider just further monitoring.    Orders Placed This Encounter   Procedures    Hepatic function panel    Basic metabolic panel    Hepatitis B core antibody    Hepatitis B surface antigen    Mitochondrial M2 Antibody IgG    Tissue transglutaminase hanna IgA and IgG    Ferritin       RTC 6 months - can cancel his LFTs improved    Senia Meadows MD MS  Hepatology/Liver Transplant  [facility]

## 2024-07-16 LAB
HBV CORE AB SERPL QL IA: NONREACTIVE
HBV SURFACE AG SERPL QL IA: NONREACTIVE
MITOCHONDRIA M2 IGG SER-ACNC: <1 U/ML
TTG IGA SER-ACNC: 0.3 U/ML
TTG IGG SER-ACNC: <0.6 U/ML

## 2024-07-19 LAB
ALP BONE SERPL-CCNC: 58 U/L
ALP LIVER SERPL-CCNC: 185 U/L
ALP OTHER SERPL-CCNC: 0 U/L
ALP SERPL-CCNC: 243 U/L

## 2024-07-22 ENCOUNTER — TELEPHONE (OUTPATIENT)
Dept: SURGERY | Facility: CLINIC | Age: 69
End: 2024-07-22

## 2024-07-22 ENCOUNTER — OFFICE VISIT (OUTPATIENT)
Dept: SURGERY | Facility: CLINIC | Age: 69
End: 2024-07-22
Attending: INTERNAL MEDICINE
Payer: COMMERCIAL

## 2024-07-22 VITALS
SYSTOLIC BLOOD PRESSURE: 132 MMHG | DIASTOLIC BLOOD PRESSURE: 78 MMHG | TEMPERATURE: 96.9 F | WEIGHT: 206 LBS | BODY MASS INDEX: 38.92 KG/M2

## 2024-07-22 DIAGNOSIS — R74.8 ALKALINE PHOSPHATASE ELEVATION: Primary | ICD-10-CM

## 2024-07-22 DIAGNOSIS — Z45.2 ENCOUNTER FOR CARE RELATED TO VASCULAR ACCESS PORT: Primary | ICD-10-CM

## 2024-07-22 DIAGNOSIS — C18.7 MALIGNANT NEOPLASM OF SIGMOID COLON (H): ICD-10-CM

## 2024-07-22 PROCEDURE — 99203 OFFICE O/P NEW LOW 30 MIN: CPT | Performed by: SPECIALIST

## 2024-07-22 ASSESSMENT — PAIN SCALES - GENERAL: PAINLEVEL: NO PAIN (0)

## 2024-07-22 NOTE — LETTER
7/22/2024      Nadia Ladd  [address] [facility] [address]      Dear Colleague,    Thank you for referring your patient, Nadia Ladd, to the [facility]. Please see a copy of my visit note below.    Consult requested by Dr. Bateman    Reason for consultation: PowerPort removal    HPI:  Patient is a 68-year-old lady who was diagnosed with sigmoid adenocarcinoma approximately 2 years ago.  She has completed her treatment.  Her last PowerPort was placed on the left about a year ago.  She would like to now have it removed.  No complaints about the port.  It is functioning.  She is on Xarelto for A-fib.    Past Medical History:   Diagnosis Date     Arthropathia 08/05/2010     B12 deficiency anemia 10/21/2010     Benign essential hypertension with target blood pressure below 140/90 10/10/2016     CARDIOVASCULAR SCREENING; LDL GOAL LESS THAN 160 10/31/2010     Crohn's colitis (H) 02/15/2011     Dermatitis-dishydrotic eczema-severe 08/05/2010     Hiatal hernia      HTN (hypertension) 07/21/2011     Iron deficiency anemia 10/21/2010     Malignant neoplasm of sigmoid colon (H)      Obesity      Primary pulmonary hypertension (H) 04/06/2010     Past Surgical History:   Procedure Laterality Date     COLECTOMY WITHOUT COLOSTOMY N/A 4/6/2023    Procedure: Laparoscopic Converted to Open Total abdominal colectomy;  Surgeon: Jerome Ashley MD;  Location: [facility]     COLONOSCOPY  10/11/10     COLONOSCOPY N/A 2/27/2023    Procedure: ATTEMPTED COLONOSCOPY WITH SIGMOID STRICTURE BIOPSY;  Surgeon: Jonathan Alcocer MD;  Location:  GI     ESOPHAGOSCOPY, GASTROSCOPY, DUODENOSCOPY (EGD), COMBINED N/A 2/27/2023    Procedure: ESOPHAGOGASTRODUODENOSCOPY, WITH BIOPSY;  Surgeon: Jonathan Alcocer MD;  Location:  GI     HYSTERECTOMY TOTAL ABDOMINAL, BILATERAL SALPINGO-OOPHORECTOMY, COMBINED N/A 4/6/2023    Procedure: Hysterectomy total abdominal, bilateral salpingo-oophorectomy;  Surgeon: Emmie  MD Sunitha;  Location: [facility]     INSERT PORT VASCULAR ACCESS Right 5/25/2023    Procedure: Ultrasound-guided right internal jugular venous access port placement with fluoroscopy;  Surgeon: Martin Thomas DO;  [facility]     IR CHEST PORT PLACEMENT > 5 YRS OF AGE  8/21/2023     IR PORT CHECK RIGHT  7/18/2023     IR PORT REMOVAL RIGHT  8/21/2023     SIGMOIDOSCOPY FLEXIBLE N/A 4/6/2023    Procedure: Sigmoidoscopy flexible;  Surgeon: Jerome Ashley MD;  [facility]: [facility]     SIGMOIDOSCOPY FLEXIBLE N/A 5/1/2024    Procedure: Sigmoidoscopy flexible;  Surgeon: Jerome Ashley MD;  Location: [facility] OR     SURGICAL HISTORY OF -   [birth year]    Perineorrhaphy, for widening vaginal orifice     [facility] EXPLORATORY OF ABDOMEN  1989    laparoscopy     Current Outpatient Medications   Medication Sig Dispense Refill     cyanocobalamin 1000 MCG TBCR Take 1,000 mcg by mouth daily 100 tablet 1     fluocinonide (LIDEX) 0.05 % external cream APPLY A SMALL AMOUNT TO AFFECTED AREA(S) TWICE DAILY AS NEEDED 60 g 1     gabapentin (NEURONTIN) 300 MG capsule Take 300mg in AM, 300mg in afternoon and 600mg at night. 120 capsule 3     gabapentin (NEURONTIN) 300 MG capsule Take 1 capsule (300 mg) by mouth 3 times daily 90 capsule 3     lidocaine-prilocaine (EMLA) 2.5-2.5 % external cream Apply topically as needed for moderate pain Apply 15-20 minutes prior to port access 1 g 4     loperamide (IMODIUM) 2 MG capsule Take 2 mg by mouth daily as needed for diarrhea (Patient not taking: Reported on 6/3/2024)       MAGNESIUM OXIDE 400 (240 Mg) MG tablet TAKE 1 TABLET (400 MG) BY MOUTH 2 TIMES DAILY 60 tablet 0     metoprolol succinate ER (TOPROL XL) 50 MG 24 hr tablet Take 1 tablet (50 mg) by mouth daily 90 tablet 3     Multiple Vitamins-Iron (MULTI-DAY PLUS IRON PO) Take 1 tablet by mouth daily       rivaroxaban ANTICOAGULANT (XARELTO) 15 MG TABS tablet Take 1 tablet (15 mg) by mouth daily (with dinner) 90 tablet 2     sodium  bicarbonate 650 MG tablet Take 1 tablet (650 mg) by mouth 2 times daily 180 tablet 3     VITAMIN D3 50 MCG (2000 UT) tablet TAKE 1 TABLET (50 MCG) BY MOUTH DAILY 30 tablet 6     No current facility-administered medications for this visit.        Allergies   Allergen Reactions     No Known Drug Allergy      Social History     Socioeconomic History     Marital status:      Spouse name: Not on file     Number of children: Not on file     Years of education: Not on file     Highest education level: Not on file   Occupational History     Not on file   Tobacco Use     Smoking status: Never     Passive exposure: Current     Smokeless tobacco: Never   Vaping Use     Vaping status: Never Used   Substance and Sexual Activity     Alcohol use: No     Drug use: No     Sexual activity: Yes     Partners: Male   Other Topics Concern     Parent/sibling w/ CABG, MI or angioplasty before 65F 55M? Not Asked   Social History Narrative     Not on file     Social Determinants of Health     Financial Resource Strain: Not on file   Food Insecurity: Not on file   Transportation Needs: Not on file   Physical Activity: Not on file   Stress: Not on file   Social Connections: Not on file   Interpersonal Safety: Low Risk  (2024)    Interpersonal Safety      Do you feel physically and emotionally safe where you currently live?: Yes      Within the past 12 months, have you been hit, slapped, kicked or otherwise physically hurt by someone?: No      Within the past 12 months, have you been humiliated or emotionally abused in other ways by your partner or ex-partner?: No   Housing Stability: Not on file     Family History   Problem Relation Age of Onset     Hypertension Mother         on meds, alive     Cerebrovascular Disease Father         stroke about age 65,  of cancer at 68 yrs     Diabetes Father         eventually took insulin     Anemia Father         Pernisios anemia     Kidney Disease Niece         kidney transplant     Kidney  Disease Nephew         kidney transplant     Venous thrombosis No family hx of      Anesthesia Reaction No family hx of       ROS: 10 point ROS neg other than the symptoms noted above in the HPI.    PE:  B/P: 132/78, T: 96.9, P: Data Unavailable, R: Data Unavailable  General: well developed, well nourished WF who appears their stated age  HEENT: NC/AT, EOMI, (-)icterus, (-)injection  Neck: Supple, No JVD  Chest: CTA, left IJ PowerPort in place  Heart: S1, S2, (-)m/r/g  Abd: Soft, non tender, non distended, non tender, no masses  Ext; Warm, no edema  Psych: AAOx3  Neuro: No focal deficits        Impression/plan:  This is a 68-year-old lady related with a sigmoid adenocarcinoma who presents for PowerPort removal.  She would prefer to have it removed with sedation.  After discussion the patient implants x 2 removed under MAC anesthesia.   The procedure, risks, benefits, and alternatives were discussed and the patient agrees to proceed.  She knows she will need to stop anticoagulant as per protocol.      Jonathan Alcocer MD, FACS      Again, thank you for allowing me to participate in the care of your patient.        Sincerely,        Jonathan Alcocer MD

## 2024-07-22 NOTE — TELEPHONE ENCOUNTER
Type of surgery: REMOVAL, VASCULAR ACCESS PORT (Left)   Location of surgery: [facility]  Date and time of surgery: 7/31  Surgeon: Leodan  Pre-Op Appt Date: 7/30  Post-Op Appt Date: 8/8   Packet sent out: Yes  Pre-cert/Authorization completed:  Not Applicable  Date: na

## 2024-07-30 ENCOUNTER — ALLIED HEALTH/NURSE VISIT (OUTPATIENT)
Dept: AUDIOLOGY | Facility: CLINIC | Age: 69
End: 2024-07-30
Payer: COMMERCIAL

## 2024-07-30 ENCOUNTER — OFFICE VISIT (OUTPATIENT)
Dept: INTERNAL MEDICINE | Facility: CLINIC | Age: 69
End: 2024-07-30
Payer: COMMERCIAL

## 2024-07-30 ENCOUNTER — TELEPHONE (OUTPATIENT)
Dept: AUDIOLOGY | Facility: CLINIC | Age: 69
End: 2024-07-30

## 2024-07-30 VITALS
OXYGEN SATURATION: 97 % | RESPIRATION RATE: 18 BRPM | BODY MASS INDEX: 38.18 KG/M2 | SYSTOLIC BLOOD PRESSURE: 128 MMHG | HEIGHT: 62 IN | WEIGHT: 207.5 LBS | HEART RATE: 68 BPM | TEMPERATURE: 97.7 F | DIASTOLIC BLOOD PRESSURE: 80 MMHG

## 2024-07-30 DIAGNOSIS — C18.3 MALIGNANT NEOPLASM OF HEPATIC FLEXURE (H): ICD-10-CM

## 2024-07-30 DIAGNOSIS — I48.0 PAROXYSMAL ATRIAL FIBRILLATION WITH RVR (H): ICD-10-CM

## 2024-07-30 DIAGNOSIS — K52.9 CHRONIC DIARRHEA: ICD-10-CM

## 2024-07-30 DIAGNOSIS — I10 HYPERTENSION GOAL BP (BLOOD PRESSURE) < 140/90: ICD-10-CM

## 2024-07-30 DIAGNOSIS — K50.10 CROHN'S DISEASE OF LARGE INTESTINE WITHOUT COMPLICATION (H): ICD-10-CM

## 2024-07-30 DIAGNOSIS — H90.3 SENSORINEURAL HEARING LOSS, ASYMMETRICAL: Primary | ICD-10-CM

## 2024-07-30 DIAGNOSIS — Z90.49 HISTORY OF COLECTOMY: ICD-10-CM

## 2024-07-30 DIAGNOSIS — I10 BENIGN ESSENTIAL HYPERTENSION WITH TARGET BLOOD PRESSURE BELOW 140/90: ICD-10-CM

## 2024-07-30 DIAGNOSIS — Z01.818 PREOP GENERAL PHYSICAL EXAM: Primary | ICD-10-CM

## 2024-07-30 DIAGNOSIS — Z45.2 ENCOUNTER FOR REMOVAL OF TUNNELED CENTRAL VENOUS CATHETER (CVC) WITH PORT: ICD-10-CM

## 2024-07-30 DIAGNOSIS — C18.7 MALIGNANT NEOPLASM OF SIGMOID COLON (H): ICD-10-CM

## 2024-07-30 DIAGNOSIS — E83.42 HYPOMAGNESEMIA: ICD-10-CM

## 2024-07-30 PROCEDURE — 99214 OFFICE O/P EST MOD 30 MIN: CPT | Performed by: INTERNAL MEDICINE

## 2024-07-30 PROCEDURE — V5010 ASSESSMENT FOR HEARING AID: HCPCS | Performed by: AUDIOLOGIST

## 2024-07-30 RX ORDER — LANOLIN ALCOHOL/MO/W.PET/CERES
400 CREAM (GRAM) TOPICAL 2 TIMES DAILY
Qty: 60 TABLET | Refills: 0 | Status: SHIPPED | OUTPATIENT
Start: 2024-07-30 | End: 2024-08-28

## 2024-07-30 ASSESSMENT — PAIN SCALES - GENERAL: PAINLEVEL: MODERATE PAIN (5)

## 2024-07-30 NOTE — PROGRESS NOTES
Preoperative Evaluation  [address] [address]  Phone: [phone]  Primary Provider: Nomi Cartwright DO  Pre-op Performing Provider: Nomi Cartwright DO  Jul 30, 2024 7/30/2024   Surgical Information   What procedure is being done? port removal   Facility or Hospital where procedure/surgery will be performed: [facility]   Who is doing the procedure / surgery? cant think of his name   Date of surgery / procedure: 7 31 24   Time of surgery / procedure: 730 am   Where do you plan to recover after surgery? at home with family        Fax number for surgical facility: Note does not need to be faxed, will be available electronically in Epic.    Assessment & Plan     The proposed surgical procedure is considered LOW risk.    Preop general physical exam      Encounter for removal of tunneled central venous catheter (CVC) with port  No longer needed for chemotherapy    History of colectomy  With ileorectal anastomosis    Crohn's disease of large intestine without complication (H)    Malignant neoplasm of hepatic flexure (H)  Postresection and chemo    Malignant neoplasm of sigmoid colon (H)  Postresection chemo    Paroxysmal atrial fibrillation with RVR (H)  Patient is already holding her anticoagulant therapy    Benign essential hypertension with target blood pressure below 140/90  Well-controlled this time                  - No identified additional risk factors other than previously addressed         Recommendation  Approval given to proceed with proposed procedure, without further diagnostic evaluation.    Yo Zuniga is a 68 year old, presenting for the following:  Pre-Op Exam          7/30/2024     2:56 PM   Additional Questions   Roomed by Aubree RAMÍREZ     HPI related to upcoming procedure: Removal left-sided PowerPort         7/30/2024   Pre-Op Questionnaire   Have you ever had a heart attack or stroke? No   Have you ever  had surgery on your heart or blood vessels, such as a stent placement, a coronary artery bypass, or surgery on an artery in your head, neck, heart, or legs? No   Do you have chest pain with activity? No   Do you have a history of heart failure? No   Do you currently have a cold, bronchitis or symptoms of other infection? No   Do you have a cough, shortness of breath, or wheezing? No   Do you or anyone in your family have previous history of blood clots? No   Do you or does anyone in your family have a serious bleeding problem such as prolonged bleeding following surgeries or cuts? No   Have you ever had problems with anemia or been told to take iron pills? (!) YES anemia, iron    Have you had any abnormal blood loss such as black, tarry or bloody stools, or abnormal vaginal bleeding? No   Have you ever had a blood transfusion? (!) YES   Have you ever had a transfusion reaction? No   Are you willing to have a blood transfusion if it is medically needed before, during, or after your surgery? Yes   Have you or any of your relatives ever had problems with anesthesia? No   Do you have sleep apnea, excessive snoring or daytime drowsiness? No   Do you have any artifical heart valves or other implanted medical devices like a pacemaker, defibrillator, or continuous glucose monitor? No   Do you have artificial joints? No   Are you allergic to latex? No        Health Care Directive  Patient does not have a Health Care Directive or Living Will: Discussed advance care planning with patient; however, patient declined at this time.    Preoperative Review of    reviewed - no record of controlled substances prescribed.          Patient Active Problem List    Diagnosis Date Noted    Class 2 severe obesity due to excess calories with serious comorbidity in adult (H) 06/04/2024     Priority: Medium    Vitamin D deficiency 12/04/2023     Priority: Medium    Secondary hyperparathyroidism (H24) 12/04/2023     Priority: Medium     Prevention of chemotherapy-induced neutropenia 07/24/2023     Priority: Medium    Chemotherapy-induced neutropenia (H24) 07/21/2023     Priority: Medium    Chronic diarrhea 06/05/2023     Priority: Medium    Anemia associated with chemotherapy 06/04/2023     Priority: Medium    Lymphopenia 06/04/2023     Priority: Medium    Stage 3b chronic kidney disease (H) 06/04/2023     Priority: Medium    Metabolic acidosis, normal anion gap (NAG) 06/04/2023     Priority: Medium    Elevated troponin 06/04/2023     Priority: Medium    Hypocalcemia 06/04/2023     Priority: Medium    Hypomagnesemia 06/04/2023     Priority: Medium    Tinnitus, right 06/04/2023     Priority: Medium    MARYA (acute kidney injury) (H24) 06/03/2023     Priority: Medium    Paroxysmal atrial fibrillation with RVR (H) 06/03/2023     Priority: Medium    Pyuria 06/03/2023     Priority: Medium    Hyperkalemia 05/10/2023     Priority: Medium    Colon cancer (H) 04/06/2023     Priority: Medium    Malignant neoplasm of sigmoid colon (H) 03/31/2023     Priority: Medium     PH- EOTD 8/10/2023 - [place]       Benign essential hypertension with target blood pressure below 140/90 10/10/2016     Priority: Medium    CARDIOVASCULAR SCREENING; LDL GOAL LESS THAN 130 10/02/2012     Priority: Medium    Prediabetes 02/09/2012     Priority: Medium    Obesity 02/09/2012     Priority: Medium    Crohn's disease of large intestine without complication (H) 02/15/2011     Priority: Medium    Iron deficiency anemia due to chronic blood loss 10/21/2010     Priority: Medium    B12 deficiency anemia 10/21/2010     Priority: Medium    Arthropathia 08/05/2010     Priority: Medium    Dermatitis-dishydrotic eczema-severe 08/05/2010     Priority: Medium    Mild pulmonary hypertension (H) 04/06/2010     Priority: Medium      Past Medical History:   Diagnosis Date    Arthropathia 08/05/2010    B12 deficiency anemia 10/21/2010    Benign essential hypertension with target blood pressure below  140/90 10/10/2016    CARDIOVASCULAR SCREENING; LDL GOAL LESS THAN 160 10/31/2010    Crohn's colitis (H) 02/15/2011    Dermatitis-dishydrotic eczema-severe 08/05/2010    Hiatal hernia     HTN (hypertension) 07/21/2011    Iron deficiency anemia 10/21/2010    Malignant neoplasm of sigmoid colon (H)     Obesity     Primary pulmonary hypertension (H) 04/06/2010     Past Surgical History:   Procedure Laterality Date    COLECTOMY WITHOUT COLOSTOMY N/A 4/6/2023    Procedure: Laparoscopic Converted to Open Total abdominal colectomy;  Surgeon: Jerome Ashley MD;  Location: [facility]    COLONOSCOPY  10/11/10    COLONOSCOPY N/A 2/27/2023    Procedure: ATTEMPTED COLONOSCOPY WITH SIGMOID STRICTURE BIOPSY;  Surgeon: Jonathan Alcocer MD;  Location:  GI    ESOPHAGOSCOPY, GASTROSCOPY, DUODENOSCOPY (EGD), COMBINED N/A 2/27/2023    Procedure: ESOPHAGOGASTRODUODENOSCOPY, WITH BIOPSY;  Surgeon: Jonathan Alcocer MD;  [facility]    HYSTERECTOMY TOTAL ABDOMINAL, BILATERAL SALPINGO-OOPHORECTOMY, COMBINED N/A 4/6/2023    Procedure: Hysterectomy total abdominal, bilateral salpingo-oophorectomy;  Surgeon: Sunitha Olea MD;  Location: [facility] OR    INSERT PORT VASCULAR ACCESS Right 5/25/2023    Procedure: Ultrasound-guided right internal jugular venous access port placement with fluoroscopy;  Surgeon: Martin Thomas DO;  Location: [facility]    IR CHEST PORT PLACEMENT > 5 YRS OF AGE  8/21/2023    IR PORT CHECK RIGHT  7/18/2023    IR PORT REMOVAL RIGHT  8/21/2023    SIGMOIDOSCOPY FLEXIBLE N/A 4/6/2023    Procedure: Sigmoidoscopy flexible;  Surgeon: Jerome Ashley MD;  Location: [facility]    SIGMOIDOSCOPY FLEXIBLE N/A 5/1/2024    Procedure: Sigmoidoscopy flexible;  Surgeon: Jerome Ashley MD;  Location: [facility] OR    SURGICAL HISTORY OF -   06/14/76    Perineorrhaphy, for widening vaginal orifice    ZZC EXPLORATORY OF ABDOMEN  1989    laparoscopy     Current Outpatient Medications   Medication Sig Dispense  Refill    cyanocobalamin 1000 MCG TBCR Take 1,000 mcg by mouth daily 100 tablet 1    fluocinonide (LIDEX) 0.05 % external cream APPLY A SMALL AMOUNT TO AFFECTED AREA(S) TWICE DAILY AS NEEDED 60 g 1    gabapentin (NEURONTIN) 300 MG capsule Take 1 capsule (300 mg) by mouth 3 times daily 90 capsule 3    loperamide (IMODIUM) 2 MG capsule Take 2 mg by mouth daily as needed for diarrhea      MAGNESIUM OXIDE 400 (240 Mg) MG tablet TAKE 1 TABLET (400 MG) BY MOUTH 2 TIMES DAILY 60 tablet 0    metoprolol succinate ER (TOPROL XL) 50 MG 24 hr tablet Take 1 tablet (50 mg) by mouth daily 90 tablet 3    Multiple Vitamins-Iron (MULTI-DAY PLUS IRON PO) Take 1 tablet by mouth daily      rivaroxaban ANTICOAGULANT (XARELTO) 15 MG TABS tablet Take 1 tablet (15 mg) by mouth daily (with dinner) 90 tablet 2    sodium bicarbonate 650 MG tablet Take 1 tablet (650 mg) by mouth 2 times daily 180 tablet 3    VITAMIN D3 50 MCG (2000 UT) tablet TAKE 1 TABLET (50 MCG) BY MOUTH DAILY 30 tablet 6    gabapentin (NEURONTIN) 300 MG capsule Take 300mg in AM, 300mg in afternoon and 600mg at night. (Patient not taking: Reported on 2024) 120 capsule 3    lidocaine-prilocaine (EMLA) 2.5-2.5 % external cream Apply topically as needed for moderate pain Apply 15-20 minutes prior to port access (Patient not taking: Reported on 2024) 1 g 4       Allergies   Allergen Reactions    No Known Drug Allergy         Social History     Tobacco Use    Smoking status: Never     Passive exposure: Current    Smokeless tobacco: Never   Substance Use Topics    Alcohol use: No     Family History   Problem Relation Age of Onset    Hypertension Mother         on meds, alive    Cerebrovascular Disease Father         stroke about age 65,  of cancer at 68 yrs    Diabetes Father         eventually took insulin    Anemia Father         Pernisios anemia    Kidney Disease Niece         kidney transplant    Kidney Disease Nephew         kidney transplant    Venous  "thrombosis No family hx of     Anesthesia Reaction No family hx of      History   Drug Use No             Review of Systems  CONSTITUTIONAL: NEGATIVE for fever, chills, change in weight  INTEGUMENTARY/SKIN: NEGATIVE for worrisome rashes, moles or lesions  EYES: NEGATIVE for vision changes or irritation  ENT/MOUTH: NEGATIVE for ear, mouth and throat problems  RESP: NEGATIVE for significant cough or SOB  BREAST: NEGATIVE for masses, tenderness or discharge  CV: NEGATIVE for chest pain, palpitations or peripheral edema  GI: Patient has chronic diarrhea as well expected following an ileal rectal anastomosis.  Denies any bleeding.  : NEGATIVE for frequency, dysuria, or hematuria  MUSCULOSKELETAL: NEGATIVE for significant arthralgias or myalgia  NEURO: NEGATIVE for weakness, dizziness or paresthesias  ENDOCRINE: NEGATIVE for temperature intolerance, skin/hair changes  HEME: NEGATIVE for bleeding problems  PSYCHIATRIC: NEGATIVE for changes in mood or affect    Objective    /80 (BP Location: Right arm, Patient Position: Chair)   Pulse 68   Temp 97.7  F (36.5  C) (Temporal)   Resp 18   Ht 1.562 m (5' 1.5\")   Wt 94.1 kg (207 lb 8 oz)   LMP  (LMP Unknown)   SpO2 97%   BMI 38.57 kg/m     Estimated body mass index is 38.57 kg/m  as calculated from the following:    Height as of this encounter: 1.562 m (5' 1.5\").    Weight as of this encounter: 94.1 kg (207 lb 8 oz).  Physical Exam  GENERAL: alert and no distress  EYES: Eyes grossly normal to inspection, PERRL and conjunctivae and sclerae normal  HENT: ear canals and TM's normal, nose and mouth without ulcers or lesions  NECK: no adenopathy, no asymmetry, masses, or scars  RESP: lungs clear to auscultation - no rales, rhonchi or wheezes  CV: regular rate and rhythm, normal S1 S2, no S3 or S4, no murmur, click or rub, no peripheral edema  ABDOMEN: Abdomen is soft.  No rebound, rigidity or guarding noted.  Scars consistent with history.  MS: no gross musculoskeletal " defects noted, no edema  SKIN: no suspicious lesions or rashes  NEURO: Normal strength and tone, mentation intact and speech normal  PSYCH: mentation appears normal, affect normal/bright    Recent Labs   Lab Test 07/15/24  1419 07/02/24  0827 06/03/24  1300   HGB  --  12.9 13.1   PLT  --  130* 161    142 141   POTASSIUM 4.7 4.3 4.5   CR 2.03* 1.70* 1.86*        Diagnostics  No labs were ordered during this visit.   No EKG required for low risk surgery (cataract, skin procedure, breast biopsy, etc).    Revised Cardiac Risk Index (RCRI)  The patient has the following serious cardiovascular risks for perioperative complications:   - No serious cardiac risks = 0 points     RCRI Interpretation: 1 point: Class II (low risk - 0.9% complication rate)         Signed Electronically by: Nomi Cartwright DO  A copy of this evaluation report is provided to the requesting physician.

## 2024-07-31 ENCOUNTER — PATIENT OUTREACH (OUTPATIENT)
Dept: CARE COORDINATION | Facility: CLINIC | Age: 69
End: 2024-07-31

## 2024-07-31 ENCOUNTER — ANESTHESIA (OUTPATIENT)
Dept: SURGERY | Facility: CLINIC | Age: 69
End: 2024-07-31
Payer: COMMERCIAL

## 2024-07-31 ENCOUNTER — ANESTHESIA EVENT (OUTPATIENT)
Dept: SURGERY | Facility: CLINIC | Age: 69
End: 2024-07-31
Payer: COMMERCIAL

## 2024-07-31 ENCOUNTER — HOSPITAL ENCOUNTER (OUTPATIENT)
Facility: CLINIC | Age: 69
Discharge: HOME | End: 2024-07-31
Attending: SPECIALIST | Admitting: SPECIALIST
Payer: COMMERCIAL

## 2024-07-31 VITALS
TEMPERATURE: 95.7 F | SYSTOLIC BLOOD PRESSURE: 128 MMHG | DIASTOLIC BLOOD PRESSURE: 76 MMHG | HEART RATE: 70 BPM | BODY MASS INDEX: 38.11 KG/M2 | OXYGEN SATURATION: 98 % | RESPIRATION RATE: 18 BRPM | WEIGHT: 205 LBS

## 2024-07-31 PROCEDURE — 999N000141 HC STATISTIC PRE-PROCEDURE NURSING ASSESSMENT: Performed by: SPECIALIST

## 2024-07-31 PROCEDURE — 272N000001 HC OR GENERAL SUPPLY STERILE: Performed by: SPECIALIST

## 2024-07-31 PROCEDURE — 36590 REMOVAL TUNNELED CV CATH: CPT | Performed by: SPECIALIST

## 2024-07-31 PROCEDURE — 250N000009 HC RX 250: Performed by: NURSE ANESTHETIST, CERTIFIED REGISTERED

## 2024-07-31 PROCEDURE — 360N000075 HC SURGERY LEVEL 2, PER MIN: Performed by: SPECIALIST

## 2024-07-31 PROCEDURE — 710N000012 HC RECOVERY PHASE 2, PER MINUTE: Performed by: SPECIALIST

## 2024-07-31 PROCEDURE — 250N000009 HC RX 250: Performed by: SPECIALIST

## 2024-07-31 PROCEDURE — 370N000017 HC ANESTHESIA TECHNICAL FEE, PER MIN: Performed by: SPECIALIST

## 2024-07-31 PROCEDURE — 250N000011 HC RX IP 250 OP 636: Performed by: NURSE ANESTHETIST, CERTIFIED REGISTERED

## 2024-07-31 PROCEDURE — 250N000011 HC RX IP 250 OP 636: Performed by: SPECIALIST

## 2024-07-31 PROCEDURE — 258N000003 HC RX IP 258 OP 636: Performed by: NURSE ANESTHETIST, CERTIFIED REGISTERED

## 2024-07-31 RX ORDER — LIDOCAINE HYDROCHLORIDE 20 MG/ML
INJECTION, SOLUTION INFILTRATION; PERINEURAL PRN
Status: DISCONTINUED | OUTPATIENT
Start: 2024-07-31 | End: 2024-07-31

## 2024-07-31 RX ORDER — ONDANSETRON 2 MG/ML
4 INJECTION INTRAMUSCULAR; INTRAVENOUS EVERY 30 MIN PRN
Status: CANCELLED | OUTPATIENT
Start: 2024-07-31

## 2024-07-31 RX ORDER — SODIUM CHLORIDE, SODIUM LACTATE, POTASSIUM CHLORIDE, CALCIUM CHLORIDE 600; 310; 30; 20 MG/100ML; MG/100ML; MG/100ML; MG/100ML
INJECTION, SOLUTION INTRAVENOUS CONTINUOUS
Status: DISCONTINUED | OUTPATIENT
Start: 2024-07-31 | End: 2024-07-31 | Stop reason: HOSPADM

## 2024-07-31 RX ORDER — ONDANSETRON 4 MG/1
4 TABLET, ORALLY DISINTEGRATING ORAL EVERY 30 MIN PRN
Status: CANCELLED | OUTPATIENT
Start: 2024-07-31

## 2024-07-31 RX ORDER — FENTANYL CITRATE 50 UG/ML
INJECTION, SOLUTION INTRAMUSCULAR; INTRAVENOUS PRN
Status: DISCONTINUED | OUTPATIENT
Start: 2024-07-31 | End: 2024-07-31

## 2024-07-31 RX ORDER — CEFAZOLIN SODIUM/WATER 2 G/20 ML
2 SYRINGE (ML) INTRAVENOUS SEE ADMIN INSTRUCTIONS
Status: DISCONTINUED | OUTPATIENT
Start: 2024-07-31 | End: 2024-07-31 | Stop reason: HOSPADM

## 2024-07-31 RX ORDER — CEFAZOLIN SODIUM/WATER 2 G/20 ML
2 SYRINGE (ML) INTRAVENOUS
Status: COMPLETED | OUTPATIENT
Start: 2024-07-31 | End: 2024-07-31

## 2024-07-31 RX ORDER — NALOXONE HYDROCHLORIDE 0.4 MG/ML
0.1 INJECTION, SOLUTION INTRAMUSCULAR; INTRAVENOUS; SUBCUTANEOUS
Status: CANCELLED | OUTPATIENT
Start: 2024-07-31

## 2024-07-31 RX ORDER — ONDANSETRON 2 MG/ML
INJECTION INTRAMUSCULAR; INTRAVENOUS PRN
Status: DISCONTINUED | OUTPATIENT
Start: 2024-07-31 | End: 2024-07-31

## 2024-07-31 RX ORDER — FERROUS SULFATE 325(65) MG
325 TABLET ORAL
COMMUNITY

## 2024-07-31 RX ORDER — HYDROCODONE BITARTRATE AND ACETAMINOPHEN 5; 325 MG/1; MG/1
1 TABLET ORAL
Status: CANCELLED | OUTPATIENT
Start: 2024-07-31

## 2024-07-31 RX ORDER — DEXAMETHASONE SODIUM PHOSPHATE 10 MG/ML
4 INJECTION, SOLUTION INTRAMUSCULAR; INTRAVENOUS
Status: CANCELLED | OUTPATIENT
Start: 2024-07-31

## 2024-07-31 RX ORDER — PROPOFOL 10 MG/ML
INJECTION, EMULSION INTRAVENOUS PRN
Status: DISCONTINUED | OUTPATIENT
Start: 2024-07-31 | End: 2024-07-31

## 2024-07-31 RX ORDER — PROPOFOL 10 MG/ML
INJECTION, EMULSION INTRAVENOUS CONTINUOUS PRN
Status: DISCONTINUED | OUTPATIENT
Start: 2024-07-31 | End: 2024-07-31

## 2024-07-31 RX ORDER — DEXAMETHASONE SODIUM PHOSPHATE 4 MG/ML
INJECTION, SOLUTION INTRA-ARTICULAR; INTRALESIONAL; INTRAMUSCULAR; INTRAVENOUS; SOFT TISSUE PRN
Status: DISCONTINUED | OUTPATIENT
Start: 2024-07-31 | End: 2024-07-31

## 2024-07-31 RX ADMIN — FENTANYL CITRATE 25 MCG: 50 INJECTION INTRAMUSCULAR; INTRAVENOUS at 09:24

## 2024-07-31 RX ADMIN — SODIUM CHLORIDE, POTASSIUM CHLORIDE, SODIUM LACTATE AND CALCIUM CHLORIDE: 600; 310; 30; 20 INJECTION, SOLUTION INTRAVENOUS at 08:31

## 2024-07-31 RX ADMIN — PROPOFOL 50 MG: 10 INJECTION, EMULSION INTRAVENOUS at 09:05

## 2024-07-31 RX ADMIN — DEXAMETHASONE SODIUM PHOSPHATE 5 MG: 4 INJECTION, SOLUTION INTRA-ARTICULAR; INTRALESIONAL; INTRAMUSCULAR; INTRAVENOUS; SOFT TISSUE at 09:13

## 2024-07-31 RX ADMIN — Medication 2 G: at 08:55

## 2024-07-31 RX ADMIN — FENTANYL CITRATE 25 MCG: 50 INJECTION INTRAMUSCULAR; INTRAVENOUS at 09:19

## 2024-07-31 RX ADMIN — FENTANYL CITRATE 25 MCG: 50 INJECTION INTRAMUSCULAR; INTRAVENOUS at 09:08

## 2024-07-31 RX ADMIN — LIDOCAINE HYDROCHLORIDE 1 ML: 10 INJECTION, SOLUTION EPIDURAL; INFILTRATION; INTRACAUDAL; PERINEURAL at 08:32

## 2024-07-31 RX ADMIN — PROPOFOL 100 MCG/KG/MIN: 10 INJECTION, EMULSION INTRAVENOUS at 09:05

## 2024-07-31 RX ADMIN — MIDAZOLAM 2 MG: 1 INJECTION INTRAMUSCULAR; INTRAVENOUS at 08:58

## 2024-07-31 RX ADMIN — LIDOCAINE HYDROCHLORIDE 50 MG: 20 INJECTION, SOLUTION INFILTRATION; PERINEURAL at 09:05

## 2024-07-31 RX ADMIN — ONDANSETRON 4 MG: 2 INJECTION INTRAMUSCULAR; INTRAVENOUS at 09:13

## 2024-07-31 RX ADMIN — FENTANYL CITRATE 25 MCG: 50 INJECTION INTRAMUSCULAR; INTRAVENOUS at 09:02

## 2024-07-31 ASSESSMENT — ACTIVITIES OF DAILY LIVING (ADL)
ADLS_ACUITY_SCORE: 37
ADLS_ACUITY_SCORE: 37
ADLS_ACUITY_SCORE: 38

## 2024-07-31 ASSESSMENT — ENCOUNTER SYMPTOMS: DYSRHYTHMIAS: 1

## 2024-07-31 NOTE — ANESTHESIA PREPROCEDURE EVALUATION
Anesthesia Pre-Procedure Evaluation    Patient: Nadia Ladd   MRN: [id number] : 1955        Procedure : Procedure(s):  Removal left-sided PowerPort          Past Medical History:   Diagnosis Date    Arthropathia 2010    B12 deficiency anemia 10/21/2010    Benign essential hypertension with target blood pressure below 140/90 10/10/2016    CARDIOVASCULAR SCREENING; LDL GOAL LESS THAN 160 10/31/2010    Crohn's colitis (H) 02/15/2011    Dermatitis-dishydrotic eczema-severe 2010    Hiatal hernia     HTN (hypertension) 2011    Iron deficiency anemia 10/21/2010    Malignant neoplasm of sigmoid colon (H)     Obesity     Primary pulmonary hypertension (H) 2010      Past Surgical History:   Procedure Laterality Date    COLECTOMY WITHOUT COLOSTOMY N/A 2023    Procedure: Laparoscopic Converted to Open Total abdominal colectomy;  Surgeon: Jerome Ashley MD;  Location: [facility]    COLONOSCOPY  10/11/10    COLONOSCOPY N/A 2023    Procedure: ATTEMPTED COLONOSCOPY WITH SIGMOID STRICTURE BIOPSY;  Surgeon: Jonathan Alcocer MD;  Location:  GI    ESOPHAGOSCOPY, GASTROSCOPY, DUODENOSCOPY (EGD), COMBINED N/A 2023    Procedure: ESOPHAGOGASTRODUODENOSCOPY, WITH BIOPSY;  Surgeon: Jonathan Alcocer MD;  Location:  GI    HYSTERECTOMY TOTAL ABDOMINAL, BILATERAL SALPINGO-OOPHORECTOMY, COMBINED N/A 2023    Procedure: Hysterectomy total abdominal, bilateral salpingo-oophorectomy;  Surgeon: Sunitha Olea MD;  Location: [facility]    INSERT PORT VASCULAR ACCESS Right 2023    Procedure: Ultrasound-guided right internal jugular venous access port placement with fluoroscopy;  Surgeon: Martin Thomas DO;  [facility]    IR CHEST PORT PLACEMENT > 5 YRS OF AGE  2023    IR PORT CHECK RIGHT  2023    IR PORT REMOVAL RIGHT  2023    SIGMOIDOSCOPY FLEXIBLE N/A 2023    Procedure: Sigmoidoscopy flexible;  Surgeon: Jerome Ashley MD;  Location: [facility]     SIGMOIDOSCOPY FLEXIBLE N/A 5/1/2024    Procedure: Sigmoidoscopy flexible;  Surgeon: Jerome Ashley MD;  [place] [facility] [place]    SURGICAL HISTORY OF -   06/14/76    Perineorrhaphy, for widening vaginal orifice    [facility] EXPLORATORY OF ABDOMEN  1989    laparoscopy      Allergies   Allergen Reactions    No Known Drug Allergy       Social History     Tobacco Use    Smoking status: Never     Passive exposure: Current    Smokeless tobacco: Never   Substance Use Topics    Alcohol use: No      Wt Readings from Last 1 Encounters:   07/30/24 94.1 kg (207 lb 8 oz)        Anesthesia Evaluation   Pt has had prior anesthetic. Type: General and MAC.        ROS/MED HX  ENT/Pulmonary:  - neg pulmonary ROS     Neurologic:  - neg neurologic ROS     Cardiovascular:     (+) Dyslipidemia hypertension- -   -  - -                        dysrhythmias, a-fib,        Previous cardiac testing   Echo: Date: Results:    Stress Test:  Date: Results:    ECG Reviewed:  Date: 8/19/23 Results:  SR with PAC  Cath:  Date: Results:      METS/Exercise Tolerance:     Hematologic:     (+)      anemia, history of blood transfusion, no previous transfusion reaction,        Musculoskeletal:  - neg musculoskeletal ROS     GI/Hepatic:     (+)      hiatal hernia, Inflammatory bowel disease,             Renal/Genitourinary:     (+) renal disease, type: CRI,            Endo: Comment: hyperparathyroid    (+)               Obesity,       Psychiatric/Substance Use:  - neg psychiatric ROS     Infectious Disease:  - neg infectious disease ROS     Malignancy:   (+) Malignancy, History of GI.    Other:  - neg other ROS          Physical Exam    Airway  airway exam normal      Mallampati: II   TM distance: > 3 FB   Neck ROM: full   Mouth opening: > 3 cm    Respiratory Devices and Support         Dental           Cardiovascular   cardiovascular exam normal       Rhythm and rate: regular and normal     Pulmonary   pulmonary exam normal        breath sounds clear to  "auscultation           OUTSIDE LABS:  CBC:   Lab Results   Component Value Date    WBC 6.1 07/02/2024    WBC 5.9 06/03/2024    HGB 12.9 07/02/2024    HGB 13.1 06/03/2024    HCT 38.3 07/02/2024    HCT 40.2 06/03/2024     (L) 07/02/2024     06/03/2024     BMP:   Lab Results   Component Value Date     07/15/2024     07/02/2024    POTASSIUM 4.7 07/15/2024    POTASSIUM 4.3 07/02/2024    CHLORIDE 105 07/15/2024    CHLORIDE 104 07/02/2024    CO2 24 07/15/2024    CO2 24 07/02/2024    BUN 28.3 (H) 07/15/2024    BUN 31.7 (H) 07/02/2024    CR 2.03 (H) 07/15/2024    CR 1.70 (H) 07/02/2024    GLC 95 07/15/2024     (H) 07/02/2024     COAGS: No results found for: \"PTT\", \"INR\", \"FIBR\"  POC: No results found for: \"BGM\", \"HCG\", \"HCGS\"  HEPATIC:   Lab Results   Component Value Date    ALBUMIN 4.3 07/15/2024    PROTTOTAL 7.3 07/15/2024    ALT 28 07/15/2024    AST 48 (H) 07/15/2024    ALKPHOS 253 (H) 07/15/2024    BILITOTAL 0.7 07/15/2024     OTHER:   Lab Results   Component Value Date    PH 7.24 (L) 04/06/2023    LACT 0.9 04/06/2023    A1C 4.8 03/12/2018    LIVIER 9.8 07/15/2024    PHOS 3.5 06/03/2024    MAG 1.9 04/01/2024    LIPASE 70 (H) 06/03/2023    TSH 2.39 08/04/2023    T4 1.25 09/14/2010    CRP 20.4 (H) 10/09/2021    SED 10 10/09/2021       Anesthesia Plan    ASA Status:  3    NPO Status:  NPO Appropriate    Anesthesia Type: MAC.     - Reason for MAC: chronic cardiopulmonary disease, straight local not clinically adequate   Induction: Intravenous, Propofol.   Maintenance: TIVA.        Consents    Anesthesia Plan(s) and associated risks, benefits, and realistic alternatives discussed. Questions answered and patient/representative(s) expressed understanding.     - Discussed:     - Discussed with:  Patient       Use of blood products discussed: No .     Postoperative Care    Pain management: IV analgesics.   PONV prophylaxis: Ondansetron (or other 5HT-3), Background Propofol Infusion " "    Comments:    Other Comments: The risks and benefits of anesthesia, and the alternatives where applicable, have been discussed with the patient, and they wish to proceed.              LUIS Clark CRNA    I have reviewed the pertinent notes and labs in the chart from the past 30 days and (re)examined the patient.  Any updates or changes from those notes are reflected in this note.            # Drug Induced Coagulation Defect: home medication list includes an anticoagulant medication   # Obesity: Estimated body mass index is 38.57 kg/m  as calculated from the following:    Height as of 7/30/24: 1.562 m (5' 1.5\").    Weight as of 7/30/24: 94.1 kg (207 lb 8 oz).      "

## 2024-07-31 NOTE — ANESTHESIA CARE TRANSFER NOTE
Patient: Nadia Ladd    Procedure: Procedure(s):  Removal left-sided PowerPort       Diagnosis: Encounter for care related to vascular access port [Z45.2]  Malignant neoplasm of sigmoid colon (H) [C18.7]  Diagnosis Additional Information: No value filed.    Anesthesia Type:   MAC     Note:    Oropharynx: oropharynx clear of all foreign objects and spontaneously breathing  Level of Consciousness: drowsy  Oxygen Supplementation: face mask    Independent Airway: airway patency satisfactory and stable  Dentition: dentition unchanged  Vital Signs Stable: post-procedure vital signs reviewed and stable  Report to RN Given: handoff report given  Patient transferred to: Phase II    Handoff Report: Identifed the Patient, Identified the Reponsible Provider, Reviewed the pertinent medical history, Discussed the surgical course, Reviewed Intra-OP anesthesia mangement and issues during anesthesia, Set expectations for post-procedure period and Allowed opportunity for questions and acknowledgement of understanding      Vitals:  Vitals Value Taken Time   BP     Temp     Pulse     Resp     SpO2 100 % 07/31/24 0937   Vitals shown include unfiled device data.    Electronically Signed By: LUIS Clark CRNA  July 31, 2024  9:37 AM

## 2024-07-31 NOTE — ANESTHESIA POSTPROCEDURE EVALUATION
Patient: Nadia Ladd    Procedure: Procedure(s):  Removal left-sided PowerPort       Anesthesia Type:  MAC    Note:  Disposition: Outpatient   Postop Pain Control: Uneventful            Sign Out: Well controlled pain   PONV: No   Neuro/Psych: Uneventful            Sign Out: Acceptable/Baseline neuro status   Airway/Respiratory: Uneventful            Sign Out: Acceptable/Baseline resp. status   CV/Hemodynamics: Uneventful            Sign Out: Acceptable CV status   Other NRE: NONE   DID A NON-ROUTINE EVENT OCCUR? No    Event details/Postop Comments:  Pt was happy with anesthesia care.  No complications.  I will follow up with the pt if needed.           Last vitals:  Vitals Value Taken Time   /76 07/31/24 1015   Temp 95.72  F (35.4  C) 07/31/24 1016   Pulse 70 07/31/24 1015   Resp 18 07/31/24 1015   SpO2 98 % 07/31/24 1015   Vitals shown include unfiled device data.    Electronically Signed By: LUIS Clark CRNA  July 31, 2024  10:35 AM

## 2024-07-31 NOTE — OP NOTE
[facility] Operative Note    Pre-operative diagnosis: Encounter for care related to vascular access port [Z45.2]  Malignant neoplasm of sigmoid colon (H) [C18.7]   Post-operative diagnosis: Same   Procedure: Procedure(s):  Removal left-sided PowerPort   Surgeon: Jonathan Alcocer MD   Assistant(s): None   Anesthesia: MAC   Estimated blood loss: Minimal   Total IV fluids: (See anesthesia record)   Blood transfusion: No transfusion was given during surgery   Total urine output: (See anesthesia record)   Drains: None   Specimens: None   Implants: None   Findings: Well incorporated PowerPort   Complications: None   Condition: Stable   Comments: Indications for procedure: This is a 68-year-old lady with a history of colon cancer.  She had a PowerPort placed for chemotherapy.  She has completed her treatment and wanted the PowerPort removed.         Details procedure:  With the cooperation the patient in the preoperative holding area the area of the port was marked in the left chest.  She was taken the operating room and placed the table in supine position.  After receiving some sedation the left chest and neck were prepped and draped sterile fashion.  A timeout was performed confirm the identity of the patient as well as the procedure performed.  The area around the port was then infiltrated the local anesthetic.  After adequate analgesia was achieved her old incision was then opened.  Subcutaneous tissue was opened and cautery.  The port hub was readily seen and dissected free using Metzenbaum scissors.  The entire port was dissected out of its capsule using Metzenbaum scissors.  The port was then removed with direct pressure held his access point in the neck.  It was found to be intact.  Pressure was held for 5 minutes by the clock and the patient was also placed in reverse Trendelenburg during this time.  The remainder of the port capsule was removed using cautery.  Subcutaneous tissue was reapproximated using  3-0 Vicryl.  The skin was closed in a running 4-0 Monocryl subcuticular stitch.  Dermabond glue was applied and the patient was then taken from the operative to recovery in stable condition to be sent home.    Jonathan Alcocer MD, FACS

## 2024-08-01 NOTE — TELEPHONE ENCOUNTER
Sorry I missed that she was hoping to  the same day. I have completed maintenance on her hearing aid and it is ready for  please give her a call. Thanks, Luisana

## 2024-08-01 NOTE — PROGRESS NOTES
AUDIOLOGY REPORT - Hearing Aid Drop Off     SUBJECTIVE:   Nadia Ladd is a 68 year old female dropped her left hearing aid off at the [facility] 7/30/2024.    Previous results evaluation 10/23/2023 showed moderate rising to mild sloping to severe sensorineural hearing loss right ear and mild sloping to severe senosrineural hearing loss left ear. Dr. Zurita provided medical clearance. She notes good benefit overall. She notes some feedback from the right device.     OBJECTIVE:   Removed dome and wax filter. Replaced wax filter and replaced small double dome left. Vacuumed suly ports.     ASSESSMENT:   Follow up and changes noted above.    PLAN:  Nadia was contacted her hearing aid is ready for .     Please call this clinic with questions regarding today s appointment.     Rober Nuñez.  [place] Licensed Audiologist #[id number]

## 2024-08-08 ENCOUNTER — TELEPHONE (OUTPATIENT)
Dept: ONCOLOGY | Facility: CLINIC | Age: 69
End: 2024-08-08

## 2024-08-08 ENCOUNTER — OFFICE VISIT (OUTPATIENT)
Dept: SURGERY | Facility: CLINIC | Age: 69
End: 2024-08-08
Payer: COMMERCIAL

## 2024-08-08 VITALS
TEMPERATURE: 97.8 F | BODY MASS INDEX: 38.11 KG/M2 | WEIGHT: 205 LBS | SYSTOLIC BLOOD PRESSURE: 128 MMHG | DIASTOLIC BLOOD PRESSURE: 72 MMHG

## 2024-08-08 DIAGNOSIS — G62.0 CHEMOTHERAPY-INDUCED NEUROPATHY (H): ICD-10-CM

## 2024-08-08 DIAGNOSIS — Z09 POSTOP CHECK: Primary | ICD-10-CM

## 2024-08-08 DIAGNOSIS — T45.1X5A CHEMOTHERAPY-INDUCED NEUROPATHY (H): ICD-10-CM

## 2024-08-08 PROCEDURE — 99024 POSTOP FOLLOW-UP VISIT: CPT | Performed by: SPECIALIST

## 2024-08-08 RX ORDER — GABAPENTIN 300 MG/1
300 CAPSULE ORAL 3 TIMES DAILY
Qty: 90 CAPSULE | Refills: 3 | Status: SHIPPED | OUTPATIENT
Start: 2024-08-08

## 2024-08-08 ASSESSMENT — PAIN SCALES - GENERAL: PAINLEVEL: NO PAIN (0)

## 2024-08-08 NOTE — PROGRESS NOTES
Follow-up for PowerPort removal    Subjective:  Patient feels good.  No complaints or concerns.      Objective:  B/P: 128/72, T: 97.8, P: Data Unavailable, R: Data Unavailable  Chest: Incision healing well.  Minimal ecchymoses.    Assessment/plan:  Patient is status post removal of PowerPort.  Doing well.  She will increase her activity as tolerated and follow-up as necessary.    Jonathan Alcocer MD, FACS

## 2024-08-08 NOTE — LETTER
8/8/2024      Nadia Ladd  [address] [facility] [address]      Dear Colleague,    Thank you for referring your patient, Nadia Ladd, to the [facility]. Please see a copy of my visit note below.    Follow-up for PowerPort removal    Subjective:  Patient feels good.  No complaints or concerns.      Objective:  B/P: 128/72, T: 97.8, P: Data Unavailable, R: Data Unavailable  Chest: Incision healing well.  Minimal ecchymoses.    Assessment/plan:  Patient is status post removal of PowerPort.  Doing well.  She will increase her activity as tolerated and follow-up as necessary.    Jonathan Alcocer MD, FACS      Again, thank you for allowing me to participate in the care of your patient.        Sincerely,        Jonathan Alcocer MD

## 2024-08-08 NOTE — TELEPHONE ENCOUNTER
Reason for Call:  Other prescription    Detailed comments: Patient requesting refill of gabapentin (NEURONTIN) 300 MG capsules. Patient states Dr. Bateman increased dose to take three times per day.    Patient almost out of meds. Medication pended to preferred pharmacy, please review for accuracy.     Please call patient when refill has been sent to pharmacy.     Phone Number Patient can be reached at: Cell number on file:    Telephone Information:   Mobile [phone]     Best Time: N/A    Can we leave a detailed message on this number? YES    Call taken on 8/8/2024 at 10:58 AM by Ryley Neal

## 2024-08-08 NOTE — TELEPHONE ENCOUNTER
REFILL REQUEST:    gabapentin (NEURONTIN) 300 MG capsule 90 capsule 3 4/2/2024 -- No   Sig - Route: Take 1 capsule (300 mg) by mouth 3 times daily - Oral   Sent to pharmacy as: Gabapentin 300 MG Oral Capsule (NEURONTIN)     Last office visit: 7/9/2024 with prescribing provider:  Dr. Bateman   Future Office Visit:  10/17/24 transfer of care to Dr. Fernandez    Requested Prescriptions   Pending Prescriptions Disp Refills    gabapentin (NEURONTIN) 300 MG capsule 90 capsule 3     Sig: Take 1 capsule (300 mg) by mouth 3 times daily       There is no refill protocol information for this order          Chart review from office visit note 07/09/24: - neuropathy- grade 2, feet>hands, RLE>LLE, increase gabapentin from 300mg TID to 300mg AM and afternoon, 600mg in PM- rx given; likely component of arthritis too     Liseth Yoon RN on 8/8/2024 at 11:05 AM

## 2024-08-13 DIAGNOSIS — I48.0 PAROXYSMAL ATRIAL FIBRILLATION WITH RVR (H): ICD-10-CM

## 2024-08-13 RX ORDER — METOPROLOL SUCCINATE 50 MG/1
50 TABLET, EXTENDED RELEASE ORAL DAILY
Qty: 90 TABLET | Refills: 2 | Status: SHIPPED | OUTPATIENT
Start: 2024-08-13

## 2024-08-14 ENCOUNTER — PATIENT OUTREACH (OUTPATIENT)
Dept: CARE COORDINATION | Facility: CLINIC | Age: 69
End: 2024-08-14
Payer: COMMERCIAL

## 2024-08-22 ENCOUNTER — LAB (OUTPATIENT)
Dept: LAB | Facility: CLINIC | Age: 69
End: 2024-08-22
Payer: COMMERCIAL

## 2024-08-22 DIAGNOSIS — R74.8 ALKALINE PHOSPHATASE ELEVATION: ICD-10-CM

## 2024-08-22 LAB
ALBUMIN SERPL BCG-MCNC: 4 G/DL (ref 3.5–5.2)
ALP SERPL-CCNC: 233 U/L (ref 40–150)
ALT SERPL W P-5'-P-CCNC: 37 U/L (ref 0–50)
AST SERPL W P-5'-P-CCNC: 38 U/L (ref 0–45)
BILIRUB DIRECT SERPL-MCNC: <0.2 MG/DL (ref 0–0.3)
BILIRUB SERPL-MCNC: 0.7 MG/DL
PROT SERPL-MCNC: 6.7 G/DL (ref 6.4–8.3)

## 2024-08-22 PROCEDURE — 36415 COLL VENOUS BLD VENIPUNCTURE: CPT

## 2024-08-22 PROCEDURE — 80076 HEPATIC FUNCTION PANEL: CPT

## 2024-08-24 DIAGNOSIS — R74.8 ALKALINE PHOSPHATASE ELEVATION: Primary | ICD-10-CM

## 2024-08-27 DIAGNOSIS — K52.9 CHRONIC DIARRHEA: ICD-10-CM

## 2024-08-27 DIAGNOSIS — E83.42 HYPOMAGNESEMIA: ICD-10-CM

## 2024-08-27 DIAGNOSIS — I48.0 PAROXYSMAL ATRIAL FIBRILLATION WITH RVR (H): ICD-10-CM

## 2024-08-28 RX ORDER — LANOLIN ALCOHOL/MO/W.PET/CERES
400 CREAM (GRAM) TOPICAL 2 TIMES DAILY
Qty: 60 TABLET | Refills: 0 | Status: SHIPPED | OUTPATIENT
Start: 2024-08-28 | End: 2024-09-30

## 2024-09-04 ENCOUNTER — LAB (OUTPATIENT)
Dept: LAB | Facility: CLINIC | Age: 69
End: 2024-09-04
Payer: COMMERCIAL

## 2024-09-04 DIAGNOSIS — R74.8 ALKALINE PHOSPHATASE ELEVATION: ICD-10-CM

## 2024-09-04 DIAGNOSIS — N18.32 STAGE 3B CHRONIC KIDNEY DISEASE (H): ICD-10-CM

## 2024-09-04 LAB
ALBUMIN MFR UR ELPH: <6 MG/DL
ALBUMIN SERPL BCG-MCNC: 4.1 G/DL (ref 3.5–5.2)
ALBUMIN UR-MCNC: NEGATIVE MG/DL
ALP SERPL-CCNC: 238 U/L (ref 40–150)
ALT SERPL W P-5'-P-CCNC: 37 U/L (ref 0–50)
ANION GAP SERPL CALCULATED.3IONS-SCNC: 12 MMOL/L (ref 7–15)
APPEARANCE UR: CLEAR
AST SERPL W P-5'-P-CCNC: 39 U/L (ref 0–45)
BILIRUB DIRECT SERPL-MCNC: <0.2 MG/DL (ref 0–0.3)
BILIRUB SERPL-MCNC: 0.7 MG/DL
BILIRUB UR QL STRIP: NEGATIVE
BUN SERPL-MCNC: 29.3 MG/DL (ref 8–23)
CALCIUM SERPL-MCNC: 9.6 MG/DL (ref 8.8–10.4)
CHLORIDE SERPL-SCNC: 104 MMOL/L (ref 98–107)
COLOR UR AUTO: YELLOW
CREAT SERPL-MCNC: 1.78 MG/DL (ref 0.51–0.95)
CREAT UR-MCNC: 84.9 MG/DL
EGFRCR SERPLBLD CKD-EPI 2021: 31 ML/MIN/1.73M2
ERYTHROCYTE [DISTWIDTH] IN BLOOD BY AUTOMATED COUNT: 13.2 % (ref 10–15)
GLUCOSE SERPL-MCNC: 122 MG/DL (ref 70–99)
GLUCOSE UR STRIP-MCNC: NEGATIVE MG/DL
HCO3 SERPL-SCNC: 24 MMOL/L (ref 22–29)
HCT VFR BLD AUTO: 41.9 % (ref 35–47)
HGB BLD-MCNC: 13.9 G/DL (ref 11.7–15.7)
HGB UR QL STRIP: ABNORMAL
HYALINE CASTS: 1 /LPF
KETONES UR STRIP-MCNC: NEGATIVE MG/DL
LEUKOCYTE ESTERASE UR QL STRIP: NEGATIVE
MCH RBC QN AUTO: 29.4 PG (ref 26.5–33)
MCHC RBC AUTO-ENTMCNC: 33.2 G/DL (ref 31.5–36.5)
MCV RBC AUTO: 89 FL (ref 78–100)
MUCOUS THREADS #/AREA URNS LPF: PRESENT /LPF
NITRATE UR QL: NEGATIVE
PH UR STRIP: 5 [PH] (ref 5–7)
PHOSPHATE SERPL-MCNC: 3.5 MG/DL (ref 2.5–4.5)
PLATELET # BLD AUTO: 167 10E3/UL (ref 150–450)
POTASSIUM SERPL-SCNC: 4.6 MMOL/L (ref 3.4–5.3)
PROT SERPL-MCNC: 6.7 G/DL (ref 6.4–8.3)
PROT/CREAT 24H UR: NORMAL MG/G{CREAT}
RBC # BLD AUTO: 4.72 10E6/UL (ref 3.8–5.2)
RBC URINE: <1 /HPF
SODIUM SERPL-SCNC: 140 MMOL/L (ref 135–145)
SP GR UR STRIP: 1.01 (ref 1–1.03)
SQUAMOUS EPITHELIAL: <1 /HPF
UROBILINOGEN UR STRIP-MCNC: NORMAL MG/DL
WBC # BLD AUTO: 6.7 10E3/UL (ref 4–11)
WBC URINE: 1 /HPF

## 2024-09-04 PROCEDURE — 80053 COMPREHEN METABOLIC PANEL: CPT

## 2024-09-04 PROCEDURE — 82248 BILIRUBIN DIRECT: CPT

## 2024-09-04 PROCEDURE — 85027 COMPLETE CBC AUTOMATED: CPT

## 2024-09-04 PROCEDURE — 36415 COLL VENOUS BLD VENIPUNCTURE: CPT

## 2024-09-04 PROCEDURE — 84156 ASSAY OF PROTEIN URINE: CPT

## 2024-09-04 PROCEDURE — 81001 URINALYSIS AUTO W/SCOPE: CPT

## 2024-09-04 PROCEDURE — 84100 ASSAY OF PHOSPHORUS: CPT

## 2024-09-09 ENCOUNTER — TELEPHONE (OUTPATIENT)
Dept: ONCOLOGY | Facility: CLINIC | Age: 69
End: 2024-09-09
Payer: COMMERCIAL

## 2024-09-09 DIAGNOSIS — R19.7 DIARRHEA, UNSPECIFIED TYPE: Primary | ICD-10-CM

## 2024-09-09 NOTE — TELEPHONE ENCOUNTER
RX refill request     loperamide (IMODIUM) 2 MG capsule /Take 2 mg by mouth daily as needed for diarrhea - Oral     [id number] [place] [facility] [address] [place] [address]    Pt asking for a refill of this medication from Dr. Fernandez     Thank You Lelo

## 2024-09-10 RX ORDER — LOPERAMIDE HCL 2 MG
2 CAPSULE ORAL DAILY PRN
Qty: 60 CAPSULE | Refills: 1 | Status: SHIPPED | OUTPATIENT
Start: 2024-09-10

## 2024-09-30 DIAGNOSIS — E83.42 HYPOMAGNESEMIA: ICD-10-CM

## 2024-09-30 DIAGNOSIS — K52.9 CHRONIC DIARRHEA: ICD-10-CM

## 2024-09-30 DIAGNOSIS — I48.0 PAROXYSMAL ATRIAL FIBRILLATION WITH RVR (H): ICD-10-CM

## 2024-09-30 RX ORDER — LANOLIN ALCOHOL/MO/W.PET/CERES
400 CREAM (GRAM) TOPICAL 2 TIMES DAILY
Qty: 60 TABLET | Refills: 0 | Status: SHIPPED | OUTPATIENT
Start: 2024-09-30

## 2024-10-04 PROBLEM — C7A.1 MALIGNANT POORLY DIFFERENTIATED NEUROENDOCRINE CARCINOMA (H): Status: ACTIVE | Noted: 2024-10-04

## 2024-10-08 ENCOUNTER — TELEPHONE (OUTPATIENT)
Dept: INTERNAL MEDICINE | Facility: CLINIC | Age: 69
End: 2024-10-08

## 2024-10-08 NOTE — TELEPHONE ENCOUNTER
Patient Quality Outreach    Patient is due for the following:   Colon Cancer Screening  Breast Cancer Screening - Mammogram  Physical Annual Wellness Visit    Next Steps:       Type of outreach:          Questions for provider review:               Aubree Padilla LPN

## 2024-10-08 NOTE — LETTER
April 3, 2025    To  Nadia Ladd  [address]  [facility] [address]        Your team at [facility] cares about your health. We have reviewed your chart and based on our findings; we are making the following recommendations to better manage your health.     You are in particular need of attention regarding the following:     Schedule Annual MAMMOGRAPHY. [facility] scheduling number is [phone] or schedule in [employer] (self referral).    If you have already completed these items, please contact the clinic via phone or   [employer] so your care team can review and update your records. Thank you for   choosing [facility] for your healthcare needs. For any questions,   concerns, or to schedule an appointment please contact our clinic.    Healthy Regards,      Your [facility] Care Team

## 2024-10-16 ENCOUNTER — HOSPITAL ENCOUNTER (OUTPATIENT)
Dept: CT IMAGING | Facility: CLINIC | Age: 69
Discharge: HOME | End: 2024-10-16
Attending: INTERNAL MEDICINE
Payer: COMMERCIAL

## 2024-10-16 ENCOUNTER — LAB (OUTPATIENT)
Dept: LAB | Facility: CLINIC | Age: 69
End: 2024-10-16
Payer: COMMERCIAL

## 2024-10-16 ENCOUNTER — INFUSION THERAPY VISIT (OUTPATIENT)
Dept: INFUSION THERAPY | Facility: CLINIC | Age: 69
End: 2024-10-16
Attending: INTERNAL MEDICINE
Payer: COMMERCIAL

## 2024-10-16 DIAGNOSIS — C18.7 MALIGNANT NEOPLASM OF SIGMOID COLON (H): ICD-10-CM

## 2024-10-16 DIAGNOSIS — D70.1 CHEMOTHERAPY-INDUCED NEUTROPENIA (H): ICD-10-CM

## 2024-10-16 DIAGNOSIS — T45.1X5A CHEMOTHERAPY-INDUCED NEUTROPENIA (H): ICD-10-CM

## 2024-10-16 DIAGNOSIS — C18.9 MALIGNANT NEOPLASM OF COLON, UNSPECIFIED PART OF COLON (H): Primary | ICD-10-CM

## 2024-10-16 LAB
ALBUMIN SERPL BCG-MCNC: 3.7 G/DL (ref 3.5–5.2)
ALP SERPL-CCNC: 189 U/L (ref 40–150)
ALT SERPL W P-5'-P-CCNC: 27 U/L (ref 0–50)
ANION GAP SERPL CALCULATED.3IONS-SCNC: 13 MMOL/L (ref 7–15)
AST SERPL W P-5'-P-CCNC: 32 U/L (ref 0–45)
BASOPHILS # BLD AUTO: 0 10E3/UL (ref 0–0.2)
BASOPHILS NFR BLD AUTO: 0 %
BILIRUB SERPL-MCNC: 0.7 MG/DL
BUN SERPL-MCNC: 25.6 MG/DL (ref 8–23)
CALCIUM SERPL-MCNC: 9.1 MG/DL (ref 8.8–10.4)
CEA SERPL-MCNC: 1.8 NG/ML
CHLORIDE SERPL-SCNC: 104 MMOL/L (ref 98–107)
CREAT SERPL-MCNC: 1.54 MG/DL (ref 0.51–0.95)
EGFRCR SERPLBLD CKD-EPI 2021: 36 ML/MIN/1.73M2
EOSINOPHIL # BLD AUTO: 0 10E3/UL (ref 0–0.7)
EOSINOPHIL NFR BLD AUTO: 0 %
ERYTHROCYTE [DISTWIDTH] IN BLOOD BY AUTOMATED COUNT: 13.3 % (ref 10–15)
GLUCOSE SERPL-MCNC: 124 MG/DL (ref 70–99)
HCO3 SERPL-SCNC: 22 MMOL/L (ref 22–29)
HCT VFR BLD AUTO: 40.2 % (ref 35–47)
HGB BLD-MCNC: 13.2 G/DL (ref 11.7–15.7)
IMM GRANULOCYTES # BLD: 0.1 10E3/UL
IMM GRANULOCYTES NFR BLD: 1 %
LYMPHOCYTES # BLD AUTO: 0.6 10E3/UL (ref 0.8–5.3)
LYMPHOCYTES NFR BLD AUTO: 12 %
MCH RBC QN AUTO: 29.1 PG (ref 26.5–33)
MCHC RBC AUTO-ENTMCNC: 32.8 G/DL (ref 31.5–36.5)
MCV RBC AUTO: 89 FL (ref 78–100)
MONOCYTES # BLD AUTO: 0.4 10E3/UL (ref 0–1.3)
MONOCYTES NFR BLD AUTO: 8 %
NEUTROPHILS # BLD AUTO: 4 10E3/UL (ref 1.6–8.3)
NEUTROPHILS NFR BLD AUTO: 80 %
NRBC # BLD AUTO: 0 10E3/UL
NRBC BLD AUTO-RTO: 0 /100
PLATELET # BLD AUTO: 136 10E3/UL (ref 150–450)
POTASSIUM SERPL-SCNC: 4.5 MMOL/L (ref 3.4–5.3)
PROT SERPL-MCNC: 6.4 G/DL (ref 6.4–8.3)
RBC # BLD AUTO: 4.53 10E6/UL (ref 3.8–5.2)
SODIUM SERPL-SCNC: 139 MMOL/L (ref 135–145)
WBC # BLD AUTO: 5.1 10E3/UL (ref 4–11)

## 2024-10-16 PROCEDURE — 74177 CT ABD & PELVIS W/CONTRAST: CPT

## 2024-10-16 PROCEDURE — 250N000009 HC RX 250: Performed by: INTERNAL MEDICINE

## 2024-10-16 PROCEDURE — 80053 COMPREHEN METABOLIC PANEL: CPT

## 2024-10-16 PROCEDURE — 82378 CARCINOEMBRYONIC ANTIGEN: CPT

## 2024-10-16 PROCEDURE — 85025 COMPLETE CBC W/AUTO DIFF WBC: CPT

## 2024-10-16 PROCEDURE — 96361 HYDRATE IV INFUSION ADD-ON: CPT

## 2024-10-16 PROCEDURE — 36415 COLL VENOUS BLD VENIPUNCTURE: CPT

## 2024-10-16 PROCEDURE — 250N000011 HC RX IP 250 OP 636: Performed by: INTERNAL MEDICINE

## 2024-10-16 PROCEDURE — 258N000003 HC RX IP 258 OP 636: Performed by: INTERNAL MEDICINE

## 2024-10-16 PROCEDURE — 96360 HYDRATION IV INFUSION INIT: CPT | Mod: XU

## 2024-10-16 RX ORDER — ALBUTEROL SULFATE 90 UG/1
1-2 INHALANT RESPIRATORY (INHALATION)
Start: 2024-10-16

## 2024-10-16 RX ORDER — HEPARIN SODIUM,PORCINE 10 UNIT/ML
5 VIAL (ML) INTRAVENOUS
OUTPATIENT
Start: 2024-10-16

## 2024-10-16 RX ORDER — ALBUTEROL SULFATE 0.83 MG/ML
2.5 SOLUTION RESPIRATORY (INHALATION)
OUTPATIENT
Start: 2024-10-16

## 2024-10-16 RX ORDER — MEPERIDINE HYDROCHLORIDE 25 MG/ML
25 INJECTION INTRAMUSCULAR; INTRAVENOUS; SUBCUTANEOUS EVERY 30 MIN PRN
OUTPATIENT
Start: 2024-10-16

## 2024-10-16 RX ORDER — METHYLPREDNISOLONE SODIUM SUCCINATE 125 MG/2ML
125 INJECTION INTRAMUSCULAR; INTRAVENOUS
Start: 2024-10-16

## 2024-10-16 RX ORDER — HEPARIN SODIUM (PORCINE) LOCK FLUSH IV SOLN 100 UNIT/ML 100 UNIT/ML
5 SOLUTION INTRAVENOUS
OUTPATIENT
Start: 2024-10-16

## 2024-10-16 RX ORDER — DIPHENHYDRAMINE HYDROCHLORIDE 50 MG/ML
50 INJECTION INTRAMUSCULAR; INTRAVENOUS
Start: 2024-10-16

## 2024-10-16 RX ORDER — EPINEPHRINE 1 MG/ML
0.3 INJECTION, SOLUTION, CONCENTRATE INTRAVENOUS EVERY 5 MIN PRN
OUTPATIENT
Start: 2024-10-16

## 2024-10-16 RX ORDER — IOPAMIDOL 755 MG/ML
500 INJECTION, SOLUTION INTRAVASCULAR ONCE
Status: COMPLETED | OUTPATIENT
Start: 2024-10-16 | End: 2024-10-16

## 2024-10-16 RX ADMIN — IOPAMIDOL 100 ML: 755 INJECTION, SOLUTION INTRAVENOUS at 10:01

## 2024-10-16 RX ADMIN — SODIUM CHLORIDE 60 ML: 9 INJECTION, SOLUTION INTRAVENOUS at 09:59

## 2024-10-16 RX ADMIN — SODIUM CHLORIDE 500 ML: 9 INJECTION, SOLUTION INTRAVENOUS at 08:35

## 2024-10-16 RX ADMIN — SODIUM CHLORIDE 500 ML: 9 INJECTION, SOLUTION INTRAVENOUS at 10:19

## 2024-10-16 NOTE — PROGRESS NOTES
Infusion Nursing Note:  Nadia Ladd presents today for IVF before and after CT scan.    Patient seen by provider today: No   present during visit today: Not Applicable.    Note: Patient has appt with Dr. Fernandez on 10/21. Lab draw by Lab tech.      Intravenous Access:  Peripheral IV placed.    Treatment Conditions:  Not Applicable.      Post Infusion Assessment:  Patient tolerated infusion without incident.  Blood return noted pre and post infusion.  Site patent and intact, free from redness, edema or discomfort.  No evidence of extravasations.  PIV access discontinued per protocol.       Discharge Plan:   AVS to patient via MYCHART.    Patient discharged in stable condition accompanied by: .  Departure Mode: Ambulatory.      Rosalina Argueta RN

## 2024-10-17 NOTE — PROGRESS NOTES
History of Present Illness - Nadia Ladd is a 69 year old female presenting in clinic today for a recheck on Patient presents with:  Follow Up    Patient presents for recheck of hearing.  She had initially asymmetric sensorineural hearing loss which was worked up.  She had an MRI at that time did not show any pathology this was a year ago.  She does not feel retaining his change.  Wears hearing aids quite successfully.  Left ear had was significantly better than the right at low to mid frequencies not only with pure-tone thresholds but especially with word recognition score.  No new symptoms of ear infection discharge vertigo or dizziness.        BP Readings from Last 1 Encounters:   10/28/24 132/72       BP noted to be well controlled today in office.       Past Medical History -   Past Medical History:   Diagnosis Date    Arthropathia 08/05/2010    B12 deficiency anemia 10/21/2010    Benign essential hypertension with target blood pressure below 140/90 10/10/2016    CARDIOVASCULAR SCREENING; LDL GOAL LESS THAN 160 10/31/2010    Crohn's colitis (H) 02/15/2011    Dermatitis-dishydrotic eczema-severe 08/05/2010    Hiatal hernia     HTN (hypertension) 07/21/2011    Iron deficiency anemia 10/21/2010    Malignant neoplasm of sigmoid colon (H)     Obesity     Primary pulmonary hypertension (H) 04/06/2010       Current Medications -   Current Outpatient Medications:     aspirin 81 MG EC tablet, Take 1 tablet (81 mg) by mouth daily., Disp: 90 tablet, Rfl: 3    cyanocobalamin 1000 MCG TBCR, Take 1,000 mcg by mouth daily, Disp: 100 tablet, Rfl: 1    gabapentin (NEURONTIN) 300 MG capsule, One capsule by mouth every morning and afternoon; two capsules by mouth every evening., Disp: 120 capsule, Rfl: 3    loperamide (IMODIUM) 2 MG capsule, Take 1 capsule (2 mg) by mouth daily as needed for diarrhea., Disp: 60 capsule, Rfl: 1    MAGNESIUM OXIDE 400 (240 Mg) MG tablet, TAKE 1 TABLET (400 MG) BY MOUTH 2 TIMES DAILY, Disp: 60  tablet, Rfl: 0    metoprolol succinate ER (TOPROL XL) 50 MG 24 hr tablet, Take 1 tablet (50 mg) by mouth daily, Disp: 90 tablet, Rfl: 2    Multiple Vitamins-Iron (MULTI-DAY PLUS IRON PO), Take 1 tablet by mouth daily, Disp: , Rfl:     rivaroxaban ANTICOAGULANT (XARELTO) 15 MG TABS tablet, Take 1 tablet (15 mg) by mouth daily (with dinner), Disp: 90 tablet, Rfl: 2    sodium bicarbonate 650 MG tablet, TAKE 1 TABLET BY MOUTH TWICE A DAY, Disp: 180 tablet, Rfl: 3    VITAMIN D3 50 MCG (2000 UT) tablet, TAKE 1 TABLET (50 MCG) BY MOUTH DAILY, Disp: 30 tablet, Rfl: 6    fluocinonide (LIDEX) 0.05 % external cream, APPLY A SMALL AMOUNT TO AFFECTED AREA(S) TWICE DAILY AS NEEDED (Patient not taking: Reported on 10/28/2024), Disp: 60 g, Rfl: 1    Allergies -   Allergies   Allergen Reactions    No Known Drug Allergy        Social History -   Social History     Socioeconomic History    Marital status:    Tobacco Use    Smoking status: Never     Passive exposure: Current    Smokeless tobacco: Never   Vaping Use    Vaping status: Never Used   Substance and Sexual Activity    Alcohol use: No    Drug use: No    Sexual activity: Yes     Partners: Male     Social Determinants of Health     Interpersonal Safety: Low Risk  (2024)    Interpersonal Safety     Do you feel physically and emotionally safe where you currently live?: Yes     Within the past 12 months, have you been hit, slapped, kicked or otherwise physically hurt by someone?: No     Within the past 12 months, have you been humiliated or emotionally abused in other ways by your partner or ex-partner?: No       Family History -   Family History   Problem Relation Age of Onset    Hypertension Mother         on meds, alive    Cerebrovascular Disease Father         stroke about age 65,  of cancer at 68 yrs    Diabetes Father         eventually took insulin    Anemia Father         Pernisios anemia    Kidney Disease Niece         kidney transplant    Kidney Disease  "Nephew         kidney transplant    Venous thrombosis No family hx of     Anesthesia Reaction No family hx of        Review of Systems - As per HPI and PMHx, otherwise review of system review of the head and neck negative. Otherwise 10+ review of system is negative    Physical Exam  /72 (BP Location: Right arm, Cuff Size: Adult Large)   Temp 98  F (36.7  C) (Temporal)   Ht 1.562 m (5' 1.5\")   Wt 98.4 kg (217 lb)   LMP  (LMP Unknown)   BMI 40.34 kg/m    BMI: Body mass index is 40.34 kg/m .    General - The patient is well nourished and well developed, and appears to have good nutritional status.  Alert and oriented to person and place, answers questions and cooperates with examination appropriately.    SKIN - No suspicious lesions or rashes.  Respiration - No respiratory distress.  Head and Face - Normocephalic and atraumatic, with no gross asymmetry noted of the contour of the facial features.  The facial nerve is intact, with strong symmetric movements.    Voice and Breathing - The patient was breathing comfortably without the use of accessory muscles. The patients voice was clear and strong, and had appropriate pitch and quality.    Ears - Bilateral pinna and EACs with normal appearing overlying skin. Tympanic membrane intact with good mobility on pneumatic otoscopy bilaterally. Bony landmarks of the ossicular chain are normal. The tympanic membranes are normal in appearance. No retraction, perforation, or masses.  No fluid or purulence was seen in the external canal or the middle ear.     Eyes - Extraocular movements intact.  Sclera were not icteric or injected, conjunctiva were pink and moist.        Nose - External contour is symmetric, no gross deflection or scars.  Nasal mucosa is pink and moist with no abnormal mucus.  The septum was midline and non-obstructive, turbinates of normal size and position.  No polyps, masses, or purulence noted on examination.    Neuro - Nonfocal neuro exam is normal, " CN 2 through 12 intact, normal gait and muscle tone.      Performed in clinic today:  Audiologic Studies - An audiogram and tympanogram were performed today as part of the evaluation and personally reviewed. The tympanogram shows Type A curves on the right and Type A curves on the left, with normal canal volumes and middle ear pressures.  The audiogram showed moderate to severe at high frequencies sloping sensorineural hearing loss on the right and mild to severe sensorineural hearing loss on the left.  Word recognition score 50% on the right versus 98% on the left.  Some further decline in word recognition score noted especially on the right.  Otherwise hearing is stable bilaterally.    A/P - Nadia Ladd is a 69 year old female Patient presents with:  Follow Up    Patient with overall stable hearing with slight deterioration of word recognition score on the right.  She will continue wearing her hearing aids.    Nadia should follow up in 1 year.      At Nadia next appointment they will need a hearing test.      Jean-Claude Zurita MD

## 2024-10-18 DIAGNOSIS — K52.9 CHRONIC DIARRHEA: ICD-10-CM

## 2024-10-18 DIAGNOSIS — N18.32 STAGE 3B CHRONIC KIDNEY DISEASE (H): ICD-10-CM

## 2024-10-18 DIAGNOSIS — I48.0 PAROXYSMAL ATRIAL FIBRILLATION WITH RVR (H): ICD-10-CM

## 2024-10-18 DIAGNOSIS — E87.20 METABOLIC ACIDOSIS, NORMAL ANION GAP (NAG): ICD-10-CM

## 2024-10-18 RX ORDER — SODIUM BICARBONATE 650 MG/1
650 TABLET ORAL 2 TIMES DAILY
Qty: 180 TABLET | Refills: 3 | Status: SHIPPED | OUTPATIENT
Start: 2024-10-18

## 2024-10-21 ENCOUNTER — VIRTUAL VISIT (OUTPATIENT)
Dept: ONCOLOGY | Facility: CLINIC | Age: 69
End: 2024-10-21
Attending: INTERNAL MEDICINE
Payer: COMMERCIAL

## 2024-10-21 ENCOUNTER — OFFICE VISIT (OUTPATIENT)
Dept: AUDIOLOGY | Facility: CLINIC | Age: 69
End: 2024-10-21
Payer: COMMERCIAL

## 2024-10-21 DIAGNOSIS — H90.3 SENSORINEURAL HEARING LOSS, ASYMMETRICAL: Primary | ICD-10-CM

## 2024-10-21 DIAGNOSIS — G62.0 CHEMOTHERAPY-INDUCED NEUROPATHY (H): ICD-10-CM

## 2024-10-21 DIAGNOSIS — C18.9: ICD-10-CM

## 2024-10-21 DIAGNOSIS — E61.1 IRON DEFICIENCY: ICD-10-CM

## 2024-10-21 DIAGNOSIS — T45.1X5A CHEMOTHERAPY-INDUCED NEUROPATHY (H): ICD-10-CM

## 2024-10-21 DIAGNOSIS — C18.7 MALIGNANT NEOPLASM OF SIGMOID COLON (H): Primary | ICD-10-CM

## 2024-10-21 DIAGNOSIS — C77.2: ICD-10-CM

## 2024-10-21 PROCEDURE — G2211 COMPLEX E/M VISIT ADD ON: HCPCS | Mod: 95 | Performed by: INTERNAL MEDICINE

## 2024-10-21 PROCEDURE — V5014 HEARING AID REPAIR/MODIFYING: HCPCS | Performed by: AUDIOLOGIST

## 2024-10-21 PROCEDURE — 92567 TYMPANOMETRY: CPT | Performed by: AUDIOLOGIST

## 2024-10-21 PROCEDURE — 92557 COMPREHENSIVE HEARING TEST: CPT | Performed by: AUDIOLOGIST

## 2024-10-21 PROCEDURE — 99214 OFFICE O/P EST MOD 30 MIN: CPT | Mod: 95 | Performed by: INTERNAL MEDICINE

## 2024-10-21 RX ORDER — GABAPENTIN 300 MG/1
CAPSULE ORAL
Qty: 120 CAPSULE | Refills: 3 | Status: SHIPPED | OUTPATIENT
Start: 2024-10-21

## 2024-10-21 RX ORDER — ASPIRIN 81 MG/1
81 TABLET ORAL DAILY
Qty: 90 TABLET | Refills: 3 | Status: SHIPPED | OUTPATIENT
Start: 2024-10-21

## 2024-10-21 NOTE — LETTER
10/21/2024      Nadia Ladd  [address] [facility] [address]      Dear Colleague,    Thank you for referring your patient, Nadia Ladd, to the [facility]. Please see a copy of my visit note below.    [facility] Hematology / Oncology  Progress Note  Name: Nadia Ladd  :  1955  MRN:  [id number]    --------------------    Assessment / Plan:  Colon cancer, N1 positive.  Status post resection  Status post adjuvant FOLFOX chemotherapy x 6 months  Chemo neuropathy    Clinically and radiographically LAURENT; continue observation  Radiographically, reviewed incidental findings; these bear monitoring moving forward.  Blood counts stable; okay to discontinue oral iron.  Blood chemistry stable; monitor.  Tumor marker stable.  Modify gabapentin to 300 mg every morning, 300 mg every afternoon and 600 mg every evening due to nighttime worsening of chemo neuropathy; reviewed likely permanent at this point; remains on B12.  Remains on chronic anticoagulation for atrial fibrillation.  Start baby aspirin 81 mg for prevention of colorectal cancer recurrence; would plan 5 years.  Monitor for recurrent iron deficiency with aspirin and a DOAC in place.  Messaging sent to colorectal surgeon regarding timing of next colonoscopy or sigmoidoscopy.    Follow-up:  1) BJT [place] 3 months w/ labs (CBC, CMP, CEA) and CT CAP.    Manuel Fernandez MD    Diagnosis Codes:  1. Malignant neoplasm of sigmoid colon (H)    2. Primary colon cancer with metastasis to 1 regional lymph node (N1a) (H)    3. Chemotherapy-induced neuropathy (H)    4. Iron deficiency        Orders:  Orders Placed This Encounter   Procedures     CT Chest/Abdomen/Pelvis w Contrast     CEA     Comprehensive metabolic panel     CBC with Platelets & Differential       --------------------    Interval History:  Nadia presents for follow-up of colorectal cancer unaccompanied via video visit.  Since her last visit, things have been  fairly stable.  She actually felt like she is come out of it finally after completing treatment.  She is just shy of 12 months from completing adjuvant chemotherapy.  The neuropathy has improved in her legs and arms but still remains a bit more pronounced in her legs and difficult around bedtime.  She is gained a little bit of weight back which has her in a good place as well.  Energy is improving.  No nausea or vomiting.  No bowel complaints..    --------------------    Family History:  Family History   Problem Relation Age of Onset     Hypertension Mother         on meds, alive     Cerebrovascular Disease Father         stroke about age 65,  of cancer at 68 yrs     Diabetes Father         eventually took insulin     Anemia Father         Pernisios anemia     Kidney Disease Niece         kidney transplant     Kidney Disease Nephew         kidney transplant     Venous thrombosis No family hx of      Anesthesia Reaction No family hx of        Social History:  Social History     Tobacco Use     Smoking status: Never     Passive exposure: Current     Smokeless tobacco: Never   Vaping Use     Vaping status: Never Used   Substance Use Topics     Alcohol use: No     Drug use: No       Medications / Allergies:  Reviewed in EMR.    --------------------    Physical Exam:  Video visit.    Data Reviewed:  Reviewed CBC, CMP, CEA.  Reviewed CT chest abdomen pelvis with independent review    Video Visit:  Nadia is a 69 year old female who is being evaluated via a billable video visit.  }    Video start time:  9:35 AM  Video end time:  9:59 AM  [facility]  Patient location: [facility]  Mode of transmission: [employer] / [place].  .      Again, thank you for allowing me to participate in the care of your patient.        Sincerely,        Manuel Fernandez MD

## 2024-10-21 NOTE — PROGRESS NOTES
AUDIOLOGY REPORT     SUMMARY: Audiology visit completed. See audiogram for results.     RECOMMENDATIONS: Follow-up with ENT    Delmar Nuñez Licensed Audiologist #[id number]

## 2024-10-21 NOTE — PROGRESS NOTES
[facility] Hematology / Oncology  Progress Note  Name: Nadia Ladd  :  1955  MRN:  [id number]    --------------------    Assessment / Plan:  Colon cancer, N1 positive.  Status post resection  Status post adjuvant FOLFOX chemotherapy x 6 months  Chemo neuropathy    Clinically and radiographically LAURENT; continue observation  Radiographically, reviewed incidental findings; these bear monitoring moving forward.  Blood counts stable; okay to discontinue oral iron.  Blood chemistry stable; monitor.  Tumor marker stable.  Modify gabapentin to 300 mg every morning, 300 mg every afternoon and 600 mg every evening due to nighttime worsening of chemo neuropathy; reviewed likely permanent at this point; remains on B12.  Remains on chronic anticoagulation for atrial fibrillation.  Start baby aspirin 81 mg for prevention of colorectal cancer recurrence; would plan 5 years.  Monitor for recurrent iron deficiency with aspirin and a DOAC in place.  Messaging sent to colorectal surgeon regarding timing of next colonoscopy or sigmoidoscopy.    Follow-up:  1) BJT [place] 3 months w/ labs (CBC, CMP, CEA) and CT CAP.    Manuel Fernandez MD    Diagnosis Codes:  1. Malignant neoplasm of sigmoid colon (H)    2. Primary colon cancer with metastasis to 1 regional lymph node (N1a) (H)    3. Chemotherapy-induced neuropathy (H)    4. Iron deficiency        Orders:  Orders Placed This Encounter   Procedures    CT Chest/Abdomen/Pelvis w Contrast    CEA    Comprehensive metabolic panel    CBC with Platelets & Differential       --------------------    Interval History:  Nadia presents for follow-up of colorectal cancer unaccompanied via video visit.  Since her last visit, things have been fairly stable.  She actually felt like she is come out of it finally after completing treatment.  She is just shy of 12 months from completing adjuvant chemotherapy.  The neuropathy has improved in her legs and arms but still remains a bit more  pronounced in her legs and difficult around bedtime.  She is gained a little bit of weight back which has her in a good place as well.  Energy is improving.  No nausea or vomiting.  No bowel complaints..    --------------------    Family History:  Family History   Problem Relation Age of Onset    Hypertension Mother         on meds, alive    Cerebrovascular Disease Father         stroke about age 65,  of cancer at 68 yrs    Diabetes Father         eventually took insulin    Anemia Father         Pernisios anemia    Kidney Disease Niece         kidney transplant    Kidney Disease Nephew         kidney transplant    Venous thrombosis No family hx of     Anesthesia Reaction No family hx of        Social History:  Social History     Tobacco Use    Smoking status: Never     Passive exposure: Current    Smokeless tobacco: Never   Vaping Use    Vaping status: Never Used   Substance Use Topics    Alcohol use: No    Drug use: No       Medications / Allergies:  Reviewed in EMR.    --------------------    Physical Exam:  Video visit.    Data Reviewed:  Reviewed CBC, CMP, CEA.  Reviewed CT chest abdomen pelvis with independent review    Video Visit:  Nadia is a 69 year old female who is being evaluated via a billable video visit.  }    Video start time:  9:35 AM  Video end time:  9:59 AM  [facility]  Patient location: [facility]  Mode of transmission:  [employer] / [employer].  .

## 2024-10-23 ENCOUNTER — PATIENT OUTREACH (OUTPATIENT)
Dept: ONCOLOGY | Facility: CLINIC | Age: 69
End: 2024-10-23
Payer: COMMERCIAL

## 2024-10-23 NOTE — TELEPHONE ENCOUNTER
[facility]: Cancer Care Follow-Up Note                                    Discussion with Patient:                                                      Chart review, enrolled into healthy planet maintenance per RNCC monitoring.       Treatment:                                                      aspirin 81 MG EC tablet 90 tablet 3 10/21/2024 -- No   Sig - Route: Take 1 tablet (81 mg) by mouth daily. - Oral       Assessment:                                                    RNCC Short Symptom Review:     Assessment completed with:: VM-chart review    General/Short Assessment  Does the patient have all their medications?: Yes  Is the patient experiencing any new or worsening symptoms?: No  Discussion with patient: Reviewed how and when to contact clinic;Reviewed patient's future appointments    Patient Coping  Accepting    Clinic Utilization  Patient Aware of Next Appointment?: Yes    Other Patient Concerns  Other Patient Reported Concerns: Yes, see notes    Intervention/Education provided during outreach:                                                       No intervention needed at this time. Patient scheduled appropriately, will continue to follow.    Patient to follow up as scheduled at next appt  Patient to call/[employer] message with updates    Signature:  Liseth Yoon RN

## 2024-10-25 DIAGNOSIS — E83.42 HYPOMAGNESEMIA: ICD-10-CM

## 2024-10-25 DIAGNOSIS — I48.0 PAROXYSMAL ATRIAL FIBRILLATION WITH RVR (H): ICD-10-CM

## 2024-10-25 DIAGNOSIS — K52.9 CHRONIC DIARRHEA: ICD-10-CM

## 2024-10-25 RX ORDER — LANOLIN ALCOHOL/MO/W.PET/CERES
400 CREAM (GRAM) TOPICAL 2 TIMES DAILY
Qty: 60 TABLET | Refills: 0 | Status: SHIPPED | OUTPATIENT
Start: 2024-10-25

## 2024-10-28 ENCOUNTER — OFFICE VISIT (OUTPATIENT)
Dept: OTOLARYNGOLOGY | Facility: CLINIC | Age: 69
End: 2024-10-28
Payer: COMMERCIAL

## 2024-10-28 VITALS
TEMPERATURE: 98 F | WEIGHT: 217 LBS | DIASTOLIC BLOOD PRESSURE: 72 MMHG | BODY MASS INDEX: 39.93 KG/M2 | HEIGHT: 62 IN | SYSTOLIC BLOOD PRESSURE: 132 MMHG

## 2024-10-28 DIAGNOSIS — H90.3 ASYMMETRICAL SENSORINEURAL HEARING LOSS: Primary | ICD-10-CM

## 2024-10-28 PROCEDURE — 99213 OFFICE O/P EST LOW 20 MIN: CPT | Performed by: OTOLARYNGOLOGY

## 2024-10-28 ASSESSMENT — PAIN SCALES - GENERAL: PAINLEVEL_OUTOF10: NO PAIN (0)

## 2024-10-28 NOTE — LETTER
10/28/2024      Nadia Ladd  [address] [facility] [address]      Dear Colleague,    Thank you for referring your patient, Nadia Ldad, to the [facility]. Please see a copy of my visit note below.    History of Present Illness - Nadia Ladd is a 69 year old female presenting in clinic today for a recheck on Patient presents with:  Follow Up    Patient presents for recheck of hearing.  She had initially asymmetric sensorineural hearing loss which was worked up.  She had an MRI at that time did not show any pathology this was a year ago.  She does not feel retaining his change.  Wears hearing aids quite successfully.  Left ear had was significantly better than the right at low to mid frequencies not only with pure-tone thresholds but especially with word recognition score.  No new symptoms of ear infection discharge vertigo or dizziness.        BP Readings from Last 1 Encounters:   10/28/24 132/72       BP noted to be well controlled today in office.       Past Medical History -   Past Medical History:   Diagnosis Date     Arthropathia 08/05/2010     B12 deficiency anemia 10/21/2010     Benign essential hypertension with target blood pressure below 140/90 10/10/2016     CARDIOVASCULAR SCREENING; LDL GOAL LESS THAN 160 10/31/2010     Crohn's colitis (H) 02/15/2011     Dermatitis-dishydrotic eczema-severe 08/05/2010     Hiatal hernia      HTN (hypertension) 07/21/2011     Iron deficiency anemia 10/21/2010     Malignant neoplasm of sigmoid colon (H)      Obesity      Primary pulmonary hypertension (H) 04/06/2010       Current Medications -   Current Outpatient Medications:      aspirin 81 MG EC tablet, Take 1 tablet (81 mg) by mouth daily., Disp: 90 tablet, Rfl: 3     cyanocobalamin 1000 MCG TBCR, Take 1,000 mcg by mouth daily, Disp: 100 tablet, Rfl: 1     gabapentin (NEURONTIN) 300 MG capsule, One capsule by mouth every morning and afternoon; two capsules by mouth every evening.,  Disp: 120 capsule, Rfl: 3     loperamide (IMODIUM) 2 MG capsule, Take 1 capsule (2 mg) by mouth daily as needed for diarrhea., Disp: 60 capsule, Rfl: 1     MAGNESIUM OXIDE 400 (240 Mg) MG tablet, TAKE 1 TABLET (400 MG) BY MOUTH 2 TIMES DAILY, Disp: 60 tablet, Rfl: 0     metoprolol succinate ER (TOPROL XL) 50 MG 24 hr tablet, Take 1 tablet (50 mg) by mouth daily, Disp: 90 tablet, Rfl: 2     Multiple Vitamins-Iron (MULTI-DAY PLUS IRON PO), Take 1 tablet by mouth daily, Disp: , Rfl:      rivaroxaban ANTICOAGULANT (XARELTO) 15 MG TABS tablet, Take 1 tablet (15 mg) by mouth daily (with dinner), Disp: 90 tablet, Rfl: 2     sodium bicarbonate 650 MG tablet, TAKE 1 TABLET BY MOUTH TWICE A DAY, Disp: 180 tablet, Rfl: 3     VITAMIN D3 50 MCG (2000 UT) tablet, TAKE 1 TABLET (50 MCG) BY MOUTH DAILY, Disp: 30 tablet, Rfl: 6     fluocinonide (LIDEX) 0.05 % external cream, APPLY A SMALL AMOUNT TO AFFECTED AREA(S) TWICE DAILY AS NEEDED (Patient not taking: Reported on 10/28/2024), Disp: 60 g, Rfl: 1    Allergies -   Allergies   Allergen Reactions     No Known Drug Allergy        Social History -   Social History     Socioeconomic History     Marital status:    Tobacco Use     Smoking status: Never     Passive exposure: Current     Smokeless tobacco: Never   Vaping Use     Vaping status: Never Used   Substance and Sexual Activity     Alcohol use: No     Drug use: No     Sexual activity: Yes     Partners: Male     Social Determinants of Health     Interpersonal Safety: Low Risk  (6/4/2024)    Interpersonal Safety      Do you feel physically and emotionally safe where you currently live?: Yes      Within the past 12 months, have you been hit, slapped, kicked or otherwise physically hurt by someone?: No      Within the past 12 months, have you been humiliated or emotionally abused in other ways by your partner or ex-partner?: No       Family History -   Family History   Problem Relation Age of Onset     Hypertension Mother        "  on meds, alive     Cerebrovascular Disease Father         stroke about age 65,  of cancer at 68 yrs     Diabetes Father         eventually took insulin     Anemia Father         Pernisios anemia     Kidney Disease Niece         kidney transplant     Kidney Disease Nephew         kidney transplant     Venous thrombosis No family hx of      Anesthesia Reaction No family hx of        Review of Systems - As per HPI and PMHx, otherwise review of system review of the head and neck negative. Otherwise 10+ review of system is negative    Physical Exam  /72 (BP Location: Right arm, Cuff Size: Adult Large)   Temp 98  F (36.7  C) (Temporal)   Ht 1.562 m (5' 1.5\")   Wt 98.4 kg (217 lb)   LMP  (LMP Unknown)   BMI 40.34 kg/m    BMI: Body mass index is 40.34 kg/m .    General - The patient is well nourished and well developed, and appears to have good nutritional status.  Alert and oriented to person and place, answers questions and cooperates with examination appropriately.    SKIN - No suspicious lesions or rashes.  Respiration - No respiratory distress.  Head and Face - Normocephalic and atraumatic, with no gross asymmetry noted of the contour of the facial features.  The facial nerve is intact, with strong symmetric movements.    Voice and Breathing - The patient was breathing comfortably without the use of accessory muscles. The patients voice was clear and strong, and had appropriate pitch and quality.    Ears - Bilateral pinna and EACs with normal appearing overlying skin. Tympanic membrane intact with good mobility on pneumatic otoscopy bilaterally. Bony landmarks of the ossicular chain are normal. The tympanic membranes are normal in appearance. No retraction, perforation, or masses.  No fluid or purulence was seen in the external canal or the middle ear.     Eyes - Extraocular movements intact.  Sclera were not icteric or injected, conjunctiva were pink and moist.        Nose - External contour is " symmetric, no gross deflection or scars.  Nasal mucosa is pink and moist with no abnormal mucus.  The septum was midline and non-obstructive, turbinates of normal size and position.  No polyps, masses, or purulence noted on examination.    Neuro - Nonfocal neuro exam is normal, CN 2 through 12 intact, normal gait and muscle tone.      Performed in clinic today:  Audiologic Studies - An audiogram and tympanogram were performed today as part of the evaluation and personally reviewed. The tympanogram shows Type A curves on the right and Type A curves on the left, with normal canal volumes and middle ear pressures.  The audiogram showed moderate to severe at high frequencies sloping sensorineural hearing loss on the right and mild to severe sensorineural hearing loss on the left.  Word recognition score 50% on the right versus 98% on the left.  Some further decline in word recognition score noted especially on the right.  Otherwise hearing is stable bilaterally.    A/P - Nadia Ladd is a 69 year old female Patient presents with:  Follow Up    Patient with overall stable hearing with slight deterioration of word recognition score on the right.  She will continue wearing her hearing aids.    Nadia should follow up in 1 year.      At Nadia next appointment they will need a hearing test.      Jean-Claude Zurita MD           Again, thank you for allowing me to participate in the care of your patient.        Sincerely,        Jean-Claude Zurita MD, MD

## 2024-11-04 ENCOUNTER — PATIENT OUTREACH (OUTPATIENT)
Dept: GASTROENTEROLOGY | Facility: CLINIC | Age: 69
End: 2024-11-04
Payer: COMMERCIAL

## 2024-11-05 ENCOUNTER — NURSE TRIAGE (OUTPATIENT)
Dept: INTERNAL MEDICINE | Facility: CLINIC | Age: 69
End: 2024-11-05
Payer: COMMERCIAL

## 2024-11-05 ENCOUNTER — PATIENT OUTREACH (OUTPATIENT)
Dept: GASTROENTEROLOGY | Facility: CLINIC | Age: 69
End: 2024-11-05
Payer: COMMERCIAL

## 2024-11-05 NOTE — TELEPHONE ENCOUNTER
"Nurse Triage SBAR    Is this a 2nd Level Triage? YES, LICENSED PRACTITIONER REVIEW IS REQUIRED    Situation: Patient called reporting left foot pain, she thinks it is a gout flare up.    Background: Patient reports pain since Sunday, denies fall or injury. Patient reports this has happened in the past and was gout, has previously taken medications for this.    Assessment: Patient denies fevers, denies redness, report foot is \"a little puffy\" but not swollen, reports it is mainly by her toe. Reports pain is 8 out of 10 and it is making it difficult to walk because of the pain. Taking Tylenol for pain. Keeping feet clean and dry.     Protocol Recommended Disposition:   See in Office Within 3 Days    Recommendation: Patient will need appointment scheduled, all available appointments are approval required. Is it OK to schedule patient with PCP at 1100 on 11/6/24?      Routed to provider    Does the patient meet one of the following criteria for ADS visit consideration? 16+ years old, with an [facility] PCP     Effie Espinoza RN on 11/5/2024 at 12:44 PM      Reason for Disposition   MODERATE pain (e.g., interferes with normal activities, limping) and present > 3 days    Additional Information   Negative: Followed an ankle or foot injury   Negative: Toe pain is main symptom   Negative: Ankle pain is main symptom   Negative: Entire foot is cool or blue in comparison to other foot   Negative: Purple or black skin on foot or toe   Negative: Red area or streak and fever   Negative: Swollen foot and fever   Negative: Patient sounds very sick or weak to the triager   Negative: SEVERE pain (e.g., excruciating, unable to do any normal activities)   Negative: Looks like a boil, infected sore, deep ulcer, or other infected rash (spreading redness, pus)   Negative: Swollen foot  (Exceptions: Localized bump from bunions, calluses, insect bite, sting.)   Negative: Weakness (i.e., loss of strength) of new-onset in foot or toes " (Exceptions: Not truly weak, foot feels weak because of pain; weakness present > 2 weeks.)   Negative: Numbness (i.e., loss of sensation) in foot or toes (Exception: Just tingling; numbness present > 2 weeks.)    Protocols used: Foot Pain-A-[place]

## 2024-11-06 NOTE — TELEPHONE ENCOUNTER
Huddled with Veena WALTERS. Reviewed Dr. Cartwright practice scheduling. Phoned patient and scheduled to see PCP tomorrow 11/07. Patient verbalized understanding.       Cliff Gee RN on 11/6/2024 at 8:16 AM

## 2024-11-07 ENCOUNTER — TELEPHONE (OUTPATIENT)
Dept: ADMISSION | Facility: CLINIC | Age: 69
End: 2024-11-07

## 2024-11-07 ENCOUNTER — OFFICE VISIT (OUTPATIENT)
Dept: INTERNAL MEDICINE | Facility: CLINIC | Age: 69
End: 2024-11-07
Payer: COMMERCIAL

## 2024-11-07 VITALS
DIASTOLIC BLOOD PRESSURE: 80 MMHG | HEART RATE: 64 BPM | WEIGHT: 217.38 LBS | SYSTOLIC BLOOD PRESSURE: 138 MMHG | HEIGHT: 62 IN | TEMPERATURE: 97.8 F | OXYGEN SATURATION: 97 % | BODY MASS INDEX: 40 KG/M2 | RESPIRATION RATE: 16 BRPM

## 2024-11-07 DIAGNOSIS — N18.32 STAGE 3B CHRONIC KIDNEY DISEASE (H): Primary | ICD-10-CM

## 2024-11-07 DIAGNOSIS — I27.0 PRIMARY PULMONARY HYPERTENSION (H): ICD-10-CM

## 2024-11-07 DIAGNOSIS — E78.5 HYPERLIPIDEMIA LDL GOAL <100: ICD-10-CM

## 2024-11-07 DIAGNOSIS — M10.072 ACUTE IDIOPATHIC GOUT INVOLVING TOE OF LEFT FOOT: ICD-10-CM

## 2024-11-07 DIAGNOSIS — I10 BENIGN ESSENTIAL HYPERTENSION WITH TARGET BLOOD PRESSURE BELOW 140/90: ICD-10-CM

## 2024-11-07 DIAGNOSIS — E66.01 MORBID OBESITY (H): ICD-10-CM

## 2024-11-07 LAB
ANION GAP SERPL CALCULATED.3IONS-SCNC: 16 MMOL/L (ref 7–15)
BUN SERPL-MCNC: 26.8 MG/DL (ref 8–23)
CALCIUM SERPL-MCNC: 9.5 MG/DL (ref 8.8–10.4)
CHLORIDE SERPL-SCNC: 103 MMOL/L (ref 98–107)
CREAT SERPL-MCNC: 1.62 MG/DL (ref 0.51–0.95)
EGFRCR SERPLBLD CKD-EPI 2021: 34 ML/MIN/1.73M2
GLUCOSE SERPL-MCNC: 129 MG/DL (ref 70–99)
HCO3 SERPL-SCNC: 21 MMOL/L (ref 22–29)
POTASSIUM SERPL-SCNC: 4.4 MMOL/L (ref 3.4–5.3)
SODIUM SERPL-SCNC: 140 MMOL/L (ref 135–145)
URATE SERPL-MCNC: 9.8 MG/DL (ref 2.4–5.7)

## 2024-11-07 PROCEDURE — 99214 OFFICE O/P EST MOD 30 MIN: CPT | Performed by: INTERNAL MEDICINE

## 2024-11-07 PROCEDURE — 84550 ASSAY OF BLOOD/URIC ACID: CPT | Performed by: INTERNAL MEDICINE

## 2024-11-07 PROCEDURE — 80048 BASIC METABOLIC PNL TOTAL CA: CPT | Performed by: INTERNAL MEDICINE

## 2024-11-07 PROCEDURE — 36415 COLL VENOUS BLD VENIPUNCTURE: CPT | Performed by: INTERNAL MEDICINE

## 2024-11-07 PROCEDURE — G2211 COMPLEX E/M VISIT ADD ON: HCPCS | Performed by: INTERNAL MEDICINE

## 2024-11-07 RX ORDER — PREDNISONE 20 MG/1
20 TABLET ORAL DAILY
Qty: 5 TABLET | Refills: 0 | Status: SHIPPED | OUTPATIENT
Start: 2024-11-07

## 2024-11-07 SDOH — HEALTH STABILITY: PHYSICAL HEALTH: ON AVERAGE, HOW MANY DAYS PER WEEK DO YOU ENGAGE IN MODERATE TO STRENUOUS EXERCISE (LIKE A BRISK WALK)?: 2 DAYS

## 2024-11-07 ASSESSMENT — SOCIAL DETERMINANTS OF HEALTH (SDOH): HOW OFTEN DO YOU GET TOGETHER WITH FRIENDS OR RELATIVES?: MORE THAN THREE TIMES A WEEK

## 2024-11-07 NOTE — TELEPHONE ENCOUNTER
Reason for Call:  Hearing Aid cleaning kit and ends for hearing aids    Detailed comments: Nadia is calling because she lost or threw out by accident the hearing aid cleaning kit and hearing aid ends that can be changed out, that Luisana gave to her previously.  She is asking if she can get more today.    She has a 10:40 appointment in [place] today with Dr Cartwright and will be in the area for a little while. Hoping to hear back soon.    Phone Number Patient can be reached at: Cell number on file:    Telephone Information:   Mobile [phone]       Best Time: anytime    Can we leave a detailed message on this number? YES    Call taken on 11/7/2024 at 9:54 AM by Aletha Hernandez

## 2024-11-07 NOTE — PROGRESS NOTES
"Preventive Care Visit  [facility]  Nomichip Olivo DO Chay, Internal Medicine  Nov 7, 2024          Subjective              EMR reviewed including:             Complaint, History of Chief Complaint, Corresponding Review of Systems, and Complaint Specific Physical Examination.    #1   Patient presents today with a painful, red, swollen great toe.  It is her left foot.  She notes the pain is approximately 6/10 in intensity.  She has had no trauma or injury to it.  She does have a history of gout but it has been well over a year since her last attack.  She is currently not on allopurinol or similar medication.  She does take Xarelto for her paroxysmal atrial fibrillation.  Additionally has a history of Sigmoid colon cancer.  She does not drink alcohol or eat a increased quantity of proteins.          Exam:   LUNGS: clear bilaterally, airflow is brisk, no intercostal retraction or stridor is noted. No coughing is noted during visit.   HEART:  regular without rubs, clicks, gallops, or murmurs. PMI is nondisplaced. Upstrokes are brisk. S1,S2 are heard.   GI: Abdomen is soft, without rebound, guarding or tenderness. Bowel sounds are appropriate. No renal bruits are heard.   NEURO: Pt is alert and appropriate. No neurologic lateralization is noted. Cranial nerves 2-12 are intact. Peripheral sensory and motor function are grossly normal.    MS: Minimal crepitance is noted in the extremities. No deformity is present. Muscle strength is appropriate and equal bilaterally. No acute joint erythema or swelling is present outside of the mentioned left great toe.        Patient has been interviewed, applicable history and applied review of systems have been performed.    Vital Signs:   /80   Pulse 64   Temp 97.8  F (36.6  C) (Temporal)   Resp 16   Ht 1.562 m (5' 1.5\")   Wt 98.6 kg (217 lb 6 oz)   LMP  (LMP Unknown)   SpO2 97%   BMI 40.41 kg/m        Decision Making    Problem and " Complexity     1. Acute idiopathic gout involving toe of left foot  Will place on a short course of prednisone.  Need to avoid NSAIDs due to her anticoagulated state.  Check uric acid level.  Discussed preventative therapy versus reactive therapy i.e. allopurinol or not.    - predniSONE (DELTASONE) 20 MG tablet; Take 1 tablet (20 mg) by mouth daily.  Dispense: 5 tablet; Refill: 0  - Uric acid; Future  - Basic metabolic panel  (Ca, Cl, CO2, Creat, Gluc, K, Na, BUN); Future  - Uric acid  - Basic metabolic panel  (Ca, Cl, CO2, Creat, Gluc, K, Na, BUN)    2. Stage 3b chronic kidney disease (H) (Primary)  Given her renal failure, would like to avoid the allopurinol.    3. Hyperlipidemia LDL goal <100  Currently stable.  Continue current low-fat diet    4. Benign essential hypertension with target blood pressure below 140/90  Controlled.  Continue current medication    5. Primary pulmonary hypertension (H)  As above      6. Morbid obesity (H)  Recommend weight loss to responsible caloric restriction and exercise                                FOLLOW UP   I have asked the patient to make an appointment for followup with me in 3 months or as needed.  She will follow-up with her oncologist as scheduled.    Regarding routine vaccinations:  I have reviewed the patient's vaccination schedule and discussed the benefits of prophylactic vaccination in detail.  I recommend the patient contact their pharmacist for vaccinations.  Discussed that most insurance companies now favor reimbursement to the pharmacies and it will financially behoove the patient to have vaccinations performed at their pharmacy.        I have carefully explained the diagnosis and treatment options to the patient.  The patient has displayed an understanding of the above, and all subsequent questions were answered.      DO DIANNA Bullard    Portions of this note were produced using [employer]  Although every attempt at real-time proof reading  has been made, occasional grammar/syntax errors may have been missed.

## 2024-11-18 ENCOUNTER — TELEPHONE (OUTPATIENT)
Dept: ADMISSION | Facility: CLINIC | Age: 69
End: 2024-11-18
Payer: COMMERCIAL

## 2024-11-18 NOTE — TELEPHONE ENCOUNTER
Oncology Nurse Triage    Situation:   Nadia reporting the following symptoms: rectal bleeding     Background:   Treating Provider:    Dr Fernandez     Date of last office visit: 10/21/2024 with Dr Fernandez     Recent Treatments:on surveillance for colon cancer    Assessment:     Pt met with Dr Fernandez on 10/21/2024. He advised that pt should start baby aspirin 81 mg for prevention of colorectal cancer recurrence. Pt states that she started taking the baby aspirin right away after that appt.  Starting around 11/10/2024, pt reports that she started noticing increasing blood on the toilet paper when she wiped after a bowel movement. She did not see any blood in the toilet water or on the stool specimen itself. Pt reports that the blood on the toilet paper was minimal. No clots. She only noticed the rectal bleeding when she had a bowel movement.     Pt does report that even prior to 11/10/2024, it was very common for her to have some blood when wiping after a bowel movement, especially when her perirectal skin would get irritated. Pt states that ever since her colon cancer surgery and treatment, it is normal for her to have 5-7 looser stools each day. This can cause skin irritation. She normally uses either baby wipes or wipes with witch hazel for cleansing and comfort. Also states that she uses Tucks pads as she has a history of both internal and external hemorrhoids.     Due to the increase in blood on the toilet paper, pt stopped taking baby aspirin around 11/13/2024. She continues to take xarelto for atrial fib.  Pt denies any other signs of bleeding or bruising.   Also denies chest pain, shortness of breath, dizziness, fever/chills, headaches, vision changes, abdominal pain, or other urgent symptoms.    Recommendations:     Pt is wondering if it is OK to stay off the aspirin 81 mg?  Or does Dr Fernandez have other recommendations?    Reviewed emergent symptoms that would warrant return call to clinic or evaluation  in ER.  Patient verbalized understanding and agreement with plan.  Patient was instructed to call the clinic with any questions, concerns, or worsening symptoms.  Maris Cloud RN on 11/18/2024 at 1:55 PM

## 2024-11-18 NOTE — TELEPHONE ENCOUNTER
Reason for Call:  Other issue with baby aspirin    Detailed comments: Nadia called requesting to speak with a nurse in oncology.  She was prescribed a baby aspirin and has had some bleeding.    Phone Number Patient can be reached at: Cell number on file:    Telephone Information:   Mobile [phone]       Best Time: anytime    Can we leave a detailed message on this number? NO does not have voicemail set up    Call taken on 11/18/2024 at 9:19 AM by Aletha Hernandez

## 2024-11-19 NOTE — TELEPHONE ENCOUNTER
Pt was notified with recommendations per Dr Fernandez.   Reviewed emergent symptoms that would warrant return call to clinic or evaluation in ER.  Patient verbalized understanding and agreement with plan.  Patient was instructed to call the clinic with any questions, concerns, or worsening symptoms.  Maris Cloud RN on 11/19/2024 at 10:09 AM

## 2024-11-25 ENCOUNTER — TELEPHONE (OUTPATIENT)
Dept: CARDIOLOGY | Facility: CLINIC | Age: 69
End: 2024-11-25
Payer: COMMERCIAL

## 2024-11-25 NOTE — TELEPHONE ENCOUNTER
Patient called wanting to know if she can do a neck stimulation/pulsating current treatment at her chiropractic office with her history of paroxysmal a fib. Patient does not have a cardiac implanted device. Patient has been doing well and has not had much issues with her a fib. Suggested patient do a test with the chiropractor before doing the full treatment to see how her body responds. Patient verbalized understanding. She will let us know if she has any further questions or concerns. Patient has follow up visit with cardiology in April 2025.         Dr. Richard PÉREZ note from 3/12/24:  ASSESSMENT:     Ms. Nadia Ladd is a 68-year-old woman with paroxysmal atrial fibrillation which has occurred more frequently during treatments with chemotherapy for colon cancer, but tended to be self-limited.  She remains on Xarelto for stroke prevention and metoprolol for rate control.  She has had just 1 episode recently since completing chemotherapy in December and it again resolved spontaneously after a few hours with the use of short acting diltiazem.     She has some chronic kidney disease which she is managing with her nephrologist.  Her cholesterol numbers are excellent.  Liver function test is normal.  Her echocardiogram is essentially normal from June 2023.     I am pleased with her current A-fib control and I will not make any changes in her medication program.  Will continue to follow-up with her in cardiology clinic periodically.

## 2024-11-25 NOTE — TELEPHONE ENCOUNTER
Reason for Call:  Other appointment    Detailed comments: Patient is calling to check to make sure that she is ok to be able to get the pulse treatment (she wasn't sure of the name but something that stimulates her neck area) from her Chiropractor given her afib issues? She wanted to ask her cardio team to make sure this is ok before resuming this.     Phone Number Patient can be reached at: Home number on file [phone] (home)    Best Time: any    Can we leave a detailed message on this number? YES    Call taken on 11/25/2024 at 10:37 AM by Jane Simms

## 2024-11-27 ENCOUNTER — TELEPHONE (OUTPATIENT)
Dept: INTERNAL MEDICINE | Facility: CLINIC | Age: 69
End: 2024-11-27
Payer: COMMERCIAL

## 2024-11-27 DIAGNOSIS — M10.072 ACUTE IDIOPATHIC GOUT INVOLVING TOE OF LEFT FOOT: ICD-10-CM

## 2024-11-27 DIAGNOSIS — E83.42 HYPOMAGNESEMIA: ICD-10-CM

## 2024-11-27 DIAGNOSIS — I48.0 PAROXYSMAL ATRIAL FIBRILLATION WITH RVR (H): ICD-10-CM

## 2024-11-27 DIAGNOSIS — K52.9 CHRONIC DIARRHEA: ICD-10-CM

## 2024-11-27 RX ORDER — LANOLIN ALCOHOL/MO/W.PET/CERES
400 CREAM (GRAM) TOPICAL 2 TIMES DAILY
Qty: 60 TABLET | Refills: 0 | Status: SHIPPED | OUTPATIENT
Start: 2024-11-27

## 2024-11-27 RX ORDER — PREDNISONE 20 MG/1
20 TABLET ORAL DAILY
Qty: 5 TABLET | Refills: 0 | Status: SHIPPED | OUTPATIENT
Start: 2024-11-27

## 2024-11-27 NOTE — TELEPHONE ENCOUNTER
I have sent another prescription for a short course of prednisone to her pharmacy.  If her symptoms recur, she will need to set up an appointment and we will need to discuss suppressive therapy with medication such as allopurinol.    Chay

## 2024-11-27 NOTE — TELEPHONE ENCOUNTER
Called and spoke with patient. Informed of Dr Leong response. Patient in agreement with plan. No further questions at this time.    La Nena Boone RN on 11/27/2024 at 10:56 AM

## 2024-11-27 NOTE — TELEPHONE ENCOUNTER
Patient is calling and stated she was into the clinic for a wellness visit with PCP on 2024.  She stated she was having a flare up of gout at that time, and PCP treated symptoms with the followin. Acute idiopathic gout involving toe of left foot  Will place on a short course of prednisone.  Need to avoid NSAIDs due to her anticoagulated state.  Check uric acid level.  Discussed preventative therapy versus reactive therapy i.e. allopurinol or not.  - predniSONE (DELTASONE) 20 MG tablet; Take 1 tablet (20 mg) by mouth daily.  Dispense: 5 tablet; Refill: 0    Patient stated PCP advised her to call back for returning symptoms and/ or questions.  Patient stated the symptoms are back and she is asking if she could get another prescription for the prednisone as it helped right away.    Will forward to PCP team for review.    Radha Austin RN

## 2024-12-11 DIAGNOSIS — I48.0 PAROXYSMAL ATRIAL FIBRILLATION (H): ICD-10-CM

## 2024-12-16 ENCOUNTER — OFFICE VISIT (OUTPATIENT)
Dept: INTERNAL MEDICINE | Facility: CLINIC | Age: 69
End: 2024-12-16
Payer: COMMERCIAL

## 2024-12-16 VITALS
DIASTOLIC BLOOD PRESSURE: 80 MMHG | WEIGHT: 219 LBS | HEART RATE: 81 BPM | TEMPERATURE: 98.1 F | BODY MASS INDEX: 41.35 KG/M2 | HEIGHT: 61 IN | SYSTOLIC BLOOD PRESSURE: 136 MMHG | OXYGEN SATURATION: 94 % | RESPIRATION RATE: 20 BRPM

## 2024-12-16 DIAGNOSIS — N18.32 STAGE 3B CHRONIC KIDNEY DISEASE (H): Primary | ICD-10-CM

## 2024-12-16 DIAGNOSIS — I48.0 PAROXYSMAL ATRIAL FIBRILLATION WITH RVR (H): ICD-10-CM

## 2024-12-16 DIAGNOSIS — E78.5 HYPERLIPIDEMIA LDL GOAL <100: ICD-10-CM

## 2024-12-16 DIAGNOSIS — M10.072 ACUTE IDIOPATHIC GOUT INVOLVING TOE OF LEFT FOOT: ICD-10-CM

## 2024-12-16 PROCEDURE — G2211 COMPLEX E/M VISIT ADD ON: HCPCS | Performed by: INTERNAL MEDICINE

## 2024-12-16 PROCEDURE — 99214 OFFICE O/P EST MOD 30 MIN: CPT | Performed by: INTERNAL MEDICINE

## 2024-12-16 RX ORDER — ALLOPURINOL 100 MG/1
100 TABLET ORAL DAILY
Qty: 90 TABLET | Refills: 0 | Status: SHIPPED | OUTPATIENT
Start: 2024-12-16

## 2024-12-16 ASSESSMENT — PAIN SCALES - GENERAL: PAINLEVEL_OUTOF10: EXTREME PAIN (8)

## 2024-12-16 NOTE — PROGRESS NOTES
Yo Zuniga is a 69 year old, presenting for the following health issues:  Arthritis (Patient here to discuss gout flare up)      12/16/2024     9:19 AM   Additional Questions   Roomed by Megan     History of Present Illness       Reason for visit:  Rechecking gout    She eats 0-1 servings of fruits and vegetables daily.She consumes 1 sweetened beverage(s) daily.She exercises with enough effort to increase her heart rate 10 to 19 minutes per day.  She exercises with enough effort to increase her heart rate 3 or less days per week.   She is taking medications regularly.                EMR reviewed including:             Complaint, History of Chief Complaint, Corresponding Review of Systems, and Complaint Specific Physical Examination.    #1   Follow-up on gout  Currently involving the left great toe.  Redness has improved somewhat over the last couple days.  Previously treated for acute gout in November of the same location.  Not nearly that bad..  Tender to touch.          Exam:   NEURO: Pt is alert and appropriate. No neurologic lateralization is noted. Cranial nerves 2-12 are intact. 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   MS: Minimal crepitance is noted in the extremities. No deformity is present. Muscle strength is appropriate and equal bilaterally. No additional joint erythema or swelling is present.      #2   Chronic renal insufficiency.  Last GFR was 34.  This is actually the best she has had in some time.  On no medication known to be renal toxic.  Denies symptoms of uremia.          Exam:   LUNGS: clear bilaterally, airflow is brisk, no intercostal retraction or stridor is noted. No coughing is noted during visit.   HEART:  regular without rubs, clicks, gallops, or murmurs. PMI is nondisplaced. Upstrokes are brisk. S1,S2 are heard.  .    #3   Paroxysmal atrial fibrillation  Currently in sinus rhythm.  Continue Xarelto.  Not an NSAID candidate because of  "her renal function and anticoagulated state.  Denies any syncope or near syncope.  No signs of congestive heart failure, orthopnea, dyspnea noted.          Exam:  As above        Patient has been interviewed, applicable history and applied review of systems have been performed.    Vital Signs:   /80   Pulse 81   Temp 98.1  F (36.7  C) (Temporal)   Resp 20   Ht 1.549 m (5' 1\")   Wt 99.3 kg (219 lb)   LMP  (LMP Unknown)   SpO2 94%   BMI 41.38 kg/m        Decision Making    Problem and Complexity     1. Acute idiopathic gout involving toe of left foot  Recommend starting allopurinol at a low dose.  Will check renal and hepatic function in about a month.  - allopurinol (ZYLOPRIM) 100 MG tablet; Take 1 tablet (100 mg) by mouth daily.  Dispense: 90 tablet; Refill: 0    2. Stage 3b chronic kidney disease (H) (Primary)  As above      3. Hyperlipidemia LDL goal <100  Currently stable.  Continue low-fat diet.    4. Paroxysmal atrial fibrillation with RVR (H)  Continue anticoagulation and rate control.                                FOLLOW UP   I have asked the patient to make an appointment for followup with me in about a month for follow-up and \"Annual Medicare Wellness Visit\"    Regarding routine vaccinations:  I have reviewed the patient's vaccination schedule and discussed the benefits of prophylactic vaccination in detail.  I recommend the patient contact their pharmacist for vaccinations.  Discussed that most insurance companies now favor reimbursement to the pharmacies and it will financially behoove the patient to have vaccinations performed at their pharmacy.        I have carefully explained the diagnosis and treatment options to the patient.  The patient has displayed an understanding of the above, and all subsequent questions were answered.      DO DIANNA Bullard    Portions of this note were produced using [employer]  Although every attempt at real-time proof reading has been made, " occasional grammar/syntax errors may have been missed.

## 2024-12-23 DIAGNOSIS — N18.31 STAGE 3A CHRONIC KIDNEY DISEASE (CKD) (H): Primary | ICD-10-CM

## 2024-12-27 DIAGNOSIS — E83.42 HYPOMAGNESEMIA: ICD-10-CM

## 2024-12-27 DIAGNOSIS — K52.9 CHRONIC DIARRHEA: ICD-10-CM

## 2024-12-27 DIAGNOSIS — I48.0 PAROXYSMAL ATRIAL FIBRILLATION WITH RVR (H): ICD-10-CM

## 2024-12-30 RX ORDER — LANOLIN ALCOHOL/MO/W.PET/CERES
400 CREAM (GRAM) TOPICAL 2 TIMES DAILY
Qty: 60 TABLET | Refills: 0 | Status: SHIPPED | OUTPATIENT
Start: 2024-12-30

## 2025-01-06 ENCOUNTER — OFFICE VISIT (OUTPATIENT)
Dept: NEPHROLOGY | Facility: CLINIC | Age: 70
End: 2025-01-06
Attending: INTERNAL MEDICINE
Payer: COMMERCIAL

## 2025-01-06 ENCOUNTER — LAB (OUTPATIENT)
Dept: LAB | Facility: CLINIC | Age: 70
End: 2025-01-06
Attending: INTERNAL MEDICINE
Payer: COMMERCIAL

## 2025-01-06 VITALS
HEIGHT: 62 IN | WEIGHT: 222.5 LBS | BODY MASS INDEX: 40.94 KG/M2 | TEMPERATURE: 98.1 F | DIASTOLIC BLOOD PRESSURE: 82 MMHG | HEART RATE: 69 BPM | SYSTOLIC BLOOD PRESSURE: 144 MMHG | OXYGEN SATURATION: 96 %

## 2025-01-06 DIAGNOSIS — N18.31 STAGE 3A CHRONIC KIDNEY DISEASE (CKD) (H): ICD-10-CM

## 2025-01-06 DIAGNOSIS — K52.9 CHRONIC DIARRHEA: ICD-10-CM

## 2025-01-06 DIAGNOSIS — N25.81 SECONDARY HYPERPARATHYROIDISM: ICD-10-CM

## 2025-01-06 DIAGNOSIS — N18.32 STAGE 3B CHRONIC KIDNEY DISEASE (H): Primary | ICD-10-CM

## 2025-01-06 DIAGNOSIS — E87.20 METABOLIC ACIDOSIS, NORMAL ANION GAP (NAG): ICD-10-CM

## 2025-01-06 LAB
ALBUMIN MFR UR ELPH: <6 MG/DL
ALBUMIN SERPL BCG-MCNC: 4.1 G/DL (ref 3.5–5.2)
ALBUMIN UR-MCNC: NEGATIVE MG/DL
ANION GAP SERPL CALCULATED.3IONS-SCNC: 12 MMOL/L (ref 7–15)
APPEARANCE UR: CLEAR
BILIRUB UR QL STRIP: NEGATIVE
BUN SERPL-MCNC: 21.9 MG/DL (ref 8–23)
CALCIUM SERPL-MCNC: 9.3 MG/DL (ref 8.8–10.4)
CHLORIDE SERPL-SCNC: 106 MMOL/L (ref 98–107)
COLOR UR AUTO: ABNORMAL
CREAT SERPL-MCNC: 1.79 MG/DL (ref 0.51–0.95)
CREAT UR-MCNC: 76.8 MG/DL
EGFRCR SERPLBLD CKD-EPI 2021: 30 ML/MIN/1.73M2
ERYTHROCYTE [DISTWIDTH] IN BLOOD BY AUTOMATED COUNT: 14.1 % (ref 10–15)
GLUCOSE SERPL-MCNC: 97 MG/DL (ref 70–99)
GLUCOSE UR STRIP-MCNC: NEGATIVE MG/DL
HCO3 SERPL-SCNC: 25 MMOL/L (ref 22–29)
HCT VFR BLD AUTO: 43.4 % (ref 35–47)
HGB BLD-MCNC: 13.8 G/DL (ref 11.7–15.7)
HGB UR QL STRIP: NEGATIVE
KETONES UR STRIP-MCNC: NEGATIVE MG/DL
LEUKOCYTE ESTERASE UR QL STRIP: NEGATIVE
MCH RBC QN AUTO: 28.6 PG (ref 26.5–33)
MCHC RBC AUTO-ENTMCNC: 31.8 G/DL (ref 31.5–36.5)
MCV RBC AUTO: 90 FL (ref 78–100)
NITRATE UR QL: NEGATIVE
PH UR STRIP: 5 [PH] (ref 5–7)
PHOSPHATE SERPL-MCNC: 2.9 MG/DL (ref 2.5–4.5)
PLATELET # BLD AUTO: 157 10E3/UL (ref 150–450)
POTASSIUM SERPL-SCNC: 4.2 MMOL/L (ref 3.4–5.3)
PROT/CREAT 24H UR: NORMAL MG/G{CREAT}
RBC # BLD AUTO: 4.82 10E6/UL (ref 3.8–5.2)
RBC URINE: <1 /HPF
SODIUM SERPL-SCNC: 143 MMOL/L (ref 135–145)
SP GR UR STRIP: 1.01 (ref 1–1.03)
SQUAMOUS EPITHELIAL: 4 /HPF
UROBILINOGEN UR STRIP-MCNC: NORMAL MG/DL
WBC # BLD AUTO: 6.3 10E3/UL (ref 4–11)
WBC URINE: 1 /HPF

## 2025-01-06 PROCEDURE — 85027 COMPLETE CBC AUTOMATED: CPT | Performed by: PATHOLOGY

## 2025-01-06 PROCEDURE — G0463 HOSPITAL OUTPT CLINIC VISIT: HCPCS | Performed by: INTERNAL MEDICINE

## 2025-01-06 PROCEDURE — 36415 COLL VENOUS BLD VENIPUNCTURE: CPT | Performed by: PATHOLOGY

## 2025-01-06 PROCEDURE — 84156 ASSAY OF PROTEIN URINE: CPT | Performed by: PATHOLOGY

## 2025-01-06 PROCEDURE — 81001 URINALYSIS AUTO W/SCOPE: CPT | Performed by: PATHOLOGY

## 2025-01-06 PROCEDURE — 99214 OFFICE O/P EST MOD 30 MIN: CPT | Performed by: INTERNAL MEDICINE

## 2025-01-06 PROCEDURE — 80069 RENAL FUNCTION PANEL: CPT | Performed by: PATHOLOGY

## 2025-01-06 PROCEDURE — G2211 COMPLEX E/M VISIT ADD ON: HCPCS | Performed by: INTERNAL MEDICINE

## 2025-01-06 ASSESSMENT — PAIN SCALES - GENERAL: PAINLEVEL_OUTOF10: MODERATE PAIN (5)

## 2025-01-06 NOTE — NURSING NOTE
"Chief Complaint   Patient presents with    RECHECK     Follow up.      Vitals:    01/06/25 1357 01/06/25 1402 01/06/25 1403   BP: (!) 161/82 (!) 161/97 (!) 144/82   BP Location: Right arm Right arm Left arm   Patient Position: Sitting Sitting Sitting   Cuff Size: Adult Large Adult Large Adult Large   Pulse: 69     Temp: 98.1  F (36.7  C)     TempSrc: Oral     SpO2: 96%     Weight: 100.9 kg (222 lb 8 oz)     Height: 1.575 m (5' 2\")         BP Readings from Last 3 Encounters:   01/06/25 (!) 144/82   12/16/24 136/80   11/07/24 138/80       BP (!) 144/82 (BP Location: Left arm, Patient Position: Sitting, Cuff Size: Adult Large)   Pulse 69   Temp 98.1  F (36.7  C) (Oral)   Ht 1.575 m (5' 2\")   Wt 100.9 kg (222 lb 8 oz)   LMP  (LMP Unknown)   SpO2 96%   BMI 40.70 kg/m       Gretta Henderson    "

## 2025-01-06 NOTE — LETTER
"1/6/2025       RE: Nadia Ladd  [address]  [facility] [address]     Dear Colleague,    Thank you for referring your patient, Nadia Ladd, to the [facility] [place] at [facility]. Please see a copy of my visit note below.      Nephrology Progress Note  01/05/2025   Chief complaint: Follow-up CKD stage 3 in colon cancer patient  History of Present Illness:    Nadia Ladd is a 69 year old F with Hx of poorly differentiated colon cancer s/p total colectomy and ALEXANDER BSO in 4/23, crohn;s disease, CKD stage 3, HTN  who is here for CKD follow-up.      The patient has history of Crohn's disease for over 20 years and has been taking sulfasalazine.  Initially, the symptom has been controlled but 3 to 4 months ago the patient started to have increasing diarrhea abdominal pain and lightheadedness.  She was found to have iron deficiency anemia and she underwent colonoscopy and was found to have colonic mass concerning for cancer in Feb 2023.  She then underwent laparoscopic converted to open total abdominal colectomy with ileorectal anastomosis, partial omentectomy, flexible sigmoidoscopy and ALEXANDER BSO in April 2023.  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  Shortly after surgery the patient did not have good appetite and noted more loose stool.  She was dismissed with her blood pressure medication including hydrochlorothiazide/Triamterene (for which she has been taking it for many many years).   On 5/9/2023, the patient noticed that she has some sinus problem as well as feeling \"dizziness\" 1 day prior to present to the oncology clinic.  Otherwise, she denies any symptoms of nausea vomiting, diarrhea, orthostatic symptoms, lower extremity edema or change in urination.  She does not take any NSAIDs.  Her appetite has been gradually improving. She was then seen by " Oncology clinic with a plan to discuss Chemotherapy but was noted to have hyperkalemia at 8.0.  The specimen was hemolyzed but upon repeat it was 7.6. She was sent to ED at [facility] and transferred to [facility] on 5/9/23.  Upon arriving at the [facility], repeat potassium improved to 5.5 and 5.3 (after shifting).  The patient has baseline creatinine of 1.3-1.6 but she is not aware that she has chronic kidney disease and she never been seen by kidney doctor.  During surgery her creatinine has been stable and on dismissal on 4/11/23, her creatinine was 1.34.  However her creatinine upon presentation on 5/9/23 was 3.82. UA on 5/9/23 showed WBC 22 but no blood. Neg protein.   Been treated conservatively with IV fluid, Lokelma with improvement in potassium level and creatinine function and she was dismissed.      She was started on FOLFOX C1D1 on 5/31/23.  However, shortly after that, she felt dizzy and sent to ED. to have A-fib with RVR and was was admited between 6/3-6/5/23.  She received diltiazem and spontaneously converted in the ED oncology is concerned it might be related to 5-FU and she has been referred to cardiology.      6/13/23: She is here for posthospitalization follow-up.  She reports bit of leg swelling. Her diarrhea has improved and she would consider that this is normal for her. Her appetite is good. No problem with urination. She still has frequent BMs. She just bought BP machine at home and they were 130/70s.     Labs 6/8/23: Creatinine 1.18, EGFR 51, BUN 11, 3.6, sodium 145, calcium 8.7, albumin 4.1. Hemoglobin 10.1, blood cell 3.0, platelet 164.  UA on 6/30/2020 showed white blood cell 7 cells no RBC. UPCR 0.06 g/g.   12/4/23: Today, she is doing good. She tolerated chemotherapy ok. She will get the last dose this Wednesday. She has intermittent diarrhea. She has a bit of swelling after chemo and goes away after a couple of days. No problem with urination. Her BP a bit high when she goes for  chemotherapy. She takes diltiazem 30 mg as needed and when she has chemo/Afib.   Labs today show creatinine 1.36, BUN 14.6, GFR 42, potassium 4.5,, dioxide 24, calcium 9.3, phosphorus 2.7, albumin 3.7.  Vitamin D 16, PTH 79. UA showed no blood and UPCR 0.13 g/g.  Positive urine glucose.  6/3/24:  In the interim, she has MARYA with Cr increased to 2.14 on 4/21/24. She received IVF and Cr improved to 1.80 but never back to her previous baseline ~1.3.  Further inquiry showed that she has no history of illnesses, volume loss or newly started medication during that time. She does take tylenol but no NSAIDs. No new medications. She infrequently has soft stool. BP varies  120-140/60-80 mmHg. She does not feel ligthheadedness or dizziness. She has no problem when urinates. Of note, she has gained ~ 26 Lbs since completed chemotherapy. Labs today show creatinine 1.86, GFR 29, BUN 32, calcium 9.7, phosphorus 3.5, albumin 4.1.  Hemoglobin 13.1, white blood cell 5.9, hematocrit 40.2, platelet 161.  UA showed small leukocyte esterase, 6 WBC and 2 RBC. UPCR undetectable.     1/6/2025: Home -130/70s. She has been feeling good. She has swelling and pain. She was started Allopurinol for gout in December 2024. She has no problem with urination. She has another CT with contrast coming up this month. She has BM 5-7 times per day. It is watery in the morning. /82 mmHg. She is off iron now.  Labs today show creatinine 1.79, BUN 21.9, potassium 4.2, sodium 143, bicarb 25, calcium 9.3, phosphorus 2.9, albumin 4.1.  CBC is normal, hemoglobin 13.8.  UA is completely bland.  UPCR is undetectable.    Past medical history  Past Medical History:   Diagnosis Date     Arthropathia 08/05/2010     B12 deficiency anemia 10/21/2010     Benign essential hypertension with target blood pressure below 140/90 10/10/2016     CARDIOVASCULAR SCREENING; LDL GOAL LESS THAN 160 10/31/2010     Crohn's colitis (H) 02/15/2011     Dermatitis-dishydrotic  eczema-severe 08/05/2010     Hiatal hernia      HTN (hypertension) 07/21/2011     Iron deficiency anemia 10/21/2010     Malignant neoplasm of sigmoid colon (H)      Obesity      Primary pulmonary hypertension (H) 04/06/2010       Past surgical history  Past Surgical History:   Procedure Laterality Date     COLECTOMY WITHOUT COLOSTOMY N/A 4/6/2023    Procedure: Laparoscopic Converted to Open Total abdominal colectomy;  Surgeon: Jerome Ashley MD;  Location: [facility] OR     COLONOSCOPY  10/11/10     COLONOSCOPY N/A 2/27/2023    Procedure: ATTEMPTED COLONOSCOPY WITH SIGMOID STRICTURE BIOPSY;  Surgeon: Jonathan Alcocer MD;  Location: [facility]     ESOPHAGOSCOPY, GASTROSCOPY, DUODENOSCOPY (EGD), COMBINED N/A 2/27/2023    Procedure: ESOPHAGOGASTRODUODENOSCOPY, WITH BIOPSY;  Surgeon: Jonathan Alcocer MD;  Location: [facility]     HYSTERECTOMY TOTAL ABDOMINAL, BILATERAL SALPINGO-OOPHORECTOMY, COMBINED N/A 4/6/2023    Procedure: Hysterectomy total abdominal, bilateral salpingo-oophorectomy;  Surgeon: Sunitha Olea MD;  Location: [facility] OR     INSERT PORT VASCULAR ACCESS Right 5/25/2023    Procedure: Ultrasound-guided right internal jugular venous access port placement with fluoroscopy;  Surgeon: Martin Thomas DO;  Location: [facility]     IR CHEST PORT PLACEMENT > 5 YRS OF AGE  8/21/2023     IR PORT CHECK RIGHT  7/18/2023     IR PORT REMOVAL RIGHT  8/21/2023     REMOVE PORT VASCULAR ACCESS Left 7/31/2024    Procedure: Removal left-sided PowerPort;  Surgeon: Jonathan Alcocer MD;  Location: [facility]     SIGMOIDOSCOPY FLEXIBLE N/A 4/6/2023    Procedure: Sigmoidoscopy flexible;  Surgeon: Jerome Ashley MD;  Location: [facility] OR     SIGMOIDOSCOPY FLEXIBLE N/A 5/1/2024    Procedure: Sigmoidoscopy flexible;  Surgeon: Jerome Ashley MD;  Location: [facility] OR     SURGICAL HISTORY OF -   06/14/76    Perineorrhaphy, for widening vaginal orifice     [facility] EXPLORATORY OF ABDOMEN  1989    laparoscopy         Review of  Systems:   14 systems were reviewed and all negative except as mentioned above.   Current Medications:  Current Outpatient Medications   Medication Sig Dispense Refill     allopurinol (ZYLOPRIM) 100 MG tablet Take 1 tablet (100 mg) by mouth daily. 90 tablet 0     aspirin 81 MG EC tablet Take 1 tablet (81 mg) by mouth daily. (Patient not taking: Reported on 12/16/2024) 90 tablet 3     cyanocobalamin 1000 MCG TBCR Take 1,000 mcg by mouth daily 100 tablet 1     fluocinonide (LIDEX) 0.05 % external cream APPLY A SMALL AMOUNT TO AFFECTED AREA(S) TWICE DAILY AS NEEDED 60 g 1     gabapentin (NEURONTIN) 300 MG capsule One capsule by mouth every morning and afternoon; two capsules by mouth every evening. 120 capsule 3     loperamide (IMODIUM) 2 MG capsule Take 1 capsule (2 mg) by mouth daily as needed for diarrhea. 60 capsule 1     MAGNESIUM OXIDE 400 (240 Mg) MG tablet TAKE 1 TABLET BY MOUTH TWICE A DAY 60 tablet 0     metoprolol succinate ER (TOPROL XL) 50 MG 24 hr tablet Take 1 tablet (50 mg) by mouth daily 90 tablet 2     Multiple Vitamins-Iron (MULTI-DAY PLUS IRON PO) Take 1 tablet by mouth daily       predniSONE (DELTASONE) 20 MG tablet Take 1 tablet (20 mg) by mouth daily. (Patient not taking: Reported on 12/16/2024) 5 tablet 0     rivaroxaban ANTICOAGULANT (XARELTO) 15 MG TABS tablet Take 1 tablet (15 mg) by mouth daily (with dinner). 90 tablet 1     sodium bicarbonate 650 MG tablet TAKE 1 TABLET BY MOUTH TWICE A  tablet 3     VITAMIN D3 50 MCG (2000 UT) tablet TAKE 1 TABLET (50 MCG) BY MOUTH DAILY 30 tablet 6     No current facility-administered medications for this visit.       Physical Exam:   LMP  (LMP Unknown)    There is no height or weight on file to calculate BMI.    GENERAL APPEARANCE: Alert, not in acute distress  EYES:  Not pale conjunctiva, pupils equal  HENT: Mouth without ulcers or lesions  PULM: lungs clear to auscultation bilaterally, equal air movement, no clubbing  CV: regular rhythm, normal  rate, no rub     -JVD no distended.      -edema: Rt leg: trace edema and left leg non swelling.   GI: soft,  - tender, no distended, bowel sounds are present  INTEGUMENT: No rash  NEURO:  Non focal. No asterixis.     Labs:   All labs reviewed by me  Last Renal Panel:  Sodium   Date Value Ref Range Status   11/07/2024 140 135 - 145 mmol/L Final   01/26/2021 140 133 - 144 mmol/L Final     Potassium   Date Value Ref Range Status   11/07/2024 4.4 3.4 - 5.3 mmol/L Final   10/09/2021 4.3 3.4 - 5.3 mmol/L Final   01/26/2021 4.1 3.4 - 5.3 mmol/L Final     Potassium POCT   Date Value Ref Range Status   04/06/2023 4.7 3.5 - 5.0 mmol/L Final     Chloride   Date Value Ref Range Status   11/07/2024 103 98 - 107 mmol/L Final   10/09/2021 118 (H) 94 - 109 mmol/L Final   01/26/2021 111 (H) 94 - 109 mmol/L Final     Carbon Dioxide   Date Value Ref Range Status   01/26/2021 22 20 - 32 mmol/L Final     Carbon Dioxide (CO2)   Date Value Ref Range Status   11/07/2024 21 (L) 22 - 29 mmol/L Final   10/09/2021 20 20 - 32 mmol/L Final     Anion Gap   Date Value Ref Range Status   11/07/2024 16 (H) 7 - 15 mmol/L Final   10/09/2021 4 3 - 14 mmol/L Final   01/26/2021 7 3 - 14 mmol/L Final     Glucose   Date Value Ref Range Status   11/07/2024 129 (H) 70 - 99 mg/dL Final   10/09/2021 159 (H) 70 - 99 mg/dL Final   01/26/2021 107 (H) 70 - 99 mg/dL Final     GLUCOSE BY METER POCT   Date Value Ref Range Status   05/10/2023 116 (H) 70 - 99 mg/dL Final     Comment:     /RN Notified     Urea Nitrogen   Date Value Ref Range Status   11/07/2024 26.8 (H) 8.0 - 23.0 mg/dL Final   10/09/2021 25 7 - 30 mg/dL Final   01/26/2021 29 7 - 30 mg/dL Final     Creatinine   Date Value Ref Range Status   11/07/2024 1.62 (H) 0.51 - 0.95 mg/dL Final   01/26/2021 1.45 (H) 0.52 - 1.04 mg/dL Final     GFR Estimate   Date Value Ref Range Status   11/07/2024 34 (L) >60 mL/min/1.73m2 Final     Comment:     eGFR calculated using 2021 CKD-EPI equation.   01/26/2021 38 (L)  >60 mL/min/[1.73_m2] Final     Comment:     Non  GFR Calc  Starting 12/18/2018, serum creatinine based estimated GFR (eGFR) will be   calculated using the Chronic Kidney Disease Epidemiology Collaboration   (CKD-EPI) equation.       GFR, ESTIMATED POCT   Date Value Ref Range Status   01/02/2024 38 (L) >60 mL/min/1.73m2 Final     Calcium   Date Value Ref Range Status   11/07/2024 9.5 8.8 - 10.4 mg/dL Final     Comment:     Reference intervals for this test were updated on 7/16/2024 to reflect our healthy population more accurately. There may be differences in the flagging of prior results with similar values performed with this method. Those prior results can be interpreted in the context of the updated reference intervals.   01/26/2021 9.3 8.5 - 10.1 mg/dL Final     Phosphorus   Date Value Ref Range Status   09/04/2024 3.5 2.5 - 4.5 mg/dL Final   09/14/2010 3.7 2.5 - 4.5 mg/dL Final     Albumin   Date Value Ref Range Status   10/16/2024 3.7 3.5 - 5.2 g/dL Final   01/26/2021 4.1 3.4 - 5.0 g/dL Final       Imaging:  I reviewed imaging studies.     Assessment & Recommendations:   Problem list  # Hx MARYA stage 2 likely secondary to ATN from hypotension (diuretic use and increase bowel movement)  # CKD stage 3B- progressed to stage 4; DDx chronic dehydration/recurrent MARYA from Crohn's disease, Hypertension or Sulfasalazine (stopped in 4/23); BL Cr 1.3-1.6  # Glycosuria; resolved  Etiology of acute kidney injury that occurred in May 23 was likely from ATN in the setting of unaware hypotension/dehydration from ongoing diuretic use and increase BM after surgery. UA was bland.UPCR was negative. Kidney ultrasound showed no evidence of hydronephrosis or masses but that showed that she had some mild atrophic changes consistent with chronic kidney disease.      In regard to CKD, she has baseline creatinine of 1.3-1.6 prior to MARYA in May 2023.  Again, etiology likely multifactorial in the setting of chronic  dehydration/recurrent MARYA from Crohn's, hypertension or sulfasalazine use which can be associated with AIN.  Sulfasalazine was discontinued since April 2023 so should no longer contributed.  UA appears to be clean and no protein so less likely GN. IgAN is associated with Crohn's but UA is not suggestive of that.      She has been on FOLFOX6, oxaliplatin may cause MARYA in the form of ATN but rare. Her Cr in December was 1.3-1.5.  Electrolyte has been stable without hypokalemia or metabolic acidosis.  She was having glycosuria without diabetes. These may raise the possibility of incomplete Fanconi syndrome.  Which could be happened in the setting of oxaliplatin. Glycosuria has resolved since after completion of Oxaliplatin.     Interestingly, her creatinine increased to 1.74 on 2/27/2024 and 2.1 4/1/2024.  Now her creatinine has been fluctuating between 1.6-2.0.  Creatinine today is 1.79 with EGFR of 30.  I think her creatinine is likely fluctuating due to the fact that she has chronic daily diarrhea.  Discussed with her about stay well-hydrated and taking Imodium when diarrhea is excessive.  She is also taking Metamucil daily.  Her UA is bland and UPCR is undetectable.  Therefore I do not think that she has other inflammatory cause for her progression of CKD.   - I asked her to drink more water from 2 L a day to 2.5 L a day  # Metabolic acidosis  Bicarb is 25.  She is on sodium bicarb 650 mg twice daily.  Will continue that for now  # Hx of Hyperkalemia; resolved  Likely in the setting of triamterene use, high K diet and MARYA. Now resolved. Off Triamterene.  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   FOLFOX6 C1D1 on 5/31/23 but has Afib shortly after. S/p mFOLFOX6 x 12 cycles (last dose of oxaliplatin on 12/13/2023)   # Anemia in CKD and cancer   # Leukopenia and thrombocytopenia  Hb 13.1. Likely  multifactorial but mostly form chemo.  Not indicated for ESAs, given recent cancer.  # Hypertension; monitor blood pressure medication  # Afib  # White coat effect  Previously on triamterene/hydrochlorothiazide but being held May 2023.  Currently she is on metoprolol ER 50 mg daily (Was on diltiazem 120 mg as needed before chemo).  Office blood pressure is 144/82 with home blood pressure 120 over 70s.  # BMD  # Vit D deficiency  # Secondary hyperparathyroidism  12/4/23: Vit D was 16 and PTH is 79. 6/3/24, PTH improved to 26.  Ca and phosphorus are normal. Currently on vitamin D3 2000 units daily. PTH isn 90 and vit D is pending.   # Crohn's disease  She was off Sulfazalazine since April 23.  Still has daily diarrhea (waterry mixed with soft).     Follow-up in 6 months with labs    The longitudinal plan of care for CKD 3/4, HTN, vit D def, Colon CA was addressed during this visit. Due to the added complexity in care, I will continue to support Nadia Ladd in the subsequent management of this condition(s) and with the ongoing continuity of care of this condition(s).     I spent  30 minutes on the date of the encounter doing chart review, history and exam, documentation and further activities as noted above. 20 minutes of this visit is dedicated to direct patient interaction via face-to-face.    Santana Brenner MD on 01/05/2025            Again, thank you for allowing me to participate in the care of your patient.      Sincerely,    Santana Brenner MD

## 2025-01-06 NOTE — PROGRESS NOTES
"  Nephrology Progress Note  01/05/2025   Chief complaint: Follow-up CKD stage 3 in colon cancer patient  History of Present Illness:    Nadia Ladd is a 69 year old F with Hx of poorly differentiated colon cancer s/p total colectomy and ALEXANDER BSO in 4/23, crohn;s disease, CKD stage 3, HTN  who is here for CKD follow-up.      The patient has history of Crohn's disease for over 20 years and has been taking sulfasalazine.  Initially, the symptom has been controlled but 3 to 4 months ago the patient started to have increasing diarrhea abdominal pain and lightheadedness.  She was found to have iron deficiency anemia and she underwent colonoscopy and was found to have colonic mass concerning for cancer in Feb 2023.  She then underwent laparoscopic converted to open total abdominal colectomy with ileorectal anastomosis, partial omentectomy, flexible sigmoidoscopy and ALEXANDER BSO in April 2023.  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  Shortly after surgery the patient did not have good appetite and noted more loose stool.  She was dismissed with her blood pressure medication including hydrochlorothiazide/Triamterene (for which she has been taking it for many many years).   On 5/9/2023, the patient noticed that she has some sinus problem as well as feeling \"dizziness\" 1 day prior to present to the oncology clinic.  Otherwise, she denies any symptoms of nausea vomiting, diarrhea, orthostatic symptoms, lower extremity edema or change in urination.  She does not take any NSAIDs.  Her appetite has been gradually improving. She was then seen by Oncology clinic with a plan to discuss Chemotherapy but was noted to have hyperkalemia at 8.0.  The specimen was hemolyzed but upon repeat it was 7.6. She was sent to ED at [facility] and transferred to [facility] on 5/9/23.  Upon arriving at the [facility], repeat potassium improved to 5.5 and 5.3 (after " shifting).  The patient has baseline creatinine of 1.3-1.6 but she is not aware that she has chronic kidney disease and she never been seen by kidney doctor.  During surgery her creatinine has been stable and on dismissal on 4/11/23, her creatinine was 1.34.  However her creatinine upon presentation on 5/9/23 was 3.82. UA on 5/9/23 showed WBC 22 but no blood. Neg protein.   Been treated conservatively with IV fluid, Lokelma with improvement in potassium level and creatinine function and she was dismissed.      She was started on FOLFOX C1D1 on 5/31/23.  However, shortly after that, she felt dizzy and sent to ED. to have A-fib with RVR and was was admited between 6/3-6/5/23.  She received diltiazem and spontaneously converted in the ED oncology is concerned it might be related to 5-FU and she has been referred to cardiology.      6/13/23: She is here for posthospitalization follow-up.  She reports bit of leg swelling. Her diarrhea has improved and she would consider that this is normal for her. Her appetite is good. No problem with urination. She still has frequent BMs. She just bought BP machine at home and they were 130/70s.     Labs 6/8/23: Creatinine 1.18, EGFR 51, BUN 11, 3.6, sodium 145, calcium 8.7, albumin 4.1. Hemoglobin 10.1, blood cell 3.0, platelet 164.  UA on 6/30/2020 showed white blood cell 7 cells no RBC. UPCR 0.06 g/g.   12/4/23: Today, she is doing good. She tolerated chemotherapy ok. She will get the last dose this Wednesday. She has intermittent diarrhea. She has a bit of swelling after chemo and goes away after a couple of days. No problem with urination. Her BP a bit high when she goes for chemotherapy. She takes diltiazem 30 mg as needed and when she has chemo/Afib.   Labs today show creatinine 1.36, BUN 14.6, GFR 42, potassium 4.5,, dioxide 24, calcium 9.3, phosphorus 2.7, albumin 3.7.  Vitamin D 16, PTH 79. UA showed no blood and UPCR 0.13 g/g.  Positive urine glucose.  6/3/24:  In the  interim, she has MARYA with Cr increased to 2.14 on 4/21/24. She received IVF and Cr improved to 1.80 but never back to her previous baseline ~1.3.  Further inquiry showed that she has no history of illnesses, volume loss or newly started medication during that time. She does take tylenol but no NSAIDs. No new medications. She infrequently has soft stool. BP varies  120-140/60-80 mmHg. She does not feel ligthheadedness or dizziness. She has no problem when urinates. Of note, she has gained ~ 26 Lbs since completed chemotherapy. Labs today show creatinine 1.86, GFR 29, BUN 32, calcium 9.7, phosphorus 3.5, albumin 4.1.  Hemoglobin 13.1, white blood cell 5.9, hematocrit 40.2, platelet 161.  UA showed small leukocyte esterase, 6 WBC and 2 RBC. UPCR undetectable.     1/6/2025: Home -130/70s. She has been feeling good. She has swelling and pain. She was started Allopurinol for gout in December 2024. She has no problem with urination. She has another CT with contrast coming up this month. She has BM 5-7 times per day. It is watery in the morning. /82 mmHg. She is off iron now.  Labs today show creatinine 1.79, BUN 21.9, potassium 4.2, sodium 143, bicarb 25, calcium 9.3, phosphorus 2.9, albumin 4.1.  CBC is normal, hemoglobin 13.8.  UA is completely bland.  UPCR is undetectable.    Past medical history  Past Medical History:   Diagnosis Date    Arthropathia 08/05/2010    B12 deficiency anemia 10/21/2010    Benign essential hypertension with target blood pressure below 140/90 10/10/2016    CARDIOVASCULAR SCREENING; LDL GOAL LESS THAN 160 10/31/2010    Crohn's colitis (H) 02/15/2011    Dermatitis-dishydrotic eczema-severe 08/05/2010    Hiatal hernia     HTN (hypertension) 07/21/2011    Iron deficiency anemia 10/21/2010    Malignant neoplasm of sigmoid colon (H)     Obesity     Primary pulmonary hypertension (H) 04/06/2010       Past surgical history  Past Surgical History:   Procedure Laterality Date    COLECTOMY  WITHOUT COLOSTOMY N/A 4/6/2023    Procedure: Laparoscopic Converted to Open Total abdominal colectomy;  Surgeon: Jerome Ashley MD;  Location: [facility]    COLONOSCOPY  10/11/10    COLONOSCOPY N/A 2/27/2023    Procedure: ATTEMPTED COLONOSCOPY WITH SIGMOID STRICTURE BIOPSY;  Surgeon: Jonathan Alcocer MD;  Location: [facility]    ESOPHAGOSCOPY, GASTROSCOPY, DUODENOSCOPY (EGD), COMBINED N/A 2/27/2023    Procedure: ESOPHAGOGASTRODUODENOSCOPY, WITH BIOPSY;  Surgeon: Jonathan Alcocer MD;  Location: [facility]    HYSTERECTOMY TOTAL ABDOMINAL, BILATERAL SALPINGO-OOPHORECTOMY, COMBINED N/A 4/6/2023    Procedure: Hysterectomy total abdominal, bilateral salpingo-oophorectomy;  Surgeon: Sunitha Olea MD;  Location: [facility] OR    INSERT PORT VASCULAR ACCESS Right 5/25/2023    Procedure: Ultrasound-guided right internal jugular venous access port placement with fluoroscopy;  Surgeon: Martin Thomas DO;  Location: [facility]    IR CHEST PORT PLACEMENT > 5 YRS OF AGE  8/21/2023    IR PORT CHECK RIGHT  7/18/2023    IR PORT REMOVAL RIGHT  8/21/2023    REMOVE PORT VASCULAR ACCESS Left 7/31/2024    Procedure: Removal left-sided PowerPort;  Surgeon: Jonathan Alcocer MD;  Location: [facility]    SIGMOIDOSCOPY FLEXIBLE N/A 4/6/2023    Procedure: Sigmoidoscopy flexible;  Surgeon: Jerome Ashley MD;  Location: [facility]    SIGMOIDOSCOPY FLEXIBLE N/A 5/1/2024    Procedure: Sigmoidoscopy flexible;  Surgeon: Jerome Ashley MD;  Location: [facility] OR    SURGICAL HISTORY OF -   06/14/76    Perineorrhaphy, for widening vaginal orifice    [facility] EXPLORATORY OF ABDOMEN  1989    laparoscopy         Review of Systems:   14 systems were reviewed and all negative except as mentioned above.   Current Medications:  Current Outpatient Medications   Medication Sig Dispense Refill    allopurinol (ZYLOPRIM) 100 MG tablet Take 1 tablet (100 mg) by mouth daily. 90 tablet 0    aspirin 81 MG EC tablet Take 1 tablet (81 mg) by mouth daily. (Patient not  taking: Reported on 12/16/2024) 90 tablet 3    cyanocobalamin 1000 MCG TBCR Take 1,000 mcg by mouth daily 100 tablet 1    fluocinonide (LIDEX) 0.05 % external cream APPLY A SMALL AMOUNT TO AFFECTED AREA(S) TWICE DAILY AS NEEDED 60 g 1    gabapentin (NEURONTIN) 300 MG capsule One capsule by mouth every morning and afternoon; two capsules by mouth every evening. 120 capsule 3    loperamide (IMODIUM) 2 MG capsule Take 1 capsule (2 mg) by mouth daily as needed for diarrhea. 60 capsule 1    MAGNESIUM OXIDE 400 (240 Mg) MG tablet TAKE 1 TABLET BY MOUTH TWICE A DAY 60 tablet 0    metoprolol succinate ER (TOPROL XL) 50 MG 24 hr tablet Take 1 tablet (50 mg) by mouth daily 90 tablet 2    Multiple Vitamins-Iron (MULTI-DAY PLUS IRON PO) Take 1 tablet by mouth daily      predniSONE (DELTASONE) 20 MG tablet Take 1 tablet (20 mg) by mouth daily. (Patient not taking: Reported on 12/16/2024) 5 tablet 0    rivaroxaban ANTICOAGULANT (XARELTO) 15 MG TABS tablet Take 1 tablet (15 mg) by mouth daily (with dinner). 90 tablet 1    sodium bicarbonate 650 MG tablet TAKE 1 TABLET BY MOUTH TWICE A  tablet 3    VITAMIN D3 50 MCG (2000 UT) tablet TAKE 1 TABLET (50 MCG) BY MOUTH DAILY 30 tablet 6     No current facility-administered medications for this visit.       Physical Exam:   LMP  (LMP Unknown)    There is no height or weight on file to calculate BMI.    GENERAL APPEARANCE: Alert, not in acute distress  EYES:  Not pale conjunctiva, pupils equal  HENT: Mouth without ulcers or lesions  PULM: lungs clear to auscultation bilaterally, equal air movement, no clubbing  CV: regular rhythm, normal rate, no rub     -JVD no distended.      -edema: Rt leg: trace edema and left leg non swelling.   GI: soft,  - tender, no distended, bowel sounds are present  INTEGUMENT: No rash  NEURO:  Non focal. No asterixis.     Labs:   All labs reviewed by me  Last Renal Panel:  Sodium   Date Value Ref Range Status   11/07/2024 140 135 - 145 mmol/L Final    01/26/2021 140 133 - 144 mmol/L Final     Potassium   Date Value Ref Range Status   11/07/2024 4.4 3.4 - 5.3 mmol/L Final   10/09/2021 4.3 3.4 - 5.3 mmol/L Final   01/26/2021 4.1 3.4 - 5.3 mmol/L Final     Potassium POCT   Date Value Ref Range Status   04/06/2023 4.7 3.5 - 5.0 mmol/L Final     Chloride   Date Value Ref Range Status   11/07/2024 103 98 - 107 mmol/L Final   10/09/2021 118 (H) 94 - 109 mmol/L Final   01/26/2021 111 (H) 94 - 109 mmol/L Final     Carbon Dioxide   Date Value Ref Range Status   01/26/2021 22 20 - 32 mmol/L Final     Carbon Dioxide (CO2)   Date Value Ref Range Status   11/07/2024 21 (L) 22 - 29 mmol/L Final   10/09/2021 20 20 - 32 mmol/L Final     Anion Gap   Date Value Ref Range Status   11/07/2024 16 (H) 7 - 15 mmol/L Final   10/09/2021 4 3 - 14 mmol/L Final   01/26/2021 7 3 - 14 mmol/L Final     Glucose   Date Value Ref Range Status   11/07/2024 129 (H) 70 - 99 mg/dL Final   10/09/2021 159 (H) 70 - 99 mg/dL Final   01/26/2021 107 (H) 70 - 99 mg/dL Final     GLUCOSE BY METER POCT   Date Value Ref Range Status   05/10/2023 116 (H) 70 - 99 mg/dL Final     Comment:     /RN Notified     Urea Nitrogen   Date Value Ref Range Status   11/07/2024 26.8 (H) 8.0 - 23.0 mg/dL Final   10/09/2021 25 7 - 30 mg/dL Final   01/26/2021 29 7 - 30 mg/dL Final     Creatinine   Date Value Ref Range Status   11/07/2024 1.62 (H) 0.51 - 0.95 mg/dL Final   01/26/2021 1.45 (H) 0.52 - 1.04 mg/dL Final     GFR Estimate   Date Value Ref Range Status   11/07/2024 34 (L) >60 mL/min/1.73m2 Final     Comment:     eGFR calculated using 2021 CKD-EPI equation.   01/26/2021 38 (L) >60 mL/min/[1.73_m2] Final     Comment:     Non  GFR Calc  Starting 12/18/2018, serum creatinine based estimated GFR (eGFR) will be   calculated using the Chronic Kidney Disease Epidemiology Collaboration   (CKD-EPI) equation.       GFR, ESTIMATED POCT   Date Value Ref Range Status   01/02/2024 38 (L) >60 mL/min/1.73m2 Final      Calcium   Date Value Ref Range Status   11/07/2024 9.5 8.8 - 10.4 mg/dL Final     Comment:     Reference intervals for this test were updated on 7/16/2024 to reflect our healthy population more accurately. There may be differences in the flagging of prior results with similar values performed with this method. Those prior results can be interpreted in the context of the updated reference intervals.   01/26/2021 9.3 8.5 - 10.1 mg/dL Final     Phosphorus   Date Value Ref Range Status   09/04/2024 3.5 2.5 - 4.5 mg/dL Final   09/14/2010 3.7 2.5 - 4.5 mg/dL Final     Albumin   Date Value Ref Range Status   10/16/2024 3.7 3.5 - 5.2 g/dL Final   01/26/2021 4.1 3.4 - 5.0 g/dL Final       Imaging:  I reviewed imaging studies.     Assessment & Recommendations:   Problem list  # Hx MARYA stage 2 likely secondary to ATN from hypotension (diuretic use and increase bowel movement)  # CKD stage 3B- progressed to stage 4; DDx chronic dehydration/recurrent MARYA from Crohn's disease, Hypertension or Sulfasalazine (stopped in 4/23); BL Cr 1.3-1.6  # Glycosuria; resolved  Etiology of acute kidney injury that occurred in May 23 was likely from ATN in the setting of unaware hypotension/dehydration from ongoing diuretic use and increase BM after surgery. UA was bland.UPCR was negative. Kidney ultrasound showed no evidence of hydronephrosis or masses but that showed that she had some mild atrophic changes consistent with chronic kidney disease.      In regard to CKD, she has baseline creatinine of 1.3-1.6 prior to MARYA in May 2023.  Again, etiology likely multifactorial in the setting of chronic dehydration/recurrent MARYA from Crohn's, hypertension or sulfasalazine use which can be associated with AIN.  Sulfasalazine was discontinued since April 2023 so should no longer contributed.  UA appears to be clean and no protein so less likely GN. IgAN is associated with Crohn's but UA is not suggestive of that.      She has been on FOLFOX6,  oxaliplatin may cause MARYA in the form of ATN but rare. Her Cr in December was 1.3-1.5.  Electrolyte has been stable without hypokalemia or metabolic acidosis.  She was having glycosuria without diabetes. These may raise the possibility of incomplete Fanconi syndrome.  Which could be happened in the setting of oxaliplatin. Glycosuria has resolved since after completion of Oxaliplatin.     Interestingly, her creatinine increased to 1.74 on 2/27/2024 and 2.1 4/1/2024.  Now her creatinine has been fluctuating between 1.6-2.0.  Creatinine today is 1.79 with EGFR of 30.  I think her creatinine is likely fluctuating due to the fact that she has chronic daily diarrhea.  Discussed with her about stay well-hydrated and taking Imodium when diarrhea is excessive.  She is also taking Metamucil daily.  Her UA is bland and UPCR is undetectable.  Therefore I do not think that she has other inflammatory cause for her progression of CKD.   - I asked her to drink more water from 2 L a day to 2.5 L a day  # Metabolic acidosis  Bicarb is 25.  She is on sodium bicarb 650 mg twice daily.  Will continue that for now  # Hx of Hyperkalemia; resolved  Likely in the setting of triamterene use, high K diet and MARYA. Now resolved. Off Triamterene.  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   FOLFOX6 C1D1 on 5/31/23 but has Afib shortly after. S/p mFOLFOX6 x 12 cycles (last dose of oxaliplatin on 12/13/2023)   # Anemia in CKD and cancer   # Leukopenia and thrombocytopenia  Hb 13.1. Likely multifactorial but mostly form chemo.  Not indicated for ESAs, given recent cancer.  # Hypertension; monitor blood pressure medication  # Afib  # White coat effect  Previously on triamterene/hydrochlorothiazide but being held May 2023.  Currently she is on metoprolol ER 50 mg daily (Was on diltiazem 120 mg as needed before chemo).  Office blood  pressure is 144/82 with home blood pressure 120 over 70s.  # BMD  # Vit D deficiency  # Secondary hyperparathyroidism  12/4/23: Vit D was 16 and PTH is 79. 6/3/24, PTH improved to 26.  Ca and phosphorus are normal. Currently on vitamin D3 2000 units daily. PTH isn 90 and vit D is pending.   # Crohn's disease  She was off Sulfazalazine since April 23.  Still has daily diarrhea (waterry mixed with soft).     Follow-up in 6 months with labs    The longitudinal plan of care for CKD 3/4, HTN, vit D def, Colon CA was addressed during this visit. Due to the added complexity in care, I will continue to support Nadiaterrance Ladd in the subsequent management of this condition(s) and with the ongoing continuity of care of this condition(s).     I spent  30 minutes on the date of the encounter doing chart review, history and exam, documentation and further activities as noted above. 20 minutes of this visit is dedicated to direct patient interaction via face-to-face.    Santana Brenner MD on 01/05/2025

## 2025-01-18 DIAGNOSIS — K52.9 CHRONIC DIARRHEA: ICD-10-CM

## 2025-01-18 DIAGNOSIS — E83.42 HYPOMAGNESEMIA: ICD-10-CM

## 2025-01-18 DIAGNOSIS — E55.9 VITAMIN D DEFICIENCY: ICD-10-CM

## 2025-01-18 DIAGNOSIS — I48.0 PAROXYSMAL ATRIAL FIBRILLATION WITH RVR (H): ICD-10-CM

## 2025-01-20 RX ORDER — LANOLIN ALCOHOL/MO/W.PET/CERES
400 CREAM (GRAM) TOPICAL 2 TIMES DAILY
Qty: 60 TABLET | Refills: 0 | Status: SHIPPED | OUTPATIENT
Start: 2025-01-20

## 2025-01-21 RX ORDER — CHOLECALCIFEROL (VITAMIN D3) 50 MCG
50 TABLET ORAL DAILY
Qty: 30 TABLET | Refills: 6 | Status: SHIPPED | OUTPATIENT
Start: 2025-01-21

## 2025-01-23 ENCOUNTER — LAB (OUTPATIENT)
Dept: LAB | Facility: CLINIC | Age: 70
End: 2025-01-23
Payer: COMMERCIAL

## 2025-01-23 DIAGNOSIS — C18.9: ICD-10-CM

## 2025-01-23 DIAGNOSIS — C18.7 MALIGNANT NEOPLASM OF SIGMOID COLON (H): ICD-10-CM

## 2025-01-23 DIAGNOSIS — C77.2: ICD-10-CM

## 2025-01-23 LAB
ALBUMIN SERPL BCG-MCNC: 4.2 G/DL (ref 3.5–5.2)
ALP SERPL-CCNC: 176 U/L (ref 40–150)
ALT SERPL W P-5'-P-CCNC: 26 U/L (ref 0–50)
ANION GAP SERPL CALCULATED.3IONS-SCNC: 13 MMOL/L (ref 7–15)
AST SERPL W P-5'-P-CCNC: 28 U/L (ref 0–45)
BASOPHILS # BLD AUTO: 0 10E3/UL (ref 0–0.2)
BASOPHILS NFR BLD AUTO: 1 %
BILIRUB SERPL-MCNC: 0.8 MG/DL
BUN SERPL-MCNC: 24.4 MG/DL (ref 8–23)
CALCIUM SERPL-MCNC: 9.2 MG/DL (ref 8.8–10.4)
CEA SERPL-MCNC: 1.7 NG/ML
CHLORIDE SERPL-SCNC: 106 MMOL/L (ref 98–107)
CREAT SERPL-MCNC: 1.95 MG/DL (ref 0.51–0.95)
EGFRCR SERPLBLD CKD-EPI 2021: 27 ML/MIN/1.73M2
EOSINOPHIL # BLD AUTO: 0 10E3/UL (ref 0–0.7)
EOSINOPHIL NFR BLD AUTO: 0 %
ERYTHROCYTE [DISTWIDTH] IN BLOOD BY AUTOMATED COUNT: 14 % (ref 10–15)
GLUCOSE SERPL-MCNC: 159 MG/DL (ref 70–99)
HCO3 SERPL-SCNC: 24 MMOL/L (ref 22–29)
HCT VFR BLD AUTO: 43.9 % (ref 35–47)
HGB BLD-MCNC: 14.2 G/DL (ref 11.7–15.7)
IMM GRANULOCYTES # BLD: 0 10E3/UL
IMM GRANULOCYTES NFR BLD: 1 %
LYMPHOCYTES # BLD AUTO: 0.5 10E3/UL (ref 0.8–5.3)
LYMPHOCYTES NFR BLD AUTO: 8 %
MCH RBC QN AUTO: 28.6 PG (ref 26.5–33)
MCHC RBC AUTO-ENTMCNC: 32.3 G/DL (ref 31.5–36.5)
MCV RBC AUTO: 88 FL (ref 78–100)
MONOCYTES # BLD AUTO: 0.5 10E3/UL (ref 0–1.3)
MONOCYTES NFR BLD AUTO: 7 %
NEUTROPHILS # BLD AUTO: 5.5 10E3/UL (ref 1.6–8.3)
NEUTROPHILS NFR BLD AUTO: 84 %
PLATELET # BLD AUTO: 187 10E3/UL (ref 150–450)
POTASSIUM SERPL-SCNC: 4.3 MMOL/L (ref 3.4–5.3)
PROT SERPL-MCNC: 6.8 G/DL (ref 6.4–8.3)
RBC # BLD AUTO: 4.97 10E6/UL (ref 3.8–5.2)
SODIUM SERPL-SCNC: 143 MMOL/L (ref 135–145)
WBC # BLD AUTO: 6.6 10E3/UL (ref 4–11)

## 2025-02-05 ENCOUNTER — TELEPHONE (OUTPATIENT)
Dept: SURGERY | Facility: CLINIC | Age: 70
End: 2025-02-05
Payer: COMMERCIAL

## 2025-02-05 NOTE — TELEPHONE ENCOUNTER
Patient confirmed scheduled appointment:  Date: 04/082025  Time: 10:00 am   Visit type: Flex Sig   Provider:    Location: [facility]   Testing/imaging: n/a  Additional notes: n/a    Spoke to Mom, she states that child is acting fine, not dazed or confused.  He is done crying and is eating a popsicle.  She states that his head was bleeding quite a bit.  The bleeding has slowed down.  Per Dr. Shah, he should be seen to assess the need for staples to the wound.  Patient scheduled for this.

## 2025-02-06 SDOH — HEALTH STABILITY: PHYSICAL HEALTH: ON AVERAGE, HOW MANY DAYS PER WEEK DO YOU ENGAGE IN MODERATE TO STRENUOUS EXERCISE (LIKE A BRISK WALK)?: 2 DAYS

## 2025-02-06 SDOH — HEALTH STABILITY: PHYSICAL HEALTH: ON AVERAGE, HOW MANY MINUTES DO YOU ENGAGE IN EXERCISE AT THIS LEVEL?: 20 MIN

## 2025-02-06 ASSESSMENT — SOCIAL DETERMINANTS OF HEALTH (SDOH): HOW OFTEN DO YOU GET TOGETHER WITH FRIENDS OR RELATIVES?: ONCE A WEEK

## 2025-02-10 ENCOUNTER — OFFICE VISIT (OUTPATIENT)
Dept: INTERNAL MEDICINE | Facility: CLINIC | Age: 70
End: 2025-02-10
Payer: COMMERCIAL

## 2025-02-10 VITALS
BODY MASS INDEX: 41.35 KG/M2 | OXYGEN SATURATION: 96 % | WEIGHT: 224.7 LBS | TEMPERATURE: 97.7 F | DIASTOLIC BLOOD PRESSURE: 80 MMHG | SYSTOLIC BLOOD PRESSURE: 138 MMHG | HEIGHT: 62 IN | RESPIRATION RATE: 18 BRPM | HEART RATE: 85 BPM

## 2025-02-10 DIAGNOSIS — K50.10 CROHN'S DISEASE OF LARGE INTESTINE WITHOUT COMPLICATION (H): ICD-10-CM

## 2025-02-10 DIAGNOSIS — C77.2: ICD-10-CM

## 2025-02-10 DIAGNOSIS — I27.20 MILD PULMONARY HYPERTENSION (H): ICD-10-CM

## 2025-02-10 DIAGNOSIS — E78.5 HYPERLIPIDEMIA LDL GOAL <100: ICD-10-CM

## 2025-02-10 DIAGNOSIS — H10.89 OTHER CONJUNCTIVITIS OF BOTH EYES: ICD-10-CM

## 2025-02-10 DIAGNOSIS — E83.42 HYPOMAGNESEMIA: ICD-10-CM

## 2025-02-10 DIAGNOSIS — I48.91 ATRIAL FIBRILLATION, UNSPECIFIED TYPE (H): ICD-10-CM

## 2025-02-10 DIAGNOSIS — C18.9: ICD-10-CM

## 2025-02-10 DIAGNOSIS — N18.32 STAGE 3B CHRONIC KIDNEY DISEASE (H): ICD-10-CM

## 2025-02-10 DIAGNOSIS — K52.9 CHRONIC DIARRHEA: ICD-10-CM

## 2025-02-10 DIAGNOSIS — M10.072 ACUTE IDIOPATHIC GOUT INVOLVING TOE OF LEFT FOOT: ICD-10-CM

## 2025-02-10 DIAGNOSIS — Z00.00 MEDICARE ANNUAL WELLNESS VISIT, SUBSEQUENT: Primary | ICD-10-CM

## 2025-02-10 PROCEDURE — G2211 COMPLEX E/M VISIT ADD ON: HCPCS | Performed by: INTERNAL MEDICINE

## 2025-02-10 PROCEDURE — G0439 PPPS, SUBSEQ VISIT: HCPCS | Performed by: INTERNAL MEDICINE

## 2025-02-10 PROCEDURE — 99214 OFFICE O/P EST MOD 30 MIN: CPT | Mod: 25 | Performed by: INTERNAL MEDICINE

## 2025-02-10 RX ORDER — MAGNESIUM OXIDE 400 MG/1
400 TABLET ORAL 2 TIMES DAILY
Qty: 180 TABLET | Refills: 3 | Status: SHIPPED | OUTPATIENT
Start: 2025-02-10

## 2025-02-10 RX ORDER — NEOMYCIN POLYMYXIN B SULFATES AND DEXAMETHASONE 3.5; 10000; 1 MG/ML; [USP'U]/ML; MG/ML
1 SUSPENSION/ DROPS OPHTHALMIC 4 TIMES DAILY
Qty: 5 ML | Refills: 1 | Status: SHIPPED | OUTPATIENT
Start: 2025-02-10

## 2025-02-10 RX ORDER — ALLOPURINOL 100 MG/1
100 TABLET ORAL DAILY
Qty: 90 TABLET | Refills: 3 | Status: SHIPPED | OUTPATIENT
Start: 2025-02-10

## 2025-02-10 ASSESSMENT — PAIN SCALES - GENERAL: PAINLEVEL_OUTOF10: MILD PAIN (2)

## 2025-02-10 NOTE — PROGRESS NOTES
Preventive Care Visit  [facility]  Nomi Cartwright DO, Internal Medicine  Feb 10, 2025      Assessment & Plan     Medicare annual wellness visit, subsequent      Other conjunctivitis of both eyes  Appears bacterial.  Will place on Maxitrol and reevaluate  - neomycin-polymixin-dexAMETHasone (MAXITROL) 0.1 % ophthalmic suspension; Place 1 drop into both eyes 4 times daily.  - OFFICE/OUTPT VISIT,EST,LEVL IV    Hyperlipidemia LDL goal <100  Check lipids at next blood draw.  - Lipid panel reflex to direct LDL Non-fasting; Future    Crohn's disease of large intestine without complication (H)  Resolved with colectomy    Primary colon cancer with metastasis to 1 regional lymph node (N1a) (H)  Patient follows with oncologist.  No signs of recurrence    Chronic diarrhea  Patient has chronic diarrhea which is led to hypomagnesemia.  Continue magnesium supplementation and follow levels  - magnesium oxide 400 MG tablet; Take 1 tablet (400 mg) by mouth 2 times daily.    Mild pulmonary hypertension (H)  Stable    Atrial fibrillation, unspecified type (H)  Continue chronic anticoagulation  - OFFICE/OUTPT VISIT,EST,LEVL IV    Acute idiopathic gout involving toe of left foot  Continue allopurinol  - allopurinol (ZYLOPRIM) 100 MG tablet; Take 1 tablet (100 mg) by mouth daily.  - OFFICE/OUTPT VISIT,EST,LEVL IV    Stage 3b chronic kidney disease (H)  Kidney function is stable but decreased  - Albumin Random Urine Quantitative with Creat Ratio; Future  - OFFICE/OUTPT VISIT,EST,LEVL IV    Hypomagnesemia  Follow magnesium levels and supplementation  - magnesium oxide 400 MG tablet; Take 1 tablet (400 mg) by mouth 2 times daily.  - OFFICE/OUTPT VISIT,EST,LEVL IV    Body mass index (BMI) 40.0-44.9, adult (H)  Recommend weight loss responsible caloric restriction and exercise    Patient has been advised of split billing requirements and indicates understanding: Yes        Counseling  Appropriate  preventive services were addressed with this patient via screening, questionnaire, or discussion as appropriate for fall prevention, nutrition, physical activity, Tobacco-use cessation, social engagement, weight loss and cognition.  Checklist reviewing preventive services available has been given to the patient.  Reviewed patient's diet, addressing concerns and/or questions.   She is at risk for lack of exercise and has been provided with information to increase physical activity for the benefit of her well-being.   The patient was instructed to see the dentist every 6 months.       MEDICATIONS:  Continue current medications without change  Work on weight loss  Regular exercise    Yo Zuniga is a 69 year old, presenting for the following:  Wellness Visit        2/10/2025    12:35 PM   Additional Questions   Roomed by Leonardo URRUTIA routine physical          Health Care Directive  Patient does not have a Health Care Directive: Patient states has Advance Directive and will bring in a copy to clinic.      2/6/2025   General Health   How would you rate your overall physical health? Good   Feel stress (tense, anxious, or unable to sleep) Not at all         2/6/2025   Nutrition   Diet: Regular (no restrictions)         2/6/2025   Exercise   Days per week of moderate/strenous exercise 2 days   Average minutes spent exercising at this level 20 min   (!) EXERCISE CONCERN      2/6/2025   Social Factors   Frequency of gathering with friends or relatives Once a week   Worry food won't last until get money to buy more No   Food not last or not have enough money for food? No   Do you have housing? (Housing is defined as stable permanent housing and does not include staying ouside in a car, in a tent, in an abandoned building, in an overnight shelter, or couch-surfing.) Yes   Are you worried about losing your housing? No   Lack of transportation? No   Unable to get utilities (heat,electricity)? No         2/6/2025    Fall Risk   Fallen 2 or more times in the past year? No   Trouble with walking or balance? No          2/6/2025   Activities of Daily Living- Home Safety   Needs help with the following daily activites None of the above   Safety concerns in the home None of the above         2/6/2025   Dental   Dentist two times every year? (!) NO         2/6/2025   Hearing Screening   Hearing concerns? None of the above         2/6/2025   Driving Risk Screening   Patient/family members have concerns about driving No         2/6/2025   General Alertness/Fatigue Screening   Have you been more tired than usual lately? No         2/6/2025   Urinary Incontinence Screening   Bothered by leaking urine in past 6 months No         11/7/2024   TB Screening   Were you born outside of the [place]? No         Today's PHQ-2 Score:       2/10/2025    12:43 PM   PHQ-2 ( 1999 [employer])   Q1: Little interest or pleasure in doing things 0    Q2: Feeling down, depressed or hopeless 0    PHQ-2 Score 0    Q1: Little interest or pleasure in doing things Not at all   Q2: Feeling down, depressed or hopeless Not at all   PHQ-2 Score 0       Proxy-reported           2/6/2025   Substance Use   Alcohol more than 3/day or more than 7/wk Not Applicable   Do you have a current opioid prescription? No   How severe/bad is pain from 1 to 10? 3/10   Do you use any other substances recreationally? (!) DECLINE     Social History     Tobacco Use    Smoking status: Never     Passive exposure: Current    Smokeless tobacco: Never   Vaping Use    Vaping status: Never Used   Substance Use Topics    Alcohol use: No    Drug use: No          Mammogram Screening - Mammogram every 1-2 years updated in Health Maintenance based on mutual decision making      History of abnormal Pap smear: Status post hysterectomy with removal of cervix and no history of CIN2 or greater or cervical cancer. Health Maintenance and Surgical History updated.       ASCVD Risk   The 10-year ASCVD risk  score (Vesna CEJA, et al., 2019) is: 11.2%    Values used to calculate the score:      Age: 69 years      Sex: Female      Is Non- : No      Diabetic: No      Tobacco smoker: No      Systolic Blood Pressure: 138 mmHg      Is BP treated: Yes      HDL Cholesterol: 65 mg/dL      Total Cholesterol: 196 mg/dL            Reviewed and updated as needed this visit by Provider                    Past Medical History:   Diagnosis Date    Arthritis     Arthropathia 08/05/2010    B12 deficiency anemia 10/21/2010    Benign essential hypertension with target blood pressure below 140/90 10/10/2016    CARDIOVASCULAR SCREENING; LDL GOAL LESS THAN 160 10/31/2010    Crohn's colitis (H) 02/15/2011    Dermatitis-dishydrotic eczema-severe 08/05/2010    Hiatal hernia     History of blood transfusion Not sure    HTN (hypertension) 07/21/2011    Iron deficiency anemia 10/21/2010    Malignant neoplasm of sigmoid colon (H)     Obesity     Primary pulmonary hypertension (H) 04/06/2010     Past Surgical History:   Procedure Laterality Date    COLECTOMY WITHOUT COLOSTOMY N/A 4/6/2023    Procedure: Laparoscopic Converted to Open Total abdominal colectomy;  Surgeon: Jerome Ashley MD;  Location: [facility]    COLONOSCOPY  10/11/10    COLONOSCOPY N/A 2/27/2023    Procedure: ATTEMPTED COLONOSCOPY WITH SIGMOID STRICTURE BIOPSY;  Surgeon: Jonathan Alcocer MD;  Location:  GI    ESOPHAGOSCOPY, GASTROSCOPY, DUODENOSCOPY (EGD), COMBINED N/A 2/27/2023    Procedure: ESOPHAGOGASTRODUODENOSCOPY, WITH BIOPSY;  Surgeon: Jonathan Alcocer MD;  Location:  GI    HYSTERECTOMY TOTAL ABDOMINAL, BILATERAL SALPINGO-OOPHORECTOMY, COMBINED N/A 4/6/2023    Procedure: Hysterectomy total abdominal, bilateral salpingo-oophorectomy;  Surgeon: Sunitha Olea MD;  Location: [facility]    INSERT PORT VASCULAR ACCESS Right 5/25/2023    Procedure: Ultrasound-guided right internal jugular venous access port placement with fluoroscopy;  Surgeon:  Martin Thomas, DO;  [facility]    IR CHEST PORT PLACEMENT > 5 YRS OF AGE  8/21/2023    IR PORT CHECK RIGHT  7/18/2023    IR PORT REMOVAL RIGHT  8/21/2023    REMOVE PORT VASCULAR ACCESS Left 7/31/2024    Procedure: Removal left-sided PowerPort;  Surgeon: Jonathan Alcocer MD;  [facility]    SIGMOIDOSCOPY FLEXIBLE N/A 4/6/2023    Procedure: Sigmoidoscopy flexible;  Surgeon: Jerome Ashley MD;  [facility]: [facility]    SIGMOIDOSCOPY FLEXIBLE N/A 5/1/2024    Procedure: Sigmoidoscopy flexible;  Surgeon: Jerome Ashley MD;  Location: [facility] OR    SURGICAL HISTORY OF -   06/14/76    Perineorrhaphy, for widening vaginal orifice    ZZC EXPLORATORY OF ABDOMEN  1989    laparoscopy     OB History   No obstetric history on file.     Lab work is in process  Labs reviewed in EPIC  BP Readings from Last 3 Encounters:   02/10/25 138/80   01/30/25 (!) 144/80   01/06/25 (!) 144/82    Wt Readings from Last 3 Encounters:   02/10/25 101.9 kg (224 lb 11.2 oz)   01/30/25 102.7 kg (226 lb 7 oz)   01/06/25 100.9 kg (222 lb 8 oz)                  Patient Active Problem List   Diagnosis    Mild pulmonary hypertension (H)    Arthropathia    Dermatitis-dishydrotic eczema-severe    Iron deficiency anemia due to chronic blood loss    B12 deficiency anemia    Crohn's disease of large intestine without complication (H)    Prediabetes    Obesity    CARDIOVASCULAR SCREENING; LDL GOAL LESS THAN 130    Benign essential hypertension with target blood pressure below 140/90    Malignant neoplasm of sigmoid colon (H)    Primary colon cancer with metastasis to 1 regional lymph node (N1a) (H)    Hyperkalemia    MARYA (acute kidney injury)    Paroxysmal atrial fibrillation with RVR (H)    Pyuria    Anemia associated with chemotherapy    Lymphopenia    Stage 3b chronic kidney disease (H)    Metabolic acidosis, normal anion gap (NAG)    Elevated troponin    Hypocalcemia    Hypomagnesemia    Tinnitus, right    Chronic diarrhea     Chemotherapy-induced neutropenia    Prevention of chemotherapy-induced neutropenia    Vitamin D deficiency    Secondary hyperparathyroidism    Class 2 severe obesity due to excess calories with serious comorbidity in adult (H)    Malignant poorly differentiated neuroendocrine carcinoma (H)     Past Surgical History:   Procedure Laterality Date    COLECTOMY WITHOUT COLOSTOMY N/A 4/6/2023    Procedure: Laparoscopic Converted to Open Total abdominal colectomy;  Surgeon: Jerome Ashley MD;  [facility]    COLONOSCOPY  10/11/10    COLONOSCOPY N/A 2/27/2023    Procedure: ATTEMPTED COLONOSCOPY WITH SIGMOID STRICTURE BIOPSY;  Surgeon: Jonathan Alcocer MD;  [facility]    ESOPHAGOSCOPY, GASTROSCOPY, DUODENOSCOPY (EGD), COMBINED N/A 2/27/2023    Procedure: ESOPHAGOGASTRODUODENOSCOPY, WITH BIOPSY;  Surgeon: Jonathan Alcocer MD;  [facility]    HYSTERECTOMY TOTAL ABDOMINAL, BILATERAL SALPINGO-OOPHORECTOMY, COMBINED N/A 4/6/2023    Procedure: Hysterectomy total abdominal, bilateral salpingo-oophorectomy;  Surgeon: Sunitha Olea MD;  [facility]    INSERT PORT VASCULAR ACCESS Right 5/25/2023    Procedure: Ultrasound-guided right internal jugular venous access port placement with fluoroscopy;  Surgeon: Martin Thomas DO;  [facility]    IR CHEST PORT PLACEMENT > 5 YRS OF AGE  8/21/2023    IR PORT CHECK RIGHT  7/18/2023    IR PORT REMOVAL RIGHT  8/21/2023    REMOVE PORT VASCULAR ACCESS Left 7/31/2024    Procedure: Removal left-sided PowerPort;  Surgeon: Jonathan Alcocer MD;  [facility]    SIGMOIDOSCOPY FLEXIBLE N/A 4/6/2023    Procedure: Sigmoidoscopy flexible;  Surgeon: Jerome Ashley MD;  [facility]    SIGMOIDOSCOPY FLEXIBLE N/A 5/1/2024    Procedure: Sigmoidoscopy flexible;  Surgeon: Jerome Ashley MD;  Location: [facility] OR    SURGICAL HISTORY OF -   06/14/76    Perineorrhaphy, for widening vaginal orifice    [facility] EXPLORATORY OF ABDOMEN  1989     laparoscopy       Social History     Tobacco Use    Smoking status: Never     Passive exposure: Current    Smokeless tobacco: Never   Substance Use Topics    Alcohol use: No     Family History   Problem Relation Age of Onset    Hypertension Mother         on meds, alive    Cerebrovascular Disease Father         stroke about age 65,  of cancer at 68 yrs    Diabetes Father         eventually took insulin    Anemia Father         Pernisios anemia    Kidney Disease Niece         kidney transplant    Kidney Disease Nephew         kidney transplant    Venous thrombosis No family hx of     Anesthesia Reaction No family hx of          Current Outpatient Medications   Medication Sig Dispense Refill    allopurinol (ZYLOPRIM) 100 MG tablet Take 1 tablet (100 mg) by mouth daily. 90 tablet 3    cyanocobalamin 1000 MCG TBCR Take 1,000 mcg by mouth daily 100 tablet 1    fluocinonide (LIDEX) 0.05 % external cream APPLY A SMALL AMOUNT TO AFFECTED AREA(S) TWICE DAILY AS NEEDED 60 g 1    gabapentin (NEURONTIN) 300 MG capsule One capsule by mouth every morning and evening; two capsules by mouth every afternoon      loperamide (IMODIUM) 2 MG capsule Take 1 capsule (2 mg) by mouth daily as needed for diarrhea. 60 capsule 1    magnesium oxide 400 MG tablet Take 1 tablet (400 mg) by mouth 2 times daily. 180 tablet 3    metoprolol succinate ER (TOPROL XL) 50 MG 24 hr tablet Take 1 tablet (50 mg) by mouth daily 90 tablet 2    Multiple Vitamins-Iron (MULTI-DAY PLUS IRON PO) Take 1 tablet by mouth daily      neomycin-polymixin-dexAMETHasone (MAXITROL) 0.1 % ophthalmic suspension Place 1 drop into both eyes 4 times daily. 5 mL 1    rivaroxaban ANTICOAGULANT (XARELTO) 15 MG TABS tablet Take 1 tablet (15 mg) by mouth daily (with dinner). 90 tablet 1    sodium bicarbonate 650 MG tablet TAKE 1 TABLET BY MOUTH TWICE A  tablet 3    VITAMIN D3 50 MCG (2000 UT) tablet TAKE 1 TABLET (50 MCG) BY MOUTH DAILY 30 tablet 6     Allergies   Allergen  Reactions    No Known Drug Allergy      Current providers sharing in care for this patient include:  Patient Care Team:  Nomi Cartwright DO as PCP - General (Internal Medicine)  Nomi Cartwright DO as Assigned PCP  Irene Bateman DO as Physician (Internal Medicine)  Viet Acharya MD as MD (Hematology & Oncology)  Irene Bateman DO as Physician (Internal Medicine)  Israel Garza MD as MD (Cardiovascular Disease)  Liseth Yoon, RN as Specialty Care Coordinator (Hematology & Oncology)  Santana Brenner MD as Assigned Nephrology Provider  Jean-Claude Zurita MD as MD (Otolaryngology)  Nu Amezcua AuD as Audiologist (Audiology)  Sarath Thompson PsyD as Psychologist (PSYCHOLOGIST CLINICAL)  Sarath Thompson PsyD as Assigned Behavioral Health Provider  Senia Meadows MD as MD (Gastroenterology)  Senia Meadows MD as Assigned Gastroenterology Provider  Jonathan Alcocer MD as Assigned Heart and Vascular Provider  Jean-Claude Zurita MD as Assigned Surgical Provider  Manuel Fernandez MD as Assigned Pediatric Specialist Provider  Elsa Sevilla PA-C as Assigned Cancer Care Provider    The following health maintenance items are reviewed in Epic and correct as of today:  Health Maintenance   Topic Date Due    DEXA  Never done    MAMMO SCREENING  Never done    Pneumococcal Vaccine: 50+ Years (1 of 2 - PCV) Never done    ZOSTER IMMUNIZATION (1 of 2) Never done    RSV VACCINE (1 - Risk 60-74 years 1-dose series) Never done    DTAP/TDAP/TD IMMUNIZATION (2 - Td or Tdap) 03/11/2019    MEDICARE ANNUAL WELLNESS VISIT  02/16/2024    MICROALBUMIN  12/03/2024    LIPID  12/04/2024    ANNUAL REVIEW OF HM ORDERS  06/04/2025    COVID-19 Vaccine (8 - Moderna risk 2024-25 season) 04/21/2025    COLORECTAL CANCER SCREENING  05/01/2025    URIC ACID  11/07/2025    BMP  01/23/2026    HEMOGLOBIN  01/23/2026    FALL RISK ASSESSMENT  02/10/2026    GLUCOSE  01/23/2028    ADVANCE  "CARE PLANNING  07/30/2029    PARATHYROID  Completed    PHOSPHORUS  Completed    HEPATITIS C SCREENING  Completed    PHQ-2 (once per calendar year)  Completed    INFLUENZA VACCINE  Completed    URINALYSIS  Completed    ALK PHOS  Completed    HPV IMMUNIZATION  Aged Out    MENINGITIS IMMUNIZATION  Aged Out         Review of Systems  CONSTITUTIONAL: NEGATIVE for fever, chills, change in weight  INTEGUMENTARY/SKIN: NEGATIVE for worrisome rashes, moles or lesions  EYES: Right eye is red and irritated.  Has been this way for about a week.  Some discharges noted  ENT/MOUTH: NEGATIVE for ear, mouth and throat problems  RESP: NEGATIVE for significant cough or SOB  BREAST: NEGATIVE for masses, tenderness or discharge  CV: NEGATIVE for chest pain, palpitations or peripheral edema  GI: NEGATIVE for nausea, abdominal pain, heartburn, or change in bowel habits  : NEGATIVE for frequency, dysuria, or hematuria  MUSCULOSKELETAL: NEGATIVE for significant arthralgias or myalgia  NEURO: NEGATIVE for weakness, dizziness or paresthesias  ENDOCRINE: NEGATIVE for temperature intolerance, skin/hair changes  HEME: NEGATIVE for bleeding problems  PSYCHIATRIC: NEGATIVE for changes in mood or affect     Objective    Exam  /80 (BP Location: Right arm, Patient Position: Sitting, Cuff Size: Adult Large)   Pulse 85   Temp 97.7  F (36.5  C) (Temporal)   Resp 18   Ht 1.57 m (5' 1.81\")   Wt 101.9 kg (224 lb 11.2 oz)   LMP  (LMP Unknown)   SpO2 96%   BMI 41.35 kg/m     Estimated body mass index is 41.35 kg/m  as calculated from the following:    Height as of this encounter: 1.57 m (5' 1.81\").    Weight as of this encounter: 101.9 kg (224 lb 11.2 oz).    Physical Exam  GENERAL: alert and no distress  EYES: Bilateral bacterial conjunctivitis is noted.  Worse on the right than left.  No signs of corneal involvement noted.  Bulbar and palpebral injection noted with some exudates more on the right than left.  HENT: ear canals and TM's normal, " nose and mouth without ulcers or lesions  NECK: no adenopathy, no asymmetry, masses, or scars  RESP: lungs clear to auscultation - no rales, rhonchi or wheezes  CV: regular rate and rhythm, normal S1 S2, no S3 or S4, no murmur, click or rub, no peripheral edema  ABDOMEN: soft, nontender, no hepatosplenomegaly, no masses and bowel sounds normal  MS: no gross musculoskeletal defects noted, no edema  SKIN: no suspicious lesions or rashes  NEURO: Normal strength and tone, mentation intact and speech normal  PSYCH: mentation appears normal, affect normal/bright        2/10/2025   Mini Cog   Clock Draw Score 2 Normal   3 Item Recall 3 objects recalled   Mini Cog Total Score 5              I have reviewed the patient's vaccination schedule and discussed the benefits of prophylactic vaccination in detail.  I recommend the patient contact their pharmacist for vaccinations.  Discussed that most insurance companies now favor reimbursement to the pharmacies and it will financially behoove the patient to have vaccinations performed at their pharmacy.        Signed Electronically by: Nomi Cartwright DO

## 2025-02-18 ENCOUNTER — PATIENT OUTREACH (OUTPATIENT)
Dept: ONCOLOGY | Facility: CLINIC | Age: 70
End: 2025-02-18
Payer: COMMERCIAL

## 2025-02-18 NOTE — TELEPHONE ENCOUNTER
[facility]: Cancer Care Follow-Up Note                                    Discussion with Patient:                                                      Chart review, enrolled into [facility] per RNCC monitoring.    Treatment:                                                    OBSERVATION    Assessment:                                                      RNCC Short Symptom Review:     Assessment completed with:: VM-chart review    General/Short Assessment  Does the patient have all their medications?: Yes  Is the patient experiencing any new or worsening symptoms?: No  Does the patient need any referrals to supportive care services?: No  Discussion with patient: Reviewed patient's future appointments    Patient Coping  Accepting    Clinic Utilization  Patient Aware of Next Appointment?: Yes    Other Patient Concerns  Other Patient Reported Concerns: Yes, see notes    Intervention/Education provided during outreach:                                                       No intervention needed at this time. Patient scheduled appropriately, will continue to follow.      Patient to follow up as scheduled at next appt  Patient to call/Educabiliat message with updates  Confirmed patient has clinic and triage numbers    Signature:  Liseth Yoon RN    done

## 2025-02-25 ENCOUNTER — TELEPHONE (OUTPATIENT)
Dept: ONCOLOGY | Facility: CLINIC | Age: 70
End: 2025-02-25
Payer: COMMERCIAL

## 2025-02-25 DIAGNOSIS — T45.1X5A CHEMOTHERAPY-INDUCED NEUROPATHY: ICD-10-CM

## 2025-02-25 DIAGNOSIS — G62.0 CHEMOTHERAPY-INDUCED NEUROPATHY: ICD-10-CM

## 2025-02-25 NOTE — TELEPHONE ENCOUNTER
Reason for Call:  Other appointment    Detailed comments: Patient calling because she tried to fill her gabapentin in [facility] in [place] and they told her it was cancelled? She's calling to inquire if it was really cancelled? She needs a refill as she only has a couple days left. Please call her at [phone]    Phone Number Patient can be reached at: Home number on file [phone] (home)    Best Time: any    Can we leave a detailed message on this number? YES    Call taken on 2/25/2025 at 10:28 AM by Jane Simms

## 2025-02-26 RX ORDER — GABAPENTIN 300 MG/1
CAPSULE ORAL
Qty: 120 CAPSULE | Refills: 2 | Status: SHIPPED | OUTPATIENT
Start: 2025-02-26

## 2025-02-26 NOTE — TELEPHONE ENCOUNTER
Per last office visit note with Dr. Fernandez on 1/30/25, Patient to continue Gabapentin 300 mg q am, 600 mg q afternoon, and 300 mg q pm. Script sent to [facility]. Detailed message left informing patient, permission to leave message in chart.       Megan Valera RN on 2/26/2025 at 1:28 PM

## 2025-02-27 NOTE — TELEPHONE ENCOUNTER
Echo- CAD, CABG    See Dr. Flynn in 1 yr.    Will forward to PCP.     KEVIN BellaN, RN  [facility]

## 2025-03-12 ENCOUNTER — HOSPITAL ENCOUNTER (OUTPATIENT)
Dept: CARDIOLOGY | Facility: CLINIC | Age: 70
Discharge: HOME | End: 2025-03-12
Attending: INTERNAL MEDICINE
Payer: COMMERCIAL

## 2025-03-12 DIAGNOSIS — I48.0 PAROXYSMAL ATRIAL FIBRILLATION WITH RVR (H): ICD-10-CM

## 2025-03-12 LAB — LVEF ECHO: NORMAL

## 2025-03-12 PROCEDURE — 93306 TTE W/DOPPLER COMPLETE: CPT

## 2025-04-01 NOTE — PROGRESS NOTES
Colon and Rectal Surgery Clinic Note    RE: Nadia Ladd.  : 1955.  CHEYENNE: 2025.    Reason for visit: flexible sigmoidoscopy for surveillance.    HPI: Nadia is a 69 year old female with sigmoid colon cancer presenting to clinic for endoscopic surveillance.     She initially presented in 2023 with fatigue and iron deficiency anemia. Colonoscopy confirmed sigmoid mass, biopsy with invasive poorly differentiated carcinoma. CT with proximal sigmoid colon mass, hepatic flexure colon mass and associated lymphadenopathy. PET confirmed multifocal disease with suspicion of third lesion in the left transverse colon, CEA was 6.8. Negative genetic testing for Chan syndrome.     She is now s/p total abdominal colectomy w/ ileorectal bakers anastomosis; ALEXANDER-BSO on 2023 with myself and Dr. Olea. Pathology demonstrated 2 masses with synchronous malignancy - sigmoid lesion with neuroendocrine component, Ki-67 80%; hepatic lesion with poorly-differentiated carcinoma.     I last saw her in clinic May 2023    Interval History:    From 23-23: adjuvant mFOLFOX 6 q14 days x12 cycles/6 months w/dose reduced oxaliplatin     CT CAP 24 post chemo  1.  Stable tiny nodules in both lungs.  2.  Stable borderline enlarged portacaval lymph node.  3.  Splenomegaly slightly increased from previous study.  4.  Postsurgical changes of subtotal colectomy with ileorectal anastomosis.  5.  Moderate-sized duodenal diverticulum.  6.  Stable nonmass-like area of low attenuation in the right hepatic lobe adjacent to the middle hepatic vein could represent some focal fatty infiltration. Attention is recommended to this area on future exams, but the stability is reassuring.    Flexible Sigmoidoscopy (24 - with me)  Findings:       The entire examined rectum appeared normal.        Patent side-to-end ileorectal anastomosis; easily transversed with adult        flexible sigmoidoscope                                                                                     Impression:     - The entire examined rectum is normal.   - No specimens collected.   - Normal mucosa in the rectum.     Recommendation:          - Use fiber, for example Citrucel, Fibercon, Konsyl or Metamucil.     CT CAP (10/16/24)  IMPRESSION:  1.  Several areas of groundglass attenuation are decreased since  7/2/2024. New areas of subpleural opacity in both lungs, most likely  some atelectasis or pneumonitis. However, given history a 3 month  follow-up CT chest is recommended.  2.  Subtotal colectomy with the ileorectal anastomosis. No  obstruction.  3.  No lymphadenopathy in the chest, abdomen, or pelvis.    CT CAP (1/23/25)  IMPRESSION:  1.  No convincing recurrent or residual malignancy demonstrated.  2.  Nearly resolved groundglass changes in the lungs compared to previous.  3.  Increased atelectasis and/or fibrosis in the lungs.      CEA Trends:     Component      Latest Ref Rng 3/10/2023  7:52 AM 5/9/2023  2:13 PM 7/11/2023  10:40 AM 10/11/2023  8:21 AM 12/6/2023  8:49 AM 12/13/2023  11:38 AM 4/1/2024  10:05 AM   CEA      ng/mL 6.8  1.8  2.8  2.0  2.1  2.3  2.1      Component      Latest Ref Rng 7/2/2024  8:27 AM 10/16/2024  8:59 AM 1/23/2025  11:03 AM   CEA      ng/mL 2.0  1.8  1.7        Today Nadia feels great.  She notices some irritation in the perianal skin after BM's.      Medications:  Current Outpatient Medications   Medication Sig Dispense Refill    allopurinol (ZYLOPRIM) 100 MG tablet Take 1 tablet (100 mg) by mouth daily. 90 tablet 3    cyanocobalamin 1000 MCG TBCR Take 1,000 mcg by mouth daily 100 tablet 1    fluocinonide (LIDEX) 0.05 % external cream APPLY A SMALL AMOUNT TO AFFECTED AREA(S) TWICE DAILY AS NEEDED 60 g 1    gabapentin (NEURONTIN) 300 MG capsule One capsule by mouth every morning and evening; two capsules by mouth every afternoon 120 capsule 2    loperamide (IMODIUM) 2 MG capsule Take 1 capsule (2 mg) by mouth daily as needed  for diarrhea. 60 capsule 1    magnesium oxide 400 MG tablet Take 1 tablet (400 mg) by mouth 2 times daily. 180 tablet 3    metoprolol succinate ER (TOPROL XL) 50 MG 24 hr tablet Take 1 tablet (50 mg) by mouth daily 90 tablet 2    Multiple Vitamins-Iron (MULTI-DAY PLUS IRON PO) Take 1 tablet by mouth daily      neomycin-polymixin-dexAMETHasone (MAXITROL) 0.1 % ophthalmic suspension Place 1 drop into both eyes 4 times daily. 5 mL 1    rivaroxaban ANTICOAGULANT (XARELTO) 15 MG TABS tablet Take 1 tablet (15 mg) by mouth daily (with dinner). 90 tablet 1    sodium bicarbonate 650 MG tablet TAKE 1 TABLET BY MOUTH TWICE A  tablet 3    VITAMIN D3 50 MCG (2000 UT) tablet TAKE 1 TABLET (50 MCG) BY MOUTH DAILY 30 tablet 6       ROS:  A complete review of systems was performed with the patient and all systems negative except as per HPI.    Physical Examination:  Exam was chaperoned by Rachna Trujillo CMA  LMP  (LMP Unknown)   General: Well hydrated. No acute distress.  Abdomen: Soft, NT, not distended. No inguinal adenopathy palpated.  Perianal external examination:  Perianal skin: perianal skin looks irritated.  Lesions: No evidence of an external lesion, nodularity, or induration in the perianal region.  Eversion of buttocks: There was not evidence of an anal fissure. Details: N/A.  Skin tags or external hemorrhoids: None.  Digital rectal examination: Was performed.   Sphincter tone: Good.  Palpable lesions: No.  Other: None.  Bimanual examination: was not performed.    Anoscopy: Was deferred    Procedure  After discussing the risks and benefits, the patient agreed to proceed with flexible sigmoidoscopy.    A total of one fleet enema(s) were administered for a preparation.    Procedure:   The sigmoidoscope was inserted to ileorectal anastomosis and stopped due to the entire remaining rectum was examined.    Findings: normal mucosa without polyps, tumors or diverticula, no biopsies taken, bowel prep was adequate, procedure  well tolerated without complications.  Retroflexion: Was not performed. This was not done.  The patient tolerated the procedure well.    Findings:  No results found for this or any previous visit.     Laboratory values reviewed:  Recent Labs   Lab Test 01/23/25  1103   WBC 6.6   HGB 14.2      CR 1.95*   ALBUMIN 4.2   BILITOTAL 0.8   ALKPHOS 176*   ALT 26   AST 28       Imaging personally reviewed by me:  CT CAP  IMPRESSION:  1.  No convincing recurrent or residual malignancy demonstrated.  2.  Nearly resolved groundglass changes in the lungs compared to previous.  3.  Increased atelectasis and/or fibrosis in the lungs.    ASSESSMENT  70 y/o lady with synchronous colon cancers, s/p TAC with ileorectal anastomosis .     Risks, benefits, and alternatives of operative treatment were thoroughly discussed with the patient, he/she understands these well and agrees to proceed.    PLAN  1. Imaging and lab surveillance per oncology (next CT CAP 5/15/25)  2. Flex sigmoidoscopy with me in clinic in one year  3. Calmoseptine to perianal skin    45 minutes spent on the date of the encounter, 15 minutes in procedure and 30 minutes doing chart review, history and exam, imaging review, documentation and further activities as noted above.    Jerome Ashley MD, PhD    Division of Colon and Rectal Surgery  [facility]    Referring Provider:  Nomi Cartwright DO  [address] [place] [address]     Primary Care Provider:  Nomi Cartwright

## 2025-04-03 NOTE — TELEPHONE ENCOUNTER
Patient Quality Outreach    Patient is due for the following:   Breast Cancer Screening - Mammogram    Action(s) Taken:       Type of outreach:    Sent letter.    Questions for provider review:    None         Aubree Padilla LPN  Chart routed to None.

## 2025-04-08 ENCOUNTER — OFFICE VISIT (OUTPATIENT)
Dept: SURGERY | Facility: CLINIC | Age: 70
End: 2025-04-08
Payer: COMMERCIAL

## 2025-04-08 VITALS
OXYGEN SATURATION: 99 % | BODY MASS INDEX: 40.83 KG/M2 | DIASTOLIC BLOOD PRESSURE: 85 MMHG | WEIGHT: 221.9 LBS | HEART RATE: 79 BPM | SYSTOLIC BLOOD PRESSURE: 152 MMHG | HEIGHT: 62 IN

## 2025-04-08 DIAGNOSIS — C18.7 MALIGNANT NEOPLASM OF SIGMOID COLON (H): Primary | ICD-10-CM

## 2025-04-08 PROCEDURE — 45330 DIAGNOSTIC SIGMOIDOSCOPY: CPT | Performed by: SURGERY

## 2025-04-08 PROCEDURE — 1126F AMNT PAIN NOTED NONE PRSNT: CPT | Performed by: SURGERY

## 2025-04-08 PROCEDURE — 3077F SYST BP >= 140 MM HG: CPT | Performed by: SURGERY

## 2025-04-08 PROCEDURE — 3079F DIAST BP 80-89 MM HG: CPT | Performed by: SURGERY

## 2025-04-08 ASSESSMENT — PAIN SCALES - GENERAL: PAINLEVEL_OUTOF10: NO PAIN (0)

## 2025-04-08 NOTE — NURSING NOTE
"Chief Complaint   Patient presents with    Flexible Sigmoidoscopy       Vitals:    04/08/25 0958   BP: (!) 152/85   BP Location: Left arm   Patient Position: Sitting   Cuff Size: Adult Large   Pulse: 79   SpO2: 99%   Weight: 221 lb 14.4 oz   Height: 5' 2\"       Body mass index is 40.59 kg/m .    Izabel Salnias, EMT  "

## 2025-04-08 NOTE — LETTER
2025       RE: Nadia Ladd  [address]  [facility] [address]     Dear Colleague,    Thank you for referring your patient, Nadia Ladd, to the [facility] [place] at [facility]. Please see a copy of my visit note below.    Colon and Rectal Surgery Clinic Note    RE: Nadia Ladd.  : 1955.  CHEYENNE: 2025.    Reason for visit: flexible sigmoidoscopy for surveillance.    HPI: Nadia is a 69 year old female with sigmoid colon cancer presenting to clinic for endoscopic surveillance.     She initially presented in 2023 with fatigue and iron deficiency anemia. Colonoscopy confirmed sigmoid mass, biopsy with invasive poorly differentiated carcinoma. CT with proximal sigmoid colon mass, hepatic flexure colon mass and associated lymphadenopathy. PET confirmed multifocal disease with suspicion of third lesion in the left transverse colon, CEA was 6.8. Negative genetic testing for Chan syndrome.     She is now s/p total abdominal colectomy w/ ileorectal bakers anastomosis; ALEXANDER-BSO on 2023 with myself and Dr. Olea. Pathology demonstrated 2 masses with synchronous malignancy - sigmoid lesion with neuroendocrine component, Ki-67 80%; hepatic lesion with poorly-differentiated carcinoma.     I last saw her in clinic May 2023    Interval History:    From 23-23: adjuvant mFOLFOX 6 q14 days x12 cycles/6 months w/dose reduced oxaliplatin     CT CAP 24 post chemo  1.  Stable tiny nodules in both lungs.  2.  Stable borderline enlarged portacaval lymph node.  3.  Splenomegaly slightly increased from previous study.  4.  Postsurgical changes of subtotal colectomy with ileorectal anastomosis.  5.  Moderate-sized duodenal diverticulum.  6.  Stable nonmass-like area of low attenuation in the right hepatic lobe adjacent to the middle hepatic vein could represent some focal fatty infiltration. Attention is  recommended to this area on future exams, but the stability is reassuring.    Flexible Sigmoidoscopy (5/1/24 - with me)  Findings:       The entire examined rectum appeared normal.        Patent side-to-end ileorectal anastomosis; easily transversed with adult        flexible sigmoidoscope                                                                                    Impression:     - The entire examined rectum is normal.   - No specimens collected.   - Normal mucosa in the rectum.     Recommendation:          - Use fiber, for example Citrucel, Fibercon, Konsyl or Metamucil.     CT CAP (10/16/24)  IMPRESSION:  1.  Several areas of groundglass attenuation are decreased since  7/2/2024. New areas of subpleural opacity in both lungs, most likely  some atelectasis or pneumonitis. However, given history a 3 month  follow-up CT chest is recommended.  2.  Subtotal colectomy with the ileorectal anastomosis. No  obstruction.  3.  No lymphadenopathy in the chest, abdomen, or pelvis.    CT CAP (1/23/25)  IMPRESSION:  1.  No convincing recurrent or residual malignancy demonstrated.  2.  Nearly resolved groundglass changes in the lungs compared to previous.  3.  Increased atelectasis and/or fibrosis in the lungs.      CEA Trends:     Component      Latest Ref Rng 3/10/2023  7:52 AM 5/9/2023  2:13 PM 7/11/2023  10:40 AM 10/11/2023  8:21 AM 12/6/2023  8:49 AM 12/13/2023  11:38 AM 4/1/2024  10:05 AM   CEA      ng/mL 6.8  1.8  2.8  2.0  2.1  2.3  2.1      Component      Latest Ref Rng 7/2/2024  8:27 AM 10/16/2024  8:59 AM 1/23/2025  11:03 AM   CEA      ng/mL 2.0  1.8  1.7        Today Nadia feels great.  She notices some irritation in the perianal skin after BM's.      Medications:  Current Outpatient Medications   Medication Sig Dispense Refill     allopurinol (ZYLOPRIM) 100 MG tablet Take 1 tablet (100 mg) by mouth daily. 90 tablet 3     cyanocobalamin 1000 MCG TBCR Take 1,000 mcg by mouth daily 100 tablet 1     fluocinonide  (LIDEX) 0.05 % external cream APPLY A SMALL AMOUNT TO AFFECTED AREA(S) TWICE DAILY AS NEEDED 60 g 1     gabapentin (NEURONTIN) 300 MG capsule One capsule by mouth every morning and evening; two capsules by mouth every afternoon 120 capsule 2     loperamide (IMODIUM) 2 MG capsule Take 1 capsule (2 mg) by mouth daily as needed for diarrhea. 60 capsule 1     magnesium oxide 400 MG tablet Take 1 tablet (400 mg) by mouth 2 times daily. 180 tablet 3     metoprolol succinate ER (TOPROL XL) 50 MG 24 hr tablet Take 1 tablet (50 mg) by mouth daily 90 tablet 2     Multiple Vitamins-Iron (MULTI-DAY PLUS IRON PO) Take 1 tablet by mouth daily       neomycin-polymixin-dexAMETHasone (MAXITROL) 0.1 % ophthalmic suspension Place 1 drop into both eyes 4 times daily. 5 mL 1     rivaroxaban ANTICOAGULANT (XARELTO) 15 MG TABS tablet Take 1 tablet (15 mg) by mouth daily (with dinner). 90 tablet 1     sodium bicarbonate 650 MG tablet TAKE 1 TABLET BY MOUTH TWICE A  tablet 3     VITAMIN D3 50 MCG (2000 UT) tablet TAKE 1 TABLET (50 MCG) BY MOUTH DAILY 30 tablet 6       ROS:  A complete review of systems was performed with the patient and all systems negative except as per HPI.    Physical Examination:  Exam was chaperoned by Rachna Trujillo CMA  LMP  (LMP Unknown)   General: Well hydrated. No acute distress.  Abdomen: Soft, NT, not distended. No inguinal adenopathy palpated.  Perianal external examination:  Perianal skin: perianal skin looks irritated.  Lesions: No evidence of an external lesion, nodularity, or induration in the perianal region.  Eversion of buttocks: There was not evidence of an anal fissure. Details: N/A.  Skin tags or external hemorrhoids: None.  Digital rectal examination: Was performed.   Sphincter tone: Good.  Palpable lesions: No.  Other: None.  Bimanual examination: was not performed.    Anoscopy: Was deferred    Procedure  After discussing the risks and benefits, the patient agreed to proceed with flexible  sigmoidoscopy.    A total of one fleet enema(s) were administered for a preparation.    Procedure:   The sigmoidoscope was inserted to ileorectal anastomosis and stopped due to the entire remaining rectum was examined.    Findings: normal mucosa without polyps, tumors or diverticula, no biopsies taken, bowel prep was adequate, procedure well tolerated without complications.  Retroflexion: Was not performed. This was not done.  The patient tolerated the procedure well.    Findings:  No results found for this or any previous visit.     Laboratory values reviewed:  Recent Labs   Lab Test 01/23/25  1103   WBC 6.6   HGB 14.2      CR 1.95*   ALBUMIN 4.2   BILITOTAL 0.8   ALKPHOS 176*   ALT 26   AST 28       Imaging personally reviewed by me:  CT CAP  IMPRESSION:  1.  No convincing recurrent or residual malignancy demonstrated.  2.  Nearly resolved groundglass changes in the lungs compared to previous.  3.  Increased atelectasis and/or fibrosis in the lungs.    ASSESSMENT  68 y/o lady with synchronous colon cancers, s/p TAC with ileorectal anastomosis .     Risks, benefits, and alternatives of operative treatment were thoroughly discussed with the patient, he/she understands these well and agrees to proceed.    PLAN  1. Imaging and lab surveillance per oncology (next CT CAP 5/15/25)  2. Flex sigmoidoscopy with me in clinic in one year  3. Calmoseptine to perianal skin    45 minutes spent on the date of the encounter, 15 minutes in procedure and 30 minutes doing chart review, history and exam, imaging review, documentation and further activities as noted above.    Jerome Ashley MD, PhD    Division of Colon and Rectal Surgery  [facility]    Referring Provider:  Nomi Cartwright DO  [address] [place] [address]     Primary Care Provider:  Nomi Cartwright      Again, thank you for allowing me to participate in the care of your  patient.      Sincerely,    Jerome Ashley MD

## 2025-04-08 NOTE — PATIENT INSTRUCTIONS
Flexible sigmoidoscopy yearly (until 2028)  Next due April 2026    Follow up:    Please call with any questions or concerns regarding your clinic visit today.    It is a pleasure being involved in your health care.    Contacts post-consultation depending on your need:    Radiology Appointments [phone]    Schedule Clinic Appointments [phone] # 1   M-F 7:30 - 5 pm    [facility] [phone]    Clinic Fax Number [phone]    Surgery Scheduling [phone]

## 2025-04-12 ENCOUNTER — HEALTH MAINTENANCE LETTER (OUTPATIENT)
Age: 70
End: 2025-04-12

## 2025-04-12 DIAGNOSIS — H10.89 OTHER CONJUNCTIVITIS OF BOTH EYES: ICD-10-CM

## 2025-04-14 DIAGNOSIS — I48.0 PAROXYSMAL ATRIAL FIBRILLATION (H): ICD-10-CM

## 2025-04-14 RX ORDER — NEOMYCIN SULFATE, POLYMYXIN B SULFATE AND DEXAMETHASONE 3.5; 10000; 1 MG/ML; [USP'U]/ML; MG/ML
SUSPENSION/ DROPS OPHTHALMIC
Qty: 5 ML | Refills: 1 | Status: SHIPPED | OUTPATIENT
Start: 2025-04-14

## 2025-04-15 NOTE — PROGRESS NOTES
~Cardiology Clinic Visit~    Primary Cardiologist: Dr. 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 S/p surgical resection 4/2023 with colectomy and started on FOLFOX chemotherapy 5/31/2023. Follows with Dr. CECILIA Bateman at [facility].   Paroxysmal atrial fibrillation with RVR  [id number] score 3 (gender, age, hypertension)  Hypertension  CKD, stage IIIb  Prediabetes  Crohn's disease  Anemia     She was initially evaluated by Dr. Garza on 6/30/2023 after her first dose of chemotherapy she presented to the emergency department with lightheadedness with positional changes.  She was found to have new onset of atrial fibrillation with RVR.  She was admitted to [facility] for 2 days and was started on rate control with IV diltiazem.  Ultimately, she converted to sinus rhythm and was discharged home on metoprolol XL 25 mg daily.    On 8/4/2023 she received chemotherapy and again developed atrial fibrillation with RVR.  She was placed on IV diltiazem infusion with improved rate control.  Ultimately, she was discharged home and converted later.      In follow up, and with ongoing chemotherapy treatment, she has struggled with palpitations that are not always associated with a return of her PAF.   She has been continued on Eliquis and Metoprolol.  We have offered her to use as needed Diltiazem for episodes of palpitations or PAF.    She completed chemotherapy December 2024.  After that, she only had 1 recognizable episode of PAF shotly after that.  Diltiazem resolved this episode.  Repeat echocardiogram 2025 stable.  Labs today showing improved renal function, stable CBC.    Interval 04/16/25    States things are getting  better from an AF standpoint.  Less and less episodes.  She continues to use as needed Diltiazem about once per month for recurrence.  Still notes that she feels symptomatic to the arrhythmia when it occurs, sometimes can tell that it is going to happen beforehand.  No symptoms today.  Has better BP control at home per her reports, average systolic 120s.  __________________________________________________________________         Assessment and Impression:     Paroxysmal atrial fibrillation with RVR  [id number] score 3 (gender, age, hypertension).  On Xarelto, scheduled BB and PRN CCB for breakthrough episodes.  Colon cancer, s/p resection with colectomy and FOLFOX chemotherapy, completed 12/2024  Hypertension  CKD, stage IIIb  Prediabetes  Crohn's disease  Anemia         Recommendations and Plan:     Continue with current plan.  No medication changes for today.  Continue healthy weight loss and BP management for PAF management.  Continue long-term OAC, no bleeding issues to report.  [facility] follow up in 1-year for routine visit.  __________________________________________________________________    Thank you for the opportunity to participate in this pleasant patient's care.    We would be happy to see this patient sooner for any concerns in the meantime.    LUIS Moran, CNP   Nurse Practitioner  [facility]    Today's clinic visit entailed:  The following tests were independently interpreted by me as noted in my documentation: labs, echo  Prescription drug management  The level of medical decision making during this visit was of moderate complexity.  Review of the result(s) of each unique test - cardiac testing, cardiac imaging, labs  Care everywhere reviewed for additional records to facilitate comprehensive patient care.  Recent hospitalization records and notes reviewed to facilitate comprehensive care coordination.    Orders this Visit:  Orders Placed This Encounter   Procedures     Follow-Up with Cardiology- [facility]     Orders Placed This Encounter   Medications    rivaroxaban ANTICOAGULANT (XARELTO) 15 MG TABS tablet     Sig: Take 1 tablet (15 mg) by mouth daily (with dinner).     Dispense:  90 tablet     Refill:  4    metoprolol succinate ER (TOPROL XL) 50 MG 24 hr tablet     Sig: Take 1 tablet (50 mg) by mouth daily.     Dispense:  90 tablet     Refill:  4    diltiazem (CARDIZEM) 30 MG tablet     Sig: Take 1 tablet (30 mg) by mouth daily as needed (Paroxysmal atrial fibrillation, palpitations).     Dispense:  30 tablet     Refill:  1     Medications Discontinued During This Encounter   Medication Reason    rivaroxaban ANTICOAGULANT (XARELTO) 15 MG TABS tablet Reorder (No AVS)    diltiazem (CARDIZEM) 30 MG tablet Reorder (No AVS)    metoprolol succinate ER (TOPROL XL) 50 MG 24 hr tablet Reorder (No AVS)     Encounter Diagnoses   Name Primary?    Paroxysmal atrial fibrillation (H) Yes    Benign essential hypertension with target blood pressure below 140/90     Stage 3b chronic kidney disease (H)     Malignant neoplasm of sigmoid colon (H)     Immunosuppressed due to chemotherapy     Crohn's disease of large intestine without complication (H)     Paroxysmal atrial fibrillation with RVR (H)      CURRENT MEDICATIONS:  Current Outpatient Medications   Medication Sig Dispense Refill    allopurinol (ZYLOPRIM) 100 MG tablet Take 1 tablet (100 mg) by mouth daily. 90 tablet 3    cyanocobalamin 1000 MCG TBCR Take 1,000 mcg by mouth daily 100 tablet 1    diltiazem (CARDIZEM) 30 MG tablet Take 1 tablet (30 mg) by mouth daily as needed (Paroxysmal atrial fibrillation, palpitations). 30 tablet 1    fluocinonide (LIDEX) 0.05 % external cream APPLY A SMALL AMOUNT TO AFFECTED AREA(S) TWICE DAILY AS NEEDED 60 g 1    gabapentin (NEURONTIN) 300 MG capsule One capsule by mouth every morning and evening; two capsules by mouth every afternoon 120 capsule 2    loperamide (IMODIUM) 2 MG capsule Take 1 capsule (2 mg) by  "mouth daily as needed for diarrhea. 60 capsule 1    magnesium oxide 400 MG tablet Take 1 tablet (400 mg) by mouth 2 times daily. 180 tablet 3    metoprolol succinate ER (TOPROL XL) 50 MG 24 hr tablet Take 1 tablet (50 mg) by mouth daily. 90 tablet 4    Multiple Vitamins-Iron (MULTI-DAY PLUS IRON PO) Take 1 tablet by mouth daily      neomycin-polymyxin-dexAMETHasone (MAXITROL) 3.5-73613-5.1 SUSP ophthalmic susp PLACE 1 DROP INTO BOTH EYES4 TIMES DAILY. 5 mL 1    rivaroxaban ANTICOAGULANT (XARELTO) 15 MG TABS tablet Take 1 tablet (15 mg) by mouth daily (with dinner). 90 tablet 4    sodium bicarbonate 650 MG tablet TAKE 1 TABLET BY MOUTH TWICE A  tablet 3    VITAMIN D3 50 MCG (2000 UT) tablet TAKE 1 TABLET (50 MCG) BY MOUTH DAILY 30 tablet 6     ALLERGIES     Allergies   Allergen Reactions    No Known Drug Allergy      PAST MEDICAL, SURGICAL, FAMILY, SOCIAL HISTORY:  History was reviewed and updated as needed, see medical record.    Review of Systems:  A 10-point Review Of Systems is otherwise normal except for that which is noted in the HPI and interval summary.    Physical Exam:    Vitals: BP (!) 146/90 (BP Location: Right arm, Patient Position: Sitting, Cuff Size: Adult Large)   Pulse 78   Ht 1.575 m (5' 2\")   Wt 103 kg (227 lb)   LMP  (LMP Unknown)   SpO2 95%   BMI 41.52 kg/m    Constitutional: Appears stated age, well nourished, NAD.  Respiratory: Non-labored. Lungs clear.  Cardiovascular: RRR, normal S1 and S2. No murmur.  No edema.  GI: Soft, non-distended, non-tender.  Skin: Warm and dry.   Musculoskeletal/Extremities: Symmetrical movement.  Neurologic: No gross focal deficits. Alert, awake.  Psychiatric: Affect appropriate. Mentation normal.    Recent Lab Results:  LIPID RESULTS:  Lab Results   Component Value Date    CHOL 196 12/04/2023    CHOL 234 (H) 06/06/2019    HDL 65 12/04/2023    HDL 43 (L) 06/06/2019    LDL 69 12/04/2023     (H) 06/06/2019    TRIG 308 (H) 12/04/2023    TRIG 266 (H) " "2019    CHOLHDLRATIO 6.0 (H) 2014     LIVER ENZYME RESULTS:  Lab Results   Component Value Date    AST 28 2025    AST 17 2021    ALT 26 2025    ALT 22 2021     CBC RESULTS:  Lab Results   Component Value Date    WBC 5.6 2025    WBC 7.2 2020    RBC 4.85 2025    RBC 4.24 2020    HGB 13.6 2025    HGB 12.7 2020    HCT 42.0 2025    HCT 40.7 2020    MCV 87 2025    MCV 96 2020    MCH 28.0 2025    MCH 30.0 2020    MCHC 32.4 2025    MCHC 31.2 (L) 2020    RDW 13.5 2025    RDW 13.9 2020     2025     2020     BMP RESULTS:  Lab Results   Component Value Date     2025     2021    POTASSIUM 4.3 2025    POTASSIUM 4.7 2023    POTASSIUM 4.3 10/09/2021    POTASSIUM 4.1 2021    CHLORIDE 103 2025    CHLORIDE 118 (H) 10/09/2021    CHLORIDE 111 (H) 2021    CO2 25 2025    CO2 20 10/09/2021    CO2 22 2021    ANIONGAP 10 2025    ANIONGAP 4 10/09/2021    ANIONGAP 7 2021     (H) 2025     (H) 05/10/2023     (H) 10/09/2021     (H) 2021    BUN 22.3 2025    BUN 25 10/09/2021    BUN 29 2021    CR 1.58 (H) 2025    CR 1.45 (H) 2021    GFRESTIMATED 35 (L) 2025    GFRESTIMATED 38 (L) 2024    GFRESTIMATED 38 (L) 2021    GFRESTBLACK 44 (L) 2021    LIVIER 9.6 2025    LIVIER 9.3 2021      A1C RESULTS:  Lab Results   Component Value Date    A1C 4.8 2018     INR RESULTS:  No results found for: \"INR\"    Recent Results (from the past 4320 hours)   Echocardiogram Complete   Result Value    LVEF  60-65%    [facility]    [id number]  [id number]  [id number]  [id number]^ROWDY^ALEXANDER^KHARI     [facility]  Echocardiography Laboratory  [address] [address]     Name: JOVITA MAYO  MRN: [id number]  : " 1955  Study Date: 03/12/2025 11:52 AM  Age: 69 yrs  Gender: Female  Patient Location: [facility]  Reason For Study: Paroxysmal atrial fibrillation with RVR  History: HTN, PHTN, AFIB, CKD  Ordering Physician: ALEXANDER RENO  Referring Physician: ALEXANDER RENO  Performed By: Priya Mixon     BSA: 2.0 m2  Height: 61 in  Weight: 224 lb  HR: 89  BP: 138/80 mmHg  ______________________________________________________________________________  Procedure  Echocardiogram with two-dimensional, color and spectral Doppler.  ______________________________________________________________________________  Interpretation Summary     Left ventricular systolic function is normal.The visual ejection fraction is  60-65%.  The right ventricular systolic function is normal.  There is mild (1+) mitral regurgitation.There is trace aortic regurgitation.  Pulmonary hypertension- RVSP 34 mm hg +RA.     Compared to prior study dated 06/05/2023 no significant changes.  ______________________________________________________________________________  Left Ventricle  The left ventricle is normal in size. There is normal left ventricular wall  thickness. Left ventricular systolic function is normal. The visual ejection  fraction is 60-65%. Diastolic Doppler findings (E/E' ratio and/or other  parameters) suggest left ventricular filling pressures are normal. No regional  wall motion abnormalities noted.     Right Ventricle  The right ventricle is normal size. The right ventricular systolic function is  normal.     Atria  Normal left atrial size. Right atrial size is normal. There is no color  Doppler evidence of an atrial shunt.     Mitral Valve  There is mild (1+) mitral regurgitation.     Tricuspid Valve  There is trace tricuspid regurgitation. The right ventricular systolic  pressure is approximated at 33.6 mmHg plus the right atrial pressure.  Pulmonary hypertension.     Aortic Valve  The aortic valve is not well visualized. Mild aortic  valve sclerosis. There is  trace aortic regurgitation. No aortic stenosis is present.     Pulmonic Valve  There is trace pulmonic valvular regurgitation. There is no pulmonic valvular  stenosis.     Vessels  The aortic root is normal size. Normal size ascending aorta. The inferior vena  cava was normal in size with preserved respiratory variability.     Pericardium  There is no pericardial effusion.     Rhythm  Sinus rhythm was noted.  ______________________________________________________________________________  MMode/2D Measurements & Calculations  IVSd: 0.80 cm     LVIDd: 4.6 cm  LVIDs: 3.0 cm  LVPWd: 0.88 cm  FS: 34.3 %  LV mass(C)d: 124.8 grams  LV mass(C)dI: 63.0 grams/m2  Ao root diam: 2.9 cm  LA dimension: 3.0 cm  asc Aorta Diam: 3.5 cm  LA/Ao: 1.1  Ao root diam index Ht(cm/m): 1.8  Ao root diam index BSA (cm/m2): 1.4  Asc Ao diam index BSA (cm/m2): 1.8  Asc Ao diam index Ht(cm/m): 2.2  LA Volume (BP): 33.0 ml     LA Volume Index (BP): 16.7 ml/m2  RV Base: 3.4 cm  RWT: 0.39  TAPSE: 2.4 cm     Doppler Measurements & Calculations  MV E max fabio: 57.2 cm/sec  MV A max fabio: 83.3 cm/sec  MV E/A: 0.69  MV dec slope: 261.2 cm/sec2  MV dec time: 0.22 sec  Ao V2 max: 206.5 cm/sec  Ao max P.0 mmHg  LV V1 max P.1 mmHg  LV V1 max: 113.1 cm/sec  LV V1 VTI: 25.8 cm  PA acc time: 0.09 sec  TR max fabio: 289.9 cm/sec  TR max P.6 mmHg  AV Fabio Ratio (DI): 0.55  E/E' av.5  Lateral E/e': 5.5     Medial E/e': 7.4  RV S Fabio: 11.5 cm/sec     ______________________________________________________________________________  Report approved by: Froylan De La Rosa MD on 2025 03:00 PM

## 2025-04-16 ENCOUNTER — OFFICE VISIT (OUTPATIENT)
Dept: CARDIOLOGY | Facility: CLINIC | Age: 70
End: 2025-04-16
Attending: NURSE PRACTITIONER
Payer: COMMERCIAL

## 2025-04-16 VITALS
SYSTOLIC BLOOD PRESSURE: 146 MMHG | WEIGHT: 227 LBS | OXYGEN SATURATION: 95 % | HEART RATE: 78 BPM | HEIGHT: 62 IN | BODY MASS INDEX: 41.77 KG/M2 | DIASTOLIC BLOOD PRESSURE: 90 MMHG

## 2025-04-16 DIAGNOSIS — I48.0 PAROXYSMAL ATRIAL FIBRILLATION (H): Primary | ICD-10-CM

## 2025-04-16 DIAGNOSIS — C18.7 MALIGNANT NEOPLASM OF SIGMOID COLON (H): ICD-10-CM

## 2025-04-16 DIAGNOSIS — I10 BENIGN ESSENTIAL HYPERTENSION WITH TARGET BLOOD PRESSURE BELOW 140/90: ICD-10-CM

## 2025-04-16 DIAGNOSIS — D84.821 IMMUNOSUPPRESSED DUE TO CHEMOTHERAPY: ICD-10-CM

## 2025-04-16 DIAGNOSIS — I48.0 PAROXYSMAL ATRIAL FIBRILLATION WITH RVR (H): ICD-10-CM

## 2025-04-16 DIAGNOSIS — N18.32 STAGE 3B CHRONIC KIDNEY DISEASE (H): ICD-10-CM

## 2025-04-16 DIAGNOSIS — Z79.69 IMMUNOSUPPRESSED DUE TO CHEMOTHERAPY: ICD-10-CM

## 2025-04-16 DIAGNOSIS — K50.10 CROHN'S DISEASE OF LARGE INTESTINE WITHOUT COMPLICATION (H): ICD-10-CM

## 2025-04-16 DIAGNOSIS — T45.1X5A IMMUNOSUPPRESSED DUE TO CHEMOTHERAPY: ICD-10-CM

## 2025-04-16 LAB
ANION GAP SERPL CALCULATED.3IONS-SCNC: 10 MMOL/L (ref 7–15)
BUN SERPL-MCNC: 22.3 MG/DL (ref 8–23)
CALCIUM SERPL-MCNC: 9.6 MG/DL (ref 8.8–10.4)
CHLORIDE SERPL-SCNC: 103 MMOL/L (ref 98–107)
CREAT SERPL-MCNC: 1.58 MG/DL (ref 0.51–0.95)
EGFRCR SERPLBLD CKD-EPI 2021: 35 ML/MIN/1.73M2
ERYTHROCYTE [DISTWIDTH] IN BLOOD BY AUTOMATED COUNT: 13.5 % (ref 10–15)
GLUCOSE SERPL-MCNC: 124 MG/DL (ref 70–99)
HCO3 SERPL-SCNC: 25 MMOL/L (ref 22–29)
HCT VFR BLD AUTO: 42 % (ref 35–47)
HGB BLD-MCNC: 13.6 G/DL (ref 11.7–15.7)
MCH RBC QN AUTO: 28 PG (ref 26.5–33)
MCHC RBC AUTO-ENTMCNC: 32.4 G/DL (ref 31.5–36.5)
MCV RBC AUTO: 87 FL (ref 78–100)
PLATELET # BLD AUTO: 156 10E3/UL (ref 150–450)
POTASSIUM SERPL-SCNC: 4.3 MMOL/L (ref 3.4–5.3)
RBC # BLD AUTO: 4.85 10E6/UL (ref 3.8–5.2)
SODIUM SERPL-SCNC: 138 MMOL/L (ref 135–145)
WBC # BLD AUTO: 5.6 10E3/UL (ref 4–11)

## 2025-04-16 PROCEDURE — 80048 BASIC METABOLIC PNL TOTAL CA: CPT | Performed by: NURSE PRACTITIONER

## 2025-04-16 PROCEDURE — 85027 COMPLETE CBC AUTOMATED: CPT | Performed by: NURSE PRACTITIONER

## 2025-04-16 PROCEDURE — 36415 COLL VENOUS BLD VENIPUNCTURE: CPT | Performed by: NURSE PRACTITIONER

## 2025-04-16 RX ORDER — METOPROLOL SUCCINATE 50 MG/1
50 TABLET, EXTENDED RELEASE ORAL DAILY
Qty: 90 TABLET | Refills: 4 | Status: SHIPPED | OUTPATIENT
Start: 2025-04-16

## 2025-04-16 RX ORDER — DILTIAZEM HYDROCHLORIDE 30 MG/1
30 TABLET, FILM COATED ORAL DAILY PRN
Qty: 30 TABLET | Refills: 1 | Status: SHIPPED | OUTPATIENT
Start: 2025-04-16

## 2025-04-16 ASSESSMENT — PAIN SCALES - GENERAL: PAINLEVEL_OUTOF10: NO PAIN (0)

## 2025-04-16 NOTE — LETTER
4/16/2025    Nomi Cartwright, DO  [address] [place] [address]    RE: Nadia CRYSTAL Wilberto       Dear Colleague,     I had the pleasure of seeing Nadia Ladd in the [facility].              ~Cardiology Clinic Visit~    Primary Cardiologist: Dr. 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 S/p surgical resection 4/2023 with colectomy and started on FOLFOX chemotherapy 5/31/2023. Follows with Dr. CECILIA Bateman at [facility].   Paroxysmal atrial fibrillation with RVR  [id number] score 3 (gender, age, hypertension)  Hypertension  CKD, stage IIIb  Prediabetes  Crohn's disease  Anemia     She was initially evaluated by Dr. Garza on 6/30/2023 after her first dose of chemotherapy she presented to the emergency department with lightheadedness with positional changes.  She was found to have new onset of atrial fibrillation with RVR.  She was admitted to [facility] for 2 days and was started on rate control with IV diltiazem.  Ultimately, she converted to sinus rhythm and was discharged home on metoprolol XL 25 mg daily.    On 8/4/2023 she received chemotherapy and again developed atrial fibrillation with RVR.  She was placed on IV diltiazem infusion with improved rate control.  Ultimately, she was discharged home and converted later.      In follow up, and with ongoing chemotherapy treatment, she has struggled with palpitations that are not always associated with a return of her PAF.   She has been continued on Eliquis and Metoprolol.  We have offered her to use as needed Diltiazem for episodes of palpitations or PAF.    She completed chemotherapy December 2024.  After that, she only had 1 recognizable  episode of PAF shotly after that.  Diltiazem resolved this episode.  Repeat echocardiogram 2025 stable.  Labs today showing improved renal function, stable CBC.    Interval 04/16/25    States things are getting better from an AF standpoint.  Less and less episodes.  She continues to use as needed Diltiazem about once per month for recurrence.  Still notes that she feels symptomatic to the arrhythmia when it occurs, sometimes can tell that it is going to happen beforehand.  No symptoms today.  Has better BP control at home per her reports, average systolic 120s.  __________________________________________________________________         Assessment and Impression:     Paroxysmal atrial fibrillation with RVR  [id number] score 3 (gender, age, hypertension).  On Xarelto, scheduled BB and PRN CCB for breakthrough episodes.  Colon cancer, s/p resection with colectomy and FOLFOX chemotherapy, completed 12/2024  Hypertension  CKD, stage IIIb  Prediabetes  Crohn's disease  Anemia         Recommendations and Plan:     Continue with current plan.  No medication changes for today.  Continue healthy weight loss and BP management for PAF management.  Continue long-term OAC, no bleeding issues to report.  [facility] follow up in 1-year for routine visit.  __________________________________________________________________    Thank you for the opportunity to participate in this pleasant patient's care.    We would be happy to see this patient sooner for any concerns in the meantime.    LUIS Moran, CNP   Nurse Practitioner  [facility]    Today's clinic visit entailed:  The following tests were independently interpreted by me as noted in my documentation: labs, echo  Prescription drug management  The level of medical decision making during this visit was of moderate complexity.  Review of the result(s) of each unique test - cardiac testing, cardiac imaging, labs  Care everywhere reviewed for additional  records to facilitate comprehensive patient care.  Recent hospitalization records and notes reviewed to facilitate comprehensive care coordination.    Orders this Visit:  Orders Placed This Encounter   Procedures     Follow-Up with Cardiology- [facility]     Orders Placed This Encounter   Medications     rivaroxaban ANTICOAGULANT (XARELTO) 15 MG TABS tablet     Sig: Take 1 tablet (15 mg) by mouth daily (with dinner).     Dispense:  90 tablet     Refill:  4     metoprolol succinate ER (TOPROL XL) 50 MG 24 hr tablet     Sig: Take 1 tablet (50 mg) by mouth daily.     Dispense:  90 tablet     Refill:  4     diltiazem (CARDIZEM) 30 MG tablet     Sig: Take 1 tablet (30 mg) by mouth daily as needed (Paroxysmal atrial fibrillation, palpitations).     Dispense:  30 tablet     Refill:  1     Medications Discontinued During This Encounter   Medication Reason     rivaroxaban ANTICOAGULANT (XARELTO) 15 MG TABS tablet Reorder (No AVS)     diltiazem (CARDIZEM) 30 MG tablet Reorder (No AVS)     metoprolol succinate ER (TOPROL XL) 50 MG 24 hr tablet Reorder (No AVS)     Encounter Diagnoses   Name Primary?     Paroxysmal atrial fibrillation (H) Yes     Benign essential hypertension with target blood pressure below 140/90      Stage 3b chronic kidney disease (H)      Malignant neoplasm of sigmoid colon (H)      Immunosuppressed due to chemotherapy      Crohn's disease of large intestine without complication (H)      Paroxysmal atrial fibrillation with RVR (H)      CURRENT MEDICATIONS:  Current Outpatient Medications   Medication Sig Dispense Refill     allopurinol (ZYLOPRIM) 100 MG tablet Take 1 tablet (100 mg) by mouth daily. 90 tablet 3     cyanocobalamin 1000 MCG TBCR Take 1,000 mcg by mouth daily 100 tablet 1     diltiazem (CARDIZEM) 30 MG tablet Take 1 tablet (30 mg) by mouth daily as needed (Paroxysmal atrial fibrillation, palpitations). 30 tablet 1     fluocinonide (LIDEX) 0.05 % external cream APPLY A SMALL AMOUNT TO AFFECTED AREA(S)  "TWICE DAILY AS NEEDED 60 g 1     gabapentin (NEURONTIN) 300 MG capsule One capsule by mouth every morning and evening; two capsules by mouth every afternoon 120 capsule 2     loperamide (IMODIUM) 2 MG capsule Take 1 capsule (2 mg) by mouth daily as needed for diarrhea. 60 capsule 1     magnesium oxide 400 MG tablet Take 1 tablet (400 mg) by mouth 2 times daily. 180 tablet 3     metoprolol succinate ER (TOPROL XL) 50 MG 24 hr tablet Take 1 tablet (50 mg) by mouth daily. 90 tablet 4     Multiple Vitamins-Iron (MULTI-DAY PLUS IRON PO) Take 1 tablet by mouth daily       neomycin-polymyxin-dexAMETHasone (MAXITROL) 3.5-93626-1.1 SUSP ophthalmic susp PLACE 1 DROP INTO BOTH EYES4 TIMES DAILY. 5 mL 1     rivaroxaban ANTICOAGULANT (XARELTO) 15 MG TABS tablet Take 1 tablet (15 mg) by mouth daily (with dinner). 90 tablet 4     sodium bicarbonate 650 MG tablet TAKE 1 TABLET BY MOUTH TWICE A  tablet 3     VITAMIN D3 50 MCG (2000 UT) tablet TAKE 1 TABLET (50 MCG) BY MOUTH DAILY 30 tablet 6     ALLERGIES     Allergies   Allergen Reactions     No Known Drug Allergy      PAST MEDICAL, SURGICAL, FAMILY, SOCIAL HISTORY:  History was reviewed and updated as needed, see medical record.    Review of Systems:  A 10-point Review Of Systems is otherwise normal except for that which is noted in the HPI and interval summary.    Physical Exam:    Vitals: BP (!) 146/90 (BP Location: Right arm, Patient Position: Sitting, Cuff Size: Adult Large)   Pulse 78   Ht 1.575 m (5' 2\")   Wt 103 kg (227 lb)   LMP  (LMP Unknown)   SpO2 95%   BMI 41.52 kg/m    Constitutional: Appears stated age, well nourished, NAD.  Respiratory: Non-labored. Lungs clear.  Cardiovascular: RRR, normal S1 and S2. No murmur.  No edema.  GI: Soft, non-distended, non-tender.  Skin: Warm and dry.   Musculoskeletal/Extremities: Symmetrical movement.  Neurologic: No gross focal deficits. Alert, awake.  Psychiatric: Affect appropriate. Mentation normal.    Recent Lab " "Results:  LIPID RESULTS:  Lab Results   Component Value Date    CHOL 196 12/04/2023    CHOL 234 (H) 06/06/2019    HDL 65 12/04/2023    HDL 43 (L) 06/06/2019    LDL 69 12/04/2023     (H) 06/06/2019    TRIG 308 (H) 12/04/2023    TRIG 266 (H) 06/06/2019    CHOLHDLRATIO 6.0 (H) 01/23/2014     LIVER ENZYME RESULTS:  Lab Results   Component Value Date    AST 28 01/23/2025    AST 17 01/26/2021    ALT 26 01/23/2025    ALT 22 01/26/2021     CBC RESULTS:  Lab Results   Component Value Date    WBC 5.6 04/16/2025    WBC 7.2 07/08/2020    RBC 4.85 04/16/2025    RBC 4.24 07/08/2020    HGB 13.6 04/16/2025    HGB 12.7 07/08/2020    HCT 42.0 04/16/2025    HCT 40.7 07/08/2020    MCV 87 04/16/2025    MCV 96 07/08/2020    MCH 28.0 04/16/2025    MCH 30.0 07/08/2020    MCHC 32.4 04/16/2025    MCHC 31.2 (L) 07/08/2020    RDW 13.5 04/16/2025    RDW 13.9 07/08/2020     04/16/2025     07/08/2020     BMP RESULTS:  Lab Results   Component Value Date     04/16/2025     01/26/2021    POTASSIUM 4.3 04/16/2025    POTASSIUM 4.7 04/06/2023    POTASSIUM 4.3 10/09/2021    POTASSIUM 4.1 01/26/2021    CHLORIDE 103 04/16/2025    CHLORIDE 118 (H) 10/09/2021    CHLORIDE 111 (H) 01/26/2021    CO2 25 04/16/2025    CO2 20 10/09/2021    CO2 22 01/26/2021    ANIONGAP 10 04/16/2025    ANIONGAP 4 10/09/2021    ANIONGAP 7 01/26/2021     (H) 04/16/2025     (H) 05/10/2023     (H) 10/09/2021     (H) 01/26/2021    BUN 22.3 04/16/2025    BUN 25 10/09/2021    BUN 29 01/26/2021    CR 1.58 (H) 04/16/2025    CR 1.45 (H) 01/26/2021    GFRESTIMATED 35 (L) 04/16/2025    GFRESTIMATED 38 (L) 01/02/2024    GFRESTIMATED 38 (L) 01/26/2021    GFRESTBLACK 44 (L) 01/26/2021    LIVIER 9.6 04/16/2025    LIVIER 9.3 01/26/2021      A1C RESULTS:  Lab Results   Component Value Date    A1C 4.8 03/12/2018     INR RESULTS:  No results found for: \"INR\"    Recent Results (from the past 4320 hours)   Echocardiogram Complete   Result Value    " LVEF  60-65%    Located within [facility]    [id number]  [id number]  [id number]  [id number]^ROWDY^ALEXANDER^KHARI     [facility]  Echocardiography Laboratory  [address] [place] [address]     Name: JOVITA MAYO  MRN: [id number]  : 1955  Study Date: 2025 11:52 AM  Age: 69 yrs  Gender: Female  Patient Location: [facility]  Reason For Study: Paroxysmal atrial fibrillation with RVR  History: HTN, PHTN, AFIB, CKD  Ordering Physician: ALEXANDER RENO  Referring Physician: ALEXANDER RENO  Performed By: Priya Mixon     BSA: 2.0 m2  Height: 61 in  Weight: 224 lb  HR: 89  BP: 138/80 mmHg  ______________________________________________________________________________  Procedure  Echocardiogram with two-dimensional, color and spectral Doppler.  ______________________________________________________________________________  Interpretation Summary     Left ventricular systolic function is normal.The visual ejection fraction is  60-65%.  The right ventricular systolic function is normal.  There is mild (1+) mitral regurgitation.There is trace aortic regurgitation.  Pulmonary hypertension- RVSP 34 mm hg +RA.     Compared to prior study dated 2023 no significant changes.  ______________________________________________________________________________  Left Ventricle  The left ventricle is normal in size. There is normal left ventricular wall  thickness. Left ventricular systolic function is normal. The visual ejection  fraction is 60-65%. Diastolic Doppler findings (E/E' ratio and/or other  parameters) suggest left ventricular filling pressures are normal. No regional  wall motion abnormalities noted.     Right Ventricle  The right ventricle is normal size. The right ventricular systolic function is  normal.     Atria  Normal left atrial size. Right atrial size is normal. There is no color  Doppler evidence of an atrial shunt.     Mitral Valve  There is mild (1+) mitral regurgitation.     Tricuspid  Valve  There is trace tricuspid regurgitation. The right ventricular systolic  pressure is approximated at 33.6 mmHg plus the right atrial pressure.  Pulmonary hypertension.     Aortic Valve  The aortic valve is not well visualized. Mild aortic valve sclerosis. There is  trace aortic regurgitation. No aortic stenosis is present.     Pulmonic Valve  There is trace pulmonic valvular regurgitation. There is no pulmonic valvular  stenosis.     Vessels  The aortic root is normal size. Normal size ascending aorta. The inferior vena  cava was normal in size with preserved respiratory variability.     Pericardium  There is no pericardial effusion.     Rhythm  Sinus rhythm was noted.  ______________________________________________________________________________  MMode/2D Measurements & Calculations  IVSd: 0.80 cm     LVIDd: 4.6 cm  LVIDs: 3.0 cm  LVPWd: 0.88 cm  FS: 34.3 %  LV mass(C)d: 124.8 grams  LV mass(C)dI: 63.0 grams/m2  Ao root diam: 2.9 cm  LA dimension: 3.0 cm  asc Aorta Diam: 3.5 cm  LA/Ao: 1.1  Ao root diam index Ht(cm/m): 1.8  Ao root diam index BSA (cm/m2): 1.4  Asc Ao diam index BSA (cm/m2): 1.8  Asc Ao diam index Ht(cm/m): 2.2  LA Volume (BP): 33.0 ml     LA Volume Index (BP): 16.7 ml/m2  RV Base: 3.4 cm  RWT: 0.39  TAPSE: 2.4 cm     Doppler Measurements & Calculations  MV E max fabio: 57.2 cm/sec  MV A max fabio: 83.3 cm/sec  MV E/A: 0.69  MV dec slope: 261.2 cm/sec2  MV dec time: 0.22 sec  Ao V2 max: 206.5 cm/sec  Ao max P.0 mmHg  LV V1 max P.1 mmHg  LV V1 max: 113.1 cm/sec  LV V1 VTI: 25.8 cm  PA acc time: 0.09 sec  TR max fabio: 289.9 cm/sec  TR max P.6 mmHg  AV Fabio Ratio (DI): 0.55  E/E' av.5  Lateral E/e': 5.5     Medial E/e': 7.4  RV S [place]: 11.5 cm/sec     ______________________________________________________________________________  Report approved by: Froylan De La Rosa MD on 2025 03:00 PM                              Thank you for allowing me to participate in the care of your  patient.      Sincerely,     LUIS Moran CNP     [facility]  cc:   Nomi Cartwright, DO  [address] [place] [address]

## 2025-04-23 ENCOUNTER — OFFICE VISIT (OUTPATIENT)
Dept: GASTROENTEROLOGY | Facility: CLINIC | Age: 70
End: 2025-04-23
Attending: STUDENT IN AN ORGANIZED HEALTH CARE EDUCATION/TRAINING PROGRAM
Payer: COMMERCIAL

## 2025-04-23 VITALS
HEART RATE: 64 BPM | SYSTOLIC BLOOD PRESSURE: 149 MMHG | TEMPERATURE: 98.7 F | OXYGEN SATURATION: 97 % | BODY MASS INDEX: 41.59 KG/M2 | WEIGHT: 226 LBS | HEIGHT: 62 IN | DIASTOLIC BLOOD PRESSURE: 73 MMHG

## 2025-04-23 DIAGNOSIS — R74.8 ALKALINE PHOSPHATASE ELEVATION: Primary | ICD-10-CM

## 2025-04-23 DIAGNOSIS — R16.1 SPLENOMEGALY: ICD-10-CM

## 2025-04-23 PROCEDURE — 3077F SYST BP >= 140 MM HG: CPT | Performed by: STUDENT IN AN ORGANIZED HEALTH CARE EDUCATION/TRAINING PROGRAM

## 2025-04-23 PROCEDURE — 99214 OFFICE O/P EST MOD 30 MIN: CPT | Performed by: STUDENT IN AN ORGANIZED HEALTH CARE EDUCATION/TRAINING PROGRAM

## 2025-04-23 PROCEDURE — G0463 HOSPITAL OUTPT CLINIC VISIT: HCPCS | Performed by: STUDENT IN AN ORGANIZED HEALTH CARE EDUCATION/TRAINING PROGRAM

## 2025-04-23 PROCEDURE — 3078F DIAST BP <80 MM HG: CPT | Performed by: STUDENT IN AN ORGANIZED HEALTH CARE EDUCATION/TRAINING PROGRAM

## 2025-04-23 PROCEDURE — 1125F AMNT PAIN NOTED PAIN PRSNT: CPT | Performed by: STUDENT IN AN ORGANIZED HEALTH CARE EDUCATION/TRAINING PROGRAM

## 2025-04-23 ASSESSMENT — PAIN SCALES - GENERAL: PAINLEVEL_OUTOF10: MODERATE PAIN (5)

## 2025-04-23 NOTE — PROGRESS NOTES
[facility] Return Patient Visit    Date of Visit: April 23, 2025    Reason for referral: elevated alkaline phosphatase    Subjective: Ms. Ladd is a 68-year-old woman with a history of colon cancer and colon neuroendocrine tumor who presents for evaluation of elevated alkaline phosphatase.    Initial History:     Prior to her cancer diagnosis, she has a history of Crohn's disease.  She had been on sulfasalazine.  She does not think she ever took Imuran.  No history of prediabetes or high cholesterol.  Diagnosed with A-fib in the setting of her chemotherapy.  Has mildly elevated pulmonary pressures on echocardiogram.  Does not drink alcohol.    No previous known liver disease, abnormal LFTs, imaging tests. No known history of liver decompensation    Alkaline phosphatase noted to be mildly elevated 7/2023, MRI liver November 2023 was unrevealing.  Her MRI showed a 2 cm liver lesion that was not PET avid and has been stable on serial imaging.  This is thought to be a potential hemangioma.  Liver enzymes peaked in December 2023 around the time when she stopped chemotherapy (FOLFOX), alkaline phosphatase is gradually decreasing.  She finished chemotherapy in December, is now on surveillance.  Her CT scans have not shown recurrent disease.  Her CT scan does not show any process in her liver other than the small lesion that was previously identified.  She is noted to have splenomegaly on imaging.    Interval Events:  - She has been doing well.  Alkaline phosphatase has been slowly decreasing.  Liver has been stable on imaging without any liver lesions or biliary dilation.    ROS: 14 point ROS negative except for positives noted in HPI.    PMHx:  Past Medical History:   Diagnosis Date    Arthritis     Arthropathia 08/05/2010    B12 deficiency anemia 10/21/2010    Benign essential hypertension with target blood pressure below 140/90 10/10/2016    CARDIOVASCULAR SCREENING; LDL GOAL LESS THAN 160  10/31/2010    Crohn's colitis (H) 02/15/2011    Dermatitis-dishydrotic eczema-severe 08/05/2010    Hiatal hernia     History of blood transfusion Not sure    HTN (hypertension) 07/21/2011    Iron deficiency anemia 10/21/2010    Malignant neoplasm of sigmoid colon (H)     Obesity     Primary pulmonary hypertension (H) 04/06/2010       PSHx:  Past Surgical History:   Procedure Laterality Date    COLECTOMY WITHOUT COLOSTOMY N/A 4/6/2023    Procedure: Laparoscopic Converted to Open Total abdominal colectomy;  Surgeon: Jerome Ashley MD;  Location: [facility]    COLONOSCOPY  10/11/10    COLONOSCOPY N/A 2/27/2023    Procedure: ATTEMPTED COLONOSCOPY WITH SIGMOID STRICTURE BIOPSY;  Surgeon: Jonathan Alcocer MD;  Location: [facility]    ESOPHAGOSCOPY, GASTROSCOPY, DUODENOSCOPY (EGD), COMBINED N/A 2/27/2023    Procedure: ESOPHAGOGASTRODUODENOSCOPY, WITH BIOPSY;  Surgeon: Jonathan Alcocer MD;  Location: [facility]    HYSTERECTOMY TOTAL ABDOMINAL, BILATERAL SALPINGO-OOPHORECTOMY, COMBINED N/A 4/6/2023    Procedure: Hysterectomy total abdominal, bilateral salpingo-oophorectomy;  Surgeon: Sunitha Olea MD;  Location: [facility]    INSERT PORT VASCULAR ACCESS Right 5/25/2023    Procedure: Ultrasound-guided right internal jugular venous access port placement with fluoroscopy;  Surgeon: Martin Thomas DO;  Location: [facility]    IR CHEST PORT PLACEMENT > 5 YRS OF AGE  8/21/2023    IR PORT CHECK RIGHT  7/18/2023    IR PORT REMOVAL RIGHT  8/21/2023    REMOVE PORT VASCULAR ACCESS Left 7/31/2024    Procedure: Removal left-sided PowerPort;  Surgeon: Jonathan Alcocer MD;  Location: [facility]    SIGMOIDOSCOPY FLEXIBLE N/A 4/6/2023    Procedure: Sigmoidoscopy flexible;  Surgeon: Jerome Ashley MD;  Location: [facility]    SIGMOIDOSCOPY FLEXIBLE N/A 5/1/2024    Procedure: Sigmoidoscopy flexible;  Surgeon: Jerome Ashley MD;  Location: [facility] OR    SURGICAL HISTORY OF -   [birth year]    Perineorrhaphy, for widening vaginal  orifice    ZZC EXPLORATORY OF ABDOMEN  1989    laparoscopy       FamHx:  Family History   Problem Relation Age of Onset    Hypertension Mother         on meds, alive    Cerebrovascular Disease Father         stroke about age 65,  of cancer at 68 yrs    Diabetes Father         eventually took insulin    Anemia Father         Pernisios anemia    Kidney Disease Niece         kidney transplant    Kidney Disease Nephew         kidney transplant    Venous thrombosis No family hx of     Anesthesia Reaction No family hx of      No family history of liver disease, liver cancer    SocHx:  Social History     Socioeconomic History    Marital status:      Spouse name: Not on file    Number of children: Not on file    Years of education: Not on file    Highest education level: Not on file   Occupational History    Not on file   Tobacco Use    Smoking status: Never     Passive exposure: Current    Smokeless tobacco: Never   Vaping Use    Vaping status: Never Used   Substance and Sexual Activity    Alcohol use: No    Drug use: No    Sexual activity: Not Currently     Partners: Male   Other Topics Concern    Parent/sibling w/ CABG, MI or angioplasty before 65F 55M? No   Social History Narrative    Not on file     Social Drivers of Health     Financial Resource Strain: Low Risk  (2025)    Financial Resource Strain     Within the past 12 months, have you or your family members you live with been unable to get utilities (heat, electricity) when it was really needed?: No   Food Insecurity: Low Risk  (2025)    Food Insecurity     Within the past 12 months, did you worry that your food would run out before you got money to buy more?: No     Within the past 12 months, did the food you bought just not last and you didn t have money to get more?: No   Transportation Needs: Low Risk  (2025)    Transportation Needs     Within the past 12 months, has lack of transportation kept you from medical appointments, getting your  medicines, non-medical meetings or appointments, work, or from getting things that you need?: No   Physical Activity: Insufficiently Active (2/6/2025)    Exercise Vital Sign     Days of Exercise per Week: 2 days     Minutes of Exercise per Session: 20 min   Stress: No Stress Concern Present (2/6/2025)    [employer] - Occupational Stress Questionnaire     Feeling of Stress : Not at all   Social Connections: Unknown (2/6/2025)    Social Connection and Isolation Panel [NHANES]     Frequency of Communication with Friends and Family: Not on file     Frequency of Social Gatherings with Friends and Family: Once a week     Attends [employer] Services: Not on file     Active Member of Clubs or Organizations: Not on file     Attends Club or Organization Meetings: Not on file     Marital Status: Not on file   Interpersonal Safety: Low Risk  (2/10/2025)    Interpersonal Safety     Do you feel physically and emotionally safe where you currently live?: Yes     Within the past 12 months, have you been hit, slapped, kicked or otherwise physically hurt by someone?: No     Within the past 12 months, have you been humiliated or emotionally abused in other ways by your partner or ex-partner?: No   Housing Stability: Low Risk  (2/6/2025)    Housing Stability     Do you have housing? : Yes     Are you worried about losing your housing?: No       Medications:  Current Outpatient Medications   Medication Sig Dispense Refill    allopurinol (ZYLOPRIM) 100 MG tablet Take 1 tablet (100 mg) by mouth daily. 90 tablet 3    cyanocobalamin 1000 MCG TBCR Take 1,000 mcg by mouth daily 100 tablet 1    diltiazem (CARDIZEM) 30 MG tablet Take 1 tablet (30 mg) by mouth daily as needed (Paroxysmal atrial fibrillation, palpitations). 30 tablet 1    fluocinonide (LIDEX) 0.05 % external cream APPLY A SMALL AMOUNT TO AFFECTED AREA(S) TWICE DAILY AS NEEDED 60 g 1    gabapentin (NEURONTIN) 300 MG capsule One capsule by mouth every  "morning and evening; two capsules by mouth every afternoon 120 capsule 2    loperamide (IMODIUM) 2 MG capsule Take 1 capsule (2 mg) by mouth daily as needed for diarrhea. 60 capsule 1    magnesium oxide 400 MG tablet Take 1 tablet (400 mg) by mouth 2 times daily. 180 tablet 3    metoprolol succinate ER (TOPROL XL) 50 MG 24 hr tablet Take 1 tablet (50 mg) by mouth daily. 90 tablet 4    Multiple Vitamins-Iron (MULTI-DAY PLUS IRON PO) Take 1 tablet by mouth daily      neomycin-polymyxin-dexAMETHasone (MAXITROL) 3.5-82341-0.1 SUSP ophthalmic susp PLACE 1 DROP INTO BOTH EYES4 TIMES DAILY. 5 mL 1    rivaroxaban ANTICOAGULANT (XARELTO) 15 MG TABS tablet Take 1 tablet (15 mg) by mouth daily (with dinner). 90 tablet 4    sodium bicarbonate 650 MG tablet TAKE 1 TABLET BY MOUTH TWICE A  tablet 3    VITAMIN D3 50 MCG (2000 UT) tablet TAKE 1 TABLET (50 MCG) BY MOUTH DAILY 30 tablet 6     No current facility-administered medications for this visit.     No OTCs, herbals    Allergies:  Allergies   Allergen Reactions    No Known Drug Allergy        Objective:  BP (!) 149/73   Pulse 64   Temp 98.7  F (37.1  C) (Oral)   Ht 1.575 m (5' 2\")   Wt 102.5 kg (226 lb)   LMP  (LMP Unknown)   SpO2 97%   BMI 41.34 kg/m    Constitutional: pleasant woman in NAD  Eyes: non icteric  Respiratory: Normal respiratory excursion   MSK: normal range of motion of visualized extremities  Abd: Non distended  Skin: No jaundice  Psychiatric: normal mood and orientation    Labs:  Last Comprehensive Metabolic Panel:  Sodium   Date Value Ref Range Status   04/16/2025 138 135 - 145 mmol/L Final   01/26/2021 140 133 - 144 mmol/L Final     Potassium   Date Value Ref Range Status   04/16/2025 4.3 3.4 - 5.3 mmol/L Final   10/09/2021 4.3 3.4 - 5.3 mmol/L Final   01/26/2021 4.1 3.4 - 5.3 mmol/L Final     Potassium POCT   Date Value Ref Range Status   04/06/2023 4.7 3.5 - 5.0 mmol/L Final     Chloride   Date Value Ref Range Status   04/16/2025 103 98 - 107 " mmol/L Final   10/09/2021 118 (H) 94 - 109 mmol/L Final   01/26/2021 111 (H) 94 - 109 mmol/L Final     Carbon Dioxide   Date Value Ref Range Status   01/26/2021 22 20 - 32 mmol/L Final     Carbon Dioxide (CO2)   Date Value Ref Range Status   04/16/2025 25 22 - 29 mmol/L Final   10/09/2021 20 20 - 32 mmol/L Final     Anion Gap   Date Value Ref Range Status   04/16/2025 10 7 - 15 mmol/L Final   10/09/2021 4 3 - 14 mmol/L Final   01/26/2021 7 3 - 14 mmol/L Final     Glucose   Date Value Ref Range Status   04/16/2025 124 (H) 70 - 99 mg/dL Final   10/09/2021 159 (H) 70 - 99 mg/dL Final   01/26/2021 107 (H) 70 - 99 mg/dL Final     GLUCOSE BY METER POCT   Date Value Ref Range Status   05/10/2023 116 (H) 70 - 99 mg/dL Final     Comment:     Dr/RN Notified     Urea Nitrogen   Date Value Ref Range Status   04/16/2025 22.3 8.0 - 23.0 mg/dL Final   10/09/2021 25 7 - 30 mg/dL Final   01/26/2021 29 7 - 30 mg/dL Final     Creatinine   Date Value Ref Range Status   04/16/2025 1.58 (H) 0.51 - 0.95 mg/dL Final   01/26/2021 1.45 (H) 0.52 - 1.04 mg/dL Final     GFR Estimate   Date Value Ref Range Status   04/16/2025 35 (L) >60 mL/min/1.73m2 Final     Comment:     eGFR calculated using 2021 CKD-EPI equation.   01/26/2021 38 (L) >60 mL/min/[1.73_m2] Final     Comment:     Non  GFR Calc  Starting 12/18/2018, serum creatinine based estimated GFR (eGFR) will be   calculated using the Chronic Kidney Disease Epidemiology Collaboration   (CKD-EPI) equation.       GFR, ESTIMATED POCT   Date Value Ref Range Status   01/02/2024 38 (L) >60 mL/min/1.73m2 Final     Calcium   Date Value Ref Range Status   04/16/2025 9.6 8.8 - 10.4 mg/dL Final   01/26/2021 9.3 8.5 - 10.1 mg/dL Final     Bilirubin Total   Date Value Ref Range Status   01/23/2025 0.8 <=1.2 mg/dL Final   01/26/2021 0.5 0.2 - 1.3 mg/dL Final     Alkaline Phosphatase   Date Value Ref Range Status   01/23/2025 176 (H) 40 - 150 U/L Final   01/26/2021 136 40 - 150 U/L Final      ALT   Date Value Ref Range Status   01/23/2025 26 0 - 50 U/L Final   01/26/2021 22 0 - 50 U/L Final     AST   Date Value Ref Range Status   01/23/2025 28 0 - 45 U/L Final   01/26/2021 17 0 - 45 U/L Final       Lab Results   Component Value Date    WBC 6.1 07/02/2024    WBC 7.2 07/08/2020     Lab Results   Component Value Date    RBC 4.32 07/02/2024    RBC 4.24 07/08/2020     Lab Results   Component Value Date    HGB 12.9 07/02/2024    HGB 12.7 07/08/2020     Lab Results   Component Value Date    HCT 38.3 07/02/2024    HCT 40.7 07/08/2020     Lab Results   Component Value Date    MCV 89 07/02/2024    MCV 96 07/08/2020     Lab Results   Component Value Date    MCH 29.9 07/02/2024    MCH 30.0 07/08/2020     Lab Results   Component Value Date    MCHC 33.7 07/02/2024    MCHC 31.2 07/08/2020     Lab Results   Component Value Date    RDW 13.2 07/02/2024    RDW 13.9 07/08/2020     Lab Results   Component Value Date     07/02/2024     07/08/2020         Previous work-up:   Lab Results   Component Value Date    HEPBANG Nonreactive 07/15/2024    HBCAB Nonreactive 07/15/2024    HCVAB Nonreactive 07/08/2020    BRENT 195 07/15/2024    IRONSAT 41 11/08/2023    TTG 0.3 07/15/2024    TTGG <0.6 07/15/2024    MITM2 <1.0 07/15/2024    TSH 2.39 08/04/2023    CHOL 196 12/04/2023    HDL 65 12/04/2023    LDL 69 12/04/2023    TRIG 308 (H) 12/04/2023    A1C 4.8 03/12/2018      Lab Results   Component Value Date    SPECDES  05/24/2023     Blood: ACD      LDRESULTS  04/21/2023     Fusion Event: NEGATIVE           Imaging: reviewed in EHR    Independently reviewed labs and imaging.     Assessment/Plan: Ms. Ladd is a 69-year-old woman with a history of colon cancer and colon neuroendocrine tumor who presents for evaluation of elevated alkaline phosphatase.     Her alkaline phosphatase is gradually improved with time.  Serologic workup for other causes of elevated alkaline phosphatase is negative.  Suspect that her elevation  was related to chemotherapy.  Her imaging has shown slightly enlarged spleen.  Discussed that she does not have clear signs of liver scarring on her CAT scans.  Enlarged spleen could be from metabolic liver disease with fibrosis versus mild noncirrhotic portal hypertension (at risk for this with FOLFOX).  Discussed doing an MRI elastography to evaluate if she has any liver scarring.  Her fib 4 is 2 which is at the cutoff for ruling out advanced fibrosis given her age.  Given that she is getting regular CAT scans, is reasonable to keep an eye on her liver with her CAT scans.  If she is signs of worsening of portal hypertension on her imaging would reconsider evaluation of liver fibrosis.  If she has signs of progressive liver disease on her CT scan (nodular appearing liver), discussed that would require long-term follow-up in our clinic.  If her alkaline phosphatase rises again would consider evaluation including a biopsy but right now no plan for this.  Alcohol discussed the treatment metabolic liver disease is improving metabolic risk factors.    No orders of the defined types were placed in this encounter.      RTC PRN    Senia Meadows MD MS  Hepatology/Liver Transplant  [facility]

## 2025-04-23 NOTE — NURSING NOTE
"Chief Complaint   Patient presents with    RECHECK     Follow Up     BP (!) 66/39   Pulse 64   Temp 98.7  F (37.1  C) (Oral)   Ht 1.575 m (5' 2\")   Wt 102.5 kg (226 lb)   LMP  (LMP Unknown)   SpO2 97%   BMI 41.34 kg/m      Taylor Mooney on 4/23/2025 at 12:40 PM    "

## 2025-04-23 NOTE — LETTER
4/23/2025      Nadia Ladd  [address]  [facility] [address]      Dear Colleague,    Thank you for referring your patient, Nadia Ladd, to the [facility] [place]. Please see a copy of my visit note below.    [facility] Return Patient Visit    Date of Visit: April 23, 2025    Reason for referral: elevated alkaline phosphatase    Subjective: Ms. Ladd is a 68-year-old woman with a history of colon cancer and colon neuroendocrine tumor who presents for evaluation of elevated alkaline phosphatase.    Initial History:     Prior to her cancer diagnosis, she has a history of Crohn's disease.  She had been on sulfasalazine.  She does not think she ever took Imuran.  No history of prediabetes or high cholesterol.  Diagnosed with A-fib in the setting of her chemotherapy.  Has mildly elevated pulmonary pressures on echocardiogram.  Does not drink alcohol.    No previous known liver disease, abnormal LFTs, imaging tests. No known history of liver decompensation    Alkaline phosphatase noted to be mildly elevated 7/2023, MRI liver November 2023 was unrevealing.  Her MRI showed a 2 cm liver lesion that was not PET avid and has been stable on serial imaging.  This is thought to be a potential hemangioma.  Liver enzymes peaked in December 2023 around the time when she stopped chemotherapy (FOLFOX), alkaline phosphatase is gradually decreasing.  She finished chemotherapy in December, is now on surveillance.  Her CT scans have not shown recurrent disease.  Her CT scan does not show any process in her liver other than the small lesion that was previously identified.  She is noted to have splenomegaly on imaging.    Interval Events:  - She has been doing well.  Alkaline phosphatase has been slowly decreasing.  Liver has been stable on imaging without any liver lesions or biliary dilation.    ROS: 14 point ROS negative except for positives noted in HPI.    PMHx:  Past  Medical History:   Diagnosis Date     Arthritis      Arthropathia 08/05/2010     B12 deficiency anemia 10/21/2010     Benign essential hypertension with target blood pressure below 140/90 10/10/2016     CARDIOVASCULAR SCREENING; LDL GOAL LESS THAN 160 10/31/2010     Crohn's colitis (H) 02/15/2011     Dermatitis-dishydrotic eczema-severe 08/05/2010     Hiatal hernia      History of blood transfusion Not sure     HTN (hypertension) 07/21/2011     Iron deficiency anemia 10/21/2010     Malignant neoplasm of sigmoid colon (H)      Obesity      Primary pulmonary hypertension (H) 04/06/2010       PSHx:  Past Surgical History:   Procedure Laterality Date     COLECTOMY WITHOUT COLOSTOMY N/A 4/6/2023    Procedure: Laparoscopic Converted to Open Total abdominal colectomy;  Surgeon: Jerome Ashley MD;  Location: [facility] OR     COLONOSCOPY  10/11/10     COLONOSCOPY N/A 2/27/2023    Procedure: ATTEMPTED COLONOSCOPY WITH SIGMOID STRICTURE BIOPSY;  Surgeon: Jonathan Alcocer MD;  Location: [facility]     ESOPHAGOSCOPY, GASTROSCOPY, DUODENOSCOPY (EGD), COMBINED N/A 2/27/2023    Procedure: ESOPHAGOGASTRODUODENOSCOPY, WITH BIOPSY;  Surgeon: Jonathan Alcocer MD;  Location: [facility]     HYSTERECTOMY TOTAL ABDOMINAL, BILATERAL SALPINGO-OOPHORECTOMY, COMBINED N/A 4/6/2023    Procedure: Hysterectomy total abdominal, bilateral salpingo-oophorectomy;  Surgeon: Sunitha Olea MD;  Location: [facility] OR     INSERT PORT VASCULAR ACCESS Right 5/25/2023    Procedure: Ultrasound-guided right internal jugular venous access port placement with fluoroscopy;  Surgeon: Martin Thomas DO;  Location: [facility]     IR CHEST PORT PLACEMENT > 5 YRS OF AGE  8/21/2023     IR PORT CHECK RIGHT  7/18/2023     IR PORT REMOVAL RIGHT  8/21/2023     REMOVE PORT VASCULAR ACCESS Left 7/31/2024    Procedure: Removal left-sided PowerPort;  Surgeon: Jonathan Alcocer MD;  Location: [facility]     SIGMOIDOSCOPY FLEXIBLE N/A 4/6/2023    Procedure: Sigmoidoscopy flexible;   Surgeon: Jerome Ashley MD;  Location: [facility] OR     SIGMOIDOSCOPY FLEXIBLE N/A 2024    Procedure: Sigmoidoscopy flexible;  Surgeon: Jerome Ashley MD;  Location: [facility] OR     SURGICAL HISTORY OF -   77    Perineorrhaphy, for widening vaginal orifice     ZZC EXPLORATORY OF ABDOMEN      laparoscopy       FamHx:  Family History   Problem Relation Age of Onset     Hypertension Mother         on meds, alive     Cerebrovascular Disease Father         stroke about age 65,  of cancer at 68 yrs     Diabetes Father         eventually took insulin     Anemia Father         Pernisios anemia     Kidney Disease Niece         kidney transplant     Kidney Disease Nephew         kidney transplant     Venous thrombosis No family hx of      Anesthesia Reaction No family hx of      No family history of liver disease, liver cancer    SocHx:  Social History     Socioeconomic History     Marital status:      Spouse name: Not on file     Number of children: Not on file     Years of education: Not on file     Highest education level: Not on file   Occupational History     Not on file   Tobacco Use     Smoking status: Never     Passive exposure: Current     Smokeless tobacco: Never   Vaping Use     Vaping status: Never Used   Substance and Sexual Activity     Alcohol use: No     Drug use: No     Sexual activity: Not Currently     Partners: Male   Other Topics Concern     Parent/sibling w/ CABG, MI or angioplasty before 65F 55M? No   Social History Narrative     Not on file     Social Drivers of Health     Financial Resource Strain: Low Risk  (2025)    Financial Resource Strain      Within the past 12 months, have you or your family members you live with been unable to get utilities (heat, electricity) when it was really needed?: No   Food Insecurity: Low Risk  (2025)    Food Insecurity      Within the past 12 months, did you worry that your food would run out before you got money to buy  more?: No      Within the past 12 months, did the food you bought just not last and you didn t have money to get more?: No   Transportation Needs: Low Risk  (2/6/2025)    Transportation Needs      Within the past 12 months, has lack of transportation kept you from medical appointments, getting your medicines, non-medical meetings or appointments, work, or from getting things that you need?: No   Physical Activity: Insufficiently Active (2/6/2025)    Exercise Vital Sign      Days of Exercise per Week: 2 days      Minutes of Exercise per Session: 20 min   Stress: No Stress Concern Present (2/6/2025)    [employer] - Occupational Stress Questionnaire      Feeling of Stress : Not at all   Social Connections: Unknown (2/6/2025)    Social Connection and Isolation Panel [NHANES]      Frequency of Communication with Friends and Family: Not on file      Frequency of Social Gatherings with Friends and Family: Once a week      Attends [employer] Services: Not on file      Active Member of Clubs or Organizations: Not on file      Attends Club or Organization Meetings: Not on file      Marital Status: Not on file   Interpersonal Safety: Low Risk  (2/10/2025)    Interpersonal Safety      Do you feel physically and emotionally safe where you currently live?: Yes      Within the past 12 months, have you been hit, slapped, kicked or otherwise physically hurt by someone?: No      Within the past 12 months, have you been humiliated or emotionally abused in other ways by your partner or ex-partner?: No   Housing Stability: Low Risk  (2/6/2025)    Housing Stability      Do you have housing? : Yes      Are you worried about losing your housing?: No       Medications:  Current Outpatient Medications   Medication Sig Dispense Refill     allopurinol (ZYLOPRIM) 100 MG tablet Take 1 tablet (100 mg) by mouth daily. 90 tablet 3     cyanocobalamin 1000 MCG TBCR Take 1,000 mcg by mouth daily 100 tablet 1     diltiazem  "(CARDIZEM) 30 MG tablet Take 1 tablet (30 mg) by mouth daily as needed (Paroxysmal atrial fibrillation, palpitations). 30 tablet 1     fluocinonide (LIDEX) 0.05 % external cream APPLY A SMALL AMOUNT TO AFFECTED AREA(S) TWICE DAILY AS NEEDED 60 g 1     gabapentin (NEURONTIN) 300 MG capsule One capsule by mouth every morning and evening; two capsules by mouth every afternoon 120 capsule 2     loperamide (IMODIUM) 2 MG capsule Take 1 capsule (2 mg) by mouth daily as needed for diarrhea. 60 capsule 1     magnesium oxide 400 MG tablet Take 1 tablet (400 mg) by mouth 2 times daily. 180 tablet 3     metoprolol succinate ER (TOPROL XL) 50 MG 24 hr tablet Take 1 tablet (50 mg) by mouth daily. 90 tablet 4     Multiple Vitamins-Iron (MULTI-DAY PLUS IRON PO) Take 1 tablet by mouth daily       neomycin-polymyxin-dexAMETHasone (MAXITROL) 3.5-69295-3.1 SUSP ophthalmic susp PLACE 1 DROP INTO BOTH EYES4 TIMES DAILY. 5 mL 1     rivaroxaban ANTICOAGULANT (XARELTO) 15 MG TABS tablet Take 1 tablet (15 mg) by mouth daily (with dinner). 90 tablet 4     sodium bicarbonate 650 MG tablet TAKE 1 TABLET BY MOUTH TWICE A  tablet 3     VITAMIN D3 50 MCG (2000 UT) tablet TAKE 1 TABLET (50 MCG) BY MOUTH DAILY 30 tablet 6     No current facility-administered medications for this visit.     No OTCs, herbals    Allergies:  Allergies   Allergen Reactions     No Known Drug Allergy        Objective:  BP (!) 149/73   Pulse 64   Temp 98.7  F (37.1  C) (Oral)   Ht 1.575 m (5' 2\")   Wt 102.5 kg (226 lb)   LMP  (LMP Unknown)   SpO2 97%   BMI 41.34 kg/m    Constitutional: pleasant woman in NAD  Eyes: non icteric  Respiratory: Normal respiratory excursion   MSK: normal range of motion of visualized extremities  Abd: Non distended  Skin: No jaundice  Psychiatric: normal mood and orientation    Labs:  Last Comprehensive Metabolic Panel:  Sodium   Date Value Ref Range Status   04/16/2025 138 135 - 145 mmol/L Final   01/26/2021 140 133 - 144 mmol/L " Final     Potassium   Date Value Ref Range Status   04/16/2025 4.3 3.4 - 5.3 mmol/L Final   10/09/2021 4.3 3.4 - 5.3 mmol/L Final   01/26/2021 4.1 3.4 - 5.3 mmol/L Final     Potassium POCT   Date Value Ref Range Status   04/06/2023 4.7 3.5 - 5.0 mmol/L Final     Chloride   Date Value Ref Range Status   04/16/2025 103 98 - 107 mmol/L Final   10/09/2021 118 (H) 94 - 109 mmol/L Final   01/26/2021 111 (H) 94 - 109 mmol/L Final     Carbon Dioxide   Date Value Ref Range Status   01/26/2021 22 20 - 32 mmol/L Final     Carbon Dioxide (CO2)   Date Value Ref Range Status   04/16/2025 25 22 - 29 mmol/L Final   10/09/2021 20 20 - 32 mmol/L Final     Anion Gap   Date Value Ref Range Status   04/16/2025 10 7 - 15 mmol/L Final   10/09/2021 4 3 - 14 mmol/L Final   01/26/2021 7 3 - 14 mmol/L Final     Glucose   Date Value Ref Range Status   04/16/2025 124 (H) 70 - 99 mg/dL Final   10/09/2021 159 (H) 70 - 99 mg/dL Final   01/26/2021 107 (H) 70 - 99 mg/dL Final     GLUCOSE BY METER POCT   Date Value Ref Range Status   05/10/2023 116 (H) 70 - 99 mg/dL Final     Comment:     Dr/RN Notified     Urea Nitrogen   Date Value Ref Range Status   04/16/2025 22.3 8.0 - 23.0 mg/dL Final   10/09/2021 25 7 - 30 mg/dL Final   01/26/2021 29 7 - 30 mg/dL Final     Creatinine   Date Value Ref Range Status   04/16/2025 1.58 (H) 0.51 - 0.95 mg/dL Final   01/26/2021 1.45 (H) 0.52 - 1.04 mg/dL Final     GFR Estimate   Date Value Ref Range Status   04/16/2025 35 (L) >60 mL/min/1.73m2 Final     Comment:     eGFR calculated using 2021 CKD-EPI equation.   01/26/2021 38 (L) >60 mL/min/[1.73_m2] Final     Comment:     Non  GFR Calc  Starting 12/18/2018, serum creatinine based estimated GFR (eGFR) will be   calculated using the Chronic Kidney Disease Epidemiology Collaboration   (CKD-EPI) equation.       GFR, ESTIMATED POCT   Date Value Ref Range Status   01/02/2024 38 (L) >60 mL/min/1.73m2 Final     Calcium   Date Value Ref Range Status    04/16/2025 9.6 8.8 - 10.4 mg/dL Final   01/26/2021 9.3 8.5 - 10.1 mg/dL Final     Bilirubin Total   Date Value Ref Range Status   01/23/2025 0.8 <=1.2 mg/dL Final   01/26/2021 0.5 0.2 - 1.3 mg/dL Final     Alkaline Phosphatase   Date Value Ref Range Status   01/23/2025 176 (H) 40 - 150 U/L Final   01/26/2021 136 40 - 150 U/L Final     ALT   Date Value Ref Range Status   01/23/2025 26 0 - 50 U/L Final   01/26/2021 22 0 - 50 U/L Final     AST   Date Value Ref Range Status   01/23/2025 28 0 - 45 U/L Final   01/26/2021 17 0 - 45 U/L Final       Lab Results   Component Value Date    WBC 6.1 07/02/2024    WBC 7.2 07/08/2020     Lab Results   Component Value Date    RBC 4.32 07/02/2024    RBC 4.24 07/08/2020     Lab Results   Component Value Date    HGB 12.9 07/02/2024    HGB 12.7 07/08/2020     Lab Results   Component Value Date    HCT 38.3 07/02/2024    HCT 40.7 07/08/2020     Lab Results   Component Value Date    MCV 89 07/02/2024    MCV 96 07/08/2020     Lab Results   Component Value Date    MCH 29.9 07/02/2024    MCH 30.0 07/08/2020     Lab Results   Component Value Date    MCHC 33.7 07/02/2024    MCHC 31.2 07/08/2020     Lab Results   Component Value Date    RDW 13.2 07/02/2024    RDW 13.9 07/08/2020     Lab Results   Component Value Date     07/02/2024     07/08/2020         Previous work-up:   Lab Results   Component Value Date    HEPBANG Nonreactive 07/15/2024    HBCAB Nonreactive 07/15/2024    HCVAB Nonreactive 07/08/2020    BRENT 195 07/15/2024    IRONSAT 41 11/08/2023    TTG 0.3 07/15/2024    TTGG <0.6 07/15/2024    MITM2 <1.0 07/15/2024    TSH 2.39 08/04/2023    CHOL 196 12/04/2023    HDL 65 12/04/2023    LDL 69 12/04/2023    TRIG 308 (H) 12/04/2023    A1C 4.8 03/12/2018      Lab Results   Component Value Date    SPECDES  05/24/2023     Blood: ACD      LDRESULTS  04/21/2023     Fusion Event: NEGATIVE           Imaging: reviewed in EHR    Independently reviewed labs and imaging.      Assessment/Plan: Ms. Ladd is a 69-year-old woman with a history of colon cancer and colon neuroendocrine tumor who presents for evaluation of elevated alkaline phosphatase.     Her alkaline phosphatase is gradually improved with time.  Serologic workup for other causes of elevated alkaline phosphatase is negative.  Suspect that her elevation was related to chemotherapy.  Her imaging has shown slightly enlarged spleen.  Discussed that she does not have clear signs of liver scarring on her CAT scans.  Enlarged spleen could be from metabolic liver disease with fibrosis versus mild noncirrhotic portal hypertension (at risk for this with FOLFOX).  Discussed doing an MRI elastography to evaluate if she has any liver scarring.  Her fib 4 is 2 which is at the cutoff for ruling out advanced fibrosis given her age.  Given that she is getting regular CAT scans, is reasonable to keep an eye on her liver with her CAT scans.  If she is signs of worsening of portal hypertension on her imaging would reconsider evaluation of liver fibrosis.  If she has signs of progressive liver disease on her CT scan (nodular appearing liver), discussed that would require long-term follow-up in our clinic.  If her alkaline phosphatase rises again would consider evaluation including a biopsy but right now no plan for this.  Alcohol discussed the treatment metabolic liver disease is improving metabolic risk factors.    No orders of the defined types were placed in this encounter.      RTC PRN    Senia Meadows MD MS  Hepatology/Liver Transplant  [facility]        Again, thank you for allowing me to participate in the care of your patient.        Sincerely,        Senia Meadows MD    Electronically signed

## 2025-05-15 ENCOUNTER — LAB (OUTPATIENT)
Dept: LAB | Facility: CLINIC | Age: 70
End: 2025-05-15
Payer: COMMERCIAL

## 2025-05-15 ENCOUNTER — HOSPITAL ENCOUNTER (OUTPATIENT)
Dept: CT IMAGING | Facility: CLINIC | Age: 70
Discharge: HOME | End: 2025-05-15
Attending: INTERNAL MEDICINE
Payer: COMMERCIAL

## 2025-05-15 DIAGNOSIS — N18.32 STAGE 3B CHRONIC KIDNEY DISEASE (H): ICD-10-CM

## 2025-05-15 DIAGNOSIS — E78.5 HYPERLIPIDEMIA LDL GOAL <100: ICD-10-CM

## 2025-05-15 DIAGNOSIS — C18.7 MALIGNANT NEOPLASM OF SIGMOID COLON (H): ICD-10-CM

## 2025-05-15 LAB
ALBUMIN SERPL BCG-MCNC: 4.3 G/DL (ref 3.5–5.2)
ALP SERPL-CCNC: 160 U/L (ref 40–150)
ALT SERPL W P-5'-P-CCNC: 21 U/L (ref 0–50)
ANION GAP SERPL CALCULATED.3IONS-SCNC: 13 MMOL/L (ref 7–15)
AST SERPL W P-5'-P-CCNC: 28 U/L (ref 0–45)
BASOPHILS # BLD AUTO: 0 10E3/UL (ref 0–0.2)
BASOPHILS NFR BLD AUTO: 1 %
BILIRUB SERPL-MCNC: 0.9 MG/DL
BUN SERPL-MCNC: 26 MG/DL (ref 8–23)
CALCIUM SERPL-MCNC: 9.5 MG/DL (ref 8.8–10.4)
CEA SERPL-MCNC: 1.7 NG/ML
CHLORIDE SERPL-SCNC: 106 MMOL/L (ref 98–107)
CHOLEST SERPL-MCNC: 181 MG/DL
CREAT SERPL-MCNC: 1.8 MG/DL (ref 0.51–0.95)
CREAT UR-MCNC: 149.8 MG/DL
EGFRCR SERPLBLD CKD-EPI 2021: 30 ML/MIN/1.73M2
EOSINOPHIL # BLD AUTO: 0 10E3/UL (ref 0–0.7)
EOSINOPHIL NFR BLD AUTO: 0 %
ERYTHROCYTE [DISTWIDTH] IN BLOOD BY AUTOMATED COUNT: 13.9 % (ref 10–15)
FASTING STATUS PATIENT QL REPORTED: NO
FASTING STATUS PATIENT QL REPORTED: NO
GLUCOSE SERPL-MCNC: 142 MG/DL (ref 70–99)
HCO3 SERPL-SCNC: 24 MMOL/L (ref 22–29)
HCT VFR BLD AUTO: 40.3 % (ref 35–47)
HDLC SERPL-MCNC: 42 MG/DL
HGB BLD-MCNC: 13.1 G/DL (ref 11.7–15.7)
IMM GRANULOCYTES # BLD: 0 10E3/UL
IMM GRANULOCYTES NFR BLD: 1 %
LDLC SERPL CALC-MCNC: 67 MG/DL
LYMPHOCYTES # BLD AUTO: 0.5 10E3/UL (ref 0.8–5.3)
LYMPHOCYTES NFR BLD AUTO: 10 %
MCH RBC QN AUTO: 27.9 PG (ref 26.5–33)
MCHC RBC AUTO-ENTMCNC: 32.5 G/DL (ref 31.5–36.5)
MCV RBC AUTO: 86 FL (ref 78–100)
MICROALBUMIN UR-MCNC: <12 MG/L
MICROALBUMIN/CREAT UR: NORMAL MG/G{CREAT}
MONOCYTES # BLD AUTO: 0.5 10E3/UL (ref 0–1.3)
MONOCYTES NFR BLD AUTO: 9 %
NEUTROPHILS # BLD AUTO: 4.4 10E3/UL (ref 1.6–8.3)
NEUTROPHILS NFR BLD AUTO: 80 %
NONHDLC SERPL-MCNC: 139 MG/DL
NRBC # BLD AUTO: 0 10E3/UL
NRBC BLD AUTO-RTO: 0 /100
PLATELET # BLD AUTO: 162 10E3/UL (ref 150–450)
POTASSIUM SERPL-SCNC: 4.4 MMOL/L (ref 3.4–5.3)
PROT SERPL-MCNC: 6.6 G/DL (ref 6.4–8.3)
RBC # BLD AUTO: 4.69 10E6/UL (ref 3.8–5.2)
SODIUM SERPL-SCNC: 143 MMOL/L (ref 135–145)
TRIGL SERPL-MCNC: 359 MG/DL
WBC # BLD AUTO: 5.5 10E3/UL (ref 4–11)

## 2025-05-15 PROCEDURE — 74176 CT ABD & PELVIS W/O CONTRAST: CPT

## 2025-05-15 RX ORDER — IOPAMIDOL 755 MG/ML
500 INJECTION, SOLUTION INTRAVASCULAR ONCE
Status: DISCONTINUED | OUTPATIENT
Start: 2025-05-15 | End: 2025-05-15

## 2025-05-16 ENCOUNTER — RESULTS FOLLOW-UP (OUTPATIENT)
Dept: FAMILY MEDICINE | Facility: CLINIC | Age: 70
End: 2025-05-16

## 2025-05-21 ENCOUNTER — RESULTS FOLLOW-UP (OUTPATIENT)
Dept: ONCOLOGY | Facility: CLINIC | Age: 70
End: 2025-05-21

## 2025-05-22 ENCOUNTER — NURSE TRIAGE (OUTPATIENT)
Dept: NURSING | Facility: CLINIC | Age: 70
End: 2025-05-22

## 2025-05-23 NOTE — TELEPHONE ENCOUNTER
Patient takes metoprolol succinate ER (TOPROL XL) 50 mg 24 hr tablet (to take 1 tablet 50 mg by mouth daily) and patient accidentally took one Metoprolol 50 mg again with evening pills.  Patient just took medication about 20 minutes ago.   Denies dizziness and weakness.  Patient is requesting to speak with Cardiology on call.      Non-Primary Care Provider (Specialties/Contract Clinics)    Provider consult indicated.     Reason for page: Double dose of medication.      Page sent to Alex Puentes by Answering Service at 7:22 pm.   No call blocking for patient.  FNA advised patient to phone back if patient does not hear from MD within one hour and patient agrees.      Senia Blum RN    .        Reason for Disposition   [1] DOUBLE DOSE (an extra dose or lesser amount) of prescription drug AND [2] NO symptoms  (Exception: A double dose of antibiotics.)    Additional Information   Negative: [1] Intentional drug overdose AND [2] suicidal thoughts or ideas   Negative: Drug overdose and triager unable to answer question   Negative: Caller requesting a renewal or refill of a medicine patient is currently taking   Negative: Caller requesting information unrelated to medicine   Negative: Caller requesting information about COVID-19 Vaccine   Negative: Caller requesting information about Emergency Contraception   Negative: Caller requesting information about Combined Birth Control Pills   Negative: Caller requesting information about Progestin Birth Control Pills   Negative: Caller requesting information about Post-Op pain or medicines   Negative: Caller requesting a prescription antibiotic (such as Penicillin) for Strep throat and has a positive culture result   Negative: Caller requesting a prescription anti-viral med (such as Tamiflu) and has influenza (flu) symptoms   Negative: Immunization reaction suspected   Negative: Rash while taking a medicine or within 3 days of stopping it   Negative: [1] Asthma and [2]  having symptoms of asthma (cough, wheezing, etc.)   Negative: [1] Symptom of illness (e.g., headache, abdominal pain, earache, vomiting) AND [2] more than mild   Negative: Breastfeeding questions about mother's medicines and diet   Negative: MORE THAN A DOUBLE DOSE of a prescription or over-the-counter (OTC) drug   Negative: [1] DOUBLE DOSE (an extra dose or lesser amount) of prescription drug AND [2] any symptoms (e.g., dizziness, nausea, pain, sleepiness)   Negative: [1] DOUBLE DOSE (an extra dose or lesser amount) of over-the-counter (OTC) drug AND [2] any symptoms (e.g., dizziness, nausea, pain, sleepiness)   Negative: Took another person's prescription drug    Protocols used: Medication Question Call-A-

## 2025-07-31 ENCOUNTER — PATIENT OUTREACH (OUTPATIENT)
Dept: CARE COORDINATION | Facility: CLINIC | Age: 70
End: 2025-07-31
Payer: COMMERCIAL

## 2025-08-25 DIAGNOSIS — E55.9 VITAMIN D DEFICIENCY: ICD-10-CM

## 2025-08-26 RX ORDER — CHOLECALCIFEROL (VITAMIN D3) 50 MCG
1 TABLET ORAL DAILY
Qty: 30 TABLET | Refills: 6 | Status: SHIPPED | OUTPATIENT
Start: 2025-08-26

## (undated) DEVICE — Device

## (undated) DEVICE — SUCTION MANIFOLD NEPTUNE 2 SYS 4 PORT 0702-020-000

## (undated) DEVICE — STOCKING SLEEVE COMPRESSION CALF MED

## (undated) DEVICE — GLOVE BIOGEL PI MICRO SZ 7.0 48570

## (undated) DEVICE — STPL ECHELON CONTOUR CUTTER CVD 40MM GREEN GCS40G

## (undated) DEVICE — PAD CHUX UNDERPAD 30X36" P3036C

## (undated) DEVICE — SU VICRYL 2-0 SH 27" UND J417H

## (undated) DEVICE — SU VICRYL 0 UR-6 27" J603H

## (undated) DEVICE — ESU LIGASURE MARYLAND VESSEL LAP 44CM XLONG LF1944

## (undated) DEVICE — KIT ENDO TURNOVER/PROCEDURE CARRY-ON 101822

## (undated) DEVICE — SU SILK 0 TIE 6X30" A306H

## (undated) DEVICE — PREP CHLORAPREP 26ML TINTED ORANGE  260815

## (undated) DEVICE — DRAPE LAP TRANSVERSE 29421

## (undated) DEVICE — GLOVE BIOGEL PI MICRO SZ 6.0 48560

## (undated) DEVICE — SOL NACL 0.9% IRRIG 1000ML BOTTLE 2F7124

## (undated) DEVICE — ESU ELEC BLADE 2.75" COATED/INSULATED E1455

## (undated) DEVICE — SOL WATER IRRIG 500ML BOTTLE 2F7113

## (undated) DEVICE — ENDO TROCAR SLEEVE KII ADV FIXATION 05X100MM CFS02

## (undated) DEVICE — DRAPE C-ARM

## (undated) DEVICE — TUBING SUCTION MEDI-VAC 1/4"X20' N620A

## (undated) DEVICE — TUBING SUCTION 12"X1/4" N612

## (undated) DEVICE — STPL RELOAD LINEAR CUT 75MM TCR75

## (undated) DEVICE — DECANTER VIAL 2006S

## (undated) DEVICE — GLOVE BIOGEL PI INDICATOR 8.0 LF 41680

## (undated) DEVICE — SOL WATER IRRIG 1000ML BOTTLE 2F7114

## (undated) DEVICE — SU SILK 2-0 TIE 12X30" A305H

## (undated) DEVICE — ESU PENCIL SMOKE EVAC W/ROCKER SWITCH 0703-047-000

## (undated) DEVICE — DRSG STERI STRIP 1/4X3" R1541

## (undated) DEVICE — ANTIFOG SOLUTION W/FOAM PAD 31142527

## (undated) DEVICE — DRAPE LEGGINGS CLEAR 8430

## (undated) DEVICE — SU VICRYL 3-0 SH 27" UND J416H

## (undated) DEVICE — GLOVE BIOGEL PI MICRO INDICATOR UNDERGLOVE SZ 6.0 48960

## (undated) DEVICE — WIPES FOLEY CARE SURESTEP PROVON DFC100

## (undated) DEVICE — ADH SKIN CLOSURE PREMIERPRO EXOFIN 1.0ML 3470

## (undated) DEVICE — ENDO TROCAR BLUNT TIP KII BALLOON 12X100MM C0R47

## (undated) DEVICE — BLADE CLIPPER SGL USE 9680

## (undated) DEVICE — GLOVE BIOGEL PI ULTRATOUCH G SZ 7.5 42175

## (undated) DEVICE — CLIP HORIZON LG ORANGE 004200

## (undated) DEVICE — SPECIMEN CONTAINER 3OZ W/FORMALIN 59901

## (undated) DEVICE — SU VICRYL 0 TIE 54" J608H

## (undated) DEVICE — DRAPE IOBAN INCISE 23X17" 6650EZ

## (undated) DEVICE — GOWN IMPERVIOUS 2XL BLUE

## (undated) DEVICE — SU VICRYL 2-0 CT-2 27" UND J269H

## (undated) DEVICE — SU MONOCRYL 4-0 PS-2 18" UND Y496G

## (undated) DEVICE — PACK MINOR PROCEDURE CUSTOM

## (undated) DEVICE — SU VICRYL 2-0 CT-2 27" J333H

## (undated) DEVICE — DRAPE SHEET REV FOLD 3/4 9349

## (undated) DEVICE — SOL NACL 0.9% 10ML VIAL 0409-4888-02

## (undated) DEVICE — TUBING SMOKE EVAC PNEUMOCLEAR HIGH FLOW 0620050250

## (undated) DEVICE — WIPE PREMOIST CLEANSING WASHCLOTHS 7988

## (undated) DEVICE — ESU LIGASURE IMPACT OPEN SEALER/DVDR CVD LG JAW LF4418

## (undated) DEVICE — DRAPE MAYO STAND 23X54 8337

## (undated) DEVICE — BLADE KNIFE SURG 15 371115

## (undated) DEVICE — LINEN GOWN XLG 5407

## (undated) DEVICE — TUBING SUCTION 6"X3/16" N56A

## (undated) DEVICE — SUCTION MANIFOLD NEPTUNE 2 SYS 1 PORT 702-025-000

## (undated) DEVICE — LINEN TOWEL PACK X30 5481

## (undated) DEVICE — PITCHER STERILE 1000ML  SSK9004A

## (undated) DEVICE — PAD PERI INDIV WRAP 11" 2022A

## (undated) DEVICE — ENDO TROCAR FIRST ENTRY KII FIOS ADV FIX 05X100MM CFF03

## (undated) DEVICE — PREP CHLORAPREP 26ML TINTED HI-LITE ORANGE 930815

## (undated) DEVICE — COVER TRANSDUCER PROBE 7X24" 610-575

## (undated) DEVICE — SPONGE LAP 18X18" X8435

## (undated) DEVICE — SYR BULB IRRIG DOVER 60 ML LATEX FREE 67000

## (undated) DEVICE — SOL ADH LIQUID BENZOIN SWAB 0.6ML C1544

## (undated) DEVICE — STPL CIRCULAR 29MM CVD CDH29P

## (undated) DEVICE — KIT ENDO FIRST STEP DISINFECTANT 200ML W/POUCH EP-4

## (undated) DEVICE — SU CHROMIC 0 BP-1 27" 47T

## (undated) DEVICE — BLADE KNIFE SURG 11 371111

## (undated) DEVICE — PANTIES MESH LG/XLG 2PK 706M2

## (undated) DEVICE — SU PDS II 0 TP-1 60" Z991G

## (undated) DEVICE — STPL LINEAR CUT 75MM TLC75

## (undated) DEVICE — LUBRICATING JELLY 4.25OZ

## (undated) DEVICE — SU VICRYL 0 CT-1 CR 8X27" UND JJ41G

## (undated) DEVICE — SYR 10ML LL W/O NDL 302995

## (undated) DEVICE — SU MONOCRYL 4-0 PS-2 27" UND Y426H

## (undated) DEVICE — GLOVE BIOGEL PI MICRO INDICATOR UNDERGLOVE SZ 7.5 48975

## (undated) DEVICE — DRAPE SLEEVE 599

## (undated) DEVICE — KIT CONNECTOR FOR OLYMPUS ENDOSCOPES DEFENDO 100310

## (undated) DEVICE — GOWN IMPERVIOUS BREATHABLE SMART XLG 89045

## (undated) DEVICE — TUBING SUCTION 10'X3/16" N510

## (undated) DEVICE — PACK AB HYST II

## (undated) DEVICE — PAD CHUX UNDERPAD 23X24" 7136

## (undated) DEVICE — LINEN TOWEL PACK X6 WHITE 5487

## (undated) RX ORDER — ONDANSETRON 2 MG/ML
INJECTION INTRAMUSCULAR; INTRAVENOUS
Status: DISPENSED
Start: 2023-04-06

## (undated) RX ORDER — FENTANYL CITRATE 50 UG/ML
INJECTION, SOLUTION INTRAMUSCULAR; INTRAVENOUS
Status: DISPENSED
Start: 2023-08-21

## (undated) RX ORDER — PROPOFOL 10 MG/ML
INJECTION, EMULSION INTRAVENOUS
Status: DISPENSED
Start: 2023-05-25

## (undated) RX ORDER — HEPARIN SODIUM (PORCINE) LOCK FLUSH IV SOLN 100 UNIT/ML 100 UNIT/ML
SOLUTION INTRAVENOUS
Status: DISPENSED
Start: 2023-07-18

## (undated) RX ORDER — SODIUM CHLORIDE, SODIUM LACTATE, POTASSIUM CHLORIDE, CALCIUM CHLORIDE 600; 310; 30; 20 MG/100ML; MG/100ML; MG/100ML; MG/100ML
INJECTION, SOLUTION INTRAVENOUS
Status: DISPENSED
Start: 2023-04-06

## (undated) RX ORDER — FENTANYL CITRATE 50 UG/ML
INJECTION, SOLUTION INTRAMUSCULAR; INTRAVENOUS
Status: DISPENSED
Start: 2023-04-06

## (undated) RX ORDER — LIDOCAINE HYDROCHLORIDE 10 MG/ML
INJECTION, SOLUTION EPIDURAL; INFILTRATION; INTRACAUDAL; PERINEURAL
Status: DISPENSED
Start: 2023-02-27

## (undated) RX ORDER — HEPARIN SODIUM (PORCINE) LOCK FLUSH IV SOLN 100 UNIT/ML 100 UNIT/ML
SOLUTION INTRAVENOUS
Status: DISPENSED
Start: 2023-08-21

## (undated) RX ORDER — METRONIDAZOLE 500 MG/100ML
INJECTION, SOLUTION INTRAVENOUS
Status: DISPENSED
Start: 2023-04-06

## (undated) RX ORDER — HEPARIN SODIUM (PORCINE) LOCK FLUSH IV SOLN 100 UNIT/ML 100 UNIT/ML
SOLUTION INTRAVENOUS
Status: DISPENSED
Start: 2024-05-01

## (undated) RX ORDER — OXYCODONE HYDROCHLORIDE 5 MG/1
TABLET ORAL
Status: DISPENSED
Start: 2023-04-06

## (undated) RX ORDER — LIDOCAINE HYDROCHLORIDE 10 MG/ML
INJECTION, SOLUTION EPIDURAL; INFILTRATION; INTRACAUDAL; PERINEURAL
Status: DISPENSED
Start: 2024-07-31

## (undated) RX ORDER — LIDOCAINE HYDROCHLORIDE 10 MG/ML
INJECTION, SOLUTION INFILTRATION; PERINEURAL
Status: DISPENSED
Start: 2023-08-21

## (undated) RX ORDER — SODIUM CHLORIDE, SODIUM LACTATE, POTASSIUM CHLORIDE, CALCIUM CHLORIDE 600; 310; 30; 20 MG/100ML; MG/100ML; MG/100ML; MG/100ML
INJECTION, SOLUTION INTRAVENOUS
Status: DISPENSED
Start: 2023-04-07

## (undated) RX ORDER — ACETAMINOPHEN 325 MG/1
TABLET ORAL
Status: DISPENSED
Start: 2023-04-07

## (undated) RX ORDER — PROPOFOL 10 MG/ML
INJECTION, EMULSION INTRAVENOUS
Status: DISPENSED
Start: 2023-02-27

## (undated) RX ORDER — OXYCODONE HYDROCHLORIDE 5 MG/1
TABLET ORAL
Status: DISPENSED
Start: 2023-04-07

## (undated) RX ORDER — HYDROMORPHONE HCL IN WATER/PF 6 MG/30 ML
PATIENT CONTROLLED ANALGESIA SYRINGE INTRAVENOUS
Status: DISPENSED
Start: 2023-04-06

## (undated) RX ORDER — HEPARIN SODIUM (PORCINE) LOCK FLUSH IV SOLN 100 UNIT/ML 100 UNIT/ML
SOLUTION INTRAVENOUS
Status: DISPENSED
Start: 2023-05-25

## (undated) RX ORDER — BUPIVACAINE HYDROCHLORIDE AND EPINEPHRINE 5; 5 MG/ML; UG/ML
INJECTION, SOLUTION EPIDURAL; INTRACAUDAL; PERINEURAL
Status: DISPENSED
Start: 2023-05-25

## (undated) RX ORDER — CALCIUM CHLORIDE 100 MG/ML
INJECTION INTRAVENOUS; INTRAVENTRICULAR
Status: DISPENSED
Start: 2023-04-06

## (undated) RX ORDER — BUPIVACAINE HYDROCHLORIDE AND EPINEPHRINE 2.5; 5 MG/ML; UG/ML
INJECTION, SOLUTION EPIDURAL; INFILTRATION; INTRACAUDAL; PERINEURAL
Status: DISPENSED
Start: 2024-07-31

## (undated) RX ORDER — IPRATROPIUM BROMIDE AND ALBUTEROL SULFATE 2.5; .5 MG/3ML; MG/3ML
SOLUTION RESPIRATORY (INHALATION)
Status: DISPENSED
Start: 2023-04-06

## (undated) RX ORDER — FENTANYL CITRATE 50 UG/ML
INJECTION, SOLUTION INTRAMUSCULAR; INTRAVENOUS
Status: DISPENSED
Start: 2023-04-07

## (undated) RX ORDER — FENTANYL CITRATE 50 UG/ML
INJECTION, SOLUTION INTRAMUSCULAR; INTRAVENOUS
Status: DISPENSED
Start: 2024-07-31

## (undated) RX ORDER — LIDOCAINE HYDROCHLORIDE 10 MG/ML
INJECTION, SOLUTION INFILTRATION; PERINEURAL
Status: DISPENSED
Start: 2024-07-31

## (undated) RX ORDER — ACETAMINOPHEN 325 MG/1
TABLET ORAL
Status: DISPENSED
Start: 2023-04-06

## (undated) RX ORDER — CEFAZOLIN SODIUM 2 G/100ML
INJECTION, SOLUTION INTRAVENOUS
Status: DISPENSED
Start: 2023-08-21

## (undated) RX ORDER — GABAPENTIN 100 MG/1
CAPSULE ORAL
Status: DISPENSED
Start: 2023-04-07

## (undated) RX ORDER — SCOLOPAMINE TRANSDERMAL SYSTEM 1 MG/1
PATCH, EXTENDED RELEASE TRANSDERMAL
Status: DISPENSED
Start: 2024-07-31

## (undated) RX ORDER — PROPOFOL 10 MG/ML
INJECTION, EMULSION INTRAVENOUS
Status: DISPENSED
Start: 2024-07-31

## (undated) RX ORDER — FENTANYL CITRATE 50 UG/ML
INJECTION, SOLUTION INTRAMUSCULAR; INTRAVENOUS
Status: DISPENSED
Start: 2023-05-25

## (undated) RX ORDER — EPHEDRINE SULFATE 50 MG/ML
INJECTION, SOLUTION INTRAMUSCULAR; INTRAVENOUS; SUBCUTANEOUS
Status: DISPENSED
Start: 2023-04-06

## (undated) RX ORDER — DEXAMETHASONE SODIUM PHOSPHATE 10 MG/ML
INJECTION, SOLUTION INTRAMUSCULAR; INTRAVENOUS
Status: DISPENSED
Start: 2023-05-25

## (undated) RX ORDER — ONDANSETRON 2 MG/ML
INJECTION INTRAMUSCULAR; INTRAVENOUS
Status: DISPENSED
Start: 2023-05-25

## (undated) RX ORDER — ENOXAPARIN SODIUM 100 MG/ML
INJECTION SUBCUTANEOUS
Status: DISPENSED
Start: 2023-04-06

## (undated) RX ORDER — FENTANYL CITRATE-0.9 % NACL/PF 10 MCG/ML
PLASTIC BAG, INJECTION (ML) INTRAVENOUS
Status: DISPENSED
Start: 2023-02-27

## (undated) RX ORDER — CEFAZOLIN SODIUM/WATER 2 G/20 ML
SYRINGE (ML) INTRAVENOUS
Status: DISPENSED
Start: 2023-04-06

## (undated) RX ORDER — LIDOCAINE HYDROCHLORIDE 10 MG/ML
INJECTION, SOLUTION INFILTRATION; PERINEURAL
Status: DISPENSED
Start: 2023-07-18

## (undated) RX ORDER — ONDANSETRON 2 MG/ML
INJECTION INTRAMUSCULAR; INTRAVENOUS
Status: DISPENSED
Start: 2023-02-27